# Patient Record
Sex: FEMALE | Race: WHITE | NOT HISPANIC OR LATINO | Employment: OTHER | URBAN - METROPOLITAN AREA
[De-identification: names, ages, dates, MRNs, and addresses within clinical notes are randomized per-mention and may not be internally consistent; named-entity substitution may affect disease eponyms.]

---

## 2017-03-02 ENCOUNTER — CONVERSION ENCOUNTER (OUTPATIENT)
Dept: MAMMOGRAPHY | Facility: HOSPITAL | Age: 62
End: 2017-03-02

## 2017-08-10 ENCOUNTER — HOSPITAL ENCOUNTER (OUTPATIENT)
Dept: HOSPITAL 23 - COPS | Age: 62
Discharge: HOME | End: 2017-08-10
Attending: SURGERY
Payer: COMMERCIAL

## 2017-08-10 DIAGNOSIS — K64.4: Primary | ICD-10-CM

## 2017-08-10 DIAGNOSIS — Z98.890: ICD-10-CM

## 2017-08-10 DIAGNOSIS — Z79.899: ICD-10-CM

## 2017-08-10 DIAGNOSIS — E66.01: ICD-10-CM

## 2017-08-10 DIAGNOSIS — Z98.51: ICD-10-CM

## 2017-08-10 DIAGNOSIS — K52.9: ICD-10-CM

## 2017-08-10 DIAGNOSIS — Z90.721: ICD-10-CM

## 2017-08-10 DIAGNOSIS — Z96.653: ICD-10-CM

## 2017-08-10 DIAGNOSIS — Z90.710: ICD-10-CM

## 2017-08-10 DIAGNOSIS — J30.2: ICD-10-CM

## 2017-08-10 DIAGNOSIS — J45.909: ICD-10-CM

## 2017-08-10 DIAGNOSIS — Z79.1: ICD-10-CM

## 2017-08-10 DIAGNOSIS — Z88.5: ICD-10-CM

## 2017-08-10 DIAGNOSIS — Z80.0: ICD-10-CM

## 2017-08-10 DIAGNOSIS — Z87.442: ICD-10-CM

## 2017-08-10 DIAGNOSIS — I10: ICD-10-CM

## 2017-08-10 DIAGNOSIS — G47.30: ICD-10-CM

## 2018-01-04 ENCOUNTER — OFFICE VISIT CONVERTED (OUTPATIENT)
Dept: INTERNAL MEDICINE | Facility: CLINIC | Age: 63
End: 2018-01-04
Attending: INTERNAL MEDICINE

## 2018-01-10 ENCOUNTER — OFFICE VISIT CONVERTED (OUTPATIENT)
Dept: CARDIOLOGY | Facility: CLINIC | Age: 63
End: 2018-01-10
Attending: INTERNAL MEDICINE

## 2018-01-26 ENCOUNTER — OFFICE VISIT CONVERTED (OUTPATIENT)
Dept: PULMONOLOGY | Facility: CLINIC | Age: 63
End: 2018-01-26
Attending: PHYSICIAN ASSISTANT

## 2018-02-26 ENCOUNTER — OFFICE VISIT CONVERTED (OUTPATIENT)
Dept: INTERNAL MEDICINE | Facility: CLINIC | Age: 63
End: 2018-02-26
Attending: INTERNAL MEDICINE

## 2018-03-19 ENCOUNTER — CONVERSION ENCOUNTER (OUTPATIENT)
Dept: CARDIOLOGY | Facility: CLINIC | Age: 63
End: 2018-03-19

## 2018-03-19 ENCOUNTER — CONVERSION ENCOUNTER (OUTPATIENT)
Dept: CARDIOLOGY | Facility: CLINIC | Age: 63
End: 2018-03-19
Attending: INTERNAL MEDICINE

## 2018-03-22 ENCOUNTER — OFFICE VISIT CONVERTED (OUTPATIENT)
Dept: PULMONOLOGY | Facility: CLINIC | Age: 63
End: 2018-03-22
Attending: INTERNAL MEDICINE

## 2018-04-17 ENCOUNTER — CONVERSION ENCOUNTER (OUTPATIENT)
Dept: MAMMOGRAPHY | Facility: HOSPITAL | Age: 63
End: 2018-04-17

## 2018-04-24 ENCOUNTER — CONVERSION ENCOUNTER (OUTPATIENT)
Dept: ULTRASOUND IMAGING | Facility: HOSPITAL | Age: 63
End: 2018-04-24

## 2018-05-21 ENCOUNTER — OFFICE VISIT CONVERTED (OUTPATIENT)
Dept: PULMONOLOGY | Facility: CLINIC | Age: 63
End: 2018-05-21
Attending: INTERNAL MEDICINE

## 2018-05-31 ENCOUNTER — OFFICE VISIT CONVERTED (OUTPATIENT)
Dept: SURGERY | Facility: CLINIC | Age: 63
End: 2018-05-31
Attending: SURGERY

## 2018-06-15 ENCOUNTER — OFFICE VISIT CONVERTED (OUTPATIENT)
Dept: INTERNAL MEDICINE | Facility: CLINIC | Age: 63
End: 2018-06-15
Attending: INTERNAL MEDICINE

## 2018-06-15 ENCOUNTER — CONVERSION ENCOUNTER (OUTPATIENT)
Dept: INTERNAL MEDICINE | Facility: CLINIC | Age: 63
End: 2018-06-15

## 2018-06-19 ENCOUNTER — CONVERSION ENCOUNTER (OUTPATIENT)
Dept: PERIOP | Facility: HOSPITAL | Age: 63
End: 2018-06-19

## 2018-06-27 ENCOUNTER — OFFICE VISIT CONVERTED (OUTPATIENT)
Dept: SURGERY | Facility: CLINIC | Age: 63
End: 2018-06-27
Attending: SURGERY

## 2018-07-11 ENCOUNTER — OFFICE VISIT CONVERTED (OUTPATIENT)
Dept: SURGERY | Facility: CLINIC | Age: 63
End: 2018-07-11
Attending: SURGERY

## 2018-07-25 ENCOUNTER — OFFICE VISIT CONVERTED (OUTPATIENT)
Dept: INTERNAL MEDICINE | Facility: CLINIC | Age: 63
End: 2018-07-25
Attending: INTERNAL MEDICINE

## 2018-08-13 ENCOUNTER — OFFICE VISIT CONVERTED (OUTPATIENT)
Dept: PULMONOLOGY | Facility: CLINIC | Age: 63
End: 2018-08-13
Attending: INTERNAL MEDICINE

## 2018-09-13 ENCOUNTER — OFFICE VISIT CONVERTED (OUTPATIENT)
Dept: SURGERY | Facility: CLINIC | Age: 63
End: 2018-09-13
Attending: SURGERY

## 2018-09-24 ENCOUNTER — OFFICE VISIT CONVERTED (OUTPATIENT)
Dept: INTERNAL MEDICINE | Facility: CLINIC | Age: 63
End: 2018-09-24
Attending: INTERNAL MEDICINE

## 2018-09-26 ENCOUNTER — OFFICE VISIT CONVERTED (OUTPATIENT)
Dept: CARDIOLOGY | Facility: CLINIC | Age: 63
End: 2018-09-26
Attending: NURSE PRACTITIONER

## 2018-11-01 ENCOUNTER — OFFICE VISIT CONVERTED (OUTPATIENT)
Dept: PULMONOLOGY | Facility: CLINIC | Age: 63
End: 2018-11-01
Attending: INTERNAL MEDICINE

## 2018-11-27 ENCOUNTER — OFFICE VISIT CONVERTED (OUTPATIENT)
Dept: INTERNAL MEDICINE | Facility: CLINIC | Age: 63
End: 2018-11-27
Attending: NURSE PRACTITIONER

## 2018-12-03 ENCOUNTER — OFFICE VISIT CONVERTED (OUTPATIENT)
Dept: INTERNAL MEDICINE | Facility: CLINIC | Age: 63
End: 2018-12-03
Attending: NURSE PRACTITIONER

## 2018-12-07 ENCOUNTER — OFFICE VISIT CONVERTED (OUTPATIENT)
Dept: INTERNAL MEDICINE | Facility: CLINIC | Age: 63
End: 2018-12-07
Attending: NURSE PRACTITIONER

## 2019-01-02 ENCOUNTER — OFFICE VISIT CONVERTED (OUTPATIENT)
Dept: INTERNAL MEDICINE | Facility: CLINIC | Age: 64
End: 2019-01-02
Attending: INTERNAL MEDICINE

## 2019-01-15 ENCOUNTER — HOSPITAL ENCOUNTER (OUTPATIENT)
Dept: CT IMAGING | Facility: HOSPITAL | Age: 64
Discharge: HOME | End: 2019-01-15
Attending: INTERNAL MEDICINE

## 2019-01-16 ENCOUNTER — HOSPITAL ENCOUNTER (OUTPATIENT)
Dept: CARDIOLOGY | Facility: HOSPITAL | Age: 64
Discharge: HOME | End: 2019-01-16
Attending: INTERNAL MEDICINE

## 2019-01-29 ENCOUNTER — OFFICE VISIT CONVERTED (OUTPATIENT)
Dept: INTERNAL MEDICINE | Facility: CLINIC | Age: 64
End: 2019-01-29
Attending: INTERNAL MEDICINE

## 2019-01-31 ENCOUNTER — OFFICE VISIT CONVERTED (OUTPATIENT)
Dept: PULMONOLOGY | Facility: CLINIC | Age: 64
End: 2019-01-31
Attending: INTERNAL MEDICINE

## 2019-03-07 ENCOUNTER — OFFICE VISIT CONVERTED (OUTPATIENT)
Dept: INTERNAL MEDICINE | Facility: CLINIC | Age: 64
End: 2019-03-07
Attending: NURSE PRACTITIONER

## 2019-08-16 ENCOUNTER — HOSPITAL ENCOUNTER (OUTPATIENT)
Dept: OTHER | Facility: HOSPITAL | Age: 64
Discharge: HOME | End: 2019-08-16
Attending: INTERNAL MEDICINE

## 2019-08-16 LAB
25(OH)D3 SERPL-MCNC: 31.2 NG/ML (ref 30–100)
ALBUMIN SERPL-MCNC: 4 G/DL (ref 3.5–5)
ALBUMIN/GLOB SERPL: 1.2 {RATIO} (ref 1.4–2.6)
ALP SERPL-CCNC: 94 U/L (ref 43–160)
ALT SERPL-CCNC: 19 U/L (ref 10–40)
ANION GAP SERPL CALC-SCNC: 18 MMOL/L (ref 8–19)
AST SERPL-CCNC: 18 U/L (ref 15–50)
BASOPHILS # BLD AUTO: 0.06 10*3/UL (ref 0–0.2)
BASOPHILS NFR BLD AUTO: 0.6 % (ref 0–3)
BILIRUB SERPL-MCNC: 0.36 MG/DL (ref 0.2–1.3)
BUN SERPL-MCNC: 18 MG/DL (ref 5–25)
BUN/CREAT SERPL: 24 {RATIO} (ref 6–20)
CALCIUM SERPL-MCNC: 9.2 MG/DL (ref 8.7–10.4)
CHLORIDE SERPL-SCNC: 101 MMOL/L (ref 99–111)
CHOLEST SERPL-MCNC: 155 MG/DL (ref 107–200)
CHOLEST/HDLC SERPL: 3.2 {RATIO} (ref 3–6)
CONV ABS IMM GRAN: 0.04 10*3/UL (ref 0–0.2)
CONV CO2: 26 MMOL/L (ref 22–32)
CONV IMMATURE GRAN: 0.4 % (ref 0–1.8)
CONV TOTAL PROTEIN: 7.3 G/DL (ref 6.3–8.2)
CREAT UR-MCNC: 0.75 MG/DL (ref 0.5–0.9)
DEPRECATED RDW RBC AUTO: 48.3 FL (ref 36.4–46.3)
EOSINOPHIL # BLD AUTO: 0.17 10*3/UL (ref 0–0.7)
EOSINOPHIL # BLD AUTO: 1.8 % (ref 0–7)
ERYTHROCYTE [DISTWIDTH] IN BLOOD BY AUTOMATED COUNT: 15.2 % (ref 11.7–14.4)
EST. AVERAGE GLUCOSE BLD GHB EST-MCNC: 131 MG/DL
GFR SERPLBLD BASED ON 1.73 SQ M-ARVRAT: >60 ML/MIN/{1.73_M2}
GLOBULIN UR ELPH-MCNC: 3.3 G/DL (ref 2–3.5)
GLUCOSE SERPL-MCNC: 138 MG/DL (ref 65–99)
HBA1C MFR BLD: 6.2 % (ref 3.5–5.7)
HCT VFR BLD AUTO: 38.6 % (ref 37–47)
HDLC SERPL-MCNC: 49 MG/DL (ref 40–60)
HGB BLD-MCNC: 11.5 G/DL (ref 12–16)
IRON SATN MFR SERPL: 8 % (ref 20–55)
IRON SERPL-MCNC: 40 UG/DL (ref 60–170)
LDLC SERPL CALC-MCNC: 84 MG/DL (ref 70–100)
LYMPHOCYTES # BLD AUTO: 1.67 10*3/UL (ref 1–5)
LYMPHOCYTES NFR BLD AUTO: 17.5 % (ref 20–45)
MCH RBC QN AUTO: 26 PG (ref 27–31)
MCHC RBC AUTO-ENTMCNC: 29.8 G/DL (ref 33–37)
MCV RBC AUTO: 87.3 FL (ref 81–99)
MONOCYTES # BLD AUTO: 0.8 10*3/UL (ref 0.2–1.2)
MONOCYTES NFR BLD AUTO: 8.4 % (ref 3–10)
NEUTROPHILS # BLD AUTO: 6.81 10*3/UL (ref 2–8)
NEUTROPHILS NFR BLD AUTO: 71.3 % (ref 30–85)
NRBC CBCN: 0 % (ref 0–0.7)
OSMOLALITY SERPL CALC.SUM OF ELEC: 294 MOSM/KG (ref 273–304)
PLATELET # BLD AUTO: 370 10*3/UL (ref 130–400)
PMV BLD AUTO: 10.1 FL (ref 9.4–12.3)
POTASSIUM SERPL-SCNC: 4.6 MMOL/L (ref 3.5–5.3)
RBC # BLD AUTO: 4.42 10*6/UL (ref 4.2–5.4)
SODIUM SERPL-SCNC: 140 MMOL/L (ref 135–147)
TIBC SERPL-MCNC: 488 UG/DL (ref 245–450)
TRANSFERRIN SERPL-MCNC: 341 MG/DL (ref 250–380)
TRIGL SERPL-MCNC: 112 MG/DL (ref 40–150)
VLDLC SERPL-MCNC: 22 MG/DL (ref 5–37)
WBC # BLD AUTO: 9.55 10*3/UL (ref 4.8–10.8)

## 2019-08-19 ENCOUNTER — OFFICE VISIT CONVERTED (OUTPATIENT)
Dept: CARDIOLOGY | Facility: CLINIC | Age: 64
End: 2019-08-19
Attending: INTERNAL MEDICINE

## 2019-08-21 ENCOUNTER — OFFICE VISIT CONVERTED (OUTPATIENT)
Dept: INTERNAL MEDICINE | Facility: CLINIC | Age: 64
End: 2019-08-21
Attending: INTERNAL MEDICINE

## 2019-11-04 ENCOUNTER — OFFICE VISIT CONVERTED (OUTPATIENT)
Dept: PULMONOLOGY | Facility: CLINIC | Age: 64
End: 2019-11-04
Attending: PHYSICIAN ASSISTANT

## 2019-12-23 ENCOUNTER — HOSPITAL ENCOUNTER (OUTPATIENT)
Dept: OTHER | Facility: HOSPITAL | Age: 64
Discharge: HOME | End: 2019-12-23
Attending: NURSE PRACTITIONER

## 2019-12-23 ENCOUNTER — OFFICE VISIT CONVERTED (OUTPATIENT)
Dept: INTERNAL MEDICINE | Facility: CLINIC | Age: 64
End: 2019-12-23
Attending: NURSE PRACTITIONER

## 2019-12-23 ENCOUNTER — CONVERSION ENCOUNTER (OUTPATIENT)
Dept: INTERNAL MEDICINE | Facility: CLINIC | Age: 64
End: 2019-12-23

## 2020-01-10 ENCOUNTER — HOSPITAL ENCOUNTER (OUTPATIENT)
Dept: MAMMOGRAPHY | Facility: HOSPITAL | Age: 65
Discharge: HOME | End: 2020-01-10
Attending: INTERNAL MEDICINE

## 2020-01-16 ENCOUNTER — HOSPITAL ENCOUNTER (OUTPATIENT)
Dept: OTHER | Facility: HOSPITAL | Age: 65
Discharge: HOME | End: 2020-01-16
Attending: INTERNAL MEDICINE

## 2020-01-16 LAB
ANION GAP SERPL CALC-SCNC: 18 MMOL/L (ref 8–19)
BASOPHILS # BLD AUTO: 0.04 10*3/UL (ref 0–0.2)
BASOPHILS NFR BLD AUTO: 0.3 % (ref 0–3)
BUN SERPL-MCNC: 16 MG/DL (ref 5–25)
BUN/CREAT SERPL: 21 {RATIO} (ref 6–20)
CALCIUM SERPL-MCNC: 9.7 MG/DL (ref 8.7–10.4)
CHLORIDE SERPL-SCNC: 100 MMOL/L (ref 99–111)
CONV ABS IMM GRAN: 0.04 10*3/UL (ref 0–0.2)
CONV CO2: 25 MMOL/L (ref 22–32)
CONV IMMATURE GRAN: 0.3 % (ref 0–1.8)
CREAT UR-MCNC: 0.76 MG/DL (ref 0.5–0.9)
DEPRECATED RDW RBC AUTO: 48.5 FL (ref 36.4–46.3)
EOSINOPHIL # BLD AUTO: 0.16 10*3/UL (ref 0–0.7)
EOSINOPHIL # BLD AUTO: 1.3 % (ref 0–7)
ERYTHROCYTE [DISTWIDTH] IN BLOOD BY AUTOMATED COUNT: 15.1 % (ref 11.7–14.4)
GFR SERPLBLD BASED ON 1.73 SQ M-ARVRAT: >60 ML/MIN/{1.73_M2}
GLUCOSE SERPL-MCNC: 103 MG/DL (ref 65–99)
HCT VFR BLD AUTO: 41.8 % (ref 37–47)
HGB BLD-MCNC: 12.3 G/DL (ref 12–16)
LYMPHOCYTES # BLD AUTO: 2.68 10*3/UL (ref 1–5)
LYMPHOCYTES NFR BLD AUTO: 22.3 % (ref 20–45)
MCH RBC QN AUTO: 26.2 PG (ref 27–31)
MCHC RBC AUTO-ENTMCNC: 29.4 G/DL (ref 33–37)
MCV RBC AUTO: 89.1 FL (ref 81–99)
MONOCYTES # BLD AUTO: 0.79 10*3/UL (ref 0.2–1.2)
MONOCYTES NFR BLD AUTO: 6.6 % (ref 3–10)
NEUTROPHILS # BLD AUTO: 8.33 10*3/UL (ref 2–8)
NEUTROPHILS NFR BLD AUTO: 69.2 % (ref 30–85)
NRBC CBCN: 0 % (ref 0–0.7)
OSMOLALITY SERPL CALC.SUM OF ELEC: 287 MOSM/KG (ref 273–304)
PLATELET # BLD AUTO: 363 10*3/UL (ref 130–400)
PMV BLD AUTO: 10.3 FL (ref 9.4–12.3)
POTASSIUM SERPL-SCNC: 4.5 MMOL/L (ref 3.5–5.3)
RBC # BLD AUTO: 4.69 10*6/UL (ref 4.2–5.4)
SODIUM SERPL-SCNC: 138 MMOL/L (ref 135–147)
WBC # BLD AUTO: 12.04 10*3/UL (ref 4.8–10.8)

## 2020-11-04 ENCOUNTER — CONVERSION ENCOUNTER (OUTPATIENT)
Dept: CARDIOLOGY | Facility: CLINIC | Age: 65
End: 2020-11-04

## 2020-11-04 ENCOUNTER — OFFICE VISIT CONVERTED (OUTPATIENT)
Dept: CARDIOLOGY | Facility: CLINIC | Age: 65
End: 2020-11-04
Attending: INTERNAL MEDICINE

## 2020-11-12 ENCOUNTER — CONVERSION ENCOUNTER (OUTPATIENT)
Dept: INTERNAL MEDICINE | Facility: CLINIC | Age: 65
End: 2020-11-12

## 2020-11-12 ENCOUNTER — OFFICE VISIT CONVERTED (OUTPATIENT)
Dept: INTERNAL MEDICINE | Facility: CLINIC | Age: 65
End: 2020-11-12
Attending: STUDENT IN AN ORGANIZED HEALTH CARE EDUCATION/TRAINING PROGRAM

## 2020-11-12 ENCOUNTER — HOSPITAL ENCOUNTER (OUTPATIENT)
Dept: OTHER | Facility: HOSPITAL | Age: 65
Discharge: HOME | End: 2020-11-12
Attending: STUDENT IN AN ORGANIZED HEALTH CARE EDUCATION/TRAINING PROGRAM

## 2020-11-12 LAB
EST. AVERAGE GLUCOSE BLD GHB EST-MCNC: 163 MG/DL
HBA1C MFR BLD: 7.3 % (ref 3.5–5.7)
IRON SATN MFR SERPL: 5 % (ref 20–55)
IRON SERPL-MCNC: 27 UG/DL (ref 60–170)
TIBC SERPL-MCNC: 513 UG/DL (ref 245–450)
TRANSFERRIN SERPL-MCNC: 359 MG/DL (ref 250–380)

## 2020-11-13 LAB
25(OH)D3 SERPL-MCNC: 35 NG/ML (ref 30–100)
ALBUMIN SERPL-MCNC: 4 G/DL (ref 3.5–5)
ALBUMIN/GLOB SERPL: 1.2 {RATIO} (ref 1.4–2.6)
ALP SERPL-CCNC: 111 U/L (ref 43–160)
ALT SERPL-CCNC: 17 U/L (ref 10–40)
ANION GAP SERPL CALC-SCNC: 16 MMOL/L (ref 8–19)
AST SERPL-CCNC: 17 U/L (ref 15–50)
BASOPHILS # BLD AUTO: 0.03 10*3/UL (ref 0–0.2)
BASOPHILS NFR BLD AUTO: 0.3 % (ref 0–3)
BILIRUB SERPL-MCNC: 0.37 MG/DL (ref 0.2–1.3)
BUN SERPL-MCNC: 15 MG/DL (ref 5–25)
BUN/CREAT SERPL: 21 {RATIO} (ref 6–20)
CALCIUM SERPL-MCNC: 9.6 MG/DL (ref 8.7–10.4)
CHLORIDE SERPL-SCNC: 100 MMOL/L (ref 99–111)
CHOLEST SERPL-MCNC: 140 MG/DL (ref 107–200)
CHOLEST/HDLC SERPL: 3.2 {RATIO} (ref 3–6)
CONV ABS IMM GRAN: 0.08 10*3/UL (ref 0–0.2)
CONV CO2: 28 MMOL/L (ref 22–32)
CONV IMMATURE GRAN: 0.9 % (ref 0–1.8)
CONV TOTAL PROTEIN: 7.3 G/DL (ref 6.3–8.2)
CREAT UR-MCNC: 0.73 MG/DL (ref 0.5–0.9)
DEPRECATED RDW RBC AUTO: 52.2 FL (ref 36.4–46.3)
EOSINOPHIL # BLD AUTO: 0.13 10*3/UL (ref 0–0.7)
EOSINOPHIL # BLD AUTO: 1.4 % (ref 0–7)
ERYTHROCYTE [DISTWIDTH] IN BLOOD BY AUTOMATED COUNT: 17.2 % (ref 11.7–14.4)
GFR SERPLBLD BASED ON 1.73 SQ M-ARVRAT: >60 ML/MIN/{1.73_M2}
GLOBULIN UR ELPH-MCNC: 3.3 G/DL (ref 2–3.5)
GLUCOSE SERPL-MCNC: 122 MG/DL (ref 65–99)
HCT VFR BLD AUTO: 38.9 % (ref 37–47)
HDLC SERPL-MCNC: 44 MG/DL (ref 40–60)
HGB BLD-MCNC: 11 G/DL (ref 12–16)
LDLC SERPL CALC-MCNC: 75 MG/DL (ref 70–100)
LYMPHOCYTES # BLD AUTO: 1.44 10*3/UL (ref 1–5)
LYMPHOCYTES NFR BLD AUTO: 15.4 % (ref 20–45)
MCH RBC QN AUTO: 23.5 PG (ref 27–31)
MCHC RBC AUTO-ENTMCNC: 28.3 G/DL (ref 33–37)
MCV RBC AUTO: 83.1 FL (ref 81–99)
MONOCYTES # BLD AUTO: 0.7 10*3/UL (ref 0.2–1.2)
MONOCYTES NFR BLD AUTO: 7.5 % (ref 3–10)
NEUTROPHILS # BLD AUTO: 6.98 10*3/UL (ref 2–8)
NEUTROPHILS NFR BLD AUTO: 74.5 % (ref 30–85)
NRBC CBCN: 0 % (ref 0–0.7)
OSMOLALITY SERPL CALC.SUM OF ELEC: 290 MOSM/KG (ref 273–304)
PLATELET # BLD AUTO: 408 10*3/UL (ref 130–400)
PMV BLD AUTO: 10.9 FL (ref 9.4–12.3)
POTASSIUM SERPL-SCNC: 4.9 MMOL/L (ref 3.5–5.3)
RBC # BLD AUTO: 4.68 10*6/UL (ref 4.2–5.4)
SODIUM SERPL-SCNC: 139 MMOL/L (ref 135–147)
TRIGL SERPL-MCNC: 103 MG/DL (ref 40–150)
TSH SERPL-ACNC: 1.15 M[IU]/L (ref 0.27–4.2)
VLDLC SERPL-MCNC: 21 MG/DL (ref 5–37)
WBC # BLD AUTO: 9.36 10*3/UL (ref 4.8–10.8)

## 2020-11-18 ENCOUNTER — HOSPITAL ENCOUNTER (OUTPATIENT)
Dept: GENERAL RADIOLOGY | Facility: HOSPITAL | Age: 65
Discharge: HOME | End: 2020-11-18
Attending: STUDENT IN AN ORGANIZED HEALTH CARE EDUCATION/TRAINING PROGRAM

## 2020-12-23 ENCOUNTER — CONVERSION ENCOUNTER (OUTPATIENT)
Dept: INTERNAL MEDICINE | Facility: CLINIC | Age: 65
End: 2020-12-23

## 2020-12-23 ENCOUNTER — OFFICE VISIT CONVERTED (OUTPATIENT)
Dept: INTERNAL MEDICINE | Facility: CLINIC | Age: 65
End: 2020-12-23
Attending: STUDENT IN AN ORGANIZED HEALTH CARE EDUCATION/TRAINING PROGRAM

## 2021-01-04 ENCOUNTER — TELEPHONE (OUTPATIENT)
Dept: OBSTETRICS AND GYNECOLOGY | Facility: CLINIC | Age: 66
End: 2021-01-04

## 2021-01-04 NOTE — TELEPHONE ENCOUNTER
Patient's daughter is your patient.    I scheduled her (new patient) for tomorrow due to the fact she has been dealing with swelling and tenderness in her left breast and PCP has not completed any tests.   Daughter is very worried as her mom is having pain and this has been going on for a month. Is her appt ok or do you want me to move out.    Thanks

## 2021-01-05 ENCOUNTER — OFFICE VISIT (OUTPATIENT)
Dept: OBSTETRICS AND GYNECOLOGY | Facility: CLINIC | Age: 66
End: 2021-01-05

## 2021-01-05 VITALS
SYSTOLIC BLOOD PRESSURE: 138 MMHG | DIASTOLIC BLOOD PRESSURE: 81 MMHG | WEIGHT: 293 LBS | BODY MASS INDEX: 48.82 KG/M2 | HEART RATE: 68 BPM | HEIGHT: 65 IN

## 2021-01-05 DIAGNOSIS — N64.4 BREAST PAIN: Primary | ICD-10-CM

## 2021-01-05 DIAGNOSIS — N64.59 PEAU D'ORANGE OVER BREAST: ICD-10-CM

## 2021-01-05 DIAGNOSIS — N64.89 FULLNESS OF BREAST: ICD-10-CM

## 2021-01-05 PROCEDURE — 99203 OFFICE O/P NEW LOW 30 MIN: CPT | Performed by: OBSTETRICS & GYNECOLOGY

## 2021-01-05 RX ORDER — MULTIVIT-MIN/IRON/FOLIC ACID/K 18-600-40
2000 CAPSULE ORAL DAILY
COMMUNITY

## 2021-01-05 RX ORDER — FEXOFENADINE HCL 180 MG/1
180 TABLET ORAL DAILY PRN
COMMUNITY
End: 2021-08-03

## 2021-01-05 RX ORDER — PRAVASTATIN SODIUM 40 MG
40 TABLET ORAL NIGHTLY
COMMUNITY

## 2021-01-05 RX ORDER — DIPHENOXYLATE HYDROCHLORIDE AND ATROPINE SULFATE 2.5; .025 MG/1; MG/1
1 TABLET ORAL DAILY
COMMUNITY

## 2021-01-05 RX ORDER — MELOXICAM 15 MG/1
15 TABLET ORAL DAILY
COMMUNITY
End: 2021-04-14

## 2021-01-05 RX ORDER — FUROSEMIDE 40 MG/1
20 TABLET ORAL DAILY
COMMUNITY
End: 2021-11-30

## 2021-01-05 RX ORDER — FLUOXETINE HYDROCHLORIDE 20 MG/1
20 CAPSULE ORAL DAILY
COMMUNITY
End: 2021-01-19

## 2021-01-05 RX ORDER — CARVEDILOL 25 MG/1
12.5 TABLET ORAL 2 TIMES DAILY WITH MEALS
COMMUNITY
End: 2021-08-06 | Stop reason: SDUPTHER

## 2021-01-05 RX ORDER — SPIRONOLACTONE 25 MG/1
25 TABLET ORAL DAILY
COMMUNITY

## 2021-01-05 RX ORDER — MONTELUKAST SODIUM 10 MG/1
10 TABLET ORAL NIGHTLY
COMMUNITY

## 2021-01-05 NOTE — PROGRESS NOTES
Pt c/o swelling and hardness in her R breast. Pt noticed at the beginning of November. Pt saw her PCP 11/12/2020, given Keflex and had a Mammogram and US, all normal. Pt was still having same symptoms after antibiotics and PCP has ordered a needle biopsy. Pt is awaiting an appt for this currently, buthas been 2 weeks since she has heard anything from her PCP.     Breast pain     HPI     65 y.o.   Started 2 months ago with pain and hardness in her right breast  Constant breast pain on right, worsened initially after started, but has been stabilized since - no alleviating or aggravating symptoms    - Seen by PCP short time later and they did mammogram on both sides and breast ultrasound on her right.  She was told it was normal -   They did not read any abnormality in this area   Given antibiotic (keflex) no response at all.   Feels like breast engorgement to here, No distinct mass   Tenderness present.   Hx of breast biopsy in the past -   Thinks likely some adenomatous like hyperplasia, remove blockage likely > 5 years ago    PCP wanted to send her back for consider second MMG and possible needle biopsy     Past Medical History:   Diagnosis Date   • Anemia    • Congestive heart failure (CHF) (CMS/HCC)    • Diabetes mellitus (CMS/HCC)    • Hypertension      Past Surgical History:   Procedure Laterality Date   • BREAST BIOPSY     • KNEE ARTHROPLASTY, PARTIAL REPLACEMENT     • SPINE SURGERY     • TOTAL ABDOMINAL HYSTERECTOMY WITH SALPINGO OOPHORECTOMY       Med list reviewed.     Allergies   Allergen Reactions   • Amlodipine Swelling   • Hydrochlorothiazide Other (See Comments)     Neuro issues   • Morphine Nausea And Vomiting     hallucinations   • Adhesive Tape Rash     Social History     Tobacco Use   • Smoking status: Never Smoker   • Smokeless tobacco: Never Used   Substance Use Topics   • Alcohol use: Not Currently   • Drug use: Never     Family History   Problem Relation Age of Onset   • Breast cancer Mother   "  • Colon cancer Paternal Grandmother    • Breast cancer Maternal Aunt    • Breast cancer Paternal Aunt    • Ovarian cancer Neg Hx    • Uterine cancer Neg Hx    • Deep vein thrombosis Neg Hx    • Pulmonary embolism Neg Hx          Review of Systems   Constitutional: Negative for fatigue and unexpected weight change.   Gastrointestinal: Negative for abdominal pain.   Genitourinary: Negative for pelvic pain and vaginal bleeding.   All other systems reviewed and are negative.      /81   Pulse 68   Ht 165.1 cm (65\")   Wt (!) 161 kg (355 lb)   Breastfeeding No   BMI 59.08 kg/m²     Physical Exam  Constitutional:       General: She is not in acute distress.     Appearance: She is obese.   HENT:      Head: Normocephalic and atraumatic.   Neck:      Musculoskeletal: Normal range of motion. No neck rigidity.   Cardiovascular:      Rate and Rhythm: Normal rate and regular rhythm.      Pulses: Normal pulses.   Pulmonary:      Effort: Pulmonary effort is normal. No respiratory distress.      Breath sounds: Normal breath sounds.   Chest:      Breasts:         Right: Skin change (peau d'orange centralized around areaola) and tenderness present. No bleeding, mass or nipple discharge.         Left: No bleeding, mass or nipple discharge.   Abdominal:      General: Abdomen is flat. There is no distension.      Palpations: Abdomen is soft.      Tenderness: There is no abdominal tenderness.   Musculoskeletal:      Right lower leg: No edema.      Left lower leg: No edema.   Neurological:      General: No focal deficit present.      Mental Status: She is alert and oriented to person, place, and time.   Skin:     General: Skin is warm and dry.   Psychiatric:         Mood and Affect: Mood normal.         Behavior: Behavior normal.         Thought Content: Thought content normal.   Vitals signs reviewed. Exam conducted with a chaperone present.            Assessment     Diagnoses and all orders for this visit:    1. Breast pain " (Primary)  -     Ambulatory Referral to Breast Surgery    2. Fullness of breast  -     Ambulatory Referral to Breast Surgery    3. Peau d'orange over breast  -     Ambulatory Referral to Breast Surgery    Concern based on her symptoms and history for need to rule out inflammatory breast cancer in this patient.   Will try and get results of prior ultrasound and biopsy, but think evaluation with breast surgeon for possible localized biopsy is best next step to see if is or is not related to inflammatory breast cancer.   S/P antibiotics with no affect.     Dipesh Leone MD  1/5/2021  13:45 EST

## 2021-01-11 ENCOUNTER — TELEPHONE (OUTPATIENT)
Dept: OBSTETRICS AND GYNECOLOGY | Facility: CLINIC | Age: 66
End: 2021-01-11

## 2021-01-11 NOTE — TELEPHONE ENCOUNTER
----- Message from Dipesh Leone MD sent at 1/5/2021  2:10 PM EST -----  Antoinette, Need to get her records from [facility] ([facility]) for recent mammogram and breast ultrasound. Also need to get her appointment with one of the breast surgeons (Dr. Banks or Dangelo) to see about ruling out inflammatory breast cancer. Thanks, Dr. Leone

## 2021-01-13 ENCOUNTER — TELEPHONE (OUTPATIENT)
Dept: SURGERY | Facility: CLINIC | Age: 66
End: 2021-01-13

## 2021-01-13 ENCOUNTER — TELEPHONE (OUTPATIENT)
Dept: OBSTETRICS AND GYNECOLOGY | Facility: CLINIC | Age: 66
End: 2021-01-13

## 2021-01-13 NOTE — TELEPHONE ENCOUNTER
----- Message from Dipesh Leone MD sent at 1/13/2021 11:05 AM EST -----  Antoinette, this is the initial MMG and ultrasound report suggesting just thickening of skin. Again, I think she needs referral to [facility] or [facility] of breast surgery to evaluate possibility of inflammatory breast cancer. How is it going on getting that referral set up? Thanks, Dr. Leone

## 2021-01-13 NOTE — TELEPHONE ENCOUNTER
Forwarding to Elly in mammogram dept.  Referral was set for scheduling at Dr. Gonzalez office on 1/6/21, unsure why they have not called pt to schedule.

## 2021-01-13 NOTE — TELEPHONE ENCOUNTER
Pt calling she is upset that she has not heard from breast surgeon. She would like a call asap.marely

## 2021-01-13 NOTE — TELEPHONE ENCOUNTER
New patient appointment with Dr. Banks is scheduled on 1/19/2021 @ 1:00pm.    Called and spoke to patient, patient expressed v/u of appointment times.    Sent patient a reminder letter in the mail.

## 2021-01-13 NOTE — TELEPHONE ENCOUNTER
Called Dr Banks's office to verify when appt was scheduled.  The informed me they are waiting for patient to call them back to confirm appt on 3/23/21 @ 830am with the Nurse Pract.    I called patient and she was not at home per her .  Will call back later.  SR

## 2021-01-14 NOTE — TELEPHONE ENCOUNTER
Dr Banks's office has moved appointment up to 1/19/21 @ 2pm.  Pt is aware and relieved.   Referral is completed.   SR

## 2021-01-15 ENCOUNTER — TELEPHONE (OUTPATIENT)
Dept: OBSTETRICS AND GYNECOLOGY | Facility: CLINIC | Age: 66
End: 2021-01-15

## 2021-01-15 NOTE — TELEPHONE ENCOUNTER
----- Message from Dipesh Leone MD sent at 1/15/2021  3:01 PM EST -----  Kassy, I have notes from Dr. Banks's office. They plan to see next week. Thanks, Dr Leone

## 2021-01-19 ENCOUNTER — LAB REQUISITION (OUTPATIENT)
Dept: LAB | Facility: HOSPITAL | Age: 66
End: 2021-01-19

## 2021-01-19 ENCOUNTER — OFFICE VISIT (OUTPATIENT)
Dept: SURGERY | Facility: CLINIC | Age: 66
End: 2021-01-19

## 2021-01-19 ENCOUNTER — APPOINTMENT (OUTPATIENT)
Dept: WOMENS IMAGING | Facility: HOSPITAL | Age: 66
End: 2021-01-19

## 2021-01-19 ENCOUNTER — PREP FOR SURGERY (OUTPATIENT)
Dept: OTHER | Facility: HOSPITAL | Age: 66
End: 2021-01-19

## 2021-01-19 VITALS
HEART RATE: 79 BPM | BODY MASS INDEX: 48.82 KG/M2 | WEIGHT: 293 LBS | OXYGEN SATURATION: 95 % | DIASTOLIC BLOOD PRESSURE: 87 MMHG | HEIGHT: 65 IN | SYSTOLIC BLOOD PRESSURE: 143 MMHG | TEMPERATURE: 97.8 F

## 2021-01-19 DIAGNOSIS — C50.911 INFLAMMATORY BREAST CANCER, RIGHT (HCC): Primary | ICD-10-CM

## 2021-01-19 DIAGNOSIS — Z00.00 ENCOUNTER FOR GENERAL ADULT MEDICAL EXAMINATION WITHOUT ABNORMAL FINDINGS: ICD-10-CM

## 2021-01-19 DIAGNOSIS — Z01.818 ENCOUNTER FOR ECHOCARDIOGRAM BEFORE INITIATION OF CHEMOTHERAPY: ICD-10-CM

## 2021-01-19 DIAGNOSIS — Z80.3 FAMILY HISTORY OF BREAST CANCER: ICD-10-CM

## 2021-01-19 DIAGNOSIS — N64.89 OTHER SPECIFIED DISORDERS OF BREAST: ICD-10-CM

## 2021-01-19 PROCEDURE — 11104 PUNCH BX SKIN SINGLE LESION: CPT | Performed by: SURGERY

## 2021-01-19 PROCEDURE — G0279 TOMOSYNTHESIS, MAMMO: HCPCS | Performed by: RADIOLOGY

## 2021-01-19 PROCEDURE — 77066 DX MAMMO INCL CAD BI: CPT | Performed by: RADIOLOGY

## 2021-01-19 PROCEDURE — 88361 TUMOR IMMUNOHISTOCHEM/COMPUT: CPT

## 2021-01-19 PROCEDURE — 11105 PUNCH BX SKIN EA SEP/ADDL: CPT | Performed by: SURGERY

## 2021-01-19 PROCEDURE — 76641 ULTRASOUND BREAST COMPLETE: CPT | Performed by: RADIOLOGY

## 2021-01-19 PROCEDURE — 38505 NEEDLE BIOPSY LYMPH NODES: CPT | Performed by: RADIOLOGY

## 2021-01-19 PROCEDURE — 99205 OFFICE O/P NEW HI 60 MIN: CPT | Performed by: SURGERY

## 2021-01-19 PROCEDURE — 88305 TISSUE EXAM BY PATHOLOGIST: CPT | Performed by: SURGERY

## 2021-01-19 PROCEDURE — 19083 BX BREAST 1ST LESION US IMAG: CPT | Performed by: RADIOLOGY

## 2021-01-19 PROCEDURE — 88305 TISSUE EXAM BY PATHOLOGIST: CPT

## 2021-01-19 PROCEDURE — 88342 IMHCHEM/IMCYTCHM 1ST ANTB: CPT | Performed by: SURGERY

## 2021-01-19 PROCEDURE — G2212 PROLONG OUTPT/OFFICE VIS: HCPCS | Performed by: SURGERY

## 2021-01-19 RX ORDER — CEFAZOLIN SODIUM IN 0.9 % NACL 3 G/100 ML
3 INTRAVENOUS SOLUTION, PIGGYBACK (ML) INTRAVENOUS ONCE
Status: CANCELLED | OUTPATIENT
Start: 2021-01-25 | End: 2021-01-19

## 2021-01-19 RX ORDER — ALBUTEROL SULFATE 90 UG/1
2 AEROSOL, METERED RESPIRATORY (INHALATION) EVERY 4 HOURS PRN
COMMUNITY
Start: 2020-11-12

## 2021-01-19 RX ORDER — FLUOXETINE HYDROCHLORIDE 40 MG/1
40 CAPSULE ORAL DAILY
COMMUNITY
Start: 2020-11-13 | End: 2021-07-01

## 2021-01-19 NOTE — PROGRESS NOTES
[facility]     Referring Provider: Dipesh eLone MD     Chief complaint: Right breast enlarged, inflamed, red, and painful     HPI: Ms. Aida Conner is a 64 yo woman, seen at the request of Dr. Dipesh Leone, for evaluation of right breast changes, possible inflammatory breast cancer. In November 2020, the patient noticed that her right breast had become enlarged, firm, painful, and red. She underwent mammogram and ultrasound at the time, which was read as benign aside from skin thickening and edema. She was treated with a course of antibiotics by her PCP without improvement. She says that the redness and swelling initially started in the lower inner portion of her breast, but over the past 2 months, it has spread to involve the entire breast. She can no longer wear a bra due to discomfort.    She has a past history of 2 benign left breast excisional biopsies many years ago, as well as a right breast excisional biopsy in 2018 for an intraductal papilloma with incidental atypical ductal and lobular hyperplasia. Her past medical history includes obesity, COPD on oxygen, sleep apnea, diabetes and CHF. She has a family history of breast cancer in her mother (diagnosed at age 45), a maternal aunt (diagnosed in her 70s), and a paternal aunt (diagnosed in her 70s). She denies any family history of ovarian cancer. She says she was seen by a medical oncologist in 2018 after the excisional biopsy to discuss possible chemoprevention. She was referred for genetic testing, however it was not covered by insurance so she did not have it done. She ended up deciding not to pursue endocrine therapy due to concerns about side effects.      Oncology/Hematology History Overview Note   06/19/18, Right Breast Excisional Biopsy ([facility], Dr. Juani Barragan):  Right breast wire guided excision:  Small foci of atypical ductal hyperplasia (0.5-1 mm); completely excised  Minute focus of atypical lobular hyperplasia (<0.5 mm)  completely excised  Residual intraductal papilloma with focal sclerosis (approximately 7 mm in greatest dimension); adequately excised  Ectactic ducts  Fibrocystic changes (stromal fibrosis, Sclerosing adenosis) with mild to moderate ductal hyperplasia  Rare microcalcifications associated with adenosis  Extensive vascular calcification  Previous biopsy site    09/07/2018, Right breast US ([facility]):  2.5 predominantly cystic lesion in the operative bed contains fine septations, and is consistent with an organizing hematoma.   No solid relatively hypo or hyperechoic mass is evident  BI-RADS 2: Benign.    1/10/2020, Screening MMG with Saurabh ([facility]):  Post lumpectomy changes on the left are stable.  Post biopsy changes on the right are evident.   No suspicious mass, area or architectural distortion or suspicious microcalcification is identified.  BI-RADS 2: Benign.    11/1/20: Pt noticed right breast was enlarged, firm, and red. Treated with a course of antibiotics with no improvement.     Inflammatory breast cancer, right (CMS/HCC)   11/17/2020 Initial Diagnosis    Inflammatory breast cancer, right (CMS/HCC)     11/18/2020 Imaging    Bilateral Diagnostic MMG with Saurabh & Right Breast US ([facility]):  MMG:  Again seen within the anterior right breast near the 12 o’clock position in an area postoperative architectural distortion. There are multiple surgical clips in this region. Findings are unchanged from 1/10/2020. No new mass or architectural distortion seen within the right breast. No new suspicious calcifications. There is new skin thickening along the anterior aspect of the right breast of uncertain etiology.   There is stable architectural distortion in the upper outer quadrant of the left breast. There is no new mass or architectural distortion. There are stable scattered punctate calcifications. No suspicious calcifications.  US:  Limited sonographic images of the right breast were obtained at the 12 o’clock  position at the site of the patient’s reported right breast fullness. This is in the retroareolar area. Images demonstrate some mild skin thickening and edema within the subcutaneous and superficial fat. Findings are nonspecific but could be seen with developing cellulitis. No discrete solid mass lesion identified. No cyst or abnormal fluid collection is seen.  BI-RADS 2: Benign.         Review of Systems   Constitutional: Positive for chills and fatigue. Negative for appetite change, diaphoresis, fever and unexpected weight change.   HENT:   Positive for hearing loss. Negative for lump/mass, mouth sores, nosebleeds, sore throat, tinnitus, trouble swallowing and voice change.    Eyes: Negative for eye problems and icterus.   Respiratory: Negative for chest tightness, cough, hemoptysis, shortness of breath and wheezing.    Cardiovascular: Negative for chest pain, leg swelling and palpitations.        H/o congestive heart failure   Gastrointestinal: Positive for blood in stool, constipation and diarrhea. Negative for abdominal distention, abdominal pain, nausea, rectal pain and vomiting.        Chronic   Endocrine: Negative for hot flashes.   Genitourinary: Negative for bladder incontinence, difficulty urinating, dyspareunia, dysuria, frequency, hematuria, menstrual problem, nocturia, pelvic pain, vaginal bleeding and vaginal discharge.    Musculoskeletal: Negative for arthralgias, back pain, flank pain, gait problem, myalgias, neck pain and neck stiffness.   Skin: Negative for itching, rash and wound.   Neurological: Negative for dizziness, extremity weakness, gait problem, headaches, light-headedness, numbness, seizures and speech difficulty.   Hematological: Negative for adenopathy. Does not bruise/bleed easily.   Psychiatric/Behavioral: Negative for confusion, decreased concentration, depression, sleep disturbance and suicidal ideas. The patient is not nervous/anxious.    I personally reviewed and updated the  ROS.      Medications:    Current Outpatient Medications:   •  albuterol sulfate  (90 Base) MCG/ACT inhaler, inhale 1 to 2 puffs EVERY 4 TO 6 HOURS AS NEEDED, Disp: , Rfl:   •  carvedilol (COREG) 25 MG tablet, Take 25 mg by mouth 2 (Two) Times a Day With Meals., Disp: , Rfl:   •  Cholecalciferol (Vitamin D) 50 MCG (2000 UT) capsule, Take 2,000 Units by mouth Daily., Disp: , Rfl:   •  fexofenadine (ALLEGRA) 180 MG tablet, Take 180 mg by mouth Daily., Disp: , Rfl:   •  FLUoxetine (PROzac) 40 MG capsule, Take 40 mg by mouth Daily., Disp: , Rfl:   •  furosemide (LASIX) 40 MG tablet, Take 40 mg by mouth 2 (Two) Times a Day., Disp: , Rfl:   •  meloxicam (MOBIC) 15 MG tablet, Take 15 mg by mouth Daily., Disp: , Rfl:   •  metFORMIN (GLUCOPHAGE) 500 MG tablet, Take 500 mg by mouth 2 (Two) Times a Day With Meals., Disp: , Rfl:   •  mometasone-formoterol (DULERA 100) 100-5 MCG/ACT inhaler, Inhale 2 Puffs/kg Daily., Disp: , Rfl:   •  montelukast (SINGULAIR) 10 MG tablet, Take 10 mg by mouth Every Night., Disp: , Rfl:   •  multivitamin (THERAGRAN) tablet tablet, Take  by mouth Daily., Disp: , Rfl:   •  pravastatin (PRAVACHOL) 40 MG tablet, Take 40 mg by mouth Daily., Disp: , Rfl:   •  spironolactone (ALDACTONE) 25 MG tablet, Take 25 mg by mouth Daily., Disp: , Rfl:       Allergies   Allergen Reactions   • Amlodipine Swelling   • Hydrochlorothiazide Other (See Comments)     Neuro issues   • Morphine Nausea And Vomiting     hallucinations   • Adhesive Tape Rash       Past Medical History:   Diagnosis Date   • Anemia    • Congestive heart failure (CHF) (CMS/HCC)    • COPD (chronic obstructive pulmonary disease) (CMS/HCC)    • Diabetes mellitus (CMS/HCC)    • Hypertension    • Obesity    • Osteoarthritis    • Sleep apnea        Past Surgical History:   Procedure Laterality Date   • BREAST EXCISIONAL BIOPSY Bilateral    • KNEE ARTHROPLASTY, PARTIAL REPLACEMENT     • SPINE SURGERY  1984   • TOTAL ABDOMINAL HYSTERECTOMY WITH  SALPINGO OOPHORECTOMY         Family History   Problem Relation Age of Onset   • Breast cancer Mother 45   • Colon cancer Paternal Grandmother    • Breast cancer Maternal Aunt 70   • Breast cancer Paternal Aunt 70   • Lung cancer Father    • Hodgkin's lymphoma Father    • Skin cancer Father         squamous cell   • Ovarian cancer Neg Hx    • Uterine cancer Neg Hx    • Deep vein thrombosis Neg Hx    • Pulmonary embolism Neg Hx        Social History     Socioeconomic History   • Marital status:      Spouse name: Not on file   • Number of children: Not on file   • Years of education: Not on file   • Highest education level: Not on file   Occupational History   • Occupation: retired teacher   Tobacco Use   • Smoking status: Never Smoker   • Smokeless tobacco: Never Used   Substance and Sexual Activity   • Alcohol use: Not Currently   • Drug use: Never   • Sexual activity: Not Currently   Social History Narrative    3 daughters       GYNECOLOGIC HISTORY:   G: 3. P: 3. AB: 0.  Last menstrual period: , sp LACHO/BSO in  for bleeding  Age at menarche: 9  Age at first childbirth: 20  Lactation/How lon months  Age at menopause: 49  Total years of oral contraceptive use: 0  Total years of hormone replacement therapy: 0      Physical Exam  Vitals:    21 1251   BP: 143/87   Pulse: 79   Temp: 97.8 °F (36.6 °C)   SpO2: 95%     ECOG 1 - Symptomatic but completely ambulatory  General: NAD, obese  Psych: a&o x 3, normal mood and affect  Eyes: EOMI, no scleral icterus  ENMT: neck supple without masses or thyromegaly, mucus membranes moist  Resp: normal effort, decreased breath sounds bilaterally, on oxygen  CV: RRR, no murmurs, mild bilateral lower extremity edema  GI: soft, NT, ND  MSK: normal gait, normal ROM in bilateral shoulders  Lymph nodes: Exam limited by body habitus, but there appear to be palpable right axillary lymph nodes  Breast: very large size, pendulous  Right: On inspection, the breast is  enlarged, edematous, and erythematous, especially centrally. There is generalized skin thickening/induration, with a peau d'orange appearance. There is a firm masslike area centrally beneath the areola measuring 9 x 9 cm. See photo uploaded to media tab.  Left: Superior curvilinear scar and radial scar at 9:00, otherwise no visible abnormalities on inspection while seated, with arms raised or hands on hips. No masses or nipple abnormalities.    Right Axilla, in-office ultrasound: There is at least 1 enlarged and morphologically abnormal lymph node in the right axilla. This measures 2.4 cm maximally and has eccentric cortical thickening.     I have independently reviewed her imaging and here are my findings:   Diffuse right breast skin thickening and edema on mammogram and ultrasound in 11/2020.      Procedure: Punch biopsy skin lesion  Indication:  Diffuse right breast skin thickening, suggestive of inflammatory breast cancer  Location: Right breast, 3:00, 12:00, 9:00  Consent:  The risks, benefits, and alternatives to the procedure were discussed with the patient, who understood and wished to proceed. The risks described included, but were not limited to, bleeding and infection.  Description of Procedure: After the patient was positioned supine on the procedure table, I prepped and draped the affected breast skin in sterile fashion. I anesthetized the affected skin and subcutaneous tissue with 1% lidocaine with epinephrine. I then took a 5mm punch biopsy of the affected skin at 3:00, 12:00, and 9:00. Manual compression was held for 5 minutes and 4-0 monocryl suture was placed to approximate the skin edges. Antibacterial ointment and a sterile dressing were applied.  Tolerance: The patient tolerated the procedure well.  Disposition: See below.      Assessment:  65 y.o. F with a new diagnosis of right inflammatory breast cancer.    Discussion:  We need a tissue sample to confirm the diagnosis, but clinically this  appears to be inflammatory breast cancer. She will have a repeat mammogram and ultrasound today, followed by breast and lymph node biopsies (I discussed her case with Dr. Zepeda on the phone today and they will work her in). Several skin punch biopsies were performed in the office today.    We reviewed the management of inflammatory breast cancer. She will require chemotherapy first and she will be referred to medical oncology to discuss this further. This will be followed by surgery, which will be a modified radical mastectomy without reconstruction, followed by radiation.    We discussed that most breast cancer is not hereditary, however given her family history, this may play a role in her case. Genetic testing is warranted and a was sent today. Should the results show a pathologic mutation, we could consider a contralateral risk-reduction mastectomy. She understands that this would not be for the treatment of her right breast cancer and will not improve her prognosis long term. This would be to reduce her risk of a second, primary breast cancer. She asked about doing a left mastectomy regardless of the genetic testing results for symmetry purposes. We will discuss this more at subsequent appointments, but I told her that this will depend on her functional status after chemo and perioperative risk assessment.    Regarding port placement, I described risks and potential complications associated with surgery, including, but not limited to, bleeding, infection, deformity, chronic pain, numbness, seroma, hematoma, deep venous thrombosis, pneumothorax, port malfunction, and the possibility of requiring additional surgery. She expressed an understanding of these factors and wished to proceed.    Plan:  A multidisciplinary plan has been formulated for the patient:      (1) Breast Surgical Oncology:  -F/u skin punch biopsies. I will call her with the results.  -Bilateral diagnostic mammogram and right ultrasound  today. Followed by breast biopsy and lymph node biopsy. I will call her with the results.  -F/u genetic testing. I will call her with results.  -Breast MRI scheduled on 1/21/21.  -Port placement scheduled for 1/25/21.  -I will see her mid-treatment with repeat clinical exam and posttreatment with repeat MRI.    (2) Medical Oncology:  -CT C/A/P & bone scan scheduled for 1/20/21.  -Appointment with Dr. Azul scheduled for 1/22/21.    (3) Radiation Oncology:  -Will refer postoperatively.      Nicci Banks MD    I spent 120 minutes caring for Aida on this date of service. This time includes time spent by me in the following activities: preparing for the visit, performing a medically appropriate examination and/or evaluation, counseling and educating the patient/family/caregiver, ordering medications, tests, or procedures, referring and communicating with other health care professionals, documenting information in the medical record, independently interpreting results and communicating that information with the patient/family/caregiver and care coordination.      CC:  MD Shari Manjarrez MD Prabodh Mehta, MD Arthur McLaughlin, MD

## 2021-01-19 NOTE — H&P (VIEW-ONLY)
[facility]     Referring Provider: Dipesh Leone MD     Chief complaint: Right breast enlarged, inflamed, red, and painful     HPI: Ms. Aida Conner is a 66 yo woman, seen at the request of Dr. Dipesh Leone, for evaluation of right breast changes, possible inflammatory breast cancer. In November 2020, the patient noticed that her right breast had become enlarged, firm, painful, and red. She underwent mammogram and ultrasound at the time, which was read as benign aside from skin thickening and edema. She was treated with a course of antibiotics by her PCP without improvement. She says that the redness and swelling initially started in the lower inner portion of her breast, but over the past 2 months, it has spread to involve the entire breast. She can no longer wear a bra due to discomfort.    She has a past history of 2 benign left breast excisional biopsies many years ago, as well as a right breast excisional biopsy in 2018 for an intraductal papilloma with incidental atypical ductal and lobular hyperplasia. Her past medical history includes obesity, COPD on oxygen, sleep apnea, diabetes and CHF. She has a family history of breast cancer in her mother (diagnosed at age 45), a maternal aunt (diagnosed in her 70s), and a paternal aunt (diagnosed in her 70s). She denies any family history of ovarian cancer. She says she was seen by a medical oncologist in 2018 after the excisional biopsy to discuss possible chemoprevention. She was referred for genetic testing, however it was not covered by insurance so she did not have it done. She ended up deciding not to pursue endocrine therapy due to concerns about side effects.      Oncology/Hematology History Overview Note   06/19/18, Right Breast Excisional Biopsy ([facility], Dr. Juani Barragan):  Right breast wire guided excision:  Small foci of atypical ductal hyperplasia (0.5-1 mm); completely excised  Minute focus of atypical lobular hyperplasia (<0.5 mm)  completely excised  Residual intraductal papilloma with focal sclerosis (approximately 7 mm in greatest dimension); adequately excised  Ectactic ducts  Fibrocystic changes (stromal fibrosis, Sclerosing adenosis) with mild to moderate ductal hyperplasia  Rare microcalcifications associated with adenosis  Extensive vascular calcification  Previous biopsy site    09/07/2018, Right breast US ([facility]):  2.5 predominantly cystic lesion in the operative bed contains fine septations, and is consistent with an organizing hematoma.   No solid relatively hypo or hyperechoic mass is evident  BI-RADS 2: Benign.    1/10/2020, Screening MMG with Saurabh ([facility]):  Post lumpectomy changes on the left are stable.  Post biopsy changes on the right are evident.   No suspicious mass, area or architectural distortion or suspicious microcalcification is identified.  BI-RADS 2: Benign.    11/1/20: Pt noticed right breast was enlarged, firm, and red. Treated with a course of antibiotics with no improvement.     Inflammatory breast cancer, right (CMS/HCC)   11/17/2020 Initial Diagnosis    Inflammatory breast cancer, right (CMS/HCC)     11/18/2020 Imaging    Bilateral Diagnostic MMG with Saurabh & Right Breast US ([facility]):  MMG:  Again seen within the anterior right breast near the 12 o’clock position in an area postoperative architectural distortion. There are multiple surgical clips in this region. Findings are unchanged from 1/10/2020. No new mass or architectural distortion seen within the right breast. No new suspicious calcifications. There is new skin thickening along the anterior aspect of the right breast of uncertain etiology.   There is stable architectural distortion in the upper outer quadrant of the left breast. There is no new mass or architectural distortion. There are stable scattered punctate calcifications. No suspicious calcifications.  US:  Limited sonographic images of the right breast were obtained at the 12 o’clock  position at the site of the patient’s reported right breast fullness. This is in the retroareolar area. Images demonstrate some mild skin thickening and edema within the subcutaneous and superficial fat. Findings are nonspecific but could be seen with developing cellulitis. No discrete solid mass lesion identified. No cyst or abnormal fluid collection is seen.  BI-RADS 2: Benign.         Review of Systems   Constitutional: Positive for chills and fatigue. Negative for appetite change, diaphoresis, fever and unexpected weight change.   HENT:   Positive for hearing loss. Negative for lump/mass, mouth sores, nosebleeds, sore throat, tinnitus, trouble swallowing and voice change.    Eyes: Negative for eye problems and icterus.   Respiratory: Negative for chest tightness, cough, hemoptysis, shortness of breath and wheezing.    Cardiovascular: Negative for chest pain, leg swelling and palpitations.        H/o congestive heart failure   Gastrointestinal: Positive for blood in stool, constipation and diarrhea. Negative for abdominal distention, abdominal pain, nausea, rectal pain and vomiting.        Chronic   Endocrine: Negative for hot flashes.   Genitourinary: Negative for bladder incontinence, difficulty urinating, dyspareunia, dysuria, frequency, hematuria, menstrual problem, nocturia, pelvic pain, vaginal bleeding and vaginal discharge.    Musculoskeletal: Negative for arthralgias, back pain, flank pain, gait problem, myalgias, neck pain and neck stiffness.   Skin: Negative for itching, rash and wound.   Neurological: Negative for dizziness, extremity weakness, gait problem, headaches, light-headedness, numbness, seizures and speech difficulty.   Hematological: Negative for adenopathy. Does not bruise/bleed easily.   Psychiatric/Behavioral: Negative for confusion, decreased concentration, depression, sleep disturbance and suicidal ideas. The patient is not nervous/anxious.    I personally reviewed and updated the  ROS.      Medications:    Current Outpatient Medications:   •  albuterol sulfate  (90 Base) MCG/ACT inhaler, inhale 1 to 2 puffs EVERY 4 TO 6 HOURS AS NEEDED, Disp: , Rfl:   •  carvedilol (COREG) 25 MG tablet, Take 25 mg by mouth 2 (Two) Times a Day With Meals., Disp: , Rfl:   •  Cholecalciferol (Vitamin D) 50 MCG (2000 UT) capsule, Take 2,000 Units by mouth Daily., Disp: , Rfl:   •  fexofenadine (ALLEGRA) 180 MG tablet, Take 180 mg by mouth Daily., Disp: , Rfl:   •  FLUoxetine (PROzac) 40 MG capsule, Take 40 mg by mouth Daily., Disp: , Rfl:   •  furosemide (LASIX) 40 MG tablet, Take 40 mg by mouth 2 (Two) Times a Day., Disp: , Rfl:   •  meloxicam (MOBIC) 15 MG tablet, Take 15 mg by mouth Daily., Disp: , Rfl:   •  metFORMIN (GLUCOPHAGE) 500 MG tablet, Take 500 mg by mouth 2 (Two) Times a Day With Meals., Disp: , Rfl:   •  mometasone-formoterol (DULERA 100) 100-5 MCG/ACT inhaler, Inhale 2 Puffs/kg Daily., Disp: , Rfl:   •  montelukast (SINGULAIR) 10 MG tablet, Take 10 mg by mouth Every Night., Disp: , Rfl:   •  multivitamin (THERAGRAN) tablet tablet, Take  by mouth Daily., Disp: , Rfl:   •  pravastatin (PRAVACHOL) 40 MG tablet, Take 40 mg by mouth Daily., Disp: , Rfl:   •  spironolactone (ALDACTONE) 25 MG tablet, Take 25 mg by mouth Daily., Disp: , Rfl:       Allergies   Allergen Reactions   • Amlodipine Swelling   • Hydrochlorothiazide Other (See Comments)     Neuro issues   • Morphine Nausea And Vomiting     hallucinations   • Adhesive Tape Rash       Past Medical History:   Diagnosis Date   • Anemia    • Congestive heart failure (CHF) (CMS/HCC)    • COPD (chronic obstructive pulmonary disease) (CMS/HCC)    • Diabetes mellitus (CMS/HCC)    • Hypertension    • Obesity    • Osteoarthritis    • Sleep apnea        Past Surgical History:   Procedure Laterality Date   • BREAST EXCISIONAL BIOPSY Bilateral    • KNEE ARTHROPLASTY, PARTIAL REPLACEMENT     • SPINE SURGERY  1984   • TOTAL ABDOMINAL HYSTERECTOMY WITH  SALPINGO OOPHORECTOMY         Family History   Problem Relation Age of Onset   • Breast cancer Mother 45   • Colon cancer Paternal Grandmother    • Breast cancer Maternal Aunt 70   • Breast cancer Paternal Aunt 70   • Lung cancer Father    • Hodgkin's lymphoma Father    • Skin cancer Father         squamous cell   • Ovarian cancer Neg Hx    • Uterine cancer Neg Hx    • Deep vein thrombosis Neg Hx    • Pulmonary embolism Neg Hx        Social History     Socioeconomic History   • Marital status:      Spouse name: Not on file   • Number of children: Not on file   • Years of education: Not on file   • Highest education level: Not on file   Occupational History   • Occupation: retired teacher   Tobacco Use   • Smoking status: Never Smoker   • Smokeless tobacco: Never Used   Substance and Sexual Activity   • Alcohol use: Not Currently   • Drug use: Never   • Sexual activity: Not Currently   Social History Narrative    3 daughters       GYNECOLOGIC HISTORY:   G: 3. P: 3. AB: 0.  Last menstrual period: , sp LACHO/BSO in  for bleeding  Age at menarche: 9  Age at first childbirth: 20  Lactation/How lon months  Age at menopause: 49  Total years of oral contraceptive use: 0  Total years of hormone replacement therapy: 0      Physical Exam  Vitals:    21 1251   BP: 143/87   Pulse: 79   Temp: 97.8 °F (36.6 °C)   SpO2: 95%     ECOG 1 - Symptomatic but completely ambulatory  General: NAD, obese  Psych: a&o x 3, normal mood and affect  Eyes: EOMI, no scleral icterus  ENMT: neck supple without masses or thyromegaly, mucus membranes moist  Resp: normal effort, decreased breath sounds bilaterally, on oxygen  CV: RRR, no murmurs, mild bilateral lower extremity edema  GI: soft, NT, ND  MSK: normal gait, normal ROM in bilateral shoulders  Lymph nodes: Exam limited by body habitus, but there appear to be palpable right axillary lymph nodes  Breast: very large size, pendulous  Right: On inspection, the breast is  enlarged, edematous, and erythematous, especially centrally. There is generalized skin thickening/induration, with a peau d'orange appearance. There is a firm masslike area centrally beneath the areola measuring 9 x 9 cm. See photo uploaded to media tab.  Left: Superior curvilinear scar and radial scar at 9:00, otherwise no visible abnormalities on inspection while seated, with arms raised or hands on hips. No masses or nipple abnormalities.    Right Axilla, in-office ultrasound: There is at least 1 enlarged and morphologically abnormal lymph node in the right axilla. This measures 2.4 cm maximally and has eccentric cortical thickening.     I have independently reviewed her imaging and here are my findings:   Diffuse right breast skin thickening and edema on mammogram and ultrasound in 11/2020.      Procedure: Punch biopsy skin lesion  Indication:  Diffuse right breast skin thickening, suggestive of inflammatory breast cancer  Location: Right breast, 3:00, 12:00, 9:00  Consent:  The risks, benefits, and alternatives to the procedure were discussed with the patient, who understood and wished to proceed. The risks described included, but were not limited to, bleeding and infection.  Description of Procedure: After the patient was positioned supine on the procedure table, I prepped and draped the affected breast skin in sterile fashion. I anesthetized the affected skin and subcutaneous tissue with 1% lidocaine with epinephrine. I then took a 5mm punch biopsy of the affected skin at 3:00, 12:00, and 9:00. Manual compression was held for 5 minutes and 4-0 monocryl suture was placed to approximate the skin edges. Antibacterial ointment and a sterile dressing were applied.  Tolerance: The patient tolerated the procedure well.  Disposition: See below.      Assessment:  65 y.o. F with a new diagnosis of right inflammatory breast cancer.    Discussion:  We need a tissue sample to confirm the diagnosis, but clinically this  appears to be inflammatory breast cancer. She will have a repeat mammogram and ultrasound today, followed by breast and lymph node biopsies (I discussed her case with Dr. Zepeda on the phone today and they will work her in). Several skin punch biopsies were performed in the office today.    We reviewed the management of inflammatory breast cancer. She will require chemotherapy first and she will be referred to medical oncology to discuss this further. This will be followed by surgery, which will be a modified radical mastectomy without reconstruction, followed by radiation.    We discussed that most breast cancer is not hereditary, however given her family history, this may play a role in her case. Genetic testing is warranted and a was sent today. Should the results show a pathologic mutation, we could consider a contralateral risk-reduction mastectomy. She understands that this would not be for the treatment of her right breast cancer and will not improve her prognosis long term. This would be to reduce her risk of a second, primary breast cancer. She asked about doing a left mastectomy regardless of the genetic testing results for symmetry purposes. We will discuss this more at subsequent appointments, but I told her that this will depend on her functional status after chemo and perioperative risk assessment.    Regarding port placement, I described risks and potential complications associated with surgery, including, but not limited to, bleeding, infection, deformity, chronic pain, numbness, seroma, hematoma, deep venous thrombosis, pneumothorax, port malfunction, and the possibility of requiring additional surgery. She expressed an understanding of these factors and wished to proceed.    Plan:  A multidisciplinary plan has been formulated for the patient:      (1) Breast Surgical Oncology:  -F/u skin punch biopsies. I will call her with the results.  -Bilateral diagnostic mammogram and right ultrasound  today. Followed by breast biopsy and lymph node biopsy. I will call her with the results.  -F/u genetic testing. I will call her with results.  -Breast MRI scheduled on 1/21/21.  -Port placement scheduled for 1/25/21.  -I will see her mid-treatment with repeat clinical exam and posttreatment with repeat MRI.    (2) Medical Oncology:  -CT C/A/P & bone scan scheduled for 1/20/21.  -Appointment with Dr. Azul scheduled for 1/22/21.    (3) Radiation Oncology:  -Will refer postoperatively.      Nicci Banks MD    I spent 120 minutes caring for Aida on this date of service. This time includes time spent by me in the following activities: preparing for the visit, performing a medically appropriate examination and/or evaluation, counseling and educating the patient/family/caregiver, ordering medications, tests, or procedures, referring and communicating with other health care professionals, documenting information in the medical record, independently interpreting results and communicating that information with the patient/family/caregiver and care coordination.      CC:  MD Shari Manjarrez MD Prabodh Mehta, MD Arthur McLaughlin, MD

## 2021-01-20 ENCOUNTER — HOSPITAL ENCOUNTER (OUTPATIENT)
Dept: NUCLEAR MEDICINE | Facility: HOSPITAL | Age: 66
Discharge: HOME | End: 2021-01-20

## 2021-01-20 ENCOUNTER — HOSPITAL ENCOUNTER (OUTPATIENT)
Dept: CT IMAGING | Facility: HOSPITAL | Age: 66
Discharge: HOME | End: 2021-01-20

## 2021-01-20 DIAGNOSIS — C50.911 INFLAMMATORY BREAST CANCER, RIGHT (HCC): ICD-10-CM

## 2021-01-20 LAB
CREAT BLDA-MCNC: 0.8 MG/DL (ref 0.6–1.3)
CYTO UR: NORMAL
LAB AP CASE REPORT: NORMAL
LAB AP CLINICAL INFORMATION: NORMAL
LAB AP SPECIAL STAINS: NORMAL
PATH REPORT.FINAL DX SPEC: NORMAL
PATH REPORT.GROSS SPEC: NORMAL

## 2021-01-20 PROCEDURE — 78306 BONE IMAGING WHOLE BODY: CPT

## 2021-01-20 PROCEDURE — 74177 CT ABD & PELVIS W/CONTRAST: CPT

## 2021-01-20 PROCEDURE — 82565 ASSAY OF CREATININE: CPT

## 2021-01-20 PROCEDURE — 0 TECHNETIUM MEDRONATE KIT: Performed by: SURGERY

## 2021-01-20 PROCEDURE — 25010000002 IOPAMIDOL 61 % SOLUTION: Performed by: SURGERY

## 2021-01-20 PROCEDURE — 71260 CT THORAX DX C+: CPT

## 2021-01-20 PROCEDURE — A9503 TC99M MEDRONATE: HCPCS | Performed by: SURGERY

## 2021-01-20 RX ORDER — TC 99M MEDRONATE 20 MG/10ML
23.8 INJECTION, POWDER, LYOPHILIZED, FOR SOLUTION INTRAVENOUS
Status: COMPLETED | OUTPATIENT
Start: 2021-01-20 | End: 2021-01-20

## 2021-01-20 RX ADMIN — IOPAMIDOL 85 ML: 612 INJECTION, SOLUTION INTRAVENOUS at 10:59

## 2021-01-20 RX ADMIN — Medication 23.8 MILLICURIE: at 09:35

## 2021-01-21 ENCOUNTER — TRANSCRIBE ORDERS (OUTPATIENT)
Dept: SURGERY | Facility: CLINIC | Age: 66
End: 2021-01-21

## 2021-01-21 ENCOUNTER — TELEPHONE (OUTPATIENT)
Dept: SURGERY | Facility: CLINIC | Age: 66
End: 2021-01-21

## 2021-01-21 ENCOUNTER — HOSPITAL ENCOUNTER (OUTPATIENT)
Dept: MRI IMAGING | Facility: HOSPITAL | Age: 66
Discharge: HOME | End: 2021-01-21

## 2021-01-21 DIAGNOSIS — C50.911 INFLAMMATORY BREAST CANCER, RIGHT (HCC): ICD-10-CM

## 2021-01-21 DIAGNOSIS — C50.911 INFLAMMATORY BREAST CANCER, RIGHT (HCC): Primary | ICD-10-CM

## 2021-01-21 LAB — CREAT BLDA-MCNC: 0.7 MG/DL (ref 0.6–1.3)

## 2021-01-21 PROCEDURE — 82565 ASSAY OF CREATININE: CPT

## 2021-01-21 NOTE — TELEPHONE ENCOUNTER
Echo is scheduled @ [facility] on 1/28/2021 @ 8:30am.    Called and left message with patient about appointment time, patient to call back and confirm.

## 2021-01-21 NOTE — TELEPHONE ENCOUNTER
I called Ms. Conner to let her know that the breast and lymph node biopsy results did confirm cancer. The skin punch biopsy results did not show any evidence of dermal lymphatic invasion, however I explained that this does not change the clinical diagnosis of inflammatory breast cancer. Also, she was unable to complete the breast MRI today and I let her know this is fine. It would have been useful information, but we can proceed with her treatment without it. We discussed the negative bone scan and the right adrenal mass noted on CT scan. She said that she has a known right adrenal mass seen on imaging for a kidney stone years ago. She will try to get us those records. I will see her for her port.   Nicci Banks MD

## 2021-01-22 ENCOUNTER — CONSULT (OUTPATIENT)
Dept: ONCOLOGY | Facility: CLINIC | Age: 66
End: 2021-01-22

## 2021-01-22 ENCOUNTER — APPOINTMENT (OUTPATIENT)
Dept: PREADMISSION TESTING | Facility: HOSPITAL | Age: 66
End: 2021-01-22

## 2021-01-22 ENCOUNTER — TELEPHONE (OUTPATIENT)
Dept: OBSTETRICS AND GYNECOLOGY | Facility: CLINIC | Age: 66
End: 2021-01-22

## 2021-01-22 ENCOUNTER — TELEPHONE (OUTPATIENT)
Dept: ONCOLOGY | Facility: CLINIC | Age: 66
End: 2021-01-22

## 2021-01-22 ENCOUNTER — HOSPITAL ENCOUNTER (OUTPATIENT)
Dept: CARDIOLOGY | Facility: HOSPITAL | Age: 66
Discharge: HOME | End: 2021-01-22

## 2021-01-22 ENCOUNTER — LAB (OUTPATIENT)
Dept: LAB | Facility: HOSPITAL | Age: 66
End: 2021-01-22

## 2021-01-22 VITALS
OXYGEN SATURATION: 96 % | HEIGHT: 65 IN | RESPIRATION RATE: 18 BRPM | BODY MASS INDEX: 48.82 KG/M2 | TEMPERATURE: 98.5 F | SYSTOLIC BLOOD PRESSURE: 161 MMHG | DIASTOLIC BLOOD PRESSURE: 75 MMHG | WEIGHT: 293 LBS

## 2021-01-22 VITALS — OXYGEN SATURATION: 94 %

## 2021-01-22 VITALS
WEIGHT: 293 LBS | BODY MASS INDEX: 48.82 KG/M2 | HEIGHT: 65 IN | DIASTOLIC BLOOD PRESSURE: 79 MMHG | RESPIRATION RATE: 22 BRPM | OXYGEN SATURATION: 93 % | HEART RATE: 83 BPM | TEMPERATURE: 98 F | SYSTOLIC BLOOD PRESSURE: 154 MMHG

## 2021-01-22 DIAGNOSIS — C50.911 INFLAMMATORY BREAST CANCER, RIGHT (HCC): ICD-10-CM

## 2021-01-22 DIAGNOSIS — Z01.818 ENCOUNTER FOR ECHOCARDIOGRAM BEFORE INITIATION OF CHEMOTHERAPY: ICD-10-CM

## 2021-01-22 DIAGNOSIS — C50.911 INFLAMMATORY BREAST CANCER, RIGHT (HCC): Primary | ICD-10-CM

## 2021-01-22 DIAGNOSIS — C50.611 MALIGNANT NEOPLASM OF AXILLARY TAIL OF RIGHT FEMALE BREAST, UNSPECIFIED ESTROGEN RECEPTOR STATUS (HCC): ICD-10-CM

## 2021-01-22 LAB
ALBUMIN SERPL-MCNC: 3.7 G/DL (ref 3.5–5.2)
ALBUMIN/GLOB SERPL: 1.1 G/DL
ALP SERPL-CCNC: 89 U/L (ref 39–117)
ALT SERPL W P-5'-P-CCNC: 17 U/L (ref 1–33)
ANION GAP SERPL CALCULATED.3IONS-SCNC: 9.1 MMOL/L (ref 5–15)
AST SERPL-CCNC: 13 U/L (ref 1–32)
BASOPHILS # BLD AUTO: 0.04 10*3/MM3 (ref 0–0.2)
BASOPHILS NFR BLD AUTO: 0.5 % (ref 0–1.5)
BILIRUB SERPL-MCNC: 0.3 MG/DL (ref 0–1.2)
BUN SERPL-MCNC: 16 MG/DL (ref 8–23)
BUN/CREAT SERPL: 21.3 (ref 7–25)
CALCIUM SPEC-SCNC: 9 MG/DL (ref 8.6–10.5)
CHLORIDE SERPL-SCNC: 104 MMOL/L (ref 98–107)
CO2 SERPL-SCNC: 26.9 MMOL/L (ref 22–29)
CREAT SERPL-MCNC: 0.75 MG/DL (ref 0.57–1)
DEPRECATED RDW RBC AUTO: 42.8 FL (ref 37–54)
EOSINOPHIL # BLD AUTO: 0.15 10*3/MM3 (ref 0–0.4)
EOSINOPHIL NFR BLD AUTO: 1.8 % (ref 0.3–6.2)
ERYTHROCYTE [DISTWIDTH] IN BLOOD BY AUTOMATED COUNT: 15 % (ref 12.3–15.4)
GFR SERPL CREATININE-BSD FRML MDRD: 77 ML/MIN/1.73
GLOBULIN UR ELPH-MCNC: 3.3 GM/DL
GLUCOSE SERPL-MCNC: 118 MG/DL (ref 65–99)
HCT VFR BLD AUTO: 33.3 % (ref 34–46.6)
HGB BLD-MCNC: 10.1 G/DL (ref 12–15.9)
IMM GRANULOCYTES # BLD AUTO: 0.04 10*3/MM3 (ref 0–0.05)
IMM GRANULOCYTES NFR BLD AUTO: 0.5 % (ref 0–0.5)
LYMPHOCYTES # BLD AUTO: 1.41 10*3/MM3 (ref 0.7–3.1)
LYMPHOCYTES NFR BLD AUTO: 17.4 % (ref 19.6–45.3)
MCH RBC QN AUTO: 24.2 PG (ref 26.6–33)
MCHC RBC AUTO-ENTMCNC: 30.3 G/DL (ref 31.5–35.7)
MCV RBC AUTO: 79.7 FL (ref 79–97)
MONOCYTES # BLD AUTO: 0.81 10*3/MM3 (ref 0.1–0.9)
MONOCYTES NFR BLD AUTO: 10 % (ref 5–12)
NEUTROPHILS NFR BLD AUTO: 5.67 10*3/MM3 (ref 1.7–7)
NEUTROPHILS NFR BLD AUTO: 69.8 % (ref 42.7–76)
NRBC BLD AUTO-RTO: 0 /100 WBC (ref 0–0.2)
PLATELET # BLD AUTO: 388 10*3/MM3 (ref 140–450)
PMV BLD AUTO: 9.8 FL (ref 6–12)
POTASSIUM SERPL-SCNC: 4.3 MMOL/L (ref 3.5–5.2)
PROT SERPL-MCNC: 7 G/DL (ref 6–8.5)
QT INTERVAL: 427 MS
RBC # BLD AUTO: 4.18 10*6/MM3 (ref 3.77–5.28)
SODIUM SERPL-SCNC: 140 MMOL/L (ref 136–145)
WBC # BLD AUTO: 8.12 10*3/MM3 (ref 3.4–10.8)

## 2021-01-22 PROCEDURE — 85025 COMPLETE CBC W/AUTO DIFF WBC: CPT

## 2021-01-22 PROCEDURE — 93306 TTE W/DOPPLER COMPLETE: CPT

## 2021-01-22 PROCEDURE — U0004 COV-19 TEST NON-CDC HGH THRU: HCPCS | Performed by: NURSE PRACTITIONER

## 2021-01-22 PROCEDURE — 93010 ELECTROCARDIOGRAM REPORT: CPT | Performed by: INTERNAL MEDICINE

## 2021-01-22 PROCEDURE — 93306 TTE W/DOPPLER COMPLETE: CPT | Performed by: INTERNAL MEDICINE

## 2021-01-22 PROCEDURE — 80053 COMPREHEN METABOLIC PANEL: CPT

## 2021-01-22 PROCEDURE — 36415 COLL VENOUS BLD VENIPUNCTURE: CPT

## 2021-01-22 PROCEDURE — 93356 MYOCRD STRAIN IMG SPCKL TRCK: CPT | Performed by: INTERNAL MEDICINE

## 2021-01-22 PROCEDURE — 25010000002 PERFLUTREN (DEFINITY) 8.476 MG IN SODIUM CHLORIDE (PF) 0.9 % 10 ML INJECTION: Performed by: SURGERY

## 2021-01-22 PROCEDURE — C9803 HOPD COVID-19 SPEC COLLECT: HCPCS | Performed by: NURSE PRACTITIONER

## 2021-01-22 PROCEDURE — 99205 OFFICE O/P NEW HI 60 MIN: CPT | Performed by: INTERNAL MEDICINE

## 2021-01-22 PROCEDURE — 93356 MYOCRD STRAIN IMG SPCKL TRCK: CPT

## 2021-01-22 PROCEDURE — 93005 ELECTROCARDIOGRAM TRACING: CPT

## 2021-01-22 RX ADMIN — PERFLUTREN 1.5 ML: 6.52 INJECTION, SUSPENSION INTRAVENOUS at 12:16

## 2021-01-22 NOTE — TELEPHONE ENCOUNTER
This did turn out to be cancer.   I talked with Aida at length.   She was happy with the referral to [facility] and to have a diagnosis and treatment plan.     Appreciate the care of Dr. Banks, Dr. Wiggins and Dr. Azul.     Dipesh Leone MD  1/22/2021  15:54 EST

## 2021-01-22 NOTE — PROGRESS NOTES
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    HISTORY OF PRESENT ILLNESS:  The patient is a 65 y.o. year old female who is here for an opinion about the above issue.    History of Present Illness patient is a 65-year-old white female with morbid obesity, history of diastolic congestive heart failure and unknown type of pulmonary disease for which she has been on oxygen for 4 years.    She has been getting routine mammography since 38 years of age because her mother  at 46 of breast cancer and had a biopsy of her left breast in  which was apparently benign and another biopsy in  on her right breast which showed a small focus of atypical ductal hyperplasia and atypical lobular hyperplasia with a residual intraductal papilloma.  At that time she saw medical oncologist who recommended genetic testing and prevention but the insurance would not cover the genetic testing and the medical oncologist thought tamoxifen was too risky for her because of her comorbidities and no treatment was given.  More recently she noticed redness of the right breast in October and showed it to her family doctor who gave her course of Keflex when it did not improve and mammogram was ordered and this was benign  When the redness persisted she saw her gynecologist with concern for inflammatory breast cancer and referred her to Dr. Banks after repeat imaging and [facility] right [facility].  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 Banks who sent her for skin biopsies and she had 3 separate punch biopsy of the skin that showed Perivascular and perifollicular inflammation with no obvious malignancy at 3:00 12:00 and 9:00  In addition staging work-up with CAT scans and bone scan were ordered.  CAT scan of the chest showed a borderline mediastinal  Infracarinal node measuring 15 mm in length mild cardiomegaly and heavy coronary artery calcifications  CT of the abdomen showed a 2.5 x 2.2 cm right adrenal mass which the patient tells me she has had in the past and is most likely benign but also a 2.7 cm left external iliac node which is indeterminate.  Bone scan was negative    Echocardiogram is scheduled for later next week and genetic testing with the [employer] stat panel is pending    Patient is  3 para 3 menarche was at age 9 menopause at 49 when she had a hysterectomy and oophorectomy  First childbirth was at age 20 she breast-fed her second and third children and took no hormone replacement after menopause    Family history is positive for mother dying of breast cancer at age 46 she is a maternal aunt with breast cancer in her 70s a paternal aunt with breast cancer in her 70s paternal grandmother with colon cancer.  Her father had Hodgkin's disease and non-Hodgkin's lymphoma but  of small cell lung cancer at 67      She has not had a heart attack stroke or blood clot      Past Medical History:   Diagnosis Date   • Anemia    • Asthma    • Breast cancer (CMS/HCC)     RIGHT    • Congestive heart failure (CHF) (CMS/HCC)    • COPD (chronic obstructive pulmonary disease) (CMS/HCC)    • Diabetes mellitus (CMS/HCC)    • H/O Intraductal papilloma    • Hemorrhoids    • History of transfusion    • Hypertension    • Kidney stone    • Obesity    • On home oxygen  therapy     2.5 AT REST WITH ACTIVITY UP TO 4L   • Osteoarthritis    • Sleep apnea     CPAP        Past Surgical History:   Procedure Laterality Date   • BREAST EXCISIONAL BIOPSY Bilateral    • COLONOSCOPY     • CYSTOSCOPY BLADDER STONE LITHOTRIPSY     • KNEE ARTHROPLASTY Bilateral 2009   • SPINE SURGERY  1984   • TOTAL ABDOMINAL HYSTERECTOMY WITH SALPINGO OOPHORECTOMY  2004        Current Outpatient Medications on File Prior to Visit   Medication Sig Dispense Refill   • albuterol sulfate  (90 Base) MCG/ACT inhaler Inhale 2 puffs Every 4 (Four) Hours As Needed.     • carvedilol (COREG) 25 MG tablet Take 12.5 mg by mouth 2 (Two) Times a Day With Meals.     • Cholecalciferol (Vitamin D) 50 MCG (2000 UT) capsule Take 2,000 Units by mouth Daily.     • fexofenadine (ALLEGRA) 180 MG tablet Take 180 mg by mouth Daily As Needed.     • FLUoxetine (PROzac) 40 MG capsule Take 40 mg by mouth Daily.     • furosemide (LASIX) 40 MG tablet Take 20 mg by mouth Daily.     • meloxicam (MOBIC) 15 MG tablet Take 15 mg by mouth Daily. PT HOLDING FOR SURGERY     • metFORMIN (GLUCOPHAGE) 500 MG tablet Take 500 mg by mouth Daily With Breakfast.     • mometasone-formoterol (DULERA 100) 100-5 MCG/ACT inhaler Inhale 2 Puffs/kg Every Night.     • montelukast (SINGULAIR) 10 MG tablet Take 10 mg by mouth Every Night.     • multivitamin (THERAGRAN) tablet tablet Take 1 tablet by mouth Daily. TO HOLD 1 WEEK BEFORE SURGERY     • pravastatin (PRAVACHOL) 40 MG tablet Take 40 mg by mouth Every Night.     • spironolactone (ALDACTONE) 25 MG tablet Take 25 mg by mouth Daily.       No current facility-administered medications on file prior to visit.         ALLERGIES:    Allergies   Allergen Reactions   • Amlodipine Swelling   • Hydrochlorothiazide Other (See Comments)     Neuro issues   • Morphine Nausea And Vomiting     hallucinations   • Adhesive Tape Rash        Social History     Socioeconomic History   • Marital status:      Spouse  "name: Not on file   • Number of children: Not on file   • Years of education: Not on file   • Highest education level: Not on file   Occupational History   • Occupation: retired teacher   Tobacco Use   • Smoking status: Never Smoker   • Smokeless tobacco: Never Used   Substance and Sexual Activity   • Alcohol use: Not Currently   • Drug use: Never   • Sexual activity: Defer   Social History Narrative    3 daughters        Family History   Problem Relation Age of Onset   • Breast cancer Mother 45   • Colon cancer Paternal Grandmother    • Breast cancer Maternal Aunt 70   • Breast cancer Paternal Aunt 70   • Lung cancer Father    • Hodgkin's lymphoma Father    • Skin cancer Father         squamous cell   • Ovarian cancer Neg Hx    • Uterine cancer Neg Hx    • Deep vein thrombosis Neg Hx    • Pulmonary embolism Neg Hx    • Malig Hyperthermia Neg Hx         Review of Systems   Constitutional: Positive for fatigue.   Respiratory: Positive for shortness of breath (With exertion on home O2 for 4 years).    Cardiovascular: Positive for leg swelling (On and off ).        Objective     Vitals:    01/22/21 0900   BP: 154/79   Pulse: 83   Resp: 22   Temp: 98 °F (36.7 °C)   TempSrc: Temporal   SpO2: 93%  Comment: with oxygen tank, Patient stated SOB   Weight: (!) 162 kg (357 lb)   Height: 165.1 cm (65\")   PainSc: 0-No pain     Current Status 1/22/2021   ECOG score 2       Physical Exam    CONSTITUTIONAL:  Vital signs reviewed.  No distress, looks comfortable.  Morbidly obese  EYES:  Conjunctiva and lids unremarkable.  PERRLA  EARS,NOSE,MOUTH,THROAT:  Ears and nose appear unremarkable.  Lips, teeth, gums appear unremarkable.  RESPIRATORY:  Normal respiratory effort.  Lungs clear to auscultation bilaterally.  BREASTS: Both breasts are pendulous the right breast shows diffuse redness with peau d'orange involving the whole breast especially the dependent part of the breast with no definite mass palpable although thickness and " induration of the skin in the periareolar area is noted-2 x 2 centimeter right axillary node appreciated (see photograph)  3 biopsy sites from skin biopsies are noted and biopsies sites at 10:00 and the axilla  CARDIOVASCULAR:  Normal S1, S2.  No murmurs rubs or gallops.  1+ lower extremity edema.  GASTROINTESTINAL: Abdomen appears unremarkable.  Nontender.  No hepatomegaly.  No splenomegaly.  LYMPHATIC:  No cervical, supraclavicular, axillary lymphadenopathy.  SKIN:  Warm.  No rashes.  PSYCHIATRIC:  Normal judgment and insight.  Normal mood and affect.      RECENT LABS:  Hematology WBC   Date Value Ref Range Status   01/22/2021 8.12 3.40 - 10.80 10*3/mm3 Final     RBC   Date Value Ref Range Status   01/22/2021 4.18 3.77 - 5.28 10*6/mm3 Final     Hemoglobin   Date Value Ref Range Status   01/22/2021 10.1 (L) 12.0 - 15.9 g/dL Final     Hematocrit   Date Value Ref Range Status   01/22/2021 33.3 (L) 34.0 - 46.6 % Final     Platelets   Date Value Ref Range Status   01/22/2021 388 140 - 450 10*3/mm3 Final          Assessment/Plan   1. [id number] right breast cancer ER/UT HER-2 pending  2.  Morbid obesity  3.  History of diastolic heart failure  4.  Pulmonary disease?  Etiology on oxygen for 4 years  5.  Strong family history of breast cancer genetic testing pending  6.  Probable benign adrenal adenoma  7.  Questionable enlarged lymph nodes left iliac chain and mediastinum likely reactive    Plan  1.  Echocardiogram soon as possible to ascertain tolerability of cardiotoxic chemotherapy which would be typically indicated with an inflammatory breast cancer    2.  PET scan to follow-up on atypical lymph nodes and confirm benign etiology of the adrenal lesion    3.  Port placement and chemo education depending on ER/UT and HER-2 status    I explained to Aida that the goal of treatment would be curative but she has a lot of comorbidities which may limit our ability to give the most effective chemotherapy in the setting  I told  her radiation would be involved and possibly hormonal therapy for 10 years if she has hormone positivity and HER-2 directed therapy for HER-2 is positive    She expressed some concerns about driving back and forth from [place] but felt that once a week was doable    We will see her back in 2 weeks to start treatment with a port placement planned early next week

## 2021-01-22 NOTE — TELEPHONE ENCOUNTER
CSW contacted the patient to follow up on a DQ score of 7, and to assess support/resource needs.  I left a message for the patient with my contact information, and requested a return call.      I will also send a letter of introduction to the patient's home.

## 2021-01-22 NOTE — DISCHARGE INSTRUCTIONS
Take the following medications the morning of surgery:    PROZAC AND CARVEDILOL.  BRING YOUR ALBUTEROL INHALER WITH YOUR  :  CALL OFFICE TO CONFIRM TIME OF ARRIVAL    If you are on prescription narcotic pain medication to control your pain you may also take that medication the morning of surgery.    General Instructions:  • Do not eat solid food after midnight the night before surgery.  • You may drink clear liquids day of surgery but must stop at least one hour before your hospital arrival time.  • It is beneficial for you to have a clear drink that contains carbohydrates the day of surgery.  We suggest a 12 to 20 ounce bottle of Gatorade or Powerade for non-diabetic patients or a 12 to 20 ounce bottle of G2 or Powerade Zero for diabetic patients. (Pediatric patients, are not advised to drink a 12 to 20 ounce carbohydrate drink)    Clear liquids are liquids you can see through.  Nothing red in color.     Plain water                               Sports drinks  Sodas                                   Gelatin (Jell-O)  Fruit juices without pulp such as white grape juice and apple juice  Popsicles that contain no fruit or yogurt  Tea or coffee (no cream or milk added)  Gatorade / Powerade  G2 / Powerade Zero    • Infants may have breast milk up to four hours before surgery.  • Infants drinking formula may drink formula up to six hours before surgery.   • Patients who avoid smoking, chewing tobacco and alcohol for 4 weeks prior to surgery have a reduced risk of post-operative complications.  Quit smoking as many days before surgery as you can.  • Do not smoke, use chewing tobacco or drink alcohol the day of surgery.   • If applicable bring your C-PAP/ BI-PAP machine.  • Bring any papers given to you in the doctor’s office.  • Wear clean comfortable clothes.  • Do not wear contact lenses, false eyelashes or make-up.  Bring a case for your glasses.   • Bring crutches or walker if applicable.  • Remove all piercings.   Leave jewelry and any other valuables at home.  • Hair extensions with metal clips must be removed prior to surgery.  • The Pre-Admission Testing nurse will instruct you to bring medications if unable to obtain an accurate list in Pre-Admission Testing.        If you were given a blood bank ID arm band remember to bring it with you the day of surgery.    Preventing a Surgical Site Infection:  • For 2 to 3 days before surgery, avoid shaving with a razor because the razor can irritate skin and make it easier to develop an infection.    • Any areas of open skin can increase the risk of a post-operative wound infection by allowing bacteria to enter and travel throughout the body.  Notify your surgeon if you have any skin wounds / rashes even if it is not near the expected surgical site.  The area will need assessed to determine if surgery should be delayed until it is healed.  • The night prior to surgery shower using a fresh bar of anti-bacterial soap (such as Dial) and clean washcloth.  Sleep in a clean bed with clean clothing.  Do not allow pets to sleep with you.  • Shower on the morning of surgery using a fresh bar of anti-bacterial soap (such as Dial) and clean washcloth.  Dry with a clean towel and dress in clean clothing.  • Ask your surgeon if you will be receiving antibiotics prior to surgery.  • Make sure you, your family, and all healthcare providers clean their hands with soap and water or an alcohol based hand  before caring for you or your wound.    Day of surgery:  Your arrival time is approximately two hours before your scheduled surgery time.  Upon arrival, a Pre-op nurse and Anesthesiologist will review your health history, obtain vital signs, and answer questions you may have.  The only belongings needed at this time will be a list of your home medications and if applicable your C-PAP/BI-PAP machine.  A Pre-op nurse will start an IV and you may receive medication in preparation for surgery,  including something to help you relax.     Please be aware that surgery does come with discomfort.  We want to make every effort to control your discomfort so please discuss any uncontrolled symptoms with your nurse.   Your doctor will most likely have prescribed pain medications.      If you are going home after surgery you will receive individualized written care instructions before being discharged.  A responsible adult must drive you to and from the hospital on the day of your surgery and stay with you for 24 hours.  Discharge prescriptions can be filled by the hospital pharmacy during regular pharmacy hours.  If you are having surgery late in the day/evening your prescription may be e-prescribed to your pharmacy.  Please verify your pharmacy hours or chose a 24 hour pharmacy to avoid not having access to your prescription because your pharmacy has closed for the day.    If you are staying overnight following surgery, you will be transported to your hospital room following the recovery period.  [facility] has all private rooms.    If you have any questions please call Pre-Admission Testing at [phone].  Deductibles and co-payments are collected on the day of service. Please be prepared to pay the required co-pay, deductible or deposit on the day of service as defined by your plan.    Patient Education for Self-Quarantine Process    Following your COVID testing, we strongly recommend that you do not leave your home after you have been tested for COVID except to get medical care. This includes not going to work, school or to public areas.  If this is not possible for you to do please limit your activities to only required outings.  Be sure to wear a mask when you are with other people, practice social distancing and wash your hands frequently.      The following items provide additional details to keep you safe.  • Wash your hands with soap and water frequently for at least 20 seconds.    • Avoid touching your eyes, nose and mouth with unwashed hands.  • Do not share anything - utensils, towels, food from the same bowl.   • Have your own utensils, drinking glass, dishes, towels and bedding.   • Do not have visitors.   • Do use FaceTime to stay in touch with family and friends.  • You should stay in a specific room away from others if possible.   • Stay at least 6 feet away from others in the home if you cannot have a dedicated room to yourself.   • Do not snuggle with your pet. While the CDC says there is no evidence that pets can spread COVID-19 or be infected from humans, it is probably best to avoid “petting, snuggling, being kissed or licked and sharing food (during self-quarantine)”, according to the CDC.   • Sanitize household surfaces daily. Include all high touch areas (door handles, light switches, phones, countertops, etc.)  • Do not share a bathroom with others, if possible.   • Wear a mask around others in your home if you are unable to stay in a separate room or 6 feet apart. If  you are unable to wear a mask, have your family member wear a mask if they must be within 6 feet of you.   Call your surgeon immediately if you experience any of the following symptoms:  • Sore Throat  • Shortness of Breath or difficulty breathing  • Cough  • Chills  • Body soreness or muscle pain  • Headache  • Fever  • New loss of taste or smell  • Do not arrive for your surgery ill.  Your procedure will need to be rescheduled to another time.  You will need to call your physician before the day of surgery to avoid any unnecessary exposure to hospital staff as well as other patients.

## 2021-01-23 LAB — SARS-COV-2 RNA RESP QL NAA+PROBE: NOT DETECTED

## 2021-01-25 ENCOUNTER — APPOINTMENT (OUTPATIENT)
Dept: GENERAL RADIOLOGY | Facility: HOSPITAL | Age: 66
End: 2021-01-25

## 2021-01-25 ENCOUNTER — ANESTHESIA (OUTPATIENT)
Dept: PERIOP | Facility: HOSPITAL | Age: 66
End: 2021-01-25

## 2021-01-25 ENCOUNTER — ANESTHESIA EVENT (OUTPATIENT)
Dept: PERIOP | Facility: HOSPITAL | Age: 66
End: 2021-01-25

## 2021-01-25 ENCOUNTER — HOSPITAL ENCOUNTER (OUTPATIENT)
Facility: HOSPITAL | Age: 66
Setting detail: HOSPITAL OUTPATIENT SURGERY
Discharge: HOME | End: 2021-01-25
Attending: SURGERY | Admitting: SURGERY

## 2021-01-25 VITALS
DIASTOLIC BLOOD PRESSURE: 60 MMHG | OXYGEN SATURATION: 100 % | SYSTOLIC BLOOD PRESSURE: 124 MMHG | RESPIRATION RATE: 18 BRPM | WEIGHT: 293 LBS | BODY MASS INDEX: 48.82 KG/M2 | HEART RATE: 72 BPM | TEMPERATURE: 97.9 F | HEIGHT: 65 IN

## 2021-01-25 DIAGNOSIS — C50.911 INFLAMMATORY BREAST CANCER, RIGHT (HCC): ICD-10-CM

## 2021-01-25 DIAGNOSIS — C50.911 INFLAMMATORY BREAST CANCER, RIGHT (HCC): Primary | ICD-10-CM

## 2021-01-25 DIAGNOSIS — C50.611 MALIGNANT NEOPLASM OF AXILLARY TAIL OF RIGHT FEMALE BREAST, UNSPECIFIED ESTROGEN RECEPTOR STATUS (HCC): ICD-10-CM

## 2021-01-25 LAB
AORTIC ARCH: 1.7 CM
AORTIC DIMENSIONLESS INDEX: 0.6 (DI)
ASCENDING AORTA: 3.7 CM
BH CV ECHO MEAS - ACS: 1.9 CM
BH CV ECHO MEAS - AO ARCH DIAM (PROXIMAL TRANS.): 1.7 CM
BH CV ECHO MEAS - AO MAX PG (FULL): 14.3 MMHG
BH CV ECHO MEAS - AO MAX PG: 22.1 MMHG
BH CV ECHO MEAS - AO MEAN PG (FULL): 8 MMHG
BH CV ECHO MEAS - AO MEAN PG: 13 MMHG
BH CV ECHO MEAS - AO ROOT AREA (BSA CORRECTED): 1.2
BH CV ECHO MEAS - AO ROOT AREA: 7.1 CM^2
BH CV ECHO MEAS - AO ROOT DIAM: 3 CM
BH CV ECHO MEAS - AO V2 MAX: 235 CM/SEC
BH CV ECHO MEAS - AO V2 MEAN: 169 CM/SEC
BH CV ECHO MEAS - AO V2 VTI: 51.8 CM
BH CV ECHO MEAS - ASC AORTA: 3.7 CM
BH CV ECHO MEAS - AVA(I,A): 1.9 CM^2
BH CV ECHO MEAS - AVA(I,D): 1.9 CM^2
BH CV ECHO MEAS - AVA(V,A): 1.9 CM^2
BH CV ECHO MEAS - AVA(V,D): 1.9 CM^2
BH CV ECHO MEAS - BSA(HAYCOCK): 2.8 M^2
BH CV ECHO MEAS - BSA: 2.5 M^2
BH CV ECHO MEAS - BZI_BMI: 59.4 KILOGRAMS/M^2
BH CV ECHO MEAS - BZI_METRIC_HEIGHT: 165.1 CM
BH CV ECHO MEAS - BZI_METRIC_WEIGHT: 161.9 KG
BH CV ECHO MEAS - EDV(MOD-SP2): 186 ML
BH CV ECHO MEAS - EDV(MOD-SP4): 157 ML
BH CV ECHO MEAS - EDV(TEICH): 180 ML
BH CV ECHO MEAS - EF(CUBED): 80.2 %
BH CV ECHO MEAS - EF(MOD-BP): 64.2 %
BH CV ECHO MEAS - EF(MOD-SP2): 62.9 %
BH CV ECHO MEAS - EF(MOD-SP4): 64.3 %
BH CV ECHO MEAS - EF(TEICH): 71.7 %
BH CV ECHO MEAS - ESV(MOD-SP2): 69 ML
BH CV ECHO MEAS - ESV(MOD-SP4): 56 ML
BH CV ECHO MEAS - ESV(TEICH): 50.9 ML
BH CV ECHO MEAS - FS: 41.7 %
BH CV ECHO MEAS - IVS/LVPW: 1.1
BH CV ECHO MEAS - IVSD: 1.2 CM
BH CV ECHO MEAS - LAT PEAK E' VEL: 9.6 CM/SEC
BH CV ECHO MEAS - LV DIASTOLIC VOL/BSA (35-75): 62 ML/M^2
BH CV ECHO MEAS - LV MASS(C)D: 296.6 GRAMS
BH CV ECHO MEAS - LV MASS(C)DI: 117.2 GRAMS/M^2
BH CV ECHO MEAS - LV MAX PG: 7.8 MMHG
BH CV ECHO MEAS - LV MEAN PG: 5 MMHG
BH CV ECHO MEAS - LV SYSTOLIC VOL/BSA (12-30): 22.1 ML/M^2
BH CV ECHO MEAS - LV V1 MAX: 140 CM/SEC
BH CV ECHO MEAS - LV V1 MEAN: 110 CM/SEC
BH CV ECHO MEAS - LV V1 VTI: 30.7 CM
BH CV ECHO MEAS - LVIDD: 6 CM
BH CV ECHO MEAS - LVIDS: 3.5 CM
BH CV ECHO MEAS - LVLD AP2: 8.8 CM
BH CV ECHO MEAS - LVLD AP4: 7.8 CM
BH CV ECHO MEAS - LVLS AP2: 7.4 CM
BH CV ECHO MEAS - LVLS AP4: 6.8 CM
BH CV ECHO MEAS - LVOT AREA (M): 3.1 CM^2
BH CV ECHO MEAS - LVOT AREA: 3.1 CM^2
BH CV ECHO MEAS - LVOT DIAM: 2 CM
BH CV ECHO MEAS - LVPWD: 1.1 CM
BH CV ECHO MEAS - MED PEAK E' VEL: 8.6 CM/SEC
BH CV ECHO MEAS - MV A DUR: 0.12 SEC
BH CV ECHO MEAS - MV A MAX VEL: 98.5 CM/SEC
BH CV ECHO MEAS - MV DEC SLOPE: 578 CM/SEC^2
BH CV ECHO MEAS - MV E MAX VEL: 74.2 CM/SEC
BH CV ECHO MEAS - MV E/A: 0.75
BH CV ECHO MEAS - MV MAX PG: 5 MMHG
BH CV ECHO MEAS - MV MEAN PG: 3 MMHG
BH CV ECHO MEAS - MV P1/2T MAX VEL: 105 CM/SEC
BH CV ECHO MEAS - MV P1/2T: 53.2 MSEC
BH CV ECHO MEAS - MV V2 MAX: 112 CM/SEC
BH CV ECHO MEAS - MV V2 MEAN: 77 CM/SEC
BH CV ECHO MEAS - MV V2 VTI: 24.6 CM
BH CV ECHO MEAS - MVA P1/2T LCG: 2.1 CM^2
BH CV ECHO MEAS - MVA(P1/2T): 4.1 CM^2
BH CV ECHO MEAS - MVA(VTI): 3.9 CM^2
BH CV ECHO MEAS - PA ACC TIME: 0.09 SEC
BH CV ECHO MEAS - PA MAX PG (FULL): 2.8 MMHG
BH CV ECHO MEAS - PA MAX PG: 6.3 MMHG
BH CV ECHO MEAS - PA PR(ACCEL): 39.9 MMHG
BH CV ECHO MEAS - PA V2 MAX: 125 CM/SEC
BH CV ECHO MEAS - PULM A REVS DUR: 0.11 SEC
BH CV ECHO MEAS - PULM A REVS VEL: 31 CM/SEC
BH CV ECHO MEAS - PULM DIAS VEL: 48.8 CM/SEC
BH CV ECHO MEAS - PULM S/D: 0.94
BH CV ECHO MEAS - PULM SYS VEL: 45.7 CM/SEC
BH CV ECHO MEAS - PVA(V,A): 2.3 CM^2
BH CV ECHO MEAS - PVA(V,D): 2.3 CM^2
BH CV ECHO MEAS - QP/QS: 0.53
BH CV ECHO MEAS - RAP SYSTOLE: 3 MMHG
BH CV ECHO MEAS - RV MAX PG: 3.4 MMHG
BH CV ECHO MEAS - RV MEAN PG: 2 MMHG
BH CV ECHO MEAS - RV V1 MAX: 92.4 CM/SEC
BH CV ECHO MEAS - RV V1 MEAN: 58 CM/SEC
BH CV ECHO MEAS - RV V1 VTI: 16.3 CM
BH CV ECHO MEAS - RVOT AREA: 3.1 CM^2
BH CV ECHO MEAS - RVOT DIAM: 2 CM
BH CV ECHO MEAS - RVSP: 19 MMHG
BH CV ECHO MEAS - SI(AO): 144.7 ML/M^2
BH CV ECHO MEAS - SI(CUBED): 68.4 ML/M^2
BH CV ECHO MEAS - SI(LVOT): 38.1 ML/M^2
BH CV ECHO MEAS - SI(MOD-SP2): 46.2 ML/M^2
BH CV ECHO MEAS - SI(MOD-SP4): 39.9 ML/M^2
BH CV ECHO MEAS - SI(TEICH): 51 ML/M^2
BH CV ECHO MEAS - SV(AO): 366.2 ML
BH CV ECHO MEAS - SV(CUBED): 173.1 ML
BH CV ECHO MEAS - SV(LVOT): 96.4 ML
BH CV ECHO MEAS - SV(MOD-SP2): 117 ML
BH CV ECHO MEAS - SV(MOD-SP4): 101 ML
BH CV ECHO MEAS - SV(RVOT): 51.2 ML
BH CV ECHO MEAS - SV(TEICH): 129.1 ML
BH CV ECHO MEAS - TAPSE (>1.6): 2.6 CM
BH CV ECHO MEAS - TR MAX VEL: 198 CM/SEC
BH CV ECHO MEASUREMENTS AVERAGE E/E' RATIO: 8.15
BH CV XLRA - RV BASE: 4 CM
BH CV XLRA - RV LENGTH: 8.1 CM
BH CV XLRA - RV MID: 2.9 CM
BH CV XLRA - TDI S': 13.2 CM/SEC
GLUCOSE BLDC GLUCOMTR-MCNC: 110 MG/DL (ref 70–130)
GLUCOSE BLDC GLUCOMTR-MCNC: 140 MG/DL (ref 70–130)
LEFT ATRIUM VOLUME INDEX: 31 ML/M2
LV EF 2D ECHO EST: 64 %
MAXIMAL PREDICTED HEART RATE: 153 BPM
SINUS: 3.7 CM
STJ: 2.7 CM
STRESS TARGET HR: 130 BPM

## 2021-01-25 PROCEDURE — 25010000002 ONDANSETRON PER 1 MG: Performed by: ANESTHESIOLOGY

## 2021-01-25 PROCEDURE — 76000 FLUOROSCOPY <1 HR PHYS/QHP: CPT

## 2021-01-25 PROCEDURE — 77001 FLUOROGUIDE FOR VEIN DEVICE: CPT | Performed by: SURGERY

## 2021-01-25 PROCEDURE — 82962 GLUCOSE BLOOD TEST: CPT

## 2021-01-25 PROCEDURE — 25010000002 HYDROMORPHONE PER 4 MG: Performed by: ANESTHESIOLOGY

## 2021-01-25 PROCEDURE — C1788 PORT, INDWELLING, IMP: HCPCS | Performed by: SURGERY

## 2021-01-25 PROCEDURE — 36561 INSERT TUNNELED CV CATH: CPT | Performed by: SURGERY

## 2021-01-25 PROCEDURE — 76937 US GUIDE VASCULAR ACCESS: CPT | Performed by: SURGERY

## 2021-01-25 PROCEDURE — 25010000002 PROPOFOL 10 MG/ML EMULSION: Performed by: ANESTHESIOLOGY

## 2021-01-25 PROCEDURE — 25010000002 HEPARIN (PORCINE) PER 1000 UNITS: Performed by: SURGERY

## 2021-01-25 PROCEDURE — 25010000002 SUCCINYLCHOLINE PER 20 MG: Performed by: ANESTHESIOLOGY

## 2021-01-25 DEVICE — POWERPORT CLEARVUE IMPLANTABLE PORT WITH ATTACHABLE 8F POLYURETHANE OPEN-ENDED SINGLE-LUMEN VENOUS CATHETER INTERMEDIATE KIT
Type: IMPLANTABLE DEVICE | Site: CHEST | Status: FUNCTIONAL
Brand: POWERPORT CLEARVUE

## 2021-01-25 RX ORDER — HYDROMORPHONE HYDROCHLORIDE 1 MG/ML
0.5 INJECTION, SOLUTION INTRAMUSCULAR; INTRAVENOUS; SUBCUTANEOUS
Status: DISCONTINUED | OUTPATIENT
Start: 2021-01-25 | End: 2021-01-25 | Stop reason: HOSPADM

## 2021-01-25 RX ORDER — FAMOTIDINE 10 MG/ML
20 INJECTION, SOLUTION INTRAVENOUS ONCE
Status: COMPLETED | OUTPATIENT
Start: 2021-01-25 | End: 2021-01-25

## 2021-01-25 RX ORDER — HYDROCODONE BITARTRATE AND ACETAMINOPHEN 7.5; 325 MG/1; MG/1
1 TABLET ORAL ONCE AS NEEDED
Status: DISCONTINUED | OUTPATIENT
Start: 2021-01-25 | End: 2021-01-25 | Stop reason: HOSPADM

## 2021-01-25 RX ORDER — CEFAZOLIN SODIUM IN 0.9 % NACL 3 G/100 ML
3 INTRAVENOUS SOLUTION, PIGGYBACK (ML) INTRAVENOUS ONCE
Status: COMPLETED | OUTPATIENT
Start: 2021-01-25 | End: 2021-01-25

## 2021-01-25 RX ORDER — PROMETHAZINE HYDROCHLORIDE 25 MG/1
25 TABLET ORAL ONCE AS NEEDED
Status: DISCONTINUED | OUTPATIENT
Start: 2021-01-25 | End: 2021-01-25 | Stop reason: HOSPADM

## 2021-01-25 RX ORDER — DIPHENHYDRAMINE HYDROCHLORIDE 50 MG/ML
12.5 INJECTION INTRAMUSCULAR; INTRAVENOUS
Status: DISCONTINUED | OUTPATIENT
Start: 2021-01-25 | End: 2021-01-25 | Stop reason: HOSPADM

## 2021-01-25 RX ORDER — SUCCINYLCHOLINE CHLORIDE 20 MG/ML
INJECTION INTRAMUSCULAR; INTRAVENOUS AS NEEDED
Status: DISCONTINUED | OUTPATIENT
Start: 2021-01-25 | End: 2021-01-25 | Stop reason: SURG

## 2021-01-25 RX ORDER — LABETALOL HYDROCHLORIDE 5 MG/ML
5 INJECTION, SOLUTION INTRAVENOUS
Status: DISCONTINUED | OUTPATIENT
Start: 2021-01-25 | End: 2021-01-25 | Stop reason: HOSPADM

## 2021-01-25 RX ORDER — SODIUM CHLORIDE, SODIUM LACTATE, POTASSIUM CHLORIDE, CALCIUM CHLORIDE 600; 310; 30; 20 MG/100ML; MG/100ML; MG/100ML; MG/100ML
9 INJECTION, SOLUTION INTRAVENOUS CONTINUOUS
Status: DISCONTINUED | OUTPATIENT
Start: 2021-01-25 | End: 2021-01-25 | Stop reason: HOSPADM

## 2021-01-25 RX ORDER — MIDAZOLAM HYDROCHLORIDE 1 MG/ML
1 INJECTION INTRAMUSCULAR; INTRAVENOUS
Status: DISCONTINUED | OUTPATIENT
Start: 2021-01-25 | End: 2021-01-25 | Stop reason: HOSPADM

## 2021-01-25 RX ORDER — OXYCODONE AND ACETAMINOPHEN 7.5; 325 MG/1; MG/1
1 TABLET ORAL ONCE AS NEEDED
Status: DISCONTINUED | OUTPATIENT
Start: 2021-01-25 | End: 2021-01-25 | Stop reason: HOSPADM

## 2021-01-25 RX ORDER — GLYCOPYRROLATE 0.2 MG/ML
INJECTION INTRAMUSCULAR; INTRAVENOUS AS NEEDED
Status: DISCONTINUED | OUTPATIENT
Start: 2021-01-25 | End: 2021-01-25 | Stop reason: SURG

## 2021-01-25 RX ORDER — LIDOCAINE HYDROCHLORIDE 20 MG/ML
INJECTION, SOLUTION INFILTRATION; PERINEURAL AS NEEDED
Status: DISCONTINUED | OUTPATIENT
Start: 2021-01-25 | End: 2021-01-25 | Stop reason: SURG

## 2021-01-25 RX ORDER — PROPOFOL 10 MG/ML
VIAL (ML) INTRAVENOUS AS NEEDED
Status: DISCONTINUED | OUTPATIENT
Start: 2021-01-25 | End: 2021-01-25 | Stop reason: SURG

## 2021-01-25 RX ORDER — FENTANYL CITRATE 50 UG/ML
50 INJECTION, SOLUTION INTRAMUSCULAR; INTRAVENOUS
Status: DISCONTINUED | OUTPATIENT
Start: 2021-01-25 | End: 2021-01-25 | Stop reason: HOSPADM

## 2021-01-25 RX ORDER — SODIUM CHLORIDE 0.9 % (FLUSH) 0.9 %
3-10 SYRINGE (ML) INJECTION AS NEEDED
Status: DISCONTINUED | OUTPATIENT
Start: 2021-01-25 | End: 2021-01-25 | Stop reason: HOSPADM

## 2021-01-25 RX ORDER — FLUMAZENIL 0.1 MG/ML
0.2 INJECTION INTRAVENOUS AS NEEDED
Status: DISCONTINUED | OUTPATIENT
Start: 2021-01-25 | End: 2021-01-25 | Stop reason: HOSPADM

## 2021-01-25 RX ORDER — HYDRALAZINE HYDROCHLORIDE 20 MG/ML
5 INJECTION INTRAMUSCULAR; INTRAVENOUS
Status: DISCONTINUED | OUTPATIENT
Start: 2021-01-25 | End: 2021-01-25 | Stop reason: HOSPADM

## 2021-01-25 RX ORDER — NALOXONE HCL 0.4 MG/ML
0.2 VIAL (ML) INJECTION AS NEEDED
Status: DISCONTINUED | OUTPATIENT
Start: 2021-01-25 | End: 2021-01-25 | Stop reason: HOSPADM

## 2021-01-25 RX ORDER — ONDANSETRON 2 MG/ML
4 INJECTION INTRAMUSCULAR; INTRAVENOUS ONCE AS NEEDED
Status: COMPLETED | OUTPATIENT
Start: 2021-01-25 | End: 2021-01-25

## 2021-01-25 RX ORDER — ROCURONIUM BROMIDE 10 MG/ML
INJECTION, SOLUTION INTRAVENOUS AS NEEDED
Status: DISCONTINUED | OUTPATIENT
Start: 2021-01-25 | End: 2021-01-25 | Stop reason: SURG

## 2021-01-25 RX ORDER — LIDOCAINE HYDROCHLORIDE 10 MG/ML
0.5 INJECTION, SOLUTION EPIDURAL; INFILTRATION; INTRACAUDAL; PERINEURAL ONCE AS NEEDED
Status: DISCONTINUED | OUTPATIENT
Start: 2021-01-25 | End: 2021-01-25 | Stop reason: HOSPADM

## 2021-01-25 RX ORDER — HYDROMORPHONE HCL 110MG/55ML
PATIENT CONTROLLED ANALGESIA SYRINGE INTRAVENOUS AS NEEDED
Status: DISCONTINUED | OUTPATIENT
Start: 2021-01-25 | End: 2021-01-25 | Stop reason: SURG

## 2021-01-25 RX ORDER — DIPHENHYDRAMINE HCL 25 MG
25 CAPSULE ORAL
Status: DISCONTINUED | OUTPATIENT
Start: 2021-01-25 | End: 2021-01-25 | Stop reason: HOSPADM

## 2021-01-25 RX ORDER — PROMETHAZINE HYDROCHLORIDE 25 MG/1
25 SUPPOSITORY RECTAL ONCE AS NEEDED
Status: DISCONTINUED | OUTPATIENT
Start: 2021-01-25 | End: 2021-01-25 | Stop reason: HOSPADM

## 2021-01-25 RX ORDER — EPHEDRINE SULFATE 50 MG/ML
5 INJECTION, SOLUTION INTRAVENOUS ONCE AS NEEDED
Status: DISCONTINUED | OUTPATIENT
Start: 2021-01-25 | End: 2021-01-25 | Stop reason: HOSPADM

## 2021-01-25 RX ORDER — SODIUM CHLORIDE 0.9 % (FLUSH) 0.9 %
3 SYRINGE (ML) INJECTION EVERY 12 HOURS SCHEDULED
Status: DISCONTINUED | OUTPATIENT
Start: 2021-01-25 | End: 2021-01-25 | Stop reason: HOSPADM

## 2021-01-25 RX ORDER — ONDANSETRON 2 MG/ML
INJECTION INTRAMUSCULAR; INTRAVENOUS AS NEEDED
Status: DISCONTINUED | OUTPATIENT
Start: 2021-01-25 | End: 2021-01-25 | Stop reason: SURG

## 2021-01-25 RX ADMIN — PROPOFOL 300 MG: 10 INJECTION, EMULSION INTRAVENOUS at 15:30

## 2021-01-25 RX ADMIN — LIDOCAINE HYDROCHLORIDE 100 MG: 20 INJECTION, SOLUTION INFILTRATION; PERINEURAL at 15:30

## 2021-01-25 RX ADMIN — SUGAMMADEX 400 MG: 100 INJECTION, SOLUTION INTRAVENOUS at 16:27

## 2021-01-25 RX ADMIN — ROCURONIUM BROMIDE 20 MG: 10 INJECTION INTRAVENOUS at 15:38

## 2021-01-25 RX ADMIN — GLYCOPYRROLATE 0.3 MG: 0.2 INJECTION INTRAMUSCULAR; INTRAVENOUS at 15:33

## 2021-01-25 RX ADMIN — CEFAZOLIN 3 G: 1 INJECTION, POWDER, FOR SOLUTION INTRAMUSCULAR; INTRAVENOUS; PARENTERAL at 15:36

## 2021-01-25 RX ADMIN — HYDROMORPHONE HYDROCHLORIDE 0.5 MG: 2 INJECTION, SOLUTION INTRAMUSCULAR; INTRAVENOUS; SUBCUTANEOUS at 16:30

## 2021-01-25 RX ADMIN — ONDANSETRON HYDROCHLORIDE 4 MG: 2 SOLUTION INTRAMUSCULAR; INTRAVENOUS at 16:14

## 2021-01-25 RX ADMIN — FAMOTIDINE 20 MG: 10 INJECTION INTRAVENOUS at 12:42

## 2021-01-25 RX ADMIN — SUCCINYLCHOLINE CHLORIDE 200 MG: 20 INJECTION, SOLUTION INTRAMUSCULAR; INTRAVENOUS; PARENTERAL at 15:30

## 2021-01-25 RX ADMIN — SODIUM CHLORIDE, POTASSIUM CHLORIDE, SODIUM LACTATE AND CALCIUM CHLORIDE 9 ML/HR: 600; 310; 30; 20 INJECTION, SOLUTION INTRAVENOUS at 12:19

## 2021-01-25 RX ADMIN — ONDANSETRON 4 MG: 2 INJECTION INTRAMUSCULAR; INTRAVENOUS at 17:33

## 2021-01-25 NOTE — ANESTHESIA PREPROCEDURE EVALUATION
Anesthesia Evaluation     Patient summary reviewed and Nursing notes reviewed                Airway   Mallampati: III  Large neck circumference and Possible difficult intubation  Dental      Pulmonary    (+) COPD severe, asthma,home oxygen, shortness of breath, sleep apnea on CPAP,   Cardiovascular     ECG reviewed  Patient on routine beta blocker  Rhythm: regular  Rate: normal    (+) hypertension, CHF , SOLOMON,       Neuro/Psych- negative ROS  GI/Hepatic/Renal/Endo    (+) morbid obesity (BMI - 60),  renal disease stones, diabetes mellitus type 2,     Musculoskeletal     Abdominal    Substance History - negative use     OB/GYN negative ob/gyn ROS         Other   arthritis,    history of cancer                  Anesthesia Plan    ASA 4     general   (CMAC available    BMI    Patient did bring her CPAP machine which will be available in the RR as needed. She uses the CPAP every night.)  intravenous induction     Anesthetic plan, all risks, benefits, and alternatives have been provided, discussed and informed consent has been obtained with: patient.

## 2021-01-25 NOTE — NURSING NOTE
"CPAP removed from personal belonging bag to setup. Blue painters tape located near mask-hose joint. Patient states, \"[employer] came to place Oxygen adapter and broke mask.\" Respiratory Therapy at bedside working with patient to attach an oxygen adapter.   "

## 2021-01-25 NOTE — ANESTHESIA PROCEDURE NOTES
Airway  Urgency: elective    Date/Time: 1/25/2021 3:31 PM  Airway not difficult    General Information and Staff    Patient location during procedure: OR  Anesthesiologist: Billy Prescott MD    Indications and Patient Condition  Indications for airway management: airway protection    Preoxygenated: yes  Mask difficulty assessment: 0 - not attempted    Final Airway Details  Final airway type: endotracheal airway      Successful airway: ETT  Cuffed: yes   Successful intubation technique: video laryngoscopy  Endotracheal tube insertion site: oral  Blade: CMAC  Blade size: D  ETT size (mm): 7.5  Cormack-Lehane Classification: grade I - full view of glottis  Placement verified by: chest auscultation and capnometry   Measured from: lips  ETT/EBT  to lips (cm): 21  Number of attempts at approach: 1  Assessment: lips, teeth, and gum same as pre-op and atraumatic intubation

## 2021-01-25 NOTE — DISCHARGE INSTRUCTIONS
**Use tylenol or ibuprofen for pain control.**   Allow dermabond to come off on its own.   Ok to shower in 48 hrs.   No tub baths for 2 weeks.   No lifting or pulling with arm on the side of operation for 1 week.     Call the office at [phone] with any questions.   Also call for temp>101.5, redness, drainage, or opening of incision.   My office will call to schedule follow-up appointment.

## 2021-01-25 NOTE — OP NOTE
Operative Report    Patient Name:  Aida Conner  YOB: 1955    Date of Surgery:  1/25/2021    Pre-op Diagnosis:   Inflammatory breast cancer, right (CMS/HCC) [C50.911]       Post-Op Diagnosis:  Inflammatory breast cancer, right (CMS/HCC) [C50.911]    Procedure:  INSERTION VENOUS ACCESS DEVICE      Staff:  Surgeon(s):  Nicci Banks MD      Anesthesia: General    Estimated Blood Loss: 5 mL    Implants:    Implant Name Type Inv. Item Serial No.  Lot No. LRB No. Used Action   KT PORT CLEARVUE POWERPORT ISP W/8F POLY CATH - [id number] Implant KT PORT CLEARVUE POWERPORT ISP W/8F POLY CATH  BARD PER[employer]HER[employer]ASC[employer] [id number] N/A 1 Implanted       Specimen:          None        Findings: Successful left IJ port placement with tip in the SVC.    Complications: None     Indications: The patient is a 65 y.o. F with a new diagnosis of right inflammatory breast cancer. Her treatment will include neoadjuvant chemotherapy. She presents today for port placement. The risks and benefits were discussed preoperatively and she understood and agreed to proceed.     Description of Procedure:  The patient was identified in the preoperative area and brought to the operating room and placed supine on the table. Patient verification was performed and general anesthesia was induced. The patient was positioned with both arms tucked and her breasts taped to her abdomen to keep them out of the field.. The anterior chest and neck were prepped and draped in standard sterile fashion. A time out was performed and all were in agreement. I confirmed that the patient had received preoperative antibiotics.     The patient was placed in Trendelenburg position and I used ultrasound to identify the left internal jugular vein. The skin above this area was infiltrated with local anesthesia. A small stab incision was made at the site of needle entry. The left internal jugular vein was accessed with the needle on the first  stick. The guidewire was advanced using a Seldinger technique with no resistance until cardiac ectopy occurred at which point the wire was pulled back until the ectopy resolved. I checked wire placement with ultrasound and visualized the wire within the vein. Wire location was then verified using fluoroscopy. A transverse incision was made on the left chest two finger breadths below the clavicle and an anterior chest wall pocket was created. The port was placed inside with a snug fit and two 2-0 prolene stitches were placed from the chest wall to each side of the port. These were tagged with hemostats. The catheter was tunneled to the area of wire entry in the neck. The 2-0 prolene was then tied.     Fluoroscopy was used to verify wire placement and to tailor the length of the catheter. The patient was again placed in Trendelenburg position and I advanced the dilator and introducer over the guidewire under fluoroscopic guidance. The dilator and the wire were removed and the catheter was placed into the introducer, which was then peeled away. Fluoroscopy demonstrated good placement of the port with no kinks in the catheter. I accessed the port and it had excellent backflow and flushed easily. The chest incision was closed with interrupted 3-0 Vicryl deep dermal sutures and a running subcuticular 4-0 Monocryl suture. The neck incision was closed with a single interrupted 3-0 Vicryl deep dermal suture. Dermabond was applied to both wounds. Counts were correct at the end of the case. The patient was awakened from anesthesia and taken to the PACU in stable condition.    Nicci Banks MD     Date: 1/25/2021  Time: 18:12 EST

## 2021-01-25 NOTE — ANESTHESIA POSTPROCEDURE EVALUATION
Patient: Aida STARKEY Ubaldo    Procedure Summary     Date: 01/25/21 Room / Location: [facility] OR  / [facility] MAIN OR    Anesthesia Start: 1521 Anesthesia Stop: 1649    Procedure: INSERTION VENOUS ACCESS DEVICE (N/A ) Diagnosis:       Inflammatory breast cancer, right (CMS/HCC)      (Inflammatory breast cancer, right (CMS/HCC) [C50.911])    Surgeon: Nicci Banks MD Provider: Billy Prescott MD    Anesthesia Type: general ASA Status: 4          Anesthesia Type: general    Vitals  Vitals Value Taken Time   /52 01/25/21 1750   Temp 36.6 °C (97.9 °F) 01/25/21 1800   Pulse 68 01/25/21 1750   Resp 16 01/25/21 1750   SpO2 98 % 01/25/21 1801   Vitals shown include unvalidated device data.        Post Anesthesia Care and Evaluation    Patient location during evaluation: bedside  Patient participation: complete - patient participated  Level of consciousness: awake and alert  Pain management: adequate  Airway patency: patent  Anesthetic complications: No anesthetic complications  PONV Status: controlled  Cardiovascular status: blood pressure returned to baseline and acceptable  Respiratory status: acceptable  Hydration status: acceptable

## 2021-01-25 NOTE — PERIOPERATIVE NURSING NOTE
Notified destiny, pt's daughter, that pt as not let for surgery, but I will call her when pt leaves for surgery.

## 2021-01-26 ENCOUNTER — TELEPHONE (OUTPATIENT)
Dept: ONCOLOGY | Facility: CLINIC | Age: 66
End: 2021-01-26

## 2021-01-26 NOTE — TELEPHONE ENCOUNTER
Caller: LUISA RINCON    Relationship to patient: SELF    Best call back number:[phone]     PT STATES SHE NEEDS TO RESCHEDULE HER PET SCAN APPT ON 01/27. STATES SHE JUST HAD HER PORT INSERTED TODAY AND SHE IS FEELING TOO NAUSEOUS TO MAKE THE APPT

## 2021-01-26 NOTE — TELEPHONE ENCOUNTER
"CSW contacted the patient (2nd attempt) to follow up on a DQ score of 7, and to assess support/resource needs.     The patient stated she underwent port surgery yesterday, so she's still feeling a little \"off kilter\" from the anesthesia. However, she stated she is doing remarkably well emotionally since her diagnosis.  The patient stated she has been expecting cancer at some point in her life, due to her mother's history of CA (she  at age 47 when the patient was only 25).  The patient stated she and her sister have always found humor in discussing when, rather than if, they will have cancer.      The patient stated she had a great deal of emotional difficulty when she had a hysterectomy years ago, and sought support from her PCP.  But in her current situation being diagnosed with cancer, she has not felt the emotional stress she felt with the news of needing a hysterectomy.  The patient stated she recognizes she has some grief regarding the diagnosis, but is taking one small step after another and feels hopeful about Dr. Azul's plan of curative intent.  The patient stated she has been providing comfort to her 3 daughters, who are very concerned.  The patient stated it feels good to be able to comfort them, knowing she is thus far feeling emotionally strong.      The patient is  to a supportive  and she has three daughters.  Two of her daughters live in other states ([place] & [place]) and one is local.  The local daughter is  and has two children ages 10 and 11.  The patient describes her grandchildren as her \"buddies\" and she adores spending time with them.  The patient stated she has a strong support network of family and friends.      The patient does not currently have practical/financial concerns and did not identify any support/resource needs.      I provided education regarding the support available to the patient through  and the SOS clinic, should she have future needs.  I also talked " with the patient about [employer], explaining the support they offer could be helpful to her daughters, grandchildren, spouse, as well as to herself.  The patient insightfully stated she could imagine at some point during treatment, needing someone to talk to about what she's going through, because she wouldn't want to burden her daughters or other family members.  I also provided education about [facility] [employer].  The patient stated that although she's not ready to pursue these support options now, she would like to have information about them for possible future reference.      I will send the patient my contact information, as well as brochures for GC and F[facility], as well as ACP information as this is not on file.  I remain available to provide support/resource as needed.

## 2021-01-27 ENCOUNTER — APPOINTMENT (OUTPATIENT)
Dept: PET IMAGING | Facility: HOSPITAL | Age: 66
End: 2021-01-27

## 2021-01-28 ENCOUNTER — APPOINTMENT (OUTPATIENT)
Dept: CARDIOLOGY | Facility: HOSPITAL | Age: 66
End: 2021-01-28

## 2021-01-28 ENCOUNTER — TELEPHONE (OUTPATIENT)
Dept: OTHER | Facility: HOSPITAL | Age: 66
End: 2021-01-28

## 2021-01-28 ENCOUNTER — TELEPHONE (OUTPATIENT)
Dept: ONCOLOGY | Facility: CLINIC | Age: 66
End: 2021-01-28

## 2021-01-28 PROBLEM — Z45.2 FITTING AND ADJUSTMENT OF VASCULAR CATHETER: Status: ACTIVE | Noted: 2021-01-28

## 2021-01-28 RX ORDER — HEPARIN SODIUM (PORCINE) LOCK FLUSH IV SOLN 100 UNIT/ML 100 UNIT/ML
500 SOLUTION INTRAVENOUS AS NEEDED
Status: CANCELLED | OUTPATIENT
Start: 2021-02-04

## 2021-01-28 RX ORDER — SODIUM CHLORIDE 0.9 % (FLUSH) 0.9 %
10 SYRINGE (ML) INJECTION AS NEEDED
Status: CANCELLED | OUTPATIENT
Start: 2021-02-04

## 2021-01-28 NOTE — TELEPHONE ENCOUNTER
Referral received from Dr. Banks's office. I called Ms. Conner and introduced myself and navigational services. She states the plan is to start chemo soon. She verbalizes her primary concern is her ongoing nausea and diarrhea after he port surgery. She stated it has been a few days and she is not tolerating it well. She will update Dr. Banks on her condition if it does not start to improve. She also stated her port site is tender but not red at this time.  Otherwise, she has a good understanding of her pathology and treatment options and is comfortable with her plan of care.     She stated her support system is strong with 3 wonderful daughters and a supportive . She is also following with Janee HIGH regarding support options as needed.     We discussed integrative therapies and other [facility]. She verbalized interest in receiving a navigation folder outlining services. I verified her mailing address and will send out a navigation folder with the following information:     [facility] Cancer Support Network,  [facility] Breast Cancer Support Group, Cancer and Restorative Exercise (CARE), [employer] Exercise program, Guide for the Newly Diagnosed, [employer], [facility], Massage Therapy, Reiki Therapy, [employer] [place], Cancer Nutrition, Cleaning for a Reason, and Survivorship Clinic.    She verbalized appreciation for navigational services and she has my contact information and will call with any questions that arise.

## 2021-01-28 NOTE — TELEPHONE ENCOUNTER
----- Message from Andie Cronin RN sent at 1/28/2021  4:20 PM EST -----  Regarding: chemo ed  Please schedule chemo ed for Monday or Tuesday. Thanks! Tentative plan is perjeta/her/tax  but may change based on her scan results

## 2021-01-29 ENCOUNTER — HOSPITAL ENCOUNTER (OUTPATIENT)
Dept: PET IMAGING | Facility: HOSPITAL | Age: 66
Discharge: HOME | End: 2021-01-29

## 2021-01-29 DIAGNOSIS — C50.911 INFLAMMATORY BREAST CANCER, RIGHT (HCC): ICD-10-CM

## 2021-01-29 DIAGNOSIS — C50.611 MALIGNANT NEOPLASM OF AXILLARY TAIL OF RIGHT FEMALE BREAST, UNSPECIFIED ESTROGEN RECEPTOR STATUS (HCC): ICD-10-CM

## 2021-01-29 LAB — GLUCOSE BLDC GLUCOMTR-MCNC: 120 MG/DL (ref 70–130)

## 2021-01-29 PROCEDURE — 82962 GLUCOSE BLOOD TEST: CPT

## 2021-01-29 PROCEDURE — A9552 F18 FDG: HCPCS | Performed by: INTERNAL MEDICINE

## 2021-01-29 PROCEDURE — 78815 PET IMAGE W/CT SKULL-THIGH: CPT

## 2021-01-29 PROCEDURE — 0 FLUDEOXYGLUCOSE F18 SOLUTION: Performed by: INTERNAL MEDICINE

## 2021-01-29 RX ADMIN — FLUDEOXYGLUCOSE F18 1 DOSE: 300 INJECTION INTRAVENOUS at 12:06

## 2021-02-01 ENCOUNTER — TELEPHONE (OUTPATIENT)
Dept: ONCOLOGY | Facility: CLINIC | Age: 66
End: 2021-02-01

## 2021-02-01 NOTE — TELEPHONE ENCOUNTER
----- Message from Andie Cronin RN sent at 1/29/2021 11:04 AM EST -----  Regarding: schedule  PET scan is today.   She is scheduled for Chemo ed on Monday: tentatively Taxol, Perjeta, Herceptin. We currently have her scheduled on Wednesday 2/3 to see you and start chemo. Cardiology appt is Friday 2/5.     Do you still want to start her 2/3? If so we need to move her to an earlier appointment to allow 6-7 hours for first time THP.

## 2021-02-01 NOTE — PROGRESS NOTES
RM:________    Referral Provider: Jaime Azul MD Haynes, Shari Luna MD    NEW PATIENT/ CONSULT  PREVIOUS CARDIOLOGIST:   CARDIAC TESTING:     : 1955   AGE: 65 y.o.    2021  REASON FOR VISIT/  CC:      WT: ____________ BP: __________L __________R/ HR_______________    CHEST PAIN: _____________    SOA: ____________PALPS: __________      LIGHTHEADED: ___________ FATIGUE: _______________ EDEMA______________  ALLERGIES:  Amlodipine, Hydrochlorothiazide, Morphine, and Adhesive tape  SMOKING HISTORY  Social History     Tobacco Use   • Smoking status: Never Smoker   • Smokeless tobacco: Never Used   Substance Use Topics   • Alcohol use: Not Currently   • Drug use: Never          CHILDREN: _______________________       CAFFEINE USE________  ALCOHOL _____________  OCCUPATION_____________  Past Surgical History:   Procedure Laterality Date   • BREAST EXCISIONAL BIOPSY Bilateral    • COLONOSCOPY     • CYSTOSCOPY BLADDER STONE LITHOTRIPSY     • KNEE ARTHROPLASTY Bilateral    • SPINE SURGERY     • TOTAL ABDOMINAL HYSTERECTOMY WITH SALPINGO OOPHORECTOMY     • VENOUS ACCESS DEVICE (PORT) INSERTION N/A 2021    Procedure: INSERTION VENOUS ACCESS DEVICE;  Surgeon: Nicci Banks MD;  Location: [facility];  Service: General;  Laterality: N/A;                FAMILY HISTORY  HEART DISEASE  CHF  DIABETES  CARDIAC ARREST  STROKE  CANCER  ANEURYSM                                                             H/O: MI_____   STROKE________   GOUT_____   ANEMIA______     CAROTID________ HIV____ CAD_______ HYPERCHOL _____    H/O: CHF _____   RF____ DM___ HTN_______PVD____THYROID DISEASE_______    PMH: GI ____   HEPATITIS ___ KIDNEY DISEASE ___ LUNG DISEASE _______     SLEEP APNEA ____ BLOOD CLOTS ____ DVT ____ VEIN STRIPPING ___________     CANCER _________________________________ CHEMO/ RADIATION__________

## 2021-02-02 ENCOUNTER — TELEPHONE (OUTPATIENT)
Dept: SURGERY | Facility: CLINIC | Age: 66
End: 2021-02-02

## 2021-02-02 NOTE — TELEPHONE ENCOUNTER
I called patient and let her know that her genetic testing was negative for a mutation.      Dr. Banks will give her a copy of the results at her next appointment.

## 2021-02-03 ENCOUNTER — APPOINTMENT (OUTPATIENT)
Dept: ONCOLOGY | Facility: HOSPITAL | Age: 66
End: 2021-02-03

## 2021-02-05 ENCOUNTER — OFFICE VISIT (OUTPATIENT)
Dept: CARDIOLOGY | Facility: CLINIC | Age: 66
End: 2021-02-05

## 2021-02-05 ENCOUNTER — HOSPITAL ENCOUNTER (OUTPATIENT)
Dept: CARDIOLOGY | Facility: HOSPITAL | Age: 66
Discharge: HOME | End: 2021-02-05
Admitting: INTERNAL MEDICINE

## 2021-02-05 VITALS
HEIGHT: 65 IN | BODY MASS INDEX: 48.82 KG/M2 | HEART RATE: 73 BPM | SYSTOLIC BLOOD PRESSURE: 118 MMHG | DIASTOLIC BLOOD PRESSURE: 66 MMHG | WEIGHT: 293 LBS

## 2021-02-05 DIAGNOSIS — I50.32 CHRONIC DIASTOLIC (CONGESTIVE) HEART FAILURE (HCC): ICD-10-CM

## 2021-02-05 DIAGNOSIS — I10 ESSENTIAL HYPERTENSION: ICD-10-CM

## 2021-02-05 DIAGNOSIS — Z99.89 OSA ON CPAP: ICD-10-CM

## 2021-02-05 DIAGNOSIS — I25.10 CORONARY ARTERY CALCIFICATION SEEN ON CT SCAN: ICD-10-CM

## 2021-02-05 DIAGNOSIS — E66.2 OBESITY HYPOVENTILATION SYNDROME (HCC): ICD-10-CM

## 2021-02-05 DIAGNOSIS — C50.911 INFLAMMATORY BREAST CANCER, RIGHT (HCC): Primary | ICD-10-CM

## 2021-02-05 DIAGNOSIS — Z01.818 EXAMINATION PRIOR TO CHEMOTHERAPY: ICD-10-CM

## 2021-02-05 DIAGNOSIS — E11.9 TYPE 2 DIABETES MELLITUS WITHOUT COMPLICATION, WITHOUT LONG-TERM CURRENT USE OF INSULIN (HCC): ICD-10-CM

## 2021-02-05 DIAGNOSIS — G47.33 OSA ON CPAP: ICD-10-CM

## 2021-02-05 LAB
NT-PROBNP SERPL-MCNC: 101 PG/ML (ref 0–900)
TROPONIN T SERPL-MCNC: <0.01 NG/ML (ref 0–0.03)

## 2021-02-05 PROCEDURE — 93000 ELECTROCARDIOGRAM COMPLETE: CPT | Performed by: INTERNAL MEDICINE

## 2021-02-05 PROCEDURE — 83880 ASSAY OF NATRIURETIC PEPTIDE: CPT | Performed by: INTERNAL MEDICINE

## 2021-02-05 PROCEDURE — 36415 COLL VENOUS BLD VENIPUNCTURE: CPT

## 2021-02-05 PROCEDURE — 99214 OFFICE O/P EST MOD 30 MIN: CPT | Performed by: INTERNAL MEDICINE

## 2021-02-05 PROCEDURE — 84484 ASSAY OF TROPONIN QUANT: CPT | Performed by: INTERNAL MEDICINE

## 2021-02-05 RX ORDER — LISINOPRIL 5 MG/1
5 TABLET ORAL DAILY
Qty: 30 TABLET | Refills: 6 | Status: SHIPPED | OUTPATIENT
Start: 2021-02-05 | End: 2021-09-30

## 2021-02-05 NOTE — PROGRESS NOTES
Date of Office Visit: 21  Encounter Provider: Bradley Garza MD  Place of Service: [facility] CARDIOLOGY  Patient Name: Aida Conner  :1955    Chief Complaint   Patient presents with   • CARDIO/ ONOCOLOGY   • Congestive Heart Failure   :     HPI:     Ms. Conner is 65 y.o. and presents today in consultation for cardio-oncology counseling.    She is an established cardiac patient of Dr. Santana in [place] and will be continuing to see him for her general cardiology needs.  She is seen in cardio oncology clinic today as she will be starting potentially cardiotoxic chemotherapy next week.    She has chronic diastolic congestive heart failure.  She is morbidly obese.  She has obstructive sleep apnea and is on CPAP.  She has obesity hypoventilation syndrome and actually has to use oxygen at all times.  She has systemic hypertension.  She has coronary artery calcification seen on a chest CT but does not carry a diagnosis of coronary artery disease.  She has type 2 diabetes, hyperlipidemia, and is morbidly obese.    She has recently been diagnosed with inflammatory breast cancer on the right.  She has undergone placement of a port and will start chemotherapy with paclitaxel, trastuzumab, and pertuzumab on February 10.  She had an echo performed in our office on .  Revealed normal left ventricular systolic function, ejection fraction 64%, grade 1 diastolic dysfunction, and normal global longitudinal strain of -21%.  The right ventricle was mildly dilated.  Both atria were mildly dilated.  The aortic valve was calcified but there was no stenosis.  Right ventricular systolic pressure was normal at 19 mmHg.    She feels that her cardiac status is stable.  She is chronically short of breath.  She has chronic lower extremity edema but it is also stable.  She denies orthopnea, PND, chest pain, palpitations, lightheadedness, or syncope.    Past Medical History:   Diagnosis  Date   • Anemia    • Asthma    • Chronic diastolic (congestive) heart failure (CMS/HCC)    • Chronic hypoxemic respiratory failure (CMS/HCC)     on continuous O2   • H/O Intraductal papilloma    • Hemorrhoids    • History of transfusion 1984    AFTER BACK SURGERY   • Hypertension    • Inflammatory breast cancer (CMS/HCC)     right   • Kidney stone    • Morbid obesity with BMI of 50.0-59.9, adult (CMS/HCC)    • Obesity hypoventilation syndrome (CMS/HCC) 2/5/2021   • On home oxygen therapy     2.5 AT REST WITH ACTIVITY UP TO 4L   • ANABELLE on CPAP    • Osteoarthritis    • Type 2 diabetes mellitus (CMS/HCC)        Past Surgical History:   Procedure Laterality Date   • BREAST EXCISIONAL BIOPSY Bilateral    • COLONOSCOPY     • CYSTOSCOPY BLADDER STONE LITHOTRIPSY     • KNEE ARTHROPLASTY Bilateral 2009   • SPINE SURGERY  1984   • TOTAL ABDOMINAL HYSTERECTOMY WITH SALPINGO OOPHORECTOMY  2004   • VENOUS ACCESS DEVICE (PORT) INSERTION N/A 1/25/2021    Procedure: INSERTION VENOUS ACCESS DEVICE;  Surgeon: Nicci Banks MD;  Location: [facility];  Service: General;  Laterality: N/A;       Social History     Socioeconomic History   • Marital status:      Spouse name: Not on file   • Number of children: 3   • Years of education: Not on file   • Highest education level: Not on file   Occupational History   • Occupation: retired teacher   Tobacco Use   • Smoking status: Never Smoker   • Smokeless tobacco: Never Used   Substance and Sexual Activity   • Alcohol use: Not Currently   • Drug use: Never   • Sexual activity: Defer   Social History Narrative    3 daughters    She drinks one half of a 2 liter of  DT coke with caffeine daily.        Family History   Problem Relation Age of Onset   • Breast cancer Mother 45   • Colon cancer Paternal Grandmother    • Breast cancer Maternal Aunt 70   • Breast cancer Paternal Aunt 70   • Lung cancer Father    • Hodgkin's lymphoma Father    • Skin cancer Father         squamous cell    • Ovarian cancer Neg Hx    • Uterine cancer Neg Hx    • Deep vein thrombosis Neg Hx    • Pulmonary embolism Neg Hx    • Malig Hyperthermia Neg Hx        Review of Systems   Constitution: Positive for malaise/fatigue.   Cardiovascular: Positive for leg swelling.   Respiratory: Positive for shortness of breath.    Skin: Positive for poor wound healing and suspicious lesions.   All other systems reviewed and are negative.      Allergies   Allergen Reactions   • Amlodipine Swelling     LEGS    • Hydrochlorothiazide Other (See Comments)     Neuro issues   • Morphine Nausea And Vomiting     hallucinations   • Adhesive Tape Rash         Current Outpatient Medications:   •  albuterol sulfate  (90 Base) MCG/ACT inhaler, Inhale 2 puffs Every 4 (Four) Hours As Needed., Disp: , Rfl:   •  carvedilol (COREG) 25 MG tablet, Take 12.5 mg by mouth 2 (Two) Times a Day With Meals., Disp: , Rfl:   •  Cholecalciferol (Vitamin D) 50 MCG (2000 UT) capsule, Take 2,000 Units by mouth Daily., Disp: , Rfl:   •  fexofenadine (ALLEGRA) 180 MG tablet, Take 180 mg by mouth Daily As Needed., Disp: , Rfl:   •  FLUoxetine (PROzac) 40 MG capsule, Take 40 mg by mouth Daily., Disp: , Rfl:   •  furosemide (LASIX) 40 MG tablet, Take 20 mg by mouth Daily., Disp: , Rfl:   •  meloxicam (MOBIC) 15 MG tablet, Take 15 mg by mouth Daily. PT HOLDING FOR SURGERY, Disp: , Rfl:   •  metFORMIN (GLUCOPHAGE) 500 MG tablet, Take 500 mg by mouth Daily With Breakfast., Disp: , Rfl:   •  mometasone-formoterol (DULERA 100) 100-5 MCG/ACT inhaler, Inhale 2 Puffs/kg Every Night., Disp: , Rfl:   •  montelukast (SINGULAIR) 10 MG tablet, Take 10 mg by mouth Every Night., Disp: , Rfl:   •  multivitamin (THERAGRAN) tablet tablet, Take 1 tablet by mouth Daily. TO HOLD 1 WEEK BEFORE SURGERY, Disp: , Rfl:   •  O2 (OXYGEN), Inhale 2.5 L/min Continuous., Disp: , Rfl:   •  omeprazole (priLOSEC) 20 MG capsule, Take 1 capsule by mouth Daily., Disp: 30 capsule, Rfl: 3  •   "ondansetron (ZOFRAN) 8 MG tablet, Take 1 tablet by mouth 3 (Three) Times a Day As Needed for Nausea or Vomiting., Disp: 30 tablet, Rfl: 5  •  Potassium 99 MG tablet, Take 1 tablet by mouth Every Night., Disp: , Rfl:   •  pravastatin (PRAVACHOL) 40 MG tablet, Take 40 mg by mouth Every Night., Disp: , Rfl:   •  spironolactone (ALDACTONE) 25 MG tablet, Take 25 mg by mouth Daily., Disp: , Rfl:   •  lisinopril (PRINIVIL,ZESTRIL) 5 MG tablet, Take 1 tablet by mouth Daily., Disp: 30 tablet, Rfl: 6      Objective:     Vitals:    02/05/21 1343   BP: 118/66   BP Location: Right arm   Pulse: 73   Weight: (!) 161 kg (355 lb)   Height: 165.1 cm (65\")     Body mass index is 59.08 kg/m².    Vitals signs reviewed.   Constitutional:       Appearance: Morbidly obese.      Comments: On oxygen.   Eyes:      Conjunctiva/sclera: Conjunctivae normal.   HENT:      Head: Normocephalic.      Nose: Nose normal.         Comments: Masked  Neck:      Musculoskeletal: Normal range of motion.      Comments: Cannot assess for JVD due to habitus  Pulmonary:      Effort: Pulmonary effort is normal.      Breath sounds: Normal breath sounds.   Cardiovascular:      Normal rate. Regular rhythm.      Murmurs: There is no murmur.   Pulses:     Intact distal pulses.   Edema:     Ankle: bilateral 1+ edema of the ankle.     Feet: bilateral 1+ edema of the feet.  Abdominal:      Palpations: Abdomen is soft.      Tenderness: There is no abdominal tenderness.      Comments: Obesity limits abdominal exam   Musculoskeletal: Normal range of motion.   Skin:     General: Skin is warm and dry.      Findings: No rash.   Neurological:      General: No focal deficit present.      Mental Status: Alert and oriented to person, place, and time.      Cranial Nerves: No cranial nerve deficit.   Psychiatric:         Behavior: Behavior normal.         Thought Content: Thought content normal.         Judgment: Judgment normal.           ECG 12 Lead    Date/Time: 2/5/2021 3:05 " PM  Performed by: Bradley Garza MD  Authorized by: Bradley Garza MD   Comparison: compared with previous ECG   Similar to previous ECG  Rhythm: sinus rhythm  Conduction: right bundle branch block  ST Segments: ST segments normal  T Waves: T waves normal  QRS axis: normal  Other: no other findings    Clinical impression: abnormal EKG            Assessment:       Diagnosis Plan   1. Inflammatory breast cancer, right (CMS/HCC)  ECG 12 Lead   2. Examination prior to chemotherapy  proBNP    Troponin    Adult Transthoracic Echo Complete W/ Cont if Necessary Per Protocol   3. Chronic diastolic (congestive) heart failure (CMS/HCC)  proBNP    Troponin    Adult Transthoracic Echo Complete W/ Cont if Necessary Per Protocol   4. Coronary artery calcification seen on CT scan  proBNP    Troponin   5. Essential hypertension     6. Type 2 diabetes mellitus without complication, without long-term current use of insulin (CMS/HCC)     7. ANABELLE on CPAP     8. Obesity hypoventilation syndrome (CMS/HCC)          Plan:       Ms. Conner has chronic diastolic congestive heart failure, coronary artery calcification without symptoms of angina, systemic hypertension, sleep apnea, obesity hypoventilation syndrome on chronic oxygen, type 2 diabetes, and she is morbidly obese.  She will be starting trastuzumab and Pertuzumab as well as paclitaxel.  She is at high risk for the development of chemotherapy related cardiomyopathy.  Thankfully her baseline echo reveals normal left ventricular systolic function and only grade 1 diastolic dysfunction, as well as normal global longitudinal strain, and normal right ventricular systolic pressure.    She will need an echocardiogram every 3 months while she is receiving treatment, again at the end of treatment, and then every 6 months for 2 years.  These recommendations may change if we see significant difference in her left ventricular ejection fraction or global longitudinal strain.    She is already on  carvedilol.  Because I do feel she is a high risk patient, I have also started lisinopril, 5 mg daily, as a cardioprotective medication.  She was counseled against the possibility of it causing a cough.    A baseline proBNP and troponin have been checked but are not yet resulted.  I will follow up on those results.    She and I also discussed, that again, my role is not to replace her trusted and long-established cardiologist at home, with whom she will still follow for her general cardiology needs.    Sincerely,       Bradley Garza MD

## 2021-02-08 NOTE — PROGRESS NOTES
Subjective     REASON FOR CONSULTATION: Right breast inflammatory breast cancer ER/RI HER-2 pending  Provide an opinion on any further workup or treatment                             REQUESTING PHYSICIAN: MD Francisco Esteban MD Bethany Haynes, MD      History of Present Illness patient is a 65-year-old female with COPD on oxygen diastolic heart failure and unfortunately inflammatory HER-2 positive breast cancer on the right here to initiate neoadjuvant chemotherapy.    Thankfully her echocardiogram was adequate and she saw the cardio oncologist who approved her for chemotherapy.    We discussed her PET scan is done because of some adenopathy in the pelvis and a adrenal nodule which were thankfully negative.  She has obviously uptake in the right breast subpectoral and axillary nodes.  There is a 6 mm right lower lobe nodule which is below PET detection level and the adrenal nodule was negative there is no other uptake detected except for short segment uptake in the distal esophagus which she tells me is probably related to Schatzki's ring which she has had and issues with reflux    At any rate I do not think we can wait to get this evaluated before starting chemotherapy and we will refer her back to GI to have it looked at soon    Genetic test with 84 genes was negative    She has a port in place and is ready to start chemotherapy today.  She has not taken her Lasix yet is a little edematous and we will give it IV with her treatment today    We talked again about diarrhea and how to best manage this to avoid dehydration and she has Imodium at home and will call us if the diarrhea is uncontrolled      Past Medical History:   Diagnosis Date   • Anemia    • Asthma    • Chronic diastolic (congestive) heart failure (CMS/HCC)    • Chronic hypoxemic respiratory failure (CMS/HCC)     on continuous O2   • H/O  Intraductal papilloma    • Hemorrhoids    • History of transfusion     AFTER BACK SURGERY   • Hypertension    • Inflammatory breast cancer (CMS/HCC)     right   • Kidney stone    • Morbid obesity with BMI of 50.0-59.9, adult (CMS/HCC)    • Obesity hypoventilation syndrome (CMS/HCC) 2021   • On home oxygen therapy     2.5 AT REST WITH ACTIVITY UP TO 4L   • ANABELLE on CPAP    • Osteoarthritis    • Type 2 diabetes mellitus (CMS/HCC)         Past Surgical History:   Procedure Laterality Date   • BREAST EXCISIONAL BIOPSY Bilateral    • COLONOSCOPY     • CYSTOSCOPY BLADDER STONE LITHOTRIPSY     • KNEE ARTHROPLASTY Bilateral    • SPINE SURGERY     • TOTAL ABDOMINAL HYSTERECTOMY WITH SALPINGO OOPHORECTOMY     • VENOUS ACCESS DEVICE (PORT) INSERTION N/A 2021    Procedure: INSERTION VENOUS ACCESS DEVICE;  Surgeon: Nicci Banks MD;  Location: [facility];  Service: General;  Laterality: N/A;      ONC HISTORY  patient is a 65-year-old white female with morbid obesity, history of diastolic congestive heart failure and unknown type of pulmonary disease for which she has been on oxygen for 4 years.    She has been getting routine mammography since 38 years of age because her mother  at 46 of breast cancer and had a biopsy of her left breast in  which was apparently benign and another biopsy in  on her right breast which showed a small focus of atypical ductal hyperplasia and atypical lobular hyperplasia with a residual intraductal papilloma.  At that time she saw medical oncologist who recommended genetic testing and prevention but the insurance would not cover the genetic testing and the medical oncologist thought tamoxifen was too risky for her because of her comorbidities and no treatment was given.  More recently she noticed redness of the right breast in October and showed it to her family doctor who gave her course of Keflex when it did not improve and mammogram was ordered and  this was benign  When the redness persisted she saw her gynecologist with concern for inflammatory breast cancer and referred her to Dr. Banks after repeat imaging and [facility].  Preliminary report shows micropapillary invasive mammary carcinoma intermediate grade measuring 13 mm right axillary node was also involved with metastatic cancer ER/SD and HER-2 are pending  Patient saw Dr. Banks who sent her for skin biopsies and she had 3 separate punch biopsy of the skin that showed Perivascular and perifollicular inflammation with no obvious malignancy at 3:00 12:00 and 9:00  In addition staging work-up with CAT scans and bone scan were ordered.  CAT scan of the chest showed a borderline mediastinal  Infracarinal node measuring 15 mm in length mild cardiomegaly and heavy coronary artery calcifications  CT of the abdomen showed a 2.5 x 2.2 cm right adrenal mass which the patient tells me she has had in the past and is most likely benign but also a 2.7 cm left external iliac node which is indeterminate.  Bone scan was negative    Echocardiogram is scheduled for later next week and genetic testing with the [employer] stat panel is pending    Patient is  3 para 3 menarche was at age 9 menopause at 49 when she had a hysterectomy and oophorectomy  First childbirth was at age 20 she breast-fed her second and third children and took no hormone replacement after menopause    Family history is positive for mother dying of breast cancer at age 46 she is a maternal aunt with breast cancer in her 70s a paternal aunt with breast cancer in her 70s paternal grandmother with colon cancer.  Her father had Hodgkin's disease and non-Hodgkin's lymphoma but  of small cell lung cancer at 67      She has not had a heart attack stroke or blood clot    Plan to do echocardiogram soon as possible to ascertain tolerability of cardiotoxic chemotherapy which would  be typically indicated with an inflammatory breast cancer    Also plan PET scan to follow-up on atypical lymph nodes and confirm benign etiology of the adrenal lesion    She will have port placement and chemo education depending on ER/VT and HER-2 status    I explained to Aida that the goal of treatment would be curative but she has a lot of comorbidities which may limit our ability to give the most effective chemotherapy in the setting  I told her radiation would be involved and possibly hormonal therapy for 10 years if she has hormone positivity and HER-2 directed therapy for HER-2 is positive    She expressed some concerns about driving back and forth from [place] but felt that once a week was doable    We will see her back in 2 weeks to start treatment with a port placement planned early next week        Current Outpatient Medications on File Prior to Visit   Medication Sig Dispense Refill   • albuterol sulfate  (90 Base) MCG/ACT inhaler Inhale 2 puffs Every 4 (Four) Hours As Needed.     • carvedilol (COREG) 25 MG tablet Take 12.5 mg by mouth 2 (Two) Times a Day With Meals.     • Cholecalciferol (Vitamin D) 50 MCG (2000 UT) capsule Take 2,000 Units by mouth Daily.     • fexofenadine (ALLEGRA) 180 MG tablet Take 180 mg by mouth Daily As Needed.     • FLUoxetine (PROzac) 40 MG capsule Take 40 mg by mouth Daily.     • furosemide (LASIX) 40 MG tablet Take 20 mg by mouth Daily.     • lisinopril (PRINIVIL,ZESTRIL) 5 MG tablet Take 1 tablet by mouth Daily. 30 tablet 6   • meloxicam (MOBIC) 15 MG tablet Take 15 mg by mouth Daily. PT HOLDING FOR SURGERY     • metFORMIN (GLUCOPHAGE) 500 MG tablet Take 500 mg by mouth Daily With Breakfast.     • mometasone-formoterol (DULERA 100) 100-5 MCG/ACT inhaler Inhale 2 Puffs/kg Every Night.     • montelukast (SINGULAIR) 10 MG tablet Take 10 mg by mouth Every Night.     • multivitamin (THERAGRAN) tablet tablet Take 1 tablet by mouth Daily. TO HOLD 1 WEEK BEFORE SURGERY      • O2 (OXYGEN) Inhale 2.5 L/min Continuous.     • ondansetron (ZOFRAN) 8 MG tablet Take 1 tablet by mouth 3 (Three) Times a Day As Needed for Nausea or Vomiting. 30 tablet 5   • Potassium 99 MG tablet Take 1 tablet by mouth Every Night.     • pravastatin (PRAVACHOL) 40 MG tablet Take 40 mg by mouth Every Night.     • spironolactone (ALDACTONE) 25 MG tablet Take 25 mg by mouth Daily.     • [DISCONTINUED] omeprazole (priLOSEC) 20 MG capsule Take 1 capsule by mouth Daily. 30 capsule 3     No current facility-administered medications on file prior to visit.         ALLERGIES:    Allergies   Allergen Reactions   • Amlodipine Swelling     LEGS    • Hydrochlorothiazide Other (See Comments)     Neuro issues   • Morphine Nausea And Vomiting     hallucinations   • Adhesive Tape Rash        Social History     Socioeconomic History   • Marital status:      Spouse name: Not on file   • Number of children: 3   • Years of education: Not on file   • Highest education level: Not on file   Occupational History   • Occupation: Teacher/     Employer: RETIRED   Tobacco Use   • Smoking status: Never Smoker   • Smokeless tobacco: Never Used   Substance and Sexual Activity   • Alcohol use: Not Currently   • Drug use: Never   • Sexual activity: Defer   Social History Narrative    3 daughters    She drinks one half of a 2 liter of  DT coke with caffeine daily.         Family History   Problem Relation Age of Onset   • Breast cancer Mother 45   • Colon cancer Paternal Grandmother    • Breast cancer Maternal Aunt 70   • Breast cancer Paternal Aunt 70   • Lung cancer Father    • Hodgkin's lymphoma Father    • Skin cancer Father         squamous cell   • Ovarian cancer Neg Hx    • Uterine cancer Neg Hx    • Deep vein thrombosis Neg Hx    • Pulmonary embolism Neg Hx    • Malig Hyperthermia Neg Hx         Review of Systems   Constitutional: Positive for fatigue.   Respiratory: Positive for shortness of breath (With exertion on  "home O2 for 4 years).    Cardiovascular: Positive for leg swelling (On and off ).        Objective     Vitals:    02/10/21 0802   BP: 144/75   Pulse: 78   Resp: 16   Temp: 97.3 °F (36.3 °C)   TempSrc: Skin   SpO2: 96%   Weight: (!) 162 kg (358 lb 3.2 oz)   Height: 165.1 cm (65\")     Current Status 2/10/2021   ECOG score 0       Physical Exam  Chest:             CONSTITUTIONAL:  Vital signs reviewed.  No distress, looks comfortable.  Morbidly obese  EYES:  Conjunctiva and lids unremarkable.  PERRLA  EARS,NOSE,MOUTH,THROAT:  Ears and nose appear unremarkable.  Lips, teeth, gums appear unremarkable.  RESPIRATORY:  Normal respiratory effort.  Lungs clear to auscultation bilaterally.  BREASTS: Both breasts are pendulous the right breast shows diffuse redness with peau d'orange involving the whole breast especially the dependent part of the breast with no definite mass palpable although thickness and induration of the skin in the periareolar area is noted-2 x 2 centimeter right axillary node appreciated (see photograph)  3 biopsy sites from skin biopsies are noted and biopsies sites at 10:00 and the axilla  CARDIOVASCULAR:  Normal S1, S2.  No murmurs rubs or gallops.  1+ brawny lower extremity edema.  GASTROINTESTINAL: Abdomen appears unremarkable.  Nontender.  No hepatomegaly.  No splenomegaly.  LYMPHATIC:  No cervical, supraclavicular, axillary lymphadenopathy.  SKIN:  Warm.  No rashes.  PSYCHIATRIC:  Normal judgment and insight.  Normal mood and affect.  I have reexamined the patient and the results are consistent with the previously documented exam. Jaime Azul MD       RECENT LABS:  Hematology WBC   Date Value Ref Range Status   02/10/2021 10.06 3.40 - 10.80 10*3/mm3 Final     RBC   Date Value Ref Range Status   02/10/2021 4.21 3.77 - 5.28 10*6/mm3 Final     Hemoglobin   Date Value Ref Range Status   02/10/2021 9.8 (L) 12.0 - 15.9 g/dL Final     Hematocrit   Date Value Ref Range Status   02/10/2021 34.4 34.0 " - 46.6 % Final     Platelets   Date Value Ref Range Status   02/10/2021 398 140 - 450 10*3/mm3 Final      PET  IMPRESSION:  1.  Moderate to intensely FDG avid asymmetric soft tissue and skin  thickening involving the right breast likely representing patient's  known malignancy.  2.  Intensely FDG avid right axillary and subpectoral adenopathy likely  represent metastatic disease.  3.  Constellation of findings within the right adrenal gland are favored  to represent a lipid rich adenoma. Continued attention on follow-up is  recommended to ensure stability.  4.  While there are no findings of definite FDG avid osseous metastasis,  given the heterogenous FDG uptake throughout the axial and appendicular  skeleton due to the above stated limitations, subtle underlying osseous  metastasis would remain occult. Therefore, continued close attention on  follow-up is recommended to exclude this possibility.  5.  Short segment of moderate to intense FDG uptake within the distal  esophagus and GE junction suggestive of esophagitis. In the appropriate  clinical context correlation with patient history is recommended with  follow-up endoscopy if clinically indicated.  6.  Sub-6 mm pulmonary nodule within the right lower lobe is below PET  resolution and indeterminate. Continued close attention on follow-up  with chest CT in 3 months is recommended to exclude metastatic disease.  7.  Other findings as above.     This report was finalized on 2/2/2021     Assessment/Plan   1. [id number] right breast cancer ER/UT HER-2 -3+ positive inflammatory breast cancer for neoadjuvant chemotherapy  · Staging work-up negative except for 6 mm lung nodule and axillary and subpectoral adenopathy  · THP followed by AC planned if she tolerates it    2.  Morbid obesity    3.  History of diastolic heart failure  · Echocardiogram with ejection fraction of 64% normal strain  · Cleared by cardio-oncology for chemotherapy    4.  Pulmonary disease?  Etiology  on oxygen for 4 years?  Jennifer    5.  Strong family history of breast cancer genetic testing 84 genes negative    6.  Probable benign adrenal adenoma-PET negative    7.  Questionable enlarged lymph nodes left iliac chain and mediastinum likely reactive-PET negative    8.  6 mm right lower lobe nodule below PET resolution pretreatment needs follow-up after THP    9.  Abnormal uptake short segment esophagus with a history of Schatzki's ring-we will double PPI and watch closely but we we will proceed with chemotherapy at this point and refer back to GI-doubt she has metastatic disease to this area    10.  Anemia with microcytic indices-check iron stores    Plan  1.  Taxol Herceptin Perjeta today  2.  Lasix 20 mg IV today  3.  Check ferritin iron stores and consider Injectafer  4.  Return in 1 week for NP evaluation toxicity check and see me in 3 weeks for second round of Perjeta  5.  Daily weights  6.  CT chest planned after completion of Perjeta Herceptin and Taxol in 12 weeks

## 2021-02-09 ENCOUNTER — TELEPHONE (OUTPATIENT)
Dept: ONCOLOGY | Facility: CLINIC | Age: 66
End: 2021-02-09

## 2021-02-09 ENCOUNTER — TELEPHONE (OUTPATIENT)
Dept: SURGERY | Facility: CLINIC | Age: 66
End: 2021-02-09

## 2021-02-09 NOTE — TELEPHONE ENCOUNTER
[facility] appointment with Dr. Banks is scheduled on 4/8/2021 @ 2:00pm.    Called and left message with patient, patient to call back and confirm.    Sent patient a reminder letter in the mail.

## 2021-02-09 NOTE — TELEPHONE ENCOUNTER
CALLER: LUISA    REASON:    PT HAS A NEW PT TOMORROW EARLY IN THE MORNING TOMORROW AND PT FAMILY IS IN A TIZZY ABOUT THE BAD WEATHER COMING IN.    PT CALLING TO ASK WHAT OUR HOSPITAL PROTOCOLS ARE FOR INCLEMENT WEATHER SO PT FAMILY WONT WORRY AS MUCH.    PLEASE ADVISE      BEST C/B: [phone]

## 2021-02-10 ENCOUNTER — OFFICE VISIT (OUTPATIENT)
Dept: ONCOLOGY | Facility: CLINIC | Age: 66
End: 2021-02-10

## 2021-02-10 ENCOUNTER — INFUSION (OUTPATIENT)
Dept: ONCOLOGY | Facility: HOSPITAL | Age: 66
End: 2021-02-10

## 2021-02-10 VITALS
RESPIRATION RATE: 16 BRPM | HEART RATE: 78 BPM | SYSTOLIC BLOOD PRESSURE: 144 MMHG | WEIGHT: 293 LBS | TEMPERATURE: 97.3 F | BODY MASS INDEX: 48.82 KG/M2 | OXYGEN SATURATION: 96 % | HEIGHT: 65 IN | DIASTOLIC BLOOD PRESSURE: 75 MMHG

## 2021-02-10 VITALS — HEART RATE: 95 BPM | SYSTOLIC BLOOD PRESSURE: 150 MMHG | DIASTOLIC BLOOD PRESSURE: 87 MMHG

## 2021-02-10 DIAGNOSIS — C50.611 MALIGNANT NEOPLASM OF AXILLARY TAIL OF RIGHT FEMALE BREAST, UNSPECIFIED ESTROGEN RECEPTOR STATUS (HCC): ICD-10-CM

## 2021-02-10 DIAGNOSIS — C50.611 MALIGNANT NEOPLASM OF AXILLARY TAIL OF RIGHT FEMALE BREAST, UNSPECIFIED ESTROGEN RECEPTOR STATUS (HCC): Primary | ICD-10-CM

## 2021-02-10 LAB
ALBUMIN SERPL-MCNC: 3.9 G/DL (ref 3.5–5.2)
ALBUMIN/GLOB SERPL: 1.2 G/DL (ref 1.1–2.4)
ALP SERPL-CCNC: 110 U/L (ref 38–116)
ALT SERPL W P-5'-P-CCNC: 12 U/L (ref 0–33)
ANION GAP SERPL CALCULATED.3IONS-SCNC: 11.2 MMOL/L (ref 5–15)
AST SERPL-CCNC: 13 U/L (ref 0–32)
BASOPHILS # BLD AUTO: 0.04 10*3/MM3 (ref 0–0.2)
BASOPHILS NFR BLD AUTO: 0.4 % (ref 0–1.5)
BILIRUB SERPL-MCNC: 0.3 MG/DL (ref 0.2–1.2)
BUN SERPL-MCNC: 21 MG/DL (ref 6–20)
BUN/CREAT SERPL: 27.6 (ref 7.3–30)
CALCIUM SPEC-SCNC: 9.5 MG/DL (ref 8.5–10.2)
CANCER AG15-3 SERPL-ACNC: 10 U/ML
CHLORIDE SERPL-SCNC: 103 MMOL/L (ref 98–107)
CO2 SERPL-SCNC: 25.8 MMOL/L (ref 22–29)
CREAT SERPL-MCNC: 0.76 MG/DL (ref 0.6–1.1)
DEPRECATED RDW RBC AUTO: 45.6 FL (ref 37–54)
EOSINOPHIL # BLD AUTO: 0.19 10*3/MM3 (ref 0–0.4)
EOSINOPHIL NFR BLD AUTO: 1.9 % (ref 0.3–6.2)
ERYTHROCYTE [DISTWIDTH] IN BLOOD BY AUTOMATED COUNT: 15.4 % (ref 12.3–15.4)
FERRITIN SERPL-MCNC: 16 NG/ML (ref 13–150)
GFR SERPL CREATININE-BSD FRML MDRD: 76 ML/MIN/1.73
GLOBULIN UR ELPH-MCNC: 3.2 GM/DL (ref 1.8–3.5)
GLUCOSE SERPL-MCNC: 109 MG/DL (ref 74–124)
HCT VFR BLD AUTO: 34.4 % (ref 34–46.6)
HGB BLD-MCNC: 9.8 G/DL (ref 12–15.9)
IMM GRANULOCYTES # BLD AUTO: 0.04 10*3/MM3 (ref 0–0.05)
IMM GRANULOCYTES NFR BLD AUTO: 0.4 % (ref 0–0.5)
IRON 24H UR-MRATE: 21 MCG/DL (ref 37–145)
IRON SATN MFR SERPL: 4 % (ref 20–50)
LYMPHOCYTES # BLD AUTO: 1.49 10*3/MM3 (ref 0.7–3.1)
LYMPHOCYTES NFR BLD AUTO: 14.8 % (ref 19.6–45.3)
MCH RBC QN AUTO: 23.3 PG (ref 26.6–33)
MCHC RBC AUTO-ENTMCNC: 28.5 G/DL (ref 31.5–35.7)
MCV RBC AUTO: 81.7 FL (ref 79–97)
MONOCYTES # BLD AUTO: 0.87 10*3/MM3 (ref 0.1–0.9)
MONOCYTES NFR BLD AUTO: 8.6 % (ref 5–12)
NEUTROPHILS NFR BLD AUTO: 7.43 10*3/MM3 (ref 1.7–7)
NEUTROPHILS NFR BLD AUTO: 73.9 % (ref 42.7–76)
NRBC BLD AUTO-RTO: 0 /100 WBC (ref 0–0.2)
PLATELET # BLD AUTO: 398 10*3/MM3 (ref 140–450)
PMV BLD AUTO: 9.6 FL (ref 6–12)
POTASSIUM SERPL-SCNC: 4.3 MMOL/L (ref 3.5–4.7)
PROT SERPL-MCNC: 7.1 G/DL (ref 6.3–8)
RBC # BLD AUTO: 4.21 10*6/MM3 (ref 3.77–5.28)
SODIUM SERPL-SCNC: 140 MMOL/L (ref 134–145)
TIBC SERPL-MCNC: 478 MCG/DL (ref 298–536)
TRANSFERRIN SERPL-MCNC: 321 MG/DL (ref 200–360)
WBC # BLD AUTO: 10.06 10*3/MM3 (ref 3.4–10.8)

## 2021-02-10 PROCEDURE — 25010000002 DIPHENHYDRAMINE PER 50 MG: Performed by: INTERNAL MEDICINE

## 2021-02-10 PROCEDURE — 80053 COMPREHEN METABOLIC PANEL: CPT

## 2021-02-10 PROCEDURE — 96367 TX/PROPH/DG ADDL SEQ IV INF: CPT

## 2021-02-10 PROCEDURE — 83540 ASSAY OF IRON: CPT | Performed by: INTERNAL MEDICINE

## 2021-02-10 PROCEDURE — 25010000002 TRASTUZUMAB PER 10 MG: Performed by: INTERNAL MEDICINE

## 2021-02-10 PROCEDURE — 25010000002 FUROSEMIDE PER 20 MG: Performed by: INTERNAL MEDICINE

## 2021-02-10 PROCEDURE — 25010000002 PACLITAXEL PER 1 MG: Performed by: INTERNAL MEDICINE

## 2021-02-10 PROCEDURE — 85025 COMPLETE CBC W/AUTO DIFF WBC: CPT

## 2021-02-10 PROCEDURE — 96417 CHEMO IV INFUS EACH ADDL SEQ: CPT

## 2021-02-10 PROCEDURE — 82728 ASSAY OF FERRITIN: CPT | Performed by: INTERNAL MEDICINE

## 2021-02-10 PROCEDURE — 96413 CHEMO IV INFUSION 1 HR: CPT

## 2021-02-10 PROCEDURE — 99215 OFFICE O/P EST HI 40 MIN: CPT | Performed by: INTERNAL MEDICINE

## 2021-02-10 PROCEDURE — 25010000002 PERTUZUMAB 420 MG/14ML SOLUTION 420 MG VIAL: Performed by: INTERNAL MEDICINE

## 2021-02-10 PROCEDURE — 25010000002 HYDROCORTISONE SODIUM SUCCINATE 100 MG RECONSTITUTED SOLUTION: Performed by: INTERNAL MEDICINE

## 2021-02-10 PROCEDURE — 86300 IMMUNOASSAY TUMOR CA 15-3: CPT | Performed by: INTERNAL MEDICINE

## 2021-02-10 PROCEDURE — 84466 ASSAY OF TRANSFERRIN: CPT | Performed by: INTERNAL MEDICINE

## 2021-02-10 PROCEDURE — 96375 TX/PRO/DX INJ NEW DRUG ADDON: CPT

## 2021-02-10 PROCEDURE — 25010000002 DEXAMETHASONE SODIUM PHOSPHATE 100 MG/10ML SOLUTION: Performed by: INTERNAL MEDICINE

## 2021-02-10 PROCEDURE — 96415 CHEMO IV INFUSION ADDL HR: CPT

## 2021-02-10 RX ORDER — SODIUM CHLORIDE 9 MG/ML
250 INJECTION, SOLUTION INTRAVENOUS ONCE
Status: COMPLETED | OUTPATIENT
Start: 2021-02-10 | End: 2021-02-10

## 2021-02-10 RX ORDER — FAMOTIDINE 10 MG/ML
20 INJECTION, SOLUTION INTRAVENOUS AS NEEDED
Status: CANCELLED | OUTPATIENT
Start: 2021-02-10

## 2021-02-10 RX ORDER — SODIUM CHLORIDE 9 MG/ML
250 INJECTION, SOLUTION INTRAVENOUS ONCE
Status: CANCELLED | OUTPATIENT
Start: 2021-03-03

## 2021-02-10 RX ORDER — FAMOTIDINE 10 MG/ML
20 INJECTION, SOLUTION INTRAVENOUS ONCE
Status: COMPLETED | OUTPATIENT
Start: 2021-02-10 | End: 2021-02-10

## 2021-02-10 RX ORDER — DIPHENHYDRAMINE HYDROCHLORIDE 50 MG/ML
50 INJECTION INTRAMUSCULAR; INTRAVENOUS AS NEEDED
Status: CANCELLED | OUTPATIENT
Start: 2021-02-10

## 2021-02-10 RX ORDER — OMEPRAZOLE 20 MG/1
40 CAPSULE, DELAYED RELEASE ORAL DAILY
Qty: 60 CAPSULE | Refills: 3 | Status: SHIPPED | OUTPATIENT
Start: 2021-02-10 | End: 2021-07-29

## 2021-02-10 RX ORDER — DIPHENHYDRAMINE HYDROCHLORIDE 50 MG/ML
50 INJECTION INTRAMUSCULAR; INTRAVENOUS AS NEEDED
Status: CANCELLED | OUTPATIENT
Start: 2021-02-24

## 2021-02-10 RX ORDER — DIPHENHYDRAMINE HYDROCHLORIDE 50 MG/ML
50 INJECTION INTRAMUSCULAR; INTRAVENOUS AS NEEDED
Status: CANCELLED | OUTPATIENT
Start: 2021-03-03

## 2021-02-10 RX ORDER — FAMOTIDINE 10 MG/ML
20 INJECTION, SOLUTION INTRAVENOUS AS NEEDED
Status: CANCELLED | OUTPATIENT
Start: 2021-02-24

## 2021-02-10 RX ORDER — FAMOTIDINE 10 MG/ML
20 INJECTION, SOLUTION INTRAVENOUS ONCE
Status: CANCELLED | OUTPATIENT
Start: 2021-02-10

## 2021-02-10 RX ORDER — SODIUM CHLORIDE 9 MG/ML
250 INJECTION, SOLUTION INTRAVENOUS ONCE
Status: CANCELLED | OUTPATIENT
Start: 2021-02-10

## 2021-02-10 RX ORDER — FAMOTIDINE 10 MG/ML
20 INJECTION, SOLUTION INTRAVENOUS ONCE
Status: CANCELLED | OUTPATIENT
Start: 2021-03-03

## 2021-02-10 RX ORDER — FUROSEMIDE 10 MG/ML
20 INJECTION INTRAMUSCULAR; INTRAVENOUS ONCE
Status: COMPLETED | OUTPATIENT
Start: 2021-02-10 | End: 2021-02-10

## 2021-02-10 RX ORDER — SODIUM CHLORIDE 9 MG/ML
250 INJECTION, SOLUTION INTRAVENOUS ONCE
Status: CANCELLED | OUTPATIENT
Start: 2021-02-24

## 2021-02-10 RX ORDER — FAMOTIDINE 10 MG/ML
20 INJECTION, SOLUTION INTRAVENOUS ONCE
Status: CANCELLED | OUTPATIENT
Start: 2021-02-24

## 2021-02-10 RX ORDER — FAMOTIDINE 10 MG/ML
20 INJECTION, SOLUTION INTRAVENOUS AS NEEDED
Status: CANCELLED | OUTPATIENT
Start: 2021-03-03

## 2021-02-10 RX ADMIN — DIPHENHYDRAMINE HYDROCHLORIDE 50 MG: 50 INJECTION, SOLUTION INTRAMUSCULAR; INTRAVENOUS at 09:04

## 2021-02-10 RX ADMIN — FAMOTIDINE 20 MG: 10 INJECTION, SOLUTION INTRAVENOUS at 08:46

## 2021-02-10 RX ADMIN — DEXAMETHASONE SODIUM PHOSPHATE 12 MG: 10 INJECTION, SOLUTION INTRAMUSCULAR; INTRAVENOUS at 08:45

## 2021-02-10 RX ADMIN — SODIUM CHLORIDE 250 ML: 9 INJECTION, SOLUTION INTRAVENOUS at 08:45

## 2021-02-10 RX ADMIN — PACLITAXEL 200 MG: 6 INJECTION, SOLUTION INTRAVENOUS at 13:38

## 2021-02-10 RX ADMIN — HYDROCORTISONE SODIUM SUCCINATE 100 MG: 100 INJECTION, POWDER, FOR SOLUTION INTRAMUSCULAR; INTRAVENOUS at 13:50

## 2021-02-10 RX ADMIN — FUROSEMIDE 20 MG: 10 INJECTION, SOLUTION INTRAMUSCULAR; INTRAVENOUS at 09:38

## 2021-02-10 RX ADMIN — PERTUZUMAB 840 MG: 30 INJECTION, SOLUTION, CONCENTRATE INTRAVENOUS at 09:57

## 2021-02-10 RX ADMIN — TRASTUZUMAB 650 MG: 150 INJECTION, POWDER, LYOPHILIZED, FOR SOLUTION INTRAVENOUS at 11:57

## 2021-02-10 NOTE — NURSING NOTE
Taxol stopped for cough and SOA, flushing noted.   Mary NP to bedside, orders received for 100 mg Solu-cortef given.   /88 HR 91  O2 Sats 3L(baseline) 91%

## 2021-02-17 ENCOUNTER — APPOINTMENT (OUTPATIENT)
Dept: ONCOLOGY | Facility: HOSPITAL | Age: 66
End: 2021-02-17

## 2021-02-24 ENCOUNTER — TELEPHONE (OUTPATIENT)
Dept: ONCOLOGY | Facility: CLINIC | Age: 66
End: 2021-02-24

## 2021-02-24 ENCOUNTER — OFFICE VISIT (OUTPATIENT)
Dept: ONCOLOGY | Facility: CLINIC | Age: 66
End: 2021-02-24

## 2021-02-24 ENCOUNTER — INFUSION (OUTPATIENT)
Dept: ONCOLOGY | Facility: HOSPITAL | Age: 66
End: 2021-02-24

## 2021-02-24 ENCOUNTER — APPOINTMENT (OUTPATIENT)
Dept: ONCOLOGY | Facility: HOSPITAL | Age: 66
End: 2021-02-24

## 2021-02-24 VITALS
DIASTOLIC BLOOD PRESSURE: 84 MMHG | HEART RATE: 77 BPM | HEIGHT: 65 IN | BODY MASS INDEX: 48.82 KG/M2 | RESPIRATION RATE: 21 BRPM | OXYGEN SATURATION: 96 % | WEIGHT: 293 LBS | SYSTOLIC BLOOD PRESSURE: 156 MMHG | TEMPERATURE: 97.5 F

## 2021-02-24 VITALS
OXYGEN SATURATION: 96 % | WEIGHT: 293 LBS | RESPIRATION RATE: 21 BRPM | SYSTOLIC BLOOD PRESSURE: 145 MMHG | TEMPERATURE: 97.5 F | DIASTOLIC BLOOD PRESSURE: 74 MMHG | HEART RATE: 72 BPM | BODY MASS INDEX: 60.37 KG/M2

## 2021-02-24 DIAGNOSIS — C50.911 INFLAMMATORY BREAST CANCER, RIGHT (HCC): ICD-10-CM

## 2021-02-24 DIAGNOSIS — R19.7 DIARRHEA, UNSPECIFIED TYPE: ICD-10-CM

## 2021-02-24 DIAGNOSIS — C50.611 MALIGNANT NEOPLASM OF AXILLARY TAIL OF RIGHT FEMALE BREAST, UNSPECIFIED ESTROGEN RECEPTOR STATUS (HCC): Primary | ICD-10-CM

## 2021-02-24 DIAGNOSIS — Z45.2 FITTING AND ADJUSTMENT OF VASCULAR CATHETER: ICD-10-CM

## 2021-02-24 DIAGNOSIS — D64.81 ANTINEOPLASTIC CHEMOTHERAPY INDUCED ANEMIA: ICD-10-CM

## 2021-02-24 DIAGNOSIS — T45.1X5A ANTINEOPLASTIC CHEMOTHERAPY INDUCED ANEMIA: ICD-10-CM

## 2021-02-24 DIAGNOSIS — T45.4X5A ADVERSE EFFECT OF IRON, INITIAL ENCOUNTER: ICD-10-CM

## 2021-02-24 DIAGNOSIS — Z79.899 HIGH RISK MEDICATION USE: ICD-10-CM

## 2021-02-24 DIAGNOSIS — K59.1 FUNCTIONAL DIARRHEA: ICD-10-CM

## 2021-02-24 LAB
ABO GROUP BLD: NORMAL
ALBUMIN SERPL-MCNC: 3.7 G/DL (ref 3.5–5.2)
ALBUMIN/GLOB SERPL: 1.3 G/DL (ref 1.1–2.4)
ALP SERPL-CCNC: 105 U/L (ref 38–116)
ALT SERPL W P-5'-P-CCNC: 16 U/L (ref 0–33)
ANION GAP SERPL CALCULATED.3IONS-SCNC: 8 MMOL/L (ref 5–15)
AST SERPL-CCNC: 15 U/L (ref 0–32)
BASOPHILS # BLD AUTO: 0.04 10*3/MM3 (ref 0–0.2)
BASOPHILS NFR BLD AUTO: 0.5 % (ref 0–1.5)
BILIRUB SERPL-MCNC: 0.3 MG/DL (ref 0.2–1.2)
BLD GP AB SCN SERPL QL: NEGATIVE
BUN SERPL-MCNC: 11 MG/DL (ref 6–20)
BUN/CREAT SERPL: 14.9 (ref 7.3–30)
CALCIUM SPEC-SCNC: 8.8 MG/DL (ref 8.5–10.2)
CHLORIDE SERPL-SCNC: 104 MMOL/L (ref 98–107)
CO2 SERPL-SCNC: 29 MMOL/L (ref 22–29)
CREAT SERPL-MCNC: 0.74 MG/DL (ref 0.6–1.1)
DEPRECATED RDW RBC AUTO: 47.3 FL (ref 37–54)
EOSINOPHIL # BLD AUTO: 0.21 10*3/MM3 (ref 0–0.4)
EOSINOPHIL NFR BLD AUTO: 2.4 % (ref 0.3–6.2)
ERYTHROCYTE [DISTWIDTH] IN BLOOD BY AUTOMATED COUNT: 15.8 % (ref 12.3–15.4)
GFR SERPL CREATININE-BSD FRML MDRD: 78 ML/MIN/1.73
GLOBULIN UR ELPH-MCNC: 2.9 GM/DL (ref 1.8–3.5)
GLUCOSE SERPL-MCNC: 131 MG/DL (ref 74–124)
HCT VFR BLD AUTO: 29.2 % (ref 34–46.6)
HGB BLD-MCNC: 8 G/DL (ref 12–15.9)
IMM GRANULOCYTES # BLD AUTO: 0.05 10*3/MM3 (ref 0–0.05)
IMM GRANULOCYTES NFR BLD AUTO: 0.6 % (ref 0–0.5)
LYMPHOCYTES # BLD AUTO: 1.31 10*3/MM3 (ref 0.7–3.1)
LYMPHOCYTES NFR BLD AUTO: 14.9 % (ref 19.6–45.3)
MAGNESIUM SERPL-MCNC: 1.7 MG/DL (ref 1.8–2.5)
MCH RBC QN AUTO: 22.5 PG (ref 26.6–33)
MCHC RBC AUTO-ENTMCNC: 27.4 G/DL (ref 31.5–35.7)
MCV RBC AUTO: 82.3 FL (ref 79–97)
MONOCYTES # BLD AUTO: 0.84 10*3/MM3 (ref 0.1–0.9)
MONOCYTES NFR BLD AUTO: 9.6 % (ref 5–12)
NEUTROPHILS NFR BLD AUTO: 6.33 10*3/MM3 (ref 1.7–7)
NEUTROPHILS NFR BLD AUTO: 72 % (ref 42.7–76)
NRBC BLD AUTO-RTO: 0 /100 WBC (ref 0–0.2)
PLATELET # BLD AUTO: 362 10*3/MM3 (ref 140–450)
PMV BLD AUTO: 8.7 FL (ref 6–12)
POTASSIUM SERPL-SCNC: 3.8 MMOL/L (ref 3.5–4.7)
PROT SERPL-MCNC: 6.6 G/DL (ref 6.3–8)
RBC # BLD AUTO: 3.55 10*6/MM3 (ref 3.77–5.28)
RH BLD: POSITIVE
SODIUM SERPL-SCNC: 141 MMOL/L (ref 134–145)
T&S EXPIRATION DATE: NORMAL
WBC # BLD AUTO: 8.78 10*3/MM3 (ref 3.4–10.8)

## 2021-02-24 PROCEDURE — 25010000002 TRASTUZUMAB PER 10 MG: Performed by: INTERNAL MEDICINE

## 2021-02-24 PROCEDURE — 96367 TX/PROPH/DG ADDL SEQ IV INF: CPT

## 2021-02-24 PROCEDURE — 25010000002 PACLITAXEL PER 1 MG: Performed by: INTERNAL MEDICINE

## 2021-02-24 PROCEDURE — 25010000003 HEPARIN LOCK FLUSH PER 10 UNITS: Performed by: INTERNAL MEDICINE

## 2021-02-24 PROCEDURE — 86920 COMPATIBILITY TEST SPIN: CPT

## 2021-02-24 PROCEDURE — 25010000002 DEXAMETHASONE SODIUM PHOSPHATE 100 MG/10ML SOLUTION: Performed by: INTERNAL MEDICINE

## 2021-02-24 PROCEDURE — 25010000002 DIPHENHYDRAMINE PER 50 MG: Performed by: INTERNAL MEDICINE

## 2021-02-24 PROCEDURE — 86901 BLOOD TYPING SEROLOGIC RH(D): CPT | Performed by: NURSE PRACTITIONER

## 2021-02-24 PROCEDURE — 86850 RBC ANTIBODY SCREEN: CPT | Performed by: NURSE PRACTITIONER

## 2021-02-24 PROCEDURE — 96413 CHEMO IV INFUSION 1 HR: CPT

## 2021-02-24 PROCEDURE — 96375 TX/PRO/DX INJ NEW DRUG ADDON: CPT

## 2021-02-24 PROCEDURE — 99215 OFFICE O/P EST HI 40 MIN: CPT | Performed by: NURSE PRACTITIONER

## 2021-02-24 PROCEDURE — 86900 BLOOD TYPING SEROLOGIC ABO: CPT | Performed by: NURSE PRACTITIONER

## 2021-02-24 PROCEDURE — 96417 CHEMO IV INFUS EACH ADDL SEQ: CPT

## 2021-02-24 PROCEDURE — 85025 COMPLETE CBC W/AUTO DIFF WBC: CPT

## 2021-02-24 PROCEDURE — 83735 ASSAY OF MAGNESIUM: CPT | Performed by: NURSE PRACTITIONER

## 2021-02-24 PROCEDURE — 80053 COMPREHEN METABOLIC PANEL: CPT

## 2021-02-24 RX ORDER — SODIUM CHLORIDE 0.9 % (FLUSH) 0.9 %
10 SYRINGE (ML) INJECTION AS NEEDED
Status: CANCELLED | OUTPATIENT
Start: 2021-02-24

## 2021-02-24 RX ORDER — HEPARIN SODIUM (PORCINE) LOCK FLUSH IV SOLN 100 UNIT/ML 100 UNIT/ML
500 SOLUTION INTRAVENOUS AS NEEDED
Status: DISCONTINUED | OUTPATIENT
Start: 2021-02-24 | End: 2021-02-24 | Stop reason: HOSPADM

## 2021-02-24 RX ORDER — SODIUM CHLORIDE 9 MG/ML
250 INJECTION, SOLUTION INTRAVENOUS AS NEEDED
Status: CANCELLED | OUTPATIENT
Start: 2021-02-24

## 2021-02-24 RX ORDER — ACETAMINOPHEN 325 MG/1
650 TABLET ORAL ONCE
Status: CANCELLED | OUTPATIENT
Start: 2021-02-24 | End: 2021-02-24

## 2021-02-24 RX ORDER — FUROSEMIDE 10 MG/ML
20 INJECTION INTRAMUSCULAR; INTRAVENOUS ONCE
Status: CANCELLED | OUTPATIENT
Start: 2021-03-01 | End: 2021-02-24

## 2021-02-24 RX ORDER — HEPARIN SODIUM (PORCINE) LOCK FLUSH IV SOLN 100 UNIT/ML 100 UNIT/ML
500 SOLUTION INTRAVENOUS AS NEEDED
Status: CANCELLED | OUTPATIENT
Start: 2021-02-24

## 2021-02-24 RX ORDER — FAMOTIDINE 10 MG/ML
20 INJECTION, SOLUTION INTRAVENOUS ONCE
Status: COMPLETED | OUTPATIENT
Start: 2021-02-24 | End: 2021-02-24

## 2021-02-24 RX ORDER — SODIUM CHLORIDE 0.9 % (FLUSH) 0.9 %
10 SYRINGE (ML) INJECTION AS NEEDED
Status: DISCONTINUED | OUTPATIENT
Start: 2021-02-24 | End: 2021-02-24 | Stop reason: HOSPADM

## 2021-02-24 RX ORDER — FUROSEMIDE 10 MG/ML
20 INJECTION INTRAMUSCULAR; INTRAVENOUS ONCE
Status: CANCELLED | OUTPATIENT
Start: 2021-02-24 | End: 2021-02-24

## 2021-02-24 RX ORDER — LIDOCAINE 5 G/100G
CREAM RECTAL; TOPICAL 3 TIMES DAILY PRN
Qty: 45 G | Refills: 2 | Status: SHIPPED | OUTPATIENT
Start: 2021-02-24 | End: 2022-06-07

## 2021-02-24 RX ORDER — ACETAMINOPHEN 325 MG/1
650 TABLET ORAL ONCE
Status: CANCELLED | OUTPATIENT
Start: 2021-03-01 | End: 2021-02-24

## 2021-02-24 RX ORDER — DIPHENHYDRAMINE HCL 25 MG
25 CAPSULE ORAL ONCE
Status: CANCELLED | OUTPATIENT
Start: 2021-03-01 | End: 2021-02-24

## 2021-02-24 RX ORDER — SODIUM CHLORIDE 9 MG/ML
250 INJECTION, SOLUTION INTRAVENOUS AS NEEDED
Status: CANCELLED | OUTPATIENT
Start: 2021-03-01

## 2021-02-24 RX ORDER — DIPHENHYDRAMINE HCL 25 MG
25 CAPSULE ORAL ONCE
Status: CANCELLED | OUTPATIENT
Start: 2021-02-24 | End: 2021-02-24

## 2021-02-24 RX ORDER — SODIUM CHLORIDE 9 MG/ML
250 INJECTION, SOLUTION INTRAVENOUS ONCE
Status: COMPLETED | OUTPATIENT
Start: 2021-02-24 | End: 2021-02-24

## 2021-02-24 RX ADMIN — TRASTUZUMAB 320 MG: 150 INJECTION, POWDER, LYOPHILIZED, FOR SOLUTION INTRAVENOUS at 11:27

## 2021-02-24 RX ADMIN — FAMOTIDINE 20 MG: 10 INJECTION, SOLUTION INTRAVENOUS at 10:44

## 2021-02-24 RX ADMIN — DIPHENHYDRAMINE HYDROCHLORIDE 25 MG: 50 INJECTION, SOLUTION INTRAMUSCULAR; INTRAVENOUS at 11:09

## 2021-02-24 RX ADMIN — SODIUM CHLORIDE 250 ML: 9 INJECTION, SOLUTION INTRAVENOUS at 10:44

## 2021-02-24 RX ADMIN — Medication 500 UNITS: at 13:13

## 2021-02-24 RX ADMIN — SODIUM CHLORIDE, PRESERVATIVE FREE 10 ML: 5 INJECTION INTRAVENOUS at 13:13

## 2021-02-24 RX ADMIN — DEXAMETHASONE SODIUM PHOSPHATE 12 MG: 10 INJECTION, SOLUTION INTRAMUSCULAR; INTRAVENOUS at 10:44

## 2021-02-24 RX ADMIN — PACLITAXEL 200 MG: 6 INJECTION, SOLUTION INTRAVENOUS at 12:13

## 2021-02-24 NOTE — PROGRESS NOTES
Subjective     REASON FOR FOLLOW-UP: Right breast inflammatory breast, triple positive                              REQUESTING PHYSICIAN: MD Francisco Esteban MD Bethany Haynes, MD      History of Present Illness patient is a 65 y.o. 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    Due to inclement weather she missed last week (day 8 therapy).  She presented to the office today for treatment in the infusion area but had no appointment with a provider.  I was asked to see her regarding several concerns.      Patient is found today to have hemoglobin down to 8.0.  She is noticeably pale, more fatigued and a bit more short of breath though this is difficult to truly assess with her underlying COPD and oxygen dependency.  She is in good spirits nonetheless.  We discussed proceeding with blood transfusion in order to continue on with current therapy.    Patient did have a mild reaction to Taxol dose #1 with some increased shortness of breath and flushing.  This was responsive to 100 mg of Solu-Cortef.  She states this gave her a headache and made her quite talkative but otherwise she was thankfully able to complete Taxol infusion without further incident.    Patient reports issues with chronic diarrhea over the last few years and did note a little bit increase in stooling following THP though nothing overly significant in her mind.  She did finally begin taking Imodium just a few days ago and has had no further stools.  She does note significant trouble with hemorrhoids in relation to the diarrhea and is requesting something to help.    She denies other concerns at this time.    Past Medical History:   Diagnosis Date   • Anemia    • Asthma    • Chronic diastolic (congestive) heart  failure (CMS/HCC)    • Chronic hypoxemic respiratory failure (CMS/HCC)     on continuous O2   • H/O Intraductal papilloma    • Hemorrhoids    • History of transfusion     AFTER BACK SURGERY   • Hypertension    • Inflammatory breast cancer (CMS/HCC)     right   • Kidney stone    • Morbid obesity with BMI of 50.0-59.9, adult (CMS/HCC)    • Obesity hypoventilation syndrome (CMS/HCC) 2021   • On home oxygen therapy     2.5 AT REST WITH ACTIVITY UP TO 4L   • ANABELLE on CPAP    • Osteoarthritis    • Type 2 diabetes mellitus (CMS/HCC)         Past Surgical History:   Procedure Laterality Date   • BREAST EXCISIONAL BIOPSY Bilateral    • COLONOSCOPY     • CYSTOSCOPY BLADDER STONE LITHOTRIPSY     • KNEE ARTHROPLASTY Bilateral    • SPINE SURGERY     • TOTAL ABDOMINAL HYSTERECTOMY WITH SALPINGO OOPHORECTOMY     • VENOUS ACCESS DEVICE (PORT) INSERTION N/A 2021    Procedure: INSERTION VENOUS ACCESS DEVICE;  Surgeon: Nicci Banks MD;  Location: [facility];  Service: General;  Laterality: N/A;      ONC HISTORY  patient is a 65-year-old white female with morbid obesity, history of diastolic congestive heart failure and unknown type of pulmonary disease for which she has been on oxygen for 4 years.    She has been getting routine mammography since 38 years of age because her mother  at 46 of breast cancer and had a biopsy of her left breast in  which was apparently benign and another biopsy in  on her right breast which showed a small focus of atypical ductal hyperplasia and atypical lobular hyperplasia with a residual intraductal papilloma.  At that time she saw medical oncologist who recommended genetic testing and prevention but the insurance would not cover the genetic testing and the medical oncologist thought tamoxifen was too risky for her because of her comorbidities and no treatment was given.  More recently she noticed redness of the right breast in October and showed it to her  family doctor who gave her course of Keflex when it did not improve and mammogram was ordered and this was benign  When the redness persisted she saw her gynecologist with concern for inflammatory breast cancer and referred her to Dr. Banks after repeat imaging and [facility] right [facility].  Preliminary report shows micropapillary invasive mammary carcinoma intermediate grade measuring 13 mm right axillary node was also involved with metastatic cancer ER/NY and HER-2 are pending  Patient saw Dr. Banks who sent her for skin biopsies and she had 3 separate punch biopsy of the skin that showed Perivascular and perifollicular inflammation with no obvious malignancy at 3:00 12:00 and 9:00  In addition staging work-up with CAT scans and bone scan were ordered.  CAT scan of the chest showed a borderline mediastinal  Infracarinal node measuring 15 mm in length mild cardiomegaly and heavy coronary artery calcifications  CT of the abdomen showed a 2.5 x 2.2 cm right adrenal mass which the patient tells me she has had in the past and is most likely benign but also a 2.7 cm left external iliac node which is indeterminate.  Bone scan was negative    Echocardiogram is scheduled for later next week and genetic testing with the [facility] stat panel is pending    Patient is  3 para 3 menarche was at age 9 menopause at 49 when she had a hysterectomy and oophorectomy  First childbirth was at age 20 she breast-fed her second and third children and took no hormone replacement after menopause    Family history is positive for mother dying of breast cancer at age 46 she is a maternal aunt with breast cancer in her 70s a paternal aunt with breast cancer in her 70s paternal grandmother with colon cancer.  Her father had Hodgkin's disease and non-Hodgkin's lymphoma but  of small cell lung cancer at 67      She has not had a heart attack stroke or blood clot    Plan to do  echocardiogram soon as possible to ascertain tolerability of cardiotoxic chemotherapy which would be typically indicated with an inflammatory breast cancer    Also plan PET scan to follow-up on atypical lymph nodes and confirm benign etiology of the adrenal lesion    She will have port placement and chemo education depending on ER/MD and HER-2 status    I explained to Aida that the goal of treatment would be curative but she has a lot of comorbidities which may limit our ability to give the most effective chemotherapy in the setting  I told her radiation would be involved and possibly hormonal therapy for 10 years if she has hormone positivity and HER-2 directed therapy for HER-2 is positive    She expressed some concerns about driving back and forth from [place] but felt that once a week was doable    We will see her back in 2 weeks to start treatment with a port placement planned early next week        Current Outpatient Medications on File Prior to Visit   Medication Sig Dispense Refill   • albuterol sulfate  (90 Base) MCG/ACT inhaler Inhale 2 puffs Every 4 (Four) Hours As Needed.     • carvedilol (COREG) 25 MG tablet Take 12.5 mg by mouth 2 (Two) Times a Day With Meals.     • Cholecalciferol (Vitamin D) 50 MCG (2000 UT) capsule Take 2,000 Units by mouth Daily.     • fexofenadine (ALLEGRA) 180 MG tablet Take 180 mg by mouth Daily As Needed.     • FLUoxetine (PROzac) 40 MG capsule Take 40 mg by mouth Daily.     • furosemide (LASIX) 40 MG tablet Take 20 mg by mouth Daily.     • lisinopril (PRINIVIL,ZESTRIL) 5 MG tablet Take 1 tablet by mouth Daily. 30 tablet 6   • meloxicam (MOBIC) 15 MG tablet Take 15 mg by mouth Daily. PT HOLDING FOR SURGERY     • metFORMIN (GLUCOPHAGE) 500 MG tablet Take 500 mg by mouth Daily With Breakfast.     • mometasone-formoterol (DULERA 100) 100-5 MCG/ACT inhaler Inhale 2 Puffs/kg Every Night.     • montelukast (SINGULAIR) 10 MG tablet Take 10 mg by mouth Every Night.     •  multivitamin (THERAGRAN) tablet tablet Take 1 tablet by mouth Daily. TO HOLD 1 WEEK BEFORE SURGERY     • O2 (OXYGEN) Inhale 2.5 L/min Continuous.     • omeprazole (priLOSEC) 20 MG capsule Take 2 capsules by mouth Daily. 60 capsule 3   • ondansetron (ZOFRAN) 8 MG tablet Take 1 tablet by mouth 3 (Three) Times a Day As Needed for Nausea or Vomiting. 30 tablet 5   • Potassium 99 MG tablet Take 1 tablet by mouth Every Night.     • pravastatin (PRAVACHOL) 40 MG tablet Take 40 mg by mouth Every Night.     • spironolactone (ALDACTONE) 25 MG tablet Take 25 mg by mouth Daily.       Current Facility-Administered Medications on File Prior to Visit   Medication Dose Route Frequency Provider Last Rate Last Admin   • [COMPLETED] dexamethasone (DECADRON) IVPB 12 mg  12 mg Intravenous Once Jaime Azul MD   Stopped at 02/24/21 1059   • [COMPLETED] diphenhydrAMINE (BENADRYL) IVPB 25 mg  25 mg Intravenous Once Jaime Azul  mL/hr at 02/24/21 1109 25 mg at 02/24/21 1109   • [COMPLETED] famotidine (PEPCID) injection 20 mg  20 mg Intravenous Once Jaime Azul MD   20 mg at 02/24/21 1044   • PACLitaxel (TAXOL) 200 mg in sodium chloride 0.9 % 283.3 mL chemo IVPB  80 mg/m2 (Treatment Plan Recorded) Intravenous Once Jaime Azul MD       • [COMPLETED] sodium chloride 0.9 % infusion 250 mL  250 mL Intravenous Once Jaime Azul MD 20 mL/hr at 02/24/21 1044 250 mL at 02/24/21 1044   • Trastuzumab (HERCEPTIN) 320 mg in sodium chloride 0.9 % 250 mL chemo IVPB  2 mg/kg (Treatment Plan Recorded) Intravenous Once Jaime Azul MD            ALLERGIES:    Allergies   Allergen Reactions   • Amlodipine Swelling     LEGS    • Hydrochlorothiazide Other (See Comments)     Neuro issues   • Morphine Nausea And Vomiting     hallucinations   • Adhesive Tape Rash        Social History     Socioeconomic History   • Marital status:      Spouse name: Not on file   • Number of children: 3   • Years  "of education: Not on file   • Highest education level: Not on file   Occupational History   • Occupation: Teacher/     Employer: RETIRED   Tobacco Use   • Smoking status: Never Smoker   • Smokeless tobacco: Never Used   Substance and Sexual Activity   • Alcohol use: Not Currently   • Drug use: Never   • Sexual activity: Defer   Social History Narrative    3 daughters    She drinks one half of a 2 liter of  DT coke with caffeine daily.         Family History   Problem Relation Age of Onset   • Breast cancer Mother 45   • Colon cancer Paternal Grandmother    • Breast cancer Maternal Aunt 70   • Breast cancer Paternal Aunt 70   • Lung cancer Father    • Hodgkin's lymphoma Father    • Skin cancer Father         squamous cell   • Ovarian cancer Neg Hx    • Uterine cancer Neg Hx    • Deep vein thrombosis Neg Hx    • Pulmonary embolism Neg Hx    • Malig Hyperthermia Neg Hx         Review of Systems   Constitutional: Positive for fatigue.   Respiratory: Positive for shortness of breath (With exertion on home O2 for 4 years).    Cardiovascular: Positive for leg swelling (On and off ).   Gastrointestinal: Positive for diarrhea and rectal pain (hemmorrhoids).   Psychiatric/Behavioral: Negative.         Objective     Vitals:    02/24/21 1026   BP: 156/84   Pulse: 77   Resp: 21   Temp: 97.5 °F (36.4 °C)   TempSrc: Skin   SpO2: 96%   Weight: (!) 164 kg (362 lb)   Height: 165.1 cm (65\")   PainSc: 0-No pain     Current Status 2/10/2021   ECOG score 0       Physical Exam  Chest:             CONSTITUTIONAL:  Vital signs reviewed.  No distress, looks comfortable.  Morbidly obese  EYES:  Conjunctiva and lids unremarkable.  PERRLA  EARS,NOSE,MOUTH,THROAT:  Ears and nose appear unremarkable.  Lips, teeth, gums appear unremarkable.  RESPIRATORY:  Normal respiratory effort.  Lungs clear to auscultation bilaterally.  BREASTS: Both breasts are pendulous the right breast shows diffuse redness with peau d'orange involving the whole " breast especially the dependent part of the breast with no definite mass palpable although thickness and induration of the skin in the periareolar area is noted-2 x 2 centimeter right axillary node appreciated (see photograph)  3 biopsy sites from skin biopsies are noted and biopsies sites at 10:00 and the axilla  CARDIOVASCULAR:  Normal S1, S2.  No murmurs rubs or gallops.  1+ brawny lower extremity edema.  GASTROINTESTINAL: Abdomen appears unremarkable.  Nontender.  No hepatomegaly.  No splenomegaly.  LYMPHATIC:  No cervical, supraclavicular, axillary lymphadenopathy.  SKIN:  Warm.  No rashes.  PSYCHIATRIC:  Normal judgment and insight.  Normal mood and affect.  I have reexamined the patient and the results are consistent with the previously documented exam. SHAUNA Bardales       RECENT LABS:  Results from last 7 days   Lab Units 02/24/21  0927   WBC 10*3/mm3 8.78   NEUTROS ABS 10*3/mm3 6.33   HEMOGLOBIN g/dL 8.0*   HEMATOCRIT % 29.2*   PLATELETS 10*3/mm3 362     Results from last 7 days   Lab Units 02/24/21  1025 02/24/21  0929   SODIUM mmol/L  --  141   POTASSIUM mmol/L  --  3.8   CHLORIDE mmol/L  --  104   CO2 mmol/L  --  29.0   BUN mg/dL  --  11   CREATININE mg/dL  --  0.74   CALCIUM mg/dL  --  8.8   ALBUMIN g/dL  --  3.70   BILIRUBIN mg/dL  --  0.3   ALK PHOS U/L  --  105   ALT (SGPT) U/L  --  16   AST (SGOT) U/L  --  15   GLUCOSE mg/dL  --  131*   MAGNESIUM mg/dL 1.7*  --              PET  IMPRESSION:  1.  Moderate to intensely FDG avid asymmetric soft tissue and skin  thickening involving the right breast likely representing patient's  known malignancy.  2.  Intensely FDG avid right axillary and subpectoral adenopathy likely  represent metastatic disease.  3.  Constellation of findings within the right adrenal gland are favored  to represent a lipid rich adenoma. Continued attention on follow-up is  recommended to ensure stability.  4.  While there are no findings of definite FDG avid osseous  metastasis,  given the heterogenous FDG uptake throughout the axial and appendicular  skeleton due to the above stated limitations, subtle underlying osseous  metastasis would remain occult. Therefore, continued close attention on  follow-up is recommended to exclude this possibility.  5.  Short segment of moderate to intense FDG uptake within the distal  esophagus and GE junction suggestive of esophagitis. In the appropriate  clinical context correlation with patient history is recommended with  follow-up endoscopy if clinically indicated.  6.  Sub-6 mm pulmonary nodule within the right lower lobe is below PET  resolution and indeterminate. Continued close attention on follow-up  with chest CT in 3 months is recommended to exclude metastatic disease.  7.  Other findings as above.     This report was finalized on 2/2/2021     Assessment/Plan   1. [id number] right breast cancer ER/DE HER-2 -3+ positive inflammatory breast cancer for neoadjuvant chemotherapy  · Staging work-up negative except for 6 mm lung nodule and axillary and subpectoral adenopathy  · THP followed by AC planned if she tolerates it  · C1D8 Taxol delayed 1 week due to inclement weather.    2.  Morbid obesity    3.  History of diastolic heart failure  · Echocardiogram with ejection fraction of 64% normal strain  · Cleared by cardio-oncology for chemotherapy    4.  Pulmonary disease?  Etiology on oxygen for 4 years?  Pickwickian    5.  Strong family history of breast cancer genetic testing 84 genes negative    6.  Probable benign adrenal adenoma-PET negative    7.  Questionable enlarged lymph nodes left iliac chain and mediastinum likely reactive-PET negative    8.  6 mm right lower lobe nodule below PET resolution pretreatment needs follow-up after THP    9.  Abnormal uptake short segment esophagus with a history of Schatzki's ring-we will double PPI and watch closely but we we will proceed with chemotherapy at this point and refer back to GI-doubt she  has metastatic disease to this area    10. Anemia with microcytic indices  · Iron studies performed 2/10/2021.  · I reviewed with the patient today that she is iron deficient, with ferritin of 16, iron saturation of 4%.  Patient reports taking ferrous gluconate in the past but this caused GI upset.  Also with her chronic diarrhea I do not think she can absorb it.  We will pursue IV iron with plans to initiate this next week pending insurance approval.   · In addition hemoglobin is down to 8.0 and we will proceed with 2 units of PRBCs later this week.    11. Hemorrhoidal pain secondary to diarrhea. Will send Rx for topical Lidocaine.    Plan  1.  Proceed with delayed cycle 1 day 8 Taxol/Herceptin.    2.  Patient to continue to utilize Imodium as needed for diarrhea.    3.  Seek insurance approval for IV iron with Injectafer with plans to give first dose next week, 3/3/2021 and second dose delayed to 2 weeks later on 3/17/2021 in order to avoid having to give her iron on the same day of her cycle 2 THP which is a long infusion.   4.  Patient to continue to monitor weight at home.    5.  Rx sent in for Lidocaine 5% anorectal cream for hemorrhoidal pain.  6.  Patient will undergo transfusion of 2 units PRBCs later this week for symptomatic anemia with hemoglobin 8.0  7.  Patient will return in 1 week for MD follow-up and cycle 1 day 15 Taxol/Herceptin.  8.  Plan CT scans after completion of Taxol Perjeta Herceptin at 12-week interval.    This patient is on drug therapy requiring intensive monitoring for toxicity.  We did more than just assess side effects of treatment today.

## 2021-02-24 NOTE — CODE DOCUMENTATION
Pt has stated she has had diarrhea for about two weeks with blood passing when she goes. Pt took imodium which has helped, today pt had a normal stool.  Pt is more SOA than normal, fatigued and pale. HGB came back at 8.0. Mariza WALLS notified and added to her schedule. Blood transfusion drawn and bracelet attached to pt.

## 2021-02-24 NOTE — TELEPHONE ENCOUNTER
----- Message from Juani Lopez RN sent at 2/24/2021  6:58 AM EST -----  Regarding: pt going straight to infusion today  Port visit not needed today as patient going straight to infusion today. Thanks

## 2021-02-25 ENCOUNTER — HOSPITAL ENCOUNTER (OUTPATIENT)
Dept: INFUSION THERAPY | Facility: HOSPITAL | Age: 66
Setting detail: INFUSION SERIES
Discharge: HOME | End: 2021-02-25

## 2021-02-25 DIAGNOSIS — T45.1X5A ANTINEOPLASTIC CHEMOTHERAPY INDUCED ANEMIA: ICD-10-CM

## 2021-02-25 DIAGNOSIS — D64.81 ANTINEOPLASTIC CHEMOTHERAPY INDUCED ANEMIA: ICD-10-CM

## 2021-02-25 DIAGNOSIS — C50.611 MALIGNANT NEOPLASM OF AXILLARY TAIL OF RIGHT FEMALE BREAST, UNSPECIFIED ESTROGEN RECEPTOR STATUS (HCC): ICD-10-CM

## 2021-02-28 LAB
BH BB BLOOD EXPIRATION DATE: NORMAL
BH BB BLOOD TYPE BARCODE: 5100
BH BB DISPENSE STATUS: NORMAL
BH BB PRODUCT CODE: NORMAL
BH BB UNIT NUMBER: NORMAL
CROSSMATCH INTERPRETATION: NORMAL
UNIT  ABO: NORMAL
UNIT  RH: NORMAL

## 2021-03-01 ENCOUNTER — HOSPITAL ENCOUNTER (OUTPATIENT)
Dept: INFUSION THERAPY | Facility: HOSPITAL | Age: 66
Setting detail: INFUSION SERIES
Discharge: HOME | End: 2021-03-01

## 2021-03-01 VITALS
DIASTOLIC BLOOD PRESSURE: 78 MMHG | OXYGEN SATURATION: 100 % | TEMPERATURE: 98.2 F | RESPIRATION RATE: 22 BRPM | HEART RATE: 69 BPM | SYSTOLIC BLOOD PRESSURE: 119 MMHG

## 2021-03-01 DIAGNOSIS — T45.1X5A ANTINEOPLASTIC CHEMOTHERAPY INDUCED ANEMIA: ICD-10-CM

## 2021-03-01 DIAGNOSIS — Z45.2 FITTING AND ADJUSTMENT OF VASCULAR CATHETER: ICD-10-CM

## 2021-03-01 DIAGNOSIS — C50.611 MALIGNANT NEOPLASM OF AXILLARY TAIL OF RIGHT FEMALE BREAST, UNSPECIFIED ESTROGEN RECEPTOR STATUS (HCC): Primary | ICD-10-CM

## 2021-03-01 DIAGNOSIS — D64.81 ANTINEOPLASTIC CHEMOTHERAPY INDUCED ANEMIA: ICD-10-CM

## 2021-03-01 LAB
ABO GROUP BLD: NORMAL
BLD GP AB SCN SERPL QL: NEGATIVE
RH BLD: POSITIVE
T&S EXPIRATION DATE: NORMAL

## 2021-03-01 PROCEDURE — 96374 THER/PROPH/DIAG INJ IV PUSH: CPT

## 2021-03-01 PROCEDURE — 25010000003 HEPARIN LOCK FLUSH PER 10 UNITS: Performed by: INTERNAL MEDICINE

## 2021-03-01 PROCEDURE — 25010000002 FUROSEMIDE PER 20 MG: Performed by: NURSE PRACTITIONER

## 2021-03-01 PROCEDURE — P9016 RBC LEUKOCYTES REDUCED: HCPCS

## 2021-03-01 PROCEDURE — 63710000001 DIPHENHYDRAMINE PER 50 MG: Performed by: NURSE PRACTITIONER

## 2021-03-01 PROCEDURE — 86900 BLOOD TYPING SEROLOGIC ABO: CPT | Performed by: NURSE PRACTITIONER

## 2021-03-01 PROCEDURE — 36591 DRAW BLOOD OFF VENOUS DEVICE: CPT

## 2021-03-01 PROCEDURE — 86850 RBC ANTIBODY SCREEN: CPT | Performed by: NURSE PRACTITIONER

## 2021-03-01 PROCEDURE — 86923 COMPATIBILITY TEST ELECTRIC: CPT

## 2021-03-01 PROCEDURE — 36430 TRANSFUSION BLD/BLD COMPNT: CPT

## 2021-03-01 PROCEDURE — 86900 BLOOD TYPING SEROLOGIC ABO: CPT

## 2021-03-01 PROCEDURE — 86901 BLOOD TYPING SEROLOGIC RH(D): CPT | Performed by: NURSE PRACTITIONER

## 2021-03-01 RX ORDER — FUROSEMIDE 10 MG/ML
20 INJECTION INTRAMUSCULAR; INTRAVENOUS ONCE
Status: COMPLETED | OUTPATIENT
Start: 2021-03-01 | End: 2021-03-01

## 2021-03-01 RX ORDER — ACETAMINOPHEN 325 MG/1
650 TABLET ORAL ONCE
Status: COMPLETED | OUTPATIENT
Start: 2021-03-01 | End: 2021-03-01

## 2021-03-01 RX ORDER — SODIUM CHLORIDE 9 MG/ML
250 INJECTION, SOLUTION INTRAVENOUS AS NEEDED
Status: DISCONTINUED | OUTPATIENT
Start: 2021-03-01 | End: 2021-03-03 | Stop reason: HOSPADM

## 2021-03-01 RX ORDER — LOPERAMIDE HYDROCHLORIDE 2 MG/1
4 CAPSULE ORAL 4 TIMES DAILY PRN
Status: DISCONTINUED | OUTPATIENT
Start: 2021-03-01 | End: 2021-03-03 | Stop reason: HOSPADM

## 2021-03-01 RX ORDER — HEPARIN SODIUM (PORCINE) LOCK FLUSH IV SOLN 100 UNIT/ML 100 UNIT/ML
500 SOLUTION INTRAVENOUS AS NEEDED
Status: DISCONTINUED | OUTPATIENT
Start: 2021-03-01 | End: 2021-03-03 | Stop reason: HOSPADM

## 2021-03-01 RX ORDER — SODIUM CHLORIDE 9 MG/ML
250 INJECTION, SOLUTION INTRAVENOUS AS NEEDED
Status: CANCELLED | OUTPATIENT
Start: 2021-03-01

## 2021-03-01 RX ORDER — DIPHENHYDRAMINE HCL 25 MG
25 CAPSULE ORAL ONCE
Status: COMPLETED | OUTPATIENT
Start: 2021-03-01 | End: 2021-03-01

## 2021-03-01 RX ORDER — SODIUM CHLORIDE 0.9 % (FLUSH) 0.9 %
10 SYRINGE (ML) INJECTION AS NEEDED
Status: CANCELLED | OUTPATIENT
Start: 2021-03-01

## 2021-03-01 RX ORDER — HEPARIN SODIUM (PORCINE) LOCK FLUSH IV SOLN 100 UNIT/ML 100 UNIT/ML
500 SOLUTION INTRAVENOUS AS NEEDED
Status: CANCELLED | OUTPATIENT
Start: 2021-03-01

## 2021-03-01 RX ADMIN — FUROSEMIDE 20 MG: 10 INJECTION, SOLUTION INTRAMUSCULAR; INTRAVENOUS at 12:17

## 2021-03-01 RX ADMIN — LOPERAMIDE HYDROCHLORIDE 4 MG: 2 CAPSULE ORAL at 11:00

## 2021-03-01 RX ADMIN — ACETAMINOPHEN 650 MG: 325 TABLET, FILM COATED ORAL at 09:00

## 2021-03-01 RX ADMIN — Medication 500 UNITS: at 15:34

## 2021-03-01 RX ADMIN — DIPHENHYDRAMINE HYDROCHLORIDE 25 MG: 25 CAPSULE ORAL at 08:59

## 2021-03-01 NOTE — PROGRESS NOTES
Left Mediport accessed easily using sterile technique according to policy. Good blood return and flushes easily. Patient tolerated well.

## 2021-03-01 NOTE — PROGRESS NOTES
Patient having abdominal cramping and frequent diarrhea. She states she has been having this issue since starting chemo. Called [facility] and received an order for immodium.

## 2021-03-01 NOTE — PATIENT INSTRUCTIONS
Anemia    Anemia is a condition in which you do not have enough red blood cells or hemoglobin. Hemoglobin is a substance in red blood cells that carries oxygen. When you do not have enough red blood cells or hemoglobin (are anemic), your body cannot get enough oxygen and your organs may not work properly. As a result, you may feel very tired or have other problems.  What are the causes?  Common causes of anemia include:  · Excessive bleeding. Anemia can be caused by excessive bleeding inside or outside the body, including bleeding from the intestine or from periods in women.  · Poor nutrition.  · Long-lasting (chronic) kidney, thyroid, and liver disease.  · Bone marrow disorders.  · Cancer and treatments for cancer.  · HIV (human immunodeficiency virus) and AIDS (acquired immunodeficiency syndrome).  · Treatments for HIV and AIDS.  · Spleen problems.  · Blood disorders.  · Infections, medicines, and autoimmune disorders that destroy red blood cells.  What are the signs or symptoms?  Symptoms of this condition include:  · Minor weakness.  · Dizziness.  · Headache.  · Feeling heartbeats that are irregular or faster than normal (palpitations).  · Shortness of breath, especially with exercise.  · Paleness.  · Cold sensitivity.  · Indigestion.  · Nausea.  · Difficulty sleeping.  · Difficulty concentrating.  Symptoms may occur suddenly or develop slowly. If your anemia is mild, you may not have symptoms.  How is this diagnosed?  This condition is diagnosed based on:  · Blood tests.  · Your medical history.  · A physical exam.  · Bone marrow biopsy.  Your health care provider may also check your stool (feces) for blood and may do additional testing to look for the cause of your bleeding.  You may also have other tests, including:  · Imaging tests, such as a CT scan or MRI.  · Endoscopy.  · Colonoscopy.  How is this treated?  Treatment for this condition depends on the cause. If you continue to lose a lot of blood, you may  need to be treated at a hospital. Treatment may include:  · Taking supplements of iron, vitamin B12, or folic acid.  · Taking a hormone medicine (erythropoietin) that can help to stimulate red blood cell growth.  · Having a blood transfusion. This may be needed if you lose a lot of blood.  · Making changes to your diet.  · Having surgery to remove your spleen.  Follow these instructions at home:  · Take over-the-counter and prescription medicines only as told by your health care provider.  · Take supplements only as told by your health care provider.  · Follow any diet instructions that you were given.  · Keep all follow-up visits as told by your health care provider. This is important.  Contact a health care provider if:  · You develop new bleeding anywhere in the body.  Get help right away if:  · You are very weak.  · You are short of breath.  · You have pain in your abdomen or chest.  · You are dizzy or feel faint.  · You have trouble concentrating.  · You have bloody or black, tarry stools.  · You vomit repeatedly or you vomit up blood.  Summary  · Anemia is a condition in which you do not have enough red blood cells or enough of a substance in your red blood cells that carries oxygen (hemoglobin).  · Symptoms may occur suddenly or develop slowly.  · If your anemia is mild, you may not have symptoms.  · This condition is diagnosed with blood tests as well as a medical history and physical exam. Other tests may be needed.  · Treatment for this condition depends on the cause of the anemia.  This information is not intended to replace advice given to you by your health care provider. Make sure you discuss any questions you have with your health care provider.  Document Revised: 11/30/2018 Document Reviewed: 01/19/2018  Elsevier Patient Education © 2020 Elsevier  "Inc.    https://www.[employer].org/donate-blood/blood-donation-process/what-happens-to-donated-blood/blood-transfusions/types-of-blood-transfusions.html\"> https://www.hematology.org/education/patients/blood-basics/blood-safety-and-matching\"> https://www.nhlbi.nih.gov/health-topics/blood-transfusion\">   Blood Transfusion, Adult  A blood transfusion is a procedure in which you receive blood or a type of blood cell (blood component) through an IV. You may need a blood transfusion when your blood level is low. This may result from a bleeding disorder, illness, injury, or surgery. The blood may come from a donor. You may also be able to donate blood for yourself (autologous blood donation) before a planned surgery.  The blood given in a transfusion is made up of different blood components. You may receive:  · Red blood cells. These carry oxygen to the cells in the body.  · Platelets. These help your blood to clot.  · Plasma. This is the liquid part of your blood. It carries proteins and other substances throughout the body.  · White blood cells. These help you fight infections.  If you have hemophilia or another clotting disorder, you may also receive other types of blood products.  Tell a health care provider about:  · Any blood disorders you have.  · Any previous reactions you have had during a blood transfusion.  · Any allergies you have.  · All medicines you are taking, including vitamins, herbs, eye drops, creams, and over-the-counter medicines.  · Any surgeries you have had.  · Any medical conditions you have, including any recent fever or cold symptoms.  · Whether you are pregnant or may be pregnant.  What are the risks?  Generally, this is a safe procedure. However, problems may occur.  · The most common problems include:  ? A mild allergic reaction, such as red, swollen areas of skin (hives) and itching.  ? Fever or chills. This may be the body's response to new blood cells received. This may occur during or " up to 4 hours after the transfusion.  · More serious problems may include:  ? Transfusion-associated circulatory overload (TACO), or too much fluid in the lungs. This may cause breathing problems.  ? A serious allergic reaction, such as difficulty breathing or swelling around the face and lips.  ? Transfusion-related acute lung injury (TRALI), which causes breathing difficulty and low oxygen in the blood. This can occur within hours of the transfusion or several days later.  ? Iron overload. This can happen after receiving many blood transfusions over a period of time.  ? Infection or virus being transmitted. This is rare because donated blood is carefully tested before it is given.  ? Hemolytic transfusion reaction. This is rare. It happens when your body's defense system (immune system)tries to attack the new blood cells. Symptoms may include fever, chills, nausea, low blood pressure, and low back or chest pain.  ? Transfusion-associated ebtui-pxglre-lzzo disease (TAGVHD). This is rare. It happens when donated cells attack your body's healthy tissues.  What happens before the procedure?  Medicines  Ask your health care provider about:  · Changing or stopping your regular medicines. This is especially important if you are taking diabetes medicines or blood thinners.  · Taking medicines such as aspirin and ibuprofen. These medicines can thin your blood. Do not take these medicines unless your health care provider tells you to take them.  · Taking over-the-counter medicines, vitamins, herbs, and supplements.  General instructions  · Follow instructions from your health care provider about eating and drinking restrictions.  · You will have a blood test to determine your blood type. This is necessary to know what kind of blood your body will accept and to match it to the donor blood.  · If you are going to have a planned surgery, you may be able to do an autologous blood donation. This may be done in case you need to  have a transfusion.  · You will have your temperature, blood pressure, and pulse monitored before the transfusion.  · If you have had an allergic reaction to a transfusion in the past, you may be given medicine to help prevent a reaction. This medicine may be given to you by mouth (orally) or through an IV.  · Set aside time for the blood transfusion. This procedure generally takes 1-4 hours to complete.  What happens during the procedure?    · An IV will be inserted into one of your veins.  · The bag of donated blood will be attached to your IV. The blood will then enter through your vein.  · Your temperature, blood pressure, and pulse will be monitored regularly during the transfusion. This monitoring is done to detect early signs of a transfusion reaction.  · Tell your nurse right away if you have any of these symptoms during the transfusion:  ? Shortness of breath or trouble breathing.  ? Chest or back pain.  ? Fever or chills.  ? Hives or itching.  · If you have any signs or symptoms of a reaction, your transfusion will be stopped and you may be given medicine.  · When the transfusion is complete, your IV will be removed.  · Pressure may be applied to the IV site for a few minutes.  · A bandage (dressing)will be applied.  The procedure may vary among health care providers and hospitals.  What happens after the procedure?  · Your temperature, blood pressure, pulse, breathing rate, and blood oxygen level will be monitored until you leave the hospital or clinic.  · Your blood may be tested to see how you are responding to the transfusion.  · You may be warmed with fluids or blankets to maintain a normal body temperature.  · If you receive your blood transfusion in an outpatient setting, you will be told whom to contact to report any reactions.  Where to find more information  For more information on blood transfusions, visit the [employer] [place]: [facility]org  Summary  · A blood transfusion is a procedure in  which you receive blood or a type of blood cell (blood component) through an IV.  · The blood you receive may come from a donor or be donated by yourself (autologous blood donation) before a planned surgery.  · The blood given in a transfusion is made up of different blood components. You may receive red blood cells, platelets, plasma, or white blood cells depending on the condition treated.  · Your temperature, blood pressure, and pulse will be monitored before, during, and after the transfusion.  · After the transfusion, your blood may be tested to see how your body has responded.  This information is not intended to replace advice given to you by your health care provider. Make sure you discuss any questions you have with your health care provider.  Document Revised: 06/11/2020 Document Reviewed: 06/11/2020  [employer] Patient Education © 2020 [employer]    Blood Transfusion, Adult, Care After  This sheet gives you information about how to care for yourself after your procedure. Your doctor may also give you more specific instructions. If you have problems or questions, contact your doctor.  What can I expect after the procedure?  After the procedure, it is common to have:  · Bruising and soreness at the IV site.  · A fever or chills on the day of the procedure. This may be your body's response to the new blood cells received.  · A headache.  Follow these instructions at home:  Insertion site care         · Follow instructions from your doctor about how to take care of your insertion site. This is where an IV tube was put into your vein. Make sure you:  ? Wash your hands with soap and water before and after you change your bandage (dressing). If you cannot use soap and water, use hand .  ? Change your bandage as told by your doctor.  · Check your insertion site every day for signs of infection. Check for:  ? Redness, swelling, or pain.  ? Bleeding from the site.  ? Warmth.  ? Pus or a bad  smell.  General instructions  · Take over-the-counter and prescription medicines only as told by your doctor.  · Rest as told by your doctor.  · Go back to your normal activities as told by your doctor.  · Keep all follow-up visits as told by your doctor. This is important.  Contact a doctor if:  · You have itching or red, swollen areas of skin (hives).  · You feel worried or nervous (anxious).  · You feel weak after doing your normal activities.  · You have redness, swelling, warmth, or pain around the insertion site.  · You have blood coming from the insertion site, and the blood does not stop with pressure.  · You have pus or a bad smell coming from the insertion site.  Get help right away if:  · You have signs of a serious reaction. This may be coming from an allergy or the body's defense system (immune system). Signs include:  ? Trouble breathing or shortness of breath.  ? Swelling of the face or feeling warm (flushed).  ? Fever or chills.  ? Head, chest, or back pain.  ? Dark pee (urine) or blood in the pee.  ? Widespread rash.  ? Fast heartbeat.  ? Feeling dizzy or light-headed.  You may receive your blood transfusion in an outpatient setting. If so, you will be told whom to contact to report any reactions.  These symptoms may be an emergency. Do not wait to see if the symptoms will go away. Get medical help right away. Call your local emergency services (911 in the [place] Do not drive yourself to the hospital.  Summary  · Bruising and soreness at the IV site are common.  · Check your insertion site every day for signs of infection.  · Rest as told by your doctor. Go back to your normal activities as told by your doctor.  · Get help right away if you have signs of a serious reaction.  This information is not intended to replace advice given to you by your health care provider. Make sure you discuss any questions you have with your health care provider.  Document Revised: 06/11/2020 Document Reviewed:  06/11/2020  Elsevier Patient Education © 2020 Elsevier Inc.

## 2021-03-02 LAB
BH BB BLOOD EXPIRATION DATE: NORMAL
BH BB BLOOD EXPIRATION DATE: NORMAL
BH BB BLOOD TYPE BARCODE: 6200
BH BB BLOOD TYPE BARCODE: 6200
BH BB DISPENSE STATUS: NORMAL
BH BB DISPENSE STATUS: NORMAL
BH BB PRODUCT CODE: NORMAL
BH BB PRODUCT CODE: NORMAL
BH BB UNIT NUMBER: NORMAL
BH BB UNIT NUMBER: NORMAL
CROSSMATCH INTERPRETATION: NORMAL
CROSSMATCH INTERPRETATION: NORMAL
UNIT  ABO: NORMAL
UNIT  ABO: NORMAL
UNIT  RH: NORMAL
UNIT  RH: NORMAL

## 2021-03-03 ENCOUNTER — INFUSION (OUTPATIENT)
Dept: ONCOLOGY | Facility: HOSPITAL | Age: 66
End: 2021-03-03

## 2021-03-03 ENCOUNTER — OFFICE VISIT (OUTPATIENT)
Dept: ONCOLOGY | Facility: CLINIC | Age: 66
End: 2021-03-03

## 2021-03-03 VITALS
HEART RATE: 84 BPM | SYSTOLIC BLOOD PRESSURE: 169 MMHG | OXYGEN SATURATION: 92 % | WEIGHT: 293 LBS | RESPIRATION RATE: 24 BRPM | BODY MASS INDEX: 48.82 KG/M2 | HEIGHT: 65 IN | DIASTOLIC BLOOD PRESSURE: 82 MMHG | TEMPERATURE: 97.8 F

## 2021-03-03 VITALS — HEART RATE: 99 BPM | DIASTOLIC BLOOD PRESSURE: 83 MMHG | SYSTOLIC BLOOD PRESSURE: 129 MMHG

## 2021-03-03 DIAGNOSIS — C50.911 INFLAMMATORY BREAST CANCER, RIGHT (HCC): Primary | ICD-10-CM

## 2021-03-03 DIAGNOSIS — C50.611 MALIGNANT NEOPLASM OF AXILLARY TAIL OF RIGHT FEMALE BREAST, UNSPECIFIED ESTROGEN RECEPTOR STATUS (HCC): ICD-10-CM

## 2021-03-03 DIAGNOSIS — T45.4X5A ADVERSE EFFECT OF IRON, INITIAL ENCOUNTER: ICD-10-CM

## 2021-03-03 DIAGNOSIS — K59.1 FUNCTIONAL DIARRHEA: ICD-10-CM

## 2021-03-03 DIAGNOSIS — C50.611 MALIGNANT NEOPLASM OF AXILLARY TAIL OF RIGHT FEMALE BREAST, UNSPECIFIED ESTROGEN RECEPTOR STATUS (HCC): Primary | ICD-10-CM

## 2021-03-03 DIAGNOSIS — C50.911 INFLAMMATORY BREAST CANCER, RIGHT (HCC): ICD-10-CM

## 2021-03-03 LAB
ALBUMIN SERPL-MCNC: 3.7 G/DL (ref 3.5–5.2)
ALBUMIN/GLOB SERPL: 1.3 G/DL (ref 1.1–2.4)
ALP SERPL-CCNC: 100 U/L (ref 38–116)
ALT SERPL W P-5'-P-CCNC: 16 U/L (ref 0–33)
ANION GAP SERPL CALCULATED.3IONS-SCNC: 8.7 MMOL/L (ref 5–15)
AST SERPL-CCNC: 14 U/L (ref 0–32)
BASOPHILS # BLD AUTO: 0.04 10*3/MM3 (ref 0–0.2)
BASOPHILS NFR BLD AUTO: 0.4 % (ref 0–1.5)
BILIRUB SERPL-MCNC: 0.3 MG/DL (ref 0.2–1.2)
BUN SERPL-MCNC: 14 MG/DL (ref 6–20)
BUN/CREAT SERPL: 20.9 (ref 7.3–30)
C DIFF TOX GENS STL QL NAA+PROBE: NEGATIVE
CALCIUM SPEC-SCNC: 8.9 MG/DL (ref 8.5–10.2)
CHLORIDE SERPL-SCNC: 103 MMOL/L (ref 98–107)
CO2 SERPL-SCNC: 28.3 MMOL/L (ref 22–29)
CREAT SERPL-MCNC: 0.67 MG/DL (ref 0.6–1.1)
DEPRECATED RDW RBC AUTO: 49.8 FL (ref 37–54)
EOSINOPHIL # BLD AUTO: 0.2 10*3/MM3 (ref 0–0.4)
EOSINOPHIL NFR BLD AUTO: 2.2 % (ref 0.3–6.2)
ERYTHROCYTE [DISTWIDTH] IN BLOOD BY AUTOMATED COUNT: 16.8 % (ref 12.3–15.4)
GFR SERPL CREATININE-BSD FRML MDRD: 88 ML/MIN/1.73
GLOBULIN UR ELPH-MCNC: 2.8 GM/DL (ref 1.8–3.5)
GLUCOSE SERPL-MCNC: 119 MG/DL (ref 74–124)
HCT VFR BLD AUTO: 32.7 % (ref 34–46.6)
HGB BLD-MCNC: 9.7 G/DL (ref 12–15.9)
IMM GRANULOCYTES # BLD AUTO: 0.06 10*3/MM3 (ref 0–0.05)
IMM GRANULOCYTES NFR BLD AUTO: 0.6 % (ref 0–0.5)
LYMPHOCYTES # BLD AUTO: 1.29 10*3/MM3 (ref 0.7–3.1)
LYMPHOCYTES NFR BLD AUTO: 13.9 % (ref 19.6–45.3)
MCH RBC QN AUTO: 24.7 PG (ref 26.6–33)
MCHC RBC AUTO-ENTMCNC: 29.7 G/DL (ref 31.5–35.7)
MCV RBC AUTO: 83.4 FL (ref 79–97)
MONOCYTES # BLD AUTO: 0.5 10*3/MM3 (ref 0.1–0.9)
MONOCYTES NFR BLD AUTO: 5.4 % (ref 5–12)
NEUTROPHILS NFR BLD AUTO: 7.18 10*3/MM3 (ref 1.7–7)
NEUTROPHILS NFR BLD AUTO: 77.5 % (ref 42.7–76)
NRBC BLD AUTO-RTO: 0 /100 WBC (ref 0–0.2)
PLATELET # BLD AUTO: 357 10*3/MM3 (ref 140–450)
PMV BLD AUTO: 9.6 FL (ref 6–12)
POTASSIUM SERPL-SCNC: 4.1 MMOL/L (ref 3.5–4.7)
PROT SERPL-MCNC: 6.5 G/DL (ref 6.3–8)
RBC # BLD AUTO: 3.92 10*6/MM3 (ref 3.77–5.28)
SODIUM SERPL-SCNC: 140 MMOL/L (ref 134–145)
WBC # BLD AUTO: 9.27 10*3/MM3 (ref 3.4–10.8)

## 2021-03-03 PROCEDURE — 87493 C DIFF AMPLIFIED PROBE: CPT | Performed by: INTERNAL MEDICINE

## 2021-03-03 PROCEDURE — 25010000002 FERRIC CARBOXYMALTOSE 750 MG/15ML SOLUTION 15 ML VIAL: Performed by: INTERNAL MEDICINE

## 2021-03-03 PROCEDURE — 25010000002 PACLITAXEL PER 1 MG: Performed by: INTERNAL MEDICINE

## 2021-03-03 PROCEDURE — 96375 TX/PRO/DX INJ NEW DRUG ADDON: CPT

## 2021-03-03 PROCEDURE — 96374 THER/PROPH/DIAG INJ IV PUSH: CPT

## 2021-03-03 PROCEDURE — 85025 COMPLETE CBC W/AUTO DIFF WBC: CPT

## 2021-03-03 PROCEDURE — 96417 CHEMO IV INFUS EACH ADDL SEQ: CPT

## 2021-03-03 PROCEDURE — 25010000002 DEXAMETHASONE SODIUM PHOSPHATE 100 MG/10ML SOLUTION: Performed by: INTERNAL MEDICINE

## 2021-03-03 PROCEDURE — 99215 OFFICE O/P EST HI 40 MIN: CPT | Performed by: INTERNAL MEDICINE

## 2021-03-03 PROCEDURE — 80053 COMPREHEN METABOLIC PANEL: CPT

## 2021-03-03 PROCEDURE — 25010000002 TRASTUZUMAB PER 10 MG: Performed by: INTERNAL MEDICINE

## 2021-03-03 PROCEDURE — 96367 TX/PROPH/DG ADDL SEQ IV INF: CPT

## 2021-03-03 PROCEDURE — 25010000002 PERTUZUMAB 420 MG/14ML SOLUTION 420 MG VIAL: Performed by: INTERNAL MEDICINE

## 2021-03-03 PROCEDURE — 25010000002 DIPHENHYDRAMINE PER 50 MG: Performed by: INTERNAL MEDICINE

## 2021-03-03 PROCEDURE — 96413 CHEMO IV INFUSION 1 HR: CPT

## 2021-03-03 RX ORDER — SODIUM CHLORIDE 9 MG/ML
250 INJECTION, SOLUTION INTRAVENOUS ONCE
Status: CANCELLED | OUTPATIENT
Start: 2021-03-10

## 2021-03-03 RX ORDER — DIPHENHYDRAMINE HYDROCHLORIDE 50 MG/ML
50 INJECTION INTRAMUSCULAR; INTRAVENOUS AS NEEDED
Status: CANCELLED | OUTPATIENT
Start: 2021-03-03

## 2021-03-03 RX ORDER — SODIUM CHLORIDE 9 MG/ML
250 INJECTION, SOLUTION INTRAVENOUS ONCE
Status: CANCELLED | OUTPATIENT
Start: 2021-03-17

## 2021-03-03 RX ORDER — SODIUM CHLORIDE 9 MG/ML
250 INJECTION, SOLUTION INTRAVENOUS ONCE
Status: CANCELLED | OUTPATIENT
Start: 2021-03-03

## 2021-03-03 RX ORDER — FAMOTIDINE 10 MG/ML
20 INJECTION, SOLUTION INTRAVENOUS ONCE
Status: CANCELLED | OUTPATIENT
Start: 2021-03-10

## 2021-03-03 RX ORDER — FAMOTIDINE 10 MG/ML
20 INJECTION, SOLUTION INTRAVENOUS ONCE
Status: CANCELLED | OUTPATIENT
Start: 2021-03-03

## 2021-03-03 RX ORDER — FAMOTIDINE 10 MG/ML
20 INJECTION, SOLUTION INTRAVENOUS AS NEEDED
Status: CANCELLED | OUTPATIENT
Start: 2021-03-10

## 2021-03-03 RX ORDER — DIPHENHYDRAMINE HYDROCHLORIDE 50 MG/ML
50 INJECTION INTRAMUSCULAR; INTRAVENOUS AS NEEDED
Status: CANCELLED | OUTPATIENT
Start: 2021-03-10

## 2021-03-03 RX ORDER — FAMOTIDINE 10 MG/ML
20 INJECTION, SOLUTION INTRAVENOUS AS NEEDED
Status: CANCELLED | OUTPATIENT
Start: 2021-03-03

## 2021-03-03 RX ORDER — FAMOTIDINE 10 MG/ML
20 INJECTION, SOLUTION INTRAVENOUS ONCE
Status: CANCELLED | OUTPATIENT
Start: 2021-03-17

## 2021-03-03 RX ORDER — LOPERAMIDE HYDROCHLORIDE 2 MG/1
2 CAPSULE ORAL ONCE
Status: COMPLETED | OUTPATIENT
Start: 2021-03-03 | End: 2021-03-03

## 2021-03-03 RX ORDER — DIPHENHYDRAMINE HYDROCHLORIDE 50 MG/ML
50 INJECTION INTRAMUSCULAR; INTRAVENOUS AS NEEDED
Status: CANCELLED | OUTPATIENT
Start: 2021-03-17

## 2021-03-03 RX ORDER — FAMOTIDINE 10 MG/ML
20 INJECTION, SOLUTION INTRAVENOUS ONCE
Status: COMPLETED | OUTPATIENT
Start: 2021-03-03 | End: 2021-03-03

## 2021-03-03 RX ORDER — FAMOTIDINE 10 MG/ML
20 INJECTION, SOLUTION INTRAVENOUS AS NEEDED
Status: CANCELLED | OUTPATIENT
Start: 2021-03-17

## 2021-03-03 RX ORDER — SODIUM CHLORIDE 9 MG/ML
250 INJECTION, SOLUTION INTRAVENOUS ONCE
Status: COMPLETED | OUTPATIENT
Start: 2021-03-03 | End: 2021-03-03

## 2021-03-03 RX ADMIN — FAMOTIDINE 20 MG: 10 INJECTION, SOLUTION INTRAVENOUS at 09:14

## 2021-03-03 RX ADMIN — DIPHENHYDRAMINE HYDROCHLORIDE 25 MG: 50 INJECTION, SOLUTION INTRAMUSCULAR; INTRAVENOUS at 09:34

## 2021-03-03 RX ADMIN — PERTUZUMAB 420 MG: 30 INJECTION, SOLUTION, CONCENTRATE INTRAVENOUS at 10:16

## 2021-03-03 RX ADMIN — FERRIC CARBOXYMALTOSE INJECTION 750 MG: 50 INJECTION, SOLUTION INTRAVENOUS at 09:59

## 2021-03-03 RX ADMIN — LOPERAMIDE HYDROCHLORIDE 2 MG: 2 CAPSULE ORAL at 12:01

## 2021-03-03 RX ADMIN — PACLITAXEL 200 MG: 6 INJECTION, SOLUTION INTRAVENOUS at 11:59

## 2021-03-03 RX ADMIN — DEXAMETHASONE SODIUM PHOSPHATE 12 MG: 10 INJECTION, SOLUTION INTRAMUSCULAR; INTRAVENOUS at 09:14

## 2021-03-03 RX ADMIN — SODIUM CHLORIDE 250 ML: 9 INJECTION, SOLUTION INTRAVENOUS at 09:14

## 2021-03-03 RX ADMIN — TRASTUZUMAB 320 MG: 150 INJECTION, POWDER, LYOPHILIZED, FOR SOLUTION INTRAVENOUS at 11:21

## 2021-03-03 NOTE — NURSING NOTE
Subjective       PATIENT NAME:  Aida Conner  YOB: 1955  PATIENT'S SEX:  female    PATIENT'S ADDRESS:  [address] [place] [id number]  PROVIDER:      Encounter Diagnoses   Name Primary?   • Malignant neoplasm of axillary tail of right female breast, unspecified estrogen receptor status (CMS/HCC) Yes   • Adverse effect of iron, initial encounter    • Functional diarrhea      Allergies   Allergen Reactions   • Amlodipine Swelling     LEGS    • Hydrochlorothiazide Other (See Comments)     Neuro issues   • Morphine Nausea And Vomiting     hallucinations   • Adhesive Tape Rash          INFUSION ORDERS    DATE      3/3/2021    For Friday 3/5/2021 at [facility]    Access port per protocol.    Administer 1 liter NS over 3 hours.    Flush port per facility protocol and de-access.     Electronically Signed By: Jaime Azul MD  (NPI: [id number])

## 2021-03-03 NOTE — NURSING NOTE
Pt experiencing severe cramps and diarrhea during visit today. Pt states this has been occurring more frequently but that today it seems to be worse and could be related to what she had for dinner last night. Dr. Azul made aware of situation and ordered a C-diff sample. Explained to pt that if the test comes back positive we will call her. 2mg of Imodium was administered. Diarrhea resolved for remainder of tx today. Signs of dehydration explained to pt and instructed her to call the clinic if any of these occur. Discussed situation with Andie PALOMINO who set pt up for fluids at [place] location for Friday. Pt v/u.

## 2021-03-05 ENCOUNTER — HOSPITAL ENCOUNTER (OUTPATIENT)
Dept: INFUSION THERAPY | Facility: HOSPITAL | Age: 66
Setting detail: RECURRING SERIES
Discharge: HOME | End: 2021-03-05
Attending: INTERNAL MEDICINE

## 2021-03-09 ENCOUNTER — TELEPHONE (OUTPATIENT)
Dept: ONCOLOGY | Facility: CLINIC | Age: 66
End: 2021-03-09

## 2021-03-09 NOTE — TELEPHONE ENCOUNTER
CALLED PT AND ADVISED THAT THESE APPTS WERE WHAT WE HAD, I WILL CALL PT BACK IN THE MORNING TO LET HER KNOW ABOUT HER APPT ON 3/17/21 THAT MANUEL WOULD REVIEW THIS INFORMATION WITH HER

## 2021-03-09 NOTE — TELEPHONE ENCOUNTER
Caller: PT    Relationship to patient: PT    Best call back number: [phone]    PT CALLING TO SEE IF THERE IS AN EARLIER APPT AVAILABLE FOR 3/10?    IS SHE SUPPOSED TO BE SCHED FOR 3/17?    PLEASE RETURN CALL.    THANK YOU

## 2021-03-10 ENCOUNTER — TELEPHONE (OUTPATIENT)
Dept: ONCOLOGY | Facility: CLINIC | Age: 66
End: 2021-03-10

## 2021-03-10 ENCOUNTER — OFFICE VISIT (OUTPATIENT)
Dept: ONCOLOGY | Facility: CLINIC | Age: 66
End: 2021-03-10

## 2021-03-10 ENCOUNTER — INFUSION (OUTPATIENT)
Dept: ONCOLOGY | Facility: HOSPITAL | Age: 66
End: 2021-03-10

## 2021-03-10 ENCOUNTER — APPOINTMENT (OUTPATIENT)
Dept: ONCOLOGY | Facility: HOSPITAL | Age: 66
End: 2021-03-10

## 2021-03-10 VITALS
OXYGEN SATURATION: 97 % | DIASTOLIC BLOOD PRESSURE: 67 MMHG | BODY MASS INDEX: 48.82 KG/M2 | SYSTOLIC BLOOD PRESSURE: 115 MMHG | HEIGHT: 65 IN | WEIGHT: 293 LBS | HEART RATE: 86 BPM | RESPIRATION RATE: 17 BRPM | TEMPERATURE: 97.5 F

## 2021-03-10 DIAGNOSIS — C50.911 INFLAMMATORY BREAST CANCER, RIGHT (HCC): Primary | ICD-10-CM

## 2021-03-10 DIAGNOSIS — L27.0 DRUG RASH: ICD-10-CM

## 2021-03-10 DIAGNOSIS — C50.611 MALIGNANT NEOPLASM OF AXILLARY TAIL OF RIGHT BREAST IN FEMALE, ESTROGEN RECEPTOR POSITIVE (HCC): ICD-10-CM

## 2021-03-10 DIAGNOSIS — K59.1 FUNCTIONAL DIARRHEA: ICD-10-CM

## 2021-03-10 DIAGNOSIS — Z17.0 MALIGNANT NEOPLASM OF AXILLARY TAIL OF RIGHT BREAST IN FEMALE, ESTROGEN RECEPTOR POSITIVE (HCC): ICD-10-CM

## 2021-03-10 DIAGNOSIS — C50.611 MALIGNANT NEOPLASM OF AXILLARY TAIL OF RIGHT FEMALE BREAST, UNSPECIFIED ESTROGEN RECEPTOR STATUS (HCC): Primary | ICD-10-CM

## 2021-03-10 DIAGNOSIS — C50.611 MALIGNANT NEOPLASM OF AXILLARY TAIL OF RIGHT FEMALE BREAST, UNSPECIFIED ESTROGEN RECEPTOR STATUS (HCC): ICD-10-CM

## 2021-03-10 DIAGNOSIS — D50.8 IRON DEFICIENCY ANEMIA SECONDARY TO INADEQUATE DIETARY IRON INTAKE: ICD-10-CM

## 2021-03-10 DIAGNOSIS — T45.4X5A ADVERSE EFFECT OF IRON, INITIAL ENCOUNTER: ICD-10-CM

## 2021-03-10 DIAGNOSIS — K64.9 HEMORRHOIDS, UNSPECIFIED HEMORRHOID TYPE: ICD-10-CM

## 2021-03-10 LAB
ALBUMIN SERPL-MCNC: 3.8 G/DL (ref 3.5–5.2)
ALBUMIN/GLOB SERPL: 1.4 G/DL (ref 1.1–2.4)
ALP SERPL-CCNC: 98 U/L (ref 38–116)
ALT SERPL W P-5'-P-CCNC: 17 U/L (ref 0–33)
ANION GAP SERPL CALCULATED.3IONS-SCNC: 7.6 MMOL/L (ref 5–15)
AST SERPL-CCNC: 13 U/L (ref 0–32)
BASOPHILS # BLD AUTO: 0.04 10*3/MM3 (ref 0–0.2)
BASOPHILS NFR BLD AUTO: 0.5 % (ref 0–1.5)
BILIRUB SERPL-MCNC: 0.4 MG/DL (ref 0.2–1.2)
BUN SERPL-MCNC: 13 MG/DL (ref 6–20)
BUN/CREAT SERPL: 17.6 (ref 7.3–30)
CALCIUM SPEC-SCNC: 9.1 MG/DL (ref 8.5–10.2)
CHLORIDE SERPL-SCNC: 101 MMOL/L (ref 98–107)
CO2 SERPL-SCNC: 28.4 MMOL/L (ref 22–29)
CREAT SERPL-MCNC: 0.74 MG/DL (ref 0.6–1.1)
DEPRECATED RDW RBC AUTO: 52.3 FL (ref 37–54)
EOSINOPHIL # BLD AUTO: 0.19 10*3/MM3 (ref 0–0.4)
EOSINOPHIL NFR BLD AUTO: 2.6 % (ref 0.3–6.2)
ERYTHROCYTE [DISTWIDTH] IN BLOOD BY AUTOMATED COUNT: 19.5 % (ref 12.3–15.4)
GFR SERPL CREATININE-BSD FRML MDRD: 78 ML/MIN/1.73
GLOBULIN UR ELPH-MCNC: 2.8 GM/DL (ref 1.8–3.5)
GLUCOSE SERPL-MCNC: 132 MG/DL (ref 74–124)
HCT VFR BLD AUTO: 32.2 % (ref 34–46.6)
HGB BLD-MCNC: 9.5 G/DL (ref 12–15.9)
IMM GRANULOCYTES # BLD AUTO: 0.11 10*3/MM3 (ref 0–0.05)
IMM GRANULOCYTES NFR BLD AUTO: 1.5 % (ref 0–0.5)
LYMPHOCYTES # BLD AUTO: 1.19 10*3/MM3 (ref 0.7–3.1)
LYMPHOCYTES NFR BLD AUTO: 16 % (ref 19.6–45.3)
MCH RBC QN AUTO: 24.9 PG (ref 26.6–33)
MCHC RBC AUTO-ENTMCNC: 29.5 G/DL (ref 31.5–35.7)
MCV RBC AUTO: 84.3 FL (ref 79–97)
MONOCYTES # BLD AUTO: 0.48 10*3/MM3 (ref 0.1–0.9)
MONOCYTES NFR BLD AUTO: 6.5 % (ref 5–12)
NEUTROPHILS NFR BLD AUTO: 5.43 10*3/MM3 (ref 1.7–7)
NEUTROPHILS NFR BLD AUTO: 72.9 % (ref 42.7–76)
NRBC BLD AUTO-RTO: 0.4 /100 WBC (ref 0–0.2)
PLATELET # BLD AUTO: 346 10*3/MM3 (ref 140–450)
PMV BLD AUTO: 9.3 FL (ref 6–12)
POTASSIUM SERPL-SCNC: 4.3 MMOL/L (ref 3.5–4.7)
PROT SERPL-MCNC: 6.6 G/DL (ref 6.3–8)
RBC # BLD AUTO: 3.82 10*6/MM3 (ref 3.77–5.28)
SODIUM SERPL-SCNC: 137 MMOL/L (ref 134–145)
WBC # BLD AUTO: 7.44 10*3/MM3 (ref 3.4–10.8)

## 2021-03-10 PROCEDURE — 25010000002 PACLITAXEL PER 1 MG: Performed by: INTERNAL MEDICINE

## 2021-03-10 PROCEDURE — 96375 TX/PRO/DX INJ NEW DRUG ADDON: CPT

## 2021-03-10 PROCEDURE — 80053 COMPREHEN METABOLIC PANEL: CPT

## 2021-03-10 PROCEDURE — 85025 COMPLETE CBC W/AUTO DIFF WBC: CPT

## 2021-03-10 PROCEDURE — 25010000002 TRASTUZUMAB PER 10 MG: Performed by: INTERNAL MEDICINE

## 2021-03-10 PROCEDURE — 96417 CHEMO IV INFUS EACH ADDL SEQ: CPT

## 2021-03-10 PROCEDURE — 25010000002 FERRIC CARBOXYMALTOSE 750 MG/15ML SOLUTION 15 ML VIAL: Performed by: INTERNAL MEDICINE

## 2021-03-10 PROCEDURE — 96413 CHEMO IV INFUSION 1 HR: CPT

## 2021-03-10 PROCEDURE — 25010000002 DEXAMETHASONE SODIUM PHOSPHATE 100 MG/10ML SOLUTION: Performed by: INTERNAL MEDICINE

## 2021-03-10 PROCEDURE — 99215 OFFICE O/P EST HI 40 MIN: CPT | Performed by: NURSE PRACTITIONER

## 2021-03-10 PROCEDURE — 25010000002 DIPHENHYDRAMINE PER 50 MG: Performed by: INTERNAL MEDICINE

## 2021-03-10 RX ORDER — FAMOTIDINE 10 MG/ML
20 INJECTION, SOLUTION INTRAVENOUS ONCE
Status: COMPLETED | OUTPATIENT
Start: 2021-03-10 | End: 2021-03-10

## 2021-03-10 RX ORDER — DICYCLOMINE HCL 20 MG
20 TABLET ORAL EVERY 6 HOURS
Qty: 60 TABLET | Refills: 2 | Status: SHIPPED | OUTPATIENT
Start: 2021-03-10 | End: 2021-12-02 | Stop reason: SDUPTHER

## 2021-03-10 RX ORDER — SODIUM CHLORIDE 9 MG/ML
250 INJECTION, SOLUTION INTRAVENOUS ONCE
Status: COMPLETED | OUTPATIENT
Start: 2021-03-10 | End: 2021-03-10

## 2021-03-10 RX ADMIN — FAMOTIDINE 20 MG: 10 INJECTION INTRAVENOUS at 13:26

## 2021-03-10 RX ADMIN — DIPHENHYDRAMINE HYDROCHLORIDE 25 MG: 50 INJECTION, SOLUTION INTRAMUSCULAR; INTRAVENOUS at 13:27

## 2021-03-10 RX ADMIN — DEXAMETHASONE SODIUM PHOSPHATE 12 MG: 10 INJECTION, SOLUTION INTRAMUSCULAR; INTRAVENOUS at 13:40

## 2021-03-10 RX ADMIN — SODIUM CHLORIDE 250 ML: 9 INJECTION, SOLUTION INTRAVENOUS at 13:26

## 2021-03-10 RX ADMIN — TRASTUZUMAB 320 MG: 150 INJECTION, POWDER, LYOPHILIZED, FOR SOLUTION INTRAVENOUS at 14:13

## 2021-03-10 RX ADMIN — PACLITAXEL 200 MG: 6 INJECTION, SOLUTION INTRAVENOUS at 14:46

## 2021-03-10 RX ADMIN — FERRIC CARBOXYMALTOSE INJECTION 750 MG: 50 INJECTION, SOLUTION INTRAVENOUS at 13:56

## 2021-03-10 NOTE — PROGRESS NOTES
Subjective     REASON FOR FOLLOW-UP: Right breast inflammatory breast, triple positive                              REQUESTING PHYSICIAN: MD Francisco Esteban MD Bethany Haynes, MD      History of Present Illness patient is a 65 y.o. 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  Due to inclement weather cycle 1 day 8 therapy was missed.  She did get day 15 therapy with fairly good tolerance except for some diarrhea.    She was found to be iron deficient despite trying oral iron.  We therefore elected to proceed with IV Injectafer with first dose given last week second dose due today.    As she is reviewed back today she states she did have worsening diarrhea after treatment last week.  Patient does note a chronic history of diarrhea with questionable diagnosis of IBS many years ago.  She had an old prescription from 2017 for Bentyl and started back on this with improvement.  She is requesting a new prescription for this.  We did also prescribe rifaximin to be taken for 7 days after each dose of Perjeta.  Patient states she got started on this a little late and took a total of 4 pills this past week.      Patient was previously also having trouble with hemorrhoidal pain with bleeding.  She states she did have a few days of this again and noted that the blood was quite significant.  I assured the patient that her hemoglobin is stable today at 9.5.  She states the topical lidocaine has helped.    Patient is noting a faint rash on her lower forearms and was worried that this was an indication that was something was wrong with her vital organs.  We discussed that we often see this with taxane therapy and this is not something to be overly concerned about but just to monitor.  I assured  her that her blood work does not indicate any alteration in her kidney or liver function for example.  She was thankful to know this.  She has not yet applied anything to the areas of skin breakdown on the arms and we discussed trying over-the-counter hydrocortisone cream.    Shortness of breath remains stable and she is wearing continuous oxygen.    Past Medical History:   Diagnosis Date   • Anemia    • Asthma    • Chronic diastolic (congestive) heart failure (CMS/HCC)    • Chronic hypoxemic respiratory failure (CMS/HCC)     on continuous O2   • H/O Intraductal papilloma    • Hemorrhoids    • History of transfusion     AFTER BACK SURGERY   • Hypertension    • Inflammatory breast cancer (CMS/HCC)     right   • Kidney stone    • Morbid obesity with BMI of 50.0-59.9, adult (CMS/HCC)    • Obesity hypoventilation syndrome (CMS/HCC) 2021   • On home oxygen therapy     2.5 AT REST WITH ACTIVITY UP TO 4L   • ANABELLE on CPAP    • Osteoarthritis    • Type 2 diabetes mellitus (CMS/HCC)         Past Surgical History:   Procedure Laterality Date   • BREAST EXCISIONAL BIOPSY Bilateral    • COLONOSCOPY     • CYSTOSCOPY BLADDER STONE LITHOTRIPSY     • KNEE ARTHROPLASTY Bilateral    • SPINE SURGERY     • TOTAL ABDOMINAL HYSTERECTOMY WITH SALPINGO OOPHORECTOMY     • VENOUS ACCESS DEVICE (PORT) INSERTION N/A 2021    Procedure: INSERTION VENOUS ACCESS DEVICE;  Surgeon: Nicci Banks MD;  Location: [facility];  Service: General;  Laterality: N/A;      ONC HISTORY  patient is a 65-year-old white female with morbid obesity, history of diastolic congestive heart failure and unknown type of pulmonary disease for which she has been on oxygen for 4 years.    She has been getting routine mammography since 38 years of age because her mother  at 46 of breast cancer and had a biopsy of her left breast in  which was apparently benign and another biopsy in 2018 on her right breast which showed a small focus  of atypical ductal hyperplasia and atypical lobular hyperplasia with a residual intraductal papilloma.  At that time she saw medical oncologist who recommended genetic testing and prevention but the insurance would not cover the genetic testing and the medical oncologist thought tamoxifen was too risky for her because of her comorbidities and no treatment was given.  More recently she noticed redness of the right breast in October and showed it to her family doctor who gave her course of Keflex when it did not improve and mammogram was ordered and this was benign  When the redness persisted she saw her gynecologist with concern for inflammatory breast cancer and referred her to Dr. Banks after repeat imaging and [facility]  Preliminary report shows micropapillary invasive mammary carcinoma intermediate grade measuring 13 mm right axillary node was also involved with metastatic cancer ER/NV and HER-2 are pending  Patient saw Dr. Banks who sent her for skin biopsies and she had 3 separate punch biopsy of the skin that showed Perivascular and perifollicular inflammation with no obvious malignancy at 3:00 12:00 and 9:00  In addition staging work-up with CAT scans and bone scan were ordered.  CAT scan of the chest showed a borderline mediastinal  Infracarinal node measuring 15 mm in length mild cardiomegaly and heavy coronary artery calcifications  CT of the abdomen showed a 2.5 x 2.2 cm right adrenal mass which the patient tells me she has had in the past and is most likely benign but also a 2.7 cm left external iliac node which is indeterminate.  Bone scan was negative    Echocardiogram is scheduled for later next week and genetic testing with the invitae stat panel is pending    Patient is  3 para 3 menarche was at age 9 menopause at 49 when she had a hysterectomy and oophorectomy  First childbirth was at age 20 she breast-fed her  second and third children and took no hormone replacement after menopause    Family history is positive for mother dying of breast cancer at age 46 she is a maternal aunt with breast cancer in her 70s a paternal aunt with breast cancer in her 70s paternal grandmother with colon cancer.  Her father had Hodgkin's disease and non-Hodgkin's lymphoma but  of small cell lung cancer at 67      She has not had a heart attack stroke or blood clot    Plan to do echocardiogram soon as possible to ascertain tolerability of cardiotoxic chemotherapy which would be typically indicated with an inflammatory breast cancer    Also plan PET scan to follow-up on atypical lymph nodes and confirm benign etiology of the adrenal lesion    She will have port placement and chemo education depending on ER/TN and HER-2 status    I explained to Aida that the goal of treatment would be curative but she has a lot of comorbidities which may limit our ability to give the most effective chemotherapy in the setting  I told her radiation would be involved and possibly hormonal therapy for 10 years if she has hormone positivity and HER-2 directed therapy for HER-2 is positive    She expressed some concerns about driving back and forth from [place] but felt that once a week was doable    We will see her back in 2 weeks to start treatment with a port placement planned early next week      Patient did have a mild reaction to Taxol dose #1 with some increased shortness of breath and flushing.  This was responsive to 100 mg of Solu-Cortef.  She states this gave her a headache and made her quite talkative but otherwise she was thankfully able to complete Taxol infusion without further incident.    Patient reports issues with chronic diarrhea over the last few years and did note a little bit increase in stooling following THP though nothing overly significant in her mind.  She did finally begin taking Imodium just a few days ago and has had no  further stools.  She does note significant trouble with hemorrhoids in relation to the diarrhea   Required blood transfusion due to significant hemorrhoidal bleeding plus iron deficiency.  Injectafer planned    Current Outpatient Medications on File Prior to Visit   Medication Sig Dispense Refill   • albuterol sulfate  (90 Base) MCG/ACT inhaler Inhale 2 puffs Every 4 (Four) Hours As Needed.     • carvedilol (COREG) 25 MG tablet Take 12.5 mg by mouth 2 (Two) Times a Day With Meals.     • Cholecalciferol (Vitamin D) 50 MCG (2000 UT) capsule Take 2,000 Units by mouth Daily.     • fexofenadine (ALLEGRA) 180 MG tablet Take 180 mg by mouth Daily As Needed.     • FLUoxetine (PROzac) 40 MG capsule Take 40 mg by mouth Daily.     • furosemide (LASIX) 40 MG tablet Take 20 mg by mouth Daily.     • Lidocaine, Anorectal, (LMX 5) 5 % cream cream Apply  topically to the appropriate area as directed 3 (Three) Times a Day As Needed (hemmorrhoid pain). 45 g 2   • lisinopril (PRINIVIL,ZESTRIL) 5 MG tablet Take 1 tablet by mouth Daily. 30 tablet 6   • meloxicam (MOBIC) 15 MG tablet Take 15 mg by mouth Daily. PT HOLDING FOR SURGERY     • metFORMIN (GLUCOPHAGE) 500 MG tablet Take 500 mg by mouth Daily With Breakfast.     • mometasone-formoterol (DULERA 100) 100-5 MCG/ACT inhaler Inhale 2 Puffs/kg Every Night.     • montelukast (SINGULAIR) 10 MG tablet Take 10 mg by mouth Every Night.     • multivitamin (THERAGRAN) tablet tablet Take 1 tablet by mouth Daily. TO HOLD 1 WEEK BEFORE SURGERY     • O2 (OXYGEN) Inhale 2.5 L/min Continuous.     • omeprazole (priLOSEC) 20 MG capsule Take 2 capsules by mouth Daily. 60 capsule 3   • ondansetron (ZOFRAN) 8 MG tablet Take 1 tablet by mouth 3 (Three) Times a Day As Needed for Nausea or Vomiting. 30 tablet 5   • Potassium 99 MG tablet Take 1 tablet by mouth Every Night.     • pravastatin (PRAVACHOL) 40 MG tablet Take 40 mg by mouth Every Night.     • riFAXIMin (XIFAXAN) 550 MG tablet Take one  tablet daily x7 days starting on day of Perjeta chemo each cycle 7 tablet 3   • spironolactone (ALDACTONE) 25 MG tablet Take 25 mg by mouth Daily.       No current facility-administered medications on file prior to visit.        ALLERGIES:    Allergies   Allergen Reactions   • Amlodipine Swelling     LEGS    • Hydrochlorothiazide Unknown - Low Severity     Neuro issues   • Morphine Nausea And Vomiting     hallucinations   • Adhesive Tape Rash        Social History     Socioeconomic History   • Marital status:      Spouse name: Not on file   • Number of children: 3   • Years of education: Not on file   • Highest education level: Not on file   Tobacco Use   • Smoking status: Never Smoker   • Smokeless tobacco: Never Used   Vaping Use   • Vaping Use: Never used   Substance and Sexual Activity   • Alcohol use: Not Currently   • Drug use: Never   • Sexual activity: Defer        Family History   Problem Relation Age of Onset   • Breast cancer Mother 45   • Colon cancer Paternal Grandmother    • Breast cancer Maternal Aunt 70   • Breast cancer Paternal Aunt 70   • Lung cancer Father    • Hodgkin's lymphoma Father    • Skin cancer Father         squamous cell   • Ovarian cancer Neg Hx    • Uterine cancer Neg Hx    • Deep vein thrombosis Neg Hx    • Pulmonary embolism Neg Hx    • Malig Hyperthermia Neg Hx         Review of Systems   Constitutional: Positive for fatigue. Negative for appetite change, chills, diaphoresis, fever and unexpected weight change.   HENT: Negative for hearing loss, sore throat and trouble swallowing.    Respiratory: Positive for shortness of breath (With exertion on home O2 for 4 years). Negative for cough, chest tightness and wheezing.    Cardiovascular: Negative for chest pain, palpitations and leg swelling.   Gastrointestinal: Positive for diarrhea (see HPI) and rectal pain (hemmorrhoids - see HPI). Negative for abdominal distention, abdominal pain, constipation, nausea and vomiting.  "  Genitourinary: Negative for dysuria, frequency, hematuria and urgency.   Musculoskeletal: Negative for joint swelling.        No muscle weakness.   Skin: Negative for rash and wound.   Neurological: Negative for seizures, syncope, speech difficulty, weakness, numbness and headaches.   Hematological: Negative for adenopathy. Does not bruise/bleed easily.   Psychiatric/Behavioral: Negative.  Negative for behavioral problems, confusion and suicidal ideas.   All other systems reviewed and are negative.     ROS reviewed 03/10/21 updated as appropriate    Objective     Vitals:    03/10/21 1245   BP: 115/67   Pulse: 86   Resp: 17   Temp: 97.5 °F (36.4 °C)   TempSrc: Skin   SpO2: 97%   Weight: (!) 158 kg (348 lb 4.8 oz)   Height: 165.1 cm (65\")   PainSc: 0-No pain     Current Status 3/10/2021   ECOG score 0       Physical Exam  Chest:             CONSTITUTIONAL:  Vital signs reviewed.  No distress, looks comfortable. Morbidly obese  EYES:  Conjunctiva and lids unremarkable.  PERRLA  EARS,NOSE,MOUTH,THROAT:  Ears and nose appear unremarkable.  Lips, teeth, gums appear unremarkable.  RESPIRATORY:  Normal respiratory effort.  Lungs clear to auscultation bilaterally.  BREASTS: Both breasts are pendulous; the right breast is no longer erythemic and much softer now to palpation.  Still with slight peau de orange appearance around the nipple.    CARDIOVASCULAR:  Normal S1, S2.  No murmurs rubs or gallops.  1+ brawny lower extremity edema.  GASTROINTESTINAL: Abdomen appears unremarkable.  Nontender.  No hepatomegaly.  No splenomegaly.  LYMPHATIC:  No cervical, supraclavicular, axillary lymphadenopathy.  SKIN:  Warm.  Faint open areas of excoriation on the lower forearms.  No active infection.  PSYCHIATRIC:  Normal judgment and insight.  Normal mood and affect.    I have reexamined the patient and the results are consistent with the previously documented exam except as updated. SHAUNA Bardales       RECENT " LABS:  Results from last 7 days   Lab Units 03/10/21  1220   WBC 10*3/mm3 7.44   NEUTROS ABS 10*3/mm3 5.43   HEMOGLOBIN g/dL 9.5*   HEMATOCRIT % 32.2*   PLATELETS 10*3/mm3 346     Results from last 7 days   Lab Units 03/10/21  1220   SODIUM mmol/L 137   POTASSIUM mmol/L 4.3   CHLORIDE mmol/L 101   CO2 mmol/L 28.4   BUN mg/dL 13   CREATININE mg/dL 0.74   CALCIUM mg/dL 9.1   ALBUMIN g/dL 3.80   BILIRUBIN mg/dL 0.4   ALK PHOS U/L 98   ALT (SGPT) U/L 17   AST (SGOT) U/L 13   GLUCOSE mg/dL 132*             PET  IMPRESSION:  1.  Moderate to intensely FDG avid asymmetric soft tissue and skin  thickening involving the right breast likely representing patient's  known malignancy.  2.  Intensely FDG avid right axillary and subpectoral adenopathy likely  represent metastatic disease.  3.  Constellation of findings within the right adrenal gland are favored  to represent a lipid rich adenoma. Continued attention on follow-up is  recommended to ensure stability.  4.  While there are no findings of definite FDG avid osseous metastasis,  given the heterogenous FDG uptake throughout the axial and appendicular  skeleton due to the above stated limitations, subtle underlying osseous  metastasis would remain occult. Therefore, continued close attention on  follow-up is recommended to exclude this possibility.  5.  Short segment of moderate to intense FDG uptake within the distal  esophagus and GE junction suggestive of esophagitis. In the appropriate  clinical context correlation with patient history is recommended with  follow-up endoscopy if clinically indicated.  6.  Sub-6 mm pulmonary nodule within the right lower lobe is below PET  resolution and indeterminate. Continued close attention on follow-up  with chest CT in 3 months is recommended to exclude metastatic disease.  7.  Other findings as above.     This report was finalized on 2/2/2021     Assessment/Plan   1. [id number] right breast cancer ER/MT HER-2 -3+ positive  inflammatory breast cancer for neoadjuvant chemotherapy  · Staging work-up negative except for 6 mm lung nodule and axillary and subpectoral adenopathy  · THP followed by AC planned if she tolerates it  · C1D8 Taxol missed due to inclement weather.    2.  Morbid obesity    3.  History of diastolic heart failure  · Echocardiogram with ejection fraction of 64% normal strain  · Cleared by cardio-oncology for chemotherapy    4.  Pulmonary disease?  Etiology on oxygen for 4 years?  Radhackian    5.  Strong family history of breast cancer genetic testing 84 genes negative    6.  Probable benign adrenal adenoma-PET negative    7.  Questionable enlarged lymph nodes left iliac chain and mediastinum likely reactive-PET negative    8.  6 mm right lower lobe nodule below PET resolution pretreatment needs follow-up after THP    9.  Abnormal uptake short segment esophagus with a history of Schatzki's ring-we will double PPI and watch closely but we we will proceed with chemotherapy at this point and refer back to GI-doubt she has metastatic disease to this area    10. Anemia with microcytic indices  · Iron studies performed 2/10/2021.  · 2/24/2021: reviewed with the patient that she is iron deficient, with ferritin of 16, iron saturation of 4%.  Patient reports taking ferrous gluconate in the past but this caused GI upset.  Also with her chronic diarrhea I do not think she can absorb it.  We will pursue IV iron with plans to initiate this next week pending insurance approval. In addition hemoglobin down to 8.0 and transfusion pursued.  · 3/3/2021: IV Injectafer initiated x2.  Hemoglobin improved to 9.7.    11. Hemorrhoidal pain secondary to diarrhea. Does have occasional bleeding. Topical lidocaine prescribed. Monitor.    12.  Small areas of excoriation on bilateral forearms,?  Related to Taxol,?  Mild drug rash.  Patient has been applying bacitracin.  I recommended she try hydrocortisone as the areas are slightly red though do  not show evidence of infection.  We will monitor as we will see.    13.  History of chronic diarrhea, ?IBS, now exacerbated with Perjeta therapy.  · Patient prescribed rifaximin 550 mg to take twice daily x7 days with each Perjeta dose.  States she picked this up late and only took about 4 days of therapy.  · Patient did find a old prescription for Bentyl from 2017 and began taking this noted improvement in her diarrhea and abdominal cramping.  Requesting a new prescription for this today.    Plan  1. Proceed with cycle 2 day 8 Taxol/Herceptin.    2. Proceed with Injectafer dose #2.  3. Prescription sent in for Bentyl 20 mg every 6 hours as needed.  4. Patient to apply hydrocortisone cream to areas of breakdown on the forearms as outlined above.  Will monitor for worsening rash.  5. Continue topical lidocaine to hemorrhoids.  Monitor bleeding.    6. Return in 1 week for cycle 2, day 15 Taxol/Herceptin.  7. Patient will otherwise return in 2 weeks for follow-up with Dr. Azul and cycle in  8. Plan CT scans after completion of Taxol Perjeta Herceptin at 12-week interval.    This patient is on drug therapy requiring intensive monitoring for toxicity.  We did more than just assess side effects of treatment today.

## 2021-03-10 NOTE — TELEPHONE ENCOUNTER
----- Message from Andie Cronin RN sent at 3/10/2021  8:51 AM EST -----  Regarding: can come to [facility] schedule looks fine, you can put her on there  ----- Message -----  From: Olimpia Kelley  Sent: 3/10/2021   8:22 AM EST  To: Andie Cronin RN  Subject: APPT                                             PT IS ASKING IF SHE WILL NEED AN APPT ON 3/17/21,I ADVISED THE PT THAT MANUEL WOULD REVIEW HER CHART WITH HER WHEN SHE COMES IN FOR HER INJECTAFER WHAT THE NEXT SCHEDULE WOULD BE. IF THERE IS SOMETHING THAT I NEED TO DO OR ADD ON ANOTHER TX PLEASE ADVISE.     THANK YOU   OLIMPIA

## 2021-03-10 NOTE — TELEPHONE ENCOUNTER
I HAVE CALLED PT BACK MULTIPLE TIMES THIS MORNING - LVM ON ALL PHONES THAT ARE ON FILE TWICE ON EACH PHONE .

## 2021-03-17 ENCOUNTER — INFUSION (OUTPATIENT)
Dept: ONCOLOGY | Facility: HOSPITAL | Age: 66
End: 2021-03-17

## 2021-03-17 VITALS
DIASTOLIC BLOOD PRESSURE: 66 MMHG | SYSTOLIC BLOOD PRESSURE: 109 MMHG | BODY MASS INDEX: 57.64 KG/M2 | HEART RATE: 75 BPM | TEMPERATURE: 96.9 F | WEIGHT: 293 LBS | RESPIRATION RATE: 18 BRPM | OXYGEN SATURATION: 96 %

## 2021-03-17 DIAGNOSIS — Z45.2 FITTING AND ADJUSTMENT OF VASCULAR CATHETER: ICD-10-CM

## 2021-03-17 DIAGNOSIS — C50.611 MALIGNANT NEOPLASM OF AXILLARY TAIL OF RIGHT FEMALE BREAST, UNSPECIFIED ESTROGEN RECEPTOR STATUS (HCC): Primary | ICD-10-CM

## 2021-03-17 LAB
BASOPHILS # BLD AUTO: 0.04 10*3/MM3 (ref 0–0.2)
BASOPHILS NFR BLD AUTO: 0.5 % (ref 0–1.5)
DEPRECATED RDW RBC AUTO: 62.3 FL (ref 37–54)
EOSINOPHIL # BLD AUTO: 0.12 10*3/MM3 (ref 0–0.4)
EOSINOPHIL NFR BLD AUTO: 1.6 % (ref 0.3–6.2)
ERYTHROCYTE [DISTWIDTH] IN BLOOD BY AUTOMATED COUNT: 21.3 % (ref 12.3–15.4)
HCT VFR BLD AUTO: 33.5 % (ref 34–46.6)
HGB BLD-MCNC: 9.8 G/DL (ref 12–15.9)
IMM GRANULOCYTES # BLD AUTO: 0.1 10*3/MM3 (ref 0–0.05)
IMM GRANULOCYTES NFR BLD AUTO: 1.3 % (ref 0–0.5)
LYMPHOCYTES # BLD AUTO: 1.4 10*3/MM3 (ref 0.7–3.1)
LYMPHOCYTES NFR BLD AUTO: 18.8 % (ref 19.6–45.3)
MCH RBC QN AUTO: 25.4 PG (ref 26.6–33)
MCHC RBC AUTO-ENTMCNC: 29.3 G/DL (ref 31.5–35.7)
MCV RBC AUTO: 86.8 FL (ref 79–97)
MONOCYTES # BLD AUTO: 0.42 10*3/MM3 (ref 0.1–0.9)
MONOCYTES NFR BLD AUTO: 5.6 % (ref 5–12)
NEUTROPHILS NFR BLD AUTO: 5.38 10*3/MM3 (ref 1.7–7)
NEUTROPHILS NFR BLD AUTO: 72.2 % (ref 42.7–76)
NRBC BLD AUTO-RTO: 0 /100 WBC (ref 0–0.2)
PLATELET # BLD AUTO: 354 10*3/MM3 (ref 140–450)
PMV BLD AUTO: 9.7 FL (ref 6–12)
RBC # BLD AUTO: 3.86 10*6/MM3 (ref 3.77–5.28)
WBC # BLD AUTO: 7.46 10*3/MM3 (ref 3.4–10.8)

## 2021-03-17 PROCEDURE — 96417 CHEMO IV INFUS EACH ADDL SEQ: CPT

## 2021-03-17 PROCEDURE — 25010000002 DIPHENHYDRAMINE PER 50 MG: Performed by: INTERNAL MEDICINE

## 2021-03-17 PROCEDURE — 36415 COLL VENOUS BLD VENIPUNCTURE: CPT

## 2021-03-17 PROCEDURE — 96375 TX/PRO/DX INJ NEW DRUG ADDON: CPT

## 2021-03-17 PROCEDURE — 85025 COMPLETE CBC W/AUTO DIFF WBC: CPT

## 2021-03-17 PROCEDURE — 96413 CHEMO IV INFUSION 1 HR: CPT

## 2021-03-17 PROCEDURE — 25010000002 DEXAMETHASONE SODIUM PHOSPHATE 100 MG/10ML SOLUTION: Performed by: INTERNAL MEDICINE

## 2021-03-17 PROCEDURE — 96367 TX/PROPH/DG ADDL SEQ IV INF: CPT

## 2021-03-17 PROCEDURE — 25010000003 HEPARIN LOCK FLUSH PER 10 UNITS: Performed by: INTERNAL MEDICINE

## 2021-03-17 PROCEDURE — 25010000002 TRASTUZUMAB PER 10 MG: Performed by: INTERNAL MEDICINE

## 2021-03-17 PROCEDURE — 25010000002 PACLITAXEL PER 1 MG: Performed by: INTERNAL MEDICINE

## 2021-03-17 RX ORDER — FAMOTIDINE 10 MG/ML
20 INJECTION, SOLUTION INTRAVENOUS ONCE
Status: COMPLETED | OUTPATIENT
Start: 2021-03-17 | End: 2021-03-17

## 2021-03-17 RX ORDER — HEPARIN SODIUM (PORCINE) LOCK FLUSH IV SOLN 100 UNIT/ML 100 UNIT/ML
500 SOLUTION INTRAVENOUS AS NEEDED
Status: CANCELLED | OUTPATIENT
Start: 2021-03-17

## 2021-03-17 RX ORDER — SODIUM CHLORIDE 0.9 % (FLUSH) 0.9 %
10 SYRINGE (ML) INJECTION AS NEEDED
Status: CANCELLED | OUTPATIENT
Start: 2021-03-17

## 2021-03-17 RX ORDER — SODIUM CHLORIDE 9 MG/ML
250 INJECTION, SOLUTION INTRAVENOUS ONCE
Status: COMPLETED | OUTPATIENT
Start: 2021-03-17 | End: 2021-03-17

## 2021-03-17 RX ORDER — HEPARIN SODIUM (PORCINE) LOCK FLUSH IV SOLN 100 UNIT/ML 100 UNIT/ML
500 SOLUTION INTRAVENOUS AS NEEDED
Status: DISCONTINUED | OUTPATIENT
Start: 2021-03-17 | End: 2021-03-17 | Stop reason: HOSPADM

## 2021-03-17 RX ADMIN — PACLITAXEL 200 MG: 6 INJECTION, SOLUTION INTRAVENOUS at 10:42

## 2021-03-17 RX ADMIN — SODIUM CHLORIDE 250 ML: 9 INJECTION, SOLUTION INTRAVENOUS at 09:24

## 2021-03-17 RX ADMIN — TRASTUZUMAB 320 MG: 150 INJECTION, POWDER, LYOPHILIZED, FOR SOLUTION INTRAVENOUS at 10:02

## 2021-03-17 RX ADMIN — DEXAMETHASONE SODIUM PHOSPHATE 12 MG: 10 INJECTION, SOLUTION INTRAMUSCULAR; INTRAVENOUS at 09:25

## 2021-03-17 RX ADMIN — DIPHENHYDRAMINE HYDROCHLORIDE 25 MG: 50 INJECTION, SOLUTION INTRAMUSCULAR; INTRAVENOUS at 09:43

## 2021-03-17 RX ADMIN — FAMOTIDINE 20 MG: 10 INJECTION INTRAVENOUS at 09:24

## 2021-03-17 RX ADMIN — Medication 500 UNITS: at 11:47

## 2021-03-19 ENCOUNTER — IMMUNIZATION (OUTPATIENT)
Dept: VACCINE CLINIC | Facility: HOSPITAL | Age: 66
End: 2021-03-19

## 2021-03-19 PROCEDURE — 91300 HC SARSCOV02 VAC 30MCG/0.3ML IM: CPT | Performed by: INTERNAL MEDICINE

## 2021-03-19 PROCEDURE — 0001A: CPT | Performed by: INTERNAL MEDICINE

## 2021-03-24 ENCOUNTER — INFUSION (OUTPATIENT)
Dept: ONCOLOGY | Facility: HOSPITAL | Age: 66
End: 2021-03-24

## 2021-03-24 ENCOUNTER — OFFICE VISIT (OUTPATIENT)
Dept: ONCOLOGY | Facility: CLINIC | Age: 66
End: 2021-03-24

## 2021-03-24 VITALS
BODY MASS INDEX: 48.82 KG/M2 | DIASTOLIC BLOOD PRESSURE: 85 MMHG | WEIGHT: 293 LBS | OXYGEN SATURATION: 94 % | HEIGHT: 65 IN | SYSTOLIC BLOOD PRESSURE: 141 MMHG | HEART RATE: 77 BPM | RESPIRATION RATE: 16 BRPM | TEMPERATURE: 98 F

## 2021-03-24 VITALS — DIASTOLIC BLOOD PRESSURE: 78 MMHG | SYSTOLIC BLOOD PRESSURE: 124 MMHG | HEART RATE: 76 BPM

## 2021-03-24 DIAGNOSIS — C50.611 MALIGNANT NEOPLASM OF AXILLARY TAIL OF RIGHT FEMALE BREAST, UNSPECIFIED ESTROGEN RECEPTOR STATUS (HCC): Primary | ICD-10-CM

## 2021-03-24 DIAGNOSIS — Z45.2 FITTING AND ADJUSTMENT OF VASCULAR CATHETER: ICD-10-CM

## 2021-03-24 DIAGNOSIS — N64.89 OTHER SPECIFIED DISORDERS OF BREAST: ICD-10-CM

## 2021-03-24 DIAGNOSIS — E66.2 OBESITY HYPOVENTILATION SYNDROME (HCC): ICD-10-CM

## 2021-03-24 DIAGNOSIS — Z17.0 MALIGNANT NEOPLASM OF AXILLARY TAIL OF RIGHT BREAST IN FEMALE, ESTROGEN RECEPTOR POSITIVE (HCC): ICD-10-CM

## 2021-03-24 DIAGNOSIS — C50.611 MALIGNANT NEOPLASM OF AXILLARY TAIL OF RIGHT FEMALE BREAST, UNSPECIFIED ESTROGEN RECEPTOR STATUS (HCC): ICD-10-CM

## 2021-03-24 DIAGNOSIS — C50.911 INFLAMMATORY BREAST CANCER, RIGHT (HCC): Primary | ICD-10-CM

## 2021-03-24 DIAGNOSIS — C50.611 MALIGNANT NEOPLASM OF AXILLARY TAIL OF RIGHT BREAST IN FEMALE, ESTROGEN RECEPTOR POSITIVE (HCC): ICD-10-CM

## 2021-03-24 LAB
ALBUMIN SERPL-MCNC: 3.6 G/DL (ref 3.5–5.2)
ALBUMIN/GLOB SERPL: 1.3 G/DL (ref 1.1–2.4)
ALP SERPL-CCNC: 90 U/L (ref 38–116)
ALT SERPL W P-5'-P-CCNC: 21 U/L (ref 0–33)
ANION GAP SERPL CALCULATED.3IONS-SCNC: 9.1 MMOL/L (ref 5–15)
AST SERPL-CCNC: 13 U/L (ref 0–32)
BASOPHILS # BLD AUTO: 0.02 10*3/MM3 (ref 0–0.2)
BASOPHILS NFR BLD AUTO: 0.3 % (ref 0–1.5)
BILIRUB SERPL-MCNC: 0.3 MG/DL (ref 0.2–1.2)
BUN SERPL-MCNC: 15 MG/DL (ref 6–20)
BUN/CREAT SERPL: 22.4 (ref 7.3–30)
CALCIUM SPEC-SCNC: 8.9 MG/DL (ref 8.5–10.2)
CHLORIDE SERPL-SCNC: 106 MMOL/L (ref 98–107)
CO2 SERPL-SCNC: 24.9 MMOL/L (ref 22–29)
CREAT SERPL-MCNC: 0.67 MG/DL (ref 0.6–1.1)
DEPRECATED RDW RBC AUTO: 75.2 FL (ref 37–54)
EOSINOPHIL # BLD AUTO: 0.13 10*3/MM3 (ref 0–0.4)
EOSINOPHIL NFR BLD AUTO: 1.9 % (ref 0.3–6.2)
ERYTHROCYTE [DISTWIDTH] IN BLOOD BY AUTOMATED COUNT: 23.7 % (ref 12.3–15.4)
GFR SERPL CREATININE-BSD FRML MDRD: 88 ML/MIN/1.73
GLOBULIN UR ELPH-MCNC: 2.7 GM/DL (ref 1.8–3.5)
GLUCOSE SERPL-MCNC: 110 MG/DL (ref 74–124)
HCT VFR BLD AUTO: 31.8 % (ref 34–46.6)
HGB BLD-MCNC: 9.3 G/DL (ref 12–15.9)
IMM GRANULOCYTES # BLD AUTO: 0.1 10*3/MM3 (ref 0–0.05)
IMM GRANULOCYTES NFR BLD AUTO: 1.4 % (ref 0–0.5)
LYMPHOCYTES # BLD AUTO: 0.9 10*3/MM3 (ref 0.7–3.1)
LYMPHOCYTES NFR BLD AUTO: 12.9 % (ref 19.6–45.3)
MCH RBC QN AUTO: 26.2 PG (ref 26.6–33)
MCHC RBC AUTO-ENTMCNC: 29.2 G/DL (ref 31.5–35.7)
MCV RBC AUTO: 89.6 FL (ref 79–97)
MONOCYTES # BLD AUTO: 0.44 10*3/MM3 (ref 0.1–0.9)
MONOCYTES NFR BLD AUTO: 6.3 % (ref 5–12)
NEUTROPHILS NFR BLD AUTO: 5.4 10*3/MM3 (ref 1.7–7)
NEUTROPHILS NFR BLD AUTO: 77.2 % (ref 42.7–76)
NRBC BLD AUTO-RTO: 0 /100 WBC (ref 0–0.2)
PLATELET # BLD AUTO: 310 10*3/MM3 (ref 140–450)
PMV BLD AUTO: 9.2 FL (ref 6–12)
POTASSIUM SERPL-SCNC: 3.7 MMOL/L (ref 3.5–4.7)
PROT SERPL-MCNC: 6.3 G/DL (ref 6.3–8)
RBC # BLD AUTO: 3.55 10*6/MM3 (ref 3.77–5.28)
SODIUM SERPL-SCNC: 140 MMOL/L (ref 134–145)
WBC # BLD AUTO: 6.99 10*3/MM3 (ref 3.4–10.8)

## 2021-03-24 PROCEDURE — 25010000002 PERTUZUMAB 420 MG/14ML SOLUTION 420 MG VIAL: Performed by: INTERNAL MEDICINE

## 2021-03-24 PROCEDURE — 25010000002 PACLITAXEL PER 1 MG: Performed by: INTERNAL MEDICINE

## 2021-03-24 PROCEDURE — 25010000002 TRASTUZUMAB PER 10 MG: Performed by: INTERNAL MEDICINE

## 2021-03-24 PROCEDURE — 96375 TX/PRO/DX INJ NEW DRUG ADDON: CPT

## 2021-03-24 PROCEDURE — 25010000002 DIPHENHYDRAMINE PER 50 MG: Performed by: INTERNAL MEDICINE

## 2021-03-24 PROCEDURE — 85025 COMPLETE CBC W/AUTO DIFF WBC: CPT

## 2021-03-24 PROCEDURE — 96413 CHEMO IV INFUSION 1 HR: CPT

## 2021-03-24 PROCEDURE — 25010000002 DEXAMETHASONE SODIUM PHOSPHATE 100 MG/10ML SOLUTION: Performed by: INTERNAL MEDICINE

## 2021-03-24 PROCEDURE — 96417 CHEMO IV INFUS EACH ADDL SEQ: CPT

## 2021-03-24 PROCEDURE — 36415 COLL VENOUS BLD VENIPUNCTURE: CPT

## 2021-03-24 PROCEDURE — 99215 OFFICE O/P EST HI 40 MIN: CPT | Performed by: INTERNAL MEDICINE

## 2021-03-24 PROCEDURE — 80053 COMPREHEN METABOLIC PANEL: CPT

## 2021-03-24 PROCEDURE — 25010000003 HEPARIN LOCK FLUSH PER 10 UNITS: Performed by: INTERNAL MEDICINE

## 2021-03-24 RX ORDER — FAMOTIDINE 10 MG/ML
20 INJECTION, SOLUTION INTRAVENOUS AS NEEDED
Status: CANCELLED | OUTPATIENT
Start: 2021-03-24

## 2021-03-24 RX ORDER — DIPHENOXYLATE HYDROCHLORIDE AND ATROPINE SULFATE 2.5; .025 MG/1; MG/1
1 TABLET ORAL
Status: DISCONTINUED | OUTPATIENT
Start: 2021-03-24 | End: 2021-03-24

## 2021-03-24 RX ORDER — FAMOTIDINE 10 MG/ML
20 INJECTION, SOLUTION INTRAVENOUS ONCE
Status: CANCELLED | OUTPATIENT
Start: 2021-03-24

## 2021-03-24 RX ORDER — FAMOTIDINE 10 MG/ML
20 INJECTION, SOLUTION INTRAVENOUS AS NEEDED
Status: CANCELLED | OUTPATIENT
Start: 2021-03-31

## 2021-03-24 RX ORDER — FAMOTIDINE 10 MG/ML
20 INJECTION, SOLUTION INTRAVENOUS ONCE
Status: CANCELLED | OUTPATIENT
Start: 2021-03-31

## 2021-03-24 RX ORDER — HEPARIN SODIUM (PORCINE) LOCK FLUSH IV SOLN 100 UNIT/ML 100 UNIT/ML
500 SOLUTION INTRAVENOUS AS NEEDED
Status: DISCONTINUED | OUTPATIENT
Start: 2021-03-24 | End: 2021-03-24 | Stop reason: HOSPADM

## 2021-03-24 RX ORDER — DIPHENHYDRAMINE HYDROCHLORIDE 50 MG/ML
50 INJECTION INTRAMUSCULAR; INTRAVENOUS AS NEEDED
Status: CANCELLED | OUTPATIENT
Start: 2021-03-24

## 2021-03-24 RX ORDER — SODIUM CHLORIDE 9 MG/ML
250 INJECTION, SOLUTION INTRAVENOUS ONCE
Status: CANCELLED | OUTPATIENT
Start: 2021-03-24

## 2021-03-24 RX ORDER — FAMOTIDINE 10 MG/ML
20 INJECTION, SOLUTION INTRAVENOUS ONCE
Status: CANCELLED | OUTPATIENT
Start: 2021-04-07

## 2021-03-24 RX ORDER — SODIUM CHLORIDE 0.9 % (FLUSH) 0.9 %
10 SYRINGE (ML) INJECTION AS NEEDED
Status: DISCONTINUED | OUTPATIENT
Start: 2021-03-24 | End: 2021-03-24 | Stop reason: HOSPADM

## 2021-03-24 RX ORDER — SODIUM CHLORIDE 9 MG/ML
250 INJECTION, SOLUTION INTRAVENOUS ONCE
Status: COMPLETED | OUTPATIENT
Start: 2021-03-24 | End: 2021-03-24

## 2021-03-24 RX ORDER — SODIUM CHLORIDE 9 MG/ML
250 INJECTION, SOLUTION INTRAVENOUS ONCE
Status: CANCELLED | OUTPATIENT
Start: 2021-03-31

## 2021-03-24 RX ORDER — HEPARIN SODIUM (PORCINE) LOCK FLUSH IV SOLN 100 UNIT/ML 100 UNIT/ML
500 SOLUTION INTRAVENOUS AS NEEDED
Status: CANCELLED | OUTPATIENT
Start: 2021-03-24

## 2021-03-24 RX ORDER — SODIUM CHLORIDE 9 MG/ML
250 INJECTION, SOLUTION INTRAVENOUS ONCE
Status: CANCELLED | OUTPATIENT
Start: 2021-04-07

## 2021-03-24 RX ORDER — FAMOTIDINE 10 MG/ML
20 INJECTION, SOLUTION INTRAVENOUS ONCE
Status: COMPLETED | OUTPATIENT
Start: 2021-03-24 | End: 2021-03-24

## 2021-03-24 RX ORDER — DIPHENOXYLATE HYDROCHLORIDE AND ATROPINE SULFATE 2.5; .025 MG/1; MG/1
2 TABLET ORAL ONCE
Status: COMPLETED | OUTPATIENT
Start: 2021-03-24 | End: 2021-03-24

## 2021-03-24 RX ORDER — DIPHENHYDRAMINE HYDROCHLORIDE 50 MG/ML
50 INJECTION INTRAMUSCULAR; INTRAVENOUS AS NEEDED
Status: CANCELLED | OUTPATIENT
Start: 2021-04-07

## 2021-03-24 RX ORDER — DIPHENHYDRAMINE HYDROCHLORIDE 50 MG/ML
50 INJECTION INTRAMUSCULAR; INTRAVENOUS AS NEEDED
Status: CANCELLED | OUTPATIENT
Start: 2021-03-31

## 2021-03-24 RX ORDER — SODIUM CHLORIDE 0.9 % (FLUSH) 0.9 %
10 SYRINGE (ML) INJECTION AS NEEDED
Status: CANCELLED | OUTPATIENT
Start: 2021-03-24

## 2021-03-24 RX ORDER — FAMOTIDINE 10 MG/ML
20 INJECTION, SOLUTION INTRAVENOUS AS NEEDED
Status: CANCELLED | OUTPATIENT
Start: 2021-04-07

## 2021-03-24 RX ADMIN — DIPHENHYDRAMINE HYDROCHLORIDE 25 MG: 50 INJECTION, SOLUTION INTRAMUSCULAR; INTRAVENOUS at 10:06

## 2021-03-24 RX ADMIN — DEXAMETHASONE SODIUM PHOSPHATE 12 MG: 10 INJECTION, SOLUTION INTRAMUSCULAR; INTRAVENOUS at 09:49

## 2021-03-24 RX ADMIN — DIPHENOXYLATE HYDROCHLORIDE AND ATROPINE SULFATE 2 TABLET: 2.5; .025 TABLET ORAL at 12:59

## 2021-03-24 RX ADMIN — SODIUM CHLORIDE 250 ML: 9 INJECTION, SOLUTION INTRAVENOUS at 09:48

## 2021-03-24 RX ADMIN — FAMOTIDINE 20 MG: 10 INJECTION INTRAVENOUS at 09:48

## 2021-03-24 RX ADMIN — PACLITAXEL 200 MG: 6 INJECTION, SOLUTION INTRAVENOUS at 12:08

## 2021-03-24 RX ADMIN — PERTUZUMAB 420 MG: 30 INJECTION, SOLUTION, CONCENTRATE INTRAVENOUS at 10:30

## 2021-03-24 RX ADMIN — Medication 500 UNITS: at 13:10

## 2021-03-24 RX ADMIN — SODIUM CHLORIDE, PRESERVATIVE FREE 10 ML: 5 INJECTION INTRAVENOUS at 13:10

## 2021-03-24 RX ADMIN — TRASTUZUMAB 320 MG: 150 INJECTION, POWDER, LYOPHILIZED, FOR SOLUTION INTRAVENOUS at 11:33

## 2021-03-24 NOTE — PROGRESS NOTES
Subjective     REASON FOR FOLLOW-UP: Right breast inflammatory breast, triple positive                              REQUESTING PHYSICIAN: MD Francisco Esteban MD Bethany Haynes, MD      History of Present Illness patient is a 65 y.o. 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  Due to inclement weather cycle 1 day 8 therapy was missed.  She did get day 15 therapy with fairly good tolerance except for some diarrhea.    She was found to be iron deficient despite trying oral iron.  We therefore elected to proceed with IV Injectafer but she remains mildly anemic with a hemoglobin of 9.3    She continues have significant diarrhea 6-8 times per day but is not taking her Imodium as suggested and is only taking 1 with a loose stool in half with the second and is still having problems and we reiterated the right way to take her Imodium and she is agreeable if she continues to have diarrhea she will have to take WelChol    She is also due to take her rifaximin this week because she is getting Perjeta    She has no neuropathy and has noticed significant improvement in the breast swelling and redness and it is almost back to baseline    Patient was previously also having trouble with hemorrhoidal pain with bleeding.  This is somewhat improved    Shortness of breath remains stable and she is wearing continuous oxygen.    Her weight has dropped 10 pounds in the last 3 weeks which is good for her especially since there is no overt signs of dehydration      Past Medical History:   Diagnosis Date   • Anemia    • Asthma    • Chronic diastolic (congestive) heart failure (CMS/HCC)    • Chronic hypoxemic respiratory failure (CMS/HCC)     on continuous O2   • H/O Intraductal papilloma    • Hemorrhoids     • History of transfusion     AFTER BACK SURGERY   • Hypertension    • Inflammatory breast cancer (CMS/HCC)     right   • Kidney stone    • Morbid obesity with BMI of 50.0-59.9, adult (CMS/HCC)    • Obesity hypoventilation syndrome (CMS/HCC) 2021   • On home oxygen therapy     2.5 AT REST WITH ACTIVITY UP TO 4L   • ANABELLE on CPAP    • Osteoarthritis    • Type 2 diabetes mellitus (CMS/HCC)         Past Surgical History:   Procedure Laterality Date   • BREAST EXCISIONAL BIOPSY Bilateral    • COLONOSCOPY     • CYSTOSCOPY BLADDER STONE LITHOTRIPSY     • KNEE ARTHROPLASTY Bilateral    • SPINE SURGERY     • TOTAL ABDOMINAL HYSTERECTOMY WITH SALPINGO OOPHORECTOMY     • VENOUS ACCESS DEVICE (PORT) INSERTION N/A 2021    Procedure: INSERTION VENOUS ACCESS DEVICE;  Surgeon: Nicci Banks MD;  Location: [facility];  Service: General;  Laterality: N/A;      ONC HISTORY  patient is a 65-year-old white female with morbid obesity, history of diastolic congestive heart failure and unknown type of pulmonary disease for which she has been on oxygen for 4 years.    She has been getting routine mammography since 38 years of age because her mother  at 46 of breast cancer and had a biopsy of her left breast in  which was apparently benign and another biopsy in  on her right breast which showed a small focus of atypical ductal hyperplasia and atypical lobular hyperplasia with a residual intraductal papilloma.  At that time she saw medical oncologist who recommended genetic testing and prevention but the insurance would not cover the genetic testing and the medical oncologist thought tamoxifen was too risky for her because of her comorbidities and no treatment was given.  More recently she noticed redness of the right breast in October and showed it to her family doctor who gave her course of Keflex when it did not improve and mammogram was ordered and this was benign  When the redness persisted  she saw her gynecologist with concern for inflammatory breast cancer and referred her to Dr. Banks after repeat imaging and [facility] right [facility]  Preliminary report shows micropapillary invasive mammary carcinoma intermediate grade measuring 13 mm right axillary node was also involved with metastatic cancer ER/LA and HER-2 are pending  Patient saw Dr. Banks who sent her for skin biopsies and she had 3 separate punch biopsy of the skin that showed Perivascular and perifollicular inflammation with no obvious malignancy at 3:00 12:00 and 9:00  In addition staging work-up with CAT scans and bone scan were ordered.  CAT scan of the chest showed a borderline mediastinal  Infracarinal node measuring 15 mm in length mild cardiomegaly and heavy coronary artery calcifications  CT of the abdomen showed a 2.5 x 2.2 cm right adrenal mass which the patient tells me she has had in the past and is most likely benign but also a 2.7 cm left external iliac node which is indeterminate.  Bone scan was negative    Echocardiogram is scheduled for later next week and genetic testing with the [employer] stat panel is pending    Patient is  3 para 3 menarche was at age 9 menopause at 49 when she had a hysterectomy and oophorectomy  First childbirth was at age 20 she breast-fed her second and third children and took no hormone replacement after menopause    Family history is positive for mother dying of breast cancer at age 46 she is a maternal aunt with breast cancer in her 70s a paternal aunt with breast cancer in her 70s paternal grandmother with colon cancer.  Her father had Hodgkin's disease and non-Hodgkin's lymphoma but  of small cell lung cancer at 67      She has not had a heart attack stroke or blood clot    Plan to do echocardiogram soon as possible to ascertain tolerability of cardiotoxic chemotherapy which would be typically indicated with an inflammatory  breast cancer    Also plan PET scan to follow-up on atypical lymph nodes and confirm benign etiology of the adrenal lesion    She will have port placement and chemo education depending on ER/GA and HER-2 status    I explained to Aida that the goal of treatment would be curative but she has a lot of comorbidities which may limit our ability to give the most effective chemotherapy in the setting  I told her radiation would be involved and possibly hormonal therapy for 10 years if she has hormone positivity and HER-2 directed therapy for HER-2 is positive    She expressed some concerns about driving back and forth from [place] but felt that once a week was doable    We will see her back in 2 weeks to start treatment with a port placement planned early next week      Patient did have a mild reaction to Taxol dose #1 with some increased shortness of breath and flushing.  This was responsive to 100 mg of Solu-Cortef.  She states this gave her a headache and made her quite talkative but otherwise she was thankfully able to complete Taxol infusion without further incident.    Patient reports issues with chronic diarrhea over the last few years and did note a little bit increase in stooling following THP though nothing overly significant in her mind.  She did finally begin taking Imodium just a few days ago and has had no further stools.  She does note significant trouble with hemorrhoids in relation to the diarrhea   Required blood transfusion due to significant hemorrhoidal bleeding plus iron deficiency.  Injectafer planned    Current Outpatient Medications on File Prior to Visit   Medication Sig Dispense Refill   • albuterol sulfate  (90 Base) MCG/ACT inhaler Inhale 2 puffs Every 4 (Four) Hours As Needed.     • carvedilol (COREG) 25 MG tablet Take 12.5 mg by mouth 2 (Two) Times a Day With Meals.     • Cholecalciferol (Vitamin D) 50 MCG (2000 UT) capsule Take 2,000 Units by mouth Daily.     • dicyclomine  (BENTYL) 20 MG tablet Take 1 tablet by mouth Every 6 (Six) Hours. 60 tablet 2   • fexofenadine (ALLEGRA) 180 MG tablet Take 180 mg by mouth Daily As Needed.     • FLUoxetine (PROzac) 40 MG capsule Take 40 mg by mouth Daily.     • furosemide (LASIX) 40 MG tablet Take 20 mg by mouth Daily.     • Lidocaine, Anorectal, (LMX 5) 5 % cream cream Apply  topically to the appropriate area as directed 3 (Three) Times a Day As Needed (hemmorrhoid pain). 45 g 2   • lisinopril (PRINIVIL,ZESTRIL) 5 MG tablet Take 1 tablet by mouth Daily. 30 tablet 6   • meloxicam (MOBIC) 15 MG tablet Take 15 mg by mouth Daily. PT HOLDING FOR SURGERY     • metFORMIN (GLUCOPHAGE) 500 MG tablet Take 500 mg by mouth Daily With Breakfast.     • mometasone-formoterol (DULERA 100) 100-5 MCG/ACT inhaler Inhale 2 Puffs/kg Every Night.     • montelukast (SINGULAIR) 10 MG tablet Take 10 mg by mouth Every Night.     • multivitamin (THERAGRAN) tablet tablet Take 1 tablet by mouth Daily. TO HOLD 1 WEEK BEFORE SURGERY     • O2 (OXYGEN) Inhale 2.5 L/min Continuous.     • omeprazole (priLOSEC) 20 MG capsule Take 2 capsules by mouth Daily. 60 capsule 3   • ondansetron (ZOFRAN) 8 MG tablet Take 1 tablet by mouth 3 (Three) Times a Day As Needed for Nausea or Vomiting. 30 tablet 5   • Potassium 99 MG tablet Take 1 tablet by mouth Every Night.     • pravastatin (PRAVACHOL) 40 MG tablet Take 40 mg by mouth Every Night.     • riFAXIMin (XIFAXAN) 550 MG tablet Take one tablet daily x7 days starting on day of Perjeta chemo each cycle 7 tablet 3   • spironolactone (ALDACTONE) 25 MG tablet Take 25 mg by mouth Daily.       Current Facility-Administered Medications on File Prior to Visit   Medication Dose Route Frequency Provider Last Rate Last Admin   • dexamethasone (DECADRON) IVPB 12 mg  12 mg Intravenous Once Jaime Azul MD       • diphenhydrAMINE (BENADRYL) IVPB 25 mg  25 mg Intravenous Once Jaime Azul MD       • famotidine (PEPCID) injection 20 mg   20 mg Intravenous Once Jaime Azul MD       • PACLitaxel (TAXOL) 200 mg in sodium chloride 0.9 % 283.3 mL chemo IVPB  80 mg/m2 (Treatment Plan Recorded) Intravenous Once Jaime Azul MD       • pertuzumab (PERJETA) 420 mg in sodium chloride 0.9 % 264 mL chemo IVPB  420 mg Intravenous Once Jaime zAul MD       • sodium chloride 0.9 % infusion 250 mL  250 mL Intravenous Once Jaime Azul MD       • Trastuzumab (HERCEPTIN) 320 mg in sodium chloride 0.9 % 250 mL chemo IVPB  2 mg/kg (Treatment Plan Recorded) Intravenous Once Jaime Azul MD            ALLERGIES:    Allergies   Allergen Reactions   • Amlodipine Swelling     LEGS    • Hydrochlorothiazide Unknown - Low Severity     Neuro issues   • Morphine Nausea And Vomiting     hallucinations   • Adhesive Tape Rash        Social History     Socioeconomic History   • Marital status:      Spouse name: Not on file   • Number of children: 3   • Years of education: Not on file   • Highest education level: Not on file   Tobacco Use   • Smoking status: Never Smoker   • Smokeless tobacco: Never Used   Vaping Use   • Vaping Use: Never used   Substance and Sexual Activity   • Alcohol use: Not Currently   • Drug use: Never   • Sexual activity: Defer        Family History   Problem Relation Age of Onset   • Breast cancer Mother 45   • Colon cancer Paternal Grandmother    • Breast cancer Maternal Aunt 70   • Breast cancer Paternal Aunt 70   • Lung cancer Father    • Hodgkin's lymphoma Father    • Skin cancer Father         squamous cell   • Ovarian cancer Neg Hx    • Uterine cancer Neg Hx    • Deep vein thrombosis Neg Hx    • Pulmonary embolism Neg Hx    • Malig Hyperthermia Neg Hx         Review of Systems   Constitutional: Positive for fatigue. Negative for appetite change, chills, diaphoresis, fever and unexpected weight change.   HENT: Negative for hearing loss, sore throat and trouble swallowing.    Respiratory: Positive for  "shortness of breath (With exertion on home O2 for 4 years). Negative for cough, chest tightness and wheezing.    Cardiovascular: Negative for chest pain, palpitations and leg swelling.   Gastrointestinal: Positive for diarrhea (see HPI) and rectal pain (hemmorrhoids - see HPI). Negative for abdominal distention, abdominal pain, constipation, nausea and vomiting.   Genitourinary: Negative for dysuria, frequency, hematuria and urgency.   Musculoskeletal: Negative for joint swelling.        No muscle weakness.   Skin: Negative for rash and wound.   Neurological: Negative for seizures, syncope, speech difficulty, weakness, numbness and headaches.   Hematological: Negative for adenopathy. Does not bruise/bleed easily.   Psychiatric/Behavioral: Negative.  Negative for behavioral problems, confusion and suicidal ideas.   All other systems reviewed and are negative.     ROS reviewed 03/24/21 updated as appropriate    Objective     Vitals:    03/24/21 0853   BP: 141/85   Pulse: 77   Resp: 16   Temp: 98 °F (36.7 °C)   TempSrc: Skin   SpO2: 94%   Weight: (!) 158 kg (347 lb 9.6 oz)   Height: 165.1 cm (65\")   PainSc: 0-No pain     Current Status 3/24/2021   ECOG score 1       Physical Exam  Chest:             CONSTITUTIONAL:  Vital signs reviewed.  No distress, looks comfortable. Morbidly obese  EYES:  Conjunctiva and lids unremarkable.  PERRLA  EARS,NOSE,MOUTH,THROAT:  Ears and nose appear unremarkable.  Lips, teeth, gums appear unremarkable.  RESPIRATORY:  Normal respiratory effort.  Lungs clear to auscultation bilaterally.  No axillary adenopathy  BREASTS: Both breasts are pendulous; the right breast is no longer erythemic and much softer now to palpation.  Still with slight peau de orange appearance around the nipple.    CARDIOVASCULAR:  Normal S1, S2.  No murmurs rubs or gallops.  1+ brawny lower extremity edema.  GASTROINTESTINAL: Abdomen appears unremarkable.  Nontender.  No hepatomegaly.  No splenomegaly.  LYMPHATIC:  No " cervical, supraclavicular, axillary lymphadenopathy.  SKIN:  Warm.  Inclusion cyst left axilla  PSYCHIATRIC:  Normal judgment and insight.  Normal mood and affect.    I have reexamined the patient and the results are consistent with the previously documented exam except as updated. Jaime Azul MD       RECENT LABS:  Results from last 7 days   Lab Units 03/24/21  0847   WBC 10*3/mm3 6.99   NEUTROS ABS 10*3/mm3 5.40   HEMOGLOBIN g/dL 9.3*   HEMATOCRIT % 31.8*   PLATELETS 10*3/mm3 310                 PET  IMPRESSION:  1.  Moderate to intensely FDG avid asymmetric soft tissue and skin  thickening involving the right breast likely representing patient's  known malignancy.  2.  Intensely FDG avid right axillary and subpectoral adenopathy likely  represent metastatic disease.  3.  Constellation of findings within the right adrenal gland are favored  to represent a lipid rich adenoma. Continued attention on follow-up is  recommended to ensure stability.  4.  While there are no findings of definite FDG avid osseous metastasis,  given the heterogenous FDG uptake throughout the axial and appendicular  skeleton due to the above stated limitations, subtle underlying osseous  metastasis would remain occult. Therefore, continued close attention on  follow-up is recommended to exclude this possibility.  5.  Short segment of moderate to intense FDG uptake within the distal  esophagus and GE junction suggestive of esophagitis. In the appropriate  clinical context correlation with patient history is recommended with  follow-up endoscopy if clinically indicated.  6.  Sub-6 mm pulmonary nodule within the right lower lobe is below PET  resolution and indeterminate. Continued close attention on follow-up  with chest CT in 3 months is recommended to exclude metastatic disease.  7.  Other findings as above.     This report was finalized on 2/2/2021     Assessment/Plan   1. [id number] right breast cancer ER/NH HER-2 -3+ positive  inflammatory breast cancer for neoadjuvant chemotherapy  · Staging work-up negative except for 6 mm lung nodule and axillary and subpectoral adenopathy  · THP followed by AC planned if she tolerates it  · C1D8 Taxol missed due to inclement weather.    2.  Morbid obesity    3.  History of diastolic heart failure  · Echocardiogram with ejection fraction of 64% normal strain  · Cleared by cardio-oncology for chemotherapy    4.  Pulmonary disease?  Etiology on oxygen for 4 years?  [place]ian    5.  Strong family history of breast cancer genetic testing 84 genes negative    6.  Probable benign adrenal adenoma-PET negative    7.  Questionable enlarged lymph nodes left iliac chain and mediastinum likely reactive-PET negative    8.  6 mm right lower lobe nodule below PET resolution pretreatment needs follow-up after THP    9.  Abnormal uptake short segment esophagus with a history of Schatzki's ring-we will double PPI and watch closely but we we will proceed with chemotherapy at this point and refer back to GI-doubt she has metastatic disease to this area    10. Anemia with microcytic indices  · Iron studies performed 2/10/2021.  · 2/24/2021: reviewed with the patient that she is iron deficient, with ferritin of 16, iron saturation of 4%.  Patient reports taking ferrous gluconate in the past but this caused GI upset.  Also with her chronic diarrhea I do not think she can absorb it.  We will pursue IV iron with plans to initiate this next week pending insurance approval. In addition hemoglobin down to 8.0 and transfusion pursued.  · 3/3/2021: IV Injectafer initiated x2.  Hemoglobin improved to 9.7.    11. Hemorrhoidal pain secondary to diarrhea. Does have occasional bleeding. Topical lidocaine prescribed. Monitor.    12.  Small areas of excoriation on bilateral forearms,?  Related to Taxol,?  Mild drug rash.  Resolved  On  13.  History of chronic diarrhea, ?IBS, now exacerbated with Perjeta therapy.  · Patient prescribed  rifaximin 550 mg to take twice daily x7 days with each Perjeta dose.  States she picked this up late and only took about 4 days of therapy.  · Mild prior to see her first year patient did find a old prescription for Bentyl from 2017 and began taking this noted improvement in her diarrhea and abdominal cramping.  Special lab  Plan  1. Proceed with cycle 3 day 1 Perjeta Taxol/Herceptin.    2. Increase Imodium as prescribed  3. Continue topical lidocaine to hemorrhoids.  Monitor bleeding.    4. Return in 1/2 week for cycle 3, day 8/15 Taxol/Herceptin.  5. Patient will otherwise return in 3 weeks for follow-up with Dr. Azul and cycle 4  6. Plan CT scans and echo after completion of Taxol Perjeta Herceptin at 12-week interval.    This patient is on drug therapy requiring intensive monitoring for toxicity.  We did more than just assess side effects of treatment today.

## 2021-03-31 ENCOUNTER — INFUSION (OUTPATIENT)
Dept: ONCOLOGY | Facility: HOSPITAL | Age: 66
End: 2021-03-31

## 2021-03-31 VITALS
WEIGHT: 293 LBS | RESPIRATION RATE: 18 BRPM | SYSTOLIC BLOOD PRESSURE: 123 MMHG | HEART RATE: 74 BPM | OXYGEN SATURATION: 92 % | BODY MASS INDEX: 58.01 KG/M2 | TEMPERATURE: 96.7 F | DIASTOLIC BLOOD PRESSURE: 70 MMHG

## 2021-03-31 DIAGNOSIS — C50.611 MALIGNANT NEOPLASM OF AXILLARY TAIL OF RIGHT FEMALE BREAST, UNSPECIFIED ESTROGEN RECEPTOR STATUS (HCC): Primary | ICD-10-CM

## 2021-03-31 LAB
ALBUMIN SERPL-MCNC: 3.4 G/DL (ref 3.5–5.2)
ALBUMIN/GLOB SERPL: 1.3 G/DL (ref 1.1–2.4)
ALP SERPL-CCNC: 85 U/L (ref 38–116)
ALT SERPL W P-5'-P-CCNC: 16 U/L (ref 0–33)
ANION GAP SERPL CALCULATED.3IONS-SCNC: 8.6 MMOL/L (ref 5–15)
AST SERPL-CCNC: 11 U/L (ref 0–32)
BASOPHILS # BLD AUTO: 0.02 10*3/MM3 (ref 0–0.2)
BASOPHILS NFR BLD AUTO: 0.3 % (ref 0–1.5)
BILIRUB SERPL-MCNC: 0.5 MG/DL (ref 0.2–1.2)
BUN SERPL-MCNC: 12 MG/DL (ref 6–20)
BUN/CREAT SERPL: 16.7 (ref 7.3–30)
CALCIUM SPEC-SCNC: 9 MG/DL (ref 8.5–10.2)
CHLORIDE SERPL-SCNC: 102 MMOL/L (ref 98–107)
CO2 SERPL-SCNC: 27.4 MMOL/L (ref 22–29)
CREAT SERPL-MCNC: 0.72 MG/DL (ref 0.6–1.1)
DEPRECATED RDW RBC AUTO: 78.3 FL (ref 37–54)
EOSINOPHIL # BLD AUTO: 0.11 10*3/MM3 (ref 0–0.4)
EOSINOPHIL NFR BLD AUTO: 1.6 % (ref 0.3–6.2)
ERYTHROCYTE [DISTWIDTH] IN BLOOD BY AUTOMATED COUNT: 24.8 % (ref 12.3–15.4)
GFR SERPL CREATININE-BSD FRML MDRD: 81 ML/MIN/1.73
GLOBULIN UR ELPH-MCNC: 2.6 GM/DL (ref 1.8–3.5)
GLUCOSE SERPL-MCNC: 140 MG/DL (ref 74–124)
HCT VFR BLD AUTO: 29 % (ref 34–46.6)
HGB BLD-MCNC: 8.7 G/DL (ref 12–15.9)
IMM GRANULOCYTES # BLD AUTO: 0.09 10*3/MM3 (ref 0–0.05)
IMM GRANULOCYTES NFR BLD AUTO: 1.3 % (ref 0–0.5)
LYMPHOCYTES # BLD AUTO: 0.76 10*3/MM3 (ref 0.7–3.1)
LYMPHOCYTES NFR BLD AUTO: 11.2 % (ref 19.6–45.3)
MCH RBC QN AUTO: 26.9 PG (ref 26.6–33)
MCHC RBC AUTO-ENTMCNC: 30 G/DL (ref 31.5–35.7)
MCV RBC AUTO: 89.5 FL (ref 79–97)
MONOCYTES # BLD AUTO: 0.58 10*3/MM3 (ref 0.1–0.9)
MONOCYTES NFR BLD AUTO: 8.5 % (ref 5–12)
NEUTROPHILS NFR BLD AUTO: 5.25 10*3/MM3 (ref 1.7–7)
NEUTROPHILS NFR BLD AUTO: 77.1 % (ref 42.7–76)
NRBC BLD AUTO-RTO: 0.3 /100 WBC (ref 0–0.2)
PLATELET # BLD AUTO: 281 10*3/MM3 (ref 140–450)
PMV BLD AUTO: 9.2 FL (ref 6–12)
POTASSIUM SERPL-SCNC: 4.1 MMOL/L (ref 3.5–4.7)
PROT SERPL-MCNC: 6 G/DL (ref 6.3–8)
RBC # BLD AUTO: 3.24 10*6/MM3 (ref 3.77–5.28)
SODIUM SERPL-SCNC: 138 MMOL/L (ref 134–145)
WBC # BLD AUTO: 6.81 10*3/MM3 (ref 3.4–10.8)

## 2021-03-31 PROCEDURE — 25010000002 TRASTUZUMAB PER 10 MG: Performed by: INTERNAL MEDICINE

## 2021-03-31 PROCEDURE — 25010000002 DEXAMETHASONE SODIUM PHOSPHATE 100 MG/10ML SOLUTION: Performed by: INTERNAL MEDICINE

## 2021-03-31 PROCEDURE — 25010000002 PACLITAXEL PER 1 MG: Performed by: INTERNAL MEDICINE

## 2021-03-31 PROCEDURE — 36415 COLL VENOUS BLD VENIPUNCTURE: CPT

## 2021-03-31 PROCEDURE — 96375 TX/PRO/DX INJ NEW DRUG ADDON: CPT

## 2021-03-31 PROCEDURE — 96417 CHEMO IV INFUS EACH ADDL SEQ: CPT

## 2021-03-31 PROCEDURE — 80053 COMPREHEN METABOLIC PANEL: CPT | Performed by: INTERNAL MEDICINE

## 2021-03-31 PROCEDURE — 85025 COMPLETE CBC W/AUTO DIFF WBC: CPT

## 2021-03-31 PROCEDURE — 25010000002 DIPHENHYDRAMINE PER 50 MG: Performed by: INTERNAL MEDICINE

## 2021-03-31 PROCEDURE — 96413 CHEMO IV INFUSION 1 HR: CPT

## 2021-03-31 RX ORDER — SODIUM CHLORIDE 9 MG/ML
250 INJECTION, SOLUTION INTRAVENOUS ONCE
Status: COMPLETED | OUTPATIENT
Start: 2021-03-31 | End: 2021-03-31

## 2021-03-31 RX ORDER — FAMOTIDINE 10 MG/ML
20 INJECTION, SOLUTION INTRAVENOUS ONCE
Status: COMPLETED | OUTPATIENT
Start: 2021-03-31 | End: 2021-03-31

## 2021-03-31 RX ADMIN — SODIUM CHLORIDE 250 ML: 9 INJECTION, SOLUTION INTRAVENOUS at 09:15

## 2021-03-31 RX ADMIN — PACLITAXEL 200 MG: 6 INJECTION, SOLUTION INTRAVENOUS at 10:32

## 2021-03-31 RX ADMIN — FAMOTIDINE 20 MG: 10 INJECTION INTRAVENOUS at 09:16

## 2021-03-31 RX ADMIN — DIPHENHYDRAMINE HYDROCHLORIDE 25 MG: 50 INJECTION, SOLUTION INTRAMUSCULAR; INTRAVENOUS at 09:33

## 2021-03-31 RX ADMIN — DEXAMETHASONE SODIUM PHOSPHATE 12 MG: 10 INJECTION, SOLUTION INTRAMUSCULAR; INTRAVENOUS at 09:16

## 2021-03-31 RX ADMIN — TRASTUZUMAB 320 MG: 150 INJECTION, POWDER, LYOPHILIZED, FOR SOLUTION INTRAVENOUS at 09:51

## 2021-03-31 NOTE — PROGRESS NOTES
Pt c/o numbness to 2 fingers on the left hand x1 week. Pt also states she has not been using cold therapy to hands and feet during tx. Pt reports having an episode of diarrhea this morning and taking 2 immodium tablets with relief. Pt states she developed a rash to both hands since on Taxol. S/w Dr. Azul who seen pt at chairside. V/o for hydrocortisone 2% cream sent to pharmacy. Will continue to monitor pts rash. If rash worsens Dr. Azul may consider switching treatments.

## 2021-04-07 ENCOUNTER — INFUSION (OUTPATIENT)
Dept: ONCOLOGY | Facility: HOSPITAL | Age: 66
End: 2021-04-07

## 2021-04-07 VITALS
SYSTOLIC BLOOD PRESSURE: 133 MMHG | OXYGEN SATURATION: 92 % | DIASTOLIC BLOOD PRESSURE: 70 MMHG | HEART RATE: 83 BPM | TEMPERATURE: 97.1 F | BODY MASS INDEX: 57.28 KG/M2 | WEIGHT: 293 LBS | RESPIRATION RATE: 21 BRPM

## 2021-04-07 DIAGNOSIS — C50.611 MALIGNANT NEOPLASM OF AXILLARY TAIL OF RIGHT FEMALE BREAST, UNSPECIFIED ESTROGEN RECEPTOR STATUS (HCC): Primary | ICD-10-CM

## 2021-04-07 DIAGNOSIS — Z45.2 FITTING AND ADJUSTMENT OF VASCULAR CATHETER: ICD-10-CM

## 2021-04-07 LAB
BASOPHILS # BLD AUTO: 0.04 10*3/MM3 (ref 0–0.2)
BASOPHILS NFR BLD AUTO: 0.5 % (ref 0–1.5)
DEPRECATED RDW RBC AUTO: 77 FL (ref 37–54)
EOSINOPHIL # BLD AUTO: 0.11 10*3/MM3 (ref 0–0.4)
EOSINOPHIL NFR BLD AUTO: 1.3 % (ref 0.3–6.2)
ERYTHROCYTE [DISTWIDTH] IN BLOOD BY AUTOMATED COUNT: 24.3 % (ref 12.3–15.4)
HCT VFR BLD AUTO: 30.3 % (ref 34–46.6)
HGB BLD-MCNC: 9 G/DL (ref 12–15.9)
IMM GRANULOCYTES # BLD AUTO: 0.18 10*3/MM3 (ref 0–0.05)
IMM GRANULOCYTES NFR BLD AUTO: 2.2 % (ref 0–0.5)
LYMPHOCYTES # BLD AUTO: 0.65 10*3/MM3 (ref 0.7–3.1)
LYMPHOCYTES NFR BLD AUTO: 7.9 % (ref 19.6–45.3)
MCH RBC QN AUTO: 26.8 PG (ref 26.6–33)
MCHC RBC AUTO-ENTMCNC: 29.7 G/DL (ref 31.5–35.7)
MCV RBC AUTO: 90.2 FL (ref 79–97)
MONOCYTES # BLD AUTO: 0.76 10*3/MM3 (ref 0.1–0.9)
MONOCYTES NFR BLD AUTO: 9.2 % (ref 5–12)
NEUTROPHILS NFR BLD AUTO: 6.54 10*3/MM3 (ref 1.7–7)
NEUTROPHILS NFR BLD AUTO: 78.9 % (ref 42.7–76)
NRBC BLD AUTO-RTO: 0.2 /100 WBC (ref 0–0.2)
PLATELET # BLD AUTO: 344 10*3/MM3 (ref 140–450)
PMV BLD AUTO: 9.8 FL (ref 6–12)
RBC # BLD AUTO: 3.36 10*6/MM3 (ref 3.77–5.28)
WBC # BLD AUTO: 8.28 10*3/MM3 (ref 3.4–10.8)

## 2021-04-07 PROCEDURE — 85025 COMPLETE CBC W/AUTO DIFF WBC: CPT

## 2021-04-07 PROCEDURE — 96413 CHEMO IV INFUSION 1 HR: CPT

## 2021-04-07 PROCEDURE — 25010000002 DIPHENHYDRAMINE PER 50 MG: Performed by: INTERNAL MEDICINE

## 2021-04-07 PROCEDURE — 25010000002 DEXAMETHASONE SODIUM PHOSPHATE 100 MG/10ML SOLUTION: Performed by: INTERNAL MEDICINE

## 2021-04-07 PROCEDURE — 36415 COLL VENOUS BLD VENIPUNCTURE: CPT

## 2021-04-07 PROCEDURE — 25010000002 TRASTUZUMAB PER 10 MG: Performed by: INTERNAL MEDICINE

## 2021-04-07 PROCEDURE — 25010000003 HEPARIN LOCK FLUSH PER 10 UNITS: Performed by: INTERNAL MEDICINE

## 2021-04-07 PROCEDURE — 96375 TX/PRO/DX INJ NEW DRUG ADDON: CPT

## 2021-04-07 RX ORDER — HEPARIN SODIUM (PORCINE) LOCK FLUSH IV SOLN 100 UNIT/ML 100 UNIT/ML
500 SOLUTION INTRAVENOUS AS NEEDED
Status: CANCELLED | OUTPATIENT
Start: 2021-04-07

## 2021-04-07 RX ORDER — PACLITAXEL 100 MG/20ML
100 INJECTION, POWDER, LYOPHILIZED, FOR SUSPENSION INTRAVENOUS ONCE
Status: CANCELLED | OUTPATIENT
Start: 2021-04-07

## 2021-04-07 RX ORDER — SODIUM CHLORIDE 0.9 % (FLUSH) 0.9 %
10 SYRINGE (ML) INJECTION AS NEEDED
Status: CANCELLED | OUTPATIENT
Start: 2021-04-07

## 2021-04-07 RX ORDER — HEPARIN SODIUM (PORCINE) LOCK FLUSH IV SOLN 100 UNIT/ML 100 UNIT/ML
500 SOLUTION INTRAVENOUS AS NEEDED
Status: DISCONTINUED | OUTPATIENT
Start: 2021-04-07 | End: 2021-04-08 | Stop reason: HOSPADM

## 2021-04-07 RX ORDER — FAMOTIDINE 10 MG/ML
20 INJECTION, SOLUTION INTRAVENOUS ONCE
Status: COMPLETED | OUTPATIENT
Start: 2021-04-07 | End: 2021-04-07

## 2021-04-07 RX ORDER — SODIUM CHLORIDE 9 MG/ML
250 INJECTION, SOLUTION INTRAVENOUS ONCE
Status: COMPLETED | OUTPATIENT
Start: 2021-04-07 | End: 2021-04-07

## 2021-04-07 RX ADMIN — DEXAMETHASONE SODIUM PHOSPHATE 12 MG: 10 INJECTION, SOLUTION INTRAMUSCULAR; INTRAVENOUS at 09:42

## 2021-04-07 RX ADMIN — FAMOTIDINE 20 MG: 10 INJECTION, SOLUTION INTRAVENOUS at 09:27

## 2021-04-07 RX ADMIN — DIPHENHYDRAMINE HYDROCHLORIDE 25 MG: 50 INJECTION, SOLUTION INTRAMUSCULAR; INTRAVENOUS at 09:26

## 2021-04-07 RX ADMIN — SODIUM CHLORIDE 250 ML: 9 INJECTION, SOLUTION INTRAVENOUS at 09:26

## 2021-04-07 RX ADMIN — TRASTUZUMAB 320 MG: 150 INJECTION, POWDER, LYOPHILIZED, FOR SOLUTION INTRAVENOUS at 11:04

## 2021-04-07 RX ADMIN — Medication 500 UNITS: at 11:41

## 2021-04-07 NOTE — NURSING NOTE
Pt developing rash on her hands and feet from Taxol. S/w Dr. Azul, Taxol changed to Abraxane in Treatment plan. Pt will receive Herceptin only today, and starting next week will receive abraxane instead of Taxol.

## 2021-04-07 NOTE — PROGRESS NOTES
Pt here for chemo today. Lower extremity and hand rash is persisting, now with a burning sensation,  despite creams. Pt also with increased SOA. Likely rash due to her taxol. Will switch treatment to Abraxane to start next week. Taxol discontinued.

## 2021-04-08 ENCOUNTER — OFFICE VISIT (OUTPATIENT)
Dept: SURGERY | Facility: CLINIC | Age: 66
End: 2021-04-08

## 2021-04-08 VITALS
DIASTOLIC BLOOD PRESSURE: 73 MMHG | OXYGEN SATURATION: 98 % | HEIGHT: 65 IN | WEIGHT: 293 LBS | SYSTOLIC BLOOD PRESSURE: 137 MMHG | BODY MASS INDEX: 48.82 KG/M2 | HEART RATE: 87 BPM | RESPIRATION RATE: 20 BRPM

## 2021-04-08 DIAGNOSIS — Z79.899 HIGH RISK MEDICATION USE: ICD-10-CM

## 2021-04-08 DIAGNOSIS — R06.02 SHORTNESS OF BREATH: ICD-10-CM

## 2021-04-08 DIAGNOSIS — C50.911 INFLAMMATORY BREAST CANCER, RIGHT (HCC): Primary | ICD-10-CM

## 2021-04-08 PROCEDURE — 99215 OFFICE O/P EST HI 40 MIN: CPT | Performed by: SURGERY

## 2021-04-08 NOTE — PROGRESS NOTES
[facility]     Referring Provider: Dipesh Leone MD     Chief complaint: F/u mid-NACT     HPI:   1/19/21:  Ms. Aida Conner is a 66 yo woman, seen at the request of Dr. Dipesh Leone, for evaluation of right breast changes, possible inflammatory breast cancer. In November 2020, the patient noticed that her right breast had become enlarged, firm, painful, and red. She underwent mammogram and ultrasound at the time, which was read as benign aside from skin thickening and edema. She was treated with a course of antibiotics by her PCP without improvement. She says that the redness and swelling initially started in the lower inner portion of her breast, but over the past 2 months, it has spread to involve the entire breast. She can no longer wear a bra due to discomfort.     She has a past history of 2 benign left breast excisional biopsies many years ago, as well as a right breast excisional biopsy in 2018 for an intraductal papilloma with incidental atypical ductal and lobular hyperplasia. Her past medical history includes obesity, COPD on oxygen, sleep apnea, diabetes and CHF. She has a family history of breast cancer in her mother (diagnosed at age 45), a maternal aunt (diagnosed in her 70s), and a paternal aunt (diagnosed in her 70s). She denies any family history of ovarian cancer. She says she was seen by a medical oncologist in 2018 after the excisional biopsy to discuss possible chemoprevention. She was referred for genetic testing, however it was not covered by insurance so she did not have it done. She ended up deciding not to pursue endocrine therapy due to concerns about side effects.     4/8/21, Interval History:  After her last visit, she underwent breast and axillary biopsies which showed a triple positive invasive ductal carcinoma. She underwent staging studies which were thankfully negative and her genetic testing returned negative for mutation. She started neoadjuvant THP on 2/10/21. She  has been having issues with increased shortness of breath and a rash, so yesterday Taxol was discontinued and she will be starting Abraxane next week. She reports that her breast has significantly decreased in size and become less swollen. She was joined today in clinic by her daughter. She gave consent for her daughter to be present during her examination and participate in the discussion.       Oncology/Hematology History Overview Note   06/19/18, Right Breast Excisional Biopsy ( [facility], Dr. Juani Barragan):  Right breast wire guided excision:  Small foci of atypical ductal hyperplasia (0.5-1 mm); completely excised  Minute focus of atypical lobular hyperplasia (<0.5 mm) completely excised  Residual intraductal papilloma with focal sclerosis (approximately 7 mm in greatest dimension); adequately excised  Ectactic ducts  Fibrocystic changes (stromal fibrosis, Sclerosing adenosis) with mild to moderate ductal hyperplasia  Rare microcalcifications associated with adenosis  Extensive vascular calcification  Previous biopsy site    09/07/2018, Right breast US ( [facility]):  2.5 predominantly cystic lesion in the operative bed contains fine septations, and is consistent with an organizing hematoma.   No solid relatively hypo or hyperechoic mass is evident  BI-RADS 2: Benign.    1/10/2020, Screening MMG with Saurabh ( [facility]):  Post lumpectomy changes on the left are stable.  Post biopsy changes on the right are evident.   No suspicious mass, area or architectural distortion or suspicious microcalcification is identified.  BI-RADS 2: Benign.    11/1/20: Pt noticed right breast was enlarged, firm, and red. Treated with a course of antibiotics with no improvement.     Inflammatory breast cancer, right (CMS/HCC)   11/17/2020 Initial Diagnosis    Inflammatory breast cancer, right (CMS/HCC)     11/18/2020 Imaging    Bilateral Diagnostic MM[facility] with S[facility] & [facility]  MMG:  Again seen within the anterior right  breast near the 12 o’clock position in an area postoperative architectural distortion. There are multiple surgical clips in this region. Findings are unchanged from 1/10/2020. No new mass or architectural distortion seen within the right breast. No new suspicious calcifications. There is new skin thickening along the anterior aspect of the right breast of uncertain etiology.   There is stable architectural distortion in the upper outer quadrant of the left breast. There is no new mass or architectural distortion. There are stable scattered punctate calcifications. No suspicious calcifications.  US:  Limited sonographic images of the right breast were obtained at the 12 o’clock position at the site of the patient’s reported right breast fullness. This is in the retroareolar area. Images demonstrate some mild skin thickening and edema within the subcutaneous and superficial fat. Findings are nonspecific but could be seen with developing cellulitis. No discrete solid mass lesion identified. No cyst or abnormal fluid collection is seen.  BI-RADS 2: Benign.     1/19/2021 Imaging    Bilateral Diagnostic MMG with Saurabh & Right Breast US ([facility]):  MMG:  Scattered areas of fibroglandular density.  1. There is a large global asymmetry with associated skin thickening and trabecular thickening seen in the central region of the right breast.  This correlates to the symptomatic area. The appearance is consistent with the clinical diagnosis of inflammatory breast carcinoma.  2.  There is a stable post-surgical scar seen in the anterior one-third region of the right breast at 12  o'clock. Post-surgical scar is in an area of prior excisional biopsy.  Metallic surgical clips noted.  3.  There is a stable post-surgical scar seen in the middle one-third 1:30 o'clock region of the left breast. Post-surgical scar is in an area of prior excisional biopsy.  Metallic surgical clips noted.  4. There are several stable punctate calcifications  seen in the anterior one-third region of the left breast at 7 o'clock.  7. There is an axillary lymph node measuring 20 mm in the right axilla.  Next, ultrasound was performed.  US:  2. Ultrasound demonstrates a stable irregular post-surgical scar seen in the anterior one-third region of the right breast at 12 o'clock.  This correlates with the mammographic finding.  5. There are two oval parallel solid masses with partially defined margins measuring 9 x 6 x 8 and 10 x 5 x 9 mm seen in the right breast at 12 o'clock located 6 centimeters from the nipple.  6. There is an irregular solid mass with poorly defined margins measuring 32 x 8 x 11 mm seen in the 10:30 o'clock region of the right breast located 15 centimeters from the nipple.  7. Ultrasound demonstrates an oval parallel abnormal axillary lymph node(s) with well defined, thin margins measuring 32 x 20 x 22 mm in the right axilla.  BI-RADS 4B: Suspicious.     1/19/2021 Biopsy    Right Breast & Right Axilla, US-Guided Core Biopsies ([facility]):    1. Right Breast, 10:30, core biopsies:   Invasive mammary carcinoma, no special type (ductal) with micropapillary features, intermediate grade (tubules=3, nuclear atypia=2, mitoses=2), measuring at least 1.3 cm in dimension.     ER+ (96.02%, strong)  MO+ (81.98%, moderate)  Her2+ (IHC 3+)  Ki-67 40.17%    2. Right Axilla, biopsy:   Lymph node involved by metastatic mammary carcinoma.     1/19/2021 Biopsy    Right Breast, Skin Punch Biopsies ([facility]):    1. Skin, Right Breast, 3 o'clock, Biopsy: Benign skin and underlying connective tissue with               A. Superficial and deep perivascular and perifollicular inflammation.     2. Skin, Right Breast, 12 o'clock, Biopsy: Benign skin and underlying connective tissue with               A. Superficial and deep perivascular and perifollicular inflammation.     3. Skin, Right Breast, 9 o'clock, Biopsy: Benign skin and underlying connective tissue with               A.  Superficial and deep perivascular and perifollicular inflammation.     1/19/2021 Genetic Testing    [employer] Multi-Cancer Panel (84 genes):    Negative     1/20/2021 Imaging    CT C/A/P ( [facility]):  1. Right breast partially within the field-of-view, there is skin thickening and parenchymal induration.  2. No indication of intrathoracic metastatic disease.  3. No lytic or sclerotic bone lesion.  4. Enlarged right adrenal gland, indeterminant. Metastatic deposits not excluded.  5. Likely old left external iliac chain lymph node.     1/20/2021 Imaging    Bone Scan ( [facility]):  No scintigraphic evidence of osseous metastasis.     1/29/2021 Imaging    [facility]  1.  Moderate to intensely FDG avid asymmetric soft tissue and skin thickening involving the right breast likely representing patient's known malignancy.  2.  Intensely FDG avid right axillary and subpectoral adenopathy likely represent metastatic disease.  3.  Constellation of findings within the right adrenal gland are favored to represent a lipid rich adenoma. Continued attention on follow-up is recommended to ensure stability.  4.  While there are no findings of definite FDG avid osseous metastasis, given the heterogenous FDG uptake throughout the axial and appendicular skeleton due to the above stated limitations, subtle underlying osseous  metastasis would remain occult. Therefore, continued close attention on follow-up is recommended to exclude this possibility.  5.  Short segment of moderate to intense FDG uptake within the distal esophagus and GE junction suggestive of esophagitis. In the appropriate clinical context correlation with patient history is recommended with follow-up endoscopy if clinically indicated.  6.  Sub-6 mm pulmonary nodule within the right lower lobe is below PET resolution and indeterminate. Continued close attention on follow-up with chest CT in 3 months is recommended to exclude metastatic disease.     Malignant neoplasm of  axillary tail of right female breast (CMS/HCC)   1/25/2021 Initial Diagnosis    Malignant neoplasm of axillary tail of right female breast (CMS/HCC)     2/10/2021 -  Chemotherapy    OP BREAST Pertuzumab / Trastuzumab-anns / PACLitaxel         Review of Systems:  See interval history.       Medications:    Current Outpatient Medications:   •  albuterol sulfate  (90 Base) MCG/ACT inhaler, Inhale 2 puffs Every 4 (Four) Hours As Needed., Disp: , Rfl:   •  carvedilol (COREG) 25 MG tablet, Take 12.5 mg by mouth 2 (Two) Times a Day With Meals., Disp: , Rfl:   •  Cholecalciferol (Vitamin D) 50 MCG (2000 UT) capsule, Take 2,000 Units by mouth Daily., Disp: , Rfl:   •  dicyclomine (BENTYL) 20 MG tablet, Take 1 tablet by mouth Every 6 (Six) Hours., Disp: 60 tablet, Rfl: 2  •  fexofenadine (ALLEGRA) 180 MG tablet, Take 180 mg by mouth Daily As Needed., Disp: , Rfl:   •  FLUoxetine (PROzac) 40 MG capsule, Take 40 mg by mouth Daily., Disp: , Rfl:   •  furosemide (LASIX) 40 MG tablet, Take 20 mg by mouth Daily., Disp: , Rfl:   •  hydrocortisone 2.5 % cream, Apply  topically to the appropriate area as directed 2 (Two) Times a Day., Disp: 3.5 g, Rfl: 0  •  Lidocaine, Anorectal, (LMX 5) 5 % cream cream, Apply  topically to the appropriate area as directed 3 (Three) Times a Day As Needed (hemmorrhoid pain)., Disp: 45 g, Rfl: 2  •  lisinopril (PRINIVIL,ZESTRIL) 5 MG tablet, Take 1 tablet by mouth Daily., Disp: 30 tablet, Rfl: 6  •  meloxicam (MOBIC) 15 MG tablet, Take 15 mg by mouth Daily. PT HOLDING FOR SURGERY, Disp: , Rfl:   •  metFORMIN (GLUCOPHAGE) 500 MG tablet, Take 500 mg by mouth Daily With Breakfast., Disp: , Rfl:   •  mometasone-formoterol (DULERA 100) 100-5 MCG/ACT inhaler, Inhale 2 Puffs/kg Every Night., Disp: , Rfl:   •  montelukast (SINGULAIR) 10 MG tablet, Take 10 mg by mouth Every Night., Disp: , Rfl:   •  multivitamin (THERAGRAN) tablet tablet, Take 1 tablet by mouth Daily. TO HOLD 1 WEEK BEFORE SURGERY, Disp:  , Rfl:   •  O2 (OXYGEN), Inhale 2.5 L/min Continuous., Disp: , Rfl:   •  omeprazole (priLOSEC) 20 MG capsule, Take 2 capsules by mouth Daily., Disp: 60 capsule, Rfl: 3  •  ondansetron (ZOFRAN) 8 MG tablet, Take 1 tablet by mouth 3 (Three) Times a Day As Needed for Nausea or Vomiting., Disp: 30 tablet, Rfl: 5  •  Potassium 99 MG tablet, Take 1 tablet by mouth Every Night., Disp: , Rfl:   •  pravastatin (PRAVACHOL) 40 MG tablet, Take 40 mg by mouth Every Night., Disp: , Rfl:   •  riFAXIMin (XIFAXAN) 550 MG tablet, Take one tablet daily x7 days starting on day of Perjeta chemo each cycle, Disp: 7 tablet, Rfl: 3  •  spironolactone (ALDACTONE) 25 MG tablet, Take 25 mg by mouth Daily., Disp: , Rfl:   No current facility-administered medications for this visit.      Allergies   Allergen Reactions   • Amlodipine Swelling     LEGS    • Hydrochlorothiazide Unknown - Low Severity     Neuro issues   • Morphine Nausea And Vomiting     hallucinations   • Adhesive Tape Rash       Family History   Problem Relation Age of Onset   • Breast cancer Mother 45   • Colon cancer Paternal Grandmother    • Breast cancer Maternal Aunt 70   • Breast cancer Paternal Aunt 70   • Lung cancer Father    • Hodgkin's lymphoma Father    • Skin cancer Father         squamous cell   • Ovarian cancer Neg Hx    • Uterine cancer Neg Hx    • Deep vein thrombosis Neg Hx    • Pulmonary embolism Neg Hx    • Malig Hyperthermia Neg Hx        PHYSICAL EXAMINATION:   Vitals:    04/08/21 1405   BP: 137/73   Pulse: 87   Resp: 20   SpO2: 98%     ECOG 1 - Symptomatic but ambulatory, with cane  General: NAD, obese, on continuous oxygen and slightly winded  Psych: a&o x 3, normal mood and affect  Eyes: EOMI, no scleral icterus  ENMT: neck supple without masses or thyromegaly, mucus membranes moist  MSK: normal gait, normal ROM in bilateral shoulders  Lymph nodes: no cervical, supraclavicular or axillary lymphadenopathy (exam limited by body habitus)  Breast: symmetric,  very large size, pendulous  Right: On inspection, the right breast is no longer enlarged versus the the left breast. There is still some edema and erythema but this has significantly improved. The skin thickening/induration with a peau d'orange appearance has also improved, instead of encompassing most of her breast, it is now mainly located in the periareolar area. At 12:00, 6 cm FN, there is a firm, masslike area measuring 2 x 2 cm. This is located just below her prior superior curvilinear scar which I could not appreciate on her initial exam due to the extensive induration. She says that she has always had a thick area under the scar, and this may be related to scar tissue. Previously she had a firm masslike area beneath the areola measuring 9 x 9 cm.  No nipple abnormalities.  Left: Superior curvilinear scar and radial scar at 9:00, otherwise no visible abnormalities on inspection while seated, with arms raised or hands on hips. No masses or nipple abnormalities.      Assessment:  66 y.o. F with a diagnosis of right breast inflammatory carcinoma: Intermediate grade, triple positive, invasive ductal carcinoma; clinical T4dN1, anatomic stage IIIB, prognostic stage IIIA.      Discussion:  We reviewed the plan for surgery. I explained that the surgical management of inflammatory breast cancer always requires a modified radical mastectomy without reconstruction and that this would be followed by postmastectomy radiation. I explained that axillary node dissection is associated with an increased risk of lymphedema versus sentinel lymph node biopsy (15-30% vs. <10%), that there is an increased risk of permanent upper inner arm numbness, and a risk of injury to motor nerves that control the shoulder muscles.     We again discussed the contralateral breast and the role of a bilateral mastectomy, which is to reduce the risk of a second primary breast cancer. Her genetic testing was negative, so her risk of a second primary  breast cancer is relatively low, and in addition endocrine therapy will further reduce this risk. She is still interested in a possible contralateral mastectomy for symmetry purposes, which is reasonable. I told her that I will consider this, however it will depend on her functional status after chemo and perioperative risk assessment. If we do not perform a left mastectomy at the initial operation, we can always perform this at a later date once her functional status has recovered.    If her chemotherapy proceeds as scheduled, I believe her last dose of AC will be around 7/14/21. We will tentatively plan for surgery about 4 weeks later.    Plan:  -Continue follow-up with Dr. Azul.  -Preoperative OT evaluation for bioimpedance testing.  -F/u with me the week of 7/19/21. We will tentatively plan for surgery the week of 8/9/2021 which will be a right modified radical mastectomy, possible left mastectomy.  She will let me know if her chemotherapy schedule is adjusted so that I can plan accordingly.    Nicci Banks MD    I spent 60 minutes caring for Aida on this date of service. This time includes time spent by me in the following activities: preparing for the visit, performing a medically appropriate examination and/or evaluation, counseling and educating the patient/family/caregiver, documenting information in the medical record and independently interpreting results and communicating that information with the patient/family/caregiver.      CC:  MD Shari Manjarrez MD Prabodh Mehta, MD Arthur McLaughlin, MD

## 2021-04-09 ENCOUNTER — IMMUNIZATION (OUTPATIENT)
Dept: VACCINE CLINIC | Facility: HOSPITAL | Age: 66
End: 2021-04-09

## 2021-04-09 ENCOUNTER — TELEPHONE (OUTPATIENT)
Dept: ONCOLOGY | Facility: CLINIC | Age: 66
End: 2021-04-09

## 2021-04-09 PROCEDURE — 91300 HC SARSCOV02 VAC 30MCG/0.3ML IM: CPT | Performed by: INTERNAL MEDICINE

## 2021-04-09 PROCEDURE — 0002A: CPT | Performed by: INTERNAL MEDICINE

## 2021-04-09 RX ORDER — PREDNISONE 10 MG/1
20 TABLET ORAL 2 TIMES DAILY
Qty: 60 TABLET | Refills: 1 | Status: SHIPPED | OUTPATIENT
Start: 2021-04-09 | End: 2021-08-03

## 2021-04-09 NOTE — TELEPHONE ENCOUNTER
----- Message from Andie Cronin RN sent at 4/9/2021  7:49 AM EDT -----  Regarding: referral to Dr. Nitesh Freitas  Please see referral to Dr. Freitas.

## 2021-04-09 NOTE — TELEPHONE ENCOUNTER
CALLED AND SPOKE WITH UJDITH IN DR EKV[facility] GRUBER OFFICE, FAX THE REFERRAL OVER AND ASKED MEDICAL RECORDS TO FAX MEDICAL RECORDS TO OFFICE TODAY AS DR KEVIN WALLER IS IN THE OFFICE TODAY AND HE CAN REVIEW TO SEE WHERE TO ADD PT IN AS HE IS BOOKED OUT TILL June

## 2021-04-09 NOTE — PROGRESS NOTES
Subjective     REASON FOR FOLLOW-UP: Right breast inflammatory breast, triple positive                              REQUESTING PHYSICIAN: MD Francisco Esteban MD Bethany Haynes, MD      History of Present Illness patient is a 66 y.o. 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    She has had 7 dose of Taxol and then when she was here last week for chemo and reported worsening shortness of breath now using oxygen constantly instead of as needed.  This was associated with worsening rash on her arms and legs from the Taxol and after looking at the whole picture we opted to hold the Taxol do a CT of the chest to rule out pneumonitis and start steroids in the event that she had Taxol pneumonitis.    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 and discontinue the taxanes  She has had a good response in the breast already    Diarrhea is improved off treatment but will likely resume with her Perjeta and Herceptin today    We have opted to switch to Adriamycin Cytoxan in 3 weeks and will repeat an echocardiogram before then    She will continue on prednisone 20 mg twice daily for total of 7 days and then decrease to 20 mg daily she has a follow-up with a pulmonologist in [facility] next week  We expect to taper her prednisone over 4 to 6 weeks    She is also due to take her rifaximin this week because she is getting Perjeta    She has no neuropathy and has noticed significant improvement in the breast swelling and redness and it is almost back to baseline    Patient was previously also having trouble with hemorrhoidal pain with  bleeding.  This is somewhat improved        Her weight has stabilized and she knows to check her weights daily to make sure she is not getting fluid overload    Past Medical History:   Diagnosis Date   • Anemia    • Asthma    • Chronic diastolic (congestive) heart failure (CMS/HCC)    • Chronic hypoxemic respiratory failure (CMS/HCC)     on continuous O2   • H/O Intraductal papilloma    • Hemorrhoids    • History of transfusion     AFTER BACK SURGERY   • Hypertension    • Inflammatory breast cancer (CMS/HCC)     right   • Kidney stone    • Morbid obesity with BMI of 50.0-59.9, adult (CMS/HCC)    • Obesity hypoventilation syndrome (CMS/HCC) 2021   • On home oxygen therapy     2.5 AT REST WITH ACTIVITY UP TO 4L   • ANABELLE on CPAP    • Osteoarthritis    • Type 2 diabetes mellitus (CMS/HCC)         Past Surgical History:   Procedure Laterality Date   • BREAST EXCISIONAL BIOPSY Bilateral    • COLONOSCOPY     • CYSTOSCOPY BLADDER STONE LITHOTRIPSY     • KNEE ARTHROPLASTY Bilateral    • SPINE SURGERY     • TOTAL ABDOMINAL HYSTERECTOMY WITH SALPINGO OOPHORECTOMY     • VENOUS ACCESS DEVICE (PORT) INSERTION N/A 2021    Procedure: INSERTION VENOUS ACCESS DEVICE;  Surgeon: Nicci Banks MD;  Location: [facility];  Service: General;  Laterality: N/A;      ONC HISTORY  patient is a 65-year-old white female with morbid obesity, history of diastolic congestive heart failure and unknown type of pulmonary disease for which she has been on oxygen for 4 years.    She has been getting routine mammography since 38 years of age because her mother  at 46 of breast cancer and had a biopsy of her left breast in  which was apparently benign and another biopsy in 2018 on her right breast which showed a small focus of atypical ductal hyperplasia and atypical lobular hyperplasia with a residual intraductal papilloma.  At that time she saw medical oncologist who recommended genetic testing and prevention  but the insurance would not cover the genetic testing and the medical oncologist thought tamoxifen was too risky for her because of her comorbidities and no treatment was given.  More recently she noticed redness of the right breast in October and showed it to her family doctor who gave her course of Keflex when it did not improve and mammogram was ordered and this was benign  When the redness persisted she saw her gynecologist with concern for inflammatory breast cancer and referred her to Dr. Banks after repeat imaging and [facility]  Preliminary report shows micropapillary invasive mammary carcinoma intermediate grade measuring 13 mm right axillary node was also involved with metastatic cancer ER/MS and HER-2 are pending  Patient saw Dr. Banks who sent her for skin biopsies and she had 3 separate punch biopsy of the skin that showed Perivascular and perifollicular inflammation with no obvious malignancy at 3:00 12:00 and 9:00  In addition staging work-up with CAT scans and bone scan were ordered.  CAT scan of the chest showed a borderline mediastinal  Infracarinal node measuring 15 mm in length mild cardiomegaly and heavy coronary artery calcifications  CT of the abdomen showed a 2.5 x 2.2 cm right adrenal mass which the patient tells me she has had in the past and is most likely benign but also a 2.7 cm left external iliac node which is indeterminate.  Bone scan was negative    Echocardiogram is scheduled for later next week and genetic testing with the invitae stat panel is pending    Patient is  3 para 3 menarche was at age 9 menopause at 49 when she had a hysterectomy and oophorectomy  First childbirth was at age 20 she breast-fed her second and third children and took no hormone replacement after menopause    Family history is positive for mother dying of breast cancer at age 46 she is a maternal aunt with breast cancer in  her 70s a paternal aunt with breast cancer in her 70s paternal grandmother with colon cancer.  Her father had Hodgkin's disease and non-Hodgkin's lymphoma but  of small cell lung cancer at 67      She has not had a heart attack stroke or blood clot    Plan to do echocardiogram soon as possible to ascertain tolerability of cardiotoxic chemotherapy which would be typically indicated with an inflammatory breast cancer    Also plan PET scan to follow-up on atypical lymph nodes and confirm benign etiology of the adrenal lesion    She will have port placement and chemo education pt is  ER/CA and HER-2 +    I explained to Aida that the goal of treatment would be curative but she has a lot of comorbidities which may limit our ability to give the most effective chemotherapy in the setting  I told her radiation would be involved and possibly hormonal therapy for 10 years as she has hormone positivity and HER-2 directed therapy    She expressed some concerns about driving back and forth from [place] but felt that once a week was doable    We will see her back in 2 weeks to start treatment with a port placement planned early next week      Patient did have a mild reaction to Taxol dose #1 with some increased shortness of breath and flushing.  This was responsive to 100 mg of Solu-Cortef.  She states this gave her a headache and made her quite talkative but otherwise she was thankfully able to complete Taxol infusion without further incident.    Patient reports issues with chronic diarrhea over the last few years and did note a little bit increase in stooling following THP though nothing overly significant in her mind.  She did finally begin taking Imodium just a few days ago and has had no further stools.  She does note significant trouble with hemorrhoids in relation to the diarrhea   Required blood transfusion due to significant hemorrhoidal bleeding plus iron deficiency.  Injectafer planned    Due to inclement  weather cycle 1 day 8 therapy was missed.  She did get day 15 therapy with fairly good tolerance except for some diarrhea.    She was found to be iron deficient despite trying oral iron.  We therefore elected to proceed with IV Injectafer but she remains mildly anemic with a hemoglobin of 9.3    Current Outpatient Medications on File Prior to Visit   Medication Sig Dispense Refill   • albuterol sulfate  (90 Base) MCG/ACT inhaler Inhale 2 puffs Every 4 (Four) Hours As Needed.     • carvedilol (COREG) 25 MG tablet Take 12.5 mg by mouth 2 (Two) Times a Day With Meals.     • Cholecalciferol (Vitamin D) 50 MCG (2000 UT) capsule Take 2,000 Units by mouth Daily.     • dicyclomine (BENTYL) 20 MG tablet Take 1 tablet by mouth Every 6 (Six) Hours. 60 tablet 2   • fexofenadine (ALLEGRA) 180 MG tablet Take 180 mg by mouth Daily As Needed.     • FLUoxetine (PROzac) 40 MG capsule Take 40 mg by mouth Daily.     • furosemide (LASIX) 40 MG tablet Take 20 mg by mouth Daily.     • hydrocortisone 2.5 % cream Apply  topically to the appropriate area as directed 2 (Two) Times a Day. 3.5 g 0   • Lidocaine, Anorectal, (LMX 5) 5 % cream cream Apply  topically to the appropriate area as directed 3 (Three) Times a Day As Needed (hemmorrhoid pain). 45 g 2   • lisinopril (PRINIVIL,ZESTRIL) 5 MG tablet Take 1 tablet by mouth Daily. 30 tablet 6   • metFORMIN (GLUCOPHAGE) 500 MG tablet Take 500 mg by mouth Daily With Breakfast.     • mometasone-formoterol (DULERA 100) 100-5 MCG/ACT inhaler Inhale 2 Puffs/kg Every Night.     • montelukast (SINGULAIR) 10 MG tablet Take 10 mg by mouth Every Night.     • multivitamin (THERAGRAN) tablet tablet Take 1 tablet by mouth Daily. TO HOLD 1 WEEK BEFORE SURGERY     • O2 (OXYGEN) Inhale 2.5 L/min Continuous.     • omeprazole (priLOSEC) 20 MG capsule Take 2 capsules by mouth Daily. 60 capsule 3   • ondansetron (ZOFRAN) 8 MG tablet Take 1 tablet by mouth 3 (Three) Times a Day As Needed for Nausea or  Vomiting. 30 tablet 5   • Potassium 99 MG tablet Take 1 tablet by mouth Every Night.     • pravastatin (PRAVACHOL) 40 MG tablet Take 40 mg by mouth Every Night.     • predniSONE (DELTASONE) 10 MG tablet Take 2 tablets by mouth 2 (Two) Times a Day. 60 tablet 1   • riFAXIMin (XIFAXAN) 550 MG tablet Take one tablet daily x7 days starting on day of Perjeta chemo each cycle 7 tablet 3   • spironolactone (ALDACTONE) 25 MG tablet Take 25 mg by mouth Daily.     • meloxicam (MOBIC) 15 MG tablet Take 15 mg by mouth Daily. PT HOLDING FOR SURGERY       No current facility-administered medications on file prior to visit.        ALLERGIES:    Allergies   Allergen Reactions   • Amlodipine Swelling     LEGS    • Hydrochlorothiazide Unknown - Low Severity     Neuro issues   • Morphine Nausea And Vomiting     hallucinations   • Adhesive Tape Rash        Social History     Socioeconomic History   • Marital status:      Spouse name: Not on file   • Number of children: 3   • Years of education: Not on file   • Highest education level: Not on file   Tobacco Use   • Smoking status: Never Smoker   • Smokeless tobacco: Never Used   Vaping Use   • Vaping Use: Never used   Substance and Sexual Activity   • Alcohol use: Not Currently   • Drug use: Never   • Sexual activity: Defer        Family History   Problem Relation Age of Onset   • Breast cancer Mother 45   • Colon cancer Paternal Grandmother    • Breast cancer Maternal Aunt 70   • Breast cancer Paternal Aunt 70   • Lung cancer Father    • Hodgkin's lymphoma Father    • Skin cancer Father         squamous cell   • Ovarian cancer Neg Hx    • Uterine cancer Neg Hx    • Deep vein thrombosis Neg Hx    • Pulmonary embolism Neg Hx    • Malig Hyperthermia Neg Hx         Review of Systems   Constitutional: Positive for fatigue. Negative for appetite change, chills, diaphoresis, fever and unexpected weight change.   HENT: Negative for hearing loss, sore throat and trouble swallowing.   "  Respiratory: Positive for shortness of breath (Improved with prednisone). Negative for cough, chest tightness and wheezing.    Cardiovascular: Negative for chest pain, palpitations and leg swelling.   Gastrointestinal: Negative for abdominal distention, abdominal pain, constipation, diarrhea (see HPI), nausea, rectal pain (hemmorrhoids -improved) and vomiting.   Genitourinary: Negative for dysuria, frequency, hematuria and urgency.   Musculoskeletal: Negative for joint swelling.        No muscle weakness.   Skin: Negative for rash and wound.   Neurological: Negative for seizures, syncope, speech difficulty, weakness, numbness and headaches.   Hematological: Negative for adenopathy. Does not bruise/bleed easily.   Psychiatric/Behavioral: Negative.  Negative for behavioral problems, confusion and suicidal ideas.   All other systems reviewed and are negative.         Objective     Vitals:    04/14/21 1153   BP: 152/73   Pulse: 78   Resp: 24   Temp: 97.3 °F (36.3 °C)   TempSrc: Temporal   SpO2: 92%   Weight: (!) 157 kg (345 lb 1.6 oz)   Height: 165.1 cm (65\")   PainSc: 0-No pain     Current Status 4/14/2021   ECOG score 1       Physical Exam  Chest:             CONSTITUTIONAL:  Vital signs reviewed.  No distress, looks comfortable. Morbidly obese  EYES:  Conjunctiva and lids unremarkable.  PERRLA  EARS,NOSE,MOUTH,THROAT:  Ears and nose appear unremarkable.  Lips, teeth, gums appear unremarkable.  RESPIRATORY:  Normal respiratory effort.  Lungs clear to auscultation bilaterally.  No axillary adenopathy  BREASTS: Both breasts are pendulous; the right breast is no longer erythemic and much softer now to palpation.  Minimal  peau de orange appearance around the nipple.    CARDIOVASCULAR:  Normal S1, S2.  No murmurs rubs or gallops.  1+ brawny lower extremity edema.  GASTROINTESTINAL: Abdomen appears unremarkable.  Nontender.  No hepatomegaly.  No splenomegaly.  LYMPHATIC:  No cervical, supraclavicular, axillary " lymphadenopathy.  SKIN:  Warm.  Inclusion cyst left axilla  PSYCHIATRIC:  Normal judgment and insight.  Normal mood and affect.  I have reexamined the patient and the results are consistent with the previously documented exam. Jaime Azul MD       RECENT LABS:  Results from last 7 days   Lab Units 04/14/21  1133   WBC 10*3/mm3 13.37*   NEUTROS ABS 10*3/mm3 10.71*   HEMOGLOBIN g/dL 10.2*   HEMATOCRIT % 33.2*   PLATELETS 10*3/mm3 370     Results from last 7 days   Lab Units 04/14/21  1133   SODIUM mmol/L 140   POTASSIUM mmol/L 4.2   CHLORIDE mmol/L 102   CO2 mmol/L 29.7*   BUN mg/dL 13   CREATININE mg/dL 0.68   CALCIUM mg/dL 9.6   ALBUMIN g/dL 3.50   BILIRUBIN mg/dL 0.4   ALK PHOS U/L 92   ALT (SGPT) U/L 25   AST (SGOT) U/L 14   GLUCOSE mg/dL 135*             PET  IMPRESSION:  1.  Moderate to intensely FDG avid asymmetric soft tissue and skin  thickening involving the right breast likely representing patient's  known malignancy.  2.  Intensely FDG avid right axillary and subpectoral adenopathy likely  represent metastatic disease.  3.  Constellation of findings within the right adrenal gland are favored  to represent a lipid rich adenoma. Continued attention on follow-up is  recommended to ensure stability.  4.  While there are no findings of definite FDG avid osseous metastasis,  given the heterogenous FDG uptake throughout the axial and appendicular  skeleton due to the above stated limitations, subtle underlying osseous  metastasis would remain occult. Therefore, continued close attention on  follow-up is recommended to exclude this possibility.  5.  Short segment of moderate to intense FDG uptake within the distal  esophagus and GE junction suggestive of esophagitis. In the appropriate  clinical context correlation with patient history is recommended with  follow-up endoscopy if clinically indicated.  6.  Sub-6 mm pulmonary nodule within the right lower lobe is below PET  resolution and indeterminate.  Continued close attention on follow-up  with chest CT in 3 months is recommended to exclude metastatic disease.  7.  Other findings as above.     This report was finalized on 2/2/2021     Assessment/Plan   1. [id number] right breast cancer ER/CA HER-2 -3+ positive inflammatory breast cancer for neoadjuvant chemotherapy  · Staging work-up negative except for 6 mm lung nodule and axillary and subpectoral adenopathy  · THP followed by AC planned if she tolerates it  · C1D8 Taxol missed due to inclement weather.  · Taxol discontinued after 7 doses due to probable Taxol pneumonitis treated with steroids    2.  Morbid obesity    3.  History of diastolic heart failure  · Echocardiogram with ejection fraction of 64% normal strain  · Cleared by cardio-oncology for chemotherapy    4.  Pulmonary disease?  Etiology on oxygen for 4 years?  Kathiian    5.  Strong family history of breast cancer genetic testing 84 genes negative    6.  Probable benign adrenal adenoma-PET negative    7.  Questionable enlarged lymph nodes left iliac chain and mediastinum likely reactive-PET negative    8.  6 mm right lower lobe nodule below PET resolution pretreatment needs follow-up after THP    9.  Abnormal uptake short segment esophagus with a history of Schatzki's ring-we will double PPI and watch closely but we we will proceed with chemotherapy at this point and refer back to GI-doubt she has metastatic disease to this area    10. Anemia with microcytic indices  · Iron studies performed 2/10/2021.  · 2/24/2021: reviewed with the patient that she is iron deficient, with ferritin of 16, iron saturation of 4%.  Patient reports taking ferrous gluconate in the past but this caused GI upset.  Also with her chronic diarrhea I do not think she can absorb it.  We will pursue IV iron with plans to initiate this next week pending insurance approval. In addition hemoglobin down to 8.0 and transfusion pursued.  · 3/3/2021: IV Injectafer initiated x2.   Hemoglobin improved to 9.7.    11. Hemorrhoidal pain secondary to diarrhea. Does have occasional bleeding. Topical lidocaine prescribed. Monitor.    12.  Small areas of excoriation on bilateral forearms,?  Related to Taxol,?  Mild drug rash.  Resolved  On  13.  History of chronic diarrhea, ?IBS, now exacerbated with Perjeta therapy.  · Patient prescribed rifaximin 550 mg to take twice daily x7 days with each Perjeta dose.  States she picked this up late and only took about 4 days of therapy.  · Mild prior to see her first year patient did find a old prescription for Bentyl from 2017 and began taking this noted improvement in her diarrhea and abdominal cramping.   14.  Worsening shortness of breath with CT evidence of groundglass infiltrates bilaterally suspicious for Taxol-induced pneumonitis.   ·  Patient improving on prednisone 20 twice daily-all taxane discontinued Perjeta Herceptin alone today and then plan to switch to Adriamycin Cytoxan    Plan  1. Proceed with cycle 4 day 1 Perjeta Herceptin.    2. No further taxanes  3. Prednisone 20 twice daily for a week and then decrease to 20 mg daily and taper over 4 to 6 weeks  4. Return in 1/2 week for cycle 4, day 8/15 /Herceptin.  5. Patient will otherwise return in 3 weeks for follow-up with Dr. Azul and first dose of Adriamycin Cytoxan with Neulasta support   6. echo in 2 weeks  7.  Review chest CT to see if the right lower lobe nodule has resolved or is persistent  8.  Follow-up with pulmonologist in a town    This patient is on drug therapy requiring intensive monitoring for toxicity.  We did more than just assess side effects of treatment today.

## 2021-04-09 NOTE — TELEPHONE ENCOUNTER
Called and spoke with pt re: her respiratory issues. When she was here earlier this week, upon arrival her pulse ox was in the 70%'s and she reports having an increase in her O2 needs at home. Per Dr. Azul, orders placed for referral to Dr. Nitesh Freitas and a fine cut chest CT that can be done at [facility] since pt lives closer to that facility. Also routed prescription for prednisone 20 mg bid and explained to pt rationale for this to treat what may be taxol pneumonitis. She v/u. Instructed her that with this steroid we would want her to weigh herself daily as diuretic may need to be adjusted. States she still has not gotten a new scale but she will do her best to get one today or tomorrow. Her daughter has been assisting with needs lately and it sounds like she would be able to help with this. Instructed pt that if her SOA or hypoxia (she now has a home oximeter) worsens she would need to proceed to the ER. She v/u.

## 2021-04-12 ENCOUNTER — TELEPHONE (OUTPATIENT)
Dept: GENERAL RADIOLOGY | Facility: HOSPITAL | Age: 66
End: 2021-04-12

## 2021-04-12 NOTE — TELEPHONE ENCOUNTER
PT HAS BEEN SCHEDULED AT [facility] FOR HER CT CHEST AT 3:15 PM ON Tuesday AS PT IS HAVING HER TX ON Wednesday - PT IS AWARE WHERE SHE IS GOING TO GET HER CT OF THE CHEST COMPLETED

## 2021-04-12 NOTE — TELEPHONE ENCOUNTER
----- Message from Andie Cronin RN sent at 4/9/2021  8:12 AM EDT -----  Regarding: CT  Also needs fine cut chest CT. She lives in [place] so please schedule at [facility] and notify pt.

## 2021-04-13 ENCOUNTER — HOSPITAL ENCOUNTER (OUTPATIENT)
Dept: CT IMAGING | Facility: HOSPITAL | Age: 66
Discharge: HOME | End: 2021-04-13
Attending: INTERNAL MEDICINE

## 2021-04-13 ENCOUNTER — TRANSCRIBE ORDERS (OUTPATIENT)
Dept: SURGERY | Facility: CLINIC | Age: 66
End: 2021-04-13

## 2021-04-13 ENCOUNTER — TELEPHONE (OUTPATIENT)
Dept: SURGERY | Facility: CLINIC | Age: 66
End: 2021-04-13

## 2021-04-13 DIAGNOSIS — C50.911 INFLAMMATORY BREAST CANCER, RIGHT (HCC): Primary | ICD-10-CM

## 2021-04-13 LAB
CREAT BLD-MCNC: 0.8 MG/DL (ref 0.6–1.4)
GFR SERPLBLD BASED ON 1.73 SQ M-ARVRAT: >60 ML/MIN/{1.73_M2}

## 2021-04-13 NOTE — TELEPHONE ENCOUNTER
OT appointment is scheduled on 5/4/2021 @ 11:15am.    Called and left message with patient about appointment time, patient to call back and confirm.    Sent patient a reminder letter in the mail.

## 2021-04-14 ENCOUNTER — INFUSION (OUTPATIENT)
Dept: ONCOLOGY | Facility: HOSPITAL | Age: 66
End: 2021-04-14

## 2021-04-14 ENCOUNTER — OFFICE VISIT (OUTPATIENT)
Dept: ONCOLOGY | Facility: CLINIC | Age: 66
End: 2021-04-14

## 2021-04-14 VITALS
HEIGHT: 65 IN | SYSTOLIC BLOOD PRESSURE: 152 MMHG | RESPIRATION RATE: 24 BRPM | DIASTOLIC BLOOD PRESSURE: 73 MMHG | WEIGHT: 293 LBS | BODY MASS INDEX: 48.82 KG/M2 | OXYGEN SATURATION: 92 % | HEART RATE: 78 BPM | TEMPERATURE: 97.3 F

## 2021-04-14 DIAGNOSIS — C50.611 MALIGNANT NEOPLASM OF AXILLARY TAIL OF RIGHT FEMALE BREAST, UNSPECIFIED ESTROGEN RECEPTOR STATUS (HCC): Primary | ICD-10-CM

## 2021-04-14 DIAGNOSIS — C50.911 INFLAMMATORY BREAST CANCER, RIGHT (HCC): Primary | ICD-10-CM

## 2021-04-14 DIAGNOSIS — C50.611 MALIGNANT NEOPLASM OF AXILLARY TAIL OF RIGHT FEMALE BREAST, UNSPECIFIED ESTROGEN RECEPTOR STATUS (HCC): ICD-10-CM

## 2021-04-14 DIAGNOSIS — Z79.899 ENCOUNTER FOR EYE EXAM DUE TO HIGH RISK MEDICATION: ICD-10-CM

## 2021-04-14 DIAGNOSIS — E66.2 OBESITY HYPOVENTILATION SYNDROME (HCC): ICD-10-CM

## 2021-04-14 DIAGNOSIS — Z79.899 ENCOUNTER FOR LONG-TERM (CURRENT) USE OF HIGH-RISK MEDICATION: ICD-10-CM

## 2021-04-14 LAB
ALBUMIN SERPL-MCNC: 3.5 G/DL (ref 3.5–5.2)
ALBUMIN/GLOB SERPL: 1.2 G/DL (ref 1.1–2.4)
ALP SERPL-CCNC: 92 U/L (ref 38–116)
ALT SERPL W P-5'-P-CCNC: 25 U/L (ref 0–33)
ANION GAP SERPL CALCULATED.3IONS-SCNC: 8.3 MMOL/L (ref 5–15)
AST SERPL-CCNC: 14 U/L (ref 0–32)
BASOPHILS # BLD AUTO: 0.04 10*3/MM3 (ref 0–0.2)
BASOPHILS NFR BLD AUTO: 0.3 % (ref 0–1.5)
BILIRUB SERPL-MCNC: 0.4 MG/DL (ref 0.2–1.2)
BUN SERPL-MCNC: 13 MG/DL (ref 6–20)
BUN/CREAT SERPL: 19.1 (ref 7.3–30)
CALCIUM SPEC-SCNC: 9.6 MG/DL (ref 8.5–10.2)
CHLORIDE SERPL-SCNC: 102 MMOL/L (ref 98–107)
CO2 SERPL-SCNC: 29.7 MMOL/L (ref 22–29)
CREAT SERPL-MCNC: 0.68 MG/DL (ref 0.6–1.1)
DEPRECATED RDW RBC AUTO: 77 FL (ref 37–54)
EOSINOPHIL # BLD AUTO: 0.03 10*3/MM3 (ref 0–0.4)
EOSINOPHIL NFR BLD AUTO: 0.2 % (ref 0.3–6.2)
ERYTHROCYTE [DISTWIDTH] IN BLOOD BY AUTOMATED COUNT: 23.5 % (ref 12.3–15.4)
GFR SERPL CREATININE-BSD FRML MDRD: 86 ML/MIN/1.73
GLOBULIN UR ELPH-MCNC: 2.9 GM/DL (ref 1.8–3.5)
GLUCOSE SERPL-MCNC: 135 MG/DL (ref 74–124)
HCT VFR BLD AUTO: 33.2 % (ref 34–46.6)
HGB BLD-MCNC: 10.2 G/DL (ref 12–15.9)
IMM GRANULOCYTES # BLD AUTO: 0.63 10*3/MM3 (ref 0–0.05)
IMM GRANULOCYTES NFR BLD AUTO: 4.7 % (ref 0–0.5)
LYMPHOCYTES # BLD AUTO: 0.93 10*3/MM3 (ref 0.7–3.1)
LYMPHOCYTES NFR BLD AUTO: 7 % (ref 19.6–45.3)
MCH RBC QN AUTO: 27.9 PG (ref 26.6–33)
MCHC RBC AUTO-ENTMCNC: 30.7 G/DL (ref 31.5–35.7)
MCV RBC AUTO: 90.7 FL (ref 79–97)
MONOCYTES # BLD AUTO: 1.03 10*3/MM3 (ref 0.1–0.9)
MONOCYTES NFR BLD AUTO: 7.7 % (ref 5–12)
NEUTROPHILS NFR BLD AUTO: 10.71 10*3/MM3 (ref 1.7–7)
NEUTROPHILS NFR BLD AUTO: 80.1 % (ref 42.7–76)
NRBC BLD AUTO-RTO: 0.2 /100 WBC (ref 0–0.2)
PLATELET # BLD AUTO: 370 10*3/MM3 (ref 140–450)
PMV BLD AUTO: 9.2 FL (ref 6–12)
POTASSIUM SERPL-SCNC: 4.2 MMOL/L (ref 3.5–4.7)
PROT SERPL-MCNC: 6.4 G/DL (ref 6.3–8)
RBC # BLD AUTO: 3.66 10*6/MM3 (ref 3.77–5.28)
SODIUM SERPL-SCNC: 140 MMOL/L (ref 134–145)
WBC # BLD AUTO: 13.37 10*3/MM3 (ref 3.4–10.8)

## 2021-04-14 PROCEDURE — 25010000002 HEPARIN LOCK FLUSH PER 10 UNITS: Performed by: INTERNAL MEDICINE

## 2021-04-14 PROCEDURE — 85025 COMPLETE CBC W/AUTO DIFF WBC: CPT

## 2021-04-14 PROCEDURE — 25010000002 DIPHENHYDRAMINE PER 50 MG: Performed by: INTERNAL MEDICINE

## 2021-04-14 PROCEDURE — 96413 CHEMO IV INFUSION 1 HR: CPT

## 2021-04-14 PROCEDURE — 96375 TX/PRO/DX INJ NEW DRUG ADDON: CPT

## 2021-04-14 PROCEDURE — 96417 CHEMO IV INFUS EACH ADDL SEQ: CPT

## 2021-04-14 PROCEDURE — 25010000002 PERTUZUMAB 420 MG/14ML SOLUTION 420 MG VIAL: Performed by: INTERNAL MEDICINE

## 2021-04-14 PROCEDURE — 25010000002 DEXAMETHASONE SODIUM PHOSPHATE 100 MG/10ML SOLUTION: Performed by: INTERNAL MEDICINE

## 2021-04-14 PROCEDURE — 80053 COMPREHEN METABOLIC PANEL: CPT

## 2021-04-14 PROCEDURE — 99215 OFFICE O/P EST HI 40 MIN: CPT | Performed by: INTERNAL MEDICINE

## 2021-04-14 PROCEDURE — 25010000002 TRASTUZUMAB PER 10 MG: Performed by: INTERNAL MEDICINE

## 2021-04-14 RX ORDER — SODIUM CHLORIDE 0.9 % (FLUSH) 0.9 %
10 SYRINGE (ML) INJECTION AS NEEDED
Status: CANCELLED | OUTPATIENT
Start: 2021-04-14

## 2021-04-14 RX ORDER — SODIUM CHLORIDE 9 MG/ML
250 INJECTION, SOLUTION INTRAVENOUS ONCE
Status: CANCELLED | OUTPATIENT
Start: 2021-04-28

## 2021-04-14 RX ORDER — FAMOTIDINE 10 MG/ML
20 INJECTION, SOLUTION INTRAVENOUS ONCE
Status: CANCELLED | OUTPATIENT
Start: 2021-04-21

## 2021-04-14 RX ORDER — FAMOTIDINE 10 MG/ML
20 INJECTION, SOLUTION INTRAVENOUS ONCE
Status: COMPLETED | OUTPATIENT
Start: 2021-04-14 | End: 2021-04-14

## 2021-04-14 RX ORDER — DIPHENHYDRAMINE HYDROCHLORIDE 50 MG/ML
50 INJECTION INTRAMUSCULAR; INTRAVENOUS AS NEEDED
Status: CANCELLED | OUTPATIENT
Start: 2021-04-21

## 2021-04-14 RX ORDER — HEPARIN SODIUM (PORCINE) LOCK FLUSH IV SOLN 100 UNIT/ML 100 UNIT/ML
500 SOLUTION INTRAVENOUS AS NEEDED
Status: DISCONTINUED | OUTPATIENT
Start: 2021-04-14 | End: 2021-04-14 | Stop reason: HOSPADM

## 2021-04-14 RX ORDER — FAMOTIDINE 10 MG/ML
20 INJECTION, SOLUTION INTRAVENOUS ONCE
Status: CANCELLED | OUTPATIENT
Start: 2021-04-28

## 2021-04-14 RX ORDER — DIPHENHYDRAMINE HYDROCHLORIDE 50 MG/ML
50 INJECTION INTRAMUSCULAR; INTRAVENOUS AS NEEDED
Status: CANCELLED | OUTPATIENT
Start: 2021-04-28

## 2021-04-14 RX ORDER — FAMOTIDINE 10 MG/ML
20 INJECTION, SOLUTION INTRAVENOUS AS NEEDED
Status: CANCELLED | OUTPATIENT
Start: 2021-04-14

## 2021-04-14 RX ORDER — FAMOTIDINE 10 MG/ML
20 INJECTION, SOLUTION INTRAVENOUS AS NEEDED
Status: CANCELLED | OUTPATIENT
Start: 2021-04-28

## 2021-04-14 RX ORDER — SODIUM CHLORIDE 9 MG/ML
250 INJECTION, SOLUTION INTRAVENOUS ONCE
Status: COMPLETED | OUTPATIENT
Start: 2021-04-14 | End: 2021-04-14

## 2021-04-14 RX ORDER — FAMOTIDINE 10 MG/ML
20 INJECTION, SOLUTION INTRAVENOUS AS NEEDED
Status: CANCELLED | OUTPATIENT
Start: 2021-04-21

## 2021-04-14 RX ORDER — SODIUM CHLORIDE 0.9 % (FLUSH) 0.9 %
10 SYRINGE (ML) INJECTION AS NEEDED
Status: DISCONTINUED | OUTPATIENT
Start: 2021-04-14 | End: 2021-04-14 | Stop reason: HOSPADM

## 2021-04-14 RX ORDER — SODIUM CHLORIDE 9 MG/ML
250 INJECTION, SOLUTION INTRAVENOUS ONCE
Status: CANCELLED | OUTPATIENT
Start: 2021-04-21

## 2021-04-14 RX ORDER — DIPHENHYDRAMINE HYDROCHLORIDE 50 MG/ML
50 INJECTION INTRAMUSCULAR; INTRAVENOUS AS NEEDED
Status: CANCELLED | OUTPATIENT
Start: 2021-04-14

## 2021-04-14 RX ORDER — SODIUM CHLORIDE 9 MG/ML
250 INJECTION, SOLUTION INTRAVENOUS ONCE
Status: CANCELLED | OUTPATIENT
Start: 2021-04-14

## 2021-04-14 RX ORDER — FAMOTIDINE 10 MG/ML
20 INJECTION, SOLUTION INTRAVENOUS ONCE
Status: CANCELLED | OUTPATIENT
Start: 2021-04-14

## 2021-04-14 RX ORDER — HEPARIN SODIUM (PORCINE) LOCK FLUSH IV SOLN 100 UNIT/ML 100 UNIT/ML
500 SOLUTION INTRAVENOUS AS NEEDED
Status: CANCELLED | OUTPATIENT
Start: 2021-04-14

## 2021-04-14 RX ADMIN — SODIUM CHLORIDE, PRESERVATIVE FREE 10 ML: 5 INJECTION INTRAVENOUS at 14:59

## 2021-04-14 RX ADMIN — DEXAMETHASONE SODIUM PHOSPHATE 12 MG: 10 INJECTION, SOLUTION INTRAMUSCULAR; INTRAVENOUS at 13:23

## 2021-04-14 RX ADMIN — PERTUZUMAB 420 MG: 30 INJECTION, SOLUTION, CONCENTRATE INTRAVENOUS at 14:26

## 2021-04-14 RX ADMIN — SODIUM CHLORIDE 250 ML: 9 INJECTION, SOLUTION INTRAVENOUS at 13:03

## 2021-04-14 RX ADMIN — DIPHENHYDRAMINE HYDROCHLORIDE 25 MG: 50 INJECTION, SOLUTION INTRAMUSCULAR; INTRAVENOUS at 13:05

## 2021-04-14 RX ADMIN — FAMOTIDINE 20 MG: 10 INJECTION INTRAVENOUS at 13:03

## 2021-04-14 RX ADMIN — TRASTUZUMAB 320 MG: 150 INJECTION, POWDER, LYOPHILIZED, FOR SOLUTION INTRAVENOUS at 13:52

## 2021-04-14 RX ADMIN — Medication 500 UNITS: at 14:59

## 2021-04-21 ENCOUNTER — INFUSION (OUTPATIENT)
Dept: ONCOLOGY | Facility: HOSPITAL | Age: 66
End: 2021-04-21

## 2021-04-21 ENCOUNTER — OFFICE VISIT (OUTPATIENT)
Dept: ONCOLOGY | Facility: CLINIC | Age: 66
End: 2021-04-21

## 2021-04-21 VITALS
WEIGHT: 293 LBS | DIASTOLIC BLOOD PRESSURE: 89 MMHG | HEIGHT: 65 IN | HEART RATE: 78 BPM | RESPIRATION RATE: 18 BRPM | TEMPERATURE: 96.9 F | BODY MASS INDEX: 48.82 KG/M2 | SYSTOLIC BLOOD PRESSURE: 144 MMHG | OXYGEN SATURATION: 93 %

## 2021-04-21 DIAGNOSIS — C50.611 MALIGNANT NEOPLASM OF AXILLARY TAIL OF RIGHT FEMALE BREAST, UNSPECIFIED ESTROGEN RECEPTOR STATUS (HCC): ICD-10-CM

## 2021-04-21 DIAGNOSIS — T50.905A DRUG-INDUCED PNEUMONITIS: ICD-10-CM

## 2021-04-21 DIAGNOSIS — J18.9 DRUG-INDUCED PNEUMONITIS: ICD-10-CM

## 2021-04-21 DIAGNOSIS — Z79.899 ENCOUNTER FOR EYE EXAM DUE TO HIGH RISK MEDICATION: ICD-10-CM

## 2021-04-21 DIAGNOSIS — C50.911 INFLAMMATORY BREAST CANCER, RIGHT (HCC): Primary | ICD-10-CM

## 2021-04-21 DIAGNOSIS — C50.611 MALIGNANT NEOPLASM OF AXILLARY TAIL OF RIGHT FEMALE BREAST, UNSPECIFIED ESTROGEN RECEPTOR STATUS (HCC): Primary | ICD-10-CM

## 2021-04-21 DIAGNOSIS — Z79.899 ENCOUNTER FOR LONG-TERM (CURRENT) USE OF HIGH-RISK MEDICATION: ICD-10-CM

## 2021-04-21 LAB
BASOPHILS # BLD AUTO: 0.05 10*3/MM3 (ref 0–0.2)
BASOPHILS NFR BLD AUTO: 0.3 % (ref 0–1.5)
DEPRECATED RDW RBC AUTO: 76.8 FL (ref 37–54)
EOSINOPHIL # BLD AUTO: 0.1 10*3/MM3 (ref 0–0.4)
EOSINOPHIL NFR BLD AUTO: 0.5 % (ref 0.3–6.2)
ERYTHROCYTE [DISTWIDTH] IN BLOOD BY AUTOMATED COUNT: 23.4 % (ref 12.3–15.4)
HCT VFR BLD AUTO: 37.2 % (ref 34–46.6)
HGB BLD-MCNC: 11.1 G/DL (ref 12–15.9)
IMM GRANULOCYTES # BLD AUTO: 0.55 10*3/MM3 (ref 0–0.05)
IMM GRANULOCYTES NFR BLD AUTO: 2.8 % (ref 0–0.5)
LYMPHOCYTES # BLD AUTO: 1.87 10*3/MM3 (ref 0.7–3.1)
LYMPHOCYTES NFR BLD AUTO: 9.5 % (ref 19.6–45.3)
MCH RBC QN AUTO: 27.2 PG (ref 26.6–33)
MCHC RBC AUTO-ENTMCNC: 29.8 G/DL (ref 31.5–35.7)
MCV RBC AUTO: 91.2 FL (ref 79–97)
MONOCYTES # BLD AUTO: 1.46 10*3/MM3 (ref 0.1–0.9)
MONOCYTES NFR BLD AUTO: 7.4 % (ref 5–12)
NEUTROPHILS NFR BLD AUTO: 15.74 10*3/MM3 (ref 1.7–7)
NEUTROPHILS NFR BLD AUTO: 79.5 % (ref 42.7–76)
NRBC BLD AUTO-RTO: 0 /100 WBC (ref 0–0.2)
PLATELET # BLD AUTO: 350 10*3/MM3 (ref 140–450)
PMV BLD AUTO: 9.2 FL (ref 6–12)
RBC # BLD AUTO: 4.08 10*6/MM3 (ref 3.77–5.28)
WBC # BLD AUTO: 19.77 10*3/MM3 (ref 3.4–10.8)

## 2021-04-21 PROCEDURE — 99214 OFFICE O/P EST MOD 30 MIN: CPT | Performed by: NURSE PRACTITIONER

## 2021-04-21 PROCEDURE — 25010000002 HEPARIN LOCK FLUSH PER 10 UNITS: Performed by: INTERNAL MEDICINE

## 2021-04-21 PROCEDURE — 96413 CHEMO IV INFUSION 1 HR: CPT

## 2021-04-21 PROCEDURE — 85025 COMPLETE CBC W/AUTO DIFF WBC: CPT

## 2021-04-21 PROCEDURE — 63710000001 DIPHENHYDRAMINE PER 50 MG: Performed by: NURSE PRACTITIONER

## 2021-04-21 PROCEDURE — 25010000002 TRASTUZUMAB PER 10 MG: Performed by: INTERNAL MEDICINE

## 2021-04-21 RX ORDER — SODIUM CHLORIDE 9 MG/ML
250 INJECTION, SOLUTION INTRAVENOUS ONCE
Status: COMPLETED | OUTPATIENT
Start: 2021-04-21 | End: 2021-04-21

## 2021-04-21 RX ORDER — HEPARIN SODIUM (PORCINE) LOCK FLUSH IV SOLN 100 UNIT/ML 100 UNIT/ML
500 SOLUTION INTRAVENOUS AS NEEDED
Status: CANCELLED | OUTPATIENT
Start: 2021-04-21

## 2021-04-21 RX ORDER — DIPHENHYDRAMINE HCL 25 MG
25 CAPSULE ORAL ONCE
Status: COMPLETED | OUTPATIENT
Start: 2021-04-21 | End: 2021-04-21

## 2021-04-21 RX ORDER — HEPARIN SODIUM (PORCINE) LOCK FLUSH IV SOLN 100 UNIT/ML 100 UNIT/ML
500 SOLUTION INTRAVENOUS AS NEEDED
Status: DISCONTINUED | OUTPATIENT
Start: 2021-04-21 | End: 2021-04-21 | Stop reason: HOSPADM

## 2021-04-21 RX ORDER — DIPHENHYDRAMINE HCL 25 MG
25 CAPSULE ORAL ONCE
Status: CANCELLED | OUTPATIENT
Start: 2021-04-21

## 2021-04-21 RX ORDER — SODIUM CHLORIDE 0.9 % (FLUSH) 0.9 %
10 SYRINGE (ML) INJECTION AS NEEDED
Status: CANCELLED | OUTPATIENT
Start: 2021-04-21

## 2021-04-21 RX ADMIN — SODIUM CHLORIDE 250 ML: 9 INJECTION, SOLUTION INTRAVENOUS at 14:13

## 2021-04-21 RX ADMIN — Medication 500 UNITS: at 15:30

## 2021-04-21 RX ADMIN — DIPHENHYDRAMINE HYDROCHLORIDE 25 MG: 25 CAPSULE ORAL at 14:13

## 2021-04-21 RX ADMIN — TRASTUZUMAB 320 MG: 150 INJECTION, POWDER, LYOPHILIZED, FOR SOLUTION INTRAVENOUS at 14:46

## 2021-04-21 NOTE — PROGRESS NOTES
Subjective     REASON FOR FOLLOW-UP: Right breast inflammatory breast, triple positive                              REQUESTING PHYSICIAN: MD Francisco Esteban MD Bethany Haynes, MD    History of Present Illness patient is a 66 y.o. 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    Patient had worsening shortness of breath requiring continuous oxygen.  CT of the chest performed to rule out pneumonitis and she was started on steroids.  CT showed bilateral groundglass infiltrates presumed to be related to Taxol pneumonitis and therefore Taxol discontinued.  Patient is therefore continuing now on Perjeta/Herceptin alone.    She is taking rifaximin to help with diarrhea in relation to Perjeta, taken the week of Perjeta specifically.    Because of Taxol pneumonitis patient was started on prednisone 20 mg twice daily.  As she is reviewed back today she notes her breathing is improved and she is very encouraged by this.  She does have a leukocytosis noted felt to be reactive from the steroids.  She will taper the prednisone down to 20 mg in the morning 10 mg in the evening beginning today.    Patient understands upcoming plan to switch to Adriamycin/Cytoxan to begin in 2 weeks.  We briefly reviewed differences in this treatment plan.  Discussed traditionally she would take oral dexamethasone for 3 days after however with her currently being on prednisone we will delay this.  She already has ondansetron to use as needed though thankfully she is not currently having any trouble with nausea.    Patient has noted overall improvement in her breast swelling and redness with improved softening as well.  She is very encouraged by this.  No neuropathy symptoms.  She denies other concerns today.    Past  Medical History:   Diagnosis Date   • Anemia    • Asthma    • Chronic diastolic (congestive) heart failure (CMS/HCC)    • Chronic hypoxemic respiratory failure (CMS/HCC)     on continuous O2   • H/O Intraductal papilloma    • Hemorrhoids    • History of transfusion     AFTER BACK SURGERY   • Hypertension    • Inflammatory breast cancer (CMS/HCC)     right   • Kidney stone    • Morbid obesity with BMI of 50.0-59.9, adult (CMS/HCC)    • Obesity hypoventilation syndrome (CMS/HCC) 2021   • On home oxygen therapy     2.5 AT REST WITH ACTIVITY UP TO 4L   • ANABELLE on CPAP    • Osteoarthritis    • Type 2 diabetes mellitus (CMS/HCC)         Past Surgical History:   Procedure Laterality Date   • BREAST EXCISIONAL BIOPSY Bilateral    • COLONOSCOPY     • CYSTOSCOPY BLADDER STONE LITHOTRIPSY     • KNEE ARTHROPLASTY Bilateral    • SPINE SURGERY     • TOTAL ABDOMINAL HYSTERECTOMY WITH SALPINGO OOPHORECTOMY     • VENOUS ACCESS DEVICE (PORT) INSERTION N/A 2021    Procedure: INSERTION VENOUS ACCESS DEVICE;  Surgeon: Nicci Banks MD;  Location: [facility];  Service: General;  Laterality: N/A;      ONC HISTORY  patient is a 65-year-old white female with morbid obesity, history of diastolic congestive heart failure and unknown type of pulmonary disease for which she has been on oxygen for 4 years.    She has been getting routine mammography since 38 years of age because her mother  at 46 of breast cancer and had a biopsy of her left breast in  which was apparently benign and another biopsy in 2018 on her right breast which showed a small focus of atypical ductal hyperplasia and atypical lobular hyperplasia with a residual intraductal papilloma.  At that time she saw medical oncologist who recommended genetic testing and prevention but the insurance would not cover the genetic testing and the medical oncologist thought tamoxifen was too risky for her because of her comorbidities and no treatment  was given.  More recently she noticed redness of the right breast in October and showed it to her family doctor who gave her course of Keflex when it did not improve and mammogram was ordered and this was benign  When the redness persisted she saw her gynecologist with concern for inflammatory breast cancer and referred her to Dr. Banks after repeat imaging and [facility]  Preliminary report shows micropapillary invasive mammary carcinoma intermediate grade measuring 13 mm right axillary node was also involved with metastatic cancer ER/FL and HER-2 are pending  Patient saw Dr. Banks who sent her for skin biopsies and she had 3 separate punch biopsy of the skin that showed Perivascular and perifollicular inflammation with no obvious malignancy at 3:00 12:00 and 9:00  In addition staging work-up with CAT scans and bone scan were ordered.  CAT scan of the chest showed a borderline mediastinal  Infracarinal node measuring 15 mm in length mild cardiomegaly and heavy coronary artery calcifications  CT of the abdomen showed a 2.5 x 2.2 cm right adrenal mass which the patient tells me she has had in the past and is most likely benign but also a 2.7 cm left external iliac node which is indeterminate.  Bone scan was negative    Echocardiogram is scheduled for later next week and genetic testing with the [employer] for [employer] stat panel is pending    Patient is  3 para 3 menarche was at age 9 menopause at 49 when she had a hysterectomy and oophorectomy  First childbirth was at age 20 she breast-fed her second and third children and took no hormone replacement after menopause    Family history is positive for mother dying of breast cancer at age 46 she is a maternal aunt with breast cancer in her 70s a paternal aunt with breast cancer in her 70s paternal grandmother with colon cancer.  Her father had Hodgkin's disease and non-Hodgkin's lymphoma but  of  small cell lung cancer at 67      She has not had a heart attack stroke or blood clot    Plan to do echocardiogram soon as possible to ascertain tolerability of cardiotoxic chemotherapy which would be typically indicated with an inflammatory breast cancer    Also plan PET scan to follow-up on atypical lymph nodes and confirm benign etiology of the adrenal lesion    She will have port placement and chemo education pt is  ER/DE and HER-2 +    I explained to Aida that the goal of treatment would be curative but she has a lot of comorbidities which may limit our ability to give the most effective chemotherapy in the setting  I told her radiation would be involved and possibly hormonal therapy for 10 years as she has hormone positivity and HER-2 directed therapy    She expressed some concerns about driving back and forth from [place] but felt that once a week was doable    We will see her back in 2 weeks to start treatment with a port placement planned early next week      Patient did have a mild reaction to Taxol dose #1 with some increased shortness of breath and flushing.  This was responsive to 100 mg of Solu-Cortef.  She states this gave her a headache and made her quite talkative but otherwise she was thankfully able to complete Taxol infusion without further incident.    Patient reports issues with chronic diarrhea over the last few years and did note a little bit increase in stooling following THP though nothing overly significant in her mind.  She did finally begin taking Imodium just a few days ago and has had no further stools.  She does note significant trouble with hemorrhoids in relation to the diarrhea   Required blood transfusion due to significant hemorrhoidal bleeding plus iron deficiency.  Injectafer planned    Due to inclement weather cycle 1 day 8 therapy was missed.  She did get day 15 therapy with fairly good tolerance except for some diarrhea.    She was found to be iron deficient despite  trying oral iron.  We therefore elected to proceed with IV Injectafer but she remains mildly anemic with a hemoglobin of 9.3    Current Outpatient Medications on File Prior to Visit   Medication Sig Dispense Refill   • albuterol sulfate  (90 Base) MCG/ACT inhaler Inhale 2 puffs Every 4 (Four) Hours As Needed.     • carvedilol (COREG) 25 MG tablet Take 12.5 mg by mouth 2 (Two) Times a Day With Meals.     • Cholecalciferol (Vitamin D) 50 MCG (2000 UT) capsule Take 2,000 Units by mouth Daily.     • dicyclomine (BENTYL) 20 MG tablet Take 1 tablet by mouth Every 6 (Six) Hours. 60 tablet 2   • fexofenadine (ALLEGRA) 180 MG tablet Take 180 mg by mouth Daily As Needed.     • FLUoxetine (PROzac) 40 MG capsule Take 40 mg by mouth Daily.     • furosemide (LASIX) 40 MG tablet Take 20 mg by mouth Daily.     • hydrocortisone 2.5 % cream Apply  topically to the appropriate area as directed 2 (Two) Times a Day. 3.5 g 0   • Lidocaine, Anorectal, (LMX 5) 5 % cream cream Apply  topically to the appropriate area as directed 3 (Three) Times a Day As Needed (hemmorrhoid pain). 45 g 2   • lisinopril (PRINIVIL,ZESTRIL) 5 MG tablet Take 1 tablet by mouth Daily. 30 tablet 6   • metFORMIN (GLUCOPHAGE) 500 MG tablet Take 500 mg by mouth Daily With Breakfast.     • mometasone-formoterol (DULERA 100) 100-5 MCG/ACT inhaler Inhale 2 Puffs/kg Every Night.     • montelukast (SINGULAIR) 10 MG tablet Take 10 mg by mouth Every Night.     • multivitamin (THERAGRAN) tablet tablet Take 1 tablet by mouth Daily. TO HOLD 1 WEEK BEFORE SURGERY     • O2 (OXYGEN) Inhale 2.5 L/min Continuous.     • omeprazole (priLOSEC) 20 MG capsule Take 2 capsules by mouth Daily. 60 capsule 3   • ondansetron (ZOFRAN) 8 MG tablet Take 1 tablet by mouth 3 (Three) Times a Day As Needed for Nausea or Vomiting. 30 tablet 5   • Potassium 99 MG tablet Take 1 tablet by mouth Every Night.     • pravastatin (PRAVACHOL) 40 MG tablet Take 40 mg by mouth Every Night.     •  predniSONE (DELTASONE) 10 MG tablet Take 2 tablets by mouth 2 (Two) Times a Day. 60 tablet 1   • riFAXIMin (XIFAXAN) 550 MG tablet Take one tablet daily x7 days starting on day of Perjeta chemo each cycle 7 tablet 3   • spironolactone (ALDACTONE) 25 MG tablet Take 25 mg by mouth Daily.       No current facility-administered medications on file prior to visit.        ALLERGIES:    Allergies   Allergen Reactions   • Amlodipine Swelling     LEGS    • Hydrochlorothiazide Unknown - Low Severity     Neuro issues   • Morphine Nausea And Vomiting     hallucinations   • Adhesive Tape Rash        Social History     Socioeconomic History   • Marital status:      Spouse name: Not on file   • Number of children: 3   • Years of education: Not on file   • Highest education level: Not on file   Tobacco Use   • Smoking status: Never Smoker   • Smokeless tobacco: Never Used   Vaping Use   • Vaping Use: Never used   Substance and Sexual Activity   • Alcohol use: Not Currently   • Drug use: Never   • Sexual activity: Defer        Family History   Problem Relation Age of Onset   • Breast cancer Mother 45   • Colon cancer Paternal Grandmother    • Breast cancer Maternal Aunt 70   • Breast cancer Paternal Aunt 70   • Lung cancer Father    • Hodgkin's lymphoma Father    • Skin cancer Father         squamous cell   • Ovarian cancer Neg Hx    • Uterine cancer Neg Hx    • Deep vein thrombosis Neg Hx    • Pulmonary embolism Neg Hx    • Malig Hyperthermia Neg Hx         Review of Systems   Constitutional: Positive for fatigue. Negative for appetite change, chills, diaphoresis, fever and unexpected weight change.   HENT: Negative for hearing loss, sore throat and trouble swallowing.    Respiratory: Positive for shortness of breath (Improved with prednisone). Negative for cough, chest tightness and wheezing.    Cardiovascular: Negative for chest pain, palpitations and leg swelling.   Gastrointestinal: Negative for abdominal distention,  "abdominal pain, constipation, diarrhea (see HPI), nausea, rectal pain (hemmorrhoids -improved) and vomiting.   Genitourinary: Negative for dysuria, frequency, hematuria and urgency.   Musculoskeletal: Negative for joint swelling.        No muscle weakness.   Skin: Negative for rash and wound.   Neurological: Negative for seizures, syncope, speech difficulty, weakness, numbness and headaches.   Hematological: Negative for adenopathy. Does not bruise/bleed easily.   Psychiatric/Behavioral: Negative.  Negative for behavioral problems, confusion and suicidal ideas.   All other systems reviewed and are negative.         Objective     Vitals:    04/21/21 1259   BP: 144/89   Pulse: 78   Resp: 18   Temp: 96.9 °F (36.1 °C)   TempSrc: Temporal   SpO2: 93%   Weight: (!) 154 kg (340 lb 3.2 oz)   Height: 165.1 cm (65\")   PainSc: 0-No pain     Current Status 4/21/2021   ECOG score 0       Physical Exam  Chest:             CONSTITUTIONAL:  Vital signs reviewed.  No distress, looks comfortable. Morbidly obese  EYES:  Conjunctiva and lids unremarkable.  PERRLA  EARS,NOSE,MOUTH,THROAT:  Ears and nose appear unremarkable.  Lips, teeth, gums appear unremarkable.  RESPIRATORY:  Normal respiratory effort.  Lungs clear to auscultation bilaterally.  No axillary adenopathy  BREASTS: Both breasts are pendulous; the right breast is no longer erythemic and much softer now to palpation.  Minimal  peau de orange appearance around the nipple.    CARDIOVASCULAR:  Normal S1, S2.  No murmurs rubs or gallops.  1+ brawny lower extremity edema.  GASTROINTESTINAL: Abdomen appears unremarkable.  Nontender.  No hepatomegaly.  No splenomegaly.  LYMPHATIC:  No cervical, supraclavicular, axillary lymphadenopathy.  SKIN:  Warm.  Inclusion cyst left axilla  PSYCHIATRIC:  Normal judgment and insight.  Normal mood and affect.    I have reexamined the patient and the results are consistent with the previously documented exam. Ynes Vazquez, APRN "       RECENT LABS:  Results from last 7 days   Lab Units 04/21/21  1244   WBC 10*3/mm3 19.77*   NEUTROS ABS 10*3/mm3 15.74*   HEMOGLOBIN g/dL 11.1*   HEMATOCRIT % 37.2   PLATELETS 10*3/mm3 350                 PET  IMPRESSION:  1.  Moderate to intensely FDG avid asymmetric soft tissue and skin  thickening involving the right breast likely representing patient's  known malignancy.  2.  Intensely FDG avid right axillary and subpectoral adenopathy likely  represent metastatic disease.  3.  Constellation of findings within the right adrenal gland are favored  to represent a lipid rich adenoma. Continued attention on follow-up is  recommended to ensure stability.  4.  While there are no findings of definite FDG avid osseous metastasis,  given the heterogenous FDG uptake throughout the axial and appendicular  skeleton due to the above stated limitations, subtle underlying osseous  metastasis would remain occult. Therefore, continued close attention on  follow-up is recommended to exclude this possibility.  5.  Short segment of moderate to intense FDG uptake within the distal  esophagus and GE junction suggestive of esophagitis. In the appropriate  clinical context correlation with patient history is recommended with  follow-up endoscopy if clinically indicated.  6.  Sub-6 mm pulmonary nodule within the right lower lobe is below PET  resolution and indeterminate. Continued close attention on follow-up  with chest CT in 3 months is recommended to exclude metastatic disease.  7.  Other findings as above.     This report was finalized on 2/2/2021     Assessment/Plan   1. [id number] right breast cancer ER/KY HER-2 -3+ positive inflammatory breast cancer for neoadjuvant chemotherapy  · Staging work-up negative except for 6 mm lung nodule and axillary and subpectoral adenopathy  · THP followed by AC planned if she tolerates it  · C1D8 Taxol missed due to inclement weather.  · Taxol discontinued after 7 doses due to probable Taxol  pneumonitis treated with steroids    2.  Morbid obesity    3.  History of diastolic heart failure  · Echocardiogram with ejection fraction of 64% normal strain  · Cleared by cardio-oncology for chemotherapy    4.  Pulmonary disease?  Etiology on oxygen for 4 years?  Jennifer    5.  Strong family history of breast cancer genetic testing 84 genes negative    6.  Probable benign adrenal adenoma-PET negative    7.  Questionable enlarged lymph nodes left iliac chain and mediastinum likely reactive-PET negative    8.  6 mm right lower lobe nodule below PET resolution pretreatment needs follow-up after THP    9.  Abnormal uptake short segment esophagus with a history of Schatzki's ring-we will double PPI and watch closely but we we will proceed with chemotherapy at this point and refer back to GI-doubt she has metastatic disease to this area    10. Anemia with microcytic indices  · Iron studies performed 2/10/2021.  · 2/24/2021: reviewed with the patient that she is iron deficient, with ferritin of 16, iron saturation of 4%.  Patient reports taking ferrous gluconate in the past but this caused GI upset.  Also with her chronic diarrhea I do not think she can absorb it.  We will pursue IV iron with plans to initiate this next week pending insurance approval. In addition hemoglobin down to 8.0 and transfusion pursued.  · 3/3/2021: IV Injectafer initiated x2.   · Hemoglobin improved to 11.1 currently.      11. Hemorrhoidal pain secondary to diarrhea. Does have occasional bleeding. Topical lidocaine prescribed. Monitor.    12.  History of chronic diarrhea, ?IBS, now exacerbated with Perjeta therapy.  · Patient prescribed rifaximin 550 mg to take twice daily x7 days with each Perjeta dose.  States she picked this up late and only took about 4 days of therapy.  · Mild prior to see first Perjeta.  Utilizing Bentyl.    · Patient now also taking rifaximin the weeks of Perjeta with good control of diarrhea.    13.  Worsening  shortness of breath with CT evidence of groundglass infiltrates bilaterally suspicious for Taxol-induced pneumonitis.   · Patient improving on prednisone 20 twice daily  · Breathing overall improved.  Continues wearing oxygen at all times.  She will slowly taper off prednisone as instructed per Dr. Azul.    Plan  1. Proceed with cycle 4 day 8 Herceptin.    2. No further taxanes  3. Hyper prednisone as already instructed, moving to 20 mg in the morning, 10 mg in the evening today.  Planning very slow taper over 4 to 6 weeks.    4. Return in 1 week for cycle 4-day 15 Herceptin.    5. Patient will undergo repeat echocardiogram next week.  6. Return in 2 weeks for follow-up with Dr. Azul and cycle 1 of Adriamycin/Cytoxan with Neulasta support.  Patient was briefly educated on this today but we will review again when she returns in 2 weeks.  Discussed that she would normally receive oral dexamethasone for nausea prevention with AC therapy, however with her currently on prednisone this will be deferred.  She already has ondansetron to use as needed for nausea.    7. Maintain follow-up with pulmonology.      This patient is on drug therapy requiring intensive monitoring for toxicity.

## 2021-04-28 ENCOUNTER — INFUSION (OUTPATIENT)
Dept: ONCOLOGY | Facility: HOSPITAL | Age: 66
End: 2021-04-28

## 2021-04-28 VITALS
SYSTOLIC BLOOD PRESSURE: 159 MMHG | TEMPERATURE: 96.9 F | DIASTOLIC BLOOD PRESSURE: 90 MMHG | WEIGHT: 293 LBS | BODY MASS INDEX: 56.65 KG/M2 | HEART RATE: 75 BPM | RESPIRATION RATE: 24 BRPM

## 2021-04-28 DIAGNOSIS — C50.611 MALIGNANT NEOPLASM OF AXILLARY TAIL OF RIGHT FEMALE BREAST, UNSPECIFIED ESTROGEN RECEPTOR STATUS (HCC): Primary | ICD-10-CM

## 2021-04-28 DIAGNOSIS — Z79.899 ENCOUNTER FOR EYE EXAM DUE TO HIGH RISK MEDICATION: ICD-10-CM

## 2021-04-28 LAB
BASOPHILS # BLD AUTO: 0.04 10*3/MM3 (ref 0–0.2)
BASOPHILS NFR BLD AUTO: 0.3 % (ref 0–1.5)
DEPRECATED RDW RBC AUTO: 74.1 FL (ref 37–54)
EOSINOPHIL # BLD AUTO: 0.19 10*3/MM3 (ref 0–0.4)
EOSINOPHIL NFR BLD AUTO: 1.4 % (ref 0.3–6.2)
ERYTHROCYTE [DISTWIDTH] IN BLOOD BY AUTOMATED COUNT: 22.4 % (ref 12.3–15.4)
HCT VFR BLD AUTO: 37.2 % (ref 34–46.6)
HGB BLD-MCNC: 11.2 G/DL (ref 12–15.9)
IMM GRANULOCYTES # BLD AUTO: 0.1 10*3/MM3 (ref 0–0.05)
IMM GRANULOCYTES NFR BLD AUTO: 0.7 % (ref 0–0.5)
LYMPHOCYTES # BLD AUTO: 1.82 10*3/MM3 (ref 0.7–3.1)
LYMPHOCYTES NFR BLD AUTO: 12.9 % (ref 19.6–45.3)
MCH RBC QN AUTO: 27.4 PG (ref 26.6–33)
MCHC RBC AUTO-ENTMCNC: 30.1 G/DL (ref 31.5–35.7)
MCV RBC AUTO: 91 FL (ref 79–97)
MONOCYTES # BLD AUTO: 1.04 10*3/MM3 (ref 0.1–0.9)
MONOCYTES NFR BLD AUTO: 7.4 % (ref 5–12)
NEUTROPHILS NFR BLD AUTO: 10.87 10*3/MM3 (ref 1.7–7)
NEUTROPHILS NFR BLD AUTO: 77.3 % (ref 42.7–76)
NRBC BLD AUTO-RTO: 0 /100 WBC (ref 0–0.2)
PLATELET # BLD AUTO: 289 10*3/MM3 (ref 140–450)
PMV BLD AUTO: 9.6 FL (ref 6–12)
RBC # BLD AUTO: 4.09 10*6/MM3 (ref 3.77–5.28)
WBC # BLD AUTO: 14.06 10*3/MM3 (ref 3.4–10.8)

## 2021-04-28 PROCEDURE — 25010000002 TRASTUZUMAB PER 10 MG: Performed by: INTERNAL MEDICINE

## 2021-04-28 PROCEDURE — 63710000001 DIPHENHYDRAMINE PER 50 MG: Performed by: INTERNAL MEDICINE

## 2021-04-28 PROCEDURE — 96413 CHEMO IV INFUSION 1 HR: CPT

## 2021-04-28 PROCEDURE — 85025 COMPLETE CBC W/AUTO DIFF WBC: CPT

## 2021-04-28 PROCEDURE — 25010000002 HEPARIN LOCK FLUSH PER 10 UNITS: Performed by: INTERNAL MEDICINE

## 2021-04-28 RX ORDER — SODIUM CHLORIDE 0.9 % (FLUSH) 0.9 %
10 SYRINGE (ML) INJECTION AS NEEDED
Status: CANCELLED | OUTPATIENT
Start: 2021-04-28

## 2021-04-28 RX ORDER — DIPHENHYDRAMINE HCL 25 MG
25 CAPSULE ORAL ONCE
Status: COMPLETED | OUTPATIENT
Start: 2021-04-28 | End: 2021-04-28

## 2021-04-28 RX ORDER — HEPARIN SODIUM (PORCINE) LOCK FLUSH IV SOLN 100 UNIT/ML 100 UNIT/ML
500 SOLUTION INTRAVENOUS AS NEEDED
Status: DISCONTINUED | OUTPATIENT
Start: 2021-04-28 | End: 2021-04-28 | Stop reason: HOSPADM

## 2021-04-28 RX ORDER — SODIUM CHLORIDE 0.9 % (FLUSH) 0.9 %
10 SYRINGE (ML) INJECTION AS NEEDED
Status: DISCONTINUED | OUTPATIENT
Start: 2021-04-28 | End: 2021-04-28 | Stop reason: HOSPADM

## 2021-04-28 RX ORDER — HEPARIN SODIUM (PORCINE) LOCK FLUSH IV SOLN 100 UNIT/ML 100 UNIT/ML
500 SOLUTION INTRAVENOUS AS NEEDED
Status: CANCELLED | OUTPATIENT
Start: 2021-04-28

## 2021-04-28 RX ORDER — SODIUM CHLORIDE 9 MG/ML
250 INJECTION, SOLUTION INTRAVENOUS ONCE
Status: COMPLETED | OUTPATIENT
Start: 2021-04-28 | End: 2021-04-28

## 2021-04-28 RX ADMIN — SODIUM CHLORIDE, PRESERVATIVE FREE 10 ML: 5 INJECTION INTRAVENOUS at 15:45

## 2021-04-28 RX ADMIN — TRASTUZUMAB 320 MG: 150 INJECTION, POWDER, LYOPHILIZED, FOR SOLUTION INTRAVENOUS at 15:07

## 2021-04-28 RX ADMIN — Medication 500 UNITS: at 15:45

## 2021-04-28 RX ADMIN — DIPHENHYDRAMINE HYDROCHLORIDE 25 MG: 25 CAPSULE ORAL at 14:50

## 2021-04-28 RX ADMIN — SODIUM CHLORIDE 250 ML: 9 INJECTION, SOLUTION INTRAVENOUS at 14:50

## 2021-04-30 ENCOUNTER — HOSPITAL ENCOUNTER (OUTPATIENT)
Dept: CARDIOLOGY | Facility: HOSPITAL | Age: 66
Discharge: HOME | End: 2021-04-30
Admitting: INTERNAL MEDICINE

## 2021-04-30 VITALS
BODY MASS INDEX: 48.82 KG/M2 | DIASTOLIC BLOOD PRESSURE: 88 MMHG | OXYGEN SATURATION: 98 % | WEIGHT: 293 LBS | HEIGHT: 65 IN | HEART RATE: 78 BPM | SYSTOLIC BLOOD PRESSURE: 138 MMHG

## 2021-04-30 DIAGNOSIS — Z79.899 ENCOUNTER FOR LONG-TERM (CURRENT) USE OF HIGH-RISK MEDICATION: ICD-10-CM

## 2021-04-30 DIAGNOSIS — C50.911 INFLAMMATORY BREAST CANCER, RIGHT (HCC): ICD-10-CM

## 2021-04-30 DIAGNOSIS — E66.2 OBESITY HYPOVENTILATION SYNDROME (HCC): ICD-10-CM

## 2021-04-30 DIAGNOSIS — C50.611 MALIGNANT NEOPLASM OF AXILLARY TAIL OF RIGHT FEMALE BREAST, UNSPECIFIED ESTROGEN RECEPTOR STATUS (HCC): ICD-10-CM

## 2021-04-30 PROCEDURE — 93356 MYOCRD STRAIN IMG SPCKL TRCK: CPT | Performed by: INTERNAL MEDICINE

## 2021-04-30 PROCEDURE — 93306 TTE W/DOPPLER COMPLETE: CPT

## 2021-04-30 PROCEDURE — 93306 TTE W/DOPPLER COMPLETE: CPT | Performed by: INTERNAL MEDICINE

## 2021-04-30 PROCEDURE — 93356 MYOCRD STRAIN IMG SPCKL TRCK: CPT

## 2021-04-30 PROCEDURE — 25010000002 PERFLUTREN (DEFINITY) 8.476 MG IN SODIUM CHLORIDE (PF) 0.9 % 10 ML INJECTION: Performed by: INTERNAL MEDICINE

## 2021-04-30 RX ADMIN — PERFLUTREN 1.5 ML: 6.52 INJECTION, SUSPENSION INTRAVENOUS at 09:38

## 2021-05-03 LAB
ASCENDING AORTA: 3.6 CM
BH CV ECHO MEAS - ACS: 2.2 CM
BH CV ECHO MEAS - AO MAX PG (FULL): 10.8 MMHG
BH CV ECHO MEAS - AO MAX PG: 14.9 MMHG
BH CV ECHO MEAS - AO MEAN PG (FULL): 5.3 MMHG
BH CV ECHO MEAS - AO MEAN PG: 7.5 MMHG
BH CV ECHO MEAS - AO ROOT AREA (BSA CORRECTED): 1.2
BH CV ECHO MEAS - AO ROOT AREA: 7.3 CM^2
BH CV ECHO MEAS - AO ROOT DIAM: 3 CM
BH CV ECHO MEAS - AO V2 MAX: 193.1 CM/SEC
BH CV ECHO MEAS - AO V2 MEAN: 128.4 CM/SEC
BH CV ECHO MEAS - AO V2 VTI: 36.3 CM
BH CV ECHO MEAS - ASC AORTA: 3.6 CM
BH CV ECHO MEAS - AVA(I,A): 1.7 CM^2
BH CV ECHO MEAS - AVA(I,D): 1.7 CM^2
BH CV ECHO MEAS - AVA(V,A): 1.6 CM^2
BH CV ECHO MEAS - AVA(V,D): 1.6 CM^2
BH CV ECHO MEAS - BSA(HAYCOCK): 2.8 M^2
BH CV ECHO MEAS - BSA: 2.5 M^2
BH CV ECHO MEAS - BZI_BMI: 56.6 KILOGRAMS/M^2
BH CV ECHO MEAS - BZI_METRIC_HEIGHT: 165.1 CM
BH CV ECHO MEAS - BZI_METRIC_WEIGHT: 154.2 KG
BH CV ECHO MEAS - EDV(MOD-SP2): 217 ML
BH CV ECHO MEAS - EDV(MOD-SP4): 220 ML
BH CV ECHO MEAS - EDV(TEICH): 154.3 ML
BH CV ECHO MEAS - EF(CUBED): 77 %
BH CV ECHO MEAS - EF(MOD-BP): 64 %
BH CV ECHO MEAS - EF(MOD-SP2): 65.9 %
BH CV ECHO MEAS - EF(MOD-SP4): 60.9 %
BH CV ECHO MEAS - EF(TEICH): 68.4 %
BH CV ECHO MEAS - ESV(MOD-SP2): 74 ML
BH CV ECHO MEAS - ESV(MOD-SP4): 86 ML
BH CV ECHO MEAS - ESV(TEICH): 48.8 ML
BH CV ECHO MEAS - FS: 38.7 %
BH CV ECHO MEAS - IVS/LVPW: 1.1
BH CV ECHO MEAS - IVSD: 1.3 CM
BH CV ECHO MEAS - LAT PEAK E' VEL: 7.9 CM/SEC
BH CV ECHO MEAS - LV DIASTOLIC VOL/BSA (35-75): 88.8 ML/M^2
BH CV ECHO MEAS - LV MASS(C)D: 294.4 GRAMS
BH CV ECHO MEAS - LV MASS(C)DI: 118.8 GRAMS/M^2
BH CV ECHO MEAS - LV MAX PG: 4.1 MMHG
BH CV ECHO MEAS - LV MEAN PG: 2.3 MMHG
BH CV ECHO MEAS - LV SYSTOLIC VOL/BSA (12-30): 34.7 ML/M^2
BH CV ECHO MEAS - LV V1 MAX: 101.1 CM/SEC
BH CV ECHO MEAS - LV V1 MEAN: 71.3 CM/SEC
BH CV ECHO MEAS - LV V1 VTI: 20 CM
BH CV ECHO MEAS - LVIDD: 5.6 CM
BH CV ECHO MEAS - LVIDS: 3.4 CM
BH CV ECHO MEAS - LVLD AP2: 9.1 CM
BH CV ECHO MEAS - LVLD AP4: 9 CM
BH CV ECHO MEAS - LVLS AP2: 7.6 CM
BH CV ECHO MEAS - LVLS AP4: 7.6 CM
BH CV ECHO MEAS - LVOT AREA (M): 3.1 CM^2
BH CV ECHO MEAS - LVOT AREA: 3.1 CM^2
BH CV ECHO MEAS - LVOT DIAM: 2 CM
BH CV ECHO MEAS - LVPWD: 1.2 CM
BH CV ECHO MEAS - MED PEAK E' VEL: 9.9 CM/SEC
BH CV ECHO MEAS - MV A DUR: 0.11 SEC
BH CV ECHO MEAS - MV A MAX VEL: 78.4 CM/SEC
BH CV ECHO MEAS - MV DEC SLOPE: 271.6 CM/SEC^2
BH CV ECHO MEAS - MV DEC TIME: 0.2 SEC
BH CV ECHO MEAS - MV E MAX VEL: 56.6 CM/SEC
BH CV ECHO MEAS - MV E/A: 0.72
BH CV ECHO MEAS - MV MAX PG: 3.3 MMHG
BH CV ECHO MEAS - MV MEAN PG: 1.4 MMHG
BH CV ECHO MEAS - MV P1/2T MAX VEL: 69 CM/SEC
BH CV ECHO MEAS - MV P1/2T: 74.4 MSEC
BH CV ECHO MEAS - MV V2 MAX: 90.8 CM/SEC
BH CV ECHO MEAS - MV V2 MEAN: 54.9 CM/SEC
BH CV ECHO MEAS - MV V2 VTI: 22 CM
BH CV ECHO MEAS - MVA P1/2T LCG: 3.2 CM^2
BH CV ECHO MEAS - MVA(P1/2T): 3 CM^2
BH CV ECHO MEAS - MVA(VTI): 2.8 CM^2
BH CV ECHO MEAS - PA ACC TIME: 0.12 SEC
BH CV ECHO MEAS - PA MAX PG (FULL): 4.5 MMHG
BH CV ECHO MEAS - PA MAX PG: 6.8 MMHG
BH CV ECHO MEAS - PA PR(ACCEL): 25.5 MMHG
BH CV ECHO MEAS - PA V2 MAX: 130.3 CM/SEC
BH CV ECHO MEAS - PULM A REVS DUR: 0.11 SEC
BH CV ECHO MEAS - PULM A REVS VEL: 39.4 CM/SEC
BH CV ECHO MEAS - PULM DIAS VEL: 49.6 CM/SEC
BH CV ECHO MEAS - PULM S/D: 0.8
BH CV ECHO MEAS - PULM SYS VEL: 39.9 CM/SEC
BH CV ECHO MEAS - PVA(V,A): 2.5 CM^2
BH CV ECHO MEAS - PVA(V,D): 2.5 CM^2
BH CV ECHO MEAS - QP/QS: 0.95
BH CV ECHO MEAS - RAP SYSTOLE: 3 MMHG
BH CV ECHO MEAS - RV MAX PG: 2.3 MMHG
BH CV ECHO MEAS - RV MEAN PG: 1.1 MMHG
BH CV ECHO MEAS - RV V1 MAX: 76.3 CM/SEC
BH CV ECHO MEAS - RV V1 MEAN: 48.7 CM/SEC
BH CV ECHO MEAS - RV V1 VTI: 13.5 CM
BH CV ECHO MEAS - RVOT AREA: 4.3 CM^2
BH CV ECHO MEAS - RVOT DIAM: 2.3 CM
BH CV ECHO MEAS - RVSP: 19 MMHG
BH CV ECHO MEAS - SI(AO): 106.8 ML/M^2
BH CV ECHO MEAS - SI(CUBED): 54.8 ML/M^2
BH CV ECHO MEAS - SI(LVOT): 24.6 ML/M^2
BH CV ECHO MEAS - SI(MOD-SP2): 57.7 ML/M^2
BH CV ECHO MEAS - SI(MOD-SP4): 54.1 ML/M^2
BH CV ECHO MEAS - SI(TEICH): 42.6 ML/M^2
BH CV ECHO MEAS - SUP REN AO DIAM: 2.5 CM
BH CV ECHO MEAS - SV(AO): 264.7 ML
BH CV ECHO MEAS - SV(CUBED): 135.9 ML
BH CV ECHO MEAS - SV(LVOT): 60.9 ML
BH CV ECHO MEAS - SV(MOD-SP2): 143 ML
BH CV ECHO MEAS - SV(MOD-SP4): 134 ML
BH CV ECHO MEAS - SV(RVOT): 57.8 ML
BH CV ECHO MEAS - SV(TEICH): 105.5 ML
BH CV ECHO MEAS - TAPSE (>1.6): 2.2 CM
BH CV ECHO MEAS - TR MAX VEL: 195.5 CM/SEC
BH CV ECHO MEASUREMENTS AVERAGE E/E' RATIO: 6.36
BH CV XLRA - RV BASE: 4.1 CM
BH CV XLRA - RV LENGTH: 8.7 CM
BH CV XLRA - RV MID: 3.5 CM
BH CV XLRA - TDI S': 16.2 CM/SEC
LEFT ATRIUM VOLUME INDEX: 25 ML/M2
LV EF 2D ECHO EST: 64 %
MAXIMAL PREDICTED HEART RATE: 152 BPM
SINUS: 3.7 CM
STJ: 2.8 CM
STRESS TARGET HR: 129 BPM

## 2021-05-03 NOTE — PROGRESS NOTES
Subjective     REASON FOR FOLLOW-UP: Right breast inflammatory breast, triple positive                              REQUESTING PHYSICIAN: MD Francisco Esteban MD Bethany Haynes, MD    History of Present Illness patient is a 66 y.o. 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    Patient had worsening shortness of breath requiring continuous oxygen.  CT of the chest performed to rule out pneumonitis and she was started on steroids.  CT showed bilateral groundglass infiltrates presumed to be related to Taxol pneumonitis and therefore Taxol discontinued.  Patient completed the fourth cycle of Perjeta/Herceptin alone.  Diarrhea was an issue but not as bad without the Taxol      Because of Taxol pneumonitis patient was started on prednisone 20 mg twice daily.  As she is reviewed back today she notes her breathing is improved and she is very encouraged by this.  She does have a leukocytosis noted felt to be reactive from the steroids.  She is currently on 10 mg a day for the next week and then I told her to go to 5 mg for a week and then every other week and stop    The CAT scan we did at [facility] mentions calcified granulomatous disease but they do not specifically describe the nodule we had seen on the pretreatment CAT scan we will asked them to clarify this but I do not think we need to do another CAT scan    Patient understands upcoming plan to switch to Adriamycin/Cytoxan today Natalie we briefly reviewed differences in this treatment plan.  Discussed traditionally she would take oral dexamethasone for 3 days after however with her currently being on prednisone we will delay this.  She already has ondansetron to use as needed though thankfully she is not currently having any trouble with  nausea.    Echocardiogram was reviewed and shows a stable ejection fraction of 64%    Patient has noted overall improvement in her breast swelling and redness with improved softening as well.  She is very encouraged by this.  No neuropathy symptoms.  She denies other concerns today.    Past Medical History:   Diagnosis Date   • Anemia    • Asthma    • Chronic diastolic (congestive) heart failure (CMS/HCC)    • Chronic hypoxemic respiratory failure (CMS/HCC)     on continuous O2   • H/O Intraductal papilloma    • Hemorrhoids    • History of transfusion     AFTER BACK SURGERY   • Hypertension    • Inflammatory breast cancer (CMS/HCC)     right   • Kidney stone    • Morbid obesity with BMI of 50.0-59.9, adult (CMS/HCC)    • Obesity hypoventilation syndrome (CMS/HCC) 2021   • On home oxygen therapy     2.5 AT REST WITH ACTIVITY UP TO 4L   • ANABELLE on CPAP    • Osteoarthritis    • Type 2 diabetes mellitus (CMS/HCC)         Past Surgical History:   Procedure Laterality Date   • BREAST EXCISIONAL BIOPSY Bilateral    • COLONOSCOPY     • CYSTOSCOPY BLADDER STONE LITHOTRIPSY     • KNEE ARTHROPLASTY Bilateral    • SPINE SURGERY     • TOTAL ABDOMINAL HYSTERECTOMY WITH SALPINGO OOPHORECTOMY     • VENOUS ACCESS DEVICE (PORT) INSERTION N/A 2021    Procedure: INSERTION VENOUS ACCESS DEVICE;  Surgeon: Nicci Banks MD;  Location: [facility];  Service: General;  Laterality: N/A;      ONC HISTORY  patient is a 65-year-old white female with morbid obesity, history of diastolic congestive heart failure and unknown type of pulmonary disease for which she has been on oxygen for 4 years.    She has been getting routine mammography since 38 years of age because her mother  at 46 of breast cancer and had a biopsy of her left breast in  which was apparently benign and another biopsy in  on her right breast which showed a small focus of atypical ductal hyperplasia and atypical lobular hyperplasia  with a residual intraductal papilloma.  At that time she saw medical oncologist who recommended genetic testing and prevention but the insurance would not cover the genetic testing and the medical oncologist thought tamoxifen was too risky for her because of her comorbidities and no treatment was given.  More recently she noticed redness of the right breast in October and showed it to her family doctor who gave her course of Keflex when it did not improve and mammogram was ordered and this was benign  When the redness persisted she saw her gynecologist with concern for inflammatory breast cancer and referred her to Dr. Banks after repeat imaging and [facility]  Preliminary report shows micropapillary invasive mammary carcinoma intermediate grade measuring 13 mm right axillary node was also involved with metastatic cancer ER/IA and HER-2 are pending  Patient saw Dr. Banks who sent her for skin biopsies and she had 3 separate punch biopsy of the skin that showed Perivascular and perifollicular inflammation with no obvious malignancy at 3:00 12:00 and 9:00  In addition staging work-up with CAT scans and bone scan were ordered.  CAT scan of the chest showed a borderline mediastinal  Infracarinal node measuring 15 mm in length mild cardiomegaly and heavy coronary artery calcifications  CT of the abdomen showed a 2.5 x 2.2 cm right adrenal mass which the patient tells me she has had in the past and is most likely benign but also a 2.7 cm left external iliac node which is indeterminate.  Bone scan was negative    Echocardiogram is scheduled for later next week and genetic testing with the invitae stat panel is pending    Patient is  3 para 3 menarche was at age 9 menopause at 49 when she had a hysterectomy and oophorectomy  First childbirth was at age 20 she breast-fed her second and third children and took no hormone replacement after  menopause    Family history is positive for mother dying of breast cancer at age 46 she is a maternal aunt with breast cancer in her 70s a paternal aunt with breast cancer in her 70s paternal grandmother with colon cancer.  Her father had Hodgkin's disease and non-Hodgkin's lymphoma but  of small cell lung cancer at 67      She has not had a heart attack stroke or blood clot    Plan to do echocardiogram soon as possible to ascertain tolerability of cardiotoxic chemotherapy which would be typically indicated with an inflammatory breast cancer    Also plan PET scan to follow-up on atypical lymph nodes and confirm benign etiology of the adrenal lesion    She will have port placement and chemo education pt is  ER/TN and HER-2 +    I explained to Aida that the goal of treatment would be curative but she has a lot of comorbidities which may limit our ability to give the most effective chemotherapy in the setting  I told her radiation would be involved and possibly hormonal therapy for 10 years as she has hormone positivity and HER-2 directed therapy    She expressed some concerns about driving back and forth from [place] but felt that once a week was doable    We will see her back in 2 weeks to start treatment with a port placement planned early next week      Patient did have a mild reaction to Taxol dose #1 with some increased shortness of breath and flushing.  This was responsive to 100 mg of Solu-Cortef.  She states this gave her a headache and made her quite talkative but otherwise she was thankfully able to complete Taxol infusion without further incident.    Patient reports issues with chronic diarrhea over the last few years and did note a little bit increase in stooling following THP though nothing overly significant in her mind.  She did finally begin taking Imodium just a few days ago and has had no further stools.  She does note significant trouble with hemorrhoids in relation to the diarrhea    Required blood transfusion due to significant hemorrhoidal bleeding plus iron deficiency.  Injectafer planned    Due to inclement weather cycle 1 day 8 therapy was missed.  She did get day 15 therapy with fairly good tolerance except for some diarrhea.    She was found to be iron deficient despite trying oral iron.  We therefore elected to proceed with IV Injectafer but she remains mildly anemic with a hemoglobin of 9.3    Current Outpatient Medications on File Prior to Visit   Medication Sig Dispense Refill   • albuterol sulfate  (90 Base) MCG/ACT inhaler Inhale 2 puffs Every 4 (Four) Hours As Needed.     • carvedilol (COREG) 25 MG tablet Take 12.5 mg by mouth 2 (Two) Times a Day With Meals.     • Cholecalciferol (Vitamin D) 50 MCG (2000 UT) capsule Take 2,000 Units by mouth Daily.     • dicyclomine (BENTYL) 20 MG tablet Take 1 tablet by mouth Every 6 (Six) Hours. 60 tablet 2   • fexofenadine (ALLEGRA) 180 MG tablet Take 180 mg by mouth Daily As Needed.     • FLUoxetine (PROzac) 40 MG capsule Take 40 mg by mouth Daily.     • furosemide (LASIX) 40 MG tablet Take 20 mg by mouth Daily.     • hydrocortisone 2.5 % cream Apply  topically to the appropriate area as directed 2 (Two) Times a Day. 3.5 g 0   • Lidocaine, Anorectal, (LMX 5) 5 % cream cream Apply  topically to the appropriate area as directed 3 (Three) Times a Day As Needed (hemmorrhoid pain). 45 g 2   • lisinopril (PRINIVIL,ZESTRIL) 5 MG tablet Take 1 tablet by mouth Daily. 30 tablet 6   • metFORMIN (GLUCOPHAGE) 500 MG tablet Take 500 mg by mouth Daily With Breakfast.     • mometasone-formoterol (DULERA 100) 100-5 MCG/ACT inhaler Inhale 2 Puffs/kg Every Night.     • montelukast (SINGULAIR) 10 MG tablet Take 10 mg by mouth Every Night.     • multivitamin (THERAGRAN) tablet tablet Take 1 tablet by mouth Daily. TO HOLD 1 WEEK BEFORE SURGERY     • O2 (OXYGEN) Inhale 2.5 L/min Continuous.     • omeprazole (priLOSEC) 20 MG capsule Take 2 capsules by mouth  Daily. 60 capsule 3   • ondansetron (ZOFRAN) 8 MG tablet Take 1 tablet by mouth 3 (Three) Times a Day As Needed for Nausea or Vomiting. 30 tablet 5   • Potassium 99 MG tablet Take 1 tablet by mouth Every Night.     • pravastatin (PRAVACHOL) 40 MG tablet Take 40 mg by mouth Every Night.     • predniSONE (DELTASONE) 10 MG tablet Take 2 tablets by mouth 2 (Two) Times a Day. 60 tablet 1   • riFAXIMin (XIFAXAN) 550 MG tablet Take one tablet daily x7 days starting on day of Perjeta chemo each cycle 7 tablet 3   • spironolactone (ALDACTONE) 25 MG tablet Take 25 mg by mouth Daily.       No current facility-administered medications on file prior to visit.        ALLERGIES:    Allergies   Allergen Reactions   • Amlodipine Swelling     LEGS    • Hydrochlorothiazide Unknown - Low Severity     Neuro issues   • Morphine Nausea And Vomiting     hallucinations   • Adhesive Tape Rash        Social History     Socioeconomic History   • Marital status:      Spouse name: Not on file   • Number of children: 3   • Years of education: Not on file   • Highest education level: Not on file   Tobacco Use   • Smoking status: Never Smoker   • Smokeless tobacco: Never Used   Vaping Use   • Vaping Use: Never used   Substance and Sexual Activity   • Alcohol use: Not Currently   • Drug use: Never   • Sexual activity: Defer        Family History   Problem Relation Age of Onset   • Breast cancer Mother 45   • Colon cancer Paternal Grandmother    • Breast cancer Maternal Aunt 70   • Breast cancer Paternal Aunt 70   • Lung cancer Father    • Hodgkin's lymphoma Father    • Skin cancer Father         squamous cell   • Ovarian cancer Neg Hx    • Uterine cancer Neg Hx    • Deep vein thrombosis Neg Hx    • Pulmonary embolism Neg Hx    • Malig Hyperthermia Neg Hx         Review of Systems   Constitutional: Positive for fatigue. Negative for appetite change, chills, diaphoresis, fever and unexpected weight change.   HENT: Negative for hearing loss,  "sore throat and trouble swallowing.    Respiratory: Positive for shortness of breath (Improved with prednisone). Negative for cough, chest tightness and wheezing.    Cardiovascular: Negative for chest pain, palpitations and leg swelling.   Gastrointestinal: Negative for abdominal distention, abdominal pain, constipation, diarrhea (see HPI), nausea, rectal pain (hemmorrhoids -improved) and vomiting.   Genitourinary: Negative for dysuria, frequency, hematuria and urgency.   Musculoskeletal: Negative for joint swelling.        No muscle weakness.   Skin: Negative for rash and wound.   Neurological: Negative for seizures, syncope, speech difficulty, weakness, numbness and headaches.   Hematological: Negative for adenopathy. Does not bruise/bleed easily.   Psychiatric/Behavioral: Negative.  Negative for behavioral problems, confusion and suicidal ideas.   All other systems reviewed and are negative.         Objective     Vitals:    05/05/21 0839   BP: 135/86   Pulse: 67   Resp: 16   Temp: 97.3 °F (36.3 °C)   TempSrc: Skin   SpO2: 96%   Weight: (!) 156 kg (343 lb 12.8 oz)   Height: 165.1 cm (65\")   PainSc: 0-No pain     Current Status 5/5/2021   ECOG score 1       Physical Exam  Chest:             CONSTITUTIONAL:  Vital signs reviewed.  No distress, looks comfortable. Morbidly obese  EYES:  Conjunctiva and lids unremarkable.  PERRLA  EARS,NOSE,MOUTH,THROAT:  Ears and nose appear unremarkable.  Lips, teeth, gums appear unremarkable.  RESPIRATORY:  Normal respiratory effort.  Lungs clear to auscultation bilaterally.  No axillary adenopathy  BREASTS: Both breasts are pendulous; the right breast is no longer erythemic and much softer now to palpation.  Minimal  peau de orange appearance around the nipple.    CARDIOVASCULAR:  Normal S1, S2.  No murmurs rubs or gallops.  1+ brawny lower extremity edema.  GASTROINTESTINAL: Abdomen appears unremarkable.  Nontender.  No hepatomegaly.  No splenomegaly.  LYMPHATIC:  No cervical, " supraclavicular, axillary lymphadenopathy.  SKIN:  Warm.  Inclusion cyst left axilla  PSYCHIATRIC:  Normal judgment and insight.  Normal mood and affect.    I have reexamined the patient and the results are consistent with the previously documented exam. Jaime Azul MD     RECENT LABS:  Results from last 7 days   Lab Units 05/05/21  0827 04/28/21  1405   WBC 10*3/mm3 11.19* 14.06*   NEUTROS ABS 10*3/mm3 8.07* 10.87*   HEMOGLOBIN g/dL 10.6* 11.2*   HEMATOCRIT % 35.3 37.2   PLATELETS 10*3/mm3 245 289     Results from last 7 days   Lab Units 05/05/21  0827   SODIUM mmol/L 140   POTASSIUM mmol/L 3.9   CHLORIDE mmol/L 100   CO2 mmol/L 29.1*   BUN mg/dL 12   CREATININE mg/dL 0.76   CALCIUM mg/dL 9.5   ALBUMIN g/dL 3.70   BILIRUBIN mg/dL 0.3   ALK PHOS U/L 88   ALT (SGPT) U/L 22   AST (SGOT) U/L 14   GLUCOSE mg/dL 116             PET  IMPRESSION:  1.  Moderate to intensely FDG avid asymmetric soft tissue and skin  thickening involving the right breast likely representing patient's  known malignancy.  2.  Intensely FDG avid right axillary and subpectoral adenopathy likely  represent metastatic disease.  3.  Constellation of findings within the right adrenal gland are favored  to represent a lipid rich adenoma. Continued attention on follow-up is  recommended to ensure stability.  4.  While there are no findings of definite FDG avid osseous metastasis,  given the heterogenous FDG uptake throughout the axial and appendicular  skeleton due to the above stated limitations, subtle underlying osseous  metastasis would remain occult. Therefore, continued close attention on  follow-up is recommended to exclude this possibility.  5.  Short segment of moderate to intense FDG uptake within the distal  esophagus and GE junction suggestive of esophagitis. In the appropriate  clinical context correlation with patient history is recommended with  follow-up endoscopy if clinically indicated.  6.  Sub-6 mm pulmonary nodule within  the right lower lobe is below PET  resolution and indeterminate. Continued close attention on follow-up  with chest CT in 3 months is recommended to exclude metastatic disease.  7.  Other findings as above.     This report was finalized on 2/2/2021     Assessment/Plan   1. [id number] right breast cancer ER/MT HER-2 -3+ positive inflammatory breast cancer for neoadjuvant chemotherapy  · Staging work-up negative except for 6 mm lung nodule and axillary and subpectoral adenopathy  · THP followed by AC planned if she tolerates it  · C1D8 Taxol missed due to inclement weather.  · Taxol discontinued after 7 doses due to probable Taxol pneumonitis treated with steroids    2.  Morbid obesity    3.  History of diastolic heart failure  · Echocardiogram with ejection fraction of 64% normal strain  · Cleared by cardio-oncology for chemotherapy    4.  Pulmonary disease?  Etiology on oxygen for 4 years?  Jennifer    5.  Strong family history of breast cancer genetic testing 84 genes negative    6.  Probable benign adrenal adenoma-PET negative    7.  Questionable enlarged lymph nodes left iliac chain and mediastinum likely reactive-PET negative    8.  6 mm right lower lobe nodule below PET resolution pretreatment needs follow-up after THP    9.  Abnormal uptake short segment esophagus with a history of Schatzki's ring-we will double PPI and watch closely but we we will proceed with chemotherapy at this point and refer back to GI-doubt she has metastatic disease to this area    10. Anemia with microcytic indices  · Iron studies performed 2/10/2021.  · 2/24/2021: reviewed with the patient that she is iron deficient, with ferritin of 16, iron saturation of 4%.  Patient reports taking ferrous gluconate in the past but this caused GI upset.  Also with her chronic diarrhea I do not think she can absorb it.  We will pursue IV iron with plans to initiate this next week pending insurance approval. In addition hemoglobin down to 8.0 and  transfusion pursued.  · 3/3/2021: IV Injectafer initiated x2.   · Hemoglobin improved to 11.1 currently.      11. Hemorrhoidal pain secondary to diarrhea. Does have occasional bleeding. Topical lidocaine prescribed. Monitor.    12.  History of chronic diarrhea, ?IBS, now exacerbated with Perjeta therapy.  · Patient prescribed rifaximin 550 mg to take twice daily x7 days with each Perjeta dose.  States she picked this up late and only took about 4 days of therapy.  · Mild prior to see first Perjeta.  Utilizing Bentyl.    · Patient now also taking rifaximin the weeks of Perjeta with good control of diarrhea.    13.  Worsening shortness of breath with CT evidence of groundglass infiltrates bilaterally suspicious for Taxol-induced pneumonitis.   · Patient improving on prednisone 20 twice daily  · Breathing overall improved.  Continues wearing oxygen at all times.  She will slowly taper off prednisone as instructed per Dr. Azul.    Plan  1. Proceed with Adriamycin and Cytoxan with Neulasta support every 3 weeks-decrease BSA to 2.34 for cycle of Adriamycin  2. No further taxanes  3. Prednisone 10 mg a day for 1 week then cut to 5 mg for a week and then 5 mg every other day and stop  4. Return in 1 week for toxicity check  5. Return in 3 weeks for follow-up with Dr. Azul and cycle 2 of Adriamycin/Cytoxan with Neulasta support.    6. Have CAT scan from [facility] compared to our pretreatment CAT scan to make sure pulmonary nodules are stable    This patient is on drug therapy requiring intensive monitoring for toxicity.

## 2021-05-04 ENCOUNTER — APPOINTMENT (OUTPATIENT)
Dept: OCCUPATIONAL THERAPY | Facility: HOSPITAL | Age: 66
End: 2021-05-04

## 2021-05-05 ENCOUNTER — INFUSION (OUTPATIENT)
Dept: ONCOLOGY | Facility: HOSPITAL | Age: 66
End: 2021-05-05

## 2021-05-05 ENCOUNTER — OFFICE VISIT (OUTPATIENT)
Dept: ONCOLOGY | Facility: CLINIC | Age: 66
End: 2021-05-05

## 2021-05-05 VITALS
DIASTOLIC BLOOD PRESSURE: 86 MMHG | SYSTOLIC BLOOD PRESSURE: 135 MMHG | TEMPERATURE: 97.3 F | OXYGEN SATURATION: 96 % | HEIGHT: 65 IN | RESPIRATION RATE: 16 BRPM | BODY MASS INDEX: 48.82 KG/M2 | WEIGHT: 293 LBS | HEART RATE: 67 BPM

## 2021-05-05 DIAGNOSIS — C50.611 MALIGNANT NEOPLASM OF AXILLARY TAIL OF RIGHT FEMALE BREAST, UNSPECIFIED ESTROGEN RECEPTOR STATUS (HCC): ICD-10-CM

## 2021-05-05 DIAGNOSIS — R79.9 ABNORMAL FINDING OF BLOOD CHEMISTRY, UNSPECIFIED: ICD-10-CM

## 2021-05-05 DIAGNOSIS — C50.911 INFLAMMATORY BREAST CANCER, RIGHT (HCC): ICD-10-CM

## 2021-05-05 DIAGNOSIS — C50.611 MALIGNANT NEOPLASM OF AXILLARY TAIL OF RIGHT FEMALE BREAST, UNSPECIFIED ESTROGEN RECEPTOR STATUS (HCC): Primary | ICD-10-CM

## 2021-05-05 DIAGNOSIS — E66.2 OBESITY HYPOVENTILATION SYNDROME (HCC): ICD-10-CM

## 2021-05-05 LAB
ALBUMIN SERPL-MCNC: 3.7 G/DL (ref 3.5–5.2)
ALBUMIN/GLOB SERPL: 1.3 G/DL (ref 1.1–2.4)
ALP SERPL-CCNC: 88 U/L (ref 38–116)
ALT SERPL W P-5'-P-CCNC: 22 U/L (ref 0–33)
ANION GAP SERPL CALCULATED.3IONS-SCNC: 10.9 MMOL/L (ref 5–15)
AST SERPL-CCNC: 14 U/L (ref 0–32)
BASOPHILS # BLD AUTO: 0.03 10*3/MM3 (ref 0–0.2)
BASOPHILS NFR BLD AUTO: 0.3 % (ref 0–1.5)
BILIRUB SERPL-MCNC: 0.3 MG/DL (ref 0.2–1.2)
BUN SERPL-MCNC: 12 MG/DL (ref 6–20)
BUN/CREAT SERPL: 15.8 (ref 7.3–30)
CALCIUM SPEC-SCNC: 9.5 MG/DL (ref 8.5–10.2)
CHLORIDE SERPL-SCNC: 100 MMOL/L (ref 98–107)
CO2 SERPL-SCNC: 29.1 MMOL/L (ref 22–29)
CREAT SERPL-MCNC: 0.76 MG/DL (ref 0.6–1.1)
DEPRECATED RDW RBC AUTO: 69.8 FL (ref 37–54)
EOSINOPHIL # BLD AUTO: 0.29 10*3/MM3 (ref 0–0.4)
EOSINOPHIL NFR BLD AUTO: 2.6 % (ref 0.3–6.2)
ERYTHROCYTE [DISTWIDTH] IN BLOOD BY AUTOMATED COUNT: 20.9 % (ref 12.3–15.4)
GFR SERPL CREATININE-BSD FRML MDRD: 76 ML/MIN/1.73
GLOBULIN UR ELPH-MCNC: 2.9 GM/DL (ref 1.8–3.5)
GLUCOSE SERPL-MCNC: 116 MG/DL (ref 74–124)
HCT VFR BLD AUTO: 35.3 % (ref 34–46.6)
HGB BLD-MCNC: 10.6 G/DL (ref 12–15.9)
IMM GRANULOCYTES # BLD AUTO: 0.05 10*3/MM3 (ref 0–0.05)
IMM GRANULOCYTES NFR BLD AUTO: 0.4 % (ref 0–0.5)
LYMPHOCYTES # BLD AUTO: 2.01 10*3/MM3 (ref 0.7–3.1)
LYMPHOCYTES NFR BLD AUTO: 18 % (ref 19.6–45.3)
MCH RBC QN AUTO: 27.5 PG (ref 26.6–33)
MCHC RBC AUTO-ENTMCNC: 30 G/DL (ref 31.5–35.7)
MCV RBC AUTO: 91.7 FL (ref 79–97)
MONOCYTES # BLD AUTO: 0.74 10*3/MM3 (ref 0.1–0.9)
MONOCYTES NFR BLD AUTO: 6.6 % (ref 5–12)
NEUTROPHILS NFR BLD AUTO: 72.1 % (ref 42.7–76)
NEUTROPHILS NFR BLD AUTO: 8.07 10*3/MM3 (ref 1.7–7)
NRBC BLD AUTO-RTO: 0 /100 WBC (ref 0–0.2)
PLATELET # BLD AUTO: 245 10*3/MM3 (ref 140–450)
PMV BLD AUTO: 9.1 FL (ref 6–12)
POTASSIUM SERPL-SCNC: 3.9 MMOL/L (ref 3.5–4.7)
PROT SERPL-MCNC: 6.6 G/DL (ref 6.3–8)
RBC # BLD AUTO: 3.85 10*6/MM3 (ref 3.77–5.28)
SODIUM SERPL-SCNC: 140 MMOL/L (ref 134–145)
WBC # BLD AUTO: 11.19 10*3/MM3 (ref 3.4–10.8)

## 2021-05-05 PROCEDURE — 80053 COMPREHEN METABOLIC PANEL: CPT

## 2021-05-05 PROCEDURE — 99215 OFFICE O/P EST HI 40 MIN: CPT | Performed by: INTERNAL MEDICINE

## 2021-05-05 PROCEDURE — 85025 COMPLETE CBC W/AUTO DIFF WBC: CPT

## 2021-05-05 PROCEDURE — 36591 DRAW BLOOD OFF VENOUS DEVICE: CPT

## 2021-05-05 RX ORDER — DOXORUBICIN HYDROCHLORIDE 2 MG/ML
60 INJECTION, SOLUTION INTRAVENOUS ONCE
Status: CANCELLED | OUTPATIENT
Start: 2021-05-05

## 2021-05-05 RX ORDER — PALONOSETRON 0.05 MG/ML
0.25 INJECTION, SOLUTION INTRAVENOUS ONCE
Status: CANCELLED | OUTPATIENT
Start: 2021-05-14

## 2021-05-05 RX ORDER — SODIUM CHLORIDE 9 MG/ML
250 INJECTION, SOLUTION INTRAVENOUS ONCE
Status: CANCELLED | OUTPATIENT
Start: 2021-05-14

## 2021-05-05 NOTE — PROGRESS NOTES
Provided pt with chemo education packets and neulasta video. Obtained consent. Waiting to hear back if pt is approved for first AC tx.    Per Poly pt is not approved today for AC.

## 2021-05-10 ENCOUNTER — TELEPHONE (OUTPATIENT)
Dept: ONCOLOGY | Facility: CLINIC | Age: 66
End: 2021-05-10

## 2021-05-10 NOTE — TELEPHONE ENCOUNTER
----- Message from Andie Cronin RN sent at 5/10/2021 12:13 PM EDT -----  Regarding: adjust appointment  Please move her to a bit earlier on the 13th. Her chemo was finally approved and she will be getting her first A/C on the 13th. Pt is expecting a call.

## 2021-05-11 NOTE — PROGRESS NOTES
Subjective     REASON FOR FOLLOW-UP: Right breast inflammatory breast, triple positive                              REQUESTING PHYSICIAN: MD Francisco Esteban MD Bethany Haynes, MD    History of Present Illness patient is a 66 y.o. 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    Adriamycin Cytoxan was not approved and took 5 days to get the approval so she is back today to start chemotherapy. Overall 1 week extra off is given a little more time to recover and she feels better    Patient had worsening shortness of breath requiring continuous oxygen.  CT of the chest performed to rule out pneumonitis and she was started on steroids.  CT showed bilateral groundglass infiltrates presumed to be related to Taxol pneumonitis and therefore Taxol discontinued.  Patient completed the fourth cycle of Perjeta/Herceptin alone.  Diarrhea was an issue but not as bad without the Taxol      Because of Taxol pneumonitis patient was started on prednisone 20 mg twice daily.  As she is reviewed back today she notes her breathing is improved and she is very encouraged by this.  She does have a leukocytosis noted felt to be reactive from the steroids.  She is currently on 10 mg a day for the next week and then I told her to go to 5 mg for a week and then every other week and stop    The CAT scan we did at [facility] ([place]) mentions calcified granulomatous disease but they do not specifically describe the nodule we had seen on the pretreatment CAT scan we will asked them to clarify this and they made no mention of nodules but no abnormality was seen    .  Discussed traditionally she would take oral dexamethasone for 3 days after however with her currently being on prednisone we will delay this.  She already has  ondansetron to use as needed though thankfully she is not currently having any trouble with nausea.    Echocardiogram was reviewed and shows a stable ejection fraction of 64%    Patient has noted overall improvement in her breast swelling and redness with improved softening as well.  She is very encouraged by this.  No neuropathy symptoms.  She denies other concerns today.    Past Medical History:   Diagnosis Date   • Anemia    • Asthma    • Chronic diastolic (congestive) heart failure (CMS/HCC)    • Chronic hypoxemic respiratory failure (CMS/HCC)     on continuous O2   • H/O Intraductal papilloma    • Hemorrhoids    • History of transfusion     AFTER BACK SURGERY   • Hypertension    • Inflammatory breast cancer (CMS/HCC)     right   • Kidney stone    • Morbid obesity with BMI of 50.0-59.9, adult (CMS/HCC)    • Obesity hypoventilation syndrome (CMS/HCC) 2021   • On home oxygen therapy     2.5 AT REST WITH ACTIVITY UP TO 4L   • ANABELLE on CPAP    • Osteoarthritis    • Type 2 diabetes mellitus (CMS/HCC)         Past Surgical History:   Procedure Laterality Date   • BREAST EXCISIONAL BIOPSY Bilateral    • COLONOSCOPY     • CYSTOSCOPY BLADDER STONE LITHOTRIPSY     • KNEE ARTHROPLASTY Bilateral    • SPINE SURGERY     • TOTAL ABDOMINAL HYSTERECTOMY WITH SALPINGO OOPHORECTOMY     • VENOUS ACCESS DEVICE (PORT) INSERTION N/A 2021    Procedure: INSERTION VENOUS ACCESS DEVICE;  Surgeon: Nicci Banks MD;  Location: [facility];  Service: General;  Laterality: N/A;      ONC HISTORY  patient is a 65-year-old white female with morbid obesity, history of diastolic congestive heart failure and unknown type of pulmonary disease for which she has been on oxygen for 4 years.    She has been getting routine mammography since 38 years of age because her mother  at 46 of breast cancer and had a biopsy of her left breast in  which was apparently benign and another biopsy in 2018 on her right breast  which showed a small focus of atypical ductal hyperplasia and atypical lobular hyperplasia with a residual intraductal papilloma.  At that time she saw medical oncologist who recommended genetic testing and prevention but the insurance would not cover the genetic testing and the medical oncologist thought tamoxifen was too risky for her because of her comorbidities and no treatment was given.  More recently she noticed redness of the right breast in October and showed it to her family doctor who gave her course of Keflex when it did not improve and mammogram was ordered and this was benign  When the redness persisted she saw her gynecologist with concern for inflammatory breast cancer and referred her to Dr. Banks after repeat imaging and [facility]  Preliminary report shows micropapillary invasive mammary carcinoma intermediate grade measuring 13 mm right axillary node was also involved with metastatic cancer ER/GA and HER-2 are pending  Patient saw Dr. Banks who sent her for skin biopsies and she had 3 separate punch biopsy of the skin that showed Perivascular and perifollicular inflammation with no obvious malignancy at 3:00 12:00 and 9:00  In addition staging work-up with CAT scans and bone scan were ordered.  CAT scan of the chest showed a borderline mediastinal  Infracarinal node measuring 15 mm in length mild cardiomegaly and heavy coronary artery calcifications  CT of the abdomen showed a 2.5 x 2.2 cm right adrenal mass which the patient tells me she has had in the past and is most likely benign but also a 2.7 cm left external iliac node which is indeterminate.  Bone scan was negative    Echocardiogram is scheduled for later next week and genetic testing with the invitae stat panel is pending    Patient is  3 para 3 menarche was at age 9 menopause at 49 when she had a hysterectomy and oophorectomy  First childbirth was at age  20 she breast-fed her second and third children and took no hormone replacement after menopause    Family history is positive for mother dying of breast cancer at age 46 she is a maternal aunt with breast cancer in her 70s a paternal aunt with breast cancer in her 70s paternal grandmother with colon cancer.  Her father had Hodgkin's disease and non-Hodgkin's lymphoma but  of small cell lung cancer at 67      She has not had a heart attack stroke or blood clot    Plan to do echocardiogram soon as possible to ascertain tolerability of cardiotoxic chemotherapy which would be typically indicated with an inflammatory breast cancer    Also plan PET scan to follow-up on atypical lymph nodes and confirm benign etiology of the adrenal lesion    She will have port placement and chemo education pt is  ER/[place] and HER-2 +    I explained to Aida that the goal of treatment would be curative but she has a lot of comorbidities which may limit our ability to give the most effective chemotherapy in the setting  I told her radiation would be involved and possibly hormonal therapy for 10 years as she has hormone positivity and HER-2 directed therapy    She expressed some concerns about driving back and forth from [place] but felt that once a week was doable    We will see her back in 2 weeks to start treatment with a port placement planned early next week      Patient did have a mild reaction to Taxol dose #1 with some increased shortness of breath and flushing.  This was responsive to 100 mg of Solu-Cortef.  She states this gave her a headache and made her quite talkative but otherwise she was thankfully able to complete Taxol infusion without further incident.    Patient reports issues with chronic diarrhea over the last few years and did note a little bit increase in stooling following THP though nothing overly significant in her mind.  She did finally begin taking Imodium just a few days ago and has had no further stools.   She does note significant trouble with hemorrhoids in relation to the diarrhea   Required blood transfusion due to significant hemorrhoidal bleeding plus iron deficiency.  Injectafer planned    Due to inclement weather cycle 1 day 8 therapy was missed.  She did get day 15 therapy with fairly good tolerance except for some diarrhea.    She was found to be iron deficient despite trying oral iron.  We therefore elected to proceed with IV Injectafer but she remains mildly anemic with a hemoglobin of 9.3    Current Outpatient Medications on File Prior to Visit   Medication Sig Dispense Refill   • albuterol sulfate  (90 Base) MCG/ACT inhaler Inhale 2 puffs Every 4 (Four) Hours As Needed.     • carvedilol (COREG) 25 MG tablet Take 12.5 mg by mouth 2 (Two) Times a Day With Meals.     • Cholecalciferol (Vitamin D) 50 MCG (2000 UT) capsule Take 2,000 Units by mouth Daily.     • dicyclomine (BENTYL) 20 MG tablet Take 1 tablet by mouth Every 6 (Six) Hours. 60 tablet 2   • fexofenadine (ALLEGRA) 180 MG tablet Take 180 mg by mouth Daily As Needed.     • FLUoxetine (PROzac) 40 MG capsule Take 40 mg by mouth Daily.     • furosemide (LASIX) 40 MG tablet Take 20 mg by mouth Daily.     • hydrocortisone 2.5 % cream Apply  topically to the appropriate area as directed 2 (Two) Times a Day. 3.5 g 0   • Lidocaine, Anorectal, (LMX 5) 5 % cream cream Apply  topically to the appropriate area as directed 3 (Three) Times a Day As Needed (hemmorrhoid pain). 45 g 2   • lisinopril (PRINIVIL,ZESTRIL) 5 MG tablet Take 1 tablet by mouth Daily. 30 tablet 6   • metFORMIN (GLUCOPHAGE) 500 MG tablet Take 500 mg by mouth Daily With Breakfast.     • mometasone-formoterol (DULERA 100) 100-5 MCG/ACT inhaler Inhale 2 Puffs/kg Every Night.     • montelukast (SINGULAIR) 10 MG tablet Take 10 mg by mouth Every Night.     • multivitamin (THERAGRAN) tablet tablet Take 1 tablet by mouth Daily. TO HOLD 1 WEEK BEFORE SURGERY     • O2 (OXYGEN) Inhale 2.5 L/min  Continuous.     • omeprazole (priLOSEC) 20 MG capsule Take 2 capsules by mouth Daily. 60 capsule 3   • ondansetron (ZOFRAN) 8 MG tablet Take 1 tablet by mouth 3 (Three) Times a Day As Needed for Nausea or Vomiting. 30 tablet 5   • Potassium 99 MG tablet Take 1 tablet by mouth Every Night.     • pravastatin (PRAVACHOL) 40 MG tablet Take 40 mg by mouth Every Night.     • predniSONE (DELTASONE) 10 MG tablet Take 2 tablets by mouth 2 (Two) Times a Day. 60 tablet 1   • riFAXIMin (XIFAXAN) 550 MG tablet Take one tablet daily x7 days starting on day of Perjeta chemo each cycle 7 tablet 3   • spironolactone (ALDACTONE) 25 MG tablet Take 25 mg by mouth Daily.       No current facility-administered medications on file prior to visit.        ALLERGIES:    Allergies   Allergen Reactions   • Amlodipine Swelling     LEGS    • Hydrochlorothiazide Unknown - Low Severity     Neuro issues   • Morphine Nausea And Vomiting     hallucinations   • Adhesive Tape Rash        Social History     Socioeconomic History   • Marital status:      Spouse name: Not on file   • Number of children: 3   • Years of education: Not on file   • Highest education level: Not on file   Tobacco Use   • Smoking status: Never Smoker   • Smokeless tobacco: Never Used   Vaping Use   • Vaping Use: Never used   Substance and Sexual Activity   • Alcohol use: Not Currently   • Drug use: Never   • Sexual activity: Defer        Family History   Problem Relation Age of Onset   • Breast cancer Mother 45   • Colon cancer Paternal Grandmother    • Breast cancer Maternal Aunt 70   • Breast cancer Paternal Aunt 70   • Lung cancer Father    • Hodgkin's lymphoma Father    • Skin cancer Father         squamous cell   • Ovarian cancer Neg Hx    • Uterine cancer Neg Hx    • Deep vein thrombosis Neg Hx    • Pulmonary embolism Neg Hx    • Malig Hyperthermia Neg Hx         Review of Systems   Constitutional: Positive for fatigue. Negative for appetite change, chills,  diaphoresis, fever and unexpected weight change.   HENT: Negative for hearing loss, sore throat and trouble swallowing.    Respiratory: Positive for shortness of breath (Improved with prednisone). Negative for cough, chest tightness and wheezing.    Cardiovascular: Negative for chest pain, palpitations and leg swelling.   Gastrointestinal: Negative for abdominal distention, abdominal pain, constipation, diarrhea (see HPI), nausea, rectal pain (hemmorrhoids -improved) and vomiting.   Genitourinary: Negative for dysuria, frequency, hematuria and urgency.   Musculoskeletal: Negative for joint swelling.        No muscle weakness.   Skin: Negative for rash and wound.   Neurological: Negative for seizures, syncope, speech difficulty, weakness, numbness and headaches.   Hematological: Negative for adenopathy. Does not bruise/bleed easily.   Psychiatric/Behavioral: Negative.  Negative for behavioral problems, confusion and suicidal ideas.   All other systems reviewed and are negative.         Objective     There were no vitals filed for this visit.  Current Status 5/5/2021   ECOG score 1       Physical Exam  Chest:             CONSTITUTIONAL:  Vital signs reviewed.  No distress, looks comfortable. Morbidly obese  EYES:  Conjunctiva and lids unremarkable.  PERRLA  EARS,NOSE,MOUTH,THROAT:  Ears and nose appear unremarkable.  Lips, teeth, gums appear unremarkable.  RESPIRATORY:  Normal respiratory effort.  Lungs clear to auscultation bilaterally.  No axillary adenopathy  BREASTS: Both breasts are pendulous; the right breast is no longer erythemic and much softer now to palpation.  Minimal  peau de orange appearance around the nipple.    CARDIOVASCULAR:  Normal S1, S2.  No murmurs rubs or gallops.  1+ brawny lower extremity edema.  GASTROINTESTINAL: Abdomen appears unremarkable.  Nontender.  No hepatomegaly.  No splenomegaly.  LYMPHATIC:  No cervical, supraclavicular, axillary lymphadenopathy.  SKIN:  Warm.  Inclusion cyst left  axilla  PSYCHIATRIC:  Normal judgment and insight.  Normal mood and affect.          RECENT LABS:  Results from last 7 days   Lab Units 05/05/21  0827   WBC 10*3/mm3 11.19*   NEUTROS ABS 10*3/mm3 8.07*   HEMOGLOBIN g/dL 10.6*   HEMATOCRIT % 35.3   PLATELETS 10*3/mm3 245     Results from last 7 days   Lab Units 05/05/21  0827   SODIUM mmol/L 140   POTASSIUM mmol/L 3.9   CHLORIDE mmol/L 100   CO2 mmol/L 29.1*   BUN mg/dL 12   CREATININE mg/dL 0.76   CALCIUM mg/dL 9.5   ALBUMIN g/dL 3.70   BILIRUBIN mg/dL 0.3   ALK PHOS U/L 88   ALT (SGPT) U/L 22   AST (SGOT) U/L 14   GLUCOSE mg/dL 116             PET  IMPRESSION:  1.  Moderate to intensely FDG avid asymmetric soft tissue and skin  thickening involving the right breast likely representing patient's  known malignancy.  2.  Intensely FDG avid right axillary and subpectoral adenopathy likely  represent metastatic disease.  3.  Constellation of findings within the right adrenal gland are favored  to represent a lipid rich adenoma. Continued attention on follow-up is  recommended to ensure stability.  4.  While there are no findings of definite FDG avid osseous metastasis,  given the heterogenous FDG uptake throughout the axial and appendicular  skeleton due to the above stated limitations, subtle underlying osseous  metastasis would remain occult. Therefore, continued close attention on  follow-up is recommended to exclude this possibility.  5.  Short segment of moderate to intense FDG uptake within the distal  esophagus and GE junction suggestive of esophagitis. In the appropriate  clinical context correlation with patient history is recommended with  follow-up endoscopy if clinically indicated.  6.  Sub-6 mm pulmonary nodule within the right lower lobe is below PET  resolution and indeterminate. Continued close attention on follow-up  with chest CT in 3 months is recommended to exclude metastatic disease.  7.  Other findings as above.     This report was finalized on  2/2/2021     Assessment/Plan   1. [id number] right breast cancer ER/HI HER-2 -3+ positive inflammatory breast cancer for neoadjuvant chemotherapy  · Staging work-up negative except for 6 mm lung nodule and axillary and subpectoral adenopathy  · THP followed by AC planned if she tolerates it  · C1D8 Taxol missed due to inclement weather.  · Taxol discontinued after 7 doses due to probable Taxol pneumonitis treated with steroids    2.  Morbid obesity    3.  History of diastolic heart failure  · Echocardiogram with ejection fraction of 64% normal strain  · Cleared by cardio-oncology for chemotherapy    4.  Pulmonary disease?  Etiology on oxygen for 4 years?  Pickwickian    5.  Strong family history of breast cancer genetic testing 84 genes negative    6.  Probable benign adrenal adenoma-PET negative    7.  Questionable enlarged lymph nodes left iliac chain and mediastinum likely reactive-PET negative    8.  6 mm right lower lobe nodule below PET resolution pretreatment needs follow-up after THP    9.  Abnormal uptake short segment esophagus with a history of Schatzki's ring-we will double PPI and watch closely but we we will proceed with chemotherapy at this point and refer back to GI-doubt she has metastatic disease to this area    10. Anemia with microcytic indices  · Iron studies performed 2/10/2021.  · 2/24/2021: reviewed with the patient that she is iron deficient, with ferritin of 16, iron saturation of 4%.  Patient reports taking ferrous gluconate in the past but this caused GI upset.  Also with her chronic diarrhea I do not think she can absorb it.  We will pursue IV iron with plans to initiate this next week pending insurance approval. In addition hemoglobin down to 8.0 and transfusion pursued.  · 3/3/2021: IV Injectafer initiated x2.   · Hemoglobin improved to 11.1 currently.      11. Hemorrhoidal pain secondary to diarrhea. Does have occasional bleeding. Topical lidocaine prescribed. Monitor.    12.  History  of chronic diarrhea, ?IBS, now exacerbated with Perjeta therapy.  · Patient prescribed rifaximin 550 mg to take twice daily x7 days with each Perjeta dose.  States she picked this up late and only took about 4 days of therapy.  · Mild prior to see first Perjeta.  Utilizing Bentyl.    · Patient now also taking rifaximin the weeks of Perjeta with good control of diarrhea.    13.  Worsening shortness of breath with CT evidence of groundglass infiltrates bilaterally suspicious for Taxol-induced pneumonitis.   · Patient improving on prednisone 20 twice daily  · Breathing overall improved.  Continues wearing oxygen at all times.  She will slowly taper off prednisone as instructed per Dr. Azul.    Plan  1. Proceed with Adriamycin and Cytoxan with Neulasta support every 3 weeks-decrease BSA to 2.3 for first cycle of Adriamycin  2. No further taxanes  3. Prednisone  5 mg for a week and then 5 mg every other day and stop  4. Return in 1 week for toxicity check  5. Return in 3 weeks for follow-up with Dr. Azul and cycle 2 of Adriamycin/Cytoxan with Neulasta support.      6. This patient is on drug therapy requiring intensive monitoring for toxicity.

## 2021-05-13 ENCOUNTER — APPOINTMENT (OUTPATIENT)
Dept: ONCOLOGY | Facility: HOSPITAL | Age: 66
End: 2021-05-13

## 2021-05-13 NOTE — PROGRESS NOTES
"   Progress Note      Patient Name: Aida Conner   Patient ID: [id number]   Sex: Female   YOB: 1955    Primary Care Provider: Shari Roa MD   Referring Provider: Penny Lou MD    Visit Date: November 12, 2020    Provider: Penny Lou MD   Location: [facility]   Location Address: [address] [place] [address]  [id number]   Location Phone: [phone]          Chief Complaint  · f/u   · CHF  · HLD   · Arthritis       History Of Present Illness  Aida Conner is a 65 year old /White female who presents for evaluation and treatment of:      followup  flu shot today  Left ear feels stopped up the past couple months     65-year-old female with extensive past medical history including chronic hypoxic respiratory failure requiring 2.5 L oxygen, sleep apnea, diastolic heart failure, and morbid obesity with BMI of 60 presenting for acute visit for deafness in the left ear, right breast hardness, and need of for prescription refills.      Concern for left ear deafness:   Reports decrease hearing in the left ear about 2 months ago. She is concerned this is secondary to cerumen impaction and would like to have her ears looked at today. Denies ringing in her ear, fevers or chills, ear drainage or ear pain. Denies any hx of recurrent ear infections.       hardness in right breast:   Hx of lumpectomy from both breast. Left 'quite sometime ago\". Right breast lumpectomy was a couple years ago and was concerning for milk duct??? (?hyperplasia). Initiation of tamoxifen was recommended  by the breast surgeon but she saw an oncologist who wanted her to have genetic testing done prior to starting tamoxifen but she was never able to start the medication since her insurance did not cover the genetic testing .Right breast hardness noticed over the past week, hard and swelling, no color change but reports the breast looks a little deformed, feels \"inflamed\". Denies " any nipple discharge. Prior hx of bloody nipple discharge at time of right breast lumpectomy 2 yrs ago. Denies any night sweat. Last mammo 2020 was normal per patient. Mother with hx of breast cancer in her 40's with bone mets and  at 46, her mother's sister had breast cancer, and paternal aunt with cancer in her old age.       CHF-  Seen by Cardiology on 2020    arthritis- needs refill of meloxicam    HLD- Needs labs     anxiety- doing well with prozac.               Past Medical History  Disease Name Date Onset Notes   Abnormal mammogram 12/15/2014 --    Allergic rhinitis --  --    Anemia --  --    Anxiety disorder --  --    Asthma --  --    Depression --  --    Enlarged heart --  --    Essential tremor 2016 --    Hemorrhoids --  external and internal   Hyperlipidemia --  --    Hypertension, essential --  --    IBS (irritable bowel syndrome) --  --    Impaired fasting glucose 2014 --    Mitral valve insufficiency --  --    Mitral valve regurgitation --  --    Obesity --  --    Obstructive sleep apnea --  --    Osteoarthrosis --  --    Renal calculi 2015 --    Restrictive airway disease 08/15/2016 --    Sleep apnea --  --    Urinary bladder incontinence --  --    Vitamin D deficiency 2014 --    VitaminD Deficiency --  --          Past Surgical History  Procedure Name Date Notes   Colonoscopy 2011 --    EGD  --    Hysterectomy 04 Total   Kidney Stone Surgery, Unspecified 2015 --    Knee Replacement  --    Lumbar spinal fusion 1984 Laminectomy   Lumpectomy, left breast  --          Medication List  Name Date Started Instructions   ALBUTEROL HFA 2020 1-2 PUFFS EVERY 4-6 HOURS AS NEEDED   Allegra 180 mg oral tablet  take 1 tablet by oral route daily as needed   carvedilol 25 mg oral tablet 2020 take 0.5 tablet by oral route 2 times a day for 90 days   cholecalciferol (vitamin D3) 2,000 unit oral tablet  take 1 tablet by oral route daily    Dulera 200-5 mcg/actuation inhalation HFA aerosol inhaler 2017 inhale 2 puffs by inhalation route every 12 hours for 90 days   ferrous gluconate 324 mg (37.5 mg iron) oral tablet  take 1 tablet by oral route 2 times a day   furosemide 40 mg oral tablet 2020 take 0.5 tablet by oral route daily for 90 days   magnesium gluconate 27 mg magnesium (500 mg) oral tablet  take 1 tablet by oral route QD PRN muscle spasms   meloxicam 15 mg oral tablet 2020 take 0.5 tablet by oral route daily for 90 days   Metamucil 0.52 gram oral capsule  take 1 capsule by oral route 5Xday   multivitamin Oral  --    Nasonex 50 mcg/actuation nasal spray,non-aerosol 2020 spray 2 sprays in each nostril by intranasal route once daily for 30 days   potassium 99 mg oral tablet  take 1 tablet by oral route QD PRN muscle spasms   pravastatin 40 mg oral tablet  take 1 tablet (40 mg) by oral route once daily   Prozac 40 mg oral capsule 2020 take 1 capsule by oral route daily   Singulair 10 mg oral tablet 2020 TAKE ONE TABLET BY MOUTH EACH EVENING for 90 days   spironolactone 25 mg oral tablet  take 1 tablet (25 mg) by oral route once daily   Symbicort 80-4.5 mcg/actuation inhalation HFA aerosol inhaler 2020 inhale 2 puffs by inhalation route 2 times per day in the morning and evening         Allergy List  Allergen Name Date Reaction Notes   Adhesives --  --  --    amlodipine --  --  --    hydrochlorothiazide --  --  --    Morphine Sulfate --  --  --          Family Medical History  Disease Name Relative/Age Notes   Colon Neoplasm, Malignant Grandmother (paternal)/<65            Reproductive History  Menstrual   Menopause Status: Postmenopausal HRT?: No   Pregnancy Summary   Total Pregnancies: 3 Full Term: 3 Premature: 0   Ab Induced: 0 Ab Spontaneous: 0 Ectopics: 0   Multiples: 0 Livin         Social History  Finding Status Start/Stop Quantity Notes   *Denies Alcohol Use --  --/-- --  --    Tobacco  "Never --/-- --  --          Immunizations  NameDate Admin Mfg Trade Name Lot Number Route Inj VIS Given VIS Publication   Blbiomqua88/12/2020 SEQ Fluarix, quadrivalent, preservative free [id number] IM RD 11/12/2020 08/15/2019   Comments: pt tolerated well. left office in stable condition.   Prevnar 1308/21/2019 WAL PREVNAR 13 [id number] IM LD 08/21/2019    Comments: patient tolerated well, left office in stable condition         Review of Systems  · Constitutional  o Denies  o : fever, fatigue, weight loss, weight gain  · HENT  o Denies  o : headaches, vertigo, nasal congestion, postnasal drainage, tinnitus, ear pain, ear fullness  · Breasts  o Denies  o : tenderness, swelling, Nipple Discharge  · Cardiovascular  o Denies  o : lower extremity edema, claudication, chest pressure, palpitations  · Respiratory  o Admits  o : shortness of breath which is chronic  o Denies  o : wheezing, frequent cough, hemoptysis, dyspnea on exertion  · Gastrointestinal  o Denies  o : nausea, vomiting, diarrhea, constipation, abdominal pain      Vitals  Date Time BP Position Site L\R Cuff Size HR RR TEMP (F) WT  HT  BMI kg/m2 BSA m2 O2 Sat FR L/min FiO2        11/12/2020 10:32 /70 Sitting    71 - R 18 97.5 360lbs 0oz 5'  5\" 59.91 2.74 94 %            Physical Examination  · Constitutional  o Appearance  o : no acute distress, well-nourished  · Head and Face  o Head  o :   § Inspection  § : atraumatic, normocephalic  · Eyes  o Eyes  o : extraocular movements intact, no scleral icterus, no conjunctival injection  · Ears, Nose, Mouth and Throat  o Ears  o :   § External Ears  § : normal  § Otoscopic Examination  § : Unable to visualize bilateral TM secondary to brown cerumen overlying each membrane. The auditory canal itself is clear, but cerumen is impacted over bilateral membranes.   o Nose  o :   § Intranasal Exam  § : nares patent, O2 canula in place.  · Respiratory  o Respiratory Effort  o : breathing comfortably, symmetric chest " rise  o Auscultation of Lungs  o : clear to asculatation bilaterally, no wheezes, rales, or rhonchii  · Cardiovascular  o Heart  o :   § Auscultation of Heart  § : regular rate and rhythm, no murmurs, rubs, or gallops  o Peripheral Vascular System  o :   § Extremities  § : no edema  · Neurologic  o Mental Status Examination  o :   § Orientation  § : grossly oriented to person, place and time  o Cranial Nerves  o : crainial nerves 2-12 grossly intact, hearing decreased on the left   o Gait and Station  o :   § Gait Screening  § : normal gait  · Psychiatric  o General  o : normal mood and affect     Breast exam: right breast with hardened circumferential area about 2cm from the nipple. Overlying skin does look different than skin of left breast with widened pores, but no discoloration. No discharge with compression of nipple. Unable to appreciate any lumps with palpation of the breast although exam is limited due to size of her breast. No FASANEH appreciated in the tail of Salgado.           Results  · In-Office Procedures  o Medical procedure  § Cerumen Removal by MA or Nurse, Unilateral [facility] ([id number])      [place]. ears flushed with warm water and peroxide. Pt tolerated well. Unable to visualize tympanic membrane. Pt instructed by Dr. Francis to try debrox drops and return to flush ears a second time.       Assessment  · Need for influenza vaccination     V04.81/Z23  will be administered in clinic today  · Impaired fasting glucose     790.21/R73.01  repeating A1C.  · VitaminD Deficiency     269.2  Chronic in need of labs today as she has not been seen for the past 1 yr.  · Anemia     281.9  Chronic in need of labs today as she has not been seen for the past 1 yr.  · Breast pain, right     611.71/N64.4  Acute onset of right breast pain and hardness which is concerning given prior history of normalities in the same breast. Diagnostic mammogram ordered for further evaluation.  · Impacted cerumen of both  ears     380.4/H61.23  Recurrent per patient. She was advised to use over-the-counter Debrox during her last encounter which she has not yet done. Attempt to clean out both ears in office were unsuccessful. Recommends patient to use over-the-counter Debrox for the next few days and return to clinic for nurse visit for cerumen removal. Will refer for formal hearing test given significant change in hearing in the left ear (failed whisper test and finger rubbing test). May need referral to ENT if we are unable to clean out her ears.   · Deafness     389.9/H91.90  Abrupt onset of left ear deafness for the past 2 months. Most likely secondary to cerumen impaction concerning etiologies cannot be ruled out at this time. Will refer to radiology for formal hearing test; we are hopeful that we will be able to get her ears cleaned out by then to allow for a satisfactory exam, otherwise she may need referral to ENT.       Plan  · Orders  o Immunization Admin Fee (Single) ([facility]) (20201) - V04.81/Z23 - 11/12/2020  o Hgb A1c [facility] ([id number]) - 790.21/R73.01 - 11/12/2020  o Physical, Primary Care Panel (CBC, CMP, Lipid, TSH) [facility] ([id number], 39057, 36210, 78961) - 281.9, 269.2, 790.21/R73.01 - 11/12/2020  o Vitamin D (25-Hydroxy) Level (50350) - 269.2 - 11/12/2020  o ACO-39: Current medications updated and reviewed (1159F, ) - - 11/12/2020  o ACO-14: Influenza immunization administered or previously received [facility] () - V04.81/Z23 - 11/12/2020  o Iron Profile (Iron 57853 TIBC 54734 and Transferrin 23779) (IRONP) - 281.9 - 11/12/2020  o Mammogram diagnostic 3D breast unilateral RIGHT (w/US if needed) [facility] (, -RO) - 611.71/N64.4 - 11/12/2020  o FLUAD, QUAD SYR 0.5 mL (40946) - V04.81/Z23 - 11/12/2020   Vaccine - Influenza; Dose: 0.5; Site: Right Deltoid; Route: Intramuscular; Date: 11/12/2020 13:55:00; Exp: 06/30/2021; Lot: [id number]; Mfg: Seqirus; TradeName: Fluarix, quadrivalent, preservative free; Administered By: Aubrey Ponce  LPN; Comment: pt tolerated well. left office in stable condition.  o Audiology Consultation (AUDIO) - 389.9/H91.90 - 11/13/2020  · Medications  o Medications have been Reconciled  o Transition of Care or Provider Policy  · Instructions  o Patient was educated/instructed on their diagnosis, treatment and medications prior to discharge from the clinic today.  · Disposition  o Follow up in 1 month.            Electronically Signed by: Penny Lou MD -Author on February 14, 2021 01:05:46 PM

## 2021-05-13 NOTE — PROGRESS NOTES
Progress Note      Patient Name: Aida Conner   Patient ID: [id number]   Sex: Female   YOB: 1955    Primary Care Provider: Shari Roa MD   Referring Provider: Shari Roa MD    Visit Date: November 4, 2020    Provider: Karen Santana MD   Location: [facility]   Location Address: [address] [place] [address], [facility]   [place]  [id number]   Location Phone: [phone]          Chief Complaint     Shortness of breath.       History Of Present Illness  REFERRING PROVIDER: Shari Roa MD   Aida Conner is a 65 year old /White female who has not been in the office in 14 months now. She continues to be short of breath. It is moderate even with mild exertion. She is on O2 at 2.5 liters when she is home, but when she is up moving around, it is 4 liters. She says that is essentially unchanged. She also admits she does not get any exercise, because she in her house and not getting out due to COVID. Denies any chest pain. No palpitations, swelling, dizziness, syncope, PND, or orthopnea. She has gained about 4 more pounds since her last visit.   PAST MEDICAL HISTORY: Atherosclerotic heart disease; Chronic lung disease; Diastolic heart failure; Hyperlipidemia; Hypertension; Obstructive sleep apnea.   PSYCHOSOCIAL HISTORY: Denies tobacco use. Denies alcohol use.   CURRENT MEDICATIONS: Coreg 25 mg 1/2 b.i.d.; fluoxetine 40 mg daily; furosemide 40 mg 1/2 daily; spironolactone 25 mg daily; Mobic 15 mg 1/2 daily; pravastatin 40 mg daily; Singulair 10 mg daily; Allegra daily; multivitamin daily; potassium over-the-counter supplement daily; vitamin D3 2000 units daily.       Review of Systems  · Cardiovascular  o Admits  o : shortness of breath while walking or lying flat  o Denies  o : palpitations (fast, fluttering, or skipping beats), swelling (feet, ankles, hands), chest pain or angina pectoris   · Respiratory  o Denies  o : chronic or frequent cough      Vitals  Date Time BP  "Position Site L\R Cuff Size HR RR TEMP (F) WT  HT  BMI kg/m2 BSA m2 O2 Sat FR L/min FiO2 HC       11/04/2020 12:00 /70 Sitting    74 - R   368lbs 16oz 5'  5\" 61.4 2.77             Physical Examination  · Constitutional  o Appearance  o : Awake, alert, super morbidly obese female, ambulates with a cane, on O2 continuously, in no acute distress.   · Respiratory  o Respiratory  o : Increased AP diameter with diminished breath sounds. Prolonged expiration. Few rhonchi. No rales.   · Cardiovascular  o Heart  o : PMI not well felt. Heart sounds are distant. No definite gallop or murmur.   o Peripheral Vascular System  o :   § Extremities  § : Trace edema.  · Gastrointestinal  o Abdominal Examination  o : Soft. No tenderness or masses felt. No hepatosplenomegaly. Abdominal aorta is not palpable.  · Labs  o Labs  o : Not done as directed.          Assessment     1.  Shortness of breath related to severe chronic lung disease.  2.  Hypertension, controlled.  3.  Hyperlipidemia, unknown control.  4.  Atherosclerotic heart disease without angina.  5.  Diastolic heart failure, stable.  6.  Super morbid obesity.       Plan     1.  Check a Lipid and CMP next week.    2.  Follow up in one year with labs or earlier if needed.          Care plan was reviewed and collaborated by SHAUNA Pineda, and Karen Santana MD, [facility] with [facility]  JF:PM:vm               Electronically Signed by: Maddy Carrasquillo-, Other -Author on November 10, 2020 06:55:55 AM  Electronically Co-signed by: SHAUNA Cornelius -Reviewer on November 10, 2020 08:34:35 AM  Electronically Co-signed by: Karen Santana MD -Reviewer on November 14, 2020 10:19:59 AM  "

## 2021-05-14 ENCOUNTER — OFFICE VISIT (OUTPATIENT)
Dept: ONCOLOGY | Facility: CLINIC | Age: 66
End: 2021-05-14

## 2021-05-14 ENCOUNTER — INFUSION (OUTPATIENT)
Dept: ONCOLOGY | Facility: HOSPITAL | Age: 66
End: 2021-05-14

## 2021-05-14 VITALS
TEMPERATURE: 96.2 F | HEART RATE: 75 BPM | HEIGHT: 65 IN | DIASTOLIC BLOOD PRESSURE: 86 MMHG | WEIGHT: 293 LBS | OXYGEN SATURATION: 97 % | SYSTOLIC BLOOD PRESSURE: 126 MMHG | BODY MASS INDEX: 48.82 KG/M2

## 2021-05-14 VITALS
SYSTOLIC BLOOD PRESSURE: 128 MMHG | HEART RATE: 74 BPM | BODY MASS INDEX: 48.82 KG/M2 | HEIGHT: 65 IN | DIASTOLIC BLOOD PRESSURE: 70 MMHG | WEIGHT: 293 LBS

## 2021-05-14 VITALS
SYSTOLIC BLOOD PRESSURE: 110 MMHG | WEIGHT: 293 LBS | TEMPERATURE: 97.5 F | HEIGHT: 65 IN | RESPIRATION RATE: 18 BRPM | HEART RATE: 71 BPM | DIASTOLIC BLOOD PRESSURE: 70 MMHG | BODY MASS INDEX: 48.82 KG/M2 | OXYGEN SATURATION: 94 %

## 2021-05-14 VITALS
TEMPERATURE: 97.1 F | SYSTOLIC BLOOD PRESSURE: 121 MMHG | HEIGHT: 65 IN | BODY MASS INDEX: 48.82 KG/M2 | OXYGEN SATURATION: 96 % | RESPIRATION RATE: 16 BRPM | HEART RATE: 73 BPM | WEIGHT: 293 LBS | DIASTOLIC BLOOD PRESSURE: 80 MMHG

## 2021-05-14 DIAGNOSIS — R79.9 ABNORMAL FINDING OF BLOOD CHEMISTRY, UNSPECIFIED: ICD-10-CM

## 2021-05-14 DIAGNOSIS — Z79.899 ENCOUNTER FOR EYE EXAM DUE TO HIGH RISK MEDICATION: ICD-10-CM

## 2021-05-14 DIAGNOSIS — E66.2 OBESITY HYPOVENTILATION SYNDROME (HCC): ICD-10-CM

## 2021-05-14 DIAGNOSIS — C50.611 MALIGNANT NEOPLASM OF AXILLARY TAIL OF RIGHT BREAST IN FEMALE, ESTROGEN RECEPTOR NEGATIVE (HCC): ICD-10-CM

## 2021-05-14 DIAGNOSIS — C50.611 MALIGNANT NEOPLASM OF AXILLARY TAIL OF RIGHT FEMALE BREAST, UNSPECIFIED ESTROGEN RECEPTOR STATUS (HCC): Primary | ICD-10-CM

## 2021-05-14 DIAGNOSIS — C50.611 MALIGNANT NEOPLASM OF AXILLARY TAIL OF RIGHT FEMALE BREAST, UNSPECIFIED ESTROGEN RECEPTOR STATUS (HCC): ICD-10-CM

## 2021-05-14 DIAGNOSIS — C50.911 INFLAMMATORY BREAST CANCER, RIGHT (HCC): ICD-10-CM

## 2021-05-14 DIAGNOSIS — C50.911 INFLAMMATORY BREAST CANCER, RIGHT (HCC): Primary | ICD-10-CM

## 2021-05-14 DIAGNOSIS — Z17.1 MALIGNANT NEOPLASM OF AXILLARY TAIL OF RIGHT BREAST IN FEMALE, ESTROGEN RECEPTOR NEGATIVE (HCC): ICD-10-CM

## 2021-05-14 LAB
ALBUMIN SERPL-MCNC: 3.7 G/DL (ref 3.5–5.2)
ALBUMIN/GLOB SERPL: 1.2 G/DL (ref 1.1–2.4)
ALP SERPL-CCNC: 92 U/L (ref 38–116)
ALT SERPL W P-5'-P-CCNC: 20 U/L (ref 0–33)
ANION GAP SERPL CALCULATED.3IONS-SCNC: 9.5 MMOL/L (ref 5–15)
AST SERPL-CCNC: 15 U/L (ref 0–32)
BASOPHILS # BLD AUTO: 0.04 10*3/MM3 (ref 0–0.2)
BASOPHILS NFR BLD AUTO: 0.3 % (ref 0–1.5)
BILIRUB SERPL-MCNC: 0.2 MG/DL (ref 0.2–1.2)
BUN SERPL-MCNC: 10 MG/DL (ref 6–20)
BUN/CREAT SERPL: 14.5 (ref 7.3–30)
CALCIUM SPEC-SCNC: 9.4 MG/DL (ref 8.5–10.2)
CHLORIDE SERPL-SCNC: 103 MMOL/L (ref 98–107)
CO2 SERPL-SCNC: 29.5 MMOL/L (ref 22–29)
CREAT SERPL-MCNC: 0.69 MG/DL (ref 0.6–1.1)
DEPRECATED RDW RBC AUTO: 64.1 FL (ref 37–54)
EOSINOPHIL # BLD AUTO: 0.19 10*3/MM3 (ref 0–0.4)
EOSINOPHIL NFR BLD AUTO: 1.5 % (ref 0.3–6.2)
ERYTHROCYTE [DISTWIDTH] IN BLOOD BY AUTOMATED COUNT: 19.1 % (ref 12.3–15.4)
GFR SERPL CREATININE-BSD FRML MDRD: 85 ML/MIN/1.73
GLOBULIN UR ELPH-MCNC: 3.1 GM/DL (ref 1.8–3.5)
GLUCOSE SERPL-MCNC: 113 MG/DL (ref 74–124)
HBA1C MFR BLD: 6.08 % (ref 4.8–5.6)
HCT VFR BLD AUTO: 32.8 % (ref 34–46.6)
HGB BLD-MCNC: 10 G/DL (ref 12–15.9)
IMM GRANULOCYTES # BLD AUTO: 0.08 10*3/MM3 (ref 0–0.05)
IMM GRANULOCYTES NFR BLD AUTO: 0.6 % (ref 0–0.5)
LYMPHOCYTES # BLD AUTO: 2.12 10*3/MM3 (ref 0.7–3.1)
LYMPHOCYTES NFR BLD AUTO: 16.4 % (ref 19.6–45.3)
MCH RBC QN AUTO: 27.8 PG (ref 26.6–33)
MCHC RBC AUTO-ENTMCNC: 30.5 G/DL (ref 31.5–35.7)
MCV RBC AUTO: 91.1 FL (ref 79–97)
MONOCYTES # BLD AUTO: 0.74 10*3/MM3 (ref 0.1–0.9)
MONOCYTES NFR BLD AUTO: 5.7 % (ref 5–12)
NEUTROPHILS NFR BLD AUTO: 75.5 % (ref 42.7–76)
NEUTROPHILS NFR BLD AUTO: 9.77 10*3/MM3 (ref 1.7–7)
NRBC BLD AUTO-RTO: 0 /100 WBC (ref 0–0.2)
PLATELET # BLD AUTO: 308 10*3/MM3 (ref 140–450)
PMV BLD AUTO: 8.9 FL (ref 6–12)
POTASSIUM SERPL-SCNC: 3.5 MMOL/L (ref 3.5–4.7)
PROT SERPL-MCNC: 6.8 G/DL (ref 6.3–8)
RBC # BLD AUTO: 3.6 10*6/MM3 (ref 3.77–5.28)
SODIUM SERPL-SCNC: 142 MMOL/L (ref 134–145)
WBC # BLD AUTO: 12.94 10*3/MM3 (ref 3.4–10.8)

## 2021-05-14 PROCEDURE — 83036 HEMOGLOBIN GLYCOSYLATED A1C: CPT | Performed by: INTERNAL MEDICINE

## 2021-05-14 PROCEDURE — 25010000002 HEPARIN LOCK FLUSH PER 10 UNITS: Performed by: INTERNAL MEDICINE

## 2021-05-14 PROCEDURE — 25010000002 CYCLOPHOSPHAMIDE 1 GM/5ML SOLUTION 5 ML VIAL: Performed by: INTERNAL MEDICINE

## 2021-05-14 PROCEDURE — 25010000002 DEXAMETHASONE SODIUM PHOSPHATE 100 MG/10ML SOLUTION: Performed by: INTERNAL MEDICINE

## 2021-05-14 PROCEDURE — 96367 TX/PROPH/DG ADDL SEQ IV INF: CPT

## 2021-05-14 PROCEDURE — 80053 COMPREHEN METABOLIC PANEL: CPT

## 2021-05-14 PROCEDURE — 96413 CHEMO IV INFUSION 1 HR: CPT

## 2021-05-14 PROCEDURE — 25010000002 PEGFILGRASTIM 6 MG/0.6ML PREFILLED SYRINGE KIT: Performed by: INTERNAL MEDICINE

## 2021-05-14 PROCEDURE — 96377 APPLICATON ON-BODY INJECTOR: CPT

## 2021-05-14 PROCEDURE — 96375 TX/PRO/DX INJ NEW DRUG ADDON: CPT

## 2021-05-14 PROCEDURE — 25010000002 FOSAPREPITANT PER 1 MG: Performed by: INTERNAL MEDICINE

## 2021-05-14 PROCEDURE — 96409 CHEMO IV PUSH SNGL DRUG: CPT

## 2021-05-14 PROCEDURE — 99214 OFFICE O/P EST MOD 30 MIN: CPT | Performed by: INTERNAL MEDICINE

## 2021-05-14 PROCEDURE — 85025 COMPLETE CBC W/AUTO DIFF WBC: CPT

## 2021-05-14 PROCEDURE — 25010000002 DOXORUBICIN PER 10 MG: Performed by: INTERNAL MEDICINE

## 2021-05-14 PROCEDURE — 25010000002 PALONOSETRON PER 25 MCG: Performed by: INTERNAL MEDICINE

## 2021-05-14 PROCEDURE — 96411 CHEMO IV PUSH ADDL DRUG: CPT

## 2021-05-14 PROCEDURE — 96417 CHEMO IV INFUS EACH ADDL SEQ: CPT

## 2021-05-14 PROCEDURE — 36415 COLL VENOUS BLD VENIPUNCTURE: CPT

## 2021-05-14 RX ORDER — DEXAMETHASONE 4 MG/1
TABLET ORAL
Qty: 6 TABLET | Refills: 3 | Status: SHIPPED | OUTPATIENT
Start: 2021-05-14 | End: 2021-09-08

## 2021-05-14 RX ORDER — DOXORUBICIN HYDROCHLORIDE 2 MG/ML
135 INJECTION, SOLUTION INTRAVENOUS ONCE
Status: COMPLETED | OUTPATIENT
Start: 2021-05-14 | End: 2021-05-14

## 2021-05-14 RX ORDER — SODIUM CHLORIDE 9 MG/ML
250 INJECTION, SOLUTION INTRAVENOUS ONCE
Status: COMPLETED | OUTPATIENT
Start: 2021-05-14 | End: 2021-05-14

## 2021-05-14 RX ORDER — PALONOSETRON 0.05 MG/ML
0.25 INJECTION, SOLUTION INTRAVENOUS ONCE
Status: COMPLETED | OUTPATIENT
Start: 2021-05-14 | End: 2021-05-14

## 2021-05-14 RX ORDER — DOXORUBICIN HYDROCHLORIDE 2 MG/ML
135 INJECTION, SOLUTION INTRAVENOUS ONCE
Status: CANCELLED | OUTPATIENT
Start: 2021-05-14

## 2021-05-14 RX ORDER — HEPARIN SODIUM (PORCINE) LOCK FLUSH IV SOLN 100 UNIT/ML 100 UNIT/ML
500 SOLUTION INTRAVENOUS AS NEEDED
Status: DISCONTINUED | OUTPATIENT
Start: 2021-05-14 | End: 2021-05-14 | Stop reason: HOSPADM

## 2021-05-14 RX ORDER — ONDANSETRON HYDROCHLORIDE 8 MG/1
8 TABLET, FILM COATED ORAL 3 TIMES DAILY PRN
Qty: 30 TABLET | Refills: 5 | Status: SHIPPED | OUTPATIENT
Start: 2021-05-14 | End: 2022-10-04

## 2021-05-14 RX ORDER — SODIUM CHLORIDE 0.9 % (FLUSH) 0.9 %
10 SYRINGE (ML) INJECTION AS NEEDED
Status: CANCELLED | OUTPATIENT
Start: 2021-05-14

## 2021-05-14 RX ORDER — HEPARIN SODIUM (PORCINE) LOCK FLUSH IV SOLN 100 UNIT/ML 100 UNIT/ML
500 SOLUTION INTRAVENOUS AS NEEDED
Status: CANCELLED | OUTPATIENT
Start: 2021-05-14

## 2021-05-14 RX ADMIN — DEXAMETHASONE SODIUM PHOSPHATE 12 MG: 10 INJECTION, SOLUTION INTRAMUSCULAR; INTRAVENOUS at 10:36

## 2021-05-14 RX ADMIN — PEGFILGRASTIM 6 MG: KIT SUBCUTANEOUS at 11:25

## 2021-05-14 RX ADMIN — PALONOSETRON 0.25 MG: 0.05 INJECTION, SOLUTION INTRAVENOUS at 10:02

## 2021-05-14 RX ADMIN — Medication 500 UNITS: at 11:52

## 2021-05-14 RX ADMIN — SODIUM CHLORIDE 100 ML: 9 INJECTION, SOLUTION INTRAVENOUS at 10:02

## 2021-05-14 RX ADMIN — CYCLOPHOSPHAMIDE 1490 MG: 200 INJECTION, SOLUTION INTRAVENOUS at 11:12

## 2021-05-14 RX ADMIN — DOXORUBICIN HYDROCHLORIDE 135 MG: 2 INJECTION, SOLUTION INTRAVENOUS at 10:55

## 2021-05-14 RX ADMIN — SODIUM CHLORIDE 250 ML: 9 INJECTION, SOLUTION INTRAVENOUS at 09:57

## 2021-05-14 NOTE — PROGRESS NOTES
"   Progress Note      Patient Name: Aida Conner   Patient ID: [id number]   Sex: Female   YOB: 1955    Primary Care Provider: Shari Roa MD   Referring Provider: Penny Lou MD    Visit Date: December 23, 2020    Provider: Penny Lou MD   Location: [facility]   Location Address: [address] [place] [address]  [id number]   Location Phone: [phone]          Chief Complaint  · Acute  · \"right breast swollen, started about first of Nov.\"      History Of Present Illness  Aida Conner is a 65 year old /White female who presents for evaluation and treatment of:      Right breast rash and discomfort: first noted in November and worsening per pt. Continues to endorse the right breast feels hard, and abnormal appearance of overlying skin. She has a hx of lumpectomy of the right breast with recommendation for initiation of tamoxifen in the past, but was unable to follow through due to her insurance not covering the proposed treatment. She denies any nipple discharge.     US obtained in November confirmed thickened skin with inflammation with concern for cellulitis at the time for which she was tx with abx. However her symptoms are persisting. Denies fevers or chills. Mammogram was also ordered at the same time, unclear if this was performed at the same time or if need rescheduling.          Past Medical History  Disease Name Date Onset Notes   Abnormal mammogram 12/15/2014 --    Allergic rhinitis --  --    Anemia --  --    Anxiety disorder --  --    Asthma --  --    Breast pain, right --  --    Depression --  --    Enlarged heart --  --    Essential tremor 04/25/2016 --    Hemorrhoids --  external and internal   Hyperlipidemia --  --    Hypertension, essential --  --    IBS (irritable bowel syndrome) --  --    Impaired fasting glucose 06/24/2014 --    Mitral valve insufficiency --  --    Mitral valve regurgitation --  --    Obesity --  --  "   Obstructive sleep apnea --  --    Osteoarthrosis --  --    Renal calculi 1/2015 --    Restrictive airway disease 08/15/2016 --    Sleep apnea --  --    Type II diabetes mellitus --  --    Urinary bladder incontinence --  --    Vitamin D deficiency 12/30/2014 --    VitaminD Deficiency --  --          Past Surgical History  Procedure Name Date Notes   Colonoscopy 2011 2014 2017 --    EGD 2017 --    Hysterectomy 8/31/04 Total   Kidney Stone Surgery, Unspecified 1/2015 --    Knee Replacement 2009 --    Lumbar spinal fusion 1984 Laminectomy   Lumpectomy, left breast 2012 --          Medication List  Name Date Started Instructions   ALBUTEROL HFA 11/12/2020 1-2 PUFFS EVERY 4-6 HOURS AS NEEDED   Allegra 180 mg oral tablet  take 1 tablet by oral route daily as needed   carvedilol 25 mg oral tablet 11/12/2020 take 0.5 tablet by oral route 2 times a day for 90 days   cholecalciferol (vitamin D3) 2,000 unit oral tablet  take 1 tablet by oral route daily   Dulera 200-5 mcg/actuation inhalation HFA aerosol inhaler 02/20/2017 inhale 2 puffs by inhalation route every 12 hours for 90 days   ferrous gluconate 324 mg (37.5 mg iron) oral tablet 11/18/2020 take 1 tablet by oral route 3 times a day for 30 days   furosemide 40 mg oral tablet 11/12/2020 take 0.5 tablet by oral route daily for 90 days   magnesium gluconate 27 mg magnesium (500 mg) oral tablet  take 1 tablet by oral route QD PRN muscle spasms   meloxicam 15 mg oral tablet 11/12/2020 take 0.5 tablet by oral route daily for 90 days   Metamucil 0.52 gram oral capsule  take 1 capsule by oral route 5Xday   metformin 500 mg oral tablet 11/18/2020 take 2 tablets (1,000 mg) by oral route 2 times per day with morning and evening meals for 30 days   multivitamin Oral  --    Nasonex 50 mcg/actuation nasal spray,non-aerosol 11/12/2020 spray 2 sprays in each nostril by intranasal route once daily for 30 days   potassium 99 mg oral tablet  take 1 tablet by oral route QD PRN muscle  spasms   pravastatin 40 mg oral tablet 2020 take 1 tablet (40 mg) by oral route once daily   Prozac 40 mg oral capsule 2020 take 1 capsule by oral route daily   Singulair 10 mg oral tablet 2020 TAKE ONE TABLET BY MOUTH EACH EVENING for 90 days   spironolactone 25 mg oral tablet  take 1 tablet (25 mg) by oral route once daily   Symbicort 80-4.5 mcg/actuation inhalation HFA aerosol inhaler 2020 inhale 2 puffs by inhalation route 2 times per day in the morning and evening         Allergy List  Allergen Name Date Reaction Notes   Adhesives --  --  --    amlodipine --  --  --    hydrochlorothiazide --  --  --    Morphine Sulfate --  --  --          Family Medical History  Disease Name Relative/Age Notes   Colon Neoplasm, Malignant Grandmother (paternal)/<65            Reproductive History  Menstrual   Menopause Status: Postmenopausal HRT?: No   Pregnancy Summary   Total Pregnancies: 3 Full Term: 3 Premature: 0   Ab Induced: 0 Ab Spontaneous: 0 Ectopics: 0   Multiples: 0 Livin         Social History  Finding Status Start/Stop Quantity Notes   *Denies Alcohol Use --  --/-- --  --    Tobacco Never --/-- --  --          Immunizations  NameDate Admin Mfg Trade Name Lot Number Route Inj VIS Given VIS Publication   Lloxpuyss63/12/2020 SEQ Fluarix, quadrivalent, preservative free [id number] IM RD 2020 08/15/2019   Comments: pt tolerated well. left office in stable condition.   Prevnar [id number] WAL PREVNAR 13 [id number] IM LD 2019    Comments: patient tolerated well, left office in stable condition         Review of Systems  · Constitutional  o Denies  o : fever, fatigue, weight loss, weight gain  · Cardiovascular  o Denies  o : lower extremity edema, claudication, chest pressure, palpitations  · Respiratory  o Denies  o : shortness of breath, wheezing, cough, hemoptysis, dyspnea on exertion  · Gastrointestinal  o Denies  o : nausea, vomiting, diarrhea, constipation, abdominal  "pain      Vitals  Date Time BP Position Site L\R Cuff Size HR RR TEMP (F) WT  HT  BMI kg/m2 BSA m2 O2 Sat FR L/min FiO2 HC       11/04/2020 12:00 /70 Sitting    74 - R   368lbs 16oz 5'  5\" 61.4 2.77       11/12/2020 10:32 /70 Sitting    71 - R 18 97.5 360lbs 0oz 5'  5\" 59.91 2.74 94 %      12/23/2020 03:11 /86 Sitting    75 - R  96.2 360lbs 0oz 5'  5\" 59.91 2.74 97 % 4 36%          Physical Examination  · Constitutional  o Appearance  o : no acute distress, well-nourished  · Head and Face  o Head  o :   § Inspection  § : atraumatic, normocephalic  · Eyes  o Eyes  o : extraocular movements intact, no scleral icterus, no conjunctival injection  · Ears, Nose, Mouth and Throat  o Ears  o :   § External Ears  § : normal  o Nose  o :   § Intranasal Exam  § : nares patent  o Oral Cavity  o :   § Oral Mucosa  § : moist mucous membranes  · Respiratory  o Respiratory Effort  o : breathing comfortably, symmetric chest rise, supplemental Oxygen in place   o Auscultation of Lungs  o : clear to asculatation bilaterally, no wheezes, rales, or rhonchii  · Cardiovascular  o Heart  o :   § Auscultation of Heart  § : regular rate and rhythm, no murmurs, rubs, or gallops  · Neurologic  o Mental Status Examination  o :   § Orientation  § : grossly oriented to person, place and time  o Gait and Station  o :   § Gait Screening  § : normal gait  · Psychiatric  o General  o : normal mood and affect     Breast exam: right breast remains with peau d'orange appearance with very pronounced pores, however w/o discoloration. Skin of right breast feels hard to the touch. No rash. Right nipple normal in appearance w/o discharge.           Assessment  · Breast pain, right     611.71/N64.4  Subacute and persisting. In need for further evaluation. Re-ordering mammogram. Biopsy by IR vs Derm for pathology, will start with IR with plan for derm referral. May benefit from referral to the oncologist she saw from her prior lumpectomy, she " will work on obtaining oncologist name.   · Skin lesion     709.9/L98.9  Abnormal skin of right breast. See above.       Plan  · Orders  o ACO-39: Current medications updated and reviewed (1159F, ) - 611.71/N64.4, 709.9/L98.9 - 12/23/2020  o Mammogram 2D diagnostic breast unilateral RIGHT (w/US if needed) [facility]. (31017, -IJ) - 611.71/N64.4, 709.9/L98.9 - 12/23/2020   Please schedule with [facility].    Please schedule ASAP.  o Breast (Needle Directed Biopsy) (73505) - 611.71/N64.4, 709.9/L98.9 - 12/23/2020   skin over right breast with abnormal appearance with dilated pores and induration surrounding the nipple and about 3cm diameter and extending over the lateral breast. Please schedule ASAP, potentially with mammogram.   · Medications  o Medications have been Reconciled  o Transition of Care or Provider Policy  · Instructions  o Patient was educated/instructed on their diagnosis, treatment and medications prior to discharge from the clinic today.  o Patient instructed to seek medical attention urgently for new or worsening symptoms.  o Call the office with any concerns or questions.  · Disposition  o Follow up in 3 weeks            Electronically Signed by: Penny Lou MD -Author on April 5, 2021 03:44:43 PM

## 2021-05-15 VITALS
OXYGEN SATURATION: 95 % | HEART RATE: 69 BPM | TEMPERATURE: 97.2 F | DIASTOLIC BLOOD PRESSURE: 74 MMHG | SYSTOLIC BLOOD PRESSURE: 130 MMHG | BODY MASS INDEX: 48.82 KG/M2 | HEIGHT: 65 IN | WEIGHT: 293 LBS

## 2021-05-15 VITALS
HEIGHT: 65 IN | HEART RATE: 71 BPM | RESPIRATION RATE: 16 BRPM | BODY MASS INDEX: 48.82 KG/M2 | TEMPERATURE: 98.1 F | WEIGHT: 293 LBS | DIASTOLIC BLOOD PRESSURE: 68 MMHG | SYSTOLIC BLOOD PRESSURE: 124 MMHG | OXYGEN SATURATION: 95 %

## 2021-05-15 VITALS
SYSTOLIC BLOOD PRESSURE: 122 MMHG | HEIGHT: 65 IN | WEIGHT: 293 LBS | BODY MASS INDEX: 48.82 KG/M2 | HEART RATE: 70 BPM | DIASTOLIC BLOOD PRESSURE: 74 MMHG

## 2021-05-16 VITALS — WEIGHT: 293 LBS | RESPIRATION RATE: 16 BRPM | HEIGHT: 65 IN | BODY MASS INDEX: 48.82 KG/M2

## 2021-05-16 VITALS — WEIGHT: 293 LBS | HEIGHT: 65 IN | BODY MASS INDEX: 48.82 KG/M2 | RESPIRATION RATE: 20 BRPM

## 2021-05-16 VITALS
HEART RATE: 68 BPM | BODY MASS INDEX: 48.82 KG/M2 | SYSTOLIC BLOOD PRESSURE: 140 MMHG | HEIGHT: 65 IN | OXYGEN SATURATION: 95 % | RESPIRATION RATE: 18 BRPM | DIASTOLIC BLOOD PRESSURE: 88 MMHG | TEMPERATURE: 97.6 F | WEIGHT: 293 LBS

## 2021-05-16 VITALS
WEIGHT: 293 LBS | RESPIRATION RATE: 18 BRPM | OXYGEN SATURATION: 95 % | SYSTOLIC BLOOD PRESSURE: 134 MMHG | HEART RATE: 72 BPM | DIASTOLIC BLOOD PRESSURE: 82 MMHG | BODY MASS INDEX: 48.82 KG/M2 | TEMPERATURE: 97.9 F | HEIGHT: 65 IN

## 2021-05-16 VITALS
WEIGHT: 293 LBS | BODY MASS INDEX: 48.82 KG/M2 | SYSTOLIC BLOOD PRESSURE: 146 MMHG | HEIGHT: 65 IN | DIASTOLIC BLOOD PRESSURE: 86 MMHG | HEART RATE: 66 BPM

## 2021-05-16 VITALS — HEIGHT: 65 IN | WEIGHT: 293 LBS | RESPIRATION RATE: 16 BRPM | BODY MASS INDEX: 48.82 KG/M2

## 2021-05-16 VITALS
OXYGEN SATURATION: 96 % | SYSTOLIC BLOOD PRESSURE: 134 MMHG | HEIGHT: 65 IN | TEMPERATURE: 98.2 F | DIASTOLIC BLOOD PRESSURE: 78 MMHG | WEIGHT: 293 LBS | BODY MASS INDEX: 48.82 KG/M2 | HEART RATE: 72 BPM | RESPIRATION RATE: 20 BRPM

## 2021-05-16 VITALS
DIASTOLIC BLOOD PRESSURE: 64 MMHG | HEIGHT: 65 IN | OXYGEN SATURATION: 97 % | WEIGHT: 293 LBS | HEART RATE: 67 BPM | TEMPERATURE: 97.2 F | SYSTOLIC BLOOD PRESSURE: 116 MMHG | BODY MASS INDEX: 48.82 KG/M2 | RESPIRATION RATE: 16 BRPM

## 2021-05-16 VITALS
WEIGHT: 293 LBS | HEIGHT: 65 IN | SYSTOLIC BLOOD PRESSURE: 140 MMHG | OXYGEN SATURATION: 95 % | DIASTOLIC BLOOD PRESSURE: 74 MMHG | TEMPERATURE: 98.2 F | HEART RATE: 69 BPM | BODY MASS INDEX: 48.82 KG/M2

## 2021-05-16 VITALS
OXYGEN SATURATION: 98 % | HEIGHT: 65 IN | BODY MASS INDEX: 48.82 KG/M2 | WEIGHT: 293 LBS | HEART RATE: 61 BPM | DIASTOLIC BLOOD PRESSURE: 82 MMHG | SYSTOLIC BLOOD PRESSURE: 130 MMHG | RESPIRATION RATE: 26 BRPM | TEMPERATURE: 98.3 F

## 2021-05-16 VITALS
HEART RATE: 77 BPM | RESPIRATION RATE: 16 BRPM | OXYGEN SATURATION: 96 % | DIASTOLIC BLOOD PRESSURE: 64 MMHG | BODY MASS INDEX: 48.82 KG/M2 | TEMPERATURE: 97.9 F | HEIGHT: 65 IN | WEIGHT: 293 LBS | SYSTOLIC BLOOD PRESSURE: 118 MMHG

## 2021-05-16 VITALS
TEMPERATURE: 98.1 F | WEIGHT: 293 LBS | DIASTOLIC BLOOD PRESSURE: 88 MMHG | OXYGEN SATURATION: 96 % | HEART RATE: 74 BPM | BODY MASS INDEX: 48.82 KG/M2 | RESPIRATION RATE: 15 BRPM | SYSTOLIC BLOOD PRESSURE: 134 MMHG | HEIGHT: 65 IN

## 2021-05-16 VITALS
BODY MASS INDEX: 48.82 KG/M2 | WEIGHT: 293 LBS | RESPIRATION RATE: 16 BRPM | SYSTOLIC BLOOD PRESSURE: 126 MMHG | HEART RATE: 72 BPM | HEIGHT: 65 IN | TEMPERATURE: 96.9 F | OXYGEN SATURATION: 95 % | DIASTOLIC BLOOD PRESSURE: 89 MMHG

## 2021-05-16 VITALS
HEIGHT: 65 IN | SYSTOLIC BLOOD PRESSURE: 168 MMHG | BODY MASS INDEX: 48.82 KG/M2 | DIASTOLIC BLOOD PRESSURE: 82 MMHG | WEIGHT: 293 LBS | HEART RATE: 78 BPM

## 2021-05-16 VITALS
HEART RATE: 78 BPM | DIASTOLIC BLOOD PRESSURE: 56 MMHG | WEIGHT: 293 LBS | HEIGHT: 65 IN | BODY MASS INDEX: 48.82 KG/M2 | SYSTOLIC BLOOD PRESSURE: 92 MMHG

## 2021-05-16 VITALS
WEIGHT: 293 LBS | DIASTOLIC BLOOD PRESSURE: 64 MMHG | OXYGEN SATURATION: 94 % | SYSTOLIC BLOOD PRESSURE: 128 MMHG | RESPIRATION RATE: 18 BRPM | TEMPERATURE: 97.8 F | HEIGHT: 65 IN | BODY MASS INDEX: 48.82 KG/M2 | HEART RATE: 77 BPM

## 2021-05-16 VITALS
RESPIRATION RATE: 16 BRPM | TEMPERATURE: 98.1 F | WEIGHT: 293 LBS | HEART RATE: 73 BPM | OXYGEN SATURATION: 95 % | BODY MASS INDEX: 48.82 KG/M2 | HEIGHT: 65 IN | DIASTOLIC BLOOD PRESSURE: 82 MMHG | SYSTOLIC BLOOD PRESSURE: 128 MMHG

## 2021-05-16 VITALS — HEIGHT: 65 IN | BODY MASS INDEX: 48.82 KG/M2 | WEIGHT: 293 LBS | RESPIRATION RATE: 20 BRPM

## 2021-05-20 ENCOUNTER — OFFICE VISIT (OUTPATIENT)
Dept: ONCOLOGY | Facility: CLINIC | Age: 66
End: 2021-05-20

## 2021-05-20 ENCOUNTER — INFUSION (OUTPATIENT)
Dept: ONCOLOGY | Facility: HOSPITAL | Age: 66
End: 2021-05-20

## 2021-05-20 VITALS
SYSTOLIC BLOOD PRESSURE: 115 MMHG | RESPIRATION RATE: 17 BRPM | HEIGHT: 65 IN | TEMPERATURE: 97.8 F | HEART RATE: 70 BPM | OXYGEN SATURATION: 97 % | DIASTOLIC BLOOD PRESSURE: 66 MMHG | BODY MASS INDEX: 48.82 KG/M2 | WEIGHT: 293 LBS

## 2021-05-20 DIAGNOSIS — K59.03 DRUG-INDUCED CONSTIPATION: ICD-10-CM

## 2021-05-20 DIAGNOSIS — Z79.899 ENCOUNTER FOR LONG-TERM (CURRENT) USE OF HIGH-RISK MEDICATION: ICD-10-CM

## 2021-05-20 DIAGNOSIS — Z79.899 ENCOUNTER FOR EYE EXAM DUE TO HIGH RISK MEDICATION: Primary | ICD-10-CM

## 2021-05-20 DIAGNOSIS — C50.911 INFLAMMATORY BREAST CANCER, RIGHT (HCC): ICD-10-CM

## 2021-05-20 DIAGNOSIS — C50.911 INFLAMMATORY BREAST CANCER, RIGHT (HCC): Primary | ICD-10-CM

## 2021-05-20 DIAGNOSIS — E66.2 OBESITY HYPOVENTILATION SYNDROME (HCC): ICD-10-CM

## 2021-05-20 DIAGNOSIS — C50.611 MALIGNANT NEOPLASM OF AXILLARY TAIL OF RIGHT FEMALE BREAST, UNSPECIFIED ESTROGEN RECEPTOR STATUS (HCC): ICD-10-CM

## 2021-05-20 LAB
ALBUMIN SERPL-MCNC: 3.9 G/DL (ref 3.5–5.2)
ALBUMIN/GLOB SERPL: 1.6 G/DL (ref 1.1–2.4)
ALP SERPL-CCNC: 107 U/L (ref 38–116)
ALT SERPL W P-5'-P-CCNC: 25 U/L (ref 0–33)
ANION GAP SERPL CALCULATED.3IONS-SCNC: 8.8 MMOL/L (ref 5–15)
AST SERPL-CCNC: 13 U/L (ref 0–32)
BASOPHILS # BLD AUTO: 0.09 10*3/MM3 (ref 0–0.2)
BASOPHILS NFR BLD AUTO: 1.5 % (ref 0–1.5)
BILIRUB SERPL-MCNC: 0.4 MG/DL (ref 0.2–1.2)
BUN SERPL-MCNC: 22 MG/DL (ref 6–20)
BUN/CREAT SERPL: 32.4 (ref 7.3–30)
CALCIUM SPEC-SCNC: 9.4 MG/DL (ref 8.5–10.2)
CHLORIDE SERPL-SCNC: 99 MMOL/L (ref 98–107)
CO2 SERPL-SCNC: 29.2 MMOL/L (ref 22–29)
CREAT SERPL-MCNC: 0.68 MG/DL (ref 0.6–1.1)
DEPRECATED RDW RBC AUTO: 62 FL (ref 37–54)
EOSINOPHIL # BLD AUTO: 0 10*3/MM3 (ref 0–0.4)
EOSINOPHIL NFR BLD AUTO: 0 % (ref 0.3–6.2)
ERYTHROCYTE [DISTWIDTH] IN BLOOD BY AUTOMATED COUNT: 18.7 % (ref 12.3–15.4)
GFR SERPL CREATININE-BSD FRML MDRD: 86 ML/MIN/1.73
GLOBULIN UR ELPH-MCNC: 2.5 GM/DL (ref 1.8–3.5)
GLUCOSE SERPL-MCNC: 206 MG/DL (ref 74–124)
HCT VFR BLD AUTO: 33.1 % (ref 34–46.6)
HGB BLD-MCNC: 9.9 G/DL (ref 12–15.9)
IMM GRANULOCYTES # BLD AUTO: 0.87 10*3/MM3 (ref 0–0.05)
IMM GRANULOCYTES NFR BLD AUTO: 14.2 % (ref 0–0.5)
LYMPHOCYTES # BLD AUTO: 0.25 10*3/MM3 (ref 0.7–3.1)
LYMPHOCYTES NFR BLD AUTO: 4.1 % (ref 19.6–45.3)
MCH RBC QN AUTO: 26.8 PG (ref 26.6–33)
MCHC RBC AUTO-ENTMCNC: 29.9 G/DL (ref 31.5–35.7)
MCV RBC AUTO: 89.7 FL (ref 79–97)
MONOCYTES # BLD AUTO: 0.05 10*3/MM3 (ref 0.1–0.9)
MONOCYTES NFR BLD AUTO: 0.8 % (ref 5–12)
NEUTROPHILS NFR BLD AUTO: 4.85 10*3/MM3 (ref 1.7–7)
NEUTROPHILS NFR BLD AUTO: 79.4 % (ref 42.7–76)
NRBC BLD AUTO-RTO: 0 /100 WBC (ref 0–0.2)
PLATELET # BLD AUTO: 310 10*3/MM3 (ref 140–450)
PMV BLD AUTO: 9.4 FL (ref 6–12)
POTASSIUM SERPL-SCNC: 4.2 MMOL/L (ref 3.5–4.7)
PROT SERPL-MCNC: 6.4 G/DL (ref 6.3–8)
RBC # BLD AUTO: 3.69 10*6/MM3 (ref 3.77–5.28)
SODIUM SERPL-SCNC: 137 MMOL/L (ref 134–145)
WBC # BLD AUTO: 6.11 10*3/MM3 (ref 3.4–10.8)

## 2021-05-20 PROCEDURE — 85025 COMPLETE CBC W/AUTO DIFF WBC: CPT

## 2021-05-20 PROCEDURE — 25010000002 HEPARIN LOCK FLUSH PER 10 UNITS: Performed by: INTERNAL MEDICINE

## 2021-05-20 PROCEDURE — 80053 COMPREHEN METABOLIC PANEL: CPT

## 2021-05-20 PROCEDURE — 36591 DRAW BLOOD OFF VENOUS DEVICE: CPT

## 2021-05-20 PROCEDURE — 99214 OFFICE O/P EST MOD 30 MIN: CPT | Performed by: NURSE PRACTITIONER

## 2021-05-20 RX ORDER — HEPARIN SODIUM (PORCINE) LOCK FLUSH IV SOLN 100 UNIT/ML 100 UNIT/ML
500 SOLUTION INTRAVENOUS AS NEEDED
Status: DISCONTINUED | OUTPATIENT
Start: 2021-05-20 | End: 2021-05-20 | Stop reason: HOSPADM

## 2021-05-20 RX ORDER — SODIUM CHLORIDE 0.9 % (FLUSH) 0.9 %
10 SYRINGE (ML) INJECTION AS NEEDED
Status: CANCELLED | OUTPATIENT
Start: 2021-05-20

## 2021-05-20 RX ORDER — HEPARIN SODIUM (PORCINE) LOCK FLUSH IV SOLN 100 UNIT/ML 100 UNIT/ML
500 SOLUTION INTRAVENOUS AS NEEDED
Status: CANCELLED | OUTPATIENT
Start: 2021-05-20

## 2021-05-20 RX ADMIN — Medication 500 UNITS: at 14:17

## 2021-05-20 NOTE — PATIENT INSTRUCTIONS
CONSTIPATION:  1. Senna-S 1-2 tabs nightly  2. If suppository preferred, may try Bisacodyl suppository    FOR FUTURE STEROIDS WITH TREATMENT:  1. Take just ONE Decadron daily x3 days Adriamycin

## 2021-05-20 NOTE — PROGRESS NOTES
Subjective     REASON FOR FOLLOW-UP: Right breast inflammatory breast, triple positive                              REQUESTING PHYSICIAN: MD Francisco Esteban MD Bethany Haynes, MD    History of Present Illness patient is a 66 y.o. 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    Patient had worsening shortness of breath requiring continuous oxygen.  CT of the chest performed to rule out pneumonitis and she was started on steroids.  CT showed bilateral groundglass infiltrates presumed to be related to Taxol pneumonitis and therefore Taxol discontinued.  Patient completed the fourth cycle of Perjeta/Herceptin alone.  Diarrhea was an issue but not as bad without the Taxol.    Patient proceeded with cycle 1 Adriamycin/Cytoxan on 5/14/2021 and is seen back today for 1 week follow-up.  She is feeling more fatigued today.  She did have difficulty with the dexamethasone and it seems there was miscommunication on her holding this in light of her still being on prednisone.  Therefore with a combination of steroids she had significant insomnia and jitteriness.  She did not have any nausea thankfully.  We discussed perhaps decreasing the amount of dexamethasone she takes with cycle 2.  She has struggle with significant constipation this week and we discussed interventions to help with this.    At this point in regards to prednisone for her pneumonitis we discussed further tapering to 5 mg every other day for 1 more week and then discontinuing as previously instructed.  Her breathing remains stable though she does require continuous oxygen, unchanged.    Patient had some questions about immunocompromise state and recent changes in mast mandate in relation to Covid pandemic.  These questions were  addressed to her satisfaction.  She denies other concerns at this time.      Past Medical History:   Diagnosis Date   • Anemia    • Asthma    • Chronic diastolic (congestive) heart failure (CMS/HCC)    • Chronic hypoxemic respiratory failure (CMS/HCC)     on continuous O2   • H/O Intraductal papilloma    • Hemorrhoids    • History of transfusion     AFTER BACK SURGERY   • Hypertension    • Inflammatory breast cancer (CMS/HCC)     right   • Kidney stone    • Morbid obesity with BMI of 50.0-59.9, adult (CMS/HCC)    • Obesity hypoventilation syndrome (CMS/HCC) 2021   • On home oxygen therapy     2.5 AT REST WITH ACTIVITY UP TO 4L   • ANABELLE on CPAP    • Osteoarthritis    • Type 2 diabetes mellitus (CMS/HCC)         Past Surgical History:   Procedure Laterality Date   • BREAST EXCISIONAL BIOPSY Bilateral    • COLONOSCOPY     • CYSTOSCOPY BLADDER STONE LITHOTRIPSY     • KNEE ARTHROPLASTY Bilateral    • SPINE SURGERY     • TOTAL ABDOMINAL HYSTERECTOMY WITH SALPINGO OOPHORECTOMY     • VENOUS ACCESS DEVICE (PORT) INSERTION N/A 2021    Procedure: INSERTION VENOUS ACCESS DEVICE;  Surgeon: Nicci Banks MD;  Location: [facility];  Service: General;  Laterality: N/A;      ONC HISTORY  patient is a 65-year-old white female with morbid obesity, history of diastolic congestive heart failure and unknown type of pulmonary disease for which she has been on oxygen for 4 years.    She has been getting routine mammography since 38 years of age because her mother  at 46 of breast cancer and had a biopsy of her left breast in  which was apparently benign and another biopsy in 2018 on her right breast which showed a small focus of atypical ductal hyperplasia and atypical lobular hyperplasia with a residual intraductal papilloma.  At that time she saw medical oncologist who recommended genetic testing and prevention but the insurance would not cover the genetic testing and the medical oncologist  thought tamoxifen was too risky for her because of her comorbidities and no treatment was given.  More recently she noticed redness of the right breast in October and showed it to her family doctor who gave her course of Keflex when it did not improve and mammogram was ordered and this was benign  When the redness persisted she saw her gynecologist with concern for inflammatory breast cancer and referred her to Dr. Banks after repeat imaging and [facility] right [facility]  Preliminary report shows micropapillary invasive mammary carcinoma intermediate grade measuring 13 mm right axillary node was also involved with metastatic cancer ER/CT and HER-2 are pending  Patient saw Dr. Banks who sent her for skin biopsies and she had 3 separate punch biopsy of the skin that showed Perivascular and perifollicular inflammation with no obvious malignancy at 3:00 12:00 and 9:00  In addition staging work-up with CAT scans and bone scan were ordered.  CAT scan of the chest showed a borderline mediastinal  Infracarinal node measuring 15 mm in length mild cardiomegaly and heavy coronary artery calcifications  CT of the abdomen showed a 2.5 x 2.2 cm right adrenal mass which the patient tells me she has had in the past and is most likely benign but also a 2.7 cm left external iliac node which is indeterminate.  Bone scan was negative    Echocardiogram is scheduled for later next week and genetic testing with the qcueitae stat panel is pending    Patient is  3 para 3 menarche was at age 9 menopause at 49 when she had a hysterectomy and oophorectomy  First childbirth was at age 20 she breast-fed her second and third children and took no hormone replacement after menopause    Family history is positive for mother dying of breast cancer at age 46 she is a maternal aunt with breast cancer in her 70s a paternal aunt with breast cancer in her 70s paternal grandmother with  colon cancer.  Her father had Hodgkin's disease and non-Hodgkin's lymphoma but  of small cell lung cancer at 67      She has not had a heart attack stroke or blood clot    Plan to do echocardiogram soon as possible to ascertain tolerability of cardiotoxic chemotherapy which would be typically indicated with an inflammatory breast cancer    Also plan PET scan to follow-up on atypical lymph nodes and confirm benign etiology of the adrenal lesion    She will have port placement and chemo education pt is  ER/KY and HER-2 +    I explained to Aida that the goal of treatment would be curative but she has a lot of comorbidities which may limit our ability to give the most effective chemotherapy in the setting  I told her radiation would be involved and possibly hormonal therapy for 10 years as she has hormone positivity and HER-2 directed therapy    She expressed some concerns about driving back and forth from [place] but felt that once a week was doable    We will see her back in 2 weeks to start treatment with a port placement planned early next week      Patient did have a mild reaction to Taxol dose #1 with some increased shortness of breath and flushing.  This was responsive to 100 mg of Solu-Cortef.  She states this gave her a headache and made her quite talkative but otherwise she was thankfully able to complete Taxol infusion without further incident.    Patient reports issues with chronic diarrhea over the last few years and did note a little bit increase in stooling following THP though nothing overly significant in her mind.  She did finally begin taking Imodium just a few days ago and has had no further stools.  She does note significant trouble with hemorrhoids in relation to the diarrhea   Required blood transfusion due to significant hemorrhoidal bleeding plus iron deficiency.  Injectafer planned    Due to inclement weather cycle 1 day 8 therapy was missed.  She did get day 15 therapy with fairly  good tolerance except for some diarrhea.    She was found to be iron deficient despite trying oral iron.  We therefore elected to proceed with IV Injectafer but she remains mildly anemic with a hemoglobin of 9.3    Current Outpatient Medications on File Prior to Visit   Medication Sig Dispense Refill   • albuterol sulfate  (90 Base) MCG/ACT inhaler Inhale 2 puffs Every 4 (Four) Hours As Needed.     • carvedilol (COREG) 25 MG tablet Take 12.5 mg by mouth 2 (Two) Times a Day With Meals.     • Cholecalciferol (Vitamin D) 50 MCG (2000 UT) capsule Take 2,000 Units by mouth Daily.     • dexamethasone (DECADRON) 4 MG tablet Take 2 tablets in the morning daily on Days 2, 3 & 4.  Take with food. 6 tablet 3   • dicyclomine (BENTYL) 20 MG tablet Take 1 tablet by mouth Every 6 (Six) Hours. 60 tablet 2   • fexofenadine (ALLEGRA) 180 MG tablet Take 180 mg by mouth Daily As Needed.     • FLUoxetine (PROzac) 40 MG capsule Take 40 mg by mouth Daily.     • furosemide (LASIX) 40 MG tablet Take 20 mg by mouth Daily.     • hydrocortisone 2.5 % cream Apply  topically to the appropriate area as directed 2 (Two) Times a Day. 3.5 g 0   • Lidocaine, Anorectal, (LMX 5) 5 % cream cream Apply  topically to the appropriate area as directed 3 (Three) Times a Day As Needed (hemmorrhoid pain). 45 g 2   • lisinopril (PRINIVIL,ZESTRIL) 5 MG tablet Take 1 tablet by mouth Daily. 30 tablet 6   • mometasone-formoterol (DULERA 100) 100-5 MCG/ACT inhaler Inhale 2 Puffs/kg Every Night.     • montelukast (SINGULAIR) 10 MG tablet Take 10 mg by mouth Every Night.     • multivitamin (THERAGRAN) tablet tablet Take 1 tablet by mouth Daily. TO HOLD 1 WEEK BEFORE SURGERY     • O2 (OXYGEN) Inhale 2.5 L/min Continuous.     • omeprazole (priLOSEC) 20 MG capsule Take 2 capsules by mouth Daily. 60 capsule 3   • ondansetron (ZOFRAN) 8 MG tablet Take 1 tablet by mouth 3 (Three) Times a Day As Needed for Nausea or Vomiting. 30 tablet 5   • Potassium 99 MG tablet  Take 1 tablet by mouth Every Night.     • pravastatin (PRAVACHOL) 40 MG tablet Take 40 mg by mouth Every Night.     • predniSONE (DELTASONE) 10 MG tablet Take 2 tablets by mouth 2 (Two) Times a Day. 60 tablet 1   • spironolactone (ALDACTONE) 25 MG tablet Take 25 mg by mouth Daily.     • [DISCONTINUED] ondansetron (ZOFRAN) 8 MG tablet Take 1 tablet by mouth 3 (Three) Times a Day As Needed for Nausea or Vomiting. 30 tablet 5   • [DISCONTINUED] riFAXIMin (XIFAXAN) 550 MG tablet Take one tablet daily x7 days starting on day of Perjeta chemo each cycle 7 tablet 3   • metFORMIN (GLUCOPHAGE) 500 MG tablet Take 500 mg by mouth Daily With Breakfast.       No current facility-administered medications on file prior to visit.        ALLERGIES:    Allergies   Allergen Reactions   • Amlodipine Swelling     LEGS    • Hydrochlorothiazide Unknown - Low Severity     Neuro issues   • Morphine Nausea And Vomiting     hallucinations   • Adhesive Tape Rash        Social History     Socioeconomic History   • Marital status:      Spouse name: Not on file   • Number of children: 3   • Years of education: Not on file   • Highest education level: Not on file   Tobacco Use   • Smoking status: Never Smoker   • Smokeless tobacco: Never Used   Vaping Use   • Vaping Use: Never used   Substance and Sexual Activity   • Alcohol use: Not Currently   • Drug use: Never   • Sexual activity: Defer        Family History   Problem Relation Age of Onset   • Breast cancer Mother 45   • Colon cancer Paternal Grandmother    • Breast cancer Maternal Aunt 70   • Breast cancer Paternal Aunt 70   • Lung cancer Father    • Hodgkin's lymphoma Father    • Skin cancer Father         squamous cell   • Ovarian cancer Neg Hx    • Uterine cancer Neg Hx    • Deep vein thrombosis Neg Hx    • Pulmonary embolism Neg Hx    • Malig Hyperthermia Neg Hx         Review of Systems   Constitutional: Positive for fatigue. Negative for appetite change, chills, diaphoresis, fever  "and unexpected weight change.   HENT: Negative for hearing loss, sore throat, trouble swallowing and voice change.    Respiratory: Positive for shortness of breath (Improved with prednisone). Negative for cough, chest tightness and wheezing.    Cardiovascular: Negative for chest pain, palpitations and leg swelling.   Gastrointestinal: Positive for constipation. Negative for abdominal distention, abdominal pain, diarrhea, nausea, rectal pain (hemmorrhoids -improved) and vomiting.   Genitourinary: Negative for dysuria, frequency, hematuria and urgency.   Musculoskeletal: Negative for joint swelling.        No muscle weakness.   Skin: Negative for rash and wound.   Neurological: Negative for seizures, syncope, speech difficulty, weakness, numbness and headaches.   Hematological: Negative for adenopathy. Does not bruise/bleed easily.   Psychiatric/Behavioral: Negative.  Negative for behavioral problems, confusion and suicidal ideas.   All other systems reviewed and are negative.       Objective     Vitals:    05/20/21 1422   BP: 115/66   Pulse: 70   Resp: 17   Temp: 97.8 °F (36.6 °C)   TempSrc: Skin   SpO2: 97%   Weight: (!) 159 kg (350 lb 11.2 oz)   Height: 165.1 cm (65\")   PainSc: 0-No pain     Current Status 5/20/2021   ECOG score 1       Physical Exam  Chest:             CONSTITUTIONAL:  Vital signs reviewed.  No distress, looks comfortable. Morbidly obese  EYES:  Conjunctiva and lids unremarkable.  PERRLA  EARS,NOSE,MOUTH,THROAT:  Ears and nose appear unremarkable.  Lips, teeth, gums appear unremarkable.  RESPIRATORY:  Normal respiratory effort.  Lungs clear to auscultation bilaterally.  No axillary adenopathy  BREASTS: Both breasts are pendulous; the right breast is no longer erythemic and much softer now to palpation.  Minimal  peau de orange appearance around the nipple.    CARDIOVASCULAR:  Normal S1, S2.  No murmurs rubs or gallops.  1+ brawny lower extremity edema.  GASTROINTESTINAL: Abdomen appears " unremarkable.  Nontender.  No hepatomegaly.  No splenomegaly.  LYMPHATIC:  No cervical, supraclavicular, axillary lymphadenopathy.  SKIN:  Warm.  Inclusion cyst left axilla  PSYCHIATRIC:  Normal judgment and insight.  Normal mood and affect.          RECENT LABS:  Results from last 7 days   Lab Units 05/20/21  1410 05/14/21  0844   WBC 10*3/mm3 6.11 12.94*   NEUTROS ABS 10*3/mm3 4.85 9.77*   HEMOGLOBIN g/dL 9.9* 10.0*   HEMATOCRIT % 33.1* 32.8*   PLATELETS 10*3/mm3 310 308     Results from last 7 days   Lab Units 05/20/21  1410 05/14/21  0844   SODIUM mmol/L 137 142   POTASSIUM mmol/L 4.2 3.5   CHLORIDE mmol/L 99 103   CO2 mmol/L 29.2* 29.5*   BUN mg/dL 22* 10   CREATININE mg/dL 0.68 0.69   CALCIUM mg/dL 9.4 9.4   ALBUMIN g/dL 3.90 3.70   BILIRUBIN mg/dL 0.4 0.2   ALK PHOS U/L 107 92   ALT (SGPT) U/L 25 20   AST (SGOT) U/L 13 15   GLUCOSE mg/dL 206* 113             PET  IMPRESSION:  1.  Moderate to intensely FDG avid asymmetric soft tissue and skin  thickening involving the right breast likely representing patient's  known malignancy.  2.  Intensely FDG avid right axillary and subpectoral adenopathy likely  represent metastatic disease.  3.  Constellation of findings within the right adrenal gland are favored  to represent a lipid rich adenoma. Continued attention on follow-up is  recommended to ensure stability.  4.  While there are no findings of definite FDG avid osseous metastasis,  given the heterogenous FDG uptake throughout the axial and appendicular  skeleton due to the above stated limitations, subtle underlying osseous  metastasis would remain occult. Therefore, continued close attention on  follow-up is recommended to exclude this possibility.  5.  Short segment of moderate to intense FDG uptake within the distal  esophagus and GE junction suggestive of esophagitis. In the appropriate  clinical context correlation with patient history is recommended with  follow-up endoscopy if clinically indicated.  6.   Sub-6 mm pulmonary nodule within the right lower lobe is below PET  resolution and indeterminate. Continued close attention on follow-up  with chest CT in 3 months is recommended to exclude metastatic disease.  7.  Other findings as above.     This report was finalized on 2/2/2021     Assessment/Plan   1. [id number] right breast cancer ER/ME HER-2 -3+ positive inflammatory breast cancer for neoadjuvant chemotherapy  · Staging work-up negative except for 6 mm lung nodule and axillary and subpectoral adenopathy  · THP followed by AC planned if she tolerates it  · C1D8 Taxol missed due to inclement weather.  · Taxol discontinued after 7 doses due to probable Taxol pneumonitis treated with steroids.    · C1 Adriamycin/Cytoxan given 5/14/2021.    2.  Morbid obesity    3.  History of diastolic heart failure  · Echocardiogram with ejection fraction of 64% normal strain  · Cleared by cardio-oncology for chemotherapy    4.  Pulmonary disease?  Etiology on oxygen for 4 years?  Pickwickian    5.  Strong family history of breast cancer genetic testing 84 genes negative    6.  Probable benign adrenal adenoma-PET negative    7.  Questionable enlarged lymph nodes left iliac chain and mediastinum likely reactive-PET negative    8.  6 mm right lower lobe nodule below PET resolution pretreatment needs follow-up after THP    9.  Abnormal uptake short segment esophagus with a history of Schatzki's ring-we will double PPI and watch closely but we we will proceed with chemotherapy at this point and refer back to GI-doubt she has metastatic disease to this area    10. Anemia with microcytic indices  · Iron studies performed 2/10/2021.  · 2/24/2021: reviewed with the patient that she is iron deficient, with ferritin of 16, iron saturation of 4%.  Patient reports taking ferrous gluconate in the past but this caused GI upset.  Also with her chronic diarrhea I do not think she can absorb it.  We will pursue IV iron with plans to initiate this  next week pending insurance approval. In addition hemoglobin down to 8.0 and transfusion pursued.  · 3/3/2021: IV Injectafer initiated x2.   · Hemoglobin down to 9.9 one week out from first AC though overall stable.  Monitor.     11. Hemorrhoidal pain secondary to diarrhea. Does have occasional bleeding. Topical lidocaine prescribed. Monitor.    12.  History of chronic diarrhea, ?IBS, exacerbated with Perjeta therapy.  · Patient required rifaximin 550 mg to take twice daily x7 days with each Perjeta dose.   · Since completion of Perjeta patient is now actually experiencing constipation (further discussed below).      13.  Taxol-induced pneumonitis.   · Worsening shortness of breath with CT evidence of groundglass infiltrates bilaterally suspicious for   · Patient prescribed prednisone 20 twice daily  · Breathing is overall improved.  Patient is slowly tapering off prednisone.  Today she will begin 5 mg every other day x1 week and then discontinue.      14.  Constipation following initiation of Adriamycin/Cytoxan.  · Patient is just increased fiber in her diet but asking what else she can do.  She prefers a suppository if possible.  We discussed the use of a total suppository but also consider taking senna S1-2 tabs nightly at least the first week after chemotherapy to help avoid this in the future.    15.  Mild dehydration.  ·  BUN up to 22.  This resulted after patient gone.  I did call her and encouraged her to increase hydration, specifically noncaffeinated beverages.  Creatinine remains normal at 0.6.    Plan  1. Counts reviewed with patient today including CBC and CMP.  2. Patient to decrease prednisone to 5 mg every other day x1 week and then discontinue.  3. Due to jitteriness and insomnia experienced with previous dexamethasone, and the fact that she did not have trouble with nausea, patient given the okay to take dexamethasone 4 mg 1 tab for 3 days after her next AC treatment rather than 2 tabs.  4. Patient  to utilize senna S tabs or bisacodyl suppository as needed per above.  5. Increase intake of noncaffeinated beverages to hydrate as outlined.  6. Patient to return in 2 weeks for follow-up with Dr. Azul and cycle 2 of Adriamycin/Cytoxan with Neulasta support.  Thankfully patient did not have bone pain in relation to Neulasta.     This patient is on drug therapy requiring intensive monitoring for toxicity.

## 2021-05-24 RX ORDER — DICYCLOMINE HCL 20 MG
TABLET ORAL
Qty: 60 TABLET | Refills: 2 | OUTPATIENT
Start: 2021-05-24

## 2021-05-28 VITALS
HEIGHT: 65 IN | WEIGHT: 293 LBS | HEART RATE: 78 BPM | BODY MASS INDEX: 48.82 KG/M2 | RESPIRATION RATE: 16 BRPM | DIASTOLIC BLOOD PRESSURE: 70 MMHG | OXYGEN SATURATION: 94 % | SYSTOLIC BLOOD PRESSURE: 150 MMHG | TEMPERATURE: 98.2 F

## 2021-05-28 VITALS
RESPIRATION RATE: 16 BRPM | OXYGEN SATURATION: 96 % | DIASTOLIC BLOOD PRESSURE: 92 MMHG | WEIGHT: 293 LBS | HEART RATE: 71 BPM | DIASTOLIC BLOOD PRESSURE: 67 MMHG | DIASTOLIC BLOOD PRESSURE: 72 MMHG | HEIGHT: 65 IN | HEART RATE: 67 BPM | DIASTOLIC BLOOD PRESSURE: 88 MMHG | BODY MASS INDEX: 48.82 KG/M2 | OXYGEN SATURATION: 92 % | TEMPERATURE: 98.1 F | BODY MASS INDEX: 48.82 KG/M2 | TEMPERATURE: 98.3 F | SYSTOLIC BLOOD PRESSURE: 169 MMHG | HEART RATE: 70 BPM | OXYGEN SATURATION: 97 % | OXYGEN SATURATION: 97 % | HEART RATE: 66 BPM | HEIGHT: 65 IN | BODY MASS INDEX: 48.82 KG/M2 | SYSTOLIC BLOOD PRESSURE: 152 MMHG | RESPIRATION RATE: 16 BRPM | SYSTOLIC BLOOD PRESSURE: 154 MMHG | HEIGHT: 65 IN | TEMPERATURE: 97.6 F | WEIGHT: 293 LBS | WEIGHT: 293 LBS | TEMPERATURE: 97.5 F | HEIGHT: 65 IN | BODY MASS INDEX: 48.82 KG/M2 | WEIGHT: 293 LBS | RESPIRATION RATE: 16 BRPM | RESPIRATION RATE: 16 BRPM | SYSTOLIC BLOOD PRESSURE: 157 MMHG

## 2021-05-28 VITALS
OXYGEN SATURATION: 97 % | HEART RATE: 72 BPM | RESPIRATION RATE: 24 BRPM | SYSTOLIC BLOOD PRESSURE: 156 MMHG | TEMPERATURE: 97.1 F | BODY MASS INDEX: 48.82 KG/M2 | DIASTOLIC BLOOD PRESSURE: 81 MMHG | WEIGHT: 293 LBS | HEIGHT: 65 IN

## 2021-05-28 VITALS
WEIGHT: 293 LBS | OXYGEN SATURATION: 96 % | HEART RATE: 69 BPM | TEMPERATURE: 98.2 F | SYSTOLIC BLOOD PRESSURE: 144 MMHG | HEIGHT: 65 IN | RESPIRATION RATE: 15 BRPM | BODY MASS INDEX: 48.82 KG/M2 | DIASTOLIC BLOOD PRESSURE: 90 MMHG

## 2021-05-28 NOTE — PROGRESS NOTES
Patient: LUISA RINCON     Acct: [id number]     Report: #[id number]  UNIT #: [id number]     : 1955    Encounter Date:2018  PRIMARY CARE: MATTHIAS CATES  ***Signed***  --------------------------------------------------------------------------------------------------------------------  Chief Complaint      Encounter Date      Mar 22, 2018            Primary Care Provider      MATTHIAS CATES            Referring Provider      MATTHIAS CATES            Patient Complaint      Patient is complaining of      2 Mth Fu            VITALS      Height 5 ft 5 in / 165.1 cm      Weight 342 lbs 5 oz / 155.771765 kg      BSA 2.77 m2      BMI 57.0 kg/m2      Temperature 98.1 F / 36.72 C - Oral      Pulse 66      Respirations 16      Blood Pressure 169/92 Sitting, Left Arm      Pulse Oximetry 96%, 4 liters      Exhaled Nitrous Oxide Testin            HPI      The patient is a 62 year old female with super morbid obesity and chronic     hypoxemic respiratory on 4 liters nasal cannula, chronic decompensated     diastolic heart failure and obstructive sleep apnea on home CPAP as well as     asthma who presents for follow up.  She was last seen in our office by Dr. Joanne Rosario on 2018 for hospital follow up.  She was hospitalized  through 2017 for acute on chronic decompensated diastolic heart     failure where she received IV Lasix, oral Aldactone and was discharged on these     medications. Additionally, she was treated for bronchitis and possible     pneumonia during that stay.  She returns today that she is much improved after     being started on Lasix and Aldactone. She is monitoring her salt intake, has     not lost any weight, but is breathing much better.  She continues to wear     oxygen at all times.  She has a CPAP which she is consistently wearing at     night.  She is scheduled for pulmonary rehabilitation, but has yet to start     with them secondary to being  ill this past winter.  She has followed up with     Dr. Santana who repeated echocardiogram in his office, but she is unaware of     these results. She had stopped taking her inhaled corticosteroids secondary to     feeling better and notes that her Dulera has been out of date.  She denies     fevers, chills or night sweats, still gets dyspneic with exertion.              Review of systems is significant for shortness of breath and dyspnea on     exertion.              Past medical, family, social and surgical history were reviewed with the     patient and updated in the chart, unchanged since last visit.              Medications:  Reviewed by myself.            ROS      Constitutional:  Denies: Fatigue, Fever, Weight gain, Weight loss, Chills,     Insomnia, Other      Respiratory/Breathing:  Complains of: Shortness of air, Denies: Wheezing, Cough    , Hemoptysis, Pleuritic pain, Other      Endocrine:  Denies: Polydipsia, Polyuria, Heat/cold intolerance, Abnorml     menstrual pattern, Diabetes, Other      Eyes:  Denies: Blurred vision, Vision Changes, Other      Ears, nose, mouth, throat:  Denies: Congestion, Dysphagia, Hearing Changes,     Nose Bleeding, Nasal Discharge, Throat pain, Tinnitus, Other      Cardiovascular:  Complains of: Exertional dyspnea, Denies: Chest Pain,     Peripheral Edema, Palpitations, Syncope, Wake up Gasping for air, Orthopnea,     Tachycardia, Other      Gastrointestinal:  Denies: Abdominal pain/cramping, Bloody stools, Constipation    , Diarrhea, Melena, Nausea, Vomiting, Other      Genitourinary:  Denies: Dysuria, Urinary frequency, Incontinence, Hematuria,     Urgency, Other      Musculoskeletal:  Denies: Joint Pain, Joint Stiffness, Joint Swelling, Myalgias    , Other      Hematologic/lymphatic:  DENIES: Lymphadenopathy, Bruising, Bleeding tendencies,     Other      Neurologic:  Denies: Headache, Numbness, Weakness, Seizures, Other      Psychiatric:  Denies: Anxiety, Appropriate Effect,  Depression, Other      Sleep:  No: Excessive daytime sleep, Morning Headache?, Snoring, Insomnia?,     Stop breathing at sleep?, Other      Integumentary:  Denies: Rash, Dry skin, Skin Warm to Touch, Other            FAMILY/SOCIAL/MEDICAL HX      Current History            Problems         Chronic Problems:         780.57 SLEEP APNEA (Onset: 7/7/11)         278.01 MORBID OBESITY (Onset: 7/7/11)         272.4 HYPERLIPIDEMIA (Onset: 7/7/11, Acute)         300.4 ANXIETY AND DEPRESSION (Onset: 7/7/11)         477.9 ALLERGIC RHINITIS (Onset: 7/7/11, Acute)         401.9 HYPERTENSION NOS (Onset: 7/7/11)      Surgical History:  Yes: Abdominal Surgery (egd, colonoscopy), Back Surgery (    1984 LUMBAR LAMINECTOMY), Bowel Surgery (COLONOSCOPY), Oral Surgery (tonsils),     Orthopedic Surgery ( BILAT. TOTAL KNEE REPLACEMENT X 2 2009, laminectomy),     Throat Surgery (TONSILLECTOMY BUT STATES THEY HAVE GROWN BACK), Other Surgeries    , No: AAA Repair, Angioplasty, Appendectomy, Bladder Surgery, Breast Surgery,     CABG, Carotid Stenosis, Cholecystectomy, Ear Surgery, Eye Surgery, Head Surgery    , Hernia Surgery, Kidney Surgery, Nose Surgery, Prostatectomy, Rectal Surgery,     Spinal Surgery, Testicular Surgery, Valve Replacement, Vascular Surgery      Stroke - Family Hx:  Grandparent      Heart - Family Hx:  Grandparent, Aunt, Uncle      Cancer/Type - Family Hx:  Mother (BREAST), Grandparent (COLON), Aunt (BREAST)      Social History:  No Tobacco Use, No Alcohol Use, No Recreational Drug use      Smoking status:  Never smoker      Exercise Activity:  None      Occupation:  RETIRED      [employer]/Social Activities:  READING,COOKING,SEWING      Whom do you live with?:        Number of Pregnancies:  3      Number of Delivers:  3      Hysterectomy:  Yes      Anticoagulation Therapy:  No      Antibiotic Prophylaxis:  No      Medical History:  Yes: Allergies, Anemia, Arthritis (osteoarthritis), Asthma,     Blood Disease (ANEMIA),  "Broken Bones, Chemotherapy/Cancer (BASAL CELL SKIN     CANCER -REMOVED), Chronic Bronchitis/COPD, Depression, Head Injury, Hemorrhoids/    Rectal Prob (RECTAL BLEEDING, NAUSEA, DIARRHEA,inflam. bowel disorder,gastritis,    HHosteoa), High Blood Pressure, Migrane Headaches, Night sweats, Shortness Of     Breath (sleep apnea), Sinus Trouble, No: Alcoholism, Cataracts, Chemical     Dependency, Emphysema, Chronic Liver Disease, Colon Trouble, Colitis,     Diverticulitis, Congestive Heart Failu, Deafness or Ringing Ears, Convulsions,     Anxiety, Bipolar Disorder, Diabetes, Epilepsy, Seizures, Forgetfullness,     Glaucoma, Gall Stones, Gout, Heart Attack, Heart Murmur, Hepatitis, Hiatal     Hernia, High Cholesterol, HIV (Do not ask - volu, Jaundice, Kidney or Bladder     Disease, Kidney Stones, Mitral Valve Prolapse, Phlebitis, Psychiatric Care,     Reflux Disease, Rheumatic Fever, Sexually Transmitted Dis, Skin Disease/Psoriais    /Ecz, Stroke, Thyroid Problem, Tuberculosis or Pos TB Te, Miscellaneous Medical/    oth            Hx Influenza Vaccination:  Yes      Date Influenza Vaccine Given:  Nov 1, 2017      Influenza Vaccine Declined:  No      2 or More Falls Past Year?:  No      Fall Past Year with Injury?:  No      Hx Pneumococcal Vaccination:  Yes      Encouraged to follow-up with:  PCP regarding preventative exams.      Chart initiated by      lalo hernández ma            ALLERGIES/MEDICATIONS      Allergies:        Coded Allergies:             AMLODIPINE BESYLATE (Verified  Allergy, Severe, SWELLING, 3/22/18)           HYDROCHLOROTHIAZIDE (Verified  Allergy, Unknown, NEUROLOGICAL AND KIDNEY     PROBLEMS OCCUR, 3/22/18)           MORPHINE (Verified  Allergy, Unknown, hallucinations; nausea, 3/22/18)           ADHESIVE (Unverified  Adverse Reaction, Unknown, \"TEARS MY SKIN\", 3/22/18)      Medications    Last Reconciled on 3/22/18 15:01 by BOAZ TORRE,       Psyllium Husk (Metamucil) 0.52 Gm Capsule      1 CAP PO " 5XD, #60 CAP 0 Refills         Reported         3/22/18       Spironolactone (Spironolactone*) 25 Mg Tablet      12.5 MG PO QDAY, #30 TAB 0 Refills         Reported         3/22/18       Carvedilol (Coreg) 12.5 Mg Tablet      12.5 MG PO BID, #60 TAB 0 Refills         Reported         3/22/18       Mometasone/Formoterol (Dulera 100 Mcg/5 Mcg) 13 Gm Hfa.aer.ad      1 PUFFS INH RTBID, #1 MDI         Prov: CRISSY VARGAS         12/30/17       Furosemide (Furosemide) 40 Mg Tablet      40 MG PO QDAY, #30 TAB 0 Refills         Prov: CRISSY VARGAS         12/30/17       Pravastatin Sod (Pravastatin*) 20 Mg Tablet      20 MG PO HS, #30 TAB 0 Refills         Reported         12/26/17       Albuterol/Ipratropium (Duoneb) Unknown Strength Ampul.neb      INH RTQ4H, #180 NEB 0 Refills         Reported         9/21/17       Mehul-Mometasone (Nasonex*) 17 Gm Naspr      2 PUFFS NARE EACH QDAY Y for ALLERGIES, #1 BOTTLE 0 Refills         Reported         9/18/17       Montelukast Sodium (Singulair*) 10 Mg Tab      10 MG PO HS, #30 TAB 0 Refills         Reported         9/18/17       Cholecalciferol (Vitamin D3*) 2,000 U Tablet      2000 UNITS PO QDAY, #30 TAB 0 Refills         Reported         9/18/17       Multivitamins (Multi-Vitamin) 1 Tab Tablet      1 TAB PO QDAY         Reported         12/3/12       FLUoxetine HCl (PROzac) 40 Mg Capsule      40 MG PO QDAY, CAP         Reported         12/3/12      Current Medications      Current Medications Reviewed 3/22/18            EXAM      VITAL SIGNS:  Reviewed.        GENERAL: Super morbid obese lady, BMI 57, on supplemental oxygen in no acute     distress.        NECK:  Supple without tracheal deviation or lymphadenopathy.  No thyromegaly     appreciated.      LYMPHATICS:  No cervical or supraclavicular lymphadenopathy.      HEENT: Pupils are equal, round and reactive to light. There is no scleral     icterus.  Nares patent without hypertrophy of the turbinates. No erythema of      the passages.  TMs are clear bilaterally with good cone of light. The posterior     pharynx is without  lesions or erythema.      RESPIRATORY:  There is poor aeration throughout with diminished breath sounds     in the bases bilaterally.  No wheezes, rhonchi or rales.  Tympanic to     percussion.        CARDIOVASCULAR:  Regular rate and rhythm.  No murmurs, gallops or rubs.  No     lower extremity edema.  Equal radial pulses.        GI: Soft, nontender, nondistended, no organomegaly.  Bowel sounds present in     all four quadrants.      MUSCULOSKELETAL:  No joint effusions, erythema or warmth over the major joint     systems.      SKIN:  No rashes or lesions.      NEUROLOGIC: Cranial nerves II-XII are intact bilaterally.  Moves all     extremities. Ambulates with ease.      PSYCH:  Appropriate mood and affect.      Vitals      Vitals:             Height 5 ft 5 in / 165.1 cm           Weight 342 lbs 5 oz / 155.512335 kg           BSA 2.77 m2           BMI 57.0 kg/m2           Temperature 98.1 F / 36.72 C - Oral           Pulse 66           Respirations 16           Blood Pressure 169/92 Sitting, Left Arm           Pulse Oximetry 96%, 4 liters            REVIEW      Results Reviewed      PCCS Results Reviewed?:  Yes Prev Lab Results, Yes Prev Radiology Results, Yes     [facility]      Lab Results      Reviewed echocardiogram from 11/10/2017 which was a suboptimal study.       Reviewed Sandy Rosario's last clinic note and my last clinic note.      Radiographic Results               [facility]                PACS RADIOLOGY REPORT            Patient: LUISA RINCON   Acct: #[id number]   Report: #[id number]            UNIT #: [id number]    DOS: 17 1228      INSURANCE:BLUE CROSS - KEHP   ORDER #:[id number]      LOCATION:U2  0217-01   : 1955            PROVIDERS      ADMITTING:  Kwasi Patel   ATTENDING: Kwasi Patel      FAMILY:   MATTHIAS CATES   ORDERING:  Shahab Anaya         OTHER:    DICTATING:  Slick Joya MD            REQ #:[id number]   EXAM:[facility] - CT CHEST without CONTRAST      REASON FOR EXAM:  groundglass opacities      REASON FOR VISIT:  ADMIT-PULM EDEMA            *******Signed******         PROCEDURE:   CT CHEST WITHOUT CONTRAST             COMPARISON:   [facility], CT, CHEST W/O CONTRAST, 12/09/2012, 23:    51.  [facility], CR, CHEST PA/AP   [facility], CR, CHEST       PA/AP   CONTRAST, 10/18/2017,       11:43.             INDICATIONS:   groundglass opacities             TECHNIQUE:   CT images were created without the administration of contrast     material.               PROTOCOL:     Standard imaging protocol performed                RADIATION:     DLP: 596mGy*cm          Automated exposure control was utilized to minimize radiation dose.              FINDINGS:         There is a small to moderate pericardial effusion which is mildly increased     compared to the chest       CT from October 2017. There is calcific atherosclerosis of the coronary     arteries and aorta, as       before. The central pulmonary arteries appear enlarged. The ascending aorta     appears ectatic       measuring up to 3.9 cm. Small mediastinal lymph nodes appear similar to the     prior exam. No definite       axillary adenopathy is seen. Postsurgical changes are noted in the left breast,     as before. There is       a small hiatal hernia, as before. The right adrenal lesion appears similar in     size measuring 2.7 cm       and demonstrates attenuation consistent with a lipid rich adenoma. No acute     abnormality is seen in       the visualized upper abdomen. Degenerative changes are present in the thoracic     spine. There is       flowing anterior osteophytosis in the thoracic spine suggesting DISH.             The previously seen mosaic attenuation of the pulmonary parenchyma with patchy      groundglass density       appears to have improved. No significant ground glass opacities are visualized     at this time. There       are areas of linear opacity/consolidation in the lower lungs. Some of these     areas were previously       present, but there are also new areas of linear opacity in the lung bases. No     other definite new       infiltrate is seen.             Probable trace effusions are seen in the inferior posterior costophrenic angles    , right slightly       greater than left. No pneumothorax. There appears to be central bronchial wall     thickening,       particularly in the lower lungs. There also appears to be foci of bronchial     obstruction in the       lower lungs in the area of linear opacities including in the right middle and     bilateral lower       lobes. This can be seen in the right lower lobe on series 5 images 184-212             CONCLUSION:         1. Mosaic attenuation with ground glass opacities seen on the prior chest CT     appears to have       improved in the interval.      2. There is central bronchial wall thickening with areas of bronchial     obstruction in the lower       lobes and right middle lobe. There are linear opacities/consolidation in the     lung bases including       in the areas of bronchial obstruction, some of which appear new, suspicious for     atelectasis and       volume loss. Findings may indicate bronchitis with mucous plugging. Correlate     with       symptoms/history. Some of the linear opacities were previously present and     likely represent       parenchymal scarring.      3. Trace effusions, right slightly greater than left.      4. Cardiomegaly with enlargement of central pulmonary arteries, which may     indicate pulmonary       hypertension.      5. Small to moderate pericardial effusion is mildly increased compared to     October 2017.      6. Coronary artery and aortic atherosclerosis.      7. Ectasia of the ascending aorta.       8. Small hiatal hernia and right adrenal adenoma, as before.              ANGEL DEVINE MD             Electronically Signed and Approved By: ANGEL DEVINE MD on 12/27/2017 at 15:35                            Until signed, this is an unconfirmed preliminary report that may contain      errors and is subject to change.                                              NETMA:      D:12/27/17 1535      PFT Results      [id number]  [id number]                                 [facility] [employer]                            [place], [place]  [address]               __________________________________________________________________________             Patient Name:                   Attending Physician:      Aida Conner M.D.             Patient Visit # MR #            Admit Date  Disch Date     Location      [id number]    [id number]      11/10/2017                 [facility]             Date of Birth      1955      __________________________________________________________________________      821 - DIAGNOSTIC REPORT             PULMONARY FUNCTION TEST             SPIROMETRY:      FEV1/FVC ratio 87% of predicted.      FEV1 37% of predicted, 0.94 liters.      FVC 42% of predicted, 1.41 liters.      There was no significant response to one-time bronchodilator administration.             LUNG VOLUMES:      Total lung capacity 83% of predicted.      Residual volume 144% of predicted.      Vital capacity 37% of predicted.             DIFFUSION CAPACITY:      DLCO, 34% of predicted.      DLCO VA, 73% of predicted.             There were no prior pulmonary function tests for comparison.             Lung volumes consistent with both obstructive and restrictive processes.             CONCLUSION:        1. Normal FEV1/FVC ratio with severely reduced FEV1 of 37% of predicted,           0.94 liters. There was  significant reversibility with administration of     bronchodilator.        2. Lung volumes show severe restrictive process, as well as hyperinflation,           residual volume being greater than 120%.        3. There is severe reduction in diffusion capacity, which corrects to mild           when corrected for lung volumes.         Please correlate clinically.             To be electronically signed in [facility]      [id number] BAOZ TORRE M.D.             KM:chavo      D:  12/01/2017 13:53      T:  12/01/2017 14:22      #[id number]             Until signed, this is an unconfirmed preliminary report that may contain      errors and is subject to change.                   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       Dx: Cough - R05      ASSESSMENT:       The patient is a 62 year old super morbid obese lady with a history of chronic     hypoxic respiratory failure on 4 liters of oxygen, moderate persistent asthma,     and decompensated diastolic heart failure who presents for follow up.      1.  Chronic hypoxemic respiratory failure. She will continue to wear 4 liters     of oxygen continuously at all times.  She will likely never be off this based     on her comorbid conditions.      2.  Chronic  decompensated diastolic heart failure. She is followed with Dr. Santana, continue diuresis as prescribed. She is currently taking Aldactone 25     twice a day as well as Lasix 40 mg once a day. She is adherent to a 2 gram     sodium diet.      3. Super morbid obesity with BMI 57 kg/m2. The patient is looking forward to     the spring to try and get active and lose weight.  She is also going to enroll     in cardiopulmonary rehab.      4.  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 The patient's     Dulera has . We do not have samples of this, so I started her on     Symbicort 80/4.5, continue Singulair.      5.  Continue Singulair.      6. Obstructive sleep apnea. She is compliant with CPAP, continue this.      7. Pulmonary artery hypertension, likely secondary to morbid obesity,     hypoventilation, obstructive sleep apnea and underlying obstructive lung     disease. This was discussed with the patient.      8.  Restrictive lung disease, likely related to decreased chest wall compliance     and body habitus. She was counseled on following up with pulmonary rehab.        9.  Positive anti-RNP antibody. This was checked while she was in the hospital     and was never followed up. I have repeated it.  If it comes back positive, this     would be suggestive of a mixed connective tissue disease and she will need a     referral to rheumatology.            Patient Education      Education resources provided:  Yes      Patient Education Provided:  How to use an Inhaler, Pulmonary Hypertension,     Sleep Apnea                 Disclaimer: Converted document may not contain table formatting or lab diagrams. Please see [employer] for the authenticated document.

## 2021-05-28 NOTE — PROGRESS NOTES
Patient: LUISA RINCON     Acct: [id number]     Report: #[id number]  UNIT #: [id number]     : 1955    Encounter Date:2019  PRIMARY CARE: MATTHIAS CATES  ***Signed***  --------------------------------------------------------------------------------------------------------------------  Chief Complaint      Encounter Date      2019            Primary Care Provider      MATTHIAS ACTES            Referring Provider      MATTHIAS CATES            Patient Complaint      Patient is complaining of      Pt is here for follow up/chest ct/PFT results/COPD            VITALS      Height 5 ft 5 in / 165.1 cm      Weight 360 lbs 9 oz / 163.71938 kg      BSA 2.54 m2      BMI 60.0 kg/m2      Temperature 98.3 F / 36.83 C - Oral      Pulse 71      Respirations 16      Blood Pressure 152/72 Sitting, Right Arm      Pulse Oximetry 97%, nasal cannula, 4 lpm      Comment: Oxygen is on pulse dose      Initial Exhaled Nitrous Oxide      Exhaled Nitrous Oxide Results:  27            HPI      The patient is a 63 year old super morbid obese female with a BMI of 60 and     chronic hypoxemic respiratory failure who presents for routine follow up. She     was last seen by myself on 18 and at this time she was continued on     Dulera, Singulair and Claritin. She was counseled no weight loss and was asked     to continue CPAP for sleep apnea. She returns today stating she has been ill and    actually came down with influenza, required 1 day of hospitalization. She is c    urrently battling a sinus infection and she has some sinus congestion, nasal     drainage and questions if she needs an antibiotic but this just started less     than a week ago. She has chronic shortness of breath, chronic intermittent     wheezing and coughing. Her cough is nonproductive and her wheezing and cough     normally happen when she lies flat at night. She has cut down on Lasix to 20 mg     once a day on her own accord for her  congestive heart failure. She has not been     taking her inhalers as prescribed, in fact, she only uses then when absolutely     needed. She has a prescription for both Dulera and Symbicort and she says she     just takes them when she feels scratchy or itchy in the throat.             Review of Systems as noted.             Past family, medical, surgical and social histories were all reviewed by myself     with the patient and are unchanged.            Medications were reviewed by myself with the patient and updated in the chart.            ROS      Constitutional:  Denies: Fatigue, Fever, Weight gain, Weight loss, Chills,     Insomnia, Other      Respiratory/Breathing:  Complains of: Shortness of air, Wheezing, Cough; Denies:    Hemoptysis, Pleuritic pain, Other      Endocrine:  Denies: Polydipsia, Polyuria, Heat/cold intolerance, Abnorml menstr    ual pattern, Diabetes, Other      Eyes:  Denies: Blurred vision, Vision Changes, Other      Ears, nose, mouth, throat:  Complains of: Congestion, Nasal Discharge; Denies:     Dysphagia, Hearing Changes, Nose Bleeding, Throat pain, Tinnitus, Other      Cardiovascular:  Denies: Chest Pain, Exertional dyspnea, Peripheral Edema,     Palpitations, Syncope, Wake up Gasping for air, Orthopnea, Tachycardia, Other      Gastrointestinal:  Denies: Abdominal pain/cramping, Bloody stools, Constipation,    Diarrhea, Melena, Nausea, Vomiting, Other      Genitourinary:  Denies: Dysuria, Urinary frequency, Incontinence, Hematuria,     Urgency, Other      Musculoskeletal:  Denies: Joint Pain, Joint Stiffness, Joint Swelling, Myalgias,    Other      Hematologic/lymphatic:  DENIES: Lymphadenopathy, Bruising, Bleeding tendencies,     Other      Neurologic:  Denies: Headache, Numbness, Weakness, Seizures, Other      Psychiatric:  Denies: Anxiety, Appropriate Effect, Depression, Other      Sleep:  No: Excessive daytime sleep, Morning Headache?, Snoring, Insomnia?, Stop    breathing at  sleep?, Other      Integumentary:  Denies: Rash, Dry skin, Skin Warm to Touch, Other            FAMILY/SOCIAL/MEDICAL HX      Current History            Problems         Chronic Problems:         780.57 SLEEP APNEA (Onset: 7/7/11)         278.01 MORBID OBESITY (Onset: 7/7/11)         272.4 HYPERLIPIDEMIA (Onset: 7/7/11, Acute)         300.4 ANXIETY AND DEPRESSION (Onset: 7/7/11)         477.9 ALLERGIC RHINITIS (Onset: 7/7/11, Acute)         401.9 HYPERTENSION NOS (Onset: 7/7/11)      Surgical History:  Yes: Abdominal Surgery (egd, colonoscopy), Back Surgery (1984    LUMBAR LAMINECTOMY), Bowel Surgery (COLONOSCOPY), Oral Surgery (tonsils),     Orthopedic Surgery ( BILAT. TOTAL KNEE REPLACEMENT X 2 2009, laminectomy),     Throat Surgery (TONSILLECTOMY BUT STATES THEY HAVE GROWN BACK), Other Surgeries;    No: AAA Repair, Angioplasty, Appendectomy, Bladder Surgery, Breast Surgery,     CABG, Carotid Stenosis, Cholecystectomy, Ear Surgery, Eye Surgery, Head Surgery,    Hernia Surgery, Kidney Surgery, Nose Surgery, Prostatectomy, Rectal Surgery,     Spinal Surgery, Testicular Surgery, Valve Replacement, Vascular Surgery      Stroke - Family Hx:  Grandparent      Heart - Family Hx:  Grandparent, Aunt, Uncle      Cancer/Type - Family Hx:  Mother (BREAST), Grandparent (COLON), Aunt (BREAST)      Social History:  No Tobacco Use, No Alcohol Use, No Recreational Drug use      Smoking status:  Never smoker      Exercise Activity:  None      Occupation:  RETIRED      [employer]/Social Activities:  READING,COOKING,SEWING      Whom do you live with?:        Number of Pregnancies:  3      Number of Delivers:  3      Hysterectomy:  Yes      Anticoagulation Therapy:  No      Antibiotic Prophylaxis:  No      Medical History:  Yes: Allergies, Anemia, Arthritis (osteoarthritis), Asthma,     Blood Disease (ANEMIA), Broken Bones, Chemotherapy/Cancer (BASAL CELL SKIN     CANCER -REMOVED), Chronic Bronchitis/COPD, Congestive Heart Failu  "(Diastolic     Congested Failure), Depression, Head Injury, Hemorrhoids/Rectal Prob (RECTAL     BLEEDING, NAUSEA, DIARRHEA,inflam. bowel disorder,gastritis,HHosteoa), High     Blood Pressure (On Medication), Migrane Headaches, Night sweats, Shortness Of     Breath (sleep apnea), Sinus Trouble; No: Alcoholism, Cataracts, Chemical     Dependency, Emphysema, Chronic Liver Disease, Colon Trouble, Colitis,     Diverticulitis, Deafness or Ringing Ears, Convulsions, Anxiety, Bipolar D    isorder, Diabetes, Epilepsy, Seizures, Forgetfullness, Glaucoma, Gall Stones,     Gout, Heart Attack, Heart Murmur, Hepatitis, Hiatal Hernia, High Cholesterol,     HIV (Do not ask - volu, Jaundice, Kidney or Bladder Disease, Kidney Stones,     Mitral Valve Prolapse, Phlebitis, Psychiatric Care, Reflux Disease, Rheumatic     Fever, Sexually Transmitted Dis, Skin Disease/Psoriais/Ecz, Stroke, Thyroid     Problem, Tuberculosis or Pos TB Te, Miscellaneous Medical/oth      Psychiatric History      Depression            PREVENTION      Hx Influenza Vaccination:  Yes      Date Influenza Vaccine Given:  Oct 1, 2018      Influenza Vaccine Declined:  No      2 or More Falls Past Year?:  No      Fall Past Year with Injury?:  No      Hx Pneumococcal Vaccination:  Yes      Encouraged to follow-up with:  PCP regarding preventative exams.      Chart initiated by      Annette Ocampo MA            ALLERGIES/MEDICATIONS      Allergies:        Coded Allergies:             AMLODIPINE BESYLATE (Verified  Allergy, Severe, SWELLING, 11/1/18)           HYDROCHLOROTHIAZIDE (Verified  Allergy, Unknown, NEUROLOGICAL AND KIDNEY     PROBLEMS OCCUR, 11/1/18)           MORPHINE (Verified  Allergy, Unknown, hallucinations; nausea, 11/1/18)           ADHESIVE (Verified  Adverse Reaction, Unknown, \"TEARS MY SKIN\", 11/1/18)      Medications    Last Reconciled on 1/31/19 16:07 by BOAZ TORRE, DO      Fluticasone/Umeclidin/Vilanter (Trelegy Ellipta 100-62.5-25) 1 Each " Blst.w.dev      1 PUFF INH RTQDAY, #1 INH 9 Refills         Prov: BOAZ TORRE         1/31/19       Magnesium Gluconate (Magnesium Gluconate) 27 Mg Tablet      500 MG PO QDAY PRN for MUSCLE SPASMS, TAB         Reported         11/27/18       Potassium (Potassium) 99 Mg Tablet      99 MG PO PRN for MUSCLE SPASMS, TAB         Reported         11/27/18       Benzonatate (Benzonatate) 200 Mg Capsule      200 MG PO TID, #30 CAP         Reported         11/27/18       Meloxicam (Mobic*) 7.5 Mg Tablet      7.5 MG PO QDAY, TAB         Reported         4/3/18       Psyllium Husk (Metamucil) 0.52 Gm Capsule      1 CAP PO 5XD, #60 CAP 0 Refills         Reported         3/22/18       Spironolactone (Spironolactone*) 25 Mg Tablet      12.5 MG PO QDAY, #30 TAB 0 Refills         Reported         3/22/18       Carvedilol (Coreg) 12.5 Mg Tablet      12.5 MG PO BID, #60 TAB 0 Refills         Reported         3/22/18       Furosemide (Furosemide) 40 Mg Tablet      40 MG PO QDAY, #30 TAB 0 Refills         Prov: CRISSY VARGAS         12/30/17       Pravastatin Sod (Pravastatin*) 20 Mg Tablet      20 MG PO HS, #30 TAB 0 Refills         Reported         12/26/17       Albuterol/Ipratropium (Duoneb) Unknown Strength Ampul.neb      1 INH QID PRN for SHORTNESS OF BREATH, #180 NEB 0 Refills         Reported         9/21/17       Mehul-Mometasone (Nasonex*) 17 Gm Naspr      2 PUFFS NARE EACH QDAY PRN for ALLERGIES, #1 BOTTLE 0 Refills         Reported         9/18/17       Montelukast Sodium (Singulair*) 10 Mg Tab      10 MG PO HS, #30 TAB 0 Refills         Reported         9/18/17       Cholecalciferol (Vitamin D3*) 2,000 U Tablet      2000 UNITS PO QDAY, #30 TAB 0 Refills         Reported         9/18/17       Multivitamins (Multi-Vitamin) 1 Tab Tablet      1 TAB PO QDAY         Reported         12/3/12       FLUoxetine HCl (PROzac) 40 Mg Capsule      40 MG PO QDAY, CAP         Reported         12/3/12      Current Medications      Current  Medications Reviewed 1/31/19            EXAM      Vital signs reviewed.      GENERAL:  Super morbid obese female on supplemental oxygen. She seems to have     some nasal congestion.       HEENT: Pupils are equally round and reactive to light and accommodation.      Extraocular muscles intact bilateral. Mild tenderness over the maxillary and     frontal sinuses, erythematous  turbinates, no bogginess, there was some dried     mucous secretions in the nasal passages.  TM's are clear bilaterally. Small     oropharynx without lesions or erythema.       NECK:  Supple without tracheal deviation or lymphadenopathy. No thyromegaly     appreciated.       LYMPHATICS: No cervical or supraclavicular lymphadenopathy.       RESPIRATORY:  Clear to auscultation bilaterally, no wheezes, rales or rhonchi,     tympanic to percussion.      CVS:  Regular rate and rhythm, no murmurs, rubs or gallops, 1+ pitting edema     bilaterally, equal radial pulses.      GI: Abdomen soft but protuberant, nontender, nondistended, no hepatomegaly     appreciated.  Bowel sounds present in all 4 quadrants.       MUSCULOSKELETAL: No erythema, warmth or fluctuance over the major joints     including the knees, ankles, wrists and elbows.        SKIN: No rashes or lesions.       NEUROLOGICAL: Alert and oriented X 3.  No focal deficits on exam. Cranial nerves    II-XII intact bilaterally.       PSYCH: Patient has appropriate mood and affect.      Vitals      Vitals:             Height 5 ft 5 in / 165.1 cm           Weight 360 lbs 9 oz / 163.91523 kg           BSA 2.54 m2           BMI 60.0 kg/m2           Temperature 98.3 F / 36.83 C - Oral           Pulse 71           Respirations 16           Blood Pressure 152/72 Sitting, Right Arm           Pulse Oximetry 97%, nasal cannula, 4 lpm           Comment: Oxygen is on pulse dose            REVIEW      Results Reviewed      PCCS Results Reviewed?:  Yes Prev Radiology Results, Yes Previous [facility] Records       Radiographic Results               [facility]                PACS RADIOLOGY REPORT            Patient: LUISA RINCON   Acct: #[id number]   Report: #[id number]            UNIT #: [id number]    DOS: 01/15/19 0941      INSURANCE:[employer]   ORDER #:[id number]      LOCATION:CT     : 1955            PROVIDERS      ADMITTING:     ATTENDING: BOAZ TORRE      FAMILY:  MATTHIAS CATES   ORDERING:  BOAZ TORRE         OTHER:    DICTATING:  FLAKITA CAMARENA MD            REQ #:[id number]   EXAM:WO - CT CHEST without CONTRAST      REASON FOR EXAM:  COPD      REASON FOR VISIT:  COPD            *******Signed******         PROCEDURE:   CT CHEST WITHOUT CONTRAST             COMPARISON:   [facility], CT, CHEST W/O CONTRAST, 2017,     14:29.             INDICATIONS:   COPD             TECHNIQUE:   CT images were created without the administration of contrast     material.               PROTOCOL:     Standard imaging protocol performed                RADIATION:     DLP: 914mGy*cm          Automated exposure control was utilized to minimize radiation dose.              FINDINGS:         No suspicious pulmonary nodules are dense consolidation.  Previously     demonstrated lower lobe mucous       plugging has improved.  Persistent but improving linear atelectasis or scarring     in the lower lobes.        There is persistent scarring or atelectasis in the lingula and right middle     lobe.  No pleural       fluid or pneumothorax.  Cardiomegaly.  Trace pericardial effusion is persistent     but slightly       improved from the prior.  No adenopathy in the chest.  No acute findings in the     included upper       abdomen.  Stable benign right adrenal adenomas.  No aggressive appearing bone     lesion.             CONCLUSION:   Lower lobe mucous plugging has improved.             Improving linear atelectasis in the lower lobes.   Persistent linear opacities in    the lower lobes,       right middle lobe, and lingula may represent chronic atelectasis or scarring.             Cardiomegaly is stable.  Persistent trace pericardial effusion                FLAKITA CAMARENA MD             Electronically Signed and Approved By: FLAKITA CAMARENA MD on 1/15/2019 at 11:29                        Until signed, this is an unconfirmed preliminary report that may contain      errors and is subject to change.                                              ALVAER:      D:01/15/19 1129            Assessment      Notes      New Medications      * Fluticasone/Umeclidin/Vilanter (Trelegy Ellipta 100-62.5-25) 1 EACH       BLST.W.DEV: 1 PUFF INH RTQDAY #1      ASSESSMENT:       1. Super morbid obesity with BMI 60.        2.  Chronic hypoxemic respiratory failure.       3.  Chronic decompensated diastolic congestive heart failure.       4.  Chronic obstructive pulmonary disease with asthma overlap syndrome.       5. Obstructive sleep apnea.       6. Pulmonary artery hypertension secondary to morbid obesity, obesity     hypoventilation syndrome.       7.  Restrictive lung disease based on body habitus.             PLAN:      1. The patient is not doing well remembering to take Symbicort or Dulera as the    se are twice daily medications. I will switch her over to trelegy once daily.        2. The patient disenrolled in pulmonary rehabilitation on her own.       3. The patient decreased her Lasix on her own. I told her to please not do this     and to follow up with Dr. Santana as previously planned.       4. I counseled the patient on weight loss and she says she will try to work on     this.       5. Continue CPAP at night.       7. Follow up in 3-4 months to see how she is doing on trelegy.            Patient Education      Patient Education Provided:  Acute Asthma, COPD, How to use an Inhaler,     Pulmonary Hypertension                 Disclaimer: Converted document may not  contain table formatting or lab diagrams. Please see [facility] [employer] [facility] for the authenticated document.

## 2021-05-28 NOTE — PROGRESS NOTES
Patient: LUISA RINCON     Acct: [id number]     Report: #[id number]  UNIT #: [id number]     : 1955    Encounter Date:2018  PRIMARY CARE: MATTHIAS CATES  ***Fazal***  --------------------------------------------------------------------------------------------------------------------  Chief Complaint      Encounter Date      2018            Referring Provider      MATTHIAS CATES            Patient Complaint      Patient is complaining of      hospital follow up            VITALS      Height 5 ft 5 in / 165.1 cm      Weight 334 lbs 2 oz / 151.810000 kg      BSA 2.73 m2      BMI 55.6 kg/m2      Temperature 98.2 F / 36.78 C - Oral      Pulse 78      Respirations 16      Blood Pressure 150/70 Sitting, Right Arm      Pulse Oximetry 94%, nasal cannula, 4 lpm      Exhaled Nitrous Oxide Testin            HPI      61 y/o super morbidly obese female, recently admitted to the hospital from -17 with acute on chronic hypoxic respiratory failure, acute on     chronic decompensated diastolic heart failure, obstructive sleep apnea on home     CPAP, asthma with acute exacerbation. The patient had been having increasing     shortness of air and was found to be in respiratory failure with fluid     overload. She was given IV Lasix, oral Aldactone, aggressive pulmonary toilet,     steroids and by mouth IV antibiotics with Rocephin. Our services consultation     in hospital due to her respiratory failure. She had significant improvement in     her symptoms with diuresis and was able to be discharged home with diuretics,     Omnicef, prednisone, albuterol and oxygen.             The patient presents to our office today for follow-up. She has been doing much     better since being discharged. She does have obstructive sleep apnea and has a     home CPAP machine from [employer]. She has continued to take diuretics and is     following closely with Dr. Santana. She admits that her shortness  of breath is     improving, she has been trying to stay on a low-salt diet and has been checking     her daily weights. She does not want to use oxygen continuously but is willing     to do it as she understands that her hypoxia is significant without it and she     can feel when her oxygen gets low. Her diffusion capacity noted on PFTs was 34%     predicted but 74% when corrected for BMI. Also showed her to have severe     restrictive process as well as hyperinflation. She is currently on Aldactone     25mg twice a day as well as Coreg 25mg daily. She feels her dyspnea on exertion     is about at baseline now. She has not had any fevers, chills or night sweats.            ROS      Constitutional:  Complains of: Fatigue, Denies: Fever, Weight gain, Weight loss    , Chills, Insomnia, Other      Respiratory/Breathing:  Complains of: Shortness of air, Denies: Wheezing, Cough    , Hemoptysis, Pleuritic pain, Other      Endocrine:  Denies: Polydipsia, Polyuria, Heat/cold intolerance, Abnorml     menstrual pattern, Diabetes, Other      Eyes:  Denies: Blurred vision, Vision Changes, Other      Ears, nose, mouth, throat:  Denies: Mouth lesions, Thrush, Throat pain,     Hoarseness, Allergies/Hay Fever, Post Nasal Drip, Headaches, Recent Head Injury    , Nose Bleeding, Neck Stiffness, Thyroid Mass, Hearing Loss, Ear Fullness, Dry     Mouth, Nasal or Sinus Pain, Dry Lips, Nasal discharge, Nasal congestion, Other      Cardiovascular:  Denies: Palpitations, Syncope, Claudication, Chest Pain, Wake     up Gasping for air, Leg Swelling, Irregular Heart Rate, Cyanosis, Dyspnea on     Exertion, Other      Gastrointestinal:  Denies: Nausea, Constipation, Diarrhea, Abdominal pain,     Vomiting, Difficulty Swallowing, Reflux/Heartburn, Dysphagia, Jaundice, Bloating    , Melena, Bloody stools, Other      Genitourinary:  Denies: Urinary frequency, Incontinence, Hematuria, Urgency,     Nocturia, Dysuria, Testicular problems, Other       Musculoskeletal:  Denies: Joint Pain, Joint Stiffness, Joint Swelling, Myalgias    , Other      Hematologic/lymphatic:  DENIES: Lymphadenopathy, Bruising, Bleeding tendencies,     Other      Neurological:  Denies: Headache, Numbness, Weakness, Seizures, Other      Psychiatric:  Denies: Anxiety, Appropriate Effect, Depression, Other      Sleep:  No: Excessive daytime sleep, Morning Headache?, Snoring, Insomnia?,     Stop breathing at sleep?, Other      Integumentary:  Denies: Rash, Dry skin, Skin Warm to Touch, Other      Immunologic/Allergic:  Denies: Latex allergy, Seasonal allergies, Asthma,     Urticaria, Eczema, Other      Immunization status:  No: Up to date            FAMILY/SOCIAL/MEDICAL HX      Current History            Problems         Chronic Problems:         780.57 SLEEP APNEA (Onset: 7/7/11)         278.01 MORBID OBESITY (Onset: 7/7/11)         272.4 HYPERLIPIDEMIA (Onset: 7/7/11, Acute)         300.4 ANXIETY AND DEPRESSION (Onset: 7/7/11)         477.9 ALLERGIC RHINITIS (Onset: 7/7/11, Acute)         401.9 HYPERTENSION NOS (Onset: 7/7/11)      Surgical History:  Yes: Abdominal Surgery (egd, colonoscopy), Back Surgery (    1984 LUMBAR LAMINECTOMY), Bowel Surgery (COLONOSCOPY), Oral Surgery (tonsils),     Orthopedic Surgery ( BILAT. TOTAL KNEE REPLACEMENT X 2 2009, laminectomy),     Throat Surgery (TONSILLECTOMY BUT STATES THEY HAVE GROWN BACK), Other Surgeries    , No: AAA Repair, Angioplasty, Appendectomy, Bladder Surgery, Breast Surgery,     CABG, Carotid Stenosis, Cholecystectomy, Ear Surgery, Eye Surgery, Head Surgery    , Hernia Surgery, Kidney Surgery, Nose Surgery, Prostatectomy, Rectal Surgery,     Spinal Surgery, Testicular Surgery, Valve Replacement, Vascular Surgery      Stroke - Family Hx:  Grandparent      Heart - Family Hx:  Grandparent, Aunt, Uncle      Cancer/Type - Family Hx:  Mother (BREAST), Grandparent (COLON), Aunt (BREAST)      Social History:  No Tobacco Use, No Alcohol Use,  No Recreational Drug use      Smoking status:  Never smoker      Exercise Activity:  None      Occupation:  RETIRED      [employer]/Social Activities:  READING,COOKING,SEWING      Whom do you live with?:        Number of Pregnancies:  3      Number of Delivers:  3      Hysterectomy:  Yes      Anticoagulation Therapy:  No      Antibiotic Prophylaxis:  No      Medical History:  Yes: Allergies, Anemia, Arthritis (osteoarthritis), Asthma,     Blood Disease (ANEMIA), Broken Bones, Chemotherapy/Cancer (BASAL CELL SKIN     CANCER -REMOVED), Chronic Bronchitis/COPD, Depression, Head Injury, Hemorrhoids/    Rectal Prob (RECTAL BLEEDING, NAUSEA, DIARRHEA,inflam. bowel disorder,gastritis,    HHosteoa), High Blood Pressure, Migrane Headaches, Night sweats, Shortness Of     Breath (sleep apnea), Sinus Trouble, No: Alcoholism, Cataracts, Chemical     Dependency, Emphysema, Chronic Liver Disease, Colon Trouble, Colitis,     Diverticulitis, Congestive Heart Failu, Deafness or Ringing Ears, Convulsions,     Anxiety, Bipolar Disorder, Diabetes, Epilepsy, Seizures, Forgetfullness,     Glaucoma, Gall Stones, Gout, Heart Attack, Heart Murmur, Hepatitis, Hiatal     Hernia, High Cholesterol, HIV (Do not ask - volu, Jaundice, Kidney or Bladder     Disease, Kidney Stones, Mitral Valve Prolapse, Phlebitis, Psychiatric Care,     Reflux Disease, Rheumatic Fever, Sexually Transmitted Dis, Skin Disease/Psoriais    /Ecz, Stroke, Thyroid Problem, Tuberculosis or Pos TB Te, Miscellaneous Medical/    oth            Hx Influenza Vaccination:  Yes      Date Influenza Vaccine Given:  Nov 1, 2017      Influenza Vaccine Declined:  No      2 or More Falls Past Year?:  No      Fall Past Year with Injury?:  No      Hx Pneumococcal Vaccination:  Yes      Encouraged to follow-up with:  PCP regarding preventative exams.      Chart initiated by      sammy holden ma            ALLERGIES/MEDICATIONS      Allergies:        Coded Allergies:              "AMLODIPINE BESYLATE (Verified  Allergy, Severe, SWELLING, 1/26/18)           HYDROCHLOROTHIAZIDE (Verified  Allergy, Unknown, NEUROLOGICAL AND KIDNEY     PROBLEMS OCCUR, 1/26/18)           MORPHINE (Verified  Allergy, Unknown, hallucinations; nausea, 1/26/18)           ADHESIVE (Unverified  Adverse Reaction, Unknown, \"TEARS MY SKIN\", 1/26/18)      Medications    Last Reconciled on 1/27/18 19:31 by Sandy Rosario      Spironolactone (Aldactone) 50 Mg Tablet      25 MG PO BID, #60 TAB         Prov: SANDY ROSARIO         1/26/18       Mometasone/Formoterol (Dulera 100 Mcg/5 Mcg) 13 Gm Hfa.aer.ad      1 PUFFS INH RTBID, #1 MDI         Prov: CRISSY VARGAS         12/30/17       Furosemide (Furosemide) 40 Mg Tablet      40 MG PO QDAY, #30 TAB 0 Refills         Prov: CRISSY VARGAS         12/30/17       Pravastatin Sod (Pravastatin*) 20 Mg Tablet      20 MG PO HS, #30 TAB 0 Refills         Reported         12/26/17       Carvedilol (Coreg) 25 Mg Tablet      25 MG PO QDAY, #60 TAB 0 Refills         Reported         12/26/17       Albuterol/Ipratropium (Duoneb) Unknown Strength Ampul.neb      INH RTQ4H, #180 NEB 0 Refills         Reported         9/21/17       Ferrous Gluconate (Ferrous Gluconate) 324 Mg Tablet      324 MG PO QDAY, #30 TAB 0 Refills         Reported         9/18/17       Mehul-Mometasone (Nasonex*) 17 Gm Naspr      2 PUFFS NARE EACH QDAY Y for ALLERGIES, #1 BOTTLE 0 Refills         Reported         9/18/17       Montelukast Sodium (Singulair*) 10 Mg Tab      10 MG PO HS, #30 TAB 0 Refills         Reported         9/18/17       Cholecalciferol (Vitamin D3*) 2,000 U Tablet      2000 UNITS PO QDAY, #30 TAB 0 Refills         Reported         9/18/17       Multivitamins (Multi-Vitamin) 1 Tab Tablet      1 TAB PO QDAY         Reported         12/3/12       FLUoxetine HCl (PROzac) 40 Mg Capsule      40 MG PO QDAY, CAP         Reported         12/3/12      Current Medications      Current Medications Reviewed " 1/26/18            EXAM      GEN-patient appears stated age resting comfortable in no acute distress on     supplemental O2 @ 4L       Eyes-PERRL,  conjunctiva are normal in appearance extraocular muscles are intact    , no scleral icterus      Mouth normal dentition, no erythema no ulcerations oropharynx appears normal no     exudate no evidence of postnasal drip, MP4      Neck-there are no palpable supraclavicular or cervical adenopathy, thyroid is     normal in appearance no apparent nodules, there is no inspiratory or expiratory     stridor      Respiratory-patient exhibits normal work of breathing, speaking in full     sentences without difficulty, the chest is normal in appearance, auscultation w    / no wheezes, rales or rhonchi, very distant breath sounds d/t obesity.       Cardiovascular-the heart rate is normal and regular S1 and S2 present with no     murmur or extra heart sounds, no JVD, + bilateral lower extremity trace pedal     edema      GI-severely obese abdomen, bowel sounds present and normal in all quadrants no     hepatosplenomegaly or masses felt      Extremities-no clubbing is present, pulses present in all extremities,     capillary refill time is normal      Skin-skin is normal in appearance it is warm and dry, no rashes present, no     evidence of cyanosis,palpation reveals no masses      Neurological-the patient is alert and oriented to time place and person, moves     all 4 extremities, normal gait, normal affect and mood, CN2-12 intact      Psyc-normal judgment and insight is good, normal mood and affect, alert and     oriented to person, place, and time, and date      Vtials      Vitals:             Height 5 ft 5 in / 165.1 cm           Weight 334 lbs 2 oz / 151.052555 kg           BSA 2.73 m2           BMI 55.6 kg/m2           Temperature 98.2 F / 36.78 C - Oral           Pulse 78           Respirations 16           Blood Pressure 150/70 Sitting, Right Arm           Pulse Oximetry 94%,  nasal cannula, 4 lpm            REVIEW      Results Reviewed      PCCS Results Reviewed?:  Yes Prev Lab Results, Yes Prev Radiology Results, Yes     Previous [facility] Records      Lab Results            Item Value  Date Time             Immunoglobulin E 30 kU/L H 17 1415             Rheumatoid Factor Screen >100.0 IU/mL H 10/7/05 1633             Blood Gas Puncture Site RIGHT Radial 11/10/17 0745             Blood Gas pH 7.41 11/10/17 0745             Blood Gas PCO2 42.5 mmHg 11/10/17 0745             Blood Gas PO2 61.0 mmHg L 11/10/17 0745             Blood Gas HCO3 26.5 mcq/L H 11/10/17 0745             Blood Gas Base Excess 1.7 11/10/17 0745             Blood Gas Oxygen Saturation 91.2 L 11/10/17 0745             Jermain Test ACCEPTED 11/10/17 0745             Hemoglobin Oxygen Saturation 90.6 % L 11/10/17 0745             Carboxyhemoglobin 0.4 % 11/10/17 0745             Methemoglobin 0.3 % 11/10/17 0745             Reduced Hemoglobin 8.7 % H 11/10/17 0745             Total Hemoglobin 12.5 g/dL 11/10/17 0745             Oxygen Delivery Device Room Air 11/10/17 0745             Blood Gas Comments NONE 11/10/17 0745             Eosinophils (%) (Auto) 7.30 % H 17 2020             Eosinophils (%) (Auto) 5.91 % 17 0442             Eosinophils (%) (Auto) 0.10 % 17 0446             Eosinophils (%) (Auto) 0.04 % 17 0422             Eosinophils (%) (Auto) 0.18 % 17 0610            Micro Results      microbiology negative aside for yeast in sputum, likely contaminant      Radiographic Results      Patient: LUISA RINCON   Acct: #[id number]   Report: #[id number]            UNIT #: [id number]    DOS: 17 1228      INSURANCE:[employer] - [employer]   ORDER #:[id number]      LOCATION:U2  0217-01   : 1955            PROVIDERS      ADMITTING:  Kwasi Patel   ATTENDING: Kwasi Patel      FAMILY:  CATES,MATTHIAS   ORDERING:  Shahab Anaya         OTHER:    DICTATING:  Slick Joya MD             [facility] #:[id number]   [facility]      REASON FOR EXAM:  groundglass opacities      REASON FOR VISIT:  ADMIT-PULM EDEMA            *******Signed******         PROCEDURE:   CT CHEST WITHOUT CONTRAST             COMPARISON:   [facility], CT, CHEST W/O CONTRAST, 12/09/2012, 23:    51.  [facility], CR, CHEST PA/AP   [facility], CR, CHEST       PA/AP   CONTRAST, 10/18/2017,       11:43.             INDICATIONS:   groundglass opacities             TECHNIQUE:   CT images were created without the administration of contrast     material.               PROTOCOL:     Standard imaging protocol performed                RADIATION:     DLP: 596mGy*cm          Automated exposure control was utilized to minimize radiation dose.              FINDINGS:         There is a small to moderate pericardial effusion which is mildly increased     compared to the chest       CT from October 2017. There is calcific atherosclerosis of the coronary     arteries and aorta, as       before. The central pulmonary arteries appear enlarged. The ascending aorta     appears ectatic       measuring up to 3.9 cm. Small mediastinal lymph nodes appear similar to the     prior exam. No definite       axillary adenopathy is seen. Postsurgical changes are noted in the left breast,     as before. There is       a small hiatal hernia, as before. The right adrenal lesion appears similar in     size measuring 2.7 cm       and demonstrates attenuation consistent with a lipid rich adenoma. No acute     abnormality is seen in       the visualized upper abdomen. Degenerative changes are present in the thoracic     spine. There is       flowing anterior osteophytosis in the thoracic spine suggesting DISH.             The previously seen mosaic attenuation of the pulmonary parenchyma with patchy     groundglass density       appears to have improved. No significant ground glass opacities are  visualized     at this time. There       are areas of linear opacity/consolidation in the lower lungs. Some of these     areas were previously       present, but there are also new areas of linear opacity in the lung bases. No     other definite new       infiltrate is seen.             Probable trace effusions are seen in the inferior posterior costophrenic angles    , right slightly       greater than left. No pneumothorax. There appears to be central bronchial wall     thickening,       particularly in the lower lungs. There also appears to be foci of bronchial     obstruction in the       lower lungs in the area of linear opacities including in the right middle and     bilateral lower       lobes. This can be seen in the right lower lobe on series 5 images 184-212             CONCLUSION:         1. Mosaic attenuation with ground glass opacities seen on the prior chest CT     appears to have       improved in the interval.      2. There is central bronchial wall thickening with areas of bronchial     obstruction in the lower       lobes and right middle lobe. There are linear opacities/consolidation in the     lung bases including       in the areas of bronchial obstruction, some of which appear new, suspicious for     atelectasis and       volume loss. Findings may indicate bronchitis with mucous plugging. Correlate     with       symptoms/history. Some of the linear opacities were previously present and     likely represent       parenchymal scarring.      3. Trace effusions, right slightly greater than left.      4. Cardiomegaly with enlargement of central pulmonary arteries, which may     indicate pulmonary       hypertension.      5. Small to moderate pericardial effusion is mildly increased compared to     October 2017.      6. Coronary artery and aortic atherosclerosis.      7. Ectasia of the ascending aorta.      8. Small hiatal hernia and right adrenal adenoma, as before.             ANGEL DEVINE MD              Electronically Signed and Approved By: ANGEL DEVINE MD on 12/27/2017 at 15:35      PFT Results      Patient Name:                   Attending Physician:      Aida Conner M.D.             Patient Visit # MR #            Admit Date  Disch Date     Location      [id number]    [id number]      11/10/2017                 [facility] -             Date of Birth      1955      __________________________________________________________________________      821 - DIAGNOSTIC REPORT             PULMONARY FUNCTION TEST             SPIROMETRY:      FEV1/FVC ratio 87% of predicted.      FEV1 37% of predicted, 0.94 liters.      FVC 42% of predicted, 1.41 liters.      There was no significant response to one-time bronchodilator administration.             LUNG VOLUMES:      Total lung capacity 83% of predicted.      Residual volume 144% of predicted.      Vital capacity 37% of predicted.             DIFFUSION CAPACITY:      DLCO, 34% of predicted.      DLCO VA, 73% of predicted.             There were no prior pulmonary function tests for comparison.             Lung volumes consistent with both obstructive and restrictive processes.             CONCLUSION:        1. Normal FEV1/FVC ratio with severely reduced FEV1 of 37% of predicted,           0.94 liters. There was significant reversibility with administration of     bronchodilator.        2. Lung volumes show severe restrictive process, as well as hyperinflation,           residual volume being greater than 120%.        3. There is severe reduction in diffusion capacity, which corrects to mild           when corrected for lung volumes.         Please correlate clinically.             To be electronically signed in [facility]      [id number] BOAZ TORRE M.D.            PLAN      Assessment      Notes      Changed Medications      * Carvedilol (Coreg) 25 MG TABLET: 25 MG PO QDAY #60      * SPIRONOLACTONE (Aldactone) 50 MG TABLET:       From:  50 MG PO BID #60       To: 25 MG PO BID #60      Discontinued Medications      * CEFDINIR 300 MG CAP: 300 MG PO BID #6      * predniSONE* 20 MG TABLET: 20 MG PO QDAY #5      ASSESSMENT   The patient is a 62 year old lady with a history of hypoxic respiratory failure     on four liters of oxygen, asthma on Dulera and Singulair as well as morbid     obesity who presents for follow            ACUTE ON CHRONIC HYPOXIC RESPIRATORY FAILURE      * Continue with supplemental O2 at 4 L continuously. Discussed with her the     importance of wearing this.       CHRONIC DECOMPENSATED DIASTOLIC HEART FAILURE      * Continue follow-up with Dr. Santana.       * Needs to continue diuresis as prescribed by cardiology. Currently on     Aldactone 25 mg twice a day.       * I spent 8 minutes discussing the need to be diligent with refraining from     foods that are high in sodium to avoid retaining unnecessary fluid, remaining     as active as possible, monitoring her fluid intake, checking daily weights,     shopping on the periphery of the grocery store and avoiding highly processed     foods that contain significant amount of sodium, and following with cardiology     on a regular basis and being compliant with medications. we discussed the     importance of these measures with hopes to avoid a subsequent hospitalization     in the future.      SUPER MORBID OBESITY w/ BODY MASS INDEX OF 55.6 kg/m        * Discussed with the patient the importance of weight loss as this is     complicating all of her comorbid conditions.       * She is working on weight loss.       OBSTRUCTIVE LUNG DISEASE W/ ASTHMA      * Continue Dulera and Singulair, has been doing well with this.      OBSTRUCTIVE SLEEP APNEA ON HOME PAP THERAPY w/ BIPAP      * Continue with CPAP from [employer], patient was asking about humidifier, will     discuss with DME company.      BILATERAL GGO NOTED ON CT SCAN      * Continue with follow-up per Dr. Juarez. May need repeat  scanning.       RESTRICTIVE LUNG DISEASE NOTED FROM PFTS LIKELY RELATED TO  CHEST WALL     COMPLIANCE FROM BODY HABITUS      * As above, needs to lose weight.       * Would likely benefit from pulmonary rehabilitation.       VACCINATION      * Up-to-date on flu and pneumonia vaccines      FOLLOW UP      * Follow up with Dr. Kruger in 1-2 months or earlier if needed.            Patient Education      Education resources provided:  Yes      Patient Education Provided:  Acute Asthma, COPD, Sleep Apnea            Electronically signed by ROX ROBLES  2018 16:54       Disclaimer: Converted document may not contain table formatting or lab diagrams. Please see [employer] for the authenticated document.

## 2021-05-28 NOTE — PROGRESS NOTES
"Patient: LUISA RINCON     Acct: [id number]     Report: #[id number]  UNIT #: [id number]     : 1955    Encounter Date:2018  PRIMARY CARE: MATTHIAS CATES  ***Signed***  --------------------------------------------------------------------------------------------------------------------  Visit Type      New Patient Visit            Chief Complaint      abnormal breast biopsy            Referring Provider/Copies To      Referring Provider:  Juani Barragan            Allergies      Coded Allergies:             AMLODIPINE BESYLATE (Verified  Allergy, Severe, SWELLING, 18)           HYDROCHLOROTHIAZIDE (Verified  Allergy, Unknown, NEUROLOGICAL AND KIDNEY     PROBLEMS OCCUR, 18)           MORPHINE (Verified  Allergy, Unknown, hallucinations; nausea, 18)           ADHESIVE (Verified  Adverse Reaction, Unknown, \"TEARS MY SKIN\", 18)            Medications      Last Reconciled on 18 13:16 by MAYA MAGALLON      Mometasone/Formoterol (Dulera 100 Mcg/5 Mcg) 13 Gm Hfa.aer.ad      2 PUFFS INH QDAY, #1 MDI         Reported         18       Meloxicam (Mobic*) 7.5 Mg Tablet      7.5 MG PO QDAY, TAB         Reported         4/3/18       Psyllium Husk (Metamucil) 0.52 Gm Capsule      1 CAP PO 5XD, #60 CAP 0 Refills         Reported         3/22/18       Spironolactone (Spironolactone*) 25 Mg Tablet      12.5 MG PO QDAY, #30 TAB 0 Refills         Reported         3/22/18       Carvedilol (Coreg) 12.5 Mg Tablet      12.5 MG PO BID, #60 TAB 0 Refills         Reported         3/22/18       Furosemide (Furosemide) 40 Mg Tablet      40 MG PO QDAY, #30 TAB 0 Refills         Prov: CRISSY VARGAS         17       Pravastatin Sod (Pravastatin*) 20 Mg Tablet      20 MG PO HS, #30 TAB 0 Refills         Reported         17       Albuterol/Ipratropium (Duoneb) Unknown Strength Ampul.neb      1 INH QID Y for SHORTNESS OF BREATH, #180 NEB 0 Refills         Reported         17     "   Mehul-Mometasone (Nasonex*) 17 Gm Naspr      2 PUFFS NARE EACH QDAY Y for ALLERGIES, #1 BOTTLE 0 Refills         Reported         17       Montelukast Sodium (Singulair*) 10 Mg Tab      10 MG PO HS, #30 TAB 0 Refills         Reported         17       Cholecalciferol (Vitamin D3*) 2,000 U Tablet      2000 UNITS PO QDAY, #30 TAB 0 Refills         Reported         17       Multivitamins (Multi-Vitamin) 1 Tab Tablet      1 TAB PO QDAY         Reported         12/3/12       FLUoxetine HCl (PROzac) 40 Mg Capsule      40 MG PO QDAY, CAP         Reported         12/3/12      Medications Reviewed:  No Changes made to meds            History and Present Illness      Past Oncology Illness History      Patient is a 63-year-old white female with past medical history notable for     diastolic heart failure and oxygen dependence who presents today for evaluation     of an abnormal breast biopsy. Patient states that she went for her routine     mammogram earlier this year. She has been compliant with yearly mammograms and     has not missed any. Just prior to her mammogram she reports a reddish-brown     discharge from the right nipple was new. It went on for several days. She was     rescheduled for her routine mammogram but was converted to a diagnostic     mammogram due to the nipple discharge. She was found to have an abnormality in     the right breast for which biopsy was recommended. She was seen by surgery and     underwent an excisional biopsy/lumpectomy on 18. Pathology from the     specimen revealed atypical hyperplasia. She states that the surgery went well     and the area is healing nicely. She has had a prior biopsy on the contralateral     left side approximate 5 years ago. She states that this did not demonstrate     cancer but was atypical. Those records will be requested. She denies any other     new masses or lymphadenopathy. She does have strong family history with her     mother   from breast cancer in her early 40s. She also had a maternal     aunt and paternal aunt both with breast cancer. She had menarche at age 9 and     underwent LACHO/BSO in her early 50s. She had her first child at age 21. She     states that she is up-to-date on GYN examinations. Her hysterectomy was due to     dysfunctional uterine bleeding and not malignancy.      Given her family history and prior biopsies, she is interested in cancer     prevention strategies and presents today for that discussion.            Clinical Trial Participant      No            ECOG Performance Status      0            Most Recent Lab Findings      FINAL PATHOLOGIC DIAGNOSIS:      RIGHT BREAST, WIRE GUIDED EXCISION:       - SMALL FOCI OF ATYPICAL DUCTAL HYPERPLASIA (0.5 - 1 MM); COMPLETELY EXCISED       - MINUTE FOCUS OF ATYPICAL LOBULAR HYPERPLASIA (<0.5 MM); COMPLETELY EXCISED       - RESIDUAL INTRADUCTAL PAPILLOMA WITH FOCAL SCLEROSIS (APPROXIMATELY 7 MM IN        GREATEST DIMENSION); ADEQUATELY EXCISED       - ECTATIC DUCTS       - FIBROCYSTIC CHANGES (STROMAL FIBROSIS, SCLEROSING ADENOSIS) WITH MILD TO     MODERATE        DUCTAL HYPERPLASIA       - RARE MICROCALCIFICATIONS ASSOCIATED WITH ADENOSIS       - EXTENSIVE VASCULAR CALCIFICATION       - PREVIOUS BIOPSY SITE            COMMENT:  Several small foci of atypical ductal and lobular hyperplasia are     noted in the      breast tissue adjacent to but separate from the intraductal papilloma and are     completely      excised.  No evidence of malignancy is observed.            Most Recent Imaging Findings      Patient: LUISA RINCON   Acct: #[id number]   Report: #[id number]            UNIT #: [id number]    DOS: 18       INSURANCE:[employer]   ORDER #:[id number]      LOCATION:SURG     : 1955            PROVIDERS      ADMITTING:     ATTENDING: Juani Barragan      FAMILY:  MATTHIAS CATES   ORDERING:  Juani Barragan         OTHER:    DICTATING:   Anders Panchal MD            REQ #:[id number]   EXAM:[id number] - DIG DX UNI POST BX NO CAD RGHT      REASON FOR EXAM:  R BREAST MASS      REASON FOR VISIT:  R BREAST MASS            *******Signed******         PROCEDURE:   DIGITAL DIAGNOSTIC POST BIOPSY NO CAD RIGHT             COMPARISON:   [facility], , DIG DX UNI POST BX NO CAD RT, 4/24/ 2018, 14:04.  [facility], , DIGITAL PLACEMENT NEEDLE LOCAL, 6/19/2018, 8:29.             INDICATIONS:   R BREAST MASS             FINDINGS:         The hook portion of the wire lies within 1 mm of the localization clip, which     is in the biopsy bed.             IMPRESSION:         The hooked portion of the localization wire lies within 1 mm of the     localization clip, which is in       the biopsy bed.             I discussed findings with Dr. Barragan at 0905 hr.                ANDERS PANCHAL MD             Electronically Signed and Approved By: ANDERS PANCHAL MD on 6/19/2018 at 9:24            PAST, FAMILY   Past Medical History      Past Medical History:  Hypertension, Low or High WBC Count, Arthritis, Sleep     apnea      Hematology/oncology:  REPORTS HX OF: Anemia            Past Surgical History      REPORTS HX OF: Joint replacement (BOTH KNEES), Skin cancer removal, Spinal     surgery, Hysterectomy, Other Past Surgical Hx            Family History      REPORTS HX OF: Anemia (MOTHER), Breast cancer (MOTHER, maternal aunt, paternal     aunt)            Social History      Lives independently:  Yes      Occupation:  RETIRED             Tobacco Use      Tobacco status:  Never smoker            Alcohol Use      Alcohol intake:  None            Substance Use      Substance use:  Denies use            REVIEW OF SYSTEMS      General:  Complains of: Fatigue, Denies: Appetite change, Excessive sweating,     Fever, Night sweats, Weight gain, Weight loss, Other      Eyes:  Denies: Blurred vision, Corrective lenses, Diplopia,  Eye irritation, Eye     pain, Eye redness, Spots in vision, Vision loss, Other      Ears, nose, mouth, throat:  Denies: Headache, Seizures, Visual Changes, Hearing     loss, Sinus Congestion, Hoarseness, Sore throat, Other      Cardiovascular:  Denies: Chest pain, Irregular heartbeat, Palpitations, Swollen     ankles/legs, Other      Respiratory:  Denies: Chest pain, Shortness of Air, Productive cough, Coughing     blood, Other      Gastrointestinal:  Denies: Nausea, Vomiting, Problem swallowing, Frequent     heartburn, Constipation, Diarrhea, Tarry stools, Bloody stools, Unable to     control bowels, Other      Kidney/Bladder:  Denies: Painful Urination, Change in urinary stream, Blood in     urine, Incontinence, Frequent Urination, Decreased urine stream, Other      Musculoskeletal:  Denies: New Back pain, Leg Cramps, Painful Joints, Swollen     Joints, Muscle Pain, Muscle weakness, Other      Skin:  DENIES: Jaundice, Easy Bleeding, Lesions/changes in moles, Nail changes,     Skin Discoloration, Rash, Other      Neurological:  Denies: Dizziness, Fainting, Numbness\Tingling, Paralysis,     Seizures, Other      Psychiatric:  Complains of: AAO X 3, Denies: Anxiety, Panic attacks, Depression    , Memory loss, Other      Endocrine:  DiabetesThyroid DisorderOsteoporosisEndocrine Other      Hematologic/lymphatic:  Denies: Bruising, Bleeding, Enlarged Lymph Nodes,     Recurrent infections, Other      Reproductive:  Denies Pregnant, Denies Menopause, Denies Still Menstruating,     Denies Heavy Periods, Denies Other            VITAL SIGNS,PAIN/FATIGUE SCORE      Vitals      Height 5 ft 4.84 in / 164.7 cm      Weight 360 lbs 14.288 oz / 163.7 kg      BSA 2.85 m2      BMI 60.3 kg/m2      Temperature 97.1 F / 36.17 C - Temporal      Pulse 72      Respirations 24      Blood Pressure 156/81 Sitting, Right Arm      Pulse Oximetry 97%, NASAL CANNULA, 2 lpm            Pain Score      Experiencing any pain?:  No            Fatigue  Score      Experiencing any fatigue?:  Yes      Fatigue (0-10 scale):  5            General Appearance:  Alert, Oriented X3, Cooperative, No acute distress      Eyes:  Anicteric Sclerae, Moist Conjunctiva, PERRLA      HEENT:  Other (nasal cannula in place)      Neck:  Supple, No Masses or JVD      Respiratory:  CTAB, Diminished Breath      Breast\Chest:  No Symmetrical, No Nipple Discharge, No Masses, No Lumps, Other (    healing surgical incision in the superior areolar position. No axillary     adenopathy bilateral.)      Cardio:  RRR, No Murmur, Other (trace-1+ edema bilateral. )      Psychiatric:  Appropriate Affect, Intact Judgement, AAO x3      Neuro:  No Focal Sensory Deficit      Muscularskeletal:  Normal Gait and Station      Extremities:  No Digital Cyanosis, No Digital Ischemia, Normal Gait and station      Lymphatic:  No Cervical, No Supraclavicular, No Infraclavicular, No Axillary            PREVENTION      Hx Influenza Vaccination:  Yes ( Nov 2017)      Date Influenza Vaccine Given:  Nov 1, 2017      Influenza Vaccine Declined:  No      2 or More Falls Past Year?:  No      Fall Past Year with Injury?:  No      Hx Pneumococcal Vaccination:  Yes      Encouraged to follow-up with:  PCP regarding preventative exams.      Chart initiated by      KARLY SHAW MA            IMPRESSION/PLAN      Impression      atypical lobular and ductal hyperplasia of the right breast.            Diagnosis      Atypical hyperplasia of breast - N62      Patient has had excisional biopsy of an area containing both ductal and lobular     hyperplasia. No evidence of malignancy. She has had prior biopsy with similar     findings on the contralateral breast. She does have a strong family history of     breast cancer including her mother who passed away in her 40s from breast     cancer. Her risk model is >20% based on the Cecilia model. Given the strong family     history, I would recommend genetic counseling evaluation to which  she is     agreeable. Referral will be made. We discussed testing for hereditary cancer     syndromes but I will defer to genetic counseling regarding those orders.      I will plan to see her back in 1 month which should allow sufficient time for     genetic testing. This could influence the possible use of medication such as     tamoxifen for breast cancer risk reduction.      New Referrals      * Genetic Counseling, Routine            Patient Education      Patient Education Provided:  Yes      ACO BMI High above 25:  Encouraged weight loss                 Disclaimer: Converted document may not contain table formatting or lab diagrams. Please see [employer] for the authenticated document.

## 2021-05-28 NOTE — PROGRESS NOTES
Patient: LUISA RINCON     Acct: [id number]     Report: #[id number]  UNIT #: [id number]     : 1955    Encounter Date:2019  PRIMARY CARE: MATTHIAS CATES  ***Signed***  --------------------------------------------------------------------------------------------------------------------  Chief Complaint      Encounter Date      2019            Primary Care Provider      MATTHIAS CATES            Referring Provider      MATTHIAS CATES            Patient Complaint      Patient is complaining of      Patient here today for oxygen re-certification, COPD            VITALS      Height 65 in / 165.1 cm      Weight 361 lbs  / 163.952366 kg      BSA 2.54 m2      BMI 60.1 kg/m2      Temperature 98.2 F / 36.78 C - Oral      Pulse 69      Respirations 15      Blood Pressure 144/90 Sitting, Right Arm      Pulse Oximetry 96%, nasal canula , 4 lpm      Comment: in office walk- O2 88%      Initial Exhaled Nitrous Oxide      Exhaled Nitrous Oxide Results:  27            HPI      The patient is a 64 year old white female patient of Dr. Juarez's with a history    of chronic hypoxic respiratory failure, super morbid obesity, chronic diastolic     heart failure, asthma chronic obstructive pulmonary disease overlap syndrome and    obstructive sleep apnea on nightly CPAP and obesity hyperventilation syndrome.     She is here for oxygen recertification today. She continues to qualify for this     on 6 minute walk test. She reports very good compliance with her CPAP and states    that she uses it every night and feels like it significantly helps her. I do not    have a recent CPAP compliance report at this time. She says she thinks she has     been doing quite well since her last office visit in 2019.  She denies     any increased dyspnea, coughing or wheezing, hemoptysis, fever or chills. She     has historically been noncompliant with using her inhalers and has had a hard     time remembering to use twice  daily inhalers so at her last office visit Dr. Torre started her on trelegy ellipta. Unfortunately she never picked this up     and never started using it. She is asking me a lot of questions about it today     and is concerned that it is a powder.             I reviewed her Review of Systems, medical, surgical and family history and agree    with those as entered.      Copies To:   BOAZ TORRE ;            GELY      Constitutional:  Denies: Fatigue, Fever, Weight gain, Weight loss, Chills,     Insomnia, Other      Respiratory/Breathing:  Complains of: Shortness of air; Denies: Wheezing, Cough,    Hemoptysis, Pleuritic pain, Other      Endocrine:  Denies: Polydipsia, Polyuria, Heat/cold intolerance, Abnorml     menstrual pattern, Diabetes, Other      Eyes:  Denies: Blurred vision, Vision Changes, Other      Ears, nose, mouth, throat:  Denies: Congestion, Dysphagia, Hearing Changes, Nose    Bleeding, Nasal Discharge, Throat pain, Tinnitus, Other      Cardiovascular:  Denies: Chest Pain, Exertional dyspnea, Peripheral Edema,     Palpitations, Syncope, Wake up Gasping for air, Orthopnea, Tachycardia, Other      Gastrointestinal:  Denies: Abdominal pain/cramping, Bloody stools, Constipation,    Diarrhea, Melena, Nausea, Vomiting, Other      Genitourinary:  Denies: Dysuria, Urinary frequency, Incontinence, Hematuria,     Urgency, Other      Musculoskeletal:  Denies: Joint Pain, Joint Stiffness, Joint Swelling, Myalgias,    Other      Hematologic/lymphatic:  DENIES: Lymphadenopathy, Bruising, Bleeding tendencies,     Other      Neurologic:  Denies: Headache, Numbness, Weakness, Seizures, Other      Psychiatric:  Denies: Anxiety, Appropriate Effect, Depression, Other      Sleep:  No: Excessive daytime sleep, Morning Headache?, Snoring, Insomnia?, Stop    breathing at sleep?, Other      Integumentary:  Denies: Rash, Dry skin, Skin Warm to Touch, Other            FAMILY/SOCIAL/MEDICAL HX      Current History             Problems         Chronic Problems:         780.57 SLEEP APNEA (Onset: 7/7/11)         278.01 MORBID OBESITY (Onset: 7/7/11)         272.4 HYPERLIPIDEMIA (Onset: 7/7/11, Acute)         300.4 ANXIETY AND DEPRESSION (Onset: 7/7/11)         477.9 ALLERGIC RHINITIS (Onset: 7/7/11, Acute)         401.9 HYPERTENSION NOS (Onset: 7/7/11)      Surgical History:  Yes: Abdominal Surgery (egd, colonoscopy), Back Surgery (1984    LUMBAR LAMINECTOMY), Bowel Surgery (COLONOSCOPY), Oral Surgery (tonsils),     Orthopedic Surgery ( BILAT. TOTAL KNEE REPLACEMENT X 2 2009, laminectomy),     Throat Surgery (TONSILLECTOMY BUT STATES THEY HAVE GROWN BACK), Other Surgeries;    No: AAA Repair, Angioplasty, Appendectomy, Bladder Surgery, Breast Surgery,     CABG, Carotid Stenosis, Cholecystectomy, Ear Surgery, Eye Surgery, Head Surgery,    Hernia Surgery, Kidney Surgery, Nose Surgery, Prostatectomy, Rectal Surgery,     Spinal Surgery, Testicular Surgery, Valve Replacement, Vascular Surgery      Stroke - Family Hx:  Grandparent      Heart - Family Hx:  Grandparent, Aunt, Uncle      Cancer/Type - Family Hx:  Mother (BREAST), Grandparent (COLON), Aunt (BREAST)      Social History:  No Tobacco Use, No Alcohol Use, No Recreational Drug use      Smoking status:  Never smoker      Exercise Activity:  None      Occupation:  RETIRED      [employer]/Social Activities:  READING,COOKING,SEWING      Whom do you live with?:        Number of Pregnancies:  3      Number of Delivers:  3      Hysterectomy:  Yes      Anticoagulation Therapy:  No      Antibiotic Prophylaxis:  No      Medical History:  Yes: Allergies, Anemia, Arthritis (osteoarthritis), Asthma,     Blood Disease (ANEMIA), Broken Bones, Chemotherapy/Cancer (BASAL CELL SKIN     CANCER -REMOVED), Chronic Bronchitis/COPD, Congestive Heart Failu (Diastolic     Congested Failure), Depression, Head Injury, Hemorrhoids/Rectal Prob (RECTAL     BLEEDING, NAUSEA, DIARRHEA,inflam. bowel  "disorder,gastritis,HHosteoa), High     Blood Pressure (On Medication), Migrane Headaches, Night sweats, Shortness Of     Breath (sleep apnea), Sinus Trouble; No: Alcoholism, Cataracts, Chemical     Dependency, Emphysema, Chronic Liver Disease, Colon Trouble, Colitis,     Diverticulitis, Deafness or Ringing Ears, Convulsions, Anxiety, Bipolar     Disorder, Diabetes, Epilepsy, Seizures, Forgetfullness, Glaucoma, Gall Stones,     Gout, Heart Attack, Heart Murmur, Hepatitis, Hiatal Hernia, High Cholesterol,     HIV (Do not ask - volu, Jaundice, Kidney or Bladder Disease, Kidney Stones,     Mitral Valve Prolapse, Phlebitis, Psychiatric Care, Reflux Disease, Rheumatic     Fever, Sexually Transmitted Dis, Skin Disease/Psoriais/Ecz, Stroke, Thyroid     Problem, Tuberculosis or Pos TB Te, Miscellaneous Medical/oth      Psychiatric History      depression            PREVENTION      Hx Influenza Vaccination:  Yes      Date Influenza Vaccine Given:  Nov 1, 2019      Influenza Vaccine Declined:  No      2 or More Falls Past Year?:  No      Fall Past Year with Injury?:  No      Hx Pneumococcal Vaccination:  Yes      Encouraged to follow-up with:  PCP regarding preventative exams.      Chart initiated by      Mary Arnold CMA            ALLERGIES/MEDICATIONS      Allergies:        Coded Allergies:             AMLODIPINE BESYLATE (Verified  Allergy, Severe, SWELLING, 11/1/18)           HYDROCHLOROTHIAZIDE (Verified  Allergy, Unknown, NEUROLOGICAL AND KIDNEY     PROBLEMS OCCUR, 11/1/18)           MORPHINE (Verified  Allergy, Unknown, hallucinations; nausea, 11/1/18)           ADHESIVE (Verified  Adverse Reaction, Unknown, \"TEARS MY SKIN\", 11/1/18)      Medications    Last Reconciled on 11/4/19 15:12 by THOMAS PRESLEY      Pravastatin Sod (Pravastatin*) 40 Mg Tablet      40 MG PO HS, #30 TAB 0 Refills         Reported         11/4/19       Mometasone/Formoterol (Dulera 200 Mcg/5 Mcg) 13 Gm Hfa.aer.ad      2 PUFFS INH RTBID, #1 " MDI 0 Refills         Reported         11/4/19       Magnesium Gluconate (Magnesium Gluconate) 27 Mg Tablet      500 MG PO QDAY PRN for MUSCLE SPASMS, TAB         Reported         11/27/18       Potassium (Potassium) 99 Mg Tablet      99 MG PO PRN for MUSCLE SPASMS, TAB         Reported         11/27/18       Benzonatate (Benzonatate) 200 Mg Capsule      200 MG PO TID, #30 CAP         Reported         11/27/18       Meloxicam (Mobic*) 7.5 Mg Tablet      7.5 MG PO QDAY, TAB         Reported         4/3/18       Psyllium Husk (Metamucil) 0.52 Gm Capsule      1 CAP PO 5XD, #60 CAP 0 Refills         Reported         3/22/18       Spironolactone (Spironolactone*) 25 Mg Tablet      12.5 MG PO QDAY, #30 TAB 0 Refills         Reported         3/22/18       Carvedilol (Coreg) 12.5 Mg Tablet      12.5 MG PO BID, #60 TAB 0 Refills         Reported         3/22/18       Furosemide (Furosemide) 40 Mg Tablet      40 MG PO QDAY, #30 TAB 0 Refills         Prov: CRISSY VARGAS         12/30/17       Albuterol/Ipratropium (Duoneb) Unknown Strength Ampul.neb      1 INH QID PRN for SHORTNESS OF BREATH, #180 NEB 0 Refills         Reported         9/21/17       Mehul-Mometasone (Nasonex*) 17 Gm Naspr      2 PUFFS NARE EACH QDAY PRN for ALLERGIES, #1 BOTTLE 0 Refills         Reported         9/18/17       Montelukast Sodium (Singulair*) 10 Mg Tab      10 MG PO HS, #30 TAB 0 Refills         Reported         9/18/17       Cholecalciferol (Vitamin D3*) 2,000 U Tablet      2000 UNITS PO QDAY, #30 TAB 0 Refills         Reported         9/18/17       Multivitamins (Multi-Vitamin) 1 Tab Tablet      1 TAB PO QDAY         Reported         12/3/12       FLUoxetine HCl (PROzac) 40 Mg Capsule      40 MG PO QDAY, CAP         Reported         12/3/12      Current Medications      Current Medications Reviewed 11/4/19            EXAM      VITAL SIGNS:  Reviewed.        NECK:  Supple without tracheal deviation or lymphadenopathy.  No thyromegaly      appreciated.      LYMPHATICS:  No cervical or supraclavicular lymphadenopathy.      HEENT: Pupils are equal, round and reactive to light. There is no scleral     icterus.  Nares patent without hypertrophy of the turbinates. No erythema of the    passages.  TMs are clear bilaterally with good cone of light. The posterior     pharynx is without  lesions or erythema.      RESPIRATORY: Mildly decreased breath sounds throughout, no wheezes, rhonchi or     crackles, normal work of breathing noted.        CARDIOVASCULAR:  Regular rate and rhythm.  No murmurs, gallops or rubs.  No     lower extremity edema.  Equal radial pulses.        GI: Soft, nontender, nondistended, no organomegaly.  Bowel sounds present in all    four quadrants.      MUSCULOSKELETAL:  No joint effusions, erythema or warmth over the major joint     systems.      SKIN:  No rashes or lesions.      NEUROLOGIC: Cranial nerves II-XII are intact bilaterally.  Moves all     extremities. Ambulates with ease.      PSYCH:  Appropriate mood and affect.      Vitals      Vitals:             Height 65 in / 165.1 cm           Weight 361 lbs  / 163.627874 kg           BSA 2.54 m2           BMI 60.1 kg/m2           Temperature 98.2 F / 36.78 C - Oral           Pulse 69           Respirations 15           Blood Pressure 144/90 Sitting, Right Arm           Pulse Oximetry 96%, nasal canula , 4 lpm           Comment: in office walk- O2 88%            REVIEW      Results Reviewed      PCCS Results Reviewed?:  Yes Prev Lab Results, Yes Prev Radiology Results, Yes     [facility]            Assessment      Notes      New Medications      * Fluticasone/Umeclidin/Vilanter (Trelegy Ellipta 100-62.5-25) 1 EACH       BLST.W.DEV: 1 PUFF INH RTQDAY #1      Changed Medications      * Pravastatin Sod (Pravastatin*) 40 MG TABLET: 40 MG PO HS #30         Replaced 20 MG TABLET: 20 MG PO HS #30      Discontinued Medications      * Fluticasone/Umeclidin/Vilanter (Trelegy  Ellipta 100-62.5-25) 1 EACH       BLST.W.DEV: 1 PUFF INH RTQDAY #1      New Office Procedures      * Fluzone Vaccine High-Dose, Routine         Flu Vacc [id number](65Yr Up)/Pf (Fluzone High-Dose 2019-20 Syr) 180 MCG/0.5 ML       SYRINGE: 180 MICROGRAM INTRAMUSCULARLY Qty 1 SYRINGE      ASSESSMENT:       1. Super morbid obesity with BMI 60.        2.  Chronic hypoxemic respiratory failure.       3.  Chronic decompensated diastolic congestive heart failure.       4.  Chronic obstructive pulmonary disease with asthma overlap syndrome.       5. Obstructive sleep apnea.       6. Pulmonary artery hypertension secondary to morbid obesity, obesity hypoventil    ation syndrome.       7.  Restrictive lung disease based on body habitus.             PLAN:      1. The patient continues to qualify for supplemental oxygen, her resting room     air SPO2 ws 96%, her ambulation room air SPO2 was 88%. Her ambulation SPO2 came     up to 96% on 2 liters of oxygen, she continues to qualify for supplemental     oxygen with exertion. We will recertify her for this so she can continue to use     supplemental oxygen.       2. Continue nightly CPAP machine with oxygen bled in.       3. I will review her CPAP compliance report when it is available.      4. I have discussed in detail with the patient regarding trelegy ellipta and     answered her questions about this. I have re-prescribed this today and advised     her to use 1 puff once daily. She is willing to try this and will call us if she    has any issues with it.  She should rinse her mouth out after using this.       5. Continue to follow up with Dr. Santana regarding diuretics for chronic     diastolic heart failure and management.       6. I counseled the patient on weight loss by decreasing caloric intake and     gradually increasing physical activity. The patient verbalized understanding.       7. I have given the patient a Fluzone today, she is up to date on pneumonia     vaccines  through her primary care provider per her report.       8. Follow up in 6-8 months with Dr. Juarez, sooner if needed.            Patient Education      Patient Education Provided:  COPD, How to use an Inhaler, Sleep Apnea      Time Spent:  > 50% /[facility]            Electronically signed by JOVANNY MOORE PA-C  11/12/2019 15:55       Disclaimer: Converted document may not contain table formatting or lab diagrams. Please see [employer] [facility] for the authenticated document.

## 2021-05-28 NOTE — PROGRESS NOTES
Patient: LUISA RINCON     Acct: [id number]     Report: #[id number]  UNIT #: [id number]     : 1955    Encounter Date:2018  PRIMARY CARE: MATTHIAS CATSE  ***Signed***  --------------------------------------------------------------------------------------------------------------------  Chief Complaint      Encounter Date      2018            Primary Care Provider      MATTHIAS CATES            Referring Provider      MATTHIAS CATES            Patient Complaint      Patient is complaining of      6 months follow up            VITALS      Height 5 ft 5 in / 165.1 cm      Weight 360 lbs 1 oz / 163.726556 kg      BSA 2.54 m2      BMI 59.9 kg/m2      Temperature 97.6 F / 36.44 C - Oral      Pulse 67      Respirations 16      Blood Pressure 154/67 Sitting, Right Arm      Pulse Oximetry 97%, nasal cannula, 4 lpm      Initial Exhaled Nitrous Oxide      Exhaled Nitrous Oxide Results:  27            HPI      The patient presents for a follow up regarding shortness of breath and chronic     hypoxemic respiratory failure.  She was last seen in the clinic on 05/15/2018.      She has been doing very well and has cut down to 20 mg of Lasix once daily and     thinks that this has kept her water weight under control.  She was diagnosed     with sleep apnea and started on CPAP machine and is doing very well with that.      She is on Dulera two puffs inhaled once a day, cetirizine and intermittent     albuterol as needed.  Additionally, she takes Singulair at night.  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     She is a retired  and likes to go over to the library at her     daughter's school to help.  She needs recertification for her oxygen.  She     currently takes 3-4 liters per minute to maintain oxygen saturations in the mid     90s.  She has gained weight and is aware that she needs to lose weight.            Past medical, family,  social and surgical history updated in the chart,     unchanged since last visit.              Review of systems as noted.            Medications were reviewed and updated in the chart.  Her Lasix is actually 20     mg, but she cut this down by herself.            ROS      Constitutional:  Complains of: Weight gain; Denies: Fatigue, Fever, Weight loss,    Chills, Insomnia, Other      Respiratory/Breathing:  Complains of: Shortness of air; Denies: Wheezing, Cough,    Hemoptysis, Pleuritic pain, Other      Endocrine:  Denies: Polydipsia, Polyuria, Heat/cold intolerance, Abnorml     menstrual pattern, Diabetes, Other      Eyes:  Denies: Blurred vision, Vision Changes, Other      Ears, nose, mouth, throat:  Denies: Mouth lesions, Thrush, Throat pain,     Hoarseness, Allergies/Hay Fever, Post Nasal Drip, Headaches, Recent Head Injury,    Nose Bleeding, Neck Stiffness, Thyroid Mass, Hearing Loss, Ear Fullness, Dry     Mouth, Nasal or Sinus Pain, Dry Lips, Nasal discharge, Nasal congestion, Other      Cardiovascular:  Denies: Palpitations, Syncope, Claudication, Chest Pain, Wake     up Gasping for air, Leg Swelling, Irregular Heart Rate, Cyanosis, Dyspnea on     Exertion, Other      Gastrointestinal:  Denies: Nausea, Constipation, Diarrhea, Abdominal pain,     Vomiting, Difficulty Swallowing, Reflux/Heartburn, Dysphagia, Jaundice,     Bloating, Melena, Bloody stools, Other      Genitourinary:  Denies: Urinary frequency, Incontinence, Hematuria, Urgency,     Nocturia, Dysuria, Testicular problems, Other      Musculoskeletal:  Denies: Joint Pain, Joint Stiffness, Joint Swelling, Myalgias,    Other      Hematologic/lymphatic:  DENIES: Lymphadenopathy, Bruising, Bleeding tendencies,     Other      Neurological:  Denies: Headache, Numbness, Weakness, Seizures, Other      Psychiatric:  Denies: Anxiety, Appropriate Effect, Depression, Other      Sleep:  No: Excessive daytime sleep, Morning Headache?, Snoring, Insomnia?, Stop     breathing at sleep?, Other      Integumentary:  Denies: Rash, Dry skin, Skin Warm to Touch, Other      Immunologic/Allergic:  Denies: Latex allergy, Seasonal allergies, Asthma, U    rticaria, Eczema, Other      Immunization status:  No: Up to date            FAMILY/SOCIAL/MEDICAL HX      Current History            Problems         Chronic Problems:         780.57 SLEEP APNEA (Onset: 7/7/11)         278.01 MORBID OBESITY (Onset: 7/7/11)         272.4 HYPERLIPIDEMIA (Onset: 7/7/11, Acute)         300.4 ANXIETY AND DEPRESSION (Onset: 7/7/11)         477.9 ALLERGIC RHINITIS (Onset: 7/7/11, Acute)         401.9 HYPERTENSION NOS (Onset: 7/7/11)      Surgical History:  Yes: Abdominal Surgery (EGD), Back Surgery (1984 LUMBAR     LAMINECTOMY), Bowel Surgery (COLONOSCOPY), Oral Surgery (tonsils), Orthopedic     Surgery (BILATERAL KNEE, LUMBAR FUSION AND LAMINECTOMY), Throat Surgery     (TONSILLECTOMY BUT STATES THEY HAVE GROWN BACK), Other Surgeries; No: AAA     Repair, Angioplasty, Appendectomy, Bladder Surgery, Breast Surgery, CABG,     Carotid Stenosis, Cholecystectomy, Ear Surgery, Eye Surgery, Head Surgery,     Hernia Surgery, Kidney Surgery, Nose Surgery, Prostatectomy, Rectal Surgery,     Spinal Surgery, Testicular Surgery, Valve Replacement, Vascular Surgery      Stroke - Family Hx:  Grandparent      Heart - Family Hx:  Grandparent, Aunt, Uncle      Cancer/Type - Family Hx:  Mother (BREAST), Grandparent (COLON), Aunt (BREAST)      Social History:  No Tobacco Use, No Alcohol Use, No Recreational Drug use      Smoking status:  Never smoker      Exercise Activity:  None      Occupation:  RETIRED      [place]/Social Activities:  READING,COOKING,SEWING      Whom do you live with?:        Number of Pregnancies:  3      Number of Delivers:  3      Hysterectomy:  Yes      Anticoagulation Therapy:  No      Antibiotic Prophylaxis:  No      Medical History:  Yes: Allergies, Anemia, Arthritis (osteoarthritis), Asthma      (MILD- CONTROLLED WITH INHALERS ), Blood Disease (ANEMIA), Broken Bones,     Chemotherapy/Cancer (BASAL CELL SKIN CANCER -REMOVED), Chronic Bronchitis/COPD,     Congestive Heart Failu (DIASTOLIC CONGESTIVE HEART FAILURE), Depression, Head     Injury, Hemorrhoids/Rectal Prob (RECTAL BLEEDING, INFLAMMATORY     BOWEL,GASTRITIS,HHosteoa), High Blood Pressure (CONTROLLED WITH MEDS), Migrane     Headaches, Shortness Of Breath (SLEEP APNEA, PULMONARY HYPERTENSION ), Sinus     Trouble; No: Alcoholism, Cataracts, Chemical Dependency, Emphysema, Chronic     Liver Disease, Colon Trouble, Colitis, Diverticulitis, Deafness or Ringing Ears,    Convulsions, Anxiety, Bipolar Disorder, Diabetes, Epilepsy, Seizures,     Forgetfullness, Glaucoma, Gall Stones, Gout, Heart Attack, Heart Murmur,     Hepatitis, Hiatal Hernia, High Cholesterol, HIV (Do not ask - volu, Jaundice, K    idney or Bladder Disease, Kidney Stones, Mitral Valve Prolapse, Night sweats,     Phlebitis, Psychiatric Care, Reflux Disease, Rheumatic Fever, Sexually     Transmitted Dis, Skin Disease/Psoriais/Ecz, Stroke, Thyroid Problem,     Tuberculosis or Pos TB Te, Miscellaneous Medical/oth      Psychiatric History      depression            PREVENTION      Hx Influenza Vaccination:  Yes      Date Influenza Vaccine Given:  Sep 28, 2018      Influenza Vaccine Declined:  No      2 or More Falls Past Year?:  No      Fall Past Year with Injury?:  No      Hx Pneumococcal Vaccination:  Yes      Encouraged to follow-up with:  PCP regarding preventative exams.      Chart initiated by      sammy holden ma            ALLERGIES/MEDICATIONS      Allergies:        Coded Allergies:             AMLODIPINE BESYLATE (Verified  Allergy, Severe, SWELLING, 11/1/18)           HYDROCHLOROTHIAZIDE (Verified  Allergy, Unknown, NEUROLOGICAL AND KIDNEY     PROBLEMS OCCUR, 11/1/18)           MORPHINE (Verified  Allergy, Unknown, hallucinations; nausea, 11/1/18)           ADHESIVE (Verified   "Adverse Reaction, Unknown, \"TEARS MY SKIN\", 11/1/18)      Medications    Last Reconciled on 11/1/18 16:33 by BOAZ TORRE DO      Cetirizine Hcl (ZyrTEC) 10 Mg Tablet      10 MG PO QDAY, #30 TAB 0 Refills         Prov: ZANE DAVIDSON cfe         10/5/18       Cetirizine Hcl (ZyrTEC) 10 Mg Tablet      10 MG PO QDAY, #30 TAB 0 Refills         Prov: STUARTOSCAROKUR cfe         10/5/18       Mometasone/Formoterol (Dulera 100 Mcg/5 Mcg) 13 Gm Hfa.aer.ad      2 PUFFS INH QDAY, #1 MDI         Reported         6/14/18       Meloxicam (Mobic*) 7.5 Mg Tablet      7.5 MG PO QDAY, TAB         Reported         4/3/18       Psyllium Husk (Metamucil) 0.52 Gm Capsule      1 CAP PO 5XD, #60 CAP 0 Refills         Reported         3/22/18       Spironolactone (Spironolactone*) 25 Mg Tablet      12.5 MG PO QDAY, #30 TAB 0 Refills         Reported         3/22/18       Carvedilol (Coreg) 12.5 Mg Tablet      12.5 MG PO BID, #60 TAB 0 Refills         Reported         3/22/18       Furosemide (Furosemide) 40 Mg Tablet      40 MG PO QDAY, #30 TAB 0 Refills         Prov: DEJUANPIEDADARISTEOCRISSY         12/30/17       Pravastatin Sod (Pravastatin*) 20 Mg Tablet      20 MG PO HS, #30 TAB 0 Refills         Reported         12/26/17       Albuterol/Ipratropium (Duoneb) Unknown Strength Ampul.neb      1 INH QID PRN for SHORTNESS OF BREATH, #180 NEB 0 Refills         Reported         9/21/17       Mehul-Mometasone (Nasonex*) 17 Gm Naspr      2 PUFFS NARE EACH QDAY PRN for ALLERGIES, #1 BOTTLE 0 Refills         Reported         9/18/17       Montelukast Sodium (Singulair*) 10 Mg Tab      10 MG PO HS, #30 TAB 0 Refills         Reported         9/18/17       Cholecalciferol (Vitamin D3*) 2,000 U Tablet      2000 UNITS PO QDAY, #30 TAB 0 Refills         Reported         9/18/17       Multivitamins (Multi-Vitamin) 1 Tab Tablet      1 TAB PO QDAY         Reported         12/3/12       FLUoxetine HCl (PROzac) 40 Mg Capsule      40 MG PO QDAY, CAP         Reported      "    12/3/12      Current Medications      Current Medications Reviewed 11/1/18            EXAM      VITAL SIGNS:  Reviewed.        GENERAL: Morbidly obese female, appears in no acute distress, speaking in full     sentences.        NECK:  Supple without tracheal deviation or lymphadenopathy.  No thyromegaly     appreciated.      LYMPHATICS:  No cervical or supraclavicular lymphadenopathy.      HEENT: Pupils are equal, round and reactive to light. There is no scleral     icterus.  Nares patent without hypertrophy of the turbinates. No erythema of the    passages.  TMs are clear bilaterally with good cone of light. The posterior     pharynx is without  lesions or erythema.      RESPIRATORY:  She has diminished breath soounds throughotu, distant heart tones,    otherwise everything else was normal.      CARDIOVASCULAR:  Regular rate and rhythm.  No murmurs, gallops or rubs.  No     lower extremity edema.  Equal radial pulses.        GI: Protuberant abdomen, soft, nontender, nondistended, no organomegaly.  Bowel     sounds present in all four quadrants.      MUSCULOSKELETAL:  No joint effusions, erythema or warmth over the major joint     systems.      SKIN:  No rashes or lesions.      NEUROLOGIC: Cranial nerves II-XII are intact bilaterally.  Moves all     extremities. Ambulates with ease.      PSYCH:  Appropriate mood and affect.      Vtials      Vitals:             Height 5 ft 5 in / 165.1 cm           Weight 360 lbs 1 oz / 163.309940 kg           BSA 2.54 m2           BMI 59.9 kg/m2           Temperature 97.6 F / 36.44 C - Oral           Pulse 67           Respirations 16           Blood Pressure 154/67 Sitting, Right Arm           Pulse Oximetry 97%, nasal cannula, 4 lpm            REVIEW      Results Reviewed      PCCS Results Reviewed?:  Yes Prev Lab Results, Yes Prev Radiology Results, Yes     Previous [facility] Records      Lab Results      I reviewed my clinic note.              I reviewed imaging including chest  CT performed 12/272/107. There was mosaic     attention and ground glass opacities seen in the chest that has had improved     central bronchial thickening and bronchial obstruction in the right lower and     middle lobes consistent with chronic bronchitis.  She also had trace bilateral     pleural effusions and a moderate pericardial effusion.            I reviewed pulmonary function testing.  She had obstructive and restrictive lung    disease, FEV1 was 37% of predicted, DLCO 34% of predicted with a vital capacity     of 37% of predicted.            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            Notes      Discontinued Medications      * Levofloxacin (Levaquin*) 500 MG TABLET: 500 MG PO QDAY 10 Days #10      * Benzonatate (Tessalon Perles) 100 MG CAPSULE: 200 MG PO TID 15 Days #90      * Levofloxacin (Levaquin*) 500 MG TABLET: 500 MG PO QDAY 10 Days #10      * Benzonatate (Tessalon Perles) 100 MG CAPSULE: 200 MG PO TID 15 Days #90      New Diagnostics      * PFT-Comp, PrePost,DLCO,BodyBox, Week         Dx: COPD, severe - J44.9      * 6 Min Walk With Pulse Ox, Routine         Dx: COPD, severe - J44.9      * Chest W/O Cont CT, Routine         Dx: COPD with asthma - J44.9      ASSESSMENT:       1.  Combined restrictive obstructive lung disease.      2. Chronic hypoxemic respiratory failure.      3. Chronic diastolic congestive heart failure, currently euvolemic.      4.  Supermorbid obesity with a BMI of 60 kg/m2.      5. Sleep apnea, compliant with CPAP.      6.  Pulmonary arterial hypertension, likely multifactorial including morbid     obesity, obesity hypoventilation syndrome, obstructive sleep apnea and her     chronic lung disease.      7.  Restrictive lung disease secondary to her obesity.            PLAN:      1. I have ordered for a repeat CT  scan of the chest without contrast, six minute    walk and pulmonary function test for new baselines one year after her last.  She    follows with Dr. Santana, I am sure she he will repeat echocardiogram and sent     this to my attention.      2.  She will continue with her chronic heart failure medications.      3. Continue Dulera, Singulair, Claritin and albuterol as needed.      4.  I did  her on weight loss. I asked if I could refer to a dietician,     but she kindly declined.        5. She will continue her CPAP for sleep apnea.      6.  Follow up in four months.            Patient Education      ACO BMI High above 25:  Encouraged weight loss      Patient Education Provided:  Acute Asthma, COPD, How to use an Inhaler                 Disclaimer: Converted document may not contain table formatting or lab diagrams. Please see [employer] for the authenticated document.

## 2021-05-28 NOTE — PROGRESS NOTES
Patient: LUISA RINCON     Acct: [id number]     Report: #[id number]  UNIT #: [id number]     : 1955    Encounter Date:2018  PRIMARY CARE: MATTHIAS CATES  ***Signed***  --------------------------------------------------------------------------------------------------------------------  Chief Complaint      Encounter Date      May 21, 2018            Primary Care Provider      MATTHIAS CATES            Referring Provider      MATTHIAS CATES            Patient Complaint      Patient is complaining of       2 Month F/U Pulm Rehab/Sleep Results/Lab Res            VITALS      Height 5 ft 5 in / 165.1 cm      Weight 353 lbs 4 oz / 160.893433 kg      BSA 2.82 m2      BMI 58.8 kg/m2      Temperature 97.5 F / 36.39 C - Oral      Pulse 70      Respirations 16      Blood Pressure 157/88 Sitting, Left Arm      Pulse Oximetry 92%, 3 liters      Exhaled Nitrous Oxide Testin            HPI      The patient is a 63 year old super morbid obese female with chronic hypercapnic     respiratory failure on 4 liters of oxygen as well as chronic decompensated     diastolic congestive heart failure and obstructive sleep apnea. She presents for    follow up regarding pulmonary rehabilitation, sleep study results and shortness     of breath.  She presents for follow up. She was last seen by myself in March at     which time I asked the patient to consider pulmonary rehabilitation and she     wanted to wait until spring/summer. She is on Symbicort 80/4.5 and Singulair.     She is compliant with her CPAP. Her heart failure is controlled currently. She     is being seen by  cardiology and is on Lasix and Aldactone and low dose     Carvedilol. She uses DuoNeb as needed and Nasonex for allergies.             Review of Systems is notable for shortness of breath and intermittent wheezing.             Past family, medical, surgical and social histories were all reviewed by myself     with the patient and are unchanged.             Medications were reviewed by myself with the patient and updated in the chart.            ROS      Constitutional:  Denies: Fatigue, Fever, Weight gain, Weight loss, Chills,     Insomnia, Other      Respiratory/Breathing:  Complains of: Shortness of air, Wheezing; Denies: Cough,    Hemoptysis, Pleuritic pain, Other      Endocrine:  Denies: Polydipsia, Polyuria, Heat/cold intolerance, Abnorml     menstrual pattern, Diabetes, Other      Eyes:  Denies: Blurred vision, Vision Changes, Other      Ears, nose, mouth, throat:  Denies: Mouth lesions, Thrush, Throat pain,     Hoarseness, Allergies/Hay Fever, Post Nasal Drip, Headaches, Recent Head Injury,    Nose Bleeding, Neck Stiffness, Thyroid Mass, Hearing Loss, Ear Fullness, Dry     Mouth, Nasal or Sinus Pain, Dry Lips, Nasal discharge, Nasal congestion, Other      Cardiovascular:  Denies: Palpitations, Syncope, Claudication, Chest Pain, Wake     up Gasping for air, Leg Swelling, Irregular Heart Rate, Cyanosis, Dyspnea on     Exertion, Other      Gastrointestinal:  Denies: Nausea, Constipation, Diarrhea, Abdominal pain,     Vomiting, Difficulty Swallowing, Reflux/Heartburn, Dysphagia, Jaundice,     Bloating, Melena, Bloody stools, Other      Genitourinary:  Denies: Urinary frequency, Incontinence, Hematuria, Urgency,     Nocturia, Dysuria, Testicular problems, Other      Musculoskeletal:  Denies: Joint Pain, Joint Stiffness, Joint Swelling, Myalgias,    Other      Hematologic/lymphatic:  DENIES: Lymphadenopathy, Bruising, Bleeding tendencies,     Other      Neurological:  Denies: Headache, Numbness, Weakness, Seizures, Other      Psychiatric:  Denies: Anxiety, Appropriate Effect, Depression, Other      Sleep:  No: Excessive daytime sleep, Morning Headache?, Snoring, Insomnia?, Stop    breathing at sleep?, Other      Integumentary:  Denies: Rash, Dry skin, Skin Warm to Touch, Other      Immunologic/Allergic:  Denies: Latex allergy, Seasonal allergies, Asthma,      Urticaria, Eczema, Other      Immunization status:  No: Up to date            FAMILY/SOCIAL/MEDICAL HX      Current History            Problems         Chronic Problems:         780.57 SLEEP APNEA (Onset: 7/7/11)         278.01 MORBID OBESITY (Onset: 7/7/11)         272.4 HYPERLIPIDEMIA (Onset: 7/7/11, Acute)         300.4 ANXIETY AND DEPRESSION (Onset: 7/7/11)         477.9 ALLERGIC RHINITIS (Onset: 7/7/11, Acute)         401.9 HYPERTENSION NOS (Onset: 7/7/11)      Surgical History:  Yes: Abdominal Surgery (egd, colonoscopy), Back Surgery (1984    LUMBAR LAMINECTOMY), Bowel Surgery (COLONOSCOPY), Oral Surgery (tonsils),     Orthopedic Surgery ( BILAT. TOTAL KNEE REPLACEMENT X 2 2009, laminectomy),     Throat Surgery (TONSILLECTOMY BUT STATES THEY HAVE GROWN BACK), Other Surgeries;    No: AAA Repair, Angioplasty, Appendectomy, Bladder Surgery, Breast Surgery,     CABG, Carotid Stenosis, Cholecystectomy, Ear Surgery, Eye Surgery, Head Surgery,    Hernia Surgery, Kidney Surgery, Nose Surgery, Prostatectomy, Rectal Surgery,     Spinal Surgery, Testicular Surgery, Valve Replacement, Vascular Surgery      Stroke - Family Hx:  Grandparent      Heart - Family Hx:  Grandparent, Aunt, Uncle      Cancer/Type - Family Hx:  Mother (BREAST), Grandparent (COLON), Aunt (BREAST)      Social History:  No Tobacco Use, No Alcohol Use, No Recreational Drug use      Smoking status:  Never smoker      Exercise Activity:  None      Occupation:  RETIRED      [place]/Social Activities:  READING,COOKING,SEWING      Whom do you live with?:        Number of Pregnancies:  3      Number of Delivers:  3      Hysterectomy:  Yes      Anticoagulation Therapy:  No      Antibiotic Prophylaxis:  No      Medical History:  Yes: Allergies, Anemia, Arthritis (osteoarthritis), Asthma,     Blood Disease (ANEMIA), Broken Bones, Chemotherapy/Cancer (BASAL CELL SKIN     CANCER -REMOVED), Chronic Bronchitis/COPD, Congestive Heart Failu (Diastolic    "  Congested Failure), Depression, Head Injury, Hemorrhoids/Rectal Prob (RECTAL     BLEEDING, NAUSEA, DIARRHEA,inflam. bowel disorder,gastritis,HHosteoa), High     Blood Pressure (On Medication), Migrane Headaches, Night sweats, Shortness Of     Breath (sleep apnea), Sinus Trouble; No: Alcoholism, Cataracts, Chemical     Dependency, Emphysema, Chronic Liver Disease, Colon Trouble, Colitis,     Diverticulitis, Deafness or Ringing Ears, Convulsions, Anxiety, Bipolar     Disorder, Diabetes, Epilepsy, Seizures, Forgetfullness, Glaucoma, Gall Stones,     Gout, Heart Attack, Heart Murmur, Hepatitis, Hiatal Hernia, High Cholesterol,     HIV (Do not ask - volu, Jaundice, Kidney or Bladder Disease, Kidney Stones,     Mitral Valve Prolapse, Phlebitis, Psychiatric Care, Reflux Disease, Rheumatic     Fever, Sexually Transmitted Dis, Skin Disease/Psoriais/Ecz, Stroke, Thyroid     Problem, Tuberculosis or Pos TB Te, Miscellaneous Medical/oth            PREVENTION      Hx Influenza Vaccination:  Yes ( Nov 17)      Date Influenza Vaccine Given:  Nov 1, 2017      Influenza Vaccine Declined:  No      2 or More Falls Past Year?:  No      Fall Past Year with Injury?:  No      Hx Pneumococcal Vaccination:  Yes      Encouraged to follow-up with:  PCP regarding preventative exams.      Chart initiated by      Maya Panchal ma            ALLERGIES/MEDICATIONS      Allergies:        Coded Allergies:             AMLODIPINE BESYLATE (Verified  Allergy, Severe, SWELLING, 5/21/18)           HYDROCHLOROTHIAZIDE (Verified  Allergy, Unknown, NEUROLOGICAL AND KIDNEY     PROBLEMS OCCUR, 5/21/18)           MORPHINE (Verified  Allergy, Unknown, hallucinations; nausea, 5/21/18)           ADHESIVE (Unverified  Adverse Reaction, Unknown, \"TEARS MY SKIN\", 5/21/18)      Medications    Last Reconciled on 5/21/18 13:16 by MAYA PANCHAL      Meloxicam (Mobic*) 7.5 Mg Tablet      7.5 MG PO QDAY, TAB         Reported         4/3/18       Psyllium Husk (Metamucil) " 0.52 Gm Capsule      1 CAP PO 5XD, #60 CAP 0 Refills         Reported         3/22/18       Spironolactone (Spironolactone*) 25 Mg Tablet      12.5 MG PO QDAY, #30 TAB 0 Refills         Reported         3/22/18       Carvedilol (Coreg) 12.5 Mg Tablet      12.5 MG PO BID, #60 TAB 0 Refills         Reported         3/22/18       Mometasone/Formoterol (Dulera 100 Mcg/5 Mcg) 13 Gm Hfa.aer.ad      1 PUFFS INH RTBID, #1 MDI         Prov: CRISSY VARGAS         12/30/17       Furosemide (Furosemide) 40 Mg Tablet      40 MG PO QDAY, #30 TAB 0 Refills         Prov: CRISSY VARGAS         12/30/17       Pravastatin Sod (Pravastatin*) 20 Mg Tablet      20 MG PO HS, #30 TAB 0 Refills         Reported         12/26/17       Albuterol/Ipratropium (Duoneb) Unknown Strength Ampul.neb      INH RTQ4H, #180 NEB 0 Refills         Reported         9/21/17       Mehul-Mometasone (Nasonex*) 17 Gm Naspr      2 PUFFS NARE EACH QDAY Y for ALLERGIES, #1 BOTTLE 0 Refills         Reported         9/18/17       Montelukast Sodium (Singulair*) 10 Mg Tab      10 MG PO HS, #30 TAB 0 Refills         Reported         9/18/17       Cholecalciferol (Vitamin D3*) 2,000 U Tablet      2000 UNITS PO QDAY, #30 TAB 0 Refills         Reported         9/18/17       Multivitamins (Multi-Vitamin) 1 Tab Tablet      1 TAB PO QDAY         Reported         12/3/12       FLUoxetine HCl (PROzac) 40 Mg Capsule      40 MG PO QDAY, CAP         Reported         12/3/12      Current Medications      Current Medications Reviewed 5/21/18            EXAM      Vital signs reviewed.      GENERAL:   Morbidly obese female in no acute distress, speaking in full     sentences on supplemental oxygen.       HEENT: Pupils are equally round and reactive to light and accommodation.      Extraocular muscles intact bilateral. Nares patent without hypertrophy of the     turbinates.  TM's are clear bilaterally. Small oropharynx without lesions or e    rythema.       NECK:  Supple without  tracheal deviation or lymphadenopathy. No thyromegaly     appreciated.       LYMPHATICS: No cervical or supraclavicular lymphadenopathy.       RESPIRATORY: Diminished breath sounds throughout, no wheezes, rales or rhonchi,     tympanic to percussion.      CVS: Distant heart tones, regular rate and rhythm, no murmurs, rubs or gallops,     2+ pitting lower extremity edema to the knees, equal radial pulses.      GI: Abdomen soft, protuberant,  nontender, nondistended, no hepatomegaly     appreciated.  Bowel sounds present in all 4 quadrants.       MUSCULOSKELETAL: No erythema, warmth or fluctuance over the major joints     including the knees, ankles, wrists and elbows.        SKIN: No rashes or lesions.       NEUROLOGICAL: Alert and oriented X 3.  No focal deficits on exam. Cranial nerves    II-XII intact bilaterally.       PSYCH: Patient has appropriate mood and affect.      Vtials      Vitals:             Height 5 ft 5 in / 165.1 cm           Weight 353 lbs 4 oz / 160.530407 kg           BSA 2.82 m2           BMI 58.8 kg/m2           Temperature 97.5 F / 36.39 C - Oral           Pulse 70           Respirations 16           Blood Pressure 157/88 Sitting, Left Arm           Pulse Oximetry 92%, 3 liters            REVIEW      Results Reviewed      [facility]  [facility]            Assessment      ASSESSMENT:       1.  Chronic hypoxemic respiratory failure.       2.  Chronic decompensated diastolic congestive heart failure.       3. Super morbid obesity with BMI 59.9 kg/m2. T.        4.  Chronic obstructive pulmonary disease with asthma overlap syndrome.       5. Allergic rhinitis.      6. Obstructive sleep apnea.       7. Pulmonary artery hypertension.      8.  Restrictive lung disease.            PLAN:      1. Continue supplemental oxygen.       2. Enroll in pulmonary rehabilitation as soon as possible.       3. Follow up with cardiology for heart failure. Continue Lasix and beta blocker      Aldactone.       4. Continue Symbicort 80/4.5 for more asthma symptoms than chronic obstructive     pulmonary disease.       5. Continue CPAP at night.       6. The patient's pulmonary artery hypertension likely secondary to a combination    of issues including morbid obesity, underlying sleep apnea and obesity     hypoventilation syndrome.       7. Follow up in 6 months.            Patient Education      Patient Education Provided:  Acute Asthma, COPD, How to use an Inhaler,     Pulmonary Hypertension                 Disclaimer: Converted document may not contain table formatting or lab diagrams. Please see [employer] for the authenticated document.

## 2021-06-04 ENCOUNTER — INFUSION (OUTPATIENT)
Dept: ONCOLOGY | Facility: HOSPITAL | Age: 66
End: 2021-06-04

## 2021-06-04 ENCOUNTER — OFFICE VISIT (OUTPATIENT)
Dept: ONCOLOGY | Facility: CLINIC | Age: 66
End: 2021-06-04

## 2021-06-04 VITALS
SYSTOLIC BLOOD PRESSURE: 151 MMHG | TEMPERATURE: 97.5 F | DIASTOLIC BLOOD PRESSURE: 84 MMHG | BODY MASS INDEX: 48.82 KG/M2 | OXYGEN SATURATION: 93 % | WEIGHT: 293 LBS | HEIGHT: 65 IN | HEART RATE: 79 BPM | RESPIRATION RATE: 16 BRPM

## 2021-06-04 DIAGNOSIS — C50.611 MALIGNANT NEOPLASM OF AXILLARY TAIL OF RIGHT FEMALE BREAST, UNSPECIFIED ESTROGEN RECEPTOR STATUS (HCC): ICD-10-CM

## 2021-06-04 DIAGNOSIS — Z79.899 ENCOUNTER FOR EYE EXAM DUE TO HIGH RISK MEDICATION: ICD-10-CM

## 2021-06-04 DIAGNOSIS — Z79.899 ENCOUNTER FOR LONG-TERM (CURRENT) USE OF HIGH-RISK MEDICATION: ICD-10-CM

## 2021-06-04 DIAGNOSIS — C50.611 MALIGNANT NEOPLASM OF AXILLARY TAIL OF RIGHT FEMALE BREAST, UNSPECIFIED ESTROGEN RECEPTOR STATUS (HCC): Primary | ICD-10-CM

## 2021-06-04 LAB
ALBUMIN SERPL-MCNC: 3.6 G/DL (ref 3.5–5.2)
ALBUMIN/GLOB SERPL: 1.3 G/DL (ref 1.1–2.4)
ALP SERPL-CCNC: 83 U/L (ref 38–116)
ALT SERPL W P-5'-P-CCNC: 44 U/L (ref 0–33)
ANION GAP SERPL CALCULATED.3IONS-SCNC: 9.3 MMOL/L (ref 5–15)
AST SERPL-CCNC: 31 U/L (ref 0–32)
BASOPHILS # BLD AUTO: 0.06 10*3/MM3 (ref 0–0.2)
BASOPHILS NFR BLD AUTO: 0.6 % (ref 0–1.5)
BILIRUB SERPL-MCNC: 0.2 MG/DL (ref 0.2–1.2)
BUN SERPL-MCNC: 10 MG/DL (ref 6–20)
BUN/CREAT SERPL: 15.6 (ref 7.3–30)
CALCIUM SPEC-SCNC: 9.3 MG/DL (ref 8.5–10.2)
CHLORIDE SERPL-SCNC: 102 MMOL/L (ref 98–107)
CO2 SERPL-SCNC: 28.7 MMOL/L (ref 22–29)
CREAT SERPL-MCNC: 0.64 MG/DL (ref 0.6–1.1)
DEPRECATED RDW RBC AUTO: 60.1 FL (ref 37–54)
EOSINOPHIL # BLD AUTO: 0.01 10*3/MM3 (ref 0–0.4)
EOSINOPHIL NFR BLD AUTO: 0.1 % (ref 0.3–6.2)
ERYTHROCYTE [DISTWIDTH] IN BLOOD BY AUTOMATED COUNT: 18.3 % (ref 12.3–15.4)
GFR SERPL CREATININE-BSD FRML MDRD: 92 ML/MIN/1.73
GLOBULIN UR ELPH-MCNC: 2.8 GM/DL (ref 1.8–3.5)
GLUCOSE SERPL-MCNC: 160 MG/DL (ref 74–124)
HCT VFR BLD AUTO: 32.2 % (ref 34–46.6)
HGB BLD-MCNC: 10.1 G/DL (ref 12–15.9)
IMM GRANULOCYTES # BLD AUTO: 0.09 10*3/MM3 (ref 0–0.05)
IMM GRANULOCYTES NFR BLD AUTO: 0.9 % (ref 0–0.5)
LYMPHOCYTES # BLD AUTO: 0.98 10*3/MM3 (ref 0.7–3.1)
LYMPHOCYTES NFR BLD AUTO: 9.9 % (ref 19.6–45.3)
MCH RBC QN AUTO: 28.2 PG (ref 26.6–33)
MCHC RBC AUTO-ENTMCNC: 31.4 G/DL (ref 31.5–35.7)
MCV RBC AUTO: 89.9 FL (ref 79–97)
MONOCYTES # BLD AUTO: 0.67 10*3/MM3 (ref 0.1–0.9)
MONOCYTES NFR BLD AUTO: 6.7 % (ref 5–12)
NEUTROPHILS NFR BLD AUTO: 8.13 10*3/MM3 (ref 1.7–7)
NEUTROPHILS NFR BLD AUTO: 81.8 % (ref 42.7–76)
NRBC BLD AUTO-RTO: 0 /100 WBC (ref 0–0.2)
PLATELET # BLD AUTO: 290 10*3/MM3 (ref 140–450)
PMV BLD AUTO: 9.1 FL (ref 6–12)
POTASSIUM SERPL-SCNC: 3.7 MMOL/L (ref 3.5–4.7)
PROT SERPL-MCNC: 6.4 G/DL (ref 6.3–8)
RBC # BLD AUTO: 3.58 10*6/MM3 (ref 3.77–5.28)
SODIUM SERPL-SCNC: 140 MMOL/L (ref 134–145)
WBC # BLD AUTO: 9.94 10*3/MM3 (ref 3.4–10.8)

## 2021-06-04 PROCEDURE — 96367 TX/PROPH/DG ADDL SEQ IV INF: CPT

## 2021-06-04 PROCEDURE — 25010000002 CYCLOPHOSPHAMIDE 1 GM/5ML SOLUTION 5 ML VIAL: Performed by: INTERNAL MEDICINE

## 2021-06-04 PROCEDURE — 80053 COMPREHEN METABOLIC PANEL: CPT

## 2021-06-04 PROCEDURE — 25010000002 FOSAPREPITANT PER 1 MG: Performed by: INTERNAL MEDICINE

## 2021-06-04 PROCEDURE — 25010000002 DOXORUBICIN PER 10 MG: Performed by: INTERNAL MEDICINE

## 2021-06-04 PROCEDURE — 25010000002 DEXAMETHASONE SODIUM PHOSPHATE 100 MG/10ML SOLUTION: Performed by: INTERNAL MEDICINE

## 2021-06-04 PROCEDURE — 99215 OFFICE O/P EST HI 40 MIN: CPT | Performed by: INTERNAL MEDICINE

## 2021-06-04 PROCEDURE — 96377 APPLICATON ON-BODY INJECTOR: CPT

## 2021-06-04 PROCEDURE — 85025 COMPLETE CBC W/AUTO DIFF WBC: CPT

## 2021-06-04 PROCEDURE — 96411 CHEMO IV PUSH ADDL DRUG: CPT

## 2021-06-04 PROCEDURE — 25010000002 PALONOSETRON PER 25 MCG: Performed by: INTERNAL MEDICINE

## 2021-06-04 PROCEDURE — 25010000002 PEGFILGRASTIM 6 MG/0.6ML PREFILLED SYRINGE KIT: Performed by: INTERNAL MEDICINE

## 2021-06-04 PROCEDURE — 25010000002 HEPARIN LOCK FLUSH PER 10 UNITS: Performed by: INTERNAL MEDICINE

## 2021-06-04 PROCEDURE — 96375 TX/PRO/DX INJ NEW DRUG ADDON: CPT

## 2021-06-04 PROCEDURE — 96413 CHEMO IV INFUSION 1 HR: CPT

## 2021-06-04 RX ORDER — SODIUM CHLORIDE 0.9 % (FLUSH) 0.9 %
10 SYRINGE (ML) INJECTION AS NEEDED
Status: DISCONTINUED | OUTPATIENT
Start: 2021-06-04 | End: 2021-06-04 | Stop reason: HOSPADM

## 2021-06-04 RX ORDER — DOXORUBICIN HYDROCHLORIDE 2 MG/ML
60 INJECTION, SOLUTION INTRAVENOUS ONCE
Status: COMPLETED | OUTPATIENT
Start: 2021-06-04 | End: 2021-06-04

## 2021-06-04 RX ORDER — HEPARIN SODIUM (PORCINE) LOCK FLUSH IV SOLN 100 UNIT/ML 100 UNIT/ML
500 SOLUTION INTRAVENOUS AS NEEDED
Status: DISCONTINUED | OUTPATIENT
Start: 2021-06-04 | End: 2021-06-04 | Stop reason: HOSPADM

## 2021-06-04 RX ORDER — SODIUM CHLORIDE 0.9 % (FLUSH) 0.9 %
10 SYRINGE (ML) INJECTION AS NEEDED
Status: CANCELLED | OUTPATIENT
Start: 2021-06-04

## 2021-06-04 RX ORDER — DOXORUBICIN HYDROCHLORIDE 2 MG/ML
60 INJECTION, SOLUTION INTRAVENOUS ONCE
Status: CANCELLED | OUTPATIENT
Start: 2021-06-04

## 2021-06-04 RX ORDER — SODIUM CHLORIDE 9 MG/ML
250 INJECTION, SOLUTION INTRAVENOUS ONCE
Status: CANCELLED | OUTPATIENT
Start: 2021-06-04

## 2021-06-04 RX ORDER — SODIUM CHLORIDE 9 MG/ML
250 INJECTION, SOLUTION INTRAVENOUS ONCE
Status: COMPLETED | OUTPATIENT
Start: 2021-06-04 | End: 2021-06-04

## 2021-06-04 RX ORDER — PALONOSETRON 0.05 MG/ML
0.25 INJECTION, SOLUTION INTRAVENOUS ONCE
Status: CANCELLED | OUTPATIENT
Start: 2021-06-04

## 2021-06-04 RX ORDER — MICONAZOLE NITRATE 20 MG/G
POWDER TOPICAL 2 TIMES DAILY
Qty: 85 G | Refills: 1 | Status: SHIPPED | OUTPATIENT
Start: 2021-06-04 | End: 2022-10-04

## 2021-06-04 RX ORDER — HEPARIN SODIUM (PORCINE) LOCK FLUSH IV SOLN 100 UNIT/ML 100 UNIT/ML
500 SOLUTION INTRAVENOUS AS NEEDED
Status: CANCELLED | OUTPATIENT
Start: 2021-06-04

## 2021-06-04 RX ORDER — PALONOSETRON 0.05 MG/ML
0.25 INJECTION, SOLUTION INTRAVENOUS ONCE
Status: COMPLETED | OUTPATIENT
Start: 2021-06-04 | End: 2021-06-04

## 2021-06-04 RX ADMIN — SODIUM CHLORIDE 250 ML: 9 INJECTION, SOLUTION INTRAVENOUS at 13:04

## 2021-06-04 RX ADMIN — PALONOSETRON 0.25 MG: 0.05 INJECTION, SOLUTION INTRAVENOUS at 13:05

## 2021-06-04 RX ADMIN — DOXORUBICIN HYDROCHLORIDE 150 MG: 2 INJECTION, SOLUTION INTRAVENOUS at 14:02

## 2021-06-04 RX ADMIN — DEXAMETHASONE SODIUM PHOSPHATE 12 MG: 10 INJECTION, SOLUTION INTRAMUSCULAR; INTRAVENOUS at 13:37

## 2021-06-04 RX ADMIN — CYCLOPHOSPHAMIDE 1490 MG: 200 INJECTION, SOLUTION INTRAVENOUS at 14:36

## 2021-06-04 RX ADMIN — SODIUM CHLORIDE 100 ML: 9 INJECTION, SOLUTION INTRAVENOUS at 13:07

## 2021-06-04 RX ADMIN — PEGFILGRASTIM 6 MG: KIT SUBCUTANEOUS at 14:39

## 2021-06-04 RX ADMIN — SODIUM CHLORIDE, PRESERVATIVE FREE 10 ML: 5 INJECTION INTRAVENOUS at 15:13

## 2021-06-04 RX ADMIN — Medication 500 UNITS: at 15:13

## 2021-06-04 NOTE — NURSING NOTE
Adriamycin given slow IV push through side arm of free flowing IVF of NS with excellent blood return maintained throughout.  Blood return monitored q 5 cc. Patient ate ice and sucked on ice during administration.

## 2021-06-18 ENCOUNTER — TELEPHONE (OUTPATIENT)
Dept: CARDIOLOGY | Facility: CLINIC | Age: 66
End: 2021-06-18

## 2021-06-18 NOTE — TELEPHONE ENCOUNTER
Called and spoke with patient. She said she missed her appt because she has a stomach virus. She would like to reschedule her echo here and left a VM earlier for Daysi. She knows to expect a call from scheduling.    Thank you,    Mariza Peterson RN  Triage [facility] [place] [facility] [place]

## 2021-06-23 ENCOUNTER — HOSPITAL ENCOUNTER (OUTPATIENT)
Dept: CARDIOLOGY | Facility: HOSPITAL | Age: 66
Discharge: HOME | End: 2021-06-23
Admitting: INTERNAL MEDICINE

## 2021-06-23 VITALS — HEART RATE: 75 BPM | OXYGEN SATURATION: 93 % | HEIGHT: 65 IN | WEIGHT: 293 LBS | BODY MASS INDEX: 48.82 KG/M2

## 2021-06-23 DIAGNOSIS — C50.611 MALIGNANT NEOPLASM OF AXILLARY TAIL OF RIGHT FEMALE BREAST, UNSPECIFIED ESTROGEN RECEPTOR STATUS (HCC): ICD-10-CM

## 2021-06-23 DIAGNOSIS — Z79.899 ENCOUNTER FOR LONG-TERM (CURRENT) USE OF HIGH-RISK MEDICATION: ICD-10-CM

## 2021-06-23 PROCEDURE — 93356 MYOCRD STRAIN IMG SPCKL TRCK: CPT | Performed by: INTERNAL MEDICINE

## 2021-06-23 PROCEDURE — 93356 MYOCRD STRAIN IMG SPCKL TRCK: CPT

## 2021-06-23 PROCEDURE — 93306 TTE W/DOPPLER COMPLETE: CPT | Performed by: INTERNAL MEDICINE

## 2021-06-23 PROCEDURE — 25010000002 PERFLUTREN (DEFINITY) 8.476 MG IN SODIUM CHLORIDE (PF) 0.9 % 10 ML INJECTION: Performed by: INTERNAL MEDICINE

## 2021-06-23 PROCEDURE — 93306 TTE W/DOPPLER COMPLETE: CPT

## 2021-06-23 RX ADMIN — PERFLUTREN 1.5 ML: 6.52 INJECTION, SUSPENSION INTRAVENOUS at 11:44

## 2021-06-24 LAB
AORTIC ARCH: 2.4 CM
ASCENDING AORTA: 3.8 CM
BH CV ECHO MEAS - ACS: 2 CM
BH CV ECHO MEAS - AO ARCH DIAM (PROXIMAL TRANS.): 2.4 CM
BH CV ECHO MEAS - AO MAX PG (FULL): 9.9 MMHG
BH CV ECHO MEAS - AO MAX PG: 14.4 MMHG
BH CV ECHO MEAS - AO MEAN PG (FULL): 6.8 MMHG
BH CV ECHO MEAS - AO MEAN PG: 8.8 MMHG
BH CV ECHO MEAS - AO ROOT AREA (BSA CORRECTED): 1.4
BH CV ECHO MEAS - AO ROOT AREA: 9.9 CM^2
BH CV ECHO MEAS - AO ROOT DIAM: 3.5 CM
BH CV ECHO MEAS - AO V2 MAX: 190 CM/SEC
BH CV ECHO MEAS - AO V2 MEAN: 142.7 CM/SEC
BH CV ECHO MEAS - AO V2 VTI: 38.1 CM
BH CV ECHO MEAS - ASC AORTA: 3.8 CM
BH CV ECHO MEAS - AVA(I,A): 1.4 CM^2
BH CV ECHO MEAS - AVA(I,D): 1.4 CM^2
BH CV ECHO MEAS - AVA(V,A): 1.7 CM^2
BH CV ECHO MEAS - AVA(V,D): 1.7 CM^2
BH CV ECHO MEAS - BSA(HAYCOCK): 2.8 M^2
BH CV ECHO MEAS - BSA: 2.5 M^2
BH CV ECHO MEAS - BZI_BMI: 57.7 KILOGRAMS/M^2
BH CV ECHO MEAS - BZI_METRIC_HEIGHT: 165.1 CM
BH CV ECHO MEAS - BZI_METRIC_WEIGHT: 157.4 KG
BH CV ECHO MEAS - EDV(MOD-SP2): 158 ML
BH CV ECHO MEAS - EDV(MOD-SP4): 159 ML
BH CV ECHO MEAS - EDV(TEICH): 112.6 ML
BH CV ECHO MEAS - EF(CUBED): 79.3 %
BH CV ECHO MEAS - EF(MOD-BP): 64 %
BH CV ECHO MEAS - EF(MOD-SP2): 63.9 %
BH CV ECHO MEAS - EF(MOD-SP4): 64.2 %
BH CV ECHO MEAS - EF(TEICH): 71.5 %
BH CV ECHO MEAS - ESV(MOD-SP2): 57 ML
BH CV ECHO MEAS - ESV(MOD-SP4): 57 ML
BH CV ECHO MEAS - ESV(TEICH): 32.1 ML
BH CV ECHO MEAS - FS: 40.9 %
BH CV ECHO MEAS - IVS/LVPW: 1.1
BH CV ECHO MEAS - IVSD: 1.2 CM
BH CV ECHO MEAS - LAT PEAK E' VEL: 11.6 CM/SEC
BH CV ECHO MEAS - LV DIASTOLIC VOL/BSA (35-75): 63.6 ML/M^2
BH CV ECHO MEAS - LV MASS(C)D: 225.1 GRAMS
BH CV ECHO MEAS - LV MASS(C)DI: 90 GRAMS/M^2
BH CV ECHO MEAS - LV MAX PG: 4.6 MMHG
BH CV ECHO MEAS - LV MEAN PG: 2 MMHG
BH CV ECHO MEAS - LV SYSTOLIC VOL/BSA (12-30): 22.8 ML/M^2
BH CV ECHO MEAS - LV V1 MAX: 106.7 CM/SEC
BH CV ECHO MEAS - LV V1 MEAN: 64 CM/SEC
BH CV ECHO MEAS - LV V1 VTI: 18.1 CM
BH CV ECHO MEAS - LVIDD: 4.9 CM
BH CV ECHO MEAS - LVIDS: 2.9 CM
BH CV ECHO MEAS - LVLD AP2: 8.6 CM
BH CV ECHO MEAS - LVLD AP4: 8.2 CM
BH CV ECHO MEAS - LVLS AP2: 6.8 CM
BH CV ECHO MEAS - LVLS AP4: 7.3 CM
BH CV ECHO MEAS - LVOT AREA (M): 3.1 CM^2
BH CV ECHO MEAS - LVOT AREA: 3 CM^2
BH CV ECHO MEAS - LVOT DIAM: 2 CM
BH CV ECHO MEAS - LVPWD: 1.2 CM
BH CV ECHO MEAS - MED PEAK E' VEL: 6.4 CM/SEC
BH CV ECHO MEAS - MV A DUR: 0.13 SEC
BH CV ECHO MEAS - MV A MAX VEL: 88.6 CM/SEC
BH CV ECHO MEAS - MV DEC SLOPE: 470 CM/SEC^2
BH CV ECHO MEAS - MV DEC TIME: 0.24 SEC
BH CV ECHO MEAS - MV E MAX VEL: 70.6 CM/SEC
BH CV ECHO MEAS - MV E/A: 0.8
BH CV ECHO MEAS - MV MAX PG: 4.3 MMHG
BH CV ECHO MEAS - MV MEAN PG: 1.7 MMHG
BH CV ECHO MEAS - MV P1/2T MAX VEL: 81.4 CM/SEC
BH CV ECHO MEAS - MV P1/2T: 50.8 MSEC
BH CV ECHO MEAS - MV V2 MAX: 103.2 CM/SEC
BH CV ECHO MEAS - MV V2 MEAN: 57.3 CM/SEC
BH CV ECHO MEAS - MV V2 VTI: 23.8 CM
BH CV ECHO MEAS - MVA P1/2T LCG: 2.7 CM^2
BH CV ECHO MEAS - MVA(P1/2T): 4.3 CM^2
BH CV ECHO MEAS - MVA(VTI): 2.3 CM^2
BH CV ECHO MEAS - PA ACC TIME: 0.1 SEC
BH CV ECHO MEAS - PA MAX PG (FULL): 2.2 MMHG
BH CV ECHO MEAS - PA MAX PG: 6.8 MMHG
BH CV ECHO MEAS - PA PR(ACCEL): 36.2 MMHG
BH CV ECHO MEAS - PA V2 MAX: 130.3 CM/SEC
BH CV ECHO MEAS - PULM A REVS DUR: 0.11 SEC
BH CV ECHO MEAS - PULM A REVS VEL: 45 CM/SEC
BH CV ECHO MEAS - PULM DIAS VEL: 44.6 CM/SEC
BH CV ECHO MEAS - PULM S/D: 1.2
BH CV ECHO MEAS - PULM SYS VEL: 51.4 CM/SEC
BH CV ECHO MEAS - PVA(V,A): 2.4 CM^2
BH CV ECHO MEAS - PVA(V,D): 2.4 CM^2
BH CV ECHO MEAS - QP/QS: 1
BH CV ECHO MEAS - RAP SYSTOLE: 3 MMHG
BH CV ECHO MEAS - RV MAX PG: 4.6 MMHG
BH CV ECHO MEAS - RV MEAN PG: 3.1 MMHG
BH CV ECHO MEAS - RV V1 MAX: 106.7 CM/SEC
BH CV ECHO MEAS - RV V1 MEAN: 86.4 CM/SEC
BH CV ECHO MEAS - RV V1 VTI: 18.9 CM
BH CV ECHO MEAS - RVOT AREA: 2.9 CM^2
BH CV ECHO MEAS - RVOT DIAM: 1.9 CM
BH CV ECHO MEAS - RVSP: 12 MMHG
BH CV ECHO MEAS - SI(AO): 150.5 ML/M^2
BH CV ECHO MEAS - SI(CUBED): 37.2 ML/M^2
BH CV ECHO MEAS - SI(LVOT): 21.7 ML/M^2
BH CV ECHO MEAS - SI(MOD-SP2): 40.4 ML/M^2
BH CV ECHO MEAS - SI(MOD-SP4): 40.8 ML/M^2
BH CV ECHO MEAS - SI(TEICH): 32.2 ML/M^2
BH CV ECHO MEAS - SUP REN AO DIAM: 2.2 CM
BH CV ECHO MEAS - SV(AO): 376.3 ML
BH CV ECHO MEAS - SV(CUBED): 93.1 ML
BH CV ECHO MEAS - SV(LVOT): 54.3 ML
BH CV ECHO MEAS - SV(MOD-SP2): 101 ML
BH CV ECHO MEAS - SV(MOD-SP4): 102 ML
BH CV ECHO MEAS - SV(RVOT): 54.4 ML
BH CV ECHO MEAS - SV(TEICH): 80.5 ML
BH CV ECHO MEAS - TAPSE (>1.6): 2.9 CM
BH CV ECHO MEAS - TR MAX VEL: 147.4 CM/SEC
BH CV ECHO MEASUREMENTS AVERAGE E/E' RATIO: 7.84
BH CV XLRA - RV BASE: 3.8 CM
BH CV XLRA - RV LENGTH: 7.7 CM
BH CV XLRA - RV MID: 3 CM
BH CV XLRA - TDI S': 13.9 CM/SEC
LEFT ATRIUM VOLUME INDEX: 32 ML/M2
LV EF 2D ECHO EST: 64 %
MAXIMAL PREDICTED HEART RATE: 152 BPM
SINUS: 3.1 CM
STJ: 2.9 CM
STRESS TARGET HR: 129 BPM

## 2021-06-25 ENCOUNTER — OFFICE VISIT (OUTPATIENT)
Dept: ONCOLOGY | Facility: CLINIC | Age: 66
End: 2021-06-25

## 2021-06-25 ENCOUNTER — INFUSION (OUTPATIENT)
Dept: ONCOLOGY | Facility: HOSPITAL | Age: 66
End: 2021-06-25

## 2021-06-25 ENCOUNTER — TELEPHONE (OUTPATIENT)
Dept: CARDIOLOGY | Facility: CLINIC | Age: 66
End: 2021-06-25

## 2021-06-25 VITALS
TEMPERATURE: 97.5 F | WEIGHT: 293 LBS | OXYGEN SATURATION: 95 % | SYSTOLIC BLOOD PRESSURE: 160 MMHG | HEIGHT: 65 IN | DIASTOLIC BLOOD PRESSURE: 83 MMHG | HEART RATE: 83 BPM | RESPIRATION RATE: 22 BRPM | BODY MASS INDEX: 48.82 KG/M2

## 2021-06-25 DIAGNOSIS — C50.611 MALIGNANT NEOPLASM OF AXILLARY TAIL OF RIGHT FEMALE BREAST, UNSPECIFIED ESTROGEN RECEPTOR STATUS (HCC): Primary | ICD-10-CM

## 2021-06-25 DIAGNOSIS — C50.611 MALIGNANT NEOPLASM OF AXILLARY TAIL OF RIGHT FEMALE BREAST, UNSPECIFIED ESTROGEN RECEPTOR STATUS (HCC): ICD-10-CM

## 2021-06-25 LAB
ALBUMIN SERPL-MCNC: 3.6 G/DL (ref 3.5–5.2)
ALBUMIN/GLOB SERPL: 1.3 G/DL (ref 1.1–2.4)
ALP SERPL-CCNC: 82 U/L (ref 38–116)
ALT SERPL W P-5'-P-CCNC: 23 U/L (ref 0–33)
ANION GAP SERPL CALCULATED.3IONS-SCNC: 8.5 MMOL/L (ref 5–15)
AST SERPL-CCNC: 22 U/L (ref 0–32)
BASOPHILS # BLD AUTO: 0.09 10*3/MM3 (ref 0–0.2)
BASOPHILS NFR BLD AUTO: 0.9 % (ref 0–1.5)
BILIRUB SERPL-MCNC: 0.2 MG/DL (ref 0.2–1.2)
BUN SERPL-MCNC: 14 MG/DL (ref 6–20)
BUN/CREAT SERPL: 18.4 (ref 7.3–30)
CALCIUM SPEC-SCNC: 9.4 MG/DL (ref 8.5–10.2)
CHLORIDE SERPL-SCNC: 104 MMOL/L (ref 98–107)
CO2 SERPL-SCNC: 28.5 MMOL/L (ref 22–29)
CREAT SERPL-MCNC: 0.76 MG/DL (ref 0.6–1.1)
DEPRECATED RDW RBC AUTO: 59.1 FL (ref 37–54)
EOSINOPHIL # BLD AUTO: 0.01 10*3/MM3 (ref 0–0.4)
EOSINOPHIL NFR BLD AUTO: 0.1 % (ref 0.3–6.2)
ERYTHROCYTE [DISTWIDTH] IN BLOOD BY AUTOMATED COUNT: 17.6 % (ref 12.3–15.4)
GFR SERPL CREATININE-BSD FRML MDRD: 76 ML/MIN/1.73
GLOBULIN UR ELPH-MCNC: 2.8 GM/DL (ref 1.8–3.5)
GLUCOSE SERPL-MCNC: 190 MG/DL (ref 74–124)
HCT VFR BLD AUTO: 34.1 % (ref 34–46.6)
HGB BLD-MCNC: 10 G/DL (ref 12–15.9)
IMM GRANULOCYTES # BLD AUTO: 0.16 10*3/MM3 (ref 0–0.05)
IMM GRANULOCYTES NFR BLD AUTO: 1.7 % (ref 0–0.5)
LYMPHOCYTES # BLD AUTO: 0.89 10*3/MM3 (ref 0.7–3.1)
LYMPHOCYTES NFR BLD AUTO: 9.3 % (ref 19.6–45.3)
MAGNESIUM SERPL-MCNC: 1.9 MG/DL (ref 1.8–2.5)
MCH RBC QN AUTO: 27.2 PG (ref 26.6–33)
MCHC RBC AUTO-ENTMCNC: 29.3 G/DL (ref 31.5–35.7)
MCV RBC AUTO: 92.7 FL (ref 79–97)
MONOCYTES # BLD AUTO: 0.74 10*3/MM3 (ref 0.1–0.9)
MONOCYTES NFR BLD AUTO: 7.7 % (ref 5–12)
NEUTROPHILS NFR BLD AUTO: 7.68 10*3/MM3 (ref 1.7–7)
NEUTROPHILS NFR BLD AUTO: 80.3 % (ref 42.7–76)
NRBC BLD AUTO-RTO: 0 /100 WBC (ref 0–0.2)
PLATELET # BLD AUTO: 386 10*3/MM3 (ref 140–450)
PMV BLD AUTO: 9.1 FL (ref 6–12)
POTASSIUM SERPL-SCNC: 3.8 MMOL/L (ref 3.5–4.7)
PROT SERPL-MCNC: 6.4 G/DL (ref 6.3–8)
RBC # BLD AUTO: 3.68 10*6/MM3 (ref 3.77–5.28)
SODIUM SERPL-SCNC: 141 MMOL/L (ref 134–145)
WBC # BLD AUTO: 9.57 10*3/MM3 (ref 3.4–10.8)

## 2021-06-25 PROCEDURE — 25010000002 DEXAMETHASONE SODIUM PHOSPHATE 100 MG/10ML SOLUTION: Performed by: INTERNAL MEDICINE

## 2021-06-25 PROCEDURE — 25010000002 FOSAPREPITANT PER 1 MG: Performed by: INTERNAL MEDICINE

## 2021-06-25 PROCEDURE — 85025 COMPLETE CBC W/AUTO DIFF WBC: CPT

## 2021-06-25 PROCEDURE — 25010000002 PALONOSETRON PER 25 MCG: Performed by: INTERNAL MEDICINE

## 2021-06-25 PROCEDURE — 96375 TX/PRO/DX INJ NEW DRUG ADDON: CPT

## 2021-06-25 PROCEDURE — 99214 OFFICE O/P EST MOD 30 MIN: CPT | Performed by: INTERNAL MEDICINE

## 2021-06-25 PROCEDURE — 83735 ASSAY OF MAGNESIUM: CPT | Performed by: INTERNAL MEDICINE

## 2021-06-25 PROCEDURE — 96413 CHEMO IV INFUSION 1 HR: CPT

## 2021-06-25 PROCEDURE — 96367 TX/PROPH/DG ADDL SEQ IV INF: CPT

## 2021-06-25 PROCEDURE — 25010000002 CYCLOPHOSPHAMIDE 1 GM/5ML SOLUTION 5 ML VIAL: Performed by: INTERNAL MEDICINE

## 2021-06-25 PROCEDURE — 96411 CHEMO IV PUSH ADDL DRUG: CPT

## 2021-06-25 PROCEDURE — 25010000002 PEGFILGRASTIM 6 MG/0.6ML PREFILLED SYRINGE KIT: Performed by: INTERNAL MEDICINE

## 2021-06-25 PROCEDURE — 96377 APPLICATON ON-BODY INJECTOR: CPT

## 2021-06-25 PROCEDURE — 80053 COMPREHEN METABOLIC PANEL: CPT

## 2021-06-25 PROCEDURE — 25010000002 DOXORUBICIN PER 10 MG: Performed by: INTERNAL MEDICINE

## 2021-06-25 RX ORDER — WHEAT DEXTRIN 3 G/3.8 G
POWDER (GRAM) ORAL
COMMUNITY
End: 2021-08-03

## 2021-06-25 RX ORDER — PALONOSETRON 0.05 MG/ML
0.25 INJECTION, SOLUTION INTRAVENOUS ONCE
Status: COMPLETED | OUTPATIENT
Start: 2021-06-25 | End: 2021-06-25

## 2021-06-25 RX ORDER — VALSARTAN 40 MG/1
TABLET ORAL
COMMUNITY
End: 2021-08-03

## 2021-06-25 RX ORDER — SODIUM CHLORIDE 9 MG/ML
250 INJECTION, SOLUTION INTRAVENOUS ONCE
Status: CANCELLED | OUTPATIENT
Start: 2021-06-25

## 2021-06-25 RX ORDER — MELOXICAM 7.5 MG/1
TABLET ORAL
COMMUNITY
End: 2021-08-03

## 2021-06-25 RX ORDER — PALONOSETRON 0.05 MG/ML
0.25 INJECTION, SOLUTION INTRAVENOUS ONCE
Status: CANCELLED | OUTPATIENT
Start: 2021-06-25

## 2021-06-25 RX ORDER — SODIUM CHLORIDE 9 MG/ML
250 INJECTION, SOLUTION INTRAVENOUS ONCE
Status: COMPLETED | OUTPATIENT
Start: 2021-06-25 | End: 2021-06-25

## 2021-06-25 RX ORDER — FEXOFENADINE HCL 180 MG/1
TABLET ORAL
COMMUNITY
End: 2021-08-03

## 2021-06-25 RX ORDER — DOXORUBICIN HYDROCHLORIDE 2 MG/ML
54 INJECTION, SOLUTION INTRAVENOUS ONCE
Status: COMPLETED | OUTPATIENT
Start: 2021-06-25 | End: 2021-06-25

## 2021-06-25 RX ORDER — DOXORUBICIN HYDROCHLORIDE 2 MG/ML
54 INJECTION, SOLUTION INTRAVENOUS ONCE
Status: CANCELLED | OUTPATIENT
Start: 2021-06-25

## 2021-06-25 RX ORDER — BENZONATATE 200 MG/1
CAPSULE ORAL
COMMUNITY
End: 2021-08-03

## 2021-06-25 RX ADMIN — DOXORUBICIN HYDROCHLORIDE 134 MG: 2 INJECTION, SOLUTION INTRAVENOUS at 12:30

## 2021-06-25 RX ADMIN — PEGFILGRASTIM 6 MG: KIT SUBCUTANEOUS at 13:23

## 2021-06-25 RX ADMIN — PALONOSETRON 0.25 MG: 0.05 INJECTION, SOLUTION INTRAVENOUS at 11:34

## 2021-06-25 RX ADMIN — CYCLOPHOSPHAMIDE 1490 MG: 200 INJECTION, SOLUTION INTRAVENOUS at 12:48

## 2021-06-25 RX ADMIN — DEXAMETHASONE SODIUM PHOSPHATE 12 MG: 10 INJECTION, SOLUTION INTRAMUSCULAR; INTRAVENOUS at 12:07

## 2021-06-25 RX ADMIN — SODIUM CHLORIDE 100 ML: 9 INJECTION, SOLUTION INTRAVENOUS at 11:34

## 2021-06-25 RX ADMIN — SODIUM CHLORIDE 250 ML: 9 INJECTION, SOLUTION INTRAVENOUS at 11:34

## 2021-06-25 NOTE — TELEPHONE ENCOUNTER
----- Message from Nancy Briseno MD sent at 6/25/2021  4:17 PM EDT -----  Elmer Huerta,   please call me regarding this patient who is on carvedilol and is currently receiving doxorubicin.  Pharmacy was able to locate information which suggests Coreg may increase anthracycline dose/by availability.  Therefore should consider switch Coreg to either metoprolol, bisoprolol or Bystolic.  This is also what [place] is now adopting.  Please call

## 2021-06-25 NOTE — PROGRESS NOTES
Subjective     REASON FOR FOLLOW-UP: Right breast inflammatory breast, triple positive                              REQUESTING PHYSICIAN: MD Francisco Esteban MD Bethany Haynes, MD    History of Present Illness patient is a 66 y.o. 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  For 12 weeks followed by Adriamycin Cytoxan    Patient had worsening shortness of breath requiring continuous oxygen.  CT of the chest performed to rule out pneumonitis and she was started on steroids.  CT showed bilateral groundglass infiltrates presumed to be related to Taxol pneumonitis and therefore Taxol discontinued.  Patient completed the fourth cycle of Perjeta/Herceptin alone.  Diarrhea was an issue but not as bad without the Taxol.  She is weaned off her prednisone    Patient proceeded with cycle 1 Adriamycin/Cytoxan on 5/14/2021 and is seen back today for cycle #2 and we are doing it at 3-week intervals because of her frailty she is here today for cycle #3    She had no nausea vomiting or change.  She is getting fatigued and less active and more short of breath with exertion better repeat echocardiogram is stable and her oxygen requirements have not increased    At this point she has tapered off her prednisone and is back to her baseline.  Her breathing remains stable though she does require continuous oxygen, unchanged.    She complains of pain in the left sacroiliac area for the last week and thinks she pulled a muscle and I have recommended she take her Mobic and see how it does and this is better    Breast continues to improve with chemotherapy    Her weight is gone up 10 pounds in the last couple of weeks and I think this is due to fluid and I have asked her to increase her Lasix and watch her  weights daily and her weight is down a couple of pounds with the higher dose of Lasix    Past Medical History:   Diagnosis Date   • Anemia    • Asthma    • Chronic diastolic (congestive) heart failure (CMS/HCC)    • Chronic hypoxemic respiratory failure (CMS/HCC)     on continuous O2   • H/O Intraductal papilloma    • Hemorrhoids    • History of transfusion     AFTER BACK SURGERY   • Hypertension    • Inflammatory breast cancer (CMS/HCC)     right   • Kidney stone    • Morbid obesity with BMI of 50.0-59.9, adult (CMS/HCC)    • Obesity hypoventilation syndrome (CMS/HCC) 2021   • On home oxygen therapy     2.5 AT REST WITH ACTIVITY UP TO 4L   • ANABELLE on CPAP    • Osteoarthritis    • Type 2 diabetes mellitus (CMS/HCC)         Past Surgical History:   Procedure Laterality Date   • BREAST EXCISIONAL BIOPSY Bilateral    • COLONOSCOPY     • CYSTOSCOPY BLADDER STONE LITHOTRIPSY     • KNEE ARTHROPLASTY Bilateral    • SPINE SURGERY     • TOTAL ABDOMINAL HYSTERECTOMY WITH SALPINGO OOPHORECTOMY     • VENOUS ACCESS DEVICE (PORT) INSERTION N/A 2021    Procedure: INSERTION VENOUS ACCESS DEVICE;  Surgeon: Nicci Banks MD;  Location: [facility];  Service: General;  Laterality: N/A;      ONC HISTORY  patient is a 65-year-old white female with morbid obesity, history of diastolic congestive heart failure and unknown type of pulmonary disease for which she has been on oxygen for 4 years.    She has been getting routine mammography since 38 years of age because her mother  at 46 of breast cancer and had a biopsy of her left breast in  which was apparently benign and another biopsy in 2018 on her right breast which showed a small focus of atypical ductal hyperplasia and atypical lobular hyperplasia with a residual intraductal papilloma.  At that time she saw medical oncologist who recommended genetic testing and prevention but the insurance would not cover the genetic testing and the medical  oncologist thought tamoxifen was too risky for her because of her comorbidities and no treatment was given.  More recently she noticed redness of the right breast in October and showed it to her family doctor who gave her course of Keflex when it did not improve and mammogram was ordered and this was benign  When the redness persisted she saw her gynecologist with concern for inflammatory breast cancer and referred her to Dr. Banks after repeat imaging and [facility].  Preliminary report shows micropapillary invasive mammary carcinoma intermediate grade measuring 13 mm right axillary node was also involved with metastatic cancer ER/WA and HER-2 are pending  Patient saw Dr. Banks who sent her for skin biopsies and she had 3 separate punch biopsy of the skin that showed Perivascular and perifollicular inflammation with no obvious malignancy at 3:00 12:00 and 9:00  In addition staging work-up with CAT scans and bone scan were ordered.  CAT scan of the chest showed a borderline mediastinal  Infracarinal node measuring 15 mm in length mild cardiomegaly and heavy coronary artery calcifications  CT of the abdomen showed a 2.5 x 2.2 cm right adrenal mass which the patient tells me she has had in the past and is most likely benign but also a 2.7 cm left external iliac node which is indeterminate.  Bone scan was negative    Echocardiogram is scheduled for later next week and genetic testing with the Z Planeitae stat panel is pending    Patient is  3 para 3 menarche was at age 9 menopause at 49 when she had a hysterectomy and oophorectomy  First childbirth was at age 20 she breast-fed her second and third children and took no hormone replacement after menopause    Family history is positive for mother dying of breast cancer at age 46 she is a maternal aunt with breast cancer in her 70s a paternal aunt with breast cancer in her 70s paternal  grandmother with colon cancer.  Her father had Hodgkin's disease and non-Hodgkin's lymphoma but  of small cell lung cancer at 67      She has not had a heart attack stroke or blood clot    Plan to do echocardiogram soon as possible to ascertain tolerability of cardiotoxic chemotherapy which would be typically indicated with an inflammatory breast cancer    Also plan PET scan to follow-up on atypical lymph nodes and confirm benign etiology of the adrenal lesion    She will have port placement and chemo education pt is  ER/OH and HER-2 +    I explained to Aida that the goal of treatment would be curative but she has a lot of comorbidities which may limit our ability to give the most effective chemotherapy in the setting  I told her radiation would be involved and possibly hormonal therapy for 10 years as she has hormone positivity and HER-2 directed therapy    She expressed some concerns about driving back and forth from [place] but felt that once a week was doable    We will see her back in 2 weeks to start treatment with a port placement planned early next week      Patient did have a mild reaction to Taxol dose #1 with some increased shortness of breath and flushing.  This was responsive to 100 mg of Solu-Cortef.  She states this gave her a headache and made her quite talkative but otherwise she was thankfully able to complete Taxol infusion without further incident.    Patient reports issues with chronic diarrhea over the last few years and did note a little bit increase in stooling following THP though nothing overly significant in her mind.  She did finally begin taking Imodium just a few days ago and has had no further stools.  She does note significant trouble with hemorrhoids in relation to the diarrhea   Required blood transfusion due to significant hemorrhoidal bleeding plus iron deficiency.  Injectafer planned    Due to inclement weather cycle 1 day 8 therapy was missed.  She did get day 15  therapy with fairly good tolerance except for some diarrhea.    She was found to be iron deficient despite trying oral iron.  We therefore elected to proceed with IV Injectafer but she remains mildly anemic with a hemoglobin of 9.3    Current Outpatient Medications on File Prior to Visit   Medication Sig Dispense Refill   • albuterol sulfate  (90 Base) MCG/ACT inhaler Inhale 2 puffs Every 4 (Four) Hours As Needed.     • benzonatate (TESSALON) 200 MG capsule benzonatate 200 mg oral capsule take 1 capsule (200 mg) by oral route 3 times per day   Suspended     • carvedilol (COREG) 25 MG tablet Take 12.5 mg by mouth 2 (Two) Times a Day With Meals.     • Cholecalciferol (Vitamin D) 50 MCG (2000 UT) capsule Take 2,000 Units by mouth Daily.     • dexamethasone (DECADRON) 4 MG tablet Take 2 tablets in the morning daily on Days 2, 3 & 4.  Take with food. 6 tablet 3   • dicyclomine (BENTYL) 20 MG tablet Take 1 tablet by mouth Every 6 (Six) Hours. 60 tablet 2   • fexofenadine (Allegra Allergy) 180 MG tablet Allegra 180 mg oral tablet take 1 tablet by oral route daily as needed   Active     • fexofenadine (ALLEGRA) 180 MG tablet Take 180 mg by mouth Daily As Needed.     • FLUoxetine (PROzac) 40 MG capsule Take 40 mg by mouth Daily.     • furosemide (LASIX) 40 MG tablet Take 20 mg by mouth Daily.     • hydrocortisone 2.5 % cream Apply  topically to the appropriate area as directed 2 (Two) Times a Day. 3.5 g 0   • Inulin 2 g chewable tablet Fiber Gummies 2 gram oral tablet,chewable chew 1 tablet by oral route daily   Suspended     • Lidocaine, Anorectal, (LMX 5) 5 % cream cream Apply  topically to the appropriate area as directed 3 (Three) Times a Day As Needed (hemmorrhoid pain). 45 g 2   • lisinopril (PRINIVIL,ZESTRIL) 5 MG tablet Take 1 tablet by mouth Daily. 30 tablet 6   • meloxicam (Mobic) 7.5 MG tablet Mobic 7.5 mg oral tablet take 1 tablet (7.5 mg) by oral route once daily   Suspended     • metFORMIN (GLUCOPHAGE)  500 MG tablet Take 500 mg by mouth Daily With Breakfast.     • miconazole (Lotrimin AF) 2 % powder Apply  topically to the appropriate area as directed 2 (two) times a day. 85 g 1   • mometasone-formoterol (DULERA 100) 100-5 MCG/ACT inhaler Inhale 2 Puffs/kg Every Night.     • montelukast (SINGULAIR) 10 MG tablet Take 10 mg by mouth Every Night.     • multivitamin (THERAGRAN) tablet tablet Take 1 tablet by mouth Daily. TO HOLD 1 WEEK BEFORE SURGERY     • O2 (OXYGEN) Inhale 2.5 L/min Continuous.     • Olmesartan Medoxomil (BENICAR PO)      • omeprazole (priLOSEC) 20 MG capsule Take 2 capsules by mouth Daily. 60 capsule 3   • ondansetron (ZOFRAN) 8 MG tablet Take 1 tablet by mouth 3 (Three) Times a Day As Needed for Nausea or Vomiting. 30 tablet 5   • Potassium 99 MG tablet Take 1 tablet by mouth Every Night.     • pravastatin (PRAVACHOL) 40 MG tablet Take 40 mg by mouth Every Night.     • predniSONE (DELTASONE) 10 MG tablet Take 2 tablets by mouth 2 (Two) Times a Day. 60 tablet 1   • spironolactone (ALDACTONE) 25 MG tablet Take 25 mg by mouth Daily.     • valsartan (Diovan) 40 MG tablet Diovan 40 mg oral tablet take 1 tablet (40 mg) by oral route 2 times per day   Suspended     • VITAMIN D PO      • Wheat Dextrin (Benefiber) powder Benefiber Sugar Free (dextrin) 3 gram/3.8 gram oral powder use as directed by oral route As needed   Suspended     • psyllium (METAMUCIL) 0.52 g capsule Metamucil 0.52 gram oral capsule take 1 capsule by oral route 5Xday   Active       No current facility-administered medications on file prior to visit.        ALLERGIES:    Allergies   Allergen Reactions   • Amlodipine Swelling     LEGS    • Hydrochlorothiazide Unknown - Low Severity     Neuro issues   • Morphine Nausea And Vomiting     hallucinations   • Adhesive Tape Rash        Social History     Socioeconomic History   • Marital status:      Spouse name: Not on file   • Number of children: 3   • Years of education: Not on file    • Highest education level: Not on file   Tobacco Use   • Smoking status: Never Smoker   • Smokeless tobacco: Never Used   Vaping Use   • Vaping Use: Never used   Substance and Sexual Activity   • Alcohol use: Not Currently   • Drug use: Never   • Sexual activity: Defer        Family History   Problem Relation Age of Onset   • Breast cancer Mother 45   • Colon cancer Paternal Grandmother    • Breast cancer Maternal Aunt 70   • Breast cancer Paternal Aunt 70   • Lung cancer Father    • Hodgkin's lymphoma Father    • Skin cancer Father         squamous cell   • Ovarian cancer Neg Hx    • Uterine cancer Neg Hx    • Deep vein thrombosis Neg Hx    • Pulmonary embolism Neg Hx    • Malig Hyperthermia Neg Hx         Review of Systems   Constitutional: Positive for fatigue. Negative for appetite change, chills, diaphoresis, fever and unexpected weight change.   HENT: Negative for hearing loss, sore throat, trouble swallowing and voice change.    Respiratory: Negative for cough, chest tightness, shortness of breath (Improved ) and wheezing.    Cardiovascular: Positive for leg swelling. Negative for chest pain and palpitations.   Gastrointestinal: Positive for constipation. Negative for abdominal distention, abdominal pain, diarrhea, nausea, rectal pain (hemmorrhoids -improved) and vomiting.   Genitourinary: Negative for dysuria, frequency, hematuria and urgency.   Musculoskeletal: Positive for back pain (Left sacroiliac). Negative for joint swelling and neck stiffness.        No muscle weakness.   Skin: Negative for rash and wound.   Neurological: Negative for seizures, syncope, speech difficulty, weakness, numbness and headaches.   Hematological: Negative for adenopathy. Does not bruise/bleed easily.   Psychiatric/Behavioral: Negative.  Negative for behavioral problems, confusion and suicidal ideas.   All other systems reviewed and are negative.       Objective     Vitals:    06/25/21 1001   BP: 160/83  Comment: w/o  B/P  "Meds taken   Pulse: 83   Resp: 22   Temp: 97.5 °F (36.4 °C)   TempSrc: Skin   SpO2: 95%   Weight: (!) 156 kg (343 lb 9.6 oz)   Height: 165.1 cm (65\")   PainSc: 0-No pain     Current Status 6/25/2021   ECOG score 0       Physical Exam  Chest:             CONSTITUTIONAL:  Vital signs reviewed.  No distress, looks comfortable. Morbidly obese  EYES:  Conjunctiva and lids unremarkable.  PERRLA  EARS,NOSE,MOUTH,THROAT:  Ears and nose appear unremarkable.  Lips, teeth, gums appear unremarkable.  RESPIRATORY:  Normal respiratory effort.  Lungs clear to auscultation bilaterally.  No axillary adenopathy  BREASTS: Both breasts are pendulous; the right breast is no longer erythemic and much softer now to palpation.  No residual abnormality  CARDIOVASCULAR:  Normal S1, S2.  No murmurs rubs or gallops.  1+ brawny lower extremity edema.  GASTROINTESTINAL: Abdomen appears unremarkable.  Nontender.  No hepatomegaly.  No splenomegaly.  LYMPHATIC:  No cervical, supraclavicular, axillary lymphadenopathy.  SKIN:  Warm.  Inclusion cyst left axilla-extensive tenia cruris  in the right groin  PSYCHIATRIC:  Normal judgment and insight.  Normal mood and affect.    I have reexamined the patient and the results are consistent with the previously documented exam. Jaime Azul MD           RECENT LABS:  Results from last 7 days   Lab Units 06/25/21  1001   WBC 10*3/mm3 9.57   NEUTROS ABS 10*3/mm3 7.68*   HEMOGLOBIN g/dL 10.0*   HEMATOCRIT % 34.1   PLATELETS 10*3/mm3 386     Results from last 7 days   Lab Units 06/25/21  1001   SODIUM mmol/L 141   POTASSIUM mmol/L 3.8   CHLORIDE mmol/L 104   CO2 mmol/L 28.5   BUN mg/dL 14   CREATININE mg/dL 0.76   CALCIUM mg/dL 9.4   ALBUMIN g/dL 3.60   BILIRUBIN mg/dL 0.2   ALK PHOS U/L 82   ALT (SGPT) U/L 23   AST (SGOT) U/L 22   GLUCOSE mg/dL 190*   MAGNESIUM mg/dL 1.9             PET  IMPRESSION:  1.  Moderate to intensely FDG avid asymmetric soft tissue and skin  thickening involving the right breast " likely representing patient's  known malignancy.  2.  Intensely FDG avid right axillary and subpectoral adenopathy likely  represent metastatic disease.  3.  Constellation of findings within the right adrenal gland are favored  to represent a lipid rich adenoma. Continued attention on follow-up is  recommended to ensure stability.  4.  While there are no findings of definite FDG avid osseous metastasis,  given the heterogenous FDG uptake throughout the axial and appendicular  skeleton due to the above stated limitations, subtle underlying osseous  metastasis would remain occult. Therefore, continued close attention on  follow-up is recommended to exclude this possibility.  5.  Short segment of moderate to intense FDG uptake within the distal  esophagus and GE junction suggestive of esophagitis. In the appropriate  clinical context correlation with patient history is recommended with  follow-up endoscopy if clinically indicated.  6.  Sub-6 mm pulmonary nodule within the right lower lobe is below PET  resolution and indeterminate. Continued close attention on follow-up  with chest CT in 3 months is recommended to exclude metastatic disease.  7.  Other findings as above.     This report was finalized on 2/2/2021     Assessment/Plan   1. [id number] right breast cancer ER/TX HER-2 -3+ positive inflammatory breast cancer for neoadjuvant chemotherapy  · Staging work-up negative except for 6 mm lung nodule and axillary and subpectoral adenopathy  · THP followed by AC planned if she tolerates it  · C1D8 Taxol missed due to inclement weather.  · Taxol discontinued after 7 doses due to probable Taxol pneumonitis treated with steroids.    · C1 Adriamycin/Cytoxan given 5/14/2021.    2.  Morbid obesity    3.  History of diastolic heart failure  · Echocardiogram with ejection fraction of 64% normal strain  · Cleared by cardio-oncology for chemotherapy    4.  Pulmonary disease?  Etiology on oxygen for 4 years?  [place]    5.   Strong family history of breast cancer genetic testing 84 genes negative    6.  Probable benign adrenal adenoma-PET negative    7.  Questionable enlarged lymph nodes left iliac chain and mediastinum likely reactive-PET negative    8.  6 mm right lower lobe nodule below PET resolution pretreatment needs follow-up after THP  · Repeat CT [facility] at [facility] radiologist report stability of nodules and nodes    9.  Abnormal uptake short segment esophagus with a history of Schatzki's ring-we will double PPI and watch closely but we we will proceed with chemotherapy at this point and refer back to GI-doubt she has metastatic disease to this area    10. Anemia with microcytic indices  · Iron studies performed 2/10/2021.  · 2/24/2021: reviewed with the patient that she is iron deficient, with ferritin of 16, iron saturation of 4%.  Patient reports taking ferrous gluconate in the past but this caused GI upset.  Also with her chronic diarrhea I do not think she can absorb it.  We will pursue IV iron with plans to initiate this next week pending insurance approval. In addition hemoglobin down to 8.0 and transfusion pursued.  · 3/3/2021: IV Injectafer initiated x2.   · Hemoglobin down to 9.9 one week out from first AC though overall stable.  Monitor.     11. Hemorrhoidal pain secondary to diarrhea. Does have occasional bleeding. Topical lidocaine prescribed. Monitor.    12.  History of chronic diarrhea, ?IBS, exacerbated with Perjeta therapy.  · Patient required rifaximin 550 mg to take twice daily x7 days with each Perjeta dose.   · Since completion of Perjeta patient is now actually experiencing constipation (further discussed below).      13.  Taxol-induced pneumonitis.   · Worsening shortness of breath with CT evidence of groundglass infiltrates bilaterally suspicious for   · Patient prescribed prednisone 20 twice daily  · Breathing is overall improved.  Patient is slowly tapering off prednisone.  Today she will begin 5  mg every other day x1 week and then discontinue.      14.  Constipation following initiation of Adriamycin/Cytoxan.  · Patient is just increased fiber in her diet but asking what else she can do.  She prefers a suppository if possible.  We discussed the use of a total suppository but also consider taking senna S1-2 tabs nightly at least the first week after chemotherapy to help avoid this in the future.    15.  Mild dehydration.  ·  BUN up to 22.  This resulted after patient gone.  I did call her and encouraged her to increase hydration, specifically noncaffeinated beverages.  Creatinine remains normal at 0.6.    Plan  1. Adriamycin Cytoxan  #3 with  with Neulasta support.  2. Mobic for sacroiliac discomfort.  3. Daily weights and increase Lasix.  4.Patient to return in 3 weeks for her last dose of Adriamycin Cytoxan before surgery     this patient is on drug therapy requiring intensive monitoring for toxicity.  We will discuss with Dr. Bradley Garza monitoring and carvedilol which may interfere with the Adriamycin and repeat echo was noted to be stable  She has had an excellent response in the breast but she is frail and I am afraid that last dose of chemotherapy may be too much for her and we will have to watch her closely and decide in 3 weeks whether to proceed with the last dose of Adriamycin Cytoxan or stop and proceed to surgery

## 2021-06-25 NOTE — TELEPHONE ENCOUNTER
I called both phone numbers on file and got voicemail at both of them.  I left a message on both numbers for her to call me back.    Suri, I will need to switch her from carvedilol to metoprolol.  Will you try calling her again next week while I am rounding in the hospital?  Also, she really needs to make a follow-up with me.    prabha BATISTA

## 2021-06-28 NOTE — TELEPHONE ENCOUNTER
Pt called back, she is scheduled for August the 8th; however she is really attached to her Carvedilol as it has stabilized her bp for years and is hesitant to change.  She also reports that her oncologist may have given last chemo treatment for now.  She is tentatively scheduled for her mastectomy in mid August.       I did verify her pharmacy in case you still want to send the Metoprolol.

## 2021-06-29 NOTE — TELEPHONE ENCOUNTER
Dr Azul, please note -- the patient is extremely reticent to change the BP medications that her usual cardiologist prescribes.  If she is indeed finished with doxorubicin, then I see no reason to change it.  Please let me know your final decision.    Suri, it seems that Dr Azul is going to reassess in three weeks.  Will you send this back to me and I can check on her then?    Thanks all  LYNNE

## 2021-07-01 RX ORDER — FLUOXETINE HYDROCHLORIDE 40 MG/1
CAPSULE ORAL
Qty: 30 CAPSULE | Refills: 0 | Status: SHIPPED | OUTPATIENT
Start: 2021-07-01 | End: 2021-08-06

## 2021-07-01 NOTE — TELEPHONE ENCOUNTER
Left detailed message that patient will need to be seen for further refills.    HUB: PLEASE MAKE APPOINTMENT FOR PATIENT IF SHE RETURNS CALL

## 2021-07-12 ENCOUNTER — HOSPITAL ENCOUNTER (OUTPATIENT)
Dept: OCCUPATIONAL THERAPY | Facility: HOSPITAL | Age: 66
Setting detail: THERAPIES SERIES
Discharge: HOME | End: 2021-07-12

## 2021-07-12 DIAGNOSIS — Z91.89 AT RISK FOR LYMPHEDEMA: Primary | ICD-10-CM

## 2021-07-12 DIAGNOSIS — C50.911 INFLAMMATORY BREAST CANCER, RIGHT (HCC): ICD-10-CM

## 2021-07-12 PROCEDURE — 93702 BIS XTRACELL FLUID ANALYSIS: CPT

## 2021-07-12 PROCEDURE — 97535 SELF CARE MNGMENT TRAINING: CPT

## 2021-07-12 PROCEDURE — 97165 OT EVAL LOW COMPLEX 30 MIN: CPT

## 2021-07-12 NOTE — THERAPY EVALUATION
Outpatient Occupational Therapy Lymphedema Initial Evaluation  [facility]     Patient Name: Aida Conner  : 1955  MRN: [id number]  Today's Date: 2021      Visit Date: 2021  Patient and therapist both masked. Therapist with face shield and gloves.  Patient Active Problem List   Diagnosis   • Inflammatory breast cancer, right (CMS/HCC)   • Other specified disorders of breast    • Malignant neoplasm of axillary tail of right female breast (CMS/HCC)   • Encounter for eye exam due to high risk medication   • Obesity hypoventilation syndrome (CMS/HCC)   • Iron adverse reaction   • Functional diarrhea   • Encounter for long-term (current) use of high-risk medication        Past Medical History:   Diagnosis Date   • Anemia    • Asthma    • Chronic diastolic (congestive) heart failure (CMS/HCC)    • Chronic hypoxemic respiratory failure (CMS/HCC)     on continuous O2   • H/O Intraductal papilloma    • Hemorrhoids    • History of transfusion     AFTER BACK SURGERY   • Hypertension    • Inflammatory breast cancer (CMS/HCC)     right   • Kidney stone    • Morbid obesity with BMI of 50.0-59.9, adult (CMS/HCC)    • Obesity hypoventilation syndrome (CMS/HCC) 2021   • On home oxygen therapy     2.5 AT REST WITH ACTIVITY UP TO 4L   • ANABELLE on CPAP    • Osteoarthritis    • Type 2 diabetes mellitus (CMS/HCC)         Past Surgical History:   Procedure Laterality Date   • BREAST EXCISIONAL BIOPSY Bilateral    • COLONOSCOPY     • CYSTOSCOPY BLADDER STONE LITHOTRIPSY     • KNEE ARTHROPLASTY Bilateral    • SPINE SURGERY     • TOTAL ABDOMINAL HYSTERECTOMY WITH SALPINGO OOPHORECTOMY     • VENOUS ACCESS DEVICE (PORT) INSERTION N/A 2021    Procedure: INSERTION VENOUS ACCESS DEVICE;  Surgeon: Nicci Banks MD;  Location: [facility]  Service: General;  Laterality: N/A;         Visit Dx:     ICD-10-CM ICD-9-CM   1. At risk for lymphedema  Z91.89 V49.89   2. Inflammatory breast cancer,  right (CMS/[facility])  C50.911 174.9       Patient History     Row Name 07/12/21 1700             History    Chief Complaint  Other 1 (comment) pre op  -RE      Date Current Problem(s) Began  04/13/21  -RE      Brief Description of Current Complaint  Patient presents for a pre-operative evaluation, lymphedema education and bioimpedance   -RE      Patient/Caregiver Goals  Know what to do to help the symptoms  -RE      Are you or can you be pregnant  No  -RE         Pain     Pain at Present  0  -RE         Fall Risk Assessment    Any falls in the past year:  Yes  -RE      Number of falls reported in the last 12 months  2  -RE      Factors that contributed to the fall:  Tripped;Fatigue  -RE         Services    Are you currently receiving Home Health services  No  -RE         Daily Activities    Primary Language  English  -RE      Are you able to read  Yes  -RE      Are you able to write  Yes  -RE      How does patient learn best?  Listening;Reading;Demonstration  -RE      Teaching needs identified  Management of Condition  -RE      Patient is concerned about/has problems with  Other (comment) lymphedema  -RE      Does patient have problems with the following?  None  -RE      Barriers to learning  None  -RE      Pt Participated in POC and Goals  Yes  -RE         Safety    Are you being hurt, hit, or frightened by anyone at home or in your life?  No  -RE      Are you being neglected by a caregiver  No  -RE        User Key  (r) = Recorded By, (t) = Taken By, (c) = Cosigned By    Initials Name Provider Type    Nathalie Hidalgo OTR Occupational Therapist          Lymphedema     Row Name 07/12/21 1800             Subjective Pain    Able to rate subjective pain?  yes  -RE      Pre-Treatment Pain Level  0  -RE      Post-Treatment Pain Level  0  -RE         Lymphedema Assessment    Lymphedema Classification  RUE:;at risk/stage 0  -RE      Lymphedema Cancer Related Sx  -- planned mastectomy and axillary dissection  -RE      Stage of  Cancer  Stage III  -RE      Chemo Received  yes neoadjuvant  -RE      Radiation Therapy Received  -- planned  -RE      Infections or Cellulitis?  no  -RE         Posture/Observations    Posture/Observations Comments  waks with staight cane, on O2  -RE         General ROM    GENERAL ROM COMMENTS  [employer] UE AROM is WFL. Shoulder flexion is 165  -RE         L-Dex Bioimpedence Screening    L-Dex Measurement Extremity  RUE  -RE      L-Dex Patient Position  Standing  -RE      L-Dex UE Dominate Side  Left  -RE      L-Dex UE At Risk Side  Right  -RE      L-Dex UE Pre Surgical Value  Yes  -RE      L-Dex UE Score  4  -RE      L-Dex UE Baseline Score  4  -RE      L-Dex UE Value Change  0  -RE      L-Dex UE Comment  WNL  -RE        User Key  (r) = Recorded By, (t) = Taken By, (c) = Cosigned By    Initials Name Provider Type    Nathalie Hidalgo OTR Occupational Therapist                  Therapy Education  Education Details: Discussed lymphedema precautions and gave written information.  Explained purpose of Bioimpedance testing including recommended frequency. Discussed patient's L-dex value of 4.0.  Given: Other (comment)  Program: New  How Provided: Verbal, Written  Provided to: Patient  Level of Understanding: Teach back education performed, Verbalized  37892 - OT Self Care/Mgmt Minutes: 15        OT Goals     Row Name 07/12/21 1800          OT Short Term Goals    STG Date to Achieve  07/26/21  -RE     STG 1  Patient will demonstrate proper awareness of “What is Lymphedema?” and “Healthy Habits” for improved prevention, management, care of symptoms and ease of transition to self-care of condition.   -RE     STG 1 Progress  New  -RE     STG 2  Pt will demonstrate understanding of use of compression sleeve for edema prevention, exercise and air travel.   -RE     STG 2 Progress  New  -RE        Long Term Goals    LTG Date to Achieve  08/12/21  -RE     LTG 1  Patient will participate in bioimpedance scans every 3 months as a  method of early detection of lymphedema to allow for early intervention.  -RE     LTG 1 Progress  New  -RE     LTG 2  Patient's bioimpedance score to remain below 6.5 for decreased risk of stage II lymphedema.  -RE     LTG 2 Progress  New  -RE        Time Calculation    OT Goal Re-Cert Due Date  10/12/21  -RE       User Key  (r) = Recorded By, (t) = Taken By, (c) = Cosigned By    Initials Name Provider Type    Nathalie Hidalgo OTR Occupational Therapist          OT Assessment/Plan     Row Name 07/12/21 3418          OT Assessment    Impairments  Impaired lymphatic circulation  -RE     Assessment Comments  Patient presents to OT pre-operatively for planned BRCA surgery to include right mastectomy and planned ALND.   Baseline ROM and  baseline bioimpedance measurements were taken today to be compared to measurements retaken post-surgery.  At that time, any reduced movement, decline in function or postural issues will be addressed with skilled therapy and additional goals will be established.  L-dex value today is 4.0 which is WNL.Personal risk factors for lymphedema post-operatively for the R upper extremity and trunk quadrant were also assessed today and basic lymphedema precautions were discussed.  A more detailed discussion regarding personal risk factors will take place post-operatively once the number of nodes removed and the plan for further medical care is known.  -RE     Please refer to paper survey for additional self-reported information  Yes  -RE     OT Diagnosis  at risk for lymphedema  -RE     OT Rehab Potential  Good  -RE     Patient/caregiver participated in establishment of treatment plan and goals  Yes  -RE     Patient would benefit from skilled therapy intervention  Yes  -RE        OT Plan    OT Frequency  Other (comment)  -RE     Predicted Duration of Therapy Intervention (OT)  post op follow up and bioimpedance per MD orders  -RE     Planned CPT's?  OT EVAL MOD COMPLEXITY: 96745;OT THER PROC EA 15  MIN: 48891CZ;OT SELF CARE/MGMT/TRAIN 15 MIN: 28434;OT MANUAL THERAPY EA 15 MIN: 72021;OT BIS XTRACELL FLUID ANALYSIS: 01184  -RE     Planned Therapy Interventions (Optional Details)  home exercise program;manual therapy techniques;patient/family education;ROM (Range of Motion);other (see comments) bioimpedance  -RE     OT Plan Comments  follow up as ordered  -RE       User Key  (r) = Recorded By, (t) = Taken By, (c) = Cosigned By    Initials Name Provider Type    Nathalie Hidalgo OTR Occupational Therapist          Outcome Measure Options: Quick DASH  Quick DASH  Open a tight or new jar.: Moderate Difficulty  Do heavy household chores (e.g., wash walls, wash floors): Unable  Carry a shopping bag or briefcase: Moderate Difficulty  Wash your back: Severe Difficulty  Use a knife to cut food: No Difficulty  Recreational activities in which you take some force or impact through your arm, should or hand (e.g. golf, hammering, tennis, etc.): Severe Difficulty  During the past week, to what extent has your arm, shoulder, or hand problem interfered with your normal social activites with family, friends, neighbors or groups?: Not at all  During the past week, were you limited in your work or other regular daily activities as a result of your arm, shoulder or hand problem?: Not limited at all  Arm, Shoulder, or hand pain: None  Tingling (pins and needles) in your arm, shoulder, or hand: None  During the past week, how much difficulty have you had sleeping because of the pain in your arm, shoulder or hand?: No difficulty  Number of Questions Answered: 11  Quick DASH Score: 31.82         Time Calculation:   OT Start Time: 1330  OT Stop Time: 1415  OT Time Calculation (min): 45 min  Total Timed Code Minutes- OT: 15 minute(s)  Timed Charges  09972 - OT Self Care/Mgmt Minutes: 15  Untimed Charges  OT Eval/Re-eval Minutes: 30  Total Minutes  Timed Charges Total Minutes: 15  Untimed Charges Total Minutes: 30   Total Minutes: 45      Therapy Charges for Today     Code Description Service Date Service Provider Modifiers Qty    [id number]  OT SELF CARE/MGMT/TRAIN EA 15 MIN 7/12/2021 Nathalie Joseph OTR GO 1    [id number]  OT EVAL LOW COMPLEXITY 2 7/12/2021 Nathalie Joseph OTR GO 1                    VELMA Moreau  7/12/2021

## 2021-07-12 NOTE — THERAPY TREATMENT NOTE
Outpatient Occupational Therapy Lymphedema Treatment Note  [facility]     Patient Name: Aida Conner  : 1955  MRN: [id number]  Today's Date: 2021      Visit Date: 2021  Patient and therapist both masked. Therapist with face shield and gloves.  Patient Active Problem List   Diagnosis   • Inflammatory breast cancer, right (CMS/HCC)   • Other specified disorders of breast    • Malignant neoplasm of axillary tail of right female breast (CMS/HCC)   • Encounter for eye exam due to high risk medication   • Obesity hypoventilation syndrome (CMS/HCC)   • Iron adverse reaction   • Functional diarrhea   • Encounter for long-term (current) use of high-risk medication        Past Medical History:   Diagnosis Date   • Anemia    • Asthma    • Chronic diastolic (congestive) heart failure (CMS/HCC)    • Chronic hypoxemic respiratory failure (CMS/HCC)     on continuous O2   • H/O Intraductal papilloma    • Hemorrhoids    • History of transfusion     AFTER BACK SURGERY   • Hypertension    • Inflammatory breast cancer (CMS/HCC)     right   • Kidney stone    • Morbid obesity with BMI of 50.0-59.9, adult (CMS/HCC)    • Obesity hypoventilation syndrome (CMS/HCC) 2021   • On home oxygen therapy     2.5 AT REST WITH ACTIVITY UP TO 4L   • ANABELLE on CPAP    • Osteoarthritis    • Type 2 diabetes mellitus (CMS/HCC)         Past Surgical History:   Procedure Laterality Date   • BREAST EXCISIONAL BIOPSY Bilateral    • COLONOSCOPY     • CYSTOSCOPY BLADDER STONE LITHOTRIPSY     • KNEE ARTHROPLASTY Bilateral    • SPINE SURGERY     • TOTAL ABDOMINAL HYSTERECTOMY WITH SALPINGO OOPHORECTOMY     • VENOUS ACCESS DEVICE (PORT) INSERTION N/A 2021    Procedure: INSERTION VENOUS ACCESS DEVICE;  Surgeon: Nicci Banks MD;  Location: [facility];  Service: General;  Laterality: N/A;         Visit Dx:      ICD-10-CM ICD-9-CM   1. At risk for lymphedema  Z91.89 V49.89   2. Inflammatory breast cancer, right  (CMS/[facility])  C50.911 174.9       Lymphedema     Row Name 07/12/21 1821             Subjective Pain    Able to rate subjective pain?  yes  -RE      Pre-Treatment Pain Level  0  -RE      Post-Treatment Pain Level  0  -RE         L-Dex Bioimpedence Screening    L-Dex Measurement Extremity  RUE  -RE      L-Dex Patient Position  Standing  -RE      L-Dex UE Dominate Side  Left  -RE      L-Dex UE At Risk Side  Right  -RE      L-Dex UE Pre Surgical Value  Yes  -RE      L-Dex UE Score  4  -RE      L-Dex UE Baseline Score  4  -RE      L-Dex UE Value Change  0  -RE      L-Dex UE Comment  WNL  -RE        User Key  (r) = Recorded By, (t) = Taken By, (c) = Cosigned By    Initials Name Provider Type    Nathalie Hidalgo OTR Occupational Therapist                                                 Time Calculation:   OT Start Time: 1415  OT Stop Time: 1430  OT Time Calculation (min): 15 min  Untimed Charges  71538 - OT Bioimpedence Rx Minutes: 15  Total Minutes  Untimed Charges Total Minutes: 15   Total Minutes: 15     Therapy Charges for Today     Code Description Service Date Service Provider Modifiers Qty    [id number] HC OT BIS XTRACELL FLUID ANALYSIS 7/12/2021 Nathalie Joseph OTR GO 1                      VELMA Moreau  7/12/2021

## 2021-07-16 ENCOUNTER — OFFICE VISIT (OUTPATIENT)
Dept: ONCOLOGY | Facility: CLINIC | Age: 66
End: 2021-07-16

## 2021-07-16 ENCOUNTER — INFUSION (OUTPATIENT)
Dept: ONCOLOGY | Facility: HOSPITAL | Age: 66
End: 2021-07-16

## 2021-07-16 VITALS
HEART RATE: 81 BPM | SYSTOLIC BLOOD PRESSURE: 110 MMHG | DIASTOLIC BLOOD PRESSURE: 78 MMHG | HEIGHT: 65 IN | RESPIRATION RATE: 16 BRPM | OXYGEN SATURATION: 94 % | TEMPERATURE: 98.2 F | WEIGHT: 293 LBS | BODY MASS INDEX: 48.82 KG/M2

## 2021-07-16 DIAGNOSIS — C50.611 MALIGNANT NEOPLASM OF AXILLARY TAIL OF RIGHT FEMALE BREAST, UNSPECIFIED ESTROGEN RECEPTOR STATUS (HCC): ICD-10-CM

## 2021-07-16 DIAGNOSIS — C50.611 MALIGNANT NEOPLASM OF AXILLARY TAIL OF RIGHT FEMALE BREAST, UNSPECIFIED ESTROGEN RECEPTOR STATUS (HCC): Primary | ICD-10-CM

## 2021-07-16 DIAGNOSIS — Z79.899 ENCOUNTER FOR EYE EXAM DUE TO HIGH RISK MEDICATION: Primary | ICD-10-CM

## 2021-07-16 LAB
ALBUMIN SERPL-MCNC: 3.6 G/DL (ref 3.5–5.2)
ALBUMIN/GLOB SERPL: 1.3 G/DL (ref 1.1–2.4)
ALP SERPL-CCNC: 86 U/L (ref 38–116)
ALT SERPL W P-5'-P-CCNC: 22 U/L (ref 0–33)
ANION GAP SERPL CALCULATED.3IONS-SCNC: 13.1 MMOL/L (ref 5–15)
AST SERPL-CCNC: 22 U/L (ref 0–32)
BASOPHILS # BLD AUTO: 0.09 10*3/MM3 (ref 0–0.2)
BASOPHILS NFR BLD AUTO: 1 % (ref 0–1.5)
BILIRUB SERPL-MCNC: 0.3 MG/DL (ref 0.2–1.2)
BUN SERPL-MCNC: 12 MG/DL (ref 6–20)
BUN/CREAT SERPL: 13.6 (ref 7.3–30)
CALCIUM SPEC-SCNC: 9.3 MG/DL (ref 8.5–10.2)
CHLORIDE SERPL-SCNC: 100 MMOL/L (ref 98–107)
CO2 SERPL-SCNC: 25.9 MMOL/L (ref 22–29)
CREAT SERPL-MCNC: 0.88 MG/DL (ref 0.6–1.1)
DEPRECATED RDW RBC AUTO: 57.2 FL (ref 37–54)
EOSINOPHIL # BLD AUTO: 0.02 10*3/MM3 (ref 0–0.4)
EOSINOPHIL NFR BLD AUTO: 0.2 % (ref 0.3–6.2)
ERYTHROCYTE [DISTWIDTH] IN BLOOD BY AUTOMATED COUNT: 17.2 % (ref 12.3–15.4)
GFR SERPL CREATININE-BSD FRML MDRD: 64 ML/MIN/1.73
GLOBULIN UR ELPH-MCNC: 2.7 GM/DL (ref 1.8–3.5)
GLUCOSE SERPL-MCNC: 145 MG/DL (ref 74–124)
HCT VFR BLD AUTO: 36.5 % (ref 34–46.6)
HGB BLD-MCNC: 10.7 G/DL (ref 12–15.9)
IMM GRANULOCYTES # BLD AUTO: 0.4 10*3/MM3 (ref 0–0.05)
IMM GRANULOCYTES NFR BLD AUTO: 4.3 % (ref 0–0.5)
LYMPHOCYTES # BLD AUTO: 1.04 10*3/MM3 (ref 0.7–3.1)
LYMPHOCYTES NFR BLD AUTO: 11.1 % (ref 19.6–45.3)
MCH RBC QN AUTO: 27.2 PG (ref 26.6–33)
MCHC RBC AUTO-ENTMCNC: 29.3 G/DL (ref 31.5–35.7)
MCV RBC AUTO: 92.6 FL (ref 79–97)
MONOCYTES # BLD AUTO: 0.99 10*3/MM3 (ref 0.1–0.9)
MONOCYTES NFR BLD AUTO: 10.5 % (ref 5–12)
NEUTROPHILS NFR BLD AUTO: 6.87 10*3/MM3 (ref 1.7–7)
NEUTROPHILS NFR BLD AUTO: 72.9 % (ref 42.7–76)
NRBC BLD AUTO-RTO: 0.5 /100 WBC (ref 0–0.2)
PLATELET # BLD AUTO: 360 10*3/MM3 (ref 140–450)
PMV BLD AUTO: 9.3 FL (ref 6–12)
POTASSIUM SERPL-SCNC: 4.1 MMOL/L (ref 3.5–4.7)
PROT SERPL-MCNC: 6.3 G/DL (ref 6.3–8)
RBC # BLD AUTO: 3.94 10*6/MM3 (ref 3.77–5.28)
SODIUM SERPL-SCNC: 139 MMOL/L (ref 134–145)
WBC # BLD AUTO: 9.41 10*3/MM3 (ref 3.4–10.8)

## 2021-07-16 PROCEDURE — 36591 DRAW BLOOD OFF VENOUS DEVICE: CPT

## 2021-07-16 PROCEDURE — 25010000002 HEPARIN LOCK FLUSH PER 10 UNITS: Performed by: INTERNAL MEDICINE

## 2021-07-16 PROCEDURE — 99214 OFFICE O/P EST MOD 30 MIN: CPT | Performed by: INTERNAL MEDICINE

## 2021-07-16 PROCEDURE — 85025 COMPLETE CBC W/AUTO DIFF WBC: CPT

## 2021-07-16 PROCEDURE — 80053 COMPREHEN METABOLIC PANEL: CPT

## 2021-07-16 RX ORDER — HEPARIN SODIUM (PORCINE) LOCK FLUSH IV SOLN 100 UNIT/ML 100 UNIT/ML
500 SOLUTION INTRAVENOUS AS NEEDED
Status: DISCONTINUED | OUTPATIENT
Start: 2021-07-16 | End: 2021-07-16 | Stop reason: HOSPADM

## 2021-07-16 RX ORDER — HEPARIN SODIUM (PORCINE) LOCK FLUSH IV SOLN 100 UNIT/ML 100 UNIT/ML
500 SOLUTION INTRAVENOUS AS NEEDED
Status: CANCELLED | OUTPATIENT
Start: 2021-07-16

## 2021-07-16 RX ORDER — SODIUM CHLORIDE 0.9 % (FLUSH) 0.9 %
10 SYRINGE (ML) INJECTION AS NEEDED
Status: DISCONTINUED | OUTPATIENT
Start: 2021-07-16 | End: 2021-07-16 | Stop reason: HOSPADM

## 2021-07-16 RX ORDER — MELOXICAM 15 MG/1
7.5 TABLET ORAL DAILY
COMMUNITY
Start: 2021-07-06 | End: 2021-08-03

## 2021-07-16 RX ORDER — SODIUM CHLORIDE 0.9 % (FLUSH) 0.9 %
10 SYRINGE (ML) INJECTION AS NEEDED
Status: CANCELLED | OUTPATIENT
Start: 2021-07-16

## 2021-07-16 RX ADMIN — Medication 500 UNITS: at 10:25

## 2021-07-16 RX ADMIN — SODIUM CHLORIDE, PRESERVATIVE FREE 10 ML: 5 INJECTION INTRAVENOUS at 10:24

## 2021-07-16 NOTE — PROGRESS NOTES
Subjective     REASON FOR FOLLOW-UP: Right breast inflammatory breast, triple positive                              REQUESTING PHYSICIAN: MD Francisco Esteban MD Bethany Haynes, MD    History of Present Illness patient is a 66 y.o. 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  For 12 weeks followed by Adriamycin Cytoxan    Patient had worsening shortness of breath requiring continuous oxygen.  CT of the chest performed to rule out pneumonitis and she was started on steroids.  CT showed bilateral groundglass infiltrates presumed to be related to Taxol pneumonitis and therefore Taxol discontinued.  Patient completed the fourth cycle of Perjeta/Herceptin alone.  Diarrhea was an issue but not as bad without the Taxol.  She is weaned off her prednisone    Patient proceeded with cycle 1 Adriamycin/Cytoxan on 5/14/2021 and is seen back today for cycle #3 and we are doing it at 3-week intervals because of her frailty       She had profound nausea vomiting on day 4 of the last treatment and pulled a muscle in her left side which is still hurting    .  She is getting significantly more fatigued and less active and more short of breath with exertion despite repeat echocardiogram being stable and her oxygen requirements have not increased    She had left sacroiliac area pain for a week and a half and this is improved she never took the Mobic    She has increased her Lasix and her weight is down 7 pounds but overall she is significantly weaker since starting treatment and I think it is in her best interest to discontinue treatment and proceed with surgery and will use Herceptin in the interim till surgery scheduled  She has had a good response in the breast and I do not think the last dose of  Adriamycin is going to be crucial and we run the risk of making her so weak that she cannot go for surgery      Past Medical History:   Diagnosis Date   • Anemia    • Asthma    • Chronic diastolic (congestive) heart failure (CMS/HCC)    • Chronic hypoxemic respiratory failure (CMS/HCC)     on continuous O2   • H/O Intraductal papilloma    • Hemorrhoids    • History of transfusion     AFTER BACK SURGERY   • Hypertension    • Inflammatory breast cancer (CMS/HCC)     right   • Kidney stone    • Morbid obesity with BMI of 50.0-59.9, adult (CMS/HCC)    • Obesity hypoventilation syndrome (CMS/HCC) 2021   • On home oxygen therapy     2.5 AT REST WITH ACTIVITY UP TO 4L   • ANABELLE on CPAP    • Osteoarthritis    • Type 2 diabetes mellitus (CMS/HCC)         Past Surgical History:   Procedure Laterality Date   • BREAST EXCISIONAL BIOPSY Bilateral    • COLONOSCOPY     • CYSTOSCOPY BLADDER STONE LITHOTRIPSY     • KNEE ARTHROPLASTY Bilateral    • SPINE SURGERY     • TOTAL ABDOMINAL HYSTERECTOMY WITH SALPINGO OOPHORECTOMY     • VENOUS ACCESS DEVICE (PORT) INSERTION N/A 2021    Procedure: INSERTION VENOUS ACCESS DEVICE;  Surgeon: Nicci Banks MD;  Location: [facility];  Service: General;  Laterality: N/A;      ONC HISTORY  patient is a 65-year-old white female with morbid obesity, history of diastolic congestive heart failure and unknown type of pulmonary disease for which she has been on oxygen for 4 years.    She has been getting routine mammography since 38 years of age because her mother  at 46 of breast cancer and had a biopsy of her left breast in  which was apparently benign and another biopsy in 2018 on her right breast which showed a small focus of atypical ductal hyperplasia and atypical lobular hyperplasia with a residual intraductal papilloma.  At that time she saw medical oncologist who recommended genetic testing and prevention but the insurance would not cover the genetic  testing and the medical oncologist thought tamoxifen was too risky for her because of her comorbidities and no treatment was given.  More recently she noticed redness of the right breast in October and showed it to her family doctor who gave her course of Keflex when it did not improve and mammogram was ordered and this was benign  When the redness persisted she saw her gynecologist with concern for inflammatory breast cancer and referred her to Dr. Banks after repeat imaging and [facility]  Preliminary report shows micropapillary invasive mammary carcinoma intermediate grade measuring 13 mm right axillary node was also involved with metastatic cancer ER/FL and HER-2 are pending  Patient saw Dr. Banks who sent her for skin biopsies and she had 3 separate punch biopsy of the skin that showed Perivascular and perifollicular inflammation with no obvious malignancy at 3:00 12:00 and 9:00  In addition staging work-up with CAT scans and bone scan were ordered.  CAT scan of the chest showed a borderline mediastinal  Infracarinal node measuring 15 mm in length mild cardiomegaly and heavy coronary artery calcifications  CT of the abdomen showed a 2.5 x 2.2 cm right adrenal mass which the patient tells me she has had in the past and is most likely benign but also a 2.7 cm left external iliac node which is indeterminate.  Bone scan was negative    Echocardiogram is scheduled for later next week and genetic testing with the [employer] stat panel is pending    Patient is  3 para 3 menarche was at age 9 menopause at 49 when she had a hysterectomy and oophorectomy  First childbirth was at age 20 she breast-fed her second and third children and took no hormone replacement after menopause    Family history is positive for mother dying of breast cancer at age 46 she is a maternal aunt with breast cancer in her 70s a paternal aunt with breast cancer in  her 70s paternal grandmother with colon cancer.  Her father had Hodgkin's disease and non-Hodgkin's lymphoma but  of small cell lung cancer at 67      She has not had a heart attack stroke or blood clot    Plan to do echocardiogram soon as possible to ascertain tolerability of cardiotoxic chemotherapy which would be typically indicated with an inflammatory breast cancer    Also plan PET scan to follow-up on atypical lymph nodes and confirm benign etiology of the adrenal lesion    She will have port placement and chemo education pt is  ER/WV and HER-2 +    I explained to Aida that the goal of treatment would be curative but she has a lot of comorbidities which may limit our ability to give the most effective chemotherapy in the setting  I told her radiation would be involved and possibly hormonal therapy for 10 years as she has hormone positivity and HER-2 directed therapy    She expressed some concerns about driving back and forth from [place] but felt that once a week was doable    We will see her back in 2 weeks to start treatment with a port placement planned early next week      Patient did have a mild reaction to Taxol dose #1 with some increased shortness of breath and flushing.  This was responsive to 100 mg of Solu-Cortef.  She states this gave her a headache and made her quite talkative but otherwise she was thankfully able to complete Taxol infusion without further incident.    Patient reports issues with chronic diarrhea over the last few years and did note a little bit increase in stooling following THP though nothing overly significant in her mind.  She did finally begin taking Imodium just a few days ago and has had no further stools.  She does note significant trouble with hemorrhoids in relation to the diarrhea   Required blood transfusion due to significant hemorrhoidal bleeding plus iron deficiency.  Injectafer planned    Due to inclement weather cycle 1 day 8 therapy was missed.  She  did get day 15 therapy with fairly good tolerance except for some diarrhea.    She was found to be iron deficient despite trying oral iron.  We therefore elected to proceed with IV Injectafer but she remains mildly anemic with a hemoglobin of 9.3    Current Outpatient Medications on File Prior to Visit   Medication Sig Dispense Refill   • albuterol sulfate  (90 Base) MCG/ACT inhaler Inhale 2 puffs Every 4 (Four) Hours As Needed.     • benzonatate (TESSALON) 200 MG capsule benzonatate 200 mg oral capsule take 1 capsule (200 mg) by oral route 3 times per day   Suspended     • carvedilol (COREG) 25 MG tablet Take 12.5 mg by mouth 2 (Two) Times a Day With Meals.     • Cholecalciferol (Vitamin D) 50 MCG (2000 UT) capsule Take 2,000 Units by mouth Daily.     • dexamethasone (DECADRON) 4 MG tablet Take 2 tablets in the morning daily on Days 2, 3 & 4.  Take with food. 6 tablet 3   • dicyclomine (BENTYL) 20 MG tablet Take 1 tablet by mouth Every 6 (Six) Hours. 60 tablet 2   • fexofenadine (Allegra Allergy) 180 MG tablet Allegra 180 mg oral tablet take 1 tablet by oral route daily as needed   Active     • fexofenadine (ALLEGRA) 180 MG tablet Take 180 mg by mouth Daily As Needed.     • FLUoxetine (PROzac) 40 MG capsule TAKE 1 CAPSULE BY MOUTH EVERY DAY 30 capsule 0   • furosemide (LASIX) 40 MG tablet Take 20 mg by mouth Daily.     • hydrocortisone 2.5 % cream Apply  topically to the appropriate area as directed 2 (Two) Times a Day. 3.5 g 0   • Inulin 2 g chewable tablet Fiber Gummies 2 gram oral tablet,chewable chew 1 tablet by oral route daily   Suspended     • Lidocaine, Anorectal, (LMX 5) 5 % cream cream Apply  topically to the appropriate area as directed 3 (Three) Times a Day As Needed (hemmorrhoid pain). 45 g 2   • lisinopril (PRINIVIL,ZESTRIL) 5 MG tablet Take 1 tablet by mouth Daily. 30 tablet 6   • meloxicam (MOBIC) 15 MG tablet Take 7.5 mg by mouth Daily.     • meloxicam (Mobic) 7.5 MG tablet Mobic 7.5 mg oral  tablet take 1 tablet (7.5 mg) by oral route once daily   Suspended     • metFORMIN (GLUCOPHAGE) 500 MG tablet Take 500 mg by mouth Daily With Breakfast.     • miconazole (Lotrimin AF) 2 % powder Apply  topically to the appropriate area as directed 2 (two) times a day. 85 g 1   • mometasone-formoterol (DULERA 100) 100-5 MCG/ACT inhaler Inhale 2 Puffs/kg Every Night.     • montelukast (SINGULAIR) 10 MG tablet Take 10 mg by mouth Every Night.     • multivitamin (THERAGRAN) tablet tablet Take 1 tablet by mouth Daily. TO HOLD 1 WEEK BEFORE SURGERY     • O2 (OXYGEN) Inhale 2.5 L/min Continuous.     • Olmesartan Medoxomil (BENICAR PO)      • omeprazole (priLOSEC) 20 MG capsule Take 2 capsules by mouth Daily. 60 capsule 3   • ondansetron (ZOFRAN) 8 MG tablet Take 1 tablet by mouth 3 (Three) Times a Day As Needed for Nausea or Vomiting. 30 tablet 5   • Potassium 99 MG tablet Take 1 tablet by mouth Every Night.     • pravastatin (PRAVACHOL) 40 MG tablet Take 40 mg by mouth Every Night.     • predniSONE (DELTASONE) 10 MG tablet Take 2 tablets by mouth 2 (Two) Times a Day. 60 tablet 1   • psyllium (METAMUCIL) 0.52 g capsule Metamucil 0.52 gram oral capsule take 1 capsule by oral route 5Xday   Active     • spironolactone (ALDACTONE) 25 MG tablet Take 25 mg by mouth Daily.     • valsartan (Diovan) 40 MG tablet Diovan 40 mg oral tablet take 1 tablet (40 mg) by oral route 2 times per day   Suspended     • VITAMIN D PO      • Wheat Dextrin (Benefiber) powder Benefiber Sugar Free (dextrin) 3 gram/3.8 gram oral powder use as directed by oral route As needed   Suspended       No current facility-administered medications on file prior to visit.        ALLERGIES:    Allergies   Allergen Reactions   • Amlodipine Swelling     LEGS    • Hydrochlorothiazide Unknown - Low Severity     Neuro issues   • Morphine Nausea And Vomiting     hallucinations   • Adhesive Tape Rash        Social History     Socioeconomic History   • Marital status:       Spouse name: Not on file   • Number of children: 3   • Years of education: Not on file   • Highest education level: Not on file   Tobacco Use   • Smoking status: Never Smoker   • Smokeless tobacco: Never Used   Vaping Use   • Vaping Use: Never used   Substance and Sexual Activity   • Alcohol use: Not Currently   • Drug use: Never   • Sexual activity: Defer        Family History   Problem Relation Age of Onset   • Breast cancer Mother 45   • Colon cancer Paternal Grandmother    • Breast cancer Maternal Aunt 70   • Breast cancer Paternal Aunt 70   • Lung cancer Father    • Hodgkin's lymphoma Father    • Skin cancer Father         squamous cell   • Ovarian cancer Neg Hx    • Uterine cancer Neg Hx    • Deep vein thrombosis Neg Hx    • Pulmonary embolism Neg Hx    • Malig Hyperthermia Neg Hx         Review of Systems   Constitutional: Positive for fatigue (Significantly worse after cycle 3 of Adriamycin Cytoxan). Negative for appetite change, chills, diaphoresis, fever and unexpected weight change.   HENT: Negative for hearing loss, sore throat, trouble swallowing and voice change.    Respiratory: Negative for cough, chest tightness, shortness of breath (Improved ) and wheezing.    Cardiovascular: Positive for leg swelling (Improved with higher dose of Lasix). Negative for chest pain and palpitations.   Gastrointestinal: Positive for constipation. Negative for abdominal distention, abdominal pain (Left upper quadrant from muscle strain after vomiting), diarrhea, nausea, rectal pain (hemmorrhoids -improved) and vomiting.   Genitourinary: Negative for dysuria, frequency, hematuria and urgency.   Musculoskeletal: Positive for back pain (Left sacroiliac better). Negative for joint swelling and neck stiffness.        No muscle weakness.   Skin: Negative for rash and wound.   Neurological: Negative for seizures, syncope, speech difficulty, weakness, numbness and headaches.   Hematological: Negative for adenopathy.  "Does not bruise/bleed easily.   Psychiatric/Behavioral: Negative.  Negative for behavioral problems, confusion and suicidal ideas.   All other systems reviewed and are negative.       Objective     Vitals:    07/16/21 0937   BP: 110/78   Pulse: 81   Resp: 16   Temp: 98.2 °F (36.8 °C)   TempSrc: Skin   SpO2: 94%   Weight: (!) 154 kg (340 lb)   Height: 165.1 cm (65\")   PainSc:   5   PainLoc: Abdomen  Comment: Lt sidstomach, under breast wall inflammation,pain     Current Status 7/16/2021   ECOG score 1       Physical Exam    CONSTITUTIONAL:  Vital signs reviewed.  No distress, looks comfortable. Morbidly obese  EYES:  Conjunctiva and lids unremarkable.  PERRLA  EARS,NOSE,MOUTH,THROAT:  Ears and nose appear unremarkable.  Lips, teeth, gums appear unremarkable.  RESPIRATORY:  Normal respiratory effort.  Lungs clear to auscultation bilaterally.  No axillary adenopathy  BREASTS: Both breasts are pendulous; the right breast is no longer larger and much softer now to palpation.  No residual abnormality-may be some slight pinkness to the skin which is new  CARDIOVASCULAR:  Normal S1, S2.  No murmurs rubs or gallops.  1+ brawny lower extremity edema.  GASTROINTESTINAL: Abdomen appears unremarkable.  Nontender.  No hepatomegaly.  No splenomegaly.  LYMPHATIC:  No cervical, supraclavicular, axillary lymphadenopathy.  SKIN:  Warm.  Inclusion cyst left axilla-extensive tenia cruris  in the right groin  PSYCHIATRIC:  Normal judgment and insight.  Normal mood and affect.       I have reexamined the patient and the results are consistent with the previously documented exam. Jaime Azul MD           RECENT LABS:  Results from last 7 days   Lab Units 07/16/21  0921   WBC 10*3/mm3 9.41   NEUTROS ABS 10*3/mm3 6.87   HEMOGLOBIN g/dL 10.7*   HEMATOCRIT % 36.5   PLATELETS 10*3/mm3 360     Results from last 7 days   Lab Units 07/16/21  0921   SODIUM mmol/L 139   POTASSIUM mmol/L 4.1   CHLORIDE mmol/L 100   CO2 mmol/L 25.9   BUN " mg/dL 12   CREATININE mg/dL 0.88   CALCIUM mg/dL 9.3   ALBUMIN g/dL 3.60   BILIRUBIN mg/dL 0.3   ALK PHOS U/L 86   ALT (SGPT) U/L 22   AST (SGOT) U/L 22   GLUCOSE mg/dL 145*             PET  IMPRESSION:  1.  Moderate to intensely FDG avid asymmetric soft tissue and skin  thickening involving the right breast likely representing patient's  known malignancy.  2.  Intensely FDG avid right axillary and subpectoral adenopathy likely  represent metastatic disease.  3.  Constellation of findings within the right adrenal gland are favored  to represent a lipid rich adenoma. Continued attention on follow-up is  recommended to ensure stability.  4.  While there are no findings of definite FDG avid osseous metastasis,  given the heterogenous FDG uptake throughout the axial and appendicular  skeleton due to the above stated limitations, subtle underlying osseous  metastasis would remain occult. Therefore, continued close attention on  follow-up is recommended to exclude this possibility.  5.  Short segment of moderate to intense FDG uptake within the distal  esophagus and GE junction suggestive of esophagitis. In the appropriate  clinical context correlation with patient history is recommended with  follow-up endoscopy if clinically indicated.  6.  Sub-6 mm pulmonary nodule within the right lower lobe is below PET  resolution and indeterminate. Continued close attention on follow-up  with chest CT in 3 months is recommended to exclude metastatic disease.  7.  Other findings as above.     This report was finalized on 2/2/2021     Assessment/Plan   1. [id number] right breast cancer ER/SC HER-2 -3+ positive inflammatory breast cancer for neoadjuvant chemotherapy  · Staging work-up negative except for 6 mm lung nodule and axillary and subpectoral adenopathy  · THP followed by AC planned if she tolerates it  · C1D8 Taxol missed due to inclement weather.  · Taxol discontinued after 7 doses due to probable Taxol pneumonitis treated with  steroids.    · C1 Adriamycin/Cytoxan given 5/14/2021.    2.  Morbid obesity    3.  History of diastolic heart failure  · Echocardiogram with ejection fraction of 64% normal strain  · Cleared by cardio-oncology for chemotherapy    4.  Pulmonary disease?  Etiology on oxygen for 4 years?  Jennifer    5.  Strong family history of breast cancer genetic testing 84 genes negative    6.  Probable benign adrenal adenoma-PET negative    7.  Questionable enlarged lymph nodes left iliac chain and mediastinum likely reactive-PET negative    8.  6 mm right lower lobe nodule below PET resolution pretreatment needs follow-up after THP  · Repeat CT read at [facility] radiologist report stability of nodules and nodes    9.  Abnormal uptake short segment esophagus with a history of Schatzki's ring-we will double PPI and watch closely but we we will proceed with chemotherapy at this point and refer back to GI-doubt she has metastatic disease to this area    10. Anemia with microcytic indices  · Iron studies performed 2/10/2021.  · 2/24/2021: reviewed with the patient that she is iron deficient, with ferritin of 16, iron saturation of 4%.  Patient reports taking ferrous gluconate in the past but this caused GI upset.  Also with her chronic diarrhea I do not think she can absorb it.  We will pursue IV iron with plans to initiate this next week pending insurance approval. In addition hemoglobin down to 8.0 and transfusion pursued.  · 3/3/2021: IV Injectafer initiated x2.   · Hemoglobin down to 9.9 one week out from first AC though overall stable.  Monitor.     11. Hemorrhoidal pain secondary to diarrhea. Does have occasional bleeding. Topical lidocaine prescribed. Monitor.    12.  History of chronic diarrhea, ?IBS, exacerbated with Perjeta therapy.  · Patient required rifaximin 550 mg to take twice daily x7 days with each Perjeta dose.   · Since completion of Perjeta patient is now actually experiencing constipation (further  discussed below).      13.  Taxol-induced pneumonitis.   · Worsening shortness of breath with CT evidence of groundglass infiltrates bilaterally suspicious for pneumonitis  · Patient prescribed prednisone 20 twice daily  · Breathing is overall improved.  Patient is slowly tapering off prednisone.  Today she will begin 5 mg every other day x1 week and then discontinue.      14.  Constipation following initiation of Adriamycin/Cytoxan.  · Patient is just increased fiber in her diet but asking what else she can do.  She prefers a suppository if possible.  We discussed the use of a total suppository but also consider taking senna S1-2 tabs nightly at least the first week after chemotherapy to help avoid this in the future.      Plan  1. Discontinue chemotherapy  2. Follow-up with Dr. Banks for imaging and scheduling of surgery  3. Daily weights   4.Patient to return in 3 weeks for Herceptin single agent until surgery scheduled     this patient is on drug therapy requiring intensive monitoring for toxicity.     She has had an excellent response in the breast but she is frail and I am afraid that last dose of chemotherapy may be too much for her and We will stop chemo now and proceed with surgery

## 2021-07-19 DIAGNOSIS — Z79.899 ENCOUNTER FOR LONG-TERM (CURRENT) USE OF HIGH-RISK MEDICATION: Primary | ICD-10-CM

## 2021-07-20 DIAGNOSIS — Z79.899 ENCOUNTER FOR LONG-TERM (CURRENT) USE OF HIGH-RISK MEDICATION: Primary | ICD-10-CM

## 2021-07-21 ENCOUNTER — OFFICE VISIT (OUTPATIENT)
Dept: SURGERY | Facility: CLINIC | Age: 66
End: 2021-07-21

## 2021-07-21 ENCOUNTER — PREP FOR SURGERY (OUTPATIENT)
Dept: OTHER | Facility: HOSPITAL | Age: 66
End: 2021-07-21

## 2021-07-21 VITALS
HEIGHT: 65 IN | RESPIRATION RATE: 20 BRPM | OXYGEN SATURATION: 98 % | DIASTOLIC BLOOD PRESSURE: 80 MMHG | HEART RATE: 79 BPM | BODY MASS INDEX: 48.82 KG/M2 | SYSTOLIC BLOOD PRESSURE: 124 MMHG | WEIGHT: 293 LBS

## 2021-07-21 DIAGNOSIS — C50.911 INFLAMMATORY BREAST CANCER, RIGHT (HCC): Primary | ICD-10-CM

## 2021-07-21 PROCEDURE — 99215 OFFICE O/P EST HI 40 MIN: CPT | Performed by: SURGERY

## 2021-07-21 RX ORDER — CEFAZOLIN SODIUM IN 0.9 % NACL 3 G/100 ML
3 INTRAVENOUS SOLUTION, PIGGYBACK (ML) INTRAVENOUS ONCE
Status: CANCELLED | OUTPATIENT
Start: 2021-08-10 | End: 2021-07-21

## 2021-07-21 RX ORDER — HEPARIN SODIUM 5000 [USP'U]/ML
5000 INJECTION, SOLUTION INTRAVENOUS; SUBCUTANEOUS
Status: CANCELLED | OUTPATIENT
Start: 2021-08-10 | End: 2021-08-11

## 2021-07-21 NOTE — PROGRESS NOTES
[facility]     Referring Provider: Dipesh Leone MD     Chief complaint: F/u post-NACT     HPI:   1/19/21:  Ms. Aida Conner is a 64 yo woman, seen at the request of Dr. Dipesh Leone, for evaluation of right breast changes, possible inflammatory breast cancer. In November 2020, the patient noticed that her right breast had become enlarged, firm, painful, and red. She underwent mammogram and ultrasound at the time, which was read as benign aside from skin thickening and edema. She was treated with a course of antibiotics by her PCP without improvement. She says that the redness and swelling initially started in the lower inner portion of her breast, but over the past 2 months, it has spread to involve the entire breast. She can no longer wear a bra due to discomfort.     She has a past history of 2 benign left breast excisional biopsies many years ago, as well as a right breast excisional biopsy in 2018 for an intraductal papilloma with incidental atypical ductal and lobular hyperplasia. Her past medical history includes obesity, COPD on oxygen, sleep apnea, diabetes and CHF. She has a family history of breast cancer in her mother (diagnosed at age 45), a maternal aunt (diagnosed in her 70s), and a paternal aunt (diagnosed in her 70s). She denies any family history of ovarian cancer. She says she was seen by a medical oncologist in 2018 after the excisional biopsy to discuss possible chemoprevention. She was referred for genetic testing, however it was not covered by insurance so she did not have it done. She ended up deciding not to pursue endocrine therapy due to concerns about side effects.     4/8/21:   After her last visit, she underwent breast and axillary biopsies which showed a triple positive invasive ductal carcinoma. She underwent staging studies which were thankfully negative and her genetic testing returned negative for mutation. She started neoadjuvant THP on 2/10/21. She has been having  issues with increased shortness of breath and a rash, so yesterday Taxol was discontinued and she will be starting Abraxane next week. She reports that her breast has significantly decreased in size and become less swollen.     7/21/21, Interval History:  Taxol was discontinued after 7 doses due to pneumonitis which improved with steroids. She then completed 3 cycles of AC, last dose on 6/25/21. Cycle 4 was held due to her frailty. She was joined today in clinic by her . She gave consent for her  to be present during her examination and participate in the discussion.       Oncology/Hematology History Overview Note   06/19/18, Right Breast Excisional Biopsy ( [facility], Dr. Juani Barragan):  Right breast wire guided excision:  Small foci of atypical ductal hyperplasia (0.5-1 mm); completely excised  Minute focus of atypical lobular hyperplasia (<0.5 mm) completely excised  Residual intraductal papilloma with focal sclerosis (approximately 7 mm in greatest dimension); adequately excised  Ectactic ducts  Fibrocystic changes (stromal fibrosis, Sclerosing adenosis) with mild to moderate ductal hyperplasia  Rare microcalcifications associated with adenosis  Extensive vascular calcification  Previous biopsy site    09/07/2018, Right breast US ( [facility]):  2.5 predominantly cystic lesion in the operative bed contains fine septations, and is consistent with an organizing hematoma.   No solid relatively hypo or hyperechoic mass is evident  BI-RADS 2: Benign.    1/10/2020, Screening MMG with Saurabh ( [facility]):  Post lumpectomy changes on the left are stable.  Post biopsy changes on the right are evident.   No suspicious mass, area or architectural distortion or suspicious microcalcification is identified.  BI-RADS 2: Benign.    11/1/20: Pt noticed right breast was enlarged, firm, and red. Treated with a course of antibiotics with no improvement.     Inflammatory breast cancer, right (CMS/HCC)   11/17/2020 Initial  Diagnosis    Inflammatory breast cancer, right (CMS/HCC)     11/18/2020 Imaging    Bilateral Diagnostic MMG with Saurabh & Right Breast US ([facility]):  MMG:  Again seen within the anterior right breast near the 12 o’clock position in an area postoperative architectural distortion. There are multiple surgical clips in this region. Findings are unchanged from 1/10/2020. No new mass or architectural distortion seen within the right breast. No new suspicious calcifications. There is new skin thickening along the anterior aspect of the right breast of uncertain etiology.   There is stable architectural distortion in the upper outer quadrant of the left breast. There is no new mass or architectural distortion. There are stable scattered punctate calcifications. No suspicious calcifications.  US:  Limited sonographic images of the right breast were obtained at the 12 o’clock position at the site of the patient’s reported right breast fullness. This is in the retroareolar area. Images demonstrate some mild skin thickening and edema within the subcutaneous and superficial fat. Findings are nonspecific but could be seen with developing cellulitis. No discrete solid mass lesion identified. No cyst or abnormal fluid collection is seen.  BI-RADS 2: Benign.     1/19/2021 Imaging    Bilateral Diagnostic MMG with Saurabh & Right Breast US ([facility]):  MMG:  Scattered areas of fibroglandular density.  1. There is a large global asymmetry with associated skin thickening and trabecular thickening seen in the central region of the right breast.  This correlates to the symptomatic area. The appearance is consistent with the clinical diagnosis of inflammatory breast carcinoma.  2.  There is a stable post-surgical scar seen in the anterior one-third region of the right breast at 12  o'clock. Post-surgical scar is in an area of prior excisional biopsy.  Metallic surgical clips noted.  3.  There is a stable post-surgical scar seen in the middle  one-third 1:30 o'clock region of the left breast. Post-surgical scar is in an area of prior excisional biopsy.  Metallic surgical clips noted.  4. There are several stable punctate calcifications seen in the anterior one-third region of the left breast at 7 o'clock.  7. There is an axillary lymph node measuring 20 mm in the right axilla.  Next, ultrasound was performed.  US:  2. Ultrasound demonstrates a stable irregular post-surgical scar seen in the anterior one-third region of the right breast at 12 o'clock.  This correlates with the mammographic finding.  5. There are two oval parallel solid masses with partially defined margins measuring 9 x 6 x 8 and 10 x 5 x 9 mm seen in the right breast at 12 o'clock located 6 centimeters from the nipple.  6. There is an irregular solid mass with poorly defined margins measuring 32 x 8 x 11 mm seen in the 10:30 o'clock region of the right breast located 15 centimeters from the nipple.  7. Ultrasound demonstrates an oval parallel abnormal axillary lymph node(s) with well defined, thin margins measuring 32 x 20 x 22 mm in the right axilla.  BI-RADS 4B: Suspicious.     1/19/2021 Biopsy    Right Breast & Right Axilla, US-Guided Core Biopsies ([facility]):    1. Right Breast, 10:30, core biopsies:   Invasive mammary carcinoma, no special type (ductal) with micropapillary features, intermediate grade (tubules=3, nuclear atypia=2, mitoses=2), measuring at least 1.3 cm in dimension.     ER+ (96.02%, strong)  CT+ (81.98%, moderate)  Her2+ (IHC 3+)  Ki-67 40.17%    2. Right Axilla, biopsy:   Lymph node involved by metastatic mammary carcinoma.     1/19/2021 Biopsy    Right Breast, Skin Punch Biopsies ([facility]):    1. Skin, Right Breast, 3 o'clock, Biopsy: Benign skin and underlying connective tissue with               A. Superficial and deep perivascular and perifollicular inflammation.     2. Skin, Right Breast, 12 o'clock, Biopsy: Benign skin and underlying connective tissue with                A. Superficial and deep perivascular and perifollicular inflammation.     3. Skin, Right Breast, 9 o'clock, Biopsy: Benign skin and underlying connective tissue with               A. Superficial and deep perivascular and perifollicular inflammation.     1/19/2021 Genetic Testing    [employer] Multi-Cancer Panel (84 genes):    Negative     1/20/2021 Imaging    [facility]  1. Right breast partially within the field-of-view, there is skin thickening and parenchymal induration.  2. No indication of intrathoracic metastatic disease.  3. No lytic or sclerotic bone lesion.  4. Enlarged right adrenal gland, indeterminant. Metastatic deposits not excluded.  5. Likely old left external iliac chain lymph node.     1/20/2021 Imaging    Bone Scan ([facility]):  No scintigraphic evidence of osseous metastasis.     1/29/2021 Imaging    PET CT ([facility]):  1.  Moderate to intensely FDG avid asymmetric soft tissue and skin thickening involving the right breast likely representing patient's known malignancy.  2.  Intensely FDG avid right axillary and subpectoral adenopathy likely represent metastatic disease.  3.  Constellation of findings within the right adrenal gland are favored to represent a lipid rich adenoma. Continued attention on follow-up is recommended to ensure stability.  4.  While there are no findings of definite FDG avid osseous metastasis, given the heterogenous FDG uptake throughout the axial and appendicular skeleton due to the above stated limitations, subtle underlying osseous  metastasis would remain occult. Therefore, continued close attention on follow-up is recommended to exclude this possibility.  5.  Short segment of moderate to intense FDG uptake within the distal esophagus and GE junction suggestive of esophagitis. In the appropriate clinical context correlation with patient history is recommended with follow-up endoscopy if clinically indicated.  6.  Sub-6 mm pulmonary nodule within the right lower  lobe is below PET resolution and indeterminate. Continued close attention on follow-up with chest CT in 3 months is recommended to exclude metastatic disease.     Malignant neoplasm of axillary tail of right female breast (CMS/HCC)   1/25/2021 Initial Diagnosis    Malignant neoplasm of axillary tail of right female breast (CMS/HCC)     2/10/2021 - 5/4/2021 Chemotherapy    OP BREAST Pertuzumab / Trastuzumab-anns / PACLitaxel     5/14/2021 - 7/15/2021 Chemotherapy    OP BREAST AC DOXOrubicin / Cyclophosphamide     8/6/2021 -  Chemotherapy    OP BREAST Trastuzumab-anns Q21D (maintenance)         Review of Systems:  See interval history.       Medications:    Current Outpatient Medications:   •  albuterol sulfate  (90 Base) MCG/ACT inhaler, Inhale 2 puffs Every 4 (Four) Hours As Needed., Disp: , Rfl:   •  benzonatate (TESSALON) 200 MG capsule, benzonatate 200 mg oral capsule take 1 capsule (200 mg) by oral route 3 times per day   Suspended, Disp: , Rfl:   •  carvedilol (COREG) 25 MG tablet, Take 12.5 mg by mouth 2 (Two) Times a Day With Meals., Disp: , Rfl:   •  Cholecalciferol (Vitamin D) 50 MCG (2000 UT) capsule, Take 2,000 Units by mouth Daily., Disp: , Rfl:   •  dexamethasone (DECADRON) 4 MG tablet, Take 2 tablets in the morning daily on Days 2, 3 & 4.  Take with food., Disp: 6 tablet, Rfl: 3  •  dicyclomine (BENTYL) 20 MG tablet, Take 1 tablet by mouth Every 6 (Six) Hours., Disp: 60 tablet, Rfl: 2  •  fexofenadine (Allegra Allergy) 180 MG tablet, Allegra 180 mg oral tablet take 1 tablet by oral route daily as needed   Active, Disp: , Rfl:   •  fexofenadine (ALLEGRA) 180 MG tablet, Take 180 mg by mouth Daily As Needed., Disp: , Rfl:   •  FLUoxetine (PROzac) 40 MG capsule, TAKE 1 CAPSULE BY MOUTH EVERY DAY, Disp: 30 capsule, Rfl: 0  •  furosemide (LASIX) 40 MG tablet, Take 20 mg by mouth Daily., Disp: , Rfl:   •  hydrocortisone 2.5 % cream, Apply  topically to the appropriate area as directed 2 (Two) Times a  Day., Disp: 3.5 g, Rfl: 0  •  Inulin 2 g chewable tablet, Fiber Gummies 2 gram oral tablet,chewable chew 1 tablet by oral route daily   Suspended, Disp: , Rfl:   •  Lidocaine, Anorectal, (LMX 5) 5 % cream cream, Apply  topically to the appropriate area as directed 3 (Three) Times a Day As Needed (hemmorrhoid pain)., Disp: 45 g, Rfl: 2  •  lisinopril (PRINIVIL,ZESTRIL) 5 MG tablet, Take 1 tablet by mouth Daily., Disp: 30 tablet, Rfl: 6  •  meloxicam (MOBIC) 15 MG tablet, Take 7.5 mg by mouth Daily., Disp: , Rfl:   •  meloxicam (Mobic) 7.5 MG tablet, Mobic 7.5 mg oral tablet take 1 tablet (7.5 mg) by oral route once daily   Suspended, Disp: , Rfl:   •  metFORMIN (GLUCOPHAGE) 500 MG tablet, Take 500 mg by mouth Daily With Breakfast., Disp: , Rfl:   •  miconazole (Lotrimin AF) 2 % powder, Apply  topically to the appropriate area as directed 2 (two) times a day., Disp: 85 g, Rfl: 1  •  mometasone-formoterol (DULERA 100) 100-5 MCG/ACT inhaler, Inhale 2 Puffs/kg Every Night., Disp: , Rfl:   •  montelukast (SINGULAIR) 10 MG tablet, Take 10 mg by mouth Every Night., Disp: , Rfl:   •  multivitamin (THERAGRAN) tablet tablet, Take 1 tablet by mouth Daily. TO HOLD 1 WEEK BEFORE SURGERY, Disp: , Rfl:   •  O2 (OXYGEN), Inhale 2.5 L/min Continuous., Disp: , Rfl:   •  Olmesartan Medoxomil (BENICAR PO), , Disp: , Rfl:   •  omeprazole (priLOSEC) 20 MG capsule, Take 2 capsules by mouth Daily., Disp: 60 capsule, Rfl: 3  •  ondansetron (ZOFRAN) 8 MG tablet, Take 1 tablet by mouth 3 (Three) Times a Day As Needed for Nausea or Vomiting., Disp: 30 tablet, Rfl: 5  •  Potassium 99 MG tablet, Take 1 tablet by mouth Every Night., Disp: , Rfl:   •  pravastatin (PRAVACHOL) 40 MG tablet, Take 40 mg by mouth Every Night., Disp: , Rfl:   •  predniSONE (DELTASONE) 10 MG tablet, Take 2 tablets by mouth 2 (Two) Times a Day., Disp: 60 tablet, Rfl: 1  •  psyllium (METAMUCIL) 0.52 g capsule, Metamucil 0.52 gram oral capsule take 1 capsule by oral route  5Xday   Active, Disp: , Rfl:   •  spironolactone (ALDACTONE) 25 MG tablet, Take 25 mg by mouth Daily., Disp: , Rfl:   •  valsartan (Diovan) 40 MG tablet, Diovan 40 mg oral tablet take 1 tablet (40 mg) by oral route 2 times per day   Suspended, Disp: , Rfl:   •  VITAMIN D PO, , Disp: , Rfl:   •  Wheat Dextrin (Benefiber) powder, Benefiber Sugar Free (dextrin) 3 gram/3.8 gram oral powder use as directed by oral route As needed   Suspended, Disp: , Rfl:       Allergies   Allergen Reactions   • Amlodipine Swelling     LEGS    • Hydrochlorothiazide Unknown - Low Severity     Neuro issues   • Morphine Nausea And Vomiting     hallucinations   • Adhesive Tape Rash       Family History   Problem Relation Age of Onset   • Breast cancer Mother 45   • Colon cancer Paternal Grandmother    • Breast cancer Maternal Aunt 70   • Breast cancer Paternal Aunt 70   • Lung cancer Father    • Hodgkin's lymphoma Father    • Skin cancer Father         squamous cell   • Ovarian cancer Neg Hx    • Uterine cancer Neg Hx    • Deep vein thrombosis Neg Hx    • Pulmonary embolism Neg Hx    • Malig Hyperthermia Neg Hx        PHYSICAL EXAMINATION:   Vitals:    07/21/21 1512   BP: 124/80   Pulse: 79   Resp: 20   SpO2: 98%     ECOG 1 - Symptomatic but ambulatory, with cane  General: NAD, obese, on continuous oxygen and slightly winded  Psych: a&o x 3, normal mood and affect  Eyes: EOMI, no scleral icterus  ENMT: neck supple without masses or thyromegaly, mucus membranes moist  MSK: normal gait, normal ROM in bilateral shoulders  Lymph nodes: no cervical, supraclavicular or axillary lymphadenopathy (exam limited by body habitus)  Breast: symmetric, very large size, pendulous  Right: On inspection, there is a superior curvilinear scar. The right breast is no longer enlarged versus the the left breast. The skin is still slightly erythematous versus the left, however the prior edema and peau d'orange appearance have resolved. No masses or nipple  abnormalities.  Left: Superior curvilinear scar and radial scar at 9:00, otherwise no visible abnormalities on inspection while seated, with arms raised or hands on hips. No masses or nipple abnormalities.      Assessment:  66 y.o. F with a diagnosis of right breast inflammatory carcinoma: Intermediate grade, triple positive, invasive ductal carcinoma; clinical T4dN1, anatomic stage IIIB, prognostic stage IIIA. She completed neoadjuvant chemotherapy on 6/25/21    Discussion:  We reviewed the plan for surgery. She understands that the surgical management of inflammatory breast cancer always requires a modified radical mastectomy without reconstruction. I explained that axillary node dissection is associated with an increased risk of lymphedema versus sentinel lymph node biopsy (15-30% vs. <10%), that there is an increased risk of permanent upper inner arm numbness, and a risk of injury to motor nerves that control the shoulder muscles.     We again discussed the contralateral breast and the role of a bilateral mastectomy, which is to reduce the risk of a second primary breast cancer. Her genetic testing was negative, so her risk of a second primary breast cancer is relatively low, and in addition endocrine therapy will further reduce this risk. She is still interested in a possible contralateral mastectomy for symmetry purposes, which is reasonable. She understands that adding this to the surgery will slightly increase her risk of perioperative complications.    I described additional risks and potential complications associated with surgery, including, but not limited to, bleeding, infection, deformity/poor cosmetic result, chronic pain, neurovascular injury, numbness, seroma, hematoma, deep venous thrombosis, skin flap necrosis, disease recurrence and the possibility of requiring additional surgery. We also discussed other treatment options including the option of not undergoing any surgical treatment and the risks  associated with this including disease progression. She expressed an understanding of these factors and wished to proceed.    Plan:  -Continue follow-up with Dr. Azul.  -Left total mastectomy and right modified radical mastectomy.    Nicci Banks MD    I spent 45 minutes caring for Aida on this date of service. This time includes time spent by me in the following activities: preparing for the visit, performing a medically appropriate examination and/or evaluation, counseling and educating the patient/family/caregiver and documenting information in the medical record.      CC:  MD Shari Manjarrez MD Prabodh Mehta, MD Arthur McLaughlin, MD

## 2021-07-22 ENCOUNTER — TELEPHONE (OUTPATIENT)
Dept: SURGERY | Facility: CLINIC | Age: 66
End: 2021-07-22

## 2021-07-22 NOTE — TELEPHONE ENCOUNTER
Surgery is scheduled on 8/10/2021 @ 11:00am, patient arrival 9:00am.    PAT is scheduled on 8/3/2021 @ 1:30pm.    Post-op appointment with Dr. Banks is scheduled on 9/2/2021 @ 2:00pm.    Called and left message with patient about appointment times, I will relay all appointment times to patient once she calls back.    Sent patient a reminder letter in the mail.

## 2021-07-28 ENCOUNTER — TRANSCRIBE ORDERS (OUTPATIENT)
Dept: PREADMISSION TESTING | Facility: HOSPITAL | Age: 66
End: 2021-07-28

## 2021-07-29 DIAGNOSIS — C50.611 MALIGNANT NEOPLASM OF AXILLARY TAIL OF RIGHT FEMALE BREAST, UNSPECIFIED ESTROGEN RECEPTOR STATUS (HCC): ICD-10-CM

## 2021-07-29 RX ORDER — OMEPRAZOLE 20 MG/1
CAPSULE, DELAYED RELEASE ORAL
Qty: 30 CAPSULE | Refills: 3 | Status: SHIPPED | OUTPATIENT
Start: 2021-07-29 | End: 2022-06-13

## 2021-08-03 ENCOUNTER — PRE-ADMISSION TESTING (OUTPATIENT)
Dept: PREADMISSION TESTING | Facility: HOSPITAL | Age: 66
End: 2021-08-03

## 2021-08-03 VITALS
RESPIRATION RATE: 20 BRPM | DIASTOLIC BLOOD PRESSURE: 81 MMHG | OXYGEN SATURATION: 96 % | TEMPERATURE: 97.7 F | SYSTOLIC BLOOD PRESSURE: 174 MMHG | BODY MASS INDEX: 48.82 KG/M2 | WEIGHT: 293 LBS | HEART RATE: 73 BPM | HEIGHT: 65 IN

## 2021-08-03 LAB
ANION GAP SERPL CALCULATED.3IONS-SCNC: 9.8 MMOL/L (ref 5–15)
BASOPHILS # BLD AUTO: 0.06 10*3/MM3 (ref 0–0.2)
BASOPHILS NFR BLD AUTO: 0.8 % (ref 0–1.5)
BUN SERPL-MCNC: 16 MG/DL (ref 8–23)
BUN/CREAT SERPL: 22.9 (ref 7–25)
CALCIUM SPEC-SCNC: 9.6 MG/DL (ref 8.6–10.5)
CHLORIDE SERPL-SCNC: 102 MMOL/L (ref 98–107)
CO2 SERPL-SCNC: 26.2 MMOL/L (ref 22–29)
CREAT SERPL-MCNC: 0.7 MG/DL (ref 0.57–1)
DEPRECATED RDW RBC AUTO: 53.9 FL (ref 37–54)
EOSINOPHIL # BLD AUTO: 0.19 10*3/MM3 (ref 0–0.4)
EOSINOPHIL NFR BLD AUTO: 2.5 % (ref 0.3–6.2)
ERYTHROCYTE [DISTWIDTH] IN BLOOD BY AUTOMATED COUNT: 16.4 % (ref 12.3–15.4)
GFR SERPL CREATININE-BSD FRML MDRD: 83 ML/MIN/1.73
GLUCOSE SERPL-MCNC: 137 MG/DL (ref 65–99)
HCT VFR BLD AUTO: 34.9 % (ref 34–46.6)
HGB BLD-MCNC: 10.7 G/DL (ref 12–15.9)
IMM GRANULOCYTES # BLD AUTO: 0.03 10*3/MM3 (ref 0–0.05)
IMM GRANULOCYTES NFR BLD AUTO: 0.4 % (ref 0–0.5)
LYMPHOCYTES # BLD AUTO: 0.83 10*3/MM3 (ref 0.7–3.1)
LYMPHOCYTES NFR BLD AUTO: 11.1 % (ref 19.6–45.3)
MCH RBC QN AUTO: 27.4 PG (ref 26.6–33)
MCHC RBC AUTO-ENTMCNC: 30.7 G/DL (ref 31.5–35.7)
MCV RBC AUTO: 89.3 FL (ref 79–97)
MONOCYTES # BLD AUTO: 0.67 10*3/MM3 (ref 0.1–0.9)
MONOCYTES NFR BLD AUTO: 8.9 % (ref 5–12)
NEUTROPHILS NFR BLD AUTO: 5.71 10*3/MM3 (ref 1.7–7)
NEUTROPHILS NFR BLD AUTO: 76.3 % (ref 42.7–76)
NRBC BLD AUTO-RTO: 0 /100 WBC (ref 0–0.2)
PLATELET # BLD AUTO: 314 10*3/MM3 (ref 140–450)
PMV BLD AUTO: 10 FL (ref 6–12)
POTASSIUM SERPL-SCNC: 4.1 MMOL/L (ref 3.5–5.2)
QT INTERVAL: 430 MS
RBC # BLD AUTO: 3.91 10*6/MM3 (ref 3.77–5.28)
SODIUM SERPL-SCNC: 138 MMOL/L (ref 136–145)
WBC # BLD AUTO: 7.49 10*3/MM3 (ref 3.4–10.8)

## 2021-08-03 PROCEDURE — 80048 BASIC METABOLIC PNL TOTAL CA: CPT

## 2021-08-03 PROCEDURE — 93010 ELECTROCARDIOGRAM REPORT: CPT | Performed by: INTERNAL MEDICINE

## 2021-08-03 PROCEDURE — 36415 COLL VENOUS BLD VENIPUNCTURE: CPT

## 2021-08-03 PROCEDURE — 93005 ELECTROCARDIOGRAM TRACING: CPT

## 2021-08-03 PROCEDURE — 85025 COMPLETE CBC W/AUTO DIFF WBC: CPT

## 2021-08-03 RX ORDER — DIPHENHYDRAMINE HCL 25 MG
25 CAPSULE ORAL NIGHTLY PRN
COMMUNITY
End: 2022-10-04

## 2021-08-03 NOTE — DISCHARGE INSTRUCTIONS
Take the following medications the morning of surgery:     prozac   Omeprazole   Coreg   dulera  Emergency inhaler    If you are on prescription narcotic pain medication to control your pain you may also take that medication the morning of surgery.    General Instructions:  • Do not eat solid food after midnight the night before surgery.  • You may drink clear liquids day of surgery but must stop at least one hour before your hospital arrival time.  • It is beneficial for you to have a clear drink that contains carbohydrates the day of surgery.  We suggest a 12 to 20 ounce bottle of Gatorade or Powerade for non-diabetic patients or a 12 to 20 ounce bottle of G2 or Powerade Zero for diabetic patients. (Pediatric patients, are not advised to drink a 12 to 20 ounce carbohydrate drink)    Clear liquids are liquids you can see through.  Nothing red in color.     Plain water                               Sports drinks  Sodas                                   Gelatin (Jell-O)  Fruit juices without pulp such as white grape juice and apple juice  Popsicles that contain no fruit or yogurt  Tea or coffee (no cream or milk added)  Gatorade / Powerade  G2 / Powerade Zero    • Infants may have breast milk up to four hours before surgery.  • Infants drinking formula may drink formula up to six hours before surgery.   • Patients who avoid smoking, chewing tobacco and alcohol for 4 weeks prior to surgery have a reduced risk of post-operative complications.  Quit smoking as many days before surgery as you can.  • Do not smoke, use chewing tobacco or drink alcohol the day of surgery.   • If applicable bring your C-PAP/ BI-PAP machine.  • Bring any papers given to you in the doctor’s office.  • Wear clean comfortable clothes.  • Do not wear contact lenses, false eyelashes or make-up.  Bring a case for your glasses.   • Bring crutches or walker if applicable.  • Remove all piercings.  Leave jewelry and any other valuables at  home.  • Hair extensions with metal clips must be removed prior to surgery.  • The Pre-Admission Testing nurse will instruct you to bring medications if unable to obtain an accurate list in Pre-Admission Testing.        If you were given a blood bank ID arm band remember to bring it with you the day of surgery.    Preventing a Surgical Site Infection:  • For 2 to 3 days before surgery, avoid shaving with a razor because the razor can irritate skin and make it easier to develop an infection.    • Any areas of open skin can increase the risk of a post-operative wound infection by allowing bacteria to enter and travel throughout the body.  Notify your surgeon if you have any skin wounds / rashes even if it is not near the expected surgical site.  The area will need assessed to determine if surgery should be delayed until it is healed.  • The night prior to surgery shower using a fresh bar of anti-bacterial soap (such as Dial) and clean washcloth.  Sleep in a clean bed with clean clothing.  Do not allow pets to sleep with you.  • Shower on the morning of surgery using a fresh bar of anti-bacterial soap (such as Dial) and clean washcloth.  Dry with a clean towel and dress in clean clothing.  • Ask your surgeon if you will be receiving antibiotics prior to surgery.  • Make sure you, your family, and all healthcare providers clean their hands with soap and water or an alcohol based hand  before caring for you or your wound.    Day of surgery:8/10/2021   0900  Your arrival time is approximately two hours before your scheduled surgery time.  Upon arrival, a Pre-op nurse and Anesthesiologist will review your health history, obtain vital signs, and answer questions you may have.  The only belongings needed at this time will be a list of your home medications and if applicable your C-PAP/BI-PAP machine.  A Pre-op nurse will start an IV and you may receive medication in preparation for surgery, including something to  help you relax.     Please be aware that surgery does come with discomfort.  We want to make every effort to control your discomfort so please discuss any uncontrolled symptoms with your nurse.   Your doctor will most likely have prescribed pain medications.      If you are going home after surgery you will receive individualized written care instructions before being discharged.  A responsible adult must drive you to and from the hospital on the day of your surgery and stay with you for 24 hours.  Discharge prescriptions can be filled by the hospital pharmacy during regular pharmacy hours.  If you are having surgery late in the day/evening your prescription may be e-prescribed to your pharmacy.  Please verify your pharmacy hours or chose a 24 hour pharmacy to avoid not having access to your prescription because your pharmacy has closed for the day.    If you are staying overnight following surgery, you will be transported to your hospital room following the recovery period.  [facility] has all private rooms.    If you have any questions please call Pre-Admission Testing at [phone].  Deductibles and co-payments are collected on the day of service. Please be prepared to pay the required co-pay, deductible or deposit on the day of service as defined by your plan.    Patient Education for Self-Quarantine Process    • Following your COVID testing, we strongly recommend that you wear a mask when you are with other people and practice social distancing.   • Limit your activities to only required outings.  • Wash your hands with soap and water frequently for at least 20 seconds.   • Avoid touching your eyes, nose and mouth with unwashed hands.  • Do not share anything - utensils, drinking glasses, food from the same bowl.   • Sanitize household surfaces daily. Include all high touch areas (door handles, light switches, phones, countertops, etc.)    Call your surgeon immediately if you experience any of  the following symptoms:  • Sore Throat  • Shortness of Breath or difficulty breathing  • Cough  • Chills  • Body soreness or muscle pain  • Headache  • Fever  • New loss of taste or smell  • Do not arrive for your surgery ill.  Your procedure will need to be rescheduled to another time.  You will need to call your physician before the day of surgery to avoid any unnecessary exposure to hospital staff as well as other patients.

## 2021-08-06 RX ORDER — FLUOXETINE HYDROCHLORIDE 40 MG/1
CAPSULE ORAL
Qty: 30 CAPSULE | Refills: 0 | Status: SHIPPED | OUTPATIENT
Start: 2021-08-06 | End: 2021-09-07

## 2021-08-06 RX ORDER — CARVEDILOL 25 MG/1
12.5 TABLET ORAL 2 TIMES DAILY WITH MEALS
Qty: 30 TABLET | Refills: 0 | Status: SHIPPED | OUTPATIENT
Start: 2021-08-06

## 2021-08-06 NOTE — TELEPHONE ENCOUNTER
She needs to schedule an appointment to be seen. Will send in 30 day supply of medications, but cannot continue refilling meds without seeing patient in clinic.    Breath sounds clear and equal bilaterally.

## 2021-08-07 ENCOUNTER — LAB (OUTPATIENT)
Dept: LAB | Facility: HOSPITAL | Age: 66
End: 2021-08-07

## 2021-08-07 LAB — SARS-COV-2 ORF1AB RESP QL NAA+PROBE: NOT DETECTED

## 2021-08-07 PROCEDURE — C9803 HOPD COVID-19 SPEC COLLECT: HCPCS

## 2021-08-07 PROCEDURE — U0004 COV-19 TEST NON-CDC HGH THRU: HCPCS

## 2021-08-10 ENCOUNTER — ANESTHESIA (OUTPATIENT)
Dept: PERIOP | Facility: HOSPITAL | Age: 66
End: 2021-08-10

## 2021-08-10 ENCOUNTER — ANESTHESIA EVENT (OUTPATIENT)
Dept: PERIOP | Facility: HOSPITAL | Age: 66
End: 2021-08-10

## 2021-08-10 ENCOUNTER — HOSPITAL ENCOUNTER (OUTPATIENT)
Facility: HOSPITAL | Age: 66
Discharge: HOME | End: 2021-08-11
Attending: SURGERY | Admitting: SURGERY

## 2021-08-10 DIAGNOSIS — C50.911 INFLAMMATORY BREAST CANCER, RIGHT (HCC): ICD-10-CM

## 2021-08-10 LAB — GLUCOSE BLDC GLUCOMTR-MCNC: 139 MG/DL (ref 70–130)

## 2021-08-10 PROCEDURE — 25010000002 FENTANYL CITRATE (PF) 50 MCG/ML SOLUTION: Performed by: NURSE ANESTHETIST, CERTIFIED REGISTERED

## 2021-08-10 PROCEDURE — 63710000001 GABAPENTIN 100 MG CAPSULE: Performed by: SURGERY

## 2021-08-10 PROCEDURE — 25010000002 PROPOFOL 10 MG/ML EMULSION: Performed by: NURSE ANESTHETIST, CERTIFIED REGISTERED

## 2021-08-10 PROCEDURE — A9270 NON-COVERED ITEM OR SERVICE: HCPCS | Performed by: STUDENT IN AN ORGANIZED HEALTH CARE EDUCATION/TRAINING PROGRAM

## 2021-08-10 PROCEDURE — 25010000002 CEFAZOLIN PER 500 MG: Performed by: SURGERY

## 2021-08-10 PROCEDURE — A9270 NON-COVERED ITEM OR SERVICE: HCPCS | Performed by: SURGERY

## 2021-08-10 PROCEDURE — C1889 IMPLANT/INSERT DEVICE, NOC: HCPCS | Performed by: SURGERY

## 2021-08-10 PROCEDURE — 63710000001 TRAMADOL 50 MG TABLET: Performed by: SURGERY

## 2021-08-10 PROCEDURE — 25010000002 SUCCINYLCHOLINE PER 20 MG: Performed by: NURSE ANESTHETIST, CERTIFIED REGISTERED

## 2021-08-10 PROCEDURE — G0378 HOSPITAL OBSERVATION PER HR: HCPCS

## 2021-08-10 PROCEDURE — 63710000001 SCOPOLAMINE 1 MG/3DAYS PATCH 72 HOUR: Performed by: STUDENT IN AN ORGANIZED HEALTH CARE EDUCATION/TRAINING PROGRAM

## 2021-08-10 PROCEDURE — 25010000002 HYDROMORPHONE PER 4 MG: Performed by: NURSE ANESTHETIST, CERTIFIED REGISTERED

## 2021-08-10 PROCEDURE — 19303 MAST SIMPLE COMPLETE: CPT | Performed by: SPECIALIST/TECHNOLOGIST, OTHER

## 2021-08-10 PROCEDURE — 25010000003 CEFAZOLIN IN DEXTROSE 2-4 GM/100ML-% SOLUTION: Performed by: SURGERY

## 2021-08-10 PROCEDURE — 88360 TUMOR IMMUNOHISTOCHEM/MANUAL: CPT | Performed by: SURGERY

## 2021-08-10 PROCEDURE — 19303 MAST SIMPLE COMPLETE: CPT | Performed by: SURGERY

## 2021-08-10 PROCEDURE — 63710000001 ACETAMINOPHEN 500 MG TABLET: Performed by: SURGERY

## 2021-08-10 PROCEDURE — 25010000002 DEXAMETHASONE PER 1 MG: Performed by: NURSE ANESTHETIST, CERTIFIED REGISTERED

## 2021-08-10 PROCEDURE — 63710000001 CARVEDILOL 12.5 MG TABLET: Performed by: SURGERY

## 2021-08-10 PROCEDURE — 88342 IMHCHEM/IMCYTCHM 1ST ANTB: CPT | Performed by: SURGERY

## 2021-08-10 PROCEDURE — 88341 IMHCHEM/IMCYTCHM EA ADD ANTB: CPT | Performed by: SURGERY

## 2021-08-10 PROCEDURE — 63710000001 MONTELUKAST 10 MG TABLET: Performed by: SURGERY

## 2021-08-10 PROCEDURE — 25010000002 ONDANSETRON PER 1 MG: Performed by: NURSE ANESTHETIST, CERTIFIED REGISTERED

## 2021-08-10 PROCEDURE — 25010000002 MIDAZOLAM PER 1 MG: Performed by: STUDENT IN AN ORGANIZED HEALTH CARE EDUCATION/TRAINING PROGRAM

## 2021-08-10 PROCEDURE — 19307 MAST MOD RAD: CPT | Performed by: SPECIALIST/TECHNOLOGIST, OTHER

## 2021-08-10 PROCEDURE — 88307 TISSUE EXAM BY PATHOLOGIST: CPT | Performed by: SURGERY

## 2021-08-10 PROCEDURE — 63710000001 DOCUSATE SODIUM 100 MG CAPSULE: Performed by: SURGERY

## 2021-08-10 PROCEDURE — 25010000002 HEPARIN (PORCINE) PER 1000 UNITS: Performed by: SURGERY

## 2021-08-10 PROCEDURE — 25010000002 PHENYLEPHRINE PER 1 ML: Performed by: NURSE ANESTHETIST, CERTIFIED REGISTERED

## 2021-08-10 PROCEDURE — 19307 MAST MOD RAD: CPT | Performed by: SURGERY

## 2021-08-10 PROCEDURE — 88309 TISSUE EXAM BY PATHOLOGIST: CPT | Performed by: SURGERY

## 2021-08-10 PROCEDURE — 82962 GLUCOSE BLOOD TEST: CPT

## 2021-08-10 DEVICE — LIGACLIP MCA MULTIPLE CLIP APPLIERS, 20 SMALL CLIPS
Type: IMPLANTABLE DEVICE | Site: BREAST | Status: FUNCTIONAL
Brand: LIGACLIP

## 2021-08-10 DEVICE — LIGACLIP MCA MULTIPLE CLIP APPLIERS, 20 MEDIUM CLIPS
Type: IMPLANTABLE DEVICE | Site: BREAST | Status: FUNCTIONAL
Brand: LIGACLIP

## 2021-08-10 RX ORDER — LIDOCAINE HYDROCHLORIDE 10 MG/ML
0.5 INJECTION, SOLUTION EPIDURAL; INFILTRATION; INTRACAUDAL; PERINEURAL ONCE AS NEEDED
Status: DISCONTINUED | OUTPATIENT
Start: 2021-08-10 | End: 2021-08-10 | Stop reason: HOSPADM

## 2021-08-10 RX ORDER — EPHEDRINE SULFATE 50 MG/ML
5 INJECTION, SOLUTION INTRAVENOUS ONCE AS NEEDED
Status: DISCONTINUED | OUTPATIENT
Start: 2021-08-10 | End: 2021-08-10 | Stop reason: HOSPADM

## 2021-08-10 RX ORDER — DIPHENHYDRAMINE HYDROCHLORIDE 50 MG/ML
12.5 INJECTION INTRAMUSCULAR; INTRAVENOUS
Status: DISCONTINUED | OUTPATIENT
Start: 2021-08-10 | End: 2021-08-10 | Stop reason: HOSPADM

## 2021-08-10 RX ORDER — HYDRALAZINE HYDROCHLORIDE 20 MG/ML
5 INJECTION INTRAMUSCULAR; INTRAVENOUS
Status: DISCONTINUED | OUTPATIENT
Start: 2021-08-10 | End: 2021-08-10 | Stop reason: HOSPADM

## 2021-08-10 RX ORDER — LABETALOL HYDROCHLORIDE 5 MG/ML
5 INJECTION, SOLUTION INTRAVENOUS
Status: DISCONTINUED | OUTPATIENT
Start: 2021-08-10 | End: 2021-08-10 | Stop reason: HOSPADM

## 2021-08-10 RX ORDER — PROMETHAZINE HYDROCHLORIDE 25 MG/1
25 TABLET ORAL ONCE AS NEEDED
Status: DISCONTINUED | OUTPATIENT
Start: 2021-08-10 | End: 2021-08-10 | Stop reason: HOSPADM

## 2021-08-10 RX ORDER — ONDANSETRON 2 MG/ML
4 INJECTION INTRAMUSCULAR; INTRAVENOUS ONCE AS NEEDED
Status: COMPLETED | OUTPATIENT
Start: 2021-08-10 | End: 2021-08-10

## 2021-08-10 RX ORDER — HYDROMORPHONE HYDROCHLORIDE 1 MG/ML
0.5 INJECTION, SOLUTION INTRAMUSCULAR; INTRAVENOUS; SUBCUTANEOUS
Status: DISCONTINUED | OUTPATIENT
Start: 2021-08-10 | End: 2021-08-10 | Stop reason: HOSPADM

## 2021-08-10 RX ORDER — LISINOPRIL 5 MG/1
5 TABLET ORAL DAILY
Status: DISCONTINUED | OUTPATIENT
Start: 2021-08-11 | End: 2021-08-11 | Stop reason: HOSPADM

## 2021-08-10 RX ORDER — ACETAMINOPHEN 500 MG
1000 TABLET ORAL EVERY 8 HOURS
Status: DISCONTINUED | OUTPATIENT
Start: 2021-08-10 | End: 2021-08-11 | Stop reason: HOSPADM

## 2021-08-10 RX ORDER — CARVEDILOL 12.5 MG/1
12.5 TABLET ORAL 2 TIMES DAILY WITH MEALS
Status: DISCONTINUED | OUTPATIENT
Start: 2021-08-10 | End: 2021-08-11 | Stop reason: HOSPADM

## 2021-08-10 RX ORDER — FAMOTIDINE 10 MG/ML
20 INJECTION, SOLUTION INTRAVENOUS ONCE
Status: COMPLETED | OUTPATIENT
Start: 2021-08-10 | End: 2021-08-10

## 2021-08-10 RX ORDER — HEPARIN SODIUM 5000 [USP'U]/ML
5000 INJECTION, SOLUTION INTRAVENOUS; SUBCUTANEOUS
Status: COMPLETED | OUTPATIENT
Start: 2021-08-10 | End: 2021-08-10

## 2021-08-10 RX ORDER — DIPHENHYDRAMINE HCL 25 MG
25 CAPSULE ORAL
Status: DISCONTINUED | OUTPATIENT
Start: 2021-08-10 | End: 2021-08-10 | Stop reason: HOSPADM

## 2021-08-10 RX ORDER — SODIUM CHLORIDE 0.9 % (FLUSH) 0.9 %
3 SYRINGE (ML) INJECTION EVERY 12 HOURS SCHEDULED
Status: DISCONTINUED | OUTPATIENT
Start: 2021-08-10 | End: 2021-08-10 | Stop reason: HOSPADM

## 2021-08-10 RX ORDER — PROMETHAZINE HYDROCHLORIDE 25 MG/1
25 SUPPOSITORY RECTAL ONCE AS NEEDED
Status: DISCONTINUED | OUTPATIENT
Start: 2021-08-10 | End: 2021-08-10 | Stop reason: HOSPADM

## 2021-08-10 RX ORDER — SODIUM CHLORIDE 0.9 % (FLUSH) 0.9 %
3-10 SYRINGE (ML) INJECTION AS NEEDED
Status: DISCONTINUED | OUTPATIENT
Start: 2021-08-10 | End: 2021-08-10 | Stop reason: HOSPADM

## 2021-08-10 RX ORDER — LIDOCAINE HYDROCHLORIDE 20 MG/ML
INJECTION, SOLUTION INFILTRATION; PERINEURAL AS NEEDED
Status: DISCONTINUED | OUTPATIENT
Start: 2021-08-10 | End: 2021-08-10 | Stop reason: SURG

## 2021-08-10 RX ORDER — SCOLOPAMINE TRANSDERMAL SYSTEM 1 MG/1
1 PATCH, EXTENDED RELEASE TRANSDERMAL ONCE
Status: DISCONTINUED | OUTPATIENT
Start: 2021-08-10 | End: 2021-08-10

## 2021-08-10 RX ORDER — DEXAMETHASONE SODIUM PHOSPHATE 10 MG/ML
INJECTION INTRAMUSCULAR; INTRAVENOUS AS NEEDED
Status: DISCONTINUED | OUTPATIENT
Start: 2021-08-10 | End: 2021-08-10 | Stop reason: SURG

## 2021-08-10 RX ORDER — NALOXONE HCL 0.4 MG/ML
0.2 VIAL (ML) INJECTION AS NEEDED
Status: DISCONTINUED | OUTPATIENT
Start: 2021-08-10 | End: 2021-08-10 | Stop reason: HOSPADM

## 2021-08-10 RX ORDER — DOCUSATE SODIUM 100 MG/1
100 CAPSULE, LIQUID FILLED ORAL 2 TIMES DAILY
Status: DISCONTINUED | OUTPATIENT
Start: 2021-08-10 | End: 2021-08-11 | Stop reason: HOSPADM

## 2021-08-10 RX ORDER — HYDROMORPHONE HCL 110MG/55ML
PATIENT CONTROLLED ANALGESIA SYRINGE INTRAVENOUS AS NEEDED
Status: DISCONTINUED | OUTPATIENT
Start: 2021-08-10 | End: 2021-08-10 | Stop reason: SURG

## 2021-08-10 RX ORDER — CEFAZOLIN SODIUM 2 G/100ML
2 INJECTION, SOLUTION INTRAVENOUS EVERY 8 HOURS
Status: COMPLETED | OUTPATIENT
Start: 2021-08-10 | End: 2021-08-11

## 2021-08-10 RX ORDER — FENTANYL CITRATE 50 UG/ML
50 INJECTION, SOLUTION INTRAMUSCULAR; INTRAVENOUS
Status: DISCONTINUED | OUTPATIENT
Start: 2021-08-10 | End: 2021-08-10 | Stop reason: HOSPADM

## 2021-08-10 RX ORDER — FLUOXETINE HYDROCHLORIDE 20 MG/1
40 CAPSULE ORAL DAILY
Status: DISCONTINUED | OUTPATIENT
Start: 2021-08-11 | End: 2021-08-11 | Stop reason: HOSPADM

## 2021-08-10 RX ORDER — ONDANSETRON 2 MG/ML
4 INJECTION INTRAMUSCULAR; INTRAVENOUS EVERY 6 HOURS PRN
Status: DISCONTINUED | OUTPATIENT
Start: 2021-08-10 | End: 2021-08-11 | Stop reason: HOSPADM

## 2021-08-10 RX ORDER — IPRATROPIUM BROMIDE AND ALBUTEROL SULFATE 2.5; .5 MG/3ML; MG/3ML
3 SOLUTION RESPIRATORY (INHALATION) ONCE AS NEEDED
Status: DISCONTINUED | OUTPATIENT
Start: 2021-08-10 | End: 2021-08-10 | Stop reason: HOSPADM

## 2021-08-10 RX ORDER — SODIUM CHLORIDE, SODIUM LACTATE, POTASSIUM CHLORIDE, CALCIUM CHLORIDE 600; 310; 30; 20 MG/100ML; MG/100ML; MG/100ML; MG/100ML
9 INJECTION, SOLUTION INTRAVENOUS CONTINUOUS
Status: DISCONTINUED | OUTPATIENT
Start: 2021-08-10 | End: 2021-08-10 | Stop reason: HOSPADM

## 2021-08-10 RX ORDER — BUDESONIDE AND FORMOTEROL FUMARATE DIHYDRATE 160; 4.5 UG/1; UG/1
2 AEROSOL RESPIRATORY (INHALATION) NIGHTLY
Status: DISCONTINUED | OUTPATIENT
Start: 2021-08-10 | End: 2021-08-11 | Stop reason: HOSPADM

## 2021-08-10 RX ORDER — SUCCINYLCHOLINE CHLORIDE 20 MG/ML
INJECTION INTRAMUSCULAR; INTRAVENOUS AS NEEDED
Status: DISCONTINUED | OUTPATIENT
Start: 2021-08-10 | End: 2021-08-10 | Stop reason: SURG

## 2021-08-10 RX ORDER — MIDAZOLAM HYDROCHLORIDE 1 MG/ML
0.5 INJECTION INTRAMUSCULAR; INTRAVENOUS
Status: DISCONTINUED | OUTPATIENT
Start: 2021-08-10 | End: 2021-08-10 | Stop reason: HOSPADM

## 2021-08-10 RX ORDER — ONDANSETRON 4 MG/1
4 TABLET, FILM COATED ORAL EVERY 6 HOURS PRN
Status: DISCONTINUED | OUTPATIENT
Start: 2021-08-10 | End: 2021-08-11 | Stop reason: HOSPADM

## 2021-08-10 RX ORDER — MONTELUKAST SODIUM 10 MG/1
10 TABLET ORAL NIGHTLY
Status: DISCONTINUED | OUTPATIENT
Start: 2021-08-10 | End: 2021-08-11 | Stop reason: HOSPADM

## 2021-08-10 RX ORDER — FENTANYL CITRATE 50 UG/ML
INJECTION, SOLUTION INTRAMUSCULAR; INTRAVENOUS AS NEEDED
Status: DISCONTINUED | OUTPATIENT
Start: 2021-08-10 | End: 2021-08-10 | Stop reason: SURG

## 2021-08-10 RX ORDER — GLYCOPYRROLATE 0.2 MG/ML
INJECTION INTRAMUSCULAR; INTRAVENOUS AS NEEDED
Status: DISCONTINUED | OUTPATIENT
Start: 2021-08-10 | End: 2021-08-10 | Stop reason: SURG

## 2021-08-10 RX ORDER — CEFAZOLIN SODIUM IN 0.9 % NACL 3 G/100 ML
3 INTRAVENOUS SOLUTION, PIGGYBACK (ML) INTRAVENOUS ONCE
Status: COMPLETED | OUTPATIENT
Start: 2021-08-10 | End: 2021-08-10

## 2021-08-10 RX ORDER — ALBUTEROL SULFATE 2.5 MG/3ML
2.5 SOLUTION RESPIRATORY (INHALATION) EVERY 4 HOURS PRN
Status: DISCONTINUED | OUTPATIENT
Start: 2021-08-10 | End: 2021-08-11 | Stop reason: HOSPADM

## 2021-08-10 RX ORDER — TRAMADOL HYDROCHLORIDE 50 MG/1
50 TABLET ORAL EVERY 6 HOURS PRN
Status: DISCONTINUED | OUTPATIENT
Start: 2021-08-10 | End: 2021-08-11 | Stop reason: HOSPADM

## 2021-08-10 RX ORDER — PROPOFOL 10 MG/ML
VIAL (ML) INTRAVENOUS AS NEEDED
Status: DISCONTINUED | OUTPATIENT
Start: 2021-08-10 | End: 2021-08-10 | Stop reason: SURG

## 2021-08-10 RX ORDER — PANTOPRAZOLE SODIUM 40 MG/1
40 TABLET, DELAYED RELEASE ORAL EVERY MORNING
Status: DISCONTINUED | OUTPATIENT
Start: 2021-08-11 | End: 2021-08-11 | Stop reason: HOSPADM

## 2021-08-10 RX ORDER — GABAPENTIN 100 MG/1
100 CAPSULE ORAL 3 TIMES DAILY
Status: DISCONTINUED | OUTPATIENT
Start: 2021-08-10 | End: 2021-08-11 | Stop reason: HOSPADM

## 2021-08-10 RX ORDER — MAGNESIUM HYDROXIDE 1200 MG/15ML
LIQUID ORAL AS NEEDED
Status: DISCONTINUED | OUTPATIENT
Start: 2021-08-10 | End: 2021-08-10 | Stop reason: HOSPADM

## 2021-08-10 RX ORDER — FUROSEMIDE 20 MG/1
20 TABLET ORAL DAILY
Status: DISCONTINUED | OUTPATIENT
Start: 2021-08-11 | End: 2021-08-11 | Stop reason: HOSPADM

## 2021-08-10 RX ORDER — SPIRONOLACTONE 25 MG/1
25 TABLET ORAL DAILY
Status: DISCONTINUED | OUTPATIENT
Start: 2021-08-11 | End: 2021-08-11 | Stop reason: HOSPADM

## 2021-08-10 RX ORDER — BUPIVACAINE HYDROCHLORIDE AND EPINEPHRINE 2.5; 5 MG/ML; UG/ML
INJECTION, SOLUTION EPIDURAL; INFILTRATION; INTRACAUDAL; PERINEURAL AS NEEDED
Status: DISCONTINUED | OUTPATIENT
Start: 2021-08-10 | End: 2021-08-10 | Stop reason: HOSPADM

## 2021-08-10 RX ADMIN — CARVEDILOL 12.5 MG: 12.5 TABLET, FILM COATED ORAL at 20:49

## 2021-08-10 RX ADMIN — DOCUSATE SODIUM 100 MG: 100 CAPSULE, LIQUID FILLED ORAL at 20:49

## 2021-08-10 RX ADMIN — SODIUM CHLORIDE, POTASSIUM CHLORIDE, SODIUM LACTATE AND CALCIUM CHLORIDE: 600; 310; 30; 20 INJECTION, SOLUTION INTRAVENOUS at 14:29

## 2021-08-10 RX ADMIN — SUCCINYLCHOLINE CHLORIDE 200 MG: 20 INJECTION, SOLUTION INTRAMUSCULAR; INTRAVENOUS; PARENTERAL at 11:24

## 2021-08-10 RX ADMIN — FAMOTIDINE 20 MG: 10 INJECTION INTRAVENOUS at 11:01

## 2021-08-10 RX ADMIN — FENTANYL CITRATE 50 MCG: 50 INJECTION INTRAMUSCULAR; INTRAVENOUS at 13:02

## 2021-08-10 RX ADMIN — HYDROMORPHONE HYDROCHLORIDE 0.5 MG: 1 INJECTION, SOLUTION INTRAMUSCULAR; INTRAVENOUS; SUBCUTANEOUS at 16:14

## 2021-08-10 RX ADMIN — GABAPENTIN 100 MG: 100 CAPSULE ORAL at 20:49

## 2021-08-10 RX ADMIN — SODIUM CHLORIDE, POTASSIUM CHLORIDE, SODIUM LACTATE AND CALCIUM CHLORIDE 9 ML/HR: 600; 310; 30; 20 INJECTION, SOLUTION INTRAVENOUS at 11:01

## 2021-08-10 RX ADMIN — FENTANYL CITRATE 50 MCG: 50 INJECTION INTRAMUSCULAR; INTRAVENOUS at 15:02

## 2021-08-10 RX ADMIN — FENTANYL CITRATE 50 MCG: 50 INJECTION, SOLUTION INTRAMUSCULAR; INTRAVENOUS at 16:18

## 2021-08-10 RX ADMIN — CEFAZOLIN SODIUM 2 G: 2 INJECTION, SOLUTION INTRAVENOUS at 20:49

## 2021-08-10 RX ADMIN — HEPARIN SODIUM 5000 UNITS: 5000 INJECTION INTRAVENOUS; SUBCUTANEOUS at 10:59

## 2021-08-10 RX ADMIN — HYDROMORPHONE HYDROCHLORIDE 0.5 MG: 2 INJECTION, SOLUTION INTRAMUSCULAR; INTRAVENOUS; SUBCUTANEOUS at 12:52

## 2021-08-10 RX ADMIN — PROPOFOL 200 MG: 10 INJECTION, EMULSION INTRAVENOUS at 11:24

## 2021-08-10 RX ADMIN — ACETAMINOPHEN 1000 MG: 500 TABLET, FILM COATED ORAL at 20:49

## 2021-08-10 RX ADMIN — MIDAZOLAM 0.5 MG: 1 INJECTION INTRAMUSCULAR; INTRAVENOUS at 11:01

## 2021-08-10 RX ADMIN — HYDROMORPHONE HYDROCHLORIDE 0.5 MG: 1 INJECTION, SOLUTION INTRAMUSCULAR; INTRAVENOUS; SUBCUTANEOUS at 16:31

## 2021-08-10 RX ADMIN — PHENYLEPHRINE HYDROCHLORIDE 100 MCG: 10 INJECTION INTRAVENOUS at 13:19

## 2021-08-10 RX ADMIN — GLYCOPYRROLATE 0.3 MG: 0.2 INJECTION INTRAMUSCULAR; INTRAVENOUS at 12:19

## 2021-08-10 RX ADMIN — FENTANYL CITRATE 100 MCG: 50 INJECTION INTRAMUSCULAR; INTRAVENOUS at 11:17

## 2021-08-10 RX ADMIN — PHENYLEPHRINE HYDROCHLORIDE 100 MCG: 10 INJECTION INTRAVENOUS at 14:14

## 2021-08-10 RX ADMIN — HYDROMORPHONE HYDROCHLORIDE 0.5 MG: 2 INJECTION, SOLUTION INTRAMUSCULAR; INTRAVENOUS; SUBCUTANEOUS at 11:48

## 2021-08-10 RX ADMIN — DEXAMETHASONE SODIUM PHOSPHATE 8 MG: 10 INJECTION INTRAMUSCULAR; INTRAVENOUS at 11:29

## 2021-08-10 RX ADMIN — LIDOCAINE HYDROCHLORIDE 100 MG: 20 INJECTION, SOLUTION INFILTRATION; PERINEURAL at 11:24

## 2021-08-10 RX ADMIN — MONTELUKAST SODIUM 10 MG: 10 TABLET, FILM COATED ORAL at 20:49

## 2021-08-10 RX ADMIN — FENTANYL CITRATE 50 MCG: 50 INJECTION, SOLUTION INTRAMUSCULAR; INTRAVENOUS at 15:54

## 2021-08-10 RX ADMIN — ONDANSETRON 4 MG: 2 INJECTION INTRAMUSCULAR; INTRAVENOUS at 17:10

## 2021-08-10 RX ADMIN — CEFAZOLIN SODIUM 3 G: 10 INJECTION, POWDER, FOR SOLUTION INTRAVENOUS at 11:04

## 2021-08-10 RX ADMIN — TRAMADOL HYDROCHLORIDE 50 MG: 50 TABLET, FILM COATED ORAL at 17:02

## 2021-08-10 RX ADMIN — SCOLOPAMINE TRANSDERMAL SYSTEM 1 PATCH: 1 PATCH, EXTENDED RELEASE TRANSDERMAL at 11:02

## 2021-08-10 NOTE — ANESTHESIA POSTPROCEDURE EVALUATION
Patient: Aida Conner    Procedure Summary     Date: 08/10/21 Room / Location: [facility] OR  / [facility] MAIN OR    Anesthesia Start: 1109 Anesthesia Stop: 1515    Procedures:       LEFT TOTAL MASTECTOMY (Left Breast)      RIGHT MODIFIED RADICAL MASTECTOMY (Right Breast) Diagnosis:       Inflammatory breast cancer, right (CMS/HCC)      (Inflammatory breast cancer, right (CMS/HCC) [C50.911])    Surgeons: Nicci Banks MD Provider: Lemuel Whitney MD    Anesthesia Type: general ASA Status: 4          Anesthesia Type: general    Vitals  Vitals Value Taken Time   /65 08/10/21 1616   Temp     Pulse 79 08/10/21 1625   Resp 16 08/10/21 1615   SpO2 97 % 08/10/21 1625   Vitals shown include unvalidated device data.        Post Anesthesia Care and Evaluation    Patient location during evaluation: PACU  Patient participation: complete - patient participated  Level of consciousness: awake  Pain score: 2  Pain management: adequate  Airway patency: patent  Anesthetic complications: No anesthetic complications  PONV Status: none  Cardiovascular status: acceptable  Respiratory status: acceptable  Hydration status: acceptable

## 2021-08-10 NOTE — ANESTHESIA PROCEDURE NOTES
Airway  Urgency: elective    Date/Time: 8/10/2021 11:24 AM  Airway not difficult    General Information and Staff    Patient location during procedure: OR  Anesthesiologist: Lemuel Whitney MD  CRNA: Nila Adams CRNA    Indications and Patient Condition  Indications for airway management: airway protection    Preoxygenated: yes  MILS not maintained throughout  Mask difficulty assessment: 0 - not attempted    Final Airway Details  Final airway type: endotracheal airway      Successful airway: ETT  Cuffed: yes   Successful intubation technique: direct laryngoscopy  Facilitating devices/methods: anterior pressure/BURP  Endotracheal tube insertion site: oral  Blade: Fawn  Blade size: 3  ETT size (mm): 7.0  Cormack-Lehane Classification: grade I - full view of glottis  Placement verified by: chest auscultation and capnometry   Cuff volume (mL): 7  Measured from: lips  ETT/EBT  to lips (cm): 22  Number of attempts at approach: 1  Assessment: lips, teeth, and gum same as pre-op and atraumatic intubation    Additional Comments  Pt preoxygenated prior to induction, easy mask airway, atraumatic intubation,+ ETCO2, + bs bilat,  ETT secured and connected to ventilator.

## 2021-08-10 NOTE — OP NOTE
Operative Report    Patient Name:  Aida Conner  YOB: 1955    Date of Surgery:  8/10/2021    Pre-op Diagnosis:   Inflammatory breast cancer, right (CMS/HCC) [C50.911]       Post-Op Diagnosis:  Inflammatory breast cancer, right (CMS/HCC) [C50.911]    Procedures:  LEFT TOTAL MASTECTOMY  RIGHT MODIFIED RADICAL MASTECTOMY      Staff:  Surgeon(s):  Nicci Banks MD    Assistant: Annette Parks CSA  was responsible for performing the following activities: Retraction, Suction, Irrigation and Suturing and their skilled assistance was necessary for the success of this case.    Anesthesia: General    Estimated Blood Loss: 50 mL    Implants:    Implant Name Type Inv. Item Serial No.  Lot No. LRB No. Used Action   CLIPAPPLR M/ ENDO LIGACLIP 9 3/8IN  - [id number] Implant CLIPAPPLR M/ ENDO LIGACLIP 9 3/8IN   [employer]  DIV [employer] J [id number] Right 1 Implanted   CLIPAPPLR M/ ENDO LIGACLIP9 3/8IN MD - [id number] Implant CLIPAPPLR M/ ENDO LIGACLIP9 3/8IN MD  [employer]  DIV [employer] J [employer] J [id number] Right 1 Implanted       Specimen:          Specimens     ID Source Type Tests Collected By Collected At Frozen?    A Breast, Left Tissue · TISSUE PATHOLOGY EXAM   Nicci Banks MD 8/10/21 1158 No    Description: left total mastectomy, stitch at 12 o clock, 2122g     B Breast, Right Tissue · TISSUE PATHOLOGY EXAM   Nicci Banks MD 8/10/21 1355 No    Description: right modified radical mastectomy, stitch @ 12 o clock, 2589g     This specimen was not marked as sent.            Findings: Fatty breast parenchyma.    Complications: None     Indications: The patient is a 66 y.o. F with a diagnosis of right breast inflammatory carcinoma: Intermediate grade, triple positive, invasive ductal carcinoma; clinical T4dN1, anatomic stage IIIB, prognostic stage IIIA. She completed neoadjuvant chemotherapy on 6/25/21. She presents today for surgery. The risks and benefits were discussed  preoperatively and she understood and agreed to proceed.     Description of Procedure:  The patient was identified in the preoperative area and brought to the operating room and placed supine on the table. Patient verification was performed and general anesthesia was induced. The patient was prepped and draped in sterile fashion with the right arm prepped into the field. A time out was performed and all were in agreement. I confirmed that the patient had received preoperative antibiotics and chemical prophylaxis.     I began with the left unaffected side. I made an incision through the skin and subcutaneous tissue. I created tissue flaps using electrocautery superiorly to the level of the clavicle, laterally to the anterior axillary line, inferiorly to the inframammary fold, and medially to the sternal border. Once the flaps were raised the breast was carefully removed from the pectoralis major and serratus using electrocautery. The specimen was marked with a short superior stitch (12:00) and handed off for pathology. The wound bed was then copiously irrigated and hemostasis achieved.     Next a pectoralis muscle block was performed. The interfascial plane between pectoralis major and minor was identified and 7.5 mL of 0.25% bupivacaine with epinephrine was injected into this space. Next the fascia overlying the serratus anterior, under pectoralis minor, was identified and this space was injected with 7.5 mL of local anesthetic. The mastectomy flaps and pectoralis muscle were coated with 5 mL of liquid thrombin. Two 19 Fr KHAI drains were placed in the mastectomy cavity, and brought out through the lateral chest wall skin inferior to the incision, and secured with 3-0 nylon. Annette began closing the left side while I moved to the right side. The deep dermis was closed with interrupted 3-0 vicryl suture. The skin was closed with 4-0 subcuticular running monocryl and covered with dermabond.     I then proceeded with the  right modified radical mastectomy. I made an incision through the skin and subcutaneous tissue. I created tissue flaps using electrocautery superiorly to the level of the clavicle, inferiorly to the inframammary fold, and medially to the sternal border. The lateral mastectomy flap was continued superiorly to the estimated level of the axillary vein and laterally to the latisimuss dorsi. Once the flaps were raised the breast was carefully removed from the pectoralis major and serratus using electrocautery and reflected laterally. The edge of the pectoralis major was identified and exposed. Dissection progressed under the pectoralis major extending to the pectoralis minor muscle. The pectoral muscles were retracted superiorly and medially with a retractor being careful to preserve the median pectoral bundle. The level II barron tissue deep to the pectoralis minor was swept down towards the specimen for inclusion. The axillary vein was then identified using careful blunt dissection and cleared of overlying fat. The first branch off the axillary vein was doubly clipped and divided with the ligasure device. The thoracodorsal nerve was then identified deep to the ligated vein and preserved. The barron tissue was carefully dissected off the chest wall and the long thoracic nerve was identified and preserved. The axillary contents were dissected out from medial to lateral being sure to visualize and preserve the neurovascular structures. Once the dissection was complete the axillary contents in continuity with the right breast were removed. The specimen was marked with a short superior stitch (12:00). The wound bed was copiously irrigated with sterile water and hemostasis acheived. The axillary vein, thoracodoral nerve bundle and long thoracic nerve were visualized and intact.     Next a pectoralis muscle block was performed. The interfascial plane between pectoralis major and minor was identified and 7.5 mL of 0.25%  bupivacaine with epinephrine was injected into this space. Next the fascia overlying the serratus anterior, under pectoralis minor, was identified and this space was injected with 7.5 mL of local anesthetic. The mastectomy flaps and pectoralis muscle were coated with 5 mL of liquid thrombin. Two 19 Fr KHAI drains were placed, one in the mastectomy cavity and one in the axilla. These were brought out through the lateral chest wall skin inferior to the incision and secured with a 3-0 nylon. The deep dermis was closed with interrupted 3-0 vicryl suture. The skin was closed with 4-0 subcuticular running monocryl and covered with dermabond. SorbaView dressings were placed over the drain sites. The patient was placed in an ACE wrap which was stuff with fluffs. Counts were correct at the end of the case. The patient was awakened from anesthesia and taken to the PACU in stable condition.    Nicci Banks MD     Date: 8/10/2021  Time: 14:54 EDT

## 2021-08-10 NOTE — ANESTHESIA PREPROCEDURE EVALUATION
Anesthesia Evaluation     Patient summary reviewed and Nursing notes reviewed   history of anesthetic complications: PONV               Airway   Mallampati: III  TM distance: <3 FB  Large neck circumference, Possible difficult intubation, Difficult intubation highly probable and Anterior  Dental    (+) poor dentition    Pulmonary    (+) COPD severe, asthma,home oxygen, shortness of breath, sleep apnea on CPAP,   Cardiovascular   Exercise tolerance: poor (<4 METS)    ECG reviewed  Patient on routine beta blocker  Rhythm: regular  Rate: normal    (+) hypertension well controlled, CHF , SOLOMON,       Neuro/Psych- negative ROS  GI/Hepatic/Renal/Endo    (+) morbid obesity (BMI - 60),  renal disease stones, diabetes mellitus type 2,     Musculoskeletal     Abdominal    Substance History - negative use     OB/GYN negative ob/gyn ROS         Other   arthritis,    history of cancer                      Anesthesia Plan    ASA 4     general   ([facility] available    BMI >50    Patient did bring her CPAP machine which will be available in the  as needed. She uses the CPAP every night.)  intravenous induction     Anesthetic plan, all risks, benefits, and alternatives have been provided, discussed and informed consent has been obtained with: patient.  Use of blood products discussed with consented to blood products.   Plan discussed with CRNA.

## 2021-08-11 ENCOUNTER — READMISSION MANAGEMENT (OUTPATIENT)
Dept: CALL CENTER | Facility: HOSPITAL | Age: 66
End: 2021-08-11

## 2021-08-11 VITALS
RESPIRATION RATE: 18 BRPM | OXYGEN SATURATION: 93 % | SYSTOLIC BLOOD PRESSURE: 122 MMHG | HEART RATE: 73 BPM | TEMPERATURE: 98.5 F | WEIGHT: 293 LBS | HEIGHT: 65 IN | BODY MASS INDEX: 48.82 KG/M2 | DIASTOLIC BLOOD PRESSURE: 69 MMHG

## 2021-08-11 PROCEDURE — 63710000001 ACETAMINOPHEN 500 MG TABLET: Performed by: SURGERY

## 2021-08-11 PROCEDURE — G0378 HOSPITAL OBSERVATION PER HR: HCPCS

## 2021-08-11 PROCEDURE — A9270 NON-COVERED ITEM OR SERVICE: HCPCS | Performed by: SURGERY

## 2021-08-11 PROCEDURE — 63710000001 CARVEDILOL 12.5 MG TABLET: Performed by: SURGERY

## 2021-08-11 PROCEDURE — 63710000001 DOCUSATE SODIUM 100 MG CAPSULE: Performed by: SURGERY

## 2021-08-11 PROCEDURE — 99024 POSTOP FOLLOW-UP VISIT: CPT | Performed by: SURGERY

## 2021-08-11 PROCEDURE — 63710000001 FLUOXETINE 20 MG CAPSULE: Performed by: SURGERY

## 2021-08-11 PROCEDURE — 63710000001 GABAPENTIN 100 MG CAPSULE: Performed by: SURGERY

## 2021-08-11 PROCEDURE — 25010000003 CEFAZOLIN IN DEXTROSE 2-4 GM/100ML-% SOLUTION: Performed by: SURGERY

## 2021-08-11 PROCEDURE — 63710000001 PANTOPRAZOLE 40 MG TABLET DELAYED-RELEASE: Performed by: SURGERY

## 2021-08-11 RX ORDER — ACETAMINOPHEN 500 MG
1000 TABLET ORAL EVERY 8 HOURS
Qty: 30 TABLET | Refills: 0 | Status: SHIPPED | OUTPATIENT
Start: 2021-08-11 | End: 2021-08-16

## 2021-08-11 RX ORDER — GABAPENTIN 100 MG/1
100 CAPSULE ORAL 3 TIMES DAILY
Qty: 42 CAPSULE | Refills: 0 | Status: SHIPPED | OUTPATIENT
Start: 2021-08-11 | End: 2021-08-30 | Stop reason: SDUPTHER

## 2021-08-11 RX ORDER — PSEUDOEPHEDRINE HCL 30 MG
100 TABLET ORAL 2 TIMES DAILY
Qty: 20 CAPSULE | Refills: 0 | Status: SHIPPED | OUTPATIENT
Start: 2021-08-11 | End: 2021-08-21

## 2021-08-11 RX ORDER — TRAMADOL HYDROCHLORIDE 50 MG/1
50 TABLET ORAL EVERY 6 HOURS PRN
Qty: 20 TABLET | Refills: 0 | Status: SHIPPED | OUTPATIENT
Start: 2021-08-11 | End: 2021-08-17

## 2021-08-11 RX ADMIN — FLUOXETINE HYDROCHLORIDE 40 MG: 20 CAPSULE ORAL at 08:38

## 2021-08-11 RX ADMIN — GABAPENTIN 100 MG: 100 CAPSULE ORAL at 08:38

## 2021-08-11 RX ADMIN — ACETAMINOPHEN 1000 MG: 500 TABLET, FILM COATED ORAL at 02:58

## 2021-08-11 RX ADMIN — PANTOPRAZOLE SODIUM 40 MG: 40 TABLET, DELAYED RELEASE ORAL at 06:42

## 2021-08-11 RX ADMIN — CARVEDILOL 12.5 MG: 12.5 TABLET, FILM COATED ORAL at 08:38

## 2021-08-11 RX ADMIN — CEFAZOLIN SODIUM 2 G: 2 INJECTION, SOLUTION INTRAVENOUS at 05:24

## 2021-08-11 RX ADMIN — DOCUSATE SODIUM 100 MG: 100 CAPSULE, LIQUID FILLED ORAL at 08:38

## 2021-08-11 NOTE — PLAN OF CARE
Goal Outcome Evaluation:  Plan of Care Reviewed With: patient        Progress: improving  Outcome Summary: Chest incisions bilaterally intact with dermabond. KHAI x4 intact with small amts of sersanginous drainage out. Voiding without difficulty. Given scheduled tylenol but denies need for any additional pain meds. Wearing o2 as she does at home. IS to 750

## 2021-08-11 NOTE — PROGRESS NOTES
Case Management Discharge Note      Final Note: Discharged home. Marina Grant, GEORGETTE         Transportation Services  Private: Car    Final Discharge Disposition Code: 01 - home or self-care

## 2021-08-11 NOTE — OUTREACH NOTE
Prep Survey      Responses   [facility] facility patient discharged from?  [place]   Is LACE score < 7 ?  Yes   Emergency Room discharge w/ pulse ox?  No   Eligibility  [facility]   Date of Admission  08/10/21   Date of Discharge  08/11/21   Discharge Disposition  Home or Self Care   Discharge diagnosis  Total left mastectomy   Does the patient have one of the following disease processes/diagnoses(primary or secondary)?  General Surgery   Does the patient have Home health ordered?  No   Is there a DME ordered?  No   Prep survey completed?  Yes          Sarahi Aly RN

## 2021-08-11 NOTE — PROGRESS NOTES
Continued Stay Note  [facility]     Patient Name: Aida Conner  MRN: [id number]  Today's Date: 8/11/2021    Admit Date: 8/10/2021    Discharge Plan     Row Name 08/11/21 1201       Plan    Plan  Home no needs    Plan Comments  Discharge order noted. Met with patient and daughter at bedside who confirmed DC plan is home. Stated spouse and daughters will assist as needed and will provide transportation at DC. Denies any needs/equipment.        Discharge Codes    No documentation.       Expected Discharge Date and Time     Expected Discharge Date Expected Discharge Time    Aug 11, 2021             Marina Grant RN

## 2021-08-11 NOTE — PROGRESS NOTES
Breast Surgery Progress Note    Chief complaint: sp surgery    Subjective:  Ms. Conner did well overnight. She has eaten, has been up out of bed, and her pain is controlled.    Objective:  Vitals:    08/11/21 0522   BP: 108/64   Pulse: 70   Resp: 18   Temp: 97.3 °F (36.3 °C)   SpO2: 93%       Physical Exam:  General: NAD  HEENT: NCAT, EOMI  Lungs: respirations non-labored  Chest: Bilateral mastectomy incisions c/d/i with dermabond, flaps healthy, drains serosanguineous    Assessment:  66 y.o. female POD 1 left mastectomy and right modified radical mastectomy    Plan:  -Drain teaching.  -Discharge home later this morning.   -She already has a follow-up appointment with me scheduled.  -I will call her with pathology results.    Nicci Banks MD

## 2021-08-11 NOTE — PROGRESS NOTES
MD order for patient to use home positive airway pressure device.  Unit reviewed and appears to be a device specified in [employer] recall.  Patient provided information on recall and directed to discuss any concerns with sleep provider and DME.  Patient states he/she does not want to continue use of home device during hospital stay. Ordering provider contacted, orders received to transition patient to hospital device.

## 2021-08-12 ENCOUNTER — TRANSITIONAL CARE MANAGEMENT TELEPHONE ENCOUNTER (OUTPATIENT)
Dept: CALL CENTER | Facility: HOSPITAL | Age: 66
End: 2021-08-12

## 2021-08-12 NOTE — OUTREACH NOTE
Call Center TCM Note      Responses   [facility] patient discharged from?  [place]   Does the patient have one of the following disease processes/diagnoses(primary or secondary)?  General Surgery   TCM attempt successful?  No   Unsuccessful attempts  Attempt 1          Elizabeth Turcios RN    8/12/2021, 09:22 EDT

## 2021-08-13 ENCOUNTER — TRANSITIONAL CARE MANAGEMENT TELEPHONE ENCOUNTER (OUTPATIENT)
Dept: CALL CENTER | Facility: HOSPITAL | Age: 66
End: 2021-08-13

## 2021-08-13 ENCOUNTER — TELEPHONE (OUTPATIENT)
Dept: SURGERY | Facility: CLINIC | Age: 66
End: 2021-08-13

## 2021-08-13 NOTE — TELEPHONE ENCOUNTER
I called Ms. Conner to discuss her pathology report. She is recovering well from surgery. I will place a referral for rad/onc.     Nicci Banks MD

## 2021-08-13 NOTE — OUTREACH NOTE
Call Center TCM Note      Responses   [facility] patient discharged from?  [place]   Does the patient have one of the following disease processes/diagnoses(primary or secondary)?  General Surgery   TCM attempt successful?  Yes [Verbal release  and daughters]   Call start time  0820   Call end time  0828   Discharge diagnosis  Total left mastectomy   Meds reviewed with patient/caregiver?  Yes   Is the patient having any side effects they believe may be caused by any medication additions or changes?  Yes   Side effects comments   Pt reports Tramadol caused flushing to face and neck . Pt no longer taking med as Pain is controlled.    Does the patient have all medications related to this admission filled (includes all antibiotics, pain medications, etc.)  Yes   Is the patient taking all medications as directed (includes completed medication regime)?  Yes   Does the patient have a follow up appointment scheduled with their surgeon?  Yes [8/2/21 @ 2pm. ]   Has the patient kept scheduled appointments due by today?  N/A   Comments  Pt declines appt with listed PCP at this time. Pt states she will make an appt.    Psychosocial issues?  No   Did the patient receive a copy of their discharge instructions?  Yes   Nursing interventions  Reviewed instructions with patient   What is the patient's perception of their health status since discharge?  Improving   Nursing interventions  Nurse provided patient education   Is the patient /caregiver able to teach back basic post-op care?  Drive as instructed by MD in discharge instructions, Keep incision areas clean,dry and protected, No tub bath, swimming, or hot tub until instructed by MD, Lifting as instructed by MD in discharge instructions   Is the patient/caregiver able to teach back signs and symptoms of incisional infection?  Increased redness, swelling or pain at the incisonal site, Increased drainage or bleeding, Pus or odor from incision, Fever   Is the  patient/caregiver able to teach back steps to recovery at home?  Rest and rebuild strength, gradually increase activity, Set small, achievable goals for return to baseline health, Make a list of questions for surgeon's appointment   If the patient is a current smoker, are they able to teach back resources for cessation?  Not a smoker   Is the patient/caregiver able to teach back the hierarchy of who to call/visit for symptoms/problems? PCP, Specialist, Home health nurse, Urgent Care, ED, 911  Yes   TCM call completed?  Yes   Wrap up additional comments  Pt reports that she is doing well, however does question that she has a pain in her shoulder at times with movement. Pt will call Surgeon office to report as well as report about tramadal flushing and swelling under the arm. Pt declined an appt for PCP follow up as she states she will make the appt. Pt also states that she is thinking of changing PCP. Encouraged Pt to make follow up appt with a provider.           Re Person RN    8/13/2021, 08:38 EDT

## 2021-08-14 LAB
LAB AP CASE REPORT: NORMAL
LAB AP DIAGNOSIS COMMENT: NORMAL
LAB AP SPECIAL STAINS: NORMAL
LAB AP SYNOPTIC CHECKLIST: NORMAL
PATH REPORT.ADDENDUM SPEC: NORMAL
PATH REPORT.FINAL DX SPEC: NORMAL
PATH REPORT.GROSS SPEC: NORMAL

## 2021-08-25 ENCOUNTER — TELEPHONE (OUTPATIENT)
Dept: SURGERY | Facility: CLINIC | Age: 66
End: 2021-08-25

## 2021-08-25 NOTE — TELEPHONE ENCOUNTER
KHAI Drain Totals:     OR Date: 8/10/21    Left Side:     Drain #1   8/12- 20 cc  8/13- 20 cc  8/14- 10 cc  8/15- 20 cc  8/16- 20 cc  8/17- 25 cc  8/18- 25 cc  8/19- 20 cc  8/20- 30 cc  8/21-10 cc  8/22- 20 cc  8/23- 10 cc  8/24- 20 cc  8/25- 10 cc morning only     Drain #2  8/12- 50 cc  8/13- 35 cc  8/14- 20 cc  8/15- 40 cc  8/16- 50 cc  8/17- 30 cc  8/18- 40 cc   8/19- 20 cc  8/20- 35 cc  8/21- 20 cc  8/22- 20 cc  8/23- 10 cc  8/24- 20 cc  8/25-10 cc morning only     Right Side:     Drain#3   8/12- 110 cc  8/13- 90 cc   8/14- 55 cc  8/15- 150 cc  8/16- 95 cc  8/17- 115 cc  8/18- 155 cc  8/19- 110 cc  8/20- 100 cc  8/21- 30 cc  8/22- 80 cc  8/23- 20 cc   8/24- 40 cc  8/25- 20 cc morning only     Drain 4#   8/12- 150 cc  8/13- 105 cc  8/14- 60 cc  8/15- 165 cc  8/16- 125 cc  8/17- 90 cc   8/18- 60 cc  8/19- 60 cc  8/20- 70 cc  8/22- 70 cc  8/23- 40 cc  8/24- 90 cc  8/25- 40 cc morning only

## 2021-08-26 ENCOUNTER — OFFICE VISIT (OUTPATIENT)
Dept: PULMONOLOGY | Facility: CLINIC | Age: 66
End: 2021-08-26

## 2021-08-26 DIAGNOSIS — J44.9 CHRONIC OBSTRUCTIVE PULMONARY DISEASE, UNSPECIFIED COPD TYPE (HCC): Primary | ICD-10-CM

## 2021-08-26 PROCEDURE — 99442 PR PHYS/QHP TELEPHONE EVALUATION 11-20 MIN: CPT | Performed by: INTERNAL MEDICINE

## 2021-08-26 RX ORDER — DOXYCYCLINE HYCLATE 100 MG
100 TABLET ORAL 2 TIMES DAILY
Qty: 20 TABLET | Refills: 0 | Status: SHIPPED | OUTPATIENT
Start: 2021-08-26 | End: 2021-09-08

## 2021-08-26 NOTE — PROGRESS NOTES
Chief Complaint  Follow-up and Shortness of Breath    Subjective          Aida Conner presents to [facility]  History of Present Illness  You have chosen to receive care through a telephone visit. Do you consent to use a telephone visit for your medical care today? Yes    Patient diagnosed with inflammatory breast cancer since last office visit  Has undergone chemotherapy radical bilateral mastectomy  Currently undergoing radiation treatments to the chest    Not currently complaining of any shortness of breath at this time    Has shortness of breath a few months ago found to have a CT scan of the chest showing some groundglass opacities    Shortness of breath has improved since taxil has been discontinued      Objective   Vital Signs:   There were no vitals taken for this visit.    Physical Exam -deferred due to phone visit  Result Review :                 Assessment and Plan  Assessment-  chronic hypoxic and hypercapnic respiratory failure  Continue O2 at current flow rate  Continue NIPPV to sleep at night    ANABELLE-continue PAP therapy-renew prescription and supplies    COPD  Continue albuterol  Reluctant to start Trelegy  Inform her side effects of mild  See patient back in 1 month to discuss further    Abnormal CT scan showing groundglass opacities  Most likely due to chemotherapy  We will see patient back in 1 month  Plan on ordering CT scan at that point  Would not be surprised if patient was found to have some groundglass opacities in the lung as she is getting radiation to her breast            There are no diagnoses linked to this encounter.  I spent 25 minutes caring for Aida on this date of service. This time includes time spent by me in the following activities:preparing for the visit, reviewing tests and documenting information in the medical record  Follow Up   No follow-ups on file.  Patient was given instructions and counseling regarding her  condition or for health maintenance advice. Please see specific information pulled into the AVS if appropriate.

## 2021-08-27 ENCOUNTER — TELEPHONE (OUTPATIENT)
Dept: SURGERY | Facility: CLINIC | Age: 66
End: 2021-08-27

## 2021-08-27 NOTE — TELEPHONE ENCOUNTER
Radiation oncology appointment with Dr. Sams @ [facility] is scheduled on 9/8/2021 @ 9:00am.    Called and left message with patient about appointment time, patient to call back and confirm.

## 2021-08-30 ENCOUNTER — TELEPHONE (OUTPATIENT)
Dept: SURGERY | Facility: CLINIC | Age: 66
End: 2021-08-30

## 2021-08-30 DIAGNOSIS — C50.911 INFLAMMATORY BREAST CANCER, RIGHT (HCC): ICD-10-CM

## 2021-08-30 RX ORDER — GABAPENTIN 100 MG/1
100 CAPSULE ORAL 3 TIMES DAILY
Qty: 90 CAPSULE | Refills: 0 | Status: SHIPPED | OUTPATIENT
Start: 2021-08-30 | End: 2021-11-29 | Stop reason: SDUPTHER

## 2021-08-30 NOTE — TELEPHONE ENCOUNTER
Patient came into office on 8/27/21 to get left breast drain removed.  She still has 3 drains total attached.     Patient tolerated removal very well. Dressed with 4x4, patient will keep clean and dry x 2 days. Also added a fresh dressing to the left axilla drain site.

## 2021-09-01 ENCOUNTER — OFFICE VISIT (OUTPATIENT)
Dept: SURGERY | Facility: CLINIC | Age: 66
End: 2021-09-01

## 2021-09-01 VITALS
WEIGHT: 293 LBS | SYSTOLIC BLOOD PRESSURE: 132 MMHG | DIASTOLIC BLOOD PRESSURE: 80 MMHG | BODY MASS INDEX: 48.82 KG/M2 | OXYGEN SATURATION: 98 % | RESPIRATION RATE: 19 BRPM | HEIGHT: 65 IN | HEART RATE: 86 BPM

## 2021-09-01 DIAGNOSIS — Z48.89 POSTOPERATIVE VISIT: Primary | ICD-10-CM

## 2021-09-01 DIAGNOSIS — C50.911 INFLAMMATORY BREAST CANCER, RIGHT (HCC): ICD-10-CM

## 2021-09-01 PROCEDURE — 99024 POSTOP FOLLOW-UP VISIT: CPT | Performed by: SURGERY

## 2021-09-01 NOTE — PROGRESS NOTES
[facility]     Referring Provider: Dipesh Leone MD     Chief complaint: Postoperative visit      HPI:   1/19/21:  Ms. Aida Conner is a 66 yo woman, seen at the request of Dr. Dipesh Leone, for evaluation of right breast changes, possible inflammatory breast cancer. In November 2020, the patient noticed that her right breast had become enlarged, firm, painful, and red. She underwent mammogram and ultrasound at the time, which was read as benign aside from skin thickening and edema. She was treated with a course of antibiotics by her PCP without improvement. She says that the redness and swelling initially started in the lower inner portion of her breast, but over the past 2 months, it has spread to involve the entire breast. She can no longer wear a bra due to discomfort.     She has a past history of 2 benign left breast excisional biopsies many years ago, as well as a right breast excisional biopsy in 2018 for an intraductal papilloma with incidental atypical ductal and lobular hyperplasia. Her past medical history includes obesity, COPD on oxygen, sleep apnea, diabetes and CHF. She has a family history of breast cancer in her mother (diagnosed at age 45), a maternal aunt (diagnosed in her 70s), and a paternal aunt (diagnosed in her 70s). She denies any family history of ovarian cancer. She says she was seen by a medical oncologist in 2018 after the excisional biopsy to discuss possible chemoprevention. She was referred for genetic testing, however it was not covered by insurance so she did not have it done. She ended up deciding not to pursue endocrine therapy due to concerns about side effects.     4/8/21:   After her last visit, she underwent breast and axillary biopsies which showed a triple positive invasive ductal carcinoma. She underwent staging studies which were thankfully negative and her genetic testing returned negative for mutation. She started neoadjuvant THP on 2/10/21. She has been  having issues with increased shortness of breath and a rash, so yesterday Taxol was discontinued and she will be starting Abraxane next week. She reports that her breast has significantly decreased in size and become less swollen.     7/21/21:  Taxol was discontinued after 7 doses due to pneumonitis which improved with steroids. She then completed 3 cycles of AC, last dose on 6/25/21. Cycle 4 was held due to her frailty.    9/1/21, Interval History:  She underwent left mastectomy and right modified radical mastectomy on 8/10/21. See surgery and pathology details in the oncologic history below. She had one left breast drain removed in the office last week. The remaining left breast drain and the right breast drain have each had out about 10-15 mL/day for the past few days. The right axillary drain has had out about 40-60 mL/day for the past few days. She is complaining of occasional sharp shooting pain in her right upper arm and her gabapentin prescription was refilled earlier this week.      Oncology/Hematology History Overview Note   06/19/18, Right Breast Excisional Biopsy ( [facility], Dr. Juani Barragan):  Right breast wire guided excision:  Small foci of atypical ductal hyperplasia (0.5-1 mm); completely excised  Minute focus of atypical lobular hyperplasia (<0.5 mm) completely excised  Residual intraductal papilloma with focal sclerosis (approximately 7 mm in greatest dimension); adequately excised  Ectactic ducts  Fibrocystic changes (stromal fibrosis, Sclerosing adenosis) with mild to moderate ductal hyperplasia  Rare microcalcifications associated with adenosis  Extensive vascular calcification  Previous biopsy site    09/07/2018, Right breast US ([facility]):  2.5 predominantly cystic lesion in the operative bed contains fine septations, and is consistent with an organizing hematoma.   No solid relatively hypo or hyperechoic mass is evident  BI-RADS 2: Benign.    1/10/2020, Screening MMG with Saurabh ([facility]):  Post lumpectomy changes on the left are stable.  Post biopsy changes on the right are evident.   No suspicious mass, area or architectural distortion or suspicious microcalcification is identified.  BI-RADS 2: Benign.    11/1/20: Pt noticed right breast was enlarged, firm, and red. Treated with a course of antibiotics with no improvement.     Inflammatory breast cancer, right (CMS/HCC)   11/17/2020 Initial Diagnosis    Inflammatory breast cancer, right (CMS/HCC)     11/18/2020 Imaging    Bilateral Diagnostic MMG with Saurabh & Right Breast US ([facility]):  MMG:  Again seen within the anterior right breast near the 12 o’clock position in an area postoperative architectural distortion. There are multiple surgical clips in this region. Findings are unchanged from 1/10/2020. No new mass or architectural distortion seen within the right breast. No new suspicious calcifications. There is new skin thickening along the anterior aspect of the right breast of uncertain etiology.   There is stable architectural distortion in the upper outer quadrant of the left breast. There is no new mass or architectural distortion. There are stable scattered punctate calcifications. No suspicious calcifications.  US:  Limited sonographic images of the right breast were obtained at the 12 o’clock position at the site of the patient’s reported right breast fullness. This is in the retroareolar area. Images demonstrate some mild skin thickening and edema within the subcutaneous and superficial fat. Findings are nonspecific but could be seen with developing cellulitis. No discrete solid mass lesion identified. No cyst or abnormal fluid collection is seen.  BI-RADS 2: Benign.     1/19/2021 Imaging    Bilateral Diagnostic MMG with Saurabh & Right Breast US ([facility]):  MMG:  Scattered areas of fibroglandular density.  1. There is a large global asymmetry with associated skin thickening and trabecular thickening seen in the central region of the  right breast.  This correlates to the symptomatic area. The appearance is consistent with the clinical diagnosis of inflammatory breast carcinoma.  2.  There is a stable post-surgical scar seen in the anterior one-third region of the right breast at 12  o'clock. Post-surgical scar is in an area of prior excisional biopsy.  Metallic surgical clips noted.  3.  There is a stable post-surgical scar seen in the middle one-third 1:30 o'clock region of the left breast. Post-surgical scar is in an area of prior excisional biopsy.  Metallic surgical clips noted.  4. There are several stable punctate calcifications seen in the anterior one-third region of the left breast at 7 o'clock.  7. There is an axillary lymph node measuring 20 mm in the right axilla.  Next, ultrasound was performed.  US:  2. Ultrasound demonstrates a stable irregular post-surgical scar seen in the anterior one-third region of the right breast at 12 o'clock.  This correlates with the mammographic finding.  5. There are two oval parallel solid masses with partially defined margins measuring 9 x 6 x 8 and 10 x 5 x 9 mm seen in the right breast at 12 o'clock located 6 centimeters from the nipple.  6. There is an irregular solid mass with poorly defined margins measuring 32 x 8 x 11 mm seen in the 10:30 o'clock region of the right breast located 15 centimeters from the nipple.  7. Ultrasound demonstrates an oval parallel abnormal axillary lymph node(s) with well defined, thin margins measuring 32 x 20 x 22 mm in the right axilla.  BI-RADS 4B: Suspicious.     1/19/2021 Biopsy    Right Breast & Right Axilla, US-Guided Core Biopsies ([facility]):    1. Right Breast, 10:30, core biopsies:   Invasive mammary carcinoma, no special type (ductal) with micropapillary features, intermediate grade (tubules=3, nuclear atypia=2, mitoses=2), measuring at least 1.3 cm in dimension.     ER+ (96.02%, strong)  [place]+ (81.98%, moderate)  Her2+ (IHC 3+)  Ki-67 40.17%    2. Right Axilla,  biopsy:   Lymph node involved by metastatic mammary carcinoma.     1/19/2021 Biopsy    Right Breast, Skin Punch Biopsies ( [facility]):    1. Skin, Right Breast, 3 o'clock, Biopsy: Benign skin and underlying connective tissue with               A. Superficial and deep perivascular and perifollicular inflammation.     2. Skin, Right Breast, 12 o'clock, Biopsy: Benign skin and underlying connective tissue with               A. Superficial and deep perivascular and perifollicular inflammation.     3. Skin, Right Breast, 9 o'clock, Biopsy: Benign skin and underlying connective tissue with               A. Superficial and deep perivascular and perifollicular inflammation.     1/19/2021 Genetic Testing    [employer] Multi-Cancer Panel (84 genes):    Negative     1/20/2021 Imaging    [facility]  1. Right breast partially within the field-of-view, there is skin thickening and parenchymal induration.  2. No indication of intrathoracic metastatic disease.  3. No lytic or sclerotic bone lesion.  4. Enlarged right adrenal gland, indeterminant. Metastatic deposits not excluded.  5. Likely old left external iliac chain lymph node.     1/20/2021 Imaging    B[facility] Scan [facility]):  No scintigraphic evidence of osseous metastasis.     1/29/2021 Imaging    PET CT ([facility]):  1.  Moderate to intensely FDG avid asymmetric soft tissue and skin thickening involving the right breast likely representing patient's known malignancy.  2.  Intensely FDG avid right axillary and subpectoral adenopathy likely represent metastatic disease.  3.  Constellation of findings within the right adrenal gland are favored to represent a lipid rich adenoma. Continued attention on follow-up is recommended to ensure stability.  4.  While there are no findings of definite FDG avid osseous metastasis, given the heterogenous FDG uptake throughout the axial and appendicular skeleton due to the above stated limitations, subtle underlying osseous  metastasis would remain  occult. Therefore, continued close attention on follow-up is recommended to exclude this possibility.  5.  Short segment of moderate to intense FDG uptake within the distal esophagus and GE junction suggestive of esophagitis. In the appropriate clinical context correlation with patient history is recommended with follow-up endoscopy if clinically indicated.  6.  Sub-6 mm pulmonary nodule within the right lower lobe is below PET resolution and indeterminate. Continued close attention on follow-up with chest CT in 3 months is recommended to exclude metastatic disease.     8/10/2021 Surgery    Right modified radical mastectomy and left total mastectomy    1. Left Breast, Total Mastectomy (2,122 grams):               A. MULTIFOCAL LOW GRADE DUCTAL CARCINOMA IN SITU (DCIS):                            1. Solid, Cribriform, and Pagetoid type with single cell necrosis and focal calcifications.                            2. Extent of DCIS: 20 mm (5 of 26 blocks involved).                            3. Margins are negative for in situ carcinoma; Closest distance: DCIS is present > 10 mm from                                the posterior margin.               B. Multiple intraductal papillomas, usual ductal hyperplasia, and fibroadenomatoid change.               C. Unremarkable skin and nipple.               D. See Synoptic Report and Comment #1.      2. Right Breast, Modified Radical Mastectomy S/P Neoadjuvant Chemotherapy (2,589 grams):               A. FIBROTIC TUMOR BED WITH NO RESIDUAL INVASIVE DUCTAL CARCINOMA.               B. Background breast parenchyma with multiple intraductal papillomas, fibroadenomatoid change,       usual ductal hyperplasia and pseudoangiomatous stromal hyperplasia (PASH).  C. Scar and fat necrosis, consistent with prior procedure-related changes.  D. Clip and biopsy site changes present within tumor bed.    E. Unremarkable skin and nipple.   F. Nineteen lymph nodes, negative for carcinoma  (0/19):               1. Clip and biopsy site changes are present.               2. Treatment effect is present in 4 of 19 lymph nodes.  G. See Comment #2.     ER+ (%, strong)  ND+ (%, strong)  Ki-67 4%     Malignant neoplasm of axillary tail of right female breast (CMS/HCC)   1/25/2021 Initial Diagnosis    Malignant neoplasm of axillary tail of right female breast (CMS/HCC)     2/10/2021 - 5/4/2021 Chemotherapy    OP BREAST Pertuzumab / Trastuzumab-anns / PACLitaxel     5/14/2021 - 7/15/2021 Chemotherapy    OP BREAST AC DOXOrubicin / Cyclophosphamide     8/6/2021 -  Chemotherapy    OP BREAST Trastuzumab-anns Q21D (maintenance)         Review of Systems:  See interval history.       Medications:    Current Outpatient Medications:   •  albuterol sulfate  (90 Base) MCG/ACT inhaler, Inhale 2 puffs Every 4 (Four) Hours As Needed., Disp: , Rfl:   •  carvedilol (COREG) 25 MG tablet, Take 0.5 tablets by mouth 2 (Two) Times a Day With Meals., Disp: 30 tablet, Rfl: 0  •  Cholecalciferol (Vitamin D) 50 MCG (2000 UT) capsule, Take 2,000 Units by mouth Daily., Disp: , Rfl:   •  dexamethasone (DECADRON) 4 MG tablet, Take 2 tablets in the morning daily on Days 2, 3 & 4.  Take with food., Disp: 6 tablet, Rfl: 3  •  dicyclomine (BENTYL) 20 MG tablet, Take 1 tablet by mouth Every 6 (Six) Hours., Disp: 60 tablet, Rfl: 2  •  diphenhydrAMINE (BENADRYL) 25 mg capsule, Take 25 mg by mouth At Night As Needed for Itching, Allergies or Sleep., Disp: , Rfl:   •  doxycycline (VIBRAMYICN) 100 MG tablet, Take 1 tablet by mouth 2 (Two) Times a Day., Disp: 20 tablet, Rfl: 0  •  FLUoxetine (PROzac) 40 MG capsule, TAKE 1 CAPSULE BY MOUTH EVERY DAY, Disp: 30 capsule, Rfl: 0  •  furosemide (LASIX) 40 MG tablet, Take 20 mg by mouth Daily., Disp: , Rfl:   •  gabapentin (NEURONTIN) 100 MG capsule, Take 1 capsule by mouth 3 (Three) Times a Day for 30 days., Disp: 90 capsule, Rfl: 0  •  hydrocortisone 2.5 % cream, Apply  topically to  the appropriate area as directed 2 (Two) Times a Day., Disp: 3.5 g, Rfl: 0  •  Lidocaine, Anorectal, (LMX 5) 5 % cream cream, Apply  topically to the appropriate area as directed 3 (Three) Times a Day As Needed (hemmorrhoid pain)., Disp: 45 g, Rfl: 2  •  lisinopril (PRINIVIL,ZESTRIL) 5 MG tablet, Take 1 tablet by mouth Daily., Disp: 30 tablet, Rfl: 6  •  miconazole (Lotrimin AF) 2 % powder, Apply  topically to the appropriate area as directed 2 (two) times a day., Disp: 85 g, Rfl: 1  •  mometasone-formoterol (DULERA 100) 100-5 MCG/ACT inhaler, Inhale 2 Puffs/kg Every Night., Disp: , Rfl:   •  montelukast (SINGULAIR) 10 MG tablet, Take 10 mg by mouth Every Night., Disp: , Rfl:   •  multivitamin (THERAGRAN) tablet tablet, Take 1 tablet by mouth Daily., Disp: , Rfl:   •  O2 (OXYGEN), Inhale 2.5 L/min Continuous., Disp: , Rfl:   •  omeprazole (priLOSEC) 20 MG capsule, TAKE 1 CAPSULE BY MOUTH EVERY DAY, Disp: 30 capsule, Rfl: 3  •  ondansetron (ZOFRAN) 8 MG tablet, Take 1 tablet by mouth 3 (Three) Times a Day As Needed for Nausea or Vomiting., Disp: 30 tablet, Rfl: 5  •  Potassium 99 MG tablet, Take 1 tablet by mouth Every Night., Disp: , Rfl:   •  pravastatin (PRAVACHOL) 40 MG tablet, Take 40 mg by mouth Every Night., Disp: , Rfl:   •  psyllium (METAMUCIL) 0.52 g capsule, Metamucil 0.52 gram oral capsule take 1 capsule by oral route 5Xday   Active, Disp: , Rfl:   •  spironolactone (ALDACTONE) 25 MG tablet, Take 25 mg by mouth Daily., Disp: , Rfl:       Allergies   Allergen Reactions   • Amlodipine Swelling     LEGS    • Hydrochlorothiazide Unknown - Low Severity     Neuro issues   • Morphine Nausea And Vomiting     hallucinations   • Adhesive Tape Rash       Family History   Problem Relation Age of Onset   • Breast cancer Mother 45   • Colon cancer Paternal Grandmother    • Breast cancer Maternal Aunt 70   • Breast cancer Paternal Aunt 70   • Lung cancer Father    • Hodgkin's lymphoma Father    • Skin cancer Father          squamous cell   • Ovarian cancer Neg Hx    • Uterine cancer Neg Hx    • Deep vein thrombosis Neg Hx    • Pulmonary embolism Neg Hx    • Malig Hyperthermia Neg Hx        PHYSICAL EXAMINATION:   Vitals:    09/01/21 0929   BP: 132/80   Pulse: 86   Resp: 19   SpO2: 98%     ECOG 1 - Symptomatic but ambulatory, with cane  General: NAD, obese, on continuous oxygen and slightly winded  Psych: a&o x 3, normal mood and affect  Eyes: EOMI, no scleral icterus  ENMT: neck supple without masses or thyromegaly, mucus membranes moist  MSK: normal gait, normal ROM in bilateral shoulders  Lymph nodes: no axillary swelling  Breast:   Right: Sp mastectomy with well-healing central incision. Flaps healthy. Drains serosanguineous.  Left: Sp mastectomy with well-healing central incision. Flaps healthy. Drain serosanguineous.      Assessment:  66 y.o. F with a diagnosis of right breast inflammatory carcinoma: Intermediate grade, triple positive, invasive ductal carcinoma; clinical T4dN1, anatomic stage IIIB, prognostic stage IIIA. She completed neoadjuvant chemotherapy on 6/25/21. She underwent left mastectomy and right modified radical mastectomy on 8/10/21, pCR on the right and incidental DCIS on the left, pTis.    Plan:  -Remaining left breast drain and right breast drain removed today. We will call her next week to check on the right axillary drain output.  -Continue follow-up with Dr. Azul.  -Radiation oncology referral. Appointment scheduled with Dr. Sams on 9/8/21.  -Follow-up with OT. She needs to be fitted with a compression sleeve before starting radiation.  -Follow-up in 3 months for clinical exam.  -She was instructed to call sooner with any questions, concerns or changes on BSE.    Nicci Banks MD      CC:  MD Shari Manjarrez MD Prabodh Mehta, MD Arthur McLaughlin, MD

## 2021-09-03 ENCOUNTER — APPOINTMENT (OUTPATIENT)
Dept: OCCUPATIONAL THERAPY | Facility: HOSPITAL | Age: 66
End: 2021-09-03

## 2021-09-03 NOTE — PROGRESS NOTES
Subjective     REASON FOR FOLLOW-UP: Right breast inflammatory breast, triple positive                              REQUESTING PHYSICIAN: MD Francisco Esteban MD Bethany Haynes, MD    History of Present Illness patient is a 66 y.o. 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  For 12 weeks followed by Adriamycin Cytoxan    Patient had worsening shortness of breath requiring continuous oxygen.  CT of the chest performed to rule out pneumonitis and she was started on steroids.  CT showed bilateral groundglass infiltrates presumed to be related to Taxol pneumonitis and therefore Taxol discontinued.  Patient completed the fourth cycle of Perjeta/Herceptin alone.  Diarrhea was an issue but not as bad without the Taxol.  She is weaned off her prednisone    Patient proceeded with cycle 1 Adriamycin/Cytoxan on 5/14/2021 and is seen back today for cycle #3 and we are doing it at 3-week intervals because of her frailty and after 3 cycles we stopped because of tolerance issues    She went for surgery her bilateral mastectomies and interestingly she had a complete pathological CR on the right breast and had evidence of DCIS in the left breast    She is scheduled to see the radiation therapist to start radiation and to continue Perjeta Herceptin and after radiation we will start hormonal blockade with an aromatase inhibitor provider bone density is not too bad and we have scheduled a DEXA scan    She continues on oxygen but overall is doing much better than I expected after surgery and chemotherapy    Echocardiogram is due next week    Past Medical History:   Diagnosis Date   • Anemia    • Anxiety    • Asthma    • Chronic diastolic (congestive) heart failure (CMS/HCC)    • Chronic  hypoxemic respiratory failure (CMS/HCC)     on continuous O2   • Diabetes mellitus (CMS/HCC)     history but HGB A1c is down and no more meds   • Diarrhea     with chemo   • H/O Intraductal papilloma    • Hemorrhoids    • History of transfusion     AFTER BACK SURGERY no reaction   • Hypertension    • IBS (irritable bowel syndrome)    • Inflammatory breast cancer (CMS/HCC)     right   • Kidney stone    • Morbid obesity with BMI of 50.0-59.9, adult (CMS/HCC)    • Obesity hypoventilation syndrome (CMS/HCC) 2021   • On home oxygen therapy     2.5 AT REST WITH ACTIVITY UP TO 4L   • ANABELLE on CPAP     cpap  & uses O2 with CPAP   • Osteoarthritis    • PONV (postoperative nausea and vomiting)         Past Surgical History:   Procedure Laterality Date   • BREAST EXCISIONAL BIOPSY Bilateral    • COLONOSCOPY     • CYSTOSCOPY BLADDER STONE LITHOTRIPSY     • ENDOSCOPY     • KNEE ARTHROPLASTY Bilateral    • MASTECTOMY Left 8/10/2021    Procedure: LEFT TOTAL MASTECTOMY;  Surgeon: Nicci Banks MD;  Location: [facility] OR;  Service: General;  Laterality: Left;   • MASTECTOMY Right 8/10/2021    Procedure: RIGHT MODIFIED RADICAL MASTECTOMY;  Surgeon: Nicci Banks MD;  Location: [facility] OR;  Service: General;  Laterality: Right;   • SPINE SURGERY     • TOTAL ABDOMINAL HYSTERECTOMY WITH SALPINGO OOPHORECTOMY     • VENOUS ACCESS DEVICE (PORT) INSERTION N/A 2021    Procedure: INSERTION VENOUS ACCESS DEVICE;  Surgeon: Nicci Banks MD;  Location: [facility] OR;  Service: General;  Laterality: N/A;      ONC HISTORY  patient is a 65-year-old white female with morbid obesity, history of diastolic congestive heart failure and unknown type of pulmonary disease for which she has been on oxygen for 4 years.    She has been getting routine mammography since 38 years of age because her mother  at 46 of breast cancer and had a biopsy of her left breast in  which was apparently benign and  another biopsy in  on her right breast which showed a small focus of atypical ductal hyperplasia and atypical lobular hyperplasia with a residual intraductal papilloma.  At that time she saw medical oncologist who recommended genetic testing and prevention but the insurance would not cover the genetic testing and the medical oncologist thought tamoxifen was too risky for her because of her comorbidities and no treatment was given.  More recently she noticed redness of the right breast in October and showed it to her family doctor who gave her course of Keflex when it did not improve and mammogram was ordered and this was benign  When the redness persisted she saw her gynecologist with concern for inflammatory breast cancer and referred her to Dr. Banks after repeat imaging and [facility] right [facility]  Preliminary report shows micropapillary invasive mammary carcinoma intermediate grade measuring 13 mm right axillary node was also involved with metastatic cancer ER/MT and HER-2 are pending  Patient saw Dr. Banks who sent her for skin biopsies and she had 3 separate punch biopsy of the skin that showed Perivascular and perifollicular inflammation with no obvious malignancy at 3:00 12:00 and 9:00  In addition staging work-up with CAT scans and bone scan were ordered.  CAT scan of the chest showed a borderline mediastinal  Infracarinal node measuring 15 mm in length mild cardiomegaly and heavy coronary artery calcifications  CT of the abdomen showed a 2.5 x 2.2 cm right adrenal mass which the patient tells me she has had in the past and is most likely benign but also a 2.7 cm left external iliac node which is indeterminate.  Bone scan was negative    Echocardiogram is scheduled for later next week and genetic testing with the invitae stat panel is pending    Patient is  3 para 3 menarche was at age 9 menopause at 49 when she had a hysterectomy  and oophorectomy  First childbirth was at age 20 she breast-fed her second and third children and took no hormone replacement after menopause    Family history is positive for mother dying of breast cancer at age 46 she is a maternal aunt with breast cancer in her 70s a paternal aunt with breast cancer in her 70s paternal grandmother with colon cancer.  Her father had Hodgkin's disease and non-Hodgkin's lymphoma but  of small cell lung cancer at 67      She has not had a heart attack stroke or blood clot    Plan to do echocardiogram soon as possible to ascertain tolerability of cardiotoxic chemotherapy which would be typically indicated with an inflammatory breast cancer    Also plan PET scan to follow-up on atypical lymph nodes and confirm benign etiology of the adrenal lesion    She will have port placement and chemo education pt is  ER/MT and HER-2 +    I explained to Aida that the goal of treatment would be curative but she has a lot of comorbidities which may limit our ability to give the most effective chemotherapy in the setting  I told her radiation would be involved and possibly hormonal therapy for 10 years as she has hormone positivity and HER-2 directed therapy    She expressed some concerns about driving back and forth from [place] but felt that once a week was doable    We will see her back in 2 weeks to start treatment with a port placement planned early next week      Patient did have a mild reaction to Taxol dose #1 with some increased shortness of breath and flushing.  This was responsive to 100 mg of Solu-Cortef.  She states this gave her a headache and made her quite talkative but otherwise she was thankfully able to complete Taxol infusion without further incident.    Patient reports issues with chronic diarrhea over the last few years and did note a little bit increase in stooling following THP though nothing overly significant in her mind.  She did finally begin taking Imodium just  a few days ago and has had no further stools.  She does note significant trouble with hemorrhoids in relation to the diarrhea   Required blood transfusion due to significant hemorrhoidal bleeding plus iron deficiency.  Injectafer planned    Due to inclement weather cycle 1 day 8 therapy was missed.  She did get day 15 therapy with fairly good tolerance except for some diarrhea.    She was found to be iron deficient despite trying oral iron.  We therefore elected to proceed with IV Injectafer but she remains mildly anemic with a hemoglobin of 9.3    7/21    She has increased her Lasix and her weight is down 7 pounds but overall she is significantly weaker since starting treatment and I think it is in her best interest to discontinue treatment and proceed with surgery and will use Herceptin in the interim till surgery scheduled  She has had a good response in the breast and I do not think the last dose of Adriamycin is going to be crucial and we run the risk of making her so weak that she cannot go for surgery    Current Outpatient Medications on File Prior to Visit   Medication Sig Dispense Refill   • albuterol sulfate  (90 Base) MCG/ACT inhaler Inhale 2 puffs Every 4 (Four) Hours As Needed.     • carvedilol (COREG) 25 MG tablet Take 0.5 tablets by mouth 2 (Two) Times a Day With Meals. 30 tablet 0   • Cholecalciferol (Vitamin D) 50 MCG (2000 UT) capsule Take 2,000 Units by mouth Daily.     • dexamethasone (DECADRON) 4 MG tablet Take 2 tablets in the morning daily on Days 2, 3 & 4.  Take with food. 6 tablet 3   • dicyclomine (BENTYL) 20 MG tablet Take 1 tablet by mouth Every 6 (Six) Hours. 60 tablet 2   • diphenhydrAMINE (BENADRYL) 25 mg capsule Take 25 mg by mouth At Night As Needed for Itching, Allergies or Sleep.     • doxycycline (VIBRAMYICN) 100 MG tablet Take 1 tablet by mouth 2 (Two) Times a Day. 20 tablet 0   • FLUoxetine (PROzac) 40 MG capsule TAKE 1 CAPSULE BY MOUTH EVERY DAY 30 capsule 0   •  furosemide (LASIX) 40 MG tablet Take 20 mg by mouth Daily.     • gabapentin (NEURONTIN) 100 MG capsule Take 1 capsule by mouth 3 (Three) Times a Day for 30 days. 90 capsule 0   • hydrocortisone 2.5 % cream Apply  topically to the appropriate area as directed 2 (Two) Times a Day. 3.5 g 0   • Lidocaine, Anorectal, (LMX 5) 5 % cream cream Apply  topically to the appropriate area as directed 3 (Three) Times a Day As Needed (hemmorrhoid pain). 45 g 2   • lisinopril (PRINIVIL,ZESTRIL) 5 MG tablet Take 1 tablet by mouth Daily. 30 tablet 6   • miconazole (Lotrimin AF) 2 % powder Apply  topically to the appropriate area as directed 2 (two) times a day. 85 g 1   • mometasone-formoterol (DULERA 100) 100-5 MCG/ACT inhaler Inhale 2 Puffs/kg Every Night.     • montelukast (SINGULAIR) 10 MG tablet Take 10 mg by mouth Every Night.     • multivitamin (THERAGRAN) tablet tablet Take 1 tablet by mouth Daily.     • O2 (OXYGEN) Inhale 2.5 L/min Continuous.     • omeprazole (priLOSEC) 20 MG capsule TAKE 1 CAPSULE BY MOUTH EVERY DAY 30 capsule 3   • ondansetron (ZOFRAN) 8 MG tablet Take 1 tablet by mouth 3 (Three) Times a Day As Needed for Nausea or Vomiting. 30 tablet 5   • Potassium 99 MG tablet Take 1 tablet by mouth Every Night.     • pravastatin (PRAVACHOL) 40 MG tablet Take 40 mg by mouth Every Night.     • psyllium (METAMUCIL) 0.52 g capsule Metamucil 0.52 gram oral capsule take 1 capsule by oral route 5Xday   Active     • spironolactone (ALDACTONE) 25 MG tablet Take 25 mg by mouth Daily.       No current facility-administered medications on file prior to visit.        ALLERGIES:    Allergies   Allergen Reactions   • Amlodipine Swelling     LEGS    • Hydrochlorothiazide Unknown - Low Severity     Neuro issues   • Morphine Nausea And Vomiting     hallucinations   • Adhesive Tape Rash        Social History     Socioeconomic History   • Marital status:      Spouse name: Not on file   • Number of children: 3   • Years of  education: Not on file   • Highest education level: Not on file   Tobacco Use   • Smoking status: Never Smoker   • Smokeless tobacco: Never Used   Vaping Use   • Vaping Use: Never used   Substance and Sexual Activity   • Alcohol use: Never   • Drug use: Never   • Sexual activity: Defer        Family History   Problem Relation Age of Onset   • Breast cancer Mother 45   • Colon cancer Paternal Grandmother    • Breast cancer Maternal Aunt 70   • Breast cancer Paternal Aunt 70   • Lung cancer Father    • Hodgkin's lymphoma Father    • Skin cancer Father         squamous cell   • Ovarian cancer Neg Hx    • Uterine cancer Neg Hx    • Deep vein thrombosis Neg Hx    • Pulmonary embolism Neg Hx    • Malig Hyperthermia Neg Hx         Review of Systems   Constitutional: Positive for fatigue (Better). Negative for appetite change, chills, diaphoresis, fever and unexpected weight change.   HENT: Negative for hearing loss, sore throat, trouble swallowing and voice change.    Respiratory: Negative for cough, chest tightness, shortness of breath (Improved ) and wheezing.    Cardiovascular: Positive for leg swelling (Better). Negative for chest pain and palpitations.   Gastrointestinal: Negative for abdominal distention, abdominal pain, constipation, diarrhea, nausea, rectal pain (hemmorrhoids -improved) and vomiting.   Genitourinary: Negative for dysuria, frequency, hematuria and urgency.   Musculoskeletal: Negative for back pain ( better), joint swelling and neck stiffness.        No muscle weakness.   Skin: Negative for rash and wound.   Neurological: Negative for seizures, syncope, speech difficulty, weakness, numbness and headaches.   Hematological: Negative for adenopathy. Does not bruise/bleed easily.   Psychiatric/Behavioral: Negative.  Negative for behavioral problems, confusion and suicidal ideas.   All other systems reviewed and are negative.       Objective     Vitals:    09/07/21 0927   BP: 129/83   Pulse: 79   Resp: 16  "  Temp: 97.5 °F (36.4 °C)   TempSrc: Skin   SpO2: 97%   Weight: (!) 153 kg (338 lb 4.8 oz)   Height: 165.1 cm (65\")   PainSc: 0-No pain     Current Status 9/7/2021   ECOG score 0       Physical Exam    CONSTITUTIONAL:  Vital signs reviewed.  No distress, looks comfortable. Morbidly obese  EYES:  Conjunctiva and lids unremarkable.  PERRLA  EARS,NOSE,MOUTH,THROAT:  Ears and nose appear unremarkable.  Lips, teeth, gums appear unremarkable.  RESPIRATORY:  Normal respiratory effort.  Lungs clear to auscultation bilaterally.  No axillary adenopathy  BREASTS: Bilateral mastectomies with no reconstruction-drain right chest wall  CARDIOVASCULAR:  Normal S1, S2.  No murmurs rubs or gallops.  1+ brawny lower extremity edema.  GASTROINTESTINAL: Abdomen appears unremarkable.  Nontender.  No hepatomegaly.  No splenomegaly.  LYMPHATIC:  No cervical, supraclavicular, axillary lymphadenopathy.  SKIN:  Warm.  Inclusion cyst left axilla-extensive tenia cruris  in the right groin  PSYCHIATRIC:  Normal judgment and insight.  Normal mood and affect.       I have reexamined the patient and the results are consistent with the previously documented exam. Jaime Azul MD           RECENT LABS:  Results from last 7 days   Lab Units 09/07/21  0933   WBC 10*3/mm3 8.43   NEUTROS ABS 10*3/mm3 6.73   HEMOGLOBIN g/dL 10.0*   HEMATOCRIT % 34.9   PLATELETS 10*3/mm3 311                 PET  IMPRESSION:  1.  Moderate to intensely FDG avid asymmetric soft tissue and skin  thickening involving the right breast likely representing patient's  known malignancy.  2.  Intensely FDG avid right axillary and subpectoral adenopathy likely  represent metastatic disease.  3.  Constellation of findings within the right adrenal gland are favored  to represent a lipid rich adenoma. Continued attention on follow-up is  recommended to ensure stability.  4.  While there are no findings of definite FDG avid osseous metastasis,  given the heterogenous FDG uptake " throughout the axial and appendicular  skeleton due to the above stated limitations, subtle underlying osseous  metastasis would remain occult. Therefore, continued close attention on  follow-up is recommended to exclude this possibility.  5.  Short segment of moderate to intense FDG uptake within the distal  esophagus and GE junction suggestive of esophagitis. In the appropriate  clinical context correlation with patient history is recommended with  follow-up endoscopy if clinically indicated.  6.  Sub-6 mm pulmonary nodule within the right lower lobe is below PET  resolution and indeterminate. Continued close attention on follow-up  with chest CT in 3 months is recommended to exclude metastatic disease.  7.  Other findings as above.     This report was finalized on 2/2/2021     Final Diagnosis   1. Left Breast, Total Mastectomy (2,122 grams):               A. MULTIFOCAL LOW GRADE DUCTAL CARCINOMA IN SITU (DCIS):                            1. Solid, Cribriform, and Pagetoid type with single cell necrosis and focal calcifications.                            2. Extent of DCIS: 20 mm (5 of 26 blocks involved).                            3. Margins are negative for in situ carcinoma; Closest distance: DCIS is present > 10 mm from                                the posterior margin.               B. Multiple intraductal papillomas, usual ductal hyperplasia, and fibroadenomatoid change.               C. Unremarkable skin and nipple.               D. See Synoptic Report and Comment #1.      2. Right Breast, Modified Radical Mastectomy S/P Neoadjuvant Chemotherapy (2,589 grams):               A. FIBROTIC TUMOR BED WITH NO RESIDUAL INVASIVE DUCTAL CARCINOMA.               B. Background breast parenchyma with multiple intraductal papillomas, fibroadenomatoid change,       usual ductal hyperplasia and pseudoangiomatous stromal hyperplasia (PASH).  C. Scar and fat necrosis, consistent with prior procedure-related changes.  D.  Clip and biopsy site changes present within tumor bed.    E. Unremarkable skin and nipple.   F. Nineteen lymph nodes, negative for carcinoma (0/19):               1. Clip and biopsy site changes are present.               2. Treatment effect is present in 4 of 19 lymph nodes.  G. See Comment #2.             Assessment/Plan   1. [id number] right breast cancer ER/NV HER-2 -3+ positive inflammatory breast cancer for neoadjuvant chemotherapy  · Staging work-up negative except for 6 mm lung nodule and axillary and subpectoral adenopathy  · THP followed by AC planned if she tolerates it  · C1D8 Taxol missed due to inclement weather.  · Taxol discontinued after 7 doses due to probable Taxol pneumonitis treated with steroids.    · C1 Adriamycin/Cytoxan given 5/14/2021.  · Adriamycin and Cytoxan stopped after 3 doses due to poor tolerance  · YPT0N0 right breast with multifocal DCIS ER/NV positive in the left breast post bilateral mastectomies and right axillary dissection  · Radiation Arimidex and Perjeta Herceptin to continue for the rest of the year    2.  Morbid obesity    3.  History of diastolic heart failure  · Echocardiogram with ejection fraction of 64% normal strain  · Cleared by cardio-oncology for chemotherapy    4.  Pulmonary disease?  Etiology on oxygen for 4 years?  Pickwickian    5.  Strong family history of breast cancer genetic testing 84 genes negative    6.  Probable benign adrenal adenoma-PET negative    7.  Questionable enlarged lymph nodes left iliac chain and mediastinum likely reactive-PET negative    8.  6 mm right lower lobe nodule below PET resolution pretreatment needs follow-up after THP  · Repeat CT read at [facility] radiologist report stability of nodules and nodes    9.  Abnormal uptake short segment esophagus with a history of Schatzki's ring-we will double PPI and watch closely but we we will proceed with chemotherapy at this point and refer back to GI-doubt she has metastatic disease to  this area    10. Anemia with microcytic indices  · Iron studies performed 2/10/2021.  · 2/24/2021: reviewed with the patient that she is iron deficient, with ferritin of 16, iron saturation of 4%.  Patient reports taking ferrous gluconate in the past but this caused GI upset.  Also with her chronic diarrhea I do not think she can absorb it.  We will pursue IV iron with plans to initiate this next week pending insurance approval. In addition hemoglobin down to 8.0 and transfusion pursued.  · 3/3/2021: IV Injectafer initiated x2.   · Hemoglobin down to 9.9 one week out from first AC though overall stable.  Monitor.   · Hemoglobin improved off chemotherapy    11. Hemorrhoidal pain secondary to diarrhea. Does have occasional bleeding. Topical lidocaine prescribed. Monitor.    12.  History of chronic diarrhea, ?IBS, exacerbated with Perjeta therapy.  · Patient required rifaximin 550 mg to take twice daily x7 days with each Perjeta dose.   · Since completion of Perjeta patient is now actually experiencing constipation (further discussed below).      13.  Taxol-induced pneumonitis.   · Worsening shortness of breath with CT evidence of groundglass infiltrates bilaterally suspicious for pneumonitis  · Patient prescribed prednisone 20 twice daily  · Breathing is overall improved.  Patient is slowly tapering off prednisone.  Today she will begin 5 mg every other day x1 week and then discontinue.      14.  Constipation following initiation of Adriamycin/Cytoxan.  · Patient is just increased fiber in her diet but asking what else she can do.  She prefers a suppository if possible.  We discussed the use of a total suppository but also consider taking senna S1-2 tabs nightly at least the first week after chemotherapy to help avoid this in the future.      Plan  1.  Echocardiogram next week  2, Herceptin alone today and then add back the Perjeta  3.  Return in 3 weeks for chemo only and see me in 6 weeks with a DEXA scan  4.   Aromatase inhibitor planned if bone density is adequate     this patient is on drug therapy requiring intensive monitoring for toxicity.   I spent 402 total minutes, face-to-face, caring for Aida today.  Greater than 50% of this time involved counseling and/or coordination of care as documented within this note.

## 2021-09-07 ENCOUNTER — INFUSION (OUTPATIENT)
Dept: ONCOLOGY | Facility: HOSPITAL | Age: 66
End: 2021-09-07

## 2021-09-07 ENCOUNTER — OFFICE VISIT (OUTPATIENT)
Dept: ONCOLOGY | Facility: CLINIC | Age: 66
End: 2021-09-07

## 2021-09-07 VITALS
HEIGHT: 65 IN | OXYGEN SATURATION: 97 % | HEART RATE: 79 BPM | BODY MASS INDEX: 48.82 KG/M2 | DIASTOLIC BLOOD PRESSURE: 83 MMHG | WEIGHT: 293 LBS | TEMPERATURE: 97.5 F | SYSTOLIC BLOOD PRESSURE: 129 MMHG | RESPIRATION RATE: 16 BRPM

## 2021-09-07 DIAGNOSIS — C50.611 MALIGNANT NEOPLASM OF AXILLARY TAIL OF RIGHT FEMALE BREAST, UNSPECIFIED ESTROGEN RECEPTOR STATUS (HCC): ICD-10-CM

## 2021-09-07 DIAGNOSIS — C50.611 MALIGNANT NEOPLASM OF AXILLARY TAIL OF RIGHT FEMALE BREAST, UNSPECIFIED ESTROGEN RECEPTOR STATUS (HCC): Primary | ICD-10-CM

## 2021-09-07 DIAGNOSIS — C50.911 INFLAMMATORY BREAST CANCER, RIGHT (HCC): ICD-10-CM

## 2021-09-07 DIAGNOSIS — Z79.899 ENCOUNTER FOR LONG-TERM (CURRENT) USE OF HIGH-RISK MEDICATION: ICD-10-CM

## 2021-09-07 DIAGNOSIS — Z09 ENCOUNTER FOR FOLLOW-UP EXAMINATION AFTER COMPLETED TREATMENT FOR CONDITIONS OTHER THAN MALIGNANT NEOPLASM: ICD-10-CM

## 2021-09-07 LAB
BASOPHILS # BLD AUTO: 0.04 10*3/MM3 (ref 0–0.2)
BASOPHILS NFR BLD AUTO: 0.5 % (ref 0–1.5)
DEPRECATED RDW RBC AUTO: 53.2 FL (ref 37–54)
EOSINOPHIL # BLD AUTO: 0.17 10*3/MM3 (ref 0–0.4)
EOSINOPHIL NFR BLD AUTO: 2 % (ref 0.3–6.2)
ERYTHROCYTE [DISTWIDTH] IN BLOOD BY AUTOMATED COUNT: 15.6 % (ref 12.3–15.4)
HCT VFR BLD AUTO: 34.9 % (ref 34–46.6)
HGB BLD-MCNC: 10 G/DL (ref 12–15.9)
IMM GRANULOCYTES # BLD AUTO: 0.03 10*3/MM3 (ref 0–0.05)
IMM GRANULOCYTES NFR BLD AUTO: 0.4 % (ref 0–0.5)
LYMPHOCYTES # BLD AUTO: 0.77 10*3/MM3 (ref 0.7–3.1)
LYMPHOCYTES NFR BLD AUTO: 9.1 % (ref 19.6–45.3)
MCH RBC QN AUTO: 26.8 PG (ref 26.6–33)
MCHC RBC AUTO-ENTMCNC: 28.7 G/DL (ref 31.5–35.7)
MCV RBC AUTO: 93.6 FL (ref 79–97)
MONOCYTES # BLD AUTO: 0.69 10*3/MM3 (ref 0.1–0.9)
MONOCYTES NFR BLD AUTO: 8.2 % (ref 5–12)
NEUTROPHILS NFR BLD AUTO: 6.73 10*3/MM3 (ref 1.7–7)
NEUTROPHILS NFR BLD AUTO: 79.8 % (ref 42.7–76)
NRBC BLD AUTO-RTO: 0 /100 WBC (ref 0–0.2)
PLATELET # BLD AUTO: 311 10*3/MM3 (ref 140–450)
PMV BLD AUTO: 9.2 FL (ref 6–12)
RBC # BLD AUTO: 3.73 10*6/MM3 (ref 3.77–5.28)
WBC # BLD AUTO: 8.43 10*3/MM3 (ref 3.4–10.8)

## 2021-09-07 PROCEDURE — 99215 OFFICE O/P EST HI 40 MIN: CPT | Performed by: INTERNAL MEDICINE

## 2021-09-07 PROCEDURE — 96413 CHEMO IV INFUSION 1 HR: CPT

## 2021-09-07 PROCEDURE — 25010000002 TRASTUZUMAB PER 10 MG: Performed by: INTERNAL MEDICINE

## 2021-09-07 PROCEDURE — 85025 COMPLETE CBC W/AUTO DIFF WBC: CPT

## 2021-09-07 RX ORDER — SODIUM CHLORIDE 9 MG/ML
250 INJECTION, SOLUTION INTRAVENOUS ONCE
Status: CANCELLED | OUTPATIENT
Start: 2021-09-07

## 2021-09-07 RX ORDER — SODIUM CHLORIDE 9 MG/ML
250 INJECTION, SOLUTION INTRAVENOUS ONCE
Status: COMPLETED | OUTPATIENT
Start: 2021-09-07 | End: 2021-09-07

## 2021-09-07 RX ORDER — FLUOXETINE HYDROCHLORIDE 40 MG/1
CAPSULE ORAL
Qty: 30 CAPSULE | Refills: 0 | Status: SHIPPED | OUTPATIENT
Start: 2021-09-07

## 2021-09-07 RX ADMIN — TRASTUZUMAB 1230 MG: 150 INJECTION, POWDER, LYOPHILIZED, FOR SOLUTION INTRAVENOUS at 12:08

## 2021-09-07 RX ADMIN — SODIUM CHLORIDE 250 ML: 9 INJECTION, SOLUTION INTRAVENOUS at 12:08

## 2021-09-08 ENCOUNTER — HOSPITAL ENCOUNTER (OUTPATIENT)
Dept: RADIATION ONCOLOGY | Facility: HOSPITAL | Age: 66
Setting detail: RADIATION/ONCOLOGY SERIES
End: 2021-09-08

## 2021-09-08 ENCOUNTER — CONSULT (OUTPATIENT)
Dept: RADIATION ONCOLOGY | Facility: HOSPITAL | Age: 66
End: 2021-09-08

## 2021-09-08 ENCOUNTER — TELEPHONE (OUTPATIENT)
Dept: OBSTETRICS AND GYNECOLOGY | Facility: CLINIC | Age: 66
End: 2021-09-08

## 2021-09-08 VITALS
DIASTOLIC BLOOD PRESSURE: 65 MMHG | HEART RATE: 76 BPM | OXYGEN SATURATION: 97 % | BODY MASS INDEX: 48.82 KG/M2 | WEIGHT: 293 LBS | TEMPERATURE: 97.8 F | RESPIRATION RATE: 16 BRPM | HEIGHT: 65 IN | SYSTOLIC BLOOD PRESSURE: 146 MMHG

## 2021-09-08 DIAGNOSIS — C50.911 INFLAMMATORY BREAST CANCER, RIGHT (HCC): Primary | ICD-10-CM

## 2021-09-08 PROBLEM — I10 HYPERTENSION, ESSENTIAL: Status: ACTIVE | Noted: 2021-09-08

## 2021-09-08 PROBLEM — E66.9 OBESITY: Status: ACTIVE | Noted: 2021-09-08

## 2021-09-08 PROBLEM — E78.5 HYPERLIPIDEMIA: Status: ACTIVE | Noted: 2021-09-08

## 2021-09-08 PROBLEM — M19.90 OSTEOARTHROSIS: Status: ACTIVE | Noted: 2021-09-08

## 2021-09-08 PROBLEM — E11.9 TYPE II DIABETES MELLITUS: Status: ACTIVE | Noted: 2021-09-08

## 2021-09-08 PROBLEM — K64.9 HEMORRHOIDS: Status: ACTIVE | Noted: 2021-09-08

## 2021-09-08 PROBLEM — I51.7 ENLARGED HEART: Status: ACTIVE | Noted: 2021-09-08

## 2021-09-08 PROBLEM — F32.A DEPRESSION: Status: ACTIVE | Noted: 2021-09-08

## 2021-09-08 PROBLEM — F41.9 ANXIETY DISORDER: Status: ACTIVE | Noted: 2021-09-08

## 2021-09-08 PROBLEM — D64.9 ANEMIA: Status: ACTIVE | Noted: 2021-09-08

## 2021-09-08 PROBLEM — G47.33 OBSTRUCTIVE SLEEP APNEA: Status: ACTIVE | Noted: 2021-09-08

## 2021-09-08 PROBLEM — R32 URINARY BLADDER INCONTINENCE: Status: ACTIVE | Noted: 2021-09-08

## 2021-09-08 PROBLEM — J30.9 ALLERGIC RHINITIS: Status: ACTIVE | Noted: 2021-09-08

## 2021-09-08 PROBLEM — I34.0 MITRAL VALVE INSUFFICIENCY: Status: ACTIVE | Noted: 2021-09-08

## 2021-09-08 PROBLEM — K58.9 IBS (IRRITABLE BOWEL SYNDROME): Status: ACTIVE | Noted: 2021-09-08

## 2021-09-08 PROBLEM — N64.4 BREAST PAIN, RIGHT: Status: ACTIVE | Noted: 2021-09-08

## 2021-09-08 PROCEDURE — 99204 OFFICE O/P NEW MOD 45 MIN: CPT | Performed by: RADIOLOGY

## 2021-09-08 PROCEDURE — G0463 HOSPITAL OUTPT CLINIC VISIT: HCPCS

## 2021-09-08 NOTE — TELEPHONE ENCOUNTER
----- Message from Kassy Rebolledo MA sent at 9/8/2021  4:09 PM EDT -----  Regarding: FW: Prescription Question  Contact: [phone]    ----- Message -----  From: Aida Conner  Sent: 9/8/2021   3:53 PM EDT  To: [facility]  Subject: Prescription Question                            Aida Brice here.   Could you possibly call in a prescription for a yeast infection cream?  THANKS!   (I took a round of antibiotics for cellulitis and ...here we go)    I don't have a current primary care  and [facility] thought of you.  Thank you so very much, Dr. Leone    24 hr. [facility]    [phone]  [address] [place] [address]

## 2021-09-08 NOTE — PROGRESS NOTES
New Patient Visit      Patient: Aida Connre   YOB: 1955   Medical Record Number: [id number]   Date of Visit: September 30, 2021   Primary Diagnosis: Inflammatory breast cancer, right (HCC) [C50.911]                                                  History of Present Illness: Aida Conner is a 66-year-old female with inflammatory breast cancer involving the right breast status post neoadjuvant chemotherapy followed by bilateral mastectomy and right axillary lymph node dissection who is seen in consultation regarding radiotherapy as a component of adjuvant management.  Ms. Conner first noticed right breast skin changes approximately 1 year ago.  She underwent evaluation and management with antibiotics with no improvement.  Mammography ordered by her primary care provider and performed on 11/18/2020 revealed no abnormality within breast parenchyma however there was new skin thickening along the anterior aspect of the right breast.  The skin changes persisted and progressed, prompting evaluation by her gynecologist who immediately referred Ms. Conner to Dr. Nicci Banks.  Mammography performed on 1/19/2021 revealed global asymmetry in the central region of the right breast there were solid masses within the right breast at the 12 o'clock position located 6 cm from the nipple.  Additionally, there was a solid mass at the 10:30 position within the right breast located 15 cm from the nipple.  Abnormal axillary lymph nodes were also appreciated.  Pathology from biopsy of the skin performed on 1/19/2021 revealed superficial and deep perivascular perifollicular inflammation.  Core biopsies from the right breast revealed invasive ductal carcinoma, ER positive/MS positive, HER-2/brandi positive with Ki-67 of 40.17%.  CT scan of the chest, abdomen and pelvis was performed on 1/20/2021 revealing skin thickening and parenchymal induration within the right breast as well as an enlarged right adrenal gland.  Bone  scan on 1/20/2021 was without evidence of bony metastatic disease PET scan performed on 1/29/2021 revealed moderate to intensely avid asymmetric soft tissue and skin thickening involving the right breast.  There is FDG avid right axillary and subpectoral lymphadenopathy.  The findings within the right adrenal gland are favored to represent a lipid rich adenoma.  There was a sub-6 cm pulmonary nodule within the right lower lobe that was below PET resolution and was indeterminate.  Ms. Conner was evaluated by Dr. Azul and commenced neoadjuvant chemotherapy with THP on 2/10/2021.  Taxol was discontinued after Ms. Conner developed pneumonitis.  She commenced Adriamycin/Cytoxan on 5/14/2021 and this was stopped after 3 doses due to poor tolerance.  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  Ms. Conner reports feeling well overall after surgery but continues to have some dyspnea on exertion.  She wears supplemental oxygen continuously now.  She denies chest pain, back pain, headaches, vision changes, focal weakness, confusion or imbalance.    This is a new problem to me, and one that is potentially life-threatening.  (Old medical records were requested, reviewed, and summarized in the HPI)    Review of Systems   Constitutional: Positive for fatigue. Negative for appetite change and unexpected weight change.   HENT: Negative for sore throat, tinnitus and trouble swallowing.    Eyes: Positive for visual disturbance (wears glasses).   Respiratory: Positive for shortness of breath. Negative for cough.         Continuous use of O2@ 3L  per NC    Cardiovascular: Negative for chest pain and palpitations.        CHF   Gastrointestinal: Negative for constipation, diarrhea and nausea.   Genitourinary: Negative for dysuria, frequency and urgency.    Musculoskeletal: Positive for arthralgias and gait problem. Negative for back pain.   Skin: Negative.         Skin integrity impaired r/t surgery   Neurological: Negative for dizziness and headaches.   Psychiatric/Behavioral: Negative for agitation, sleep disturbance and suicidal ideas.          All other systems reviewed and are negative.    Past Medical History:   Diagnosis Date   • Anemia    • Anxiety    • Asthma    • Chronic diastolic (congestive) heart failure (CMS/HCC)    • Chronic hypoxemic respiratory failure (CMS/HCC)     on continuous O2   • Diarrhea     with chemo   • H/O Intraductal papilloma    • Hemorrhoids    • History of transfusion 1984    AFTER BACK SURGERY no reaction   • Hypertension    • IBS (irritable bowel syndrome)    • Inflammatory breast cancer (CMS/HCC)     right   • Kidney stone    • Morbid obesity with BMI of 50.0-59.9, adult (CMS/HCC)    • Obesity hypoventilation syndrome (CMS/HCC) 2/5/2021   • On home oxygen therapy     2.5 AT REST WITH ACTIVITY UP TO 4L   • ANABELLE on CPAP     cpap  & uses O2 with CPAP   • Osteoarthritis    • PONV (postoperative nausea and vomiting)         Past Surgical History:   Procedure Laterality Date   • BREAST EXCISIONAL BIOPSY Bilateral    • COLONOSCOPY     • CYSTOSCOPY BLADDER STONE LITHOTRIPSY     • ENDOSCOPY     • KNEE ARTHROPLASTY Bilateral 2009   • MASTECTOMY Left 8/10/2021    Procedure: LEFT TOTAL MASTECTOMY;  Surgeon: Nicci Banks MD;  Location: [facility] OR;  Service: General;  Laterality: Left;   • MASTECTOMY Right 8/10/2021    Procedure: RIGHT MODIFIED RADICAL MASTECTOMY;  Surgeon: Nicci Banks MD;  Location: [facility] OR;  Service: General;  Laterality: Right;   • SPINE SURGERY  1984   • TOTAL ABDOMINAL HYSTERECTOMY WITH SALPINGO OOPHORECTOMY  2004   • UPPER GASTROINTESTINAL ENDOSCOPY     • VENOUS ACCESS DEVICE (PORT) INSERTION N/A 1/25/2021    Procedure: INSERTION VENOUS ACCESS DEVICE;  Surgeon: Nicci Banks MD;   Location: [facility] MAIN OR;  Service: General;  Laterality: N/A;      Family History   Problem Relation Age of Onset   • Breast cancer Mother 45   • Colon cancer Paternal Grandmother    • Breast cancer Maternal Aunt 70   • Breast cancer Paternal Aunt 70   • Lung cancer Father    • Hodgkin's lymphoma Father    • Skin cancer Father         squamous cell   • Ovarian cancer Neg Hx    • Uterine cancer Neg Hx    • Deep vein thrombosis Neg Hx    • Pulmonary embolism Neg Hx    • Malig Hyperthermia Neg Hx         Social History     Socioeconomic History   • Marital status:      Spouse name: Not on file   • Number of children: 3   • Years of education: Not on file   • Highest education level: Not on file   Tobacco Use   • Smoking status: Never Smoker   • Smokeless tobacco: Never Used   Vaping Use   • Vaping Use: Never used   Substance and Sexual Activity   • Alcohol use: Never   • Drug use: Never   • Sexual activity: Defer       Allergies: Amlodipine, Hydrochlorothiazide, Morphine, and Adhesive tape     Prior to Admission medications    Medication Sig Start Date End Date Taking? Authorizing Provider   albuterol sulfate  (90 Base) MCG/ACT inhaler Inhale 2 puffs Every 4 (Four) Hours As Needed. 11/12/20  Yes Candy Gonzalez MD   carvedilol (COREG) 25 MG tablet Take 0.5 tablets by mouth 2 (Two) Times a Day With Meals. 8/6/21  Yes Shari Roa MD   Cholecalciferol (Vitamin D) 50 MCG (2000 UT) capsule Take 2,000 Units by mouth Daily.   Yes Candy Gonzalez MD   dicyclomine (BENTYL) 20 MG tablet Take 1 tablet by mouth Every 6 (Six) Hours. 3/10/21  Yes Ynes Vazquez APRN   diphenhydrAMINE (BENADRYL) 25 mg capsule Take 25 mg by mouth At Night As Needed for Itching, Allergies or Sleep.   Yes Candy Gonzalez MD   FLUoxetine (PROzac) 40 MG capsule TAKE 1 CAPSULE BY MOUTH EVERY DAY 9/7/21  Yes Shari Roa MD   furosemide (LASIX) 40 MG tablet Take 20 mg by mouth Daily.    Yes Candy Gonzalez MD   gabapentin (NEURONTIN) 100 MG capsule Take 1 capsule by mouth 3 (Three) Times a Day for 30 days. 8/30/21 9/29/21 Yes Nicci Banks MD   Lidocaine, Anorectal, (LMX 5) 5 % cream cream Apply  topically to the appropriate area as directed 3 (Three) Times a Day As Needed (hemmorrhoid pain). 2/24/21  Yes Ynes Vazquez APRN   lisinopril (PRINIVIL,ZESTRIL) 5 MG tablet Take 1 tablet by mouth Daily. 2/5/21  Yes Bradley Garza MD   miconazole (Lotrimin AF) 2 % powder Apply  topically to the appropriate area as directed 2 (two) times a day. 6/4/21  Yes Jaime Azul MD   mometasone-formoterol (DULERA 100) 100-5 MCG/ACT inhaler Inhale 2 Puffs/kg Every Night.   Yes Candy Gonzalez MD   montelukast (SINGULAIR) 10 MG tablet Take 10 mg by mouth Every Night.   Yes Cnady Gonzalez MD   multivitamin (THERAGRAN) tablet tablet Take 1 tablet by mouth Daily.   Yes Candy Gonzalez MD   O2 (OXYGEN) Inhale 2.5 L/min Continuous.   Yes Candy Gonzalez MD   Potassium 99 MG tablet Take 1 tablet by mouth Every Night.   Yes Candy Gonzalez MD   pravastatin (PRAVACHOL) 40 MG tablet Take 40 mg by mouth Every Night.   Yes Candy Gonzalez MD   psyllium (METAMUCIL) 0.52 g capsule Metamucil 0.52 gram oral capsule take 1 capsule by oral route 5Xday   Active   Yes Candy Gonzalez MD   spironolactone (ALDACTONE) 25 MG tablet Take 25 mg by mouth Daily.   Yes Candy Gonzalez MD   omeprazole (priLOSEC) 20 MG capsule TAKE 1 CAPSULE BY MOUTH EVERY DAY 7/29/21   Jaime Azul MD   ondansetron (ZOFRAN) 8 MG tablet Take 1 tablet by mouth 3 (Three) Times a Day As Needed for Nausea or Vomiting. 5/14/21   Jaime Azul MD   dexamethasone (DECADRON) 4 MG tablet Take 2 tablets in the morning daily on Days 2, 3 & 4.  Take with food. 5/14/21 9/8/21  Jaime Azul MD   doxycycline (VIBRAMYICN) 100 MG tablet Take 1 tablet by mouth 2 (Two) Times a  "Day. 8/26/21 9/8/21  Jaime Azul MD   hydrocortisone 2.5 % cream Apply  topically to the appropriate area as directed 2 (Two) Times a Day. 3/31/21 9/8/21  Jaime Azul MD      Pain:(on a scale of 0-10)  Pain Score    09/08/21 0937   PainSc:   2        Quality of Life: 80 - Restricted Physical Activity  Advanced Care Plan: N    Physical Examination:  Vitals:     Vitals:    09/08/21 0937   BP: 146/65   Pulse: 76   Resp: 16   Temp: 97.8 °F (36.6 °C)   SpO2: 97%       Height: 165.1 cm (65\")  Weight: Weight: (!) 155 kg (341 lb 7.9 oz) Body mass index is 56.83 kg/m².      Constitutional: The patient is a well-developed, well-nourished obese female in no acute distress.  Alert and oriented ×3.  HEENT: Atraumatic. Normocephalic. No abnormalities noted.  Lymphatics: No cervical, supraclavicular, or axillary lymphadenopathy is palpated.  CV: Regular rate and rhythm.  No murmurs, rubs, or gallops are appreciated.  Respiratory: Decreased breath sounds bilaterally  Breasts: Bilateral postmastectomy scars healing well with some eschar formation.  No signs of infection or malignancy.  GI: Abdomen soft, nontender, nondistended  Extremities: No clubbing, cyanosis, or edema.  Neurologic: Cranial nerves II through XII are grossly intact, with no focal neurological deficits noted on exam.  Psychiatric: Alert and oriented x3. Normal affect, with no anxiety or depression noted.    Radiographs :I personally reviewed the PET/CT performed on 1/29/2021.  The pertinent findings are as above in HPI.      Pathology: I personally reviewed the pathology report from the procedure performed on 1/19/2021 as well as the pathology report from the procedure performed on 8/10/2021.  The pertinent findings are as above in HPI.    Labs:   WBC   Date Value Ref Range Status   09/07/2021 8.43 3.40 - 10.80 10*3/mm3 Final     Hemoglobin   Date Value Ref Range Status   09/07/2021 10.0 (L) 12.0 - 15.9 g/dL Final     Hematocrit   Date Value Ref " Range Status   09/07/2021 34.9 34.0 - 46.6 % Final     Platelets   Date Value Ref Range Status   09/07/2021 311 140 - 450 10*3/mm3 Final    No results found for: CEA    ASSESSMENT/PLAN: Aida Conner is a 66-year-old female with [id number] ER positive/ME positive HER-2/brandi/positive inflammatory breast cancer involving the right breast status post neoadjuvant chemotherapy followed by bilateral mastectomy and right axillary lymph node dissection.  She experienced an pleat pathologic response within the right breast and has DCIS within the left breast.  She did experience pneumonitis with Taxol and still has persistent pneumonitis associated dyspnea.  She is healing well overall after surgery.  ECOG 1.    I discussed the clinical, radiographic and pathologic findings to date with Ms. Conner.  I explained the role of radiotherapy in the adjuvant management of inflammatory breast cancer.  Outlined the rationale for external beam radiotherapy in the adjuvant setting, recommending this as a component of her treatment.  She is agreeable to my recommendation for adjuvant external beam radiotherapy to the right chest wall and axilla, understanding the risks, benefits and alternatives to treatment.  She is scheduled for CT simulation for treatment planning purposes.        Electronically signed by Shahab Sams MD 9/8/2021  10:15 EDT

## 2021-09-08 NOTE — TELEPHONE ENCOUNTER
Kassy,     I sent in Rx as requested. She is aware (I have another e-mail to respond to).     Thanks,   Dr. Leone

## 2021-09-08 NOTE — PATIENT INSTRUCTIONS
Managing Side Effects of Radiation Therapy  You will learn how to identify the most common side effects that occur from radiation therapy, including how they occur and ways to manage them.  To view the content, go to this web address:  [email or web address]    This video will  on: 2023. If you need access to this video following this date, please reach out to the healthcare provider who assigned it to you.  This information is not intended to replace advice given to you by your health care provider. Make sure you discuss any questions you have with your health care provider.  [employer] Patient Education ©  [employer]  Radiation for Breast Cancer  You will learn what radiation is, the different forms, and the side effects.  To view the content, go to this web address:  [email or web address]    This video will  on: 2023. If you need access to this video following this date, please reach out to the healthcare provider who assigned it to you.  This information is not intended to replace advice given to you by your health care provider. Make sure you discuss any questions you have with your health care provider.  [employer] Patient Education ©  [employer]  Breast Cancer, Female    Breast cancer is a malignant growth of tissue (tumor) in the breast. Unlike noncancerous (benign) tumors, malignant tumors are cancerous and can spread to other parts of the body. The two most common types of breast cancer start in the milk ducts (ductal carcinoma) or in the lobules where milk is made in the breast (lobular carcinoma). Breast cancer is one of the most common types of cancer in women.  What are the causes?  The exact cause of female breast cancer is unknown.  What increases the risk?  The following factors may make you more likely to develop this condition:  · Being older than 55 years of age.  · Race and ethnicity.  women generally have an increased risk, but  -American women are more likely to develop the disease before age 45.  · Having a family history of breast cancer.  · Having had breast cancer in the past.  · Having certain noncancerous conditions of the breast, such as dense breast tissue.  · Having the BRCA1 and BRCA2 genes.  · Having a history of radiation exposure.  · Obesity.  · Starting menopause after age 55.  · Starting your menstrual periods before age 12.  · Having never been pregnant or having your first child after age 30.  · Having never .  · Using hormone therapy after menopause.  · Using birth control pills.  · Drinking more than one alcoholic drink a day.  · Exposure to the drug MELISSA, which was given to pregnant women from the 1940s to the 1970s.  What are the signs or symptoms?  Symptoms of this condition include:  · A painless lump or thickening in your breast.  · Changes in the size or shape of your breast.  · Breast skin changes, such as puckering or dimpling.  · Nipple abnormalities, such as scaling, crustiness, redness, or pulling in (retraction).  · Nipple discharge that is bloody or clear.  How is this diagnosed?  This condition may be diagnosed by:  · Taking your medical history and doing a physical exam. During the exam, your health care provider will feel the tissue around your breast and under your arms.  · Taking a sample of nipple discharge. The sample will be examined under a microscope.  · Performing imaging tests, such as breast X-rays (mammogram), breast ultrasound exams, or an MRI.  · Taking a tissue sample (biopsy) from the breast. The sample will be examined under a microscope to look for cancer cells.  · Taking a sample from the lymph nodes near the affected breast (sentinel node biopsy).  Your cancer will be staged to determine its severity and extent. Staging is a careful attempt to find out the size of the tumor, whether the cancer has spread, and if so, to what parts of the body. Staging also includes testing  your tumor for certain receptors, such as estrogen, progesterone, and human epidermal growth factor receptor 2 (HER2). This will help your cancer care team decide on a treatment that will work best for you. You may need to have more tests to determine the stage of your cancer. Stages include the following:  · Stage 0--The tumor has not spread to other breast tissue.  · Stage I--The cancer is only found in the breast or may be in the lymph nodes. The tumor may be up to ¾ in (2 cm) wide.  · Stage II--The cancer has spread to nearby lymph nodes. The tumor may be up to 2 in (5 cm) wide.  · Stage III--The cancer has spread to more distant lymph nodes. The tumor may be larger than 2 in (5 cm) wide.  · Stage IV--The cancer has spread to other parts of the body, such as the bones, brain, liver, or lungs.  How is this treated?  Treatment for this condition depends on the type and stage of the breast cancer. It may be treated with:  · Surgery. This may involve breast-conserving surgery (lumpectomy or partial mastectomy) in which only the part of the breast containing the cancer is removed. Some normal tissue surrounding this area may also be removed. In some cases, surgery may be done to remove the entire breast (mastectomy) and nipple. Lymph nodes may also be removed.  · Radiation therapy, which uses high-energy rays to kill cancer cells.  · Chemotherapy, which is the use of drugs to kill cancer cells.  · Hormone therapy, which involves taking medicine to adjust the hormone levels in your body. You may take medicine to decrease your estrogen levels. This can help stop cancer cells from growing.  · Targeted therapy, in which drugs are used to block the growth and spread of cancer cells. These drugs target a specific part of the cancer cell and usually cause fewer side effects than chemotherapy. Targeted therapy may be used alone or in combination with chemotherapy.  · A combination of surgery, radiation, chemotherapy, or  hormone therapy may be needed to treat breast cancer.  Follow these instructions at home:  1. Take over-the-counter and prescription medicines only as told by your health care provider.  2. Eat a healthy diet. A healthy diet includes lots of fruits and vegetables, low-fat dairy products, lean meats, and fiber.  ? Make sure half your plate is filled with fruits or vegetables.  ? Choose high-fiber foods such as whole-grain breads and cereals.  3. Consider joining a support group. This may help you learn to cope with the stress of having breast cancer.  4. Talk to your health care team about exercise and physical activity. The right exercise program can:  ? Help prevent or reduce symptoms such as fatigue or depression.  ? Improve overall health and survival rates.  5. Keep all follow-up visits as told by your health care provider. This is important.  Where to find more information  · American Cancer Society: www.cancer.org  · National Cancer Horace: www.cancer.gov  Contact a health care provider if:  · You have a sudden increase in pain.  · You have any symptoms or changes that concern you.  · You lose weight without trying.  · You notice a new lump in either breast or under your arm.  · You develop swelling in either arm or hand.  · You have a fever.  · You notice new fatigue or weakness.  Get help right away if:  · You have chest pain or trouble breathing.  · You faint.  Summary  · Breast cancer is a malignant growth of tissue (tumor) in the breast.  · Your cancer will be staged to determine its severity and extent.  · Treatment for this condition depends on the type and stage of the breast cancer.  This information is not intended to replace advice given to you by your health care provider. Make sure you discuss any questions you have with your health care provider.  Document Revised: 11/30/2018 Document Reviewed: 08/13/2018  [employer] Patient Education © 2021 [employer].  External Beam Radiation Therapy, Care  After  This sheet gives you information about how to care for yourself after your procedure. Your health care provider may also give you more specific instructions. If you have problems or questions, contact your health care provider.  What can I expect after the procedure?  After the procedure, it is common to have:  · Tiredness (fatigue).  · Red, flaking, dry skin in the treated area.  · A sunburn-like rash on the skin in the treated area.  · Itching in the treated area.  Other side effects may occur, depending on which part of the body was exposed to radiation and how much radiation was used. These side effects may include:  · Hair loss if the radiation therapy was directed to the head.  · Coughing or difficulty swallowing if the radiation therapy was directed to the head, neck, or chest.  · A type of swelling called lymphedema if the radiation therapy was directed to the head, neck, or chest.  · Nausea, vomiting, or diarrhea if the radiation therapy was directed to the abdomen or pelvis.  · Bladder problems, urinating often, or a lowered ability or desire to have sex (sexual dysfunction). These problems may happen if the radiation therapy was directed to the bladder, kidney, or prostate.  · Memory loss and thinking problems (cognitive changes) if the radiation therapy was directed to the brain.  Some side effects may show up months to years later. However, most side effects are usually temporary and get better over time. It can take up to 3-4 weeks for you to regain your energy or for side effects to get better.  Follow these instructions at home:  Radiation therapy affects everyone differently. Some people do not have side effects. You can take steps at home to help prevent or manage side effects.  Skin care    · Wash your skin with a mild soap as told by your health care provider. Do not scrub or rub your skin. Pat yourself dry.  · Use a mild shampoo and be gentle when washing your hair.  · Apply gentle lotion  or cream to the treated area as told by your health care provider.  · Keep the treated area covered when you are outside. Do not expose treated skin to the sun.  · Avoid scratching the treated area.  Eating and drinking  · Eat small nutritious meals and snacks regularly during the day.  · Choose bland and soft foods that are easy to eat.  · Follow your health care provider's advice on the type and amount of liquids to drink each day.  · If you have diarrhea, drink plenty of clear fluids.  General instructions  · Do not use a heating pad or a warm cloth to relieve pain in the treated area.  · Take over-the-counter and prescription medicines only as told by your health care provider.  · Try to maintain your weight during treatment. Ask your health care team for tips.  · Keep all follow-up visits as told by your health care provider. This is important. The visits are usually scheduled 6 weeks to 6 months after radiation therapy. They are needed to determine if the radiation therapy worked as expected.  Contact a health care provider if:  1. You have any of the following in the treated area:  ? Pain.  ? More redness.  ? Open skin or blisters.  2. You have a fever.  3. You have nausea or vomiting that lasts more than 2 days.  4. You have diarrhea that lasts longer than 2 days.  5. You lose weight without trying to.  Get help right away if you:  · Are unable to swallow.  · Have difficulty breathing.  · Have severe vomiting or diarrhea.  Summary  · After this procedure, it is common to have tiredness (fatigue), skin changes, and other side effects, depending on where the radiation therapy was given.  · Some side effects may show up months to years later. However, most side effects are usually temporary and get better over time. It can take up to 3-4 weeks for you to regain your energy or for side effects to get better.  · Keep all follow-up visits as told by your health care provider. This is important. The visits are  usually scheduled 6 weeks to 6 months after radiation therapy.  This information is not intended to replace advice given to you by your health care provider. Make sure you discuss any questions you have with your health care provider.  Document Revised: 08/31/2020 Document Reviewed: 08/31/2020  [employer] Patient Education © 2021 [employer]      Pt educated on Skin care to the breast during radiation treatments.

## 2021-09-10 ENCOUNTER — TELEPHONE (OUTPATIENT)
Dept: SURGERY | Facility: CLINIC | Age: 66
End: 2021-09-10

## 2021-09-10 ENCOUNTER — CLINICAL SUPPORT (OUTPATIENT)
Dept: SURGERY | Facility: CLINIC | Age: 66
End: 2021-09-10

## 2021-09-10 NOTE — PROGRESS NOTES
Patient came into office today for right axilla J Luis drain removal.    Patient tolerated removal very well. Dressed with 4x4, patient will keep clean and dry x 2 days.     Patient is healing well.

## 2021-09-10 NOTE — TELEPHONE ENCOUNTER
Right Axillary KHAI drain     OR Date: 8/10/21    9/1-60 cc  9/2- 65 cc  9/3- 40 cc  9/4- 60 cc  9/5- 40 cc   9/6- 40 cc  9/7- 40 cc  9/8- 20 cc  9/9- 20 cc  9/10- 5 Morning only

## 2021-09-14 NOTE — PROGRESS NOTES
RM:________    Referral Provider: Jaime Azul MD Archemetre, Rose, MD    NEW PATIENT/ CONSULT  PREVIOUS CARDIOLOGIST:   CARDIAC TESTING:     : 1955   AGE: 66 y.o.    2021  REASON FOR VISIT/  CC:      WT: ____________ BP: __________L __________R/ HR_______________    CHEST PAIN: _____________    SOA: ____________PALPS: __________      LIGHTHEADED: ___________ FATIGUE: _______________ EDEMA______________  ALLERGIES:  Amlodipine, Hydrochlorothiazide, Morphine, and Adhesive tape  SMOKING HISTORY  Social History     Tobacco Use   • Smoking status: Never Smoker   • Smokeless tobacco: Never Used   Vaping Use   • Vaping Use: Never used   Substance Use Topics   • Alcohol use: Never   • Drug use: Never          CHILDREN: _______________________       CAFFEINE USE________  ALCOHOL _____________  OCCUPATION_____________  Past Surgical History:   Procedure Laterality Date   • BREAST EXCISIONAL BIOPSY Bilateral    • COLONOSCOPY     • CYSTOSCOPY BLADDER STONE LITHOTRIPSY     • ENDOSCOPY     • KNEE ARTHROPLASTY Bilateral    • MASTECTOMY Left 8/10/2021    Procedure: LEFT TOTAL MASTECTOMY;  Surgeon: Nicci Banks MD;  Location: [facility];  Service: General;  Laterality: Left;   • MASTECTOMY Right 8/10/2021    Procedure: RIGHT MODIFIED RADICAL MASTECTOMY;  Surgeon: Nicci Banks MD;  Location: [facility];  Service: General;  Laterality: Right;   • SPINE SURGERY     • TOTAL ABDOMINAL HYSTERECTOMY WITH SALPINGO OOPHORECTOMY     • UPPER GASTROINTESTINAL ENDOSCOPY     • VENOUS ACCESS DEVICE (PORT) INSERTION N/A 2021    Procedure: INSERTION VENOUS ACCESS DEVICE;  Surgeon: Nicci Banks MD;  Location: [facility];  Service: General;  Laterality: N/A;                FAMILY HISTORY  HEART DISEASE  CHF  DIABETES  CARDIAC ARREST  STROKE  CANCER  ANEURYSM                                                             H/O: MI_____   STROKE________   GOUT_____    ANEMIA______     CAROTID________ HIV____ CAD_______ HYPERCHOL _____    H/O: CHF _____   RF____ DM___ HTN_______PVD____THYROID DISEASE_______    PMH: GI ____   HEPATITIS ___ KIDNEY DISEASE ___ LUNG DISEASE _______     SLEEP APNEA ____ BLOOD CLOTS ____ DVT ____ VEIN STRIPPING ___________     CANCER _________________________________ CHEMO/ RADIATION__________

## 2021-09-16 ENCOUNTER — HOSPITAL ENCOUNTER (OUTPATIENT)
Dept: BONE DENSITY | Facility: HOSPITAL | Age: 66
Discharge: HOME | End: 2021-09-16
Admitting: INTERNAL MEDICINE

## 2021-09-16 ENCOUNTER — MDT ASSESSMENT (OUTPATIENT)
Dept: OTHER | Facility: HOSPITAL | Age: 66
End: 2021-09-16

## 2021-09-16 DIAGNOSIS — C50.911 INFLAMMATORY BREAST CANCER, RIGHT (HCC): ICD-10-CM

## 2021-09-16 DIAGNOSIS — Z09 ENCOUNTER FOR FOLLOW-UP EXAMINATION AFTER COMPLETED TREATMENT FOR CONDITIONS OTHER THAN MALIGNANT NEOPLASM: ICD-10-CM

## 2021-09-16 DIAGNOSIS — Z79.899 ENCOUNTER FOR LONG-TERM (CURRENT) USE OF HIGH-RISK MEDICATION: ICD-10-CM

## 2021-09-16 DIAGNOSIS — C50.611 MALIGNANT NEOPLASM OF AXILLARY TAIL OF RIGHT FEMALE BREAST, UNSPECIFIED ESTROGEN RECEPTOR STATUS (HCC): ICD-10-CM

## 2021-09-16 PROCEDURE — 77080 DXA BONE DENSITY AXIAL: CPT

## 2021-09-16 NOTE — PROGRESS NOTES
MULTIDISCIPLINARY TREATMENT PLANNING CONFERENCE  DATE: 9/17/2021      SPECIALTY: Breast Conference    PRESENTER: Rina Azul MD    SITE: Right Breast         Please discuss clinical/working stage, TNM, Stage Group, National Treatment Guidelines, and Prognostic Indicators.       CONFERENCE SUMMARY:  Patient with inflammatory breast cancer triple positive treated with Adriamycin Cytoxan Taxol Perjeta Herceptin neoadjuvant lady with a complete pathological response at surgery.  Continue Perjeta Herceptin because of high risk pretreatment stage despite path CR radiation and aromatase inhibitor plan                          AJCC STAGE: [id number] with contralateral DCIS      REFERRAL SUPPORT   Psychosocial Assessment:  []                Clinical Trials:  []                Genetic Testing:  []                Geriatric Assessment:  []                Smoking Cessation:  []                Nutrition Assessment:  []                Social Work Evaluation/Barriers to Care:  []                Behavioral Oncology Evaluation:  []                Palliative Care:  []      EVIDENCE BASED NATIONAL TREATMENT GUIDELINES:  []                   SOCIAL HISTORY:   reports that she has never smoked. She has never used smokeless tobacco. She reports that she does not drink alcohol and does not use drugs.                  PAST MEDICAL HISTORY:   has a past medical history of Anemia, Anxiety, Asthma, Chronic diastolic (congestive) heart failure (CMS/HCC), Chronic hypoxemic respiratory failure (CMS/HCC), Diarrhea, H/O Intraductal papilloma, Hemorrhoids, History of transfusion (1984), Hypertension, IBS (irritable bowel syndrome), Inflammatory breast cancer (CMS/HCC), Kidney stone, Morbid obesity with BMI of 50.0-59.9, adult (CMS/HCC), Obesity hypoventilation syndrome (CMS/HCC) (2/5/2021), On home oxygen therapy, ANABELLE on CPAP, Osteoarthritis, and PONV (postoperative nausea and vomiting).                  PAST SURGICAL HISTORY:  @                  IMAGING:  No radiology results for the last 90 days.     11/18/2020:    PROCEDURE: DIGITAL DIAGNOSTIC BILAT WALDEMAR WITH R2 CAD AND 3D DAVID         COMPARISON: [facility], MG, DIG SCREENING BILAT WALDEMAR W 3D DAVID, 1/10/2020, 7:13.         INDICATIONS: RIGHT BREAST FULLNESS BEHIND THE NIPPLE.         IMPRESSION:    1. New skin thickening along the anterior aspect of the right breast of uncertain etiology.      Ultrasound performed same day demonstrated skin thickening and edema in this area.  Findings could     be related to developing cellulitis.  Clinical correlation recommended.      2.  Stable postoperative architectural distortion within both breast as detailed above.      PROCEDURE: RIGHT BREAST ULTRASOUND, LIMITED         COMPARISON: [facility], US, US BREAST RIGHT LIMITED, 9/07/2018, 11:01.         INDICATIONS: RIGHT BREAST PAIN        CONCLUSION:        1. Mild skin thickening and edema within the periareolar subcutaneous and superficial fat which     could be related to developing cellulitis.  Clinical correlation recommended.  No evidence of     abscess or solid mass lesion.      1/20/2021:    CT ABDOMEN PELVIS WITH CONTRAST-, CT CHEST WITH CONTRAST-       CT ABDOMEN PELVIS WITH CONTRAST-, CT CHEST WITH CONTRAST-     CT FINDINGS: The right breast is only partially within the  field-of-view. There is skin thickening with considerable induration of  the parenchyma. Post biopsy markers in position. There is right axillary  adenopathy. Largest lymph node is approximately 3.5 cm in length. The  left breast is only partially within the field-of-view. No left axillary  adenopathy.     No lytic or sclerotic bone lesion.     No pleural effusion, lung consolidation or suspicious pulmonary lesion.  There are fine linear areas of scarring versus discoid atelectasis  within both lungs.     Heavy coronary artery calcification. Mild cardiac enlargement, no  pericardial effusion. Esophagus is  satisfactory in course and caliber.  Few small mediastinal lymph nodes with the largest in the infracarinal  space, 15 mm in greatest length. No adenopathy. Atherosclerotic  calcification of a normal diameter aorta.     There is a 2.5 x 2.2 cm right adrenal mass, it is indeterminant and a  metastatic deposit is not excluded. If there is any old cross-sectional  imaging, this would be helpful to review. None at this institution. The  left adrenal gland is normal.     The liver, gallbladder, pancreas and spleen are satisfactory in  appearance. The stomach is within normal limits. The right kidney is  normal. There is a 2-3 mm nonobstructing left renal calculus, the left  kidney is otherwise normal. The ureters are satisfactory in course and  caliber to the urinary bladder which is collapsed. Diameter of the  abdominal aorta is normal.     Large and small bowel within normal limits. In the left external iliac  chain, there is an enlarged 27 x 11 mm lymph node. No other enlarged  pelvic or abdominal lymph node demonstrated. This is likely an old  reactive node.     CONCLUSION:  1. Right breast partially within the field-of-view, there is skin  thickening and parenchymal induration.  2. No indication of intrathoracic metastatic disease.  3. No lytic or sclerotic bone lesion.  4. Enlarged right adrenal gland, indeterminant. Metastatic deposits not  excluded.  5. Likely old left external iliac chain lymph node.    1/29/2021:    F-18 FDG PET FROM SKULL BASE TO MID THIGH WITH PET/CT FUSION     FINDINGS:      Evaluation is significantly suboptimal due to patient body habitus and  respiratory motion artifact.     No cervical adenopathy demonstrating FDG uptake significantly above that  of blood pool is present. The thyroid, submandibular and parotid glands  demonstrate roughly symmetric FDG uptake.     There is no hilar, mediastinal or left axillary adenopathy demonstrating  FDG uptake significantly above that of blood pool.      Extensive intensely FDG avid right axillary and subpectoral adenopathy  is present. Index most inferior right axillary node measures 1.8 cm in  short axis dimension with a max SUV of 18.9. Index right subpectoral  node measures 0.7 cm in short axis dimension with a max SUV of 6.6.     Extensive asymmetric soft tissue thickening is present throughout the  right breast with overlying skin thickening as well. It demonstrates  relatively diffuse moderate to intense FDG uptake demonstrate a max SUV  of approximately 8.4.     There is no pulmonary consolidation, pleural effusion or pneumothorax.  Sub-6 mm pleural-based nodule within the superior aspect of the right  lower lobe is present and below PET resolution. No FDG avid pulmonary  nodules are present.     Short segment of moderate to intense FDG uptake is present within the  distal esophagus and GE junction demonstrating a max SUV of 7.1.     Uptake within the liver is heterogenous without a definite focal area of  FDG uptake significantly above that of the remainder of the liver, the  gallbladder, pancreas, spleen or kidneys. Hepatic steatosis present.  Left nephrolithiasis is present and measures up to approximately 1-2 mm.  There is no hydronephrosis.     There is a hypodense lesion within the right adrenal gland measuring 2.5  cm. It demonstrates a density of less than 0 Hounsfield units and does  not demonstrate FDG uptake significantly above that of background liver  with a max SUV of 6.4 compared with background liver of 6.1.     There is no abdominopelvic adenopathy demonstrating FDG uptake  significantly above that of blood pool. No free intraperitoneal air or  fluid is present.     FDG uptake throughout the axial and appendicular skeleton is  heterogenous without a focal area demonstrating FDG uptake significantly  above that of the remainder of the visualized bones. There is densely  sclerotic appearance of the right fourth rib posterior medially  with  what appears to be associated callus formation. There is no significant  FDG uptake in this location above that of adjacent marrow. No increased  uptake was visualized on recent skeletal scintigraphy. Mild to moderate  anterolisthesis of L5 on S1 is present. Cortical irregularity along the  distal aspect of the sternum is favored to be secondary to respiratory  motion artifact in this area given the appearance as well as similar  defect within the overlying skin this level.     IMPRESSION:  1.  Moderate to intensely FDG avid asymmetric soft tissue and skin  thickening involving the right breast likely representing patient's  known malignancy.  2.  Intensely FDG avid right axillary and subpectoral adenopathy likely  represent metastatic disease.  3.  Constellation of findings within the right adrenal gland are favored  to represent a lipid rich adenoma. Continued attention on follow-up is  recommended to ensure stability.  4.  While there are no findings of definite FDG avid osseous metastasis,  given the heterogenous FDG uptake throughout the axial and appendicular  skeleton due to the above stated limitations, subtle underlying osseous  metastasis would remain occult. Therefore, continued close attention on  follow-up is recommended to exclude this possibility.  5.  Short segment of moderate to intense FDG uptake within the distal  esophagus and GE junction suggestive of esophagitis. In the appropriate  clinical context correlation with patient history is recommended with  follow-up endoscopy if clinically indicated.  6.  Sub-6 mm pulmonary nodule within the right lower lobe is below PET  resolution and indeterminate. Continued close attention on follow-up  with chest CT in 3 months is recommended to exclude metastatic disease.  7.  Other findings as above.      4/13/2021:    PROCEDURE: CT CHEST W/ CONTRAST         COMPARISON: [facility], CT, CHEST W/O CONTRAST, 1/15/2019, 9:43.          INDICATIONS: SHORTNESS OF BREATH/HIGH RISK MED USE (TAXOL)/INFLAMATORY BREAST CANCER           FINDINGS:     There are few scattered thin bandlike linear densities in both lungs which have increased from the     previous exam and represent either pulmonary scars versus subsegmental atelectasis.  There are some     nonspecific areas of ground-glass density throughout both lungs.  This can be seen with an     infectious or inflammatory process.  There are no layering pleural effusions.  There is a     left-sided port in place.  There are a few non-pathologically enlarged mediastinal lymph nodes     incidentally noted.  There are calcified left hilar and subcarinal lymph nodes indicating old     healed granulomatous disease.  There are atherosclerotic vascular calcifications which includes     involvement of the coronary arteries.  There is a round hypodense mass in the right adrenal gland.      It measures 2.9 x 1.8 cm.  The Hounsfield unit density is suggestive of a benign adenoma.  There     are no acute findings seen beneath the hemidiaphragms.  There are no suspicious osteolytic or     sclerotic lesions within the bony thorax.         CONCLUSION:     1. There are a few scattered thin bandlike linear densities which have increased from the previous     exam representing either pulmonary scars versus subsegmental atelectasis.    2. Hazy areas of ground-glass density have developed in both lungs.  This is nonspecific.  This can     be seen with an infectious or inflammatory process.    3. Chronic calcific granulomatous disease.    4. Stable benign adenoma in the right adrenal gland.                    SURGICAL PROCEDURE / PATHOLOGY:      1/19/2021: [id number] (OPUS)        8/10/2021: [id number] [facility]                        Labs & Biomarkers:      1/19/2021:         8/10/2021:

## 2021-09-17 ENCOUNTER — HOSPITAL ENCOUNTER (OUTPATIENT)
Dept: CARDIOLOGY | Facility: HOSPITAL | Age: 66
Discharge: HOME | End: 2021-09-17
Admitting: INTERNAL MEDICINE

## 2021-09-17 ENCOUNTER — OFFICE VISIT (OUTPATIENT)
Dept: CARDIOLOGY | Facility: CLINIC | Age: 66
End: 2021-09-17

## 2021-09-17 VITALS
BODY MASS INDEX: 48.82 KG/M2 | SYSTOLIC BLOOD PRESSURE: 146 MMHG | WEIGHT: 293 LBS | HEIGHT: 65 IN | HEART RATE: 81 BPM | DIASTOLIC BLOOD PRESSURE: 74 MMHG

## 2021-09-17 VITALS
HEIGHT: 65 IN | SYSTOLIC BLOOD PRESSURE: 140 MMHG | BODY MASS INDEX: 48.82 KG/M2 | DIASTOLIC BLOOD PRESSURE: 80 MMHG | OXYGEN SATURATION: 93 % | HEART RATE: 75 BPM | WEIGHT: 293 LBS

## 2021-09-17 DIAGNOSIS — I10 HYPERTENSION, ESSENTIAL: ICD-10-CM

## 2021-09-17 DIAGNOSIS — E11.42 TYPE 2 DIABETES MELLITUS WITH DIABETIC POLYNEUROPATHY, WITHOUT LONG-TERM CURRENT USE OF INSULIN (HCC): ICD-10-CM

## 2021-09-17 DIAGNOSIS — G47.33 OBSTRUCTIVE SLEEP APNEA: ICD-10-CM

## 2021-09-17 DIAGNOSIS — C50.611 MALIGNANT NEOPLASM OF AXILLARY TAIL OF RIGHT BREAST IN FEMALE, ESTROGEN RECEPTOR NEGATIVE (HCC): Primary | ICD-10-CM

## 2021-09-17 DIAGNOSIS — E66.01 CLASS 3 SEVERE OBESITY DUE TO EXCESS CALORIES WITH SERIOUS COMORBIDITY AND BODY MASS INDEX (BMI) OF 50.0 TO 59.9 IN ADULT (HCC): ICD-10-CM

## 2021-09-17 DIAGNOSIS — Z17.1 MALIGNANT NEOPLASM OF AXILLARY TAIL OF RIGHT BREAST IN FEMALE, ESTROGEN RECEPTOR NEGATIVE (HCC): Primary | ICD-10-CM

## 2021-09-17 DIAGNOSIS — E78.2 MIXED HYPERLIPIDEMIA: ICD-10-CM

## 2021-09-17 DIAGNOSIS — Z79.899 ENCOUNTER FOR LONG-TERM (CURRENT) USE OF HIGH-RISK MEDICATION: ICD-10-CM

## 2021-09-17 PROCEDURE — 93000 ELECTROCARDIOGRAM COMPLETE: CPT | Performed by: INTERNAL MEDICINE

## 2021-09-17 PROCEDURE — 93321 DOPPLER ECHO F-UP/LMTD STD: CPT | Performed by: INTERNAL MEDICINE

## 2021-09-17 PROCEDURE — 93356 MYOCRD STRAIN IMG SPCKL TRCK: CPT

## 2021-09-17 PROCEDURE — 93308 TTE F-UP OR LMTD: CPT | Performed by: INTERNAL MEDICINE

## 2021-09-17 PROCEDURE — 93308 TTE F-UP OR LMTD: CPT

## 2021-09-17 PROCEDURE — 93356 MYOCRD STRAIN IMG SPCKL TRCK: CPT | Performed by: INTERNAL MEDICINE

## 2021-09-17 PROCEDURE — 93325 DOPPLER ECHO COLOR FLOW MAPG: CPT

## 2021-09-17 PROCEDURE — 93321 DOPPLER ECHO F-UP/LMTD STD: CPT

## 2021-09-17 PROCEDURE — 93325 DOPPLER ECHO COLOR FLOW MAPG: CPT | Performed by: INTERNAL MEDICINE

## 2021-09-17 PROCEDURE — 99213 OFFICE O/P EST LOW 20 MIN: CPT | Performed by: INTERNAL MEDICINE

## 2021-09-17 PROCEDURE — 25010000002 PERFLUTREN (DEFINITY) 8.476 MG IN SODIUM CHLORIDE (PF) 0.9 % 10 ML INJECTION: Performed by: INTERNAL MEDICINE

## 2021-09-17 RX ADMIN — PERFLUTREN 1.5 ML: 6.52 INJECTION, SUSPENSION INTRAVENOUS at 13:08

## 2021-09-17 NOTE — PROGRESS NOTES
Date of Office Visit: 21  Encounter Provider: Bradley Garza MD  Place of Service: [facility] CARDIOLOGY  Patient Name: Aida Conner  :1955    Chief Complaint   Patient presents with   • Cancer   :     HPI:     Ms. Conner is 66 y.o. and presents today to follow up. I met her in 2021.     She is an established cardiac patient of Dr. Santana in [place] and will be continuing to see him for her general cardiology needs.      She has chronic diastolic congestive heart failure.  She has obstructive sleep apnea and is on CPAP.  She has obesity hypoventilation syndrome and actually has to use oxygen at all times.  She has systemic hypertension.  She has coronary artery calcification seen on a chest CT but does not carry a diagnosis of coronary artery disease.  She has type 2 diabetes, hyperlipidemia, and is morbidly obese.    She was diagnosed with right sided inflammatory breast cancer (ER/MN/Her2+)  in late . She had an echo in 2021 that showed normal LVSF, grade 1 diastolic dysfunction, and normal GLS -21%. aclitaxel, trastuzumab, and pertuzumab on February 10.  She had an echo performed in our office on .  Revealed normal left ventricular systolic function, ejection fraction 64%, grade 1 diastolic dysfunction, normal global longitudinal strain of -21%, and normal RVSP.     She was intended to take doxorubicin as well as trastuzumab/pertuzumab. She was already on carvedilol, and I added lisinopril. While on doxorubicin, there was a recommendation to switch her beta blocker due to concerns that it could increase doxorubicin levels, but she was reluctant to do so as it had been started by her primary cardiologist.     Ultimately, she received only 3 doses of doxorubicin/cyclophosphamide before it was stopped due to intolerance. Paclitaxel caused pneumonitis and had to be stopped. She has been on trastuzumab, with plans to add pertuzumab soon.  There is a plan to ultimately start an aromatase inhibitor if her bone density is adequate. She has also been referred for right sided chest radiation.     She had echocardiograms in April, June, and again today, which were unchanged (LVEF 64%), GLS -21-22%.     Her cardiac status is stable; she has chronic dyspnea and is on oxygen. She has no chest pain, palpitations, or syncope. She had been taking 20mg of furosemide daily, but has required some additional diuretics depending on her pedal edema. She denies orthopnea.     Past Medical History:   Diagnosis Date   • Anemia    • Anxiety    • Asthma    • Chronic diastolic (congestive) heart failure (CMS/HCC)    • Chronic hypoxemic respiratory failure (CMS/HCC)     on continuous O2   • Diarrhea     with chemo   • H/O Intraductal papilloma    • Hemorrhoids    • History of transfusion 1984    AFTER BACK SURGERY no reaction   • Hypertension    • IBS (irritable bowel syndrome)    • Inflammatory breast cancer (CMS/HCC)     right   • Kidney stone    • Morbid obesity with BMI of 50.0-59.9, adult (CMS/HCC)    • Obesity hypoventilation syndrome (CMS/HCC) 2/5/2021   • On home oxygen therapy     2.5 AT REST WITH ACTIVITY UP TO 4L   • ANABELLE on CPAP     cpap  & uses O2 with CPAP   • Osteoarthritis    • PONV (postoperative nausea and vomiting)        Past Surgical History:   Procedure Laterality Date   • BREAST EXCISIONAL BIOPSY Bilateral    • COLONOSCOPY     • CYSTOSCOPY BLADDER STONE LITHOTRIPSY     • ENDOSCOPY     • KNEE ARTHROPLASTY Bilateral 2009   • MASTECTOMY Left 8/10/2021    Procedure: LEFT TOTAL MASTECTOMY;  Surgeon: Nicci Banks MD;  Location: [facility];  Service: General;  Laterality: Left;   • MASTECTOMY Right 8/10/2021    Procedure: RIGHT MODIFIED RADICAL MASTECTOMY;  Surgeon: Nicci Banks MD;  Location: [facility] OR;  Service: General;  Laterality: Right;   • SPINE SURGERY  1984   • TOTAL ABDOMINAL HYSTERECTOMY WITH SALPINGO OOPHORECTOMY  2004   •  UPPER GASTROINTESTINAL ENDOSCOPY     • VENOUS ACCESS DEVICE (PORT) INSERTION N/A 1/25/2021    Procedure: INSERTION VENOUS ACCESS DEVICE;  Surgeon: Nicci Banks MD;  Location: [facility];  Service: General;  Laterality: N/A;       Social History     Socioeconomic History   • Marital status:      Spouse name: Not on file   • Number of children: 3   • Years of education: Not on file   • Highest education level: Not on file   Tobacco Use   • Smoking status: Never Smoker   • Smokeless tobacco: Never Used   Vaping Use   • Vaping Use: Never used   Substance and Sexual Activity   • Alcohol use: Never   • Drug use: Never   • Sexual activity: Defer       Family History   Problem Relation Age of Onset   • Breast cancer Mother 45   • Colon cancer Paternal Grandmother    • Breast cancer Maternal Aunt 70   • Breast cancer Paternal Aunt 70   • Lung cancer Father    • Hodgkin's lymphoma Father    • Skin cancer Father         squamous cell   • Ovarian cancer Neg Hx    • Uterine cancer Neg Hx    • Deep vein thrombosis Neg Hx    • Pulmonary embolism Neg Hx    • Malig Hyperthermia Neg Hx        Review of Systems   Constitutional: Positive for malaise/fatigue.   Cardiovascular: Positive for leg swelling.   Respiratory: Positive for shortness of breath.    Skin: Positive for poor wound healing and suspicious lesions.   All other systems reviewed and are negative.      Allergies   Allergen Reactions   • Amlodipine Swelling     LEGS    • Hydrochlorothiazide Unknown - Low Severity     Neuro issues   • Morphine Nausea And Vomiting     hallucinations   • Adhesive Tape Rash         Current Outpatient Medications:   •  albuterol sulfate  (90 Base) MCG/ACT inhaler, Inhale 2 puffs Every 4 (Four) Hours As Needed., Disp: , Rfl:   •  carvedilol (COREG) 25 MG tablet, Take 0.5 tablets by mouth 2 (Two) Times a Day With Meals., Disp: 30 tablet, Rfl: 0  •  Cholecalciferol (Vitamin D) 50 MCG (2000 UT) capsule, Take 2,000 Units by  mouth Daily., Disp: , Rfl:   •  dicyclomine (BENTYL) 20 MG tablet, Take 1 tablet by mouth Every 6 (Six) Hours., Disp: 60 tablet, Rfl: 2  •  diphenhydrAMINE (BENADRYL) 25 mg capsule, Take 25 mg by mouth At Night As Needed for Itching, Allergies or Sleep., Disp: , Rfl:   •  FLUoxetine (PROzac) 40 MG capsule, TAKE 1 CAPSULE BY MOUTH EVERY DAY, Disp: 30 capsule, Rfl: 0  •  furosemide (LASIX) 40 MG tablet, Take 20 mg by mouth Daily., Disp: , Rfl:   •  gabapentin (NEURONTIN) 100 MG capsule, Take 1 capsule by mouth 3 (Three) Times a Day for 30 days., Disp: 90 capsule, Rfl: 0  •  Lidocaine, Anorectal, (LMX 5) 5 % cream cream, Apply  topically to the appropriate area as directed 3 (Three) Times a Day As Needed (hemmorrhoid pain)., Disp: 45 g, Rfl: 2  •  lisinopril (PRINIVIL,ZESTRIL) 5 MG tablet, Take 1 tablet by mouth Daily., Disp: 30 tablet, Rfl: 6  •  miconazole (Lotrimin AF) 2 % powder, Apply  topically to the appropriate area as directed 2 (two) times a day., Disp: 85 g, Rfl: 1  •  mometasone-formoterol (DULERA 100) 100-5 MCG/ACT inhaler, Inhale 2 Puffs/kg Every Night., Disp: , Rfl:   •  montelukast (SINGULAIR) 10 MG tablet, Take 10 mg by mouth Every Night., Disp: , Rfl:   •  multivitamin (THERAGRAN) tablet tablet, Take 1 tablet by mouth Daily., Disp: , Rfl:   •  O2 (OXYGEN), Inhale 2.5 L/min Continuous., Disp: , Rfl:   •  omeprazole (priLOSEC) 20 MG capsule, TAKE 1 CAPSULE BY MOUTH EVERY DAY, Disp: 30 capsule, Rfl: 3  •  ondansetron (ZOFRAN) 8 MG tablet, Take 1 tablet by mouth 3 (Three) Times a Day As Needed for Nausea or Vomiting., Disp: 30 tablet, Rfl: 5  •  Potassium 99 MG tablet, Take 1 tablet by mouth Every Night., Disp: , Rfl:   •  pravastatin (PRAVACHOL) 40 MG tablet, Take 40 mg by mouth Every Night., Disp: , Rfl:   •  psyllium (METAMUCIL) 0.52 g capsule, Metamucil 0.52 gram oral capsule take 1 capsule by oral route 5Xday   Active, Disp: , Rfl:   •  spironolactone (ALDACTONE) 25 MG tablet, Take 25 mg by mouth  Daily., Disp: , Rfl:       Objective:     There were no vitals filed for this visit.  There is no height or weight on file to calculate BMI.    Vitals reviewed.   Constitutional:       Appearance: Not in distress. Morbidly obese.      Comments: On oxygen. Morbidly obese.   Eyes:      Conjunctiva/sclera: Conjunctivae normal.   HENT:      Head: Normocephalic.      Nose: Nose normal.         Comments: Masked  Neck:      Comments: Cannot assess for JVD due to habitus  Pulmonary:      Effort: Pulmonary effort is normal.      Breath sounds: Normal breath sounds.   Cardiovascular:      Normal rate. Regular rhythm.      Murmurs: There is no murmur.   Pulses:     Intact distal pulses.   Edema:     Ankle: bilateral 2+ edema of the ankle.     Feet: bilateral 2+ edema of the feet.  Abdominal:      Palpations: Abdomen is soft.      Tenderness: There is no abdominal tenderness.      Comments: Obesity limits abdominal exam   Musculoskeletal: Normal range of motion.      Cervical back: Normal range of motion. Skin:     General: Skin is warm and dry.      Findings: No rash.   Neurological:      General: No focal deficit present.      Mental Status: Alert and oriented to person, place, and time.      Cranial Nerves: No cranial nerve deficit.   Psychiatric:         Behavior: Behavior normal.         Thought Content: Thought content normal.         Judgment: Judgment normal.           ECG 12 Lead    Date/Time: 9/17/2021 1:27 PM  Performed by: Bradley Garza MD  Authorized by: Bradley Garza MD   Comparison: compared with previous ECG   Similar to previous ECG  Rhythm: sinus rhythm  Conduction: right bundle branch block  ST Segments: ST segments normal  T Waves: T waves normal  QRS axis: normal  Other: no other findings    Clinical impression: abnormal EKG            Assessment:       Diagnosis Plan   1. Malignant neoplasm of axillary tail of right breast in female, estrogen receptor negative (CMS/HCC)     2. Hypertension, essential      3. Mixed hyperlipidemia     4. Type 2 diabetes mellitus with diabetic polyneuropathy, without long-term current use of insulin (CMS/[facility])     5. Class 3 severe obesity due to excess calories with serious comorbidity and body mass index (BMI) of 50.0 to 59.9 in adult (CMS/[facility])     6. Obstructive sleep apnea        Plan:       Ms. Conner has chronic diastolic congestive heart failure, coronary artery calcification without symptoms of angina, systemic hypertension, sleep apnea, obesity hypoventilation syndrome on chronic oxygen, type 2 diabetes, and she is morbidly obese.  She received three doses of doxorobucin but did not tolerate it well.  She has started trastuzumab and will be start pertuzumab. She may be starting an aromatase inhibitor in the future.    She has had serial echocardiograms which have shown stable left-ventricular systolic function, ejection fraction 60-65%, and stable global longitudinal strain, -21-22%.  She will receive another echocardiogram in 3 months and see me back in 6 months.  She will remain on carvedilol and lisinopril.    She does not take her diuretic on days where she has appointments.  As a result, she has more leg swelling than usual.  I encouraged her to take 40 mg of furosemide daily for the next 2 days and to keep her legs elevated.    I will touch base with Dr. Azul to see how long her intended treatment with trastuzumab is going to be.    Sincerely,       Bradley Garza MD

## 2021-09-20 LAB
BH CV ECHO MEAS - BSA(HAYCOCK): 2.8 M^2
BH CV ECHO MEAS - BSA: 2.5 M^2
BH CV ECHO MEAS - BZI_BMI: 56.7 KILOGRAMS/M^2
BH CV ECHO MEAS - BZI_METRIC_HEIGHT: 165.1 CM
BH CV ECHO MEAS - BZI_METRIC_WEIGHT: 154.7 KG
BH CV ECHO MEAS - EDV(CUBED): 191.2 ML
BH CV ECHO MEAS - EDV(MOD-SP2): 121 ML
BH CV ECHO MEAS - EDV(MOD-SP4): 150 ML
BH CV ECHO MEAS - EDV(TEICH): 164 ML
BH CV ECHO MEAS - EF(CUBED): 72.2 %
BH CV ECHO MEAS - EF(MOD-BP): 63.4 %
BH CV ECHO MEAS - EF(MOD-SP2): 64.5 %
BH CV ECHO MEAS - EF(MOD-SP4): 64 %
BH CV ECHO MEAS - EF(TEICH): 63.2 %
BH CV ECHO MEAS - ESV(CUBED): 53.1 ML
BH CV ECHO MEAS - ESV(MOD-SP2): 43 ML
BH CV ECHO MEAS - ESV(MOD-SP4): 54 ML
BH CV ECHO MEAS - ESV(TEICH): 60.4 ML
BH CV ECHO MEAS - FS: 34.7 %
BH CV ECHO MEAS - IVS/LVPW: 1
BH CV ECHO MEAS - IVSD: 1.2 CM
BH CV ECHO MEAS - LAT PEAK E' VEL: 11.4 CM/SEC
BH CV ECHO MEAS - LV DIASTOLIC VOL/BSA (35-75): 60.4 ML/M^2
BH CV ECHO MEAS - LV MASS(C)D: 296.2 GRAMS
BH CV ECHO MEAS - LV MASS(C)DI: 119.4 GRAMS/M^2
BH CV ECHO MEAS - LV SYSTOLIC VOL/BSA (12-30): 21.8 ML/M^2
BH CV ECHO MEAS - LVIDD: 5.8 CM
BH CV ECHO MEAS - LVIDS: 3.8 CM
BH CV ECHO MEAS - LVLD AP2: 8.1 CM
BH CV ECHO MEAS - LVLD AP4: 8 CM
BH CV ECHO MEAS - LVLS AP2: 6.9 CM
BH CV ECHO MEAS - LVLS AP4: 7.4 CM
BH CV ECHO MEAS - LVPWD: 1.2 CM
BH CV ECHO MEAS - MED PEAK E' VEL: 8.6 CM/SEC
BH CV ECHO MEAS - MV A DUR: 0.13 SEC
BH CV ECHO MEAS - MV A MAX VEL: 85.7 CM/SEC
BH CV ECHO MEAS - MV DEC SLOPE: 668.9 CM/SEC^2
BH CV ECHO MEAS - MV DEC TIME: 0.15 SEC
BH CV ECHO MEAS - MV E MAX VEL: 101.5 CM/SEC
BH CV ECHO MEAS - MV E/A: 1.2
BH CV ECHO MEAS - MV P1/2T MAX VEL: 102.4 CM/SEC
BH CV ECHO MEAS - MV P1/2T: 44.8 MSEC
BH CV ECHO MEAS - MVA P1/2T LCG: 2.1 CM^2
BH CV ECHO MEAS - MVA(P1/2T): 4.9 CM^2
BH CV ECHO MEAS - PULM A REVS DUR: 0.12 SEC
BH CV ECHO MEAS - PULM A REVS VEL: 33.6 CM/SEC
BH CV ECHO MEAS - PULM DIAS VEL: 60.1 CM/SEC
BH CV ECHO MEAS - PULM S/D: 0.75
BH CV ECHO MEAS - PULM SYS VEL: 44.9 CM/SEC
BH CV ECHO MEAS - SI(CUBED): 55.6 ML/M^2
BH CV ECHO MEAS - SI(MOD-SP2): 31.4 ML/M^2
BH CV ECHO MEAS - SI(MOD-SP4): 38.7 ML/M^2
BH CV ECHO MEAS - SI(TEICH): 41.8 ML/M^2
BH CV ECHO MEAS - SV(CUBED): 138.1 ML
BH CV ECHO MEAS - SV(MOD-SP2): 78 ML
BH CV ECHO MEAS - SV(MOD-SP4): 96 ML
BH CV ECHO MEAS - SV(TEICH): 103.6 ML
BH CV ECHO MEASUREMENTS AVERAGE E/E' RATIO: 10.15
BH CV XLRA - TDI S': 14.4 CM/SEC
LEFT ATRIUM VOLUME INDEX: 35 ML/M2
MAXIMAL PREDICTED HEART RATE: 152 BPM
STRESS TARGET HR: 129 BPM

## 2021-09-28 ENCOUNTER — OFFICE VISIT (OUTPATIENT)
Dept: PULMONOLOGY | Facility: CLINIC | Age: 66
End: 2021-09-28

## 2021-09-28 VITALS
WEIGHT: 293 LBS | DIASTOLIC BLOOD PRESSURE: 69 MMHG | TEMPERATURE: 98.4 F | OXYGEN SATURATION: 98 % | BODY MASS INDEX: 48.82 KG/M2 | HEART RATE: 72 BPM | HEIGHT: 65 IN | SYSTOLIC BLOOD PRESSURE: 126 MMHG | RESPIRATION RATE: 20 BRPM

## 2021-09-28 DIAGNOSIS — E66.01 CLASS 3 SEVERE OBESITY DUE TO EXCESS CALORIES WITH SERIOUS COMORBIDITY AND BODY MASS INDEX (BMI) OF 50.0 TO 59.9 IN ADULT (HCC): ICD-10-CM

## 2021-09-28 DIAGNOSIS — J30.9 ALLERGIC RHINITIS, UNSPECIFIED SEASONALITY, UNSPECIFIED TRIGGER: ICD-10-CM

## 2021-09-28 DIAGNOSIS — G47.33 OBSTRUCTIVE SLEEP APNEA: Primary | ICD-10-CM

## 2021-09-28 DIAGNOSIS — J98.4 RESTRICTIVE AIRWAY DISEASE: ICD-10-CM

## 2021-09-28 DIAGNOSIS — J96.12 CHRONIC RESPIRATORY FAILURE WITH HYPOXIA AND HYPERCAPNIA (HCC): ICD-10-CM

## 2021-09-28 DIAGNOSIS — R06.09 DYSPNEA ON EXERTION: ICD-10-CM

## 2021-09-28 DIAGNOSIS — R91.8 ABNORMAL CT SCAN OF LUNG: ICD-10-CM

## 2021-09-28 DIAGNOSIS — Z23 ENCOUNTER FOR IMMUNIZATION: ICD-10-CM

## 2021-09-28 DIAGNOSIS — J96.11 CHRONIC RESPIRATORY FAILURE WITH HYPOXIA AND HYPERCAPNIA (HCC): ICD-10-CM

## 2021-09-28 PROCEDURE — G0008 ADMIN INFLUENZA VIRUS VAC: HCPCS | Performed by: NURSE PRACTITIONER

## 2021-09-28 PROCEDURE — 99214 OFFICE O/P EST MOD 30 MIN: CPT | Performed by: NURSE PRACTITIONER

## 2021-09-28 PROCEDURE — 90662 IIV NO PRSV INCREASED AG IM: CPT | Performed by: NURSE PRACTITIONER

## 2021-09-28 NOTE — PROGRESS NOTES
Primary Care Provider  Penny Lou MD     Referring Provider  No ref. provider found     Chief Complaint  Follow-up (1mo f/u) and Sleep Apnea    Subjective          Aida Conner presents to [facility] PULMONARY & CRITICAL CARE MEDICINE  History of Present Illness  Aida Conner is a 66 y.o. female patient of Dr. Anaya with a history of obstructive sleep apnea, chronic hypoxic and hypercapnic respiratory failure, COPD, breast cancer, and an abnormal chest CT showing groundglass opacities.  She is here for follow-up visit today.    Patient is that she is doing well since her last visit.  She denies using antibiotics or steroids for her lungs.  She recently had a bilateral mastectomy and chemotherapy.  She states that she has not yet started her radiation therapy and has a follow appointment with Dr. Sams this week to discuss this further.  She also states that she does not regularly take an inhaler.  She does have Dulera as well as an albuterol inhaler at home to use as needed.  She continues wear 3 L of oxygen continuously and wears her NIPPV at night with no issues.  Overall, patient states that she is doing well and has no complaints at this time.  She is up-to-date with her Covid vaccine and would like a flu vaccine today.      Her history of smoking is      Tobacco Use: Low Risk    • Smoking Tobacco Use: Never Smoker   • Smokeless Tobacco Use: Never Used   .    Review of Systems   Constitutional: Negative for chills, fatigue, fever, unexpected weight gain and unexpected weight loss.   HENT: Negative for congestion (Nasal), hearing loss, mouth sores, nosebleeds, postnasal drip, sore throat and trouble swallowing.    Eyes: Negative for blurred vision and visual disturbance.   Respiratory: Positive for shortness of breath. Negative for apnea, cough and wheezing.         Negative for Hemoptysis     Cardiovascular: Negative for chest pain, palpitations and leg swelling.    Gastrointestinal: Negative for abdominal pain, constipation, diarrhea, nausea, vomiting and GERD.        Negative for Jaundice  Negative for Bloating  Negative for Melena   Musculoskeletal: Negative for joint swelling and myalgias.        Negative for Joint pain  Negative for Joint stiffness   Skin: Negative for color change.        Negative for cyanosis   Neurological: Negative for syncope, weakness, numbness and headache.      Sleep: Negative for Excessive daytime sleepiness  Negative for morning headaches  Negative for Snoring    Family History   Problem Relation Age of Onset   • Breast cancer Mother 45   • Colon cancer Paternal Grandmother    • Breast cancer Maternal Aunt 70   • Breast cancer Paternal Aunt 70   • Lung cancer Father    • Hodgkin's lymphoma Father    • Skin cancer Father         squamous cell   • Ovarian cancer Neg Hx    • Uterine cancer Neg Hx    • Deep vein thrombosis Neg Hx    • Pulmonary embolism Neg Hx    • Malig Hyperthermia Neg Hx         Social History     Socioeconomic History   • Marital status:      Spouse name: Not on file   • Number of children: 3   • Years of education: Not on file   • Highest education level: Not on file   Tobacco Use   • Smoking status: Never Smoker   • Smokeless tobacco: Never Used   Vaping Use   • Vaping Use: Never used   Substance and Sexual Activity   • Alcohol use: Never   • Drug use: Never   • Sexual activity: Defer        Past Medical History:   Diagnosis Date   • Anemia    • Anxiety    • Asthma    • Chronic diastolic (congestive) heart failure (CMS/HCC)    • Chronic hypoxemic respiratory failure (CMS/HCC)     on continuous O2   • Diarrhea     with chemo   • H/O Intraductal papilloma    • Hemorrhoids    • History of transfusion 1984    AFTER BACK SURGERY no reaction   • Hypertension    • IBS (irritable bowel syndrome)    • Inflammatory breast cancer (CMS/HCC)     right   • Kidney stone    • Morbid obesity with BMI of 50.0-59.9, adult (CMS/HCC)    •  Obesity hypoventilation syndrome (CMS/HCC) 2/5/2021   • On home oxygen therapy     2.5 AT REST WITH ACTIVITY UP TO 4L   • ANABELLE on CPAP     cpap  & uses O2 with CPAP   • Osteoarthritis    • PONV (postoperative nausea and vomiting)         Immunization History   Administered Date(s) Administered   • COVID-19 (PFIZER) 03/19/2021, 04/09/2021   • Flu Vaccine Quad PF >36MO 09/24/2018, 11/12/2020   • Fluzone High-Dose 65+yrs 09/28/2021   • Influenza TIV (IM) 01/30/2014, 09/29/2014   • Pneumococcal Conjugate 13-Valent (PCV13) 08/21/2019         Allergies   Allergen Reactions   • Amlodipine Swelling     LEGS    • Hydrochlorothiazide Unknown - Low Severity     Neuro issues   • Morphine Nausea And Vomiting     hallucinations   • Adhesive Tape Rash          Current Outpatient Medications:   •  albuterol sulfate  (90 Base) MCG/ACT inhaler, Inhale 2 puffs Every 4 (Four) Hours As Needed., Disp: , Rfl:   •  carvedilol (COREG) 25 MG tablet, Take 0.5 tablets by mouth 2 (Two) Times a Day With Meals., Disp: 30 tablet, Rfl: 0  •  Cholecalciferol (Vitamin D) 50 MCG (2000 UT) capsule, Take 2,000 Units by mouth Daily., Disp: , Rfl:   •  dicyclomine (BENTYL) 20 MG tablet, Take 1 tablet by mouth Every 6 (Six) Hours., Disp: 60 tablet, Rfl: 2  •  diphenhydrAMINE (BENADRYL) 25 mg capsule, Take 25 mg by mouth At Night As Needed for Itching, Allergies or Sleep., Disp: , Rfl:   •  FLUoxetine (PROzac) 40 MG capsule, TAKE 1 CAPSULE BY MOUTH EVERY DAY, Disp: 30 capsule, Rfl: 0  •  furosemide (LASIX) 40 MG tablet, Take 20 mg by mouth Daily., Disp: , Rfl:   •  gabapentin (NEURONTIN) 100 MG capsule, Take 1 capsule by mouth 3 (Three) Times a Day for 30 days., Disp: 90 capsule, Rfl: 0  •  Lidocaine, Anorectal, (LMX 5) 5 % cream cream, Apply  topically to the appropriate area as directed 3 (Three) Times a Day As Needed (hemmorrhoid pain)., Disp: 45 g, Rfl: 2  •  lisinopril (PRINIVIL,ZESTRIL) 5 MG tablet, Take 1 tablet by mouth Daily., Disp: 30  tablet, Rfl: 6  •  miconazole (Lotrimin AF) 2 % powder, Apply  topically to the appropriate area as directed 2 (two) times a day., Disp: 85 g, Rfl: 1  •  mometasone-formoterol (DULERA 100) 100-5 MCG/ACT inhaler, Inhale 2 Puffs/kg Every Night., Disp: , Rfl:   •  montelukast (SINGULAIR) 10 MG tablet, Take 10 mg by mouth Every Night., Disp: , Rfl:   •  multivitamin (THERAGRAN) tablet tablet, Take 1 tablet by mouth Daily., Disp: , Rfl:   •  O2 (OXYGEN), Inhale 3 L/min Continuous., Disp: , Rfl:   •  ondansetron (ZOFRAN) 8 MG tablet, Take 1 tablet by mouth 3 (Three) Times a Day As Needed for Nausea or Vomiting., Disp: 30 tablet, Rfl: 5  •  Potassium 99 MG tablet, Take 1 tablet by mouth Every Night., Disp: , Rfl:   •  pravastatin (PRAVACHOL) 40 MG tablet, Take 40 mg by mouth Every Night., Disp: , Rfl:   •  spironolactone (ALDACTONE) 25 MG tablet, Take 25 mg by mouth Daily., Disp: , Rfl:   •  omeprazole (priLOSEC) 20 MG capsule, TAKE 1 CAPSULE BY MOUTH EVERY DAY, Disp: 30 capsule, Rfl: 3  •  psyllium (METAMUCIL) 0.52 g capsule, Metamucil 0.52 gram oral capsule take 1 capsule by oral route 5Xday   Active, Disp: , Rfl:      Objective   Physical Exam  Constitutional:       General: She is not in acute distress.     Appearance: Normal appearance. She is obese.   HENT:      Right Ear: Hearing normal.      Left Ear: Hearing normal.      Nose: No nasal tenderness or congestion.      Mouth/Throat:      Mouth: Mucous membranes are moist. No oral lesions.   Eyes:      Extraocular Movements: Extraocular movements intact.      Pupils: Pupils are equal, round, and reactive to light.   Neck:      Thyroid: No thyroid mass or thyromegaly.   Cardiovascular:      Rate and Rhythm: Normal rate and regular rhythm.      Pulses: Normal pulses.      Heart sounds: Normal heart sounds. No murmur heard.     Pulmonary:      Effort: Pulmonary effort is normal.      Breath sounds: Decreased breath sounds present. No wheezing, rhonchi or rales.  "  Abdominal:      General: Bowel sounds are normal. There is no distension.      Palpations: Abdomen is soft.      Tenderness: There is no abdominal tenderness.   Musculoskeletal:      Cervical back: Neck supple.      Right lower leg: No edema.      Left lower leg: No edema.   Lymphadenopathy:      Cervical: No cervical adenopathy.      Upper Body:      Right upper body: No axillary adenopathy.   Skin:     General: Skin is warm and dry.      Findings: No lesion or rash.   Neurological:      General: No focal deficit present.      Mental Status: She is alert and oriented to person, place, and time.      Cranial Nerves: Cranial nerves are intact.   Psychiatric:         Mood and Affect: Affect normal. Mood is not anxious or depressed.         Vital Signs:   /69 (BP Location: Other (Comment), Patient Position: Sitting, Cuff Size: Adult) Comment (BP Location): RIGHT FOREARM  Pulse 72   Temp 98.4 °F (36.9 °C) (Temporal)   Resp 20   Ht 165.1 cm (65\")   Wt (!) 154 kg (340 lb)   SpO2 98% Comment: 4L O2  BMI 56.58 kg/m²        Result Review :     CMP    CMP 6/25/21 7/16/21 8/3/21   Glucose 190 (A) 145 (A) 137 (A)   BUN 14 12 16   Creatinine 0.76 0.88 0.70   eGFR Non African Am 76 64 83   Sodium 141 139 138   Potassium 3.8 4.1 4.1   Chloride 104 100 102   Calcium 9.4 9.3 9.6   Albumin 3.60 3.60    Total Bilirubin 0.2 0.3    Alkaline Phosphatase 82 86    AST (SGOT) 22 22    ALT (SGPT) 23 22    (A) Abnormal value            CBC w/diff    CBC w/Diff 7/16/21 8/3/21 9/7/21   WBC 9.41 7.49 8.43   RBC 3.94 3.91 3.73 (A)   Hemoglobin 10.7 (A) 10.7 (A) 10.0 (A)   Hematocrit 36.5 34.9 34.9   MCV 92.6 89.3 93.6   MCH 27.2 27.4 26.8   MCHC 29.3 (A) 30.7 (A) 28.7 (A)   RDW 17.2 (A) 16.4 (A) 15.6 (A)   Platelets 360 314 311   Neutrophil Rel % 72.9 76.3 (A) 79.8 (A)   Immature Granulocyte Rel % 4.3 (A) 0.4 0.4   Lymphocyte Rel % 11.1 (A) 11.1 (A) 9.1 (A)   Monocyte Rel % 10.5 8.9 8.2   Eosinophil Rel % 0.2 (A) 2.5 2.0   Basophil " Rel % 1.0 0.8 0.5   (A) Abnormal value            Data reviewed: Radiologic studies Chest CT 4/13/2021 and Dr. Anaya's last telehealth note   Procedures        Assessment and Plan    Diagnoses and all orders for this visit:    1. Obstructive sleep apnea (Primary)    2. Restrictive airway disease    3. Allergic rhinitis, unspecified seasonality, unspecified trigger    4. Abnormal CT scan of lung  -     CT Chest Without Contrast; Future    5. Encounter for immunization  -     Fluzone High-Dose 65+yrs ([id number])    6. Dyspnea on exertion    7. Chronic respiratory failure with hypoxia and hypercapnia ([facility])    8. Class 3 severe obesity due to excess calories with serious comorbidity and body mass index (BMI) of 50.0 to 59.9 in adult (CMS/HCC)    9.  Continue Dulera as prescribed.  Rinse mouth out after each use.  10.  Continue albuterol as needed.  11.  Continue Singulair at night.  12.  I will order a CT scan to assess patient's groundglass opacities on previous CT.  13.  Continue supplemental oxygen to maintain saturations at or above 90%.  14.  Continue NIPPV at night and with naps.  15.  Encourage patient try stay as active as possible.  Recommended 30 minutes daily exercise.  16.  Follow-up with oncology and radiation oncology as scheduled.  17.  Follow-up in 6 months with Dr. Anaya sooner if needed.      Follow Up   Return in about 6 months (around 3/28/2022) for Recheck with Dr. Anaya.  Patient was given instructions and counseling regarding her condition or for health maintenance advice. Please see specific information pulled into the AVS if appropriate.

## 2021-09-29 ENCOUNTER — APPOINTMENT (OUTPATIENT)
Dept: RADIATION ONCOLOGY | Facility: HOSPITAL | Age: 66
End: 2021-09-29

## 2021-09-30 ENCOUNTER — HOSPITAL ENCOUNTER (OUTPATIENT)
Dept: RADIATION ONCOLOGY | Facility: HOSPITAL | Age: 66
Setting detail: RADIATION/ONCOLOGY SERIES
Discharge: HOME | End: 2021-09-30

## 2021-09-30 DIAGNOSIS — C50.111 MALIGNANT NEOPLASM OF CENTRAL PORTION OF RIGHT FEMALE BREAST (HCC): ICD-10-CM

## 2021-09-30 DIAGNOSIS — C50.611 MALIGNANT NEOPLASM OF AXILLARY TAIL OF RIGHT FEMALE BREAST, UNSPECIFIED ESTROGEN RECEPTOR STATUS (HCC): ICD-10-CM

## 2021-09-30 DIAGNOSIS — C50.911 INFLAMMATORY BREAST CANCER, RIGHT (HCC): Primary | ICD-10-CM

## 2021-09-30 PROCEDURE — 77334 RADIATION TREATMENT AID(S): CPT | Performed by: RADIOLOGY

## 2021-09-30 PROCEDURE — 77263 THER RADIOLOGY TX PLNG CPLX: CPT | Performed by: RADIOLOGY

## 2021-09-30 PROCEDURE — 77290 THER RAD SIMULAJ FIELD CPLX: CPT | Performed by: RADIOLOGY

## 2021-09-30 RX ORDER — LISINOPRIL 5 MG/1
TABLET ORAL
Qty: 30 TABLET | Refills: 6 | Status: SHIPPED | OUTPATIENT
Start: 2021-09-30 | End: 2022-06-10

## 2021-10-01 ENCOUNTER — HOSPITAL ENCOUNTER (OUTPATIENT)
Dept: RADIATION ONCOLOGY | Facility: HOSPITAL | Age: 66
Setting detail: RADIATION/ONCOLOGY SERIES
End: 2021-10-01

## 2021-10-05 ENCOUNTER — INFUSION (OUTPATIENT)
Dept: ONCOLOGY | Facility: HOSPITAL | Age: 66
End: 2021-10-05

## 2021-10-05 VITALS
OXYGEN SATURATION: 93 % | HEART RATE: 80 BPM | SYSTOLIC BLOOD PRESSURE: 141 MMHG | DIASTOLIC BLOOD PRESSURE: 81 MMHG | TEMPERATURE: 98 F | WEIGHT: 293 LBS | BODY MASS INDEX: 55.78 KG/M2

## 2021-10-05 DIAGNOSIS — C50.611 MALIGNANT NEOPLASM OF AXILLARY TAIL OF RIGHT FEMALE BREAST, UNSPECIFIED ESTROGEN RECEPTOR STATUS (HCC): Primary | ICD-10-CM

## 2021-10-05 LAB
ALBUMIN SERPL-MCNC: 4 G/DL (ref 3.5–5.2)
ALBUMIN/GLOB SERPL: 1.3 G/DL (ref 1.1–2.4)
ALP SERPL-CCNC: 103 U/L (ref 38–116)
ALT SERPL W P-5'-P-CCNC: 12 U/L (ref 0–33)
ANION GAP SERPL CALCULATED.3IONS-SCNC: 8.1 MMOL/L (ref 5–15)
AST SERPL-CCNC: 15 U/L (ref 0–32)
BASOPHILS # BLD AUTO: 0.03 10*3/MM3 (ref 0–0.2)
BASOPHILS NFR BLD AUTO: 0.3 % (ref 0–1.5)
BILIRUB SERPL-MCNC: 0.3 MG/DL (ref 0.2–1.2)
BUN SERPL-MCNC: 19 MG/DL (ref 6–20)
BUN/CREAT SERPL: 29.7 (ref 7.3–30)
CALCIUM SPEC-SCNC: 9.3 MG/DL (ref 8.5–10.2)
CHLORIDE SERPL-SCNC: 102 MMOL/L (ref 98–107)
CO2 SERPL-SCNC: 30.9 MMOL/L (ref 22–29)
CREAT SERPL-MCNC: 0.64 MG/DL (ref 0.6–1.1)
DEPRECATED RDW RBC AUTO: 47.3 FL (ref 37–54)
EOSINOPHIL # BLD AUTO: 0.16 10*3/MM3 (ref 0–0.4)
EOSINOPHIL NFR BLD AUTO: 1.7 % (ref 0.3–6.2)
ERYTHROCYTE [DISTWIDTH] IN BLOOD BY AUTOMATED COUNT: 14.3 % (ref 12.3–15.4)
GFR SERPL CREATININE-BSD FRML MDRD: 92 ML/MIN/1.73
GLOBULIN UR ELPH-MCNC: 3 GM/DL (ref 1.8–3.5)
GLUCOSE SERPL-MCNC: 120 MG/DL (ref 74–124)
HCT VFR BLD AUTO: 34.8 % (ref 34–46.6)
HGB BLD-MCNC: 10.3 G/DL (ref 12–15.9)
IMM GRANULOCYTES # BLD AUTO: 0.04 10*3/MM3 (ref 0–0.05)
IMM GRANULOCYTES NFR BLD AUTO: 0.4 % (ref 0–0.5)
LYMPHOCYTES # BLD AUTO: 0.91 10*3/MM3 (ref 0.7–3.1)
LYMPHOCYTES NFR BLD AUTO: 9.7 % (ref 19.6–45.3)
MCH RBC QN AUTO: 26.8 PG (ref 26.6–33)
MCHC RBC AUTO-ENTMCNC: 29.6 G/DL (ref 31.5–35.7)
MCV RBC AUTO: 90.4 FL (ref 79–97)
MONOCYTES # BLD AUTO: 0.73 10*3/MM3 (ref 0.1–0.9)
MONOCYTES NFR BLD AUTO: 7.8 % (ref 5–12)
NEUTROPHILS NFR BLD AUTO: 7.49 10*3/MM3 (ref 1.7–7)
NEUTROPHILS NFR BLD AUTO: 80.1 % (ref 42.7–76)
NRBC BLD AUTO-RTO: 0 /100 WBC (ref 0–0.2)
PLATELET # BLD AUTO: 337 10*3/MM3 (ref 140–450)
PMV BLD AUTO: 9.2 FL (ref 6–12)
POTASSIUM SERPL-SCNC: 4.2 MMOL/L (ref 3.5–4.7)
PROT SERPL-MCNC: 7 G/DL (ref 6.3–8)
RBC # BLD AUTO: 3.85 10*6/MM3 (ref 3.77–5.28)
SODIUM SERPL-SCNC: 141 MMOL/L (ref 134–145)
WBC # BLD AUTO: 9.36 10*3/MM3 (ref 3.4–10.8)

## 2021-10-05 PROCEDURE — 96413 CHEMO IV INFUSION 1 HR: CPT

## 2021-10-05 PROCEDURE — 25010000002 TRASTUZUMAB PER 10 MG: Performed by: INTERNAL MEDICINE

## 2021-10-05 PROCEDURE — 80053 COMPREHEN METABOLIC PANEL: CPT

## 2021-10-05 PROCEDURE — 25010000002 PERTUZUMAB 420 MG/14ML SOLUTION 420 MG VIAL: Performed by: NURSE PRACTITIONER

## 2021-10-05 PROCEDURE — 96417 CHEMO IV INFUS EACH ADDL SEQ: CPT

## 2021-10-05 PROCEDURE — 96415 CHEMO IV INFUSION ADDL HR: CPT

## 2021-10-05 PROCEDURE — 85025 COMPLETE CBC W/AUTO DIFF WBC: CPT

## 2021-10-05 RX ORDER — SODIUM CHLORIDE 9 MG/ML
250 INJECTION, SOLUTION INTRAVENOUS ONCE
Status: COMPLETED | OUTPATIENT
Start: 2021-10-05 | End: 2021-10-05

## 2021-10-05 RX ADMIN — PERTUZUMAB 840 MG: 30 INJECTION, SOLUTION, CONCENTRATE INTRAVENOUS at 12:01

## 2021-10-05 RX ADMIN — TRASTUZUMAB 920 MG: 150 INJECTION, POWDER, LYOPHILIZED, FOR SOLUTION INTRAVENOUS at 14:14

## 2021-10-05 RX ADMIN — SODIUM CHLORIDE 250 ML: 9 INJECTION, SOLUTION INTRAVENOUS at 12:01

## 2021-10-13 ENCOUNTER — HOSPITAL ENCOUNTER (OUTPATIENT)
Dept: RADIATION ONCOLOGY | Facility: HOSPITAL | Age: 66
Discharge: HOME | End: 2021-10-13

## 2021-10-13 PROCEDURE — 77295 3-D RADIOTHERAPY PLAN: CPT | Performed by: RADIOLOGY

## 2021-10-13 PROCEDURE — 77300 RADIATION THERAPY DOSE PLAN: CPT | Performed by: RADIOLOGY

## 2021-10-13 PROCEDURE — 77280 THER RAD SIMULAJ FIELD SMPL: CPT | Performed by: RADIOLOGY

## 2021-10-13 PROCEDURE — 77334 RADIATION TREATMENT AID(S): CPT | Performed by: RADIOLOGY

## 2021-10-14 ENCOUNTER — HOSPITAL ENCOUNTER (OUTPATIENT)
Dept: CT IMAGING | Facility: HOSPITAL | Age: 66
Discharge: HOME | End: 2021-10-14
Admitting: NURSE PRACTITIONER

## 2021-10-14 ENCOUNTER — HOSPITAL ENCOUNTER (OUTPATIENT)
Dept: RADIATION ONCOLOGY | Facility: HOSPITAL | Age: 66
Discharge: HOME | End: 2021-10-14

## 2021-10-14 DIAGNOSIS — R91.8 ABNORMAL CT SCAN OF LUNG: ICD-10-CM

## 2021-10-14 PROCEDURE — G6002 STEREOSCOPIC X-RAY GUIDANCE: HCPCS | Performed by: RADIOLOGY

## 2021-10-14 PROCEDURE — 71250 CT THORAX DX C-: CPT

## 2021-10-14 PROCEDURE — 77412 RADIATION TX DELIVERY LVL 3: CPT | Performed by: RADIOLOGY

## 2021-10-14 PROCEDURE — 77387 GUIDANCE FOR RADJ TX DLVR: CPT | Performed by: RADIOLOGY

## 2021-10-15 ENCOUNTER — HOSPITAL ENCOUNTER (OUTPATIENT)
Dept: RADIATION ONCOLOGY | Facility: HOSPITAL | Age: 66
Discharge: HOME | End: 2021-10-15

## 2021-10-15 PROCEDURE — 77412 RADIATION TX DELIVERY LVL 3: CPT | Performed by: RADIOLOGY

## 2021-10-15 PROCEDURE — 77387 GUIDANCE FOR RADJ TX DLVR: CPT | Performed by: RADIOLOGY

## 2021-10-15 PROCEDURE — G6002 STEREOSCOPIC X-RAY GUIDANCE: HCPCS | Performed by: RADIOLOGY

## 2021-10-18 ENCOUNTER — DOCUMENTATION (OUTPATIENT)
Dept: RADIATION ONCOLOGY | Facility: HOSPITAL | Age: 66
End: 2021-10-18

## 2021-10-18 ENCOUNTER — HOSPITAL ENCOUNTER (OUTPATIENT)
Dept: RADIATION ONCOLOGY | Facility: HOSPITAL | Age: 66
Discharge: HOME | End: 2021-10-18

## 2021-10-18 PROCEDURE — G6002 STEREOSCOPIC X-RAY GUIDANCE: HCPCS | Performed by: RADIOLOGY

## 2021-10-18 PROCEDURE — 77387 GUIDANCE FOR RADJ TX DLVR: CPT | Performed by: RADIOLOGY

## 2021-10-18 PROCEDURE — 77412 RADIATION TX DELIVERY LVL 3: CPT | Performed by: RADIOLOGY

## 2021-10-18 NOTE — PROGRESS NOTES
Diagnosis: Breast cancer    Reason for referral: First week of tx/rounding    Comments: OSW met with pt in rad onc during first week of tx/rounding. Pt presented in a pleasant mood. Introduced myself and discussed OSW role/psychosocial services available. Pt utilizing [facility] wheelchair today. Pt has portable oxygen with her today and receives this through [employer]. Pt resides in [place] and reports she has great support from her spouse and daughter as well as her Buddhism. Pt reports she has great evelyn. Pt also has a grandson and granddaughter, ages 11 and 12. Pt's spouse provides transportation to txs. Pt has [employer] Medicare insurance.   Pt has several blouses she purchased post bilateral mastectomy and would like to donate these. OSW to consult with breast nurse navigator and [place] [facility] regarding possible donation opportunities.  Pt unable to independently provide feet/nail care and grooming and inquiring where she may receive this at, explaining she had a neighbor who was diabetic and had this completed somewhere in which insurance covered the expense. Pt does not have diabetes and understands insurance will likely not cover this expense for her. OSW to consult with medical staff.  No other needs identified at this time. Provided my business card, encouraging OSW support remains available. Pt expressed gratitude.    Services/Referrals Provided: Care coordination - feet/nail care and grooming

## 2021-10-18 NOTE — PROGRESS NOTES
Update: Spoke with [place] [facility] [employer] and they are happy to accept donations. Pt may ship these directly to their address and they can provide pt with a tax deductible donation receipt if needed.   Left breast nurse navigator a MELVIN. Awaiting response.  Spoke with rad onc RNs who confirm a podiatrist may provide nail grooming, however, due to pt not being diabetic she would likely have to pay OOP for this service. Pt may also utilize a nail salon.  OSW to contact pt to relay this information. OSW support remains available.

## 2021-10-19 ENCOUNTER — HOSPITAL ENCOUNTER (OUTPATIENT)
Dept: RADIATION ONCOLOGY | Facility: HOSPITAL | Age: 66
Discharge: HOME | End: 2021-10-19

## 2021-10-19 PROCEDURE — 77412 RADIATION TX DELIVERY LVL 3: CPT | Performed by: RADIOLOGY

## 2021-10-19 PROCEDURE — 77387 GUIDANCE FOR RADJ TX DLVR: CPT | Performed by: RADIOLOGY

## 2021-10-19 PROCEDURE — G6002 STEREOSCOPIC X-RAY GUIDANCE: HCPCS | Performed by: RADIOLOGY

## 2021-10-20 ENCOUNTER — HOSPITAL ENCOUNTER (OUTPATIENT)
Dept: RADIATION ONCOLOGY | Facility: HOSPITAL | Age: 66
Discharge: HOME | End: 2021-10-20

## 2021-10-20 PROCEDURE — 77336 RADIATION PHYSICS CONSULT: CPT | Performed by: RADIOLOGY

## 2021-10-20 PROCEDURE — 77412 RADIATION TX DELIVERY LVL 3: CPT | Performed by: RADIOLOGY

## 2021-10-20 PROCEDURE — G6002 STEREOSCOPIC X-RAY GUIDANCE: HCPCS | Performed by: RADIOLOGY

## 2021-10-20 PROCEDURE — 77387 GUIDANCE FOR RADJ TX DLVR: CPT | Performed by: RADIOLOGY

## 2021-10-21 ENCOUNTER — NURSE NAVIGATOR (OUTPATIENT)
Dept: OTHER | Facility: HOSPITAL | Age: 66
End: 2021-10-21

## 2021-10-21 ENCOUNTER — HOSPITAL ENCOUNTER (OUTPATIENT)
Dept: RADIATION ONCOLOGY | Facility: HOSPITAL | Age: 66
Discharge: HOME | End: 2021-10-21

## 2021-10-21 VITALS
OXYGEN SATURATION: 96 % | SYSTOLIC BLOOD PRESSURE: 167 MMHG | TEMPERATURE: 97.5 F | HEART RATE: 79 BPM | DIASTOLIC BLOOD PRESSURE: 92 MMHG | WEIGHT: 293 LBS | BODY MASS INDEX: 55.21 KG/M2 | RESPIRATION RATE: 16 BRPM

## 2021-10-21 DIAGNOSIS — C50.911 INFLAMMATORY BREAST CANCER, RIGHT (HCC): Primary | ICD-10-CM

## 2021-10-21 PROCEDURE — 77387 GUIDANCE FOR RADJ TX DLVR: CPT | Performed by: RADIOLOGY

## 2021-10-21 PROCEDURE — 77412 RADIATION TX DELIVERY LVL 3: CPT | Performed by: RADIOLOGY

## 2021-10-21 PROCEDURE — G6002 STEREOSCOPIC X-RAY GUIDANCE: HCPCS | Performed by: RADIOLOGY

## 2021-10-21 RX ORDER — FLUOXETINE HYDROCHLORIDE 40 MG/1
CAPSULE ORAL
Qty: 30 CAPSULE | Refills: 0 | OUTPATIENT
Start: 2021-10-21

## 2021-10-21 NOTE — PROGRESS NOTES
On Treatment Visit       Patient: Aida Conner   YOB: 1955   Medical Record Number: [id number]     Date of Visit  October 21, 2021   Primary Diagnosis:Inflammatory breast cancer, right (HCC) [C50.911]  Cancer Staging: Cancer Staging  No matching staging information was found for the patient.       was seen today for an on treatment visit.  She is receiving radiation therapy to the right chest wall and nodes.  She  has received 1200 cGy in 6 fractions out of a planned dose of 5000 cGy in 25 fractions. She is currently receiving concurrent Perjeta per Dr. Azul in [place].   She states she has intermittent diarrhea which she has had since chemo.  She has been given a referral for PT and a compression sleeve.    Today on exam the patient is tolerating radiation therapy well and has no new disease or treatment-related complaints.                                            Review of Systems:   Review of Systems   Constitutional: Positive for fatigue.   Respiratory: Negative for cough and shortness of breath.    Gastrointestinal: Positive for diarrhea. Negative for constipation and nausea.   Genitourinary: Negative for dysuria, frequency and urgency.   Skin: Negative for color change.   Neurological: Negative for dizziness and headaches.       Vitals:     Vitals:    10/21/21 1341   BP: 167/92   Pulse: 79   Resp: 16   Temp: 97.5 °F (36.4 °C)   SpO2: 96%       Weight:   Wt Readings from Last 3 Encounters:   10/21/21 (!) 151 kg (331 lb 12.7 oz)   10/05/21 (!) 152 kg (335 lb 3.2 oz)   09/28/21 (!) 154 kg (340 lb)      Pain:    Pain Score    10/21/21 1341   PainSc: 0-No pain         Physical Exam:  Physical Exam  Constitutional:       General: She is not in acute distress.     Appearance: She is obese.   Chest:      Chest wall: No mass or swelling.   Breasts:      Right: Absent.      Left: Absent.       Lymphadenopathy:      Comments: No obvious RUE edema.   Skin:     Findings: No erythema.    Neurological:      Mental Status: She is alert.           Plan: I have reviewed treatment setup notes, checked and approved the daily guidance images.  I reviewed dose delivery, treatment parameters and deemed them appropriate. We plan to continue radiation therapy as prescribed.      Lydia Barreto MD  Radiation Oncology   Electronically signed 10/21/2021  14:05 EDT

## 2021-10-21 NOTE — PROGRESS NOTES
Called MsMichelle Ubaldo to see how she was doing. Left a message with my contact information and asked her to call back at her convenience.

## 2021-10-21 NOTE — TELEPHONE ENCOUNTER
Denied due to needing follow up appointment. Has been receiving refills without making appt since July 2021.

## 2021-10-22 ENCOUNTER — HOSPITAL ENCOUNTER (OUTPATIENT)
Dept: RADIATION ONCOLOGY | Facility: HOSPITAL | Age: 66
Discharge: HOME | End: 2021-10-22

## 2021-10-22 ENCOUNTER — TELEPHONE (OUTPATIENT)
Dept: OCCUPATIONAL THERAPY | Facility: HOSPITAL | Age: 66
End: 2021-10-22

## 2021-10-22 ENCOUNTER — HOSPITAL ENCOUNTER (OUTPATIENT)
Dept: OCCUPATIONAL THERAPY | Facility: HOSPITAL | Age: 66
Discharge: HOME | End: 2021-10-22
Admitting: RADIOLOGY

## 2021-10-22 DIAGNOSIS — I89.0 LYMPHEDEMA: ICD-10-CM

## 2021-10-22 DIAGNOSIS — G89.3 NEOPLASM RELATED PAIN: ICD-10-CM

## 2021-10-22 DIAGNOSIS — Z17.0 BILATERAL MALIGNANT NEOPLASM OF BREAST IN FEMALE, ESTROGEN RECEPTOR POSITIVE, UNSPECIFIED SITE OF BREAST (HCC): ICD-10-CM

## 2021-10-22 DIAGNOSIS — Z91.89 AT RISK FOR SELF CARE DEFICIT: ICD-10-CM

## 2021-10-22 DIAGNOSIS — M62.81 MUSCLE WEAKNESS (GENERALIZED): ICD-10-CM

## 2021-10-22 DIAGNOSIS — M25.60 JOINT STIFFNESS: ICD-10-CM

## 2021-10-22 DIAGNOSIS — Z91.89 AT RISK FOR LYMPHEDEMA: ICD-10-CM

## 2021-10-22 DIAGNOSIS — Z90.13 HISTORY OF MASTECTOMY, BILATERAL: Primary | ICD-10-CM

## 2021-10-22 DIAGNOSIS — C50.911 BILATERAL MALIGNANT NEOPLASM OF BREAST IN FEMALE, ESTROGEN RECEPTOR POSITIVE, UNSPECIFIED SITE OF BREAST (HCC): ICD-10-CM

## 2021-10-22 DIAGNOSIS — R29.3 ABNORMAL POSTURE: ICD-10-CM

## 2021-10-22 DIAGNOSIS — L90.5 SCAR CONDITION AND FIBROSIS OF SKIN: Primary | ICD-10-CM

## 2021-10-22 DIAGNOSIS — I97.2 POSTMASTECTOMY LYMPHEDEMA SYNDROME: ICD-10-CM

## 2021-10-22 DIAGNOSIS — C50.911 INFLAMMATORY BREAST CANCER, RIGHT (HCC): ICD-10-CM

## 2021-10-22 DIAGNOSIS — C50.912 BILATERAL MALIGNANT NEOPLASM OF BREAST IN FEMALE, ESTROGEN RECEPTOR POSITIVE, UNSPECIFIED SITE OF BREAST (HCC): ICD-10-CM

## 2021-10-22 PROCEDURE — 93702 BIS XTRACELL FLUID ANALYSIS: CPT

## 2021-10-22 PROCEDURE — 97165 OT EVAL LOW COMPLEX 30 MIN: CPT

## 2021-10-22 PROCEDURE — 97535 SELF CARE MNGMENT TRAINING: CPT

## 2021-10-22 NOTE — THERAPY EVALUATION
Outpatient Occupational Therapy Lymphedema Initial Evaluation   Etienne     Patient Name: Aida Conner  : 1955  MRN: [id number]  Today's Date: 10/22/2021      Visit Date: 10/22/2021    Patient Active Problem List   Diagnosis   • Inflammatory breast cancer, right (HCC)   • Other specified disorders of breast    • Malignant neoplasm of axillary tail of right female breast (HCC)   • Encounter for eye exam due to high risk medication   • Obesity hypoventilation syndrome (HCC)   • Iron adverse reaction   • Functional diarrhea   • Encounter for long-term (current) use of high-risk medication   • Abnormal mammogram   • Allergic rhinitis   • Anemia   • Anxiety disorder   • Breast pain, right   • Depression   • Essential tremor   • Hemorrhoids   • Hyperlipidemia   • Hypertension, essential   • IBS (irritable bowel syndrome)   • Impaired fasting glucose   • Obesity   • Obstructive sleep apnea   • Osteoarthrosis   • Renal calculi   • Restrictive airway disease   • Type II diabetes mellitus (HCC)   • Vitamin deficiency   • Urinary bladder incontinence   • Malignant neoplasm of central portion of right female breast (HCC)        Past Medical History:   Diagnosis Date   • Anemia    • Anxiety    • Asthma    • Chronic diastolic (congestive) heart failure (CMS/HCC)    • Chronic hypoxemic respiratory failure (CMS/HCC)     on continuous O2   • Diarrhea     with chemo   • H/O Intraductal papilloma    • Hemorrhoids    • History of transfusion     AFTER BACK SURGERY no reaction   • Hypertension    • IBS (irritable bowel syndrome)    • Inflammatory breast cancer (CMS/HCC)     right   • Kidney stone    • Morbid obesity with BMI of 50.0-59.9, adult (CMS/HCC)    • Obesity hypoventilation syndrome (CMS/HCC) 2021   • On home oxygen therapy     2.5 AT REST WITH ACTIVITY UP TO 4L   • ANABELLE on CPAP     cpap  & uses O2 with CPAP   • Osteoarthritis    • PONV (postoperative nausea and vomiting)         Past Surgical History:    Procedure Laterality Date   • BREAST EXCISIONAL BIOPSY Bilateral    • COLONOSCOPY     • CYSTOSCOPY BLADDER STONE LITHOTRIPSY     • ENDOSCOPY     • KNEE ARTHROPLASTY Bilateral 2009   • MASTECTOMY Left 8/10/2021    Procedure: LEFT TOTAL MASTECTOMY;  Surgeon: Nicci Banks MD;  Location: [facility] MAIN OR;  Service: General;  Laterality: Left;   • MASTECTOMY Right 8/10/2021    Procedure: RIGHT MODIFIED RADICAL MASTECTOMY;  Surgeon: Nicci Banks MD;  Location: [facility] MAIN OR;  Service: General;  Laterality: Right;   • SPINE SURGERY  1984   • TOTAL ABDOMINAL HYSTERECTOMY WITH SALPINGO OOPHORECTOMY  2004   • UPPER GASTROINTESTINAL ENDOSCOPY     • VENOUS ACCESS DEVICE (PORT) INSERTION N/A 1/25/2021    Procedure: INSERTION VENOUS ACCESS DEVICE;  Surgeon: Nicci Banks MD;  Location: [facility] MAIN OR;  Service: General;  Laterality: N/A;         Visit Dx:     ICD-10-CM ICD-9-CM   1. Scar condition and fibrosis of skin  L90.5 709.2   2. At risk for lymphedema  Z91.89 V49.89   3. Inflammatory breast cancer, right (HCC)  C50.911 174.9   4. Neoplasm related pain  G89.3 338.3   5. Joint stiffness  M25.60 719.50   6. Muscle weakness (generalized)  M62.81 728.87   7. Abnormal posture  R29.3 781.92   8. 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 At risk for self care deficit  Z91.89 V49.89        Patient History     Row Name 10/22/21 1300             History    Chief Complaint Other 1 (comment)  at risk for lymphedema  -CH      Hand Dominance left-handed  -              Fall Risk Assessment    Any falls in the past year: No  -CH      Does patient have a fear of falling Yes (comment)  uses cane  -CH              Services    Are you currently receiving Home Health services No  -CH      Do you plan to receive Home Health services in the near future No  -CH              Daily Activities    Primary Language English  -CH      Are you able to read Yes  -CH      Are you able to write Yes  -CH      How does  patient learn best? Reading; Demonstration  -CH      Barriers to learning None  -CH              Safety    Are you being hurt, hit, or frightened by anyone at home or in your life? No  -CH      Are you being neglected by a caregiver No  -CH      Have you had any of the following issues with Depression  pt has a history of use of prozac and her primary care has set up counseling  -CH            User Key  (r) = Recorded By, (t) = Taken By, (c) = Cosigned By    Initials Name Provider Type     Ria Walker OT Occupational Therapist                 Lymphedema     Row Name 10/22/21 1300             Subjective Pain    Able to rate subjective pain? yes  -CH      Pre-Treatment Pain Level 0  -CH      Post-Treatment Pain Level 0  -CH      Subjective Pain Comment Patient states that at rest she does not have pain however with end range of motion she does feel stretching pain/discomfort.  -CH              Subjective Comments    Subjective Comments Patient states she has not been performing any postsurgical exercises.  Patient states she did have a visit with occupational therapy at [facility] who discussed lymphedema and performed L Dex scoring.  Patient did not have a follow-up with that clinician at the time.  Patient also states that she did not understand when she was fitted for her sleeve that she would need to follow-up afterwards.  -CH              Lymphedema Assessment    Lymphedema Classification RUE:; at risk/stage 0  -CH      Lymphedema Cancer Related Sx right; modified radical mastectomy; axillary dissection; left; simple mastectomy  -CH      Lymph Nodes Removed # 19  -CH      Positive Lymph Nodes # 0  -CH      Chemo Received yes  -CH      Chemo Treatments #/Timeframe THP  AC   HP  -CH      Radiation Therapy Received yes  -CH      Radiation Treatments #/Timeframe 8 completed and expected 25 total  -CH              Physical Concerns    The amount of pain associated with my lymphedema is: 0  -CH    "   The amount of limb heaviness associated with my lymphedema is: 0  -CH      The amount of skin tightness associated with my lymphedema is: 0  -CH      The size of my swollen limb(s) seems: 0  -CH      Lymphedema affects the movement of my swollen limb(s): 0  -CH      The strength in my swollen limb(s) is: 0  -CH              Psychosocial Concerns    Lymphedema affects my body image (i.e., \"how I think I look\"). 0  -CH      Lymphedema affects my socializing with others. 0  -CH      Lymphedema affects my intimate relations with spouse or partner (rate 0 if not applicable 0  -CH      Lymphedema \"gets me down\" (i.e., depression, frustration, or anger) 0  -CH      I must rely on others for help due to my lymphedema. 0  -CH      I know what to do to manage my lymphedema 4  -CH              Functional Concerns    Lymphedema affects my ability to perform self-care activities (i.e. eating, dressing, hygiene) 0  -CH      Lymphedema affects my ability to perform routine home or work-related activities. 0  -CH      Lymphedema affects my performance of preferred leisure activities. 0  -CH      Lymphedema affects proper fit of clothing/shoes 0  -CH      Lymphedema affects my sleep 0  -CH              Posture/Observations    Alignment Options Rounded shoulders; Scapular elevation  -CH      Rounded Shoulders Right:; Moderate  -CH      Scapular Elevation Right:; Mild  -CH              General ROM    RT Upper Ext Rt Shoulder ABduction; Rt Shoulder Extension; Rt Shoulder External Rotation; Rt Shoulder Internal Rotation; Rt Shoulder Flexion  -CH              Right Upper Ext    Rt Shoulder Abduction AROM 85  -CH      Rt Shoulder Extension AROM 50  -CH      Rt Shoulder Flexion AROM 110  -CH      Rt Shoulder External Rotation AROM 45  -CH      Rt Shoulder Internal Rotation AROM WFL  -CH              L-Dex Bioimpedence Screening    L-Dex Measurement Extremity RUE  -CH      L-Dex Patient Position Standing  -CH      L-Dex UE Dominate Side " Left  -CH      L-Dex UE At Risk Side Right  -CH      L-Dex UE Pre Surgical Value Yes  -CH      L-Dex UE Score 10.6  -CH      L-Dex UE Baseline Score 4  -CH      L-Dex UE Value Change 6.6  -CH      $ L-Dex Charge yes  -CH              Lymphedema Life Impact Scale Totals    A.  Total Q1 - Q17 (Do not include Q18) 4  -CH      B.  Total number of questions answered (Q1-Q17) 17  -CH      C. Divide A by B 0.24  -CH      D. Multiple C by 25 6  -CH            User Key  (r) = Recorded By, (t) = Taken By, (c) = Cosigned By    Initials Name Provider Type    Ria Colón OT Occupational Therapist                 OT Ortho     Row Name 10/22/21 1300             Orthotics & Prosthetics Management    Orthosis Location upper extremity orthosis  -      Additional Documentation Orthosis Location (Row)  -            User Key  (r) = Recorded By, (t) = Taken By, (c) = Cosigned By    Initials Name Provider Type    Ria Colón OT Occupational Therapist              Due to the condition of the residual soft tissue in the upper extremity and patient continuing with radiation therapy, OT recommends patient procure a CircAid reduction kit to support reduction of limb volume in the right upper extremity.  OT feels this will be a softer material against the skin and will allow her for greater ease to don doff the compression garment.  Patient was sized into a wide long CircAid reduction kit.  OT will send order request to  once the physician order for the compression sleeve is received.      Therapy Education  Education Details: Education on Lymphatic pathology and development of lymphedema.  Education on lymphedema prevention/stoplight to recovery, self MLD, HEP: stretching protocol.  Education on wear schedule of compression garment for UE with emphasis to attend to skin integrity as she is completing XRT.  Given: HEP, Symptoms/condition management  Program: New  How Provided: Verbal  Provided to:  Patient  Level of Understanding: Verbalized, Demonstrated  30898 - OT Self Care/Mgmt Minutes: 60     Patient provided education on risk factors for lymphedema to include greater than 6 lymph node removal, BMI greater than 30, mastectomy versus lumpectomy, radiation therapy, and Taxol use and advanced age.    Patient provided with the LeAP lymphedema action plan stoplight to recovery handout (Rehabilitation Oncology: July 2019 - Volume 37 - Issue 3 - p 122127 doi: 10.1097/01.[id number]) to improve patient's ability to identify lymph exacerbation and provide guidance on appropriate action to be taken to control symptoms.   Patient provided with education on a simple self-manual lymphatic drainage technique.  Handouts provided.   Patient exhibited fair return demonstration of skill.  Patient will benefit from continued education to ensure carryover skill.  Patient provided education on orthopedic and lymph fluid dynamic changes from mastectomy and sentinel node removal surgery, activity guidelines aand education on a stretching HEP.     OT Goals     Row Name 10/22/21 1610          Time Calculation    OT Goal Re-Cert Due Date 11/21/21  -           User Key  (r) = Recorded By, (t) = Taken By, (c) = Cosigned By    Initials Name Provider Type    Ria Colón, OT Occupational Therapist              1. Post Breast Surgery Care/at risk for Lymphedema  LTG 1: 90 days:  As an indicator of no exacerbation of lymphedema staging, the patient will present with an L-Dex score less than [10] points from preoperative baseline.   STATUS: New  STG 1a:   30 days: To prevent exacerbation of mixed edema to lymphedema, patient will utilize the 2 postsurgical compression garments daily.      STATUS: New  STG 1b: 30 days: Patient will be independent with self-manual lymphatic massage.    STATUS: New  STG 1c: 30 days:  Patient will be independent with identification of signs and symptoms of lymphedema exasperation per  stoplight to recovery education handout.   STATUS: New  STG 1 d: 30 days: Patient will be independent with HEP to prevent advancement in lymphedema staging.   STATUS: New  TREATMENT:  Self Care/ADL retraining, Therapeutic Activity, Neuromuscular Re-education, Therapeutic Exercise, Bioimpedence Fluid Analysis, Post-Surgical compression garement [address] [facility]/ [employer] Cam[employer]ole Kit 2860K, Orthotic Management and training,  and Manual Therapy.     OT Assessment/Plan     Row Name 10/22/21 1609          OT Assessment    Functional Limitations Performance in self-care ADL  -CH     Impairments Impaired lymphatic circulation  -     Assessment Comments Patient is demonstrating an exasperation in limb volume indicating subclinical levels of lymphedema this date.  Patient will benefit from continued skilled occupational therapy services to be fitted into the CircAid upper extremity reduction kit and to continue education on prevention and management of lymphedema symptoms as well as evaluation of overall lymphatic functioning to prevent advancement in lymphedema staging.  -     OT Rehab Potential Excellent  -     Patient/caregiver participated in establishment of treatment plan and goals Yes  -            OT Plan    OT Frequency 1x/week  0-1 time per week  -     Predicted Duration of Therapy Intervention (OT) Pt. to re-evaluated 3 weeks post-surgery, 3 weeks post XRT, every 3 months from baseline for years 1-3 and every 6 months years 4 and 5  -           User Key  (r) = Recorded By, (t) = Taken By, (c) = Cosigned By    Initials Name Provider Type    Ria Colón OT Occupational Therapist                          Time Calculation:   Timed Charges  86314 - OT Self Care/Mgmt Minutes: 60  Untimed Charges  OT Eval/Re-eval Minutes: 30  Total Minutes  Timed Charges Total Minutes: 60  Untimed Charges Total Minutes: 30   Total Minutes: 90     Therapy Charges for Today     Code Description Service Date  Service Provider Modifiers Qty    08990179630 HC PT BIS XTRACELL FLUID ANALYSIS 10/22/2021 Ria Walker OT  1    21505512674 HC OT SELF CARE/MGMT/TRAIN EA 15 MIN 10/22/2021 Ria Walker OT GO 4    99634167181 HC OT EVAL LOW COMPLEXITY 1 10/22/2021 Ria Walker OT GO 1                    Ria Walker OT  10/22/2021

## 2021-10-25 ENCOUNTER — HOSPITAL ENCOUNTER (OUTPATIENT)
Dept: RADIATION ONCOLOGY | Facility: HOSPITAL | Age: 66
Discharge: HOME | End: 2021-10-25

## 2021-10-25 PROCEDURE — 77307 TELETHX ISODOSE PLAN CPLX: CPT | Performed by: RADIOLOGY

## 2021-10-25 PROCEDURE — 77334 RADIATION TREATMENT AID(S): CPT | Performed by: RADIOLOGY

## 2021-10-25 PROCEDURE — 77412 RADIATION TX DELIVERY LVL 3: CPT | Performed by: RADIOLOGY

## 2021-10-25 PROCEDURE — 77280 THER RAD SIMULAJ FIELD SMPL: CPT | Performed by: RADIOLOGY

## 2021-10-25 NOTE — PROGRESS NOTES
10/25: Spoke with pt in rad onc to relay information regarding feet/nail care and provided resources for such. Pt reports she does not need financial assistance for this service. Also provided information regarding [place] [facility]. No other needs identified at this time. Encouraged OSW support remains available.

## 2021-10-26 ENCOUNTER — HOSPITAL ENCOUNTER (OUTPATIENT)
Dept: RADIATION ONCOLOGY | Facility: HOSPITAL | Age: 66
Discharge: HOME | End: 2021-10-26

## 2021-10-26 ENCOUNTER — TELEPHONE (OUTPATIENT)
Dept: ONCOLOGY | Facility: CLINIC | Age: 66
End: 2021-10-26

## 2021-10-26 VITALS
TEMPERATURE: 97.2 F | WEIGHT: 293 LBS | RESPIRATION RATE: 16 BRPM | HEART RATE: 73 BPM | DIASTOLIC BLOOD PRESSURE: 60 MMHG | OXYGEN SATURATION: 97 % | BODY MASS INDEX: 55.69 KG/M2 | SYSTOLIC BLOOD PRESSURE: 145 MMHG

## 2021-10-26 DIAGNOSIS — C50.111 MALIGNANT NEOPLASM OF CENTRAL PORTION OF RIGHT BREAST IN FEMALE, ESTROGEN RECEPTOR NEGATIVE (HCC): Primary | ICD-10-CM

## 2021-10-26 DIAGNOSIS — Z17.1 MALIGNANT NEOPLASM OF CENTRAL PORTION OF RIGHT BREAST IN FEMALE, ESTROGEN RECEPTOR NEGATIVE (HCC): Primary | ICD-10-CM

## 2021-10-26 DIAGNOSIS — R19.7 DIARRHEA, UNSPECIFIED TYPE: Primary | ICD-10-CM

## 2021-10-26 PROCEDURE — 77427 RADIATION TX MANAGEMENT X5: CPT | Performed by: RADIOLOGY

## 2021-10-26 PROCEDURE — 77387 GUIDANCE FOR RADJ TX DLVR: CPT | Performed by: RADIOLOGY

## 2021-10-26 PROCEDURE — 77412 RADIATION TX DELIVERY LVL 3: CPT | Performed by: RADIOLOGY

## 2021-10-26 PROCEDURE — G6002 STEREOSCOPIC X-RAY GUIDANCE: HCPCS | Performed by: RADIOLOGY

## 2021-10-26 NOTE — TELEPHONE ENCOUNTER
Pt called in to report that she has had diarrhea for the past 2 weeks (received Perjeta 3 weeks ago). Has been taking immodium, but only one tablet every few hours. Reviewed our office protocol for taking immodium 2 tablets after 1st loose stool of the day then one tablet after each additional loose stool not to exceed 8 tablets/day. Per Dr. Azul will also re-start patient on xifaxan 550mg daily and this has been routed to her pharmacy. Pt requested to hold chemo until after radiation, but instructed her she will need to come to her appointment tomorrow to be seen by Dr. Azul, obtain stool culture and stool for c-diff and receive her herceptin infusion. We will hold perjeta at this time. She v/u.

## 2021-10-27 ENCOUNTER — INFUSION (OUTPATIENT)
Dept: ONCOLOGY | Facility: HOSPITAL | Age: 66
End: 2021-10-27

## 2021-10-27 ENCOUNTER — OFFICE VISIT (OUTPATIENT)
Dept: ONCOLOGY | Facility: CLINIC | Age: 66
End: 2021-10-27

## 2021-10-27 ENCOUNTER — HOSPITAL ENCOUNTER (OUTPATIENT)
Dept: RADIATION ONCOLOGY | Facility: HOSPITAL | Age: 66
Discharge: HOME | End: 2021-10-27

## 2021-10-27 VITALS
SYSTOLIC BLOOD PRESSURE: 131 MMHG | RESPIRATION RATE: 18 BRPM | TEMPERATURE: 97.3 F | HEIGHT: 65 IN | DIASTOLIC BLOOD PRESSURE: 85 MMHG | OXYGEN SATURATION: 97 % | HEART RATE: 72 BPM | BODY MASS INDEX: 48.82 KG/M2 | WEIGHT: 293 LBS

## 2021-10-27 DIAGNOSIS — Z17.1 MALIGNANT NEOPLASM OF AXILLARY TAIL OF RIGHT BREAST IN FEMALE, ESTROGEN RECEPTOR NEGATIVE (HCC): ICD-10-CM

## 2021-10-27 DIAGNOSIS — Z79.899 ENCOUNTER FOR EYE EXAM DUE TO HIGH RISK MEDICATION: ICD-10-CM

## 2021-10-27 DIAGNOSIS — C50.611 MALIGNANT NEOPLASM OF AXILLARY TAIL OF RIGHT FEMALE BREAST, UNSPECIFIED ESTROGEN RECEPTOR STATUS (HCC): ICD-10-CM

## 2021-10-27 DIAGNOSIS — C50.611 MALIGNANT NEOPLASM OF AXILLARY TAIL OF RIGHT FEMALE BREAST, UNSPECIFIED ESTROGEN RECEPTOR STATUS (HCC): Primary | ICD-10-CM

## 2021-10-27 DIAGNOSIS — C50.611 MALIGNANT NEOPLASM OF AXILLARY TAIL OF RIGHT BREAST IN FEMALE, ESTROGEN RECEPTOR NEGATIVE (HCC): ICD-10-CM

## 2021-10-27 DIAGNOSIS — R19.7 DIARRHEA, UNSPECIFIED TYPE: ICD-10-CM

## 2021-10-27 DIAGNOSIS — C50.911 INFLAMMATORY BREAST CANCER, RIGHT (HCC): Primary | ICD-10-CM

## 2021-10-27 PROBLEM — M81.0 OSTEOPOROSIS WITHOUT CURRENT PATHOLOGICAL FRACTURE: Status: ACTIVE | Noted: 2021-10-27

## 2021-10-27 LAB
ALBUMIN SERPL-MCNC: 3.8 G/DL (ref 3.5–5.2)
ALBUMIN/GLOB SERPL: 1.4 G/DL (ref 1.1–2.4)
ALP SERPL-CCNC: 104 U/L (ref 38–116)
ALT SERPL W P-5'-P-CCNC: 12 U/L (ref 0–33)
ANION GAP SERPL CALCULATED.3IONS-SCNC: 5.1 MMOL/L (ref 5–15)
AST SERPL-CCNC: 16 U/L (ref 0–32)
BASOPHILS # BLD AUTO: 0.02 10*3/MM3 (ref 0–0.2)
BASOPHILS NFR BLD AUTO: 0.3 % (ref 0–1.5)
BILIRUB SERPL-MCNC: 0.3 MG/DL (ref 0.2–1.2)
BUN SERPL-MCNC: 14 MG/DL (ref 6–20)
BUN/CREAT SERPL: 19.7 (ref 7.3–30)
CALCIUM SPEC-SCNC: 9.3 MG/DL (ref 8.5–10.2)
CHLORIDE SERPL-SCNC: 105 MMOL/L (ref 98–107)
CO2 SERPL-SCNC: 33.9 MMOL/L (ref 22–29)
CREAT SERPL-MCNC: 0.71 MG/DL (ref 0.6–1.1)
DEPRECATED RDW RBC AUTO: 47.1 FL (ref 37–54)
EOSINOPHIL # BLD AUTO: 0.18 10*3/MM3 (ref 0–0.4)
EOSINOPHIL NFR BLD AUTO: 2.3 % (ref 0.3–6.2)
ERYTHROCYTE [DISTWIDTH] IN BLOOD BY AUTOMATED COUNT: 14.2 % (ref 12.3–15.4)
FERRITIN SERPL-MCNC: 17.6 NG/ML (ref 13–150)
GFR SERPL CREATININE-BSD FRML MDRD: 82 ML/MIN/1.73
GLOBULIN UR ELPH-MCNC: 2.7 GM/DL (ref 1.8–3.5)
GLUCOSE SERPL-MCNC: 108 MG/DL (ref 74–124)
HCT VFR BLD AUTO: 32.6 % (ref 34–46.6)
HGB BLD-MCNC: 9.3 G/DL (ref 12–15.9)
IMM GRANULOCYTES # BLD AUTO: 0.04 10*3/MM3 (ref 0–0.05)
IMM GRANULOCYTES NFR BLD AUTO: 0.5 % (ref 0–0.5)
IRON 24H UR-MRATE: 24 MCG/DL (ref 37–145)
IRON SATN MFR SERPL: 6 % (ref 14–48)
LYMPHOCYTES # BLD AUTO: 0.65 10*3/MM3 (ref 0.7–3.1)
LYMPHOCYTES NFR BLD AUTO: 8.5 % (ref 19.6–45.3)
MAGNESIUM SERPL-MCNC: 1.7 MG/DL (ref 1.8–2.5)
MCH RBC QN AUTO: 25.8 PG (ref 26.6–33)
MCHC RBC AUTO-ENTMCNC: 28.5 G/DL (ref 31.5–35.7)
MCV RBC AUTO: 90.6 FL (ref 79–97)
MONOCYTES # BLD AUTO: 0.67 10*3/MM3 (ref 0.1–0.9)
MONOCYTES NFR BLD AUTO: 8.7 % (ref 5–12)
NEUTROPHILS NFR BLD AUTO: 6.12 10*3/MM3 (ref 1.7–7)
NEUTROPHILS NFR BLD AUTO: 79.7 % (ref 42.7–76)
NRBC BLD AUTO-RTO: 0 /100 WBC (ref 0–0.2)
PLATELET # BLD AUTO: 262 10*3/MM3 (ref 140–450)
PMV BLD AUTO: 9.3 FL (ref 6–12)
POTASSIUM SERPL-SCNC: 3.9 MMOL/L (ref 3.5–4.7)
PROT SERPL-MCNC: 6.5 G/DL (ref 6.3–8)
RBC # BLD AUTO: 3.6 10*6/MM3 (ref 3.77–5.28)
SODIUM SERPL-SCNC: 144 MMOL/L (ref 134–145)
TIBC SERPL-MCNC: 433 MCG/DL (ref 249–505)
TRANSFERRIN SERPL-MCNC: 309 MG/DL (ref 200–360)
WBC # BLD AUTO: 7.68 10*3/MM3 (ref 3.4–10.8)

## 2021-10-27 PROCEDURE — 36415 COLL VENOUS BLD VENIPUNCTURE: CPT | Performed by: INTERNAL MEDICINE

## 2021-10-27 PROCEDURE — 82728 ASSAY OF FERRITIN: CPT | Performed by: INTERNAL MEDICINE

## 2021-10-27 PROCEDURE — 85025 COMPLETE CBC W/AUTO DIFF WBC: CPT

## 2021-10-27 PROCEDURE — 25010000002 TRASTUZUMAB PER 10 MG: Performed by: INTERNAL MEDICINE

## 2021-10-27 PROCEDURE — 77387 GUIDANCE FOR RADJ TX DLVR: CPT | Performed by: RADIOLOGY

## 2021-10-27 PROCEDURE — 80053 COMPREHEN METABOLIC PANEL: CPT

## 2021-10-27 PROCEDURE — 83540 ASSAY OF IRON: CPT | Performed by: INTERNAL MEDICINE

## 2021-10-27 PROCEDURE — 84466 ASSAY OF TRANSFERRIN: CPT | Performed by: INTERNAL MEDICINE

## 2021-10-27 PROCEDURE — 25010000002 HEPARIN LOCK FLUSH PER 10 UNITS: Performed by: INTERNAL MEDICINE

## 2021-10-27 PROCEDURE — 77412 RADIATION TX DELIVERY LVL 3: CPT | Performed by: RADIOLOGY

## 2021-10-27 PROCEDURE — 96413 CHEMO IV INFUSION 1 HR: CPT

## 2021-10-27 PROCEDURE — 99215 OFFICE O/P EST HI 40 MIN: CPT | Performed by: INTERNAL MEDICINE

## 2021-10-27 PROCEDURE — 83735 ASSAY OF MAGNESIUM: CPT | Performed by: INTERNAL MEDICINE

## 2021-10-27 PROCEDURE — G6002 STEREOSCOPIC X-RAY GUIDANCE: HCPCS | Performed by: RADIOLOGY

## 2021-10-27 RX ORDER — SODIUM CHLORIDE 0.9 % (FLUSH) 0.9 %
10 SYRINGE (ML) INJECTION AS NEEDED
Status: DISCONTINUED | OUTPATIENT
Start: 2021-10-27 | End: 2021-10-27 | Stop reason: HOSPADM

## 2021-10-27 RX ORDER — SODIUM CHLORIDE 9 MG/ML
250 INJECTION, SOLUTION INTRAVENOUS ONCE
Status: COMPLETED | OUTPATIENT
Start: 2021-10-27 | End: 2021-10-27

## 2021-10-27 RX ORDER — SODIUM CHLORIDE 9 MG/ML
250 INJECTION, SOLUTION INTRAVENOUS ONCE
Status: CANCELLED | OUTPATIENT
Start: 2021-10-27

## 2021-10-27 RX ORDER — ANASTROZOLE 1 MG/1
1 TABLET ORAL DAILY
Qty: 30 TABLET | Refills: 6 | Status: SHIPPED | OUTPATIENT
Start: 2021-10-27 | End: 2021-11-26

## 2021-10-27 RX ORDER — HEPARIN SODIUM (PORCINE) LOCK FLUSH IV SOLN 100 UNIT/ML 100 UNIT/ML
500 SOLUTION INTRAVENOUS AS NEEDED
Status: DISCONTINUED | OUTPATIENT
Start: 2021-10-27 | End: 2021-10-27 | Stop reason: HOSPADM

## 2021-10-27 RX ORDER — DIPHENOXYLATE HYDROCHLORIDE AND ATROPINE SULFATE 2.5; .025 MG/1; MG/1
1 TABLET ORAL 4 TIMES DAILY PRN
Qty: 120 TABLET | Refills: 0 | Status: SHIPPED | OUTPATIENT
Start: 2021-10-27

## 2021-10-27 RX ORDER — HEPARIN SODIUM (PORCINE) LOCK FLUSH IV SOLN 100 UNIT/ML 100 UNIT/ML
500 SOLUTION INTRAVENOUS AS NEEDED
Status: CANCELLED | OUTPATIENT
Start: 2021-10-27

## 2021-10-27 RX ORDER — SODIUM CHLORIDE 0.9 % (FLUSH) 0.9 %
10 SYRINGE (ML) INJECTION AS NEEDED
Status: CANCELLED | OUTPATIENT
Start: 2021-10-27

## 2021-10-27 RX ADMIN — TRASTUZUMAB 920 MG: 150 INJECTION, POWDER, LYOPHILIZED, FOR SOLUTION INTRAVENOUS at 13:25

## 2021-10-27 RX ADMIN — SODIUM CHLORIDE 250 ML: 9 INJECTION, SOLUTION INTRAVENOUS at 12:54

## 2021-10-27 RX ADMIN — Medication 500 UNITS: at 13:58

## 2021-10-27 RX ADMIN — SODIUM CHLORIDE, PRESERVATIVE FREE 10 ML: 5 INJECTION INTRAVENOUS at 13:58

## 2021-10-27 NOTE — PROGRESS NOTES
Subjective     REASON FOR FOLLOW-UP: Right breast inflammatory breast, triple positive                              REQUESTING PHYSICIAN: MD Francisco Esteban MD Bethany Haynes, MD    History of Present Illness patient is a 66 y.o. 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  For 12 weeks followed by Adriamycin Cytoxan  She is here today USP through her radiation having had significant diarrhea from her dose of Perjeta 3 weeks ago.  The diarrhea started about a week after the dose and she did not call and tell us about it until 2 days ago.  She is taking Imodium on doubled up her dosing and diarrhea stopped as of yesterday morning  We have asked her to give us a stool specimen if possible for C. difficile but at this point I think she is not can be able to tolerate Perjeta and we will continue with Herceptin alone    We will start anastrozole because she is not a good candidate for tamoxifen and her bone density showed normal bone density in the spine but osteoporosis in the left femoral neck and we will start Prolia in 6 weeks.The side effects and toxicities of the Aromatase inhibitors was discussed with the patient including, hot flashes, mood swings and hair thinning.Significant arthralgias and worsening bone density were also discussed. Baseline bone density evaluation was ordered.        The radiation is wearing her out and she is very fatigued hopefully this will improve    She continues on oxygen but overall is doing much better than I expected after surgery and chemotherapy    Echocardiogram i in September was stable with an EF of 63%    Past Medical History:   Diagnosis Date   • Anemia    • Anxiety    • Asthma    • Chronic diastolic (congestive) heart  failure (HCC)    • Chronic hypoxemic respiratory failure (HCC)     on continuous O2   • Diarrhea     with chemo   • H/O Intraductal papilloma    • Hemorrhoids    • History of transfusion     AFTER BACK SURGERY no reaction   • Hypertension    • IBS (irritable bowel syndrome)    • Inflammatory breast cancer (HCC)     right   • Kidney stone    • Morbid obesity with BMI of 50.0-59.9, adult (HCC)    • Obesity hypoventilation syndrome (HCC) 2021   • On home oxygen therapy     2.5 AT REST WITH ACTIVITY UP TO 4L   • ANABELLE on CPAP     cpap  & uses O2 with CPAP   • Osteoarthritis    • PONV (postoperative nausea and vomiting)         Past Surgical History:   Procedure Laterality Date   • BREAST EXCISIONAL BIOPSY Bilateral    • COLONOSCOPY     • CYSTOSCOPY BLADDER STONE LITHOTRIPSY     • ENDOSCOPY     • KNEE ARTHROPLASTY Bilateral    • MASTECTOMY Left 8/10/2021    Procedure: LEFT TOTAL MASTECTOMY;  Surgeon: Nicci Banks MD;  Location: [facility];  Service: General;  Laterality: Left;   • MASTECTOMY Right 8/10/2021    Procedure: RIGHT MODIFIED RADICAL MASTECTOMY;  Surgeon: Nicci Banks MD;  Location: [facility] OR;  Service: General;  Laterality: Right;   • SPINE SURGERY     • TOTAL ABDOMINAL HYSTERECTOMY WITH SALPINGO OOPHORECTOMY     • UPPER GASTROINTESTINAL ENDOSCOPY     • VENOUS ACCESS DEVICE (PORT) INSERTION N/A 2021    Procedure: INSERTION VENOUS ACCESS DEVICE;  Surgeon: Nicci Banks MD;  Location: [facility];  Service: General;  Laterality: N/A;      ONC HISTORY  patient is a 65-year-old white female with morbid obesity, history of diastolic congestive heart failure and unknown type of pulmonary disease for which she has been on oxygen for 4 years.    She has been getting routine mammography since 38 years of age because her mother  at 46 of breast cancer and had a biopsy of her left breast in  which was apparently benign and another biopsy in 2018 on her  right breast which showed a small focus of atypical ductal hyperplasia and atypical lobular hyperplasia with a residual intraductal papilloma.  At that time she saw medical oncologist who recommended genetic testing and prevention but the insurance would not cover the genetic testing and the medical oncologist thought tamoxifen was too risky for her because of her comorbidities and no treatment was given.  More recently she noticed redness of the right breast in October and showed it to her family doctor who gave her course of Keflex when it did not improve and mammogram was ordered and this was benign  When the redness persisted she saw her gynecologist with concern for inflammatory breast cancer and referred her to Dr. Banks after repeat imaging and [facility]  Preliminary report shows micropapillary invasive mammary carcinoma intermediate grade measuring 13 mm right axillary node was also involved with metastatic cancer ER/MN and HER-2 are pending  Patient saw Dr. Banks who sent her for skin biopsies and she had 3 separate punch biopsy of the skin that showed Perivascular and perifollicular inflammation with no obvious malignancy at 3:00 12:00 and 9:00  In addition staging work-up with CAT scans and bone scan were ordered.  CAT scan of the chest showed a borderline mediastinal  Infracarinal node measuring 15 mm in length mild cardiomegaly and heavy coronary artery calcifications  CT of the abdomen showed a 2.5 x 2.2 cm right adrenal mass which the patient tells me she has had in the past and is most likely benign but also a 2.7 cm left external iliac node which is indeterminate.  Bone scan was negative    Echocardiogram is scheduled for later next week and genetic testing with the invitae stat panel is pending    Patient is  3 para 3 menarche was at age 9 menopause at 49 when she had a hysterectomy and oophorectomy  First  childbirth was at age 20 she breast-fed her second and third children and took no hormone replacement after menopause    Family history is positive for mother dying of breast cancer at age 46 she is a maternal aunt with breast cancer in her 70s a paternal aunt with breast cancer in her 70s paternal grandmother with colon cancer.  Her father had Hodgkin's disease and non-Hodgkin's lymphoma but  of small cell lung cancer at 67      She has not had a heart attack stroke or blood clot    Plan to do echocardiogram soon as possible to ascertain tolerability of cardiotoxic chemotherapy which would be typically indicated with an inflammatory breast cancer    Also plan PET scan to follow-up on atypical lymph nodes and confirm benign etiology of the adrenal lesion    She will have port placement and chemo education pt is  ER/NC and HER-2 +    I explained to Aida that the goal of treatment would be curative but she has a lot of comorbidities which may limit our ability to give the most effective chemotherapy in the setting  I told her radiation would be involved and possibly hormonal therapy for 10 years as she has hormone positivity and HER-2 directed therapy    She expressed some concerns about driving back and forth from [place] but felt that once a week was doable    We will see her back in 2 weeks to start treatment with a port placement planned early next week      Patient did have a mild reaction to Taxol dose #1 with some increased shortness of breath and flushing.  This was responsive to 100 mg of Solu-Cortef.  She states this gave her a headache and made her quite talkative but otherwise she was thankfully able to complete Taxol infusion without further incident.    Patient reports issues with chronic diarrhea over the last few years and did note a little bit increase in stooling following THP though nothing overly significant in her mind.  She did finally begin taking Imodium just a few days ago and has  had no further stools.  She does note significant trouble with hemorrhoids in relation to the diarrhea   Required blood transfusion due to significant hemorrhoidal bleeding plus iron deficiency.  Injectafer planned    Due to inclement weather cycle 1 day 8 therapy was missed.  She did get day 15 therapy with fairly good tolerance except for some diarrhea.    She was found to be iron deficient despite trying oral iron.  We therefore elected to proceed with IV Injectafer but she remains mildly anemic with a hemoglobin of 9.3    7/21    She has increased her Lasix and her weight is down 7 pounds but overall she is significantly weaker since starting treatment and I think it is in her best interest to discontinue treatment and proceed with surgery and will use Herceptin in the interim till surgery scheduled  She has had a good response in the breast and I do not think the last dose of Adriamycin is going to be crucial and we run the risk of making her so weak that she cannot go for surgery    9/21  Patient had worsening shortness of breath requiring continuous oxygen.  CT of the chest performed to rule out pneumonitis and she was started on steroids.  CT showed bilateral groundglass infiltrates presumed to be related to Taxol pneumonitis and therefore Taxol discontinued.  Patient completed the fourth cycle of Perjeta/Herceptin alone.  Diarrhea was an issue but not as bad without the Taxol.  She is weaned off her prednisone    Patient proceeded with cycle 1 Adriamycin/Cytoxan on 5/14/2021 and is seen back today for cycle #3 and we are doing it at 3-week intervals because of her frailty and after 3 cycles we stopped because of tolerance issues    She went for surgery her bilateral mastectomies and interestingly she had a complete pathological CR on the right breast and had evidence of DCIS in the left breast [id number]      Current Outpatient Medications on File Prior to Visit   Medication Sig Dispense Refill   • albuterol  sulfate  (90 Base) MCG/ACT inhaler Inhale 2 puffs Every 4 (Four) Hours As Needed.     • carvedilol (COREG) 25 MG tablet Take 0.5 tablets by mouth 2 (Two) Times a Day With Meals. 30 tablet 0   • Cholecalciferol (Vitamin D) 50 MCG (2000 UT) capsule Take 2,000 Units by mouth Daily.     • dicyclomine (BENTYL) 20 MG tablet Take 1 tablet by mouth Every 6 (Six) Hours. 60 tablet 2   • diphenhydrAMINE (BENADRYL) 25 mg capsule Take 25 mg by mouth At Night As Needed for Itching, Allergies or Sleep.     • FLUoxetine (PROzac) 40 MG capsule TAKE 1 CAPSULE BY MOUTH EVERY DAY 30 capsule 0   • furosemide (LASIX) 40 MG tablet Take 20 mg by mouth Daily.     • Lidocaine, Anorectal, (LMX 5) 5 % cream cream Apply  topically to the appropriate area as directed 3 (Three) Times a Day As Needed (hemmorrhoid pain). 45 g 2   • lisinopril (PRINIVIL,ZESTRIL) 5 MG tablet TAKE 1 TABLET BY MOUTH EVERY DAY 30 tablet 6   • miconazole (Lotrimin AF) 2 % powder Apply  topically to the appropriate area as directed 2 (two) times a day. 85 g 1   • mometasone-formoterol (DULERA 100) 100-5 MCG/ACT inhaler Inhale 2 Puffs/kg Every Night.     • montelukast (SINGULAIR) 10 MG tablet Take 10 mg by mouth Every Night.     • multivitamin (THERAGRAN) tablet tablet Take 1 tablet by mouth Daily.     • O2 (OXYGEN) Inhale 3 L/min Continuous.     • omeprazole (priLOSEC) 20 MG capsule TAKE 1 CAPSULE BY MOUTH EVERY DAY 30 capsule 3   • ondansetron (ZOFRAN) 8 MG tablet Take 1 tablet by mouth 3 (Three) Times a Day As Needed for Nausea or Vomiting. 30 tablet 5   • Potassium 99 MG tablet Take 1 tablet by mouth Every Night.     • pravastatin (PRAVACHOL) 40 MG tablet Take 40 mg by mouth Every Night.     • psyllium (METAMUCIL) 0.52 g capsule Metamucil 0.52 gram oral capsule take 1 capsule by oral route 5Xday   Active     • riFAXIMin (Xifaxan) 550 MG tablet Take 1 tablet by mouth Daily. 28 tablet 2   • spironolactone (ALDACTONE) 25 MG tablet Take 25 mg by mouth Daily.     •  gabapentin (NEURONTIN) 100 MG capsule Take 1 capsule by mouth 3 (Three) Times a Day for 30 days. 90 capsule 0     No current facility-administered medications on file prior to visit.        ALLERGIES:    Allergies   Allergen Reactions   • Amlodipine Swelling     LEGS    • Hydrochlorothiazide Unknown - Low Severity     Neuro issues   • Morphine Nausea And Vomiting     hallucinations   • Adhesive Tape Rash        Social History     Socioeconomic History   • Marital status:    • Number of children: 3   Tobacco Use   • Smoking status: Never Smoker   • Smokeless tobacco: Never Used   Vaping Use   • Vaping Use: Never used   Substance and Sexual Activity   • Alcohol use: Never   • Drug use: Never   • Sexual activity: Defer        Family History   Problem Relation Age of Onset   • Breast cancer Mother 45   • Colon cancer Paternal Grandmother    • Breast cancer Maternal Aunt 70   • Breast cancer Paternal Aunt 70   • Lung cancer Father    • Hodgkin's lymphoma Father    • Skin cancer Father         squamous cell   • Ovarian cancer Neg Hx    • Uterine cancer Neg Hx    • Deep vein thrombosis Neg Hx    • Pulmonary embolism Neg Hx    • Malig Hyperthermia Neg Hx         Review of Systems   Constitutional: Positive for fatigue (Better). Negative for appetite change, chills, diaphoresis, fever and unexpected weight change.   HENT: Negative for hearing loss, sore throat, trouble swallowing and voice change.    Respiratory: Negative for cough, chest tightness, shortness of breath (Improved ) and wheezing.    Cardiovascular: Positive for leg swelling (Better). Negative for chest pain and palpitations.   Gastrointestinal: Negative for abdominal distention, abdominal pain, constipation, diarrhea (Severe after Perjeta stopped yesterday), nausea, rectal pain (hemmorrhoids -improved) and vomiting.   Genitourinary: Negative for dysuria, frequency, hematuria and urgency.   Musculoskeletal: Negative for back pain ( better), joint  "swelling and neck stiffness.        No muscle weakness.   Skin: Negative for rash and wound.   Neurological: Negative for seizures, syncope, speech difficulty, weakness, numbness and headaches.   Hematological: Negative for adenopathy. Does not bruise/bleed easily.   Psychiatric/Behavioral: Negative.  Negative for behavioral problems, confusion and suicidal ideas.   All other systems reviewed and are negative.       Objective     Vitals:    10/27/21 1207   BP: 131/85   Pulse: 72   Resp: 18   Temp: 97.3 °F (36.3 °C)   TempSrc: Temporal   SpO2: 97%   Weight: (!) 152 kg (334 lb 12.8 oz)   Height: 165.1 cm (65\")   PainSc: 0-No pain     Current Status 10/27/2021   ECOG score 1       Physical Exam    CONSTITUTIONAL:  Vital signs reviewed.  No distress, looks comfortable. Morbidly obese  EYES:  Conjunctiva and lids unremarkable.  PERRLA  EARS,NOSE,MOUTH,THROAT:  Ears and nose appear unremarkable.  Lips, teeth, gums appear unremarkable.  RESPIRATORY:  Normal respiratory effort.  Lungs clear to auscultation bilaterally.  No axillary adenopathy  BREASTS: Bilateral mastectomies with no reconstruction-drain right chest wall  CARDIOVASCULAR:  Normal S1, S2.  No murmurs rubs or gallops.  1+ brawny lower extremity edema.  GASTROINTESTINAL: Abdomen appears unremarkable.  Nontender.  No hepatomegaly.  No splenomegaly.  LYMPHATIC:  No cervical, supraclavicular, axillary lymphadenopathy.  SKIN:  Warm.  Inclusion cyst left axilla-extensive tenia cruris  in the right groin  PSYCHIATRIC:  Normal judgment and insight.  Normal mood and affect.       I have reexamined the patient and the results are consistent with the previously documented exam. Jaime Azul MD           RECENT LABS:  Results from last 7 days   Lab Units 10/27/21  1147   WBC 10*3/mm3 7.68   NEUTROS ABS 10*3/mm3 6.12   HEMOGLOBIN g/dL 9.3*   HEMATOCRIT % 32.6*   PLATELETS 10*3/mm3 262                 PET  IMPRESSION:  1.  Moderate to intensely FDG avid asymmetric " soft tissue and skin  thickening involving the right breast likely representing patient's  known malignancy.  2.  Intensely FDG avid right axillary and subpectoral adenopathy likely  represent metastatic disease.  3.  Constellation of findings within the right adrenal gland are favored  to represent a lipid rich adenoma. Continued attention on follow-up is  recommended to ensure stability.  4.  While there are no findings of definite FDG avid osseous metastasis,  given the heterogenous FDG uptake throughout the axial and appendicular  skeleton due to the above stated limitations, subtle underlying osseous  metastasis would remain occult. Therefore, continued close attention on  follow-up is recommended to exclude this possibility.  5.  Short segment of moderate to intense FDG uptake within the distal  esophagus and GE junction suggestive of esophagitis. In the appropriate  clinical context correlation with patient history is recommended with  follow-up endoscopy if clinically indicated.  6.  Sub-6 mm pulmonary nodule within the right lower lobe is below PET  resolution and indeterminate. Continued close attention on follow-up  with chest CT in 3 months is recommended to exclude metastatic disease.  7.  Other findings as above.     This report was finalized on 2/2/2021     Final Diagnosis   1. Left Breast, Total Mastectomy (2,122 grams):               A. MULTIFOCAL LOW GRADE DUCTAL CARCINOMA IN SITU (DCIS):                            1. Solid, Cribriform, and Pagetoid type with single cell necrosis and focal calcifications.                            2. Extent of DCIS: 20 mm (5 of 26 blocks involved).                            3. Margins are negative for in situ carcinoma; Closest distance: DCIS is present > 10 mm from                                the posterior margin.               B. Multiple intraductal papillomas, usual ductal hyperplasia, and fibroadenomatoid change.               C. Unremarkable skin and  nipple.               D. See Synoptic Report and Comment #1.      2. Right Breast, Modified Radical Mastectomy S/P Neoadjuvant Chemotherapy (2,589 grams):               A. FIBROTIC TUMOR BED WITH NO RESIDUAL INVASIVE DUCTAL CARCINOMA.               B. Background breast parenchyma with multiple intraductal papillomas, fibroadenomatoid change,       usual ductal hyperplasia and pseudoangiomatous stromal hyperplasia (PASH).  C. Scar and fat necrosis, consistent with prior procedure-related changes.  D. Clip and biopsy site changes present within tumor bed.    E. Unremarkable skin and nipple.   F. Nineteen lymph nodes, negative for carcinoma (0/19):               1. Clip and biopsy site changes are present.               2. Treatment effect is present in 4 of 19 lymph nodes.  G. See Comment #2.         FINDINGS:   LUMBAR SPINE:  The BMD measured in the L1-L4 is 1.054 g/cm2 for a  T-score of 0.1 and a Z-score of 2.1     LEFT HIP: The BMD for the femoral neck is 0.476g/cm2 for a T score of   -3.4 and a Z score of -1.7     RIGHT HIP:  The BMD for the femoral neck is 0.657g/cm2 for a T score of  -1.7 and a Z score of 0.0     IMPRESSION:  Osteoporosis.     This report was finalized on 9/16/2021     Assessment/Plan   1. [id number] right breast cancer ER/CA HER-2 -3+ positive inflammatory breast cancer for neoadjuvant chemotherapy  · Staging work-up negative except for 6 mm lung nodule and axillary and subpectoral adenopathy  · THP followed by AC planned if she tolerates it  · C1D8 Taxol missed due to inclement weather.  · Taxol discontinued after 7 doses due to probable Taxol pneumonitis treated with steroids.    · C1 Adriamycin/Cytoxan given 5/14/2021.  · Adriamycin and Cytoxan stopped after 3 doses due to poor tolerance  · YPT0N0 right breast with multifocal DCIS ER/CA positive in the left breast post bilateral mastectomies and right axillary dissection  · Radiation Arimidex and Perjeta Herceptin to continue for the rest of  the year  · Severe diarrhea after resuming Perjeta-Lomotil ordered and C. difficile checked with this is C. difficile negative she will not tolerate Perjeta for the rest of the year and we will stop    2.  Morbid obesity    3.  History of diastolic heart failure  · Echocardiogram with ejection fraction of 64% normal strain  · Cleared by cardio-oncology for chemotherapy  · Echocardiogram in 9/21 stable at 63%    4.  Pulmonary disease?  Etiology on oxygen for 4 years?  Pickwickian    5.  Strong family history of breast cancer genetic testing 84 genes negative    6.  Probable benign adrenal adenoma-PET negative    7.  Questionable enlarged lymph nodes left iliac chain and mediastinum likely reactive-PET negative    8.  6 mm right lower lobe nodule below PET resolution pretreatment needs follow-up after THP  · Repeat CT read at [facility] radiologist report stability of nodules and nodes  · Repeat CT at [facility] in 10/21 shows continued improvement    9.  Abnormal uptake short segment esophagus with a history of Schatzki's ring-we will double PPI and watch closely but we we will proceed with chemotherapy at this point and refer back to GI-doubt she has metastatic disease to this area    10. Anemia with microcytic indices  · Iron studies performed 2/10/2021.  · 2/24/2021: reviewed with the patient that she is iron deficient, with ferritin of 16, iron saturation of 4%.  Patient reports taking ferrous gluconate in the past but this caused GI upset.  Also with her chronic diarrhea I do not think she can absorb it.  We will pursue IV iron with plans to initiate this next week pending insurance approval. In addition hemoglobin down to 8.0 and transfusion pursued.  · 3/3/2021: IV Injectafer initiated x2.   · Hemoglobin down to 9.9 one week out from first AC though overall stable.  Monitor.   · Hemoglobin improved off chemotherapy  · Hemoglobin dropped after resuming Perjeta Herceptin will recheck iron stores    11.  Hemorrhoidal pain secondary to diarrhea. Does have occasional bleeding. Topical lidocaine prescribed. Monitor.    12.  History of chronic diarrhea, ?IBS, exacerbated with Perjeta therapy.  · Patient required rifaximin 550 mg to take twice daily x7 days with each Perjeta dose.   · Since completion of Perjeta patient is now actually experiencing constipation (further discussed below).      13.  Taxol-induced pneumonitis.   · Worsening shortness of breath with CT evidence of groundglass infiltrates bilaterally suspicious for pneumonitis  · Patient prescribed prednisone 20 twice daily  · Breathing is overall improved.  Patient is slowly tapering off prednisone.  Today she will begin 5 mg every other day x1 week and then discontinue.      14.  Constipation following initiation of Adriamycin/Cytoxan.  · Patient is just increased fiber in her diet but asking what else she can do.  She prefers a suppository if possible.  We discussed the use of a total suppository but also consider taking senna S1-2 tabs nightly at least the first week after chemotherapy to help avoid this in the future.    15.  Osteoporosis on DEXA scan in 9/21  Plan  1.  Herceptin alone today and from here on out unless the diarrhea was from C. difficile  2.   Return in 3 weeks for Herceptin only and see Mariza Vazquez in 6 weeks for Prolia   3. see me in 12 weeks   4.  Check stool for C. difficile and iron studies  5.  Arimidex to start in early November with plans to start Prolia in 6 weeks  6.  Lomotil prescribed for diarrhea   this patient is on drug therapy requiring intensive monitoring for toxicity.     At this point the diarrhea for her is really prohibitive and she has C. difficile to explain her diarrhea we will have to stop Perjeta and continue with Herceptin single agent and Arimidex  Radiation will continue for another 3 weeks  I spent 40 total minutes, face-to-face, caring for Aida today.  Greater than 50% of this time involved counseling  and/or coordination of care as documented within this note.

## 2021-10-28 ENCOUNTER — HOSPITAL ENCOUNTER (OUTPATIENT)
Dept: RADIATION ONCOLOGY | Facility: HOSPITAL | Age: 66
Discharge: HOME | End: 2021-10-28

## 2021-10-28 PROCEDURE — 77387 GUIDANCE FOR RADJ TX DLVR: CPT | Performed by: RADIOLOGY

## 2021-10-28 PROCEDURE — 77336 RADIATION PHYSICS CONSULT: CPT | Performed by: RADIOLOGY

## 2021-10-28 PROCEDURE — 77412 RADIATION TX DELIVERY LVL 3: CPT | Performed by: RADIOLOGY

## 2021-10-28 PROCEDURE — G6002 STEREOSCOPIC X-RAY GUIDANCE: HCPCS | Performed by: RADIOLOGY

## 2021-10-29 ENCOUNTER — HOSPITAL ENCOUNTER (OUTPATIENT)
Dept: RADIATION ONCOLOGY | Facility: HOSPITAL | Age: 66
Discharge: HOME | End: 2021-10-29

## 2021-10-29 ENCOUNTER — APPOINTMENT (OUTPATIENT)
Dept: OCCUPATIONAL THERAPY | Facility: HOSPITAL | Age: 66
End: 2021-10-29

## 2021-10-29 PROCEDURE — 77387 GUIDANCE FOR RADJ TX DLVR: CPT | Performed by: RADIOLOGY

## 2021-10-29 PROCEDURE — 77427 RADIATION TX MANAGEMENT X5: CPT | Performed by: RADIOLOGY

## 2021-10-29 PROCEDURE — G6002 STEREOSCOPIC X-RAY GUIDANCE: HCPCS | Performed by: RADIOLOGY

## 2021-10-29 PROCEDURE — 77412 RADIATION TX DELIVERY LVL 3: CPT | Performed by: RADIOLOGY

## 2021-11-01 ENCOUNTER — HOSPITAL ENCOUNTER (OUTPATIENT)
Dept: RADIATION ONCOLOGY | Facility: HOSPITAL | Age: 66
Discharge: HOME | End: 2021-11-01

## 2021-11-01 ENCOUNTER — HOSPITAL ENCOUNTER (OUTPATIENT)
Dept: RADIATION ONCOLOGY | Facility: HOSPITAL | Age: 66
Setting detail: RADIATION/ONCOLOGY SERIES
End: 2021-11-01

## 2021-11-01 VITALS
SYSTOLIC BLOOD PRESSURE: 153 MMHG | OXYGEN SATURATION: 100 % | HEART RATE: 69 BPM | RESPIRATION RATE: 18 BRPM | DIASTOLIC BLOOD PRESSURE: 89 MMHG | TEMPERATURE: 97.3 F

## 2021-11-01 DIAGNOSIS — Z17.1 MALIGNANT NEOPLASM OF CENTRAL PORTION OF RIGHT BREAST IN FEMALE, ESTROGEN RECEPTOR NEGATIVE (HCC): Primary | ICD-10-CM

## 2021-11-01 DIAGNOSIS — C50.111 MALIGNANT NEOPLASM OF CENTRAL PORTION OF RIGHT BREAST IN FEMALE, ESTROGEN RECEPTOR NEGATIVE (HCC): Primary | ICD-10-CM

## 2021-11-01 PROCEDURE — 77412 RADIATION TX DELIVERY LVL 3: CPT | Performed by: RADIOLOGY

## 2021-11-01 PROCEDURE — 77387 GUIDANCE FOR RADJ TX DLVR: CPT | Performed by: RADIOLOGY

## 2021-11-01 PROCEDURE — G6002 STEREOSCOPIC X-RAY GUIDANCE: HCPCS | Performed by: RADIOLOGY

## 2021-11-01 NOTE — PROGRESS NOTES
On Treatment Visit       Patient: Aida Conner   YOB: 1955   Medical Record Number: [id number]     Date of Visit  November 1, 2021   Cancer Staging: Cancer Staging  Inflammatory breast cancer, right (HCC)  Staging form: Breast, AJCC 8th Edition  - Clinical: cT4d, cN1, G3, ER-, [place]-, HER2+ - Signed by Jaime Azul MD on 10/27/2021         was seen today for an on treatment visit.  She is receiving radiation therapy to the left chest wall. She  has received 1200 cGy in 6 fractions out of a planned dose of 5000 cGy in 25 fractions.     She experienced some shortness of breath today when walking into the hospital but this is now resolved.  She denies chest pain or cough.                                          Review of Systems:   Review of Systems   Constitutional: Positive for fatigue. Negative for appetite change.   HENT: Positive for sinus pressure (with clear sinus drainage). Negative for sore throat.    Respiratory: Positive for cough (nonproductive, started 1 day prior) and shortness of breath (on oxygen).    Cardiovascular: Positive for leg swelling.   Gastrointestinal: Negative for diarrhea and nausea.   Genitourinary: Negative for difficulty urinating.   Musculoskeletal: Positive for gait problem.   Neurological: Negative for dizziness and headaches.   Psychiatric/Behavioral: Positive for sleep disturbance.       Vitals:     Vitals:    11/01/21 1352   BP: 153/89   Pulse: 69   Resp: 18   Temp: 97.3 °F (36.3 °C)   SpO2: 100%       Weight:   Wt Readings from Last 3 Encounters:   10/27/21 (!) 152 kg (334 lb 12.8 oz)   10/26/21 (!) 152 kg (334 lb 10.5 oz)   10/21/21 (!) 151 kg (331 lb 12.7 oz)      Pain:    Pain Score    11/01/21 1352   PainSc: 0-No pain         Physical Exam:  Gen: WD/WN; NAD; nasal cannula in place  HEENT: MMM  Trachea: midline  Chest: symmetric  Resp: normal respiratory effort  Extr: warm, well-perfused  Neuro: awake and alert; no aphasia or neglect    Plan: I  have reviewed treatment setup notes, checked and approved the daily guidance images.  I reviewed dose delivery, treatment parameters and deemed them appropriate. We plan to continue radiation therapy as prescribed.          Radiation Oncology    Electronically signed by Shahab Sams MD 11/1/2021  15:08 EDT

## 2021-11-02 ENCOUNTER — HOSPITAL ENCOUNTER (OUTPATIENT)
Dept: RADIATION ONCOLOGY | Facility: HOSPITAL | Age: 66
Discharge: HOME | End: 2021-11-02

## 2021-11-02 PROCEDURE — 77412 RADIATION TX DELIVERY LVL 3: CPT | Performed by: RADIOLOGY

## 2021-11-02 PROCEDURE — 77387 GUIDANCE FOR RADJ TX DLVR: CPT | Performed by: RADIOLOGY

## 2021-11-02 PROCEDURE — G6002 STEREOSCOPIC X-RAY GUIDANCE: HCPCS | Performed by: RADIOLOGY

## 2021-11-03 ENCOUNTER — LAB (OUTPATIENT)
Dept: LAB | Facility: HOSPITAL | Age: 66
End: 2021-11-03

## 2021-11-03 ENCOUNTER — HOSPITAL ENCOUNTER (OUTPATIENT)
Dept: RADIATION ONCOLOGY | Facility: HOSPITAL | Age: 66
Discharge: HOME | End: 2021-11-03

## 2021-11-03 PROCEDURE — G6002 STEREOSCOPIC X-RAY GUIDANCE: HCPCS | Performed by: RADIOLOGY

## 2021-11-03 PROCEDURE — 77387 GUIDANCE FOR RADJ TX DLVR: CPT | Performed by: RADIOLOGY

## 2021-11-03 PROCEDURE — 77412 RADIATION TX DELIVERY LVL 3: CPT | Performed by: RADIOLOGY

## 2021-11-05 ENCOUNTER — HOSPITAL ENCOUNTER (OUTPATIENT)
Dept: RADIATION ONCOLOGY | Facility: HOSPITAL | Age: 66
Discharge: HOME | End: 2021-11-05

## 2021-11-05 PROCEDURE — 77387 GUIDANCE FOR RADJ TX DLVR: CPT | Performed by: RADIOLOGY

## 2021-11-05 PROCEDURE — 77336 RADIATION PHYSICS CONSULT: CPT | Performed by: RADIOLOGY

## 2021-11-05 PROCEDURE — G6002 STEREOSCOPIC X-RAY GUIDANCE: HCPCS | Performed by: RADIOLOGY

## 2021-11-05 PROCEDURE — 77412 RADIATION TX DELIVERY LVL 3: CPT | Performed by: RADIOLOGY

## 2021-11-06 PROBLEM — I25.10 CORONARY ARTERY DISEASE INVOLVING NATIVE HEART WITHOUT ANGINA PECTORIS: Status: ACTIVE | Noted: 2021-11-06

## 2021-11-06 PROBLEM — I50.32 CHRONIC DIASTOLIC CONGESTIVE HEART FAILURE: Status: ACTIVE | Noted: 2021-11-06

## 2021-11-08 ENCOUNTER — HOSPITAL ENCOUNTER (OUTPATIENT)
Dept: RADIATION ONCOLOGY | Facility: HOSPITAL | Age: 66
Discharge: HOME | End: 2021-11-08

## 2021-11-08 ENCOUNTER — TELEPHONE (OUTPATIENT)
Dept: CARDIOLOGY | Facility: CLINIC | Age: 66
End: 2021-11-08

## 2021-11-08 PROCEDURE — G6002 STEREOSCOPIC X-RAY GUIDANCE: HCPCS | Performed by: RADIOLOGY

## 2021-11-08 PROCEDURE — 77427 RADIATION TX MANAGEMENT X5: CPT | Performed by: RADIOLOGY

## 2021-11-08 PROCEDURE — 77412 RADIATION TX DELIVERY LVL 3: CPT | Performed by: RADIOLOGY

## 2021-11-08 PROCEDURE — 77387 GUIDANCE FOR RADJ TX DLVR: CPT | Performed by: RADIOLOGY

## 2021-11-09 ENCOUNTER — HOSPITAL ENCOUNTER (OUTPATIENT)
Dept: RADIATION ONCOLOGY | Facility: HOSPITAL | Age: 66
Discharge: HOME | End: 2021-11-09

## 2021-11-09 PROCEDURE — G6002 STEREOSCOPIC X-RAY GUIDANCE: HCPCS | Performed by: RADIOLOGY

## 2021-11-09 PROCEDURE — 77387 GUIDANCE FOR RADJ TX DLVR: CPT | Performed by: RADIOLOGY

## 2021-11-09 PROCEDURE — 77412 RADIATION TX DELIVERY LVL 3: CPT | Performed by: RADIOLOGY

## 2021-11-10 ENCOUNTER — HOSPITAL ENCOUNTER (OUTPATIENT)
Dept: RADIATION ONCOLOGY | Facility: HOSPITAL | Age: 66
Discharge: HOME | End: 2021-11-10

## 2021-11-10 ENCOUNTER — APPOINTMENT (OUTPATIENT)
Dept: OCCUPATIONAL THERAPY | Facility: HOSPITAL | Age: 66
End: 2021-11-10

## 2021-11-10 VITALS
BODY MASS INDEX: 53.97 KG/M2 | TEMPERATURE: 97.5 F | OXYGEN SATURATION: 93 % | SYSTOLIC BLOOD PRESSURE: 133 MMHG | RESPIRATION RATE: 16 BRPM | WEIGHT: 293 LBS | DIASTOLIC BLOOD PRESSURE: 74 MMHG | HEART RATE: 78 BPM

## 2021-11-10 DIAGNOSIS — Z17.1 MALIGNANT NEOPLASM OF CENTRAL PORTION OF RIGHT BREAST IN FEMALE, ESTROGEN RECEPTOR NEGATIVE (HCC): Primary | ICD-10-CM

## 2021-11-10 DIAGNOSIS — C50.111 MALIGNANT NEOPLASM OF CENTRAL PORTION OF RIGHT BREAST IN FEMALE, ESTROGEN RECEPTOR NEGATIVE (HCC): Primary | ICD-10-CM

## 2021-11-10 PROCEDURE — 77412 RADIATION TX DELIVERY LVL 3: CPT | Performed by: RADIOLOGY

## 2021-11-10 PROCEDURE — G6002 STEREOSCOPIC X-RAY GUIDANCE: HCPCS | Performed by: RADIOLOGY

## 2021-11-10 PROCEDURE — 77387 GUIDANCE FOR RADJ TX DLVR: CPT | Performed by: RADIOLOGY

## 2021-11-10 NOTE — PROGRESS NOTES
On Treatment Visit       Patient: Aida Conner   YOB: 1955   Medical Record Number: [id number]     Date of Visit  November 10, 2021   Primary Diagnosis:Malignant neoplasm of central portion of right breast in female, estrogen receptor negative (HCC) [C50.111, Z17.1]  Cancer Staging: Cancer Staging  cT4d, cN1, G3, ER-, [place]-, HER2+       was seen today for an on treatment visit.  She is receiving radiation therapy to the right chest wall.  She  has received 3600 cGy in 18 fractions out of a planned dose of 5000 cGy in 25 fractions. She is currently receiving concurrent Perjeta  per Dr. Azul.     Today on exam the patient is tolerating radiation therapy fairly well.  She has an area of moist desquamation at the right axilla for which she is using Silvadene cream.                                            Review of Systems:   Review of Systems   Constitutional: Positive for fatigue.   Respiratory: Positive for shortness of breath. Negative for cough.    Gastrointestinal: Negative for constipation, diarrhea and nausea.   Genitourinary: Negative for dysuria, frequency and urgency.   Skin: Positive for color change.   Neurological: Negative for dizziness and headaches.       Vitals:     Vitals:    11/10/21 1339   BP: 133/74   Pulse: 78   Resp: 16   Temp: 97.5 °F (36.4 °C)   SpO2: 93%       Weight:   Wt Readings from Last 3 Encounters:   11/10/21 (!) 147 kg (324 lb 4.8 oz)   10/27/21 (!) 152 kg (334 lb 12.8 oz)   10/26/21 (!) 152 kg (334 lb 10.5 oz)      Pain:    Pain Score    11/10/21 1339   PainSc:   5         Physical Exam:  Physical Exam  Constitutional:       General: She is not in acute distress.     Appearance: She is obese.   Chest:   Breasts:      Right: Skin change (several cm patch of superficial moist desquamation at Rt axilla; moderate Rt chest wall erythema) present.       Neurological:      Mental Status: She is alert.           Plan: I have reviewed treatment setup notes, checked  and approved the daily guidance images.  I reviewed dose delivery, treatment parameters and deemed them appropriate. We plan to continue radiation therapy as prescribed.      Lydia Barreto MD  Radiation Oncology   Electronically signed 11/10/2021  14:57 EST

## 2021-11-11 ENCOUNTER — HOSPITAL ENCOUNTER (OUTPATIENT)
Dept: RADIATION ONCOLOGY | Facility: HOSPITAL | Age: 66
Discharge: HOME | End: 2021-11-11

## 2021-11-11 PROCEDURE — G6002 STEREOSCOPIC X-RAY GUIDANCE: HCPCS | Performed by: RADIOLOGY

## 2021-11-11 PROCEDURE — 77412 RADIATION TX DELIVERY LVL 3: CPT | Performed by: RADIOLOGY

## 2021-11-11 PROCEDURE — 77387 GUIDANCE FOR RADJ TX DLVR: CPT | Performed by: RADIOLOGY

## 2021-11-12 ENCOUNTER — TELEPHONE (OUTPATIENT)
Dept: ONCOLOGY | Facility: CLINIC | Age: 66
End: 2021-11-12

## 2021-11-12 ENCOUNTER — TELEPHONE (OUTPATIENT)
Dept: SURGERY | Facility: CLINIC | Age: 66
End: 2021-11-12

## 2021-11-12 ENCOUNTER — HOSPITAL ENCOUNTER (OUTPATIENT)
Dept: RADIATION ONCOLOGY | Facility: HOSPITAL | Age: 66
Discharge: HOME | End: 2021-11-12

## 2021-11-12 PROCEDURE — 77412 RADIATION TX DELIVERY LVL 3: CPT | Performed by: RADIOLOGY

## 2021-11-12 PROCEDURE — 77387 GUIDANCE FOR RADJ TX DLVR: CPT | Performed by: RADIOLOGY

## 2021-11-12 PROCEDURE — G6002 STEREOSCOPIC X-RAY GUIDANCE: HCPCS | Performed by: RADIOLOGY

## 2021-11-12 PROCEDURE — 77336 RADIATION PHYSICS CONSULT: CPT | Performed by: RADIOLOGY

## 2021-11-12 NOTE — TELEPHONE ENCOUNTER
Patient let us know her  has suffered a stroke  He is home now, doing better  He does not want to see the Neurologist recommended through [facility].   She would like to know who we would recommend for Neurology here at [facility]?

## 2021-11-12 NOTE — TELEPHONE ENCOUNTER
Caller: LUISA RINCON    Best call back number: [phone] ANYTIME AND LEAVE VM.    Type of visit: INFUSION APPT     Requested date: 11/18/2021 ORIGINAL APPT WAS AT 11AM BUT PATIENTS DAUGHTER WILL NOT GET OFF WORK IN TIME.  PATIENT NEEDS APPT PUSHED BACK TO LATER TIME.  ANYTIME 12PM AND AFTER WORKS.     If rescheduling, when is the original appointment: 11/18/2021    Additional notes:PLEASE CALL PATIENT BACK AND CONFIRM THIS HAS BEEN CHANGED.

## 2021-11-15 ENCOUNTER — HOSPITAL ENCOUNTER (OUTPATIENT)
Dept: RADIATION ONCOLOGY | Facility: HOSPITAL | Age: 66
Discharge: HOME | End: 2021-11-15

## 2021-11-15 PROCEDURE — 77387 GUIDANCE FOR RADJ TX DLVR: CPT | Performed by: RADIOLOGY

## 2021-11-15 PROCEDURE — 77412 RADIATION TX DELIVERY LVL 3: CPT | Performed by: RADIOLOGY

## 2021-11-15 PROCEDURE — 77427 RADIATION TX MANAGEMENT X5: CPT | Performed by: RADIOLOGY

## 2021-11-15 PROCEDURE — G6002 STEREOSCOPIC X-RAY GUIDANCE: HCPCS | Performed by: RADIOLOGY

## 2021-11-16 ENCOUNTER — TELEPHONE (OUTPATIENT)
Dept: OTHER | Facility: HOSPITAL | Age: 66
End: 2021-11-16

## 2021-11-16 NOTE — TELEPHONE ENCOUNTER
[facility] M[facility]IDISCIPLIN[facility]  SURVIVORSHIP SERVICES CARE COORDINATION NOTE  Survivorship Treatment Summary Referral Scheduling  PHONE      Call placed to Patient RE: open referral to survivorship treatment summary visit.    Left voice mail for patient    Introduced myself and reviewed purpose and goals of survivorship treatment summary visit as well as what to expect..    Patient mailed name and contact information for Katerina Drake DNP, APRN.    Patient encouraged to call the office at any point for additional information, resources or support.

## 2021-11-17 ENCOUNTER — HOSPITAL ENCOUNTER (OUTPATIENT)
Dept: RADIATION ONCOLOGY | Facility: HOSPITAL | Age: 66
Discharge: HOME | End: 2021-11-17

## 2021-11-17 PROCEDURE — G6002 STEREOSCOPIC X-RAY GUIDANCE: HCPCS | Performed by: RADIOLOGY

## 2021-11-17 PROCEDURE — 77412 RADIATION TX DELIVERY LVL 3: CPT | Performed by: RADIOLOGY

## 2021-11-17 PROCEDURE — 77387 GUIDANCE FOR RADJ TX DLVR: CPT | Performed by: RADIOLOGY

## 2021-11-18 ENCOUNTER — APPOINTMENT (OUTPATIENT)
Dept: ONCOLOGY | Facility: HOSPITAL | Age: 66
End: 2021-11-18

## 2021-11-18 ENCOUNTER — INFUSION (OUTPATIENT)
Dept: ONCOLOGY | Facility: HOSPITAL | Age: 66
End: 2021-11-18

## 2021-11-18 VITALS
RESPIRATION RATE: 20 BRPM | OXYGEN SATURATION: 96 % | BODY MASS INDEX: 54.38 KG/M2 | DIASTOLIC BLOOD PRESSURE: 78 MMHG | WEIGHT: 293 LBS | TEMPERATURE: 97 F | HEART RATE: 81 BPM | SYSTOLIC BLOOD PRESSURE: 125 MMHG

## 2021-11-18 DIAGNOSIS — C50.611 MALIGNANT NEOPLASM OF AXILLARY TAIL OF RIGHT FEMALE BREAST, UNSPECIFIED ESTROGEN RECEPTOR STATUS (HCC): Primary | ICD-10-CM

## 2021-11-18 LAB
ALBUMIN SERPL-MCNC: 3.6 G/DL (ref 3.5–5.2)
ALBUMIN/GLOB SERPL: 1.2 G/DL (ref 1.1–2.4)
ALP SERPL-CCNC: 110 U/L (ref 38–116)
ALT SERPL W P-5'-P-CCNC: 15 U/L (ref 0–33)
ANION GAP SERPL CALCULATED.3IONS-SCNC: 6.1 MMOL/L (ref 5–15)
AST SERPL-CCNC: 17 U/L (ref 0–32)
BASOPHILS # BLD AUTO: 0.03 10*3/MM3 (ref 0–0.2)
BASOPHILS NFR BLD AUTO: 0.4 % (ref 0–1.5)
BILIRUB SERPL-MCNC: 0.2 MG/DL (ref 0.2–1.2)
BUN SERPL-MCNC: 16 MG/DL (ref 6–20)
BUN/CREAT SERPL: 26.7 (ref 7.3–30)
CALCIUM SPEC-SCNC: 9.4 MG/DL (ref 8.5–10.2)
CHLORIDE SERPL-SCNC: 104 MMOL/L (ref 98–107)
CO2 SERPL-SCNC: 30.9 MMOL/L (ref 22–29)
CREAT SERPL-MCNC: 0.6 MG/DL (ref 0.6–1.1)
DEPRECATED RDW RBC AUTO: 46.4 FL (ref 37–54)
EOSINOPHIL # BLD AUTO: 0.21 10*3/MM3 (ref 0–0.4)
EOSINOPHIL NFR BLD AUTO: 3 % (ref 0.3–6.2)
ERYTHROCYTE [DISTWIDTH] IN BLOOD BY AUTOMATED COUNT: 15 % (ref 12.3–15.4)
GFR SERPL CREATININE-BSD FRML MDRD: 99 ML/MIN/1.73
GLOBULIN UR ELPH-MCNC: 3 GM/DL (ref 1.8–3.5)
GLUCOSE SERPL-MCNC: 119 MG/DL (ref 74–124)
HCT VFR BLD AUTO: 35.4 % (ref 34–46.6)
HGB BLD-MCNC: 10.3 G/DL (ref 12–15.9)
IMM GRANULOCYTES # BLD AUTO: 0.03 10*3/MM3 (ref 0–0.05)
IMM GRANULOCYTES NFR BLD AUTO: 0.4 % (ref 0–0.5)
LYMPHOCYTES # BLD AUTO: 0.61 10*3/MM3 (ref 0.7–3.1)
LYMPHOCYTES NFR BLD AUTO: 8.6 % (ref 19.6–45.3)
MCH RBC QN AUTO: 24.9 PG (ref 26.6–33)
MCHC RBC AUTO-ENTMCNC: 29.1 G/DL (ref 31.5–35.7)
MCV RBC AUTO: 85.7 FL (ref 79–97)
MONOCYTES # BLD AUTO: 0.56 10*3/MM3 (ref 0.1–0.9)
MONOCYTES NFR BLD AUTO: 7.9 % (ref 5–12)
NEUTROPHILS NFR BLD AUTO: 5.62 10*3/MM3 (ref 1.7–7)
NEUTROPHILS NFR BLD AUTO: 79.7 % (ref 42.7–76)
NRBC BLD AUTO-RTO: 0 /100 WBC (ref 0–0.2)
PLATELET # BLD AUTO: 292 10*3/MM3 (ref 140–450)
PMV BLD AUTO: 9.9 FL (ref 6–12)
POTASSIUM SERPL-SCNC: 4 MMOL/L (ref 3.5–4.7)
PROT SERPL-MCNC: 6.6 G/DL (ref 6.3–8)
RBC # BLD AUTO: 4.13 10*6/MM3 (ref 3.77–5.28)
SODIUM SERPL-SCNC: 141 MMOL/L (ref 134–145)
WBC NRBC COR # BLD: 7.06 10*3/MM3 (ref 3.4–10.8)

## 2021-11-18 PROCEDURE — 25010000002 TRASTUZUMAB PER 10 MG: Performed by: NURSE PRACTITIONER

## 2021-11-18 PROCEDURE — 80053 COMPREHEN METABOLIC PANEL: CPT

## 2021-11-18 PROCEDURE — 85025 COMPLETE CBC W/AUTO DIFF WBC: CPT

## 2021-11-18 PROCEDURE — 96413 CHEMO IV INFUSION 1 HR: CPT

## 2021-11-18 RX ORDER — SODIUM CHLORIDE 9 MG/ML
250 INJECTION, SOLUTION INTRAVENOUS ONCE
Status: COMPLETED | OUTPATIENT
Start: 2021-11-18 | End: 2021-11-18

## 2021-11-18 RX ADMIN — SODIUM CHLORIDE 250 ML: 9 INJECTION, SOLUTION INTRAVENOUS at 13:10

## 2021-11-18 RX ADMIN — TRASTUZUMAB 900 MG: 150 INJECTION, POWDER, LYOPHILIZED, FOR SOLUTION INTRAVENOUS at 13:11

## 2021-11-19 ENCOUNTER — HOSPITAL ENCOUNTER (OUTPATIENT)
Dept: RADIATION ONCOLOGY | Facility: HOSPITAL | Age: 66
Discharge: HOME | End: 2021-11-19

## 2021-11-19 PROCEDURE — 77412 RADIATION TX DELIVERY LVL 3: CPT | Performed by: RADIOLOGY

## 2021-11-19 PROCEDURE — 77387 GUIDANCE FOR RADJ TX DLVR: CPT | Performed by: RADIOLOGY

## 2021-11-19 PROCEDURE — G6002 STEREOSCOPIC X-RAY GUIDANCE: HCPCS | Performed by: RADIOLOGY

## 2021-11-21 ENCOUNTER — HOSPITAL ENCOUNTER (OUTPATIENT)
Dept: RADIATION ONCOLOGY | Facility: HOSPITAL | Age: 66
Discharge: HOME | End: 2021-11-21

## 2021-11-21 PROCEDURE — 77412 RADIATION TX DELIVERY LVL 3: CPT | Performed by: RADIOLOGY

## 2021-11-21 PROCEDURE — G6002 STEREOSCOPIC X-RAY GUIDANCE: HCPCS | Performed by: RADIOLOGY

## 2021-11-21 PROCEDURE — 77387 GUIDANCE FOR RADJ TX DLVR: CPT | Performed by: RADIOLOGY

## 2021-11-22 ENCOUNTER — HOSPITAL ENCOUNTER (OUTPATIENT)
Dept: RADIATION ONCOLOGY | Facility: HOSPITAL | Age: 66
Discharge: HOME | End: 2021-11-22

## 2021-11-22 PROCEDURE — 77412 RADIATION TX DELIVERY LVL 3: CPT | Performed by: RADIOLOGY

## 2021-11-22 PROCEDURE — 77387 GUIDANCE FOR RADJ TX DLVR: CPT | Performed by: RADIOLOGY

## 2021-11-22 PROCEDURE — G6002 STEREOSCOPIC X-RAY GUIDANCE: HCPCS | Performed by: RADIOLOGY

## 2021-11-22 PROCEDURE — 77336 RADIATION PHYSICS CONSULT: CPT | Performed by: RADIOLOGY

## 2021-11-23 ENCOUNTER — OFFICE VISIT (OUTPATIENT)
Dept: RADIATION ONCOLOGY | Facility: HOSPITAL | Age: 66
End: 2021-11-23

## 2021-11-23 ENCOUNTER — APPOINTMENT (OUTPATIENT)
Dept: RADIATION ONCOLOGY | Facility: HOSPITAL | Age: 66
End: 2021-11-23

## 2021-11-23 VITALS
HEART RATE: 71 BPM | TEMPERATURE: 97.5 F | RESPIRATION RATE: 18 BRPM | SYSTOLIC BLOOD PRESSURE: 130 MMHG | DIASTOLIC BLOOD PRESSURE: 69 MMHG | OXYGEN SATURATION: 98 %

## 2021-11-23 DIAGNOSIS — C50.911 INFLAMMATORY BREAST CANCER, RIGHT (HCC): Primary | ICD-10-CM

## 2021-11-23 PROCEDURE — 99212-NC PR NO CHARGE CBC OFFICE OUTPATIENT VISIT 10 MINUTES: Performed by: RADIOLOGY

## 2021-11-29 ENCOUNTER — APPOINTMENT (OUTPATIENT)
Dept: RADIATION ONCOLOGY | Facility: HOSPITAL | Age: 66
End: 2021-11-29

## 2021-11-29 DIAGNOSIS — C50.911 INFLAMMATORY BREAST CANCER, RIGHT (HCC): ICD-10-CM

## 2021-11-29 RX ORDER — LIDOCAINE HYDROCHLORIDE 20 MG/ML
5 SOLUTION OROPHARYNGEAL AS NEEDED
Qty: 100 ML | Refills: 1 | Status: SHIPPED | OUTPATIENT
Start: 2021-11-29 | End: 2022-10-04

## 2021-11-29 RX ORDER — GABAPENTIN 100 MG/1
100 CAPSULE ORAL 3 TIMES DAILY
Qty: 90 CAPSULE | Refills: 1 | Status: SHIPPED | OUTPATIENT
Start: 2021-11-29 | End: 2022-10-04

## 2021-11-30 DIAGNOSIS — E11.9 TYPE 2 DIABETES MELLITUS WITHOUT COMPLICATIONS (HCC): ICD-10-CM

## 2021-11-30 RX ORDER — FUROSEMIDE 40 MG/1
TABLET ORAL
Qty: 45 TABLET | Refills: 1 | Status: SHIPPED | OUTPATIENT
Start: 2021-11-30 | End: 2022-12-27

## 2021-11-30 RX ORDER — MELOXICAM 15 MG/1
TABLET ORAL
Qty: 45 TABLET | Refills: 1 | Status: SHIPPED | OUTPATIENT
Start: 2021-11-30 | End: 2022-10-04

## 2021-12-02 ENCOUNTER — OFFICE VISIT (OUTPATIENT)
Dept: SURGERY | Facility: CLINIC | Age: 66
End: 2021-12-02

## 2021-12-02 VITALS
BODY MASS INDEX: 48.82 KG/M2 | OXYGEN SATURATION: 95 % | DIASTOLIC BLOOD PRESSURE: 72 MMHG | HEART RATE: 75 BPM | WEIGHT: 293 LBS | SYSTOLIC BLOOD PRESSURE: 139 MMHG | HEIGHT: 65 IN

## 2021-12-02 DIAGNOSIS — C50.911 INFLAMMATORY BREAST CANCER, RIGHT (HCC): Primary | ICD-10-CM

## 2021-12-02 PROCEDURE — 99213 OFFICE O/P EST LOW 20 MIN: CPT | Performed by: SURGERY

## 2021-12-02 RX ORDER — DICYCLOMINE HCL 20 MG
20 TABLET ORAL EVERY 6 HOURS
Qty: 60 TABLET | Refills: 2 | Status: SHIPPED | OUTPATIENT
Start: 2021-12-02

## 2021-12-02 NOTE — PROGRESS NOTES
[facility]     Referring Provider: Dipesh Leone MD     Chief complaint: Routine follow-up breast cancer     HPI:   1/19/21:  Ms. Aida Conner is a 66 yo woman, seen at the request of Dr. Dipesh Leone, for evaluation of right breast changes, possible inflammatory breast cancer. In November 2020, the patient noticed that her right breast had become enlarged, firm, painful, and red. She underwent mammogram and ultrasound at the time, which was read as benign aside from skin thickening and edema. She was treated with a course of antibiotics by her PCP without improvement. She says that the redness and swelling initially started in the lower inner portion of her breast, but over the past 2 months, it has spread to involve the entire breast. She can no longer wear a bra due to discomfort.     She has a past history of 2 benign left breast excisional biopsies many years ago, as well as a right breast excisional biopsy in 2018 for an intraductal papilloma with incidental atypical ductal and lobular hyperplasia. Her past medical history includes obesity, COPD on oxygen, sleep apnea, diabetes and CHF. She has a family history of breast cancer in her mother (diagnosed at age 45), a maternal aunt (diagnosed in her 70s), and a paternal aunt (diagnosed in her 70s). She denies any family history of ovarian cancer. She says she was seen by a medical oncologist in 2018 after the excisional biopsy to discuss possible chemoprevention. She was referred for genetic testing, however it was not covered by insurance so she did not have it done. She ended up deciding not to pursue endocrine therapy due to concerns about side effects.     4/8/21:   After her last visit, she underwent breast and axillary biopsies which showed a triple positive invasive ductal carcinoma. She underwent staging studies which were thankfully negative and her genetic testing returned negative for mutation. She started neoadjuvant THP on 2/10/21.  She has been having issues with increased shortness of breath and a rash, so yesterday Taxol was discontinued and she will be starting Abraxane next week. She reports that her breast has significantly decreased in size and become less swollen.     7/21/21:  Taxol was discontinued after 7 doses due to pneumonitis which improved with steroids. She then completed 3 cycles of AC, last dose on 6/25/21. Cycle 4 was held due to her frailty.    9/1/21  She underwent left mastectomy and right modified radical mastectomy on 8/10/21. See surgery and pathology details in the oncologic history below. She had one left breast drain removed in the office last week. The remaining left breast drain and the right breast drain have each had out about 10-15 mL/day for the past few days. The right axillary drain has had out about 40-60 mL/day for the past few days. She is complaining of occasional sharp shooting pain in her right upper arm and her gabapentin prescription was refilled earlier this week.    12/2/21, Interval History:  She returns today for scheduled follow-up. She completed radiation on 11/22/21. She struggled with skin irritation towards the end of radiation and sent me a message earlier this week asking for a gabapentin refill due to the pain. Over the past few days, the skin has already gotten much better. She is currently applying Silvadene.  She was seen by OT at [facility] postoperatively and her bioimpedance score was already elevated from her preoperative score. She was fitted for a compression sleeve, however never received it. Dr. Azul recently stopped Perjeta due to diarrhea. She is continuing on Herceptin alone and she also restarted Arimidex.      Oncology/Hematology History Overview Note   06/19/18, Right Breast Excisional Biopsy ([facility], Dr. Juani Barragan):  Right breast wire guided excision:  Small foci of atypical ductal hyperplasia (0.5-1 mm); completely excised  Minute focus of atypical lobular  hyperplasia (<0.5 mm) completely excised  Residual intraductal papilloma with focal sclerosis (approximately 7 mm in greatest dimension); adequately excised  Ectactic ducts  Fibrocystic changes (stromal fibrosis, Sclerosing adenosis) with mild to moderate ductal hyperplasia  Rare microcalcifications associated with adenosis  Extensive vascular calcification  Previous biopsy site    09/07/2018, Right breast US ([facility]):  2.5 predominantly cystic lesion in the operative bed contains fine septations, and is consistent with an organizing hematoma.   No solid relatively hypo or hyperechoic mass is evident  BI-RADS 2: Benign.    1/10/2020, Screening MMG with Saurabh ([facility]):  Post lumpectomy changes on the left are stable.  Post biopsy changes on the right are evident.   No suspicious mass, area or architectural distortion or suspicious microcalcification is identified.  BI-RADS 2: Benign.    11/1/20: Pt noticed right breast was enlarged, firm, and red. Treated with a course of antibiotics with no improvement.     Inflammatory breast cancer, right (HCC)   11/17/2020 Initial Diagnosis    Inflammatory breast cancer, right (CMS/HCC)     11/18/2020 Imaging    Bilateral Diagnostic MMG with Saurabh & Right Breast US ([facility]):  MMG:  Again seen within the anterior right breast near the 12 o’clock position in an area postoperative architectural distortion. There are multiple surgical clips in this region. Findings are unchanged from 1/10/2020. No new mass or architectural distortion seen within the right breast. No new suspicious calcifications. There is new skin thickening along the anterior aspect of the right breast of uncertain etiology.   There is stable architectural distortion in the upper outer quadrant of the left breast. There is no new mass or architectural distortion. There are stable scattered punctate calcifications. No suspicious calcifications.  US:  Limited sonographic images of the right breast were obtained  at the 12 o’clock position at the site of the patient’s reported right breast fullness. This is in the retroareolar area. Images demonstrate some mild skin thickening and edema within the subcutaneous and superficial fat. Findings are nonspecific but could be seen with developing cellulitis. No discrete solid mass lesion identified. No cyst or abnormal fluid collection is seen.  BI-RADS 2: Benign.     1/19/2021 Imaging    Bilateral Diagnostic MMG with Saurabh & Right Breast US ([facility]):  MMG:  Scattered areas of fibroglandular density.  1. There is a large global asymmetry with associated skin thickening and trabecular thickening seen in the central region of the right breast.  This correlates to the symptomatic area. The appearance is consistent with the clinical diagnosis of inflammatory breast carcinoma.  2.  There is a stable post-surgical scar seen in the anterior one-third region of the right breast at 12  o'clock. Post-surgical scar is in an area of prior excisional biopsy.  Metallic surgical clips noted.  3.  There is a stable post-surgical scar seen in the middle one-third 1:30 o'clock region of the left breast. Post-surgical scar is in an area of prior excisional biopsy.  Metallic surgical clips noted.  4. There are several stable punctate calcifications seen in the anterior one-third region of the left breast at 7 o'clock.  7. There is an axillary lymph node measuring 20 mm in the right axilla.  Next, ultrasound was performed.  US:  2. Ultrasound demonstrates a stable irregular post-surgical scar seen in the anterior one-third region of the right breast at 12 o'clock.  This correlates with the mammographic finding.  5. There are two oval parallel solid masses with partially defined margins measuring 9 x 6 x 8 and 10 x 5 x 9 mm seen in the right breast at 12 o'clock located 6 centimeters from the nipple.  6. There is an irregular solid mass with poorly defined margins measuring 32 x 8 x 11 mm seen in the 10:30  o'clock region of the right breast located 15 centimeters from the nipple.  7. Ultrasound demonstrates an oval parallel abnormal axillary lymph node(s) with well defined, thin margins measuring 32 x 20 x 22 mm in the right axilla.  BI-RADS 4B: Suspicious.     1/19/2021 Biopsy    Right Breast & Right Axilla, US-Guided Core Biopsies ([facility]):    1. Right Breast, 10:30, core biopsies:   Invasive mammary carcinoma, no special type (ductal) with micropapillary features, intermediate grade (tubules=3, nuclear atypia=2, mitoses=2), measuring at least 1.3 cm in dimension.     ER+ (96.02%, strong)  ND+ (81.98%, moderate)  Her2+ (IHC 3+)  Ki-67 40.17%    2. Right Axilla, biopsy:   Lymph node involved by metastatic mammary carcinoma.     1/19/2021 Biopsy    Right Breast, Skin Punch Biopsies ( [facility]):    1. Skin, Right Breast, 3 o'clock, Biopsy: Benign skin and underlying connective tissue with               A. Superficial and deep perivascular and perifollicular inflammation.     2. Skin, Right Breast, 12 o'clock, Biopsy: Benign skin and underlying connective tissue with               A. Superficial and deep perivascular and perifollicular inflammation.     3. Skin, Right Breast, 9 o'clock, Biopsy: Benign skin and underlying connective tissue with               A. Superficial and deep perivascular and perifollicular inflammation.     1/19/2021 Genetic Testing    [employer] Multi-Cancer Panel (84 genes):    Negative     1/20/2021 Imaging    CT C/A/P ( [facility]):  1. Right breast partially within the field-of-view, there is skin thickening and parenchymal induration.  2. No indication of intrathoracic metastatic disease.  3. No lytic or sclerotic bone lesion.  4. Enlarged right adrenal gland, indeterminant. Metastatic deposits not excluded.  5. Likely old left external iliac chain lymph node.     1/20/2021 Imaging    Bone Scan ( [facility]):  No scintigraphic evidence of osseous metastasis.     1/29/2021 Imaging    PET CT ( [facility]):  1.   Moderate to intensely FDG avid asymmetric soft tissue and skin thickening involving the right breast likely representing patient's known malignancy.  2.  Intensely FDG avid right axillary and subpectoral adenopathy likely represent metastatic disease.  3.  Constellation of findings within the right adrenal gland are favored to represent a lipid rich adenoma. Continued attention on follow-up is recommended to ensure stability.  4.  While there are no findings of definite FDG avid osseous metastasis, given the heterogenous FDG uptake throughout the axial and appendicular skeleton due to the above stated limitations, subtle underlying osseous  metastasis would remain occult. Therefore, continued close attention on follow-up is recommended to exclude this possibility.  5.  Short segment of moderate to intense FDG uptake within the distal esophagus and GE junction suggestive of esophagitis. In the appropriate clinical context correlation with patient history is recommended with follow-up endoscopy if clinically indicated.  6.  Sub-6 mm pulmonary nodule within the right lower lobe is below PET resolution and indeterminate. Continued close attention on follow-up with chest CT in 3 months is recommended to exclude metastatic disease.     8/10/2021 Surgery    Right modified radical mastectomy and left total mastectomy    1. Left Breast, Total Mastectomy (2,122 grams):               A. MULTIFOCAL LOW GRADE DUCTAL CARCINOMA IN SITU (DCIS):                            1. Solid, Cribriform, and Pagetoid type with single cell necrosis and focal calcifications.                            2. Extent of DCIS: 20 mm (5 of 26 blocks involved).                            3. Margins are negative for in situ carcinoma; Closest distance: DCIS is present > 10 mm from                                the posterior margin.               B. Multiple intraductal papillomas, usual ductal hyperplasia, and fibroadenomatoid change.               C.  Unremarkable skin and nipple.               D. See Synoptic Report and Comment #1.      2. Right Breast, Modified Radical Mastectomy S/P Neoadjuvant Chemotherapy (2,589 grams):               A. FIBROTIC TUMOR BED WITH NO RESIDUAL INVASIVE DUCTAL CARCINOMA.               B. Background breast parenchyma with multiple intraductal papillomas, fibroadenomatoid change,       usual ductal hyperplasia and pseudoangiomatous stromal hyperplasia (PASH).  C. Scar and fat necrosis, consistent with prior procedure-related changes.  D. Clip and biopsy site changes present within tumor bed.    E. Unremarkable skin and nipple.   F. Nineteen lymph nodes, negative for carcinoma (0/19):               1. Clip and biopsy site changes are present.               2. Treatment effect is present in 4 of 19 lymph nodes.  G. See Comment #2.     ER+ (%, strong)  GA+ (%, strong)  Ki-67 4%     10/27/2021 Cancer Staged    Staging form: Breast, AJCC 8th Edition  - Clinical: cT4d, cN1, G3, ER-, GA-, HER2+ - Signed by Jaime Azul MD on 10/27/2021     Malignant neoplasm of axillary tail of right female breast (HCC)   1/25/2021 Initial Diagnosis    Malignant neoplasm of axillary tail of right female breast (CMS/HCC)     2/10/2021 - 5/4/2021 Chemotherapy    OP BREAST Pertuzumab / Trastuzumab-anns / PACLitaxel     5/14/2021 - 7/15/2021 Chemotherapy    OP BREAST AC DOXOrubicin / Cyclophosphamide     9/7/2021 - 9/27/2021 Chemotherapy    OP BREAST Trastuzumab-anns Q21D (maintenance)     10/5/2021 -  Chemotherapy    OP BREAST Pertuzumab / Trastuzumab-anns  Q21D     Malignant neoplasm of central portion of right female breast (HCC)   9/30/2021 Initial Diagnosis    Malignant neoplasm of central portion of right female breast (CMS/HCC)     10/11/2021 -  Radiation    RADIATION THERAPY Treatment Details (Noted on 9/30/2021)  Site: Right Breast - Central portion of breast  Technique: 3D CRT  Goal: Curative  Planned Treatment Start Date:  10/11/2021     12/8/2021 -  Chemotherapy    OP SUPPORTIVE Denosumab (Prolia) Q6M         Review of Systems:  See interval history.       Medications:    Current Outpatient Medications:   •  albuterol sulfate  (90 Base) MCG/ACT inhaler, Inhale 2 puffs Every 4 (Four) Hours As Needed., Disp: , Rfl:   •  carvedilol (COREG) 25 MG tablet, Take 0.5 tablets by mouth 2 (Two) Times a Day With Meals., Disp: 30 tablet, Rfl: 0  •  Cholecalciferol (Vitamin D) 50 MCG (2000 UT) capsule, Take 2,000 Units by mouth Daily., Disp: , Rfl:   •  dicyclomine (BENTYL) 20 MG tablet, Take 1 tablet by mouth Every 6 (Six) Hours., Disp: 60 tablet, Rfl: 2  •  diphenhydrAMINE (BENADRYL) 25 mg capsule, Take 25 mg by mouth At Night As Needed for Itching, Allergies or Sleep., Disp: , Rfl:   •  diphenoxylate-atropine (LOMOTIL) 2.5-0.025 MG per tablet, Take 1 tablet by mouth 4 (Four) Times a Day As Needed for Diarrhea., Disp: 120 tablet, Rfl: 0  •  FLUoxetine (PROzac) 40 MG capsule, TAKE 1 CAPSULE BY MOUTH EVERY DAY, Disp: 30 capsule, Rfl: 0  •  furosemide (LASIX) 40 MG tablet, TAKE ONE-HALF TABLET BY MOUTH EVERY DAY, Disp: 45 tablet, Rfl: 1  •  gabapentin (NEURONTIN) 100 MG capsule, Take 1 capsule by mouth 3 (Three) Times a Day for 30 days., Disp: 90 capsule, Rfl: 1  •  Lidocaine Viscous HCl (XYLOCAINE) 2 % solution, Apply 5 mL topically to the appropriate area as directed As Needed for Mild Pain  (mix with Silvadene)., Disp: 100 mL, Rfl: 1  •  Lidocaine, Anorectal, (LMX 5) 5 % cream cream, Apply  topically to the appropriate area as directed 3 (Three) Times a Day As Needed (hemmorrhoid pain)., Disp: 45 g, Rfl: 2  •  lisinopril (PRINIVIL,ZESTRIL) 5 MG tablet, TAKE 1 TABLET BY MOUTH EVERY DAY, Disp: 30 tablet, Rfl: 6  •  meloxicam (MOBIC) 15 MG tablet, take 1/2 tablet BY MOUTH EVERY DAY, Disp: 45 tablet, Rfl: 1  •  miconazole (Lotrimin AF) 2 % powder, Apply  topically to the appropriate area as directed 2 (two) times a day., Disp: 85 g, Rfl: 1  •   mometasone-formoterol (DULERA 100) 100-5 MCG/ACT inhaler, Inhale 2 Puffs/kg Every Night., Disp: , Rfl:   •  montelukast (SINGULAIR) 10 MG tablet, Take 10 mg by mouth Every Night., Disp: , Rfl:   •  multivitamin (THERAGRAN) tablet tablet, Take 1 tablet by mouth Daily., Disp: , Rfl:   •  O2 (OXYGEN), Inhale 3 L/min Continuous., Disp: , Rfl:   •  omeprazole (priLOSEC) 20 MG capsule, TAKE 1 CAPSULE BY MOUTH EVERY DAY, Disp: 30 capsule, Rfl: 3  •  ondansetron (ZOFRAN) 8 MG tablet, Take 1 tablet by mouth 3 (Three) Times a Day As Needed for Nausea or Vomiting., Disp: 30 tablet, Rfl: 5  •  Potassium 99 MG tablet, Take 1 tablet by mouth Every Night., Disp: , Rfl:   •  pravastatin (PRAVACHOL) 40 MG tablet, Take 40 mg by mouth Every Night., Disp: , Rfl:   •  psyllium (METAMUCIL) 0.52 g capsule, Metamucil 0.52 gram oral capsule take 1 capsule by oral route 5Xday   Active, Disp: , Rfl:   •  riFAXIMin (Xifaxan) 550 MG tablet, Take 1 tablet by mouth Daily., Disp: 28 tablet, Rfl: 2  •  silver sulfadiazine (SILVADENE, SSD) 1 % cream, Apply 1 application topically to the appropriate area as directed 2 (Two) Times a Day., Disp: 50 g, Rfl: 1  •  spironolactone (ALDACTONE) 25 MG tablet, Take 25 mg by mouth Daily., Disp: , Rfl:       Allergies   Allergen Reactions   • Amlodipine Swelling     LEGS    • Hydrochlorothiazide Unknown - Low Severity     Neuro issues   • Morphine Nausea And Vomiting     hallucinations   • Adhesive Tape Rash       Family History   Problem Relation Age of Onset   • Breast cancer Mother 45   • Colon cancer Paternal Grandmother    • Breast cancer Maternal Aunt 70   • Breast cancer Paternal Aunt 70   • Lung cancer Father    • Hodgkin's lymphoma Father    • Skin cancer Father         squamous cell   • Ovarian cancer Neg Hx    • Uterine cancer Neg Hx    • Deep vein thrombosis Neg Hx    • Pulmonary embolism Neg Hx    • Malig Hyperthermia Neg Hx        PHYSICAL EXAMINATION:   Vitals:    12/02/21 1049   BP: 139/72    Pulse: 75   SpO2: 95%     ECOG 1 - Symptomatic but ambulatory, with cane  General: NAD, obese, on continuous oxygen and slightly winded  Psych: a&o x 3, normal mood and affect  Eyes: EOMI, no scleral icterus  ENMT: neck supple without masses or thyromegaly, mucus membranes moist  MSK: normal gait, decreased ROM in right shoulder  Lymph nodes: +Right axillary edema; no cervical, supraclavicular or axillary lymphadenopathy (exam limited by body habitus)  Breast:   Right: Sp mastectomy with well-healed central scar and post radiation erythema. There is moist desquamation along the lateral skin fold into the axilla. No masses.  Left: Sp mastectomy with well-healed central scar. Scar is soft. No masses.  Ext: Mild right upper extremity edema.      Assessment:  66 y.o. F with a diagnosis of right breast inflammatory carcinoma: Intermediate grade, triple positive, invasive ductal carcinoma; clinical T4dN1, anatomic stage IIIB, prognostic stage IIIA. She completed neoadjuvant chemotherapy on 6/25/21. She underwent left mastectomy and right modified radical mastectomy on 8/10/21, pCR on the right and incidental DCIS on the left, pTis. She completed radiation on 11/22/21. She is currently on Herceptin and Arimidex.    Plan:  -Continue follow-up with Dr. Azul.  -Continue follow-up with Dr. Sams.  -Continue follow-up with OT/LE clinic at [facility]. She really needs to start wearing a compression sleeve. She is going to call them for a follow-up appointment.  -Follow-up in 3 months for clinical exam.  -She was instructed to call sooner with any questions, concerns or changes on BSE.    Nicci Banks MD      CC:  MD Serenity Manjarrez APRN

## 2021-12-06 ENCOUNTER — APPOINTMENT (OUTPATIENT)
Dept: RADIATION ONCOLOGY | Facility: HOSPITAL | Age: 66
End: 2021-12-06

## 2021-12-09 ENCOUNTER — INFUSION (OUTPATIENT)
Dept: ONCOLOGY | Facility: HOSPITAL | Age: 66
End: 2021-12-09

## 2021-12-09 ENCOUNTER — OFFICE VISIT (OUTPATIENT)
Dept: ONCOLOGY | Facility: CLINIC | Age: 66
End: 2021-12-09

## 2021-12-09 VITALS
SYSTOLIC BLOOD PRESSURE: 139 MMHG | HEIGHT: 65 IN | OXYGEN SATURATION: 94 % | TEMPERATURE: 97.3 F | DIASTOLIC BLOOD PRESSURE: 83 MMHG | HEART RATE: 76 BPM | RESPIRATION RATE: 16 BRPM | WEIGHT: 293 LBS | BODY MASS INDEX: 48.82 KG/M2

## 2021-12-09 DIAGNOSIS — T45.4X5A ADVERSE EFFECT OF IRON, INITIAL ENCOUNTER: ICD-10-CM

## 2021-12-09 DIAGNOSIS — D50.8 IRON DEFICIENCY ANEMIA SECONDARY TO INADEQUATE DIETARY IRON INTAKE: ICD-10-CM

## 2021-12-09 DIAGNOSIS — Z79.899 ENCOUNTER FOR LONG-TERM (CURRENT) USE OF HIGH-RISK MEDICATION: ICD-10-CM

## 2021-12-09 DIAGNOSIS — M81.0 AGE-RELATED OSTEOPOROSIS WITHOUT CURRENT PATHOLOGICAL FRACTURE: ICD-10-CM

## 2021-12-09 DIAGNOSIS — C50.611 MALIGNANT NEOPLASM OF AXILLARY TAIL OF RIGHT FEMALE BREAST, UNSPECIFIED ESTROGEN RECEPTOR STATUS (HCC): ICD-10-CM

## 2021-12-09 DIAGNOSIS — C50.911 INFLAMMATORY BREAST CANCER, RIGHT (HCC): ICD-10-CM

## 2021-12-09 DIAGNOSIS — Z17.1 MALIGNANT NEOPLASM OF AXILLARY TAIL OF RIGHT BREAST IN FEMALE, ESTROGEN RECEPTOR NEGATIVE (HCC): Primary | ICD-10-CM

## 2021-12-09 DIAGNOSIS — C50.611 MALIGNANT NEOPLASM OF AXILLARY TAIL OF RIGHT BREAST IN FEMALE, ESTROGEN RECEPTOR NEGATIVE (HCC): Primary | ICD-10-CM

## 2021-12-09 DIAGNOSIS — K59.1 FUNCTIONAL DIARRHEA: ICD-10-CM

## 2021-12-09 DIAGNOSIS — C50.611 MALIGNANT NEOPLASM OF AXILLARY TAIL OF RIGHT FEMALE BREAST, UNSPECIFIED ESTROGEN RECEPTOR STATUS (HCC): Primary | ICD-10-CM

## 2021-12-09 DIAGNOSIS — C50.111 MALIGNANT NEOPLASM OF CENTRAL PORTION OF RIGHT FEMALE BREAST, UNSPECIFIED ESTROGEN RECEPTOR STATUS (HCC): ICD-10-CM

## 2021-12-09 DIAGNOSIS — K58.2 IRRITABLE BOWEL SYNDROME WITH BOTH CONSTIPATION AND DIARRHEA: ICD-10-CM

## 2021-12-09 LAB
ALBUMIN SERPL-MCNC: 3.7 G/DL (ref 3.5–5.2)
ALBUMIN/GLOB SERPL: 1.3 G/DL (ref 1.1–2.4)
ALP SERPL-CCNC: 108 U/L (ref 38–116)
ALT SERPL W P-5'-P-CCNC: 13 U/L (ref 0–33)
ANION GAP SERPL CALCULATED.3IONS-SCNC: 8.5 MMOL/L (ref 5–15)
AST SERPL-CCNC: 16 U/L (ref 0–32)
BASOPHILS # BLD AUTO: 0.02 10*3/MM3 (ref 0–0.2)
BASOPHILS NFR BLD AUTO: 0.3 % (ref 0–1.5)
BILIRUB SERPL-MCNC: 0.4 MG/DL (ref 0.2–1.2)
BUN SERPL-MCNC: 15 MG/DL (ref 6–20)
BUN/CREAT SERPL: 18.8 (ref 7.3–30)
CALCIUM SPEC-SCNC: 9.1 MG/DL (ref 8.5–10.2)
CHLORIDE SERPL-SCNC: 103 MMOL/L (ref 98–107)
CO2 SERPL-SCNC: 29.5 MMOL/L (ref 22–29)
CREAT SERPL-MCNC: 0.8 MG/DL (ref 0.6–1.1)
DEPRECATED RDW RBC AUTO: 50.1 FL (ref 37–54)
EOSINOPHIL # BLD AUTO: 0.17 10*3/MM3 (ref 0–0.4)
EOSINOPHIL NFR BLD AUTO: 2.5 % (ref 0.3–6.2)
ERYTHROCYTE [DISTWIDTH] IN BLOOD BY AUTOMATED COUNT: 15.9 % (ref 12.3–15.4)
FERRITIN SERPL-MCNC: 17.5 NG/ML (ref 13–150)
GFR SERPL CREATININE-BSD FRML MDRD: 71 ML/MIN/1.73
GLOBULIN UR ELPH-MCNC: 2.8 GM/DL (ref 1.8–3.5)
GLUCOSE SERPL-MCNC: 97 MG/DL (ref 74–124)
HCT VFR BLD AUTO: 34.5 % (ref 34–46.6)
HGB BLD-MCNC: 9.8 G/DL (ref 12–15.9)
IMM GRANULOCYTES # BLD AUTO: 0.02 10*3/MM3 (ref 0–0.05)
IMM GRANULOCYTES NFR BLD AUTO: 0.3 % (ref 0–0.5)
IRON 24H UR-MRATE: 26 MCG/DL (ref 37–145)
IRON SATN MFR SERPL: 6 % (ref 14–48)
LYMPHOCYTES # BLD AUTO: 0.45 10*3/MM3 (ref 0.7–3.1)
LYMPHOCYTES NFR BLD AUTO: 6.6 % (ref 19.6–45.3)
MAGNESIUM SERPL-MCNC: 1.9 MG/DL (ref 1.8–2.5)
MCH RBC QN AUTO: 25 PG (ref 26.6–33)
MCHC RBC AUTO-ENTMCNC: 28.4 G/DL (ref 31.5–35.7)
MCV RBC AUTO: 88 FL (ref 79–97)
MONOCYTES # BLD AUTO: 0.61 10*3/MM3 (ref 0.1–0.9)
MONOCYTES NFR BLD AUTO: 9 % (ref 5–12)
NEUTROPHILS NFR BLD AUTO: 5.5 10*3/MM3 (ref 1.7–7)
NEUTROPHILS NFR BLD AUTO: 81.3 % (ref 42.7–76)
NRBC BLD AUTO-RTO: 0 /100 WBC (ref 0–0.2)
PHOSPHATE SERPL-MCNC: 4.1 MG/DL (ref 2.5–4.5)
PLATELET # BLD AUTO: 251 10*3/MM3 (ref 140–450)
PMV BLD AUTO: 9.4 FL (ref 6–12)
POTASSIUM SERPL-SCNC: 4.3 MMOL/L (ref 3.5–4.7)
PROT SERPL-MCNC: 6.5 G/DL (ref 6.3–8)
RBC # BLD AUTO: 3.92 10*6/MM3 (ref 3.77–5.28)
SODIUM SERPL-SCNC: 141 MMOL/L (ref 134–145)
TIBC SERPL-MCNC: 430 MCG/DL (ref 249–505)
TRANSFERRIN SERPL-MCNC: 307 MG/DL (ref 200–360)
WBC NRBC COR # BLD: 6.77 10*3/MM3 (ref 3.4–10.8)

## 2021-12-09 PROCEDURE — 84466 ASSAY OF TRANSFERRIN: CPT

## 2021-12-09 PROCEDURE — 25010000002 TRASTUZUMAB PER 10 MG: Performed by: NURSE PRACTITIONER

## 2021-12-09 PROCEDURE — 85025 COMPLETE CBC W/AUTO DIFF WBC: CPT

## 2021-12-09 PROCEDURE — 83735 ASSAY OF MAGNESIUM: CPT

## 2021-12-09 PROCEDURE — 96375 TX/PRO/DX INJ NEW DRUG ADDON: CPT

## 2021-12-09 PROCEDURE — 96413 CHEMO IV INFUSION 1 HR: CPT

## 2021-12-09 PROCEDURE — 99215 OFFICE O/P EST HI 40 MIN: CPT | Performed by: NURSE PRACTITIONER

## 2021-12-09 PROCEDURE — 83540 ASSAY OF IRON: CPT

## 2021-12-09 PROCEDURE — 84100 ASSAY OF PHOSPHORUS: CPT

## 2021-12-09 PROCEDURE — 80053 COMPREHEN METABOLIC PANEL: CPT

## 2021-12-09 PROCEDURE — 25010000002 DENOSUMAB 60 MG/ML SOLUTION PREFILLED SYRINGE: Performed by: NURSE PRACTITIONER

## 2021-12-09 PROCEDURE — 25010000002 FERRIC CARBOXYMALTOSE 750 MG/15ML SOLUTION: Performed by: NURSE PRACTITIONER

## 2021-12-09 PROCEDURE — 82728 ASSAY OF FERRITIN: CPT

## 2021-12-09 PROCEDURE — 96372 THER/PROPH/DIAG INJ SC/IM: CPT

## 2021-12-09 RX ORDER — SODIUM CHLORIDE 9 MG/ML
250 INJECTION, SOLUTION INTRAVENOUS ONCE
Status: CANCELLED | OUTPATIENT
Start: 2021-12-09

## 2021-12-09 RX ORDER — ANASTROZOLE 1 MG/1
1 TABLET ORAL DAILY
COMMUNITY
End: 2022-05-10 | Stop reason: SDUPTHER

## 2021-12-09 RX ORDER — SODIUM CHLORIDE 9 MG/ML
250 INJECTION, SOLUTION INTRAVENOUS ONCE
Status: CANCELLED | OUTPATIENT
Start: 2021-12-16

## 2021-12-09 RX ORDER — SODIUM CHLORIDE 9 MG/ML
250 INJECTION, SOLUTION INTRAVENOUS ONCE
Status: COMPLETED | OUTPATIENT
Start: 2021-12-09 | End: 2021-12-09

## 2021-12-09 RX ADMIN — FERRIC CARBOXYMALTOSE INJECTION 750 MG: 50 INJECTION, SOLUTION INTRAVENOUS at 14:01

## 2021-12-09 RX ADMIN — SODIUM CHLORIDE 250 ML: 9 INJECTION, SOLUTION INTRAVENOUS at 13:28

## 2021-12-09 RX ADMIN — DENOSUMAB 60 MG: 60 INJECTION SUBCUTANEOUS at 14:41

## 2021-12-09 RX ADMIN — TRASTUZUMAB 900 MG: 150 INJECTION, POWDER, LYOPHILIZED, FOR SOLUTION INTRAVENOUS at 13:28

## 2021-12-09 NOTE — PROGRESS NOTES
Subjective     REASON FOR FOLLOW-UP: Right breast inflammatory breast, triple positive                              REQUESTING PHYSICIAN: MD Francisco Esteban MD Bethany Haynes, MD    History of Present Illness patient is a 66 y.o. 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  She continues on Herceptin/Perjeta however Perjeta was discontinued 2 treatments ago due to persistent diarrhea.  We had requested the patient to give us a stool sample to rule out C. difficile.  She states she provided a sample to Bobby Kapadia however it does not appear that that sample was ever assessed.  That being said she is not having consistent diarrhea at this point.  She will have a few loose stools around day 4-5 and this occurred even after most recent Herceptin alone.  She does have Xifaxan to use still though was not sure exactly how to dose this now with Perjeta out of the picture.  We discussed she still could use this may be days 3 through 7 after therapy.  She also has Lomotil which seems to be helpful.    We discussed recent findings on DEXA scan showing osteoporosis.  The patient did not previously understand these findings and so we reviewed in detail again today the reason why we have recommended she proceed with Prolia with first dose plan today.  We discussed potential side effects for which to monitor.  Patient has no teeth bothering her and is up-to-date on dental checkups.    We also reviewed recent iron studies showing the patient is iron deficient again.  Iron studies done 10/27/2021 showed iron saturation of 6%, ferritin 17.  It appears the plan had been to give her IV Injectafer however this was missed in November.  We  discussed proceeding with this today.  She is symptomatic with significant fatigue.  She continues to be anemic, hemoglobin 9.8 today.    She did complete radiation 2 weeks ago and skin is still healing from this.  At this point she has areas which are flaking and mildly  erythemic but no open areas.      She does note some difficulty with worsening depression though she admits with feeling really tired this has not helped.    She denies other concerns this time.      Past Medical History:   Diagnosis Date   • Anemia    • Anxiety    • Asthma    • Chronic diastolic (congestive) heart failure (HCC)    • Chronic hypoxemic respiratory failure (HCC)     on continuous O2   • Diarrhea     with chemo   • H/O Intraductal papilloma    • Hemorrhoids    • History of transfusion 1984    AFTER BACK SURGERY no reaction   • Hypertension    • IBS (irritable bowel syndrome)    • Inflammatory breast cancer (HCC)     right   • Kidney stone    • Morbid obesity with BMI of 50.0-59.9, adult (HCC)    • Obesity hypoventilation syndrome (HCC) 2/5/2021   • On home oxygen therapy     2.5 AT REST WITH ACTIVITY UP TO 4L   • ANABELLE on CPAP     cpap  & uses O2 with CPAP   • Osteoarthritis    • PONV (postoperative nausea and vomiting)         Past Surgical History:   Procedure Laterality Date   • BREAST EXCISIONAL BIOPSY Bilateral    • COLONOSCOPY     • CYSTOSCOPY BLADDER STONE LITHOTRIPSY     • ENDOSCOPY     • KNEE ARTHROPLASTY Bilateral 2009   • MASTECTOMY Left 8/10/2021    Procedure: LEFT TOTAL MASTECTOMY;  Surgeon: Nicci Banks MD;  Location: [facility];  Service: General;  Laterality: Left;   • MASTECTOMY Right 8/10/2021    Procedure: RIGHT MODIFIED RADICAL MASTECTOMY;  Surgeon: Nicci Banks MD;  [facility];  Service: General;  Laterality: Right;   • SPINE SURGERY  1984   • TOTAL ABDOMINAL HYSTERECTOMY WITH SALPINGO OOPHORECTOMY  2004   • UPPER GASTROINTESTINAL ENDOSCOPY     • VENOUS ACCESS DEVICE (PORT)  INSERTION N/A 2021    Procedure: INSERTION VENOUS ACCESS DEVICE;  Surgeon: Nicci Banks MD;  Location: [facility] OR;  Service: General;  Laterality: N/A;      ONC HISTORY  patient is a 65-year-old white female with morbid obesity, history of diastolic congestive heart failure and unknown type of pulmonary disease for which she has been on oxygen for 4 years.    She has been getting routine mammography since 38 years of age because her mother  at 46 of breast cancer and had a biopsy of her left breast in  which was apparently benign and another biopsy in  on her right breast which showed a small focus of atypical ductal hyperplasia and atypical lobular hyperplasia with a residual intraductal papilloma.  At that time she saw medical oncologist who recommended genetic testing and prevention but the insurance would not cover the genetic testing and the medical oncologist thought tamoxifen was too risky for her because of her comorbidities and no treatment was given.  More recently she noticed redness of the right breast in October and showed it to her family doctor who gave her course of Keflex when it did not improve and mammogram was ordered and this was benign  When the redness persisted she saw her gynecologist with concern for inflammatory breast cancer and referred her to Dr. Banks after repeat imaging and [facility]  Preliminary report shows micropapillary invasive mammary carcinoma intermediate grade measuring 13 mm right axillary node was also involved with metastatic cancer ER/ME and HER-2 are pending  Patient saw Dr. Banks who sent her for skin biopsies and she had 3 separate punch biopsy of the skin that showed Perivascular and perifollicular inflammation with no obvious malignancy at 3:00 12:00 and 9:00  In addition staging work-up with CAT scans and bone scan were ordered.  CAT scan of the chest showed a  borderline mediastinal  Infracarinal node measuring 15 mm in length mild cardiomegaly and heavy coronary artery calcifications  CT of the abdomen showed a 2.5 x 2.2 cm right adrenal mass which the patient tells me she has had in the past and is most likely benign but also a 2.7 cm left external iliac node which is indeterminate.  Bone scan was negative    Echocardiogram is scheduled for later next week and genetic testing with the [employer] stat panel is pending    Patient is  3 para 3 menarche was at age 9 menopause at 49 when she had a hysterectomy and oophorectomy  First childbirth was at age 20 she breast-fed her second and third children and took no hormone replacement after menopause    Family history is positive for mother dying of breast cancer at age 46 she is a maternal aunt with breast cancer in her 70s a paternal aunt with breast cancer in her 70s paternal grandmother with colon cancer.  Her father had Hodgkin's disease and non-Hodgkin's lymphoma but  of small cell lung cancer at 67      She has not had a heart attack stroke or blood clot    Plan to do echocardiogram soon as possible to ascertain tolerability of cardiotoxic chemotherapy which would be typically indicated with an inflammatory breast cancer    Also plan PET scan to follow-up on atypical lymph nodes and confirm benign etiology of the adrenal lesion    She will have port placement and chemo education pt is  ER/NE and HER-2 +    I explained to Aida that the goal of treatment would be curative but she has a lot of comorbidities which may limit our ability to give the most effective chemotherapy in the setting  I told her radiation would be involved and possibly hormonal therapy for 10 years as she has hormone positivity and HER-2 directed therapy    She expressed some concerns about driving back and forth from [place] but felt that once a week was doable    We will see her back in 2 weeks to start treatment with a port  placement planned early next week      Patient did have a mild reaction to Taxol dose #1 with some increased shortness of breath and flushing.  This was responsive to 100 mg of Solu-Cortef.  She states this gave her a headache and made her quite talkative but otherwise she was thankfully able to complete Taxol infusion without further incident.    Patient reports issues with chronic diarrhea over the last few years and did note a little bit increase in stooling following THP though nothing overly significant in her mind.  She did finally begin taking Imodium just a few days ago and has had no further stools.  She does note significant trouble with hemorrhoids in relation to the diarrhea   Required blood transfusion due to significant hemorrhoidal bleeding plus iron deficiency.  Injectafer planned    Due to inclement weather cycle 1 day 8 therapy was missed.  She did get day 15 therapy with fairly good tolerance except for some diarrhea.    She was found to be iron deficient despite trying oral iron.  We therefore elected to proceed with IV Injectafer but she remains mildly anemic with a hemoglobin of 9.3    7/21    She has increased her Lasix and her weight is down 7 pounds but overall she is significantly weaker since starting treatment and I think it is in her best interest to discontinue treatment and proceed with surgery and will use Herceptin in the interim till surgery scheduled  She has had a good response in the breast and I do not think the last dose of Adriamycin is going to be crucial and we run the risk of making her so weak that she cannot go for surgery    9/21  Patient had worsening shortness of breath requiring continuous oxygen.  CT of the chest performed to rule out pneumonitis and she was started on steroids.  CT showed bilateral groundglass infiltrates presumed to be related to Taxol pneumonitis and therefore Taxol discontinued.  Patient completed the fourth cycle of Perjeta/Herceptin alone.   Diarrhea was an issue but not as bad without the Taxol.  She is weaned off her prednisone    Patient proceeded with cycle 1 Adriamycin/Cytoxan on 5/14/2021 and is seen back today for cycle #3 and we are doing it at 3-week intervals because of her frailty and after 3 cycles we stopped because of tolerance issues    She went for surgery her bilateral mastectomies and interestingly she had a complete pathological CR on the right breast and had evidence of DCIS in the left breast [id number]      Current Outpatient Medications on File Prior to Visit   Medication Sig Dispense Refill   • albuterol sulfate  (90 Base) MCG/ACT inhaler Inhale 2 puffs Every 4 (Four) Hours As Needed.     • anastrozole (ARIMIDEX) 1 MG tablet Take 1 mg by mouth Daily.     • carvedilol (COREG) 25 MG tablet Take 0.5 tablets by mouth 2 (Two) Times a Day With Meals. 30 tablet 0   • Cholecalciferol (Vitamin D) 50 MCG (2000 UT) capsule Take 2,000 Units by mouth Daily.     • dicyclomine (BENTYL) 20 MG tablet Take 1 tablet by mouth Every 6 (Six) Hours. 60 tablet 2   • diphenhydrAMINE (BENADRYL) 25 mg capsule Take 25 mg by mouth At Night As Needed for Itching, Allergies or Sleep.     • diphenoxylate-atropine (LOMOTIL) 2.5-0.025 MG per tablet Take 1 tablet by mouth 4 (Four) Times a Day As Needed for Diarrhea. 120 tablet 0   • FLUoxetine (PROzac) 40 MG capsule TAKE 1 CAPSULE BY MOUTH EVERY DAY 30 capsule 0   • furosemide (LASIX) 40 MG tablet TAKE ONE-HALF TABLET BY MOUTH EVERY DAY 45 tablet 1   • gabapentin (NEURONTIN) 100 MG capsule Take 1 capsule by mouth 3 (Three) Times a Day for 30 days. 90 capsule 1   • Lidocaine Viscous HCl (XYLOCAINE) 2 % solution Apply 5 mL topically to the appropriate area as directed As Needed for Mild Pain  (mix with Silvadene). 100 mL 1   • Lidocaine, Anorectal, (LMX 5) 5 % cream cream Apply  topically to the appropriate area as directed 3 (Three) Times a Day As Needed (hemmorrhoid pain). 45 g 2   • lisinopril  (PRINIVIL,ZESTRIL) 5 MG tablet TAKE 1 TABLET BY MOUTH EVERY DAY 30 tablet 6   • meloxicam (MOBIC) 15 MG tablet take 1/2 tablet BY MOUTH EVERY DAY 45 tablet 1   • miconazole (Lotrimin AF) 2 % powder Apply  topically to the appropriate area as directed 2 (two) times a day. 85 g 1   • mometasone-formoterol (DULERA 100) 100-5 MCG/ACT inhaler Inhale 2 Puffs/kg Every Night.     • montelukast (SINGULAIR) 10 MG tablet Take 10 mg by mouth Every Night.     • multivitamin (THERAGRAN) tablet tablet Take 1 tablet by mouth Daily.     • O2 (OXYGEN) Inhale 3 L/min Continuous.     • omeprazole (priLOSEC) 20 MG capsule TAKE 1 CAPSULE BY MOUTH EVERY DAY 30 capsule 3   • ondansetron (ZOFRAN) 8 MG tablet Take 1 tablet by mouth 3 (Three) Times a Day As Needed for Nausea or Vomiting. 30 tablet 5   • Potassium 99 MG tablet Take 1 tablet by mouth Every Night.     • pravastatin (PRAVACHOL) 40 MG tablet Take 40 mg by mouth Every Night.     • psyllium (METAMUCIL) 0.52 g capsule Metamucil 0.52 gram oral capsule take 1 capsule by oral route 5Xday   Active     • riFAXIMin (Xifaxan) 550 MG tablet Take 1 tablet by mouth Daily. 28 tablet 2   • silver sulfadiazine (SILVADENE, SSD) 1 % cream Apply 1 application topically to the appropriate area as directed 2 (Two) Times a Day. 50 g 1   • spironolactone (ALDACTONE) 25 MG tablet Take 25 mg by mouth Daily.       Current Facility-Administered Medications on File Prior to Visit   Medication Dose Route Frequency Provider Last Rate Last Admin   • denosumab (PROLIA) syringe 60 mg  60 mg Subcutaneous Once Ynes Vazquez, APRN       • sodium chloride 0.9 % infusion 250 mL  250 mL Intravenous Once Ynes Vazquez, SHAUNA       • Trastuzumab (HERCEPTIN) 900 mg in sodium chloride 0.9 % 250 mL chemo IVPB  900 mg Intravenous Once Ynes Vazquez, APRN            ALLERGIES:    Allergies   Allergen Reactions   • Amlodipine Swelling     LEGS    • Hydrochlorothiazide Unknown - Low Severity     Neuro  issues   • Morphine Nausea And Vomiting     hallucinations   • Adhesive Tape Rash        Social History     Socioeconomic History   • Marital status:    • Number of children: 3   Tobacco Use   • Smoking status: Never Smoker   • Smokeless tobacco: Never Used   Vaping Use   • Vaping Use: Never used   Substance and Sexual Activity   • Alcohol use: Never   • Drug use: Never   • Sexual activity: Defer        Family History   Problem Relation Age of Onset   • Breast cancer Mother 45   • Colon cancer Paternal Grandmother    • Breast cancer Maternal Aunt 70   • Breast cancer Paternal Aunt 70   • Lung cancer Father    • Hodgkin's lymphoma Father    • Skin cancer Father         squamous cell   • Ovarian cancer Neg Hx    • Uterine cancer Neg Hx    • Deep vein thrombosis Neg Hx    • Pulmonary embolism Neg Hx    • Malig Hyperthermia Neg Hx         Review of Systems   Constitutional: Positive for fatigue (worse). Negative for appetite change, chills, diaphoresis, fever and unexpected weight change.   HENT: Negative for hearing loss, sore throat, trouble swallowing and voice change.    Respiratory: Negative for cough, chest tightness, shortness of breath (Improved ) and wheezing.    Cardiovascular: Positive for leg swelling (stable). Negative for chest pain and palpitations.   Gastrointestinal: Negative for abdominal distention, abdominal pain, constipation, diarrhea (Severe after Perjeta stopped yesterday), nausea, rectal pain (hemmorrhoids -improved) and vomiting.   Genitourinary: Negative for dysuria, frequency, hematuria and urgency.   Musculoskeletal: Negative for back pain ( better), joint swelling and neck stiffness.        No muscle weakness.   Skin: Negative for rash and wound.   Neurological: Negative for seizures, syncope, speech difficulty, weakness, numbness and headaches.   Hematological: Negative for adenopathy. Does not bruise/bleed easily.   Psychiatric/Behavioral: Positive for dysphoric mood (see HPI).  "Negative for behavioral problems, confusion and suicidal ideas.   All other systems reviewed and are negative.     ROS reviewed and updated 12/09/21      Objective     Vitals:    12/09/21 1144   BP: 139/83   Pulse: 76   Resp: 16   Temp: 97.3 °F (36.3 °C)   TempSrc: Temporal   SpO2: 94%   Weight: (!) 148 kg (327 lb)   Height: 165.1 cm (65\")   PainSc: 0-No pain     Current Status 12/9/2021   ECOG score 1       Physical Exam    CONSTITUTIONAL:  Vital signs reviewed.  No distress, looks comfortable. Morbidly obese  EYES:  Conjunctiva and lids unremarkable.  PERRLA  EARS,NOSE,MOUTH,THROAT:  Ears and nose appear unremarkable.  Lips, teeth, gums appear unremarkable.  RESPIRATORY:  Normal respiratory effort.  Lungs clear to auscultation bilaterally.  No axillary adenopathy  BREASTS: Bilateral mastectomies with no reconstruction.  Mild radiation dermatitis along the right chest wall.  She has peeling skin along the right posterior chest.  No open areas to the skin.  CARDIOVASCULAR:  Normal S1, S2.  No murmurs rubs or gallops.  1+ brawny lower extremity edema.  GASTROINTESTINAL: Abdomen appears unremarkable.  Nontender.  No hepatomegaly.  No splenomegaly.  LYMPHATIC:  No cervical, supraclavicular, axillary lymphadenopathy.  SKIN:  Warm.  Inclusion cyst left axilla-extensive tenia cruris  in the right groin  PSYCHIATRIC:  Normal judgment and insight.  Normal mood and affect.      RECENT LABS:  Results from last 7 days   Lab Units 12/09/21  1117   WBC 10*3/mm3 6.77   NEUTROS ABS 10*3/mm3 5.50   HEMOGLOBIN g/dL 9.8*   HEMATOCRIT % 34.5   PLATELETS 10*3/mm3 251     Results from last 7 days   Lab Units 12/09/21  1117   SODIUM mmol/L 141   POTASSIUM mmol/L 4.3   CHLORIDE mmol/L 103   CO2 mmol/L 29.5*   BUN mg/dL 15   CREATININE mg/dL 0.80   CALCIUM mg/dL 9.1   ALBUMIN g/dL 3.70   BILIRUBIN mg/dL 0.4   ALK PHOS U/L 108   ALT (SGPT) U/L 13   AST (SGOT) U/L 16   GLUCOSE mg/dL 97   MAGNESIUM mg/dL 1.9             PET  IMPRESSION:  1.  " Moderate to intensely FDG avid asymmetric soft tissue and skin  thickening involving the right breast likely representing patient's  known malignancy.  2.  Intensely FDG avid right axillary and subpectoral adenopathy likely  represent metastatic disease.  3.  Constellation of findings within the right adrenal gland are favored  to represent a lipid rich adenoma. Continued attention on follow-up is  recommended to ensure stability.  4.  While there are no findings of definite FDG avid osseous metastasis,  given the heterogenous FDG uptake throughout the axial and appendicular  skeleton due to the above stated limitations, subtle underlying osseous  metastasis would remain occult. Therefore, continued close attention on  follow-up is recommended to exclude this possibility.  5.  Short segment of moderate to intense FDG uptake within the distal  esophagus and GE junction suggestive of esophagitis. In the appropriate  clinical context correlation with patient history is recommended with  follow-up endoscopy if clinically indicated.  6.  Sub-6 mm pulmonary nodule within the right lower lobe is below PET  resolution and indeterminate. Continued close attention on follow-up  with chest CT in 3 months is recommended to exclude metastatic disease.  7.  Other findings as above.     This report was finalized on 2/2/2021     Final Diagnosis   1. Left Breast, Total Mastectomy (2,122 grams):               A. MULTIFOCAL LOW GRADE DUCTAL CARCINOMA IN SITU (DCIS):                            1. Solid, Cribriform, and Pagetoid type with single cell necrosis and focal calcifications.                            2. Extent of DCIS: 20 mm (5 of 26 blocks involved).                            3. Margins are negative for in situ carcinoma; Closest distance: DCIS is present > 10 mm from                                the posterior margin.               B. Multiple intraductal papillomas, usual ductal hyperplasia, and fibroadenomatoid  change.               C. Unremarkable skin and nipple.               D. See Synoptic Report and Comment #1.      2. Right Breast, Modified Radical Mastectomy S/P Neoadjuvant Chemotherapy (2,589 grams):               A. FIBROTIC TUMOR BED WITH NO RESIDUAL INVASIVE DUCTAL CARCINOMA.               B. Background breast parenchyma with multiple intraductal papillomas, fibroadenomatoid change,       usual ductal hyperplasia and pseudoangiomatous stromal hyperplasia (PASH).  C. Scar and fat necrosis, consistent with prior procedure-related changes.  D. Clip and biopsy site changes present within tumor bed.    E. Unremarkable skin and nipple.   F. Nineteen lymph nodes, negative for carcinoma (0/19):               1. Clip and biopsy site changes are present.               2. Treatment effect is present in 4 of 19 lymph nodes.  G. See Comment #2.         FINDINGS:   LUMBAR SPINE:  The BMD measured in the L1-L4 is 1.054 g/cm2 for a  T-score of 0.1 and a Z-score of 2.1     LEFT HIP: The BMD for the femoral neck is 0.476g/cm2 for a T score of   -3.4 and a Z score of -1.7     RIGHT HIP:  The BMD for the femoral neck is 0.657g/cm2 for a T score of  -1.7 and a Z score of 0.0     IMPRESSION:  Osteoporosis.     This report was finalized on 9/16/2021     Assessment/Plan   1. [id number] right breast cancer ER/VT HER-2 -3+ positive inflammatory breast cancer for neoadjuvant chemotherapy  · Staging work-up negative except for 6 mm lung nodule and axillary and subpectoral adenopathy  · THP followed by AC planned if she tolerates it  · C1D8 Taxol missed due to inclement weather.  · Taxol discontinued after 7 doses due to probable Taxol pneumonitis treated with steroids.    · C1 Adriamycin/Cytoxan given 5/14/2021.  · Adriamycin and Cytoxan stopped after 3 doses due to poor tolerance  · YPT0N0 right breast with multifocal DCIS ER/VT positive in the left breast post bilateral mastectomies and right axillary dissection  · Radiation Arimidex and  Perjeta Herceptin to continue for the rest of the year  · 10/27/2021 decision made to discontinue Perjeta due to worsening diarrhea.  There was some concern for possible C. difficile and we did attempt to collect sample from the patient however this was not run.  See further discussion below.  · Right chest radiation completed end of November 2021.  · November 2021 patient initiating Arimidex, tolerating well.  · 12/9/2021 continue Herceptin alone for duration of 1 year which will be February 2022.    2.  Morbid obesity    3.  History of diastolic heart failure  · Echocardiogram with ejection fraction of 64% normal strain  · Cleared by cardio-oncology for chemotherapy  · Echocardiogram in 9/21 stable at 63%    4.  Pulmonary disease?  Etiology on oxygen for 4 years?  Radhackian    5.  Strong family history of breast cancer genetic testing 84 genes negative    6.  Probable benign adrenal adenoma-PET negative    7.  Questionable enlarged lymph nodes left iliac chain and mediastinum likely reactive-PET negative    8.  6 mm right lower lobe nodule below PET resolution pretreatment needs follow-up after THP  · Repeat CT read at [facility] radiologist report stability of nodules and nodes  · Repeat CT at [facility] in 10/21 shows continued improvement    9.  Abnormal uptake short segment esophagus with a history of Schatzki's ring-we will double PPI and watch closely but we we will proceed with chemotherapy at this point and refer back to GI-doubt she has metastatic disease to this area    10. Anemia with microcytic indices  · Iron studies performed 2/10/2021.  · 2/24/2021: reviewed with the patient that she is iron deficient, with ferritin of 16, iron saturation of 4%.  Patient reports taking ferrous gluconate in the past but this caused GI upset.  Also with her chronic diarrhea I do not think she can absorb it.  We will pursue IV iron with plans to initiate this next week pending insurance approval. In  addition hemoglobin down to 8.0 and transfusion pursued.  · 3/3/2021: IV Injectafer initiated x2.   · Hemoglobin down to 9.9 one week out from first AC though overall stable.  Monitor.   · Hemoglobin improved off chemotherapy  · Hemoglobin dropped after resuming Perjeta Herceptin will recheck iron stores  · 10/27/2021 repeat iron studies with iron saturation 6%, ferritin 17.  Plan additional Injectafer.    11.  History of chronic diarrhea, ?IBS, exacerbated with Perjeta therapy.  · Patient required rifaximin 550 mg to take twice daily x7 days with each Perjeta dose.   · Perjeta discontinued due to continued diarrhea.  · Patient does have rifaximin to use if needed and Lomotil which she finds most helpful.    12. History of Taxol-induced pneumonitis.    13.  Osteoporosis on DEXA scan in 9/21  · Plan Prolia, begin 12/9/2021.    PLAN:  1. Proceed with Herceptin today.  2. Proceed with dose 1 of Prolia.  Patient educated on this in detail today.  She is up-to-date on dental exams and no current dental issues.  3. Repeat ferritin and iron profile today in order to get patient approved for additional Injectafer.  Expect these to remain low as they were 6 weeks ago and we will plan to proceed with IV Injectafer x2.  4. Continue Arimidex.  Tolerating well.  5. Continue Lomotil as needed for diarrhea.  6. Patient would like to move back to the Wednesday treatment day and we will shift her appointments to return on 1/5/2022 for Herceptin alone.  7. She will then see Dr. Azul 3 weeks later for additional Herceptin.  8. She will have completed 1 year of Herceptin therapy as of February 2022.    Patient reports feeling more down today but also attributes some of this to how tired she feels.  She is also recovering from radiation.  Encouraged her that we need to get her iron stores replenished and allow her some more time away from radiation.  If she is still struggling with worsening depression when seen back in follow-up we  can refer her to supportive oncology. She is agreeable.    I spent 50 minutes caring for Aida on this date of service. This time includes time spent by me in the following activities: preparing for the visit, reviewing tests, obtaining and/or reviewing a separately obtained history, performing a medically appropriate examination and/or evaluation, counseling and educating the patient/family/caregiver, ordering medications, tests, or procedures, referring and communicating with other health care professionals, documenting information in the medical record and care coordination

## 2021-12-13 ENCOUNTER — OFFICE VISIT (OUTPATIENT)
Dept: RADIATION ONCOLOGY | Facility: HOSPITAL | Age: 66
End: 2021-12-13

## 2021-12-13 VITALS
BODY MASS INDEX: 53.89 KG/M2 | HEART RATE: 70 BPM | SYSTOLIC BLOOD PRESSURE: 109 MMHG | TEMPERATURE: 97.8 F | OXYGEN SATURATION: 99 % | WEIGHT: 293 LBS | RESPIRATION RATE: 16 BRPM | DIASTOLIC BLOOD PRESSURE: 64 MMHG

## 2021-12-13 DIAGNOSIS — Z17.1 MALIGNANT NEOPLASM OF CENTRAL PORTION OF RIGHT BREAST IN FEMALE, ESTROGEN RECEPTOR NEGATIVE (HCC): Primary | ICD-10-CM

## 2021-12-13 DIAGNOSIS — C50.111 MALIGNANT NEOPLASM OF CENTRAL PORTION OF RIGHT BREAST IN FEMALE, ESTROGEN RECEPTOR NEGATIVE (HCC): Primary | ICD-10-CM

## 2021-12-13 PROCEDURE — G0463 HOSPITAL OUTPT CLINIC VISIT: HCPCS | Performed by: RADIOLOGY

## 2021-12-13 PROCEDURE — 99212-NC PR NO CHARGE CBC OFFICE OUTPATIENT VISIT 10 MINUTES: Performed by: RADIOLOGY

## 2021-12-13 NOTE — PROGRESS NOTES
Follow Up Office Visit      Encounter Date: 12/13/2021   Patient Name: Aida Conner  YOB: 1955   Medical Record Number: [id number]   Primary Diagnosis: Malignant neoplasm of central portion of right breast in female, estrogen receptor negative (HCC) [C50.111, Z17.1]   Cancer Staging: Cancer Staging  Inflammatory breast cancer, right (HCC)  Staging form: Breast, AJCC 8th Edition  - Clinical: cT4d, cN1, G3, ER-, MD-, HER2+ - Signed by Jaime Azul MD on 10/27/2021    Completion Date:  11/22/2021    Chief Complaint:    Chief Complaint   Patient presents with   • Follow-up   • Breast Cancer       History of Present Illness: Aida Conner returns to radiation oncology for short interval follow-up in order to assess healing of radiation dermatitis.  She reports feeling well overall with the exception of fatigue that she attributes to her anemia.  She still requires supplemental oxygen.  She reports complete healing of her right chest wall but does have some persistent tenderness.        Subjective      Review of Systems: Review of Systems   Constitutional: Positive for fatigue.   Respiratory: Negative for cough and shortness of breath.    Gastrointestinal: Positive for diarrhea. Negative for constipation and nausea.   Genitourinary: Negative for dysuria, frequency and urgency.   Skin: Positive for color change.   Neurological: Negative for dizziness and headaches.       The following portions of the patient's history were reviewed and updated as appropriate: allergies, current medications, past family history, past medical history, past social history, past surgical history and problem list.    Medications:     Current Outpatient Medications:   •  albuterol sulfate  (90 Base) MCG/ACT inhaler, Inhale 2 puffs Every 4 (Four) Hours As Needed., Disp: , Rfl:   •  anastrozole (ARIMIDEX) 1 MG tablet, Take 1 mg by mouth Daily., Disp: , Rfl:   •  carvedilol (COREG) 25 MG tablet, Take 0.5 tablets  by mouth 2 (Two) Times a Day With Meals., Disp: 30 tablet, Rfl: 0  •  Cholecalciferol (Vitamin D) 50 MCG (2000 UT) capsule, Take 2,000 Units by mouth Daily., Disp: , Rfl:   •  dicyclomine (BENTYL) 20 MG tablet, Take 1 tablet by mouth Every 6 (Six) Hours., Disp: 60 tablet, Rfl: 2  •  diphenhydrAMINE (BENADRYL) 25 mg capsule, Take 25 mg by mouth At Night As Needed for Itching, Allergies or Sleep., Disp: , Rfl:   •  diphenoxylate-atropine (LOMOTIL) 2.5-0.025 MG per tablet, Take 1 tablet by mouth 4 (Four) Times a Day As Needed for Diarrhea., Disp: 120 tablet, Rfl: 0  •  FLUoxetine (PROzac) 40 MG capsule, TAKE 1 CAPSULE BY MOUTH EVERY DAY, Disp: 30 capsule, Rfl: 0  •  furosemide (LASIX) 40 MG tablet, TAKE ONE-HALF TABLET BY MOUTH EVERY DAY, Disp: 45 tablet, Rfl: 1  •  lisinopril (PRINIVIL,ZESTRIL) 5 MG tablet, TAKE 1 TABLET BY MOUTH EVERY DAY, Disp: 30 tablet, Rfl: 6  •  meloxicam (MOBIC) 15 MG tablet, take 1/2 tablet BY MOUTH EVERY DAY, Disp: 45 tablet, Rfl: 1  •  mometasone-formoterol (DULERA 100) 100-5 MCG/ACT inhaler, Inhale 2 Puffs/kg Every Night., Disp: , Rfl:   •  montelukast (SINGULAIR) 10 MG tablet, Take 10 mg by mouth Every Night., Disp: , Rfl:   •  multivitamin (THERAGRAN) tablet tablet, Take 1 tablet by mouth Daily., Disp: , Rfl:   •  O2 (OXYGEN), Inhale 3 L/min Continuous., Disp: , Rfl:   •  omeprazole (priLOSEC) 20 MG capsule, TAKE 1 CAPSULE BY MOUTH EVERY DAY, Disp: 30 capsule, Rfl: 3  •  ondansetron (ZOFRAN) 8 MG tablet, Take 1 tablet by mouth 3 (Three) Times a Day As Needed for Nausea or Vomiting., Disp: 30 tablet, Rfl: 5  •  Potassium 99 MG tablet, Take 1 tablet by mouth Every Night., Disp: , Rfl:   •  pravastatin (PRAVACHOL) 40 MG tablet, Take 40 mg by mouth Every Night., Disp: , Rfl:   •  psyllium (METAMUCIL) 0.52 g capsule, Metamucil 0.52 gram oral capsule take 1 capsule by oral route 5Xday   Active, Disp: , Rfl:   •  riFAXIMin (Xifaxan) 550 MG tablet, Take 1 tablet by mouth Daily., Disp: 28 tablet,  Rfl: 2  •  spironolactone (ALDACTONE) 25 MG tablet, Take 25 mg by mouth Daily., Disp: , Rfl:   •  gabapentin (NEURONTIN) 100 MG capsule, Take 1 capsule by mouth 3 (Three) Times a Day for 30 days., Disp: 90 capsule, Rfl: 1  •  Lidocaine Viscous HCl (XYLOCAINE) 2 % solution, Apply 5 mL topically to the appropriate area as directed As Needed for Mild Pain  (mix with Silvadene)., Disp: 100 mL, Rfl: 1  •  Lidocaine, Anorectal, (LMX 5) 5 % cream cream, Apply  topically to the appropriate area as directed 3 (Three) Times a Day As Needed (hemmorrhoid pain)., Disp: 45 g, Rfl: 2  •  miconazole (Lotrimin AF) 2 % powder, Apply  topically to the appropriate area as directed 2 (two) times a day., Disp: 85 g, Rfl: 1  •  silver sulfadiazine (SILVADENE, SSD) 1 % cream, Apply 1 application topically to the appropriate area as directed 2 (Two) Times a Day., Disp: 50 g, Rfl: 1    Allergies:   Allergies   Allergen Reactions   • Amlodipine Swelling     LEGS    • Hydrochlorothiazide Unknown - Low Severity     Neuro issues   • Morphine Nausea And Vomiting     hallucinations   • Adhesive Tape Rash       ECOG: (1) Restricted in physically strenuous activity, ambulatory and able to do work of light nature  Quality of Life: 70 - Difficulty Walking, Needs Assistance      Objective     Physical Exam:   Vital Signs:   Vitals:    12/13/21 0948   BP: 109/64   Pulse: 70   Resp: 16   Temp: 97.8 °F (36.6 °C)   TempSrc: Temporal   SpO2: 99%   Weight: (!) 147 kg (323 lb 13.7 oz)   PainSc:   3     Body mass index is 53.89 kg/m².     Physical Exam  Constitutional:       General: She is not in acute distress.     Appearance: Normal appearance. She is obese.   HENT:      Head: Normocephalic and atraumatic.   Pulmonary:      Effort: No respiratory distress.      Breath sounds: No stridor. No wheezing or rhonchi.   Chest:      Comments: Complete right chest wall re-epithelialization with right chest wall hyperpigmentation and dry skin  Abdominal:       General: There is no distension.   Skin:     General: Skin is warm and dry.   Neurological:      General: No focal deficit present.      Mental Status: She is alert.      Cranial Nerves: No cranial nerve deficit.   Psychiatric:         Mood and Affect: Mood normal.         Behavior: Behavior normal.             Assessment / Plan        Assessment/Plan:   Aida Conner is a 66-year-old female with [id number] ER positive/MT positive HER-2/brandi/positive inflammatory breast cancer involving the right breast status post neoadjuvant chemotherapy followed by bilateral mastectomy and right axillary lymph node dissection.  She experienced complete pathologic response within the right breast and has DCIS within the left breast.  She did experience pneumonitis with Taxol and still has persistent pneumonitis associated dyspnea.  She is now status post external beam radiotherapy to the right chest wall and axilla.  She experienced a grade 2 skin reaction and has subsequently experienced complete reepithelialization.  She continues to receive Herceptin and appears to be tolerating this well.    I will see her in follow-up in 6 months.  She was encouraged to contact me in the interim with any questions or concerns.      Shahab Sams MD  Radiation Oncology  [facility]    This document has been signed by Shahab Sams MD on December 13, 2021 10:37 EST

## 2021-12-16 ENCOUNTER — TELEPHONE (OUTPATIENT)
Dept: ONCOLOGY | Facility: CLINIC | Age: 66
End: 2021-12-16

## 2021-12-16 ENCOUNTER — INFUSION (OUTPATIENT)
Dept: ONCOLOGY | Facility: HOSPITAL | Age: 66
End: 2021-12-16

## 2021-12-16 VITALS
BODY MASS INDEX: 52.68 KG/M2 | SYSTOLIC BLOOD PRESSURE: 149 MMHG | WEIGHT: 293 LBS | OXYGEN SATURATION: 95 % | DIASTOLIC BLOOD PRESSURE: 79 MMHG | TEMPERATURE: 97.1 F | HEART RATE: 74 BPM | RESPIRATION RATE: 18 BRPM

## 2021-12-16 DIAGNOSIS — T45.4X5A ADVERSE EFFECT OF IRON, INITIAL ENCOUNTER: Primary | ICD-10-CM

## 2021-12-16 DIAGNOSIS — Z79.899 ENCOUNTER FOR EYE EXAM DUE TO HIGH RISK MEDICATION: ICD-10-CM

## 2021-12-16 DIAGNOSIS — K59.1 FUNCTIONAL DIARRHEA: ICD-10-CM

## 2021-12-16 PROCEDURE — 25010000002 HEPARIN LOCK FLUSH PER 10 UNITS: Performed by: INTERNAL MEDICINE

## 2021-12-16 PROCEDURE — 25010000002 FERRIC CARBOXYMALTOSE 750 MG/15ML SOLUTION: Performed by: NURSE PRACTITIONER

## 2021-12-16 PROCEDURE — 96374 THER/PROPH/DIAG INJ IV PUSH: CPT

## 2021-12-16 RX ORDER — SODIUM CHLORIDE 0.9 % (FLUSH) 0.9 %
10 SYRINGE (ML) INJECTION AS NEEDED
Status: CANCELLED | OUTPATIENT
Start: 2021-12-16

## 2021-12-16 RX ORDER — SODIUM CHLORIDE 9 MG/ML
250 INJECTION, SOLUTION INTRAVENOUS ONCE
Status: COMPLETED | OUTPATIENT
Start: 2021-12-16 | End: 2021-12-16

## 2021-12-16 RX ORDER — SODIUM CHLORIDE 0.9 % (FLUSH) 0.9 %
10 SYRINGE (ML) INJECTION AS NEEDED
Status: DISCONTINUED | OUTPATIENT
Start: 2021-12-16 | End: 2021-12-16 | Stop reason: HOSPADM

## 2021-12-16 RX ORDER — HEPARIN SODIUM (PORCINE) LOCK FLUSH IV SOLN 100 UNIT/ML 100 UNIT/ML
500 SOLUTION INTRAVENOUS AS NEEDED
Status: DISCONTINUED | OUTPATIENT
Start: 2021-12-16 | End: 2021-12-16 | Stop reason: HOSPADM

## 2021-12-16 RX ORDER — HEPARIN SODIUM (PORCINE) LOCK FLUSH IV SOLN 100 UNIT/ML 100 UNIT/ML
500 SOLUTION INTRAVENOUS AS NEEDED
Status: CANCELLED | OUTPATIENT
Start: 2021-12-16

## 2021-12-16 RX ADMIN — FERRIC CARBOXYMALTOSE INJECTION 750 MG: 50 INJECTION, SOLUTION INTRAVENOUS at 11:05

## 2021-12-16 RX ADMIN — Medication 500 UNITS: at 11:42

## 2021-12-16 RX ADMIN — SODIUM CHLORIDE 250 ML: 9 INJECTION, SOLUTION INTRAVENOUS at 11:02

## 2021-12-16 RX ADMIN — SODIUM CHLORIDE, PRESERVATIVE FREE 10 ML: 5 INJECTION INTRAVENOUS at 11:42

## 2021-12-16 NOTE — NURSING NOTE
Pt's weight down to 316lb today. We rechecked wt twice and this is accurate.  Informed pharmacy that the wt in the computer for today is accurate.

## 2021-12-16 NOTE — TELEPHONE ENCOUNTER
Caller: Aida Conner    Relationship: Self    Best call back number: [phone]    What is the best time to reach you: ANYTIME TODAY    Who are you requesting to speak with (clinical staff, provider,  specific staff member): NURSE    What was the call regarding: PATIENT WOULD LIKE TO KNOW IF HER INFUSION FOR TODAY CAN BE DONE AT [facility] IN ORDER TO SAVE HER A TRIP TO [place]?      Do you require a callback:  PLEASE CALL PATIENT TO ADVISE.

## 2021-12-16 NOTE — TELEPHONE ENCOUNTER
Returned Ms Conner's call. Advised her that unfortunately she would not be able to get her infusion at [facility] due to multiple factors.  She requested an earlier time today due to their travel time.  Per infusion, she can come at 10:30- she was going to head this way   Erythema to left ankle

## 2021-12-17 ENCOUNTER — APPOINTMENT (OUTPATIENT)
Dept: CARDIOLOGY | Facility: HOSPITAL | Age: 66
End: 2021-12-17

## 2021-12-28 ENCOUNTER — APPOINTMENT (OUTPATIENT)
Dept: CARDIOLOGY | Facility: HOSPITAL | Age: 66
End: 2021-12-28

## 2022-01-05 ENCOUNTER — HOSPITAL ENCOUNTER (OUTPATIENT)
Dept: CARDIOLOGY | Facility: HOSPITAL | Age: 67
Discharge: HOME | End: 2022-01-05
Admitting: INTERNAL MEDICINE

## 2022-01-05 VITALS — HEIGHT: 65 IN | WEIGHT: 293 LBS | BODY MASS INDEX: 48.82 KG/M2 | HEART RATE: 78 BPM

## 2022-01-05 DIAGNOSIS — G47.33 OBSTRUCTIVE SLEEP APNEA: ICD-10-CM

## 2022-01-05 DIAGNOSIS — C50.611 MALIGNANT NEOPLASM OF AXILLARY TAIL OF RIGHT FEMALE BREAST, UNSPECIFIED ESTROGEN RECEPTOR STATUS: Primary | ICD-10-CM

## 2022-01-05 DIAGNOSIS — Z17.1 MALIGNANT NEOPLASM OF AXILLARY TAIL OF RIGHT BREAST IN FEMALE, ESTROGEN RECEPTOR NEGATIVE: ICD-10-CM

## 2022-01-05 DIAGNOSIS — E78.2 MIXED HYPERLIPIDEMIA: ICD-10-CM

## 2022-01-05 DIAGNOSIS — E66.01 CLASS 3 SEVERE OBESITY DUE TO EXCESS CALORIES WITH SERIOUS COMORBIDITY AND BODY MASS INDEX (BMI) OF 50.0 TO 59.9 IN ADULT: ICD-10-CM

## 2022-01-05 DIAGNOSIS — I10 HYPERTENSION, ESSENTIAL: ICD-10-CM

## 2022-01-05 DIAGNOSIS — C50.611 MALIGNANT NEOPLASM OF AXILLARY TAIL OF RIGHT BREAST IN FEMALE, ESTROGEN RECEPTOR NEGATIVE: ICD-10-CM

## 2022-01-05 DIAGNOSIS — E11.42 TYPE 2 DIABETES MELLITUS WITH DIABETIC POLYNEUROPATHY, WITHOUT LONG-TERM CURRENT USE OF INSULIN: ICD-10-CM

## 2022-01-05 PROCEDURE — 93306 TTE W/DOPPLER COMPLETE: CPT | Performed by: INTERNAL MEDICINE

## 2022-01-05 PROCEDURE — 93356 MYOCRD STRAIN IMG SPCKL TRCK: CPT | Performed by: INTERNAL MEDICINE

## 2022-01-05 PROCEDURE — 93356 MYOCRD STRAIN IMG SPCKL TRCK: CPT

## 2022-01-05 PROCEDURE — 93306 TTE W/DOPPLER COMPLETE: CPT

## 2022-01-05 PROCEDURE — 25010000002 PERFLUTREN (DEFINITY) 8.476 MG IN SODIUM CHLORIDE (PF) 0.9 % 10 ML INJECTION: Performed by: INTERNAL MEDICINE

## 2022-01-05 RX ADMIN — PERFLUTREN 1.5 ML: 6.52 INJECTION, SUSPENSION INTRAVENOUS at 10:53

## 2022-01-05 NOTE — PROGRESS NOTES
I called but got VM and left message that this was all stable. Can continue with current treatment plan.      Jenelle, can you order a limited echo in 90 days?    Tian andersen

## 2022-01-06 ENCOUNTER — TELEPHONE (OUTPATIENT)
Dept: ONCOLOGY | Facility: CLINIC | Age: 67
End: 2022-01-06

## 2022-01-06 ENCOUNTER — APPOINTMENT (OUTPATIENT)
Dept: ONCOLOGY | Facility: HOSPITAL | Age: 67
End: 2022-01-06

## 2022-01-06 ENCOUNTER — OFFICE VISIT (OUTPATIENT)
Dept: ONCOLOGY | Facility: CLINIC | Age: 67
End: 2022-01-06

## 2022-01-06 ENCOUNTER — INFUSION (OUTPATIENT)
Dept: ONCOLOGY | Facility: HOSPITAL | Age: 67
End: 2022-01-06

## 2022-01-06 VITALS
WEIGHT: 293 LBS | BODY MASS INDEX: 48.82 KG/M2 | HEART RATE: 77 BPM | DIASTOLIC BLOOD PRESSURE: 79 MMHG | RESPIRATION RATE: 18 BRPM | SYSTOLIC BLOOD PRESSURE: 128 MMHG | OXYGEN SATURATION: 95 % | TEMPERATURE: 97.2 F | HEIGHT: 65 IN

## 2022-01-06 DIAGNOSIS — C50.611 MALIGNANT NEOPLASM OF AXILLARY TAIL OF RIGHT FEMALE BREAST, UNSPECIFIED ESTROGEN RECEPTOR STATUS: ICD-10-CM

## 2022-01-06 DIAGNOSIS — C50.611 MALIGNANT NEOPLASM OF AXILLARY TAIL OF RIGHT BREAST IN FEMALE, ESTROGEN RECEPTOR POSITIVE: ICD-10-CM

## 2022-01-06 DIAGNOSIS — C50.911 INFLAMMATORY BREAST CANCER, RIGHT: Primary | ICD-10-CM

## 2022-01-06 DIAGNOSIS — C50.611 MALIGNANT NEOPLASM OF AXILLARY TAIL OF RIGHT FEMALE BREAST, UNSPECIFIED ESTROGEN RECEPTOR STATUS: Primary | ICD-10-CM

## 2022-01-06 DIAGNOSIS — C50.111 MALIGNANT NEOPLASM OF CENTRAL PORTION OF RIGHT BREAST IN FEMALE, ESTROGEN RECEPTOR POSITIVE: ICD-10-CM

## 2022-01-06 DIAGNOSIS — Z79.899 ENCOUNTER FOR LONG-TERM (CURRENT) USE OF HIGH-RISK MEDICATION: ICD-10-CM

## 2022-01-06 DIAGNOSIS — Z17.0 MALIGNANT NEOPLASM OF AXILLARY TAIL OF RIGHT BREAST IN FEMALE, ESTROGEN RECEPTOR POSITIVE: ICD-10-CM

## 2022-01-06 DIAGNOSIS — Z17.0 MALIGNANT NEOPLASM OF CENTRAL PORTION OF RIGHT BREAST IN FEMALE, ESTROGEN RECEPTOR POSITIVE: ICD-10-CM

## 2022-01-06 DIAGNOSIS — R92.8 ABNORMAL MAMMOGRAM: ICD-10-CM

## 2022-01-06 LAB
ALBUMIN SERPL-MCNC: 3.6 G/DL (ref 3.5–5.2)
ALBUMIN/GLOB SERPL: 1.1 G/DL (ref 1.1–2.4)
ALP SERPL-CCNC: 120 U/L (ref 38–116)
ALT SERPL W P-5'-P-CCNC: 14 U/L (ref 0–33)
ANION GAP SERPL CALCULATED.3IONS-SCNC: 6 MMOL/L (ref 5–15)
AORTIC DIMENSIONLESS INDEX: 0.5 (DI)
ASCENDING AORTA: 3.2 CM
AST SERPL-CCNC: 16 U/L (ref 0–32)
BASOPHILS # BLD AUTO: 0.04 10*3/MM3 (ref 0–0.2)
BASOPHILS NFR BLD AUTO: 0.4 % (ref 0–1.5)
BH CV ECHO MEAS - ACS: 1.8 CM
BH CV ECHO MEAS - AO MAX PG (FULL): 16.1 MMHG
BH CV ECHO MEAS - AO MAX PG: 20.8 MMHG
BH CV ECHO MEAS - AO MEAN PG (FULL): 8.4 MMHG
BH CV ECHO MEAS - AO MEAN PG: 11.4 MMHG
BH CV ECHO MEAS - AO ROOT AREA (BSA CORRECTED): 1.6
BH CV ECHO MEAS - AO ROOT AREA: 11.4 CM^2
BH CV ECHO MEAS - AO ROOT DIAM: 3.8 CM
BH CV ECHO MEAS - AO V2 MAX: 228 CM/SEC
BH CV ECHO MEAS - AO V2 MEAN: 161.6 CM/SEC
BH CV ECHO MEAS - AO V2 VTI: 46.5 CM
BH CV ECHO MEAS - ASC AORTA: 3.2 CM
BH CV ECHO MEAS - AVA(I,A): 1.7 CM^2
BH CV ECHO MEAS - AVA(I,D): 1.7 CM^2
BH CV ECHO MEAS - AVA(V,A): 1.5 CM^2
BH CV ECHO MEAS - AVA(V,D): 1.5 CM^2
BH CV ECHO MEAS - BSA(HAYCOCK): 2.7 M^2
BH CV ECHO MEAS - BSA: 2.4 M^2
BH CV ECHO MEAS - BZI_BMI: 52.6 KILOGRAMS/M^2
BH CV ECHO MEAS - BZI_METRIC_HEIGHT: 165.1 CM
BH CV ECHO MEAS - BZI_METRIC_WEIGHT: 143.3 KG
BH CV ECHO MEAS - EDV(MOD-SP2): 165 ML
BH CV ECHO MEAS - EDV(MOD-SP4): 203 ML
BH CV ECHO MEAS - EDV(TEICH): 166.9 ML
BH CV ECHO MEAS - EF(CUBED): 68 %
BH CV ECHO MEAS - EF(MOD-BP): 64 %
BH CV ECHO MEAS - EF(MOD-SP2): 64.2 %
BH CV ECHO MEAS - EF(MOD-SP4): 63.5 %
BH CV ECHO MEAS - EF(TEICH): 58.8 %
BH CV ECHO MEAS - ESV(MOD-SP2): 59 ML
BH CV ECHO MEAS - ESV(MOD-SP4): 74 ML
BH CV ECHO MEAS - ESV(TEICH): 68.7 ML
BH CV ECHO MEAS - FS: 31.6 %
BH CV ECHO MEAS - IVS/LVPW: 0.88
BH CV ECHO MEAS - IVSD: 1.1 CM
BH CV ECHO MEAS - LAT PEAK E' VEL: 10.4 CM/SEC
BH CV ECHO MEAS - LV DIASTOLIC VOL/BSA (35-75): 84.5 ML/M^2
BH CV ECHO MEAS - LV MASS(C)D: 273.6 GRAMS
BH CV ECHO MEAS - LV MASS(C)DI: 113.9 GRAMS/M^2
BH CV ECHO MEAS - LV MAX PG: 4.7 MMHG
BH CV ECHO MEAS - LV MEAN PG: 2.9 MMHG
BH CV ECHO MEAS - LV SYSTOLIC VOL/BSA (12-30): 30.8 ML/M^2
BH CV ECHO MEAS - LV V1 MAX: 108 CM/SEC
BH CV ECHO MEAS - LV V1 MEAN: 82.2 CM/SEC
BH CV ECHO MEAS - LV V1 VTI: 23.7 CM
BH CV ECHO MEAS - LVIDD: 5.8 CM
BH CV ECHO MEAS - LVIDS: 4 CM
BH CV ECHO MEAS - LVLD AP2: 8.7 CM
BH CV ECHO MEAS - LVLD AP4: 9.1 CM
BH CV ECHO MEAS - LVLS AP2: 7.6 CM
BH CV ECHO MEAS - LVLS AP4: 7.3 CM
BH CV ECHO MEAS - LVOT AREA (M): 3.1 CM^2
BH CV ECHO MEAS - LVOT AREA: 3.3 CM^2
BH CV ECHO MEAS - LVOT DIAM: 2 CM
BH CV ECHO MEAS - LVPWD: 1.2 CM
BH CV ECHO MEAS - MED PEAK E' VEL: 7.2 CM/SEC
BH CV ECHO MEAS - MR MAX PG: 62 MMHG
BH CV ECHO MEAS - MR MAX VEL: 393.6 CM/SEC
BH CV ECHO MEAS - MV A DUR: 0.09 SEC
BH CV ECHO MEAS - MV A MAX VEL: 90.4 CM/SEC
BH CV ECHO MEAS - MV DEC SLOPE: 389.7 CM/SEC^2
BH CV ECHO MEAS - MV DEC TIME: 0.19 SEC
BH CV ECHO MEAS - MV E MAX VEL: 81 CM/SEC
BH CV ECHO MEAS - MV E/A: 0.9
BH CV ECHO MEAS - MV MAX PG: 3.4 MMHG
BH CV ECHO MEAS - MV MEAN PG: 1.8 MMHG
BH CV ECHO MEAS - MV P1/2T MAX VEL: 101.8 CM/SEC
BH CV ECHO MEAS - MV P1/2T: 76.5 MSEC
BH CV ECHO MEAS - MV V2 MAX: 92.3 CM/SEC
BH CV ECHO MEAS - MV V2 MEAN: 63.4 CM/SEC
BH CV ECHO MEAS - MV V2 VTI: 26.3 CM
BH CV ECHO MEAS - MVA P1/2T LCG: 2.2 CM^2
BH CV ECHO MEAS - MVA(P1/2T): 2.9 CM^2
BH CV ECHO MEAS - MVA(VTI): 2.9 CM^2
BH CV ECHO MEAS - PA ACC TIME: 0.08 SEC
BH CV ECHO MEAS - PA MAX PG (FULL): 2.1 MMHG
BH CV ECHO MEAS - PA MAX PG: 4.7 MMHG
BH CV ECHO MEAS - PA PR(ACCEL): 41 MMHG
BH CV ECHO MEAS - PA V2 MAX: 108.6 CM/SEC
BH CV ECHO MEAS - PULM A REVS DUR: 0.11 SEC
BH CV ECHO MEAS - PULM A REVS VEL: 33.4 CM/SEC
BH CV ECHO MEAS - PULM DIAS VEL: 43.3 CM/SEC
BH CV ECHO MEAS - PULM S/D: 0.93
BH CV ECHO MEAS - PULM SYS VEL: 40.1 CM/SEC
BH CV ECHO MEAS - PVA(V,A): 2.8 CM^2
BH CV ECHO MEAS - PVA(V,D): 2.8 CM^2
BH CV ECHO MEAS - QP/QS: 0.61
BH CV ECHO MEAS - RAP SYSTOLE: 3 MMHG
BH CV ECHO MEAS - RV MAX PG: 2.7 MMHG
BH CV ECHO MEAS - RV MEAN PG: 1.6 MMHG
BH CV ECHO MEAS - RV V1 MAX: 81.4 CM/SEC
BH CV ECHO MEAS - RV V1 MEAN: 60.7 CM/SEC
BH CV ECHO MEAS - RV V1 VTI: 12.7 CM
BH CV ECHO MEAS - RVOT AREA: 3.7 CM^2
BH CV ECHO MEAS - RVOT DIAM: 2.2 CM
BH CV ECHO MEAS - RVSP: 26 MMHG
BH CV ECHO MEAS - SI(AO): 220.9 ML/M^2
BH CV ECHO MEAS - SI(CUBED): 55.4 ML/M^2
BH CV ECHO MEAS - SI(LVOT): 32.2 ML/M^2
BH CV ECHO MEAS - SI(MOD-SP2): 44.1 ML/M^2
BH CV ECHO MEAS - SI(MOD-SP4): 53.7 ML/M^2
BH CV ECHO MEAS - SI(TEICH): 40.9 ML/M^2
BH CV ECHO MEAS - SUP REN AO DIAM: 2.2 CM
BH CV ECHO MEAS - SV(AO): 530.7 ML
BH CV ECHO MEAS - SV(CUBED): 133.1 ML
BH CV ECHO MEAS - SV(LVOT): 77.3 ML
BH CV ECHO MEAS - SV(MOD-SP2): 106 ML
BH CV ECHO MEAS - SV(MOD-SP4): 129 ML
BH CV ECHO MEAS - SV(RVOT): 47.1 ML
BH CV ECHO MEAS - SV(TEICH): 98.2 ML
BH CV ECHO MEAS - TAPSE (>1.6): 2.7 CM
BH CV ECHO MEAS - TR MAX VEL: 237.5 CM/SEC
BH CV ECHO MEASUREMENTS AVERAGE E/E' RATIO: 9.2
BH CV XLRA - RV BASE: 4.3 CM
BH CV XLRA - RV LENGTH: 8.9 CM
BH CV XLRA - RV MID: 3.6 CM
BH CV XLRA - TDI S': 13.2 CM/SEC
BILIRUB SERPL-MCNC: 0.3 MG/DL (ref 0.2–1.2)
BUN SERPL-MCNC: 10 MG/DL (ref 6–20)
BUN/CREAT SERPL: 17.9 (ref 7.3–30)
CALCIUM SPEC-SCNC: 7 MG/DL (ref 8.5–10.2)
CHLORIDE SERPL-SCNC: 105 MMOL/L (ref 98–107)
CO2 SERPL-SCNC: 27 MMOL/L (ref 22–29)
CREAT SERPL-MCNC: 0.56 MG/DL (ref 0.6–1.1)
DEPRECATED RDW RBC AUTO: 67.8 FL (ref 37–54)
EOSINOPHIL # BLD AUTO: 0.09 10*3/MM3 (ref 0–0.4)
EOSINOPHIL NFR BLD AUTO: 1 % (ref 0.3–6.2)
ERYTHROCYTE [DISTWIDTH] IN BLOOD BY AUTOMATED COUNT: 20.7 % (ref 12.3–15.4)
GFR SERPL CREATININE-BSD FRML MDRD: 108 ML/MIN/1.73
GLOBULIN UR ELPH-MCNC: 3.2 GM/DL (ref 1.8–3.5)
GLUCOSE SERPL-MCNC: 101 MG/DL (ref 74–124)
HCT VFR BLD AUTO: 41.8 % (ref 34–46.6)
HGB BLD-MCNC: 12.4 G/DL (ref 12–15.9)
IMM GRANULOCYTES # BLD AUTO: 0.03 10*3/MM3 (ref 0–0.05)
IMM GRANULOCYTES NFR BLD AUTO: 0.3 % (ref 0–0.5)
LEFT ATRIUM VOLUME INDEX: 31 ML/M2
LYMPHOCYTES # BLD AUTO: 0.68 10*3/MM3 (ref 0.7–3.1)
LYMPHOCYTES NFR BLD AUTO: 7.5 % (ref 19.6–45.3)
MAXIMAL PREDICTED HEART RATE: 152 BPM
MCH RBC QN AUTO: 26.9 PG (ref 26.6–33)
MCHC RBC AUTO-ENTMCNC: 29.7 G/DL (ref 31.5–35.7)
MCV RBC AUTO: 90.7 FL (ref 79–97)
MONOCYTES # BLD AUTO: 0.53 10*3/MM3 (ref 0.1–0.9)
MONOCYTES NFR BLD AUTO: 5.9 % (ref 5–12)
NEUTROPHILS NFR BLD AUTO: 7.64 10*3/MM3 (ref 1.7–7)
NEUTROPHILS NFR BLD AUTO: 84.9 % (ref 42.7–76)
NRBC BLD AUTO-RTO: 0 /100 WBC (ref 0–0.2)
PLATELET # BLD AUTO: 250 10*3/MM3 (ref 140–450)
PMV BLD AUTO: 9.4 FL (ref 6–12)
POTASSIUM SERPL-SCNC: 4.3 MMOL/L (ref 3.5–4.7)
PROT SERPL-MCNC: 6.8 G/DL (ref 6.3–8)
RBC # BLD AUTO: 4.61 10*6/MM3 (ref 3.77–5.28)
SINUS: 3.8 CM
SODIUM SERPL-SCNC: 138 MMOL/L (ref 134–145)
STJ: 2.8 CM
STRESS TARGET HR: 129 BPM
WBC NRBC COR # BLD: 9.01 10*3/MM3 (ref 3.4–10.8)

## 2022-01-06 PROCEDURE — 96413 CHEMO IV INFUSION 1 HR: CPT

## 2022-01-06 PROCEDURE — 25010000002 TRASTUZUMAB PER 10 MG: Performed by: INTERNAL MEDICINE

## 2022-01-06 PROCEDURE — 85025 COMPLETE CBC W/AUTO DIFF WBC: CPT

## 2022-01-06 PROCEDURE — 80053 COMPREHEN METABOLIC PANEL: CPT

## 2022-01-06 PROCEDURE — 99214 OFFICE O/P EST MOD 30 MIN: CPT | Performed by: INTERNAL MEDICINE

## 2022-01-06 RX ORDER — SODIUM CHLORIDE 9 MG/ML
250 INJECTION, SOLUTION INTRAVENOUS ONCE
Status: CANCELLED | OUTPATIENT
Start: 2022-01-06

## 2022-01-06 RX ORDER — SODIUM CHLORIDE 9 MG/ML
250 INJECTION, SOLUTION INTRAVENOUS ONCE
Status: COMPLETED | OUTPATIENT
Start: 2022-01-06 | End: 2022-01-06

## 2022-01-06 RX ADMIN — TRASTUZUMAB 900 MG: 150 INJECTION, POWDER, LYOPHILIZED, FOR SOLUTION INTRAVENOUS at 14:38

## 2022-01-06 RX ADMIN — SODIUM CHLORIDE 250 ML: 9 INJECTION, SOLUTION INTRAVENOUS at 14:38

## 2022-01-06 NOTE — NURSING NOTE
Spoke to  in regards of cc and calcium. Per MD pt should take two tums daily. Pt vu.     Lab Results   Component Value Date    CALCIUM 7.0 (L) 01/06/2022    PHOS 4.1 12/09/2021     Lab Results   Component Value Date    GLUCOSE 101 01/06/2022    BUN 10 01/06/2022    CREATININE 0.56 (L) 01/06/2022    EGFRIFNONA 108 01/06/2022    BCR 17.9 01/06/2022    K 4.3 01/06/2022    CO2 27.0 01/06/2022    CALCIUM 7.0 (L) 01/06/2022    ALBUMIN 3.60 01/06/2022    LABIL2 1.2 (L) 11/12/2020    AST 16 01/06/2022    ALT 14 01/06/2022

## 2022-01-06 NOTE — PROGRESS NOTES
Subjective     REASON FOR FOLLOW-UP: Right breast inflammatory breast, triple positive                              REQUESTING PHYSICIAN: MD Francisco Esteban MD Bethany Haynes, MD    History of Present Illness patient is a 66 y.o. 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 difficile or other infections.    She is feeling very well overall and tolerating the Arimidex without any problems    Diarrhea comes and goes and is not definitively due to her Herceptin    She had 1 dose of Prolia and is not due for the next dose until June    continues on oxygen but overall is doing much better than I expected after surgery and chemotherapy    Echocardiogram on 1/5/2022 was stable with an EF of 64% and normal strain    Past Medical History:   Diagnosis Date   • Anemia    • Anxiety    • Asthma    • Chronic diastolic (congestive) heart failure (HCC)    • Chronic hypoxemic respiratory failure (HCC)     on continuous O2   • Diarrhea     with chemo   • H/O Intraductal papilloma    • Hemorrhoids    • History of transfusion 1984    AFTER BACK SURGERY no reaction   • Hypertension    • IBS (irritable bowel syndrome)    • Inflammatory breast cancer (HCC)     right   • Kidney stone    • Morbid obesity with BMI of 50.0-59.9, adult (HCC)    • Obesity hypoventilation syndrome (HCC) 2/5/2021   • On home oxygen therapy     2.5 AT REST WITH ACTIVITY UP TO 4L   • ANABELLE on CPAP     cpap  & uses O2 with CPAP   • Osteoarthritis    • PONV (postoperative nausea and vomiting)         Past Surgical History:   Procedure Laterality Date    • BREAST EXCISIONAL BIOPSY Bilateral    • COLONOSCOPY     • CYSTOSCOPY BLADDER STONE LITHOTRIPSY     • ENDOSCOPY     • KNEE ARTHROPLASTY Bilateral 2009   • MASTECTOMY Left 8/10/2021    Procedure: LEFT TOTAL MASTECTOMY;  Surgeon: Nicci Banks MD;  Location: [facility] MAIN OR;  Service: General;  Laterality: Left;   • MASTECTOMY Right 8/10/2021    Procedure: RIGHT MODIFIED RADICAL MASTECTOMY;  Surgeon: Nicci Banks MD;  Location: [facility] MAIN OR;  Service: General;  Laterality: Right;   • SPINE SURGERY     • TOTAL ABDOMINAL HYSTERECTOMY WITH SALPINGO OOPHORECTOMY     • UPPER GASTROINTESTINAL ENDOSCOPY     • VENOUS ACCESS DEVICE (PORT) INSERTION N/A 2021    Procedure: INSERTION VENOUS ACCESS DEVICE;  Surgeon: Nicci Banks MD;  Location: [facility] OR;  Service: General;  Laterality: N/A;      ONC HISTORY  patient is a 65-year-old white female with morbid obesity, history of diastolic congestive heart failure and unknown type of pulmonary disease for which she has been on oxygen for 4 years.    She has been getting routine mammography since 38 years of age because her mother  at 46 of breast cancer and had a biopsy of her left breast in  which was apparently benign and another biopsy in  on her right breast which showed a small focus of atypical ductal hyperplasia and atypical lobular hyperplasia with a residual intraductal papilloma.  At that time she saw medical oncologist who recommended genetic testing and prevention but the insurance would not cover the genetic testing and the medical oncologist thought tamoxifen was too risky for her because of her comorbidities and no treatment was given.  More recently she noticed redness of the right breast in October and showed it to her family doctor who gave her course of Keflex when it did not improve and mammogram was ordered and this was benign  When the redness persisted she saw her gynecologist with concern for  inflammatory breast cancer and referred her to Dr. Banks after repeat imaging and [facility] right [facility]  Preliminary report shows micropapillary invasive mammary carcinoma intermediate grade measuring 13 mm right axillary node was also involved with metastatic cancer ER/FL and HER-2 are pending  Patient saw Dr. Banks who sent her for skin biopsies and she had 3 separate punch biopsy of the skin that showed Perivascular and perifollicular inflammation with no obvious malignancy at 3:00 12:00 and 9:00  In addition staging work-up with CAT scans and bone scan were ordered.  CAT scan of the chest showed a borderline mediastinal  Infracarinal node measuring 15 mm in length mild cardiomegaly and heavy coronary artery calcifications  CT of the abdomen showed a 2.5 x 2.2 cm right adrenal mass which the patient tells me she has had in the past and is most likely benign but also a 2.7 cm left external iliac node which is indeterminate.  Bone scan was negative    Echocardiogram is scheduled for later next week and genetic testing with the [employer]itae stat panel is pending    Patient is  3 para 3 menarche was at age 9 menopause at 49 when she had a hysterectomy and oophorectomy  First childbirth was at age 20 she breast-fed her second and third children and took no hormone replacement after menopause    Family history is positive for mother dying of breast cancer at age 46 she is a maternal aunt with breast cancer in her 70s a paternal aunt with breast cancer in her 70s paternal grandmother with colon cancer.  Her father had Hodgkin's disease and non-Hodgkin's lymphoma but  of small cell lung cancer at 67      She has not had a heart attack stroke or blood clot    Plan to do echocardiogram soon as possible to ascertain tolerability of cardiotoxic chemotherapy which would be typically indicated with an inflammatory breast cancer    Also plan PET scan to  follow-up on atypical lymph nodes and confirm benign etiology of the adrenal lesion    She will have port placement and chemo education pt is  ER/NY and HER-2 +    I explained to Aida that the goal of treatment would be curative but she has a lot of comorbidities which may limit our ability to give the most effective chemotherapy in the setting  I told her radiation would be involved and possibly hormonal therapy for 10 years as she has hormone positivity and HER-2 directed therapy    She expressed some concerns about driving back and forth from [place] but felt that once a week was doable    We will see her back in 2 weeks to start treatment with a port placement planned early next week      Patient did have a mild reaction to Taxol dose #1 with some increased shortness of breath and flushing.  This was responsive to 100 mg of Solu-Cortef.  She states this gave her a headache and made her quite talkative but otherwise she was thankfully able to complete Taxol infusion without further incident.    Patient reports issues with chronic diarrhea over the last few years and did note a little bit increase in stooling following THP though nothing overly significant in her mind.  She did finally begin taking Imodium just a few days ago and has had no further stools.  She does note significant trouble with hemorrhoids in relation to the diarrhea   Required blood transfusion due to significant hemorrhoidal bleeding plus iron deficiency.  Injectafer planned    Due to inclement weather cycle 1 day 8 therapy was missed.  She did get day 15 therapy with fairly good tolerance except for some diarrhea.    She was found to be iron deficient despite trying oral iron.  We therefore elected to proceed with IV Injectafer but she remains mildly anemic with a hemoglobin of 9.3    7/21    She has increased her Lasix and her weight is down 7 pounds but overall she is significantly weaker since starting treatment and I think it is in  her best interest to discontinue treatment and proceed with surgery and will use Herceptin in the interim till surgery scheduled  She has had a good response in the breast and I do not think the last dose of Adriamycin is going to be crucial and we run the risk of making her so weak that she cannot go for surgery    9/21  Patient had worsening shortness of breath requiring continuous oxygen.  CT of the chest performed to rule out pneumonitis and she was started on steroids.  CT showed bilateral groundglass infiltrates presumed to be related to Taxol pneumonitis and therefore Taxol discontinued.  Patient completed the fourth cycle of Perjeta/Herceptin alone.  Diarrhea was an issue but not as bad without the Taxol.  She is weaned off her prednisone    Patient proceeded with cycle 1 Adriamycin/Cytoxan on 5/14/2021 and is seen back today for cycle #3 and we are doing it at 3-week intervals because of her frailty and after 3 cycles we stopped because of tolerance issues    She went for surgery her bilateral mastectomies and interestingly she had a complete pathological CR on the right breast and had evidence of DCIS in the left breast [id number]    10/21  We will start anastrozole because she is not a good candidate for tamoxifen and her bone density showed normal bone density in the spine but osteoporosis in the left femoral neck and we will start Prolia in 6 weeks.The side effects and toxicities of the Aromatase inhibitors was discussed with the patient including, hot flashes, mood swings and hair thinning.Significant arthralgias and worsening bone density were also discussed. Baseline bone density evaluation was ordered.        Current Outpatient Medications on File Prior to Visit   Medication Sig Dispense Refill   • albuterol sulfate  (90 Base) MCG/ACT inhaler Inhale 2 puffs Every 4 (Four) Hours As Needed.     • anastrozole (ARIMIDEX) 1 MG tablet Take 1 mg by mouth Daily.     • carvedilol (COREG) 25 MG tablet Take  0.5 tablets by mouth 2 (Two) Times a Day With Meals. 30 tablet 0   • Cholecalciferol (Vitamin D) 50 MCG (2000 UT) capsule Take 2,000 Units by mouth Daily.     • dicyclomine (BENTYL) 20 MG tablet Take 1 tablet by mouth Every 6 (Six) Hours. 60 tablet 2   • diphenhydrAMINE (BENADRYL) 25 mg capsule Take 25 mg by mouth At Night As Needed for Itching, Allergies or Sleep.     • diphenoxylate-atropine (LOMOTIL) 2.5-0.025 MG per tablet Take 1 tablet by mouth 4 (Four) Times a Day As Needed for Diarrhea. 120 tablet 0   • FLUoxetine (PROzac) 40 MG capsule TAKE 1 CAPSULE BY MOUTH EVERY DAY 30 capsule 0   • furosemide (LASIX) 40 MG tablet TAKE ONE-HALF TABLET BY MOUTH EVERY DAY 45 tablet 1   • Lidocaine Viscous HCl (XYLOCAINE) 2 % solution Apply 5 mL topically to the appropriate area as directed As Needed for Mild Pain  (mix with Silvadene). 100 mL 1   • Lidocaine, Anorectal, (LMX 5) 5 % cream cream Apply  topically to the appropriate area as directed 3 (Three) Times a Day As Needed (hemmorrhoid pain). 45 g 2   • lisinopril (PRINIVIL,ZESTRIL) 5 MG tablet TAKE 1 TABLET BY MOUTH EVERY DAY 30 tablet 6   • meloxicam (MOBIC) 15 MG tablet take 1/2 tablet BY MOUTH EVERY DAY 45 tablet 1   • miconazole (Lotrimin AF) 2 % powder Apply  topically to the appropriate area as directed 2 (two) times a day. 85 g 1   • mometasone-formoterol (DULERA 100) 100-5 MCG/ACT inhaler Inhale 2 Puffs/kg Every Night.     • montelukast (SINGULAIR) 10 MG tablet Take 10 mg by mouth Every Night.     • multivitamin (THERAGRAN) tablet tablet Take 1 tablet by mouth Daily.     • O2 (OXYGEN) Inhale 3 L/min Continuous.     • omeprazole (priLOSEC) 20 MG capsule TAKE 1 CAPSULE BY MOUTH EVERY DAY 30 capsule 3   • ondansetron (ZOFRAN) 8 MG tablet Take 1 tablet by mouth 3 (Three) Times a Day As Needed for Nausea or Vomiting. 30 tablet 5   • Potassium 99 MG tablet Take 1 tablet by mouth Every Night.     • pravastatin (PRAVACHOL) 40 MG tablet Take 40 mg by mouth Every Night.      • psyllium (METAMUCIL) 0.52 g capsule Metamucil 0.52 gram oral capsule take 1 capsule by oral route 5Xday   Active     • riFAXIMin (Xifaxan) 550 MG tablet Take 1 tablet by mouth Daily. 28 tablet 2   • silver sulfadiazine (SILVADENE, SSD) 1 % cream Apply 1 application topically to the appropriate area as directed 2 (Two) Times a Day. 50 g 1   • spironolactone (ALDACTONE) 25 MG tablet Take 25 mg by mouth Daily.     • triamcinolone (KENALOG) 0.1 % ointment APPLY TO LOWER LEGS TWICE DAILY AS NEEDED FOR ITCHING     • gabapentin (NEURONTIN) 100 MG capsule Take 1 capsule by mouth 3 (Three) Times a Day for 30 days. 90 capsule 1     No current facility-administered medications on file prior to visit.        ALLERGIES:    Allergies   Allergen Reactions   • Amlodipine Swelling     LEGS    • Hydrochlorothiazide Unknown - Low Severity     Neuro issues   • Morphine Nausea And Vomiting     hallucinations   • Adhesive Tape Rash        Social History     Socioeconomic History   • Marital status:    • Number of children: 3   Tobacco Use   • Smoking status: Never Smoker   • Smokeless tobacco: Never Used   Vaping Use   • Vaping Use: Never used   Substance and Sexual Activity   • Alcohol use: Never   • Drug use: Never   • Sexual activity: Defer        Family History   Problem Relation Age of Onset   • Breast cancer Mother 45   • Colon cancer Paternal Grandmother    • Breast cancer Maternal Aunt 70   • Breast cancer Paternal Aunt 70   • Lung cancer Father    • Hodgkin's lymphoma Father    • Skin cancer Father         squamous cell   • Ovarian cancer Neg Hx    • Uterine cancer Neg Hx    • Deep vein thrombosis Neg Hx    • Pulmonary embolism Neg Hx    • Malig Hyperthermia Neg Hx         Review of Systems   Constitutional: Positive for fatigue (Better). Negative for appetite change, chills, diaphoresis, fever and unexpected weight change.   HENT: Negative for hearing loss, sore throat, trouble swallowing and voice change.   "  Respiratory: Negative for cough, chest tightness, shortness of breath (Improved ) and wheezing.    Cardiovascular: Positive for leg swelling (Better). Negative for chest pain and palpitations.   Gastrointestinal: Negative for abdominal distention, abdominal pain, constipation, diarrhea (Severe after Perjeta stopped yesterday), nausea, rectal pain (hemmorrhoids -improved) and vomiting.   Genitourinary: Negative for dysuria, frequency, hematuria and urgency.   Musculoskeletal: Negative for back pain ( better), joint swelling and neck stiffness.        No muscle weakness.   Skin: Negative for rash and wound.   Neurological: Negative for seizures, syncope, speech difficulty, weakness, numbness and headaches.   Hematological: Negative for adenopathy. Does not bruise/bleed easily.   Psychiatric/Behavioral: Negative.  Negative for behavioral problems, confusion and suicidal ideas.   All other systems reviewed and are negative.       Objective     Vitals:    01/06/22 1253   BP: 128/79   Pulse: 77   Resp: 18   Temp: 97.2 °F (36.2 °C)   TempSrc: Temporal   SpO2: 95%   Weight: (!) 141 kg (311 lb 3.2 oz)   Height: 165.1 cm (65\")   PainSc: 0-No pain     Current Status 1/6/2022   ECOG score 0       Physical Exam    CONSTITUTIONAL:  Vital signs reviewed.  No distress, looks comfortable. Morbidly obese  EYES:  Conjunctiva and lids unremarkable.  PERRLA  EARS,NOSE,MOUTH,THROAT:  Ears and nose appear unremarkable.  Lips, teeth, gums appear unremarkable.  RESPIRATORY:  Normal respiratory effort.  Lungs clear to auscultation bilaterally.  No axillary adenopathy  BREASTS: Bilateral mastectomies with no reconstruction-drain right chest wall  CARDIOVASCULAR:  Normal S1, S2.  No murmurs rubs or gallops.  1+ brawny lower extremity edema.  GASTROINTESTINAL: Abdomen appears unremarkable.  Nontender.  No hepatomegaly.  No splenomegaly.  LYMPHATIC:  No cervical, supraclavicular, axillary lymphadenopathy.  SKIN:  Warm.  Inclusion cyst left " axilla-extensive tenia cruris  in the right groin  PSYCHIATRIC:  Normal judgment and insight.  Normal mood and affect.       I have reexamined the patient and the results are consistent with the previously documented exam. Jaime Azul MD           RECENT LABS:  Results from last 7 days   Lab Units 01/06/22  1242   WBC 10*3/mm3 9.01   NEUTROS ABS 10*3/mm3 7.64*   HEMOGLOBIN g/dL 12.4   HEMATOCRIT % 41.8   PLATELETS 10*3/mm3 250     Results from last 7 days   Lab Units 01/06/22  1242   SODIUM mmol/L 138   POTASSIUM mmol/L 4.3   CHLORIDE mmol/L 105   CO2 mmol/L 27.0   BUN mg/dL 10   CREATININE mg/dL 0.56*   CALCIUM mg/dL 7.0*   ALBUMIN g/dL 3.60   BILIRUBIN mg/dL 0.3   ALK PHOS U/L 120*   ALT (SGPT) U/L 14   AST (SGOT) U/L 16   GLUCOSE mg/dL 101             PET  IMPRESSION:  1.  Moderate to intensely FDG avid asymmetric soft tissue and skin  thickening involving the right breast likely representing patient's  known malignancy.  2.  Intensely FDG avid right axillary and subpectoral adenopathy likely  represent metastatic disease.  3.  Constellation of findings within the right adrenal gland are favored  to represent a lipid rich adenoma. Continued attention on follow-up is  recommended to ensure stability.  4.  While there are no findings of definite FDG avid osseous metastasis,  given the heterogenous FDG uptake throughout the axial and appendicular  skeleton due to the above stated limitations, subtle underlying osseous  metastasis would remain occult. Therefore, continued close attention on  follow-up is recommended to exclude this possibility.  5.  Short segment of moderate to intense FDG uptake within the distal  esophagus and GE junction suggestive of esophagitis. In the appropriate  clinical context correlation with patient history is recommended with  follow-up endoscopy if clinically indicated.  6.  Sub-6 mm pulmonary nodule within the right lower lobe is below PET  resolution and indeterminate.  Continued close attention on follow-up  with chest CT in 3 months is recommended to exclude metastatic disease.  7.  Other findings as above.     This report was finalized on 2/2/2021     Final Diagnosis   1. Left Breast, Total Mastectomy (2,122 grams):               A. MULTIFOCAL LOW GRADE DUCTAL CARCINOMA IN SITU (DCIS):                            1. Solid, Cribriform, and Pagetoid type with single cell necrosis and focal calcifications.                            2. Extent of DCIS: 20 mm (5 of 26 blocks involved).                            3. Margins are negative for in situ carcinoma; Closest distance: DCIS is present > 10 mm from                                the posterior margin.               B. Multiple intraductal papillomas, usual ductal hyperplasia, and fibroadenomatoid change.               C. Unremarkable skin and nipple.               D. See Synoptic Report and Comment #1.      2. Right Breast, Modified Radical Mastectomy S/P Neoadjuvant Chemotherapy (2,589 grams):               A. FIBROTIC TUMOR BED WITH NO RESIDUAL INVASIVE DUCTAL CARCINOMA.               B. Background breast parenchyma with multiple intraductal papillomas, fibroadenomatoid change,       usual ductal hyperplasia and pseudoangiomatous stromal hyperplasia (PASH).  C. Scar and fat necrosis, consistent with prior procedure-related changes.  D. Clip and biopsy site changes present within tumor bed.    E. Unremarkable skin and nipple.   F. Nineteen lymph nodes, negative for carcinoma (0/19):               1. Clip and biopsy site changes are present.               2. Treatment effect is present in 4 of 19 lymph nodes.  G. See Comment #2.         FINDINGS:   LUMBAR SPINE:  The BMD measured in the L1-L4 is 1.054 g/cm2 for a  T-score of 0.1 and a Z-score of 2.1     LEFT HIP: The BMD for the femoral neck is 0.476g/cm2 for a T score of   -3.4 and a Z score of -1.7     RIGHT HIP:  The BMD for the femoral neck is 0.657g/cm2 for a T score  of  -1.7 and a Z score of 0.0     IMPRESSION:  Osteoporosis.     This report was finalized on 9/16/2021     Assessment/Plan   1. [id number] right breast cancer ER/CA HER-2 -3+ positive inflammatory breast cancer for neoadjuvant chemotherapy  · Staging work-up negative except for 6 mm lung nodule and axillary and subpectoral adenopathy  · THP followed by AC planned if she tolerates it  · C1D8 Taxol missed due to inclement weather.  · Taxol discontinued after 7 doses due to probable Taxol pneumonitis treated with steroids.    · C1 Adriamycin/Cytoxan given 5/14/2021.  · Adriamycin and Cytoxan stopped after 3 doses due to poor tolerance  · YPT0N0 right breast with multifocal DCIS ER/CA positive in the left breast post bilateral mastectomies and right axillary dissection  · Radiation Arimidex and Perjeta Herceptin to continue for the rest of the year  · Severe diarrhea after resuming Perjeta-Lomotil ordered and C. difficile checked with this is C. difficile negative she will not tolerate Perjeta for the rest of the year and we will stop    2.  Morbid obesity    3.  History of diastolic heart failure  · Echocardiogram with ejection fraction of 64% normal strain  · Cleared by cardio-oncology for chemotherapy  · Echocardiogram in 9/21 stable at 63%    4.  Pulmonary disease?  Etiology on oxygen for 4 years?  Jennifer    5.  Strong family history of breast cancer genetic testing 84 genes negative    6.  Probable benign adrenal adenoma-PET negative    7.  Questionable enlarged lymph nodes left iliac chain and mediastinum likely reactive-PET negative    8.  6 mm right lower lobe nodule below PET resolution pretreatment needs follow-up after THP  · Repeat CT read at [facility] radiologist report stability of nodules and nodes  · Repeat CT at [facility] in 10/21 shows continued improvement    9.  Abnormal uptake short segment esophagus with a history of Schatzki's ring-we will double PPI and watch closely but we we  will proceed with chemotherapy at this point and refer back to GI-doubt she has metastatic disease to this area    10. Anemia with microcytic indices  · Iron studies performed 2/10/2021.  · 2/24/2021: reviewed with the patient that she is iron deficient, with ferritin of 16, iron saturation of 4%.  Patient reports taking ferrous gluconate in the past but this caused GI upset.  Also with her chronic diarrhea I do not think she can absorb it.  We will pursue IV iron with plans to initiate this next week pending insurance approval. In addition hemoglobin down to 8.0 and transfusion pursued.  · 3/3/2021: IV Injectafer initiated x2.   · Hemoglobin down to 9.9 one week out from first AC though overall stable.  Monitor.   · Hemoglobin improved off chemotherapy  · Hemoglobin dropped after resuming Perjeta Herceptin will recheck iron stores    11. Hemorrhoidal pain secondary to diarrhea. Does have occasional bleeding. Topical lidocaine prescribed. Monitor.    12.  History of chronic diarrhea, ?IBS, exacerbated with Perjeta therapy.  · Patient required rifaximin 550 mg to take twice daily x7 days with each Perjeta dose.   · Since completion of Perjeta patient is now actually experiencing constipation (further discussed below).      13.  Taxol-induced pneumonitis.   · Worsening shortness of breath with CT evidence of groundglass infiltrates bilaterally suspicious for pneumonitis  · Patient prescribed prednisone 20 twice daily  · Breathing is overall improved.  Patient is slowly tapering off prednisone.  Today she will begin 5 mg every other day x1 week and then discontinue.      14.  Constipation following initiation of Adriamycin/Cytoxan.  · Patient is just increased fiber in her diet but asking what else she can do.  She prefers a suppository if possible.  We discussed the use of a total suppository but also consider taking senna S1-2 tabs nightly at least the first week after chemotherapy to help avoid this in the  future.    15.  Osteoporosis on DEXA scan in 9/21  Plan  1.  Herceptin alone today and from here on out   2.   Return in 3 weeks/6 weeks for herceptin  3. see me in 9 weeks   4.continue Arimidex   5.  Prolia due again in June   this patient is on drug therapy requiring intensive monitoring for toxicity.     At this point the diarrhea for her is really prohibitive and  C. difficile neg it is haed to explain her diarrhea we will have to stop Perjeta and continue with Herceptin single agent and Arimidex

## 2022-01-06 NOTE — TELEPHONE ENCOUNTER
Caller: LUISA RINCON    Relationship to patient: PATIENT    Best call back number: [phone]    Chief complaint: RESCHEDULE BECAUSE OF WEATHER    Type of visit: PORT FLUSH AND FOLLOW UP    Requested date: TODAY 1/6/22 BUT EARLIER THAN 1:45    Additional notes:PLEASE CALL TO RESCHEDULE

## 2022-01-26 ENCOUNTER — APPOINTMENT (OUTPATIENT)
Dept: ONCOLOGY | Facility: HOSPITAL | Age: 67
End: 2022-01-26

## 2022-01-27 ENCOUNTER — INFUSION (OUTPATIENT)
Dept: ONCOLOGY | Facility: HOSPITAL | Age: 67
End: 2022-01-27

## 2022-01-27 VITALS
RESPIRATION RATE: 16 BRPM | HEART RATE: 76 BPM | TEMPERATURE: 97.7 F | BODY MASS INDEX: 51.82 KG/M2 | DIASTOLIC BLOOD PRESSURE: 74 MMHG | WEIGHT: 293 LBS | SYSTOLIC BLOOD PRESSURE: 147 MMHG | OXYGEN SATURATION: 93 %

## 2022-01-27 DIAGNOSIS — Z79.899 ENCOUNTER FOR EYE EXAM DUE TO HIGH RISK MEDICATION: ICD-10-CM

## 2022-01-27 DIAGNOSIS — C50.611 MALIGNANT NEOPLASM OF AXILLARY TAIL OF RIGHT FEMALE BREAST, UNSPECIFIED ESTROGEN RECEPTOR STATUS: Primary | ICD-10-CM

## 2022-01-27 LAB
ALBUMIN SERPL-MCNC: 3.8 G/DL (ref 3.5–5.2)
ALBUMIN/GLOB SERPL: 1.3 G/DL (ref 1.1–2.4)
ALP SERPL-CCNC: 113 U/L (ref 38–116)
ALT SERPL W P-5'-P-CCNC: 15 U/L (ref 0–33)
ANION GAP SERPL CALCULATED.3IONS-SCNC: 7.7 MMOL/L (ref 5–15)
AST SERPL-CCNC: 15 U/L (ref 0–32)
BASOPHILS # BLD AUTO: 0.03 10*3/MM3 (ref 0–0.2)
BASOPHILS NFR BLD AUTO: 0.4 % (ref 0–1.5)
BILIRUB SERPL-MCNC: 0.4 MG/DL (ref 0.2–1.2)
BUN SERPL-MCNC: 11 MG/DL (ref 6–20)
BUN/CREAT SERPL: 19.6 (ref 7.3–30)
CALCIUM SPEC-SCNC: 8.3 MG/DL (ref 8.5–10.2)
CHLORIDE SERPL-SCNC: 103 MMOL/L (ref 98–107)
CO2 SERPL-SCNC: 27.3 MMOL/L (ref 22–29)
CREAT SERPL-MCNC: 0.56 MG/DL (ref 0.6–1.1)
DEPRECATED RDW RBC AUTO: 66.2 FL (ref 37–54)
EOSINOPHIL # BLD AUTO: 0.08 10*3/MM3 (ref 0–0.4)
EOSINOPHIL NFR BLD AUTO: 1.1 % (ref 0.3–6.2)
ERYTHROCYTE [DISTWIDTH] IN BLOOD BY AUTOMATED COUNT: 19.4 % (ref 12.3–15.4)
GFR SERPL CREATININE-BSD FRML MDRD: 108 ML/MIN/1.73
GLOBULIN UR ELPH-MCNC: 2.9 GM/DL (ref 1.8–3.5)
GLUCOSE SERPL-MCNC: 160 MG/DL (ref 74–124)
HCT VFR BLD AUTO: 44.5 % (ref 34–46.6)
HGB BLD-MCNC: 13.1 G/DL (ref 12–15.9)
IMM GRANULOCYTES # BLD AUTO: 0.02 10*3/MM3 (ref 0–0.05)
IMM GRANULOCYTES NFR BLD AUTO: 0.3 % (ref 0–0.5)
LYMPHOCYTES # BLD AUTO: 0.54 10*3/MM3 (ref 0.7–3.1)
LYMPHOCYTES NFR BLD AUTO: 7.1 % (ref 19.6–45.3)
MCH RBC QN AUTO: 27.2 PG (ref 26.6–33)
MCHC RBC AUTO-ENTMCNC: 29.4 G/DL (ref 31.5–35.7)
MCV RBC AUTO: 92.3 FL (ref 79–97)
MONOCYTES # BLD AUTO: 0.45 10*3/MM3 (ref 0.1–0.9)
MONOCYTES NFR BLD AUTO: 5.9 % (ref 5–12)
NEUTROPHILS NFR BLD AUTO: 6.49 10*3/MM3 (ref 1.7–7)
NEUTROPHILS NFR BLD AUTO: 85.2 % (ref 42.7–76)
NRBC BLD AUTO-RTO: 0 /100 WBC (ref 0–0.2)
PLATELET # BLD AUTO: 243 10*3/MM3 (ref 140–450)
PMV BLD AUTO: 9.2 FL (ref 6–12)
POTASSIUM SERPL-SCNC: 4.5 MMOL/L (ref 3.5–4.7)
PROT SERPL-MCNC: 6.7 G/DL (ref 6.3–8)
RBC # BLD AUTO: 4.82 10*6/MM3 (ref 3.77–5.28)
SODIUM SERPL-SCNC: 138 MMOL/L (ref 134–145)
WBC NRBC COR # BLD: 7.61 10*3/MM3 (ref 3.4–10.8)

## 2022-01-27 PROCEDURE — 85025 COMPLETE CBC W/AUTO DIFF WBC: CPT

## 2022-01-27 PROCEDURE — 80053 COMPREHEN METABOLIC PANEL: CPT

## 2022-01-27 PROCEDURE — 96413 CHEMO IV INFUSION 1 HR: CPT

## 2022-01-27 PROCEDURE — 25010000002 HEPARIN LOCK FLUSH PER 10 UNITS: Performed by: INTERNAL MEDICINE

## 2022-01-27 PROCEDURE — 25010000002 TRASTUZUMAB PER 10 MG: Performed by: NURSE PRACTITIONER

## 2022-01-27 RX ORDER — HEPARIN SODIUM (PORCINE) LOCK FLUSH IV SOLN 100 UNIT/ML 100 UNIT/ML
500 SOLUTION INTRAVENOUS AS NEEDED
Status: CANCELLED | OUTPATIENT
Start: 2022-01-27

## 2022-01-27 RX ORDER — SODIUM CHLORIDE 0.9 % (FLUSH) 0.9 %
10 SYRINGE (ML) INJECTION AS NEEDED
Status: DISCONTINUED | OUTPATIENT
Start: 2022-01-27 | End: 2022-01-27 | Stop reason: HOSPADM

## 2022-01-27 RX ORDER — SODIUM CHLORIDE 9 MG/ML
250 INJECTION, SOLUTION INTRAVENOUS ONCE
Status: COMPLETED | OUTPATIENT
Start: 2022-01-27 | End: 2022-01-27

## 2022-01-27 RX ORDER — HEPARIN SODIUM (PORCINE) LOCK FLUSH IV SOLN 100 UNIT/ML 100 UNIT/ML
500 SOLUTION INTRAVENOUS AS NEEDED
Status: DISCONTINUED | OUTPATIENT
Start: 2022-01-27 | End: 2022-01-27 | Stop reason: HOSPADM

## 2022-01-27 RX ORDER — SODIUM CHLORIDE 0.9 % (FLUSH) 0.9 %
10 SYRINGE (ML) INJECTION AS NEEDED
Status: CANCELLED | OUTPATIENT
Start: 2022-01-27

## 2022-01-27 RX ADMIN — Medication 10 ML: at 16:33

## 2022-01-27 RX ADMIN — Medication 500 UNITS: at 16:33

## 2022-01-27 RX ADMIN — SODIUM CHLORIDE 250 ML: 9 INJECTION, SOLUTION INTRAVENOUS at 14:57

## 2022-01-27 RX ADMIN — TRASTUZUMAB 900 MG: 150 INJECTION, POWDER, LYOPHILIZED, FOR SOLUTION INTRAVENOUS at 16:00

## 2022-02-11 ENCOUNTER — TELEPHONE (OUTPATIENT)
Dept: ONCOLOGY | Facility: CLINIC | Age: 67
End: 2022-02-11

## 2022-02-11 NOTE — TELEPHONE ENCOUNTER
Caller: Aida Conner    Relationship to patient: Self    Best call back number: [phone]    Chief complaint: PATIENT SCHED 3/9/22 FOR INFUSION. PATIENT STATES THIS WOULD BE ONE DAY SHORT OF 3 WEEKS REGARDING TREATEMNT. IS THIS OK OR DOES THIS NEED TO BE RESCHED TO 3/10/22    Type of visit: INFUSION

## 2022-02-17 ENCOUNTER — INFUSION (OUTPATIENT)
Dept: ONCOLOGY | Facility: HOSPITAL | Age: 67
End: 2022-02-17

## 2022-02-17 ENCOUNTER — OFFICE VISIT (OUTPATIENT)
Dept: ONCOLOGY | Facility: CLINIC | Age: 67
End: 2022-02-17

## 2022-02-17 VITALS
WEIGHT: 293 LBS | BODY MASS INDEX: 48.82 KG/M2 | HEIGHT: 65 IN | DIASTOLIC BLOOD PRESSURE: 95 MMHG | TEMPERATURE: 96.8 F | RESPIRATION RATE: 20 BRPM | SYSTOLIC BLOOD PRESSURE: 158 MMHG | OXYGEN SATURATION: 96 % | HEART RATE: 73 BPM

## 2022-02-17 DIAGNOSIS — C50.111 MALIGNANT NEOPLASM OF CENTRAL PORTION OF RIGHT BREAST IN FEMALE, ESTROGEN RECEPTOR POSITIVE: ICD-10-CM

## 2022-02-17 DIAGNOSIS — Z17.0 MALIGNANT NEOPLASM OF CENTRAL PORTION OF RIGHT BREAST IN FEMALE, ESTROGEN RECEPTOR POSITIVE: ICD-10-CM

## 2022-02-17 DIAGNOSIS — C50.611 MALIGNANT NEOPLASM OF AXILLARY TAIL OF RIGHT FEMALE BREAST, UNSPECIFIED ESTROGEN RECEPTOR STATUS: Primary | ICD-10-CM

## 2022-02-17 DIAGNOSIS — T45.4X5A ADVERSE EFFECT OF IRON, INITIAL ENCOUNTER: ICD-10-CM

## 2022-02-17 DIAGNOSIS — Z17.0 MALIGNANT NEOPLASM OF AXILLARY TAIL OF RIGHT BREAST IN FEMALE, ESTROGEN RECEPTOR POSITIVE: ICD-10-CM

## 2022-02-17 DIAGNOSIS — C50.611 MALIGNANT NEOPLASM OF AXILLARY TAIL OF RIGHT BREAST IN FEMALE, ESTROGEN RECEPTOR POSITIVE: ICD-10-CM

## 2022-02-17 DIAGNOSIS — C50.911 INFLAMMATORY BREAST CANCER, RIGHT: Primary | ICD-10-CM

## 2022-02-17 LAB
ALBUMIN SERPL-MCNC: 3.8 G/DL (ref 3.5–5.2)
ALBUMIN/GLOB SERPL: 1.3 G/DL (ref 1.1–2.4)
ALP SERPL-CCNC: 106 U/L (ref 38–116)
ALT SERPL W P-5'-P-CCNC: 15 U/L (ref 0–33)
ANION GAP SERPL CALCULATED.3IONS-SCNC: 6.9 MMOL/L (ref 5–15)
AST SERPL-CCNC: 14 U/L (ref 0–32)
BASOPHILS # BLD AUTO: 0.02 10*3/MM3 (ref 0–0.2)
BASOPHILS NFR BLD AUTO: 0.2 % (ref 0–1.5)
BILIRUB SERPL-MCNC: 0.4 MG/DL (ref 0.2–1.2)
BUN SERPL-MCNC: 14 MG/DL (ref 6–20)
BUN/CREAT SERPL: 24.1 (ref 7.3–30)
CALCIUM SPEC-SCNC: 8.9 MG/DL (ref 8.5–10.2)
CHLORIDE SERPL-SCNC: 104 MMOL/L (ref 98–107)
CO2 SERPL-SCNC: 32.1 MMOL/L (ref 22–29)
CREAT SERPL-MCNC: 0.58 MG/DL (ref 0.6–1.1)
DEPRECATED RDW RBC AUTO: 61.7 FL (ref 37–54)
EOSINOPHIL # BLD AUTO: 0.15 10*3/MM3 (ref 0–0.4)
EOSINOPHIL NFR BLD AUTO: 1.9 % (ref 0.3–6.2)
ERYTHROCYTE [DISTWIDTH] IN BLOOD BY AUTOMATED COUNT: 17.4 % (ref 12.3–15.4)
GFR SERPL CREATININE-BSD FRML MDRD: 103 ML/MIN/1.73
GLOBULIN UR ELPH-MCNC: 2.9 GM/DL (ref 1.8–3.5)
GLUCOSE SERPL-MCNC: 112 MG/DL (ref 74–124)
HCT VFR BLD AUTO: 42.6 % (ref 34–46.6)
HGB BLD-MCNC: 12.5 G/DL (ref 12–15.9)
IMM GRANULOCYTES # BLD AUTO: 0.02 10*3/MM3 (ref 0–0.05)
IMM GRANULOCYTES NFR BLD AUTO: 0.2 % (ref 0–0.5)
LYMPHOCYTES # BLD AUTO: 0.58 10*3/MM3 (ref 0.7–3.1)
LYMPHOCYTES NFR BLD AUTO: 7.2 % (ref 19.6–45.3)
MCH RBC QN AUTO: 28 PG (ref 26.6–33)
MCHC RBC AUTO-ENTMCNC: 29.3 G/DL (ref 31.5–35.7)
MCV RBC AUTO: 95.3 FL (ref 79–97)
MONOCYTES # BLD AUTO: 0.63 10*3/MM3 (ref 0.1–0.9)
MONOCYTES NFR BLD AUTO: 7.9 % (ref 5–12)
NEUTROPHILS NFR BLD AUTO: 6.62 10*3/MM3 (ref 1.7–7)
NEUTROPHILS NFR BLD AUTO: 82.6 % (ref 42.7–76)
NRBC BLD AUTO-RTO: 0 /100 WBC (ref 0–0.2)
PLATELET # BLD AUTO: 231 10*3/MM3 (ref 140–450)
PMV BLD AUTO: 9.5 FL (ref 6–12)
POTASSIUM SERPL-SCNC: 4.4 MMOL/L (ref 3.5–4.7)
PROT SERPL-MCNC: 6.7 G/DL (ref 6.3–8)
RBC # BLD AUTO: 4.47 10*6/MM3 (ref 3.77–5.28)
SODIUM SERPL-SCNC: 143 MMOL/L (ref 134–145)
WBC NRBC COR # BLD: 8.02 10*3/MM3 (ref 3.4–10.8)

## 2022-02-17 PROCEDURE — 25010000002 TRASTUZUMAB PER 10 MG: Performed by: INTERNAL MEDICINE

## 2022-02-17 PROCEDURE — 99214 OFFICE O/P EST MOD 30 MIN: CPT | Performed by: INTERNAL MEDICINE

## 2022-02-17 PROCEDURE — 80053 COMPREHEN METABOLIC PANEL: CPT

## 2022-02-17 PROCEDURE — 85025 COMPLETE CBC W/AUTO DIFF WBC: CPT

## 2022-02-17 PROCEDURE — 96413 CHEMO IV INFUSION 1 HR: CPT

## 2022-02-17 RX ORDER — SODIUM CHLORIDE 9 MG/ML
250 INJECTION, SOLUTION INTRAVENOUS ONCE
Status: COMPLETED | OUTPATIENT
Start: 2022-02-17 | End: 2022-02-17

## 2022-02-17 RX ORDER — SODIUM CHLORIDE 0.9 % (FLUSH) 0.9 %
10 SYRINGE (ML) INJECTION AS NEEDED
Status: CANCELLED | OUTPATIENT
Start: 2022-02-17

## 2022-02-17 RX ORDER — HEPARIN SODIUM (PORCINE) LOCK FLUSH IV SOLN 100 UNIT/ML 100 UNIT/ML
500 SOLUTION INTRAVENOUS AS NEEDED
Status: CANCELLED | OUTPATIENT
Start: 2022-02-17

## 2022-02-17 RX ORDER — SODIUM CHLORIDE 9 MG/ML
250 INJECTION, SOLUTION INTRAVENOUS ONCE
Status: CANCELLED | OUTPATIENT
Start: 2022-02-17

## 2022-02-17 RX ADMIN — TRASTUZUMAB 900 MG: 150 INJECTION, POWDER, LYOPHILIZED, FOR SOLUTION INTRAVENOUS at 14:28

## 2022-02-17 RX ADMIN — SODIUM CHLORIDE 250 ML: 9 INJECTION, SOLUTION INTRAVENOUS at 14:28

## 2022-02-17 NOTE — PROGRESS NOTES
Subjective     REASON FOR FOLLOW-UP: Right breast inflammatory breast, triple positive                              REQUESTING PHYSICIAN: MD Francisco Esteban MD Bethany Haynes, MD    History of Present Illness patient is a 66 y.o. 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 difficile or other infections.    She is feeling very well overall and tolerating the Arimidex without any problems    Diarrhea has resolved after stopping the Perjeta    She had 1 dose of Prolia and is not due for the next dose until June    continues on oxygen but overall is doing much better than I expected after surgery and chemotherapy    Echocardiogram on 1/5/2022 was stable with an EF of 64% and normal strain and her last dose of Herceptin will be in the end of March to make up for some delays during surgery    Past Medical History:   Diagnosis Date   • Anemia    • Anxiety    • Asthma    • Chronic diastolic (congestive) heart failure (HCC)    • Chronic hypoxemic respiratory failure (HCC)     on continuous O2   • Diarrhea     with chemo   • H/O Intraductal papilloma    • Hemorrhoids    • History of transfusion 1984    AFTER BACK SURGERY no reaction   • Hypertension    • IBS (irritable bowel syndrome)    • Inflammatory breast cancer (HCC)     right   • Kidney stone    • Morbid obesity with BMI of 50.0-59.9, adult (HCC)    • Obesity hypoventilation syndrome (HCC)  2021   • On home oxygen therapy     2.5 AT REST WITH ACTIVITY UP TO 4L   • ANABELLE on CPAP     cpap  & uses O2 with CPAP   • Osteoarthritis    • PONV (postoperative nausea and vomiting)         Past Surgical History:   Procedure Laterality Date   • BREAST EXCISIONAL BIOPSY Bilateral    • COLONOSCOPY     • CYSTOSCOPY BLADDER STONE LITHOTRIPSY     • ENDOSCOPY     • KNEE ARTHROPLASTY Bilateral    • MASTECTOMY Left 8/10/2021    Procedure: LEFT TOTAL MASTECTOMY;  Surgeon: Nicci Banks MD;  Location: [facility] OR;  Service: General;  Laterality: Left;   • MASTECTOMY Right 8/10/2021    Procedure: RIGHT MODIFIED RADICAL MASTECTOMY;  Surgeon: Nicci Banks MD;  Location: [facility] OR;  Service: General;  Laterality: Right;   • SPINE SURGERY     • TOTAL ABDOMINAL HYSTERECTOMY WITH SALPINGO OOPHORECTOMY     • UPPER GASTROINTESTINAL ENDOSCOPY     • VENOUS ACCESS DEVICE (PORT) INSERTION N/A 2021    Procedure: INSERTION VENOUS ACCESS DEVICE;  Surgeon: Nicci Banks MD;  Location: [facility] OR;  Service: General;  Laterality: N/A;      ONC HISTORY  patient is a 65-year-old white female with morbid obesity, history of diastolic congestive heart failure and unknown type of pulmonary disease for which she has been on oxygen for 4 years.    She has been getting routine mammography since 38 years of age because her mother  at 46 of breast cancer and had a biopsy of her left breast in  which was apparently benign and another biopsy in 2018 on her right breast which showed a small focus of atypical ductal hyperplasia and atypical lobular hyperplasia with a residual intraductal papilloma.  At that time she saw medical oncologist who recommended genetic testing and prevention but the insurance would not cover the genetic testing and the medical oncologist thought tamoxifen was too risky for her because of her comorbidities and no treatment was given.  More recently she noticed redness of  the right breast in October and showed it to her family doctor who gave her course of Keflex when it did not improve and mammogram was ordered and this was benign  When the redness persisted she saw her gynecologist with concern for inflammatory breast cancer and referred her to Dr. Banks after repeat imaging and [facility]  Preliminary report shows micropapillary invasive mammary carcinoma intermediate grade measuring 13 mm right axillary node was also involved with metastatic cancer ER/HI and HER-2 are pending  Patient saw Dr. Banks who sent her for skin biopsies and she had 3 separate punch biopsy of the skin that showed Perivascular and perifollicular inflammation with no obvious malignancy at 3:00 12:00 and 9:00  In addition staging work-up with CAT scans and bone scan were ordered.  CAT scan of the chest showed a borderline mediastinal  Infracarinal node measuring 15 mm in length mild cardiomegaly and heavy coronary artery calcifications  CT of the abdomen showed a 2.5 x 2.2 cm right adrenal mass which the patient tells me she has had in the past and is most likely benign but also a 2.7 cm left external iliac node which is indeterminate.  Bone scan was negative    Echocardiogram is scheduled for later next week and genetic testing with the [employer] stat panel is pending    Patient is  3 para 3 menarche was at age 9 menopause at 49 when she had a hysterectomy and oophorectomy  First childbirth was at age 20 she breast-fed her second and third children and took no hormone replacement after menopause    Family history is positive for mother dying of breast cancer at age 46 she is a maternal aunt with breast cancer in her 70s a paternal aunt with breast cancer in her 70s paternal grandmother with colon cancer.  Her father had Hodgkin's disease and non-Hodgkin's lymphoma but  of small cell lung cancer at 67      She has not had a  heart attack stroke or blood clot    Plan to do echocardiogram soon as possible to ascertain tolerability of cardiotoxic chemotherapy which would be typically indicated with an inflammatory breast cancer    Also plan PET scan to follow-up on atypical lymph nodes and confirm benign etiology of the adrenal lesion    She will have port placement and chemo education pt is  ER/AZ and HER-2 +    I explained to Aida that the goal of treatment would be curative but she has a lot of comorbidities which may limit our ability to give the most effective chemotherapy in the setting  I told her radiation would be involved and possibly hormonal therapy for 10 years as she has hormone positivity and HER-2 directed therapy    She expressed some concerns about driving back and forth from [place] but felt that once a week was doable    We will see her back in 2 weeks to start treatment with a port placement planned early next week      Patient did have a mild reaction to Taxol dose #1 with some increased shortness of breath and flushing.  This was responsive to 100 mg of Solu-Cortef.  She states this gave her a headache and made her quite talkative but otherwise she was thankfully able to complete Taxol infusion without further incident.    Patient reports issues with chronic diarrhea over the last few years and did note a little bit increase in stooling following THP though nothing overly significant in her mind.  She did finally begin taking Imodium just a few days ago and has had no further stools.  She does note significant trouble with hemorrhoids in relation to the diarrhea   Required blood transfusion due to significant hemorrhoidal bleeding plus iron deficiency.  Injectafer planned    Due to inclement weather cycle 1 day 8 therapy was missed.  She did get day 15 therapy with fairly good tolerance except for some diarrhea.    She was found to be iron deficient despite trying oral iron.  We therefore elected to proceed  with IV Injectafer but she remains mildly anemic with a hemoglobin of 9.3    7/21    She has increased her Lasix and her weight is down 7 pounds but overall she is significantly weaker since starting treatment and I think it is in her best interest to discontinue treatment and proceed with surgery and will use Herceptin in the interim till surgery scheduled  She has had a good response in the breast and I do not think the last dose of Adriamycin is going to be crucial and we run the risk of making her so weak that she cannot go for surgery    9/21  Patient had worsening shortness of breath requiring continuous oxygen.  CT of the chest performed to rule out pneumonitis and she was started on steroids.  CT showed bilateral groundglass infiltrates presumed to be related to Taxol pneumonitis and therefore Taxol discontinued.  Patient completed the fourth cycle of Perjeta/Herceptin alone.  Diarrhea was an issue but not as bad without the Taxol.  She is weaned off her prednisone    Patient proceeded with cycle 1 Adriamycin/Cytoxan on 5/14/2021 and is seen back today for cycle #3 and we are doing it at 3-week intervals because of her frailty and after 3 cycles we stopped because of tolerance issues    She went for surgery her bilateral mastectomies and interestingly she had a complete pathological CR on the right breast and had evidence of DCIS in the left breast [id number]    10/21  We will start anastrozole because she is not a good candidate for tamoxifen and her bone density showed normal bone density in the spine but osteoporosis in the left femoral neck and we will start Prolia in 6 weeks.The side effects and toxicities of the Aromatase inhibitors was discussed with the patient including, hot flashes, mood swings and hair thinning.Significant arthralgias and worsening bone density were also discussed. Baseline bone density evaluation was ordered.        Current Outpatient Medications on File Prior to Visit   Medication  Sig Dispense Refill   • albuterol sulfate  (90 Base) MCG/ACT inhaler Inhale 2 puffs Every 4 (Four) Hours As Needed.     • anastrozole (ARIMIDEX) 1 MG tablet Take 1 mg by mouth Daily.     • carvedilol (COREG) 25 MG tablet Take 0.5 tablets by mouth 2 (Two) Times a Day With Meals. 30 tablet 0   • Cholecalciferol (Vitamin D) 50 MCG (2000 UT) capsule Take 2,000 Units by mouth Daily.     • dicyclomine (BENTYL) 20 MG tablet Take 1 tablet by mouth Every 6 (Six) Hours. 60 tablet 2   • diphenhydrAMINE (BENADRYL) 25 mg capsule Take 25 mg by mouth At Night As Needed for Itching, Allergies or Sleep.     • diphenoxylate-atropine (LOMOTIL) 2.5-0.025 MG per tablet Take 1 tablet by mouth 4 (Four) Times a Day As Needed for Diarrhea. 120 tablet 0   • FLUoxetine (PROzac) 40 MG capsule TAKE 1 CAPSULE BY MOUTH EVERY DAY 30 capsule 0   • furosemide (LASIX) 40 MG tablet TAKE ONE-HALF TABLET BY MOUTH EVERY DAY 45 tablet 1   • Lidocaine Viscous HCl (XYLOCAINE) 2 % solution Apply 5 mL topically to the appropriate area as directed As Needed for Mild Pain  (mix with Silvadene). 100 mL 1   • Lidocaine, Anorectal, (LMX 5) 5 % cream cream Apply  topically to the appropriate area as directed 3 (Three) Times a Day As Needed (hemmorrhoid pain). 45 g 2   • lisinopril (PRINIVIL,ZESTRIL) 5 MG tablet TAKE 1 TABLET BY MOUTH EVERY DAY 30 tablet 6   • meloxicam (MOBIC) 15 MG tablet take 1/2 tablet BY MOUTH EVERY DAY 45 tablet 1   • miconazole (Lotrimin AF) 2 % powder Apply  topically to the appropriate area as directed 2 (two) times a day. 85 g 1   • mometasone-formoterol (DULERA 100) 100-5 MCG/ACT inhaler Inhale 2 Puffs/kg Every Night.     • montelukast (SINGULAIR) 10 MG tablet Take 10 mg by mouth Every Night.     • multivitamin (THERAGRAN) tablet tablet Take 1 tablet by mouth Daily.     • O2 (OXYGEN) Inhale 3 L/min Continuous.     • omeprazole (priLOSEC) 20 MG capsule TAKE 1 CAPSULE BY MOUTH EVERY DAY 30 capsule 3   • ondansetron (ZOFRAN) 8 MG  tablet Take 1 tablet by mouth 3 (Three) Times a Day As Needed for Nausea or Vomiting. 30 tablet 5   • Potassium 99 MG tablet Take 1 tablet by mouth Every Night.     • pravastatin (PRAVACHOL) 40 MG tablet Take 40 mg by mouth Every Night.     • psyllium (METAMUCIL) 0.52 g capsule Metamucil 0.52 gram oral capsule take 1 capsule by oral route 5Xday   Active     • riFAXIMin (Xifaxan) 550 MG tablet Take 1 tablet by mouth Daily. 28 tablet 2   • silver sulfadiazine (SILVADENE, SSD) 1 % cream Apply 1 application topically to the appropriate area as directed 2 (Two) Times a Day. 50 g 1   • spironolactone (ALDACTONE) 25 MG tablet Take 25 mg by mouth Daily.     • triamcinolone (KENALOG) 0.1 % ointment APPLY TO LOWER LEGS TWICE DAILY AS NEEDED FOR ITCHING     • gabapentin (NEURONTIN) 100 MG capsule Take 1 capsule by mouth 3 (Three) Times a Day for 30 days. 90 capsule 1     Current Facility-Administered Medications on File Prior to Visit   Medication Dose Route Frequency Provider Last Rate Last Admin   • [COMPLETED] sodium chloride 0.9 % infusion 250 mL  250 mL Intravenous Once Jaime Azul MD   Stopped at 02/17/22 1503   • [COMPLETED] Trastuzumab (HERCEPTIN) 900 mg in sodium chloride 0.9 % 250 mL chemo IVPB  900 mg Intravenous Once Jaime Azul MD   Stopped at 02/17/22 1503        ALLERGIES:    Allergies   Allergen Reactions   • Amlodipine Swelling     LEGS    • Hydrochlorothiazide Unknown - Low Severity     Neuro issues   • Morphine Nausea And Vomiting     hallucinations   • Adhesive Tape Rash        Social History     Socioeconomic History   • Marital status:    • Number of children: 3   Tobacco Use   • Smoking status: Never Smoker   • Smokeless tobacco: Never Used   Vaping Use   • Vaping Use: Never used   Substance and Sexual Activity   • Alcohol use: Never   • Drug use: Never   • Sexual activity: Defer        Family History   Problem Relation Age of Onset   • Breast cancer Mother 45   • Colon cancer  "Paternal Grandmother    • Breast cancer Maternal Aunt 70   • Breast cancer Paternal Aunt 70   • Lung cancer Father    • Hodgkin's lymphoma Father    • Skin cancer Father         squamous cell   • Ovarian cancer Neg Hx    • Uterine cancer Neg Hx    • Deep vein thrombosis Neg Hx    • Pulmonary embolism Neg Hx    • Malig Hyperthermia Neg Hx         Review of Systems   Constitutional: Positive for fatigue (Better). Negative for appetite change, chills, diaphoresis, fever and unexpected weight change.   HENT: Negative for hearing loss, sore throat, trouble swallowing and voice change.    Respiratory: Negative for cough, chest tightness, shortness of breath (Improved ) and wheezing.    Cardiovascular: Negative for chest pain and palpitations.   Gastrointestinal: Negative for abdominal distention, abdominal pain, constipation, diarrhea (Severe after Perjeta stopped yesterday), nausea, rectal pain (hemmorrhoids -improved) and vomiting.   Genitourinary: Negative for dysuria, frequency, hematuria and urgency.   Musculoskeletal: Negative for back pain ( better), joint swelling and neck stiffness.        No muscle weakness.   Skin: Negative for rash and wound.   Neurological: Negative for seizures, syncope, speech difficulty, weakness, numbness and headaches.   Hematological: Negative for adenopathy. Does not bruise/bleed easily.   Psychiatric/Behavioral: Negative.  Negative for behavioral problems, confusion and suicidal ideas.   All other systems reviewed and are negative.       Objective     Vitals:    02/17/22 1250   BP: 158/95   Pulse: 73   Resp: 20   Temp: 96.8 °F (36 °C)   TempSrc: Temporal   SpO2: 96%   Weight: (!) 145 kg (318 lb 9.6 oz)   Height: 165.1 cm (65\")   PainSc: 0-No pain     Current Status 2/17/2022   ECOG score 1       Physical Exam    CONSTITUTIONAL:  Vital signs reviewed.  No distress, looks comfortable. Morbidly obese  EYES:  Conjunctiva and lids unremarkable.  PERRLA  EARS,NOSE,MOUTH,THROAT:  Ears and " nose appear unremarkable.  Lips, teeth, gums appear unremarkable.  RESPIRATORY:  Normal respiratory effort.  Lungs clear to auscultation bilaterally.  No axillary adenopathy  BREASTS: Bilateral mastectomies with no reconstruction-  CARDIOVASCULAR:  Normal S1, S2.  No murmurs rubs or gallops.  1+ brawny lower extremity edema.  GASTROINTESTINAL: Abdomen appears unremarkable.  Nontender.  No hepatomegaly.  No splenomegaly.  LYMPHATIC:  No cervical, supraclavicular, axillary lymphadenopathy.  SKIN:  Warm.  Inclusion cyst left axilla-  PSYCHIATRIC:  Normal judgment and insight.  Normal mood and affect.       I have reexamined the patient and the results are consistent with the previously documented exam. Jaime Azul MD           RECENT LABS:  Results from last 7 days   Lab Units 02/17/22  1237   WBC 10*3/mm3 8.02   NEUTROS ABS 10*3/mm3 6.62   HEMOGLOBIN g/dL 12.5   HEMATOCRIT % 42.6   PLATELETS 10*3/mm3 231     Results from last 7 days   Lab Units 02/17/22  1237   SODIUM mmol/L 143   POTASSIUM mmol/L 4.4   CHLORIDE mmol/L 104   CO2 mmol/L 32.1*   BUN mg/dL 14   CREATININE mg/dL 0.58*   CALCIUM mg/dL 8.9   ALBUMIN g/dL 3.80   BILIRUBIN mg/dL 0.4   ALK PHOS U/L 106   ALT (SGPT) U/L 15   AST (SGOT) U/L 14   GLUCOSE mg/dL 112             PET  IMPRESSION:  1.  Moderate to intensely FDG avid asymmetric soft tissue and skin  thickening involving the right breast likely representing patient's  known malignancy.  2.  Intensely FDG avid right axillary and subpectoral adenopathy likely  represent metastatic disease.  3.  Constellation of findings within the right adrenal gland are favored  to represent a lipid rich adenoma. Continued attention on follow-up is  recommended to ensure stability.  4.  While there are no findings of definite FDG avid osseous metastasis,  given the heterogenous FDG uptake throughout the axial and appendicular  skeleton due to the above stated limitations, subtle underlying osseous  metastasis  would remain occult. Therefore, continued close attention on  follow-up is recommended to exclude this possibility.  5.  Short segment of moderate to intense FDG uptake within the distal  esophagus and GE junction suggestive of esophagitis. In the appropriate  clinical context correlation with patient history is recommended with  follow-up endoscopy if clinically indicated.  6.  Sub-6 mm pulmonary nodule within the right lower lobe is below PET  resolution and indeterminate. Continued close attention on follow-up  with chest CT in 3 months is recommended to exclude metastatic disease.  7.  Other findings as above.     This report was finalized on 2/2/2021     Final Diagnosis   1. Left Breast, Total Mastectomy (2,122 grams):               A. MULTIFOCAL LOW GRADE DUCTAL CARCINOMA IN SITU (DCIS):                            1. Solid, Cribriform, and Pagetoid type with single cell necrosis and focal calcifications.                            2. Extent of DCIS: 20 mm (5 of 26 blocks involved).                            3. Margins are negative for in situ carcinoma; Closest distance: DCIS is present > 10 mm from                                the posterior margin.               B. Multiple intraductal papillomas, usual ductal hyperplasia, and fibroadenomatoid change.               C. Unremarkable skin and nipple.               D. See Synoptic Report and Comment #1.      2. Right Breast, Modified Radical Mastectomy S/P Neoadjuvant Chemotherapy (2,589 grams):               A. FIBROTIC TUMOR BED WITH NO RESIDUAL INVASIVE DUCTAL CARCINOMA.               B. Background breast parenchyma with multiple intraductal papillomas, fibroadenomatoid change,       usual ductal hyperplasia and pseudoangiomatous stromal hyperplasia (PASH).  C. Scar and fat necrosis, consistent with prior procedure-related changes.  D. Clip and biopsy site changes present within tumor bed.    E. Unremarkable skin and nipple.   F. Nineteen lymph nodes,  negative for carcinoma (0/19):               1. Clip and biopsy site changes are present.               2. Treatment effect is present in 4 of 19 lymph nodes.  G. See Comment #2.         FINDINGS:   LUMBAR SPINE:  The BMD measured in the L1-L4 is 1.054 g/cm2 for a  T-score of 0.1 and a Z-score of 2.1     LEFT HIP: The BMD for the femoral neck is 0.476g/cm2 for a T score of   -3.4 and a Z score of -1.7     RIGHT HIP:  The BMD for the femoral neck is 0.657g/cm2 for a T score of  -1.7 and a Z score of 0.0     IMPRESSION:  Osteoporosis.     This report was finalized on 9/16/2021     Assessment/Plan   1. [id number] right breast cancer ER/WV HER-2 -3+ positive inflammatory breast cancer for neoadjuvant chemotherapy  · Staging work-up negative except for 6 mm lung nodule and axillary and subpectoral adenopathy  · THP followed by AC planned if she tolerates it  · C1D8 Taxol missed due to inclement weather.  · Taxol discontinued after 7 doses due to probable Taxol pneumonitis treated with steroids.    · C1 Adriamycin/Cytoxan given 5/14/2021.  · Adriamycin and Cytoxan stopped after 3 doses due to poor tolerance  · YPT0N0 right breast with multifocal DCIS ER/WV positive in the left breast post bilateral mastectomies and right axillary dissection  · Radiation Arimidex and Perjeta Herceptin to continue for the rest of the year  · Severe diarrhea after resuming Perjeta-Lomotil ordered and C. difficile checked with this is C. difficile negative she will not tolerate Perjeta for the rest of the year and we will stop  · Doing well on single agent Herceptin and Arimidex in 2/22    2.  Morbid obesity    3.  History of diastolic heart failure  · Echocardiogram with ejection fraction of 64% normal strain  · Cleared by cardio-oncology for chemotherapy  · Echocardiogram in 9/21 stable at 63%    4.  Pulmonary disease?  Etiology on oxygen for 4 years?  Jennifer    5.  Strong family history of breast cancer genetic testing 84 genes  negative    6.  Probable benign adrenal adenoma-PET negative    7.  Questionable enlarged lymph nodes left iliac chain and mediastinum likely reactive-PET negative    8.  6 mm right lower lobe nodule below PET resolution pretreatment needs follow-up after THP  · Repeat CT read at [facility] radiologist report stability of nodules and nodes  · Repeat CT at [facility] in 10/21 shows continued improvement    9.  Abnormal uptake short segment esophagus with a history of Schatzki's ring-we will double PPI and watch closely but we we will proceed with chemotherapy at this point and refer back to GI-doubt she has metastatic disease to this area    10. Anemia with microcytic indices  · Iron studies performed 2/10/2021.  · 2/24/2021: reviewed with the patient that she is iron deficient, with ferritin of 16, iron saturation of 4%.  Patient reports taking ferrous gluconate in the past but this caused GI upset.  Also with her chronic diarrhea I do not think she can absorb it.  We will pursue IV iron with plans to initiate this next week pending insurance approval. In addition hemoglobin down to 8.0 and transfusion pursued.  · 3/3/2021: IV Injectafer initiated x2.   · Hemoglobin down to 9.9 one week out from first AC though overall stable.  Monitor.   · Hemoglobin improved off chemotherapy  · Hemoglobin dropped after resuming Perjeta Herceptin will recheck iron stores    11. Hemorrhoidal pain secondary to diarrhea. Does have occasional bleeding. Topical lidocaine prescribed. Monitor.    12.  History of chronic diarrhea, ?IBS, exacerbated with Perjeta therapy.  · Patient required rifaximin 550 mg to take twice daily x7 days with each Perjeta dose.   · Since completion of Perjeta patient is now actually experiencing constipation (further discussed below).      13.  Taxol-induced pneumonitis.   · Worsening shortness of breath with CT evidence of groundglass infiltrates bilaterally suspicious for pneumonitis  · Patient  prescribed prednisone 20 twice daily  · Breathing is overall improved.  Patient is slowly tapering off prednisone.  Today she will begin 5 mg every other day x1 week and then discontinue.      14.  Constipation following initiation of Adriamycin/Cytoxan.  · Patient is just increased fiber in her diet but asking what else she can do.  She prefers a suppository if possible.  We discussed the use of a total suppository but also consider taking senna S1-2 tabs nightly at least the first week after chemotherapy to help avoid this in the future.    15.  Osteoporosis on DEXA scan in 9/21-Prolia initiated in 12/21    Plan  1.  Herceptin alone today  2.   Return in 3 weeks/6 weeks for herceptin  3. see me in 12 weeks for her next dose of Prolia  4.continue Arimidex   5. Report removal with Dr. Banks   this patient is on drug therapy requiring intensive monitoring for toxicity.

## 2022-02-28 ENCOUNTER — PATIENT MESSAGE (OUTPATIENT)
Dept: CARDIOLOGY | Facility: CLINIC | Age: 67
End: 2022-02-28

## 2022-03-01 ENCOUNTER — TELEPHONE (OUTPATIENT)
Dept: SURGERY | Facility: CLINIC | Age: 67
End: 2022-03-01

## 2022-03-01 NOTE — TELEPHONE ENCOUNTER
Due to scheduling conflict, appointment with Dr. Banks on 3/2/2022 has been moved to 3/23/2022 @ 11:30am.    Called and left message with patient about appointment time change, patient to call back and confirm.

## 2022-03-01 NOTE — TELEPHONE ENCOUNTER
From: Aida Conner  To: Bradley Garza MD  Sent: 2/28/2022 4:20 PM EST  Subject: Appt needs to move to April    I am scheduled for an echo and office visit w/ you on 3/17 I believe, but it has only been 2 months since my last one (1/5 or 6 ) So the next one needs to be April 6 or beyond.    My LAST chemo is scheduled for 3/31/22 , so Dr. Azul said something about leaving it up to you as to whether you want to do another echo/office visit then or not. Between 3/31 and 6/9, I will not be seeing Dr. Azul, and then see her on 6/9 for a 3 mo. follow-up and labs.   NOTE: I DO really like you as a cardiologist, and would like you to continue as MY cardiologist. We had discussed this informally on my last visit. I just need to be sure this is okay with you before I notify Dr. Santana's office in [facility]. I can request all needed files from them.

## 2022-03-01 NOTE — TELEPHONE ENCOUNTER
You are right.  We do not need to do this test until early April so we will reschedule that.  I am happy to see you in my [place] office.  I will have someone from my office call you tomorrow.

## 2022-03-10 ENCOUNTER — INFUSION (OUTPATIENT)
Dept: ONCOLOGY | Facility: HOSPITAL | Age: 67
End: 2022-03-10

## 2022-03-10 ENCOUNTER — OFFICE VISIT (OUTPATIENT)
Dept: ONCOLOGY | Facility: CLINIC | Age: 67
End: 2022-03-10

## 2022-03-10 VITALS
HEART RATE: 86 BPM | WEIGHT: 293 LBS | OXYGEN SATURATION: 94 % | SYSTOLIC BLOOD PRESSURE: 117 MMHG | RESPIRATION RATE: 18 BRPM | BODY MASS INDEX: 53.42 KG/M2 | DIASTOLIC BLOOD PRESSURE: 77 MMHG | TEMPERATURE: 97.5 F

## 2022-03-10 DIAGNOSIS — C50.911 INFLAMMATORY BREAST CANCER, RIGHT: ICD-10-CM

## 2022-03-10 DIAGNOSIS — Z79.899 ENCOUNTER FOR EYE EXAM DUE TO HIGH RISK MEDICATION: ICD-10-CM

## 2022-03-10 DIAGNOSIS — C50.611 MALIGNANT NEOPLASM OF AXILLARY TAIL OF RIGHT FEMALE BREAST, UNSPECIFIED ESTROGEN RECEPTOR STATUS: Primary | ICD-10-CM

## 2022-03-10 DIAGNOSIS — L02.229 BOIL OF TRUNK: ICD-10-CM

## 2022-03-10 DIAGNOSIS — Z79.899 HIGH RISK MEDICATION USE: ICD-10-CM

## 2022-03-10 DIAGNOSIS — T45.4X5A ADVERSE EFFECT OF IRON, INITIAL ENCOUNTER: ICD-10-CM

## 2022-03-10 LAB
ALBUMIN SERPL-MCNC: 3.7 G/DL (ref 3.5–5.2)
ALBUMIN/GLOB SERPL: 1.3 G/DL (ref 1.1–2.4)
ALP SERPL-CCNC: 94 U/L (ref 38–116)
ALT SERPL W P-5'-P-CCNC: 17 U/L (ref 0–33)
ANION GAP SERPL CALCULATED.3IONS-SCNC: 6.2 MMOL/L (ref 5–15)
AST SERPL-CCNC: 14 U/L (ref 0–32)
BASOPHILS # BLD AUTO: 0.02 10*3/MM3 (ref 0–0.2)
BASOPHILS NFR BLD AUTO: 0.2 % (ref 0–1.5)
BILIRUB SERPL-MCNC: 0.5 MG/DL (ref 0.2–1.2)
BUN SERPL-MCNC: 18 MG/DL (ref 6–20)
BUN/CREAT SERPL: 31 (ref 7.3–30)
CALCIUM SPEC-SCNC: 9 MG/DL (ref 8.5–10.2)
CHLORIDE SERPL-SCNC: 101 MMOL/L (ref 98–107)
CO2 SERPL-SCNC: 31.8 MMOL/L (ref 22–29)
CREAT SERPL-MCNC: 0.58 MG/DL (ref 0.6–1.1)
DEPRECATED RDW RBC AUTO: 56 FL (ref 37–54)
EGFRCR SERPLBLD CKD-EPI 2021: 98.7 ML/MIN/1.73
EOSINOPHIL # BLD AUTO: 0.19 10*3/MM3 (ref 0–0.4)
EOSINOPHIL NFR BLD AUTO: 2.3 % (ref 0.3–6.2)
ERYTHROCYTE [DISTWIDTH] IN BLOOD BY AUTOMATED COUNT: 15.9 % (ref 12.3–15.4)
FERRITIN SERPL-MCNC: 267.5 NG/ML (ref 13–150)
GLOBULIN UR ELPH-MCNC: 2.9 GM/DL (ref 1.8–3.5)
GLUCOSE SERPL-MCNC: 101 MG/DL (ref 74–124)
HCT VFR BLD AUTO: 40.7 % (ref 34–46.6)
HGB BLD-MCNC: 12.1 G/DL (ref 12–15.9)
HGB RETIC QN AUTO: 34.4 PG (ref 29.8–36.1)
IMM GRANULOCYTES # BLD AUTO: 0.03 10*3/MM3 (ref 0–0.05)
IMM GRANULOCYTES NFR BLD AUTO: 0.4 % (ref 0–0.5)
IMM RETICS NFR: 9.9 % (ref 3–15.8)
IRON 24H UR-MRATE: 87 MCG/DL (ref 37–145)
IRON SATN MFR SERPL: 26 % (ref 14–48)
LYMPHOCYTES # BLD AUTO: 0.62 10*3/MM3 (ref 0.7–3.1)
LYMPHOCYTES NFR BLD AUTO: 7.5 % (ref 19.6–45.3)
MCH RBC QN AUTO: 28.7 PG (ref 26.6–33)
MCHC RBC AUTO-ENTMCNC: 29.7 G/DL (ref 31.5–35.7)
MCV RBC AUTO: 96.4 FL (ref 79–97)
MONOCYTES # BLD AUTO: 0.6 10*3/MM3 (ref 0.1–0.9)
MONOCYTES NFR BLD AUTO: 7.3 % (ref 5–12)
NEUTROPHILS NFR BLD AUTO: 6.81 10*3/MM3 (ref 1.7–7)
NEUTROPHILS NFR BLD AUTO: 82.3 % (ref 42.7–76)
NRBC BLD AUTO-RTO: 0 /100 WBC (ref 0–0.2)
PLATELET # BLD AUTO: 221 10*3/MM3 (ref 140–450)
PMV BLD AUTO: 9.1 FL (ref 6–12)
POTASSIUM SERPL-SCNC: 4.4 MMOL/L (ref 3.5–4.7)
PROT SERPL-MCNC: 6.6 G/DL (ref 6.3–8)
RBC # BLD AUTO: 4.22 10*6/MM3 (ref 3.77–5.28)
RETICS # AUTO: 0.06 10*6/MM3 (ref 0.02–0.13)
RETICS/RBC NFR AUTO: 1.37 % (ref 0.7–1.9)
SODIUM SERPL-SCNC: 139 MMOL/L (ref 134–145)
TIBC SERPL-MCNC: 336 MCG/DL (ref 249–505)
TRANSFERRIN SERPL-MCNC: 240 MG/DL (ref 200–360)
WBC NRBC COR # BLD: 8.27 10*3/MM3 (ref 3.4–10.8)

## 2022-03-10 PROCEDURE — 85046 RETICYTE/HGB CONCENTRATE: CPT

## 2022-03-10 PROCEDURE — 82728 ASSAY OF FERRITIN: CPT

## 2022-03-10 PROCEDURE — 80053 COMPREHEN METABOLIC PANEL: CPT

## 2022-03-10 PROCEDURE — 84466 ASSAY OF TRANSFERRIN: CPT

## 2022-03-10 PROCEDURE — 25010000002 TRASTUZUMAB PER 10 MG: Performed by: NURSE PRACTITIONER

## 2022-03-10 PROCEDURE — 99214 OFFICE O/P EST MOD 30 MIN: CPT | Performed by: NURSE PRACTITIONER

## 2022-03-10 PROCEDURE — 83540 ASSAY OF IRON: CPT

## 2022-03-10 PROCEDURE — 85025 COMPLETE CBC W/AUTO DIFF WBC: CPT

## 2022-03-10 PROCEDURE — 25010000002 HEPARIN LOCK FLUSH PER 10 UNITS: Performed by: INTERNAL MEDICINE

## 2022-03-10 PROCEDURE — 96413 CHEMO IV INFUSION 1 HR: CPT

## 2022-03-10 RX ORDER — SODIUM CHLORIDE 9 MG/ML
250 INJECTION, SOLUTION INTRAVENOUS ONCE
Status: COMPLETED | OUTPATIENT
Start: 2022-03-10 | End: 2022-03-10

## 2022-03-10 RX ORDER — SODIUM CHLORIDE 0.9 % (FLUSH) 0.9 %
10 SYRINGE (ML) INJECTION AS NEEDED
Status: CANCELLED | OUTPATIENT
Start: 2022-03-10

## 2022-03-10 RX ORDER — HEPARIN SODIUM (PORCINE) LOCK FLUSH IV SOLN 100 UNIT/ML 100 UNIT/ML
500 SOLUTION INTRAVENOUS AS NEEDED
Status: CANCELLED | OUTPATIENT
Start: 2022-03-10

## 2022-03-10 RX ORDER — HEPARIN SODIUM (PORCINE) LOCK FLUSH IV SOLN 100 UNIT/ML 100 UNIT/ML
500 SOLUTION INTRAVENOUS AS NEEDED
Status: DISCONTINUED | OUTPATIENT
Start: 2022-03-10 | End: 2022-03-10 | Stop reason: HOSPADM

## 2022-03-10 RX ADMIN — Medication 500 UNITS: at 13:55

## 2022-03-10 RX ADMIN — TRASTUZUMAB 900 MG: 150 INJECTION, POWDER, LYOPHILIZED, FOR SOLUTION INTRAVENOUS at 13:18

## 2022-03-10 RX ADMIN — SODIUM CHLORIDE 250 ML: 9 INJECTION, SOLUTION INTRAVENOUS at 13:18

## 2022-03-10 NOTE — PROGRESS NOTES
Subjective     REASON FOR FOLLOW-UP: Right breast inflammatory breast, triple positive                              REQUESTING PHYSICIAN: MD Francisco Esteban MD Bethany Haynes, MD    History of Present Illness patient is a 66 y.o. 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 difficile or other infections.    She is feeling very well overall and tolerating the Arimidex without any problems    I was asked to see the patient in the infusion area regarding a boil under her right mastectomy incision.  She reports a few weeks ago she developed a boil which was red and pustular.  She was seen by her primary care provider who lanced this area and placed her on doxycycline x10 days.  She had resolution of the redness.  She never had fevers.  She has a small incision where this was drained and feels there is an area hardened underneath and wished to be evaluated.  She currently denies fevers.  This is not painful.  She reports after aggressively squeezing this several days ago there was a small amount of purulent drainage removed.    Past Medical History:   Diagnosis Date   • Anemia    • Anxiety    • Asthma    • Chronic diastolic (congestive) heart failure (HCC)    • Chronic hypoxemic respiratory failure (HCC)     on continuous O2   • Diarrhea     with chemo   • H/O Intraductal papilloma    • Hemorrhoids    • History of transfusion 1984    AFTER BACK  SURGERY no reaction   • Hypertension    • IBS (irritable bowel syndrome)    • Inflammatory breast cancer (HCC)     right   • Kidney stone    • Morbid obesity with BMI of 50.0-59.9, adult (HCC)    • Obesity hypoventilation syndrome (HCC) 2021   • On home oxygen therapy     2.5 AT REST WITH ACTIVITY UP TO 4L   • ANABELLE on CPAP     cpap  & uses O2 with CPAP   • Osteoarthritis    • PONV (postoperative nausea and vomiting)         Past Surgical History:   Procedure Laterality Date   • BREAST EXCISIONAL BIOPSY Bilateral    • COLONOSCOPY     • CYSTOSCOPY BLADDER STONE LITHOTRIPSY     • ENDOSCOPY     • KNEE ARTHROPLASTY Bilateral 2009   • MASTECTOMY Left 8/10/2021    Procedure: LEFT TOTAL MASTECTOMY;  Surgeon: Nicci Banks MD;  Location: [facility] OR;  Service: General;  Laterality: Left;   • MASTECTOMY Right 8/10/2021    Procedure: RIGHT MODIFIED RADICAL MASTECTOMY;  Surgeon: Nicci Banks MD;  Location: [facility] MAIN OR;  Service: General;  Laterality: Right;   • SPINE SURGERY     • TOTAL ABDOMINAL HYSTERECTOMY WITH SALPINGO OOPHORECTOMY     • UPPER GASTROINTESTINAL ENDOSCOPY     • VENOUS ACCESS DEVICE (PORT) INSERTION N/A 2021    Procedure: INSERTION VENOUS ACCESS DEVICE;  Surgeon: Nicci Banks MD;  Location: [facility] OR;  Service: General;  Laterality: N/A;      ONC HISTORY  patient is a 65-year-old white female with morbid obesity, history of diastolic congestive heart failure and unknown type of pulmonary disease for which she has been on oxygen for 4 years.    She has been getting routine mammography since 38 years of age because her mother  at 46 of breast cancer and had a biopsy of her left breast in  which was apparently benign and another biopsy in  on her right breast which showed a small focus of atypical ductal hyperplasia and atypical lobular hyperplasia with a residual intraductal papilloma.  At that time she saw medical oncologist who recommended genetic  testing and prevention but the insurance would not cover the genetic testing and the medical oncologist thought tamoxifen was too risky for her because of her comorbidities and no treatment was given.  More recently she noticed redness of the right breast in October and showed it to her family doctor who gave her course of Keflex when it did not improve and mammogram was ordered and this was benign  When the redness persisted she saw her gynecologist with concern for inflammatory breast cancer and referred her to Dr. Banks after repeat imaging and [facility]  Preliminary report shows micropapillary invasive mammary carcinoma intermediate grade measuring 13 mm right axillary node was also involved with metastatic cancer ER/VA and HER-2 are pending  Patient saw Dr. Banks who sent her for skin biopsies and she had 3 separate punch biopsy of the skin that showed Perivascular and perifollicular inflammation with no obvious malignancy at 3:00 12:00 and 9:00  In addition staging work-up with CAT scans and bone scan were ordered.  CAT scan of the chest showed a borderline mediastinal  Infracarinal node measuring 15 mm in length mild cardiomegaly and heavy coronary artery calcifications  CT of the abdomen showed a 2.5 x 2.2 cm right adrenal mass which the patient tells me she has had in the past and is most likely benign but also a 2.7 cm left external iliac node which is indeterminate.  Bone scan was negative    Echocardiogram is scheduled for later next week and genetic testing with the invitae stat panel is pending    Patient is  3 para 3 menarche was at age 9 menopause at 49 when she had a hysterectomy and oophorectomy  First childbirth was at age 20 she breast-fed her second and third children and took no hormone replacement after menopause    Family history is positive for mother dying of breast cancer at age 46 she is a maternal aunt  with breast cancer in her 70s a paternal aunt with breast cancer in her 70s paternal grandmother with colon cancer.  Her father had Hodgkin's disease and non-Hodgkin's lymphoma but  of small cell lung cancer at 67      She has not had a heart attack stroke or blood clot    Plan to do echocardiogram soon as possible to ascertain tolerability of cardiotoxic chemotherapy which would be typically indicated with an inflammatory breast cancer    Also plan PET scan to follow-up on atypical lymph nodes and confirm benign etiology of the adrenal lesion    She will have port placement and chemo education pt is  ER/WV and HER-2 +    I explained to Aida that the goal of treatment would be curative but she has a lot of comorbidities which may limit our ability to give the most effective chemotherapy in the setting  I told her radiation would be involved and possibly hormonal therapy for 10 years as she has hormone positivity and HER-2 directed therapy    She expressed some concerns about driving back and forth from [place] but felt that once a week was doable    We will see her back in 2 weeks to start treatment with a port placement planned early next week      Patient did have a mild reaction to Taxol dose #1 with some increased shortness of breath and flushing.  This was responsive to 100 mg of Solu-Cortef.  She states this gave her a headache and made her quite talkative but otherwise she was thankfully able to complete Taxol infusion without further incident.    Patient reports issues with chronic diarrhea over the last few years and did note a little bit increase in stooling following THP though nothing overly significant in her mind.  She did finally begin taking Imodium just a few days ago and has had no further stools.  She does note significant trouble with hemorrhoids in relation to the diarrhea   Required blood transfusion due to significant hemorrhoidal bleeding plus iron deficiency.  Injectafer  planned    Due to inclement weather cycle 1 day 8 therapy was missed.  She did get day 15 therapy with fairly good tolerance except for some diarrhea.    She was found to be iron deficient despite trying oral iron.  We therefore elected to proceed with IV Injectafer but she remains mildly anemic with a hemoglobin of 9.3    7/21    She has increased her Lasix and her weight is down 7 pounds but overall she is significantly weaker since starting treatment and I think it is in her best interest to discontinue treatment and proceed with surgery and will use Herceptin in the interim till surgery scheduled  She has had a good response in the breast and I do not think the last dose of Adriamycin is going to be crucial and we run the risk of making her so weak that she cannot go for surgery    9/21  Patient had worsening shortness of breath requiring continuous oxygen.  CT of the chest performed to rule out pneumonitis and she was started on steroids.  CT showed bilateral groundglass infiltrates presumed to be related to Taxol pneumonitis and therefore Taxol discontinued.  Patient completed the fourth cycle of Perjeta/Herceptin alone.  Diarrhea was an issue but not as bad without the Taxol.  She is weaned off her prednisone    Patient proceeded with cycle 1 Adriamycin/Cytoxan on 5/14/2021 and is seen back today for cycle #3 and we are doing it at 3-week intervals because of her frailty and after 3 cycles we stopped because of tolerance issues    She went for surgery her bilateral mastectomies and interestingly she had a complete pathological CR on the right breast and had evidence of DCIS in the left breast [id number]    10/21  We will start anastrozole because she is not a good candidate for tamoxifen and her bone density showed normal bone density in the spine but osteoporosis in the left femoral neck and we will start Prolia in 6 weeks.The side effects and toxicities of the Aromatase inhibitors was discussed with the patient  including, hot flashes, mood swings and hair thinning.Significant arthralgias and worsening bone density were also discussed. Baseline bone density evaluation was ordered.        Current Outpatient Medications on File Prior to Visit   Medication Sig Dispense Refill   • albuterol sulfate  (90 Base) MCG/ACT inhaler Inhale 2 puffs Every 4 (Four) Hours As Needed.     • anastrozole (ARIMIDEX) 1 MG tablet Take 1 mg by mouth Daily.     • carvedilol (COREG) 25 MG tablet Take 0.5 tablets by mouth 2 (Two) Times a Day With Meals. 30 tablet 0   • Cholecalciferol (Vitamin D) 50 MCG (2000 UT) capsule Take 2,000 Units by mouth Daily.     • dicyclomine (BENTYL) 20 MG tablet Take 1 tablet by mouth Every 6 (Six) Hours. 60 tablet 2   • diphenhydrAMINE (BENADRYL) 25 mg capsule Take 25 mg by mouth At Night As Needed for Itching, Allergies or Sleep.     • diphenoxylate-atropine (LOMOTIL) 2.5-0.025 MG per tablet Take 1 tablet by mouth 4 (Four) Times a Day As Needed for Diarrhea. 120 tablet 0   • FLUoxetine (PROzac) 40 MG capsule TAKE 1 CAPSULE BY MOUTH EVERY DAY 30 capsule 0   • furosemide (LASIX) 40 MG tablet TAKE ONE-HALF TABLET BY MOUTH EVERY DAY 45 tablet 1   • gabapentin (NEURONTIN) 100 MG capsule Take 1 capsule by mouth 3 (Three) Times a Day for 30 days. 90 capsule 1   • Lidocaine Viscous HCl (XYLOCAINE) 2 % solution Apply 5 mL topically to the appropriate area as directed As Needed for Mild Pain  (mix with Silvadene). 100 mL 1   • Lidocaine, Anorectal, (LMX 5) 5 % cream cream Apply  topically to the appropriate area as directed 3 (Three) Times a Day As Needed (hemmorrhoid pain). 45 g 2   • lisinopril (PRINIVIL,ZESTRIL) 5 MG tablet TAKE 1 TABLET BY MOUTH EVERY DAY 30 tablet 6   • meloxicam (MOBIC) 15 MG tablet take 1/2 tablet BY MOUTH EVERY DAY 45 tablet 1   • miconazole (Lotrimin AF) 2 % powder Apply  topically to the appropriate area as directed 2 (two) times a day. 85 g 1   • mometasone-formoterol (DULERA 100) 100-5  MCG/ACT inhaler Inhale 2 Puffs/kg Every Night.     • montelukast (SINGULAIR) 10 MG tablet Take 10 mg by mouth Every Night.     • multivitamin (THERAGRAN) tablet tablet Take 1 tablet by mouth Daily.     • O2 (OXYGEN) Inhale 3 L/min Continuous.     • omeprazole (priLOSEC) 20 MG capsule TAKE 1 CAPSULE BY MOUTH EVERY DAY 30 capsule 3   • ondansetron (ZOFRAN) 8 MG tablet Take 1 tablet by mouth 3 (Three) Times a Day As Needed for Nausea or Vomiting. 30 tablet 5   • Potassium 99 MG tablet Take 1 tablet by mouth Every Night.     • pravastatin (PRAVACHOL) 40 MG tablet Take 40 mg by mouth Every Night.     • psyllium (METAMUCIL) 0.52 g capsule Metamucil 0.52 gram oral capsule take 1 capsule by oral route 5Xday   Active     • riFAXIMin (Xifaxan) 550 MG tablet Take 1 tablet by mouth Daily. 28 tablet 2   • silver sulfadiazine (SILVADENE, SSD) 1 % cream Apply 1 application topically to the appropriate area as directed 2 (Two) Times a Day. 50 g 1   • spironolactone (ALDACTONE) 25 MG tablet Take 25 mg by mouth Daily.     • triamcinolone (KENALOG) 0.1 % ointment APPLY TO LOWER LEGS TWICE DAILY AS NEEDED FOR ITCHING       Current Facility-Administered Medications on File Prior to Visit   Medication Dose Route Frequency Provider Last Rate Last Admin   • heparin injection 500 Units  500 Units Intravenous PRN Jaime Azul MD   500 Units at 03/10/22 1355   • [COMPLETED] sodium chloride 0.9 % infusion 250 mL  250 mL Intravenous Once Nicci Hudson APRN   Stopped at 03/10/22 1355   • [COMPLETED] Trastuzumab (HERCEPTIN) 900 mg in sodium chloride 0.9 % 250 mL chemo IVPB  900 mg Intravenous Once Nicci Hudson APRN   Stopped at 03/10/22 1354        ALLERGIES:    Allergies   Allergen Reactions   • Amlodipine Swelling     LEGS    • Hydrochlorothiazide Unknown - Low Severity     Neuro issues   • Morphine Nausea And Vomiting     hallucinations   • Adhesive Tape Rash        Social History     Socioeconomic History   •  Marital status:    • Number of children: 3   Tobacco Use   • Smoking status: Never Smoker   • Smokeless tobacco: Never Used   Vaping Use   • Vaping Use: Never used   Substance and Sexual Activity   • Alcohol use: Never   • Drug use: Never   • Sexual activity: Defer        Family History   Problem Relation Age of Onset   • Breast cancer Mother 45   • Colon cancer Paternal Grandmother    • Breast cancer Maternal Aunt 70   • Breast cancer Paternal Aunt 70   • Lung cancer Father    • Hodgkin's lymphoma Father    • Skin cancer Father         squamous cell   • Ovarian cancer Neg Hx    • Uterine cancer Neg Hx    • Deep vein thrombosis Neg Hx    • Pulmonary embolism Neg Hx    • Malig Hyperthermia Neg Hx         Review of Systems   ROS as per HPI    Objective       There were no vitals filed for this visit.  Current Status 2/17/2022   ECOG score 1     Vital signs performed in the infusion area with temperature 97.5, heart rate 86, respirations 18, blood pressure 117/77, O2 sats 94%, weight 321 pounds pain 0-10    Physical Exam    CONSTITUTIONAL:  Vital signs reviewed.  No distress, looks comfortable. Morbidly obese  EYES:  Conjunctiva and lids unremarkable.  PERRLA  EARS,NOSE,MOUTH,THROAT:  Ears and nose appear unremarkable.  Lips, teeth, gums appear unremarkable.  RESPIRATORY:  Normal respiratory effort.  O2 via nasal cannula in place  BREASTS: Bilateral mastectomies with no reconstruction  CARDIOVASCULAR:  Normal S1, S2.  No murmurs rubs or gallops.  1+ brawny lower extremity edema.  GASTROINTESTINAL: Abdomen appears unremarkable.  Nontender.  No hepatomegaly.  No splenomegaly.  SKIN:  Warm.  Careful inspection of the skin, mastectomy incisions with no evidence of recurrent disease.  Inferior to the right mastectomy incision there is a 1 cm healed incision from previous boil.  There is slight induration, 1.5 cm just under the incision, there is no surrounding erythema, no warmth, no purulent drainage, no signs of  infection or boil remaining  PSYCHIATRIC:  Normal judgment and insight.  Normal mood and affect.       I have reexamined the patient and the results are consistent with the previously documented exam. SHAUNA Milan     RECENT LABS:  Results from last 7 days   Lab Units 03/10/22  1223   WBC 10*3/mm3 8.27   NEUTROS ABS 10*3/mm3 6.81   HEMOGLOBIN g/dL 12.1   HEMATOCRIT % 40.7   PLATELETS 10*3/mm3 221     Results from last 7 days   Lab Units 03/10/22  1223   SODIUM mmol/L 139   POTASSIUM mmol/L 4.4   CHLORIDE mmol/L 101   CO2 mmol/L 31.8*   BUN mg/dL 18   CREATININE mg/dL 0.58*   CALCIUM mg/dL 9.0   ALBUMIN g/dL 3.70   BILIRUBIN mg/dL 0.5   ALK PHOS U/L 94   ALT (SGPT) U/L 17   AST (SGOT) U/L 14   GLUCOSE mg/dL 101   FERRITIN ng/mL 267.50*   IRON mcg/dL 87   TIBC mcg/dL 336         PET  IMPRESSION:  1.  Moderate to intensely FDG avid asymmetric soft tissue and skin  thickening involving the right breast likely representing patient's  known malignancy.  2.  Intensely FDG avid right axillary and subpectoral adenopathy likely  represent metastatic disease.  3.  Constellation of findings within the right adrenal gland are favored  to represent a lipid rich adenoma. Continued attention on follow-up is  recommended to ensure stability.  4.  While there are no findings of definite FDG avid osseous metastasis,  given the heterogenous FDG uptake throughout the axial and appendicular  skeleton due to the above stated limitations, subtle underlying osseous  metastasis would remain occult. Therefore, continued close attention on  follow-up is recommended to exclude this possibility.  5.  Short segment of moderate to intense FDG uptake within the distal  esophagus and GE junction suggestive of esophagitis. In the appropriate  clinical context correlation with patient history is recommended with  follow-up endoscopy if clinically indicated.  6.  Sub-6 mm pulmonary nodule within the right lower lobe is below  PET  resolution and indeterminate. Continued close attention on follow-up  with chest CT in 3 months is recommended to exclude metastatic disease.  7.  Other findings as above.     This report was finalized on 2/2/2021     Final Diagnosis   1. Left Breast, Total Mastectomy (2,122 grams):               A. MULTIFOCAL LOW GRADE DUCTAL CARCINOMA IN SITU (DCIS):                            1. Solid, Cribriform, and Pagetoid type with single cell necrosis and focal calcifications.                            2. Extent of DCIS: 20 mm (5 of 26 blocks involved).                            3. Margins are negative for in situ carcinoma; Closest distance: DCIS is present > 10 mm from                                the posterior margin.               B. Multiple intraductal papillomas, usual ductal hyperplasia, and fibroadenomatoid change.               C. Unremarkable skin and nipple.               D. See Synoptic Report and Comment #1.      2. Right Breast, Modified Radical Mastectomy S/P Neoadjuvant Chemotherapy (2,589 grams):               A. FIBROTIC TUMOR BED WITH NO RESIDUAL INVASIVE DUCTAL CARCINOMA.               B. Background breast parenchyma with multiple intraductal papillomas, fibroadenomatoid change,       usual ductal hyperplasia and pseudoangiomatous stromal hyperplasia (PASH).  C. Scar and fat necrosis, consistent with prior procedure-related changes.  D. Clip and biopsy site changes present within tumor bed.    E. Unremarkable skin and nipple.   F. Nineteen lymph nodes, negative for carcinoma (0/19):               1. Clip and biopsy site changes are present.               2. Treatment effect is present in 4 of 19 lymph nodes.  G. See Comment #2.         FINDINGS:   LUMBAR SPINE:  The BMD measured in the L1-L4 is 1.054 g/cm2 for a  T-score of 0.1 and a Z-score of 2.1     LEFT HIP: The BMD for the femoral neck is 0.476g/cm2 for a T score of   -3.4 and a Z score of -1.7     RIGHT HIP:  The BMD for the femoral neck  is 0.657g/cm2 for a T score of  -1.7 and a Z score of 0.0     IMPRESSION:  Osteoporosis.     Assessment/Plan   1. [id number] right breast cancer ER/MN HER-2 -3+ positive inflammatory breast cancer for neoadjuvant chemotherapy  · Staging work-up negative except for 6 mm lung nodule and axillary and subpectoral adenopathy  · THP followed by AC planned if she tolerates it  · C1D8 Taxol missed due to inclement weather.  · Taxol discontinued after 7 doses due to probable Taxol pneumonitis treated with steroids.    · C1 Adriamycin/Cytoxan given 5/14/2021.  · Adriamycin and Cytoxan stopped after 3 doses due to poor tolerance  · YPT0N0 right breast with multifocal DCIS ER/MN positive in the left breast post bilateral mastectomies and right axillary dissection  · Radiation Arimidex and Perjeta Herceptin to continue for the rest of the year  · Severe diarrhea after resuming Perjeta-Lomotil ordered and C. difficile checked with this is C. difficile negative she will not tolerate Perjeta for the rest of the year and we will stop  · Doing well on single agent Herceptin and Arimidex in 2/22  · Proceed today with Herceptin    2.  Morbid obesity    3.  History of diastolic heart failure  · Echocardiogram with ejection fraction of 64% normal strain  · Cleared by cardio-oncology for chemotherapy  · Echocardiogram in 9/21 stable at 63%  · Echocardiogram 1/5/2022 with stable ejection fraction 64%    4.  Pulmonary disease?  Etiology on oxygen for 4 years?  Pickwickian    5.  Strong family history of breast cancer genetic testing 84 genes negative    6.  Probable benign adrenal adenoma-PET negative    7.  Questionable enlarged lymph nodes left iliac chain and mediastinum likely reactive-PET negative    8.  6 mm right lower lobe nodule below PET resolution pretreatment needs follow-up after THP  · Repeat CT read at [facility] radiologist report stability of nodules and nodes  · Repeat CT at [facility] in 10/21 shows continued  improvement    9.  Abnormal uptake short segment esophagus with a history of Schatzki's ring-we will double PPI and watch closely but we we will proceed with chemotherapy at this point and refer back to GI-doubt she has metastatic disease to this area    10. Anemia with microcytic indices  · Iron studies performed 2/10/2021.  · 2/24/2021: reviewed with the patient that she is iron deficient, with ferritin of 16, iron saturation of 4%.  Patient reports taking ferrous gluconate in the past but this caused GI upset.  Also with her chronic diarrhea I do not think she can absorb it.  We will pursue IV iron with plans to initiate this next week pending insurance approval. In addition hemoglobin down to 8.0 and transfusion pursued.  · 3/3/2021: IV Injectafer initiated x2.   · Hemoglobin down to 9.9 one week out from first AC though overall stable.  Monitor.   · Hemoglobin improved off chemotherapy  · Hemoglobin stable today at 12.1 without iron deficiency, ferritin 267, iron saturation 26%    11. Hemorrhoidal pain secondary to diarrhea. Does have occasional bleeding. Topical lidocaine prescribed. Monitor.    12.  History of chronic diarrhea, ?IBS, exacerbated with Perjeta therapy.  · Patient required rifaximin 550 mg to take twice daily x7 days with each Perjeta dose.   · Since completion of Perjeta patient is now actually experiencing constipation (further discussed below).      13.  Taxol-induced pneumonitis.   · Worsening shortness of breath with CT evidence of groundglass infiltrates bilaterally suspicious for pneumonitis  · Patient prescribed prednisone 20 twice daily  · Breathing is overall stable.  Patient is slowly tapering off prednisone.      14.  Osteoporosis on DEXA scan in 9/21-Prolia initiated in 12/21    15.  Boil under the right mastectomy site  · Drained by PCP and placed on doxycycline x10 days which was completed  · No evidence of recurrent boil presently with no erythema, drainage, warmth.  There is  only minimal induration just under the site of previous drainage site.  The incision is closed.  · No further management required at this time.    Plan  1.  Herceptin alone today  2.   Return in 3 weeks for final herceptin  3.  Follow-up with Dr. Banks as scheduled late March 2022  4.  At this time, the patient is not in need of further drainage or antibiotic treatment for previous boil.  There are no signs of infection or evidence of boil on exam today  5.  Continue Arimidex  6.  She is not in need of additional iron replacement  7.  Echocardiogram April 2022  8.  MD follow-up with Dr. Azul June 2022 at which time she is due for Prolia    Patient is on high risk medication requiring close monitoring for toxicity    Nicci Hudson, APRN  03/10/2022

## 2022-03-23 ENCOUNTER — OFFICE VISIT (OUTPATIENT)
Dept: SURGERY | Facility: CLINIC | Age: 67
End: 2022-03-23

## 2022-03-23 ENCOUNTER — PREP FOR SURGERY (OUTPATIENT)
Dept: OTHER | Facility: HOSPITAL | Age: 67
End: 2022-03-23

## 2022-03-23 ENCOUNTER — TELEPHONE (OUTPATIENT)
Dept: SURGERY | Facility: CLINIC | Age: 67
End: 2022-03-23

## 2022-03-23 VITALS
DIASTOLIC BLOOD PRESSURE: 80 MMHG | BODY MASS INDEX: 48.82 KG/M2 | HEIGHT: 65 IN | OXYGEN SATURATION: 97 % | SYSTOLIC BLOOD PRESSURE: 132 MMHG | RESPIRATION RATE: 17 BRPM | HEART RATE: 83 BPM | WEIGHT: 293 LBS

## 2022-03-23 DIAGNOSIS — C50.111 MALIGNANT NEOPLASM OF CENTRAL PORTION OF RIGHT BREAST IN FEMALE, ESTROGEN RECEPTOR POSITIVE: Primary | ICD-10-CM

## 2022-03-23 DIAGNOSIS — C50.911 INFLAMMATORY BREAST CANCER, RIGHT: Primary | ICD-10-CM

## 2022-03-23 DIAGNOSIS — Z01.818 PRE-OP TESTING: Primary | ICD-10-CM

## 2022-03-23 DIAGNOSIS — Z17.0 MALIGNANT NEOPLASM OF CENTRAL PORTION OF RIGHT BREAST IN FEMALE, ESTROGEN RECEPTOR POSITIVE: Primary | ICD-10-CM

## 2022-03-23 PROCEDURE — 99213 OFFICE O/P EST LOW 20 MIN: CPT | Performed by: SURGERY

## 2022-03-23 NOTE — PROGRESS NOTES
[facility]     Referring Provider: Dipesh Leone MD     Chief complaint: Routine follow-up breast cancer     HPI:   1/19/21:  Ms. Aida Conner is a 66 yo woman, seen at the request of Dr. Dipesh Leone, for evaluation of right breast changes, possible inflammatory breast cancer. In November 2020, the patient noticed that her right breast had become enlarged, firm, painful, and red. She underwent mammogram and ultrasound at the time, which was read as benign aside from skin thickening and edema. She was treated with a course of antibiotics by her PCP without improvement. She says that the redness and swelling initially started in the lower inner portion of her breast, but over the past 2 months, it has spread to involve the entire breast. She can no longer wear a bra due to discomfort.     She has a past history of 2 benign left breast excisional biopsies many years ago, as well as a right breast excisional biopsy in 2018 for an intraductal papilloma with incidental atypical ductal and lobular hyperplasia. Her past medical history includes obesity, COPD on oxygen, sleep apnea, diabetes and CHF. She has a family history of breast cancer in her mother (diagnosed at age 45), a maternal aunt (diagnosed in her 70s), and a paternal aunt (diagnosed in her 70s). She denies any family history of ovarian cancer. She says she was seen by a medical oncologist in 2018 after the excisional biopsy to discuss possible chemoprevention. She was referred for genetic testing, however it was not covered by insurance so she did not have it done. She ended up deciding not to pursue endocrine therapy due to concerns about side effects.     4/8/21:   After her last visit, she underwent breast and axillary biopsies which showed a triple positive invasive ductal carcinoma. She underwent staging studies which were thankfully negative and her genetic testing returned negative for mutation. She started neoadjuvant THP on 2/10/21.  She has been having issues with increased shortness of breath and a rash, so yesterday Taxol was discontinued and she will be starting Abraxane next week. She reports that her breast has significantly decreased in size and become less swollen.     7/21/21:  Taxol was discontinued after 7 doses due to pneumonitis which improved with steroids. She then completed 3 cycles of AC, last dose on 6/25/21. Cycle 4 was held due to her frailty.    9/1/21  She underwent left mastectomy and right modified radical mastectomy on 8/10/21. See surgery and pathology details in the oncologic history below. She had one left breast drain removed in the office last week. The remaining left breast drain and the right breast drain have each had out about 10-15 mL/day for the past few days. The right axillary drain has had out about 40-60 mL/day for the past few days. She is complaining of occasional sharp shooting pain in her right upper arm and her gabapentin prescription was refilled earlier this week.    12/2/21:  She returns today for scheduled follow-up. She completed radiation on 11/22/21. She struggled with skin irritation towards the end of radiation and sent me a message earlier this week asking for a gabapentin refill due to the pain. Over the past few days, the skin has already gotten much better. She is currently applying Silvadene.  She was seen by OT at [facility] postoperatively and her bioimpedance score was already elevated from her preoperative score. She was fitted for a compression sleeve, however never received it. Dr. Azul recently stopped Perjeta due to diarrhea. She is continuing on Herceptin alone and she also restarted Arimidex.    3/23/22, Interval History:  She returns today for scheduled follow-up. Her last dose of Herceptin is scheduled for 3/31/22. She remains on Arimidex and is still tolerating this well. She never saw OT back after her last appointment and she still does not have a compression sleeve. She has  noticed some swelling/tightness in her upper right arm. She also has been having pain and decreased range of motion in her right shoulder.      Oncology/Hematology History Overview Note   06/19/18, Right Breast Excisional Biopsy ( [facility], Dr. Juani Barragan):  Right breast wire guided excision:  Small foci of atypical ductal hyperplasia (0.5-1 mm); completely excised  Minute focus of atypical lobular hyperplasia (<0.5 mm) completely excised  Residual intraductal papilloma with focal sclerosis (approximately 7 mm in greatest dimension); adequately excised  Ectactic ducts  Fibrocystic changes (stromal fibrosis, Sclerosing adenosis) with mild to moderate ductal hyperplasia  Rare microcalcifications associated with adenosis  Extensive vascular calcification  Previous biopsy site    09/07/2018, Right breast US ( [facility]):  2.5 predominantly cystic lesion in the operative bed contains fine septations, and is consistent with an organizing hematoma.   No solid relatively hypo or hyperechoic mass is evident  BI-RADS 2: Benign.    1/10/2020, Screening MMG with Saurabh ( [facility]):  Post lumpectomy changes on the left are stable.  Post biopsy changes on the right are evident.   No suspicious mass, area or architectural distortion or suspicious microcalcification is identified.  BI-RADS 2: Benign.    11/1/20: Pt noticed right breast was enlarged, firm, and red. Treated with a course of antibiotics with no improvement.     Inflammatory breast cancer, right (HCC)   11/17/2020 Initial Diagnosis    Inflammatory breast cancer, right (CMS/HCC)     11/18/2020 Imaging    Bilateral Diagnostic MMG with Saurabh & Right Breast US ( [facility]):  MMG:  Again seen within the anterior right breast near the 12 o’clock position in an area postoperative architectural distortion. There are multiple surgical clips in this region. Findings are unchanged from 1/10/2020. No new mass or architectural distortion seen within the right breast. No new  suspicious calcifications. There is new skin thickening along the anterior aspect of the right breast of uncertain etiology.   There is stable architectural distortion in the upper outer quadrant of the left breast. There is no new mass or architectural distortion. There are stable scattered punctate calcifications. No suspicious calcifications.  US:  Limited sonographic images of the right breast were obtained at the 12 o’clock position at the site of the patient’s reported right breast fullness. This is in the retroareolar area. Images demonstrate some mild skin thickening and edema within the subcutaneous and superficial fat. Findings are nonspecific but could be seen with developing cellulitis. No discrete solid mass lesion identified. No cyst or abnormal fluid collection is seen.  BI-RADS 2: Benign.     1/19/2021 Imaging    Bilateral Diagnostic MMG with Saurabh & Right Breast US ([facility]):  MMG:  Scattered areas of fibroglandular density.  1. There is a large global asymmetry with associated skin thickening and trabecular thickening seen in the central region of the right breast.  This correlates to the symptomatic area. The appearance is consistent with the clinical diagnosis of inflammatory breast carcinoma.  2.  There is a stable post-surgical scar seen in the anterior one-third region of the right breast at 12  o'clock. Post-surgical scar is in an area of prior excisional biopsy.  Metallic surgical clips noted.  3.  There is a stable post-surgical scar seen in the middle one-third 1:30 o'clock region of the left breast. Post-surgical scar is in an area of prior excisional biopsy.  Metallic surgical clips noted.  4. There are several stable punctate calcifications seen in the anterior one-third region of the left breast at 7 o'clock.  7. There is an axillary lymph node measuring 20 mm in the right axilla.  Next, ultrasound was performed.  US:  2. Ultrasound demonstrates a stable irregular post-surgical scar seen in  the anterior one-third region of the right breast at 12 o'clock.  This correlates with the mammographic finding.  5. There are two oval parallel solid masses with partially defined margins measuring 9 x 6 x 8 and 10 x 5 x 9 mm seen in the right breast at 12 o'clock located 6 centimeters from the nipple.  6. There is an irregular solid mass with poorly defined margins measuring 32 x 8 x 11 mm seen in the 10:30 o'clock region of the right breast located 15 centimeters from the nipple.  7. Ultrasound demonstrates an oval parallel abnormal axillary lymph node(s) with well defined, thin margins measuring 32 x 20 x 22 mm in the right axilla.  BI-RADS 4B: Suspicious.     1/19/2021 Biopsy    Right Breast & Right Axilla, US-Guided Core Biopsies ([facility]):    1. Right Breast, 10:30, core biopsies:   Invasive mammary carcinoma, no special type (ductal) with micropapillary features, intermediate grade (tubules=3, nuclear atypia=2, mitoses=2), measuring at least 1.3 cm in dimension.     ER+ (96.02%, strong)  KS+ (81.98%, moderate)  Her2+ (IHC 3+)  Ki-67 40.17%    2. Right Axilla, biopsy:   Lymph node involved by metastatic mammary carcinoma.     1/19/2021 Biopsy    Right Breast, Skin Punch Biopsies ( [facility]):    1. Skin, Right Breast, 3 o'clock, Biopsy: Benign skin and underlying connective tissue with               A. Superficial and deep perivascular and perifollicular inflammation.     2. Skin, Right Breast, 12 o'clock, Biopsy: Benign skin and underlying connective tissue with               A. Superficial and deep perivascular and perifollicular inflammation.     3. Skin, Right Breast, 9 o'clock, Biopsy: Benign skin and underlying connective tissue with               A. Superficial and deep perivascular and perifollicular inflammation.     1/19/2021 Genetic Testing    [employer] Multi-Cancer Panel (84 genes):    Negative     1/20/2021 Imaging    CT C/A/P ( [facility]):  1. Right breast partially within the field-of-view, there is skin  thickening and parenchymal induration.  2. No indication of intrathoracic metastatic disease.  3. No lytic or sclerotic bone lesion.  4. Enlarged right adrenal gland, indeterminant. Metastatic deposits not excluded.  5. Likely old left external iliac chain lymph node.     1/20/2021 Imaging    Bone Scan ( [facility]):  No scintigraphic evidence of osseous metastasis.     1/29/2021 Imaging    [facility]  1.  Moderate to intensely FDG avid asymmetric soft tissue and skin thickening involving the right breast likely representing patient's known malignancy.  2.  Intensely FDG avid right axillary and subpectoral adenopathy likely represent metastatic disease.  3.  Constellation of findings within the right adrenal gland are favored to represent a lipid rich adenoma. Continued attention on follow-up is recommended to ensure stability.  4.  While there are no findings of definite FDG avid osseous metastasis, given the heterogenous FDG uptake throughout the axial and appendicular skeleton due to the above stated limitations, subtle underlying osseous  metastasis would remain occult. Therefore, continued close attention on follow-up is recommended to exclude this possibility.  5.  Short segment of moderate to intense FDG uptake within the distal esophagus and GE junction suggestive of esophagitis. In the appropriate clinical context correlation with patient history is recommended with follow-up endoscopy if clinically indicated.  6.  Sub-6 mm pulmonary nodule within the right lower lobe is below PET resolution and indeterminate. Continued close attention on follow-up with chest CT in 3 months is recommended to exclude metastatic disease.     8/10/2021 Surgery    Right modified radical mastectomy and left total mastectomy    1. Left Breast, Total Mastectomy (2,122 grams):               A. MULTIFOCAL LOW GRADE DUCTAL CARCINOMA IN SITU (DCIS):                            1. Solid, Cribriform, and Pagetoid type with single cell  necrosis and focal calcifications.                            2. Extent of DCIS: 20 mm (5 of 26 blocks involved).                            3. Margins are negative for in situ carcinoma; Closest distance: DCIS is present > 10 mm from                                the posterior margin.               B. Multiple intraductal papillomas, usual ductal hyperplasia, and fibroadenomatoid change.               C. Unremarkable skin and nipple.               D. See Synoptic Report and Comment #1.      2. Right Breast, Modified Radical Mastectomy S/P Neoadjuvant Chemotherapy (2,589 grams):               A. FIBROTIC TUMOR BED WITH NO RESIDUAL INVASIVE DUCTAL CARCINOMA.               B. Background breast parenchyma with multiple intraductal papillomas, fibroadenomatoid change,       usual ductal hyperplasia and pseudoangiomatous stromal hyperplasia (PASH).  C. Scar and fat necrosis, consistent with prior procedure-related changes.  D. Clip and biopsy site changes present within tumor bed.    E. Unremarkable skin and nipple.   F. Nineteen lymph nodes, negative for carcinoma (0/19):               1. Clip and biopsy site changes are present.               2. Treatment effect is present in 4 of 19 lymph nodes.  G. See Comment #2.     ER+ (%, strong)  OR+ (%, strong)  Ki-67 4%     10/27/2021 Cancer Staged    Staging form: Breast, AJCC 8th Edition  - Clinical: cT4d, cN1, G3, ER-, OR-, HER2+ - Signed by Jaime Azul MD on 10/27/2021     1/6/2022 Cancer Staged    Staging form: Breast, AJCC 8th Edition  - Pathologic: No Stage Recommended (ypT0, pN0, cM0) - Signed by Jaime Azul MD on 1/6/2022     Malignant neoplasm of axillary tail of right female breast (HCC)   1/25/2021 Initial Diagnosis    Malignant neoplasm of axillary tail of right female breast (CMS/HCC)     2/10/2021 - 5/4/2021 Chemotherapy    OP BREAST Pertuzumab / Trastuzumab-anns / PACLitaxel     5/14/2021 - 7/15/2021 Chemotherapy    OP BREAST AC  DOXOrubicin / Cyclophosphamide     9/7/2021 - 9/27/2021 Chemotherapy    OP BREAST Trastuzumab-anns Q21D (maintenance)     10/5/2021 -  Chemotherapy    OP BREAST Pertuzumab / Trastuzumab-anns  Q21D     Malignant neoplasm of central portion of right female breast (HCC)   9/30/2021 Initial Diagnosis    Malignant neoplasm of central portion of right female breast (CMS/HCC)     10/11/2021 -  Radiation    RADIATION THERAPY Treatment Details (Noted on 9/30/2021)  Site: Right Breast - Central portion of breast  Technique: 3D CRT  Goal: Curative  Planned Treatment Start Date: 10/11/2021     12/9/2021 -  Chemotherapy    OP SUPPORTIVE Denosumab (Prolia) Q6M         Review of Systems:  See interval history.       Medications:    Current Outpatient Medications:   •  albuterol sulfate  (90 Base) MCG/ACT inhaler, Inhale 2 puffs Every 4 (Four) Hours As Needed., Disp: , Rfl:   •  anastrozole (ARIMIDEX) 1 MG tablet, Take 1 mg by mouth Daily., Disp: , Rfl:   •  carvedilol (COREG) 25 MG tablet, Take 0.5 tablets by mouth 2 (Two) Times a Day With Meals., Disp: 30 tablet, Rfl: 0  •  Cholecalciferol (Vitamin D) 50 MCG (2000 UT) capsule, Take 2,000 Units by mouth Daily., Disp: , Rfl:   •  dicyclomine (BENTYL) 20 MG tablet, Take 1 tablet by mouth Every 6 (Six) Hours., Disp: 60 tablet, Rfl: 2  •  diphenhydrAMINE (BENADRYL) 25 mg capsule, Take 25 mg by mouth At Night As Needed for Itching, Allergies or Sleep., Disp: , Rfl:   •  diphenoxylate-atropine (LOMOTIL) 2.5-0.025 MG per tablet, Take 1 tablet by mouth 4 (Four) Times a Day As Needed for Diarrhea., Disp: 120 tablet, Rfl: 0  •  FLUoxetine (PROzac) 40 MG capsule, TAKE 1 CAPSULE BY MOUTH EVERY DAY, Disp: 30 capsule, Rfl: 0  •  furosemide (LASIX) 40 MG tablet, TAKE ONE-HALF TABLET BY MOUTH EVERY DAY, Disp: 45 tablet, Rfl: 1  •  gabapentin (NEURONTIN) 100 MG capsule, Take 1 capsule by mouth 3 (Three) Times a Day for 30 days., Disp: 90 capsule, Rfl: 1  •  Lidocaine Viscous HCl (XYLOCAINE)  2 % solution, Apply 5 mL topically to the appropriate area as directed As Needed for Mild Pain  (mix with Silvadene)., Disp: 100 mL, Rfl: 1  •  Lidocaine, Anorectal, (LMX 5) 5 % cream cream, Apply  topically to the appropriate area as directed 3 (Three) Times a Day As Needed (hemmorrhoid pain)., Disp: 45 g, Rfl: 2  •  lisinopril (PRINIVIL,ZESTRIL) 5 MG tablet, TAKE 1 TABLET BY MOUTH EVERY DAY, Disp: 30 tablet, Rfl: 6  •  meloxicam (MOBIC) 15 MG tablet, take 1/2 tablet BY MOUTH EVERY DAY, Disp: 45 tablet, Rfl: 1  •  miconazole (Lotrimin AF) 2 % powder, Apply  topically to the appropriate area as directed 2 (two) times a day., Disp: 85 g, Rfl: 1  •  mometasone-formoterol (DULERA 100) 100-5 MCG/ACT inhaler, Inhale 2 Puffs/kg Every Night., Disp: , Rfl:   •  montelukast (SINGULAIR) 10 MG tablet, Take 10 mg by mouth Every Night., Disp: , Rfl:   •  multivitamin (THERAGRAN) tablet tablet, Take 1 tablet by mouth Daily., Disp: , Rfl:   •  O2 (OXYGEN), Inhale 3 L/min Continuous., Disp: , Rfl:   •  omeprazole (priLOSEC) 20 MG capsule, TAKE 1 CAPSULE BY MOUTH EVERY DAY, Disp: 30 capsule, Rfl: 3  •  ondansetron (ZOFRAN) 8 MG tablet, Take 1 tablet by mouth 3 (Three) Times a Day As Needed for Nausea or Vomiting., Disp: 30 tablet, Rfl: 5  •  Potassium 99 MG tablet, Take 1 tablet by mouth Every Night., Disp: , Rfl:   •  pravastatin (PRAVACHOL) 40 MG tablet, Take 40 mg by mouth Every Night., Disp: , Rfl:   •  psyllium (METAMUCIL) 0.52 g capsule, Metamucil 0.52 gram oral capsule take 1 capsule by oral route 5Xday   Active, Disp: , Rfl:   •  riFAXIMin (Xifaxan) 550 MG tablet, Take 1 tablet by mouth Daily., Disp: 28 tablet, Rfl: 2  •  silver sulfadiazine (SILVADENE, SSD) 1 % cream, Apply 1 application topically to the appropriate area as directed 2 (Two) Times a Day., Disp: 50 g, Rfl: 1  •  spironolactone (ALDACTONE) 25 MG tablet, Take 25 mg by mouth Daily., Disp: , Rfl:   •  triamcinolone (KENALOG) 0.1 % ointment, APPLY TO LOWER LEGS  TWICE DAILY AS NEEDED FOR ITCHING, Disp: , Rfl:       Allergies   Allergen Reactions   • Amlodipine Swelling     LEGS    • Hydrochlorothiazide Unknown - Low Severity     Neuro issues   • Morphine Nausea And Vomiting     hallucinations   • Adhesive Tape Rash       Family History   Problem Relation Age of Onset   • Breast cancer Mother 45   • Colon cancer Paternal Grandmother    • Breast cancer Maternal Aunt 70   • Breast cancer Paternal Aunt 70   • Lung cancer Father    • Hodgkin's lymphoma Father    • Skin cancer Father         squamous cell   • Ovarian cancer Neg Hx    • Uterine cancer Neg Hx    • Deep vein thrombosis Neg Hx    • Pulmonary embolism Neg Hx    • Malig Hyperthermia Neg Hx        PHYSICAL EXAMINATION:   Vitals:    03/23/22 1136   BP: 132/80   Pulse: 83   Resp: 17   SpO2: 97%     ECOG 1 - Symptomatic but ambulatory, with cane  General: NAD, obese, on continuous oxygen and slightly winded  Psych: a&o x 3, normal mood and affect  Eyes: EOMI, no scleral icterus  ENMT: neck supple without masses or thyromegaly, mucus membranes moist  MSK: normal gait, decreased ROM in right shoulder, Mild right upper extremity edema.  Lymph nodes: +Right axillary edema; no cervical, supraclavicular or axillary lymphadenopathy (exam limited by body habitus)  Breast:   Right: Sp mastectomy with well-healed central scar and post radiation hyperpigmentation. There is scar retraction laterally. No masses.  Left: Sp mastectomy with well-healed central scar. Scar is soft. No masses.      Assessment:  66 y.o. F with a diagnosis of right breast inflammatory carcinoma: Intermediate grade, triple positive, invasive ductal carcinoma; clinical T4dN1, anatomic stage IIIB, prognostic stage IIIA. She completed neoadjuvant chemotherapy on 6/25/21. She underwent left mastectomy and right modified radical mastectomy on 8/10/21, pCR on the right and incidental DCIS on the left, pTis. She completed radiation on 11/22/21. She is currently on  Herceptin and Arimidex.  -She has right upper extremity and chest wall lymphedema.    Plan:  -Port removal.  -Continue follow-up with Dr. Azul.  -Continue follow-up with Dr. Sams.  -She already has follow-up scheduled with with OT/LE clinic at [facility] in 2-week. I am also going to put in a PT referral because I think she may need this for her right shoulder.  -Follow-up in 6 months for clinical exam.  -She was instructed to call sooner with any questions, concerns or changes on BSE.    Nicci Banks MD      CC:  MD Serenity Manjarrez APRN

## 2022-03-23 NOTE — TELEPHONE ENCOUNTER
I left a message for this pt to call regarding her COVID test and PT appt.     I called [facility], where she currently goes for Lymphedema, and she has an appt with them on Monday 03/28/2022 at 2:00  They can see her for her additional PT needs so she just needs to keep that appointment.    She can have her COVID test done in [place] but it needs to be done at a [facility] facility so we can see those results prior to her port removal on 04/05/2022 and it needs to be a PCR test only.      CMA

## 2022-03-31 ENCOUNTER — INFUSION (OUTPATIENT)
Dept: ONCOLOGY | Facility: HOSPITAL | Age: 67
End: 2022-03-31

## 2022-03-31 VITALS
HEART RATE: 79 BPM | TEMPERATURE: 97.7 F | WEIGHT: 293 LBS | OXYGEN SATURATION: 95 % | RESPIRATION RATE: 18 BRPM | DIASTOLIC BLOOD PRESSURE: 65 MMHG | BODY MASS INDEX: 53.05 KG/M2 | SYSTOLIC BLOOD PRESSURE: 103 MMHG

## 2022-03-31 DIAGNOSIS — C50.611 MALIGNANT NEOPLASM OF AXILLARY TAIL OF RIGHT FEMALE BREAST, UNSPECIFIED ESTROGEN RECEPTOR STATUS: Primary | ICD-10-CM

## 2022-03-31 LAB
ALBUMIN SERPL-MCNC: 3.4 G/DL (ref 3.5–5.2)
ALBUMIN/GLOB SERPL: 1.1 G/DL (ref 1.1–2.4)
ALP SERPL-CCNC: 83 U/L (ref 38–116)
ALT SERPL W P-5'-P-CCNC: 12 U/L (ref 0–33)
ANION GAP SERPL CALCULATED.3IONS-SCNC: 10.3 MMOL/L (ref 5–15)
AST SERPL-CCNC: 14 U/L (ref 0–32)
BASOPHILS # BLD AUTO: 0.01 10*3/MM3 (ref 0–0.2)
BASOPHILS NFR BLD AUTO: 0.1 % (ref 0–1.5)
BILIRUB SERPL-MCNC: 0.4 MG/DL (ref 0.2–1.2)
BUN SERPL-MCNC: 19 MG/DL (ref 6–20)
BUN/CREAT SERPL: 31.1 (ref 7.3–30)
CALCIUM SPEC-SCNC: 8.4 MG/DL (ref 8.5–10.2)
CHLORIDE SERPL-SCNC: 105 MMOL/L (ref 98–107)
CO2 SERPL-SCNC: 23.7 MMOL/L (ref 22–29)
CREAT SERPL-MCNC: 0.61 MG/DL (ref 0.6–1.1)
DEPRECATED RDW RBC AUTO: 52.5 FL (ref 37–54)
EGFRCR SERPLBLD CKD-EPI 2021: 96.9 ML/MIN/1.73
EOSINOPHIL # BLD AUTO: 0.12 10*3/MM3 (ref 0–0.4)
EOSINOPHIL NFR BLD AUTO: 1.5 % (ref 0.3–6.2)
ERYTHROCYTE [DISTWIDTH] IN BLOOD BY AUTOMATED COUNT: 14.2 % (ref 12.3–15.4)
GLOBULIN UR ELPH-MCNC: 3.1 GM/DL (ref 1.8–3.5)
GLUCOSE SERPL-MCNC: 97 MG/DL (ref 74–124)
HCT VFR BLD AUTO: 40 % (ref 34–46.6)
HGB BLD-MCNC: 11.7 G/DL (ref 12–15.9)
IMM GRANULOCYTES # BLD AUTO: 0.03 10*3/MM3 (ref 0–0.05)
IMM GRANULOCYTES NFR BLD AUTO: 0.4 % (ref 0–0.5)
LYMPHOCYTES # BLD AUTO: 0.77 10*3/MM3 (ref 0.7–3.1)
LYMPHOCYTES NFR BLD AUTO: 9.4 % (ref 19.6–45.3)
MCH RBC QN AUTO: 29.3 PG (ref 26.6–33)
MCHC RBC AUTO-ENTMCNC: 29.3 G/DL (ref 31.5–35.7)
MCV RBC AUTO: 100.3 FL (ref 79–97)
MONOCYTES # BLD AUTO: 0.69 10*3/MM3 (ref 0.1–0.9)
MONOCYTES NFR BLD AUTO: 8.4 % (ref 5–12)
NEUTROPHILS NFR BLD AUTO: 6.57 10*3/MM3 (ref 1.7–7)
NEUTROPHILS NFR BLD AUTO: 80.2 % (ref 42.7–76)
NRBC BLD AUTO-RTO: 0 /100 WBC (ref 0–0.2)
PLATELET # BLD AUTO: 246 10*3/MM3 (ref 140–450)
PMV BLD AUTO: 9.5 FL (ref 6–12)
POTASSIUM SERPL-SCNC: 4 MMOL/L (ref 3.5–4.7)
PROT SERPL-MCNC: 6.5 G/DL (ref 6.3–8)
RBC # BLD AUTO: 3.99 10*6/MM3 (ref 3.77–5.28)
SODIUM SERPL-SCNC: 139 MMOL/L (ref 134–145)
WBC NRBC COR # BLD: 8.19 10*3/MM3 (ref 3.4–10.8)

## 2022-03-31 PROCEDURE — 96413 CHEMO IV INFUSION 1 HR: CPT

## 2022-03-31 PROCEDURE — 25010000002 TRASTUZUMAB PER 10 MG: Performed by: NURSE PRACTITIONER

## 2022-03-31 PROCEDURE — 80053 COMPREHEN METABOLIC PANEL: CPT

## 2022-03-31 PROCEDURE — 85025 COMPLETE CBC W/AUTO DIFF WBC: CPT

## 2022-03-31 RX ORDER — SODIUM CHLORIDE 9 MG/ML
250 INJECTION, SOLUTION INTRAVENOUS ONCE
Status: COMPLETED | OUTPATIENT
Start: 2022-03-31 | End: 2022-03-31

## 2022-03-31 RX ADMIN — TRASTUZUMAB 900 MG: 150 INJECTION, POWDER, LYOPHILIZED, FOR SOLUTION INTRAVENOUS at 13:06

## 2022-03-31 RX ADMIN — SODIUM CHLORIDE 250 ML: 9 INJECTION, SOLUTION INTRAVENOUS at 13:05

## 2022-03-31 NOTE — TELEPHONE ENCOUNTER
Pt said she will be going to [facility] on Saturday for the covid testing.  Order was placed for [facility].  I called to confirm if testing could be done there but per Penny they are closed this Saturday and next Saturday so the pt will need to go to the Urgent care on [address]. Address was given to the pt- orders placed in Epic for testing.

## 2022-03-31 NOTE — TELEPHONE ENCOUNTER
Pt called back, she now wants to come to [facility] for her covid testing. I called to get her scheduled and notified the pt- she is scheduled to come in on Saturday April 2, 2022 (drive through testing instructions were given to the pt) arrive at 1:20.    CMA

## 2022-03-31 NOTE — TELEPHONE ENCOUNTER
Left another message on pts home and mobile line today. I need her to call back asap so we can get her covid testing set up prior to her surgery. She is supposed to let me know which facility she will be getting this so I can place an order.    CMA

## 2022-04-01 ENCOUNTER — TELEPHONE (OUTPATIENT)
Dept: PULMONOLOGY | Facility: CLINIC | Age: 67
End: 2022-04-01

## 2022-04-01 NOTE — TELEPHONE ENCOUNTER
Patient called and left voicemail stating that she is not feeling well and will need to reschedule today's appointment. Patient states she would like to be rescheduled with Dr. Anaya as well. Please call patient to reschedule.

## 2022-04-02 ENCOUNTER — LAB (OUTPATIENT)
Dept: LAB | Facility: HOSPITAL | Age: 67
End: 2022-04-02

## 2022-04-02 DIAGNOSIS — C50.111 MALIGNANT NEOPLASM OF CENTRAL PORTION OF RIGHT BREAST IN FEMALE, ESTROGEN RECEPTOR POSITIVE: ICD-10-CM

## 2022-04-02 DIAGNOSIS — Z01.818 PRE-OP TESTING: ICD-10-CM

## 2022-04-02 DIAGNOSIS — Z17.0 MALIGNANT NEOPLASM OF CENTRAL PORTION OF RIGHT BREAST IN FEMALE, ESTROGEN RECEPTOR POSITIVE: ICD-10-CM

## 2022-04-02 LAB — SARS-COV-2 ORF1AB RESP QL NAA+PROBE: NOT DETECTED

## 2022-04-02 PROCEDURE — C9803 HOPD COVID-19 SPEC COLLECT: HCPCS

## 2022-04-02 PROCEDURE — U0004 COV-19 TEST NON-CDC HGH THRU: HCPCS

## 2022-04-04 ENCOUNTER — HOSPITAL ENCOUNTER (OUTPATIENT)
Dept: OCCUPATIONAL THERAPY | Facility: HOSPITAL | Age: 67
Setting detail: THERAPIES SERIES
Discharge: HOME | End: 2022-04-04

## 2022-04-04 DIAGNOSIS — Z90.13 HISTORY OF MASTECTOMY, BILATERAL: Primary | ICD-10-CM

## 2022-04-04 DIAGNOSIS — R52 PAIN: ICD-10-CM

## 2022-04-04 DIAGNOSIS — L90.5 SCAR CONDITION AND FIBROSIS OF SKIN: ICD-10-CM

## 2022-04-04 DIAGNOSIS — C50.912 BILATERAL MALIGNANT NEOPLASM OF BREAST IN FEMALE, ESTROGEN RECEPTOR POSITIVE, UNSPECIFIED SITE OF BREAST: ICD-10-CM

## 2022-04-04 DIAGNOSIS — M62.81 MUSCLE WEAKNESS (GENERALIZED): ICD-10-CM

## 2022-04-04 DIAGNOSIS — R29.3 ABNORMAL POSTURE: ICD-10-CM

## 2022-04-04 DIAGNOSIS — I89.0 LYMPHEDEMA: ICD-10-CM

## 2022-04-04 DIAGNOSIS — M25.60 JOINT STIFFNESS: ICD-10-CM

## 2022-04-04 DIAGNOSIS — Z17.0 BILATERAL MALIGNANT NEOPLASM OF BREAST IN FEMALE, ESTROGEN RECEPTOR POSITIVE, UNSPECIFIED SITE OF BREAST: ICD-10-CM

## 2022-04-04 DIAGNOSIS — C50.911 BILATERAL MALIGNANT NEOPLASM OF BREAST IN FEMALE, ESTROGEN RECEPTOR POSITIVE, UNSPECIFIED SITE OF BREAST: ICD-10-CM

## 2022-04-04 DIAGNOSIS — I97.2 POSTMASTECTOMY LYMPHEDEMA SYNDROME: ICD-10-CM

## 2022-04-04 PROCEDURE — 93702 BIS XTRACELL FLUID ANALYSIS: CPT

## 2022-04-04 PROCEDURE — 97535 SELF CARE MNGMENT TRAINING: CPT

## 2022-04-04 NOTE — THERAPY RE-EVALUATION
Outpatient Occupational Therapy Lymphedema Re-Evaluation   Etienne     Patient Name: Aida Conner  : 1955  MRN: [id number]  Today's Date: 2022      Visit Date: 2022    Patient Active Problem List   Diagnosis   • Inflammatory breast cancer, right (HCC)   • Other specified disorders of breast    • Malignant neoplasm of axillary tail of right female breast (HCC)   • Encounter for eye exam due to high risk medication   • Obesity hypoventilation syndrome (HCC)   • Iron adverse reaction   • Functional diarrhea   • Encounter for long-term (current) use of high-risk medication   • Abnormal mammogram   • Allergic rhinitis   • Anemia   • Anxiety disorder   • Breast pain, right   • Depression   • Essential tremor   • Hemorrhoids   • Hyperlipidemia   • Hypertension, essential   • IBS (irritable bowel syndrome)   • Impaired fasting glucose   • Obesity   • Obstructive sleep apnea   • Osteoarthrosis   • Renal calculi   • Restrictive airway disease   • Type II diabetes mellitus (HCC)   • Vitamin deficiency   • Urinary bladder incontinence   • Malignant neoplasm of central portion of right female breast (HCC)   • Osteoporosis without current pathological fracture   • Chronic diastolic congestive heart failure (HCC)   • Coronary artery disease involving native heart without angina pectoris        Past Medical History:   Diagnosis Date   • Anemia    • Anxiety    • Asthma    • Chronic diastolic (congestive) heart failure (HCC)    • Chronic hypoxemic respiratory failure (HCC)     on continuous O2   • Diarrhea     with chemo   • H/O Intraductal papilloma    • Hemorrhoids    • History of transfusion     AFTER BACK SURGERY no reaction   • Hypertension    • IBS (irritable bowel syndrome)    • Inflammatory breast cancer (HCC)     right   • Kidney stone    • Morbid obesity with BMI of 50.0-59.9, adult (HCC)    • Obesity hypoventilation syndrome (HCC) 2021   • On home oxygen therapy     2.5 AT REST WITH ACTIVITY  UP TO 4L   • ANABELLE on CPAP     cpap  & uses O2 with CPAP   • Osteoarthritis    • PONV (postoperative nausea and vomiting)         Past Surgical History:   Procedure Laterality Date   • BREAST EXCISIONAL BIOPSY Bilateral    • COLONOSCOPY     • CYSTOSCOPY BLADDER STONE LITHOTRIPSY     • ENDOSCOPY     • KNEE ARTHROPLASTY Bilateral 2009   • MASTECTOMY Left 8/10/2021    Procedure: LEFT TOTAL MASTECTOMY;  Surgeon: Nicci Banks MD;  Location: [facility] OR;  Service: General;  Laterality: Left;   • MASTECTOMY Right 8/10/2021    Procedure: RIGHT MODIFIED RADICAL MASTECTOMY;  Surgeon: Nicci Banks MD;  Location: [facility] MAIN OR;  Service: General;  Laterality: Right;   • SPINE SURGERY  1984   • TOTAL ABDOMINAL HYSTERECTOMY WITH SALPINGO OOPHORECTOMY  2004   • UPPER GASTROINTESTINAL ENDOSCOPY     • VENOUS ACCESS DEVICE (PORT) INSERTION N/A 1/25/2021    Procedure: INSERTION VENOUS ACCESS DEVICE;  Surgeon: Nicci Banks MD;  Location: [facility] MAIN OR;  Service: General;  Laterality: N/A;   • VENOUS ACCESS DEVICE (PORT) REMOVAL Left 4/5/2022    Procedure: REMOVAL VENOUS ACCESS DEVICE;  Surgeon: Nicci Banks MD;  Location: [facility] MAIN OR;  Service: General;  Laterality: Left;         Visit Dx:     ICD-10-CM ICD-9-CM   1. History of mastectomy, bilateral  Z90.13 V45.71   2. Bilateral malignant neoplasm of breast in female, estrogen receptor positive, unspecified site of breast (HCC)  C50.911 174.9    Z17.0 V86.0    C50.912    3. Lymphedema  I89.0 457.1   4. Scar condition and fibrosis of skin  L90.5 709.2   5. Pain  R52 780.96   6. Joint stiffness  M25.60 719.50   7. Muscle weakness (generalized)  M62.81 728.87   8. Abnormal posture  R29.3 781.92   9. Postmastectomy lymphedema syndrome  I97.2 457.0               Therapy Education  Education Details: OT reviewed lymphedema prevention information with emphasis on signs and symptoms of development of lymphedema including sensory signs and symptoms.   Patient did ask about the compression sleeve that OT had put in a request for.  The request was placed but OT did was unaware that she had not received anything because she was unable to attend appointments.  OT educated patient of the need for complete decongestive therapy to stabilize her lymphatic functioning and hopefully return it back to normal.  Given: HEP, Symptoms/condition management  Program: New  How Provided: Verbal  Provided to: Patient  Level of Understanding: Verbalized, Demonstrated  06883 - OT Self Care/Mgmt Minutes: 58  OT and patient discussed compression wrapping in the right upper extremity.  Patient expressed concerned about the weight and the bulk of the compression wraps being uncomfortable and possibly causing more pain in the right shoulder.  OT did recommend a CircAid reduction kit that would be lighter and easier for her to self manage at home to allow for hygiene.  OT was able to obtain measurements for the CircAid reduction kit for the patient however patient did not have a credit card on file for her to be able to proceed with ordering this garment for her.           1. Post Breast Surgery Care/at risk for Lymphedema  LTG 1: 90 days:  As an indicator of no exacerbation of lymphedema staging, the patient will present with an L-Dex score less than [10] points from preoperative baseline.              STATUS: Not met: Ongoing  STG 1a:   30 days: To prevent exacerbation of mixed edema to lymphedema, patient will utilize the 2 postsurgical compression garments daily.                 STATUS: Not met: Discontinue  STG 1b: 30 days: Patient will be independent with self-manual lymphatic massage.               STATUS: Partially met: Ongoing   STG 1c: 30 days:  Patient will be independent with identification of signs and symptoms of lymphedema exasperation per stoplight to recovery education handout.              STATUS: Partially met: Ongoing   STG 1 d: 30 days: Patient will be independent with  HEP to prevent advancement in lymphedema staging.              STATUS: Partially met ongoing  TREATMENT:  Self Care/ADL retraining, Therapeutic Activity, Neuromuscular Re-education, Therapeutic Exercise, Bioimpedence Fluid Analysis, Post-Surgical compression garement 30689 Alayna Zip-ST-High/ Nayana Camisole Kit 2860K, Orthotic Management and training,  and Manual Therapy.    2. Decreased Functional Use of R UE  LTG [2]:  90 Days: The patient will report a pain rating of 2/10 or better to improve functional use of right upper extremity and IADL tasks.    STATUS:  New  STG [2]a:  The patient will report a pain rating of 4/10 or better as an average in 1 week interval.  STATUS:  New    LTG [3]: 90 days:  The patient will demonstrate an increase of right upper extremity AROM to WFL for ADL independence.  STATUS:  New    STG [3]a: The patient will demonstrate an increase in all shoulder planes in right upper extremity up to degrees of 20 or WFL for improved self-care independence.    STATUS:  New    TREATMENT:  Manual Therapy, Therapeutic Activity, ADL retraining, Neuromuscular Re-education, Therapeutic Exercise, Patient and Family Education, Orthotic Management and training, Modalities: TENS, NMES, Ultrasound, Fluidotherapy Wound dressing as needed, Modalities as needed and Manual Therapy.    3. Carrying, Moving, and Handling Objects Functional Limitation     LTG 4: 90 days:  The patient will demonstrate 1-19% limitation by achieving a score of 11 on the Quick Dash.           STATUS:  New   STG 4a: 30 days:  The patient will demonstrate 80-99% limitation by achieving a score of 54 on the Quick Dash.             STATUS:  New  TREATMENT: Manual Therapy, Therapeutic Activity, Neuromuscular Re-education, Therapeutic Exercise, Patient and Family Education, Orthotic Management and training, Modalities: TENS, NMES, Ultrasound, Fluidotherapy Wound dressing as needed, Modalities as needed and Manual Therapy.        Quick  DASH  Open a tight or new jar.: Severe Difficulty  Do heavy household chores (e.g., wash walls, wash floors): Unable  Carry a shopping bag or briefcase: Moderate Difficulty  Wash your back: Unable  Use a knife to cut food: No Difficulty  Recreational activities in which you take some force or impact through your arm, should or hand (e.g. golf, hammering, tennis, etc.): Unable  During the past week, to what extent has your arm, shoulder, or hand problem interfered with your normal social activites with family, friends, neighbors or groups?: Not at all  During the past week, were you limited in your work or other regular daily activities as a result of your arm, shoulder or hand problem?: Moderately Limited  Arm, Shoulder, or hand pain: Moderate  Tingling (pins and needles) in your arm, shoulder, or hand: None  During the past week, how much difficulty have you had sleeping because of the pain in your arm, shoulder or hand?: Moderate Difficiculty         Time Calculation:   Timed Charges  25166 - OT Self Care/Mgmt Minutes: 58  Total Minutes  Timed Charges Total Minutes: 58   Total Minutes: 58                   Ria Walker OT  4/11/2022

## 2022-04-05 ENCOUNTER — ANESTHESIA (OUTPATIENT)
Dept: PERIOP | Facility: HOSPITAL | Age: 67
End: 2022-04-05

## 2022-04-05 ENCOUNTER — HOSPITAL ENCOUNTER (OUTPATIENT)
Facility: HOSPITAL | Age: 67
Setting detail: HOSPITAL OUTPATIENT SURGERY
Discharge: HOME | End: 2022-04-05
Attending: SURGERY | Admitting: SURGERY

## 2022-04-05 ENCOUNTER — ANESTHESIA EVENT (OUTPATIENT)
Dept: PERIOP | Facility: HOSPITAL | Age: 67
End: 2022-04-05

## 2022-04-05 VITALS
BODY MASS INDEX: 48.82 KG/M2 | TEMPERATURE: 97.6 F | HEIGHT: 65 IN | DIASTOLIC BLOOD PRESSURE: 92 MMHG | RESPIRATION RATE: 20 BRPM | WEIGHT: 293 LBS | SYSTOLIC BLOOD PRESSURE: 154 MMHG | OXYGEN SATURATION: 96 % | HEART RATE: 66 BPM

## 2022-04-05 PROBLEM — C50.111 MALIGNANT NEOPLASM OF CENTRAL PORTION OF RIGHT FEMALE BREAST: Status: ACTIVE | Noted: 2021-09-30

## 2022-04-05 PROCEDURE — 36590 REMOVAL TUNNELED CV CATH: CPT | Performed by: SURGERY

## 2022-04-05 PROCEDURE — 25010000002 PROPOFOL 10 MG/ML EMULSION: Performed by: NURSE ANESTHETIST, CERTIFIED REGISTERED

## 2022-04-05 RX ORDER — ONDANSETRON 2 MG/ML
4 INJECTION INTRAMUSCULAR; INTRAVENOUS ONCE AS NEEDED
Status: DISCONTINUED | OUTPATIENT
Start: 2022-04-05 | End: 2022-04-05 | Stop reason: HOSPADM

## 2022-04-05 RX ORDER — ACETAMINOPHEN 325 MG/1
650 TABLET ORAL ONCE AS NEEDED
Status: DISCONTINUED | OUTPATIENT
Start: 2022-04-05 | End: 2022-04-05 | Stop reason: HOSPADM

## 2022-04-05 RX ORDER — PROMETHAZINE HYDROCHLORIDE 25 MG/1
25 TABLET ORAL ONCE AS NEEDED
Status: DISCONTINUED | OUTPATIENT
Start: 2022-04-05 | End: 2022-04-05 | Stop reason: HOSPADM

## 2022-04-05 RX ORDER — PROPOFOL 10 MG/ML
VIAL (ML) INTRAVENOUS AS NEEDED
Status: DISCONTINUED | OUTPATIENT
Start: 2022-04-05 | End: 2022-04-05 | Stop reason: SURG

## 2022-04-05 RX ORDER — DIPHENHYDRAMINE HCL 25 MG
25 CAPSULE ORAL
Status: DISCONTINUED | OUTPATIENT
Start: 2022-04-05 | End: 2022-04-05 | Stop reason: HOSPADM

## 2022-04-05 RX ORDER — LIDOCAINE HYDROCHLORIDE 10 MG/ML
0.5 INJECTION, SOLUTION EPIDURAL; INFILTRATION; INTRACAUDAL; PERINEURAL ONCE AS NEEDED
Status: DISCONTINUED | OUTPATIENT
Start: 2022-04-05 | End: 2022-04-05 | Stop reason: HOSPADM

## 2022-04-05 RX ORDER — LABETALOL HYDROCHLORIDE 5 MG/ML
5 INJECTION, SOLUTION INTRAVENOUS
Status: DISCONTINUED | OUTPATIENT
Start: 2022-04-05 | End: 2022-04-05 | Stop reason: HOSPADM

## 2022-04-05 RX ORDER — NALOXONE HCL 0.4 MG/ML
0.2 VIAL (ML) INJECTION AS NEEDED
Status: DISCONTINUED | OUTPATIENT
Start: 2022-04-05 | End: 2022-04-05 | Stop reason: HOSPADM

## 2022-04-05 RX ORDER — HYDRALAZINE HYDROCHLORIDE 20 MG/ML
5 INJECTION INTRAMUSCULAR; INTRAVENOUS
Status: DISCONTINUED | OUTPATIENT
Start: 2022-04-05 | End: 2022-04-05 | Stop reason: HOSPADM

## 2022-04-05 RX ORDER — FAMOTIDINE 10 MG/ML
20 INJECTION, SOLUTION INTRAVENOUS ONCE
Status: COMPLETED | OUTPATIENT
Start: 2022-04-05 | End: 2022-04-05

## 2022-04-05 RX ORDER — SODIUM CHLORIDE 0.9 % (FLUSH) 0.9 %
3 SYRINGE (ML) INJECTION EVERY 12 HOURS SCHEDULED
Status: DISCONTINUED | OUTPATIENT
Start: 2022-04-05 | End: 2022-04-05 | Stop reason: HOSPADM

## 2022-04-05 RX ORDER — SODIUM CHLORIDE 0.9 % (FLUSH) 0.9 %
3-10 SYRINGE (ML) INJECTION AS NEEDED
Status: DISCONTINUED | OUTPATIENT
Start: 2022-04-05 | End: 2022-04-05 | Stop reason: HOSPADM

## 2022-04-05 RX ORDER — FENTANYL CITRATE 50 UG/ML
25 INJECTION, SOLUTION INTRAMUSCULAR; INTRAVENOUS
Status: DISCONTINUED | OUTPATIENT
Start: 2022-04-05 | End: 2022-04-05 | Stop reason: HOSPADM

## 2022-04-05 RX ORDER — PROPOFOL 10 MG/ML
VIAL (ML) INTRAVENOUS CONTINUOUS PRN
Status: DISCONTINUED | OUTPATIENT
Start: 2022-04-05 | End: 2022-04-05 | Stop reason: SURG

## 2022-04-05 RX ORDER — FENTANYL CITRATE 50 UG/ML
50 INJECTION, SOLUTION INTRAMUSCULAR; INTRAVENOUS
Status: DISCONTINUED | OUTPATIENT
Start: 2022-04-05 | End: 2022-04-05 | Stop reason: HOSPADM

## 2022-04-05 RX ORDER — FLUMAZENIL 0.1 MG/ML
0.2 INJECTION INTRAVENOUS AS NEEDED
Status: DISCONTINUED | OUTPATIENT
Start: 2022-04-05 | End: 2022-04-05 | Stop reason: HOSPADM

## 2022-04-05 RX ORDER — LIDOCAINE HYDROCHLORIDE 20 MG/ML
INJECTION, SOLUTION INFILTRATION; PERINEURAL AS NEEDED
Status: DISCONTINUED | OUTPATIENT
Start: 2022-04-05 | End: 2022-04-05 | Stop reason: SURG

## 2022-04-05 RX ORDER — MAGNESIUM HYDROXIDE 1200 MG/15ML
LIQUID ORAL AS NEEDED
Status: DISCONTINUED | OUTPATIENT
Start: 2022-04-05 | End: 2022-04-05 | Stop reason: HOSPADM

## 2022-04-05 RX ORDER — OXYCODONE AND ACETAMINOPHEN 7.5; 325 MG/1; MG/1
1 TABLET ORAL EVERY 4 HOURS PRN
Status: DISCONTINUED | OUTPATIENT
Start: 2022-04-05 | End: 2022-04-05 | Stop reason: HOSPADM

## 2022-04-05 RX ORDER — EPHEDRINE SULFATE 50 MG/ML
5 INJECTION, SOLUTION INTRAVENOUS ONCE AS NEEDED
Status: DISCONTINUED | OUTPATIENT
Start: 2022-04-05 | End: 2022-04-05 | Stop reason: HOSPADM

## 2022-04-05 RX ORDER — HYDROMORPHONE HYDROCHLORIDE 1 MG/ML
0.25 INJECTION, SOLUTION INTRAMUSCULAR; INTRAVENOUS; SUBCUTANEOUS
Status: DISCONTINUED | OUTPATIENT
Start: 2022-04-05 | End: 2022-04-05 | Stop reason: HOSPADM

## 2022-04-05 RX ORDER — HYDROCODONE BITARTRATE AND ACETAMINOPHEN 7.5; 325 MG/1; MG/1
1 TABLET ORAL ONCE AS NEEDED
Status: DISCONTINUED | OUTPATIENT
Start: 2022-04-05 | End: 2022-04-05 | Stop reason: HOSPADM

## 2022-04-05 RX ORDER — SODIUM CHLORIDE, SODIUM LACTATE, POTASSIUM CHLORIDE, CALCIUM CHLORIDE 600; 310; 30; 20 MG/100ML; MG/100ML; MG/100ML; MG/100ML
9 INJECTION, SOLUTION INTRAVENOUS CONTINUOUS
Status: DISCONTINUED | OUTPATIENT
Start: 2022-04-05 | End: 2022-04-05 | Stop reason: HOSPADM

## 2022-04-05 RX ORDER — PROMETHAZINE HYDROCHLORIDE 25 MG/1
25 SUPPOSITORY RECTAL ONCE AS NEEDED
Status: DISCONTINUED | OUTPATIENT
Start: 2022-04-05 | End: 2022-04-05 | Stop reason: HOSPADM

## 2022-04-05 RX ORDER — DIPHENHYDRAMINE HYDROCHLORIDE 50 MG/ML
12.5 INJECTION INTRAMUSCULAR; INTRAVENOUS
Status: DISCONTINUED | OUTPATIENT
Start: 2022-04-05 | End: 2022-04-05 | Stop reason: HOSPADM

## 2022-04-05 RX ADMIN — Medication 20 MG: at 08:02

## 2022-04-05 RX ADMIN — Medication 20 MG: at 07:59

## 2022-04-05 RX ADMIN — FAMOTIDINE 20 MG: 10 INJECTION INTRAVENOUS at 07:11

## 2022-04-05 RX ADMIN — PROPOFOL 125 MCG/KG/MIN: 10 INJECTION, EMULSION INTRAVENOUS at 07:57

## 2022-04-05 RX ADMIN — LIDOCAINE HYDROCHLORIDE 60 MG: 20 INJECTION, SOLUTION INFILTRATION; PERINEURAL at 07:57

## 2022-04-05 RX ADMIN — SODIUM CHLORIDE, POTASSIUM CHLORIDE, SODIUM LACTATE AND CALCIUM CHLORIDE 9 ML/HR: 600; 310; 30; 20 INJECTION, SOLUTION INTRAVENOUS at 07:11

## 2022-04-05 RX ADMIN — Medication 50 MG: at 07:57

## 2022-04-05 NOTE — ANESTHESIA PREPROCEDURE EVALUATION
Anesthesia Evaluation     Patient summary reviewed and Nursing notes reviewed   history of anesthetic complications: PONV  NPO Solid Status: > 8 hours  NPO Liquid Status: > 2 hours           Airway   Mallampati: II  Dental - normal exam     Pulmonary - normal exam   (+) COPD, asthma,home oxygen, sleep apnea on CPAP,   Cardiovascular - normal exam  Exercise tolerance: poor (<4 METS)    ECG reviewed    (+) hypertension, CHF , hyperlipidemia,     ROS comment: Reviewed echo    Neuro/Psych  (+) psychiatric history Anxiety and Depression,    GI/Hepatic/Renal/Endo    (+) morbid obesity,  renal disease stones, diabetes mellitus type 2,     Musculoskeletal     Abdominal   (+) obese,    Substance History      OB/GYN          Other   arthritis,    history of cancer                  Anesthesia Plan    ASA 3     MAC   (I have reviewed all pertinent information including medical history,allergies, imaging, studies and laboratory results. I have explained risks and benefits to anesthesia, including but not limited to; dental damage, corneal abrasion, sore throat, nausea, vomiting, aspiration, MI, nerve damage, failed block and death. Patient has agreed to proceed. )    Anesthetic plan, all risks, benefits, and alternatives have been provided, discussed and informed consent has been obtained with: patient.    Plan discussed with CRNA.        CODE STATUS:

## 2022-04-05 NOTE — ANESTHESIA POSTPROCEDURE EVALUATION
"Patient: Aida STARKEY [place]    Procedure Summary     Date: 04/05/22 Room / Location: [facility] OR 03 / [facility] MAIN OR    Anesthesia Start: 0748 Anesthesia Stop: 0832    Procedure: REMOVAL VENOUS ACCESS DEVICE (Left ) Diagnosis:       Malignant neoplasm of central portion of right breast in female, estrogen receptor positive (HCC)      (Malignant neoplasm of central portion of right breast in female, estrogen receptor positive (HCC) [C50.111, Z17.0])    Surgeons: Nicci Banks MD Provider: Idania Christiansen MD    Anesthesia Type: MAC ASA Status: 3          Anesthesia Type: MAC    Vitals  Vitals Value Taken Time   /87 04/05/22 0836   Temp 36.4 °C (97.6 °F) 04/05/22 0831   Pulse 66 04/05/22 0850   Resp 20 04/05/22 0835   SpO2 96 % 04/05/22 0850   Vitals shown include unvalidated device data.        Post Anesthesia Care and Evaluation    Patient location during evaluation: bedside  Patient participation: complete - patient participated  Level of consciousness: awake and alert  Pain management: adequate  Airway patency: patent  Anesthetic complications: No anesthetic complications    Cardiovascular status: acceptable  Respiratory status: acceptable  Hydration status: acceptable    Comments: /87   Pulse 66   Temp 36.4 °C (97.6 °F) (Oral)   Resp 20   Ht 165.1 cm (65\")   Wt (!) 145 kg (320 lb 5.3 oz)   LMP  (LMP Unknown)   SpO2 100%   BMI 53.31 kg/m²           "

## 2022-04-05 NOTE — OP NOTE
Operative Report    Patient Name:  Aida Conner  YOB: 1955    Date of Surgery:  4/5/2022    Pre-op Diagnosis:   Malignant neoplasm of central portion of right breast in female, estrogen receptor positive (HCC) [C50.111, Z17.0]       Post-Op Diagnosis:  Malignant neoplasm of central portion of right breast in female, estrogen receptor positive (HCC) [C50.111, Z17.0]    Procedure:  REMOVAL LEFT IJ VENOUS ACCESS DEVICE    Staff:  Surgeon(s):  Nicci Banks MD         Anesthesia: Monitored Anesthesia Care    Estimated Blood Loss: 2 mL    Implants:    Nothing was implanted during the procedure    Specimen:          None        Findings: Port and catheter were intact.     Complications: None     Indications: The patient is a 66 y.o. F with a diagnosis of right breast inflammatory carcinoma: Intermediate grade, triple positive, invasive ductal carcinoma; clinical T4dN1, anatomic stage IIIB, prognostic stage IIIA. She completed neoadjuvant chemotherapy on 6/25/21. She underwent left mastectomy and right modified radical mastectomy on 8/10/21, pCR on the right and incidental DCIS on the left, pTis. She completed radiation on 11/22/21. She completed maintenance Herceptin on 3/31/22. She presents today for port removal. The risks and benefits were discussed preoperatively and she understood and agreed to proceed.     Description of Procedure:  The patient was identified in the preoperative area and brought to the operating room and placed supine on the table. Patient verification was performed and anesthesia was induced. The anterior chest and neck were prepped and draped in standard sterile fashion. A time out was performed and all were in agreement.    Local anesthesia was injected into the skin and subcutaneous tissue along the prior incision, towards the port pocket and towards the neck. The prior chest wall incision was excised. Cautery was used to divide down to the port and open the capsule  around the port. The previously placed Prolene sutures were cut and removed. The port was then grasped with a clamp and lifted out of the wound. A 3-0 Vicryl pursestring suture was placed around the catheter tract, but not tied.    The patient was then placed in trendelenburg position. Port and catheter were removed while pressure was held at the neck insertion site and the pursestring stitch was simultaneously tied. Pressure was held for 5 minutes. The wound was irrigated and hemostasis was acheived with electrocautery. Additional local anesthetic was infiltrated into the wound. The incision was closed with interrupted 3-0 Vicryl deep dermal sutures and a running subcuticular 4-0 Monocryl suture and covered with Dermabond. Counts were correct at the end of the case. The patient was awakened from anesthesia and taken to the PACU in stable condition.    Nicci Banks MD     Date: 4/5/2022  Time: 08:55 EDT

## 2022-04-05 NOTE — DISCHARGE INSTRUCTIONS
Discharge Instructions for Port Removal    Go home, rest and take it easy today; however, you should get up and move about several times today to reduce the risk of developing a clot in your legs.      You may experience some dizziness or memory loss from the anesthesia.  This may last for the next 24 hours.  Someone should plan on staying with you for the first 24 hours for your safety.    Do not make any important legal decisions or sign any legal papers for the next 24 hours.      Eat and drink lightly today.  Start off with liquids, jello, soup, crackers or other bland foods at first. You may advance your diet tomorrow as tolerated as long as you do not experience any nausea or vomiting.     If skin glue (Dermabond) was used, your incisions are protected and covered.  The invisible glue will dissolve on its own as your incision heals.       You may shower tomorrow allowing water to run over the incisions; however do not scrub the incisions.  No tub baths until your incisions are completely healed.           You may have some pain/discomfort following surgery.   You will not be totally pain free, but your pain medicine should make the pain tolerable.  Please take your pain medicine as prescribed and always take your pills with food to prevent nausea. If you are having severe pain that cannot be controlled by the pain medicine, please contact me.  If the pain is such that narcotic pain medicine is not required, you may take Tylenol or Ibuprofen as directed unless indicated otherwise.     No driving for 24 hours and for as long as you are taking your prescription pain medicine.      Remember to contact me for any of the following:    Fever> 101 degrees  Severe pain that cannot be controlled by taking your pain pills  Severe nausea or vomiting that cannot be controlled by taking your nausea medicine  Significant bleeding from your incision  Drainage that has a bad smell or is yellow or green in appearance  Any  other questions or concerns

## 2022-04-11 ENCOUNTER — HOSPITAL ENCOUNTER (OUTPATIENT)
Dept: OCCUPATIONAL THERAPY | Facility: HOSPITAL | Age: 67
Setting detail: THERAPIES SERIES
Discharge: HOME | End: 2022-04-11

## 2022-04-11 DIAGNOSIS — C50.912 BILATERAL MALIGNANT NEOPLASM OF BREAST IN FEMALE, ESTROGEN RECEPTOR POSITIVE, UNSPECIFIED SITE OF BREAST: ICD-10-CM

## 2022-04-11 DIAGNOSIS — Z91.89 AT RISK FOR LYMPHEDEMA: ICD-10-CM

## 2022-04-11 DIAGNOSIS — R29.3 ABNORMAL POSTURE: ICD-10-CM

## 2022-04-11 DIAGNOSIS — R52 PAIN: ICD-10-CM

## 2022-04-11 DIAGNOSIS — Z17.0 BILATERAL MALIGNANT NEOPLASM OF BREAST IN FEMALE, ESTROGEN RECEPTOR POSITIVE, UNSPECIFIED SITE OF BREAST: ICD-10-CM

## 2022-04-11 DIAGNOSIS — I97.2 POSTMASTECTOMY LYMPHEDEMA SYNDROME: ICD-10-CM

## 2022-04-11 DIAGNOSIS — Z91.89 AT RISK FOR SELF CARE DEFICIT: ICD-10-CM

## 2022-04-11 DIAGNOSIS — C50.911 BILATERAL MALIGNANT NEOPLASM OF BREAST IN FEMALE, ESTROGEN RECEPTOR POSITIVE, UNSPECIFIED SITE OF BREAST: ICD-10-CM

## 2022-04-11 DIAGNOSIS — L90.5 SCAR CONDITION AND FIBROSIS OF SKIN: ICD-10-CM

## 2022-04-11 DIAGNOSIS — Z90.13 HISTORY OF MASTECTOMY, BILATERAL: Primary | ICD-10-CM

## 2022-04-11 DIAGNOSIS — M62.81 MUSCLE WEAKNESS (GENERALIZED): ICD-10-CM

## 2022-04-11 DIAGNOSIS — G89.3 NEOPLASM RELATED PAIN: ICD-10-CM

## 2022-04-11 DIAGNOSIS — C50.911 INFLAMMATORY BREAST CANCER, RIGHT: ICD-10-CM

## 2022-04-11 DIAGNOSIS — I89.0 LYMPHEDEMA: ICD-10-CM

## 2022-04-11 DIAGNOSIS — M25.60 JOINT STIFFNESS: ICD-10-CM

## 2022-04-11 PROCEDURE — 97535 SELF CARE MNGMENT TRAINING: CPT

## 2022-04-11 PROCEDURE — 97140 MANUAL THERAPY 1/> REGIONS: CPT

## 2022-04-11 NOTE — THERAPY TREATMENT NOTE
Outpatient Occupational Therapy Lymphedema Treatment Note  [facility]     Patient Name: Aida Conner  : 1955  MRN: [id number]  Today's Date: 2022      Visit Date: 2022    Patient Active Problem List   Diagnosis   • Inflammatory breast cancer, right (HCC)   • Other specified disorders of breast    • Malignant neoplasm of axillary tail of right female breast (HCC)   • Encounter for eye exam due to high risk medication   • Obesity hypoventilation syndrome (HCC)   • Iron adverse reaction   • Functional diarrhea   • Encounter for long-term (current) use of high-risk medication   • Abnormal mammogram   • Allergic rhinitis   • Anemia   • Anxiety disorder   • Breast pain, right   • Depression   • Essential tremor   • Hemorrhoids   • Hyperlipidemia   • Hypertension, essential   • IBS (irritable bowel syndrome)   • Impaired fasting glucose   • Obesity   • Obstructive sleep apnea   • Osteoarthrosis   • Renal calculi   • Restrictive airway disease   • Type II diabetes mellitus (HCC)   • Vitamin deficiency   • Urinary bladder incontinence   • Malignant neoplasm of central portion of right female breast (HCC)   • Osteoporosis without current pathological fracture   • Chronic diastolic congestive heart failure (HCC)   • Coronary artery disease involving native heart without angina pectoris        Past Medical History:   Diagnosis Date   • Anemia    • Anxiety    • Asthma    • Chronic diastolic (congestive) heart failure (HCC)    • Chronic hypoxemic respiratory failure (HCC)     on continuous O2   • Diarrhea     with chemo   • H/O Intraductal papilloma    • Hemorrhoids    • History of transfusion     AFTER BACK SURGERY no reaction   • Hypertension    • IBS (irritable bowel syndrome)    • Inflammatory breast cancer (HCC)     right   • Kidney stone    • Morbid obesity with BMI of 50.0-59.9, adult (HCC)    • Obesity hypoventilation syndrome (HCC) 2021   • On home oxygen therapy     2.5 AT REST WITH  ACTIVITY UP TO 4L   • ANABELLE on CPAP     cpap  & uses O2 with CPAP   • Osteoarthritis    • PONV (postoperative nausea and vomiting)         Past Surgical History:   Procedure Laterality Date   • BREAST EXCISIONAL BIOPSY Bilateral    • COLONOSCOPY     • CYSTOSCOPY BLADDER STONE LITHOTRIPSY     • ENDOSCOPY     • KNEE ARTHROPLASTY Bilateral 2009   • MASTECTOMY Left 8/10/2021    Procedure: LEFT TOTAL MASTECTOMY;  Surgeon: Nicci Banks MD;  Location: [facility] MAIN OR;  Service: General;  Laterality: Left;   • MASTECTOMY Right 8/10/2021    Procedure: RIGHT MODIFIED RADICAL MASTECTOMY;  Surgeon: Nicci Banks MD;  Location: [facility] MAIN OR;  Service: General;  Laterality: Right;   • SPINE SURGERY  1984   • TOTAL ABDOMINAL HYSTERECTOMY WITH SALPINGO OOPHORECTOMY  2004   • UPPER GASTROINTESTINAL ENDOSCOPY     • VENOUS ACCESS DEVICE (PORT) INSERTION N/A 1/25/2021    Procedure: INSERTION VENOUS ACCESS DEVICE;  Surgeon: Nicci Banks MD;  Location: [facility] MAIN OR;  Service: General;  Laterality: N/A;   • VENOUS ACCESS DEVICE (PORT) REMOVAL Left 4/5/2022    Procedure: REMOVAL VENOUS ACCESS DEVICE;  Surgeon: Nicci Banks MD;  Location: [facility] MAIN OR;  Service: General;  Laterality: Left;         Visit Dx:      ICD-10-CM ICD-9-CM   1. History of mastectomy, bilateral  Z90.13 V45.71   2. Bilateral malignant neoplasm of breast in female, estrogen receptor positive, unspecified site of breast (HCC)  C50.911 174.9    Z17.0 V86.0    C50.912    3. Lymphedema  I89.0 457.1   4. Scar condition and fibrosis of skin  L90.5 709.2   5. Joint stiffness  M25.60 719.50   6. Muscle weakness (generalized)  M62.81 728.87   7. Abnormal posture  R29.3 781.92   8. 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 At risk for lymphedema  Z91.89 V49.89   10. Inflammatory breast cancer, right (HCC)  C50.911 174.9   11. Pain  R52 780.96   12. Neoplasm related pain  G89.3 338.3   13. At risk for self care deficit  Z91.89 V49.89         Lymphedema     Row Name 04/11/22 1200             Subjective Pain    Able to rate subjective pain? yes  -SC      Pre-Treatment Pain Level 6  -SC      Post-Treatment Pain Level 4  -SC      Subjective Pain Comment R shoulder with ROM  -SC              Manual Lymphatic Drainage    Manual Lymphatic Drainage initial sequence;opened regional lymph nodes;opened anastamoses;extremity treatment  -SC      Initial Sequence full neck;cervical;supraclavicular;shoulder collectors  -SC      Opened Regional Lymph Nodes axillary;inguinal;other lymph nodes;ribs  parasternal  -SC      Axillary left  -SC      Inguinal right  -SC      Opened Anastamoses anterior axillo-axillary;axillo-inguinal  -SC      Extremity Treatment MLD to full limb  -SC      MLD to Full Limb R UE  -SC      Manual Therapy Therapist providing trigger point release to pec major/minor, trapezius, latissimus dorsi and deltoid to decrease increased muscle tightness and pain and improve pt ROM. Pt states post treatment she felt she had an increase in ROM  -SC              Compression/Skin Care    Compression/Skin Care wrapping location;bandaging  -SC      Wrapping Location upper extremity  chest wall  -SC      Wrapping Location UE right:;fingers to axilla  -SC      Bandage Layers cotton liner;padding/fluff layer;short-stretch bandages (comment size/quantity);cotton elastic stocking- single layer (comment size)  [place]  -SC      Bandaging Technique circumferential/spiral;moderate compression  -SC            User Key  (r) = Recorded By, (t) = Taken By, (c) = Cosigned By    Initials Name Provider Type    Shahrzad Solsi COTA Occupational Therapist Assistant                         OT Assessment/Plan     Row Name 04/11/22 1218          OT Assessment    Assessment Comments Patient will benefit from skilled occupational therapy services for complete decongestive therapy to help stabilize lymphatic functioning and skilled occupational therapy services to help  restore functional range of motion and strength in the upper extremity to achieve independence with IADLs.  -SC     Patient would benefit from skilled therapy intervention Yes  -SC            OT Plan    OT Plan Comments Continue POC  -SC           User Key  (r) = Recorded By, (t) = Taken By, (c) = Cosigned By    Initials Name Provider Type    Shahrzad Solis COTA Occupational Therapist Assistant                    Manual Rx (last 36 hours)     Manual Treatments     Row Name 04/11/22 1223             Total Minutes    89147 - OT Manual Therapy Minutes 46  -SC            User Key  (r) = Recorded By, (t) = Taken By, (c) = Cosigned By    Initials Name Provider Type    Shahrzad Solis COTA Occupational Therapist Assistant                  Therapy Education  Education Details: Therapist recommending pt don Matrix on chest wall for at least 24 hours and then to have  help reapply during day only. Therapist recommending pt keep compression wrapping on R UE for at least 24 hours or until she comes back to treatment in 2 days. Therapist educating pt on importance of staying in compression wraps to decrease fluid accumulation. Therapist recommending pt continue to utilize R UE as much as possible while wraps to assist with fluid reduction.  Given: Symptoms/condition management  Program: New, Reinforced  How Provided: Verbal  Provided to: Patient  Level of Understanding: Verbalized  15029 - OT Self Care/Mgmt Minutes: 10                Time Calculation:   Timed Charges  70699 - OT Manual Therapy Minutes: 46  75811 - OT Self Care/Mgmt Minutes: 10  Untimed Charges  OT Moist Heat Minutes: 5  Total Minutes  Timed Charges Total Minutes: 56  Untimed Charges Total Minutes: 5   Total Minutes: 61     Therapy Charges for Today     Code Description Service Date Service Provider Modifiers Qty    40716521555 HC OT MANUAL THERAPY EA 15 MIN 4/11/2022 Shahrzad Torres COTA GO 3    15643470181 HC OT SELF CARE/MGMT/TRAIN EA 15 MIN  4/11/2022 Shahrzad Torres COTA GO 1                      LUIS M Mcguire  4/11/2022

## 2022-04-13 ENCOUNTER — HOSPITAL ENCOUNTER (OUTPATIENT)
Dept: OCCUPATIONAL THERAPY | Facility: HOSPITAL | Age: 67
Discharge: HOME | End: 2022-04-13
Admitting: SURGERY

## 2022-04-13 DIAGNOSIS — C50.912 BILATERAL MALIGNANT NEOPLASM OF BREAST IN FEMALE, ESTROGEN RECEPTOR POSITIVE, UNSPECIFIED SITE OF BREAST: ICD-10-CM

## 2022-04-13 DIAGNOSIS — M25.60 JOINT STIFFNESS: ICD-10-CM

## 2022-04-13 DIAGNOSIS — Z90.13 HISTORY OF MASTECTOMY, BILATERAL: Primary | ICD-10-CM

## 2022-04-13 DIAGNOSIS — I97.2 POSTMASTECTOMY LYMPHEDEMA SYNDROME: ICD-10-CM

## 2022-04-13 DIAGNOSIS — C50.911 BILATERAL MALIGNANT NEOPLASM OF BREAST IN FEMALE, ESTROGEN RECEPTOR POSITIVE, UNSPECIFIED SITE OF BREAST: ICD-10-CM

## 2022-04-13 DIAGNOSIS — I89.0 LYMPHEDEMA: ICD-10-CM

## 2022-04-13 DIAGNOSIS — L90.5 SCAR CONDITION AND FIBROSIS OF SKIN: ICD-10-CM

## 2022-04-13 DIAGNOSIS — R29.3 ABNORMAL POSTURE: ICD-10-CM

## 2022-04-13 DIAGNOSIS — R52 PAIN: ICD-10-CM

## 2022-04-13 DIAGNOSIS — Z17.0 BILATERAL MALIGNANT NEOPLASM OF BREAST IN FEMALE, ESTROGEN RECEPTOR POSITIVE, UNSPECIFIED SITE OF BREAST: ICD-10-CM

## 2022-04-13 DIAGNOSIS — M62.81 MUSCLE WEAKNESS (GENERALIZED): ICD-10-CM

## 2022-04-13 PROCEDURE — 97535 SELF CARE MNGMENT TRAINING: CPT

## 2022-04-13 PROCEDURE — 97763 ORTHC/PROSTC MGMT SBSQ ENC: CPT

## 2022-04-13 PROCEDURE — 97140 MANUAL THERAPY 1/> REGIONS: CPT

## 2022-04-13 NOTE — THERAPY TREATMENT NOTE
Outpatient Occupational Therapy Lymphedema Treatment Note  [facility]     Patient Name: Aida Conner  : 1955  MRN: [id number]  Today's Date: 2022      Visit Date: 2022    Patient Active Problem List   Diagnosis   • Inflammatory breast cancer, right (HCC)   • Other specified disorders of breast    • Malignant neoplasm of axillary tail of right female breast (HCC)   • Encounter for eye exam due to high risk medication   • Obesity hypoventilation syndrome (HCC)   • Iron adverse reaction   • Functional diarrhea   • Encounter for long-term (current) use of high-risk medication   • Abnormal mammogram   • Allergic rhinitis   • Anemia   • Anxiety disorder   • Breast pain, right   • Depression   • Essential tremor   • Hemorrhoids   • Hyperlipidemia   • Hypertension, essential   • IBS (irritable bowel syndrome)   • Impaired fasting glucose   • Obesity   • Obstructive sleep apnea   • Osteoarthrosis   • Renal calculi   • Restrictive airway disease   • Type II diabetes mellitus (HCC)   • Vitamin deficiency   • Urinary bladder incontinence   • Malignant neoplasm of central portion of right female breast (HCC)   • Osteoporosis without current pathological fracture   • Chronic diastolic congestive heart failure (HCC)   • Coronary artery disease involving native heart without angina pectoris        Past Medical History:   Diagnosis Date   • Anemia    • Anxiety    • Asthma    • Chronic diastolic (congestive) heart failure (HCC)    • Chronic hypoxemic respiratory failure (HCC)     on continuous O2   • Diarrhea     with chemo   • H/O Intraductal papilloma    • Hemorrhoids    • History of transfusion     AFTER BACK SURGERY no reaction   • Hypertension    • IBS (irritable bowel syndrome)    • Inflammatory breast cancer (HCC)     right   • Kidney stone    • Morbid obesity with BMI of 50.0-59.9, adult (HCC)    • Obesity hypoventilation syndrome (HCC) 2021   • On home oxygen therapy     2.5 AT REST WITH  ACTIVITY UP TO 4L   • ANABELLE on CPAP     cpap  & uses O2 with CPAP   • Osteoarthritis    • PONV (postoperative nausea and vomiting)         Past Surgical History:   Procedure Laterality Date   • BREAST EXCISIONAL BIOPSY Bilateral    • COLONOSCOPY     • CYSTOSCOPY BLADDER STONE LITHOTRIPSY     • ENDOSCOPY     • KNEE ARTHROPLASTY Bilateral 2009   • MASTECTOMY Left 8/10/2021    Procedure: LEFT TOTAL MASTECTOMY;  Surgeon: Nicci Banks MD;  Location: [facility] MAIN OR;  Service: General;  Laterality: Left;   • MASTECTOMY Right 8/10/2021    Procedure: RIGHT MODIFIED RADICAL MASTECTOMY;  Surgeon: Nicci Banks MD;  Location: [facility] MAIN OR;  Service: General;  Laterality: Right;   • SPINE SURGERY  1984   • TOTAL ABDOMINAL HYSTERECTOMY WITH SALPINGO OOPHORECTOMY  2004   • UPPER GASTROINTESTINAL ENDOSCOPY     • VENOUS ACCESS DEVICE (PORT) INSERTION N/A 1/25/2021    Procedure: INSERTION VENOUS ACCESS DEVICE;  Surgeon: Nicci Banks MD;  Location: [facility] MAIN OR;  Service: General;  Laterality: N/A;   • VENOUS ACCESS DEVICE (PORT) REMOVAL Left 4/5/2022    Procedure: REMOVAL VENOUS ACCESS DEVICE;  Surgeon: Nicci Banks MD;  Location: [facility] MAIN OR;  Service: General;  Laterality: Left;         Visit Dx:      ICD-10-CM ICD-9-CM   1. History of mastectomy, bilateral  Z90.13 V45.71   2. Bilateral malignant neoplasm of breast in female, estrogen receptor positive, unspecified site of breast (HCC)  C50.911 174.9    Z17.0 V86.0    C50.912    3. Lymphedema  I89.0 457.1   4. Scar condition and fibrosis of skin  L90.5 709.2   5. Joint stiffness  M25.60 719.50   6. Muscle weakness (generalized)  M62.81 728.87   7. Abnormal posture  R29.3 781.92   8. 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 Pain  R52 780.96        Lymphedema     Row Name 04/13/22 1700             Subjective Pain    Able to rate subjective pain? yes  -CH      Pre-Treatment Pain Level 5  -CH      Post-Treatment Pain Level 4  -CH       Subjective Pain Comment Patient continues to have significant shoulder pain that is worsened with range of motion.  -CH              Subjective Comments    Subjective Comments Patient states that after her treatment with Shahrzad she had a lot of relief of shoulder pain that she has not experienced in quite some time.  Patient also stated that she has some difficulty with tolerating the compression wraps due to her having an area superior to the elbow which felt like a pressure wound was starting and almost as if she had a tech tourniquet in that area.  Patient also stated she had a lot of pain in the elbow.  Patient initially discussed focusing more on range of motion and pain management and had indicated the possibility of not addressing the lymphedema if the treatment protocol was going to cause her significant amount of pain and discomfort with the compression wraps.  -CH              Manual Lymphatic Drainage    Manual Lymphatic Drainage initial sequence;opened regional lymph nodes;opened anastamoses;extremity treatment  -CH      Initial Sequence short neck;supraclavicular  -CH      Opened Regional Lymph Nodes axillary;paraspinal  Peristernal, intercostal  -CH      Axillary right  -CH      Opened Anastamoses other opened anastamoses  Axial low-peristernal  -CH      Extremity Treatment MLD to proximal limb only  -CH      Manual Therapy Secondary to time constraints patient performed a modified Samir protocol MLD with patient.  -CH            User Key  (r) = Recorded By, (t) = Taken By, (c) = Cosigned By    Initials Name Provider Type    Ria Colón OT Occupational Therapist                 OT Ortho     Row Name 04/13/22 4012             Orthotics & Prosthetics Management    Orthosis Location upper extremity orthosis  -CH      Additional Documentation Orthosis Location (Row)  -            User Key  (r) = Recorded By, (t) = Taken By, (c) = Cosigned By    Initials Name Provider Type    Ria Colón  OT Occupational Therapist              OT assisted patient in procuring a CircAid reduction arm kit size wide long this date through [employer]       OT Assessment/Plan     Row Name 04/13/22 7298          OT Assessment    Functional Limitations Performance in self-care ADL  -CH     Impairments Impaired lymphatic circulation;Range of motion;Posture;Pain;Muscle strength;Joint mobility  -CH     Assessment Comments Patient is demonstrating hypertonicity and musculature in the entire right upper quadrant both anteriorly posteriorly and into the neck that is creating ongoing pain and poor postural alignment contributing to further orthopedic decline.  Patient also continues to have ongoing lymphedema in the right upper extremity.  Patient will benefit from continued skilled occupational therapy services to decrease pain and improve functional range of motion and improve overall lymphatic dynamics to improve independence with I/ADLs.  -CH            OT Plan    OT Plan Comments Continue POC  -CH           User Key  (r) = Recorded By, (t) = Taken By, (c) = Cosigned By    Initials Name Provider Type    Ria Colón OT Occupational Therapist                    Manual Rx (last 36 hours)     Manual Treatments     Row Name 04/13/22 2188             Total Minutes    02496 - OT Manual Therapy Minutes 25  -CH            User Key  (r) = Recorded By, (t) = Taken By, (c) = Cosigned By    Initials Name Provider Type    Ria Colón OT Occupational Therapist                  Therapy Education  Education Details: Patient had attempted to come up with alternatives to having compression that would cut into the elbow area and create the tourniquet.  OT educated patient that we could not leave the compression off of the elbow only as compression above and below that would only result in swelling to increase in the elbow region.  OT educated patient on possible alternatives to how we wrap to increase comfort and inability  to tolerate compression to manage the lymphedema.  OT also educated patient on the the effect of not treating the lymphedema and how it can progress through stages therefore we need to continue with complete decongestive therapy to include compression manual lymphatic drainage and addressing the range of motion and strength in that shoulder.  Patient was very understanding and did related that she understood the consequences of not addressing it.  Patient was also very adamant that if she had the same experience that she had with her first compression wrap that she would not be able to tolerate it per the protocol and felt that she would not make progress towards improving the overall lymphedema.  OT did educate patient about the CircAid upper extremity reduction kit and how it would be a lighter more tolerable garment.  Given: Symptoms/condition management  Program: New, Reinforced  How Provided: Verbal  Provided to: Patient  Level of Understanding: Verbalized  91159 - OT Self Care/Mgmt Minutes: 20                Time Calculation:   Timed Charges  93632 - OT Manual Therapy Minutes: 25  08191 - OT Self Care/Mgmt Minutes: 20  47001 - OT Orthotic Management: 10  Total Minutes  Timed Charges Total Minutes: 55   Total Minutes: 55     Therapy Charges for Today     Code Description Service Date Service Provider Modifiers Qty    87608735735 HC OT MANUAL THERAPY EA 15 MIN 4/13/2022 Ria Walker OT GO 2    48840009420 HC OT SELF CARE/MGMT/TRAIN EA 15 MIN 4/13/2022 Ria Walker OT GO 1    [id number]  OT ORTHOTIC(S)/PROSTHETIC(S) ENCOUNTER, EACH 15 MIN 4/13/2022 Ria Walker OT  1                      Ria Walker OT  4/13/2022

## 2022-04-20 ENCOUNTER — HOSPITAL ENCOUNTER (OUTPATIENT)
Dept: OCCUPATIONAL THERAPY | Facility: HOSPITAL | Age: 67
Setting detail: THERAPIES SERIES
Discharge: HOME | End: 2022-04-20

## 2022-04-20 DIAGNOSIS — Z90.13 HISTORY OF MASTECTOMY, BILATERAL: ICD-10-CM

## 2022-04-20 DIAGNOSIS — L90.5 SCAR CONDITION AND FIBROSIS OF SKIN: ICD-10-CM

## 2022-04-20 DIAGNOSIS — C50.911 BILATERAL MALIGNANT NEOPLASM OF BREAST IN FEMALE, ESTROGEN RECEPTOR POSITIVE, UNSPECIFIED SITE OF BREAST: ICD-10-CM

## 2022-04-20 DIAGNOSIS — Z17.0 BILATERAL MALIGNANT NEOPLASM OF BREAST IN FEMALE, ESTROGEN RECEPTOR POSITIVE, UNSPECIFIED SITE OF BREAST: ICD-10-CM

## 2022-04-20 DIAGNOSIS — C50.912 BILATERAL MALIGNANT NEOPLASM OF BREAST IN FEMALE, ESTROGEN RECEPTOR POSITIVE, UNSPECIFIED SITE OF BREAST: ICD-10-CM

## 2022-04-20 DIAGNOSIS — R52 PAIN: ICD-10-CM

## 2022-04-20 DIAGNOSIS — I89.0 LYMPHEDEMA: Primary | ICD-10-CM

## 2022-04-20 DIAGNOSIS — R29.3 ABNORMAL POSTURE: ICD-10-CM

## 2022-04-20 DIAGNOSIS — M25.60 JOINT STIFFNESS: ICD-10-CM

## 2022-04-20 DIAGNOSIS — I97.2 POSTMASTECTOMY LYMPHEDEMA SYNDROME: ICD-10-CM

## 2022-04-20 PROCEDURE — 97140 MANUAL THERAPY 1/> REGIONS: CPT

## 2022-04-20 NOTE — THERAPY TREATMENT NOTE
Outpatient Occupational Therapy Lymphedema Treatment Note  [facility]     Patient Name: Aida Conner  : 1955  MRN: [id number]  Today's Date: 2022      Visit Date: 2022    Patient Active Problem List   Diagnosis   • Inflammatory breast cancer, right (HCC)   • Other specified disorders of breast    • Malignant neoplasm of axillary tail of right female breast (HCC)   • Encounter for eye exam due to high risk medication   • Obesity hypoventilation syndrome (HCC)   • Iron adverse reaction   • Functional diarrhea   • Encounter for long-term (current) use of high-risk medication   • Abnormal mammogram   • Allergic rhinitis   • Anemia   • Anxiety disorder   • Breast pain, right   • Depression   • Essential tremor   • Hemorrhoids   • Hyperlipidemia   • Hypertension, essential   • IBS (irritable bowel syndrome)   • Impaired fasting glucose   • Obesity   • Obstructive sleep apnea   • Osteoarthrosis   • Renal calculi   • Restrictive airway disease   • Type II diabetes mellitus (HCC)   • Vitamin deficiency   • Urinary bladder incontinence   • Malignant neoplasm of central portion of right female breast (HCC)   • Osteoporosis without current pathological fracture   • Chronic diastolic congestive heart failure (HCC)   • Coronary artery disease involving native heart without angina pectoris        Past Medical History:   Diagnosis Date   • Anemia    • Anxiety    • Asthma    • Chronic diastolic (congestive) heart failure (HCC)    • Chronic hypoxemic respiratory failure (HCC)     on continuous O2   • Diarrhea     with chemo   • H/O Intraductal papilloma    • Hemorrhoids    • History of transfusion     AFTER BACK SURGERY no reaction   • Hypertension    • IBS (irritable bowel syndrome)    • Inflammatory breast cancer (HCC)     right   • Kidney stone    • Morbid obesity with BMI of 50.0-59.9, adult (HCC)    • Obesity hypoventilation syndrome (HCC) 2021   • On home oxygen therapy     2.5 AT REST WITH  ACTIVITY UP TO 4L   • ANABELLE on CPAP     cpap  & uses O2 with CPAP   • Osteoarthritis    • PONV (postoperative nausea and vomiting)         Past Surgical History:   Procedure Laterality Date   • BREAST EXCISIONAL BIOPSY Bilateral    • COLONOSCOPY     • CYSTOSCOPY BLADDER STONE LITHOTRIPSY     • ENDOSCOPY     • KNEE ARTHROPLASTY Bilateral 2009   • MASTECTOMY Left 8/10/2021    Procedure: LEFT TOTAL MASTECTOMY;  Surgeon: Nicci Banks MD;  Location: [facility] MAIN OR;  Service: General;  Laterality: Left;   • MASTECTOMY Right 8/10/2021    Procedure: RIGHT MODIFIED RADICAL MASTECTOMY;  Surgeon: Nicci Banks MD;  Location: [facility] MAIN OR;  Service: General;  Laterality: Right;   • SPINE SURGERY  1984   • TOTAL ABDOMINAL HYSTERECTOMY WITH SALPINGO OOPHORECTOMY  2004   • UPPER GASTROINTESTINAL ENDOSCOPY     • VENOUS ACCESS DEVICE (PORT) INSERTION N/A 1/25/2021    Procedure: INSERTION VENOUS ACCESS DEVICE;  Surgeon: Nicci Banks MD;  Location: [facility] MAIN OR;  Service: General;  Laterality: N/A;   • VENOUS ACCESS DEVICE (PORT) REMOVAL Left 4/5/2022    Procedure: REMOVAL VENOUS ACCESS DEVICE;  Surgeon: Nicci Banks MD;  Location: [facility] MAIN OR;  Service: General;  Laterality: Left;         Visit Dx:      ICD-10-CM ICD-9-CM   1. 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 Bilateral malignant neoplasm of breast in female, estrogen receptor positive, unspecified site of breast (HCC)  C50.911 174.9    Z17.0 V86.0    C50.912    4. History of mastectomy, bilateral  Z90.13 V45.71   5. Abnormal posture  R29.3 781.92   6. Pain  R52 780.96   7. Joint stiffness  M25.60 719.50   8. Scar condition and fibrosis of skin  L90.5 709.2        Lymphedema     Row Name 04/20/22 0800             Subjective Pain    Able to rate subjective pain? yes  -CH      Pre-Treatment Pain Level 5  -CH      Post-Treatment Pain Level 3  -CH      Subjective Pain Comment Patient states that for  "several days after treatment her shoulder felt better but with more time passing it began to be painful again.  She stated that she did attempt to do some of the massage techniques that we utilized in clinic but could not find the \"right spots\"  -CH              Subjective Comments    Subjective Comments Patient states she is noticing more swelling accumulation especially when she did not have compression on.  -CH              Skin Changes/Observations    Location/Assessment Upper Quadrant  -CH      Upper Quadrant Conditions intact;right:  -CH      Upper Quadrant Color/Pigment right:;normal  -CH              Manual Lymphatic Drainage    Manual Lymphatic Drainage initial sequence;opened regional lymph nodes;opened anastamoses;extremity treatment  -CH      Initial Sequence short neck;supraclavicular  -CH      Opened Regional Lymph Nodes axillary;paraspinal  Peristernal, intercostal  -CH      Axillary right  -CH      Opened Anastamoses other opened anastamoses  Axial low-peristernal  -CH      Extremity Treatment MLD to proximal limb only  -CH      MLD to Full Limb R UE  -      Manual Therapy Therapist provided patient with trigger point release and myofascial release to the right upper quadrant particularly to pectoralis major pectoralis minor teres major teres minor latissimus dorsi insertion site at the inferior angle of scapula and upper trap/supraspinatus.  -CH              Compression/Skin Care    Compression/Skin Care wrapping location;bandaging  -CH      Wrapping Location upper extremity  -CH      Wrapping Location UE right:;fingers to axilla  -CH      Bandage Layers cotton liner;padding/fluff layer;short-stretch bandages (comment size/quantity)  -CH      Bandaging Technique circumferential/spiral;moderate compression  -CH            User Key  (r) = Recorded By, (t) = Taken By, (c) = Cosigned By    Initials Name Provider Type    CH Ria Walker OT Occupational Therapist                         OT " Assessment/Plan     Row Name 04/20/22 1725          OT Assessment    Functional Limitations Performance in self-care ADL  -     Impairments Impaired lymphatic circulation;Range of motion;Posture;Pain;Muscle strength;Joint mobility  -     Assessment Comments Patient will benefit from continued skilled occupational therapy services to help decrease pain and muscle tightening in the right upper quadrant as well as assist with reduction of lymph volume until a plateau is achieved.  -CH            OT Plan    OT Plan Comments Continue POC  -CH           User Key  (r) = Recorded By, (t) = Taken By, (c) = Cosigned By    Initials Name Provider Type    Ria Colón OT Occupational Therapist                    Manual Rx (last 36 hours)     Manual Treatments     Row Name 04/20/22 1727             Total Minutes    98792 - OT Manual Therapy Minutes 55  -CH            User Key  (r) = Recorded By, (t) = Taken By, (c) = Cosigned By    Initials Name Provider Type    Ria Colón OT Occupational Therapist                  Therapy Education  Education Details: OT inform patient that OT had just received a email indicating that her garment was ordered and shipped on 4/18/2022.  OT completed documentation on the [employer] website so that the patient will get updates on when her garment will be ordered.  Given: Symptoms/condition management  Program: New, Reinforced  How Provided: Verbal  Provided to: Patient  Level of Understanding: Verbalized                Time Calculation:   Timed Charges  72138 - OT Manual Therapy Minutes: 55  Total Minutes  Timed Charges Total Minutes: 55   Total Minutes: 55     Therapy Charges for Today     Code Description Service Date Service Provider Modifiers Qty    [id number]  OT MANUAL THERAPY EA 15 MIN 4/20/2022 Ria Walker OT GO 4                      Ria Walker OT  4/20/2022

## 2022-04-21 ENCOUNTER — HOSPITAL ENCOUNTER (OUTPATIENT)
Dept: CARDIOLOGY | Facility: HOSPITAL | Age: 67
Discharge: HOME | End: 2022-04-21
Admitting: INTERNAL MEDICINE

## 2022-04-21 ENCOUNTER — OFFICE VISIT (OUTPATIENT)
Dept: CARDIOLOGY | Facility: CLINIC | Age: 67
End: 2022-04-21

## 2022-04-21 VITALS
DIASTOLIC BLOOD PRESSURE: 64 MMHG | HEART RATE: 70 BPM | WEIGHT: 293 LBS | SYSTOLIC BLOOD PRESSURE: 120 MMHG | BODY MASS INDEX: 48.82 KG/M2 | HEIGHT: 65 IN

## 2022-04-21 VITALS
HEART RATE: 76 BPM | WEIGHT: 293 LBS | OXYGEN SATURATION: 98 % | BODY MASS INDEX: 48.82 KG/M2 | HEIGHT: 65 IN | SYSTOLIC BLOOD PRESSURE: 128 MMHG | DIASTOLIC BLOOD PRESSURE: 80 MMHG

## 2022-04-21 DIAGNOSIS — I50.32 CHRONIC DIASTOLIC CONGESTIVE HEART FAILURE: ICD-10-CM

## 2022-04-21 DIAGNOSIS — Z79.899 ENCOUNTER FOR LONG-TERM (CURRENT) USE OF HIGH-RISK MEDICATION: ICD-10-CM

## 2022-04-21 DIAGNOSIS — I25.10 CORONARY ARTERY CALCIFICATION SEEN ON CT SCAN: Primary | ICD-10-CM

## 2022-04-21 DIAGNOSIS — I10 HYPERTENSION, ESSENTIAL: ICD-10-CM

## 2022-04-21 DIAGNOSIS — E78.2 MIXED HYPERLIPIDEMIA: ICD-10-CM

## 2022-04-21 DIAGNOSIS — E11.42 TYPE 2 DIABETES MELLITUS WITH DIABETIC POLYNEUROPATHY, WITHOUT LONG-TERM CURRENT USE OF INSULIN: ICD-10-CM

## 2022-04-21 DIAGNOSIS — C50.911 INFLAMMATORY BREAST CANCER, RIGHT: ICD-10-CM

## 2022-04-21 DIAGNOSIS — I34.0 NONRHEUMATIC MITRAL VALVE REGURGITATION: ICD-10-CM

## 2022-04-21 LAB
BH CV ECHO LEFT VENTRICLE GLOBAL LONGITUDINAL STRAIN: -22.4 %
BH CV ECHO MEAS - EDV(CUBED): 283.1 ML
BH CV ECHO MEAS - EDV(MOD-SP2): 180 ML
BH CV ECHO MEAS - EDV(MOD-SP4): 205 ML
BH CV ECHO MEAS - EF(MOD-BP): 64.4 %
BH CV ECHO MEAS - EF(MOD-SP2): 63.3 %
BH CV ECHO MEAS - EF(MOD-SP4): 66.8 %
BH CV ECHO MEAS - ESV(CUBED): 88.9 ML
BH CV ECHO MEAS - ESV(MOD-SP2): 66 ML
BH CV ECHO MEAS - ESV(MOD-SP4): 68 ML
BH CV ECHO MEAS - FS: 32 %
BH CV ECHO MEAS - IVS/LVPW: 0.99 CM
BH CV ECHO MEAS - IVSD: 1.15 CM
BH CV ECHO MEAS - LAT PEAK E' VEL: 9 CM/SEC
BH CV ECHO MEAS - LV DIASTOLIC VOL/BSA (35-75): 84.9 CM2
BH CV ECHO MEAS - LV MASS(C)D: 346.9 GRAMS
BH CV ECHO MEAS - LV SYSTOLIC VOL/BSA (12-30): 28.2 CM2
BH CV ECHO MEAS - LVIDD: 6.6 CM
BH CV ECHO MEAS - LVIDS: 4.5 CM
BH CV ECHO MEAS - LVPWD: 1.16 CM
BH CV ECHO MEAS - MED PEAK E' VEL: 96.8 CM/SEC
BH CV ECHO MEAS - MR MAX PG: 138.3 MMHG
BH CV ECHO MEAS - MR MAX VEL: 588.1 CM/SEC
BH CV ECHO MEAS - MV A MAX VEL: 107.1 CM/SEC
BH CV ECHO MEAS - MV DEC TIME: 0.15 MSEC
BH CV ECHO MEAS - MV E MAX VEL: 87.6 CM/SEC
BH CV ECHO MEAS - MV E/A: 0.82
BH CV ECHO MEAS - PULM A REVS DUR: 0.11 SEC
BH CV ECHO MEAS - PULM A REVS VEL: 33.7 CM/SEC
BH CV ECHO MEAS - PULM DIAS VEL: 51.4 CM/SEC
BH CV ECHO MEAS - PULM SYS VEL: 35.1 CM/SEC
BH CV ECHO MEAS - SI(MOD-SP2): 47.2 ML/M2
BH CV ECHO MEAS - SI(MOD-SP4): 56.7 ML/M2
BH CV ECHO MEAS - SV(MOD-SP2): 114 ML
BH CV ECHO MEAS - SV(MOD-SP4): 137 ML
BH CV ECHO MEASUREMENTS AVERAGE E/E' RATIO: 1.66
LEFT ATRIUM VOLUME INDEX: 21.6 ML/M2
LV EF 2D ECHO EST: 55 %
MAXIMAL PREDICTED HEART RATE: 151 BPM
STRESS TARGET HR: 128 BPM

## 2022-04-21 PROCEDURE — 93356 MYOCRD STRAIN IMG SPCKL TRCK: CPT

## 2022-04-21 PROCEDURE — 93325 DOPPLER ECHO COLOR FLOW MAPG: CPT

## 2022-04-21 PROCEDURE — 93325 DOPPLER ECHO COLOR FLOW MAPG: CPT | Performed by: INTERNAL MEDICINE

## 2022-04-21 PROCEDURE — 93321 DOPPLER ECHO F-UP/LMTD STD: CPT

## 2022-04-21 PROCEDURE — 25010000002 PERFLUTREN (DEFINITY) 8.476 MG IN SODIUM CHLORIDE (PF) 0.9 % 10 ML INJECTION: Performed by: INTERNAL MEDICINE

## 2022-04-21 PROCEDURE — 93308 TTE F-UP OR LMTD: CPT

## 2022-04-21 PROCEDURE — 93000 ELECTROCARDIOGRAM COMPLETE: CPT | Performed by: INTERNAL MEDICINE

## 2022-04-21 PROCEDURE — 93308 TTE F-UP OR LMTD: CPT | Performed by: INTERNAL MEDICINE

## 2022-04-21 PROCEDURE — 93321 DOPPLER ECHO F-UP/LMTD STD: CPT | Performed by: INTERNAL MEDICINE

## 2022-04-21 PROCEDURE — 93356 MYOCRD STRAIN IMG SPCKL TRCK: CPT | Performed by: INTERNAL MEDICINE

## 2022-04-21 PROCEDURE — 99214 OFFICE O/P EST MOD 30 MIN: CPT | Performed by: INTERNAL MEDICINE

## 2022-04-21 RX ADMIN — PERFLUTREN 1.5 ML: 6.52 INJECTION, SUSPENSION INTRAVENOUS at 09:41

## 2022-04-21 NOTE — PROGRESS NOTES
Date of Office Visit: 22  Encounter Provider: Bradley Garza MD  Place of Service: [facility] CARDIOLOGY  Patient Name: Aida Conner  :1955    Chief Complaint   Patient presents with   • Malignant neoplasm of axillary tail of right breast in fema   • Follow-up   :     HPI:     Ms. Conner is 67 y.o. and presents today to follow up. I met her in 2021. I have reviewed prior notes and there are no changes except for any new updates described below. I have also reviewed any information entered into the medical record by the patient or by ancillary staff.     She has chronic diastolic congestive heart failure.  She has obstructive sleep apnea and is on CPAP.  She has obesity hypoventilation syndrome and actually has to use oxygen at all times.  She has systemic hypertension.  She has coronary artery calcification seen on a chest CT but does not carry a diagnosis of coronary artery disease.  She has type 2 diabetes, hyperlipidemia, and is morbidly obese.    She was diagnosed with right sided inflammatory breast cancer (ER/MS/Her2+) in late . She had an echo in 2021 that showed normal LVSF, grade 1 diastolic dysfunction, and normal GLS -21%. aclitaxel, trastuzumab, and pertuzumab on February 10.  She had an echo performed in our office on .  Revealed normal left ventricular systolic function, ejection fraction 64%, grade 1 diastolic dysfunction, normal global longitudinal strain of -21%, and normal RVSP.     She was intended to take doxorubicin as well as trastuzumab/pertuzumab. She was already on carvedilol, and I added lisinopril. While on doxorubicin, there was a recommendation to switch her beta blocker due to concerns that it could increase doxorubicin levels, but she was reluctant to do so as it had been started by her primary cardiologist.     Ultimately, she received only 3 doses of doxorubicin/cyclophosphamide before it was stopped due to  intolerance. Paclitaxel caused pneumonitis and had to be stopped. She did not tolerate pertuzumab, and she finished trastuzumab on March 31, 2022. She has been on anastrozole.     She has had serial echocardiograms since April 2021; she had one today. All have been stable (LVEF 60-65%, GLS -21-22%).    Her cardiac status is stable; she has chronic dyspnea and is on oxygen. She has no chest pain, palpitations, or syncope. She had been taking 20mg of furosemide daily, but has required some additional diuretics depending on her pedal edema. She denies orthopnea.     Past Medical History:   Diagnosis Date   • Anemia    • Anxiety    • Asthma    • Chronic diastolic (congestive) heart failure (HCC)    • Chronic hypoxemic respiratory failure (HCC)     on continuous O2   • Coronary artery calcification seen on CT scan 4/21/2022   • Diarrhea     with chemo   • H/O Intraductal papilloma    • Hemorrhoids    • History of transfusion 1984    AFTER BACK SURGERY no reaction   • Hypertension    • IBS (irritable bowel syndrome)    • Inflammatory breast cancer (HCC)     right   • Kidney stone    • Morbid obesity with BMI of 50.0-59.9, adult (HCC)    • Obesity hypoventilation syndrome (HCC) 02/05/2021   • On home oxygen therapy     2.5 AT REST WITH ACTIVITY UP TO 4L   • ANABELLE on CPAP     cpap  & uses O2 with CPAP   • Osteoarthritis    • PONV (postoperative nausea and vomiting)        Past Surgical History:   Procedure Laterality Date   • BREAST EXCISIONAL BIOPSY Bilateral    • COLONOSCOPY     • CYSTOSCOPY BLADDER STONE LITHOTRIPSY     • ENDOSCOPY     • KNEE ARTHROPLASTY Bilateral 2009   • MASTECTOMY Left 8/10/2021    Procedure: LEFT TOTAL MASTECTOMY;  Surgeon: Nicci Banks MD;  Location: [facility];  Service: General;  Laterality: Left;   • MASTECTOMY Right 8/10/2021    Procedure: RIGHT MODIFIED RADICAL MASTECTOMY;  Surgeon: Nicci Banks MD;  Location: [facility] OR;  Service: General;  Laterality: Right;   • SPINE  SURGERY  1984   • TOTAL ABDOMINAL HYSTERECTOMY WITH SALPINGO OOPHORECTOMY  2004   • UPPER GASTROINTESTINAL ENDOSCOPY     • VENOUS ACCESS DEVICE (PORT) INSERTION N/A 1/25/2021    Procedure: INSERTION VENOUS ACCESS DEVICE;  Surgeon: Nicci Banks MD;  Location: [facility] OR;  Service: General;  Laterality: N/A;   • VENOUS ACCESS DEVICE (PORT) REMOVAL Left 4/5/2022    Procedure: REMOVAL VENOUS ACCESS DEVICE;  Surgeon: Nicci Banks MD;  Location: [facility] OR;  Service: General;  Laterality: Left;       Social History     Socioeconomic History   • Marital status:    • Number of children: 3   Tobacco Use   • Smoking status: Never Smoker   • Smokeless tobacco: Never Used   Vaping Use   • Vaping Use: Never used   Substance and Sexual Activity   • Alcohol use: Never   • Drug use: Never   • Sexual activity: Defer       Family History   Problem Relation Age of Onset   • Breast cancer Mother 45   • Colon cancer Paternal Grandmother    • Breast cancer Maternal Aunt 70   • Breast cancer Paternal Aunt 70   • Lung cancer Father    • Hodgkin's lymphoma Father    • Skin cancer Father         squamous cell   • Ovarian cancer Neg Hx    • Uterine cancer Neg Hx    • Deep vein thrombosis Neg Hx    • Pulmonary embolism Neg Hx    • Malig Hyperthermia Neg Hx        Review of Systems   Constitutional: Positive for malaise/fatigue.   Cardiovascular: Positive for leg swelling.   Respiratory: Positive for shortness of breath.    Skin: Positive for poor wound healing and suspicious lesions.   All other systems reviewed and are negative.      Allergies   Allergen Reactions   • Amlodipine Swelling     LEGS    • Hydrochlorothiazide Unknown - Low Severity     Neuro issues   • Morphine Nausea And Vomiting     hallucinations   • Adhesive Tape Rash         Current Outpatient Medications:   •  albuterol sulfate  (90 Base) MCG/ACT inhaler, Inhale 2 puffs Every 4 (Four) Hours As Needed., Disp: , Rfl:   •  anastrozole  (ARIMIDEX) 1 MG tablet, Take 1 mg by mouth Daily., Disp: , Rfl:   •  carvedilol (COREG) 25 MG tablet, Take 0.5 tablets by mouth 2 (Two) Times a Day With Meals., Disp: 30 tablet, Rfl: 0  •  Cholecalciferol (Vitamin D) 50 MCG (2000 UT) capsule, Take 2,000 Units by mouth Daily., Disp: , Rfl:   •  dicyclomine (BENTYL) 20 MG tablet, Take 1 tablet by mouth Every 6 (Six) Hours., Disp: 60 tablet, Rfl: 2  •  diphenhydrAMINE (BENADRYL) 25 mg capsule, Take 25 mg by mouth At Night As Needed for Itching, Allergies or Sleep., Disp: , Rfl:   •  diphenoxylate-atropine (LOMOTIL) 2.5-0.025 MG per tablet, Take 1 tablet by mouth 4 (Four) Times a Day As Needed for Diarrhea., Disp: 120 tablet, Rfl: 0  •  FLUoxetine (PROzac) 40 MG capsule, TAKE 1 CAPSULE BY MOUTH EVERY DAY, Disp: 30 capsule, Rfl: 0  •  furosemide (LASIX) 40 MG tablet, TAKE ONE-HALF TABLET BY MOUTH EVERY DAY, Disp: 45 tablet, Rfl: 1  •  Lidocaine Viscous HCl (XYLOCAINE) 2 % solution, Apply 5 mL topically to the appropriate area as directed As Needed for Mild Pain  (mix with Silvadene)., Disp: 100 mL, Rfl: 1  •  Lidocaine, Anorectal, (LMX 5) 5 % cream cream, Apply  topically to the appropriate area as directed 3 (Three) Times a Day As Needed (hemmorrhoid pain)., Disp: 45 g, Rfl: 2  •  lisinopril (PRINIVIL,ZESTRIL) 5 MG tablet, TAKE 1 TABLET BY MOUTH EVERY DAY, Disp: 30 tablet, Rfl: 6  •  meloxicam (MOBIC) 15 MG tablet, take 1/2 tablet BY MOUTH EVERY DAY, Disp: 45 tablet, Rfl: 1  •  miconazole (Lotrimin AF) 2 % powder, Apply  topically to the appropriate area as directed 2 (two) times a day., Disp: 85 g, Rfl: 1  •  mometasone-formoterol (DULERA 100) 100-5 MCG/ACT inhaler, Inhale 2 Puffs/kg Every Night., Disp: , Rfl:   •  montelukast (SINGULAIR) 10 MG tablet, Take 10 mg by mouth Every Night., Disp: , Rfl:   •  multivitamin (THERAGRAN) tablet tablet, Take 1 tablet by mouth Daily., Disp: , Rfl:   •  O2 (OXYGEN), Inhale 3 L/min Continuous., Disp: , Rfl:   •  omeprazole  "(priLOSEC) 20 MG capsule, TAKE 1 CAPSULE BY MOUTH EVERY DAY, Disp: 30 capsule, Rfl: 3  •  ondansetron (ZOFRAN) 8 MG tablet, Take 1 tablet by mouth 3 (Three) Times a Day As Needed for Nausea or Vomiting., Disp: 30 tablet, Rfl: 5  •  Potassium 99 MG tablet, Take 1 tablet by mouth Every Night., Disp: , Rfl:   •  pravastatin (PRAVACHOL) 40 MG tablet, Take 40 mg by mouth Every Night., Disp: , Rfl:   •  psyllium (METAMUCIL) 0.52 g capsule, Metamucil 0.52 gram oral capsule take 1 capsule by oral route 5Xday   Active, Disp: , Rfl:   •  riFAXIMin (Xifaxan) 550 MG tablet, Take 1 tablet by mouth Daily., Disp: 28 tablet, Rfl: 2  •  silver sulfadiazine (SILVADENE, SSD) 1 % cream, Apply 1 application topically to the appropriate area as directed 2 (Two) Times a Day., Disp: 50 g, Rfl: 1  •  spironolactone (ALDACTONE) 25 MG tablet, Take 25 mg by mouth Daily., Disp: , Rfl:   •  gabapentin (NEURONTIN) 100 MG capsule, Take 1 capsule by mouth 3 (Three) Times a Day for 30 days., Disp: 90 capsule, Rfl: 1  No current facility-administered medications for this visit.      Objective:     Vitals:    04/21/22 1040   BP: 120/64   BP Location: Left arm   Pulse: 70   Weight: (!) 147 kg (324 lb)   Height: 165.1 cm (65\")     Body mass index is 53.92 kg/m².    Vitals reviewed.   Constitutional:       Appearance: Not in distress. Morbidly obese.      Comments: On oxygen. Morbidly obese.   Eyes:      Conjunctiva/sclera: Conjunctivae normal.   HENT:      Head: Normocephalic.      Nose: Nose normal.         Comments: Masked  Neck:      Comments: Cannot assess for JVD due to habitus  Pulmonary:      Effort: Pulmonary effort is normal.      Breath sounds: Normal breath sounds.   Cardiovascular:      Normal rate. Regular rhythm.      Murmurs: There is no murmur.   Pulses:     Intact distal pulses.   Edema:     Ankle: bilateral 2+ edema of the ankle.     Feet: bilateral 2+ edema of the feet.  Abdominal:      Palpations: Abdomen is soft.      Tenderness: " There is no abdominal tenderness.      Comments: Obesity limits abdominal exam   Musculoskeletal: Normal range of motion.      Cervical back: Normal range of motion. Skin:     General: Skin is warm and dry.      Findings: No rash.   Neurological:      General: No focal deficit present.      Mental Status: Alert and oriented to person, place, and time.      Cranial Nerves: No cranial nerve deficit.   Psychiatric:         Behavior: Behavior normal.         Thought Content: Thought content normal.         Judgment: Judgment normal.           ECG 12 Lead    Date/Time: 4/21/2022 3:12 PM  Performed by: Bradley Garza MD  Authorized by: Bradley Garza MD   Comparison: compared with previous ECG   Similar to previous ECG  Rhythm: sinus rhythm  Conduction: right bundle branch block and left anterior fascicular block  ST Segments: ST segments normal  T Waves: T waves normal  QRS axis: left  Other: no other findings    Clinical impression: abnormal EKG            Assessment:       Diagnosis Plan   1. Coronary artery calcification seen on CT scan     2. Mixed hyperlipidemia     3. Chronic diastolic congestive heart failure (HCC)  Adult Transthoracic Echo Limited W/ Cont if Necessary Per Protocol   4. Nonrheumatic mitral valve regurgitation     5. Hypertension, essential     6. Inflammatory breast cancer, right (HCC)     7. Type 2 diabetes mellitus with diabetic polyneuropathy, without long-term current use of insulin (HCC)        Plan:       Ms. Conner has chronic diastolic congestive heart failure, coronary artery calcification without symptoms of angina, systemic hypertension, sleep apnea, obesity hypoventilation syndrome on chronic oxygen, type 2 diabetes, and she is morbidly obese.      She only received three doses of doxorobucin (she did not tolerate it well). She did not tolerate pertuzumab. She completed trastuzumab on March 31, 2022. Serial echocardiograms have been normal and stable; I will repeat this in six months.   Today's echo did show mild-moderate MR.     She will remain on carvedilol, lisinopril, furosemide, and spironolactone. Her BP is well controlled.     She is now on anastrozole; we will continue to try to control her cardiac risk factors while on this medication.     Sincerely,       Bradley Garza MD

## 2022-04-22 ENCOUNTER — HOSPITAL ENCOUNTER (OUTPATIENT)
Dept: OCCUPATIONAL THERAPY | Facility: HOSPITAL | Age: 67
Setting detail: THERAPIES SERIES
Discharge: HOME | End: 2022-04-22

## 2022-04-22 DIAGNOSIS — I89.0 LYMPHEDEMA: Primary | ICD-10-CM

## 2022-04-22 DIAGNOSIS — G89.3 NEOPLASM RELATED PAIN: ICD-10-CM

## 2022-04-22 DIAGNOSIS — C50.911 BILATERAL MALIGNANT NEOPLASM OF BREAST IN FEMALE, ESTROGEN RECEPTOR POSITIVE, UNSPECIFIED SITE OF BREAST: ICD-10-CM

## 2022-04-22 DIAGNOSIS — M25.60 JOINT STIFFNESS: ICD-10-CM

## 2022-04-22 DIAGNOSIS — Z17.0 BILATERAL MALIGNANT NEOPLASM OF BREAST IN FEMALE, ESTROGEN RECEPTOR POSITIVE, UNSPECIFIED SITE OF BREAST: ICD-10-CM

## 2022-04-22 DIAGNOSIS — L90.5 SCAR CONDITION AND FIBROSIS OF SKIN: ICD-10-CM

## 2022-04-22 DIAGNOSIS — Z90.13 HISTORY OF MASTECTOMY, BILATERAL: ICD-10-CM

## 2022-04-22 DIAGNOSIS — Z91.89 AT RISK FOR SELF CARE DEFICIT: ICD-10-CM

## 2022-04-22 DIAGNOSIS — I97.2 POSTMASTECTOMY LYMPHEDEMA SYNDROME: ICD-10-CM

## 2022-04-22 DIAGNOSIS — R52 PAIN: ICD-10-CM

## 2022-04-22 DIAGNOSIS — C50.911 INFLAMMATORY BREAST CANCER, RIGHT: ICD-10-CM

## 2022-04-22 DIAGNOSIS — R29.3 ABNORMAL POSTURE: ICD-10-CM

## 2022-04-22 DIAGNOSIS — C50.912 BILATERAL MALIGNANT NEOPLASM OF BREAST IN FEMALE, ESTROGEN RECEPTOR POSITIVE, UNSPECIFIED SITE OF BREAST: ICD-10-CM

## 2022-04-22 DIAGNOSIS — M62.81 MUSCLE WEAKNESS (GENERALIZED): ICD-10-CM

## 2022-04-22 DIAGNOSIS — Z91.89 AT RISK FOR LYMPHEDEMA: ICD-10-CM

## 2022-04-22 PROCEDURE — 97535 SELF CARE MNGMENT TRAINING: CPT

## 2022-04-22 PROCEDURE — 97140 MANUAL THERAPY 1/> REGIONS: CPT

## 2022-04-22 NOTE — THERAPY TREATMENT NOTE
Outpatient Occupational Therapy Lymphedema Treatment Note  [facility]     Patient Name: Aida Conner  : 1955  MRN: [id number]  Today's Date: 2022      Visit Date: 2022    Patient Active Problem List   Diagnosis   • Inflammatory breast cancer, right (HCC)   • Other specified disorders of breast    • Malignant neoplasm of axillary tail of right female breast (HCC)   • Encounter for eye exam due to high risk medication   • Obesity hypoventilation syndrome (HCC)   • Iron adverse reaction   • Functional diarrhea   • Encounter for long-term (current) use of high-risk medication   • Abnormal mammogram   • Allergic rhinitis   • Anemia   • Anxiety disorder   • Breast pain, right   • Depression   • Essential tremor   • Hemorrhoids   • Hyperlipidemia   • Hypertension, essential   • IBS (irritable bowel syndrome)   • Impaired fasting glucose   • Obesity   • Obstructive sleep apnea   • Osteoarthrosis   • Renal calculi   • Restrictive airway disease   • Type II diabetes mellitus (HCC)   • Vitamin deficiency   • Urinary bladder incontinence   • Malignant neoplasm of central portion of right female breast (HCC)   • Osteoporosis without current pathological fracture   • Chronic diastolic congestive heart failure (HCC)   • Coronary artery calcification seen on CT scan        Past Medical History:   Diagnosis Date   • Anemia    • Anxiety    • Asthma    • Chronic diastolic (congestive) heart failure (HCC)    • Chronic hypoxemic respiratory failure (HCC)     on continuous O2   • Coronary artery calcification seen on CT scan 2022   • Diarrhea     with chemo   • H/O Intraductal papilloma    • Hemorrhoids    • History of transfusion 1984    AFTER BACK SURGERY no reaction   • Hypertension    • IBS (irritable bowel syndrome)    • Inflammatory breast cancer (HCC)     right   • Kidney stone    • Morbid obesity with BMI of 50.0-59.9, adult (HCC)    • Obesity hypoventilation syndrome (HCC) 2021   • On home oxygen  therapy     2.5 AT REST WITH ACTIVITY UP TO 4L   • ANABELLE on CPAP     cpap  & uses O2 with CPAP   • Osteoarthritis    • PONV (postoperative nausea and vomiting)         Past Surgical History:   Procedure Laterality Date   • BREAST EXCISIONAL BIOPSY Bilateral    • COLONOSCOPY     • CYSTOSCOPY BLADDER STONE LITHOTRIPSY     • ENDOSCOPY     • KNEE ARTHROPLASTY Bilateral 2009   • MASTECTOMY Left 8/10/2021    Procedure: LEFT TOTAL MASTECTOMY;  Surgeon: Nicci Banks MD;  Location: [facility] MAIN OR;  Service: General;  Laterality: Left;   • MASTECTOMY Right 8/10/2021    Procedure: RIGHT MODIFIED RADICAL MASTECTOMY;  Surgeon: Nicci Banks MD;  Location: [facility] MAIN OR;  Service: General;  Laterality: Right;   • SPINE SURGERY  1984   • TOTAL ABDOMINAL HYSTERECTOMY WITH SALPINGO OOPHORECTOMY  2004   • UPPER GASTROINTESTINAL ENDOSCOPY     • VENOUS ACCESS DEVICE (PORT) INSERTION N/A 1/25/2021    Procedure: INSERTION VENOUS ACCESS DEVICE;  Surgeon: Nicci Banks MD;  Location: [facility] MAIN OR;  Service: General;  Laterality: N/A;   • VENOUS ACCESS DEVICE (PORT) REMOVAL Left 4/5/2022    Procedure: REMOVAL VENOUS ACCESS DEVICE;  Surgeon: Nicci Banks MD;  Location: [facility] MAIN OR;  Service: General;  Laterality: Left;         Visit Dx:      ICD-10-CM ICD-9-CM   1. 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 Bilateral malignant neoplasm of breast in female, estrogen receptor positive, unspecified site of breast (HCC)  C50.911 174.9    Z17.0 V86.0    C50.912    4. History of mastectomy, bilateral  Z90.13 V45.71   5. Abnormal posture  R29.3 781.92   6. Pain  R52 780.96   7. Joint stiffness  M25.60 719.50   8. Scar condition and fibrosis of skin  L90.5 709.2   9. Muscle weakness (generalized)  M62.81 728.87   10. At risk for lymphedema  Z91.89 V49.89   11. Inflammatory breast cancer, right (HCC)  C50.911 174.9   12. Neoplasm related pain  G89.3 338.3   13. At risk for self  care deficit  Z91.89 V49.89        Lymphedema     Row Name 04/22/22 1400             Subjective Pain    Able to rate subjective pain? yes  -SC      Pre-Treatment Pain Level 4  -SC      Post-Treatment Pain Level 0  -SC      Subjective Pain Comment Right shoulder with range of motion  -SC              Subjective Comments    Subjective Comments Patient states she received the CircAid reduction kit and is eager to see if she likes it better than the compression wrapping  -SC              Skin Changes/Observations    Location/Assessment Upper Quadrant  -SC      Upper Quadrant Conditions intact;right:  -SC      Upper Quadrant Color/Pigment right:;normal  -SC              Manual Lymphatic Drainage    Manual Lymphatic Drainage initial sequence;opened regional lymph nodes;opened anastamoses;extremity treatment  -SC      Initial Sequence short neck;supraclavicular  -SC      Opened Regional Lymph Nodes axillary;paraspinal  Peristernal, intercostal  -SC      Axillary right  -SC      Opened Anastamoses other opened anastamoses  Axial low-peristernal  -SC      Extremity Treatment MLD to proximal limb only  -SC      MLD to Full Limb R UE  -SC      Manual Therapy Therapist provided patient with trigger point release and myofascial release to the right upper quadrant particularly to pectoralis major pectoralis minor teres major teres minor latissimus dorsi insertion site at the inferior angle of scapula and upper trap/supraspinatus.  Therapist able to break lymphatic cording in right axilla allowing for increased range of motion of right upper extremity.  -SC              Compression/Skin Care    Compression/Skin Care compression garment  -SC      Compression Garment Comments Medi CircAid reduction kit for right upper extremity  -SC      Compression/Skin Care Comments Therapist measuring and trimming CircAid reduction kit to fit patient right upper extremity.  -SC            User Key  (r) = Recorded By, (t) = Taken By, (c) =  Cosigned By    Initials Name Provider Type    Shahrzad Solis COTA Occupational Therapist Assistant                         OT Assessment/Plan     Row Name 04/22/22 8607          OT Assessment    Assessment Comments Patient will benefit from continued skilled occupational therapy services to help decrease pain and muscle tightening in the right upper quadrant as well as assist with reduction of lymph volume until a plateau is achieved.  -SC     Patient would benefit from skilled therapy intervention Yes  -SC            OT Plan    OT Plan Comments Continue POC  -SC           User Key  (r) = Recorded By, (t) = Taken By, (c) = Cosigned By    Initials Name Provider Type    Shahrzad Solsi COTA Occupational Therapist Assistant                    Manual Rx (last 36 hours)     Manual Treatments     Row Name 04/22/22 1500             Total Minutes    45533 - OT Manual Therapy Minutes 30  -SC            User Key  (r) = Recorded By, (t) = Taken By, (c) = Cosigned By    Initials Name Provider Type    Shahrzad Slois COTA Occupational Therapist Assistant                  Therapy Education  Education Details: Therapist providing education to patient regarding donning and doffing technique of Medi CircAid reduction kit for right upper extremity.  Patient able to demonstrate fair donning technique.  Patient states her  will be able to help her at home.  Therapist educating patient on wear schedule of reduction, every day all day and at night, taking off for showers only.  Therapist reeducating patient on process of reduction kit, trimming down the reduction kit as needed to allow for optimal volume loss.  Patient would benefit from continued education on donning garment.  Given: Symptoms/condition management  Program: New  How Provided: Verbal, Demonstration  Provided to: Patient  Level of Understanding: Teach back education performed, Verbalized, Demonstrated  80619 - OT Self Care/Mgmt Minutes: 30                 Time Calculation:   Timed Charges  02054 - OT Manual Therapy Minutes: 30  25045 - OT Self Care/Mgmt Minutes: 30  Total Minutes  Timed Charges Total Minutes: 60   Total Minutes: 60     Therapy Charges for Today     Code Description Service Date Service Provider Modifiers Qty    43082147978 HC OT MANUAL THERAPY EA 15 MIN 4/22/2022 Shahrzad Torres COTA GO 2    78169115503 HC OT SELF CARE/MGMT/TRAIN EA 15 MIN 4/22/2022 Shahrzad Torres COTA GO 2                      LUIS M Mcguire  4/22/2022

## 2022-04-27 ENCOUNTER — APPOINTMENT (OUTPATIENT)
Dept: OCCUPATIONAL THERAPY | Facility: HOSPITAL | Age: 67
End: 2022-04-27

## 2022-04-29 ENCOUNTER — HOSPITAL ENCOUNTER (OUTPATIENT)
Dept: OCCUPATIONAL THERAPY | Facility: HOSPITAL | Age: 67
Setting detail: THERAPIES SERIES
Discharge: HOME | End: 2022-04-29

## 2022-04-29 DIAGNOSIS — C50.912 BILATERAL MALIGNANT NEOPLASM OF BREAST IN FEMALE, ESTROGEN RECEPTOR POSITIVE, UNSPECIFIED SITE OF BREAST: ICD-10-CM

## 2022-04-29 DIAGNOSIS — R52 PAIN: ICD-10-CM

## 2022-04-29 DIAGNOSIS — Z17.0 BILATERAL MALIGNANT NEOPLASM OF BREAST IN FEMALE, ESTROGEN RECEPTOR POSITIVE, UNSPECIFIED SITE OF BREAST: ICD-10-CM

## 2022-04-29 DIAGNOSIS — C50.911 BILATERAL MALIGNANT NEOPLASM OF BREAST IN FEMALE, ESTROGEN RECEPTOR POSITIVE, UNSPECIFIED SITE OF BREAST: ICD-10-CM

## 2022-04-29 DIAGNOSIS — M25.60 JOINT STIFFNESS: ICD-10-CM

## 2022-04-29 DIAGNOSIS — I89.0 LYMPHEDEMA: Primary | ICD-10-CM

## 2022-04-29 DIAGNOSIS — M62.81 MUSCLE WEAKNESS (GENERALIZED): ICD-10-CM

## 2022-04-29 DIAGNOSIS — R29.3 ABNORMAL POSTURE: ICD-10-CM

## 2022-04-29 PROCEDURE — 97140 MANUAL THERAPY 1/> REGIONS: CPT

## 2022-04-29 PROCEDURE — 97535 SELF CARE MNGMENT TRAINING: CPT

## 2022-04-29 NOTE — THERAPY TREATMENT NOTE
Outpatient Occupational Therapy Lymphedema Treatment Note  [facility]     Patient Name: Aida Conner  : 1955  MRN: [id number]  Today's Date: 2022      Visit Date: 2022    Patient Active Problem List   Diagnosis   • Inflammatory breast cancer, right (HCC)   • Other specified disorders of breast    • Malignant neoplasm of axillary tail of right female breast (HCC)   • Encounter for eye exam due to high risk medication   • Obesity hypoventilation syndrome (HCC)   • Iron adverse reaction   • Functional diarrhea   • Encounter for long-term (current) use of high-risk medication   • Abnormal mammogram   • Allergic rhinitis   • Anemia   • Anxiety disorder   • Breast pain, right   • Depression   • Essential tremor   • Hemorrhoids   • Hyperlipidemia   • Hypertension, essential   • IBS (irritable bowel syndrome)   • Impaired fasting glucose   • Obesity   • Obstructive sleep apnea   • Osteoarthrosis   • Renal calculi   • Restrictive airway disease   • Type II diabetes mellitus (HCC)   • Vitamin deficiency   • Urinary bladder incontinence   • Malignant neoplasm of central portion of right female breast (HCC)   • Osteoporosis without current pathological fracture   • Chronic diastolic congestive heart failure (HCC)   • Coronary artery calcification seen on CT scan        Past Medical History:   Diagnosis Date   • Anemia    • Anxiety    • Asthma    • Chronic diastolic (congestive) heart failure (HCC)    • Chronic hypoxemic respiratory failure (HCC)     on continuous O2   • Coronary artery calcification seen on CT scan 2022   • Diarrhea     with chemo   • H/O Intraductal papilloma    • Hemorrhoids    • History of transfusion 1984    AFTER BACK SURGERY no reaction   • Hypertension    • IBS (irritable bowel syndrome)    • Inflammatory breast cancer (HCC)     right   • Kidney stone    • Morbid obesity with BMI of 50.0-59.9, adult (HCC)    • Obesity hypoventilation syndrome (HCC) 2021   • On home oxygen  therapy     2.5 AT REST WITH ACTIVITY UP TO 4L   • ANABELLE on CPAP     cpap  & uses O2 with CPAP   • Osteoarthritis    • PONV (postoperative nausea and vomiting)         Past Surgical History:   Procedure Laterality Date   • BREAST EXCISIONAL BIOPSY Bilateral    • COLONOSCOPY     • CYSTOSCOPY BLADDER STONE LITHOTRIPSY     • ENDOSCOPY     • KNEE ARTHROPLASTY Bilateral 2009   • MASTECTOMY Left 8/10/2021    Procedure: LEFT TOTAL MASTECTOMY;  Surgeon: Nicci Banks MD;  Location: [facility] MAIN OR;  Service: General;  Laterality: Left;   • MASTECTOMY Right 8/10/2021    Procedure: RIGHT MODIFIED RADICAL MASTECTOMY;  Surgeon: Nicci Banks MD;  Location: [facility] MAIN OR;  Service: General;  Laterality: Right;   • SPINE SURGERY  1984   • TOTAL ABDOMINAL HYSTERECTOMY WITH SALPINGO OOPHORECTOMY  2004   • UPPER GASTROINTESTINAL ENDOSCOPY     • VENOUS ACCESS DEVICE (PORT) INSERTION N/A 1/25/2021    Procedure: INSERTION VENOUS ACCESS DEVICE;  Surgeon: Nicci Banks MD;  Location: [facility] MAIN OR;  Service: General;  Laterality: N/A;   • VENOUS ACCESS DEVICE (PORT) REMOVAL Left 4/5/2022    Procedure: REMOVAL VENOUS ACCESS DEVICE;  Surgeon: Nicci Banks MD;  Location: [facility] MAIN OR;  Service: General;  Laterality: Left;         Visit Dx:      ICD-10-CM ICD-9-CM   1. Lymphedema  I89.0 457.1   2. Bilateral malignant neoplasm of breast in female, estrogen receptor positive, unspecified site of breast (HCC)  C50.911 174.9    Z17.0 V86.0    C50.912    3. Abnormal posture  R29.3 781.92   4. Pain  R52 780.96   5. Joint stiffness  M25.60 719.50   6. Muscle weakness (generalized)  M62.81 728.87        Lymphedema     Row Name 04/29/22 1500             Subjective Pain    Able to rate subjective pain? yes  -MP      Pre-Treatment Pain Level 4  -MP      Post-Treatment Pain Level 1  -MP      Subjective Pain Comment Right shoulder pain with overhead reach especially.  -MP              Subjective Comments    Subjective  Comments Pt states her  has been helping to vicente the circaid reduction kit on the right UE.  She states she tolerated it for about a day and a half before removing, but that she put it back on multiple times since last visit.  -MP              Skin Changes/Observations    Location/Assessment Upper Quadrant  -MP      Upper Quadrant Conditions intact;right:  -MP      Upper Quadrant Color/Pigment right:;hyperpigmented  -MP      Skin Observations Comment pt reports slight irritation at skin folds right lateral trunk, but no redness or other irritation is noted.  -MP              Manual Lymphatic Drainage    Manual Lymphatic Drainage initial sequence;opened regional lymph nodes;opened anastamoses;extremity treatment  -MP      Initial Sequence short neck;supraclavicular  -MP      Opened Regional Lymph Nodes axillary;paraspinal  Peristernal, intercostal  -MP      Axillary right  -MP      Opened Anastamoses other opened anastamoses  Axial low-peristernal  -MP      Extremity Treatment MLD to proximal limb only  -MP      Manual Therapy Occupational therapist provided manual therapy for trigger point release and myofascial release at the right upper quadrant including pectoralis major and minor, teres major and minor, latissimus dorsi and upper trapezius muscles.  Occupational therapist assisted with active assistive stretch into shoulder abduction, external rotation..  -MP            User Key  (r) = Recorded By, (t) = Taken By, (c) = Cosigned By    Initials Name Provider Type    Belkis Kaur OT Occupational Therapist                         OT Assessment/Plan     Row Name 04/29/22 1528          OT Assessment    Assessment Comments Patient reported a significant reduction in pain with overhead reach after treatment today.  She was able to return demonstrate exercises that were added to her home exercise program as well as donning of CircAid reduction kit at the right upper extremity with ability to provide  instruction to her , who will be helping.  She is progressing well and continues to benefit from skilled occupational therapy services until a plateau is achieved and education is completed for use of a permanent garment and upgraded home exercise program activities.  -MP           User Key  (r) = Recorded By, (t) = Taken By, (c) = Cosigned By    Initials Name Provider Type    Belkis Kaur OT Occupational Therapist                   OT Exercises     Row Name 04/29/22 1500             Exercise 1    Exercise Name 1 Scapular retraction/low trapezial  -MP      Sets 1 1  -MP      Reps 1 5  -MP      Time (Minutes) 1 Hold 5-10 seconds  -MP              Exercise 2    Exercise Name 2 Active assistive external rotation with cane  -MP      Sets 2 1  -MP      Reps 2 5  -MP      Time (Seconds) 2 Hold 10 seconds  -MP              Exercise 3    Exercise Name 3 Overhead reach with cane exercise  -MP      Reps 3 5  -MP      Time (Minutes) 3 Hold for sustained stretch for 5 to 10 seconds  -MP            User Key  (r) = Recorded By, (t) = Taken By, (c) = Cosigned By    Initials Name Provider Type    Belkis Kaur OT Occupational Therapist              Manual Rx (last 36 hours)     Manual Treatments     Row Name 04/29/22 1532             Total Minutes    92146 - OT Manual Therapy Minutes 40  -MP            User Key  (r) = Recorded By, (t) = Taken By, (c) = Cosigned By    Initials Name Provider Type    Belkis Kaur OT Occupational Therapist                  Therapy Education  Education Details: OT provided education regarding scapular balance and need for improved functional motion for external rotation at the right upper extremity in order to provide stability for overhead reach.  OT instructed client in exercises for low trapezial activation and hold, and cane exercises for shoulder external rotation followed by shoulder flexion.  She was asked to continue these exercises as home exercise program additions  and is agreeable.  She demonstrated knowledge of proper donning of CircAid reduction kit with therapist helping minimally.  Patient has her  at home who can help as well and she is able to provide instruction to him for this.  Given: HEP  Program: New, Reinforced, Progressed  How Provided: Verbal, Demonstration  Provided to: Patient  13379 - OT Self Care/Mgmt Minutes: 20                Time Calculation:   Timed Charges  23627 - OT Manual Therapy Minutes: 40  91873 - OT Self Care/Mgmt Minutes: 20  Total Minutes  Timed Charges Total Minutes: 60   Total Minutes: 60     Therapy Charges for Today     Code Description Service Date Service Provider Modifiers Qty    01988507331 HC OT MANUAL THERAPY EA 15 MIN 4/29/2022 Belkis Mooney OT GO 3    00512632783 HC OT SELF CARE/MGMT/TRAIN EA 15 MIN 4/29/2022 Belkis Mooney OT GO 1                      Belkis Mooney OT  4/29/2022

## 2022-05-02 ENCOUNTER — HOSPITAL ENCOUNTER (OUTPATIENT)
Dept: OCCUPATIONAL THERAPY | Facility: HOSPITAL | Age: 67
Setting detail: THERAPIES SERIES
Discharge: HOME | End: 2022-05-02

## 2022-05-02 DIAGNOSIS — Z91.89 AT RISK FOR SELF CARE DEFICIT: ICD-10-CM

## 2022-05-02 DIAGNOSIS — Z90.13 HISTORY OF MASTECTOMY, BILATERAL: ICD-10-CM

## 2022-05-02 DIAGNOSIS — C50.912 BILATERAL MALIGNANT NEOPLASM OF BREAST IN FEMALE, ESTROGEN RECEPTOR POSITIVE, UNSPECIFIED SITE OF BREAST: ICD-10-CM

## 2022-05-02 DIAGNOSIS — C50.911 INFLAMMATORY BREAST CANCER, RIGHT: ICD-10-CM

## 2022-05-02 DIAGNOSIS — M62.81 MUSCLE WEAKNESS (GENERALIZED): ICD-10-CM

## 2022-05-02 DIAGNOSIS — M25.60 JOINT STIFFNESS: ICD-10-CM

## 2022-05-02 DIAGNOSIS — R29.3 ABNORMAL POSTURE: ICD-10-CM

## 2022-05-02 DIAGNOSIS — Z91.89 AT RISK FOR LYMPHEDEMA: ICD-10-CM

## 2022-05-02 DIAGNOSIS — C50.911 BILATERAL MALIGNANT NEOPLASM OF BREAST IN FEMALE, ESTROGEN RECEPTOR POSITIVE, UNSPECIFIED SITE OF BREAST: ICD-10-CM

## 2022-05-02 DIAGNOSIS — I97.2 POSTMASTECTOMY LYMPHEDEMA SYNDROME: ICD-10-CM

## 2022-05-02 DIAGNOSIS — L90.5 SCAR CONDITION AND FIBROSIS OF SKIN: ICD-10-CM

## 2022-05-02 DIAGNOSIS — R52 PAIN: ICD-10-CM

## 2022-05-02 DIAGNOSIS — G89.3 NEOPLASM RELATED PAIN: ICD-10-CM

## 2022-05-02 DIAGNOSIS — Z17.0 BILATERAL MALIGNANT NEOPLASM OF BREAST IN FEMALE, ESTROGEN RECEPTOR POSITIVE, UNSPECIFIED SITE OF BREAST: ICD-10-CM

## 2022-05-02 DIAGNOSIS — I89.0 LYMPHEDEMA: Primary | ICD-10-CM

## 2022-05-02 PROCEDURE — 97535 SELF CARE MNGMENT TRAINING: CPT

## 2022-05-02 PROCEDURE — 97140 MANUAL THERAPY 1/> REGIONS: CPT

## 2022-05-02 NOTE — THERAPY TREATMENT NOTE
Outpatient Occupational Therapy Lymphedema Treatment Note  OLGA Clifton     Patient Name: Aida Conner  : 1955  MRN: [id number]  Today's Date: 2022      Visit Date: 2022    Patient Active Problem List   Diagnosis   • Inflammatory breast cancer, right (HCC)   • Other specified disorders of breast    • Malignant neoplasm of axillary tail of right female breast (HCC)   • Encounter for eye exam due to high risk medication   • Obesity hypoventilation syndrome (HCC)   • Iron adverse reaction   • Functional diarrhea   • Encounter for long-term (current) use of high-risk medication   • Abnormal mammogram   • Allergic rhinitis   • Anemia   • Anxiety disorder   • Breast pain, right   • Depression   • Essential tremor   • Hemorrhoids   • Hyperlipidemia   • Hypertension, essential   • IBS (irritable bowel syndrome)   • Impaired fasting glucose   • Obesity   • Obstructive sleep apnea   • Osteoarthrosis   • Renal calculi   • Restrictive airway disease   • Type II diabetes mellitus (HCC)   • Vitamin deficiency   • Urinary bladder incontinence   • Malignant neoplasm of central portion of right female breast (HCC)   • Osteoporosis without current pathological fracture   • Chronic diastolic congestive heart failure (HCC)   • Coronary artery calcification seen on CT scan        Past Medical History:   Diagnosis Date   • Anemia    • Anxiety    • Asthma    • Chronic diastolic (congestive) heart failure (HCC)    • Chronic hypoxemic respiratory failure (HCC)     on continuous O2   • Coronary artery calcification seen on CT scan 2022   • Diarrhea     with chemo   • H/O Intraductal papilloma    • Hemorrhoids    • History of transfusion 1984    AFTER BACK SURGERY no reaction   • Hypertension    • IBS (irritable bowel syndrome)    • Inflammatory breast cancer (HCC)     right   • Kidney stone    • Morbid obesity with BMI of 50.0-59.9, adult (HCC)    • Obesity hypoventilation syndrome (HCC) 2021   • On home oxygen  therapy     2.5 AT REST WITH ACTIVITY UP TO 4L   • ANABELLE on CPAP     cpap  & uses O2 with CPAP   • Osteoarthritis    • PONV (postoperative nausea and vomiting)         Past Surgical History:   Procedure Laterality Date   • BREAST EXCISIONAL BIOPSY Bilateral    • COLONOSCOPY     • CYSTOSCOPY BLADDER STONE LITHOTRIPSY     • ENDOSCOPY     • KNEE ARTHROPLASTY Bilateral 2009   • MASTECTOMY Left 8/10/2021    Procedure: LEFT TOTAL MASTECTOMY;  Surgeon: Nicci Banks MD;  Location: [facility] MAIN OR;  Service: General;  Laterality: Left;   • MASTECTOMY Right 8/10/2021    Procedure: RIGHT MODIFIED RADICAL MASTECTOMY;  Surgeon: Nicci Banks MD;  Location: [facility] MAIN OR;  Service: General;  Laterality: Right;   • SPINE SURGERY  1984   • TOTAL ABDOMINAL HYSTERECTOMY WITH SALPINGO OOPHORECTOMY  2004   • UPPER GASTROINTESTINAL ENDOSCOPY     • VENOUS ACCESS DEVICE (PORT) INSERTION N/A 1/25/2021    Procedure: INSERTION VENOUS ACCESS DEVICE;  Surgeon: Nicci Banks MD;  Location: [facility] MAIN OR;  Service: General;  Laterality: N/A;   • VENOUS ACCESS DEVICE (PORT) REMOVAL Left 4/5/2022    Procedure: REMOVAL VENOUS ACCESS DEVICE;  Surgeon: Nicci Banks MD;  Location: [facility] MAIN OR;  Service: General;  Laterality: Left;         Visit Dx:      ICD-10-CM ICD-9-CM   1. Lymphedema  I89.0 457.1   2. Bilateral malignant neoplasm of breast in female, estrogen receptor positive, unspecified site of breast (HCC)  C50.911 174.9    Z17.0 V86.0    C50.912    3. Abnormal posture  R29.3 781.92   4. Pain  R52 780.96   5. Joint stiffness  M25.60 719.50   6. Muscle weakness (generalized)  M62.81 728.87   7. 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 History of mastectomy, bilateral  Z90.13 V45.71   9. Scar condition and fibrosis of skin  L90.5 709.2   10. At risk for lymphedema  Z91.89 V49.89   11. Inflammatory breast cancer, right (HCC)  C50.911 174.9   12. Neoplasm related pain  G89.3 338.3   13. At risk for self  care deficit  Z91.89 V49.89        Lymphedema     Row Name 05/02/22 1700             Subjective Pain    Able to rate subjective pain? yes  -SC      Pre-Treatment Pain Level 0  -SC      Post-Treatment Pain Level 0  -SC      Subjective Pain Comment Patient denies pain  -SC              Subjective Comments    Subjective Comments Patient states she no longer experiences right shoulder pain however she does experience tightness and pulling with range of motion, however patient does express excitement with the increase in range of motion since beginning treatment again  -SC              Skin Changes/Observations    Skin Observations Comment Patient arriving not donning CircAid reduction kit on right upper upper extremity.  Patient states she did not feel like putting it on since she was coming to treatment.  -SC              Manual Lymphatic Drainage    Manual Lymphatic Drainage initial sequence;opened regional lymph nodes;opened anastamoses;extremity treatment  -SC      Initial Sequence short neck;supraclavicular  -SC      Opened Regional Lymph Nodes axillary;paraspinal  Peristernal, intercostal  -SC      Axillary right  -SC      Opened Anastamoses other opened anastamoses  Axial low-peristernal  -SC      Extremity Treatment MLD to full limb  -SC      MLD to Full Limb Right upper extremity  -SC      Manual Therapy Therapist provided patient with trigger point release and myofascial release to the right upper quadrant particularly to pectoralis major pectoralis minor teres major teres minor latissimus dorsi insertion site at the inferior angle of scapula and upper trap/supraspinatus.  Therapist providing myofascial arm pull and passive stretch of right upper extremity.  Patient able to demonstrate increased range of motion posttreatment  -SC              Compression/Skin Care    Compression Garment Comments CircAid reduction kit right upper extremity  -SC            User Key  (r) = Recorded By, (t) = Taken By, (c) =  Cosigned By    Initials Name Provider Type    Shahrzad Solis COTA Occupational Therapist Assistant                         OT Assessment/Plan     Row Name 05/02/22 1751          OT Assessment    Assessment Comments Patient will benefit from skilled occupational therapy until plateau has been reached in right upper extremity and new compression sleeve can be obtained.  Patient will also benefit from skilled occupational therapy to improve patient range of motion in right upper extremity and decrease pain to improve independence with functional I/ADLs.  -SC     Patient would benefit from skilled therapy intervention Yes  -SC            OT Plan    OT Plan Comments Continue POC  -SC           User Key  (r) = Recorded By, (t) = Taken By, (c) = Cosigned By    Initials Name Provider Type    Shahrzad Solis COTA Occupational Therapist Assistant                    Manual Rx (last 36 hours)     Manual Treatments     Row Name 05/02/22 1753             Total Minutes    43449 - OT Manual Therapy Minutes 43  -SC            User Key  (r) = Recorded By, (t) = Taken By, (c) = Cosigned By    Initials Name Provider Type    Shahrzad Solis COTA Occupational Therapist Assistant                  Therapy Education  Education Details: Therapist re-educating patient on importance donning CircAid reduction kit every day and all night for optimal reduction results.  Therapist stressing the need for constant compression and performing self manual lymphatic drainage twice a day along with stretching HEP.  Given: Symptoms/condition management  Program: Reinforced  How Provided: Verbal  Provided to: Patient  Level of Understanding: Verbalized  90045 - OT Self Care/Mgmt Minutes: 10                Time Calculation:   Timed Charges  26258 - OT Manual Therapy Minutes: 43  06150 - OT Self Care/Mgmt Minutes: 10  Total Minutes  Timed Charges Total Minutes: 53   Total Minutes: 53     Therapy Charges for Today     Code Description Service  Date Service Provider Modifiers Qty    62139861102 HC OT MANUAL THERAPY EA 15 MIN 5/2/2022 Shahrzad Torres COTA GO 3    04009662911 HC OT SELF CARE/MGMT/TRAIN EA 15 MIN 5/2/2022 Shahrzad Torres COTA GO 1                      LUIS M Mcguire  5/2/2022

## 2022-05-04 ENCOUNTER — HOSPITAL ENCOUNTER (OUTPATIENT)
Dept: OCCUPATIONAL THERAPY | Facility: HOSPITAL | Age: 67
Setting detail: THERAPIES SERIES
Discharge: HOME | End: 2022-05-04

## 2022-05-04 DIAGNOSIS — I89.0 LYMPHEDEMA: Primary | ICD-10-CM

## 2022-05-04 DIAGNOSIS — I97.2 POSTMASTECTOMY LYMPHEDEMA SYNDROME: ICD-10-CM

## 2022-05-04 DIAGNOSIS — R29.3 ABNORMAL POSTURE: ICD-10-CM

## 2022-05-04 DIAGNOSIS — R52 PAIN: ICD-10-CM

## 2022-05-04 DIAGNOSIS — M25.60 JOINT STIFFNESS: ICD-10-CM

## 2022-05-04 PROCEDURE — 93702 BIS XTRACELL FLUID ANALYSIS: CPT

## 2022-05-04 PROCEDURE — 97535 SELF CARE MNGMENT TRAINING: CPT

## 2022-05-04 PROCEDURE — 97035 APP MDLTY 1+ULTRASOUND EA 15: CPT

## 2022-05-04 NOTE — THERAPY RE-EVALUATION
Outpatient Occupational Therapy Lymphedema Re-Evaluation   Etienne     Patient Name: Aida Conner  : 1955  MRN: [id number]  Today's Date: 2022      Visit Date: 2022    Patient Active Problem List   Diagnosis   • Inflammatory breast cancer, right (HCC)   • Other specified disorders of breast    • Malignant neoplasm of axillary tail of right female breast (HCC)   • Encounter for eye exam due to high risk medication   • Obesity hypoventilation syndrome (HCC)   • Iron adverse reaction   • Functional diarrhea   • Encounter for long-term (current) use of high-risk medication   • Abnormal mammogram   • Allergic rhinitis   • Anemia   • Anxiety disorder   • Breast pain, right   • Depression   • Essential tremor   • Hemorrhoids   • Hyperlipidemia   • Hypertension, essential   • IBS (irritable bowel syndrome)   • Impaired fasting glucose   • Obesity   • Obstructive sleep apnea   • Osteoarthrosis   • Renal calculi   • Restrictive airway disease   • Type II diabetes mellitus (HCC)   • Vitamin deficiency   • Urinary bladder incontinence   • Malignant neoplasm of central portion of right female breast (HCC)   • Osteoporosis without current pathological fracture   • Chronic diastolic congestive heart failure (HCC)   • Coronary artery calcification seen on CT scan        Past Medical History:   Diagnosis Date   • Anemia    • Anxiety    • Asthma    • Chronic diastolic (congestive) heart failure (HCC)    • Chronic hypoxemic respiratory failure (HCC)     on continuous O2   • Coronary artery calcification seen on CT scan 2022   • Diarrhea     with chemo   • H/O Intraductal papilloma    • Hemorrhoids    • History of transfusion     AFTER BACK SURGERY no reaction   • Hypertension    • IBS (irritable bowel syndrome)    • Inflammatory breast cancer (HCC)     right   • Kidney stone    • Morbid obesity with BMI of 50.0-59.9, adult (HCC)    • Obesity hypoventilation syndrome (HCC) 2021   • On home oxygen  therapy     2.5 AT REST WITH ACTIVITY UP TO 4L   • ANABELLE on CPAP     cpap  & uses O2 with CPAP   • Osteoarthritis    • PONV (postoperative nausea and vomiting)         Past Surgical History:   Procedure Laterality Date   • BREAST EXCISIONAL BIOPSY Bilateral    • COLONOSCOPY     • CYSTOSCOPY BLADDER STONE LITHOTRIPSY     • ENDOSCOPY     • KNEE ARTHROPLASTY Bilateral 2009   • MASTECTOMY Left 8/10/2021    Procedure: LEFT TOTAL MASTECTOMY;  Surgeon: Nicci Banks MD;  Location: [facility] MAIN OR;  Service: General;  Laterality: Left;   • MASTECTOMY Right 8/10/2021    Procedure: RIGHT MODIFIED RADICAL MASTECTOMY;  Surgeon: Nicci Banks MD;  Location: [facility] MAIN OR;  Service: General;  Laterality: Right;   • SPINE SURGERY  1984   • TOTAL ABDOMINAL HYSTERECTOMY WITH SALPINGO OOPHORECTOMY  2004   • UPPER GASTROINTESTINAL ENDOSCOPY     • VENOUS ACCESS DEVICE (PORT) INSERTION N/A 1/25/2021    Procedure: INSERTION VENOUS ACCESS DEVICE;  Surgeon: Nicci Banks MD;  Location: [facility] MAIN OR;  Service: General;  Laterality: N/A;   • VENOUS ACCESS DEVICE (PORT) REMOVAL Left 4/5/2022    Procedure: REMOVAL VENOUS ACCESS DEVICE;  Surgeon: Nicci Banks MD;  Location: [facility] MAIN OR;  Service: General;  Laterality: Left;         Visit Dx:     ICD-10-CM ICD-9-CM   1. Lymphedema  I89.0 457.1   2. Pain  R52 780.96   3. Joint stiffness  M25.60 719.50   4. Abnormal posture  R29.3 781.92   5. 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 yes  -CH      Pre-Treatment Pain Level 4  -CH      Post-Treatment Pain Level 4  -CH      Subjective Pain Comment R deltoid insertion site  -CH              Subjective Comments    Subjective Comments Patient reports new onset of swelling at approximate rib 9 through 12 and over the right abdomen that is painful to the touch and hot compared to the other side.  -CH            "   Lymphedema Assessment    Lymphedema Classification RUE:;at risk/stage 0  -CH      Lymphedema Cancer Related Sx right;modified radical mastectomy;axillary dissection;left;simple mastectomy  -CH      Lymph Nodes Removed # 19  -CH      Positive Lymph Nodes # 0  -CH      Chemo Received yes  -CH      Chemo Treatments #/Timeframe THP  AC   HP  -CH      Radiation Therapy Received yes  -CH      Radiation Treatments #/Timeframe 25  -CH              LLIS - Physical Concerns    The amount of pain associated with my lymphedema is: 0  -CH      The amount of limb heaviness associated with my lymphedema is: 0  -CH      The amount of skin tightness associated with my lymphedema is: 0  -CH      The size of my swollen limb(s) seems: 0  -CH      Lymphedema affects the movement of my swollen limb(s): 0  -CH      The strength in my swollen limb(s) is: 0  -CH              LLIS - Psychosocial Concerns    Lymphedema affects my body image (i.e., \"how I think I look\"). 0  -CH      Lymphedema affects my socializing with others. 0  -CH      Lymphedema affects my intimate relations with spouse or partner (rate 0 if not applicable 0  -CH      Lymphedema \"gets me down\" (i.e., depression, frustration, or anger) 0  -CH      I must rely on others for help due to my lymphedema. 0  -CH      I know what to do to manage my lymphedema 0  -CH              LLIS - Functional Concerns    Lymphedema affects my ability to perform self-care activities (i.e. eating, dressing, hygiene) 0  -CH      Lymphedema affects my ability to perform routine home or work-related activities. 0  -CH      Lymphedema affects my performance of preferred leisure activities. 0  -CH      Lymphedema affects proper fit of clothing/shoes 0  -CH      Lymphedema affects my sleep 0  -CH              Right Upper Ext    Rt Shoulder Abduction AROM 110  -CH      Rt Shoulder Extension AROM 60  -CH      Rt Shoulder Flexion AROM 120  -CH      Rt Shoulder External Rotation AROM 55  -CH      Rt " Shoulder Internal Rotation AROM 35  -CH              Skin Changes/Observations    Location/Assessment Upper Quadrant  -CH      Upper Quadrant Conditions right:;intact;dry  -CH      Upper Quadrant Color/Pigment right:;hyperpigmented  -      Skin Observations Comment Patient is noted with observable swelling in the lower abdomen along ribs 10 through 9.  Patient also noted with pain through palpation on ribs 11 and 12.  Patient was also observed wearing her compression garment to this session however the garment had slipped down to mid brachial region.  -CH              L-Dex Bioimpedence Screening    L-Dex Measurement Extremity LUE  -CH      L-Dex Patient Position Standing  -CH      L-Dex UE Dominate Side Right  -CH      L-Dex UE At Risk Side Right  -CH      L-Dex UE Pre Surgical Value Yes  -CH      L-Dex UE Score 7.2  -CH      L-Dex UE Baseline Score 10.6  -CH      L-Dex UE Value Change -3.4  -CH      $ L-Dex Charge yes  -CH              Lymphedema Life Impact Scale Totals    A.  Total Q1 - Q17 (Do not include Q18) 0  -CH      B.  Total number of questions answered (Q1-Q17) 17  -CH      C. Divide A by B 0  -CH      D. Multiple C by 25 0  -CH            User Key  (r) = Recorded By, (t) = Taken By, (c) = Cosigned By    Initials Name Provider Type    Ria Colón OT Occupational Therapist                        Therapy Education  Education Details: OT and patient discussed options on how to don doff the compression garment to improve placement of the garment.  OT recommended patient place the garment on countertop or at the table and while sitting extend her arm out and slighted into the garment and allow the table to hold her arm while she is strapping it down.  OT also recommend that patient pull the garment up throughout the day as it is probably being moved through the friction of her arm rubbing against her body when she is mobilizing.  OT educated patient that we will withhold any stretching and any self  manual lymphatic drainage this date until she rules out infection pathology in the lower quadrant.  Given: Symptoms/condition management  Program: Reinforced, New  How Provided: Verbal, Demonstration  Provided to: Patient  Level of Understanding: Verbalized  64934 - OT Self Care/Mgmt Minutes: 35         OT Goals     Row Name 05/04/22 1716          Time Calculation    OT Goal Re-Cert Due Date 06/03/22  -           User Key  (r) = Recorded By, (t) = Taken By, (c) = Cosigned By    Initials Name Provider Type    Ria Colón OT Occupational Therapist              1. Post Breast Surgery Care/at risk for Lymphedema  LTG 1: 90 days:  As an indicator of no exacerbation of lymphedema staging, the patient will present with an L-Dex score less than [10] points from preoperative baseline.              STATUS: Met: Ongoing    STG 1a:   30 days: To prevent exacerbation of mixed edema to lymphedema, patient will utilize the 2 postsurgical compression garments daily.                 STATUS: Not met: Discontinue    STG 1b: 30 days: Patient will be independent with self-manual lymphatic massage.               STATUS: Met: Ongoing     STG 1c: 30 days:  Patient will be independent with identification of signs and symptoms of lymphedema exasperation per stoplight to recovery education handout.              STATUS: Partially met: Ongoing     STG 1 d: 30 days: Patient will be independent with HEP to prevent advancement in lymphedema staging.              STATUS: Partially met: ongoing    TREATMENT:  Self Care/ADL retraining, Therapeutic Activity, Neuromuscular Re-education, Therapeutic Exercise, Bioimpedence Fluid Analysis, [facility] compression [facility]arement [address] [employer] 2860K, Orthotic Management and training,  and Manual Therapy.     2. Decreased Functional Use of R UE  LTG [2]:  90 Days: The patient will report a pain rating of 2/10 or better to improve functional use of right upper extremity  and IADL tasks.            STATUS:  Partially Met: ongoing  STG [2]a:  The patient will report a pain rating of 4/10 or better as an average in 1 week intervals.   STATUS:  Met: ongoing    3. Decrease Functional ROM:    LTG [3]: 90 days:  The patient will demonstrate an increase of right upper extremity AROM to WFL for ADL independence.  STATUS:  Partially Met: ongoing     STG [3]a: The patient will demonstrate an increase in all shoulder planes in right upper extremity up to degrees of 20 or WFL for improved self-care independence.        STATUS:  Partially Met: ongoing     TREATMENT:  Manual Therapy, Therapeutic Activity, ADL retraining, Neuromuscular Re-education, Therapeutic Exercise, Patient and Family Education, Orthotic Management and training, Modalities: TENS, NMES, Ultrasound, Fluidotherapy Wound dressing as needed, Modalities as needed and Manual Therapy.     3. Carrying, Moving, and Handling Objects Functional Limitation                               LTG 4: 90 days:  The patient will demonstrate 1-19% limitation by achieving a score of 11 on the Quick Dash.                      STATUS:  Partially Met: ongoing              STG 4a: 30 days:  The patient will demonstrate 80-99% limitation by achieving a score of 54 on the Quick Dash.                      STATUS:  Met   STG 4a: 30 days:  The patient will demonstrate 40-9% limitation by achieving a score of 36 on the Quick Dash.                      STATUS:  New  TREATMENT: Manual Therapy, Therapeutic Activity, Neuromuscular Re-education, Therapeutic Exercise, Patient and Family Education, Orthotic Management and training, Modalities: TENS, NMES, Ultrasound, Fluidotherapy Wound dressing as needed, Modalities as needed and Manual Therapy.        OT Assessment/Plan     Row Name 05/04/22 9242          OT Assessment    Functional Limitations Performance in self-care ADL  -CH     Impairments Impaired lymphatic circulation;Range of motion;Posture;Pain;Muscle  strength;Joint mobility  -     Assessment Comments Patient is demonstrating a significant reduction in lymph volume in the right upper extremity.  Patient has demonstrated improved range of motion but has not been able to achieve functional range of motion at this time.  Patient is also presenting with new onset of joint tenderness in the anterior aspect of the right shoulder.  Patient will benefit from continued skilled occupational therapy services to continue to support reduction of the lymph volume in the right upper extremity and till a plateau is achieved.  Patient will also benefit from skilled occupational therapy services to reduce muscle tension and pain that is negatively impacting functional range of motion and IADL tasks.  -     OT Rehab Potential Excellent  -     Patient/caregiver participated in establishment of treatment plan and goals Yes  -     Patient would benefit from skilled therapy intervention Yes  -CH            OT Plan    OT Frequency 1x/week  -     Predicted Duration of Therapy Intervention (OT) 12  -     Planned CPT's? OT RE-EVAL: 49229;OT THER ACT EA 15 MIN: 00807WC;OT THER PROC EA 15 MIN: 55589OQ;OT NEUROMUSC RE EDUCATION EA 15 MIN: 23642;OT SELF CARE/MGMT/TRAIN 15 MIN: 06247;OT HOT/COLD PACK;OT ULTRASOUND EA 15 MIN: 23062;OT ELECTRIC STIM ATTD EA 15 MIN: 71034;OT MANUAL THERAPY EA 15 MIN: 97561;OT BIS XTRACELL FLUID ANALYSIS: 51674;OT ORTHOTIC MGMT/TRAIN EA 15 MIN: 47573;OT ORTHO/PROSTHET CHECKOUT EA 15 MIN: 88882  -     Planned Therapy Interventions (Optional Details) home exercise program;joint mobilization;manual therapy techniques;neuromuscular re-education;orthotic fitting/training;patient/family education;postural re-education;ROM (Range of Motion);strengthening  -           User Key  (r) = Recorded By, (t) = Taken By, (c) = Cosigned By    Initials Name Provider Type    Ria Colón OT Occupational Therapist                Outcome Measure Options: Quick  DASH  Quick DASH  Open a tight or new jar.: Severe Difficulty  Do heavy household chores (e.g., wash walls, wash floors): Severe Difficulty  Carry a shopping bag or briefcase: Moderate Difficulty  Wash your back: Severe Difficulty  Use a knife to cut food: No Difficulty  Recreational activities in which you take some force or impact through your arm, should or hand (e.g. golf, hammering, tennis, etc.): Unable  During the past week, to what extent has your arm, shoulder, or hand problem interfered with your normal social activites with family, friends, neighbors or groups?: Not at all  During the past week, were you limited in your work or other regular daily activities as a result of your arm, shoulder or hand problem?: Moderately Limited  Arm, Shoulder, or hand pain: Mild  Tingling (pins and needles) in your arm, shoulder, or hand: None  During the past week, how much difficulty have you had sleeping because of the pain in your arm, shoulder or hand?: Mild Difficulty  Number of Questions Answered: 11  Quick DASH Score: 43.18         Time Calculation:   Timed Charges  75796 - OT Ultrasound Minutes: 8  19943 - OT Self Care/Mgmt Minutes: 35  Total Minutes  Timed Charges Total Minutes: 43   Total Minutes: 43     Therapy Charges for Today     Code Description Service Date Service Provider Modifiers Qty    52017655756 HC PT BIS XTRACELL FLUID ANALYSIS 5/4/2022 Ria Walker OT  1    [id number]  OT SELF CARE/MGMT/TRAIN EA 15 MIN 5/4/2022 Ria Walker OT GO 2    [id number]  OT ULTRASOUND EA 15 MIN 5/4/2022 Ria Walker OT GO 1                    Ria Walker OT  5/4/2022

## 2022-05-10 ENCOUNTER — TELEPHONE (OUTPATIENT)
Dept: ONCOLOGY | Facility: CLINIC | Age: 67
End: 2022-05-10

## 2022-05-10 RX ORDER — ANASTROZOLE 1 MG/1
1 TABLET ORAL DAILY
Qty: 90 TABLET | Refills: 1 | Status: SHIPPED | OUTPATIENT
Start: 2022-05-10 | End: 2022-12-29 | Stop reason: SDUPTHER

## 2022-05-13 ENCOUNTER — APPOINTMENT (OUTPATIENT)
Dept: OCCUPATIONAL THERAPY | Facility: HOSPITAL | Age: 67
End: 2022-05-13

## 2022-05-16 ENCOUNTER — HOSPITAL ENCOUNTER (OUTPATIENT)
Dept: OCCUPATIONAL THERAPY | Facility: HOSPITAL | Age: 67
Setting detail: THERAPIES SERIES
Discharge: HOME | End: 2022-05-16

## 2022-05-16 DIAGNOSIS — I97.2 POSTMASTECTOMY LYMPHEDEMA SYNDROME: ICD-10-CM

## 2022-05-16 DIAGNOSIS — Z91.89 AT RISK FOR SELF CARE DEFICIT: ICD-10-CM

## 2022-05-16 DIAGNOSIS — R52 PAIN: ICD-10-CM

## 2022-05-16 DIAGNOSIS — R29.3 ABNORMAL POSTURE: ICD-10-CM

## 2022-05-16 DIAGNOSIS — I89.0 LYMPHEDEMA: Primary | ICD-10-CM

## 2022-05-16 DIAGNOSIS — M25.60 JOINT STIFFNESS: ICD-10-CM

## 2022-05-16 PROCEDURE — 97140 MANUAL THERAPY 1/> REGIONS: CPT

## 2022-05-16 PROCEDURE — 97760 ORTHOTIC MGMT&TRAING 1ST ENC: CPT

## 2022-05-16 NOTE — THERAPY TREATMENT NOTE
Outpatient Occupational Therapy Lymphedema Treatment Note  [facility]     Patient Name: Aida Conner  : 1955  MRN: [id number]  Today's Date: 2022      Visit Date: 2022    Patient Active Problem List   Diagnosis   • Inflammatory breast cancer, right (HCC)   • Other specified disorders of breast    • Malignant neoplasm of axillary tail of right female breast (HCC)   • Encounter for eye exam due to high risk medication   • Obesity hypoventilation syndrome (HCC)   • Iron adverse reaction   • Functional diarrhea   • Encounter for long-term (current) use of high-risk medication   • Abnormal mammogram   • Allergic rhinitis   • Anemia   • Anxiety disorder   • Breast pain, right   • Depression   • Essential tremor   • Hemorrhoids   • Hyperlipidemia   • Hypertension, essential   • IBS (irritable bowel syndrome)   • Impaired fasting glucose   • Obesity   • Obstructive sleep apnea   • Osteoarthrosis   • Renal calculi   • Restrictive airway disease   • Type II diabetes mellitus (HCC)   • Vitamin deficiency   • Urinary bladder incontinence   • Malignant neoplasm of central portion of right female breast (HCC)   • Osteoporosis without current pathological fracture   • Chronic diastolic congestive heart failure (HCC)   • Coronary artery calcification seen on CT scan        Past Medical History:   Diagnosis Date   • Anemia    • Anxiety    • Asthma    • Chronic diastolic (congestive) heart failure (HCC)    • Chronic hypoxemic respiratory failure (HCC)     on continuous O2   • Coronary artery calcification seen on CT scan 2022   • Diarrhea     with chemo   • H/O Intraductal papilloma    • Hemorrhoids    • History of transfusion 1984    AFTER BACK SURGERY no reaction   • Hypertension    • IBS (irritable bowel syndrome)    • Inflammatory breast cancer (HCC)     right   • Kidney stone    • Morbid obesity with BMI of 50.0-59.9, adult (HCC)    • Obesity hypoventilation syndrome (HCC) 2021   • On home oxygen  therapy     2.5 AT REST WITH ACTIVITY UP TO 4L   • ANABELLE on CPAP     cpap  & uses O2 with CPAP   • Osteoarthritis    • PONV (postoperative nausea and vomiting)         Past Surgical History:   Procedure Laterality Date   • BREAST EXCISIONAL BIOPSY Bilateral    • COLONOSCOPY     • CYSTOSCOPY BLADDER STONE LITHOTRIPSY     • ENDOSCOPY     • KNEE ARTHROPLASTY Bilateral 2009   • MASTECTOMY Left 8/10/2021    Procedure: LEFT TOTAL MASTECTOMY;  Surgeon: Nicci Banks MD;  Location: [facility] MAIN OR;  Service: General;  Laterality: Left;   • MASTECTOMY Right 8/10/2021    Procedure: RIGHT MODIFIED RADICAL MASTECTOMY;  Surgeon: Nicci Banks MD;  Location: [facility] MAIN OR;  Service: General;  Laterality: Right;   • SPINE SURGERY  1984   • TOTAL ABDOMINAL HYSTERECTOMY WITH SALPINGO OOPHORECTOMY  2004   • UPPER GASTROINTESTINAL ENDOSCOPY     • VENOUS ACCESS DEVICE (PORT) INSERTION N/A 1/25/2021    Procedure: INSERTION VENOUS ACCESS DEVICE;  Surgeon: Nicci Banks MD;  Location: [facility] MAIN OR;  Service: General;  Laterality: N/A;   • VENOUS ACCESS DEVICE (PORT) REMOVAL Left 4/5/2022    Procedure: REMOVAL VENOUS ACCESS DEVICE;  Surgeon: Nicci Banks MD;  Location: [facility] MAIN OR;  Service: General;  Laterality: Left;         Visit Dx:      ICD-10-CM ICD-9-CM   1. Lymphedema  I89.0 457.1   2. Pain  R52 780.96   3. Joint stiffness  M25.60 719.50   4. Abnormal posture  R29.3 781.92   5. 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 At risk for self care deficit  Z91.89 V49.89        Lymphedema     Row Name 05/16/22 1600             Subjective Pain    Able to rate subjective pain? yes  -SC      Pre-Treatment Pain Level 2  -SC      Post-Treatment Pain Level 2  -SC      Subjective Pain Comment Right axilla with range of motion  -SC              Subjective Comments    Subjective Comments Patient states she went to her primary care physician regarding the red inflamed tender area on the right lateral  abdomen.  Patient states her primary care physician does believe that it is an issue with the ribs and cartilage in between.  Patient is waiting for x-ray appointment to be made.  Patient does state that it has been feeling better and is no longer red and inflamed.  -SC              Manual Lymphatic Drainage    Manual Lymphatic Drainage initial sequence;opened regional lymph nodes;opened anastamoses;extremity treatment  -SC      Initial Sequence short neck;supraclavicular  -SC      Opened Regional Lymph Nodes axillary;paraspinal  Peristernal, intercostal  -SC      Axillary right  -SC      Opened Anastamoses other opened anastamoses  Axial low-peristernal  -SC      Extremity Treatment MLD to full limb  -SC      MLD to Full Limb Upper extremity  -SC      Manual Therapy Therapist provided patient with trigger point release and myofascial release to the right upper quadrant particularly to pectoralis major pectoralis minor teres major teres minor latissimus dorsi insertion site at the inferior angle of scapula and upper trap/supraspinatus.  Therapist providing  passive stretch of right upper extremity in all planes.  Patient able to demonstrate increased range of motion and decreased discomfort posttreatment  -SC              Compression/Skin Care    Compression Garment Comments CircAid reduction kit right upper extremity  -SC            User Key  (r) = Recorded By, (t) = Taken By, (c) = Cosigned By    Initials Name Provider Type    SC Shahrzad Torres COTA Occupational Therapist Assistant                 OT Ortho     Row Name 05/16/22 1600             Orthotics & Prosthetics Management    Orthosis Location upper extremity orthosis  Therapist modifying CircAid reduction kit right upper extremity allowing for increased compression throughout for optimal lymph volume reduction.  -SC      Additional Documentation Orthosis Location (Row)  -SC            User Key  (r) = Recorded By, (t) = Taken By, (c) = Cosigned By     Initials Name Provider Type    Shahrzad Solis COTA Occupational Therapist Assistant                     OT Assessment/Plan     Row Name 05/16/22 1652          OT Assessment    Assessment Comments Patient will benefit from continued skilled occupational therapy services to continue to support reduction of the lymph volume in the right upper extremity and till a plateau is achieved. Patient will also benefit from skilled occupational therapy services to reduce muscle tension and pain that is negatively impacting functional range of motion and IADL tasks.  -SC     Patient would benefit from skilled therapy intervention Yes  -SC            OT Plan    OT Plan Comments Continue POC  -SC           User Key  (r) = Recorded By, (t) = Taken By, (c) = Cosigned By    Initials Name Provider Type    Shahrzad Solis COTA Occupational Therapist Assistant                    Manual Rx (last 36 hours)     Manual Treatments     Row Name 05/16/22 1652             Total Minutes    93920 - OT Manual Therapy Minutes 36  -SC            User Key  (r) = Recorded By, (t) = Taken By, (c) = Cosigned By    Initials Name Provider Type    Shahrzad Solis COTA Occupational Therapist Assistant                                   Time Calculation:   Timed Charges  91280 - OT Manual Therapy Minutes: 36  19578 - OT Orthotic Management: 10  Total Minutes  Timed Charges Total Minutes: 46   Total Minutes: 46     Therapy Charges for Today     Code Description Service Date Service Provider Modifiers Qty    19790285359 HC OT ORTHOTIC MGMT/TRAIN EA 15 MIN 5/16/2022 Shahrzad Torres COTA GO 1    75988874931 HC OT MANUAL THERAPY EA 15 MIN 5/16/2022 Shahrzad Torres COTA GO 2                      LUIS M Mcguire  5/16/2022

## 2022-05-20 ENCOUNTER — HOSPITAL ENCOUNTER (OUTPATIENT)
Dept: OCCUPATIONAL THERAPY | Facility: HOSPITAL | Age: 67
Setting detail: THERAPIES SERIES
Discharge: HOME | End: 2022-05-20

## 2022-05-20 DIAGNOSIS — I89.0 LYMPHEDEMA: Primary | ICD-10-CM

## 2022-05-20 DIAGNOSIS — Z91.89 AT RISK FOR SELF CARE DEFICIT: ICD-10-CM

## 2022-05-20 DIAGNOSIS — M25.60 JOINT STIFFNESS: ICD-10-CM

## 2022-05-20 DIAGNOSIS — I97.2 POSTMASTECTOMY LYMPHEDEMA SYNDROME: ICD-10-CM

## 2022-05-20 DIAGNOSIS — R52 PAIN: ICD-10-CM

## 2022-05-20 DIAGNOSIS — Z90.13 HISTORY OF MASTECTOMY, BILATERAL: ICD-10-CM

## 2022-05-20 DIAGNOSIS — M62.81 MUSCLE WEAKNESS (GENERALIZED): ICD-10-CM

## 2022-05-20 DIAGNOSIS — Z17.0 BILATERAL MALIGNANT NEOPLASM OF BREAST IN FEMALE, ESTROGEN RECEPTOR POSITIVE, UNSPECIFIED SITE OF BREAST: ICD-10-CM

## 2022-05-20 DIAGNOSIS — C50.912 BILATERAL MALIGNANT NEOPLASM OF BREAST IN FEMALE, ESTROGEN RECEPTOR POSITIVE, UNSPECIFIED SITE OF BREAST: ICD-10-CM

## 2022-05-20 DIAGNOSIS — Z91.89 AT RISK FOR LYMPHEDEMA: ICD-10-CM

## 2022-05-20 DIAGNOSIS — C50.911 BILATERAL MALIGNANT NEOPLASM OF BREAST IN FEMALE, ESTROGEN RECEPTOR POSITIVE, UNSPECIFIED SITE OF BREAST: ICD-10-CM

## 2022-05-20 DIAGNOSIS — L90.5 SCAR CONDITION AND FIBROSIS OF SKIN: ICD-10-CM

## 2022-05-20 DIAGNOSIS — R29.3 ABNORMAL POSTURE: ICD-10-CM

## 2022-05-20 PROCEDURE — 97110 THERAPEUTIC EXERCISES: CPT

## 2022-05-20 PROCEDURE — 97140 MANUAL THERAPY 1/> REGIONS: CPT

## 2022-05-20 NOTE — THERAPY TREATMENT NOTE
Outpatient Occupational Therapy Lymphedema Treatment Note  [facility]     Patient Name: Aida Conner  : 1955  MRN: [id number]  Today's Date: 2022      Visit Date: 2022    Patient Active Problem List   Diagnosis   • Inflammatory breast cancer, right (HCC)   • Other specified disorders of breast    • Malignant neoplasm of axillary tail of right female breast (HCC)   • Encounter for eye exam due to high risk medication   • Obesity hypoventilation syndrome (HCC)   • Iron adverse reaction   • Functional diarrhea   • Encounter for long-term (current) use of high-risk medication   • Abnormal mammogram   • Allergic rhinitis   • Anemia   • Anxiety disorder   • Breast pain, right   • Depression   • Essential tremor   • Hemorrhoids   • Hyperlipidemia   • Hypertension, essential   • IBS (irritable bowel syndrome)   • Impaired fasting glucose   • Obesity   • Obstructive sleep apnea   • Osteoarthrosis   • Renal calculi   • Restrictive airway disease   • Type II diabetes mellitus (HCC)   • Vitamin deficiency   • Urinary bladder incontinence   • Malignant neoplasm of central portion of right female breast (HCC)   • Osteoporosis without current pathological fracture   • Chronic diastolic congestive heart failure (HCC)   • Coronary artery calcification seen on CT scan        Past Medical History:   Diagnosis Date   • Anemia    • Anxiety    • Asthma    • Chronic diastolic (congestive) heart failure (HCC)    • Chronic hypoxemic respiratory failure (HCC)     on continuous O2   • Coronary artery calcification seen on CT scan 2022   • Diarrhea     with chemo   • H/O Intraductal papilloma    • Hemorrhoids    • History of transfusion 1984    AFTER BACK SURGERY no reaction   • Hypertension    • IBS (irritable bowel syndrome)    • Inflammatory breast cancer (HCC)     right   • Kidney stone    • Morbid obesity with BMI of 50.0-59.9, adult (HCC)    • Obesity hypoventilation syndrome (HCC) 2021   • On home oxygen  therapy     2.5 AT REST WITH ACTIVITY UP TO 4L   • ANABELLE on CPAP     cpap  & uses O2 with CPAP   • Osteoarthritis    • PONV (postoperative nausea and vomiting)         Past Surgical History:   Procedure Laterality Date   • BREAST EXCISIONAL BIOPSY Bilateral    • COLONOSCOPY     • CYSTOSCOPY BLADDER STONE LITHOTRIPSY     • ENDOSCOPY     • KNEE ARTHROPLASTY Bilateral 2009   • MASTECTOMY Left 8/10/2021    Procedure: LEFT TOTAL MASTECTOMY;  Surgeon: Nicci Banks MD;  Location: [facility] MAIN OR;  Service: General;  Laterality: Left;   • MASTECTOMY Right 8/10/2021    Procedure: RIGHT MODIFIED RADICAL MASTECTOMY;  Surgeon: Nicci Banks MD;  Location: [facility] MAIN OR;  Service: General;  Laterality: Right;   • SPINE SURGERY  1984   • TOTAL ABDOMINAL HYSTERECTOMY WITH SALPINGO OOPHORECTOMY  2004   • UPPER GASTROINTESTINAL ENDOSCOPY     • VENOUS ACCESS DEVICE (PORT) INSERTION N/A 1/25/2021    Procedure: INSERTION VENOUS ACCESS DEVICE;  Surgeon: Nicci Banks MD;  Location: [facility] MAIN OR;  Service: General;  Laterality: N/A;   • VENOUS ACCESS DEVICE (PORT) REMOVAL Left 4/5/2022    Procedure: REMOVAL VENOUS ACCESS DEVICE;  Surgeon: Nicci Banks MD;  Location: [facility] MAIN OR;  Service: General;  Laterality: Left;         Visit Dx:      ICD-10-CM ICD-9-CM   1. Lymphedema  I89.0 457.1   2. Pain  R52 780.96   3. Joint stiffness  M25.60 719.50   4. Abnormal posture  R29.3 781.92   5. 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 At risk for self care deficit  Z91.89 V49.89   7. Bilateral malignant neoplasm of breast in female, estrogen receptor positive, unspecified site of breast (HCC)  C50.911 174.9    Z17.0 V86.0    C50.912    8. Muscle weakness (generalized)  M62.81 728.87   9. History of mastectomy, bilateral  Z90.13 V45.71   10. Scar condition and fibrosis of skin  L90.5 709.2   11. At risk for lymphedema  Z91.89 V49.89        Lymphedema     Row Name 05/20/22 1400             Subjective Pain     Able to rate subjective pain? yes  -MP      Pre-Treatment Pain Level 2  -MP      Post-Treatment Pain Level 2  -MP      Subjective Pain Comment Right axilla and shoulder with motion  -MP              Subjective Comments    Subjective Comments still waiting for x-ray to be set up  -MP              Skin Changes/Observations    Location/Assessment Upper Quadrant  -MP      Upper Quadrant Conditions right:;intact;clean;dry  -MP      Upper Quadrant Color/Pigment right:;erythema;hyperpigmented  -MP      Skin Observations Comment Patient is noted to be wearing compression garment upon arrival to therapy today.  Garment is donned mostly correct with a couple of gaps between straps.  -MP              Manual Lymphatic Drainage    Manual Lymphatic Drainage initial sequence;opened regional lymph nodes;opened anastamoses  -MP      Initial Sequence short neck;supraclavicular;shoulder collectors;abdomen  -MP      Opened Regional Lymph Nodes axillary;inguinal  -MP      Axillary right  -MP      Opened Anastamoses anterior axillo-axillary;axillo-inguinal  -MP      Extremity Treatment MLD to proximal limb only  -MP      Manual Therapy Therapist provided trigger point release and myofascial release at the right upper quadrant, with focus on the pectoralis major and minor, teres major and minor, latissimus dorsi and upper trapius/supraspinatus.  Therapist with joint mobilization for glenohumeral joint anterior posterior glide, distraction and assisted rotation  -MP              Compression/Skin Care    Compression/Skin Care compression garment  -MP      Compression Garment Comments CircAid reduction kit was donned with therapist assisting patient to adjust tension on straps and patient providing teach back.  Therapist reminded patient to avoid gaps between straps.  Patient states that  is able to help with this at home and she will remind him to limit gaps  -MP            User Key  (r) = Recorded By, (t) = Taken By, (c) =  Cosigned By    Initials Name Provider Type    Belkis Kaur OT Occupational Therapist                         OT Assessment/Plan     Row Name 05/20/22 1537          OT Assessment    Assessment Comments Patient continues to have slight redness at the left lateral chest wall, but no obvious signs of infection on this date.  She tolerated treatment well and will continue to benefit from skilled occupational therapy services until a plateau is achieved and patient is independent with complete decongestive therapy, as well as independent with modalities and exercises to reduce muscle tension and pain that is negatively impacting functional range of motion  -MP           User Key  (r) = Recorded By, (t) = Taken By, (c) = Cosigned By    Initials Name Provider Type    Belkis Kaur OT Occupational Therapist                   OT Exercises     Row Name 05/20/22 1500             Exercise 1    Exercise Name 1 Scapular retraction/low trapezial  -MP      Sets 1 1  -MP      Time (Minutes) 1 Hold 5-10 seconds  -MP              Exercise 2    Exercise Name 2 Active assistive external rotation with cane  -MP      Sets 2 1  -MP      Reps 2 5  -MP      Time (Seconds) 2 Hold 10 seconds  -MP              Exercise 3    Exercise Name 3 Overhead reach with cane exercise  -MP      Reps 3 5  -MP      Time (Minutes) 3 Hold for sustained stretch for 5 to 10 seconds  -MP            User Key  (r) = Recorded By, (t) = Taken By, (c) = Cosigned By    Initials Name Provider Type    Belkis Kaur OT Occupational Therapist              Manual Rx (last 36 hours)     Manual Treatments     Row Name 05/20/22 1539             Total Minutes    41009 - OT Manual Therapy Minutes 40  -MP            User Key  (r) = Recorded By, (t) = Taken By, (c) = Cosigned By    Initials Name Provider Type    Belkis Kaur OT Occupational Therapist                Therapy Education  Education Details: Therapist instructed patient to begin light shoulder  external rotation and shoulder flexion cane exercises after shoulder depression and low trap squeezes.  She was instructed in the biomechanics of the shoulder and rationale for performing scapular balancing exercises prior to overhead reach and external rotation.  Patient return demonstrated cane exercises properly and was asked to complete these daily in a seated or similar reclined posture        Time Calculation:   Timed Charges  38291 - OT Therapeutic Exercise Minutes: 20  44538 - OT Manual Therapy Minutes: 40  Total Minutes  Timed Charges Total Minutes: 60   Total Minutes: 60     Therapy Charges for Today     Code Description Service Date Service Provider Modifiers Qty    66512875247  OT THER PROC EA 15 MIN 5/20/2022 Belkis Mooney OT GO 1    [id number]  OT MANUAL THERAPY EA 15 MIN 5/20/2022 Belkis Mooney OT GO 3                      Belkis Mooney OT  5/20/2022

## 2022-05-23 ENCOUNTER — HOSPITAL ENCOUNTER (OUTPATIENT)
Dept: OCCUPATIONAL THERAPY | Facility: HOSPITAL | Age: 67
Setting detail: THERAPIES SERIES
Discharge: HOME | End: 2022-05-23

## 2022-05-23 DIAGNOSIS — R52 PAIN: Primary | ICD-10-CM

## 2022-05-23 DIAGNOSIS — L90.5 SCAR CONDITION AND FIBROSIS OF SKIN: ICD-10-CM

## 2022-05-23 DIAGNOSIS — M25.60 JOINT STIFFNESS: ICD-10-CM

## 2022-05-23 DIAGNOSIS — I89.0 LYMPHEDEMA: ICD-10-CM

## 2022-05-23 PROCEDURE — 97140 MANUAL THERAPY 1/> REGIONS: CPT

## 2022-05-23 PROCEDURE — 97035 APP MDLTY 1+ULTRASOUND EA 15: CPT

## 2022-05-23 NOTE — THERAPY TREATMENT NOTE
Outpatient Occupational Therapy Lymphedema Treatment Note  [facility]     Patient Name: Aida Conner  : 1955  MRN: [id number]  Today's Date: 2022      Visit Date: 2022    Patient Active Problem List   Diagnosis   • Inflammatory breast cancer, right (HCC)   • Other specified disorders of breast    • Malignant neoplasm of axillary tail of right female breast (HCC)   • Encounter for eye exam due to high risk medication   • Obesity hypoventilation syndrome (HCC)   • Iron adverse reaction   • Functional diarrhea   • Encounter for long-term (current) use of high-risk medication   • Abnormal mammogram   • Allergic rhinitis   • Anemia   • Anxiety disorder   • Breast pain, right   • Depression   • Essential tremor   • Hemorrhoids   • Hyperlipidemia   • Hypertension, essential   • IBS (irritable bowel syndrome)   • Impaired fasting glucose   • Obesity   • Obstructive sleep apnea   • Osteoarthrosis   • Renal calculi   • Restrictive airway disease   • Type II diabetes mellitus (HCC)   • Vitamin deficiency   • Urinary bladder incontinence   • Malignant neoplasm of central portion of right female breast (HCC)   • Osteoporosis without current pathological fracture   • Chronic diastolic congestive heart failure (HCC)   • Coronary artery calcification seen on CT scan        Past Medical History:   Diagnosis Date   • Anemia    • Anxiety    • Asthma    • Chronic diastolic (congestive) heart failure (HCC)    • Chronic hypoxemic respiratory failure (HCC)     on continuous O2   • Coronary artery calcification seen on CT scan 2022   • Diarrhea     with chemo   • H/O Intraductal papilloma    • Hemorrhoids    • History of transfusion 1984    AFTER BACK SURGERY no reaction   • Hypertension    • IBS (irritable bowel syndrome)    • Inflammatory breast cancer (HCC)     right   • Kidney stone    • Morbid obesity with BMI of 50.0-59.9, adult (HCC)    • Obesity hypoventilation syndrome (HCC) 2021   • On home oxygen  therapy     2.5 AT REST WITH ACTIVITY UP TO 4L   • ANABELLE on CPAP     cpap  & uses O2 with CPAP   • Osteoarthritis    • PONV (postoperative nausea and vomiting)         Past Surgical History:   Procedure Laterality Date   • BREAST EXCISIONAL BIOPSY Bilateral    • COLONOSCOPY     • CYSTOSCOPY BLADDER STONE LITHOTRIPSY     • ENDOSCOPY     • KNEE ARTHROPLASTY Bilateral 2009   • MASTECTOMY Left 8/10/2021    Procedure: LEFT TOTAL MASTECTOMY;  Surgeon: Nicci Banks MD;  Location: [facility] MAIN OR;  Service: General;  Laterality: Left;   • MASTECTOMY Right 8/10/2021    Procedure: RIGHT MODIFIED RADICAL MASTECTOMY;  Surgeon: Nicci Banks MD;  Location: [facility] MAIN OR;  Service: General;  Laterality: Right;   • SPINE SURGERY  1984   • TOTAL ABDOMINAL HYSTERECTOMY WITH SALPINGO OOPHORECTOMY  2004   • UPPER GASTROINTESTINAL ENDOSCOPY     • VENOUS ACCESS DEVICE (PORT) INSERTION N/A 1/25/2021    Procedure: INSERTION VENOUS ACCESS DEVICE;  Surgeon: Nicci Banks MD;  Location: [facility] MAIN OR;  Service: General;  Laterality: N/A;   • VENOUS ACCESS DEVICE (PORT) REMOVAL Left 4/5/2022    Procedure: REMOVAL VENOUS ACCESS DEVICE;  Surgeon: Nicci Banks MD;  Location: [facility] MAIN OR;  Service: General;  Laterality: Left;         Visit Dx:      ICD-10-CM ICD-9-CM   1. Pain  R52 780.96   2. Lymphedema  I89.0 457.1   3. Joint stiffness  M25.60 719.50   4. Scar condition and fibrosis of skin  L90.5 709.2        Lymphedema     Row Name 05/23/22 1600             Subjective Pain    Able to rate subjective pain? yes  -CH      Pre-Treatment Pain Level 3  -CH      Post-Treatment Pain Level 3  -CH      Subjective Pain Comment Patient's pain is primarily at end range of motion and she feels pain and pulling into her right chest wall/axilla  -CH              Subjective Comments    Subjective Comments Patient states she is still not gotten an x-ray of the rib to determine where the pain is coming from.  -CH               Manual Lymphatic Drainage    Manual Lymphatic Drainage initial sequence;opened regional lymph nodes;opened anastamoses  -      Initial Sequence short neck;supraclavicular;shoulder collectors;abdomen  -CH      Opened Regional Lymph Nodes axillary;inguinal  -CH      Axillary right  -      Opened Anastamoses anterior axillo-axillary;axillo-inguinal  -CH      Extremity Treatment MLD to proximal limb only  -      Manual Therapy Therapist provided myofascial release/trigger point to include myofascial arm pull.  Patient is noted with significant spongy [place] at flexion and abduction.  Patient is also noted with significant external rotation limitation secondary to pain in the shoulder with end range.  -            User Key  (r) = Recorded By, (t) = Taken By, (c) = Cosigned By    Initials Name Provider Type    Ria Colón OT Occupational Therapist              OT assisted patient in donning the CircAid reduction kit to the right upper extremity.     OT Assessment/Plan     Row Name 05/23/22 1615          OT Assessment    Functional Limitations Performance in self-care ADL  -     Impairments Impaired lymphatic circulation;Range of motion;Posture;Pain;Muscle strength;Joint mobility  -     Assessment Comments Patient is noted with an increase in overall lymphatic dynamics from an L-Dex score that was 7.9 in April is now 21.  Patient also continues to present with significant range of motion restrictions with pain at end range.  Patient will benefit from continued skilled occupational therapy services to continue to support limb volume back to normalize functioning and improve functional range of motion for I/ADL independence.  -            OT Plan    OT Plan Comments Continue POC  -           User Key  (r) = Recorded By, (t) = Taken By, (c) = Cosigned By    Initials Name Provider Type    Ria Colón OT Occupational Therapist                 Modalities     Row Name 05/23/22 1600              Ultrasound 72117    Location Right shoulder  -CH      Combo RX Minutes 70765 8  -CH      Duty Cycle 100  -CH      Frequency 3.0 MHz  -CH            User Key  (r) = Recorded By, (t) = Taken By, (c) = Cosigned By    Initials Name Provider Type    Ria Colón, OT Occupational Therapist                Manual Rx (last 36 hours)     Manual Treatments     Row Name 05/23/22 1616             Total Minutes    40658 - OT Manual Therapy Minutes 45  -CH            User Key  (r) = Recorded By, (t) = Taken By, (c) = Cosigned By    Initials Name Provider Type    Ria Colón, OT Occupational Therapist                Time Calculation:   Timed Charges  75074 - OT Ultrasound Minutes: 8  75418 - OT Manual Therapy Minutes: 45  Total Minutes  Timed Charges Total Minutes: 53   Total Minutes: 53     Therapy Charges for Today     Code Description Service Date Service Provider Modifiers Qty    34092737575  OT MANUAL THERAPY EA 15 MIN 5/23/2022 Ria Walker OT GO 3    [id number]  OT ULTRASOUND EA 15 MIN 5/23/2022 Ria Walker OT GO 1                      Ria Walker OT  5/23/2022

## 2022-05-27 ENCOUNTER — HOSPITAL ENCOUNTER (OUTPATIENT)
Dept: OCCUPATIONAL THERAPY | Facility: HOSPITAL | Age: 67
Setting detail: THERAPIES SERIES
Discharge: HOME | End: 2022-05-27

## 2022-05-27 DIAGNOSIS — Z91.89 AT RISK FOR SELF CARE DEFICIT: ICD-10-CM

## 2022-05-27 DIAGNOSIS — C50.912 BILATERAL MALIGNANT NEOPLASM OF BREAST IN FEMALE, ESTROGEN RECEPTOR POSITIVE, UNSPECIFIED SITE OF BREAST: ICD-10-CM

## 2022-05-27 DIAGNOSIS — Z17.0 BILATERAL MALIGNANT NEOPLASM OF BREAST IN FEMALE, ESTROGEN RECEPTOR POSITIVE, UNSPECIFIED SITE OF BREAST: ICD-10-CM

## 2022-05-27 DIAGNOSIS — C50.911 INFLAMMATORY BREAST CANCER, RIGHT: ICD-10-CM

## 2022-05-27 DIAGNOSIS — Z91.89 AT RISK FOR LYMPHEDEMA: ICD-10-CM

## 2022-05-27 DIAGNOSIS — I89.0 LYMPHEDEMA: ICD-10-CM

## 2022-05-27 DIAGNOSIS — I97.2 POSTMASTECTOMY LYMPHEDEMA SYNDROME: ICD-10-CM

## 2022-05-27 DIAGNOSIS — G89.3 NEOPLASM RELATED PAIN: ICD-10-CM

## 2022-05-27 DIAGNOSIS — Z90.13 HISTORY OF MASTECTOMY, BILATERAL: ICD-10-CM

## 2022-05-27 DIAGNOSIS — C50.911 BILATERAL MALIGNANT NEOPLASM OF BREAST IN FEMALE, ESTROGEN RECEPTOR POSITIVE, UNSPECIFIED SITE OF BREAST: ICD-10-CM

## 2022-05-27 DIAGNOSIS — M62.81 MUSCLE WEAKNESS (GENERALIZED): ICD-10-CM

## 2022-05-27 DIAGNOSIS — R29.3 ABNORMAL POSTURE: ICD-10-CM

## 2022-05-27 DIAGNOSIS — R52 PAIN: Primary | ICD-10-CM

## 2022-05-27 DIAGNOSIS — L90.5 SCAR CONDITION AND FIBROSIS OF SKIN: ICD-10-CM

## 2022-05-27 DIAGNOSIS — M25.60 JOINT STIFFNESS: ICD-10-CM

## 2022-05-27 PROCEDURE — 97535 SELF CARE MNGMENT TRAINING: CPT

## 2022-05-27 PROCEDURE — 97140 MANUAL THERAPY 1/> REGIONS: CPT

## 2022-05-27 NOTE — THERAPY TREATMENT NOTE
Outpatient Occupational Therapy Lymphedema Treatment Note  [facility]     Patient Name: Aida Conner  : 1955  MRN: [id number]  Today's Date: 2022      Visit Date: 2022    Patient Active Problem List   Diagnosis   • Inflammatory breast cancer, right (HCC)   • Other specified disorders of breast    • Malignant neoplasm of axillary tail of right female breast (HCC)   • Encounter for eye exam due to high risk medication   • Obesity hypoventilation syndrome (HCC)   • Iron adverse reaction   • Functional diarrhea   • Encounter for long-term (current) use of high-risk medication   • Abnormal mammogram   • Allergic rhinitis   • Anemia   • Anxiety disorder   • Breast pain, right   • Depression   • Essential tremor   • Hemorrhoids   • Hyperlipidemia   • Hypertension, essential   • IBS (irritable bowel syndrome)   • Impaired fasting glucose   • Obesity   • Obstructive sleep apnea   • Osteoarthrosis   • Renal calculi   • Restrictive airway disease   • Type II diabetes mellitus (HCC)   • Vitamin deficiency   • Urinary bladder incontinence   • Malignant neoplasm of central portion of right female breast (HCC)   • Osteoporosis without current pathological fracture   • Chronic diastolic congestive heart failure (HCC)   • Coronary artery calcification seen on CT scan        Past Medical History:   Diagnosis Date   • Anemia    • Anxiety    • Asthma    • Chronic diastolic (congestive) heart failure (HCC)    • Chronic hypoxemic respiratory failure (HCC)     on continuous O2   • Coronary artery calcification seen on CT scan 2022   • Diarrhea     with chemo   • H/O Intraductal papilloma    • Hemorrhoids    • History of transfusion 1984    AFTER BACK SURGERY no reaction   • Hypertension    • IBS (irritable bowel syndrome)    • Inflammatory breast cancer (HCC)     right   • Kidney stone    • Morbid obesity with BMI of 50.0-59.9, adult (HCC)    • Obesity hypoventilation syndrome (HCC) 2021   • On home oxygen  therapy     2.5 AT REST WITH ACTIVITY UP TO 4L   • ANABELLE on CPAP     cpap  & uses O2 with CPAP   • Osteoarthritis    • PONV (postoperative nausea and vomiting)         Past Surgical History:   Procedure Laterality Date   • BREAST EXCISIONAL BIOPSY Bilateral    • COLONOSCOPY     • CYSTOSCOPY BLADDER STONE LITHOTRIPSY     • ENDOSCOPY     • KNEE ARTHROPLASTY Bilateral 2009   • MASTECTOMY Left 8/10/2021    Procedure: LEFT TOTAL MASTECTOMY;  Surgeon: Nicci Banks MD;  Location: [facility] MAIN OR;  Service: General;  Laterality: Left;   • MASTECTOMY Right 8/10/2021    Procedure: RIGHT MODIFIED RADICAL MASTECTOMY;  Surgeon: Nicci Banks MD;  Location: [facility] MAIN OR;  Service: General;  Laterality: Right;   • SPINE SURGERY  1984   • TOTAL ABDOMINAL HYSTERECTOMY WITH SALPINGO OOPHORECTOMY  2004   • UPPER GASTROINTESTINAL ENDOSCOPY     • VENOUS ACCESS DEVICE (PORT) INSERTION N/A 1/25/2021    Procedure: INSERTION VENOUS ACCESS DEVICE;  Surgeon: Nicci Banks MD;  Location: [facility] MAIN OR;  Service: General;  Laterality: N/A;   • VENOUS ACCESS DEVICE (PORT) REMOVAL Left 4/5/2022    Procedure: REMOVAL VENOUS ACCESS DEVICE;  Surgeon: Nicci Banks MD;  Location: [facility] MAIN OR;  Service: General;  Laterality: Left;         Visit Dx:      ICD-10-CM ICD-9-CM   1. Pain  R52 780.96   2. Lymphedema  I89.0 457.1   3. Joint stiffness  M25.60 719.50   4. Scar condition and fibrosis of skin  L90.5 709.2   5. Abnormal posture  R29.3 781.92   6. 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 At risk for self care deficit  Z91.89 V49.89   8. Bilateral malignant neoplasm of breast in female, estrogen receptor positive, unspecified site of breast (HCC)  C50.911 174.9    Z17.0 V86.0    C50.912    9. Muscle weakness (generalized)  M62.81 728.87   10. History of mastectomy, bilateral  Z90.13 V45.71   11. At risk for lymphedema  Z91.89 V49.89   12. Inflammatory breast cancer, right (HCC)  C50.911 174.9   13.  Neoplasm related pain  G89.3 338.3        Lymphedema     Row Name 05/27/22 1600             Subjective Pain    Able to rate subjective pain? yes  -SC      Pre-Treatment Pain Level 3  -SC      Post-Treatment Pain Level 3  -SC      Subjective Pain Comment Patient's pain is primarily at end range of motion and she feels pain and pulling into her right chest wall/axilla  -SC              Manual Lymphatic Drainage    Manual Lymphatic Drainage initial sequence;opened regional lymph nodes;opened anastamoses  -SC      Initial Sequence short neck;supraclavicular;shoulder collectors;abdomen  -SC      Opened Regional Lymph Nodes axillary;inguinal  -SC      Axillary right  -SC      Opened Anastamoses anterior axillo-axillary;axillo-inguinal  -SC      Extremity Treatment MLD to proximal limb only  -SC      Manual Therapy point release and myofascial release to the right upper quadrant particularly to pectoralis major pectoralis minor teres major teres minor latissimus dorsi insertion site at the inferior angle of scapula and upper trap/supraspinatus.  Therapist providing  passive stretch of right upper extremity in all planes.  -SC              Compression/Skin Care    Compression/Skin Care compression garment  -SC      Compression Garment Comments CircAid reduction kit  -SC            User Key  (r) = Recorded By, (t) = Taken By, (c) = Cosigned By    Initials Name Provider Type    SC Shahrzad Torres COTA Occupational Therapist Assistant                         OT Assessment/Plan     Row Name 05/27/22 9349          OT Assessment    Assessment Comments Patient will benefit from continued skilled occupational therapy services to continue to support lymph volume back to normalize functioning and improve functional range of motion for I/ADL independence.  -SC     Patient would benefit from skilled therapy intervention Yes  -SC            OT Plan    OT Plan Comments Continue POC  -SC           User Key  (r) = Recorded By, (t) = Taken  By, (c) = Cosigned By    Initials Name Provider Type    Shahrzad Solis COTA Occupational Therapist Assistant                    Manual Rx (last 36 hours)     Manual Treatments     Row Name 05/27/22 1652             Total Minutes    67293 - OT Manual Therapy Minutes 25  -SC            User Key  (r) = Recorded By, (t) = Taken By, (c) = Cosigned By    Initials Name Provider Type    Shahrzad Solis COTA Occupational Therapist Assistant                  Therapy Education  Education Details: Therapist providing patient with new HEP to improve patient range of motion and decreased possible frozen shoulder and right shoulder.  Therapist demonstrating exercises and patient was able to demonstrate x2 with good understanding.  Patient will benefit from continued education to ensure good technique to prevent injury.  Therapist providing patient with information about frozen shoulder for patient to read.  Given: HEP, Symptoms/condition management  Program: New  How Provided: Verbal, Demonstration, Written  Provided to: Patient  Level of Understanding: Teach back education performed, Verbalized, Demonstrated  16576 - OT Self Care/Mgmt Minutes: 25        Time Calculation:   Timed Charges  29090 - OT Manual Therapy Minutes: 25  43086 - OT Self Care/Mgmt Minutes: 25  Untimed Charges  OT Moist Heat Minutes: 5  Total Minutes  Timed Charges Total Minutes: 50  Untimed Charges Total Minutes: 5   Total Minutes: 55     Therapy Charges for Today     Code Description Service Date Service Provider Modifiers Qty    78641110224 HC OT MANUAL THERAPY EA 15 MIN 5/27/2022 Shahrzad Torres COTA GO 2    17250597767 HC OT SELF CARE/MGMT/TRAIN EA 15 MIN 5/27/2022 Shahrzad Torres COTA GO 1                      LUIS M Mcguire  5/27/2022

## 2022-06-01 ENCOUNTER — APPOINTMENT (OUTPATIENT)
Dept: OCCUPATIONAL THERAPY | Facility: HOSPITAL | Age: 67
End: 2022-06-01

## 2022-06-03 ENCOUNTER — APPOINTMENT (OUTPATIENT)
Dept: OCCUPATIONAL THERAPY | Facility: HOSPITAL | Age: 67
End: 2022-06-03

## 2022-06-06 ENCOUNTER — TELEPHONE (OUTPATIENT)
Dept: ONCOLOGY | Facility: CLINIC | Age: 67
End: 2022-06-06

## 2022-06-06 NOTE — TELEPHONE ENCOUNTER
Caller: Aida Conner    Relationship to patient: Self    Best call back number: [phone]    Chief complaint: NEED TO RESCHEDULE THIS WEEKS APPTS    Type of visit: LABS, FOLLOW UP AND INJECTION    Requested date:   6/29 OR 6/30 PREFER LATE MORNING OR EARLY AFTERNOON APPT TIME     AND OR IF NEED EARLIER DATE  CAN DO ANY WED OR THURS IN THE MONTH OF June     If rescheduling, when is the original appointment: 06/09     Additional notes:

## 2022-06-07 ENCOUNTER — APPOINTMENT (OUTPATIENT)
Dept: OCCUPATIONAL THERAPY | Facility: HOSPITAL | Age: 67
End: 2022-06-07

## 2022-06-07 RX ORDER — LIDOCAINE 50 MG/G
CREAM TOPICAL
Qty: 30 G | Refills: 2 | Status: SHIPPED | OUTPATIENT
Start: 2022-06-07 | End: 2022-10-04

## 2022-06-09 ENCOUNTER — APPOINTMENT (OUTPATIENT)
Dept: ONCOLOGY | Facility: HOSPITAL | Age: 67
End: 2022-06-09

## 2022-06-10 ENCOUNTER — APPOINTMENT (OUTPATIENT)
Dept: OCCUPATIONAL THERAPY | Facility: HOSPITAL | Age: 67
End: 2022-06-10

## 2022-06-10 RX ORDER — LISINOPRIL 5 MG/1
TABLET ORAL
Qty: 90 TABLET | Refills: 1 | Status: SHIPPED | OUTPATIENT
Start: 2022-06-10

## 2022-06-11 DIAGNOSIS — C50.611 MALIGNANT NEOPLASM OF AXILLARY TAIL OF RIGHT FEMALE BREAST, UNSPECIFIED ESTROGEN RECEPTOR STATUS: ICD-10-CM

## 2022-06-13 ENCOUNTER — APPOINTMENT (OUTPATIENT)
Dept: RADIATION ONCOLOGY | Facility: HOSPITAL | Age: 67
End: 2022-06-13

## 2022-06-13 RX ORDER — OMEPRAZOLE 20 MG/1
CAPSULE, DELAYED RELEASE ORAL
Qty: 30 CAPSULE | Refills: 3 | Status: SHIPPED | OUTPATIENT
Start: 2022-06-13 | End: 2022-07-11

## 2022-06-14 ENCOUNTER — HOSPITAL ENCOUNTER (OUTPATIENT)
Dept: OCCUPATIONAL THERAPY | Facility: HOSPITAL | Age: 67
Setting detail: THERAPIES SERIES
Discharge: HOME | End: 2022-06-14

## 2022-06-14 PROCEDURE — 97110 THERAPEUTIC EXERCISES: CPT

## 2022-06-14 PROCEDURE — 97140 MANUAL THERAPY 1/> REGIONS: CPT

## 2022-06-14 NOTE — THERAPY TREATMENT NOTE
Outpatient Occupational Therapy Lymphedema Treatment Note  [facility]     Patient Name: Aida Conner  : 1955  MRN: [id number]  Today's Date: 2022      Visit Date: 2022    Patient Active Problem List   Diagnosis   • Inflammatory breast cancer, right (HCC)   • Other specified disorders of breast    • Malignant neoplasm of axillary tail of right female breast (HCC)   • Encounter for eye exam due to high risk medication   • Obesity hypoventilation syndrome (HCC)   • Iron adverse reaction   • Functional diarrhea   • Encounter for long-term (current) use of high-risk medication   • Abnormal mammogram   • Allergic rhinitis   • Anemia   • Anxiety disorder   • Breast pain, right   • Depression   • Essential tremor   • Hemorrhoids   • Hyperlipidemia   • Hypertension, essential   • IBS (irritable bowel syndrome)   • Impaired fasting glucose   • Obesity   • Obstructive sleep apnea   • Osteoarthrosis   • Renal calculi   • Restrictive airway disease   • Type II diabetes mellitus (HCC)   • Vitamin deficiency   • Urinary bladder incontinence   • Malignant neoplasm of central portion of right female breast (HCC)   • Osteoporosis without current pathological fracture   • Chronic diastolic congestive heart failure (HCC)   • Coronary artery calcification seen on CT scan        Past Medical History:   Diagnosis Date   • Anemia    • Anxiety    • Asthma    • Chronic diastolic (congestive) heart failure (HCC)    • Chronic hypoxemic respiratory failure (HCC)     on continuous O2   • Coronary artery calcification seen on CT scan 2022   • Diarrhea     with chemo   • H/O Intraductal papilloma    • Hemorrhoids    • History of transfusion 1984    AFTER BACK SURGERY no reaction   • Hypertension    • IBS (irritable bowel syndrome)    • Inflammatory breast cancer (HCC)     right   • Kidney stone    • Morbid obesity with BMI of 50.0-59.9, adult (HCC)    • Obesity hypoventilation syndrome (HCC) 2021   • On home oxygen  therapy     2.5 AT REST WITH ACTIVITY UP TO 4L   • ANABELLE on CPAP     cpap  & uses O2 with CPAP   • Osteoarthritis    • PONV (postoperative nausea and vomiting)         Past Surgical History:   Procedure Laterality Date   • BREAST EXCISIONAL BIOPSY Bilateral    • COLONOSCOPY     • CYSTOSCOPY BLADDER STONE LITHOTRIPSY     • ENDOSCOPY     • KNEE ARTHROPLASTY Bilateral 2009   • MASTECTOMY Left 8/10/2021    Procedure: LEFT TOTAL MASTECTOMY;  Surgeon: Nicci Banks MD;  Location: [facility] MAIN OR;  Service: General;  Laterality: Left;   • MASTECTOMY Right 8/10/2021    Procedure: RIGHT MODIFIED RADICAL MASTECTOMY;  Surgeon: Nicci Banks MD;  Location: [facility] MAIN OR;  Service: General;  Laterality: Right;   • SPINE SURGERY  1984   • TOTAL ABDOMINAL HYSTERECTOMY WITH SALPINGO OOPHORECTOMY  2004   • UPPER GASTROINTESTINAL ENDOSCOPY     • VENOUS ACCESS DEVICE (PORT) INSERTION N/A 1/25/2021    Procedure: INSERTION VENOUS ACCESS DEVICE;  Surgeon: Nicci Banks MD;  Location: [facility] MAIN OR;  Service: General;  Laterality: N/A;   • VENOUS ACCESS DEVICE (PORT) REMOVAL Left 4/5/2022    Procedure: REMOVAL VENOUS ACCESS DEVICE;  Surgeon: Nicci Banks MD;  Location: [facility] MAIN OR;  Service: General;  Laterality: Left;         Visit Dx:    No diagnosis found.     Lymphedema     Row Name 06/14/22 1600             Subjective Pain    Able to rate subjective pain? yes  -MP      Pre-Treatment Pain Level 2  -MP      Post-Treatment Pain Level 2  -MP              Subjective Comments    Subjective Comments Patient reports that her  is recovering from a spinal surgery and has been unable to help very much with donning of her compression garment.  She states that it continues to be difficult to do by herself.  -MP              Skin Changes/Observations    Location/Assessment Upper Quadrant  -MP      Upper Quadrant Conditions right:;intact;clean;dry  -MP      Upper Quadrant Color/Pigment right:;erythema  -MP       Skin Observations Comment Slight erythema right chest wall. Patient arrived w/ no compression, but brought circaid reduction kit with her.  -MP              Manual Lymphatic Drainage    Manual Lymphatic Drainage initial sequence;opened regional lymph nodes;opened anastamoses  -MP      Initial Sequence short neck;supraclavicular;shoulder collectors;abdomen  -MP      Opened Regional Lymph Nodes axillary;inguinal  -MP      Axillary right  -MP      Opened Anastamoses anterior axillo-axillary;axillo-inguinal  -MP      Extremity Treatment MLD to proximal limb only  -MP      Manual Therapy Trigger point and myofascial release right Upper extremity.  Focused on pectoralis major/minor, teres major/minor  -MP              Compression/Skin Care    Compression/Skin Care compression garment  -MP      Compression Garment Comments CircAid reduction kit was donned with patient teach back to therapist and how to apply.  Patient demonstrates knowledge but is unable to apply independently.   generally helps her at home but is unable right now because of recovering from spinal surgery  -MP            User Key  (r) = Recorded By, (t) = Taken By, (c) = Cosigned By    Initials Name Provider Type    Belkis Kaur OT Occupational Therapist                     OT Assessment/Plan     Row Name 06/14/22 7007          OT Assessment    Assessment Comments Patient was without compression on the right upper extremity today when coming into the clinic because she needs help from her  to don it, and he has been unable since his surgery.  She does state that he will be more able to help within the next few days.  She return demonstrated all home exercise program activities properly and tolerated today's treatment for myofascial release and trigger point release as well as advanced active range of motion patient will continue to benefit from skilled occupational therapy services in order to continue to support lymph volume reduction  and normalize functional movement of the right upper extremity for ADLs and IADLs  -MP           User Key  (r) = Recorded By, (t) = Taken By, (c) = Cosigned By    Initials Name Provider Type    Belkis Kaur, OT Occupational Therapist                 Modalities     Row Name 06/14/22 1600             Ultrasound 96808    Location Right lateral chest pectoralis major/minor  -MP      Duty Cycle 50  -MP      Frequency 3.0 MHz  -MP      Intensity - Wts/cm 0.5  -MP      43226 - PT Ultrasound Minutes 8  -MP            User Key  (r) = Recorded By, (t) = Taken By, (c) = Cosigned By    Initials Name Provider Type    MP Belkis Mooney, OT Occupational Therapist               OT Exercises     Row Name 06/14/22 1600             Exercise 1    Exercise Name 1 Scapular retraction/low trapezial  -MP      Sets 1 1  -MP      Time (Minutes) 1 Hold 5-10 seconds  -MP              Exercise 2    Exercise Name 2 Active assistive external rotation with cane  -MP      Sets 2 1  -MP      Reps 2 5  -MP      Time (Seconds) 2 Hold 10 seconds  -MP              Exercise 3    Exercise Name 3 Overhead reach with cane exercise  -MP      Reps 3 5  -MP      Time (Minutes) 3 Hold for sustained stretch for 5 to 10 seconds  -MP              Exercise 4    Exercise Name 4 Shoulder flexion actively and assistive wall  -MP      Sets 4 1  -MP      Reps 4 5  -MP              Exercise 5    Exercise Name 5 Shoulder abduction active and assistive wall  -MP      Reps 5 5  -MP            User Key  (r) = Recorded By, (t) = Taken By, (c) = Cosigned By    Initials Name Provider Type    MP Belkis Mooney, OT Occupational Therapist              Manual Rx (last 36 hours)     Manual Treatments     Row Name 06/14/22 1620             Total Minutes    47117 - OT Manual Therapy Minutes 40  -MP            User Key  (r) = Recorded By, (t) = Taken By, (c) = Cosigned By    Initials Name Provider Type    MP Belkis Mooney, OT Occupational Therapist                  Therapy  Education  Education Details: Patient reported that she had difficulty fitting and exercises due to caring for her .  She also stated that she was having trouble remembering how to perform some of them.  Occupational therapist reviewed all home exercise program activities with patient.  See exercise flowsheet.  Patient return demonstrated properly and did have her written instructions with her today while reviewing.  Patient was asked to try to do exercises multiple times a day, even if unable to do the full 10 repetitions of each.  She stated agreement.  Occupational therapist reviewed importance of wearing CircAid reduction kit at all times as able.  Patient verbalized agreement and stated that her  would be more able to help her since he is starting to feel better from surgery.  Given: Symptoms/condition management, HEP  Program: Reinforced, Progressed  How Provided: Demonstration, Verbal  Provided to: Patient        Time Calculation:   Timed Charges  59963 - OT Therapeutic Exercise Minutes: 20  92907 - OT Manual Therapy Minutes: 40  Total Minutes  Timed Charges Total Minutes: 60   Total Minutes: 60     Therapy Charges for Today     Code Description Service Date Service Provider Modifiers Qty    59946865869  OT THER PROC EA 15 MIN 6/14/2022 Belkis Mooney OT GO 1    72088696076  OT MANUAL THERAPY EA 15 MIN 6/14/2022 Belkis Mooney OT GO 3                      Belkis Mooney OT  6/14/2022

## 2022-06-17 ENCOUNTER — HOSPITAL ENCOUNTER (OUTPATIENT)
Dept: OCCUPATIONAL THERAPY | Facility: HOSPITAL | Age: 67
Setting detail: THERAPIES SERIES
Discharge: HOME | End: 2022-06-17

## 2022-06-17 DIAGNOSIS — L90.5 SCAR CONDITION AND FIBROSIS OF SKIN: ICD-10-CM

## 2022-06-17 DIAGNOSIS — Z90.13 HISTORY OF MASTECTOMY, BILATERAL: ICD-10-CM

## 2022-06-17 DIAGNOSIS — M25.60 JOINT STIFFNESS: ICD-10-CM

## 2022-06-17 DIAGNOSIS — R52 PAIN: Primary | ICD-10-CM

## 2022-06-17 DIAGNOSIS — C50.912 BILATERAL MALIGNANT NEOPLASM OF BREAST IN FEMALE, ESTROGEN RECEPTOR POSITIVE, UNSPECIFIED SITE OF BREAST: ICD-10-CM

## 2022-06-17 DIAGNOSIS — Z17.0 BILATERAL MALIGNANT NEOPLASM OF BREAST IN FEMALE, ESTROGEN RECEPTOR POSITIVE, UNSPECIFIED SITE OF BREAST: ICD-10-CM

## 2022-06-17 DIAGNOSIS — C50.911 BILATERAL MALIGNANT NEOPLASM OF BREAST IN FEMALE, ESTROGEN RECEPTOR POSITIVE, UNSPECIFIED SITE OF BREAST: ICD-10-CM

## 2022-06-17 DIAGNOSIS — R29.3 ABNORMAL POSTURE: ICD-10-CM

## 2022-06-17 DIAGNOSIS — I97.2 POSTMASTECTOMY LYMPHEDEMA SYNDROME: ICD-10-CM

## 2022-06-17 DIAGNOSIS — M62.81 MUSCLE WEAKNESS (GENERALIZED): ICD-10-CM

## 2022-06-17 DIAGNOSIS — I89.0 LYMPHEDEMA: ICD-10-CM

## 2022-06-17 DIAGNOSIS — Z91.89 AT RISK FOR SELF CARE DEFICIT: ICD-10-CM

## 2022-06-17 DIAGNOSIS — Z91.89 AT RISK FOR LYMPHEDEMA: ICD-10-CM

## 2022-06-17 PROCEDURE — 97140 MANUAL THERAPY 1/> REGIONS: CPT

## 2022-06-17 PROCEDURE — 97110 THERAPEUTIC EXERCISES: CPT

## 2022-06-17 NOTE — THERAPY TREATMENT NOTE
Outpatient Occupational Therapy Lymphedema Treatment Note  OLGA Clifton     Patient Name: Aida Conner  : 1955  MRN: [id number]  Today's Date: 2022      Visit Date: 2022    Patient Active Problem List   Diagnosis   • Inflammatory breast cancer, right (HCC)   • Other specified disorders of breast    • Malignant neoplasm of axillary tail of right female breast (HCC)   • Encounter for eye exam due to high risk medication   • Obesity hypoventilation syndrome (HCC)   • Iron adverse reaction   • Functional diarrhea   • Encounter for long-term (current) use of high-risk medication   • Abnormal mammogram   • Allergic rhinitis   • Anemia   • Anxiety disorder   • Breast pain, right   • Depression   • Essential tremor   • Hemorrhoids   • Hyperlipidemia   • Hypertension, essential   • IBS (irritable bowel syndrome)   • Impaired fasting glucose   • Obesity   • Obstructive sleep apnea   • Osteoarthrosis   • Renal calculi   • Restrictive airway disease   • Type II diabetes mellitus (HCC)   • Vitamin deficiency   • Urinary bladder incontinence   • Malignant neoplasm of central portion of right female breast (HCC)   • Osteoporosis without current pathological fracture   • Chronic diastolic congestive heart failure (HCC)   • Coronary artery calcification seen on CT scan        Past Medical History:   Diagnosis Date   • Anemia    • Anxiety    • Asthma    • Chronic diastolic (congestive) heart failure (HCC)    • Chronic hypoxemic respiratory failure (HCC)     on continuous O2   • Coronary artery calcification seen on CT scan 2022   • Diarrhea     with chemo   • H/O Intraductal papilloma    • Hemorrhoids    • History of transfusion 1984    AFTER BACK SURGERY no reaction   • Hypertension    • IBS (irritable bowel syndrome)    • Inflammatory breast cancer (HCC)     right   • Kidney stone    • Morbid obesity with BMI of 50.0-59.9, adult (HCC)    • Obesity hypoventilation syndrome (HCC) 2021   • On home oxygen  therapy     2.5 AT REST WITH ACTIVITY UP TO 4L   • ANABELLE on CPAP     cpap  & uses O2 with CPAP   • Osteoarthritis    • PONV (postoperative nausea and vomiting)         Past Surgical History:   Procedure Laterality Date   • BREAST EXCISIONAL BIOPSY Bilateral    • COLONOSCOPY     • CYSTOSCOPY BLADDER STONE LITHOTRIPSY     • ENDOSCOPY     • KNEE ARTHROPLASTY Bilateral 2009   • MASTECTOMY Left 8/10/2021    Procedure: LEFT TOTAL MASTECTOMY;  Surgeon: Nicci Banks MD;  Location: [facility] MAIN OR;  Service: General;  Laterality: Left;   • MASTECTOMY Right 8/10/2021    Procedure: RIGHT MODIFIED RADICAL MASTECTOMY;  Surgeon: Nicci Banks MD;  Location: [facility] MAIN OR;  Service: General;  Laterality: Right;   • SPINE SURGERY  1984   • TOTAL ABDOMINAL HYSTERECTOMY WITH SALPINGO OOPHORECTOMY  2004   • UPPER GASTROINTESTINAL ENDOSCOPY     • VENOUS ACCESS DEVICE (PORT) INSERTION N/A 1/25/2021    Procedure: INSERTION VENOUS ACCESS DEVICE;  Surgeon: Nicci Banks MD;  Location: [facility] MAIN OR;  Service: General;  Laterality: N/A;   • VENOUS ACCESS DEVICE (PORT) REMOVAL Left 4/5/2022    Procedure: REMOVAL VENOUS ACCESS DEVICE;  Surgeon: Nicci Banks MD;  Location: [facility] MAIN OR;  Service: General;  Laterality: Left;         Visit Dx:      ICD-10-CM ICD-9-CM   1. Pain  R52 780.96   2. Lymphedema  I89.0 457.1   3. Joint stiffness  M25.60 719.50   4. Scar condition and fibrosis of skin  L90.5 709.2   5. Abnormal posture  R29.3 781.92   6. 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 At risk for self care deficit  Z91.89 V49.89   8. Bilateral malignant neoplasm of breast in female, estrogen receptor positive, unspecified site of breast (HCC)  C50.911 174.9    Z17.0 V86.0    C50.912    9. Muscle weakness (generalized)  M62.81 728.87   10. At risk for lymphedema  Z91.89 V49.89   11. History of mastectomy, bilateral  Z90.13 V45.71        Lymphedema     Row Name 06/17/22 1100             Subjective Pain     Able to rate subjective pain? yes  -MP      Pre-Treatment Pain Level 1  -MP      Post-Treatment Pain Level 1  -MP      Subjective Pain Comment more stiffness than pain right shoulder  -MP              Subjective Comments    Subjective Comments  was able to help vicente circaid reduction kit today.  She arrived  -MP              Skin Changes/Observations    Location/Assessment Upper Quadrant  -MP      Upper Quadrant Conditions right:;intact;clean;dry  -MP      Upper Quadrant Color/Pigment right:;erythema  -MP              Manual Lymphatic Drainage    Manual Lymphatic Drainage initial sequence;opened regional lymph nodes;opened anastamoses  -MP      Initial Sequence short neck;supraclavicular;shoulder collectors;abdomen  -MP      Opened Regional Lymph Nodes axillary;inguinal  -MP      Axillary right  -MP      Opened Anastamoses anterior axillo-axillary;axillo-inguinal  -MP      Extremity Treatment MLD to proximal limb only  -MP      Manual Therapy Therapist provided trigger point and myofascial release at the right upper extremity with focus on pectoralis major/minor, teres major and minor  -MP              Compression/Skin Care    Compression/Skin Care compression garment  -MP      Compression Garment Comments Therapist assisted patient with donning of CircAid reduction kit at the right upper extremity following treatment.  Patient was able to verbalize instructions on how to apply.  Patient reports that her  is now able to help with donning of CircAid at home as well.  -MP            User Key  (r) = Recorded By, (t) = Taken By, (c) = Cosigned By    Initials Name Provider Type    Belkis Kaur OT Occupational Therapist                       OT Assessment/Plan     Row Name 06/17/22 6738          OT Assessment    Assessment Comments Patient arrived today into clinic with compression at the right upper extremity and reports that she is able to put them more effectively now that her  is able to  help with it.  She is able to provide instruction to her  to help her.  She tolerated treatment well today and reported a pain level 1/10 upon arrival to therapy today.  Her greatest concern at this point is range of motion, and she continues to have significant muscular tightness and scar tissue interference with range of motion.  She continues to be appropriate for skilled occupational therapy services in order to continue to support lymph volume reduction and normalize functional movement at the right upper extremity for ADLs and IADLs.  -MP           User Key  (r) = Recorded By, (t) = Taken By, (c) = Cosigned By    Initials Name Provider Type    Belkis Kaur OT Occupational Therapist                  Manual Rx (last 36 hours)     Manual Treatments     Row Name 06/17/22 1246             Total Minutes    00867 - OT Manual Therapy Minutes 32  -MP            User Key  (r) = Recorded By, (t) = Taken By, (c) = Cosigned By    Initials Name Provider Type    Belkis Kaur OT Occupational Therapist                Therapy Education  Education Details: Patient is asked to continue to maintain compression is much as possible between treatment sessions.  Occupational therapist instructed patient to pull compression garment up to his close to the proximal arm as possible prior to tightening straps.  Patient verbalized knowledge she was asked to continue to perform home exercise program activities at least 1 time a day.  Patient verbalizes that she is continuing to do exercises daily.        Time Calculation:   Timed Charges  70448 - OT Therapeutic Exercise Minutes: 13  33659 - OT Manual Therapy Minutes: 32  Total Minutes  Timed Charges Total Minutes: 45   Total Minutes: 45     Therapy Charges for Today     Code Description Service Date Service Provider Modifiers Qty    81968723796  OT THER PROC EA 15 MIN 6/17/2022 Belkis Mooney OT GO 1    [id number]  OT MANUAL THERAPY EA 15 MIN 6/17/2022 Vinicio  Belkis, OT GO 2                      Belkis Mooney OT  6/17/2022

## 2022-06-21 ENCOUNTER — APPOINTMENT (OUTPATIENT)
Dept: OCCUPATIONAL THERAPY | Facility: HOSPITAL | Age: 67
End: 2022-06-21

## 2022-06-23 ENCOUNTER — HOSPITAL ENCOUNTER (OUTPATIENT)
Dept: OCCUPATIONAL THERAPY | Facility: HOSPITAL | Age: 67
Setting detail: THERAPIES SERIES
Discharge: HOME | End: 2022-06-23

## 2022-06-23 DIAGNOSIS — C50.912 BILATERAL MALIGNANT NEOPLASM OF BREAST IN FEMALE, ESTROGEN RECEPTOR POSITIVE, UNSPECIFIED SITE OF BREAST: ICD-10-CM

## 2022-06-23 DIAGNOSIS — M62.81 MUSCLE WEAKNESS (GENERALIZED): ICD-10-CM

## 2022-06-23 DIAGNOSIS — I89.0 LYMPHEDEMA: ICD-10-CM

## 2022-06-23 DIAGNOSIS — M25.60 JOINT STIFFNESS: ICD-10-CM

## 2022-06-23 DIAGNOSIS — Z17.0 BILATERAL MALIGNANT NEOPLASM OF BREAST IN FEMALE, ESTROGEN RECEPTOR POSITIVE, UNSPECIFIED SITE OF BREAST: ICD-10-CM

## 2022-06-23 DIAGNOSIS — R52 PAIN: Primary | ICD-10-CM

## 2022-06-23 DIAGNOSIS — Z91.89 AT RISK FOR LYMPHEDEMA: ICD-10-CM

## 2022-06-23 DIAGNOSIS — Z90.13 HISTORY OF MASTECTOMY, BILATERAL: ICD-10-CM

## 2022-06-23 DIAGNOSIS — C50.911 BILATERAL MALIGNANT NEOPLASM OF BREAST IN FEMALE, ESTROGEN RECEPTOR POSITIVE, UNSPECIFIED SITE OF BREAST: ICD-10-CM

## 2022-06-23 DIAGNOSIS — Z91.89 AT RISK FOR SELF CARE DEFICIT: ICD-10-CM

## 2022-06-23 DIAGNOSIS — L90.5 SCAR CONDITION AND FIBROSIS OF SKIN: ICD-10-CM

## 2022-06-23 DIAGNOSIS — I97.2 POSTMASTECTOMY LYMPHEDEMA SYNDROME: ICD-10-CM

## 2022-06-23 DIAGNOSIS — R29.3 ABNORMAL POSTURE: ICD-10-CM

## 2022-06-23 PROCEDURE — 97140 MANUAL THERAPY 1/> REGIONS: CPT

## 2022-06-23 PROCEDURE — 93702 BIS XTRACELL FLUID ANALYSIS: CPT

## 2022-06-23 NOTE — THERAPY RE-EVALUATION
Outpatient Occupational Therapy Lymphedema Re-Evaluation   Etienne     Patient Name: Aida Conner  : 1955  MRN: [id number]  Today's Date: 2022      Visit Date: 2022    Patient Active Problem List   Diagnosis   • Inflammatory breast cancer, right (HCC)   • Other specified disorders of breast    • Malignant neoplasm of axillary tail of right female breast (HCC)   • Encounter for eye exam due to high risk medication   • Obesity hypoventilation syndrome (HCC)   • Iron adverse reaction   • Functional diarrhea   • Encounter for long-term (current) use of high-risk medication   • Abnormal mammogram   • Allergic rhinitis   • Anemia   • Anxiety disorder   • Breast pain, right   • Depression   • Essential tremor   • Hemorrhoids   • Hyperlipidemia   • Hypertension, essential   • IBS (irritable bowel syndrome)   • Impaired fasting glucose   • Obesity   • Obstructive sleep apnea   • Osteoarthrosis   • Renal calculi   • Restrictive airway disease   • Type II diabetes mellitus (HCC)   • Vitamin deficiency   • Urinary bladder incontinence   • Malignant neoplasm of central portion of right female breast (HCC)   • Osteoporosis without current pathological fracture   • Chronic diastolic congestive heart failure (HCC)   • Coronary artery calcification seen on CT scan        Past Medical History:   Diagnosis Date   • Anemia    • Anxiety    • Asthma    • Chronic diastolic (congestive) heart failure (HCC)    • Chronic hypoxemic respiratory failure (HCC)     on continuous O2   • Coronary artery calcification seen on CT scan 2022   • Diarrhea     with chemo   • H/O Intraductal papilloma    • Hemorrhoids    • History of transfusion     AFTER BACK SURGERY no reaction   • Hypertension    • IBS (irritable bowel syndrome)    • Inflammatory breast cancer (HCC)     right   • Kidney stone    • Morbid obesity with BMI of 50.0-59.9, adult (HCC)    • Obesity hypoventilation syndrome (HCC) 2021   • On home oxygen  therapy     2.5 AT REST WITH ACTIVITY UP TO 4L   • ANABELLE on CPAP     cpap  & uses O2 with CPAP   • Osteoarthritis    • PONV (postoperative nausea and vomiting)         Past Surgical History:   Procedure Laterality Date   • BREAST EXCISIONAL BIOPSY Bilateral    • COLONOSCOPY     • CYSTOSCOPY BLADDER STONE LITHOTRIPSY     • ENDOSCOPY     • KNEE ARTHROPLASTY Bilateral 2009   • MASTECTOMY Left 8/10/2021    Procedure: LEFT TOTAL MASTECTOMY;  Surgeon: Nicci Banks MD;  Location: [facility] MAIN OR;  Service: General;  Laterality: Left;   • MASTECTOMY Right 8/10/2021    Procedure: RIGHT MODIFIED RADICAL MASTECTOMY;  Surgeon: Nicci Banks MD;  Location: [facility] MAIN OR;  Service: General;  Laterality: Right;   • SPINE SURGERY  1984   • TOTAL ABDOMINAL HYSTERECTOMY WITH SALPINGO OOPHORECTOMY  2004   • UPPER GASTROINTESTINAL ENDOSCOPY     • VENOUS ACCESS DEVICE (PORT) INSERTION N/A 1/25/2021    Procedure: INSERTION VENOUS ACCESS DEVICE;  Surgeon: Nicci Banks MD;  Location: [facility] MAIN OR;  Service: General;  Laterality: N/A;   • VENOUS ACCESS DEVICE (PORT) REMOVAL Left 4/5/2022    Procedure: REMOVAL VENOUS ACCESS DEVICE;  Surgeon: Nicci Banks MD;  Location: [facility] MAIN OR;  Service: General;  Laterality: Left;         Visit Dx:     ICD-10-CM ICD-9-CM   1. Pain  R52 780.96   2. Lymphedema  I89.0 457.1   3. Joint stiffness  M25.60 719.50   4. Scar condition and fibrosis of skin  L90.5 709.2   5. Abnormal posture  R29.3 781.92   6. 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 At risk for self care deficit  Z91.89 V49.89   8. Bilateral malignant neoplasm of breast in female, estrogen receptor positive, unspecified site of breast (HCC)  C50.911 174.9    Z17.0 V86.0    C50.912    9. Muscle weakness (generalized)  M62.81 728.87   10. At risk for lymphedema  Z91.89 V49.89   11. History of mastectomy, bilateral  Z90.13 V45.71            Lymphedema     Row Name 06/23/22 0800             Subjective  "Pain    Able to rate subjective pain? yes  -MP      Pre-Treatment Pain Level 0  -MP      Post-Treatment Pain Level 0  -MP      Subjective Pain Comment Pt denies pain.  -MP              Subjective Comments    Subjective Comments Pt and  received the covid-19 booster, pfizer, 5 days ago.  She states that she has been more tired. Otherwise, no side effects from the [place]. She states she hasd been doing exercises 1x/day. She states she feels she is continuing to progress.  -MP              LLIS - Physical Concerns    The amount of pain associated with my lymphedema is: 0  -MP      The amount of limb heaviness associated with my lymphedema is: 1  -MP      The amount of skin tightness associated with my lymphedema is: 0  -MP      The size of my swollen limb(s) seems: 1  -MP      Lymphedema affects the movement of my swollen limb(s): 0  -MP      The strength in my swollen limb(s) is: 2  -MP              LLIS - Psychosocial Concerns    Lymphedema affects my body image (i.e., \"how I think I look\"). 0  -MP      Lymphedema affects my socializing with others. 0  -MP      Lymphedema affects my intimate relations with spouse or partner (rate 0 if not applicable 0  -MP      Lymphedema \"gets me down\" (i.e., depression, frustration, or anger) 0  -MP      I must rely on others for help due to my lymphedema. 0  -MP      I know what to do to manage my lymphedema 0  -MP              LLIS - Functional Concerns    Lymphedema affects my ability to perform self-care activities (i.e. eating, dressing, hygiene) 0  -MP      Lymphedema affects my ability to perform routine home or work-related activities. 0  -MP      Lymphedema affects my performance of preferred leisure activities. 0  -MP      Lymphedema affects proper fit of clothing/shoes 0  -MP      Lymphedema affects my sleep 0  -MP              Posture/Observations    Posture/Observations Comments WFLs for ADLs and IADLs  -MP              General ROM    RT Upper Ext Rt Shoulder " ABduction;Rt Shoulder Flexion;Rt Shoulder External Rotation;Rt Shoulder Internal Rotation  -MP              Right Upper Ext    Rt Shoulder Flexion AROM 140  -MP      Rt Shoulder External Rotation AROM 70  -MP      Rt Shoulder Internal Rotation AROM 30  -MP      Rt Upper Extremity Comments  Pt states difficulty pulling pants up  -MP              MMT (Manual Muscle Testing)    General MMT Comments Internal rotation, external rotation and tricep strength are limited to 3+/5  -MP              Skin Changes/Observations    Location/Assessment Upper Quadrant  -MP      Upper Quadrant Conditions right:;intact;clean;dry  -MP      Upper Quadrant Color/Pigment right:;erythema  -MP              Lymphedema Sensation    Lymphedema Sensation Comments no complaints of sensory deficits.  -MP              Manual Lymphatic Drainage    Manual Lymphatic Drainage initial sequence;opened regional lymph nodes;opened anastamoses  -MP      Initial Sequence short neck;supraclavicular;shoulder collectors;abdomen  -MP      Opened Regional Lymph Nodes axillary;inguinal  -MP      Axillary right  -MP      Opened Anastamoses anterior axillo-axillary;axillo-inguinal  -MP      Extremity Treatment MLD to proximal limb only  -MP      Manual Therapy Therapist provided trigger point and myofascial release at the right UE w/ focus on pectoris major/minor, teres major and minor.  -MP              Compression/Skin Care    Compression/Skin Care compression garment  -MP      Compression Garment Comments Therapist assisted with donning of garment after treatment  -MP              L-Dex Bioimpedence Screening    L-Dex UE Score 24.7  -MP      L-Dex UE Baseline Score 10.6  -MP      L-Dex UE Value Change 14.1  -MP      $ L-Dex Charge yes  -MP              Lymphedema Life Impact Scale Totals    A.  Total Q1 - Q17 (Do not include Q18) 4  -MP      B.  Total number of questions answered (Q1-Q17) 17  -MP      C. Divide A by B 0.24  -MP      D. Multiple C by 25 6  -MP             User Key  (r) = Recorded By, (t) = Taken By, (c) = Cosigned By    Initials Name Provider Type    Belkis Kaur OT Occupational Therapist                      Therapy Education  Education Details: Occupational therapist educated patient that her lymph dynamics have changed and increased, making it imperative that she be diligent with compression garment wear daily as well as self manual lymphatic drainage and exercise performance 2 times daily, and to perform other exercises at least 1 time a day.  Occupational therapist reviewed self manual lymphatic drainage technique with patient, and patient return demonstrated technique properly.  Patient was asked to focus attention for home exercises on external rotation and overhead flexion especially.  Patient is agreeable.  Given: Symptoms/condition management, HEP  Program: Reinforced, Progressed  How Provided: Verbal, Demonstration, Written  Provided to: Patient  Level of Understanding: Teach back education performed, Verbalized, Demonstrated      Manual Rx (last 36 hours)     Manual Treatments     Row Name 06/23/22 1217             Total Minutes    08926 - OT Manual Therapy Minutes 30  -MP            User Key  (r) = Recorded By, (t) = Taken By, (c) = Cosigned By    Initials Name Provider Type    Belkis Kaur OT Occupational Therapist               OT Goals     Row Name 06/23/22 1217          Time Calculation    OT Goal Re-Cert Due Date 07/23/22  -MP           User Key  (r) = Recorded By, (t) = Taken By, (c) = Cosigned By    Initials Name Provider Type    Belkis Kaur OT Occupational Therapist                 OT Assessment/Plan     Row Name 06/23/22 1214          OT Assessment    Functional Limitations Performance in self-care ADL  -MP     Impairments Impaired lymphatic circulation;Range of motion;Posture;Pain;Muscle strength;Joint mobility  -MP     Assessment Comments Patient demonstrates lymph dynamics that are significantly outside of  normal range today per bioimpedance reassessment.  She reports that she is using compression garment daily, but also requires assistance to don it, and states that she has difficulty encouraging her  to help.  Occupational therapist emphasized need to maintain compression at all times as much as possible.  Patient was able to also demonstrate ability to perform self manual lymphatic drainage and understands importance of performing this 2 times a day at this time, as well as other home exercise program activities.  She continues to be appropriate for skilled occupational therapy services in order to support lymph volume reduction and normalize functional movement at the right upper extremity for ADLs and IADLs.  -MP     OT Diagnosis Lymphedema  -MP     OT Rehab Potential Good  -MP     Patient/caregiver participated in establishment of treatment plan and goals Yes  -MP     Patient would benefit from skilled therapy intervention Yes  -MP            OT Plan    OT Frequency 2x/week  1-2 times a week  -MP     Predicted Duration of Therapy Intervention (OT) 8 weeks  -MP     Planned CPT's? OT RE-EVAL: 40376;OT THER ACT EA 15 MIN: 95653PB;OT THER PROC EA 15 MIN: 40429ZH;OT NEUROMUSC RE EDUCATION EA 15 MIN: 85890;OT SELF CARE/MGMT/TRAIN 15 MIN: 00960;OT HOT/COLD PACK;OT ULTRASOUND EA 15 MIN: 06431;OT ELECTRIC STIM ATTD EA 15 MIN: 87532;OT MANUAL THERAPY EA 15 MIN: 26611;OT BIS XTRACELL FLUID ANALYSIS: 87758;OT ORTHOTIC MGMT/TRAIN EA 15 MIN: 87923;OT ORTHO/PROSTHET CHECKOUT EA 15 MIN: 01959  -MP     Planned Therapy Interventions (Optional Details) home exercise program;joint mobilization;manual therapy techniques;neuromuscular re-education;orthotic fitting/training;patient/family education;postural re-education;ROM (Range of Motion);strengthening  -MP     OT Plan Comments Continue plan of care  -MP           User Key  (r) = Recorded By, (t) = Taken By, (c) = Cosigned By    Initials Name Provider Type    ANGELICA Mooney  SHAN Tamayo Occupational Therapist                      Goals:  1. Post Breast Surgery Care/at risk for Lymphedema  LTG 1: 90 days:  As an indicator of no exacerbation of lymphedema staging, the patient will present with an L-Dex score less than [10] points from preoperative baseline.              STATUS: Not Met: Ongoing     STG 1a:   30 days: To prevent exacerbation of mixed edema to lymphedema, patient will utilize the 2 postsurgical compression garments daily.                 STATUS: Not met: Discontinue     STG 1b: 30 days: Patient will be independent with self-manual lymphatic massage.               STATUS: Met: Ongoing      STG 1c: 30 days:  Patient will be independent with identification of signs and symptoms of lymphedema exasperation per stoplight to recovery education handout.              STATUS: met: Ongoing      STG 1 d: 30 days: Patient will be independent with HEP to prevent advancement in lymphedema staging.              STATUS: Partially met: ongoing     TREATMENT:  Self Care/ADL retraining, Therapeutic Activity, Neuromuscular Re-education, Therapeutic Exercise, Bioimpedence Fluid Analysis, Post-Surgical compression garement [address] [address]na Zip-ST-High/ Nayana Camisole Kit 2860K, Orthotic Management and training,  and Manual Therapy.     2. Decreased Functional Use of R UE  LTG [2]:  90 Days: The patient will report a pain rating of 2/10 or better to improve functional use of right upper extremity and IADL tasks.            STATUS:  Partially Met: ongoing  STG [2]a:  The patient will report a pain rating of 4/10 or better as an average in 1 week intervals.   STATUS:  Met: ongoing     3. Decrease Functional ROM:     LTG [3]: 90 days:  The patient will demonstrate an increase of right upper extremity AROM to WFL for ADL independence.  STATUS:  Partially Met: ongoing     STG [3]a: The patient will demonstrate an increase in all shoulder planes in right upper extremity up to degrees of 20 or WFL for  improved self-care independence.        STATUS:  Partially Met: ongoing     TREATMENT:  Manual Therapy, Therapeutic Activity, ADL retraining, Neuromuscular Re-education, Therapeutic Exercise, Patient and Family Education, Orthotic Management and training, Modalities: TENS, NMES, Ultrasound, Fluidotherapy Wound dressing as needed, Modalities as needed and Manual Therapy.     3. Carrying, Moving, and Handling Objects Functional Limitation                               LTG 4: 90 days:  The patient will demonstrate 1-19% limitation by achieving a score of 11 on the Quick Dash.                      STATUS:  Partially Met: ongoing              STG 4a: 30 days:  The patient will demonstrate 80-99% limitation by achieving a score of 54 on the Quick Dash.                      STATUS:  Met              STG 4a: 30 days:  The patient will demonstrate 40-9% limitation by achieving a score of 36 on the Quick Dash.                      STATUS:  New  TREATMENT: Manual Therapy, Therapeutic Activity, Neuromuscular Re-education, Therapeutic Exercise, Patient and Family Education, Orthotic Management and training, Modalities: TENS, NMES, Ultrasound, Fluidotherapy Wound dressing as needed, Modalities as needed and Manual Therapy.    Time Calculation:   Timed Charges  15348 - OT Manual Therapy Minutes: 30  Total Minutes  Timed Charges Total Minutes: 30   Total Minutes: 30     Therapy Charges for Today     Code Description Service Date Service Provider Modifiers Qty    76671517499 HC PT BIS XTRACELL FLUID ANALYSIS 6/23/2022 Belkis Mooney OT  1    [id number] HC OT MANUAL THERAPY EA 15 MIN 6/23/2022 Belkis Mooney OT GO 2                  Belkis Mooney OT  6/23/2022

## 2022-06-24 ENCOUNTER — APPOINTMENT (OUTPATIENT)
Dept: OCCUPATIONAL THERAPY | Facility: HOSPITAL | Age: 67
End: 2022-06-24

## 2022-07-09 NOTE — PROGRESS NOTES
Subjective     REASON FOR FOLLOW-UP: Right breast inflammatory breast, triple positive                              REQUESTING PHYSICIAN: MD Francisco Esteban MD Bethany Haynes, MD    History of Present Illness patient is a 67 y.o. 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  Completed treatment March 2022    Patient has been continuing on Arimidex daily.  She denies any known side effects from this aside from gaining weight.  She does remain quite sedentary and admits to even skipping her lymphedema exercises that she knows she should be doing.  She has not been getting out of the house much as family is quite concerned about her risk of infection.  We discussed from the cancer standpoint she has not immunosuppressed however she does have to watch with regards to her ongoing shortness of breath and need for oxygen.      She has noticed intermittent diarrhea and states she is due for colonoscopy so therefore we will get this set up.  She is concerned this could be related to her diarrhea and chemotherapy though she did have some IBS type symptoms prior to initiation of treatment she reports.  She does have improvement when she takes probiotics regularly but finds when she stops them that the diarrhea increases.  I encouraged her to take this daily then.    She denies any new areas of pain.  She has had no recent skin changes.  She does continue with [facility]hed[facility].    Past Medical History:   Diagnosis Date   • Anemia     • Anxiety    • Asthma    • Chronic diastolic (congestive) heart failure (HCC)    • Chronic hypoxemic respiratory failure (HCC)     on continuous O2   • Coronary artery calcification seen on CT scan 4/21/2022   • Diarrhea     with chemo   • H/O Intraductal papilloma    • Hemorrhoids    • History of transfusion 1984    AFTER BACK SURGERY no reaction   • Hypertension    • IBS (irritable bowel syndrome)    • Inflammatory breast cancer (HCC)     right   • Kidney stone    • Morbid obesity with BMI of 50.0-59.9, adult (HCC)    • Obesity hypoventilation syndrome (HCC) 02/05/2021   • On home oxygen therapy     2.5 AT REST WITH ACTIVITY UP TO 4L   • ANABELLE on CPAP     cpap  & uses O2 with CPAP   • Osteoarthritis    • PONV (postoperative nausea and vomiting)         Past Surgical History:   Procedure Laterality Date   • BREAST EXCISIONAL BIOPSY Bilateral    • COLONOSCOPY     • CYSTOSCOPY BLADDER STONE LITHOTRIPSY     • ENDOSCOPY     • KNEE ARTHROPLASTY Bilateral 2009   • MASTECTOMY Left 8/10/2021    Procedure: LEFT TOTAL MASTECTOMY;  Surgeon: Nicci Banks MD;  Location: [facility] OR;  Service: General;  Laterality: Left;   • MASTECTOMY Right 8/10/2021    Procedure: RIGHT MODIFIED RADICAL MASTECTOMY;  Surgeon: Nicci Banks MD;  Location: [facility] OR;  Service: General;  Laterality: Right;   • SPINE SURGERY  1984   • TOTAL ABDOMINAL HYSTERECTOMY WITH SALPINGO OOPHORECTOMY  2004   • UPPER GASTROINTESTINAL ENDOSCOPY     • VENOUS ACCESS DEVICE (PORT) INSERTION N/A 1/25/2021    Procedure: INSERTION VENOUS ACCESS DEVICE;  Surgeon: Nicci Banks MD;  Location: [facility] OR;  Service: General;  Laterality: N/A;   • VENOUS ACCESS DEVICE (PORT) REMOVAL Left 4/5/2022    Procedure: REMOVAL VENOUS ACCESS DEVICE;  Surgeon: Nicci Banks MD;  Location: [facility] OR;  Service: General;  Laterality: Left;      ONC HISTORY  patient is a 65-year-old white female with morbid obesity, history of diastolic  congestive heart failure and unknown type of pulmonary disease for which she has been on oxygen for 4 years.    She has been getting routine mammography since 38 years of age because her mother  at 46 of breast cancer and had a biopsy of her left breast in  which was apparently benign and another biopsy in  on her right breast which showed a small focus of atypical ductal hyperplasia and atypical lobular hyperplasia with a residual intraductal papilloma.  At that time she saw medical oncologist who recommended genetic testing and prevention but the insurance would not cover the genetic testing and the medical oncologist thought tamoxifen was too risky for her because of her comorbidities and no treatment was given.  More recently she noticed redness of the right breast in October and showed it to her family doctor who gave her course of Keflex when it did not improve and mammogram was ordered and this was benign  When the redness persisted she saw her gynecologist with concern for inflammatory breast cancer and referred her to Dr. Banks after repeat imaging and [facility].  Preliminary report shows micropapillary invasive mammary carcinoma intermediate grade measuring 13 mm right axillary node was also involved with metastatic cancer ER/AZ and HER-2 are pending  Patient saw Dr. Banks who sent her for skin biopsies and she had 3 separate punch biopsy of the skin that showed Perivascular and perifollicular inflammation with no obvious malignancy at 3:00 12:00 and 9:00  In addition staging work-up with CAT scans and bone scan were ordered.  CAT scan of the chest showed a borderline mediastinal  Infracarinal node measuring 15 mm in length mild cardiomegaly and heavy coronary artery calcifications  CT of the abdomen showed a 2.5 x 2.2 cm right adrenal mass which the patient tells me she has had in the past and is most likely benign but  also a 2.7 cm left external iliac node which is indeterminate.  Bone scan was negative    Echocardiogram is scheduled for later next week and genetic testing with the Teevoxe stat panel is pending    Patient is  3 para 3 menarche was at age 9 menopause at 49 when she had a hysterectomy and oophorectomy  First childbirth was at age 20 she breast-fed her second and third children and took no hormone replacement after menopause    Family history is positive for mother dying of breast cancer at age 46 she is a maternal aunt with breast cancer in her 70s a paternal aunt with breast cancer in her 70s paternal grandmother with colon cancer.  Her father had Hodgkin's disease and non-Hodgkin's lymphoma but  of small cell lung cancer at 67      She has not had a heart attack stroke or blood clot    Plan to do echocardiogram soon as possible to ascertain tolerability of cardiotoxic chemotherapy which would be typically indicated with an inflammatory breast cancer    Also plan PET scan to follow-up on atypical lymph nodes and confirm benign etiology of the adrenal lesion    She will have port placement and chemo education pt is  ER/ND and HER-2 +    I explained to Aida that the goal of treatment would be curative but she has a lot of comorbidities which may limit our ability to give the most effective chemotherapy in the setting  I told her radiation would be involved and possibly hormonal therapy for 10 years as she has hormone positivity and HER-2 directed therapy    She expressed some concerns about driving back and forth from [place] but felt that once a week was doable    We will see her back in 2 weeks to start treatment with a port placement planned early next week      Patient did have a mild reaction to Taxol dose #1 with some increased shortness of breath and flushing.  This was responsive to 100 mg of Solu-Cortef.  She states this gave her a headache and made her quite talkative but otherwise she  was thankfully able to complete Taxol infusion without further incident.    Patient reports issues with chronic diarrhea over the last few years and did note a little bit increase in stooling following THP though nothing overly significant in her mind.  She did finally begin taking Imodium just a few days ago and has had no further stools.  She does note significant trouble with hemorrhoids in relation to the diarrhea   Required blood transfusion due to significant hemorrhoidal bleeding plus iron deficiency.  Injectafer planned    Due to inclement weather cycle 1 day 8 therapy was missed.  She did get day 15 therapy with fairly good tolerance except for some diarrhea.    She was found to be iron deficient despite trying oral iron.  We therefore elected to proceed with IV Injectafer but she remains mildly anemic with a hemoglobin of 9.3    7/21    She has increased her Lasix and her weight is down 7 pounds but overall she is significantly weaker since starting treatment and I think it is in her best interest to discontinue treatment and proceed with surgery and will use Herceptin in the interim till surgery scheduled  She has had a good response in the breast and I do not think the last dose of Adriamycin is going to be crucial and we run the risk of making her so weak that she cannot go for surgery    9/21  Patient had worsening shortness of breath requiring continuous oxygen.  CT of the chest performed to rule out pneumonitis and she was started on steroids.  CT showed bilateral groundglass infiltrates presumed to be related to Taxol pneumonitis and therefore Taxol discontinued.  Patient completed the fourth cycle of Perjeta/Herceptin alone.  Diarrhea was an issue but not as bad without the Taxol.  She is weaned off her prednisone    Patient proceeded with cycle 1 Adriamycin/Cytoxan on 5/14/2021 and is seen back today for cycle #3 and we are doing it at 3-week intervals because of her frailty and after 3 cycles we  stopped because of tolerance issues    She went for surgery her bilateral mastectomies and interestingly she had a complete pathological CR on the right breast and had evidence of DCIS in the left breast [id number]    10/21  We will start anastrozole because she is not a good candidate for tamoxifen and her bone density showed normal bone density in the spine but osteoporosis in the left femoral neck and we will start Prolia in 6 weeks.The side effects and toxicities of the Aromatase inhibitors was discussed with the patient including, hot flashes, mood swings and hair thinning.Significant arthralgias and worsening bone density were also discussed. Baseline bone density evaluation was ordered.        Current Outpatient Medications on File Prior to Visit   Medication Sig Dispense Refill   • albuterol sulfate  (90 Base) MCG/ACT inhaler Inhale 2 puffs Every 4 (Four) Hours As Needed.     • anastrozole (ARIMIDEX) 1 MG tablet Take 1 tablet by mouth Daily. 90 tablet 1   • carvedilol (COREG) 25 MG tablet Take 0.5 tablets by mouth 2 (Two) Times a Day With Meals. 30 tablet 0   • Cholecalciferol (Vitamin D) 50 MCG (2000 UT) capsule Take 2,000 Units by mouth Daily.     • dicyclomine (BENTYL) 20 MG tablet Take 1 tablet by mouth Every 6 (Six) Hours. 60 tablet 2   • diphenhydrAMINE (BENADRYL) 25 mg capsule Take 25 mg by mouth At Night As Needed for Itching, Allergies or Sleep.     • diphenoxylate-atropine (LOMOTIL) 2.5-0.025 MG per tablet Take 1 tablet by mouth 4 (Four) Times a Day As Needed for Diarrhea. 120 tablet 0   • FLUoxetine (PROzac) 40 MG capsule TAKE 1 CAPSULE BY MOUTH EVERY DAY 30 capsule 0   • furosemide (LASIX) 40 MG tablet TAKE ONE-HALF TABLET BY MOUTH EVERY DAY 45 tablet 1   • Hemorrhoidal Relief 5 % cream cream apply topically to the appropriate area as directed THREE TIMES DAILY AS NEEDED for hemmorrhoid pain 30 g 2   • Lidocaine Viscous HCl (XYLOCAINE) 2 % solution Apply 5 mL topically to the appropriate area  as directed As Needed for Mild Pain  (mix with Silvadene). 100 mL 1   • lisinopril (PRINIVIL,ZESTRIL) 5 MG tablet TAKE 1 TABLET BY MOUTH EVERY DAY 90 tablet 1   • meloxicam (MOBIC) 15 MG tablet take 1/2 tablet BY MOUTH EVERY DAY 45 tablet 1   • miconazole (Lotrimin AF) 2 % powder Apply  topically to the appropriate area as directed 2 (two) times a day. 85 g 1   • mometasone-formoterol (DULERA 100) 100-5 MCG/ACT inhaler Inhale 2 Puffs/kg Every Night.     • montelukast (SINGULAIR) 10 MG tablet Take 10 mg by mouth Every Night.     • multivitamin (THERAGRAN) tablet tablet Take 1 tablet by mouth Daily.     • O2 (OXYGEN) Inhale 3 L/min Continuous.     • ondansetron (ZOFRAN) 8 MG tablet Take 1 tablet by mouth 3 (Three) Times a Day As Needed for Nausea or Vomiting. 30 tablet 5   • Potassium 99 MG tablet Take 1 tablet by mouth Every Night.     • pravastatin (PRAVACHOL) 40 MG tablet Take 40 mg by mouth Every Night.     • psyllium (METAMUCIL) 0.52 g capsule Metamucil 0.52 gram oral capsule take 1 capsule by oral route 5Xday   Active     • riFAXIMin (Xifaxan) 550 MG tablet Take 1 tablet by mouth Daily. 28 tablet 2   • silver sulfadiazine (SILVADENE, SSD) 1 % cream Apply 1 application topically to the appropriate area as directed 2 (Two) Times a Day. 50 g 1   • spironolactone (ALDACTONE) 25 MG tablet Take 25 mg by mouth Daily.     • [DISCONTINUED] omeprazole (priLOSEC) 20 MG capsule TAKE 1 CAPSULE BY MOUTH EVERY DAY 30 capsule 3   • gabapentin (NEURONTIN) 100 MG capsule Take 1 capsule by mouth 3 (Three) Times a Day for 30 days. 90 capsule 1     Current Facility-Administered Medications on File Prior to Visit   Medication Dose Route Frequency Provider Last Rate Last Admin   • [DISCONTINUED] heparin injection 500 Units  500 Units Intravenous PRN Jaime Azul MD       • [DISCONTINUED] sodium chloride 0.9 % flush 10 mL  10 mL Intravenous PRN Jaime Azul MD            ALLERGIES:    Allergies   Allergen Reactions  "  • Amlodipine Swelling     LEGS    • Hydrochlorothiazide Unknown - Low Severity     Neuro issues   • Morphine Nausea And Vomiting     hallucinations   • Adhesive Tape Rash        Social History     Socioeconomic History   • Marital status:    • Number of children: 3   Tobacco Use   • Smoking status: Never Smoker   • Smokeless tobacco: Never Used   Vaping Use   • Vaping Use: Never used   Substance and Sexual Activity   • Alcohol use: Never   • Drug use: Never   • Sexual activity: Defer        Family History   Problem Relation Age of Onset   • Breast cancer Mother 45   • Colon cancer Paternal Grandmother    • Breast cancer Maternal Aunt 70   • Breast cancer Paternal Aunt 70   • Lung cancer Father    • Hodgkin's lymphoma Father    • Skin cancer Father         squamous cell   • Ovarian cancer Neg Hx    • Uterine cancer Neg Hx    • Deep vein thrombosis Neg Hx    • Pulmonary embolism Neg Hx    • Malig Hyperthermia Neg Hx         Review of Systems   ROS as per HPI    Objective       Vitals:    07/11/22 1037   BP: 128/61   Pulse: 69   Resp: 22   Temp: 97.3 °F (36.3 °C)   TempSrc: Temporal   SpO2: 93%   Weight: (!) 148 kg (327 lb 1.6 oz)   Height: 165.1 cm (65\")   PainSc: 0-No pain     Current Status 2/17/2022   ECOG score 1         Physical Exam    CONSTITUTIONAL:  Vital signs reviewed.  No distress, looks comfortable. Morbidly obese  EYES:  Conjunctiva and lids unremarkable.  PERRLA  EARS,NOSE,MOUTH,THROAT: Hearing intact, mask in place.  RESPIRATORY:  Normal respiratory effort.  O2 via nasal cannula in place  BREASTS: Bilateral mastectomies with no reconstruction.  No erythema or nodules noted.  CARDIOVASCULAR:  Normal S1, S2.  No murmurs rubs or gallops.  1+ brawny lower extremity edema.  SKIN:  Warm.  Dry, no rashes.  MUSCULOSKELETAL: Walks without assistance.  PSYCHIATRIC:  Normal judgment and insight.  Normal mood and affect.       I have reexamined the patient and the results are consistent with the " previously documented exam. SHAUNA Wilson     RECENT LABS:  Results from last 7 days   Lab Units 07/11/22  1119   WBC 10*3/mm3 8.75   NEUTROS ABS 10*3/mm3 7.28*   HEMOGLOBIN g/dL 13.3   HEMATOCRIT % 42.9   PLATELETS 10*3/mm3 240     Results from last 7 days   Lab Units 07/11/22  1119   SODIUM mmol/L 139   POTASSIUM mmol/L 4.6   CHLORIDE mmol/L 101   CO2 mmol/L 27.9   BUN mg/dL 18   CREATININE mg/dL 0.60   CALCIUM mg/dL 9.6   ALBUMIN g/dL 4.00   BILIRUBIN mg/dL 0.4   ALK PHOS U/L 104   ALT (SGPT) U/L 19   AST (SGOT) U/L 14   GLUCOSE mg/dL 125*   MAGNESIUM mg/dL 1.8         PET  IMPRESSION:  1.  Moderate to intensely FDG avid asymmetric soft tissue and skin  thickening involving the right breast likely representing patient's  known malignancy.  2.  Intensely FDG avid right axillary and subpectoral adenopathy likely  represent metastatic disease.  3.  Constellation of findings within the right adrenal gland are favored  to represent a lipid rich adenoma. Continued attention on follow-up is  recommended to ensure stability.  4.  While there are no findings of definite FDG avid osseous metastasis,  given the heterogenous FDG uptake throughout the axial and appendicular  skeleton due to the above stated limitations, subtle underlying osseous  metastasis would remain occult. Therefore, continued close attention on  follow-up is recommended to exclude this possibility.  5.  Short segment of moderate to intense FDG uptake within the distal  esophagus and GE junction suggestive of esophagitis. In the appropriate  clinical context correlation with patient history is recommended with  follow-up endoscopy if clinically indicated.  6.  Sub-6 mm pulmonary nodule within the right lower lobe is below PET  resolution and indeterminate. Continued close attention on follow-up  with chest CT in 3 months is recommended to exclude metastatic disease.  7.  Other findings as above.     This report was finalized on 2/2/2021     Final  Diagnosis   1. Left Breast, Total Mastectomy (2,122 grams):               A. MULTIFOCAL LOW GRADE DUCTAL CARCINOMA IN SITU (DCIS):                            1. Solid, Cribriform, and Pagetoid type with single cell necrosis and focal calcifications.                            2. Extent of DCIS: 20 mm (5 of 26 blocks involved).                            3. Margins are negative for in situ carcinoma; Closest distance: DCIS is present > 10 mm from                                the posterior margin.               B. Multiple intraductal papillomas, usual ductal hyperplasia, and fibroadenomatoid change.               C. Unremarkable skin and nipple.               D. See Synoptic Report and Comment #1.      2. Right Breast, Modified Radical Mastectomy S/P Neoadjuvant Chemotherapy (2,589 grams):               A. FIBROTIC TUMOR BED WITH NO RESIDUAL INVASIVE DUCTAL CARCINOMA.               B. Background breast parenchyma with multiple intraductal papillomas, fibroadenomatoid change,       usual ductal hyperplasia and pseudoangiomatous stromal hyperplasia (PASH).  C. Scar and fat necrosis, consistent with prior procedure-related changes.  D. Clip and biopsy site changes present within tumor bed.    E. Unremarkable skin and nipple.   F. Nineteen lymph nodes, negative for carcinoma (0/19):               1. Clip and biopsy site changes are present.               2. Treatment effect is present in 4 of 19 lymph nodes.  G. See Comment #2.         FINDINGS:   LUMBAR SPINE:  The BMD measured in the L1-L4 is 1.054 g/cm2 for a  T-score of 0.1 and a Z-score of 2.1     LEFT HIP: The BMD for the femoral neck is 0.476g/cm2 for a T score of   -3.4 and a Z score of -1.7     RIGHT HIP:  The BMD for the femoral neck is 0.657g/cm2 for a T score of  -1.7 and a Z score of 0.0     IMPRESSION:  Osteoporosis.     Assessment & Plan   1. [id number] right breast cancer ER/VA HER-2 -3+ positive inflammatory breast cancer for neoadjuvant  chemotherapy  · Staging work-up negative except for 6 mm lung nodule and axillary and subpectoral adenopathy  · THP followed by AC planned if she tolerates it  · C1D8 Taxol missed due to inclement weather.  · Taxol discontinued after 7 doses due to probable Taxol pneumonitis treated with steroids.    · C1 Adriamycin/Cytoxan given 5/14/2021.  · Adriamycin and Cytoxan stopped after 3 doses due to poor tolerance  · YPT0N0 right breast with multifocal DCIS ER/FL positive in the left breast post bilateral mastectomies and right axillary dissection  · Radiation Arimidex and Perjeta Herceptin to continue for the rest of the year  · Severe diarrhea after resuming Perjeta-Lomotil ordered and C. difficile checked with this is C. difficile negative she will not tolerate Perjeta for the rest of the year and we will stop  · Doing well on single agent Herceptin and Arimidex in 2/22  · Final Herceptin 3/31/2022  · Port removed by Dr. Banks 4/5/2022  · Referred to lymphedema clinic at [facility] and undergoing treatment  · Patient seen 7/11/2022 continuing on Arimidex.  She does have intermittent diarrhea which she feels is related to the chemotherapy.  We discussed typically it would resolve within a month or so after treatment is over.  She does have formed stools as well and states she feels as dietary related.  She is due for colonoscopy and will have this scheduled.  Otherwise she feels she is tolerating the Arimidex well.  No new areas of persistent pain.  Respiratory status is stable.  Patient is gaining some weight and is quite frustrated by this though she did have fairly significant nausea during treatment with weight loss.  We discussed 7 gain is not surprising giving that she was adjusting to being off chemotherapy however that we do want to monitor this and limit any further weight gain and ideally have her lose weight in a healthy manner.  She reports he is quite sedentary now and I encouraged some sort of increased  physical activity.  She is not keeping up with her lymphedema exercises she reports we discussed trying to schedule this on her day so that she makes her to accomplish a task.  We also discussed some dietary adjustments.    2.  Morbid obesity    3.  History of diastolic heart failure  · Echocardiogram with ejection fraction of 64% normal strain  · Cleared by cardio-oncology for chemotherapy  · Echocardiogram in 9/21 stable at 63%  · Echocardiogram 1/5/2022 with stable ejection fraction 64%  · Echocardiogram with ejection fraction 55% 4/21/2022  · Patient due for echocardiogram and follow-up with Dr. Garza October 2022    4.  Pulmonary disease?  Etiology on oxygen for 4 years?  Jennifer    5.  Strong family history of breast cancer genetic testing 84 genes negative    6.  Probable benign adrenal adenoma-PET negative    7.  Questionable enlarged lymph nodes left iliac chain and mediastinum likely reactive-PET negative    8.  6 mm right lower lobe nodule below PET resolution pretreatment needs follow-up after THP  · Repeat CT read at [facility] radiologist report stability of nodules and nodes  · Repeat CT at [facility] in 10/21 shows continued improvement    9.  Abnormal uptake short segment esophagus with a history of Schatzki's ring-we will double PPI and watch closely but we we will proceed with chemotherapy at this point and refer back to GI-doubt she has metastatic disease to this area    10. Anemia with microcytic indices  · Iron studies performed 2/10/2021.  · 2/24/2021: reviewed with the patient that she is iron deficient, with ferritin of 16, iron saturation of 4%.  Patient reports taking ferrous gluconate in the past but this caused GI upset.  Also with her chronic diarrhea I do not think she can absorb it.  We will pursue IV iron with plans to initiate this next week pending insurance approval. In addition hemoglobin down to 8.0 and transfusion pursued.  · 3/3/2021: IV Injectafer initiated x2.    · Hemoglobin down to 9.9 one week out from first AC though overall stable.  Monitor.   · Hemoglobin improved off chemotherapy  · Hemoglobin today 13.3    11. Hemorrhoidal pain secondary to diarrhea. Does have occasional bleeding. Topical lidocaine prescribed. Monitor.    12.  History of chronic diarrhea, ?IBS, exacerbated with Perjeta therapy.  · Patient required rifaximin 550 mg to take twice daily x7 days with each Perjeta dose.   · Since completion of Perjeta patient is now actually experiencing constipation (further discussed below).      13.  Taxol-induced pneumonitis.   · Worsening shortness of breath with CT evidence of groundglass infiltrates bilaterally suspicious for pneumonitis  · Patient prescribed prednisone 20 twice daily  · Breathing is overall stable.  Patient is slowly tapering off prednisone.      14.  Osteoporosis on DEXA scan in 9/21-Prolia initiated in 12/21.  Patient due for Prolia today.    15.  Boil under the right mastectomy site  · Drained by PCP and placed on doxycycline x10 days which was completed  · No evidence of recurrent boil presently with no erythema, drainage, warmth.  There is only minimal induration just under the site of previous drainage site.  The incision is closed.  · No further management required at this time.    Plan    1. Prolia today   2. Continue Arimidex daily  3. Follow-up with Dr. Garza for echocardiogram in October.  4. Follow-up with Dr. Azul in 3 months with CBC, CMP  5. Patient will schedule colonoscopy with her gastroenterologist  6. Encouraged increased physical activity and dietary adjustments due to recent weight gain.      Patient is on high risk medication requiring close monitoring for toxicity    Itzel Larry, SHAUNA  07/11/2022

## 2022-07-11 ENCOUNTER — INFUSION (OUTPATIENT)
Dept: ONCOLOGY | Facility: HOSPITAL | Age: 67
End: 2022-07-11

## 2022-07-11 ENCOUNTER — OFFICE VISIT (OUTPATIENT)
Dept: ONCOLOGY | Facility: CLINIC | Age: 67
End: 2022-07-11

## 2022-07-11 VITALS
HEIGHT: 65 IN | OXYGEN SATURATION: 93 % | DIASTOLIC BLOOD PRESSURE: 61 MMHG | HEART RATE: 69 BPM | TEMPERATURE: 97.3 F | SYSTOLIC BLOOD PRESSURE: 128 MMHG | RESPIRATION RATE: 22 BRPM | BODY MASS INDEX: 48.82 KG/M2 | WEIGHT: 293 LBS

## 2022-07-11 DIAGNOSIS — C50.611 MALIGNANT NEOPLASM OF AXILLARY TAIL OF RIGHT FEMALE BREAST, UNSPECIFIED ESTROGEN RECEPTOR STATUS: Primary | ICD-10-CM

## 2022-07-11 DIAGNOSIS — C50.911 INFLAMMATORY BREAST CANCER, RIGHT: ICD-10-CM

## 2022-07-11 DIAGNOSIS — C50.111 MALIGNANT NEOPLASM OF CENTRAL PORTION OF RIGHT FEMALE BREAST, UNSPECIFIED ESTROGEN RECEPTOR STATUS: ICD-10-CM

## 2022-07-11 DIAGNOSIS — Z79.811 AROMATASE INHIBITOR USE: ICD-10-CM

## 2022-07-11 DIAGNOSIS — Z79.899 ENCOUNTER FOR EYE EXAM DUE TO HIGH RISK MEDICATION: Primary | ICD-10-CM

## 2022-07-11 DIAGNOSIS — Z79.899 HIGH RISK MEDICATION USE: ICD-10-CM

## 2022-07-11 DIAGNOSIS — M81.0 AGE-RELATED OSTEOPOROSIS WITHOUT CURRENT PATHOLOGICAL FRACTURE: Primary | ICD-10-CM

## 2022-07-11 DIAGNOSIS — M81.0 AGE-RELATED OSTEOPOROSIS WITHOUT CURRENT PATHOLOGICAL FRACTURE: ICD-10-CM

## 2022-07-11 LAB
ALBUMIN SERPL-MCNC: 4 G/DL (ref 3.5–5.2)
ALBUMIN/GLOB SERPL: 1.4 G/DL (ref 1.1–2.4)
ALP SERPL-CCNC: 104 U/L (ref 38–116)
ALT SERPL W P-5'-P-CCNC: 19 U/L (ref 0–33)
ANION GAP SERPL CALCULATED.3IONS-SCNC: 10.1 MMOL/L (ref 5–15)
AST SERPL-CCNC: 14 U/L (ref 0–32)
BASOPHILS # BLD AUTO: 0.03 10*3/MM3 (ref 0–0.2)
BASOPHILS NFR BLD AUTO: 0.3 % (ref 0–1.5)
BILIRUB SERPL-MCNC: 0.4 MG/DL (ref 0.2–1.2)
BUN SERPL-MCNC: 18 MG/DL (ref 6–20)
BUN/CREAT SERPL: 30 (ref 7.3–30)
CALCIUM SPEC-SCNC: 9.6 MG/DL (ref 8.5–10.2)
CHLORIDE SERPL-SCNC: 101 MMOL/L (ref 98–107)
CO2 SERPL-SCNC: 27.9 MMOL/L (ref 22–29)
CREAT SERPL-MCNC: 0.6 MG/DL (ref 0.6–1.1)
DEPRECATED RDW RBC AUTO: 45.1 FL (ref 37–54)
EGFRCR SERPLBLD CKD-EPI 2021: 97.3 ML/MIN/1.73
EOSINOPHIL # BLD AUTO: 0.17 10*3/MM3 (ref 0–0.4)
EOSINOPHIL NFR BLD AUTO: 1.9 % (ref 0.3–6.2)
ERYTHROCYTE [DISTWIDTH] IN BLOOD BY AUTOMATED COUNT: 12.6 % (ref 12.3–15.4)
GLOBULIN UR ELPH-MCNC: 2.9 GM/DL (ref 1.8–3.5)
GLUCOSE SERPL-MCNC: 125 MG/DL (ref 74–124)
HCT VFR BLD AUTO: 42.9 % (ref 34–46.6)
HGB BLD-MCNC: 13.3 G/DL (ref 12–15.9)
IMM GRANULOCYTES # BLD AUTO: 0.03 10*3/MM3 (ref 0–0.05)
IMM GRANULOCYTES NFR BLD AUTO: 0.3 % (ref 0–0.5)
LYMPHOCYTES # BLD AUTO: 0.7 10*3/MM3 (ref 0.7–3.1)
LYMPHOCYTES NFR BLD AUTO: 8 % (ref 19.6–45.3)
MAGNESIUM SERPL-MCNC: 1.8 MG/DL (ref 1.8–2.5)
MCH RBC QN AUTO: 30.3 PG (ref 26.6–33)
MCHC RBC AUTO-ENTMCNC: 31 G/DL (ref 31.5–35.7)
MCV RBC AUTO: 97.7 FL (ref 79–97)
MONOCYTES # BLD AUTO: 0.54 10*3/MM3 (ref 0.1–0.9)
MONOCYTES NFR BLD AUTO: 6.2 % (ref 5–12)
NEUTROPHILS NFR BLD AUTO: 7.28 10*3/MM3 (ref 1.7–7)
NEUTROPHILS NFR BLD AUTO: 83.3 % (ref 42.7–76)
NRBC BLD AUTO-RTO: 0 /100 WBC (ref 0–0.2)
PHOSPHATE SERPL-MCNC: 4.1 MG/DL (ref 2.5–4.5)
PLATELET # BLD AUTO: 240 10*3/MM3 (ref 140–450)
PMV BLD AUTO: 9.9 FL (ref 6–12)
POTASSIUM SERPL-SCNC: 4.6 MMOL/L (ref 3.5–4.7)
PROT SERPL-MCNC: 6.9 G/DL (ref 6.3–8)
RBC # BLD AUTO: 4.39 10*6/MM3 (ref 3.77–5.28)
SODIUM SERPL-SCNC: 139 MMOL/L (ref 134–145)
WBC NRBC COR # BLD: 8.75 10*3/MM3 (ref 3.4–10.8)

## 2022-07-11 PROCEDURE — 84100 ASSAY OF PHOSPHORUS: CPT

## 2022-07-11 PROCEDURE — 96372 THER/PROPH/DIAG INJ SC/IM: CPT

## 2022-07-11 PROCEDURE — 99214 OFFICE O/P EST MOD 30 MIN: CPT | Performed by: NURSE PRACTITIONER

## 2022-07-11 PROCEDURE — 83735 ASSAY OF MAGNESIUM: CPT

## 2022-07-11 PROCEDURE — 85025 COMPLETE CBC W/AUTO DIFF WBC: CPT

## 2022-07-11 PROCEDURE — 25010000002 DENOSUMAB 60 MG/ML SOLUTION PREFILLED SYRINGE: Performed by: NURSE PRACTITIONER

## 2022-07-11 PROCEDURE — 80053 COMPREHEN METABOLIC PANEL: CPT

## 2022-07-11 RX ORDER — SODIUM CHLORIDE 0.9 % (FLUSH) 0.9 %
10 SYRINGE (ML) INJECTION AS NEEDED
OUTPATIENT
Start: 2022-07-11

## 2022-07-11 RX ORDER — HEPARIN SODIUM (PORCINE) LOCK FLUSH IV SOLN 100 UNIT/ML 100 UNIT/ML
500 SOLUTION INTRAVENOUS AS NEEDED
Status: DISCONTINUED | OUTPATIENT
Start: 2022-07-11 | End: 2022-07-11 | Stop reason: HOSPADM

## 2022-07-11 RX ORDER — HEPARIN SODIUM (PORCINE) LOCK FLUSH IV SOLN 100 UNIT/ML 100 UNIT/ML
500 SOLUTION INTRAVENOUS AS NEEDED
OUTPATIENT
Start: 2022-07-11

## 2022-07-11 RX ORDER — SODIUM CHLORIDE 0.9 % (FLUSH) 0.9 %
10 SYRINGE (ML) INJECTION AS NEEDED
Status: DISCONTINUED | OUTPATIENT
Start: 2022-07-11 | End: 2022-07-11 | Stop reason: HOSPADM

## 2022-07-11 RX ADMIN — DENOSUMAB 60 MG: 60 INJECTION SUBCUTANEOUS at 12:26

## 2022-07-12 ENCOUNTER — TRANSCRIBE ORDERS (OUTPATIENT)
Dept: GENERAL RADIOLOGY | Facility: HOSPITAL | Age: 67
End: 2022-07-12

## 2022-07-12 ENCOUNTER — HOSPITAL ENCOUNTER (OUTPATIENT)
Dept: GENERAL RADIOLOGY | Facility: HOSPITAL | Age: 67
Discharge: HOME | End: 2022-07-12

## 2022-07-12 DIAGNOSIS — R07.89 STERNUM PAIN: ICD-10-CM

## 2022-07-12 DIAGNOSIS — R07.89 STERNUM PAIN: Primary | ICD-10-CM

## 2022-07-12 PROCEDURE — 71046 X-RAY EXAM CHEST 2 VIEWS: CPT

## 2022-07-12 PROCEDURE — 71120 X-RAY EXAM BREASTBONE 2/>VWS: CPT

## 2022-07-12 PROCEDURE — 71100 X-RAY EXAM RIBS UNI 2 VIEWS: CPT

## 2022-07-15 ENCOUNTER — HOSPITAL ENCOUNTER (OUTPATIENT)
Dept: OCCUPATIONAL THERAPY | Facility: HOSPITAL | Age: 67
Setting detail: THERAPIES SERIES
Discharge: HOME | End: 2022-07-15

## 2022-07-15 DIAGNOSIS — I97.2 POSTMASTECTOMY LYMPHEDEMA SYNDROME: ICD-10-CM

## 2022-07-15 DIAGNOSIS — I89.0 LYMPHEDEMA: Primary | ICD-10-CM

## 2022-07-15 DIAGNOSIS — M25.60 JOINT STIFFNESS: ICD-10-CM

## 2022-07-15 DIAGNOSIS — R29.3 ABNORMAL POSTURE: ICD-10-CM

## 2022-07-15 DIAGNOSIS — R52 PAIN: ICD-10-CM

## 2022-07-15 PROCEDURE — 97140 MANUAL THERAPY 1/> REGIONS: CPT

## 2022-07-15 PROCEDURE — 97535 SELF CARE MNGMENT TRAINING: CPT

## 2022-07-15 NOTE — THERAPY TREATMENT NOTE
Outpatient Occupational Therapy Lymphedema Treatment Note  [facility]     Patient Name: Aida Conner  : 1955  MRN: [id number]  Today's Date: 7/15/2022      Visit Date: 07/15/2022    Patient Active Problem List   Diagnosis   • Inflammatory breast cancer, right (HCC)   • Other specified disorders of breast    • Malignant neoplasm of axillary tail of right female breast (HCC)   • Encounter for eye exam due to high risk medication   • Obesity hypoventilation syndrome (HCC)   • Iron adverse reaction   • Functional diarrhea   • Encounter for long-term (current) use of high-risk medication   • Abnormal mammogram   • Allergic rhinitis   • Anemia   • Anxiety disorder   • Breast pain, right   • Depression   • Essential tremor   • Hemorrhoids   • Hyperlipidemia   • Hypertension, essential   • IBS (irritable bowel syndrome)   • Impaired fasting glucose   • Obesity   • Obstructive sleep apnea   • Osteoarthrosis   • Renal calculi   • Restrictive airway disease   • Type II diabetes mellitus (HCC)   • Vitamin deficiency   • Urinary bladder incontinence   • Malignant neoplasm of central portion of right female breast (HCC)   • Osteoporosis without current pathological fracture   • Chronic diastolic congestive heart failure (HCC)   • Coronary artery calcification seen on CT scan        Past Medical History:   Diagnosis Date   • Anemia    • Anxiety    • Asthma    • Chronic diastolic (congestive) heart failure (HCC)    • Chronic hypoxemic respiratory failure (HCC)     on continuous O2   • Coronary artery calcification seen on CT scan 2022   • Diarrhea     with chemo   • H/O Intraductal papilloma    • Hemorrhoids    • History of transfusion 1984    AFTER BACK SURGERY no reaction   • Hypertension    • IBS (irritable bowel syndrome)    • Inflammatory breast cancer (HCC)     right   • Kidney stone    • Morbid obesity with BMI of 50.0-59.9, adult (HCC)    • Obesity hypoventilation syndrome (HCC) 2021   • On home oxygen  therapy     2.5 AT REST WITH ACTIVITY UP TO 4L   • ANABELLE on CPAP     cpap  & uses O2 with CPAP   • Osteoarthritis    • PONV (postoperative nausea and vomiting)         Past Surgical History:   Procedure Laterality Date   • BREAST EXCISIONAL BIOPSY Bilateral    • COLONOSCOPY     • CYSTOSCOPY BLADDER STONE LITHOTRIPSY     • ENDOSCOPY     • KNEE ARTHROPLASTY Bilateral 2009   • MASTECTOMY Left 8/10/2021    Procedure: LEFT TOTAL MASTECTOMY;  Surgeon: Nicci Banks MD;  Location: [facility] MAIN OR;  Service: General;  Laterality: Left;   • MASTECTOMY Right 8/10/2021    Procedure: RIGHT MODIFIED RADICAL MASTECTOMY;  Surgeon: Nicci Banks MD;  Location: [facility] MAIN OR;  Service: General;  Laterality: Right;   • SPINE SURGERY  1984   • TOTAL ABDOMINAL HYSTERECTOMY WITH SALPINGO OOPHORECTOMY  2004   • UPPER GASTROINTESTINAL ENDOSCOPY     • VENOUS ACCESS DEVICE (PORT) INSERTION N/A 1/25/2021    Procedure: INSERTION VENOUS ACCESS DEVICE;  Surgeon: Nicci Banks MD;  Location: [facility] MAIN OR;  Service: General;  Laterality: N/A;   • VENOUS ACCESS DEVICE (PORT) REMOVAL Left 4/5/2022    Procedure: REMOVAL VENOUS ACCESS DEVICE;  Surgeon: Nicci Banks MD;  Location: [facility] MAIN OR;  Service: General;  Laterality: Left;         Visit Dx:      ICD-10-CM ICD-9-CM   1. Lymphedema  I89.0 457.1   2. Pain  R52 780.96   3. Joint stiffness  M25.60 719.50   4. Abnormal posture  R29.3 781.92   5. 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 yes  -SC      Pre-Treatment Pain Level 1  -SC      Post-Treatment Pain Level 1  -SC      Subjective Pain Comment Right shoulder and right axilla  -SC              Subjective Comments    Subjective Comments Patient states that she has been more consistent with her HEP stretching protocol and has been performing extra stretching to help release tightness in right axilla.   Patient states her and her  have difficulty donning the CircAid reduction kit and would like more education on technique  -SC              Skin Changes/Observations    Skin Observations Comment Patient noted donning CircAid reduction kit on right upper extremity however patient's garment was loose on each strap and not placed in proper placement.  -SC              Manual Lymphatic Drainage    Manual Lymphatic Drainage initial sequence;opened regional lymph nodes;opened anastamoses;extremity treatment  -SC      Initial Sequence short neck;cervical;supraclavicular;shoulder collectors  -SC      Opened Regional Lymph Nodes axillary;inguinal  -SC      Extremity Treatment MLD to full limb  -SC      MLD to Full Limb Right upper extremity  -SC      Manual Therapy Therapist providing trigger point release and passive stretch to right upper quadrant.  Patient able to demonstrate increased range of motion post treatment  -SC              Compression/Skin Care    Compression/Skin Care compression garment  -SC      Compression Garment Comments CircAid reduction kit right upper extremity  -SC            User Key  (r) = Recorded By, (t) = Taken By, (c) = Cosigned By    Initials Name Provider Type    Shahrzad Solis COTA Occupational Therapist Assistant                                Manual Rx (last 36 hours)     Manual Treatments     Row Name 07/15/22 1152             Total Minutes    82787 - OT Manual Therapy Minutes 30  -SC            User Key  (r) = Recorded By, (t) = Taken By, (c) = Cosigned By    Initials Name Provider Type    Shahrzad Solis COTA Occupational Therapist Assistant                  Therapy Education  Education Details: Therapist reeducating patient on donning doffing techniques for CircAid reduction kit for right upper extremity.  Patient states she is only been pulling the straps 1 time through and having a difficult time getting the top straps to reach.  Therapist educating patient to go  through 2 times with the second time allowing for increased pull on all straps.  Patient was able to demonstrate a increased independence with donning post reeducation.  Patient does admit that she does not don the CircAid reduction kit as often as she should.  Therapist reeducating patient on importance of keeping the reduction kit on at all times except for showers in order to reduce in volume and plateau  Given: Symptoms/condition management  Program: Reinforced  How Provided: Verbal, Demonstration  Provided to: Patient  Level of Understanding: Teach back education performed, Verbalized, Demonstrated  76339 - OT Self Care/Mgmt Minutes: 12                Time Calculation:   Timed Charges  03034 - OT Manual Therapy Minutes: 30  66615 - OT Self Care/Mgmt Minutes: 12  Total Minutes  Timed Charges Total Minutes: 42   Total Minutes: 42     Therapy Charges for Today     Code Description Service Date Service Provider Modifiers Qty    56716495604 HC OT MANUAL THERAPY EA 15 MIN 7/15/2022 Shahrzad Torres COTA GO 2    50292359177 HC OT SELF CARE/MGMT/TRAIN EA 15 MIN 7/15/2022 Shahrzad Torres COTA GO 1                      LUIS M Mcguire  7/15/2022

## 2022-07-18 ENCOUNTER — HOSPITAL ENCOUNTER (OUTPATIENT)
Dept: OCCUPATIONAL THERAPY | Facility: HOSPITAL | Age: 67
Setting detail: THERAPIES SERIES
Discharge: HOME | End: 2022-07-18

## 2022-07-18 DIAGNOSIS — I89.0 LYMPHEDEMA: Primary | ICD-10-CM

## 2022-07-18 DIAGNOSIS — R52 PAIN: ICD-10-CM

## 2022-07-18 DIAGNOSIS — I97.2 POSTMASTECTOMY LYMPHEDEMA SYNDROME: ICD-10-CM

## 2022-07-18 DIAGNOSIS — R29.3 ABNORMAL POSTURE: ICD-10-CM

## 2022-07-18 DIAGNOSIS — M25.60 JOINT STIFFNESS: ICD-10-CM

## 2022-07-18 PROCEDURE — 97535 SELF CARE MNGMENT TRAINING: CPT

## 2022-07-18 PROCEDURE — 97140 MANUAL THERAPY 1/> REGIONS: CPT

## 2022-07-18 NOTE — THERAPY TREATMENT NOTE
Outpatient Occupational Therapy Lymphedema Treatment Note  [facility]     Patient Name: Aida Conner  : 1955  MRN: [id number]  Today's Date: 2022      Visit Date: 2022    Patient Active Problem List   Diagnosis   • Inflammatory breast cancer, right (HCC)   • Other specified disorders of breast    • Malignant neoplasm of axillary tail of right female breast (HCC)   • Encounter for eye exam due to high risk medication   • Obesity hypoventilation syndrome (HCC)   • Iron adverse reaction   • Functional diarrhea   • Encounter for long-term (current) use of high-risk medication   • Abnormal mammogram   • Allergic rhinitis   • Anemia   • Anxiety disorder   • Breast pain, right   • Depression   • Essential tremor   • Hemorrhoids   • Hyperlipidemia   • Hypertension, essential   • IBS (irritable bowel syndrome)   • Impaired fasting glucose   • Obesity   • Obstructive sleep apnea   • Osteoarthrosis   • Renal calculi   • Restrictive airway disease   • Type II diabetes mellitus (HCC)   • Vitamin deficiency   • Urinary bladder incontinence   • Malignant neoplasm of central portion of right female breast (HCC)   • Osteoporosis without current pathological fracture   • Chronic diastolic congestive heart failure (HCC)   • Coronary artery calcification seen on CT scan        Past Medical History:   Diagnosis Date   • Anemia    • Anxiety    • Asthma    • Chronic diastolic (congestive) heart failure (HCC)    • Chronic hypoxemic respiratory failure (HCC)     on continuous O2   • Coronary artery calcification seen on CT scan 2022   • Diarrhea     with chemo   • H/O Intraductal papilloma    • Hemorrhoids    • History of transfusion 1984    AFTER BACK SURGERY no reaction   • Hypertension    • IBS (irritable bowel syndrome)    • Inflammatory breast cancer (HCC)     right   • Kidney stone    • Morbid obesity with BMI of 50.0-59.9, adult (HCC)    • Obesity hypoventilation syndrome (HCC) 2021   • On home oxygen  therapy     2.5 AT REST WITH ACTIVITY UP TO 4L   • ANABELLE on CPAP     cpap  & uses O2 with CPAP   • Osteoarthritis    • PONV (postoperative nausea and vomiting)         Past Surgical History:   Procedure Laterality Date   • BREAST EXCISIONAL BIOPSY Bilateral    • COLONOSCOPY     • CYSTOSCOPY BLADDER STONE LITHOTRIPSY     • ENDOSCOPY     • KNEE ARTHROPLASTY Bilateral 2009   • MASTECTOMY Left 8/10/2021    Procedure: LEFT TOTAL MASTECTOMY;  Surgeon: Nicci Banks MD;  Location: [facility] MAIN OR;  Service: General;  Laterality: Left;   • MASTECTOMY Right 8/10/2021    Procedure: RIGHT MODIFIED RADICAL MASTECTOMY;  Surgeon: Nicci Banks MD;  Location: [facility] MAIN OR;  Service: General;  Laterality: Right;   • SPINE SURGERY  1984   • TOTAL ABDOMINAL HYSTERECTOMY WITH SALPINGO OOPHORECTOMY  2004   • UPPER GASTROINTESTINAL ENDOSCOPY     • VENOUS ACCESS DEVICE (PORT) INSERTION N/A 1/25/2021    Procedure: INSERTION VENOUS ACCESS DEVICE;  Surgeon: Nicci Banks MD;  Location: [facility] MAIN OR;  Service: General;  Laterality: N/A;   • VENOUS ACCESS DEVICE (PORT) REMOVAL Left 4/5/2022    Procedure: REMOVAL VENOUS ACCESS DEVICE;  Surgeon: Nicci Banks MD;  Location: [facility] MAIN OR;  Service: General;  Laterality: Left;         Visit Dx:      ICD-10-CM ICD-9-CM   1. Lymphedema  I89.0 457.1   2. Pain  R52 780.96   3. Joint stiffness  M25.60 719.50   4. Abnormal posture  R29.3 781.92   5. 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 yes  -SC      Pre-Treatment Pain Level 0  -SC      Post-Treatment Pain Level 0  -SC              Subjective Comments    Subjective Comments Patient states her  and her have been attempting to get the CircAid reduction kit on better.  Patient does state that she feels her right elbow is irritated due to the garment.  -SC              Skin Changes/Observations    Skin  Observations Comment Patient noted donning CircAid reduction kit on right upper extremity upon arrival.  Reduction kit noted with improper placement and very loose straps throughout.  Reduction kit noted with bunching at elbow which may be causing patient's discomfort  -SC              Manual Lymphatic Drainage    Manual Lymphatic Drainage initial sequence;opened regional lymph nodes;opened anastamoses;extremity treatment  -SC      Initial Sequence short neck;cervical;supraclavicular;shoulder collectors  -SC      Opened Regional Lymph Nodes axillary;inguinal  -SC      Extremity Treatment MLD to full limb  -SC      MLD to Full Limb Right upper extremity  -SC      Manual Therapy Therapist providing trigger point release and passive stretch to right upper quadrant.  Patient able to demonstrate increased range of motion post treatment  -SC              Compression/Skin Care    Compression/Skin Care compression garment  -SC      Compression Garment Comments CircAid reduction kit  -SC            User Key  (r) = Recorded By, (t) = Taken By, (c) = Cosigned By    Initials Name Provider Type    Shahrzad Solis COTA Occupational Therapist Assistant                         OT Assessment/Plan     Row Name 07/18/22 1113          OT Assessment    Assessment Comments Patient will continue to benefit from skilled occupational therapy until patient has demonstrated normalized lymphatic functioning and patient has obtained permanent compression garment and is independent with complete decongestive therapy.  -SC     Patient would benefit from skilled therapy intervention Yes  -SC            OT Plan    OT Plan Comments Continue POC  -SC           User Key  (r) = Recorded By, (t) = Taken By, (c) = Cosigned By    Initials Name Provider Type    Shahrzad Solis COTA Occupational Therapist Assistant                    Manual Rx (last 36 hours)     Manual Treatments     Row Name 07/18/22 1116             Total Minutes    59486 - OT  Manual Therapy Minutes 30  -SC            User Key  (r) = Recorded By, (t) = Taken By, (c) = Cosigned By    Initials Name Provider Type    Shahrzad Solis COTA Occupational Therapist Assistant                  Therapy Education  Education Details: Therapist reeducating patient on donning techniques for CircAid reduction kit.  Patient states she feels her  is in a hurry when attempting to assist her with donning the reduction kit impacting technique.  Therapist reeducating patient on independent donning techniques.  Patient utilizing table to hold arm up and going through the straps twice to get a better compression.  Therapist reeducating patient on importance of retightening straps and replacing garment throughout the day due to decreasing lymph volume causing the garment to slip.  Therapist educating patient on the possibility of garment slipping and sitting in elbow region causing the irritation to patient's elbow and causing red marks throughout brachium areas.  Patient states she did not think about having to readjust and retighten compression garment throughout the day.  Therapist reeducating patient on the need to retighten and readjust in order to continue lymph reduction.  Therapist reeducating patient on self manual lymphatic drainage to ensure patient's carryover of skill.  Patient was able to demonstrate good carryover this date after reeducation  Given: Symptoms/condition management, HEP  Program: New, Reinforced  How Provided: Verbal, Demonstration  Provided to: Patient  Level of Understanding: Teach back education performed, Verbalized, Demonstrated  29384 - OT Self Care/Mgmt Minutes: 24                Time Calculation:   Timed Charges  10834 - OT Manual Therapy Minutes: 30  57858 - OT Self Care/Mgmt Minutes: 24  Total Minutes  Timed Charges Total Minutes: 54   Total Minutes: 54     Therapy Charges for Today     Code Description Service Date Service Provider Modifiers Qty    97781684001   OT MANUAL THERAPY EA 15 MIN 7/18/2022 Shahrzad Torres COTA GO 2    13843401218 HC OT SELF CARE/MGMT/TRAIN EA 15 MIN 7/18/2022 Shahrzad Torres COTA GO 2                      LUIS M Mcguire  7/18/2022

## 2022-08-02 ENCOUNTER — HOSPITAL ENCOUNTER (OUTPATIENT)
Dept: OCCUPATIONAL THERAPY | Facility: HOSPITAL | Age: 67
Setting detail: THERAPIES SERIES
Discharge: HOME | End: 2022-08-02

## 2022-08-02 DIAGNOSIS — M62.81 MUSCLE WEAKNESS (GENERALIZED): ICD-10-CM

## 2022-08-02 DIAGNOSIS — Z91.89 AT RISK FOR LYMPHEDEMA: ICD-10-CM

## 2022-08-02 DIAGNOSIS — R29.3 ABNORMAL POSTURE: ICD-10-CM

## 2022-08-02 DIAGNOSIS — Z90.13 HISTORY OF MASTECTOMY, BILATERAL: ICD-10-CM

## 2022-08-02 DIAGNOSIS — R52 PAIN: ICD-10-CM

## 2022-08-02 DIAGNOSIS — C50.911 BILATERAL MALIGNANT NEOPLASM OF BREAST IN FEMALE, ESTROGEN RECEPTOR POSITIVE, UNSPECIFIED SITE OF BREAST: ICD-10-CM

## 2022-08-02 DIAGNOSIS — Z17.0 BILATERAL MALIGNANT NEOPLASM OF BREAST IN FEMALE, ESTROGEN RECEPTOR POSITIVE, UNSPECIFIED SITE OF BREAST: ICD-10-CM

## 2022-08-02 DIAGNOSIS — C50.912 BILATERAL MALIGNANT NEOPLASM OF BREAST IN FEMALE, ESTROGEN RECEPTOR POSITIVE, UNSPECIFIED SITE OF BREAST: ICD-10-CM

## 2022-08-02 DIAGNOSIS — I89.0 LYMPHEDEMA: Primary | ICD-10-CM

## 2022-08-02 DIAGNOSIS — L90.5 SCAR CONDITION AND FIBROSIS OF SKIN: ICD-10-CM

## 2022-08-02 DIAGNOSIS — Z91.89 AT RISK FOR SELF CARE DEFICIT: ICD-10-CM

## 2022-08-02 DIAGNOSIS — M25.60 JOINT STIFFNESS: ICD-10-CM

## 2022-08-02 PROCEDURE — 97535 SELF CARE MNGMENT TRAINING: CPT

## 2022-08-02 PROCEDURE — 97140 MANUAL THERAPY 1/> REGIONS: CPT

## 2022-08-02 PROCEDURE — 93702 BIS XTRACELL FLUID ANALYSIS: CPT

## 2022-08-02 NOTE — THERAPY RE-EVALUATION
Outpatient Occupational Therapy Lymphedema Re-Evaluation   [facility]     Patient Name: Aida Conner  : 1955  MRN: [id number]  Today's Date: 2022      Visit Date: 2022    Patient Active Problem List   Diagnosis   • Inflammatory breast cancer, right (HCC)   • Other specified disorders of breast    • Malignant neoplasm of axillary tail of right female breast (HCC)   • Encounter for eye exam due to high risk medication   • Obesity hypoventilation syndrome (HCC)   • Iron adverse reaction   • Functional diarrhea   • Encounter for long-term (current) use of high-risk medication   • Abnormal mammogram   • Allergic rhinitis   • Anemia   • Anxiety disorder   • Breast pain, right   • Depression   • Essential tremor   • Hemorrhoids   • Hyperlipidemia   • Hypertension, essential   • IBS (irritable bowel syndrome)   • Impaired fasting glucose   • Obesity   • Obstructive sleep apnea   • Osteoarthrosis   • Renal calculi   • Restrictive airway disease   • Type II diabetes mellitus (HCC)   • Vitamin deficiency   • Urinary bladder incontinence   • Malignant neoplasm of central portion of right female breast (HCC)   • Osteoporosis without current pathological fracture   • Chronic diastolic congestive heart failure (HCC)   • Coronary artery calcification seen on CT scan        Past Medical History:   Diagnosis Date   • Anemia    • Anxiety    • Asthma    • Chronic diastolic (congestive) heart failure (HCC)    • Chronic hypoxemic respiratory failure (HCC)     on continuous O2   • Coronary artery calcification seen on CT scan 2022   • Diarrhea     with chemo   • H/O Intraductal papilloma    • Hemorrhoids    • History of transfusion     AFTER BACK SURGERY no reaction   • Hypertension    • IBS (irritable bowel syndrome)    • Inflammatory breast cancer (HCC)     right   • Kidney stone    • Morbid obesity with BMI of 50.0-59.9, adult (HCC)    • Obesity hypoventilation syndrome (HCC) 2021   • On home oxygen  therapy     2.5 AT REST WITH ACTIVITY UP TO 4L   • ANABELLE on CPAP     cpap  & uses O2 with CPAP   • Osteoarthritis    • PONV (postoperative nausea and vomiting)         Past Surgical History:   Procedure Laterality Date   • BREAST EXCISIONAL BIOPSY Bilateral    • COLONOSCOPY     • CYSTOSCOPY BLADDER STONE LITHOTRIPSY     • ENDOSCOPY     • KNEE ARTHROPLASTY Bilateral 2009   • MASTECTOMY Left 8/10/2021    Procedure: LEFT TOTAL MASTECTOMY;  Surgeon: Nicci Banks MD;  Location: [facility] MAIN OR;  Service: General;  Laterality: Left;   • MASTECTOMY Right 8/10/2021    Procedure: RIGHT MODIFIED RADICAL MASTECTOMY;  Surgeon: Nicci Banks MD;  Location: [facility] MAIN OR;  Service: General;  Laterality: Right;   • SPINE SURGERY  1984   • TOTAL ABDOMINAL HYSTERECTOMY WITH SALPINGO OOPHORECTOMY  2004   • UPPER GASTROINTESTINAL ENDOSCOPY     • VENOUS ACCESS DEVICE (PORT) INSERTION N/A 1/25/2021    Procedure: INSERTION VENOUS ACCESS DEVICE;  Surgeon: Nicci Banks MD;  Location: [facility] MAIN OR;  Service: General;  Laterality: N/A;   • VENOUS ACCESS DEVICE (PORT) REMOVAL Left 4/5/2022    Procedure: REMOVAL VENOUS ACCESS DEVICE;  Surgeon: Nicci Banks MD;  Location: [facility] MAIN OR;  Service: General;  Laterality: Left;         Visit Dx:     ICD-10-CM ICD-9-CM   1. Lymphedema  I89.0 457.1   2. Pain  R52 780.96   3. Joint stiffness  M25.60 719.50   4. Abnormal posture  R29.3 781.92   5. Scar condition and fibrosis of skin  L90.5 709.2   6. At risk for self care deficit  Z91.89 V49.89   7. Bilateral malignant neoplasm of breast in female, estrogen receptor positive, unspecified site of breast (HCC)  C50.911 174.9    Z17.0 V86.0    C50.912    8. Muscle weakness (generalized)  M62.81 728.87   9. At risk for lymphedema  Z91.89 V49.89   10. History of mastectomy, bilateral  Z90.13 V45.71            Lymphedema     Row Name 08/02/22 1000             Subjective Pain    Able to rate subjective pain? yes  -MP       "Pre-Treatment Pain Level 0  -MP      Post-Treatment Pain Level 0  -MP              Subjective Comments    Subjective Comments Pt states that she has been instructing her  in how to vicente circaid reduction kit. She states she didn't put sleeve on under circaid this morning due to being in a hurry.  She states she is wearing it approximately 50% of time.  -MP              Lymphedema Assessment    Lymphedema Classification stage 2 (Spontaneously Irreversible)  -MP      Lymphedema Cancer Related Sx right;bilateral;modified radical mastectomy  -MP      Lymph Nodes Removed # 19  -MP      Positive Lymph Nodes # 4  -MP      Stage of Cancer Stage III  -MP      Chemo Received yes  -MP      Radiation Therapy Received yes  -MP      Radiation Treatments #/Timeframe 25  -MP      Infections or Cellulitis? no  -MP              LLIS - Physical Concerns    The amount of pain associated with my lymphedema is: 0  -MP      The amount of limb heaviness associated with my lymphedema is: 2  -MP      The amount of skin tightness associated with my lymphedema is: 1  -MP      The size of my swollen limb(s) seems: 1  -MP      Lymphedema affects the movement of my swollen limb(s): 1  -MP      The strength in my swollen limb(s) is: 1  -MP              LLIS - Psychosocial Concerns    Lymphedema affects my body image (i.e., \"how I think I look\"). 0  -MP      Lymphedema affects my socializing with others. 0  -MP      Lymphedema affects my intimate relations with spouse or partner (rate 0 if not applicable 0  -MP      Lymphedema \"gets me down\" (i.e., depression, frustration, or anger) 0  -MP      I must rely on others for help due to my lymphedema. 0  -MP      I know what to do to manage my lymphedema 0  -MP              LLIS - Functional Concerns    Lymphedema affects my ability to perform self-care activities (i.e. eating, dressing, hygiene) 0  -MP      Lymphedema affects my ability to perform routine home or work-related activities. 0  -MP  "     Lymphedema affects my performance of preferred leisure activities. 0  -MP      Lymphedema affects proper fit of clothing/shoes 0  -MP      Lymphedema affects my sleep 0  -MP              Posture/Observations    Posture/Observations Comments WFLs for ADLs  -MP              General ROM    RT Upper Ext --  R shoulder deficits. See below  -MP      GENERAL ROM COMMENTS see below  -MP              Right Upper Ext    Rt Shoulder Flexion AROM 0-90  -MP      Rt Shoulder External Rotation AROM 60  -MP      Rt Shoulder Internal Rotation AROM 50  -MP      Rt Upper Extremity Comments  Pt states pulling pants up is easier. She states reaching for items and doing dishes have gotten better.  -MP              MMT (Manual Muscle Testing)    General MMT Comments Shoulder scaption 3-/5 right; other parameters 3+/5 within available ROM right  -MP              Skin Changes/Observations    Location/Assessment Upper Quadrant  -MP      Upper Quadrant Conditions right:;intact;clean;dry  -MP      Skin Observations Comment Patient arrived with circaid reduction kit on.  It appeared to be on properly for proximity to axilla and having no gaps.  Pt was not wearing sleeve under circaid and verbalized knowledge that it should be worn with circaid reduction kit, states she was in a hurry and unable to locate in time to vicente.  States she is now independent to vicente this portion herself, and is providing instruction to  for circaid donning.  -MP              Lymphedema Sensation    Lymphedema Sensation Comments denies sensory deficits.  -MP              Manual Lymphatic Drainage    Manual Lymphatic Drainage initial sequence;opened regional lymph nodes;opened anastamoses;extremity treatment  -MP      Initial Sequence short neck;cervical;supraclavicular;shoulder collectors  -MP      Opened Regional Lymph Nodes axillary;inguinal  -MP      Extremity Treatment MLD to full limb  -MP      MLD to Full Limb Full MLD right UE  -MP      Manual Therapy  Provided trigger point release and passive stretch and joint mobilization for distration G-H joint Right prior to MLD  -MP              Compression/Skin Care    Compression/Skin Care skin care;compression garment  -MP      Compression Garment Comments circaid reduction kit donned with pt providing teach back  -MP              L-Dex Bioimpedence Screening    L-Dex UE Score 15  -MP      L-Dex UE Baseline Score 10.6  -MP      L-Dex UE Value Change 4.4  -MP      $ L-Dex Charge yes  -MP              Lymphedema Life Impact Scale Totals    A.  Total Q1 - Q17 (Do not include Q18) 6  -MP      B.  Total number of questions answered (Q1-Q17) 17  -MP      C. Divide A by B 0.35  -MP      D. Multiple C by 25 8.75  -MP            User Key  (r) = Recorded By, (t) = Taken By, (c) = Cosigned By    Initials Name Provider Type    Belkis Kaur OT Occupational Therapist                        Therapy Education  Education Details: Therapist reinforced that CircAid reduction kit should be worn with stocking placed first over skin after skin care, then donning of upper portion with Velcro closures.  Patient was able to teach back proper technique for therapist to assist with donning of CircAid reduction kit as she is instructing her  at home.  Patient was educated that she does demonstrate a slight reduction in lymph volume per L-Dex measurement today, but still elevated from baseline score which was taken months after surgery and at which time patient was demonstrating symptoms.  Patient was instructed in necessity to maintain compression as much as possible between treatment sessions, to continue to perform self manual lymphatic drainage and exercises daily.  Patient is agreeable to try to increase wearing time as much as possible for CircAid reduction kit.  Given: HEP, Symptoms/condition management  Program: Reinforced, Progressed  How Provided: Verbal, Demonstration  Level of Understanding: Teach back education performed,  Verbalized, Demonstrated  33667 - OT Self Care/Mgmt Minutes: 15      Manual Rx (last 36 hours)     Manual Treatments     Row Name 08/02/22 1344             Total Minutes    16929 - OT Manual Therapy Minutes 28  -MP            User Key  (r) = Recorded By, (t) = Taken By, (c) = Cosigned By    Initials Name Provider Type    Belkis Kaur, OT Occupational Therapist               OT Goals     Row Name 08/02/22 1343          Time Calculation    OT Goal Re-Cert Due Date 09/01/22  -MP           User Key  (r) = Recorded By, (t) = Taken By, (c) = Cosigned By    Initials Name Provider Type    Belkis Kaur, OT Occupational Therapist                 OT Assessment/Plan     Row Name 08/02/22 1340          OT Assessment    Functional Limitations Performance in self-care ADL  -MP     Impairments Impaired lymphatic circulation;Range of motion;Posture;Pain;Muscle strength;Joint mobility  -MP     Assessment Comments Patient is demonstrating a slight reduction in lymphatic volume per L-Dex measurements on this date, but continues to have lymph dynamics outside of normal limits.  She is independent to don a portion of CircAid reduction kit, the stocking, and is able to instruct her  to don the Velcro closure portion of the CircAid reduction kit with her supervision and verbal instructions.  She states motivation to increase wearing time is much as possible to be wearing 24/7.  She is appropriate to continue skilled occupational therapy services until a plateau is reached, she has obtained a permanent compression garment and is independent with complete decongestive therapy.  -MP     OT Diagnosis Lymphedema  -MP     OT Rehab Potential Good  -MP     Patient/caregiver participated in establishment of treatment plan and goals Yes  -MP     Patient would benefit from skilled therapy intervention Yes  -MP            OT Plan    OT Frequency 2x/week  1-2 times a week  -MP     Predicted Duration of Therapy Intervention (OT) 4  weeks  -MP     Planned CPT's? OT RE-EVAL: 05054;OT THER ACT EA 15 MIN: 05860QK;OT THER PROC EA 15 MIN: 19139TZ;OT NEUROMUSC RE EDUCATION EA 15 MIN: 62066;OT SELF CARE/MGMT/TRAIN 15 MIN: 59938;OT HOT/COLD PACK;OT ULTRASOUND EA 15 MIN: 38670;OT ELECTRIC STIM ATTD EA 15 MIN: 95951;OT MANUAL THERAPY EA 15 MIN: 05436;OT BIS XTRACELL FLUID ANALYSIS: 40179;OT ORTHOTIC MGMT/TRAIN EA 15 MIN: 94795;OT ORTHO/PROSTHET CHECKOUT EA 15 MIN: 84367  -MP     Planned Therapy Interventions (Optional Details) home exercise program;joint mobilization;manual therapy techniques;neuromuscular re-education;orthotic fitting/training;patient/family education;postural re-education;ROM (Range of Motion);strengthening  -MP     OT Plan Comments Continue POC  -MP           User Key  (r) = Recorded By, (t) = Taken By, (c) = Cosigned By    Initials Name Provider Type    Belkis Kaur OT Occupational Therapist              Goals:  1. Post Breast Surgery Care/at risk for Lymphedema  LTG 1: 90 days:  As an indicator of no exacerbation of lymphedema staging, the patient will present with an L-Dex score less than 7 points from preoperative baseline.              STATUS: Not Met: Ongoing     STG 1a:   30 days: To prevent exacerbation of mixed edema to lymphedema, patient will utilize the 2 postsurgical compression garments daily.                 STATUS: Not met: Discontinue     STG 1b: 30 days: Patient will be independent with self-manual lymphatic massage.               STATUS: Met: Ongoing      STG 1c: 30 days:  Patient will be independent with identification of signs and symptoms of lymphedema exasperation per stoplight to recovery education handout.              STATUS: met: Ongoing      STG 1 d: 30 days: Patient will be independent with HEP to prevent advancement in lymphedema staging.              STATUS: Partially met: ongoing     TREATMENT:  Self Care/ADL retraining, Therapeutic Activity, Neuromuscular Re-education, Therapeutic Exercise,  Bioimpedence Fluid Analysis, Post-Surgical compression garement [address] [address] [facility]-High/ [employer] Camisole Kit 2860K, Orthotic Management and training,  and Manual Therapy.     2. Decreased Functional Use of R UE  LTG [2]:  90 Days: The patient will report a pain rating of 2/10 or better to improve functional use of right upper extremity and IADL tasks.            STATUS: Met: ongoing  STG [2]a:  The patient will report a pain rating of 4/10 or better as an average in 1 week intervals.   STATUS:  Met: ongoing     3. Decrease Functional ROM:     LTG [3]: 90 days:  The patient will demonstrate an increase of right upper extremity AROM to WFL for ADL independence.  STATUS:  Partially Met: ongoing     STG [3]a: The patient will demonstrate an increase in all shoulder planes in right upper extremity up to degrees of 20 or WFL for improved self-care independence.        STATUS:  Partially Met: ongoing     TREATMENT:  Manual Therapy, Therapeutic Activity, ADL retraining, Neuromuscular Re-education, Therapeutic Exercise, Patient and Family Education, Orthotic Management and training, Modalities: TENS, NMES, Ultrasound, Fluidotherapy Wound dressing as needed, Modalities as needed and Manual Therapy.     3. Carrying, Moving, and Handling Objects Functional Limitation                               LTG 4: 90 days:  The patient will demonstrate 1-19% limitation by achieving a score of 11 on the Quick Dash.                      STATUS:  Partially Met: ongoing              STG 4a: 30 days:  The patient will demonstrate 80-99% limitation by achieving a score of 54 on the Quick Dash.                      STATUS:  Met              STG 4a: 30 days:  The patient will demonstrate 40-9% limitation by achieving a score of 36 on the Quick Dash.                      STATUS:  New  TREATMENT: Manual Therapy, Therapeutic Activity, Neuromuscular Re-education, Therapeutic Exercise, Patient and Family Education, Orthotic Management and training,  Modalities: TENS, NMES, Ultrasound, Fluidotherapy Wound dressing as needed, Modalities as needed and Manual Therapy.        Time Calculation:   Timed Charges  17701 - OT Manual Therapy Minutes: 28  14575 - OT Self Care/Mgmt Minutes: 15  Total Minutes  Timed Charges Total Minutes: 43   Total Minutes: 43     Therapy Charges for Today     Code Description Service Date Service Provider Modifiers Qty    42394747436 HC PT BIS XTRACELL FLUID ANALYSIS 8/2/2022 Belkis Mooney, OT  1    45802995132 HC OT MANUAL THERAPY EA 15 MIN 8/2/2022 Belkis Mooney OT GO 2    [id number]  OT SELF CARE/MGMT/TRAIN EA 15 MIN 8/2/2022 Belkis Mooney OT GO 1                    Belkis Mooney OT  8/2/2022

## 2022-08-04 ENCOUNTER — OFFICE VISIT (OUTPATIENT)
Dept: PULMONOLOGY | Facility: CLINIC | Age: 67
End: 2022-08-04

## 2022-08-04 VITALS
RESPIRATION RATE: 16 BRPM | SYSTOLIC BLOOD PRESSURE: 142 MMHG | OXYGEN SATURATION: 97 % | HEART RATE: 76 BPM | DIASTOLIC BLOOD PRESSURE: 123 MMHG | TEMPERATURE: 98 F

## 2022-08-04 DIAGNOSIS — E66.01 MORBID OBESITY WITH BMI OF 50.0-59.9, ADULT: ICD-10-CM

## 2022-08-04 DIAGNOSIS — E66.2 OBESITY HYPOVENTILATION SYNDROME: Primary | ICD-10-CM

## 2022-08-04 DIAGNOSIS — R06.09 DYSPNEA ON EXERTION: ICD-10-CM

## 2022-08-04 DIAGNOSIS — J96.11 CHRONIC RESPIRATORY FAILURE WITH HYPOXIA AND HYPERCAPNIA: ICD-10-CM

## 2022-08-04 DIAGNOSIS — G47.33 OBSTRUCTIVE SLEEP APNEA: ICD-10-CM

## 2022-08-04 DIAGNOSIS — J96.12 CHRONIC RESPIRATORY FAILURE WITH HYPOXIA AND HYPERCAPNIA: ICD-10-CM

## 2022-08-04 DIAGNOSIS — C50.911 INFLAMMATORY BREAST CANCER, RIGHT: ICD-10-CM

## 2022-08-04 PROCEDURE — 99213 OFFICE O/P EST LOW 20 MIN: CPT | Performed by: NURSE PRACTITIONER

## 2022-08-04 NOTE — PATIENT INSTRUCTIONS
Obesity, Adult  Obesity is the condition of having too much total body fat. Being overweight or obese means that your weight is greater than what is considered healthy for your body size. Obesity is determined by a measurement called BMI. BMI is an estimate of body fat and is calculated from height and weight. For adults, a BMI of 30 or higher is considered obese.  Obesity can lead to other health concerns and major illnesses, including:  Stroke.  Coronary artery disease (CAD).  Type 2 diabetes.  Some types of cancer, including cancers of the colon, breast, uterus, and gallbladder.  Osteoarthritis.  High blood pressure (hypertension).  High cholesterol.  Sleep apnea.  Gallbladder stones.  Infertility problems.  What are the causes?  Common causes of this condition include:  Eating daily meals that are high in calories, sugar, and fat.  Being born with genes that may make you more likely to become obese.  Having a medical condition that causes obesity, including:  Hypothyroidism.  Polycystic ovarian syndrome (PCOS).  Binge-eating disorder.  Cushing syndrome.  Taking certain medicines, such as steroids, antidepressants, and seizure medicines.  Not being physically active (sedentary lifestyle).  Not getting enough sleep.  Drinking high amounts of sugar-sweetened beverages, such as soft drinks.  What increases the risk?  The following factors may make you more likely to develop this condition:  Having a family history of obesity.  Being a woman of  descent.  Being a man of  descent.  Living in an area with limited access to:  [place], recreation centers, or sidewalks.  Healthy food choices, such as grocery stores and [employer] markets.  What are the signs or symptoms?  The main sign of this condition is having too much body fat.  How is this diagnosed?  This condition is diagnosed based on:  Your BMI. If you are an adult with a BMI of 30 or higher, you are considered obese.  Your waist  circumference. This measures the distance around your waistline.  Your skinfold thickness. Your health care provider may gently pinch a fold of your skin and measure it.  You may have other tests to check for underlying conditions.  How is this treated?  Treatment for this condition often includes changing your lifestyle. Treatment may include some or all of the following:  Dietary changes. This may include developing a healthy meal plan.  Regular physical activity. This may include activity that causes your heart to beat faster (aerobic exercise) and strength training. Work with your health care provider to design an exercise program that works for you.  Medicine to help you lose weight if you are unable to lose 1 pound a week after 6 weeks of healthy eating and more physical activity.  Treating conditions that cause the obesity (underlying conditions).  Surgery. Surgical options may include gastric banding and gastric bypass. Surgery may be done if:  Other treatments have not helped to improve your condition.  You have a BMI of 40 or higher.  You have life-threatening health problems related to obesity.  Follow these instructions at home:  Eating and drinking    Follow recommendations from your health care provider about what you eat and drink. Your health care provider may advise you to:  Limit fast food, sweets, and processed snack foods.  Choose low-fat options, such as low-fat milk instead of whole milk.  Eat 5 or more servings of fruits or vegetables every day.  Eat at home more often. This gives you more control over what you eat.  Choose healthy foods when you eat out.  Learn to read food labels. This will help you understand how much food is considered 1 serving.  Learn what a healthy serving size is.  Keep low-fat snacks available.  Limit sugary drinks, such as soda, fruit juice, sweetened iced tea, and flavored milk.  Drink enough water to keep your urine pale yellow.  Do not follow a fad diet. Fad diets  can be unhealthy and even dangerous.    Physical activity  Exercise regularly, as told by your health care provider.  Most adults should get up to 150 minutes of moderate-intensity exercise every week.  Ask your health care provider what types of exercise are safe for you and how often you should exercise.  Warm up and stretch before being active.  Cool down and stretch after being active.  Rest between periods of activity.  Lifestyle  Work with your health care provider and a dietitian to set a weight-loss goal that is healthy and reasonable for you.  Limit your screen time.  Find ways to reward yourself that do not involve food.  Do not drink alcohol if:  Your health care provider tells you not to drink.  You are pregnant, may be pregnant, or are planning to become pregnant.  If you drink alcohol:  Limit how much you use to:  0-1 drink a day for women.  0-2 drinks a day for men.  Be aware of how much alcohol is in your drink. In the [place] one drink equals one 12 oz bottle of beer (355 mL), one 5 oz glass of wine (148 mL), or one 1½ oz glass of hard liquor (44 mL).  General instructions  Keep a weight-loss journal to keep track of the food you eat and how much exercise you get.  Take over-the-counter and prescription medicines only as told by your health care provider.  Take vitamins and supplements only as told by your health care provider.  Consider joining a support group. Your health care provider may be able to recommend a support group.  Keep all follow-up visits as told by your health care provider. This is important.  Contact a health care provider if:  You are unable to meet your weight loss goal after 6 weeks of dietary and lifestyle changes.  Get help right away if you are having:  Trouble breathing.  Suicidal thoughts or behaviors.  Summary  Obesity is the condition of having too much total body fat.  Being overweight or obese means that your weight is greater than what is considered healthy for your  body size.  Work with your health care provider and a dietitian to set a weight-loss goal that is healthy and reasonable for you.  Exercise regularly, as told by your health care provider. Ask your health care provider what types of exercise are safe for you and how often you should exercise.  This information is not intended to replace advice given to you by your health care provider. Make sure you discuss any questions you have with your health care provider.  Document Revised: 08/22/2019 Document Reviewed: 08/22/2019  [employer]vier Patient Education © 2021 [employer]

## 2022-08-04 NOTE — PROGRESS NOTES
Primary Care Provider  Serenity Lucas APRN     Referring Provider  No ref. provider found     Chief Complaint  Sleep Apnea (6 month f/u/Has not been using CPAP)    Subjective          Aida Conner presents to [facility] PULMONARY [facility]ARE MEDICINE  History of Present Illness  Aida Conner is a 67 y.o. female patient of Dr. Anaya here for management of obstructive sleep apnea, chronic hypoxic and hypercapnic respiratory failure, COPD, obesity, breast cancer and an abnormal chest x-ray.    Patient states she is doing well this her last visit.  She denies using any antibiotics or steroids for her lungs.  She denies any fevers or chills.  She has not needed to use her Dulera or albuterol this year.  Her shortness of breath is mild in severity, worse with exertion and improved with rest. she has continued to take her Singulair at night.  She completed the 6 months of radiation last year prior to having a mastectomy in August 2021.  She states that since having chemotherapy, she has not had as many trips to her primary care provider or illnesses.  She wears 3 L of oxygen.  She is not wearing a CPAP at night currently.  Overall, patient is doing well and has no additional concerns at this time.  She is up-to-date with her COVID and Prevnar 13 vaccines.  She will be due for a flu vaccine this fall.     Her history of smoking is   Tobacco Use: Low Risk    • Smoking Tobacco Use: Never Smoker   • Smokeless Tobacco Use: Never Used   .    Review of Systems   Constitutional: Negative for chills, fatigue, fever, unexpected weight gain and unexpected weight loss.   HENT: Negative for congestion (Nasal), hearing loss, mouth sores, nosebleeds, postnasal drip, sore throat and trouble swallowing.    Eyes: Negative for blurred vision and visual disturbance.   Respiratory: Positive for shortness of breath. Negative for apnea, cough and wheezing.         Negative for Hemoptysis     Cardiovascular: Negative  for chest pain, palpitations and leg swelling.   Gastrointestinal: Negative for abdominal pain, constipation, diarrhea, nausea, vomiting and GERD.        Negative for Jaundice  Negative for Bloating  Negative for Melena   Musculoskeletal: Negative for joint swelling and myalgias.        Negative for Joint pain  Negative for Joint stiffness   Skin: Negative for color change.        Negative for cyanosis   Neurological: Negative for syncope, weakness, numbness and headache.      Sleep: Negative for Excessive daytime sleepiness  Negative for morning headaches  Negative for Snoring    Family History   Problem Relation Age of Onset   • Breast cancer Mother 45   • Cancer Mother         breast; metastasized   • Colon cancer Paternal Grandmother    • Cancer Paternal Grandmother         colon cancer   • Hypertension Paternal Grandmother         EVERY ADULT IN MY FAMILY   • Breast cancer Maternal Aunt 70   • Breast cancer Paternal Aunt 70   • Cancer Paternal Aunt         breast; cured   • Lung cancer Father    • Hodgkin's lymphoma Father    • Skin cancer Father         squamous cell   • Cancer Father         4 kinds: Hodgkins; lung; squamous cell skin   • Cancer Maternal Aunt         breast; metastasized   • Ovarian cancer Neg Hx    • Uterine cancer Neg Hx    • Deep vein thrombosis Neg Hx    • Pulmonary embolism Neg Hx    • Malig Hyperthermia Neg Hx         Social History     Socioeconomic History   • Marital status:    • Number of children: 3   Tobacco Use   • Smoking status: Never Smoker   • Smokeless tobacco: Never Used   Vaping Use   • Vaping Use: Never used   Substance and Sexual Activity   • Alcohol use: Never   • Drug use: Never   • Sexual activity: Not Currently     Partners: Female        Past Medical History:   Diagnosis Date   • Allergic rhinitis    • Anemia    • Anxiety    • Asthma    • Chronic bronchitis (HCC)     In past, espec. during working years   • Chronic diastolic (congestive) heart failure (HCC)     • Chronic hypoxemic respiratory failure (HCC)     on continuous O2   • Coronary artery calcification seen on CT scan 04/21/2022   • Diarrhea     with chemo   • GERD (gastroesophageal reflux disease) Mild   • H/O Intraductal papilloma    • Hemorrhoids    • History of transfusion 1984    AFTER BACK SURGERY no reaction   • Hypertension    • IBS (irritable bowel syndrome)    • Inflammatory breast cancer (HCC)     right   • Kidney stone    • Morbid obesity with BMI of 50.0-59.9, adult (HCC)    • Obesity hypoventilation syndrome (HCC) 02/05/2021   • On home oxygen therapy     2.5 AT REST WITH ACTIVITY UP TO 4L   • ANABELLE on CPAP     cpap  & uses O2 with CPAP   • Osteoarthritis    • Pneumonia     Many times in past, 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   • PONV (postoperative nausea and vomiting)         Immunization History   Administered Date(s) Administered   • COVID-19 (PFIZER) PURPLE CAP 03/19/2021, 04/09/2021, 11/13/2021   • Covid-19 (Pfizer) Gray Cap 06/18/2022   • Flu Vaccine Quad PF >36MO 09/24/2018, 11/12/2020   • Fluzone High-Dose 65+yrs 09/28/2021   • Influenza TIV (IM) 01/30/2014, 09/29/2014   • Pneumococcal Conjugate 13-Valent (PCV13) 08/21/2019         Allergies   Allergen Reactions   • Amlodipine Swelling     LEGS    • Hydrochlorothiazide Unknown - Low Severity     Neuro issues   • Morphine Nausea And Vomiting     hallucinations   • Adhesive Tape Rash          Current Outpatient Medications:   •  anastrozole (ARIMIDEX) 1 MG tablet, Take 1 tablet by mouth Daily., Disp: 90 tablet, Rfl: 1  •  carvedilol (COREG) 25 MG tablet, Take 0.5 tablets by mouth 2 (Two) Times a Day With Meals., Disp: 30 tablet, Rfl: 0  •  Cholecalciferol (Vitamin D) 50 MCG (2000 UT) capsule, Take 2,000 Units by mouth Daily., Disp: , Rfl:   •  dicyclomine (BENTYL) 20 MG tablet, Take 1 tablet by mouth Every 6 (Six) Hours., Disp: 60 tablet, Rfl: 2  •  diphenoxylate-atropine (LOMOTIL) 2.5-0.025 MG per tablet, Take 1 tablet by mouth 4 (Four) Times a  Day As Needed for Diarrhea., Disp: 120 tablet, Rfl: 0  •  FLUoxetine (PROzac) 40 MG capsule, TAKE 1 CAPSULE BY MOUTH EVERY DAY, Disp: 30 capsule, Rfl: 0  •  furosemide (LASIX) 40 MG tablet, TAKE ONE-HALF TABLET BY MOUTH EVERY DAY, Disp: 45 tablet, Rfl: 1  •  lisinopril (PRINIVIL,ZESTRIL) 5 MG tablet, TAKE 1 TABLET BY MOUTH EVERY DAY, Disp: 90 tablet, Rfl: 1  •  montelukast (SINGULAIR) 10 MG tablet, Take 10 mg by mouth Every Night., Disp: , Rfl:   •  multivitamin (THERAGRAN) tablet tablet, Take 1 tablet by mouth Daily., Disp: , Rfl:   •  O2 (OXYGEN), Inhale 3 L/min Continuous., Disp: , Rfl:   •  pravastatin (PRAVACHOL) 40 MG tablet, Take 40 mg by mouth Every Night., Disp: , Rfl:   •  psyllium (METAMUCIL) 0.52 g capsule, Metamucil 0.52 gram oral capsule take 1 capsule by oral route 5Xday   Active, Disp: , Rfl:   •  spironolactone (ALDACTONE) 25 MG tablet, Take 25 mg by mouth Daily., Disp: , Rfl:   •  albuterol sulfate  (90 Base) MCG/ACT inhaler, Inhale 2 puffs Every 4 (Four) Hours As Needed., Disp: , Rfl:   •  diphenhydrAMINE (BENADRYL) 25 mg capsule, Take 25 mg by mouth At Night As Needed for Itching, Allergies or Sleep., Disp: , Rfl:   •  gabapentin (NEURONTIN) 100 MG capsule, Take 1 capsule by mouth 3 (Three) Times a Day for 30 days., Disp: 90 capsule, Rfl: 1  •  Hemorrhoidal Relief 5 % cream cream, apply topically to the appropriate area as directed THREE TIMES DAILY AS NEEDED for hemmorrhoid pain, Disp: 30 g, Rfl: 2  •  Lidocaine Viscous HCl (XYLOCAINE) 2 % solution, Apply 5 mL topically to the appropriate area as directed As Needed for Mild Pain  (mix with Silvadene)., Disp: 100 mL, Rfl: 1  •  meloxicam (MOBIC) 15 MG tablet, take 1/2 tablet BY MOUTH EVERY DAY, Disp: 45 tablet, Rfl: 1  •  miconazole (Lotrimin AF) 2 % powder, Apply  topically to the appropriate area as directed 2 (two) times a day., Disp: 85 g, Rfl: 1  •  mometasone-formoterol (DULERA 100) 100-5 MCG/ACT inhaler, Inhale 2 Puffs/kg Every  Night., Disp: , Rfl:   •  ondansetron (ZOFRAN) 8 MG tablet, Take 1 tablet by mouth 3 (Three) Times a Day As Needed for Nausea or Vomiting., Disp: 30 tablet, Rfl: 5  •  Potassium 99 MG tablet, Take 1 tablet by mouth Every Night., Disp: , Rfl:   •  riFAXIMin (Xifaxan) 550 MG tablet, Take 1 tablet by mouth Daily., Disp: 28 tablet, Rfl: 2  •  silver sulfadiazine (SILVADENE, SSD) 1 % cream, Apply 1 application topically to the appropriate area as directed 2 (Two) Times a Day., Disp: 50 g, Rfl: 1     Objective   Physical Exam  Constitutional:       General: She is not in acute distress.     Appearance: Normal appearance. She is obese.   HENT:      Right Ear: Hearing normal.      Left Ear: Hearing normal.      Nose: No nasal tenderness or congestion.      Mouth/Throat:      Mouth: Mucous membranes are moist. No oral lesions.   Eyes:      Extraocular Movements: Extraocular movements intact.      Pupils: Pupils are equal, round, and reactive to light.   Neck:      Thyroid: No thyroid mass or thyromegaly.   Cardiovascular:      Rate and Rhythm: Normal rate and regular rhythm.      Pulses: Normal pulses.      Heart sounds: Normal heart sounds. No murmur heard.  Pulmonary:      Effort: Pulmonary effort is normal.      Breath sounds: Decreased breath sounds present. No wheezing, rhonchi or rales.      Comments: Patient is on 3 L of oxygen.  She is able to speak full sentences without difficulty.  Chest:   Breasts:      Right: No axillary adenopathy.       Abdominal:      General: Bowel sounds are normal. There is no distension.      Palpations: Abdomen is soft.      Tenderness: There is no abdominal tenderness.   Musculoskeletal:      Cervical back: Neck supple.      Right lower leg: No edema.      Left lower leg: No edema.   Lymphadenopathy:      Cervical: No cervical adenopathy.      Upper Body:      Right upper body: No axillary adenopathy.   Skin:     General: Skin is warm and dry.      Findings: No lesion or rash.    Neurological:      General: No focal deficit present.      Mental Status: She is alert and oriented to person, place, and time.      Cranial Nerves: Cranial nerves are intact.   Psychiatric:         Mood and Affect: Affect normal. Mood is not anxious or depressed.         Vital Signs:   BP (!) 142/123 (BP Location: Left arm, Patient Position: Sitting, Cuff Size: Adult) Comment: Patient has not taken BP medication today.  Pulse 76   Temp 98 °F (36.7 °C) (Infrared)   Resp 16   SpO2 97% Comment: 3L oxygen pulse       Result Review :     CMP    CMP 3/10/22 3/31/22 7/11/22   Glucose 101 97 125 (A)   BUN 18 19 18   Creatinine 0.58 (A) 0.61 0.60   Sodium 139 139 139   Potassium 4.4 4.0 4.6   Chloride 101 105 101   Calcium 9.0 8.4 (A) 9.6   Albumin 3.70 3.40 (A) 4.00   Total Bilirubin 0.5 0.4 0.4   Alkaline Phosphatase 94 83 104   AST (SGOT) 14 14 14   ALT (SGPT) 17 12 19   (A) Abnormal value            CBC w/diff    CBC w/Diff 3/10/22 3/31/22 7/11/22   WBC 8.27 8.19 8.75   RBC 4.22 3.99 4.39   Hemoglobin 12.1 11.7 (A) 13.3   Hematocrit 40.7 40.0 42.9   MCV 96.4 100.3 (A) 97.7 (A)   MCH 28.7 29.3 30.3   MCHC 29.7 (A) 29.3 (A) 31.0 (A)   RDW 15.9 (A) 14.2 12.6   Platelets 221 246 240   Neutrophil Rel % 82.3 (A) 80.2 (A) 83.3 (A)   Immature Granulocyte Rel % 0.4 0.4 0.3   Lymphocyte Rel % 7.5 (A) 9.4 (A) 8.0 (A)   Monocyte Rel % 7.3 8.4 6.2   Eosinophil Rel % 2.3 1.5 1.9   Basophil Rel % 0.2 0.1 0.3   (A) Abnormal value            Data reviewed: Radiologic studies Chest CT 10/14/2021, pulmonary function test 1/16/2019 and My last office note   Procedures        Assessment and Plan    Diagnoses and all orders for this visit:    1. Obesity hypoventilation syndrome (HCC) (Primary)    2. Obstructive sleep apnea    3. Inflammatory breast cancer, right (HCC)    4. Morbid obesity with BMI of 50.0-59.9, adult (HCC)    5. Dyspnea on exertion    6. Chronic respiratory failure with hypoxia and hypercapnia (HCC)    7.  Continue  Dulera as prescribed.  Rinse mouth out after each use.  8.  Continue albuterol as needed.  9.  Continue Singulair.  10.  Continue supplemental oxygen to maintain saturations at or above 89%.  11.  Follow-up with oncology as scheduled.  12.  Follow-up with Dr. Anaya in 6 months, sooner if needed.    I spent 25 minutes caring for Aida on this date of service. This time includes time spent by me in the following activities:preparing for the visit, reviewing tests, obtaining and/or reviewing a separately obtained history, performing a medically appropriate examination and/or evaluation , counseling and educating the patient/family/caregiver and documenting information in the medical record    Follow Up   Return in about 6 months (around 2/4/2023) for Recheck with Héctor.  Patient was given instructions and counseling regarding her condition or for health maintenance advice. Please see specific information pulled into the AVS if appropriate.

## 2022-08-05 ENCOUNTER — HOSPITAL ENCOUNTER (OUTPATIENT)
Dept: OCCUPATIONAL THERAPY | Facility: HOSPITAL | Age: 67
Setting detail: THERAPIES SERIES
Discharge: HOME | End: 2022-08-05

## 2022-08-05 DIAGNOSIS — C50.911 BILATERAL MALIGNANT NEOPLASM OF BREAST IN FEMALE, ESTROGEN RECEPTOR POSITIVE, UNSPECIFIED SITE OF BREAST: ICD-10-CM

## 2022-08-05 DIAGNOSIS — C50.912 BILATERAL MALIGNANT NEOPLASM OF BREAST IN FEMALE, ESTROGEN RECEPTOR POSITIVE, UNSPECIFIED SITE OF BREAST: ICD-10-CM

## 2022-08-05 DIAGNOSIS — Z17.0 BILATERAL MALIGNANT NEOPLASM OF BREAST IN FEMALE, ESTROGEN RECEPTOR POSITIVE, UNSPECIFIED SITE OF BREAST: ICD-10-CM

## 2022-08-05 DIAGNOSIS — M25.60 JOINT STIFFNESS: ICD-10-CM

## 2022-08-05 DIAGNOSIS — I97.2 POSTMASTECTOMY LYMPHEDEMA SYNDROME: ICD-10-CM

## 2022-08-05 DIAGNOSIS — Z90.13 HISTORY OF MASTECTOMY, BILATERAL: ICD-10-CM

## 2022-08-05 DIAGNOSIS — M62.81 MUSCLE WEAKNESS (GENERALIZED): ICD-10-CM

## 2022-08-05 DIAGNOSIS — I89.0 LYMPHEDEMA: Primary | ICD-10-CM

## 2022-08-05 DIAGNOSIS — R52 PAIN: ICD-10-CM

## 2022-08-05 DIAGNOSIS — L90.5 SCAR CONDITION AND FIBROSIS OF SKIN: ICD-10-CM

## 2022-08-05 DIAGNOSIS — R29.3 ABNORMAL POSTURE: ICD-10-CM

## 2022-08-05 PROCEDURE — 97140 MANUAL THERAPY 1/> REGIONS: CPT

## 2022-08-05 NOTE — THERAPY TREATMENT NOTE
Outpatient Occupational Therapy Lymphedema Treatment Note  OLGA Clifton     Patient Name: Aida Conner  : 1955  MRN: [id number]  Today's Date: 2022      Visit Date: 2022    Patient Active Problem List   Diagnosis   • Inflammatory breast cancer, right (HCC)   • Other specified disorders of breast    • Malignant neoplasm of axillary tail of right female breast (HCC)   • Encounter for eye exam due to high risk medication   • Obesity hypoventilation syndrome (HCC)   • Iron adverse reaction   • Functional diarrhea   • Encounter for long-term (current) use of high-risk medication   • Abnormal mammogram   • Allergic rhinitis   • Anemia   • Anxiety disorder   • Breast pain, right   • Depression   • Essential tremor   • Hemorrhoids   • Hyperlipidemia   • Hypertension, essential   • IBS (irritable bowel syndrome)   • Impaired fasting glucose   • Obesity   • Obstructive sleep apnea   • Osteoarthrosis   • Renal calculi   • Restrictive airway disease   • Type II diabetes mellitus (HCC)   • Vitamin deficiency   • Urinary bladder incontinence   • Malignant neoplasm of central portion of right female breast (HCC)   • Osteoporosis without current pathological fracture   • Chronic diastolic congestive heart failure (HCC)   • Coronary artery calcification seen on CT scan        Past Medical History:   Diagnosis Date   • Allergic rhinitis    • Anemia    • Anxiety    • Asthma    • Chronic bronchitis (HCC)     In past, espec. during working years   • Chronic diastolic (congestive) heart failure (HCC)    • Chronic hypoxemic respiratory failure (HCC)     on continuous O2   • Coronary artery calcification seen on CT scan 2022   • Diarrhea     with chemo   • GERD (gastroesophageal reflux disease) Mild   • H/O Intraductal papilloma    • Hemorrhoids    • History of transfusion     AFTER BACK SURGERY no reaction   • Hypertension    • IBS (irritable bowel syndrome)    • Inflammatory breast cancer (HCC)     right   •  Kidney stone    • Morbid obesity with BMI of 50.0-59.9, adult ([facility])    • Obesity hypoventilation syndrome (HCC) 02/05/2021   • On home oxygen therapy     2.5 AT REST WITH ACTIVITY UP TO 4L   • ANABELLE on CPAP     cpap  & uses O2 with CPAP   • Osteoarthritis    • Pneumonia     Many times in past, 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   • PONV (postoperative nausea and vomiting)         Past Surgical History:   Procedure Laterality Date   • BREAST EXCISIONAL BIOPSY Bilateral    • COLONOSCOPY     • CYSTOSCOPY BLADDER STONE LITHOTRIPSY     • ENDOSCOPY     • KNEE ARTHROPLASTY Bilateral 2009   • MASTECTOMY Left 08/10/2021    Procedure: LEFT TOTAL MASTECTOMY;  Surgeon: Nicci Banks MD;  Location: [facility] MAIN OR;  Service: General;  Laterality: Left;   • MASTECTOMY Right 08/10/2021    Procedure: RIGHT MODIFIED RADICAL MASTECTOMY;  Surgeon: Nicci Banks MD;  Location: [facility] MAIN OR;  Service: General;  Laterality: Right;   • SPINE SURGERY  1984   • TOTAL ABDOMINAL HYSTERECTOMY WITH SALPINGO OOPHORECTOMY  2004   • UPPER GASTROINTESTINAL ENDOSCOPY     • VENOUS ACCESS DEVICE (PORT) INSERTION N/A 01/25/2021    Procedure: INSERTION VENOUS ACCESS DEVICE;  Surgeon: Nicci Banks MD;  Location: [facility] MAIN OR;  Service: General;  Laterality: N/A;   • VENOUS ACCESS DEVICE (PORT) REMOVAL Left 04/05/2022    Procedure: REMOVAL VENOUS ACCESS DEVICE;  Surgeon: Nicci Banks MD;  Location: [facility] MAIN OR;  Service: General;  Laterality: Left;         Visit Dx:      ICD-10-CM ICD-9-CM   1. Lymphedema  I89.0 457.1   2. Pain  R52 780.96   3. Abnormal posture  R29.3 781.92   4. Joint stiffness  M25.60 719.50   5. 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 Scar condition and fibrosis of skin  L90.5 709.2   7. History of mastectomy, bilateral  Z90.13 V45.71   8. Muscle weakness (generalized)  M62.81 728.87   9. Bilateral malignant neoplasm of breast in female, estrogen receptor positive, unspecified site of breast  ([facility])  C50.911 174.9    Z17.0 V86.0    C50.912         Lymphedema     Row Name 08/05/22 1100             Subjective Pain    Able to rate subjective pain? yes  -MP      Pre-Treatment Pain Level 0  -MP      Post-Treatment Pain Level 0  -MP              Subjective Comments    Subjective Comments Pt states that she is keeping the circaid reduction kit for about 16 hours a day.  She states she is being able to put it on more by herself.  She states she is not consistently wearing it through the night, but is elevated arm when not wearing.  -MP              Skin Changes/Observations    Location/Assessment Upper Quadrant  -MP      Upper Quadrant Conditions right:;intact;clean;dry  -MP              Manual Lymphatic Drainage    Manual Lymphatic Drainage initial sequence;opened regional lymph nodes;opened anastamoses;extremity treatment  -MP      Initial Sequence short neck;cervical;supraclavicular;shoulder collectors  -MP      Opened Regional Lymph Nodes axillary;inguinal  -MP      Extremity Treatment MLD to full limb  -MP      MLD to Full Limb full MLD right UE  -MP      Manual Therapy Provided trigger release at pectoralis major, minor, teres minor and upper trapezius muscles, as well as gentle distraction at the glenohumeral joint  left prior to assisted motion  -MP              Compression/Skin Care    Compression/Skin Care skin care;compression garment  -MP      Compression Garment Comments circaid reduction kit wass re-applied after tx wut patient providing instruction during donning.  -MP            User Key  (r) = Recorded By, (t) = Taken By, (c) = Cosigned By    Initials Name Provider Type    Belkis Kaur OT Occupational Therapist               OT Assessment/Plan     Row Name 08/05/22 4661          OT Assessment    Assessment Comments Patient tolerated treatment well with increase in active range of motion following joint mobilization and soft tissue work.  Patient continues to benefit from skilled  occupational therapy services until a plateau is noted in lymph volume reduction and maximal functional improvement is noted at the left upper extremity.  -MP           User Key  (r) = Recorded By, (t) = Taken By, (c) = Cosigned By    Initials Name Provider Type    Belkis Kaur OT Occupational Therapist               OT Exercises     Row Name 08/05/22 1100             Exercise 2    Exercise Name 2 Active assistive external rotation with cane  -MP      Sets 2 1  -MP      Reps 2 5  -MP      Time (Seconds) 2 Hold 10 seconds  -MP              Exercise 3    Exercise Name 3 Overhead reach with cane exercise  -MP      Reps 3 5  -MP      Time (Minutes) 3 Hold for sustained stretch for 5 to 10 seconds  -MP              Exercise 4    Exercise Name 4 Shoulder flexion actively and assistive wall  -MP      Sets 4 1  -MP      Reps 4 5  -MP              Exercise 5    Exercise Name 5 Shoulder abduction active and assistive wall  -MP      Sets 5 1  -MP      Reps 5 5  -MP              Exercise 6    Exercise Name 6 --  -MP      Sets 6 --  -MP      Reps 6 --  -MP            User Key  (r) = Recorded By, (t) = Taken By, (c) = Cosigned By    Initials Name Provider Type    Belkis Kaur OT Occupational Therapist              Manual Rx (last 36 hours)     Manual Treatments     Row Name 08/05/22 1702             Total Minutes    97315 - OT Manual Therapy Minutes 48  -MP            User Key  (r) = Recorded By, (t) = Taken By, (c) = Cosigned By    Initials Name Provider Type    Belkis Kaur OT Occupational Therapist            Therapy Education  Education Details: Patient return demonstrated no exercise for trip protraction/retraction of left shoulder at tabletop correctly after instruction.  Patient is asked to add this to her home exercise program activities.  Patient return demonstrated independent donning of CircAid reduction kit at the right upper extremity correctly with minimal cueing from this therapist to be sure  of no gaps at Velcro closures  31845 - OT Self Care/Mgmt Minutes: 14          Time Calculation:   Timed Charges  92899 - OT Manual Therapy Minutes: 48  19156 - OT Self Care/Mgmt Minutes: 14  Total Minutes  Timed Charges Total Minutes: 62   Total Minutes: 62     Therapy Charges for Today     Code Description Service Date Service Provider Modifiers Qty    [id number] HC OT MANUAL THERAPY EA 15 MIN 8/5/2022 Belkis Mooney OT GO 3                      Belkis Mooney OT  8/5/2022

## 2022-08-08 ENCOUNTER — HOSPITAL ENCOUNTER (OUTPATIENT)
Dept: OCCUPATIONAL THERAPY | Facility: HOSPITAL | Age: 67
Setting detail: THERAPIES SERIES
Discharge: HOME | End: 2022-08-08

## 2022-08-08 DIAGNOSIS — M25.60 JOINT STIFFNESS: ICD-10-CM

## 2022-08-08 DIAGNOSIS — L90.5 SCAR CONDITION AND FIBROSIS OF SKIN: ICD-10-CM

## 2022-08-08 DIAGNOSIS — I89.0 LYMPHEDEMA: Primary | ICD-10-CM

## 2022-08-08 DIAGNOSIS — I97.2 POSTMASTECTOMY LYMPHEDEMA SYNDROME: ICD-10-CM

## 2022-08-08 DIAGNOSIS — R52 PAIN: ICD-10-CM

## 2022-08-08 DIAGNOSIS — R29.3 ABNORMAL POSTURE: ICD-10-CM

## 2022-08-08 PROCEDURE — 97535 SELF CARE MNGMENT TRAINING: CPT

## 2022-08-08 PROCEDURE — 97760 ORTHOTIC MGMT&TRAING 1ST ENC: CPT

## 2022-08-08 NOTE — THERAPY TREATMENT NOTE
Outpatient Occupational Therapy Lymphedema Treatment Note  OLGA Clifton     Patient Name: Aida Conner  : 1955  MRN: [id number]  Today's Date: 2022      Visit Date: 2022    Patient Active Problem List   Diagnosis   • Inflammatory breast cancer, right (HCC)   • Other specified disorders of breast    • Malignant neoplasm of axillary tail of right female breast (HCC)   • Encounter for eye exam due to high risk medication   • Obesity hypoventilation syndrome (HCC)   • Iron adverse reaction   • Functional diarrhea   • Encounter for long-term (current) use of high-risk medication   • Abnormal mammogram   • Allergic rhinitis   • Anemia   • Anxiety disorder   • Breast pain, right   • Depression   • Essential tremor   • Hemorrhoids   • Hyperlipidemia   • Hypertension, essential   • IBS (irritable bowel syndrome)   • Impaired fasting glucose   • Obesity   • Obstructive sleep apnea   • Osteoarthrosis   • Renal calculi   • Restrictive airway disease   • Type II diabetes mellitus (HCC)   • Vitamin deficiency   • Urinary bladder incontinence   • Malignant neoplasm of central portion of right female breast (HCC)   • Osteoporosis without current pathological fracture   • Chronic diastolic congestive heart failure (HCC)   • Coronary artery calcification seen on CT scan        Past Medical History:   Diagnosis Date   • Allergic rhinitis    • Anemia    • Anxiety    • Asthma    • Chronic bronchitis (HCC)     In past, espec. during working years   • Chronic diastolic (congestive) heart failure (HCC)    • Chronic hypoxemic respiratory failure (HCC)     on continuous O2   • Coronary artery calcification seen on CT scan 2022   • Diarrhea     with chemo   • GERD (gastroesophageal reflux disease) Mild   • H/O Intraductal papilloma    • Hemorrhoids    • History of transfusion     AFTER BACK SURGERY no reaction   • Hypertension    • IBS (irritable bowel syndrome)    • Inflammatory breast cancer (HCC)     right   •  Kidney stone    • Morbid obesity with BMI of 50.0-59.9, adult ([facility])    • Obesity hypoventilation syndrome (HCC) 02/05/2021   • On home oxygen therapy     2.5 AT REST WITH ACTIVITY UP TO 4L   • ANABELLE on CPAP     cpap  & uses O2 with CPAP   • Osteoarthritis    • Pneumonia     Many times in past, 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   • PONV (postoperative nausea and vomiting)         Past Surgical History:   Procedure Laterality Date   • BREAST EXCISIONAL BIOPSY Bilateral    • COLONOSCOPY     • CYSTOSCOPY BLADDER STONE LITHOTRIPSY     • ENDOSCOPY     • KNEE ARTHROPLASTY Bilateral 2009   • MASTECTOMY Left 08/10/2021    Procedure: LEFT TOTAL MASTECTOMY;  Surgeon: Nicci Banks MD;  Location: [facility] MAIN OR;  Service: General;  Laterality: Left;   • MASTECTOMY Right 08/10/2021    Procedure: RIGHT MODIFIED RADICAL MASTECTOMY;  Surgeon: Nicci Banks MD;  Location: [facility] MAIN OR;  Service: General;  Laterality: Right;   • SPINE SURGERY  1984   • TOTAL ABDOMINAL HYSTERECTOMY WITH SALPINGO OOPHORECTOMY  2004   • UPPER GASTROINTESTINAL ENDOSCOPY     • VENOUS ACCESS DEVICE (PORT) INSERTION N/A 01/25/2021    Procedure: INSERTION VENOUS ACCESS DEVICE;  Surgeon: Nicci Banks MD;  Location: [facility] MAIN OR;  Service: General;  Laterality: N/A;   • VENOUS ACCESS DEVICE (PORT) REMOVAL Left 04/05/2022    Procedure: REMOVAL VENOUS ACCESS DEVICE;  Surgeon: Nicci Banks MD;  Location: [facility] MAIN OR;  Service: General;  Laterality: Left;         Visit Dx:      ICD-10-CM ICD-9-CM   1. Lymphedema  I89.0 457.1   2. Pain  R52 780.96   3. Joint stiffness  M25.60 719.50   4. 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 Scar condition and fibrosis of skin  L90.5 709.2   6. Abnormal posture  R29.3 781.92        Lymphedema     Row Name 08/08/22 1600             Subjective Pain    Able to rate subjective pain? yes  -CH      Pre-Treatment Pain Level 0  -CH      Post-Treatment Pain Level 0  -CH              Subjective  "Comments    Subjective Comments Patient can sleep off on Sunday and did not put them back on until she came to the clinic.  -CH              Lymphedema Assessment    Lymphedema Classification stage 2 (Spontaneously Irreversible)  -CH      Lymphedema Cancer Related Sx right;bilateral;modified radical mastectomy  -CH      Lymph Nodes Removed # 19  -CH      Positive Lymph Nodes # 4  -CH      Chemo Received yes  -CH      Radiation Therapy Received yes  -CH      Radiation Treatments #/Timeframe 25  -CH      Infections or Cellulitis? no  -CH            User Key  (r) = Recorded By, (t) = Taken By, (c) = Cosigned By    Initials Name Provider Type    Ria Colón OT Occupational Therapist                 OT Ortho     Row Name 08/08/22 1600             Orthotics & Prosthetics Management    Orthosis Location upper extremity orthosis  -      Additional Documentation Orthosis Location (Row)  -            User Key  (r) = Recorded By, (t) = Taken By, (c) = Cosigned By    Initials Name Provider Type    Ria Colón OT Occupational Therapist              OT provided patient with a size F Tubigrip to use as under sleeve underneath the CircAid reduction kit to attempt to mold soft tissue into a cylindrical shape.  Patient also provided with 1/4\" foam around the elbow to decrease cutting in at the inferior aspect of the elbow.  Patient was educated that throughout the day as she loses volume she will need to readjust and retighten the sleeve so that it does not slide down and accumulate into the elbow region.       OT Assessment/Plan     Row Name 08/08/22 1702          OT Assessment    Functional Limitations Performance in self-care ADL  -     Impairments Impaired lymphatic circulation;Range of motion;Posture;Pain;Muscle strength;Joint mobility  -     Assessment Comments Patient continues to present with ongoing worsening lymphedema due to intolerance to compression sleeve.  Patient is improving functional range " of motion however she is not doing self manual lymphatic drainage at home either.  Patient will benefit from skilled occupational therapy services to continue to alter the approach to address her lymphedema and continue to alter the compression sleeve to improve comfort and tolerance to the use of the compression sleeve to support reduction of limb volume so that a plateau can be achieved.  If patient is unable to demonstrate progress OT will then assist patient in procuring a permanent compression garment and discharge her from therapy services.  -CH            OT Plan    OT Plan Comments Continue POC  -CH           User Key  (r) = Recorded By, (t) = Taken By, (c) = Cosigned By    Initials Name Provider Type    Ria Colón OT Occupational Therapist                          Therapy Education  Education Details: OT educated patient that given her current trajectory she continues to demonstrate more more lymph volume in the upper extremity due to her not wearing her compression sleeve for longer than 8 hours a day and going for more than 24 hours without use of it at all.  Patient states that the main issue with her compliance with the sleeve has to do with it cutting into her elbow so she will take it off and then forget to put it back on.  OT educated patient that at this point if she does not start to demonstrate improved lymphatic functioning we may have to procure her a permanent sleeve and discharge her from services.  However OT would like to initiate ordering a pneumatic compression pump to improve lymphatic flow at home when he is experiencing intolerance to wear of the compression sleeve.  OT also altered multiple pieces of the compression sleeve to ease cutting into the arm.  Patient states that she will have her  help her don her compression sleeve and try to keep it on longer.  Given: Symptoms/condition management  Program: Reinforced  How Provided: Verbal  Provided to: Patient  Level of  Understanding: Verbalized  20745 - OT Self Care/Mgmt Minutes: 25     Patient continues to demonstrate failed limb volume reduction with conservative treatment.           Time Calculation:   Timed Charges  72162 - OT Self Care/Mgmt Minutes: 25  34532 - OT Orthotic Management: 28  Total Minutes  Timed Charges Total Minutes: 53   Total Minutes: 53     Therapy Charges for Today     Code Description Service Date Service Provider Modifiers Qty    60815886156 HC OT SELF CARE/MGMT/TRAIN EA 15 MIN 8/8/2022 Ria Walker OT GO 2    83234113907  OT ORTHOTIC MGMT/TRAIN EA 15 MIN 8/8/2022 Ria Walker OT GO 1                      Ria Walker OT  8/8/2022

## 2022-08-10 ENCOUNTER — HOSPITAL ENCOUNTER (OUTPATIENT)
Dept: OCCUPATIONAL THERAPY | Facility: HOSPITAL | Age: 67
Setting detail: THERAPIES SERIES
Discharge: HOME | End: 2022-08-10

## 2022-08-10 DIAGNOSIS — I97.2 POSTMASTECTOMY LYMPHEDEMA SYNDROME: ICD-10-CM

## 2022-08-10 DIAGNOSIS — L90.5 SCAR CONDITION AND FIBROSIS OF SKIN: ICD-10-CM

## 2022-08-10 DIAGNOSIS — M25.60 JOINT STIFFNESS: ICD-10-CM

## 2022-08-10 DIAGNOSIS — R29.3 ABNORMAL POSTURE: ICD-10-CM

## 2022-08-10 DIAGNOSIS — I89.0 LYMPHEDEMA: Primary | ICD-10-CM

## 2022-08-10 DIAGNOSIS — R52 PAIN: ICD-10-CM

## 2022-08-10 PROCEDURE — 97140 MANUAL THERAPY 1/> REGIONS: CPT

## 2022-08-10 PROCEDURE — 97535 SELF CARE MNGMENT TRAINING: CPT

## 2022-08-10 NOTE — THERAPY TREATMENT NOTE
Outpatient Occupational Therapy Lymphedema Treatment Note  OLGA Clifton     Patient Name: Aida Conner  : 1955  MRN: [id number]  Today's Date: 8/10/2022      Visit Date: 08/10/2022    Patient Active Problem List   Diagnosis   • Inflammatory breast cancer, right (HCC)   • Other specified disorders of breast    • Malignant neoplasm of axillary tail of right female breast (HCC)   • Encounter for eye exam due to high risk medication   • Obesity hypoventilation syndrome (HCC)   • Iron adverse reaction   • Functional diarrhea   • Encounter for long-term (current) use of high-risk medication   • Abnormal mammogram   • Allergic rhinitis   • Anemia   • Anxiety disorder   • Breast pain, right   • Depression   • Essential tremor   • Hemorrhoids   • Hyperlipidemia   • Hypertension, essential   • IBS (irritable bowel syndrome)   • Impaired fasting glucose   • Obesity   • Obstructive sleep apnea   • Osteoarthrosis   • Renal calculi   • Restrictive airway disease   • Type II diabetes mellitus (HCC)   • Vitamin deficiency   • Urinary bladder incontinence   • Malignant neoplasm of central portion of right female breast (HCC)   • Osteoporosis without current pathological fracture   • Chronic diastolic congestive heart failure (HCC)   • Coronary artery calcification seen on CT scan        Past Medical History:   Diagnosis Date   • Allergic rhinitis    • Anemia    • Anxiety    • Asthma    • Chronic bronchitis (HCC)     In past, espec. during working years   • Chronic diastolic (congestive) heart failure (HCC)    • Chronic hypoxemic respiratory failure (HCC)     on continuous O2   • Coronary artery calcification seen on CT scan 2022   • Diarrhea     with chemo   • GERD (gastroesophageal reflux disease) Mild   • H/O Intraductal papilloma    • Hemorrhoids    • History of transfusion     AFTER BACK SURGERY no reaction   • Hypertension    • IBS (irritable bowel syndrome)    • Inflammatory breast cancer (HCC)     right   •  Kidney stone    • Morbid obesity with BMI of 50.0-59.9, adult ([facility])    • Obesity hypoventilation syndrome (HCC) 02/05/2021   • On home oxygen therapy     2.5 AT REST WITH ACTIVITY UP TO 4L   • ANABELLE on CPAP     cpap  & uses O2 with CPAP   • Osteoarthritis    • Pneumonia     Many times in past, 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   • PONV (postoperative nausea and vomiting)         Past Surgical History:   Procedure Laterality Date   • BREAST EXCISIONAL BIOPSY Bilateral    • COLONOSCOPY     • CYSTOSCOPY BLADDER STONE LITHOTRIPSY     • ENDOSCOPY     • KNEE ARTHROPLASTY Bilateral 2009   • MASTECTOMY Left 08/10/2021    Procedure: LEFT TOTAL MASTECTOMY;  Surgeon: Nicci Banks MD;  Location: [facility] MAIN OR;  Service: General;  Laterality: Left;   • MASTECTOMY Right 08/10/2021    Procedure: RIGHT MODIFIED RADICAL MASTECTOMY;  Surgeon: Nicci Banks MD;  Location: [facility] MAIN OR;  Service: General;  Laterality: Right;   • SPINE SURGERY  1984   • TOTAL ABDOMINAL HYSTERECTOMY WITH SALPINGO OOPHORECTOMY  2004   • UPPER GASTROINTESTINAL ENDOSCOPY     • VENOUS ACCESS DEVICE (PORT) INSERTION N/A 01/25/2021    Procedure: INSERTION VENOUS ACCESS DEVICE;  Surgeon: Nicci Banks MD;  Location: [facility] MAIN OR;  Service: General;  Laterality: N/A;   • VENOUS ACCESS DEVICE (PORT) REMOVAL Left 04/05/2022    Procedure: REMOVAL VENOUS ACCESS DEVICE;  Surgeon: Nicci Banks MD;  Location: [facility] MAIN OR;  Service: General;  Laterality: Left;         Visit Dx:      ICD-10-CM ICD-9-CM   1. Lymphedema  I89.0 457.1   2. Pain  R52 780.96   3. Joint stiffness  M25.60 719.50   4. 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 Scar condition and fibrosis of skin  L90.5 709.2   6. Abnormal posture  R29.3 781.92        Lymphedema     Row Name 08/10/22 1400             Subjective Pain    Able to rate subjective pain? yes  -SC      Pre-Treatment Pain Level 0  -SC      Post-Treatment Pain Level 0  -SC              Subjective  Comments    Subjective Comments Patient states that she is frustrated with the situation of gaining fluid and not losing fluid and feels like giving up on treatment.  Patient states she understands that the issue is her not being completely compliant however is having issues with doing the right things to fix the situation.  -SC              Skin Changes/Observations    Skin Observations Comment Patient arriving donning CircAid reduction kit on right upper extremity.  Reduction kit noted with slipped down presentation with straps not tight enough.  Patient states the foam from last appointment did not work and she cannot tolerate it.  Patient also states the Tubigrip that was applied under the CircAid was too tight and cut into her skin.  -SC              Manual Lymphatic Drainage    Manual Lymphatic Drainage initial sequence;opened regional lymph nodes;opened anastamoses;extremity treatment  -SC      Initial Sequence short neck;cervical;supraclavicular;shoulder collectors  -SC      Opened Regional Lymph Nodes axillary;inguinal  -SC      Extremity Treatment MLD to full limb  -SC      MLD to Full Limb Right upper extremity  -SC      Manual Therapy Therapist providing passive range of motion and stretch to right upper extremity to improve range of motion and decreased pain  -SC              Compression/Skin Care    Compression Garment Comments CircAid reduction kit  -SC      Compression/Skin Care Comments Therapist providing patient with new liners for right upper extremity under CircAid reduction kit  -SC            User Key  (r) = Recorded By, (t) = Taken By, (c) = Cosigned By    Initials Name Provider Type    Shahrzad Solis COTA Occupational Therapist Assistant                         OT Assessment/Plan     Row Name 08/10/22 0239          OT Assessment    Assessment Comments Patient will benefit from skilled occupational therapy services to continue to alter the approach to address her lymphedema and continue  to alter the compression sleeve to improve comfort and tolerance to the use of the compression sleeve to support reduction of lymph volume so that a plateau can be achieved.  -SC     Patient would benefit from skilled therapy intervention Yes  -SC            OT Plan    OT Plan Comments Continue POC  -SC           User Key  (r) = Recorded By, (t) = Taken By, (c) = Cosigned By    Initials Name Provider Type    Shahrzad Solis COTA Occupational Therapist Assistant                    Manual Rx (last 36 hours)     Manual Treatments     Row Name 08/10/22 1502             Total Minutes    11734 - OT Manual Therapy Minutes 38  -SC            User Key  (r) = Recorded By, (t) = Taken By, (c) = Cosigned By    Initials Name Provider Type    Shahrzad Solis COTA Occupational Therapist Assistant                  Therapy Education  Education Details: Therapist discussing with patient regarding wear schedule of CircAid reduction kit.  Patient states that she does not don it during exercise.  Therapist strongly encouraging patient to perform exercises with CircAid reduction kit on secondary to muscle pumping action with the compression will help move fluid however without the compression can cause increased swelling.  Therapist recommending patient don CircAid reduction kit as long as she can tolerate and then [place] for our and redonning reduction kit, continuing that pattern to see if it helps with the discomfort.  Therapist suggesting that the routine of [place] a hour and replacing right after will help patient remember to re-don.  Patient states that she will do her best to be more compliant with wearing the reduction kit more often in hopes to begin making progress.  Therapist reeducating patient on techniques of donning reduction kit independently.  Given: Symptoms/condition management  Program: Reinforced, New  How Provided: Verbal, Demonstration  Provided to: Patient  Level of Understanding: Verbalized  61709 - OT  Self Care/Mgmt Minutes: 15                Time Calculation:   Timed Charges  42958 - OT Manual Therapy Minutes: 38  89871 - OT Self Care/Mgmt Minutes: 15  Total Minutes  Timed Charges Total Minutes: 53   Total Minutes: 53     Therapy Charges for Today     Code Description Service Date Service Provider Modifiers Qty    43283366816 HC OT MANUAL THERAPY EA 15 MIN 8/10/2022 Shahrzad Torres COTA GO 3    02100226336 HC OT SELF CARE/MGMT/TRAIN EA 15 MIN 8/10/2022 Shahrzad Torres COTA GO 1                      LUIS M Mcguire  8/10/2022

## 2022-08-16 ENCOUNTER — APPOINTMENT (OUTPATIENT)
Dept: OCCUPATIONAL THERAPY | Facility: HOSPITAL | Age: 67
End: 2022-08-16

## 2022-08-23 ENCOUNTER — HOSPITAL ENCOUNTER (OUTPATIENT)
Dept: OCCUPATIONAL THERAPY | Facility: HOSPITAL | Age: 67
Setting detail: THERAPIES SERIES
Discharge: HOME | End: 2022-08-23

## 2022-08-23 DIAGNOSIS — L90.5 SCAR CONDITION AND FIBROSIS OF SKIN: ICD-10-CM

## 2022-08-23 DIAGNOSIS — M25.60 JOINT STIFFNESS: ICD-10-CM

## 2022-08-23 DIAGNOSIS — I89.0 LYMPHEDEMA: Primary | ICD-10-CM

## 2022-08-23 DIAGNOSIS — R52 PAIN: ICD-10-CM

## 2022-08-23 DIAGNOSIS — I97.2 POSTMASTECTOMY LYMPHEDEMA SYNDROME: ICD-10-CM

## 2022-08-23 PROCEDURE — 97140 MANUAL THERAPY 1/> REGIONS: CPT

## 2022-08-23 PROCEDURE — 97535 SELF CARE MNGMENT TRAINING: CPT

## 2022-08-23 NOTE — THERAPY TREATMENT NOTE
Outpatient Occupational Therapy Lymphedema Treatment Note  OLGA Clifton     Patient Name: Aida Conner  : 1955  MRN: [id number]  Today's Date: 2022      Visit Date: 2022    Patient Active Problem List   Diagnosis   • Inflammatory breast cancer, right (HCC)   • Other specified disorders of breast    • Malignant neoplasm of axillary tail of right female breast (HCC)   • Encounter for eye exam due to high risk medication   • Obesity hypoventilation syndrome (HCC)   • Iron adverse reaction   • Functional diarrhea   • Encounter for long-term (current) use of high-risk medication   • Abnormal mammogram   • Allergic rhinitis   • Anemia   • Anxiety disorder   • Breast pain, right   • Depression   • Essential tremor   • Hemorrhoids   • Hyperlipidemia   • Hypertension, essential   • IBS (irritable bowel syndrome)   • Impaired fasting glucose   • Obesity   • Obstructive sleep apnea   • Osteoarthrosis   • Renal calculi   • Restrictive airway disease   • Type II diabetes mellitus (HCC)   • Vitamin deficiency   • Urinary bladder incontinence   • Malignant neoplasm of central portion of right female breast (HCC)   • Osteoporosis without current pathological fracture   • Chronic diastolic congestive heart failure (HCC)   • Coronary artery calcification seen on CT scan        Past Medical History:   Diagnosis Date   • Allergic rhinitis    • Anemia    • Anxiety    • Asthma    • Chronic bronchitis (HCC)     In past, espec. during working years   • Chronic diastolic (congestive) heart failure (HCC)    • Chronic hypoxemic respiratory failure (HCC)     on continuous O2   • Coronary artery calcification seen on CT scan 2022   • Diarrhea     with chemo   • GERD (gastroesophageal reflux disease) Mild   • H/O Intraductal papilloma    • Hemorrhoids    • History of transfusion     AFTER BACK SURGERY no reaction   • Hypertension    • IBS (irritable bowel syndrome)    • Inflammatory breast cancer (HCC)     right   •  Kidney stone    • Morbid obesity with BMI of 50.0-59.9, adult ([facility])    • Obesity hypoventilation syndrome (HCC) 02/05/2021   • On home oxygen therapy     2.5 AT REST WITH ACTIVITY UP TO 4L   • ANABELLE on CPAP     cpap  & uses O2 with CPAP   • Osteoarthritis    • Pneumonia     Many times in past, 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   • PONV (postoperative nausea and vomiting)         Past Surgical History:   Procedure Laterality Date   • BREAST EXCISIONAL BIOPSY Bilateral    • COLONOSCOPY     • CYSTOSCOPY BLADDER STONE LITHOTRIPSY     • ENDOSCOPY     • KNEE ARTHROPLASTY Bilateral 2009   • MASTECTOMY Left 08/10/2021    Procedure: LEFT TOTAL MASTECTOMY;  Surgeon: Nicci Banks MD;  Location: [facility] MAIN OR;  Service: General;  Laterality: Left;   • MASTECTOMY Right 08/10/2021    Procedure: RIGHT MODIFIED RADICAL MASTECTOMY;  Surgeon: Nicci Banks MD;  Location: [facility] MAIN OR;  Service: General;  Laterality: Right;   • SPINE SURGERY  1984   • TOTAL ABDOMINAL HYSTERECTOMY WITH SALPINGO OOPHORECTOMY  2004   • UPPER GASTROINTESTINAL ENDOSCOPY     • VENOUS ACCESS DEVICE (PORT) INSERTION N/A 01/25/2021    Procedure: INSERTION VENOUS ACCESS DEVICE;  Surgeon: Nicci Banks MD;  Location: [facility] MAIN OR;  Service: General;  Laterality: N/A;   • VENOUS ACCESS DEVICE (PORT) REMOVAL Left 04/05/2022    Procedure: REMOVAL VENOUS ACCESS DEVICE;  Surgeon: Nicci Banks MD;  Location: [facility] MAIN OR;  Service: General;  Laterality: Left;         Visit Dx:      ICD-10-CM ICD-9-CM   1. Lymphedema  I89.0 457.1   2. Pain  R52 780.96   3. Joint stiffness  M25.60 719.50   4. 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 Scar condition and fibrosis of skin  L90.5 709.2        Lymphedema     Row Name 08/23/22 1000             Subjective Pain    Able to rate subjective pain? yes  -SC      Pre-Treatment Pain Level 0  -SC      Post-Treatment Pain Level 0  -SC              Subjective Comments    Subjective Comments Pt  c/o digestive issues this date requesting no laying down treatment to avoid issues  -SC              Skin Changes/Observations    Skin Observations Comment Patient arriving donning CircAid reduction kit on right upper extremity.  Reduction kit noted with slipped down presentation with straps not tight enough. Pt states her  put it on her and she was unable to reach around to retighten  -SC              Compression/Skin Care    Compression Garment Comments CircAid reduction kit  -SC      Compression/Skin Care Comments Therapist modifying reduction kin on R UE to allow for increased ease reaching straps to retighten. Pt was able to demonstrate increased independence once modifications/reducing size occured  -SC            User Key  (r) = Recorded By, (t) = Taken By, (c) = Cosigned By    Initials Name Provider Type    Shahrzad Solis COTA Occupational Therapist Assistant                                Manual Rx (last 36 hours)     Manual Treatments     Row Name 08/23/22 1039             Total Minutes    21169 - OT Manual Therapy Minutes 12  -SC            User Key  (r) = Recorded By, (t) = Taken By, (c) = Cosigned By    Initials Name Provider Type    Shahrzad Solis COTA Occupational Therapist Assistant                  Therapy Education  Education Details: Therapist reviewing self manual lymphatic drainage with pt. Pt was able to demonstrate increased understanding of skill. Therapist educating pt on techniques to increase independence with donning reduction kit. [facility] did attend appointment this date to measure pt for compression pump.  Given: Symptoms/condition management  Program: Reinforced  How Provided: Verbal, Demonstration  Provided to: Patient  Level of Understanding: Teach back education performed, Verbalized, Demonstrated  72821 - OT Self Care/Mgmt Minutes: 15                Time Calculation:   Timed Charges  70087 - OT Manual Therapy Minutes: 12  77491 - OT Self Care/Mgmt Minutes:  15  Total Minutes  Timed Charges Total Minutes: 27   Total Minutes: 27     Therapy Charges for Today     Code Description Service Date Service Provider Modifiers Qty    13011380475 HC OT MANUAL THERAPY EA 15 MIN 8/23/2022 Shahrzad Torres COTA GO 1    71266055703 HC OT SELF CARE/MGMT/TRAIN EA 15 MIN 8/23/2022 Shahrzad Torres COTA GO 1                      LUIS M Mcguire  8/23/2022

## 2022-08-25 ENCOUNTER — APPOINTMENT (OUTPATIENT)
Dept: OCCUPATIONAL THERAPY | Facility: HOSPITAL | Age: 67
End: 2022-08-25

## 2022-08-29 ENCOUNTER — TELEPHONE (OUTPATIENT)
Dept: SURGERY | Facility: CLINIC | Age: 67
End: 2022-08-29

## 2022-08-29 NOTE — TELEPHONE ENCOUNTER
Caller: PADILLA     Relationship: [facility]     Best call back number: [phone]    What form or medical record are you requesting: FORM FAXED ON 8/25/22 FOR MEDICAL EQUIPMENT. THIS SUPPLIER NEEDS A SIGNATURE FROM  TO SUPPLY TO PT.     Who is requesting this form or medical record from you: [facility]     How would you like to receive the form or medical records (pick-up, mail, fax): FAX  If fax, what is the fax number: [phone]      Timeframe paperwork needed: ASAP    Additional notes: N/A

## 2022-08-30 ENCOUNTER — HOSPITAL ENCOUNTER (OUTPATIENT)
Dept: OCCUPATIONAL THERAPY | Facility: HOSPITAL | Age: 67
Setting detail: THERAPIES SERIES
Discharge: HOME | End: 2022-08-30

## 2022-08-30 DIAGNOSIS — M25.60 JOINT STIFFNESS: ICD-10-CM

## 2022-08-30 DIAGNOSIS — M62.81 MUSCLE WEAKNESS (GENERALIZED): ICD-10-CM

## 2022-08-30 DIAGNOSIS — R52 PAIN: ICD-10-CM

## 2022-08-30 DIAGNOSIS — I97.2 POSTMASTECTOMY LYMPHEDEMA SYNDROME: ICD-10-CM

## 2022-08-30 DIAGNOSIS — Z91.89 AT RISK FOR LYMPHEDEMA: ICD-10-CM

## 2022-08-30 DIAGNOSIS — L90.5 SCAR CONDITION AND FIBROSIS OF SKIN: ICD-10-CM

## 2022-08-30 DIAGNOSIS — I89.0 LYMPHEDEMA: Primary | ICD-10-CM

## 2022-08-30 DIAGNOSIS — C50.911 BILATERAL MALIGNANT NEOPLASM OF BREAST IN FEMALE, ESTROGEN RECEPTOR POSITIVE, UNSPECIFIED SITE OF BREAST: ICD-10-CM

## 2022-08-30 DIAGNOSIS — C50.912 BILATERAL MALIGNANT NEOPLASM OF BREAST IN FEMALE, ESTROGEN RECEPTOR POSITIVE, UNSPECIFIED SITE OF BREAST: ICD-10-CM

## 2022-08-30 DIAGNOSIS — Z17.0 BILATERAL MALIGNANT NEOPLASM OF BREAST IN FEMALE, ESTROGEN RECEPTOR POSITIVE, UNSPECIFIED SITE OF BREAST: ICD-10-CM

## 2022-08-30 DIAGNOSIS — R29.3 ABNORMAL POSTURE: ICD-10-CM

## 2022-08-30 DIAGNOSIS — Z90.13 HISTORY OF MASTECTOMY, BILATERAL: ICD-10-CM

## 2022-08-30 DIAGNOSIS — Z91.89 AT RISK FOR SELF CARE DEFICIT: ICD-10-CM

## 2022-08-30 PROCEDURE — 97140 MANUAL THERAPY 1/> REGIONS: CPT

## 2022-08-30 PROCEDURE — 97535 SELF CARE MNGMENT TRAINING: CPT

## 2022-09-01 ENCOUNTER — HOSPITAL ENCOUNTER (OUTPATIENT)
Dept: OCCUPATIONAL THERAPY | Facility: HOSPITAL | Age: 67
Setting detail: THERAPIES SERIES
Discharge: HOME | End: 2022-09-01

## 2022-09-01 DIAGNOSIS — Z91.89 AT RISK FOR LYMPHEDEMA: ICD-10-CM

## 2022-09-01 DIAGNOSIS — I97.2 POSTMASTECTOMY LYMPHEDEMA SYNDROME: ICD-10-CM

## 2022-09-01 DIAGNOSIS — C50.911 BILATERAL MALIGNANT NEOPLASM OF BREAST IN FEMALE, ESTROGEN RECEPTOR POSITIVE, UNSPECIFIED SITE OF BREAST: ICD-10-CM

## 2022-09-01 DIAGNOSIS — R52 PAIN: ICD-10-CM

## 2022-09-01 DIAGNOSIS — Z91.89 AT RISK FOR SELF CARE DEFICIT: ICD-10-CM

## 2022-09-01 DIAGNOSIS — M62.81 MUSCLE WEAKNESS (GENERALIZED): ICD-10-CM

## 2022-09-01 DIAGNOSIS — Z17.0 BILATERAL MALIGNANT NEOPLASM OF BREAST IN FEMALE, ESTROGEN RECEPTOR POSITIVE, UNSPECIFIED SITE OF BREAST: ICD-10-CM

## 2022-09-01 DIAGNOSIS — M25.60 JOINT STIFFNESS: ICD-10-CM

## 2022-09-01 DIAGNOSIS — I89.0 LYMPHEDEMA: Primary | ICD-10-CM

## 2022-09-01 DIAGNOSIS — R29.3 ABNORMAL POSTURE: ICD-10-CM

## 2022-09-01 DIAGNOSIS — L90.5 SCAR CONDITION AND FIBROSIS OF SKIN: ICD-10-CM

## 2022-09-01 DIAGNOSIS — C50.912 BILATERAL MALIGNANT NEOPLASM OF BREAST IN FEMALE, ESTROGEN RECEPTOR POSITIVE, UNSPECIFIED SITE OF BREAST: ICD-10-CM

## 2022-09-01 PROCEDURE — 97140 MANUAL THERAPY 1/> REGIONS: CPT

## 2022-09-01 PROCEDURE — 97535 SELF CARE MNGMENT TRAINING: CPT

## 2022-09-01 NOTE — THERAPY TREATMENT NOTE
Outpatient Occupational Therapy Lymphedema Treatment Note  OLGA Clifton     Patient Name: Aida Conner  : 1955  MRN: [id number]  Today's Date: 2022      Visit Date: 2022    Patient Active Problem List   Diagnosis   • Inflammatory breast cancer, right (HCC)   • Other specified disorders of breast    • Malignant neoplasm of axillary tail of right female breast (HCC)   • Encounter for eye exam due to high risk medication   • Obesity hypoventilation syndrome (HCC)   • Iron adverse reaction   • Functional diarrhea   • Encounter for long-term (current) use of high-risk medication   • Abnormal mammogram   • Allergic rhinitis   • Anemia   • Anxiety disorder   • Breast pain, right   • Depression   • Essential tremor   • Hemorrhoids   • Hyperlipidemia   • Hypertension, essential   • IBS (irritable bowel syndrome)   • Impaired fasting glucose   • Obesity   • Obstructive sleep apnea   • Osteoarthrosis   • Renal calculi   • Restrictive airway disease   • Type II diabetes mellitus (HCC)   • Vitamin deficiency   • Urinary bladder incontinence   • Malignant neoplasm of central portion of right female breast (HCC)   • Osteoporosis without current pathological fracture   • Chronic diastolic congestive heart failure (HCC)   • Coronary artery calcification seen on CT scan        Past Medical History:   Diagnosis Date   • Allergic rhinitis    • Anemia    • Anxiety    • Asthma    • Chronic bronchitis (HCC)     In past, espec. during working years   • Chronic diastolic (congestive) heart failure (HCC)    • Chronic hypoxemic respiratory failure (HCC)     on continuous O2   • Coronary artery calcification seen on CT scan 2022   • Diarrhea     with chemo   • GERD (gastroesophageal reflux disease) Mild   • H/O Intraductal papilloma    • Hemorrhoids    • History of transfusion     AFTER BACK SURGERY no reaction   • Hypertension    • IBS (irritable bowel syndrome)    • Inflammatory breast cancer (HCC)     right   •  Kidney stone    • Morbid obesity with BMI of 50.0-59.9, adult ([facility])    • Obesity hypoventilation syndrome ([facility]) 02/05/2021   • On home oxygen therapy     2.5 AT REST WITH ACTIVITY UP TO 4L   • ANABELLE on CPAP     cpap  & uses O2 with CPAP   • Osteoarthritis    • Pneumonia     Many times in past, 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   • PONV (postoperative nausea and vomiting)         Past Surgical History:   Procedure Laterality Date   • BREAST EXCISIONAL BIOPSY Bilateral    • COLONOSCOPY     • CYSTOSCOPY BLADDER STONE LITHOTRIPSY     • ENDOSCOPY     • KNEE ARTHROPLASTY Bilateral 2009   • MASTECTOMY Left 08/10/2021    Procedure: LEFT TOTAL MASTECTOMY;  Surgeon: Nicci Banks MD;  Location: [facility] MAIN OR;  Service: General;  Laterality: Left;   • MASTECTOMY Right 08/10/2021    Procedure: RIGHT MODIFIED RADICAL MASTECTOMY;  Surgeon: Nicci Banks MD;  Location: [facility] MAIN OR;  Service: General;  Laterality: Right;   • SPINE SURGERY  1984   • TOTAL ABDOMINAL HYSTERECTOMY WITH SALPINGO OOPHORECTOMY  2004   • UPPER GASTROINTESTINAL ENDOSCOPY     • VENOUS ACCESS DEVICE (PORT) INSERTION N/A 01/25/2021    Procedure: INSERTION VENOUS ACCESS DEVICE;  Surgeon: Nicci Banks MD;  Location: [facility] MAIN OR;  Service: General;  Laterality: N/A;   • VENOUS ACCESS DEVICE (PORT) REMOVAL Left 04/05/2022    Procedure: REMOVAL VENOUS ACCESS DEVICE;  Surgeon: Nicci Banks MD;  Location: [facility] MAIN OR;  Service: General;  Laterality: Left;         Visit Dx:      ICD-10-CM ICD-9-CM   1. Lymphedema  I89.0 457.1   2. Pain  R52 780.96   3. Joint stiffness  M25.60 719.50   4. 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 Scar condition and fibrosis of skin  L90.5 709.2   6. Abnormal posture  R29.3 781.92   7. Muscle weakness (generalized)  M62.81 728.87   8. Bilateral malignant neoplasm of breast in female, estrogen receptor positive, unspecified site of breast ([facility])  C50.911 174.9    Z17.0 V86.0    C50.912    9.  At risk for self care deficit  Z91.89 V49.89   10. At risk for lymphedema  Z91.89 V49.89        Lymphedema     Row Name 09/01/22 1600             Subjective Pain    Able to rate subjective pain? yes  -SC      Pre-Treatment Pain Level 0  -SC      Post-Treatment Pain Level 0  -SC      Subjective Pain Comment Patient denies pain  -SC              Subjective Comments    Subjective Comments Patient states that she feels she has increased range of motion and has been able to do more things at home lysing right upper extremity  -SC              Manual Lymphatic Drainage    Manual Lymphatic Drainage initial sequence;opened regional lymph nodes;opened anastamoses;extremity treatment  -SC      Initial Sequence short neck;cervical;supraclavicular;shoulder collectors  -SC      Opened Regional Lymph Nodes axillary;inguinal  -SC      Axillary left  -SC      Inguinal right  -SC      Opened Anastamoses anterior axillo-axillary;axillo-inguinal  -SC      Extremity Treatment MLD to full limb  -SC      MLD to Full Limb Right upper extremity  -SC      Manual Therapy Therapist providing passive range of motion and stretch to right upper extremity to improve range of motion and decreased pain  -SC              Compression/Skin Care    Compression/Skin Care compression garment  -SC      Compression Garment Comments CircAid reduction kit right upper extremity  -SC            User Key  (r) = Recorded By, (t) = Taken By, (c) = Cosigned By    Initials Name Provider Type    Shahrzad Solis COTA Occupational Therapist Assistant                                Manual Rx (last 36 hours)     Manual Treatments     Row Name 09/01/22 1607             Total Minutes    38740 - OT Manual Therapy Minutes 42  -SC            User Key  (r) = Recorded By, (t) = Taken By, (c) = Cosigned By    Initials Name Provider Type    Shahrzad Solis COTA Occupational Therapist Assistant                  Therapy Education  Education Details: Therapist reeducating  patient on importance of retightening CircAid reduction kit on right upper upper extremity throughout the day.  Patient states that her  put it on her however he does not get it tight enough when he does it the second time after initial donning.  Therapist reeducating patient on techniques of self tightening the reduction kit.  Patient was able to demonstrate independence with this technique  Given: Symptoms/condition management  Program: Reinforced  How Provided: Verbal, Demonstration  Provided to: Patient  Level of Understanding: Verbalized, Teach back education performed, Demonstrated  06799 - OT Self Care/Mgmt Minutes: 15                Time Calculation:   Timed Charges  91987 - OT Manual Therapy Minutes: 42  03200 - OT Self Care/Mgmt Minutes: 15  Total Minutes  Timed Charges Total Minutes: 57   Total Minutes: 57     Therapy Charges for Today     Code Description Service Date Service Provider Modifiers Qty    70495309388 HC OT MANUAL THERAPY EA 15 MIN 9/1/2022 Shahrzad Torres COTA GO 3    68201937126 HC OT SELF CARE/MGMT/TRAIN EA 15 MIN 9/1/2022 Shahrzad Torres COTA GO 1                      LUIS M Mcguire  9/1/2022

## 2022-09-04 DIAGNOSIS — C50.611 MALIGNANT NEOPLASM OF AXILLARY TAIL OF RIGHT FEMALE BREAST, UNSPECIFIED ESTROGEN RECEPTOR STATUS: ICD-10-CM

## 2022-09-06 ENCOUNTER — HOSPITAL ENCOUNTER (OUTPATIENT)
Dept: OCCUPATIONAL THERAPY | Facility: HOSPITAL | Age: 67
Setting detail: THERAPIES SERIES
Discharge: HOME | End: 2022-09-06

## 2022-09-06 DIAGNOSIS — M25.60 JOINT STIFFNESS: ICD-10-CM

## 2022-09-06 DIAGNOSIS — I89.0 LYMPHEDEMA: Primary | ICD-10-CM

## 2022-09-06 DIAGNOSIS — R52 PAIN: ICD-10-CM

## 2022-09-06 PROCEDURE — 97140 MANUAL THERAPY 1/> REGIONS: CPT | Performed by: OCCUPATIONAL THERAPIST

## 2022-09-06 PROCEDURE — 97535 SELF CARE MNGMENT TRAINING: CPT | Performed by: OCCUPATIONAL THERAPIST

## 2022-09-06 PROCEDURE — 93702 BIS XTRACELL FLUID ANALYSIS: CPT | Performed by: OCCUPATIONAL THERAPIST

## 2022-09-06 RX ORDER — OMEPRAZOLE 20 MG/1
CAPSULE, DELAYED RELEASE ORAL
Qty: 30 CAPSULE | Refills: 3 | OUTPATIENT
Start: 2022-09-06

## 2022-09-06 NOTE — THERAPY RE-EVALUATION
Outpatient Occupational Therapy Lymphedema Re-Evaluation   Et[facility]     Patient Name: Aida Conner  : 1955  MRN: [id number]  Today's Date: 2022      Visit Date: 2022    Patient Active Problem List   Diagnosis   • Inflammatory breast cancer, right (HCC)   • Other specified disorders of breast    • Malignant neoplasm of axillary tail of right female breast (HCC)   • Encounter for eye exam due to high risk medication   • Obesity hypoventilation syndrome (HCC)   • Iron adverse reaction   • Functional diarrhea   • Encounter for long-term (current) use of high-risk medication   • Abnormal mammogram   • Allergic rhinitis   • Anemia   • Anxiety disorder   • Breast pain, right   • Depression   • Essential tremor   • Hemorrhoids   • Hyperlipidemia   • Hypertension, essential   • IBS (irritable bowel syndrome)   • Impaired fasting glucose   • Obesity   • Obstructive sleep apnea   • Osteoarthrosis   • Renal calculi   • Restrictive airway disease   • Type II diabetes mellitus (HCC)   • Vitamin deficiency   • Urinary bladder incontinence   • Malignant neoplasm of central portion of right female breast (HCC)   • Osteoporosis without current pathological fracture   • Chronic diastolic congestive heart failure (HCC)   • Coronary artery calcification seen on CT scan        Past Medical History:   Diagnosis Date   • Allergic rhinitis    • Anemia    • Anxiety    • Asthma    • Chronic bronchitis (HCC)     In past, espec. during working years   • Chronic diastolic (congestive) heart failure (HCC)    • Chronic hypoxemic respiratory failure (HCC)     on continuous O2   • Coronary artery calcification seen on CT scan 2022   • Diarrhea     with chemo   • GERD (gastroesophageal reflux disease) Mild   • H/O Intraductal papilloma    • Hemorrhoids    • History of transfusion     AFTER BACK SURGERY no reaction   • Hypertension    • IBS (irritable bowel syndrome)    • Inflammatory breast cancer (HCC)     right   •  Kidney stone    • Morbid obesity with BMI of 50.0-59.9, adult ([facility])    • Obesity hypoventilation syndrome (HCC) 02/05/2021   • On home oxygen therapy     2.5 AT REST WITH ACTIVITY UP TO 4L   • ANABELLE on CPAP     cpap  & uses O2 with CPAP   • Osteoarthritis    • Pneumonia     Many times in past, 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   • PONV (postoperative nausea and vomiting)         Past Surgical History:   Procedure Laterality Date   • BREAST EXCISIONAL BIOPSY Bilateral    • COLONOSCOPY     • CYSTOSCOPY BLADDER STONE LITHOTRIPSY     • ENDOSCOPY     • KNEE ARTHROPLASTY Bilateral 2009   • MASTECTOMY Left 08/10/2021    Procedure: LEFT TOTAL MASTECTOMY;  Surgeon: Nicci Banks MD;  Location: [facility] OR;  Service: General;  Laterality: Left;   • MASTECTOMY Right 08/10/2021    Procedure: RIGHT MODIFIED RADICAL MASTECTOMY;  Surgeon: Nicci Banks MD;  Location: [facility] OR;  Service: General;  Laterality: Right;   • SPINE SURGERY  1984   • TOTAL ABDOMINAL HYSTERECTOMY WITH SALPINGO OOPHORECTOMY  2004   • UPPER GASTROINTESTINAL ENDOSCOPY     • VENOUS ACCESS DEVICE (PORT) INSERTION N/A 01/25/2021    Procedure: INSERTION VENOUS ACCESS DEVICE;  Surgeon: Nicci Banks MD;  Location: [facility] OR;  Service: General;  Laterality: N/A;   • VENOUS ACCESS DEVICE (PORT) REMOVAL Left 04/05/2022    Procedure: REMOVAL VENOUS ACCESS DEVICE;  Surgeon: Nicci Banks MD;  Location: [facility] OR;  Service: General;  Laterality: Left;         Visit Dx:     ICD-10-CM ICD-9-CM   1. Lymphedema  I89.0 457.1   2. Pain  R52 780.96   3. Joint stiffness  M25.60 719.50        Patient History     Row Name 09/06/22 1100             History    Chief Complaint --  Pt reports that she is moving her arm better but still not moving as much as she would like  -TD      Brief Description of Current Complaint Pt had bilateral mysectomy without reconstruction and had 19 lymphnodes removed on the right side.  She presents today with  better movement but continues wearing her circaid reduction kit  -TD              Fall Risk Assessment    Any falls in the past year: No  -TD      Does patient have a fear of falling Yes (comment)  -TD              Services    Are you currently receiving Home Health services No  -TD      Do you plan to receive Home Health services in the near future No  -TD              Daily Activities    Primary Language English  -TD      Are you able to read Yes  -TD      Are you able to write Yes  -TD      How does patient learn best? Reading;Demonstration  -TD      Barriers to learning None  -TD              Safety    Are you being hurt, hit, or frightened by anyone at home or in your life? No  -TD      Are you being neglected by a caregiver No  -TD      Have you had any of the following issues with Depression  pt has a history of use of prozac and her primary care has set up counseling  -TD            User Key  (r) = Recorded By, (t) = Taken By, (c) = Cosigned By    Initials Name Provider Type    TD Jojo Jones OT Occupational Therapist                 Lymphedema     Row Name 09/06/22 1100             Subjective Pain    Able to rate subjective pain? yes  -TD      Pre-Treatment Pain Level 0  -TD      Post-Treatment Pain Level 0  -TD              Subjective Comments    Subjective Comments Pt reports she feels that the swelling has decreased and AROM has increased.  -TD              Lymphedema Assessment    Lymphedema Classification RUE:;stage 1 (Spontaneously Reversible)  -TD      Lymphedema Cancer Related Sx bilateral;simple mastectomy  -TD      Lymph Nodes Removed # 19  -TD      Positive Lymph Nodes # 4  -TD      Chemo Received yes  -TD      Radiation Therapy Received yes  -TD              LLIS - Physical Concerns    The amount of pain associated with my lymphedema is: 0  -TD      The amount of limb heaviness associated with my lymphedema is: 0  -TD      The amount of skin tightness associated with my lymphedema is: 0  -TD   "    The size of my swollen limb(s) seems: 2  -TD      Lymphedema affects the movement of my swollen limb(s): 0  -TD      The strength in my swollen limb(s) is: 0  -TD              LLIS - Psychosocial Concerns    Lymphedema affects my body image (i.e., \"how I think I look\"). 0  -TD      Lymphedema affects my socializing with others. 0  -TD      Lymphedema affects my intimate relations with spouse or partner (rate 0 if not applicable 0  -TD      Lymphedema \"gets me down\" (i.e., depression, frustration, or anger) 0  -TD      I must rely on others for help due to my lymphedema. 1  -TD      I know what to do to manage my lymphedema 3  -TD              LLIS - Functional Concerns    Lymphedema affects my ability to perform self-care activities (i.e. eating, dressing, hygiene) 0  -TD      Lymphedema affects my ability to perform routine home or work-related activities. 0  -TD      Lymphedema affects my performance of preferred leisure activities. 0  -TD      Lymphedema affects proper fit of clothing/shoes 0  -TD      Lymphedema affects my sleep 0  -TD              Right Upper Ext    Rt Shoulder Abduction AROM 100  -TD      Rt Shoulder Flexion AROM 140  -TD      Rt Shoulder External Rotation AROM 60  -TD      Rt Shoulder Internal Rotation AROM 50  -TD              L-Dex Bioimpedence Screening    L-Dex Measurement Extremity LUE  -TD      L-Dex Patient Position Standing  -TD      L-Dex UE Dominate Side Right  -TD      L-Dex UE At Risk Side Right  -TD      L-Dex UE Pre Surgical Value Yes  -TD      L-Dex UE Score 10.3  -TD      L-Dex UE Baseline Score 10.6  -TD      L-Dex UE Value Change -0.3  -TD      $ L-Dex Charge yes  -TD              Lymphedema Life Impact Scale Totals    A.  Total Q1 - Q17 (Do not include Q18) 6  -TD      B.  Total number of questions answered (Q1-Q17) 17  -TD      C. Divide A by B 0.35  -TD      D. Multiple C by 25 8.75  -TD            User Key  (r) = Recorded By, (t) = Taken By, (c) = Cosigned By    " Initials Name Provider Type    Jojo Vines OT Occupational Therapist                        Therapy Education  Education Details: Patient and therapist investigated different options for compression on the chest wall.  Patient practiced with multiple sports bras and compression bras this date but was not comfortable due to lack of breast tissue to assist in anchoring the bra to the chest wall.  Patient reported that the bras cut into the axilla and caused pain.  Therapist also concerned that the cutting in the axilla would increase lymph volume in the right upper extremity.  Patient and therapist will continue to investigate further options for compression on the chest wall.  Given: Symptoms/condition management  Program: New  How Provided: Verbal, Demonstration  Provided to: Patient  Level of Understanding: Verbalized, Teach back education performed, Demonstrated  95251 - OT Self Care/Mgmt Minutes: 45      Manual Rx (last 36 hours)     Manual Treatments     Row Name 09/06/22 1233             Total Minutes    08291 - OT Manual Therapy Minutes 10  -TD            User Key  (r) = Recorded By, (t) = Taken By, (c) = Cosigned By    Initials Name Provider Type    Jojo Vines OT Occupational Therapist               OT Goals     Row Name 09/06/22 1233          Time Calculation    OT Goal Re-Cert Due Date 10/06/22  -TD           User Key  (r) = Recorded By, (t) = Taken By, (c) = Cosigned By    Initials Name Provider Type    Jojo Vines OT Occupational Therapist              Goals:  1. Post Breast Surgery Care/at risk for Lymphedema  LTG 1: 90 days:  As an indicator of no exacerbation of lymphedema staging, the patient will present with an L-Dex score less than 7 points from preoperative baseline.              STATUS: Not Met: Ongoing     STG 1a:   30 days: To prevent exacerbation of mixed edema to lymphedema, patient will utilize the 2 postsurgical compression garments daily.                 STATUS: Not met:  Discontinue     STG 1b: 30 days: Patient will be independent with self-manual lymphatic massage.               STATUS: Met: Ongoing      STG 1c: 30 days:  Patient will be independent with identification of signs and symptoms of lymphedema exasperation per stoplight to recovery education handout.              STATUS: met: Ongoing      STG 1 d: 30 days: Patient will be independent with HEP to prevent advancement in lymphedema staging.              STATUS: Partially met: ongoing     TREATMENT:  Self Care/ADL retraining, Therapeutic Activity, Neuromuscular Re-education, Therapeutic Exercise, Bioimpedence Fluid Analysis, Post-Surgical compression garement [address] Zip-ST-High/ Nayana Camisole Kit 2860K, Orthotic Management and training,  and Manual Therapy.     2. Decreased Functional Use of R UE  LTG [2]:  90 Days: The patient will report a pain rating of 2/10 or better to improve functional use of right upper extremity and IADL tasks.            STATUS: Met: ongoing  STG [2]a:  The patient will report a pain rating of 4/10 or better as an average in 1 week intervals.   STATUS:  Met: ongoing     3. Decrease Functional ROM:     LTG [3]: 90 days:  The patient will demonstrate an increase of right upper extremity AROM to WFL for ADL independence.  STATUS:  Partially Met: ongoing     STG [3]a: The patient will demonstrate an increase in all shoulder planes in right upper extremity up to degrees of 20 or WFL for improved self-care independence.        STATUS:  Partially Met: ongoing     TREATMENT:  Manual Therapy, Therapeutic Activity, ADL retraining, Neuromuscular Re-education, Therapeutic Exercise, Patient and Family Education, Orthotic Management and training, Modalities: TENS, NMES, Ultrasound, Fluidotherapy Wound dressing as needed, Modalities as needed and Manual Therapy.     3. Carrying, Moving, and Handling Objects Functional Limitation                               LTG 4: 90 days:  The patient will demonstrate  1-19% limitation by achieving a score of 11 on the Quick Dash.                      STATUS:  Partially Met: ongoing              STG 4a: 30 days:  The patient will demonstrate 80-99% limitation by achieving a score of 54 on the Quick Dash.                      STATUS:  Met              STG 4a: 30 days:  The patient will demonstrate 40-9% limitation by achieving a score of 36 on the Quick Dash.                      STATUS:  New  TREATMENT: Manual Therapy, Therapeutic Activity, Neuromuscular Re-education, Therapeutic Exercise, Patient and Family Education, Orthotic Management and training, Modalities: TENS, NMES, Ultrasound, Fluidotherapy Wound dressing as needed, Modalities as needed and Manual Therapy.   OT Assessment/Plan     Row Name 09/06/22 1226          OT Assessment    Functional Limitations Performance in self-care ADL  -TD     Impairments Impaired lymphatic circulation;Range of motion;Posture;Pain;Muscle strength;Joint mobility  -TD     Assessment Comments Patient reported that she feels that swelling has decreased.  Patient continues complain of the right chest wall being swollen and feels full.  Patient was unable to tolerate matrix wrap last session due to movement meant of the matrix wrap up around her axilla.  Patient and therapist are going to continue to investigate compression for the right chest wall.  Patient would benefit from continued skilled occupational therapy until plateau is reached achieved and lymph volume at the right upper extremity and she is independent with complete decongestive therapy and home exercise program for muscular deficits.  -TD     OT Diagnosis Lymphedema  -TD     OT Rehab Potential Good  -TD     Patient/caregiver participated in establishment of treatment plan and goals Yes  -TD     Patient would benefit from skilled therapy intervention Yes  -TD            OT Plan    OT Frequency 1x/week  -TD     Predicted Duration of Therapy Intervention (OT) 4 weeks  -TD     Planned  CPT's? OT RE-EVAL: 52281;OT THER ACT EA 15 MIN: 08320VU;OT THER PROC EA 15 MIN: 19331QS;OT NEUROMUSC RE EDUCATION EA 15 MIN: 50719;OT SELF CARE/MGMT/TRAIN 15 MIN: 50054;OT HOT/COLD PACK;OT ULTRASOUND EA 15 MIN: 54528;OT ELECTRIC STIM ATTD EA 15 MIN: 13643;OT MANUAL THERAPY EA 15 MIN: 31148;OT BIS XTRACELL FLUID ANALYSIS: 07279;OT ORTHOTIC MGMT/TRAIN EA 15 MIN: 50325;OT ORTHO/PROSTHET CHECKOUT EA 15 MIN: 47403  -TD     Planned Therapy Interventions (Optional Details) home exercise program;joint mobilization;manual therapy techniques;neuromuscular re-education;orthotic fitting/training;patient/family education;postural re-education;ROM (Range of Motion);strengthening  -TD     OT Plan Comments Continue plan of care  -TD           User Key  (r) = Recorded By, (t) = Taken By, (c) = Cosigned By    Initials Name Provider Type    TD Jojo Jones OT Occupational Therapist                          Time Calculation:   Timed Charges  00262 - OT Manual Therapy Minutes: 10  35691 - OT Self Care/Mgmt Minutes: 45  Total Minutes  Timed Charges Total Minutes: 55   Total Minutes: 55     Therapy Charges for Today     Code Description Service Date Service Provider Modifiers Qty    32287962027 HC PT BIS XTRACELL FLUID ANALYSIS 9/6/2022 Jojo Jones OT  1    24636710920 HC OT MANUAL THERAPY EA 15 MIN 9/6/2022 Jojo Jones OT GO 1    07488525899 HC OT SELF CARE/MGMT/TRAIN EA 15 MIN 9/6/2022 Jojo Jones OT GO 3                    Jojo Jones OT  9/6/2022

## 2022-09-22 ENCOUNTER — HOSPITAL ENCOUNTER (OUTPATIENT)
Dept: OCCUPATIONAL THERAPY | Facility: HOSPITAL | Age: 67
Setting detail: THERAPIES SERIES
Discharge: HOME | End: 2022-09-22

## 2022-09-22 DIAGNOSIS — Z17.0 BILATERAL MALIGNANT NEOPLASM OF BREAST IN FEMALE, ESTROGEN RECEPTOR POSITIVE, UNSPECIFIED SITE OF BREAST: ICD-10-CM

## 2022-09-22 DIAGNOSIS — M25.60 JOINT STIFFNESS: ICD-10-CM

## 2022-09-22 DIAGNOSIS — Z90.13 HISTORY OF MASTECTOMY, BILATERAL: ICD-10-CM

## 2022-09-22 DIAGNOSIS — C50.912 BILATERAL MALIGNANT NEOPLASM OF BREAST IN FEMALE, ESTROGEN RECEPTOR POSITIVE, UNSPECIFIED SITE OF BREAST: ICD-10-CM

## 2022-09-22 DIAGNOSIS — R29.3 ABNORMAL POSTURE: ICD-10-CM

## 2022-09-22 DIAGNOSIS — L90.5 SCAR CONDITION AND FIBROSIS OF SKIN: ICD-10-CM

## 2022-09-22 DIAGNOSIS — C50.911 BILATERAL MALIGNANT NEOPLASM OF BREAST IN FEMALE, ESTROGEN RECEPTOR POSITIVE, UNSPECIFIED SITE OF BREAST: ICD-10-CM

## 2022-09-22 DIAGNOSIS — M62.81 MUSCLE WEAKNESS (GENERALIZED): ICD-10-CM

## 2022-09-22 DIAGNOSIS — I89.0 LYMPHEDEMA: Primary | ICD-10-CM

## 2022-09-22 DIAGNOSIS — I97.2 POSTMASTECTOMY LYMPHEDEMA SYNDROME: ICD-10-CM

## 2022-09-22 DIAGNOSIS — R52 PAIN: ICD-10-CM

## 2022-09-22 DIAGNOSIS — Z91.89 AT RISK FOR SELF CARE DEFICIT: ICD-10-CM

## 2022-09-22 DIAGNOSIS — Z91.89 AT RISK FOR LYMPHEDEMA: ICD-10-CM

## 2022-09-22 PROCEDURE — 97140 MANUAL THERAPY 1/> REGIONS: CPT

## 2022-09-22 NOTE — THERAPY TREATMENT NOTE
Outpatient Occupational Therapy Lymphedema Treatment Note  [facility]     Patient Name: Aida Conner  : 1955  MRN: [id number]  Today's Date: 2022      Visit Date: 2022    Patient Active Problem List   Diagnosis   • Inflammatory breast cancer, right (HCC)   • Other specified disorders of breast    • Malignant neoplasm of axillary tail of right female breast (HCC)   • Encounter for eye exam due to high risk medication   • Obesity hypoventilation syndrome (HCC)   • Iron adverse reaction   • Functional diarrhea   • Encounter for long-term (current) use of high-risk medication   • Abnormal mammogram   • Allergic rhinitis   • Anemia   • Anxiety disorder   • Breast pain, right   • Depression   • Essential tremor   • Hemorrhoids   • Hyperlipidemia   • Hypertension, essential   • IBS (irritable bowel syndrome)   • Impaired fasting glucose   • Obesity   • Obstructive sleep apnea   • Osteoarthrosis   • Renal calculi   • Restrictive airway disease   • Type II diabetes mellitus (HCC)   • Vitamin deficiency   • Urinary bladder incontinence   • Malignant neoplasm of central portion of right female breast (HCC)   • Osteoporosis without current pathological fracture   • Chronic diastolic congestive heart failure (HCC)   • Coronary artery calcification seen on CT scan        Past Medical History:   Diagnosis Date   • Allergic rhinitis    • Anemia    • Anxiety    • Asthma    • Chronic bronchitis (HCC)     In past, espec. during working years   • Chronic diastolic (congestive) heart failure (HCC)    • Chronic hypoxemic respiratory failure (HCC)     on continuous O2   • Coronary artery calcification seen on CT scan 2022   • Diarrhea     with chemo   • GERD (gastroesophageal reflux disease) Mild   • H/O Intraductal papilloma    • Hemorrhoids    • History of transfusion     AFTER BACK SURGERY no reaction   • Hypertension    • IBS (irritable bowel syndrome)    • Inflammatory breast cancer (HCC)     right   •  Kidney stone    • Morbid obesity with BMI of 50.0-59.9, adult ([facility])    • Obesity hypoventilation syndrome ([facility]) 02/05/2021   • On home oxygen therapy     2.5 AT REST WITH ACTIVITY UP TO 4L   • ANABELLE on CPAP     cpap  & uses O2 with CPAP   • Osteoarthritis    • Pneumonia     Many times in past, 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   • PONV (postoperative nausea and vomiting)         Past Surgical History:   Procedure Laterality Date   • BREAST EXCISIONAL BIOPSY Bilateral    • COLONOSCOPY     • CYSTOSCOPY BLADDER STONE LITHOTRIPSY     • ENDOSCOPY     • KNEE ARTHROPLASTY Bilateral 2009   • MASTECTOMY Left 08/10/2021    Procedure: LEFT TOTAL MASTECTOMY;  Surgeon: Nicci Banks MD;  Location: [facility] MAIN OR;  Service: General;  Laterality: Left;   • MASTECTOMY Right 08/10/2021    Procedure: RIGHT MODIFIED RADICAL MASTECTOMY;  Surgeon: Nicci Banks MD;  Location: [facility] MAIN OR;  Service: General;  Laterality: Right;   • SPINE SURGERY  1984   • TOTAL ABDOMINAL HYSTERECTOMY WITH SALPINGO OOPHORECTOMY  2004   • UPPER GASTROINTESTINAL ENDOSCOPY     • VENOUS ACCESS DEVICE (PORT) INSERTION N/A 01/25/2021    Procedure: INSERTION VENOUS ACCESS DEVICE;  Surgeon: Nicci Banks MD;  Location: [facility] MAIN OR;  Service: General;  Laterality: N/A;   • VENOUS ACCESS DEVICE (PORT) REMOVAL Left 04/05/2022    Procedure: REMOVAL VENOUS ACCESS DEVICE;  Surgeon: Nicci Banks MD;  Location: [facility] MAIN OR;  Service: General;  Laterality: Left;         Visit Dx:      ICD-10-CM ICD-9-CM   1. Lymphedema  I89.0 457.1   2. Pain  R52 780.96   3. Joint stiffness  M25.60 719.50   4. 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 Scar condition and fibrosis of skin  L90.5 709.2   6. Abnormal posture  R29.3 781.92   7. Muscle weakness (generalized)  M62.81 728.87   8. Bilateral malignant neoplasm of breast in female, estrogen receptor positive, unspecified site of breast ([facility])  C50.911 174.9    Z17.0 V86.0    C50.912    9.  At risk for self care deficit  Z91.89 V49.89   10. At risk for lymphedema  Z91.89 V49.89   11. History of mastectomy, bilateral  Z90.13 V45.71        Lymphedema     Row Name 09/22/22 1500             Subjective Comments    Subjective Comments Patient states she feels that her range of motion is getting better however does continue to impact her IADLs.  Patient states that she knows that her CircAid slips however she does not take the time to pull it up.  -SC              Skin Changes/Observations    Skin Observations Comment Patient arriving with CircAid reduction kit donned on right upper extremity however kit was significantly slipped causing bunching at elbow and wrist.  -SC              Manual Lymphatic Drainage    Manual Lymphatic Drainage initial sequence;opened regional lymph nodes;opened anastamoses;extremity treatment  -SC      Initial Sequence short neck;cervical;supraclavicular;shoulder collectors  -SC      Opened Regional Lymph Nodes axillary;inguinal  -SC      Axillary left  -SC      Inguinal right  -SC      Opened Anastamoses anterior axillo-axillary;axillo-inguinal  -SC      Extremity Treatment MLD to full limb  -SC      MLD to Full Limb Right upper extremity  -SC      Manual Therapy Therapist providing passive range of motion and stretch to right upper extremity in flexion, external rotation, internal rotation, abduction, and horizontal abduction to improve range of motion and decreased pain  -SC              Compression/Skin Care    Compression/Skin Care compression garment  -SC      Compression Garment Comments CircAid reduction kit right lower extremity.  No modifications required this date  -SC            User Key  (r) = Recorded By, (t) = Taken By, (c) = Cosigned By    Initials Name Provider Type    SC Shahrzad Torres COTA Occupational Therapist Assistant                         OT Assessment/Plan     Row Name 09/22/22 6519          OT Assessment    Assessment Comments Patient has demonstrated  decrease in lymph volume however due to decreased range of motion and difficulty with exercise and donning compression garment patient will have a difficult time maintaining lymphedema.  Patient has attempted exercise, stretching, compression, and elevation while sitting however patient still struggles with managing lymphedema symptoms.  Patient will continue to benefit from occupational therapy services to continue education for lymphedema management symptoms, decrease lymph volume to a plateau, obtain permanent compression garment, and until patient is independent with complete decongestive therapy.  -SC     Patient would benefit from skilled therapy intervention Yes  -SC            OT Plan    OT Plan Comments Continue POC  -SC           User Key  (r) = Recorded By, (t) = Taken By, (c) = Cosigned By    Initials Name Provider Type    Shahrzad Solis COTA Occupational Therapist Assistant                    Manual Rx (last 36 hours)     Manual Treatments     Row Name 09/22/22 1548             Total Minutes    09170 - OT Manual Therapy Minutes 54  -SC            User Key  (r) = Recorded By, (t) = Taken By, (c) = Cosigned By    Initials Name Provider Type    SC Shahrzad Torres COTA Occupational Therapist Assistant                                   Time Calculation:   Timed Charges  42975 - OT Manual Therapy Minutes: 54  Total Minutes  Timed Charges Total Minutes: 54   Total Minutes: 54     Therapy Charges for Today     Code Description Service Date Service Provider Modifiers Qty    [id number]  OT MANUAL THERAPY EA 15 MIN 9/22/2022 Shahrzad Torres COTA GO 4                      LUIS M Mcguire  9/22/2022

## 2022-10-04 ENCOUNTER — OFFICE VISIT (OUTPATIENT)
Dept: ONCOLOGY | Facility: CLINIC | Age: 67
End: 2022-10-04

## 2022-10-04 ENCOUNTER — LAB (OUTPATIENT)
Dept: LAB | Facility: HOSPITAL | Age: 67
End: 2022-10-04

## 2022-10-04 ENCOUNTER — APPOINTMENT (OUTPATIENT)
Dept: OCCUPATIONAL THERAPY | Facility: HOSPITAL | Age: 67
End: 2022-10-04

## 2022-10-04 VITALS
RESPIRATION RATE: 20 BRPM | WEIGHT: 293 LBS | BODY MASS INDEX: 48.82 KG/M2 | TEMPERATURE: 96.9 F | SYSTOLIC BLOOD PRESSURE: 179 MMHG | HEART RATE: 83 BPM | OXYGEN SATURATION: 95 % | HEIGHT: 65 IN | DIASTOLIC BLOOD PRESSURE: 79 MMHG

## 2022-10-04 DIAGNOSIS — C50.911 INFLAMMATORY BREAST CANCER, RIGHT: ICD-10-CM

## 2022-10-04 DIAGNOSIS — C50.611 MALIGNANT NEOPLASM OF AXILLARY TAIL OF RIGHT FEMALE BREAST, UNSPECIFIED ESTROGEN RECEPTOR STATUS: Primary | ICD-10-CM

## 2022-10-04 DIAGNOSIS — C50.111 MALIGNANT NEOPLASM OF CENTRAL PORTION OF RIGHT BREAST IN FEMALE, ESTROGEN RECEPTOR POSITIVE: ICD-10-CM

## 2022-10-04 DIAGNOSIS — Z17.0 MALIGNANT NEOPLASM OF CENTRAL PORTION OF RIGHT BREAST IN FEMALE, ESTROGEN RECEPTOR POSITIVE: ICD-10-CM

## 2022-10-04 DIAGNOSIS — C50.611 MALIGNANT NEOPLASM OF AXILLARY TAIL OF RIGHT FEMALE BREAST, UNSPECIFIED ESTROGEN RECEPTOR STATUS: ICD-10-CM

## 2022-10-04 PROBLEM — Z79.811 AROMATASE INHIBITOR USE: Status: ACTIVE | Noted: 2022-10-04

## 2022-10-04 LAB
ALBUMIN SERPL-MCNC: 3.9 G/DL (ref 3.5–5.2)
ALBUMIN/GLOB SERPL: 1.4 G/DL (ref 1.1–2.4)
ALP SERPL-CCNC: 80 U/L (ref 38–116)
ALT SERPL W P-5'-P-CCNC: 14 U/L (ref 0–33)
ANION GAP SERPL CALCULATED.3IONS-SCNC: 10.4 MMOL/L (ref 5–15)
AST SERPL-CCNC: 12 U/L (ref 0–32)
BASOPHILS # BLD AUTO: 0.02 10*3/MM3 (ref 0–0.2)
BASOPHILS NFR BLD AUTO: 0.3 % (ref 0–1.5)
BILIRUB SERPL-MCNC: 0.5 MG/DL (ref 0.2–1.2)
BUN SERPL-MCNC: 15 MG/DL (ref 6–20)
BUN/CREAT SERPL: 25.9 (ref 7.3–30)
CALCIUM SPEC-SCNC: 9.2 MG/DL (ref 8.5–10.2)
CHLORIDE SERPL-SCNC: 104 MMOL/L (ref 98–107)
CO2 SERPL-SCNC: 27.6 MMOL/L (ref 22–29)
CREAT SERPL-MCNC: 0.58 MG/DL (ref 0.6–1.1)
DEPRECATED RDW RBC AUTO: 47.5 FL (ref 37–54)
EGFRCR SERPLBLD CKD-EPI 2021: 98.1 ML/MIN/1.73
EOSINOPHIL # BLD AUTO: 0.16 10*3/MM3 (ref 0–0.4)
EOSINOPHIL NFR BLD AUTO: 2.3 % (ref 0.3–6.2)
ERYTHROCYTE [DISTWIDTH] IN BLOOD BY AUTOMATED COUNT: 13.2 % (ref 12.3–15.4)
GLOBULIN UR ELPH-MCNC: 2.8 GM/DL (ref 1.8–3.5)
GLUCOSE SERPL-MCNC: 116 MG/DL (ref 74–124)
HCT VFR BLD AUTO: 42.4 % (ref 34–46.6)
HGB BLD-MCNC: 12.7 G/DL (ref 12–15.9)
IMM GRANULOCYTES # BLD AUTO: 0.03 10*3/MM3 (ref 0–0.05)
IMM GRANULOCYTES NFR BLD AUTO: 0.4 % (ref 0–0.5)
LYMPHOCYTES # BLD AUTO: 0.86 10*3/MM3 (ref 0.7–3.1)
LYMPHOCYTES NFR BLD AUTO: 12.2 % (ref 19.6–45.3)
MCH RBC QN AUTO: 29.4 PG (ref 26.6–33)
MCHC RBC AUTO-ENTMCNC: 30 G/DL (ref 31.5–35.7)
MCV RBC AUTO: 98.1 FL (ref 79–97)
MONOCYTES # BLD AUTO: 0.49 10*3/MM3 (ref 0.1–0.9)
MONOCYTES NFR BLD AUTO: 6.9 % (ref 5–12)
NEUTROPHILS NFR BLD AUTO: 5.51 10*3/MM3 (ref 1.7–7)
NEUTROPHILS NFR BLD AUTO: 77.9 % (ref 42.7–76)
NRBC BLD AUTO-RTO: 0 /100 WBC (ref 0–0.2)
PLATELET # BLD AUTO: 243 10*3/MM3 (ref 140–450)
PMV BLD AUTO: 9.1 FL (ref 6–12)
POTASSIUM SERPL-SCNC: 4.3 MMOL/L (ref 3.5–4.7)
PROT SERPL-MCNC: 6.7 G/DL (ref 6.3–8)
RBC # BLD AUTO: 4.32 10*6/MM3 (ref 3.77–5.28)
SODIUM SERPL-SCNC: 142 MMOL/L (ref 134–145)
WBC NRBC COR # BLD: 7.07 10*3/MM3 (ref 3.4–10.8)

## 2022-10-04 PROCEDURE — 80053 COMPREHEN METABOLIC PANEL: CPT

## 2022-10-04 PROCEDURE — 99214 OFFICE O/P EST MOD 30 MIN: CPT | Performed by: INTERNAL MEDICINE

## 2022-10-04 PROCEDURE — 85025 COMPLETE CBC W/AUTO DIFF WBC: CPT

## 2022-10-04 PROCEDURE — 36415 COLL VENOUS BLD VENIPUNCTURE: CPT

## 2022-10-04 NOTE — PROGRESS NOTES
Subjective     REASON FOR FOLLOW-UP: Right breast inflammatory breast, triple positive                              REQUESTING PHYSICIAN: MD Francisco Esteban MD Bethany Haynes, MD    History of Present Illness patient is a 67 y.o. 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  Completed treatment March 2022    Patient has been continuing on Arimidex daily.  She denies any known side effects from this aside from gaining weight.  She does remain quite sedentary and admits to even skipping her lymphedema exercises that she knows she should be doing.  She has not been getting out of the house much as family is quite concerned about her risk of infection.  We discussed from the cancer standpoint she has not immunosuppressed however she does have to watch with regards to her ongoing shortness of breath and need for oxygen.      She has noticed intermittent diarrhea and states she is due for colonoscopy so therefore we will get this set up.  She is concerned this could be related to her diarrhea and chemotherapy though she did have some IBS type symptoms prior to initiation of treatment she reports.  She does have improvement when she takes probiotics regularly but finds when she stops them that the diarrhea increases.  I encouraged her to take this daily then.    She denies any new areas of pain.  She has had no recent skin changes.  She does continue with lymphedema clinic at [facility].    Past Medical History:   Diagnosis Date   •  Allergic rhinitis    • Anemia    • Anxiety    • Asthma    • Chronic bronchitis (HCC)     In past, espec. during working years   • Chronic diastolic (congestive) heart failure (HCC)    • Chronic hypoxemic respiratory failure (HCC)     on continuous O2   • Coronary artery calcification seen on CT scan 04/21/2022   • Diarrhea     with chemo   • GERD (gastroesophageal reflux disease) Mild   • H/O Intraductal papilloma    • Hemorrhoids    • History of transfusion 1984    AFTER BACK SURGERY no reaction   • Hypertension    • IBS (irritable bowel syndrome)    • Inflammatory breast cancer (HCC)     right   • Kidney stone    • Morbid obesity with BMI of 50.0-59.9, adult (HCC)    • Obesity hypoventilation syndrome (HCC) 02/05/2021   • On home oxygen therapy     2.5 AT REST WITH ACTIVITY UP TO 4L   • ANABELLE on CPAP     cpap  & uses O2 with CPAP   • Osteoarthritis    • Pneumonia     Many times in past, 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   • PONV (postoperative nausea and vomiting)         Past Surgical History:   Procedure Laterality Date   • BREAST EXCISIONAL BIOPSY Bilateral    • COLONOSCOPY     • CYSTOSCOPY BLADDER STONE LITHOTRIPSY     • ENDOSCOPY     • KNEE ARTHROPLASTY Bilateral 2009   • MASTECTOMY Left 08/10/2021    Procedure: LEFT TOTAL MASTECTOMY;  Surgeon: Nicci Banks MD;  Location: [facility];  Service: General;  Laterality: Left;   • MASTECTOMY Right 08/10/2021    Procedure: RIGHT MODIFIED RADICAL MASTECTOMY;  Surgeon: Nicci Banks MD;  Location: [facility] OR;  Service: General;  Laterality: Right;   • SPINE SURGERY  1984   • TOTAL ABDOMINAL HYSTERECTOMY WITH SALPINGO OOPHORECTOMY  2004   • UPPER GASTROINTESTINAL ENDOSCOPY     • VENOUS ACCESS DEVICE (PORT) INSERTION N/A 01/25/2021    Procedure: INSERTION VENOUS ACCESS DEVICE;  Surgeon: Nicci Banks MD;  Location: [facility];  Service: General;  Laterality: N/A;   • VENOUS ACCESS DEVICE (PORT) REMOVAL Left 04/05/2022    Procedure: REMOVAL  VENOUS ACCESS DEVICE;  Surgeon: Nicci Banks MD;  Location: [facility];  Service: General;  Laterality: Left;      ONC HISTORY  patient is a 65-year-old white female with morbid obesity, history of diastolic congestive heart failure and unknown type of pulmonary disease for which she has been on oxygen for 4 years.    She has been getting routine mammography since 38 years of age because her mother  at 46 of breast cancer and had a biopsy of her left breast in  which was apparently benign and another biopsy in  on her right breast which showed a small focus of atypical ductal hyperplasia and atypical lobular hyperplasia with a residual intraductal papilloma.  At that time she saw medical oncologist who recommended genetic testing and prevention but the insurance would not cover the genetic testing and the medical oncologist thought tamoxifen was too risky for her because of her comorbidities and no treatment was given.  More recently she noticed redness of the right breast in October and showed it to her family doctor who gave her course of Keflex when it did not improve and mammogram was ordered and this was benign  When the redness persisted she saw her gynecologist with concern for inflammatory breast cancer and referred her to Dr. Banks after repeat imaging and [facility].  Preliminary report shows micropapillary invasive mammary carcinoma intermediate grade measuring 13 mm right axillary node was also involved with metastatic cancer ER/[place] and HER-2 are pending  Patient saw Dr. Banks who sent her for skin biopsies and she had 3 separate punch biopsy of the skin that showed Perivascular and perifollicular inflammation with no obvious malignancy at 3:00 12:00 and 9:00  In addition staging work-up with CAT scans and bone scan were ordered.  CAT scan of the chest showed a borderline mediastinal  Infracarinal node  measuring 15 mm in length mild cardiomegaly and heavy coronary artery calcifications  CT of the abdomen showed a 2.5 x 2.2 cm right adrenal mass which the patient tells me she has had in the past and is most likely benign but also a 2.7 cm left external iliac node which is indeterminate.  Bone scan was negative    Echocardiogram is scheduled for later next week and genetic testing with the [employer] stat panel is pending    Patient is  3 para 3 menarche was at age 9 menopause at 49 when she had a hysterectomy and oophorectomy  First childbirth was at age 20 she breast-fed her second and third children and took no hormone replacement after menopause    Family history is positive for mother dying of breast cancer at age 46 she is a maternal aunt with breast cancer in her 70s a paternal aunt with breast cancer in her 70s paternal grandmother with colon cancer.  Her father had Hodgkin's disease and non-Hodgkin's lymphoma but  of small cell lung cancer at 67      She has not had a heart attack stroke or blood clot    Plan to do echocardiogram soon as possible to ascertain tolerability of cardiotoxic chemotherapy which would be typically indicated with an inflammatory breast cancer    Also plan PET scan to follow-up on atypical lymph nodes and confirm benign etiology of the adrenal lesion    She will have port placement and chemo education pt is  ER/NE and HER-2 +    I explained to Aida that the goal of treatment would be curative but she has a lot of comorbidities which may limit our ability to give the most effective chemotherapy in the setting  I told her radiation would be involved and possibly hormonal therapy for 10 years as she has hormone positivity and HER-2 directed therapy    She expressed some concerns about driving back and forth from [place] but felt that once a week was doable    We will see her back in 2 weeks to start treatment with a port placement planned early next week      Patient  did have a mild reaction to Taxol dose #1 with some increased shortness of breath and flushing.  This was responsive to 100 mg of Solu-Cortef.  She states this gave her a headache and made her quite talkative but otherwise she was thankfully able to complete Taxol infusion without further incident.    Patient reports issues with chronic diarrhea over the last few years and did note a little bit increase in stooling following THP though nothing overly significant in her mind.  She did finally begin taking Imodium just a few days ago and has had no further stools.  She does note significant trouble with hemorrhoids in relation to the diarrhea   Required blood transfusion due to significant hemorrhoidal bleeding plus iron deficiency.  Injectafer planned    Due to inclement weather cycle 1 day 8 therapy was missed.  She did get day 15 therapy with fairly good tolerance except for some diarrhea.    She was found to be iron deficient despite trying oral iron.  We therefore elected to proceed with IV Injectafer but she remains mildly anemic with a hemoglobin of 9.3    7/21    She has increased her Lasix and her weight is down 7 pounds but overall she is significantly weaker since starting treatment and I think it is in her best interest to discontinue treatment and proceed with surgery and will use Herceptin in the interim till surgery scheduled  She has had a good response in the breast and I do not think the last dose of Adriamycin is going to be crucial and we run the risk of making her so weak that she cannot go for surgery    9/21  Patient had worsening shortness of breath requiring continuous oxygen.  CT of the chest performed to rule out pneumonitis and she was started on steroids.  CT showed bilateral groundglass infiltrates presumed to be related to Taxol pneumonitis and therefore Taxol discontinued.  Patient completed the fourth cycle of Perjeta/Herceptin alone.  Diarrhea was an issue but not as bad without the  Taxol.  She is weaned off her prednisone    Patient proceeded with cycle 1 Adriamycin/Cytoxan on 5/14/2021 and is seen back today for cycle #3 and we are doing it at 3-week intervals because of her frailty and after 3 cycles we stopped because of tolerance issues    She went for surgery her bilateral mastectomies and interestingly she had a complete pathological CR on the right breast and had evidence of DCIS in the left breast [id number]    10/21  We will start anastrozole because she is not a good candidate for tamoxifen and her bone density showed normal bone density in the spine but osteoporosis in the left femoral neck and we will start Prolia in 6 weeks.The side effects and toxicities of the Aromatase inhibitors was discussed with the patient including, hot flashes, mood swings and hair thinning.Significant arthralgias and worsening bone density were also discussed. Baseline bone density evaluation was ordered.        Current Outpatient Medications on File Prior to Visit   Medication Sig Dispense Refill   • albuterol sulfate  (90 Base) MCG/ACT inhaler Inhale 2 puffs Every 4 (Four) Hours As Needed.     • anastrozole (ARIMIDEX) 1 MG tablet Take 1 tablet by mouth Daily. 90 tablet 1   • carvedilol (COREG) 25 MG tablet Take 0.5 tablets by mouth 2 (Two) Times a Day With Meals. 30 tablet 0   • Cholecalciferol (Vitamin D) 50 MCG (2000 UT) capsule Take 2,000 Units by mouth Daily.     • dicyclomine (BENTYL) 20 MG tablet Take 1 tablet by mouth Every 6 (Six) Hours. 60 tablet 2   • diphenhydrAMINE (BENADRYL) 25 mg capsule Take 25 mg by mouth At Night As Needed for Itching, Allergies or Sleep.     • diphenoxylate-atropine (LOMOTIL) 2.5-0.025 MG per tablet Take 1 tablet by mouth 4 (Four) Times a Day As Needed for Diarrhea. 120 tablet 0   • FLUoxetine (PROzac) 40 MG capsule TAKE 1 CAPSULE BY MOUTH EVERY DAY 30 capsule 0   • furosemide (LASIX) 40 MG tablet TAKE ONE-HALF TABLET BY MOUTH EVERY DAY 45 tablet 1   • gabapentin  (NEURONTIN) 100 MG capsule Take 1 capsule by mouth 3 (Three) Times a Day for 30 days. 90 capsule 1   • Hemorrhoidal Relief 5 % cream cream apply topically to the appropriate area as directed THREE TIMES DAILY AS NEEDED for hemmorrhoid pain 30 g 2   • Lidocaine Viscous HCl (XYLOCAINE) 2 % solution Apply 5 mL topically to the appropriate area as directed As Needed for Mild Pain  (mix with Silvadene). 100 mL 1   • lisinopril (PRINIVIL,ZESTRIL) 5 MG tablet TAKE 1 TABLET BY MOUTH EVERY DAY 90 tablet 1   • meloxicam (MOBIC) 15 MG tablet take 1/2 tablet BY MOUTH EVERY DAY 45 tablet 1   • miconazole (Lotrimin AF) 2 % powder Apply  topically to the appropriate area as directed 2 (two) times a day. 85 g 1   • mometasone-formoterol (DULERA 100) 100-5 MCG/ACT inhaler Inhale 2 Puffs/kg Every Night.     • montelukast (SINGULAIR) 10 MG tablet Take 10 mg by mouth Every Night.     • multivitamin (THERAGRAN) tablet tablet Take 1 tablet by mouth Daily.     • O2 (OXYGEN) Inhale 3 L/min Continuous.     • ondansetron (ZOFRAN) 8 MG tablet Take 1 tablet by mouth 3 (Three) Times a Day As Needed for Nausea or Vomiting. 30 tablet 5   • Potassium 99 MG tablet Take 1 tablet by mouth Every Night.     • pravastatin (PRAVACHOL) 40 MG tablet Take 40 mg by mouth Every Night.     • psyllium (METAMUCIL) 0.52 g capsule Metamucil 0.52 gram oral capsule take 1 capsule by oral route 5Xday   Active     • riFAXIMin (Xifaxan) 550 MG tablet Take 1 tablet by mouth Daily. 28 tablet 2   • silver sulfadiazine (SILVADENE, SSD) 1 % cream Apply 1 application topically to the appropriate area as directed 2 (Two) Times a Day. 50 g 1   • spironolactone (ALDACTONE) 25 MG tablet Take 25 mg by mouth Daily.       No current facility-administered medications on file prior to visit.        ALLERGIES:    Allergies   Allergen Reactions   • Amlodipine Swelling     LEGS    • Hydrochlorothiazide Unknown - Low Severity     Neuro issues   • Morphine Nausea And Vomiting      "hallucinations   • Adhesive Tape Rash        Social History     Socioeconomic History   • Marital status:    • Number of children: 3   Tobacco Use   • Smoking status: Never Smoker   • Smokeless tobacco: Never Used   Vaping Use   • Vaping Use: Never used   Substance and Sexual Activity   • Alcohol use: Never   • Drug use: Never   • Sexual activity: Not Currently     Partners: Female        Family History   Problem Relation Age of Onset   • Breast cancer Mother 45   • Cancer Mother         breast; metastasized   • Colon cancer Paternal Grandmother    • Cancer Paternal Grandmother         colon cancer   • Hypertension Paternal Grandmother         EVERY ADULT IN MY FAMILY   • Breast cancer Maternal Aunt 70   • Breast cancer Paternal Aunt 70   • Cancer Paternal Aunt         breast; cured   • Lung cancer Father    • Hodgkin's lymphoma Father    • Skin cancer Father         squamous cell   • Cancer Father         4 kinds: Hodgkins; lung; squamous cell skin   • Cancer Maternal Aunt         breast; metastasized   • Ovarian cancer Neg Hx    • Uterine cancer Neg Hx    • Deep vein thrombosis Neg Hx    • Pulmonary embolism Neg Hx    • Malig Hyperthermia Neg Hx         Review of Systems   ROS as per HPI    Objective       Vitals:    10/04/22 1019   Temp: 96.9 °F (36.1 °C)   TempSrc: Temporal   Weight: (!) 155 kg (340 lb 11.2 oz)   Height: 165.1 cm (65\")   PainSc: 0-No pain     Current Status 10/4/2022   ECOG score 0         Physical Exam    CONSTITUTIONAL:  Vital signs reviewed.  No distress, looks comfortable. Morbidly obese  EYES:  Conjunctiva and lids unremarkable.  PERRLA  EARS,NOSE,MOUTH,THROAT: Hearing intact, mask in place.  RESPIRATORY:  Normal respiratory effort.  O2 via nasal cannula in place  BREASTS: Bilateral mastectomies with no reconstruction.  No erythema or nodules noted.  CARDIOVASCULAR:  Normal S1, S2.  No murmurs rubs or gallops.  1+ brawny lower extremity edema.  SKIN:  Warm.  Dry, no " rashes.  MUSCULOSKELETAL: Walks without assistance.  PSYCHIATRIC:  Normal judgment and insight.  Normal mood and affect.       I have reexamined the patient and the results are consistent with the previously documented exam. Ronda Rivera MA     RECENT LABS:  Results from last 7 days   Lab Units 10/04/22  0950   WBC 10*3/mm3 7.07   NEUTROS ABS 10*3/mm3 5.51   HEMOGLOBIN g/dL 12.7   HEMATOCRIT % 42.4   PLATELETS 10*3/mm3 243             PET  IMPRESSION:  1.  Moderate to intensely FDG avid asymmetric soft tissue and skin  thickening involving the right breast likely representing patient's  known malignancy.  2.  Intensely FDG avid right axillary and subpectoral adenopathy likely  represent metastatic disease.  3.  Constellation of findings within the right adrenal gland are favored  to represent a lipid rich adenoma. Continued attention on follow-up is  recommended to ensure stability.  4.  While there are no findings of definite FDG avid osseous metastasis,  given the heterogenous FDG uptake throughout the axial and appendicular  skeleton due to the above stated limitations, subtle underlying osseous  metastasis would remain occult. Therefore, continued close attention on  follow-up is recommended to exclude this possibility.  5.  Short segment of moderate to intense FDG uptake within the distal  esophagus and GE junction suggestive of esophagitis. In the appropriate  clinical context correlation with patient history is recommended with  follow-up endoscopy if clinically indicated.  6.  Sub-6 mm pulmonary nodule within the right lower lobe is below PET  resolution and indeterminate. Continued close attention on follow-up  with chest CT in 3 months is recommended to exclude metastatic disease.  7.  Other findings as above.     This report was finalized on 2/2/2021     Final Diagnosis   1. Left Breast, Total Mastectomy (2,122 grams):               A. MULTIFOCAL LOW GRADE DUCTAL CARCINOMA IN SITU (DCIS):                             1. Solid, Cribriform, and Pagetoid type with single cell necrosis and focal calcifications.                            2. Extent of DCIS: 20 mm (5 of 26 blocks involved).                            3. Margins are negative for in situ carcinoma; Closest distance: DCIS is present > 10 mm from                                the posterior margin.               B. Multiple intraductal papillomas, usual ductal hyperplasia, and fibroadenomatoid change.               C. Unremarkable skin and nipple.               D. See Synoptic Report and Comment #1.      2. Right Breast, Modified Radical Mastectomy S/P Neoadjuvant Chemotherapy (2,589 grams):               A. FIBROTIC TUMOR BED WITH NO RESIDUAL INVASIVE DUCTAL CARCINOMA.               B. Background breast parenchyma with multiple intraductal papillomas, fibroadenomatoid change,       usual ductal hyperplasia and pseudoangiomatous stromal hyperplasia (PASH).  C. Scar and fat necrosis, consistent with prior procedure-related changes.  D. Clip and biopsy site changes present within tumor bed.    E. Unremarkable skin and nipple.   F. Nineteen lymph nodes, negative for carcinoma (0/19):               1. Clip and biopsy site changes are present.               2. Treatment effect is present in 4 of 19 lymph nodes.  G. See Comment #2.         FINDINGS:   LUMBAR SPINE:  The BMD measured in the L1-L4 is 1.054 g/cm2 for a  T-score of 0.1 and a Z-score of 2.1     LEFT HIP: The BMD for the femoral neck is 0.476g/cm2 for a T score of   -3.4 and a Z score of -1.7     RIGHT HIP:  The BMD for the femoral neck is 0.657g/cm2 for a T score of  -1.7 and a Z score of 0.0     IMPRESSION:  Osteoporosis.     Assessment & Plan   1. [id number] right breast cancer ER/CT HER-2 -3+ positive inflammatory breast cancer for neoadjuvant chemotherapy  · Staging work-up negative except for 6 mm lung nodule and axillary and subpectoral adenopathy  · THP followed by AC planned if she tolerates  it  · C1D8 Taxol missed due to inclement weather.  · Taxol discontinued after 7 doses due to probable Taxol pneumonitis treated with steroids.    · C1 Adriamycin/Cytoxan given 5/14/2021.  · Adriamycin and Cytoxan stopped after 3 doses due to poor tolerance  · YPT0N0 right breast with multifocal DCIS ER/MD positive in the left breast post bilateral mastectomies and right axillary dissection  · Radiation Arimidex and Perjeta Herceptin to continue for the rest of the year  · Severe diarrhea after resuming Perjeta-Lomotil ordered and C. difficile checked with this is C. difficile negative she will not tolerate Perjeta for the rest of the year and we will stop  · Doing well on single agent Herceptin and Arimidex in 2/22  · Final Herceptin 3/31/2022  · Port removed by Dr. Banks 4/5/2022  · Referred to lymphedema clinic at [facility] and undergoing treatment  · Patient seen 7/11/2022 continuing on Arimidex.  She does have intermittent diarrhea which she feels is related to the chemotherapy.  We discussed typically it would resolve within a month or so after treatment is over.  She does have formed stools as well and states she feels as dietary related.  She is due for colonoscopy and will have this scheduled.  Otherwise she feels she is tolerating the Arimidex well.  No new areas of persistent pain.  Respiratory status is stable.  Patient is gaining some weight and is quite frustrated by this though she did have fairly significant nausea during treatment with weight loss.  We discussed 7 gain is not surprising giving that she was adjusting to being off chemotherapy however that we do want to monitor this and limit any further weight gain and ideally have her lose weight in a healthy manner.  She reports he is quite sedentary now and I encouraged some sort of increased physical activity.  She is not keeping up with her lymphedema exercises she reports we discussed trying to schedule this on her day so that she makes her to  accomplish a task.  We also discussed some dietary adjustments.    2.  Morbid obesity    3.  History of diastolic heart failure  · Echocardiogram with ejection fraction of 64% normal strain  · Cleared by cardio-oncology for chemotherapy  · Echocardiogram in 9/21 stable at 63%  · Echocardiogram 1/5/2022 with stable ejection fraction 64%  · Echocardiogram with ejection fraction 55% 4/21/2022  · Patient due for echocardiogram and follow-up with Dr. Garza October 2022    4.  Pulmonary disease?  Etiology on oxygen for 4 years?  Jennifer    5.  Strong family history of breast cancer genetic testing 84 genes negative    6.  Probable benign adrenal adenoma-PET negative    7.  Questionable enlarged lymph nodes left iliac chain and mediastinum likely reactive-PET negative    8.  6 mm right lower lobe nodule below PET resolution pretreatment needs follow-up after THP  · Repeat CT read at [facility] radiologist report stability of nodules and nodes  · Repeat CT at [facility] in 10/21 shows continued improvement    9.  Abnormal uptake short segment esophagus with a history of Schatzki's ring-we will double PPI and watch closely but we we will proceed with chemotherapy at this point and refer back to GI-doubt she has metastatic disease to this area    10. Anemia with microcytic indices  · Iron studies performed 2/10/2021.  · 2/24/2021: reviewed with the patient that she is iron deficient, with ferritin of 16, iron saturation of 4%.  Patient reports taking ferrous gluconate in the past but this caused GI upset.  Also with her chronic diarrhea I do not think she can absorb it.  We will pursue IV iron with plans to initiate this next week pending insurance approval. In addition hemoglobin down to 8.0 and transfusion pursued.  · 3/3/2021: IV Injectafer initiated x2.   · Hemoglobin down to 9.9 one week out from first AC though overall stable.  Monitor.   · Hemoglobin improved off chemotherapy  · Hemoglobin today  13.3    11. Hemorrhoidal pain secondary to diarrhea. Does have occasional bleeding. Topical lidocaine prescribed. Monitor.    12.  History of chronic diarrhea, ?IBS, exacerbated with Perjeta therapy.  · Patient required rifaximin 550 mg to take twice daily x7 days with each Perjeta dose.   · Since completion of Perjeta patient is now actually experiencing constipation (further discussed below).      13.  Taxol-induced pneumonitis.   · Worsening shortness of breath with CT evidence of groundglass infiltrates bilaterally suspicious for pneumonitis  · Patient prescribed prednisone 20 twice daily  · Breathing is overall stable.  Patient is slowly tapering off prednisone.      12.  Osteoporosis on DEXA scan in 9/21-Prolia initiated in 12/21.  Patient due for Prolia today.    15.  Boil under the right mastectomy site  · Drained by PCP and placed on doxycycline x10 days which was completed  · No evidence of recurrent boil presently with no erythema, drainage, warmth.  There is only minimal induration just under the site of previous drainage site.  The incision is closed.  · No further management required at this time.    Plan    1. Prolia today   2. Continue Arimidex daily  3. Follow-up with Dr. Garza for echocardiogram in October.  4. Follow-up with Dr. Azul in 3 months with CBC, CMP  5. Patient will schedule colonoscopy with her gastroenterologist  6. Encouraged increased physical activity and dietary adjustments due to recent weight gain.      Patient is on high risk medication requiring close monitoring for toxicity    Ronda Rivera MA  10/04/2022

## 2022-10-04 NOTE — PROGRESS NOTES
Subjective     REASON FOR FOLLOW-UP: Right breast inflammatory breast, triple positive                              REQUESTING PHYSICIAN: MD Francisco Estebna MD Bethany Haynes, MD    History of present illness:  patient is a 67 y.o. 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    She is feeling very well overall and tolerating the Arimidex without any problems    Diarrhea has resolved after stopping the Perjeta but she still has issues on and off and she is scheduled to see the gastroenterologist    She had 2 dose of Prolia and is not due for the next dose until January    continues on oxygen but overall is doing much better than I expected after surgery and chemotherapy    She is having issues with weight gain which we talked about and she is going to try to be more active    Past Medical History:   Diagnosis Date   • Allergic rhinitis    • Anemia    • Anxiety    • Asthma    • Chronic bronchitis (HCC)     In past, espec. during working years   • Chronic diastolic (congestive) heart failure (HCC)    • Chronic hypoxemic respiratory failure (HCC)     on continuous O2   • Coronary artery calcification seen on CT scan 04/21/2022   • Diarrhea     with chemo   • GERD (gastroesophageal reflux disease) Mild   • H/O Intraductal papilloma    • Hemorrhoids    • History of transfusion 1984    AFTER BACK SURGERY no reaction   • Hypertension    •  IBS (irritable bowel syndrome)    • Inflammatory breast cancer (HCC)     right   • Kidney stone    • Morbid obesity with BMI of 50.0-59.9, adult (HCC)    • Obesity hypoventilation syndrome (HCC) 2021   • On home oxygen therapy     2.5 AT REST WITH ACTIVITY UP TO 4L   • ANABELLE on CPAP     cpap  & uses O2 with CPAP   • Osteoarthritis    • Pneumonia     Many times in past, 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   • PONV (postoperative nausea and vomiting)         Past Surgical History:   Procedure Laterality Date   • BREAST EXCISIONAL BIOPSY Bilateral    • COLONOSCOPY     • CYSTOSCOPY BLADDER STONE LITHOTRIPSY     • ENDOSCOPY     • KNEE ARTHROPLASTY Bilateral    • MASTECTOMY Left 08/10/2021    Procedure: LEFT TOTAL MASTECTOMY;  Surgeon: Nicci Banks MD;  Location: [facility] MAIN OR;  Service: General;  Laterality: Left;   • MASTECTOMY Right 08/10/2021    Procedure: RIGHT MODIFIED RADICAL MASTECTOMY;  Surgeon: Nicci Banks MD;  Location: [facility] MAIN OR;  Service: General;  Laterality: Right;   • SPINE SURGERY     • TOTAL ABDOMINAL HYSTERECTOMY WITH SALPINGO OOPHORECTOMY     • UPPER GASTROINTESTINAL ENDOSCOPY     • VENOUS ACCESS DEVICE (PORT) INSERTION N/A 2021    Procedure: INSERTION VENOUS ACCESS DEVICE;  Surgeon: Nicci Banks MD;  Location: [facility] MAIN OR;  Service: General;  Laterality: N/A;   • VENOUS ACCESS DEVICE (PORT) REMOVAL Left 2022    Procedure: REMOVAL VENOUS ACCESS DEVICE;  Surgeon: Nicci Banks MD;  Location: [facility] MAIN OR;  Service: General;  Laterality: Left;      ONC HISTORY  patient is a 65-year-old white female with morbid obesity, history of diastolic congestive heart failure and unknown type of pulmonary disease for which she has been on oxygen for 4 years.    She has been getting routine mammography since 38 years of age because her mother  at 46 of breast cancer and had a biopsy of her left breast in  which was apparently benign and another  biopsy in  on her right breast which showed a small focus of atypical ductal hyperplasia and atypical lobular hyperplasia with a residual intraductal papilloma.  At that time she saw medical oncologist who recommended genetic testing and prevention but the insurance would not cover the genetic testing and the medical oncologist thought tamoxifen was too risky for her because of her comorbidities and no treatment was given.  More recently she noticed redness of the right breast in October and showed it to her family doctor who gave her course of Keflex when it did not improve and mammogram was ordered and this was benign  When the redness persisted she saw her gynecologist with concern for inflammatory breast cancer and referred her to Dr. Banks after repeat imaging and [facility] right [facility]  Preliminary report shows micropapillary invasive mammary carcinoma intermediate grade measuring 13 mm right axillary node was also involved with metastatic cancer ER/MA and HER-2 are pending  Patient saw Dr. Banks who sent her for skin biopsies and she had 3 separate punch biopsy of the skin that showed Perivascular and perifollicular inflammation with no obvious malignancy at 3:00 12:00 and 9:00  In addition staging work-up with CAT scans and bone scan were ordered.  CAT scan of the chest showed a borderline mediastinal  Infracarinal node measuring 15 mm in length mild cardiomegaly and heavy coronary artery calcifications  CT of the abdomen showed a 2.5 x 2.2 cm right adrenal mass which the patient tells me she has had in the past and is most likely benign but also a 2.7 cm left external iliac node which is indeterminate.  Bone scan was negative    Echocardiogram is scheduled for later next week and genetic testing with the invitae stat panel is pending    Patient is  3 para 3 menarche was at age 9 menopause at 49 when she had a hysterectomy and  oophorectomy  First childbirth was at age 20 she breast-fed her second and third children and took no hormone replacement after menopause    Family history is positive for mother dying of breast cancer at age 46 she is a maternal aunt with breast cancer in her 70s a paternal aunt with breast cancer in her 70s paternal grandmother with colon cancer.  Her father had Hodgkin's disease and non-Hodgkin's lymphoma but  of small cell lung cancer at 67      She has not had a heart attack stroke or blood clot    Plan to do echocardiogram soon as possible to ascertain tolerability of cardiotoxic chemotherapy which would be typically indicated with an inflammatory breast cancer    Also plan PET scan to follow-up on atypical lymph nodes and confirm benign etiology of the adrenal lesion    She will have port placement and chemo education pt is  ER/[place] and HER-2 +    I explained to Aida that the goal of treatment would be curative but she has a lot of comorbidities which may limit our ability to give the most effective chemotherapy in the setting  I told her radiation would be involved and possibly hormonal therapy for 10 years as she has hormone positivity and HER-2 directed therapy    She expressed some concerns about driving back and forth from [place] but felt that once a week was doable    We will see her back in 2 weeks to start treatment with a port placement planned early next week      Patient did have a mild reaction to Taxol dose #1 with some increased shortness of breath and flushing.  This was responsive to 100 mg of Solu-Cortef.  She states this gave her a headache and made her quite talkative but otherwise she was thankfully able to complete Taxol infusion without further incident.    Patient reports issues with chronic diarrhea over the last few years and did note a little bit increase in stooling following THP though nothing overly significant in her mind.  She did finally begin taking Imodium just a  few days ago and has had no further stools.  She does note significant trouble with hemorrhoids in relation to the diarrhea   Required blood transfusion due to significant hemorrhoidal bleeding plus iron deficiency.  Injectafer planned    Due to inclement weather cycle 1 day 8 therapy was missed.  She did get day 15 therapy with fairly good tolerance except for some diarrhea.    She was found to be iron deficient despite trying oral iron.  We therefore elected to proceed with IV Injectafer but she remains mildly anemic with a hemoglobin of 9.3    7/21    She has increased her Lasix and her weight is down 7 pounds but overall she is significantly weaker since starting treatment and I think it is in her best interest to discontinue treatment and proceed with surgery and will use Herceptin in the interim till surgery scheduled  She has had a good response in the breast and I do not think the last dose of Adriamycin is going to be crucial and we run the risk of making her so weak that she cannot go for surgery    9/21  Patient had worsening shortness of breath requiring continuous oxygen.  CT of the chest performed to rule out pneumonitis and she was started on steroids.  CT showed bilateral groundglass infiltrates presumed to be related to Taxol pneumonitis and therefore Taxol discontinued.  Patient completed the fourth cycle of Perjeta/Herceptin alone.  Diarrhea was an issue but not as bad without the Taxol.  She is weaned off her prednisone    Patient proceeded with cycle 1 Adriamycin/Cytoxan on 5/14/2021 and is seen back today for cycle #3 and we are doing it at 3-week intervals because of her frailty and after 3 cycles we stopped because of tolerance issues    She went for surgery her bilateral mastectomies and interestingly she had a complete pathological CR on the right breast and had evidence of DCIS in the left breast [id number]    10/21  We will start anastrozole because she is not a good candidate for tamoxifen  and her bone density showed normal bone density in the spine but osteoporosis in the left femoral neck and we will start Prolia in 6 weeks.The side effects and toxicities of the Aromatase inhibitors was discussed with the patient including, hot flashes, mood swings and hair thinning.Significant arthralgias and worsening bone density were also discussed. Baseline bone density evaluation was ordered.        Current Outpatient Medications on File Prior to Visit   Medication Sig Dispense Refill   • albuterol sulfate  (90 Base) MCG/ACT inhaler Inhale 2 puffs Every 4 (Four) Hours As Needed.     • anastrozole (ARIMIDEX) 1 MG tablet Take 1 tablet by mouth Daily. 90 tablet 1   • carvedilol (COREG) 25 MG tablet Take 0.5 tablets by mouth 2 (Two) Times a Day With Meals. 30 tablet 0   • Cholecalciferol (Vitamin D) 50 MCG (2000 UT) capsule Take 2,000 Units by mouth Daily.     • dicyclomine (BENTYL) 20 MG tablet Take 1 tablet by mouth Every 6 (Six) Hours. 60 tablet 2   • diphenhydrAMINE (BENADRYL) 25 mg capsule Take 25 mg by mouth At Night As Needed for Itching, Allergies or Sleep.     • diphenoxylate-atropine (LOMOTIL) 2.5-0.025 MG per tablet Take 1 tablet by mouth 4 (Four) Times a Day As Needed for Diarrhea. 120 tablet 0   • FLUoxetine (PROzac) 40 MG capsule TAKE 1 CAPSULE BY MOUTH EVERY DAY 30 capsule 0   • furosemide (LASIX) 40 MG tablet TAKE ONE-HALF TABLET BY MOUTH EVERY DAY 45 tablet 1   • gabapentin (NEURONTIN) 100 MG capsule Take 1 capsule by mouth 3 (Three) Times a Day for 30 days. 90 capsule 1   • Hemorrhoidal Relief 5 % cream cream apply topically to the appropriate area as directed THREE TIMES DAILY AS NEEDED for hemmorrhoid pain 30 g 2   • Lidocaine Viscous HCl (XYLOCAINE) 2 % solution Apply 5 mL topically to the appropriate area as directed As Needed for Mild Pain  (mix with Silvadene). 100 mL 1   • lisinopril (PRINIVIL,ZESTRIL) 5 MG tablet TAKE 1 TABLET BY MOUTH EVERY DAY 90 tablet 1   • meloxicam (MOBIC) 15  MG tablet take 1/2 tablet BY MOUTH EVERY DAY 45 tablet 1   • miconazole (Lotrimin AF) 2 % powder Apply  topically to the appropriate area as directed 2 (two) times a day. 85 g 1   • mometasone-formoterol (DULERA 100) 100-5 MCG/ACT inhaler Inhale 2 Puffs/kg Every Night.     • montelukast (SINGULAIR) 10 MG tablet Take 10 mg by mouth Every Night.     • multivitamin (THERAGRAN) tablet tablet Take 1 tablet by mouth Daily.     • O2 (OXYGEN) Inhale 3 L/min Continuous.     • ondansetron (ZOFRAN) 8 MG tablet Take 1 tablet by mouth 3 (Three) Times a Day As Needed for Nausea or Vomiting. 30 tablet 5   • Potassium 99 MG tablet Take 1 tablet by mouth Every Night.     • pravastatin (PRAVACHOL) 40 MG tablet Take 40 mg by mouth Every Night.     • psyllium (METAMUCIL) 0.52 g capsule Metamucil 0.52 gram oral capsule take 1 capsule by oral route 5Xday   Active     • riFAXIMin (Xifaxan) 550 MG tablet Take 1 tablet by mouth Daily. 28 tablet 2   • silver sulfadiazine (SILVADENE, SSD) 1 % cream Apply 1 application topically to the appropriate area as directed 2 (Two) Times a Day. 50 g 1   • spironolactone (ALDACTONE) 25 MG tablet Take 25 mg by mouth Daily.       No current facility-administered medications on file prior to visit.        ALLERGIES:    Allergies   Allergen Reactions   • Amlodipine Swelling     LEGS    • Hydrochlorothiazide Unknown - Low Severity     Neuro issues   • Morphine Nausea And Vomiting     hallucinations   • Adhesive Tape Rash        Social History     Socioeconomic History   • Marital status:    • Number of children: 3   Tobacco Use   • Smoking status: Never Smoker   • Smokeless tobacco: Never Used   Vaping Use   • Vaping Use: Never used   Substance and Sexual Activity   • Alcohol use: Never   • Drug use: Never   • Sexual activity: Not Currently     Partners: Female        Family History   Problem Relation Age of Onset   • Breast cancer Mother 45   • Cancer Mother         breast; metastasized   • Colon  "cancer Paternal Grandmother    • Cancer Paternal Grandmother         colon cancer   • Hypertension Paternal Grandmother         EVERY ADULT IN MY FAMILY   • Breast cancer Maternal Aunt 70   • Breast cancer Paternal Aunt 70   • Cancer Paternal Aunt         breast; cured   • Lung cancer Father    • Hodgkin's lymphoma Father    • Skin cancer Father         squamous cell   • Cancer Father         4 kinds: Hodgkins; lung; squamous cell skin   • Cancer Maternal Aunt         breast; metastasized   • Ovarian cancer Neg Hx    • Uterine cancer Neg Hx    • Deep vein thrombosis Neg Hx    • Pulmonary embolism Neg Hx    • Malig Hyperthermia Neg Hx         Review of Systems   Constitutional: Positive for fatigue and unexpected weight change (Weight gain).   Respiratory: Positive for shortness of breath.    Gastrointestinal: Positive for diarrhea.   All other systems reviewed and are negative.        Objective     Vitals:    10/04/22 1019   BP: 179/79   Pulse: 83   Resp: 20   Temp: 96.9 °F (36.1 °C)   TempSrc: Temporal   SpO2: 95%   Weight: (!) 155 kg (340 lb 11.2 oz)   Height: 165.1 cm (65\")   PainSc: 0-No pain     Current Status 10/4/2022   ECOG score 0       Physical Exam    CONSTITUTIONAL:  Vital signs reviewed.  No distress, looks comfortable. Morbidly obese  EYES:  Conjunctiva and lids unremarkable.  PERRLA  EARS,NOSE,MOUTH,THROAT:  Ears and nose appear unremarkable.  Lips, teeth, gums appear unremarkable.  RESPIRATORY:  Normal respiratory effort.  Lungs clear to auscultation bilaterally.  No axillary adenopathy  BREASTS: Bilateral mastectomies with no reconstruction-  CARDIOVASCULAR:  Normal S1, S2.  No murmurs rubs or gallops.  1+ brawny lower extremity edema.  GASTROINTESTINAL: Abdomen appears unremarkable.  Nontender.  No hepatomegaly.  No splenomegaly.  LYMPHATIC:  No cervical, supraclavicular, axillary lymphadenopathy.  SKIN:  Warm.  Inclusion cyst left axilla-  PSYCHIATRIC:  Normal judgment and insight.  Normal mood " and affect.       I have reexamined the patient and the results are consistent with the previously documented exam. Jaime Azul MD           RECENT LABS:  Results from last 7 days   Lab Units 10/04/22  0950   WBC 10*3/mm3 7.07   NEUTROS ABS 10*3/mm3 5.51   HEMOGLOBIN g/dL 12.7   HEMATOCRIT % 42.4   PLATELETS 10*3/mm3 243                 PET  IMPRESSION:  1.  Moderate to intensely FDG avid asymmetric soft tissue and skin  thickening involving the right breast likely representing patient's  known malignancy.  2.  Intensely FDG avid right axillary and subpectoral adenopathy likely  represent metastatic disease.  3.  Constellation of findings within the right adrenal gland are favored  to represent a lipid rich adenoma. Continued attention on follow-up is  recommended to ensure stability.  4.  While there are no findings of definite FDG avid osseous metastasis,  given the heterogenous FDG uptake throughout the axial and appendicular  skeleton due to the above stated limitations, subtle underlying osseous  metastasis would remain occult. Therefore, continued close attention on  follow-up is recommended to exclude this possibility.  5.  Short segment of moderate to intense FDG uptake within the distal  esophagus and GE junction suggestive of esophagitis. In the appropriate  clinical context correlation with patient history is recommended with  follow-up endoscopy if clinically indicated.  6.  Sub-6 mm pulmonary nodule within the right lower lobe is below PET  resolution and indeterminate. Continued close attention on follow-up  with chest CT in 3 months is recommended to exclude metastatic disease.  7.  Other findings as above.     This report was finalized on 2/2/2021     Final Diagnosis   1. Left Breast, Total Mastectomy (2,122 grams):               A. MULTIFOCAL LOW GRADE DUCTAL CARCINOMA IN SITU (DCIS):                            1. Solid, Cribriform, and Pagetoid type with single cell necrosis and focal  calcifications.                            2. Extent of DCIS: 20 mm (5 of 26 blocks involved).                            3. Margins are negative for in situ carcinoma; Closest distance: DCIS is present > 10 mm from                                the posterior margin.               B. Multiple intraductal papillomas, usual ductal hyperplasia, and fibroadenomatoid change.               C. Unremarkable skin and nipple.               D. See Synoptic Report and Comment #1.      2. Right Breast, Modified Radical Mastectomy S/P Neoadjuvant Chemotherapy (2,589 grams):               A. FIBROTIC TUMOR BED WITH NO RESIDUAL INVASIVE DUCTAL CARCINOMA.               B. Background breast parenchyma with multiple intraductal papillomas, fibroadenomatoid change,       usual ductal hyperplasia and pseudoangiomatous stromal hyperplasia (PASH).  C. Scar and fat necrosis, consistent with prior procedure-related changes.  D. Clip and biopsy site changes present within tumor bed.    E. Unremarkable skin and nipple.   F. Nineteen lymph nodes, negative for carcinoma (0/19):               1. Clip and biopsy site changes are present.               2. Treatment effect is present in 4 of 19 lymph nodes.  G. See Comment #2.         FINDINGS:   LUMBAR SPINE:  The BMD measured in the L1-L4 is 1.054 g/cm2 for a  T-score of 0.1 and a Z-score of 2.1     LEFT HIP: The BMD for the femoral neck is 0.476g/cm2 for a T score of   -3.4 and a Z score of -1.7     RIGHT HIP:  The BMD for the femoral neck is 0.657g/cm2 for a T score of  -1.7 and a Z score of 0.0     IMPRESSION:  Osteoporosis.     This report was finalized on 9/16/2021     Assessment & Plan   1. [id number] right breast cancer ER/[place] HER-2 -3+ positive inflammatory breast cancer for neoadjuvant chemotherapy  · Staging work-up negative except for 6 mm lung nodule and axillary and subpectoral adenopathy  · THP followed by AC planned if she tolerates it  · C1D8 Taxol missed due to inclement  weather.  · Taxol discontinued after 7 doses due to probable Taxol pneumonitis treated with steroids.    · C1 Adriamycin/Cytoxan given 5/14/2021.  · Adriamycin and Cytoxan stopped after 3 doses due to poor tolerance  · YPT0N0 right breast with multifocal DCIS ER/FL positive in the left breast post bilateral mastectomies and right axillary dissection  · Radiation Arimidex and Perjeta Herceptin to continue for the rest of the year  · Severe diarrhea after resuming Perjeta-Lomotil ordered and C. difficile checked with this is C. difficile negative she will not tolerate Perjeta for the rest of the year and we will stop  · Doing well on single agent Herceptin and Arimidex in 2/22  · Completed adjuvant Herceptin in 3/22  · Tolerating anastrozole well as of 10/22    2.  Morbid obesity    3.  History of diastolic heart failure  · Echocardiogram with ejection fraction of 64% normal strain  · Cleared by cardio-oncology for chemotherapy  · Echocardiogram in 9/21 stable at 63%    4.  Pulmonary disease?  Etiology on oxygen for 4 years?  Pickwickian    5.  Strong family history of breast cancer genetic testing 84 genes negative    6.  Probable benign adrenal adenoma-PET negative    7.  Questionable enlarged lymph nodes left iliac chain and mediastinum likely reactive-PET negative    8.  6 mm right lower lobe nodule below PET resolution pretreatment needs follow-up after THP  · Repeat CT read at [facility] radiologist report stability of nodules and nodes  · Repeat CT at [facility] in 10/21 shows continued improvement    9.  Abnormal uptake short segment esophagus with a history of Schatzki's ring-we will double PPI and watch closely but we we will proceed with chemotherapy at this point and refer back to GI-doubt she has metastatic disease to this area    10. Anemia with microcytic indices  · Iron studies performed 2/10/2021.  · 2/24/2021: reviewed with the patient that she is iron deficient, with ferritin of 16, iron  saturation of 4%.  Patient reports taking ferrous gluconate in the past but this caused GI upset.  Also with her chronic diarrhea I do not think she can absorb it.  We will pursue IV iron with plans to initiate this next week pending insurance approval. In addition hemoglobin down to 8.0 and transfusion pursued.  · 3/3/2021: IV Injectafer initiated x2.   · Hemoglobin down to 9.9 one week out from first AC though overall stable.  Monitor.   · Hemoglobin improved off chemotherapy  · Hemoglobin dropped after resuming Perjeta Herceptin will recheck iron stores    11. Hemorrhoidal pain secondary to diarrhea. Does have occasional bleeding. Topical lidocaine prescribed. Monitor.    12.  History of chronic diarrhea, ?IBS, exacerbated with Perjeta therapy.  · Patient required rifaximin 550 mg to take twice daily x7 days with each Perjeta dose.   · Since completion of Perjeta patient is now actually experiencing constipation (further discussed below).      13.  Taxol-induced pneumonitis.   · Worsening shortness of breath with CT evidence of groundglass infiltrates bilaterally suspicious for pneumonitis  · Patient prescribed prednisone 20 twice daily  · Breathing is overall improved.  Patient is slowly tapering off prednisone.  Today she will begin 5 mg every other day x1 week and then discontinue.      14.  Constipation following initiation of Adriamycin/Cytoxan.  · Patient is just increased fiber in her diet but asking what else she can do.  She prefers a suppository if possible.  We discussed the use of a total suppository but also consider taking senna S1-2 tabs nightly at least the first week after chemotherapy to help avoid this in the future.    15.  Osteoporosis on DEXA scan in 9/21-Prolia initiated in 12/21    Plan  1. Return  12 weeks for her next dose of Prolia and NP visit  2.continue Arimidex   3.  See me in 6 months    Discussed exercise and weight loss and she is going to try

## 2022-10-06 NOTE — PROGRESS NOTES
Chief Complaint  Colon cancer screening     Aida Conner is a 67 y.o. female who presents to [facility]STROENTEROLOG[facility]- [facility] for a gastroenterology evaluation of colon cancer screening       History of Present Illness  New patient presents to the office for colon cancer screening.  Patient experienced severe diarrhea associated with Perjeta treatment for breast cancer.  Patient received treatment with Xifaxan for 7 days after each dose.  Patient was switched to Arimidex and diarrhea resolved.  Patient reports she can have intermittent diarrhea depending on certain foods she eats.  Denies melena, hematochezia, abdominal pain, and unintentional weight loss.  She does have family history of colon cancer in her paternal grandmother.  She has had increased bloating and belching.  Denies nausea, vomiting, heartburn, dysphagia, and epigastric pain.  Patient is established with pulmonology Dr. Anaya, she is on 2.5L nasal cannula all of the time.  Patient is established with cardiology Dr. Turner    Colonoscopy 08/10/2017 by Dr. Craft -normal mucosa throughout with large external hemorrhoids.    Past Medical History:   Diagnosis Date   • Allergic rhinitis    • Anemia    • Anxiety    • Asthma    • Chronic bronchitis (HCC)     In past, espec. during working years   • Chronic diastolic (congestive) heart failure (HCC)    • Chronic hypoxemic respiratory failure (HCC)     on continuous O2   • Coronary artery calcification seen on CT scan 04/21/2022   • Diarrhea     with chemo   • GERD (gastroesophageal reflux disease) Mild   • H/O Intraductal papilloma    • Hemorrhoids    • History of transfusion 1984    AFTER BACK SURGERY no reaction   • Hypertension    • IBS (irritable bowel syndrome)    • Inflammatory breast cancer (HCC)     right   • Kidney stone    • Morbid obesity with BMI of 50.0-59.9, adult (HCC)    • Obesity hypoventilation syndrome (HCC) 02/05/2021   • On home oxygen therapy     2.5 AT REST  WITH ACTIVITY UP TO 4L   • ANABELLE on CPAP     cpap  & uses O2 with CPAP   • Osteoarthritis    • Pneumonia     Many times in past, 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   • PONV (postoperative nausea and vomiting)        Past Surgical History:   Procedure Laterality Date   • BREAST EXCISIONAL BIOPSY Bilateral    • COLONOSCOPY     • CYSTOSCOPY BLADDER STONE LITHOTRIPSY     • ENDOSCOPY     • KNEE ARTHROPLASTY Bilateral 2009   • MASTECTOMY Left 08/10/2021    Procedure: LEFT TOTAL MASTECTOMY;  Surgeon: Nicci Banks MD;  Location: [facility] MAIN OR;  Service: General;  Laterality: Left;   • MASTECTOMY Right 08/10/2021    Procedure: RIGHT MODIFIED RADICAL MASTECTOMY;  Surgeon: Nicci Banks MD;  Location: [facility] MAIN OR;  Service: General;  Laterality: Right;   • SPINE SURGERY  1984   • TOTAL ABDOMINAL HYSTERECTOMY WITH SALPINGO OOPHORECTOMY  2004   • UPPER GASTROINTESTINAL ENDOSCOPY     • VENOUS ACCESS DEVICE (PORT) INSERTION N/A 01/25/2021    Procedure: INSERTION VENOUS ACCESS DEVICE;  Surgeon: Nicci Banks MD;  Location: [facility] MAIN OR;  Service: General;  Laterality: N/A;   • VENOUS ACCESS DEVICE (PORT) REMOVAL Left 04/05/2022    Procedure: REMOVAL VENOUS ACCESS DEVICE;  Surgeon: Nicci Banks MD;  Location: [facility] MAIN OR;  Service: General;  Laterality: Left;         Current Outpatient Medications:   •  albuterol sulfate  (90 Base) MCG/ACT inhaler, Inhale 2 puffs Every 4 (Four) Hours As Needed., Disp: , Rfl:   •  anastrozole (ARIMIDEX) 1 MG tablet, Take 1 tablet by mouth Daily., Disp: 90 tablet, Rfl: 1  •  carvedilol (COREG) 25 MG tablet, Take 0.5 tablets by mouth 2 (Two) Times a Day With Meals., Disp: 30 tablet, Rfl: 0  •  Cholecalciferol (Vitamin D) 50 MCG (2000 UT) capsule, Take 2,000 Units by mouth Daily., Disp: , Rfl:   •  dicyclomine (BENTYL) 20 MG tablet, Take 1 tablet by mouth Every 6 (Six) Hours., Disp: 60 tablet, Rfl: 2  •  diphenoxylate-atropine (LOMOTIL) 2.5-0.025 MG per tablet,  Take 1 tablet by mouth 4 (Four) Times a Day As Needed for Diarrhea., Disp: 120 tablet, Rfl: 0  •  FLUoxetine (PROzac) 40 MG capsule, TAKE 1 CAPSULE BY MOUTH EVERY DAY, Disp: 30 capsule, Rfl: 0  •  furosemide (LASIX) 40 MG tablet, TAKE ONE-HALF TABLET BY MOUTH EVERY DAY, Disp: 45 tablet, Rfl: 1  •  lisinopril (PRINIVIL,ZESTRIL) 5 MG tablet, TAKE 1 TABLET BY MOUTH EVERY DAY, Disp: 90 tablet, Rfl: 1  •  mometasone-formoterol (DULERA 100) 100-5 MCG/ACT inhaler, Inhale 2 Puffs/kg Every Night., Disp: , Rfl:   •  montelukast (SINGULAIR) 10 MG tablet, Take 10 mg by mouth Every Night., Disp: , Rfl:   •  multivitamin (THERAGRAN) tablet tablet, Take 1 tablet by mouth Daily., Disp: , Rfl:   •  O2 (OXYGEN), Inhale 3 L/min Continuous., Disp: , Rfl:   •  pravastatin (PRAVACHOL) 40 MG tablet, Take 40 mg by mouth Every Night., Disp: , Rfl:   •  psyllium (METAMUCIL) 0.52 g capsule, Metamucil 0.52 gram oral capsule take 1 capsule by oral route 5Xday   Active, Disp: , Rfl:   •  spironolactone (ALDACTONE) 25 MG tablet, Take 25 mg by mouth Daily., Disp: , Rfl:   •  PEG 3350-KCl-NaBcb-NaCl-NaSulf (Golytely) 227.1 g pack, Take 4,000 mL by mouth 1 (One) Time for 1 dose. Take as directed by the office for colon prep, Disp: 1 each, Rfl: 0     Allergies   Allergen Reactions   • Amlodipine Swelling     LEGS    • Hydrochlorothiazide Unknown - Low Severity     Neuro issues   • Morphine Nausea And Vomiting     hallucinations   • Adhesive Tape Rash       Family History   Problem Relation Age of Onset   • Breast cancer Mother 45   • Cancer Mother         breast; metastasized   • Lung cancer Father    • Hodgkin's lymphoma Father    • Skin cancer Father         squamous cell   • Cancer Father         4 kinds: Hodgkins; lung; squamous cell skin   • Breast cancer Maternal Aunt 70   • Cancer Maternal Aunt         breast; metastasized   • Breast cancer Paternal Aunt 70   • Cancer Paternal Aunt         breast; cured   • Colon cancer Paternal Grandmother 60  "  • Cancer Paternal Grandmother         colon cancer   • Hypertension Paternal Grandmother         EVERY ADULT IN MY FAMILY   • Ovarian cancer Neg Hx    • Uterine cancer Neg Hx    • Deep vein thrombosis Neg Hx    • Pulmonary embolism Neg Hx    • Malig Hyperthermia Neg Hx         Social History     Social History Narrative    3 daughters    She drinks one half of a 2 liter of  DT coke with caffeine daily.        Immunization:  Immunization History   Administered Date(s) Administered   • COVID-19 (PFIZER) PURPLE CAP 03/19/2021, 04/09/2021, 11/13/2021   • Covid-19 (Pfizer) Gray Cap 06/18/2022   • Flu Vaccine Quad PF >36MO 09/24/2018, 11/12/2020   • Fluzone High-Dose 65+yrs 09/28/2021   • Influenza TIV (IM) 01/30/2014, 09/29/2014   • Pneumococcal Conjugate 13-Valent (PCV13) 08/21/2019        Objective     Vital Signs:   /71 (BP Location: Right arm, Patient Position: Sitting, Cuff Size: Adult)   Pulse 94   Ht 165.1 cm (65\")   Wt (!) 154 kg (338 lb 12.8 oz)   SpO2 92%   BMI 56.38 kg/m²       Physical Exam  Constitutional:       Appearance: Normal appearance.   HENT:      Head: Normocephalic.   Cardiovascular:      Rate and Rhythm: Normal rate and regular rhythm.      Heart sounds: Normal heart sounds.   Pulmonary:      Effort: Pulmonary effort is normal.      Breath sounds: Normal breath sounds.   Abdominal:      General: Bowel sounds are normal.      Palpations: Abdomen is soft.   Skin:     General: Skin is warm and dry.   Neurological:      Mental Status: She is alert and oriented to person, place, and time. Mental status is at baseline.   Psychiatric:         Mood and Affect: Mood normal.         Behavior: Behavior normal.         Thought Content: Thought content normal.         Judgment: Judgment normal.         Result Review :     CBC w/diff    CBC w/Diff 3/31/22 7/11/22 10/4/22   WBC 8.19 8.75 7.07   RBC 3.99 4.39 4.32   Hemoglobin 11.7 (A) 13.3 12.7   Hematocrit 40.0 42.9 42.4   .3 (A) 97.7 (A) " 98.1 (A)   MCH 29.3 30.3 29.4   MCHC 29.3 (A) 31.0 (A) 30.0 (A)   RDW 14.2 12.6 13.2   Platelets 246 240 243   Neutrophil Rel % 80.2 (A) 83.3 (A) 77.9 (A)   Immature Granulocyte Rel % 0.4 0.3 0.4   Lymphocyte Rel % 9.4 (A) 8.0 (A) 12.2 (A)   Monocyte Rel % 8.4 6.2 6.9   Eosinophil Rel % 1.5 1.9 2.3   Basophil Rel % 0.1 0.3 0.3   (A) Abnormal value            CMP    CMP 3/31/22 7/11/22 10/4/22   Glucose 97 125 (A) 116   BUN 19 18 15   Creatinine 0.61 0.60 0.58 (A)   Sodium 139 139 142   Potassium 4.0 4.6 4.3   Chloride 105 101 104   Calcium 8.4 (A) 9.6 9.2   Albumin 3.40 (A) 4.00 3.90   Total Bilirubin 0.4 0.4 0.5   Alkaline Phosphatase 83 104 80   AST (SGOT) 14 14 12   ALT (SGPT) 12 19 14   (A) Abnormal value                    Assessment and Plan    Diagnoses and all orders for this visit:    1. Belching (Primary)    2. Bloating    3. Hiatal hernia    4. Irritable bowel syndrome, unspecified type    5. History of breast cancer    6. Family history of colon cancer    Other orders  -     PEG 3350-KCl-NaBcb-NaCl-NaSulf (Golytely) 227.1 g pack; Take 4,000 mL by mouth 1 (One) Time for 1 dose. Take as directed by the office for colon prep  Dispense: 1 each; Refill: 0    67-year-old patient presents to the office due for screening colonoscopy, irritable bowel syndrome, belching, hiatal hernia, and history of breast cancer.  Patient will continue dicyclomine to manage IBS.  Advised patient to avoid certain food triggers known to cause diarrhea.  Offered to start patient on Prilosec, she does not wish to proceed at this time.  Patient would like to proceed with EGD and colonoscopy, we will schedule at her earliest convenience.  We will seek pulmonary clearance from Dr. Anaya and cardiac clearance from Dr. Turner.  Patient is agreeable to plan will call the office any questions or concerns.    EGD/COLONOSCOPY Surgical Risk and Benefits: Possible risk/complications, benefits, and alternatives to surgical or invasive procedure  have been explained to patient and/or legal guardian. Risks include bleeding, infection, and perforation. Patient has been evaluated and can tolerate anesthesia and/or sedation. Risk, benefits, and alternatives to anesthesia and sedation have been explained to patient and/or legal guardian.     Follow Up   No follow-ups on file.  Patient was given instructions and counseling regarding her condition or for health maintenance advice. Please see specific information pulled into the AVS if appropriate.

## 2022-10-07 ENCOUNTER — OFFICE VISIT (OUTPATIENT)
Dept: GASTROENTEROLOGY | Facility: CLINIC | Age: 67
End: 2022-10-07

## 2022-10-07 ENCOUNTER — HOSPITAL ENCOUNTER (OUTPATIENT)
Dept: OCCUPATIONAL THERAPY | Facility: HOSPITAL | Age: 67
Setting detail: THERAPIES SERIES
Discharge: HOME | End: 2022-10-07

## 2022-10-07 ENCOUNTER — PREP FOR SURGERY (OUTPATIENT)
Dept: OTHER | Facility: HOSPITAL | Age: 67
End: 2022-10-07

## 2022-10-07 VITALS
DIASTOLIC BLOOD PRESSURE: 71 MMHG | SYSTOLIC BLOOD PRESSURE: 124 MMHG | HEART RATE: 94 BPM | OXYGEN SATURATION: 92 % | WEIGHT: 293 LBS | BODY MASS INDEX: 48.82 KG/M2 | HEIGHT: 65 IN

## 2022-10-07 DIAGNOSIS — R14.0 BLOATING: ICD-10-CM

## 2022-10-07 DIAGNOSIS — I89.0 LYMPHEDEMA: Primary | ICD-10-CM

## 2022-10-07 DIAGNOSIS — Z80.0 FAMILY HISTORY OF COLON CANCER: ICD-10-CM

## 2022-10-07 DIAGNOSIS — Z90.13 HISTORY OF MASTECTOMY, BILATERAL: ICD-10-CM

## 2022-10-07 DIAGNOSIS — Z91.89 AT RISK FOR SELF CARE DEFICIT: ICD-10-CM

## 2022-10-07 DIAGNOSIS — C50.911 BILATERAL MALIGNANT NEOPLASM OF BREAST IN FEMALE, ESTROGEN RECEPTOR POSITIVE, UNSPECIFIED SITE OF BREAST: ICD-10-CM

## 2022-10-07 DIAGNOSIS — K44.9 HIATAL HERNIA: Primary | ICD-10-CM

## 2022-10-07 DIAGNOSIS — Z85.3 HISTORY OF BREAST CANCER: ICD-10-CM

## 2022-10-07 DIAGNOSIS — Z91.89 AT RISK FOR LYMPHEDEMA: ICD-10-CM

## 2022-10-07 DIAGNOSIS — L90.5 SCAR CONDITION AND FIBROSIS OF SKIN: ICD-10-CM

## 2022-10-07 DIAGNOSIS — Z17.0 BILATERAL MALIGNANT NEOPLASM OF BREAST IN FEMALE, ESTROGEN RECEPTOR POSITIVE, UNSPECIFIED SITE OF BREAST: ICD-10-CM

## 2022-10-07 DIAGNOSIS — C50.912 BILATERAL MALIGNANT NEOPLASM OF BREAST IN FEMALE, ESTROGEN RECEPTOR POSITIVE, UNSPECIFIED SITE OF BREAST: ICD-10-CM

## 2022-10-07 DIAGNOSIS — K44.9 HIATAL HERNIA: ICD-10-CM

## 2022-10-07 DIAGNOSIS — M62.81 MUSCLE WEAKNESS (GENERALIZED): ICD-10-CM

## 2022-10-07 DIAGNOSIS — I97.2 POSTMASTECTOMY LYMPHEDEMA SYNDROME: ICD-10-CM

## 2022-10-07 DIAGNOSIS — K58.9 IRRITABLE BOWEL SYNDROME, UNSPECIFIED TYPE: ICD-10-CM

## 2022-10-07 DIAGNOSIS — R14.2 BELCHING: Primary | ICD-10-CM

## 2022-10-07 DIAGNOSIS — M25.60 JOINT STIFFNESS: ICD-10-CM

## 2022-10-07 DIAGNOSIS — R52 PAIN: ICD-10-CM

## 2022-10-07 DIAGNOSIS — R29.3 ABNORMAL POSTURE: ICD-10-CM

## 2022-10-07 PROCEDURE — 97535 SELF CARE MNGMENT TRAINING: CPT

## 2022-10-07 PROCEDURE — 99214 OFFICE O/P EST MOD 30 MIN: CPT

## 2022-10-07 PROCEDURE — 97140 MANUAL THERAPY 1/> REGIONS: CPT

## 2022-10-07 RX ORDER — POLYETHYLENE GLYCOL 3350, SODIUM SULFATE ANHYDROUS, SODIUM BICARBONATE, SODIUM CHLORIDE, POTASSIUM CHLORIDE 227.1; 21.5; 6.36; 5.53; .754 G/L; G/L; G/L; G/L; G/L
4000 POWDER, FOR SOLUTION ORAL ONCE
Qty: 1 EACH | Refills: 0 | Status: SHIPPED | OUTPATIENT
Start: 2022-10-07 | End: 2022-10-07

## 2022-10-07 NOTE — THERAPY TREATMENT NOTE
Outpatient Occupational Therapy Lymphedema Treatment Note  [facility]     Patient Name: Aida Conner  : 1955  MRN: [id number]  Today's Date: 10/7/2022      Visit Date: 10/07/2022    Patient Active Problem List   Diagnosis   • Inflammatory breast cancer, right (HCC)   • Other specified disorders of breast    • Malignant neoplasm of axillary tail of right female breast (HCC)   • Encounter for eye exam due to high risk medication   • Obesity hypoventilation syndrome (HCC)   • Iron adverse reaction   • Functional diarrhea   • Encounter for long-term (current) use of high-risk medication   • Abnormal mammogram   • Allergic rhinitis   • Anemia   • Anxiety disorder   • Breast pain, right   • Depression   • Essential tremor   • Hemorrhoids   • Hyperlipidemia   • Hypertension, essential   • IBS (irritable bowel syndrome)   • Impaired fasting glucose   • Obesity   • Obstructive sleep apnea   • Osteoarthrosis   • Renal calculi   • Restrictive airway disease   • Type II diabetes mellitus (HCC)   • Vitamin deficiency   • Urinary bladder incontinence   • Malignant neoplasm of central portion of right female breast (HCC)   • Osteoporosis without current pathological fracture   • Chronic diastolic congestive heart failure (HCC)   • Coronary artery calcification seen on CT scan   • Aromatase inhibitor use   • Belching   • Bloating   • Hiatal hernia   • History of breast cancer   • Family history of colon cancer        Past Medical History:   Diagnosis Date   • Allergic rhinitis    • Anemia    • Anxiety    • Asthma    • Chronic bronchitis (HCC)     In past, espec. during working years   • Chronic diastolic (congestive) heart failure (HCC)    • Chronic hypoxemic respiratory failure (HCC)     on continuous O2   • Coronary artery calcification seen on CT scan 2022   • Diarrhea     with chemo   • GERD (gastroesophageal reflux disease) Mild   • H/O Intraductal papilloma    • Hemorrhoids    • History of transfusion 1984     AFTER BACK SURGERY no reaction   • Hypertension    • IBS (irritable bowel syndrome)    • Inflammatory breast cancer (HCC)     right   • Kidney stone    • Morbid obesity with BMI of 50.0-59.9, adult (HCC)    • Obesity hypoventilation syndrome (HCC) 02/05/2021   • On home oxygen therapy     2.5 AT REST WITH ACTIVITY UP TO 4L   • ANABELLE on CPAP     cpap  & uses O2 with CPAP   • Osteoarthritis    • Pneumonia     Many times in past, 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 MAIN OR;  Service: General;  Laterality: Right;   • SPINE SURGERY  1984   • TOTAL ABDOMINAL HYSTERECTOMY WITH SALPINGO OOPHORECTOMY  2004   • UPPER GASTROINTESTINAL ENDOSCOPY     • VENOUS ACCESS DEVICE (PORT) INSERTION N/A 01/25/2021    Procedure: INSERTION VENOUS ACCESS DEVICE;  Surgeon: Nicci Banks MD;  Location: [facility] OR;  Service: General;  Laterality: N/A;   • VENOUS ACCESS DEVICE (PORT) REMOVAL Left 04/05/2022    Procedure: REMOVAL VENOUS ACCESS DEVICE;  Surgeon: Nicci Banks MD;  Location: [facility] OR;  Service: General;  Laterality: Left;       Visit Dx:      ICD-10-CM ICD-9-CM   1. Lymphedema  I89.0 457.1   2. Pain  R52 780.96   3. Joint stiffness  M25.60 719.50   4. 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 Scar condition and fibrosis of skin  L90.5 709.2   6. Abnormal posture  R29.3 781.92   7. Muscle weakness (generalized)  M62.81 728.87   8. At risk for self care  deficit  Z91.89 V49.89   9. At risk for lymphedema  Z91.89 V49.89   10. History of mastectomy, bilateral  Z90.13 V45.71   11. Bilateral malignant neoplasm of breast in female, estrogen receptor positive, unspecified site of breast (HCC)  C50.911 174.9    Z17.0 V86.0    C50.912         Lymphedema     Row Name 10/07/22 1300             Subjective Pain    Able to rate subjective pain? yes  -MP      Pre-Treatment Pain Level 0  -MP      Post-Treatment Pain Level 0  -MP              Subjective Comments    Subjective Comments Silke states that she received her compression pump, but has not used it yet. She arrived today without circaid reduction kit on, and  is out of town.  -MP              Manual Lymphatic Drainage    Manual Lymphatic Drainage initial sequence;opened regional lymph nodes;opened anastamoses;extremity treatment  -MP      Initial Sequence short neck;cervical;supraclavicular;shoulder collectors  -MP      Opened Regional Lymph Nodes axillary;inguinal  -MP      Axillary left  -MP      Inguinal right  -MP      Opened Anastamoses anterior axillo-axillary;axillo-inguinal  -MP      Extremity Treatment MLD to full limb  -MP      MLD to Full Limb R UE  -MP              Compression/Skin Care    Compression/Skin Care Comments unasble to vicente circaid due to pt forgetting it at home.  -MP            User Key  (r) = Recorded By, (t) = Taken By, (c) = Cosigned By    Initials Name Provider Type    Belkis Kaur OT Occupational Therapist               OT Assessment/Plan     Row Name 10/07/22 1340          OT Assessment    Assessment Comments Mrs. Conner continues to tolerate treatment well, with improved motion at the right shoulder for shoulder flexion from 135 degrees to 150.  She demonstrated ability to perform cane exercies, which she can complete while in recliner at home.  She is aware of recommendation to have circaid on at all times except in shower, and to use pump daily without circaid on, but to  replace directly after. She will continue to benefit from skilled OT until a plateau in  lymph volume is acheived and she is independent with complete decongestive therapy.  -MP           User Key  (r) = Recorded By, (t) = Taken By, (c) = Cosigned By    Initials Name Provider Type    Belkis Kaur OT Occupational Therapist              Manual Rx (last 36 hours)     Manual Treatments     Row Name 10/07/22 1740             Total Minutes    88511 - OT Manual Therapy Minutes 55  -MP            User Key  (r) = Recorded By, (t) = Taken By, (c) = Cosigned By    Initials Name Provider Type    Belkis Kaur OT Occupational Therapist              Therapy Education  Education Details: Patient was not wearing Circaid reduction today.  She was reminded to vicente Circaid daily after shower and should maintain it on as close to 24/7 as possible. Pt was re-instructed in cane exercises for sustained stretch into external rotation, overhead flexion and abduction with focus on proper position at the G-H joint  Given: Symptoms/condition management, HEP  Program: Reinforced  How Provided: Demonstration, Written, Verbal  Provided to: Patient  Level of Understanding: Verbalized, Demonstrated  08061 - OT Self Care/Mgmt Minutes: 12          Time Calculation:   Timed Charges  69064 - OT Manual Therapy Minutes: 55  72542 - OT Self Care/Mgmt Minutes: 12  Total Minutes  Timed Charges Total Minutes: 67   Total Minutes: 67     Therapy Charges for Today     Code Description Service Date Service Provider Modifiers Qty    52444028021 HC OT MANUAL THERAPY EA 15 MIN 10/7/2022 Belkis Mooney OT GO 3    72349176362 HC OT SELF CARE/MGMT/TRAIN EA 15 MIN 10/7/2022 Belkis Mooney OT GO 1                      Belkis Mooney OT  10/7/2022

## 2022-10-10 ENCOUNTER — PATIENT ROUNDING (BHMG ONLY) (OUTPATIENT)
Dept: GASTROENTEROLOGY | Facility: CLINIC | Age: 67
End: 2022-10-10

## 2022-10-10 ENCOUNTER — TELEPHONE (OUTPATIENT)
Dept: PULMONOLOGY | Facility: CLINIC | Age: 67
End: 2022-10-10

## 2022-10-10 NOTE — TELEPHONE ENCOUNTER
----- Message from Shahab Anaya DO sent at 10/7/2022  3:43 PM EDT -----  Ok to have surgery  ----- Message -----  From: Ghazala Castro RegSched Rep  Sent: 10/7/2022  11:50 AM EDT  To: Shahab Anaya DO

## 2022-10-10 NOTE — PROGRESS NOTES
10/10/2022      Hello, may I speak with Aida Conner     My name is Jose. I am calling from [facility] [place].    Before we get started may I verify your date of birth? 1955    I am calling to officially welcome you to our practice and ask about your recent visit. Is this a good time to talk? No. Left pt VM.     Tell me about your visit with us. What things went well?         We're always looking for ways to make our patients' experiences even better. Do you have recommendations on ways we may improve?    Overall were you satisfied with your first visit to our practice?    I appreciate you taking the time to speak with me today. Is there anything else I can do for you?    I'm glad to hear that you had a very good visit. We would really appreciate you completing a survey if you receive one either in the mail, email or text.       Thank you, and have a great day.

## 2022-10-12 ENCOUNTER — HOSPITAL ENCOUNTER (OUTPATIENT)
Dept: OCCUPATIONAL THERAPY | Facility: HOSPITAL | Age: 67
Setting detail: THERAPIES SERIES
Discharge: HOME | End: 2022-10-12

## 2022-10-12 VITALS — WEIGHT: 293 LBS | BODY MASS INDEX: 48.82 KG/M2 | HEIGHT: 65 IN

## 2022-10-12 DIAGNOSIS — R52 PAIN: ICD-10-CM

## 2022-10-12 DIAGNOSIS — I89.0 LYMPHEDEMA: Primary | ICD-10-CM

## 2022-10-12 DIAGNOSIS — I97.2 POSTMASTECTOMY LYMPHEDEMA SYNDROME: ICD-10-CM

## 2022-10-12 DIAGNOSIS — L90.5 SCAR CONDITION AND FIBROSIS OF SKIN: ICD-10-CM

## 2022-10-12 DIAGNOSIS — M25.60 JOINT STIFFNESS: ICD-10-CM

## 2022-10-12 PROCEDURE — 97535 SELF CARE MNGMENT TRAINING: CPT

## 2022-10-12 PROCEDURE — 93702 BIS XTRACELL FLUID ANALYSIS: CPT

## 2022-10-12 NOTE — THERAPY RE-EVALUATION
Outpatient Occupational Therapy Lymphedema Re-Evaluation   [place]     Patient Name: Aida Conner  : 1955  MRN: [id number]  Today's Date: 10/12/2022      Visit Date: 10/12/2022    Patient Active Problem List   Diagnosis   • Inflammatory breast cancer, right (HCC)   • Other specified disorders of breast    • Malignant neoplasm of axillary tail of right female breast (HCC)   • Encounter for eye exam due to high risk medication   • Obesity hypoventilation syndrome (HCC)   • Iron adverse reaction   • Functional diarrhea   • Encounter for long-term (current) use of high-risk medication   • Abnormal mammogram   • Allergic rhinitis   • Anemia   • Anxiety disorder   • Breast pain, right   • Depression   • Essential tremor   • Hemorrhoids   • Hyperlipidemia   • Hypertension, essential   • IBS (irritable bowel syndrome)   • Impaired fasting glucose   • Obesity   • Obstructive sleep apnea   • Osteoarthrosis   • Renal calculi   • Restrictive airway disease   • Type II diabetes mellitus (HCC)   • Vitamin deficiency   • Urinary bladder incontinence   • Malignant neoplasm of central portion of right female breast (HCC)   • Osteoporosis without current pathological fracture   • Chronic diastolic congestive heart failure (HCC)   • Coronary artery calcification seen on CT scan   • Aromatase inhibitor use   • Belching   • Bloating   • Hiatal hernia   • History of breast cancer   • Family history of colon cancer        Past Medical History:   Diagnosis Date   • Allergic rhinitis    • Anemia    • Anxiety    • Asthma    • Chronic bronchitis (HCC)     In past, espec. during working years   • Chronic diastolic (congestive) heart failure (HCC)    • Chronic hypoxemic respiratory failure (HCC)     on continuous O2   • Coronary artery calcification seen on CT scan 2022   • Diarrhea     with chemo   • GERD (gastroesophageal reflux disease) Mild   • H/O Intraductal papilloma    • Hemorrhoids    • History of transfusion 1984     AFTER BACK SURGERY no reaction   • Hypertension    • IBS (irritable bowel syndrome)    • Inflammatory breast cancer (HCC)     right   • Kidney stone    • Morbid obesity with BMI of 50.0-59.9, adult (HCC)    • Obesity hypoventilation syndrome (HCC) 02/05/2021   • On home oxygen therapy     2.5 AT REST WITH ACTIVITY UP TO 4L   • ANABELLE on CPAP     cpap  & uses O2 with CPAP   • Osteoarthritis    • Pneumonia     Many times in past, 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   • PONV (postoperative nausea and vomiting)         Past Surgical History:   Procedure Laterality Date   • BREAST EXCISIONAL BIOPSY Bilateral    • COLONOSCOPY     • CYSTOSCOPY BLADDER STONE LITHOTRIPSY     • ENDOSCOPY     • KNEE ARTHROPLASTY Bilateral 2009   • MASTECTOMY Left 08/10/2021    Procedure: LEFT TOTAL MASTECTOMY;  Surgeon: Nicci Banks MD;  Location: [facility] OR;  Service: General;  Laterality: Left;   • MASTECTOMY Right 08/10/2021    Procedure: RIGHT MODIFIED RADICAL MASTECTOMY;  Surgeon: Nicci Banks MD;  Location: [facility] OR;  Service: General;  Laterality: Right;   • SPINE SURGERY  1984   • TOTAL ABDOMINAL HYSTERECTOMY WITH SALPINGO OOPHORECTOMY  2004   • UPPER GASTROINTESTINAL ENDOSCOPY     • VENOUS ACCESS DEVICE (PORT) INSERTION N/A 01/25/2021    Procedure: INSERTION VENOUS ACCESS DEVICE;  Surgeon: Nicci Banks MD;  Location: [facility] OR;  Service: General;  Laterality: N/A;   • VENOUS ACCESS DEVICE (PORT) REMOVAL Left 04/05/2022    Procedure: REMOVAL VENOUS ACCESS DEVICE;  Surgeon: Nicci Banks MD;  Location: [facility] OR;  Service: General;  Laterality: Left;         Visit Dx:     ICD-10-CM ICD-9-CM   1. Lymphedema  I89.0 457.1   2. Pain  R52 780.96   3. 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 Joint stiffness  M25.60 719.50   5. Scar condition and fibrosis of skin  L90.5 709.2        Patient History     Row Name 10/12/22 1300             History    Brief Description of Current Complaint Pt  had bilateral mysectomy without reconstruction and had 19 lymphnodes removed on the right side.  She presents today with better movement but continues wearing her circaid reduction kit. Patient reports no new concerns at this time and is happy with progress in therapy.  -SF      Hand Dominance left-handed  -SF         Fall Risk Assessment    Any falls in the past year: No  -SF      Does patient have a fear of falling Yes (comment)  Due to previous falls  -SF         Services    Are you currently receiving Home Health services No  -SF      Do you plan to receive Home Health services in the near future No  -SF         Daily Activities    Primary Language English  -SF      Are you able to read Yes  -SF      Are you able to write Yes  -SF      How does patient learn best? Reading;Demonstration  -SF      Barriers to learning None  -SF         Safety    Are you being hurt, hit, or frightened by anyone at home or in your life? No  -SF      Are you being neglected by a caregiver No  -SF      Have you had any of the following issues with Depression  pt has a history of use of prozac and her primary care has set up counseling  -SF            User Key  (r) = Recorded By, (t) = Taken By, (c) = Cosigned By    Initials Name Provider Type    SF Clary Rosas, OT Occupational Therapist                 Lymphedema     Row Name 10/12/22 1300             Subjective Pain    Able to rate subjective pain? yes  -SF      Pre-Treatment Pain Level 0  -SF      Post-Treatment Pain Level 0  -SF      Subjective Pain Comment Patient denies pain  -SF         Subjective Comments    Subjective Comments Patient reports wearing CircAId wrap throughout the day but it will slip down. Patient reports compression receiving pump in the mail but has not used it yet.  Patient subjectively reports that she wears her CircAid wrap for about 6 hours during the day.  -SF         Lymphedema Assessment    Lymphedema Classification RUE:;stage 1 (Spontaneously  "Reversible)  -SF      Lymphedema Cancer Related Sx bilateral;simple mastectomy  -SF      Lymph Nodes Removed # 19  -SF      Positive Lymph Nodes # 4  -SF      Chemo Received yes  -SF      Radiation Therapy Received yes  -SF         LLIS - Physical Concerns    The amount of pain associated with my lymphedema is: 0  -SF      The amount of limb heaviness associated with my lymphedema is: 1  -SF      The amount of skin tightness associated with my lymphedema is: 1  -SF      The size of my swollen limb(s) seems: 1  -SF      Lymphedema affects the movement of my swollen limb(s): 0  Patient reports movement is affected by surgery and not lymphedema  -SF      The strength in my swollen limb(s) is: 1  -SF         LLIS - Psychosocial Concerns    Lymphedema affects my body image (i.e., \"how I think I look\"). 0  -SF      Lymphedema affects my socializing with others. 0  -SF      Lymphedema affects my intimate relations with spouse or partner (rate 0 if not applicable 0  -SF      Lymphedema \"gets me down\" (i.e., depression, frustration, or anger) 0  -SF      I must rely on others for help due to my lymphedema. 1  -SF      I know what to do to manage my lymphedema 0  -SF         LLIS - Functional Concerns    Lymphedema affects my ability to perform self-care activities (i.e. eating, dressing, hygiene) 0  -SF      Lymphedema affects my ability to perform routine home or work-related activities. 0  -SF      Lymphedema affects my performance of preferred leisure activities. 1  -SF      Lymphedema affects proper fit of clothing/shoes 0  -SF      Lymphedema affects my sleep 0  -SF         Right Upper Ext    Rt Shoulder Abduction AROM 110  -SF      Rt Shoulder Flexion AROM 135  -SF      Rt Shoulder External Rotation AROM 55  -SF      Rt Shoulder Internal Rotation AROM 50  -SF         Skin Changes/Observations    Skin Observations Comment Patient arrived to therapy appointment w/ Circaid wrap donned to [place].  CircAid wrap noted with " "improper placement providing extra compression around bicep with swelling noted above.  -SF         Compression/Skin Care    Compression/Skin Care compression garment  -SF      Compression Garment Comments CircAid reduction kit for right upper extremity.  Patient required mod assist to don garment  -SF         L-Dex Bioimpedence Screening    L-Dex Measurement Extremity LUE  -SF      L-Dex Patient Position Standing  -SF      L-Dex UE Dominate Side Left  -SF      L-Dex UE At Risk Side Right  -SF      L-Dex UE Score 12.3  -SF      L-Dex UE Baseline Score 10.6  -SF      L-Dex UE Value Change 1.7  -SF      Height 165.1 cm (65\")  -SF      Weight 154 kg (339 lb 8.1 oz)  -SF      BMI (Calculated) 56.5  -SF      $ L-Dex Charge yes  -SF         Lymphedema Life Impact Scale Totals    A.  Total Q1 - Q17 (Do not include Q18) 6  -SF      B.  Total number of questions answered (Q1-Q17) 17  -SF      C. Divide A by B 0.35  -SF      D. Multiple C by 25 8.75  -SF            User Key  (r) = Recorded By, (t) = Taken By, (c) = Cosigned By    Initials Name Provider Type    Clary Kelley OT Occupational Therapist                        Therapy Education  Education Details: Patient was educated on importance of utilizing compression pump to help manage lymphedema.  OT reviewed utilizing compression pump for 1 hour twice a day.  OT provided towel stretch to increase shoulder internal rotation following patient complaints of not being able to reach her back.  OT provided patient with \"bye-bye\" exercises to improve lymphatic mobilization and decrease patient risk of lymphatic cording and to also improve nerve pain.  Patient verbalized understanding and demonstrated exercises.  Review of complete decongestive therapy including importance for skin care, compression wrapping, exercise and self massage as well as signs and symptoms of infection.  Given: Symptoms/condition management, HEP  Program: New, Reinforced  How Provided: Verbal, " Demonstration, Written  Provided to: Patient  Level of Understanding: Verbalized  39339 - OT Self Care/Mgmt Minutes: 55         OT Goals     Row Name 10/12/22 1705          Time Calculation    OT Goal Re-Cert Due Date 11/11/22  -           User Key  (r) = Recorded By, (t) = Taken By, (c) = Cosigned By    Initials Name Provider Type    Clary Kelley OT Occupational Therapist              Goals:  1. Post Breast Surgery Care/at risk for Lymphedema  LTG 1: 90 days:  As an indicator of no exacerbation of lymphedema staging, the patient will present with an L-Dex score less than 7 points from preoperative baseline.              STATUS: Not Met: Ongoing     STG 1a:   30 days: To prevent exacerbation of mixed edema to lymphedema, patient will utilize the 2 postsurgical compression garments daily.                 STATUS: Not met: Discontinue     STG 1b: 30 days: Patient will be independent with self-manual lymphatic massage.               STATUS: Met: Ongoing      STG 1c: 30 days:  Patient will be independent with identification of signs and symptoms of lymphedema exasperation per stoplight to recovery education handout.              STATUS: met: Ongoing      STG 1 d: 30 days: Patient will be independent with HEP to prevent advancement in lymphedema staging.              STATUS: Partially met: ongoing     TREATMENT:  Self Care/ADL retraining, Therapeutic Activity, Neuromuscular Re-education, Therapeutic Exercise, Bioimpedence Fluid Analysis, Post-Surgical compression garement [id number] [facility] [employer] Cam[employer] Kit 2860K, Orthotic Management and training,  and Manual Therapy.     2. Decreased Functional Use of R UE  LTG [2]:  90 Days: The patient will report a pain rating of 2/10 or better to improve functional use of right upper extremity and IADL tasks.            STATUS: Met: ongoing  STG [2]a:  The patient will report a pain rating of 4/10 or better as an average in 1 week intervals.   STATUS:  Met:  ongoing     3. Decrease Functional ROM:     LTG [3]: 90 days:  The patient will demonstrate an increase of right upper extremity AROM to WFL for ADL independence.  STATUS:  Partially Met: ongoing     STG [3]a: The patient will demonstrate an increase in all shoulder planes in right upper extremity up to degrees of 20 or WFL for improved self-care independence.        STATUS:  Partially Met: ongoing     TREATMENT:  Manual Therapy, Therapeutic Activity, ADL retraining, Neuromuscular Re-education, Therapeutic Exercise, Patient and Family Education, Orthotic Management and training, Modalities: TENS, NMES, Ultrasound, Fluidotherapy Wound dressing as needed, Modalities as needed and Manual Therapy.     3. Carrying, Moving, and Handling Objects Functional Limitation                               LTG 4: 90 days:  The patient will demonstrate 1-19% limitation by achieving a score of 11 on the Quick Dash.                      STATUS:  Partially Met: ongoing              STG 4a: 30 days:  The patient will demonstrate 80-99% limitation by achieving a score of 54 on the Quick Dash.                      STATUS:  Met              STG 4a: 30 days:  The patient will demonstrate 40-9% limitation by achieving a score of 36 on the Quick Dash.                      STATUS:  Ongoing  TREATMENT: Manual Therapy, Therapeutic Activity, Neuromuscular Re-education, Therapeutic Exercise, Patient and Family Education, Orthotic Management and training, Modalities: TENS, NMES, Ultrasound, Fluidotherapy Wound dressing as needed, Modalities as needed and Manual Therapy.     OT Assessment/Plan     Row Name 10/12/22 0374          OT Assessment    Functional Limitations Performance in self-care ADL;Limitations in functional capacity and performance  -SF     Impairments Impaired lymphatic circulation;Range of motion;Posture;Pain;Muscle strength;Joint mobility  -SF     Assessment Comments Patient continues to tolerate treatments well and demonstrates  good understanding of need for compression garment to help manage lymphedema.  Patient subjectively reports comparable score on the lymphedema life impact scale as well as showing comparable range of motion measurements to last reevaluation.  OT and patient discussed continuing therapy to improve carryover of lymphedema management techniques as well as incorporate compression pump into daily routine.  Patient verbalized understanding and was in agreement with plan.  Patient L-Dex scores demonstrate no significant change.  Patient will continue to benefit from skilled occupational therapy to prevent progression of lymphedema to every reversible stage, decrease risk of cellulitis associated with the condition, and increased activity tolerance for daily functional task.  -SF     OT Diagnosis Lymphedema  -SF     OT Rehab Potential Good  -SF     Patient/caregiver participated in establishment of treatment plan and goals Yes  -SF     Patient would benefit from skilled therapy intervention Yes  -SF        OT Plan    OT Frequency 1x/week  -SF     Predicted Duration of Therapy Intervention (OT) 4 weeks  -SF     Planned CPT's? OT RE-EVAL: 92012;OT THER ACT EA 15 MIN: [id number];OT THER PROC EA 15 MIN: [id number];OT NEUROMUSC RE EDUCATION EA 15 MIN: 21000;OT SELF CARE/MGMT/TRAIN 15 MIN: 88011;OT HOT/COLD PACK;OT ULTRASOUND EA 15 MIN: 06060;OT ELECTRIC STIM ATTD EA 15 MIN: 93876;OT MANUAL THERAPY EA 15 MIN: 50322;OT BIS XTRACELL FLUID ANALYSIS: 85579;OT ORTHOTIC MGMT/TRAIN EA 15 MIN: 57727;OT ORTHO/PROSTHET CHECKOUT EA 15 MIN: 76566  -SF     Planned Therapy Interventions (Optional Details) home exercise program;joint mobilization;manual therapy techniques;neuromuscular re-education;orthotic fitting/training;patient/family education;postural re-education;ROM (Range of Motion);strengthening  -SF     OT Plan Comments Continue POC  -SF           User Key  (r) = Recorded By, (t) = Taken By, (c) = Cosigned By    Initials Name Provider Type     SF Clary Rosas OT Occupational Therapist                          Time Calculation:   Timed Charges  65820 - OT Self Care/Mgmt Minutes: 55  Total Minutes  Timed Charges Total Minutes: 55   Total Minutes: 55     Therapy Charges for Today     Code Description Service Date Service Provider Modifiers Qty    62849968393 HC PT BIS XTRACELL FLUID ANALYSIS 10/12/2022 Clary Rosas OT  1    [id number] HC OT SELF CARE/MGMT/TRAIN EA 15 MIN 10/12/2022 Clary Rosas OT GO 4                    Clary Rosas OT  10/12/2022

## 2022-10-18 ENCOUNTER — APPOINTMENT (OUTPATIENT)
Dept: OCCUPATIONAL THERAPY | Facility: HOSPITAL | Age: 67
End: 2022-10-18

## 2022-10-21 ENCOUNTER — HOSPITAL ENCOUNTER (OUTPATIENT)
Dept: OCCUPATIONAL THERAPY | Facility: HOSPITAL | Age: 67
Discharge: HOME | End: 2022-10-21
Admitting: SURGERY

## 2022-10-21 DIAGNOSIS — Z90.13 HISTORY OF MASTECTOMY, BILATERAL: ICD-10-CM

## 2022-10-21 DIAGNOSIS — Z91.89 AT RISK FOR LYMPHEDEMA: ICD-10-CM

## 2022-10-21 DIAGNOSIS — C50.911 BILATERAL MALIGNANT NEOPLASM OF BREAST IN FEMALE, ESTROGEN RECEPTOR POSITIVE, UNSPECIFIED SITE OF BREAST: ICD-10-CM

## 2022-10-21 DIAGNOSIS — I97.2 POSTMASTECTOMY LYMPHEDEMA SYNDROME: ICD-10-CM

## 2022-10-21 DIAGNOSIS — Z17.0 BILATERAL MALIGNANT NEOPLASM OF BREAST IN FEMALE, ESTROGEN RECEPTOR POSITIVE, UNSPECIFIED SITE OF BREAST: ICD-10-CM

## 2022-10-21 DIAGNOSIS — Z91.89 AT RISK FOR SELF CARE DEFICIT: ICD-10-CM

## 2022-10-21 DIAGNOSIS — R52 PAIN: ICD-10-CM

## 2022-10-21 DIAGNOSIS — R29.3 ABNORMAL POSTURE: ICD-10-CM

## 2022-10-21 DIAGNOSIS — L90.5 SCAR CONDITION AND FIBROSIS OF SKIN: ICD-10-CM

## 2022-10-21 DIAGNOSIS — C50.912 BILATERAL MALIGNANT NEOPLASM OF BREAST IN FEMALE, ESTROGEN RECEPTOR POSITIVE, UNSPECIFIED SITE OF BREAST: ICD-10-CM

## 2022-10-21 DIAGNOSIS — M62.81 MUSCLE WEAKNESS (GENERALIZED): ICD-10-CM

## 2022-10-21 DIAGNOSIS — I89.0 LYMPHEDEMA: Primary | ICD-10-CM

## 2022-10-21 DIAGNOSIS — M25.60 JOINT STIFFNESS: ICD-10-CM

## 2022-10-21 PROCEDURE — 97140 MANUAL THERAPY 1/> REGIONS: CPT

## 2022-10-21 PROCEDURE — 97110 THERAPEUTIC EXERCISES: CPT

## 2022-10-21 NOTE — THERAPY TREATMENT NOTE
Outpatient Occupational Therapy Lymphedema Treatment Note  OLGA Clifton     Patient Name: Aida Conner  : 1955  MRN: [id number]  Today's Date: 10/21/2022      Visit Date: 10/21/2022    Patient Active Problem List   Diagnosis   • Inflammatory breast cancer, right (HCC)   • Other specified disorders of breast    • Malignant neoplasm of axillary tail of right female breast (HCC)   • Encounter for eye exam due to high risk medication   • Obesity hypoventilation syndrome (HCC)   • Iron adverse reaction   • Functional diarrhea   • Encounter for long-term (current) use of high-risk medication   • Abnormal mammogram   • Allergic rhinitis   • Anemia   • Anxiety disorder   • Breast pain, right   • Depression   • Essential tremor   • Hemorrhoids   • Hyperlipidemia   • Hypertension, essential   • IBS (irritable bowel syndrome)   • Impaired fasting glucose   • Obesity   • Obstructive sleep apnea   • Osteoarthrosis   • Renal calculi   • Restrictive airway disease   • Type II diabetes mellitus (HCC)   • Vitamin deficiency   • Urinary bladder incontinence   • Malignant neoplasm of central portion of right female breast (HCC)   • Osteoporosis without current pathological fracture   • Chronic diastolic congestive heart failure (HCC)   • Coronary artery calcification seen on CT scan   • Aromatase inhibitor use   • Belching   • Bloating   • Hiatal hernia   • History of breast cancer   • Family history of colon cancer        Past Medical History:   Diagnosis Date   • Allergic rhinitis    • Anemia    • Anxiety    • Asthma    • Chronic bronchitis (HCC)     In past, espec. during working years   • Chronic diastolic (congestive) heart failure (HCC)    • Chronic hypoxemic respiratory failure (HCC)     on continuous O2   • Coronary artery calcification seen on CT scan 2022   • Diarrhea     with chemo   • GERD (gastroesophageal reflux disease) Mild   • H/O Intraductal papilloma    • Hemorrhoids    • History of transfusion 1984     AFTER BACK SURGERY no reaction   • Hypertension    • IBS (irritable bowel syndrome)    • Inflammatory breast cancer (HCC)     right   • Kidney stone    • Morbid obesity with BMI of 50.0-59.9, adult (HCC)    • Obesity hypoventilation syndrome (HCC) 02/05/2021   • On home oxygen therapy     2.5 AT REST WITH ACTIVITY UP TO 4L   • ANABELLE on CPAP     cpap  & uses O2 with CPAP   • Osteoarthritis    • Pneumonia     Many times in past, 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   • PONV (postoperative nausea and vomiting)         Past Surgical History:   Procedure Laterality Date   • BREAST EXCISIONAL BIOPSY Bilateral    • COLONOSCOPY     • CYSTOSCOPY BLADDER STONE LITHOTRIPSY     • ENDOSCOPY     • KNEE ARTHROPLASTY Bilateral 2009   • MASTECTOMY Left 08/10/2021    Procedure: LEFT TOTAL MASTECTOMY;  Surgeon: Nicci Banks MD;  Location: [facility] OR;  Service: General;  Laterality: Left;   • MASTECTOMY Right 08/10/2021    Procedure: RIGHT MODIFIED RADICAL MASTECTOMY;  Surgeon: Nicci Banks MD;  Location: [facility] MAIN OR;  Service: General;  Laterality: Right;   • SPINE SURGERY  1984   • TOTAL ABDOMINAL HYSTERECTOMY WITH SALPINGO OOPHORECTOMY  2004   • UPPER GASTROINTESTINAL ENDOSCOPY     • VENOUS ACCESS DEVICE (PORT) INSERTION N/A 01/25/2021    Procedure: INSERTION VENOUS ACCESS DEVICE;  Surgeon: Nicci Banks MD;  Location: [facility] OR;  Service: General;  Laterality: N/A;   • VENOUS ACCESS DEVICE (PORT) REMOVAL Left 04/05/2022    Procedure: REMOVAL VENOUS ACCESS DEVICE;  Surgeon: Nicci Banks MD;  Location: [facility] OR;  Service: General;  Laterality: Left;       Visit Dx:      ICD-10-CM ICD-9-CM   1. Lymphedema  I89.0 457.1   2. Pain  R52 780.96   3. 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 Joint stiffness  M25.60 719.50   5. Scar condition and fibrosis of skin  L90.5 709.2   6. Abnormal posture  R29.3 781.92   7. Muscle weakness (generalized)  M62.81 728.87   8. At risk for self care  deficit  Z91.89 V49.89   9. At risk for lymphedema  Z91.89 V49.89   10. History of mastectomy, bilateral  Z90.13 V45.71   11. Bilateral malignant neoplasm of breast in female, estrogen receptor positive, unspecified site of breast (HCC)  C50.911 174.9    Z17.0 V86.0    C50.912         Lymphedema     Row Name 10/21/22 1100             Subjective Pain    Able to rate subjective pain? yes  -MP      Pre-Treatment Pain Level 2  -MP      Post-Treatment Pain Level 2  -MP      Subjective Pain Comment Patient reports that the sleeve for the pump seems to be fitting in a way that she thinks is causing swelling at the right bicep.  -MP         Subjective Comments    Subjective Comments Patient states she had to vicente CircAid independently this morning and had trouble getting it correctly.  -MP         Lymphedema Assessment    Lymphedema Classification RUE:;stage 1 (Spontaneously Reversible)  -MP      Lymphedema Cancer Related Sx bilateral;simple mastectomy  -MP      Lymph Nodes Removed # 19  -MP      Positive Lymph Nodes # 4  -MP      Chemo Received yes  -MP      Radiation Therapy Received yes  -MP         Manual Lymphatic Drainage    Manual Lymphatic Drainage initial sequence;opened regional lymph nodes;opened anastamoses;extremity treatment  -MP      Initial Sequence full neck  -MP      Opened Regional Lymph Nodes axillary;inguinal  -MP      Axillary left  -MP      Inguinal right  -MP      Opened Anastamoses anterior axillo-axillary;axillo-inguinal  -MP      Extremity Treatment MLD to full limb  -MP      MLD to Full Limb R UE  -MP         Compression/Skin Care    Compression/Skin Care compression garment  -MP      Compression Garment Comments Occupational therapist helped patient to don CircAid reduction kit following treatment.  OT emphasized need to pull garment up to the axilla for proper fit.  CircAid reduction kit appears more snug than it has previously been.  -MP            User Key  (r) = Recorded By, (t) = Taken By,  (c) = Cosigned By    Initials Name Provider Type    Belkis Kaur OT Occupational Therapist                 OT Assessment/Plan     Row Name 10/21/22 1513          OT Assessment    Assessment Comments Mrs. Conner presents today with increase in volume at the proximal right upper extremity, which she believes is due to pump not providing appropriate compression at the proximal end.  Occupational therapist encouraged patient to discuss settings and application of sleeve with lymphedema pump representative.  Patient verbalizes that she will call them when she leaves here today.  Patient continues to have difficulty with proper donning of CircAid reduction kit when she has to do it by herself, and is encouraged to try to get her  to help her with it prior to him leaving the house in the morning.  Patient continues to require skilled occupational therapy services in order to prevent progression of lymphedema to irreversible stage decrease risk of cellulitis associated with lymphedema and increase functional range of motion for activities of daily living  -MP           User Key  (r) = Recorded By, (t) = Taken By, (c) = Cosigned By    Initials Name Provider Type    Belkis Kaur OT Occupational Therapist                Manual Rx (last 36 hours)     Manual Treatments     Row Name 10/21/22 1515             Total Minutes    86022 - OT Manual Therapy Minutes 48  -MP            User Key  (r) = Recorded By, (t) = Taken By, (c) = Cosigned By    Initials Name Provider Type    Belkis Kaur OT Occupational Therapist              Therapy Education  Education Details: Occupational therapist observed that there was visually more edema at the proximal right arm, which patient states is due to compression pump not applying pressure at the proximal arm.  Occupational therapist asked patient if she had been donning the sleeve all the way to the axilla.  Patient states that she has been.  Occupational therapist  instructed patient to call lymphedema pump representative and discuss possible reasons and changes in settings that may be helpful.  Patient was also educated to continue to perform home exercise program as she has been doing including exercises and self manual lymphatic drainage.  Occupational therapist encouraged patient to continue to have  assist with donning of CircAid reduction kit and to make sure that it is pulled all the way to the axilla to provide appropriate compression.  Given: Symptoms/condition management, HEP  Program: Reinforced  How Provided: Verbal, Demonstration, Written  Provided to: Patient  Level of Understanding: Verbalized     Time Calculation:   Timed Charges  34114 - OT Therapeutic Exercise Minutes: 10  85435 - OT Manual Therapy Minutes: 48  Total Minutes  Timed Charges Total Minutes: 58   Total Minutes: 58     Therapy Charges for Today     Code Description Service Date Service Provider Modifiers Qty    80974137019  OT THER PROC EA 15 MIN 10/21/2022 Belkis Mooney OT GO 1    [id number]  OT MANUAL THERAPY EA 15 MIN 10/21/2022 Belkis Mooney OT GO 3                      Belkis Mooney OT  10/21/2022

## 2022-10-26 ENCOUNTER — APPOINTMENT (OUTPATIENT)
Dept: CARDIOLOGY | Facility: HOSPITAL | Age: 67
End: 2022-10-26

## 2022-10-26 NOTE — PROGRESS NOTES
Date of Office Visit:  10/07/2022  Encounter Provider:  Bradley Garza MD  Place of Service:  [facility]  Patient Name: Aida Conner  :  1955    To Whom it May Concern:    Ms Conner has chronic diastolic congestive heart failure, coronary artery calcification without symptoms of angina, systemic hypertension, sleep apnea, obesity hypoventilation syndrome on chronic oxygen, type 2 diabetes, and she is morbidly obese.  Her cardiac conditions do not necessarily put her at high risk with endoscopy but her numerous other comorbidities do.    Please contact our office with any questions or concerns. As always, it has been a pleasure to participate in your patient's care.      Bradley Garza MD  [facility]

## 2022-10-27 ENCOUNTER — APPOINTMENT (OUTPATIENT)
Dept: OCCUPATIONAL THERAPY | Facility: HOSPITAL | Age: 67
End: 2022-10-27

## 2022-10-27 ENCOUNTER — HOSPITAL ENCOUNTER (OUTPATIENT)
Dept: OCCUPATIONAL THERAPY | Facility: HOSPITAL | Age: 67
Setting detail: THERAPIES SERIES
Discharge: HOME | End: 2022-10-27

## 2022-10-27 DIAGNOSIS — R52 PAIN: ICD-10-CM

## 2022-10-27 DIAGNOSIS — C50.911 BILATERAL MALIGNANT NEOPLASM OF BREAST IN FEMALE, ESTROGEN RECEPTOR POSITIVE, UNSPECIFIED SITE OF BREAST: ICD-10-CM

## 2022-10-27 DIAGNOSIS — Z91.89 AT RISK FOR SELF CARE DEFICIT: ICD-10-CM

## 2022-10-27 DIAGNOSIS — M62.81 MUSCLE WEAKNESS (GENERALIZED): ICD-10-CM

## 2022-10-27 DIAGNOSIS — R29.3 ABNORMAL POSTURE: ICD-10-CM

## 2022-10-27 DIAGNOSIS — L90.5 SCAR CONDITION AND FIBROSIS OF SKIN: ICD-10-CM

## 2022-10-27 DIAGNOSIS — Z90.13 HISTORY OF MASTECTOMY, BILATERAL: ICD-10-CM

## 2022-10-27 DIAGNOSIS — Z17.0 BILATERAL MALIGNANT NEOPLASM OF BREAST IN FEMALE, ESTROGEN RECEPTOR POSITIVE, UNSPECIFIED SITE OF BREAST: ICD-10-CM

## 2022-10-27 DIAGNOSIS — Z91.89 AT RISK FOR LYMPHEDEMA: ICD-10-CM

## 2022-10-27 DIAGNOSIS — M25.60 JOINT STIFFNESS: ICD-10-CM

## 2022-10-27 DIAGNOSIS — I97.2 POSTMASTECTOMY LYMPHEDEMA SYNDROME: ICD-10-CM

## 2022-10-27 DIAGNOSIS — C50.912 BILATERAL MALIGNANT NEOPLASM OF BREAST IN FEMALE, ESTROGEN RECEPTOR POSITIVE, UNSPECIFIED SITE OF BREAST: ICD-10-CM

## 2022-10-27 DIAGNOSIS — I89.0 LYMPHEDEMA: Primary | ICD-10-CM

## 2022-10-27 PROCEDURE — 97140 MANUAL THERAPY 1/> REGIONS: CPT

## 2022-10-27 PROCEDURE — 97110 THERAPEUTIC EXERCISES: CPT

## 2022-10-27 NOTE — THERAPY TREATMENT NOTE
Outpatient Occupational Therapy Lymphedema Treatment Note  [facility] Clifton     Patient Name: Aida Conner  : 1955  MRN: [id number]  Today's Date: 10/27/2022      Visit Date: 10/27/2022    Patient Active Problem List   Diagnosis   • Inflammatory breast cancer, right (HCC)   • Other specified disorders of breast    • Malignant neoplasm of axillary tail of right female breast (HCC)   • Encounter for eye exam due to high risk medication   • Obesity hypoventilation syndrome (HCC)   • Iron adverse reaction   • Functional diarrhea   • Encounter for long-term (current) use of high-risk medication   • Abnormal mammogram   • Allergic rhinitis   • Anemia   • Anxiety disorder   • Breast pain, right   • Depression   • Essential tremor   • Hemorrhoids   • Hyperlipidemia   • Hypertension, essential   • IBS (irritable bowel syndrome)   • Impaired fasting glucose   • Obesity   • Obstructive sleep apnea   • Osteoarthrosis   • Renal calculi   • Restrictive airway disease   • Type II diabetes mellitus (HCC)   • Vitamin deficiency   • Urinary bladder incontinence   • Malignant neoplasm of central portion of right female breast (HCC)   • Osteoporosis without current pathological fracture   • Chronic diastolic congestive heart failure (HCC)   • Coronary artery calcification seen on CT scan   • Aromatase inhibitor use   • Belching   • Bloating   • Hiatal hernia   • History of breast cancer   • Family history of colon cancer        Past Medical History:   Diagnosis Date   • Allergic rhinitis    • Anemia    • Anxiety    • Asthma    • Chronic bronchitis (HCC)     In past, espec. during working years   • Chronic diastolic (congestive) heart failure (HCC)    • Chronic hypoxemic respiratory failure (HCC)     on continuous O2   • Coronary artery calcification seen on CT scan 2022   • Diarrhea     with chemo   • GERD (gastroesophageal reflux disease) Mild   • H/O Intraductal papilloma    • Hemorrhoids    • History of transfusion 1984     AFTER BACK SURGERY no reaction   • Hypertension    • IBS (irritable bowel syndrome)    • Inflammatory breast cancer (HCC)     right   • Kidney stone    • Morbid obesity with BMI of 50.0-59.9, adult (HCC)    • Obesity hypoventilation syndrome (HCC) 02/05/2021   • On home oxygen therapy     2.5 AT REST WITH ACTIVITY UP TO 4L   • ANABELLE on CPAP     cpap  & uses O2 with CPAP   • Osteoarthritis    • Pneumonia     Many times in past, 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   • PONV (postoperative nausea and vomiting)         Past Surgical History:   Procedure Laterality Date   • BREAST EXCISIONAL BIOPSY Bilateral    • COLONOSCOPY     • CYSTOSCOPY BLADDER STONE LITHOTRIPSY     • ENDOSCOPY     • KNEE ARTHROPLASTY Bilateral 2009   • MASTECTOMY Left 08/10/2021    Procedure: LEFT TOTAL MASTECTOMY;  Surgeon: Nicci Banks MD;  Location: [facility] OR;  Service: General;  Laterality: Left;   • MASTECTOMY Right 08/10/2021    Procedure: RIGHT MODIFIED RADICAL MASTECTOMY;  Surgeon: Nicci Banks MD;  Location: [facility] MAIN OR;  Service: General;  Laterality: Right;   • SPINE SURGERY  1984   • TOTAL ABDOMINAL HYSTERECTOMY WITH SALPINGO OOPHORECTOMY  2004   • UPPER GASTROINTESTINAL ENDOSCOPY     • VENOUS ACCESS DEVICE (PORT) INSERTION N/A 01/25/2021    Procedure: INSERTION VENOUS ACCESS DEVICE;  Surgeon: Nicci Banks MD;  Location: [facility] OR;  Service: General;  Laterality: N/A;   • VENOUS ACCESS DEVICE (PORT) REMOVAL Left 04/05/2022    Procedure: REMOVAL VENOUS ACCESS DEVICE;  Surgeon: Nicci Banks MD;  Location: [facility] OR;  Service: General;  Laterality: Left;     Visit Dx:      ICD-10-CM ICD-9-CM   1. Lymphedema  I89.0 457.1   2. Pain  R52 780.96   3. 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 Joint stiffness  M25.60 719.50   5. Scar condition and fibrosis of skin  L90.5 709.2   6. Abnormal posture  R29.3 781.92   7. Muscle weakness (generalized)  M62.81 728.87   8. At risk for self care  deficit  Z91.89 V49.89   9. At risk for lymphedema  Z91.89 V49.89   10. History of mastectomy, bilateral  Z90.13 V45.71   11. Bilateral malignant neoplasm of breast in female, estrogen receptor positive, unspecified site of breast (HCC)  C50.911 174.9    Z17.0 V86.0    C50.912       Lymphedema     Row Name 10/27/22 1400             Subjective Pain    Able to rate subjective pain? yes  -MP      Pre-Treatment Pain Level 0  -MP      Post-Treatment Pain Level 0  -MP      Subjective Pain Comment Patient denies pain today.  She states her  was in a hurry to get out of the house.  -MP         Subjective Comments    Subjective Comments Patient arrived with no compression on the right UE.  -MP         Manual Lymphatic Drainage    Manual Lymphatic Drainage initial sequence;opened regional lymph nodes;opened anastamoses;extremity treatment  -MP      Initial Sequence full neck  -MP      Opened Regional Lymph Nodes axillary;inguinal  -MP      Axillary left  -MP      Inguinal right  -MP      Opened Anastamoses anterior axillo-axillary  -MP      Extremity Treatment MLD to full limb  -MP      MLD to Full Limb R UE  -MP         Compression/Skin Care    Compression/Skin Care compression garment  -MP      Compression Garment Comments Patient arrived without compression garment on today, and compression garment was left at home.  -MP      Compression/Skin Care Comments Unable to assist with compression garment due to patient not having it with her.  -MP            User Key  (r) = Recorded By, (t) = Taken By, (c) = Cosigned By    Initials Name Provider Type    Belkis Kaur OT Occupational Therapist                Manual Rx (last 36 hours)     Manual Treatments     Row Name 10/27/22 1524             Total Minutes    46847 - OT Manual Therapy Minutes 38  -MP            User Key  (r) = Recorded By, (t) = Taken By, (c) = Cosigned By    Initials Name Provider Type    Belkis Kaur OT Occupational Therapist                 Therapy Education  Education Details: Occupational therapist emphasized the importance of wearing CircAid reduction kit daily, 24/7 as much as possible in order to get best lymph reduction.  Also, patient was reminded that use of the lymphedema pump 2 times a day would be helpful and patient informed therapist that she has not contacted the lymphedema pump representative to discuss swelling at the proximal arm yet.  Therapist encouraged patient to call lymphedema pump representative as soon as she leaves here today.  Patient is asked to continue home exercise program activities and manual lymphatic drainage as well.  Patient is agreeable.  Given: Symptoms/condition management, HEP  Program: Reinforced  How Provided: Verbal, Demonstration  Provided to: Patient  Level of Understanding: Verbalized        Time Calculation:   Timed Charges  05979 - OT Therapeutic Exercise Minutes: 16  97720 - OT Manual Therapy Minutes: 38  Total Minutes  Timed Charges Total Minutes: 54   Total Minutes: 54     Therapy Charges for Today     Code Description Service Date Service Provider Modifiers Qty    [id number]  OT THER PROC EA 15 MIN 10/27/2022 Belkis Mooney OT GO 1    [id number]  OT MANUAL THERAPY EA 15 MIN 10/27/2022 Belkis Mooney OT GO 3                      Belkis Mooney OT  10/27/2022

## 2022-11-01 ENCOUNTER — HOSPITAL ENCOUNTER (OUTPATIENT)
Dept: OCCUPATIONAL THERAPY | Facility: HOSPITAL | Age: 67
Setting detail: THERAPIES SERIES
Discharge: HOME | End: 2022-11-01

## 2022-11-01 ENCOUNTER — APPOINTMENT (OUTPATIENT)
Dept: OCCUPATIONAL THERAPY | Facility: HOSPITAL | Age: 67
End: 2022-11-01

## 2022-11-01 DIAGNOSIS — M25.60 JOINT STIFFNESS: ICD-10-CM

## 2022-11-01 DIAGNOSIS — R52 PAIN: ICD-10-CM

## 2022-11-01 DIAGNOSIS — I97.2 POSTMASTECTOMY LYMPHEDEMA SYNDROME: ICD-10-CM

## 2022-11-01 DIAGNOSIS — I89.0 LYMPHEDEMA: Primary | ICD-10-CM

## 2022-11-01 PROCEDURE — 97140 MANUAL THERAPY 1/> REGIONS: CPT

## 2022-11-01 NOTE — THERAPY TREATMENT NOTE
Outpatient Occupational Therapy Lymphedema Treatment Note  OL[facility] Clifton     Patient Name: Aida Conner  : 1955  MRN: [id number]  Today's Date: 2022      Visit Date: 2022    Patient Active Problem List   Diagnosis   • Inflammatory breast cancer, right (HCC)   • Other specified disorders of breast    • Malignant neoplasm of axillary tail of right female breast (HCC)   • Encounter for eye exam due to high risk medication   • Obesity hypoventilation syndrome (HCC)   • Iron adverse reaction   • Functional diarrhea   • Encounter for long-term (current) use of high-risk medication   • Abnormal mammogram   • Allergic rhinitis   • Anemia   • Anxiety disorder   • Breast pain, right   • Depression   • Essential tremor   • Hemorrhoids   • Hyperlipidemia   • Hypertension, essential   • IBS (irritable bowel syndrome)   • Impaired fasting glucose   • Obesity   • Obstructive sleep apnea   • Osteoarthrosis   • Renal calculi   • Restrictive airway disease   • Type II diabetes mellitus (HCC)   • Vitamin deficiency   • Urinary bladder incontinence   • Malignant neoplasm of central portion of right female breast (HCC)   • Osteoporosis without current pathological fracture   • Chronic diastolic congestive heart failure (HCC)   • Coronary artery calcification seen on CT scan   • Aromatase inhibitor use   • Belching   • Bloating   • Hiatal hernia   • History of breast cancer   • Family history of colon cancer        Past Medical History:   Diagnosis Date   • Allergic rhinitis    • Anemia    • Anxiety    • Asthma    • Chronic bronchitis (HCC)     In past, espec. during working years   • Chronic diastolic (congestive) heart failure (HCC)    • Chronic hypoxemic respiratory failure (HCC)     on continuous O2   • Coronary artery calcification seen on CT scan 2022   • Diarrhea     with chemo   • GERD (gastroesophageal reflux disease) Mild   • H/O Intraductal papilloma    • Hemorrhoids    • History of transfusion 1984     AFTER BACK SURGERY no reaction   • Hypertension    • IBS (irritable bowel syndrome)    • Inflammatory breast cancer (HCC)     right   • Kidney stone    • Morbid obesity with BMI of 50.0-59.9, adult (HCC)    • Obesity hypoventilation syndrome (HCC) 02/05/2021   • On home oxygen therapy     2.5 AT REST WITH ACTIVITY UP TO 4L   • ANABELLE on CPAP     cpap  & uses O2 with CPAP   • Osteoarthritis    • Pneumonia     Many times in past, 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   • PONV (postoperative nausea and vomiting)         Past Surgical History:   Procedure Laterality Date   • BREAST EXCISIONAL BIOPSY Bilateral    • COLONOSCOPY     • CYSTOSCOPY BLADDER STONE LITHOTRIPSY     • ENDOSCOPY     • KNEE ARTHROPLASTY Bilateral 2009   • MASTECTOMY Left 08/10/2021    Procedure: LEFT TOTAL MASTECTOMY;  Surgeon: Nicci Banks MD;  Location: [facility];  Service: General;  Laterality: Left;   • MASTECTOMY Right 08/10/2021    Procedure: RIGHT MODIFIED RADICAL MASTECTOMY;  Surgeon: Nicci Banks MD;  Location: [facility] OR;  Service: General;  Laterality: Right;   • SPINE SURGERY  1984   • TOTAL ABDOMINAL HYSTERECTOMY WITH SALPINGO OOPHORECTOMY  2004   • UPPER GASTROINTESTINAL ENDOSCOPY     • VENOUS ACCESS DEVICE (PORT) INSERTION N/A 01/25/2021    Procedure: INSERTION VENOUS ACCESS DEVICE;  Surgeon: Nicci Banks MD;  Location: [facility] OR;  Service: General;  Laterality: N/A;   • VENOUS ACCESS DEVICE (PORT) REMOVAL Left 04/05/2022    Procedure: REMOVAL VENOUS ACCESS DEVICE;  Surgeon: Nicci Banks MD;  Location: [facility] OR;  Service: General;  Laterality: Left;         Visit Dx:      ICD-10-CM ICD-9-CM   1. Lymphedema  I89.0 457.1   2. Pain  R52 780.96   3. 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 Joint stiffness  M25.60 719.50        Lymphedema     Row Name 11/01/22 1500             Subjective Pain    Able to rate subjective pain? yes  -SF      Pre-Treatment Pain Level 0  -SF       Post-Treatment Pain Level 0  -SF      Subjective Pain Comment Patient denies pain  -SF         Subjective Comments    Subjective Comments Patient arrived w/ Circaid kit on RUE. Patient reports she has stopped using compression pump due to pump not providing compression to bicep and seems to be causing more swelling. patient reports she has called rep to have him come and assess pump.  -SF         Manual Lymphatic Drainage    Manual Lymphatic Drainage initial sequence;opened regional lymph nodes;opened anastamoses;extremity treatment  -SF      Initial Sequence full neck  -SF      Opened Regional Lymph Nodes axillary;inguinal  -SF      Axillary left;right  -SF      Inguinal right  -SF      Opened Anastamoses anterior axillo-axillary;axillo-inguinal  -SF      Extremity Treatment MLD to full limb  -SF      MLD to Full Limb RUE  -SF         Compression/Skin Care    Compression/Skin Care compression garment;skin care  -SF      Skin Care moisturizing lotion applied  -SF      Compression Garment Comments Patient arrived w/ Circaid reduction kit on RUE  -SF      Compression/Skin Care Comments OT provided max A to don Circaid reduction kit following treatment  -SF            User Key  (r) = Recorded By, (t) = Taken By, (c) = Cosigned By    Initials Name Provider Type    Clary Kelley OT Occupational Therapist                         OT Assessment/Plan     Row Name 11/01/22 1816          OT Assessment    Assessment Comments Patient demonstrates compliance with CircAid reduction kit on this date. Patient will continue to benefit from skilled occupational therapy services in order to prevent progression of lymphedema to irreversible stage, decrease risk of cellulitis associated with lymphedema, and increased functional range of motion for activities of daily living.  -SF        OT Plan    OT Plan Comments Continue w/ POC  -SF           User Key  (r) = Recorded By, (t) = Taken By, (c) = Cosigned By    Initials Name Provider  Type    SF Clary Rosas OT Occupational Therapist                    Manual Rx (last 36 hours)     Manual Treatments     Row Name 11/01/22 1720             Total Minutes    11131 - OT Manual Therapy Minutes 40  -SF            User Key  (r) = Recorded By, (t) = Taken By, (c) = Cosigned By    Initials Name Provider Type    Clary Kelley OT Occupational Therapist                                   Time Calculation:   Timed Charges  21650 - OT Manual Therapy Minutes: 40  Total Minutes  Timed Charges Total Minutes: 40   Total Minutes: 40     Therapy Charges for Today     Code Description Service Date Service Provider Modifiers Qty    [id number]  OT MANUAL THERAPY EA 15 MIN 11/1/2022 Clary Rosas OT GO 3                      Clary Rosas OT  11/1/2022

## 2022-11-08 ENCOUNTER — TELEPHONE (OUTPATIENT)
Dept: GASTROENTEROLOGY | Facility: CLINIC | Age: 67
End: 2022-11-08

## 2022-11-08 NOTE — TELEPHONE ENCOUNTER
----- Message from Bradley Garza MD sent at 11/8/2022 11:51 AM EST -----        ----- Message -----  From: Ghazala Castro RegSched Rep  Sent: 11/8/2022  11:21 AM EST  To: Bradley Garza MD

## 2022-11-09 ENCOUNTER — HOSPITAL ENCOUNTER (OUTPATIENT)
Dept: OCCUPATIONAL THERAPY | Facility: HOSPITAL | Age: 67
Setting detail: THERAPIES SERIES
Discharge: HOME | End: 2022-11-09

## 2022-11-09 DIAGNOSIS — I89.0 LYMPHEDEMA: Primary | ICD-10-CM

## 2022-11-09 DIAGNOSIS — L90.5 SCAR CONDITION AND FIBROSIS OF SKIN: ICD-10-CM

## 2022-11-09 DIAGNOSIS — I97.2 POSTMASTECTOMY LYMPHEDEMA SYNDROME: ICD-10-CM

## 2022-11-09 DIAGNOSIS — M25.60 JOINT STIFFNESS: ICD-10-CM

## 2022-11-09 DIAGNOSIS — R52 PAIN: ICD-10-CM

## 2022-11-09 PROCEDURE — 97140 MANUAL THERAPY 1/> REGIONS: CPT

## 2022-11-09 PROCEDURE — 97535 SELF CARE MNGMENT TRAINING: CPT

## 2022-11-09 NOTE — THERAPY TREATMENT NOTE
Outpatient Occupational Therapy Lymphedema Treatment Note  [facility]     Patient Name: Aida Conner  : 1955  MRN: [id number]  Today's Date: 2022      Visit Date: 2022    Patient Active Problem List   Diagnosis   • Inflammatory breast cancer, right (HCC)   • Other specified disorders of breast    • Malignant neoplasm of axillary tail of right female breast (HCC)   • Encounter for eye exam due to high risk medication   • Obesity hypoventilation syndrome (HCC)   • Iron adverse reaction   • Functional diarrhea   • Encounter for long-term (current) use of high-risk medication   • Abnormal mammogram   • Allergic rhinitis   • Anemia   • Anxiety disorder   • Breast pain, right   • Depression   • Essential tremor   • Hemorrhoids   • Hyperlipidemia   • Hypertension, essential   • IBS (irritable bowel syndrome)   • Impaired fasting glucose   • Obesity   • Obstructive sleep apnea   • Osteoarthrosis   • Renal calculi   • Restrictive airway disease   • Type II diabetes mellitus (HCC)   • Vitamin deficiency   • Urinary bladder incontinence   • Malignant neoplasm of central portion of right female breast (HCC)   • Osteoporosis without current pathological fracture   • Chronic diastolic congestive heart failure (HCC)   • Coronary artery calcification seen on CT scan   • Aromatase inhibitor use   • Belching   • Bloating   • Hiatal hernia   • History of breast cancer   • Family history of colon cancer        Past Medical History:   Diagnosis Date   • Allergic rhinitis    • Anemia    • Anxiety    • Asthma    • Chronic bronchitis (HCC)     In past, espec. during working years   • Chronic diastolic (congestive) heart failure (HCC)    • Chronic hypoxemic respiratory failure (HCC)     on continuous O2   • Coronary artery calcification seen on CT scan 2022   • Diarrhea     with chemo   • GERD (gastroesophageal reflux disease) Mild   • H/O Intraductal papilloma    • Hemorrhoids    • History of transfusion 1984     AFTER BACK SURGERY no reaction   • Hypertension    • IBS (irritable bowel syndrome)    • Inflammatory breast cancer (HCC)     right   • Kidney stone    • Morbid obesity with BMI of 50.0-59.9, adult (HCC)    • Obesity hypoventilation syndrome (HCC) 02/05/2021   • On home oxygen therapy     2.5 AT REST WITH ACTIVITY UP TO 4L   • ANABELLE on CPAP     cpap  & uses O2 with CPAP   • Osteoarthritis    • Pneumonia     Many times in past, 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   • PONV (postoperative nausea and vomiting)         Past Surgical History:   Procedure Laterality Date   • BREAST EXCISIONAL BIOPSY Bilateral    • COLONOSCOPY     • CYSTOSCOPY BLADDER STONE LITHOTRIPSY     • ENDOSCOPY     • KNEE ARTHROPLASTY Bilateral 2009   • MASTECTOMY Left 08/10/2021    Procedure: LEFT TOTAL MASTECTOMY;  Surgeon: Nicci Banks MD;  Location: [facility] OR;  Service: General;  Laterality: Left;   • MASTECTOMY Right 08/10/2021    Procedure: RIGHT MODIFIED RADICAL MASTECTOMY;  Surgeon: Nicci Banks MD;  Location: [facility] OR;  Service: General;  Laterality: Right;   • SPINE SURGERY  1984   • TOTAL ABDOMINAL HYSTERECTOMY WITH SALPINGO OOPHORECTOMY  2004   • UPPER GASTROINTESTINAL ENDOSCOPY     • VENOUS ACCESS DEVICE (PORT) INSERTION N/A 01/25/2021    Procedure: INSERTION VENOUS ACCESS DEVICE;  Surgeon: Nicci Banks MD;  Location: [facility] OR;  Service: General;  Laterality: N/A;   • VENOUS ACCESS DEVICE (PORT) REMOVAL Left 04/05/2022    Procedure: REMOVAL VENOUS ACCESS DEVICE;  Surgeon: Nicci Banks MD;  Location: [facility] OR;  Service: General;  Laterality: Left;         Visit Dx:      ICD-10-CM ICD-9-CM   1. Lymphedema  I89.0 457.1   2. Pain  R52 780.96   3. 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 Joint stiffness  M25.60 719.50   5. Scar condition and fibrosis of skin  L90.5 709.2        Lymphedema     Row Name 11/09/22 1400             Subjective Pain    Able to rate subjective pain? yes  -SF       Pre-Treatment Pain Level 0  -SF      Post-Treatment Pain Level 0  -SF      Subjective Pain Comment Patient denies pain  -SF         Subjective Comments    Subjective Comments Patient reports she has not gotten in contact with compression pump rep to assess function of machine at this time.  Patient reports she has been able to maintain CircAid compression garment.  Patient reports engaging in HEP specifically cane exercises to address right shoulder flexion.  -SF         Skin Changes/Observations    Skin Observations Comment Patient arrive w/ CircAid reduction kit donned to RUE  -SF         Lymphedema Measurements    Measurement Type(s) Circumferential  -SF      Circumferential Areas Upper extremities  -SF         Manual Lymphatic Drainage    Manual Lymphatic Drainage initial sequence;opened regional lymph nodes;opened anastamoses;extremity treatment  -SF      Initial Sequence short neck;supraclavicular;shoulder collectors  -SF      Opened Regional Lymph Nodes axillary;inguinal  -SF      Axillary left;right  -SF      Inguinal right  -SF      Opened Anastamoses anterior axillo-axillary;axillo-inguinal  -SF      Extremity Treatment MLD to full limb  -SF      MLD to Full Limb RUE  -SF         Compression/Skin Care    Compression/Skin Care compression garment;skin care  -SF      Skin Care moisturizing lotion applied  -SF      Compression/Skin Care Comments OT provided max a to don CircAid reduction kit following treatment  -SF            User Key  (r) = Recorded By, (t) = Taken By, (c) = Cosigned By    Initials Name Provider Type    Clary Kelley OT Occupational Therapist                         OT Assessment/Plan     Row Name 11/09/22 2661          OT Assessment    Assessment Comments Patient reports compliance with HEP as well as maintaining CircAid reduction kit on right upper extremity. Patient will continue to benefit from skilled occupational therapy services in order to prevent progression of lymphedema to  irreversible stage, decrease risk of cellulitis associated with lymphedema, and increased functional range of motion for activities of daily living.  -SF     Patient would benefit from skilled therapy intervention Yes  -SF        OT Plan    OT Plan Comments Continue w/ POC  -SF           User Key  (r) = Recorded By, (t) = Taken By, (c) = Cosigned By    Initials Name Provider Type    Clary Kelley OT Occupational Therapist                    Manual Rx (last 36 hours)     Manual Treatments     Row Name 11/09/22 1615             Total Minutes    48915 - OT Manual Therapy Minutes 30  -SF            User Key  (r) = Recorded By, (t) = Taken By, (c) = Cosigned By    Initials Name Provider Type    Clary Kelley OT Occupational Therapist                  Therapy Education  Education Details: OT and patient discussed compression camisole to address right axillary region due to patient discomfort with skin.  OT and patient discussed importance of utilizing compression pump to manage lymphedema.  Patient verbalized she would get in contact with compression pump rep to have machine assessed to be able to use daily.  Given: Symptoms/condition management, Edema management  Program: Reinforced  How Provided: Verbal  Provided to: Patient  Level of Understanding: Verbalized  90619 - OT Self Care/Mgmt Minutes: 10                Time Calculation:   Timed Charges  28862 - OT Manual Therapy Minutes: 30  90890 - OT Self Care/Mgmt Minutes: 10  Total Minutes  Timed Charges Total Minutes: 40   Total Minutes: 40     Therapy Charges for Today     Code Description Service Date Service Provider Modifiers Qty    02463044458  OT MANUAL THERAPY EA 15 MIN 11/9/2022 Clary Rosas OT GO 2    27911688001 HC OT SELF CARE/MGMT/TRAIN EA 15 MIN 11/9/2022 Clary Rosas OT GO 1                      Clary Rosas OT  11/9/2022

## 2022-11-15 ENCOUNTER — APPOINTMENT (OUTPATIENT)
Dept: OCCUPATIONAL THERAPY | Facility: HOSPITAL | Age: 67
End: 2022-11-15

## 2022-11-18 ENCOUNTER — TELEPHONE (OUTPATIENT)
Dept: GASTROENTEROLOGY | Facility: CLINIC | Age: 67
End: 2022-11-18

## 2022-11-18 NOTE — TELEPHONE ENCOUNTER
I left  reminding Ms Conner of her scheduled scope on 12.08.22, arrival time of 11:30am. Reminded of liquid diet the day prior. Reminded of bowel prep and instructions. Stated I would also send [employer] message too. chela

## 2022-12-08 ENCOUNTER — ANESTHESIA (OUTPATIENT)
Dept: GASTROENTEROLOGY | Facility: HOSPITAL | Age: 67
End: 2022-12-08

## 2022-12-08 ENCOUNTER — HOSPITAL ENCOUNTER (OUTPATIENT)
Facility: HOSPITAL | Age: 67
Setting detail: HOSPITAL OUTPATIENT SURGERY
Discharge: HOME | End: 2022-12-08
Attending: INTERNAL MEDICINE | Admitting: INTERNAL MEDICINE

## 2022-12-08 ENCOUNTER — ANESTHESIA EVENT (OUTPATIENT)
Dept: GASTROENTEROLOGY | Facility: HOSPITAL | Age: 67
End: 2022-12-08

## 2022-12-08 VITALS
HEART RATE: 70 BPM | RESPIRATION RATE: 19 BRPM | SYSTOLIC BLOOD PRESSURE: 147 MMHG | TEMPERATURE: 97.9 F | OXYGEN SATURATION: 93 % | DIASTOLIC BLOOD PRESSURE: 72 MMHG | BODY MASS INDEX: 56.06 KG/M2 | WEIGHT: 293 LBS

## 2022-12-08 DIAGNOSIS — Z80.0 FAMILY HISTORY OF COLON CANCER: ICD-10-CM

## 2022-12-08 DIAGNOSIS — Z85.3 HISTORY OF BREAST CANCER: ICD-10-CM

## 2022-12-08 DIAGNOSIS — R14.0 BLOATING: ICD-10-CM

## 2022-12-08 DIAGNOSIS — R14.2 BELCHING: ICD-10-CM

## 2022-12-08 DIAGNOSIS — K44.9 HIATAL HERNIA: ICD-10-CM

## 2022-12-08 DIAGNOSIS — K58.9 IRRITABLE BOWEL SYNDROME, UNSPECIFIED TYPE: ICD-10-CM

## 2022-12-08 PROCEDURE — 88305 TISSUE EXAM BY PATHOLOGIST: CPT | Performed by: INTERNAL MEDICINE

## 2022-12-08 PROCEDURE — G0105 COLORECTAL SCRN; HI RISK IND: HCPCS | Performed by: INTERNAL MEDICINE

## 2022-12-08 PROCEDURE — 43239 EGD BIOPSY SINGLE/MULTIPLE: CPT | Performed by: INTERNAL MEDICINE

## 2022-12-08 PROCEDURE — 25010000002 PROPOFOL 10 MG/ML EMULSION: Performed by: NURSE ANESTHETIST, CERTIFIED REGISTERED

## 2022-12-08 RX ORDER — SODIUM CHLORIDE, SODIUM LACTATE, POTASSIUM CHLORIDE, CALCIUM CHLORIDE 600; 310; 30; 20 MG/100ML; MG/100ML; MG/100ML; MG/100ML
30 INJECTION, SOLUTION INTRAVENOUS CONTINUOUS
Status: DISCONTINUED | OUTPATIENT
Start: 2022-12-08 | End: 2022-12-08 | Stop reason: HOSPADM

## 2022-12-08 RX ORDER — PROPOFOL 10 MG/ML
VIAL (ML) INTRAVENOUS AS NEEDED
Status: DISCONTINUED | OUTPATIENT
Start: 2022-12-08 | End: 2022-12-08 | Stop reason: SURG

## 2022-12-08 RX ORDER — LIDOCAINE HYDROCHLORIDE 20 MG/ML
INJECTION, SOLUTION EPIDURAL; INFILTRATION; INTRACAUDAL; PERINEURAL AS NEEDED
Status: DISCONTINUED | OUTPATIENT
Start: 2022-12-08 | End: 2022-12-08 | Stop reason: SURG

## 2022-12-08 RX ADMIN — PROPOFOL 100 MG: 10 INJECTION, EMULSION INTRAVENOUS at 13:49

## 2022-12-08 RX ADMIN — PROPOFOL 100 MCG/KG/MIN: 10 INJECTION, EMULSION INTRAVENOUS at 13:49

## 2022-12-08 RX ADMIN — SODIUM CHLORIDE, POTASSIUM CHLORIDE, SODIUM LACTATE AND CALCIUM CHLORIDE: 600; 310; 30; 20 INJECTION, SOLUTION INTRAVENOUS at 13:46

## 2022-12-08 RX ADMIN — LIDOCAINE HYDROCHLORIDE 50 MG: 20 INJECTION, SOLUTION EPIDURAL; INFILTRATION; INTRACAUDAL; PERINEURAL at 13:49

## 2022-12-08 NOTE — ANESTHESIA POSTPROCEDURE EVALUATION
Patient: Aida STARKEY Ubaldo    Procedure Summary     Date: 12/08/22 Room / Location: [facility] ENDOSCOPY 3 / [facility]    Anesthesia Start: 1346 Anesthesia Stop: 1422    Procedures:       ESOPHAGOGASTRODUODENOSCOPY  with biopsy      COLONOSCOPY Diagnosis:       Belching      Bloating      Hiatal hernia      Irritable bowel syndrome, unspecified type      History of breast cancer      Family history of colon cancer      (Belching [R14.2])      (Bloating [R14.0])      (Hiatal hernia [K44.9])      (Irritable bowel syndrome, unspecified type [K58.9])      (History of breast cancer [Z85.3])      (Family history of colon cancer [Z80.0])    Surgeons: Henry Aden MD Provider: Thad Gross MD    Anesthesia Type: general ASA Status: 3          Anesthesia Type: general    Vitals  Vitals Value Taken Time   /68 12/08/22 1426   Temp 36.7 °C (98 °F) 12/08/22 1420   Pulse 73 12/08/22 1431   Resp 19 12/08/22 1425   SpO2 91 % 12/08/22 1431   Vitals shown include unvalidated device data.        Post Anesthesia Care and Evaluation    Patient location during evaluation: bedside  Patient participation: complete - patient participated  Level of consciousness: awake  Pain management: adequate    Airway patency: patent  Anesthetic complications: No anesthetic complications  PONV Status: none  Cardiovascular status: acceptable and stable  Respiratory status: acceptable  Hydration status: acceptable    Comments: An Anesthesiologist personally participated in the most demanding procedures (including induction and emergence if applicable) in the anesthesia plan, monitored the course of anesthesia administration at frequent intervals and remained physically present and available for immediate diagnosis and treatment of emergencies.

## 2022-12-08 NOTE — ANESTHESIA PREPROCEDURE EVALUATION
Anesthesia Evaluation     Patient summary reviewed and Nursing notes reviewed   history of anesthetic complications: PONV  NPO Solid Status: > 8 hours  NPO Liquid Status: > 2 hours           Airway   Mallampati: II  TM distance: <3 FB  Neck ROM: full  Large neck circumference and Possible difficult intubation  Dental - normal exam     Pulmonary - normal exam    breath sounds clear to auscultation  (+) pneumonia resolved , COPD, asthma,home oxygen, sleep apnea on CPAP,   Cardiovascular - normal exam  Exercise tolerance: poor (<4 METS)    ECG reviewed  Rhythm: regular  Rate: normal    (+) hypertension, CAD, CHF Diastolic >=55%, hyperlipidemia,     ROS comment: Reviewed echo    Neuro/Psych  (+) psychiatric history Anxiety and Depression,    GI/Hepatic/Renal/Endo    (+) obesity, morbid obesity, hiatal hernia, GERD,  renal disease stones, diabetes mellitus type 2 well controlled,     Musculoskeletal     Abdominal   (+) obese,    Substance History      OB/GYN          Other   arthritis,    history of cancer                    Anesthesia Plan    ASA 3     general     (I have reviewed all pertinent information including medical history,allergies, imaging, studies and laboratory results. I have explained risks and benefits to anesthesia, including but not limited to; dental damage, corneal abrasion, sore throat, nausea, vomiting, aspiration, MI, nerve damage, failed block and death. Patient has agreed to proceed. )  intravenous induction     Anesthetic plan, risks, benefits, and alternatives have been provided, discussed and informed consent has been obtained with: patient.  Pre-procedure education provided  Plan discussed with CRNA.        CODE STATUS:

## 2022-12-08 NOTE — H&P
ScreeningPre Procedure History & Physical    Chief Complaint:   Screening   gerd  Family h/o colon cancer    Subjective     HPI:   Screening     Past Medical History:   Past Medical History:   Diagnosis Date   • Allergic rhinitis    • Anemia    • Anxiety    • Asthma    • Chronic bronchitis (HCC)     In past, espec. during working years   • Chronic diastolic (congestive) heart failure (HCC)    • Chronic hypoxemic respiratory failure (HCC)     on continuous O2   • Coronary artery calcification seen on CT scan 04/21/2022   • Diarrhea     with chemo   • GERD (gastroesophageal reflux disease) Mild   • H/O Intraductal papilloma    • Hemorrhoids    • History of transfusion 1984    AFTER BACK SURGERY no reaction   • Hypertension    • IBS (irritable bowel syndrome)    • Inflammatory breast cancer (HCC)     right   • Kidney stone    • Morbid obesity with BMI of 50.0-59.9, adult (HCC)    • Obesity hypoventilation syndrome (HCC) 02/05/2021   • On home oxygen therapy     2.5 AT REST WITH ACTIVITY UP TO 4L   • ANABELLE on CPAP     cpap  & uses O2 with CPAP   • Osteoarthritis    • Pneumonia     Many times in past, 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   • PONV (postoperative nausea and vomiting)        Past Surgical History:  Past Surgical History:   Procedure Laterality Date   • BREAST EXCISIONAL BIOPSY Bilateral    • COLONOSCOPY     • CYSTOSCOPY BLADDER STONE LITHOTRIPSY     • ENDOSCOPY     • KNEE ARTHROPLASTY Bilateral 2009   • MASTECTOMY Left 08/10/2021    Procedure: LEFT TOTAL MASTECTOMY;  Surgeon: Nicci Banks MD;  Location: [facility];  Service: General;  Laterality: Left;   • MASTECTOMY Right 08/10/2021    Procedure: RIGHT MODIFIED RADICAL MASTECTOMY;  Surgeon: Nicci Banks MD;  Location: [facility] OR;  Service: General;  Laterality: Right;   • SPINE SURGERY  1984   • TOTAL ABDOMINAL HYSTERECTOMY WITH SALPINGO OOPHORECTOMY  2004   • UPPER GASTROINTESTINAL ENDOSCOPY     • VENOUS ACCESS DEVICE (PORT) INSERTION N/A  01/25/2021    Procedure: INSERTION VENOUS ACCESS DEVICE;  Surgeon: Nicci Banks MD;  Location:  [facility];  Service: General;  Laterality: N/A;   • VENOUS ACCESS DEVICE (PORT) REMOVAL Left 04/05/2022    Procedure: REMOVAL VENOUS ACCESS DEVICE;  Surgeon: Nicci Banks MD;  Location:  [facility];  Service: General;  Laterality: Left;       Family History:  Family History   Problem Relation Age of Onset   • Breast cancer Mother 45   • Cancer Mother         breast; metastasized   • Lung cancer Father    • Hodgkin's lymphoma Father    • Skin cancer Father         squamous cell   • Cancer Father         4 kinds: Hodgkins; lung; squamous cell skin   • Breast cancer Maternal Aunt 70   • Cancer Maternal Aunt         breast; metastasized   • Breast cancer Paternal Aunt 70   • Cancer Paternal Aunt         breast; cured   • Colon cancer Paternal Grandmother 60   • Cancer Paternal Grandmother         colon cancer   • Hypertension Paternal Grandmother         EVERY ADULT IN MY FAMILY   • Ovarian cancer Neg Hx    • Uterine cancer Neg Hx    • Deep vein thrombosis Neg Hx    • Pulmonary embolism Neg Hx    • Malig Hyperthermia Neg Hx        Social History:   reports that she has never smoked. She has never used smokeless tobacco. She reports that she does not drink alcohol and does not use drugs.    Medications:   Medications Prior to Admission   Medication Sig Dispense Refill Last Dose   • albuterol sulfate  (90 Base) MCG/ACT inhaler Inhale 2 puffs Every 4 (Four) Hours As Needed.   Past Month   • anastrozole (ARIMIDEX) 1 MG tablet Take 1 tablet by mouth Daily. 90 tablet 1 12/7/2022   • carvedilol (COREG) 25 MG tablet Take 0.5 tablets by mouth 2 (Two) Times a Day With Meals. 30 tablet 0 12/7/2022   • Cholecalciferol (Vitamin D) 50 MCG (2000 UT) capsule Take 2,000 Units by mouth Daily.   12/7/2022   • dicyclomine (BENTYL) 20 MG tablet Take 1 tablet by mouth Every 6 (Six) Hours. 60 tablet 2 Past Month   •  diphenoxylate-atropine (LOMOTIL) 2.5-0.025 MG per tablet Take 1 tablet by mouth 4 (Four) Times a Day As Needed for Diarrhea. 120 tablet 0 Past Month   • FLUoxetine (PROzac) 40 MG capsule TAKE 1 CAPSULE BY MOUTH EVERY DAY 30 capsule 0 12/7/2022   • furosemide (LASIX) 40 MG tablet TAKE ONE-HALF TABLET BY MOUTH EVERY DAY 45 tablet 1 Past Week   • lisinopril (PRINIVIL,ZESTRIL) 5 MG tablet TAKE 1 TABLET BY MOUTH EVERY DAY 90 tablet 1 12/7/2022   • mometasone-formoterol (DULERA 100) 100-5 MCG/ACT inhaler Inhale 2 Puffs/kg Every Night.   Past Week   • montelukast (SINGULAIR) 10 MG tablet Take 10 mg by mouth Every Night.   Past Week   • multivitamin (THERAGRAN) tablet tablet Take 1 tablet by mouth Daily.   Past Week   • O2 (OXYGEN) Inhale 3 L/min Continuous.   12/8/2022   • pravastatin (PRAVACHOL) 40 MG tablet Take 40 mg by mouth Every Night.   Past Week   • psyllium (METAMUCIL) 0.52 g capsule Metamucil 0.52 gram oral capsule take 1 capsule by oral route 5Xday   Active   Past Month   • spironolactone (ALDACTONE) 25 MG tablet Take 25 mg by mouth Daily.   Past Week       Allergies:  Amlodipine, Hydrochlorothiazide, Morphine, and Adhesive tape        Objective     Blood pressure 136/53, pulse 67, temperature 96.5 °F (35.8 °C), temperature source Temporal, resp. rate 18, weight (!) 153 kg (336 lb 13.8 oz), SpO2 97 %, not currently breastfeeding.    Physical Exam   Constitutional: Pt is oriented to person, place, and time and well-developed, well-nourished, and in no distress.   Mouth/Throat: Oropharynx is clear and moist.   Neck: Normal range of motion.   Cardiovascular: Normal rate, regular rhythm and normal heart sounds.    Pulmonary/Chest: Effort normal and breath sounds normal.   Abdominal: Soft. Nontender  Skin: Skin is warm and dry.   Psychiatric: Mood, memory, affect and judgment normal.     Assessment & Plan     Diagnosis:  Screening colonoscopy  Family h/o colon cancer  gerd  belching       Anticipated Surgical  Procedure:  Colonoscopy  egd    The risks, benefits, and alternatives of this procedure have been discussed with the patient or the responsible party- the patient understands and agrees to proceed.

## 2022-12-12 ENCOUNTER — TELEPHONE (OUTPATIENT)
Dept: GASTROENTEROLOGY | Facility: CLINIC | Age: 67
End: 2022-12-12

## 2022-12-12 LAB
CYTO UR: NORMAL
LAB AP CASE REPORT: NORMAL
LAB AP CLINICAL INFORMATION: NORMAL
PATH REPORT.FINAL DX SPEC: NORMAL
PATH REPORT.GROSS SPEC: NORMAL

## 2022-12-12 NOTE — TELEPHONE ENCOUNTER
Patient placed in 5 year colon recall----- Message from SHAUNA Wilhelm sent at 12/12/2022  8:51 AM EST -----  I have reviewed the patient colonoscopy and pathology report. It is recommended the patient be on a 5 year recall. Please place in the recall system.     I have reviewed upper endoscopy. Negative results for H. Pylori, metaplasia, dysplasia and malignancy.

## 2022-12-20 ENCOUNTER — HOSPITAL ENCOUNTER (OUTPATIENT)
Dept: OCCUPATIONAL THERAPY | Facility: HOSPITAL | Age: 67
Setting detail: THERAPIES SERIES
Discharge: HOME | End: 2022-12-20

## 2022-12-20 DIAGNOSIS — Z17.0 BILATERAL MALIGNANT NEOPLASM OF BREAST IN FEMALE, ESTROGEN RECEPTOR POSITIVE, UNSPECIFIED SITE OF BREAST: ICD-10-CM

## 2022-12-20 DIAGNOSIS — M62.81 MUSCLE WEAKNESS (GENERALIZED): ICD-10-CM

## 2022-12-20 DIAGNOSIS — E56.9 VITAMIN DEFICIENCY: ICD-10-CM

## 2022-12-20 DIAGNOSIS — C50.912 BILATERAL MALIGNANT NEOPLASM OF BREAST IN FEMALE, ESTROGEN RECEPTOR POSITIVE, UNSPECIFIED SITE OF BREAST: ICD-10-CM

## 2022-12-20 DIAGNOSIS — L90.5 SCAR CONDITION AND FIBROSIS OF SKIN: ICD-10-CM

## 2022-12-20 DIAGNOSIS — Z90.13 HISTORY OF MASTECTOMY, BILATERAL: ICD-10-CM

## 2022-12-20 DIAGNOSIS — C50.911 BILATERAL MALIGNANT NEOPLASM OF BREAST IN FEMALE, ESTROGEN RECEPTOR POSITIVE, UNSPECIFIED SITE OF BREAST: ICD-10-CM

## 2022-12-20 DIAGNOSIS — I89.0 LYMPHEDEMA: Primary | ICD-10-CM

## 2022-12-20 DIAGNOSIS — I97.2 POSTMASTECTOMY LYMPHEDEMA SYNDROME: ICD-10-CM

## 2022-12-20 PROCEDURE — 97140 MANUAL THERAPY 1/> REGIONS: CPT

## 2022-12-20 PROCEDURE — 97535 SELF CARE MNGMENT TRAINING: CPT

## 2022-12-20 PROCEDURE — 93702 BIS XTRACELL FLUID ANALYSIS: CPT

## 2022-12-20 NOTE — THERAPY RE-EVALUATION
Outpatient Occupational Therapy Lymphedema Re-Evaluation   [place]     Patient Name: Aida Conner  : 1955  MRN: [id number]  Today's Date: 2022      Visit Date: 2022    Patient Active Problem List   Diagnosis   • Inflammatory breast cancer, right (HCC)   • Other specified disorders of breast    • Malignant neoplasm of axillary tail of right female breast (HCC)   • Encounter for eye exam due to high risk medication   • Obesity hypoventilation syndrome (HCC)   • Iron adverse reaction   • Functional diarrhea   • Encounter for long-term (current) use of high-risk medication   • Abnormal mammogram   • Allergic rhinitis   • Anemia   • Anxiety disorder   • Breast pain, right   • Depression   • Essential tremor   • Hemorrhoids   • Hyperlipidemia   • Hypertension, essential   • IBS (irritable bowel syndrome)   • Impaired fasting glucose   • Obesity   • Obstructive sleep apnea   • Osteoarthrosis   • Renal calculi   • Restrictive airway disease   • Type II diabetes mellitus (HCC)   • Vitamin deficiency   • Urinary bladder incontinence   • Malignant neoplasm of central portion of right female breast (HCC)   • Osteoporosis without current pathological fracture   • Chronic diastolic congestive heart failure (HCC)   • Coronary artery calcification seen on CT scan   • Aromatase inhibitor use   • Belching   • Bloating   • Hiatal hernia   • History of breast cancer   • Family history of colon cancer        Past Medical History:   Diagnosis Date   • Allergic rhinitis    • Anemia    • Anxiety    • Asthma    • Chronic bronchitis (HCC)     In past, espec. during working years   • Chronic diastolic (congestive) heart failure (HCC)    • Chronic hypoxemic respiratory failure (HCC)     on continuous O2   • Coronary artery calcification seen on CT scan 2022   • Diarrhea     with chemo   • GERD (gastroesophageal reflux disease) Mild   • H/O Intraductal papilloma    • Hemorrhoids    • History of transfusion 1984     AFTER BACK SURGERY no reaction   • Hypertension    • IBS (irritable bowel syndrome)    • Inflammatory breast cancer (HCC)     right   • Kidney stone    • Morbid obesity with BMI of 50.0-59.9, adult (HCC)    • Obesity hypoventilation syndrome (HCC) 02/05/2021   • On home oxygen therapy     2.5 AT REST WITH ACTIVITY UP TO 4L   • ANABELLE on CPAP     cpap  & uses O2 with CPAP   • Osteoarthritis    • Pneumonia     Many times in past, 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 ENDOSCOPY;  Service: Gastroenterology;  Laterality: N/A;  diverticulosis, and hemorrhoids   • CYSTOSCOPY BLADDER STONE LITHOTRIPSY     • ENDOSCOPY     • ENDOSCOPY N/A 12/8/2022    Procedure: ESOPHAGOGASTRODUODENOSCOPY  with biopsy;  Surgeon: Henry Aden MD;  Location: [facility] ENDOSCOPY;  Service: Gastroenterology;  Laterality: N/A;  hiatal hernia   • KNEE ARTHROPLASTY Bilateral 2009   • MASTECTOMY Left 08/10/2021    Procedure: LEFT TOTAL MASTECTOMY;  Surgeon: Nicci Banks MD;  Location: [facility] OR;  Service: General;  Laterality: Left;   • MASTECTOMY Right 08/10/2021    Procedure: RIGHT MODIFIED RADICAL MASTECTOMY;  Surgeon: Nicci Banks MD;  Location: [facility] OR;  Service: General;  Laterality: Right;   • SPINE SURGERY  1984   • TOTAL ABDOMINAL HYSTERECTOMY WITH SALPINGO OOPHORECTOMY  2004   • UPPER GASTROINTESTINAL ENDOSCOPY     • VENOUS ACCESS DEVICE (PORT) INSERTION N/A 01/25/2021    Procedure: INSERTION VENOUS ACCESS DEVICE;  Surgeon: Nicci Banks MD;  Location: [facility] OR;  Service: General;  Laterality: N/A;   • VENOUS ACCESS DEVICE (PORT) REMOVAL Left 04/05/2022    Procedure: REMOVAL VENOUS ACCESS DEVICE;  Surgeon: Nicci Banks MD;   "Location: [facility] OR;  Service: General;  Laterality: Left;         Visit Dx:     ICD-10-CM ICD-9-CM   1. 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 Scar condition and fibrosis of skin  L90.5 709.2   4. Muscle weakness (generalized)  M62.81 728.87   5. History of mastectomy, bilateral  Z90.13 V45.71   6. Bilateral malignant neoplasm of breast in female, estrogen receptor positive, unspecified site of breast (HCC)  C50.911 174.9    Z17.0 V86.0    C50.912             Lymphedema     Row Name 12/20/22 1400             Subjective Pain    Able to rate subjective pain? yes  -SF      Pre-Treatment Pain Level 0  -SF      Post-Treatment Pain Level 0  -SF      Subjective Pain Comment Patient denies pain  -SF         Subjective Comments    Subjective Comments Patient reports she feels her arms are symmetrical.  Patient reports she has not been wearing reduction kit consistently and has not had her compression pump fixed.  -SF         Lymphedema Assessment    Lymphedema Classification RUE:;stage 1 (Spontaneously Reversible)  -SF      Lymphedema Cancer Related Sx bilateral;simple mastectomy  -SF      Lymph Nodes Removed # 19  -SF      Positive Lymph Nodes # 4  -SF      Chemo Received yes  -SF      Radiation Therapy Received yes  -SF         LLIS - Physical Concerns    The amount of pain associated with my lymphedema is: 0  -SF      The amount of limb heaviness associated with my lymphedema is: 1  -SF      The amount of skin tightness associated with my lymphedema is: 1  -SF      The size of my swollen limb(s) seems: 0  -SF      Lymphedema affects the movement of my swollen limb(s): 0  -SF      The strength in my swollen limb(s) is: 1  -SF         LLIS - Psychosocial Concerns    Lymphedema affects my body image (i.e., \"how I think I look\"). 0  -SF      Lymphedema affects my socializing with others. 0  -SF      Lymphedema affects my intimate relations with spouse or partner (rate 0 if not " "applicable 0  -SF      Lymphedema \"gets me down\" (i.e., depression, frustration, or anger) 0  -SF      I must rely on others for help due to my lymphedema. 1  -SF      I know what to do to manage my lymphedema 0  -SF         LLIS - Functional Concerns    Lymphedema affects my ability to perform self-care activities (i.e. eating, dressing, hygiene) 0  -SF      Lymphedema affects my ability to perform routine home or work-related activities. 0  -SF      Lymphedema affects my performance of preferred leisure activities. 0  -SF      Lymphedema affects proper fit of clothing/shoes 0  -SF      Lymphedema affects my sleep 0  -SF         Right Upper Ext    Rt Shoulder Abduction AROM 90  -SF      Rt Shoulder Flexion AROM 100  -SF      Rt Shoulder External Rotation AROM 55  -SF      Rt Shoulder Internal Rotation AROM 50  -SF         Skin Changes/Observations    Skin Observations Comment Patient arrived with CircAid reduction kit donned to right upper extremity  -SF         Lymphedema Measurements    Measurement Type(s) Circumferential;Volumetric  -SF      Circumferential Areas Upper extremities  -SF         Compression/Skin Care    Compression/Skin Care compression garment;skin care  -SF      Compression/Skin Care Comments OT provided max A to don CircAid reduction kit  -SF         L-Dex Bioimpedence Screening    L-Dex Measurement Extremity RUE  -SF      L-Dex Patient Position Standing  -SF      L-Dex UE Dominate Side Left  -SF      L-Dex UE At Risk Side Right  -SF      L-Dex UE Score 11.8  -SF      L-Dex UE Baseline Score 10.6  -SF      L-Dex UE Value Change 1.2  -SF      $ L-Dex Charge yes  -SF         Lymphedema Life Impact Scale Totals    A.  Total Q1 - Q17 (Do not include Q18) 4  -SF      B.  Total number of questions answered (Q1-Q17) 17  -SF      C. Divide A by B 0.24  -SF      D. Multiple C by 25 6  -SF            User Key  (r) = Recorded By, (t) = Taken By, (c) = Cosigned By    Initials Name Provider Type    SF " Clary Rosas OT Occupational Therapist               Circumferential Measurements:              Therapy Education  Education Details: Patient provided with the LeAP lymphedema action plan stoplight to recovery handout (Rehabilitation Oncology: July 2019 - Volume 37 - Issue 3 - p 122-127 doi: 10.1097/01.[id number]) to improve patient's ability to identify lymph exacerbation and provide guidance on appropriate action to be taken to control symptoms. She is instructed to call for a follow-up appointment prior to 3 months from today if she does have an increase in fullness, tightness or any other symptoms.  Patient was encouraged to wear reduction kit daily as well as utilize compression pump.  Review of HEP as well as self manual lymphatic drainage was provided on this date with additional handouts provided to patient.  Given: Symptoms/condition management, Edema management, HEP  Program: New, Reinforced  How Provided: Verbal, Demonstration, Written  Provided to: Patient  Level of Understanding: Verbalized, Demonstrated  56187 - OT Self Care/Mgmt Minutes: 45      Manual Rx (last 36 hours)     Manual Treatments     Row Name 12/20/22 1518             Total Minutes    39576 - OT Manual Therapy Minutes 10  -SF            User Key  (r) = Recorded By, (t) = Taken By, (c) = Cosigned By    Initials Name Provider Type    SF Clary Rosas OT Occupational Therapist               OT Goals     Row Name 12/20/22 1518          Time Calculation    OT Goal Re-Cert Due Date 01/19/23  -SF           User Key  (r) = Recorded By, (t) = Taken By, (c) = Cosigned By    Initials Name Provider Type    SF Clary Rosas OT Occupational Therapist                 OT Assessment/Plan     Row Name 12/20/22 1513          OT Assessment    Functional Limitations Performance in self-care ADL;Limitations in functional capacity and performance  -SF     Impairments Impaired lymphatic circulation;Range of motion;Posture;Pain;Muscle strength;Joint  mobility  -SF     Assessment Comments Patient L-Dex scores have returned closely to baseline.  Circumferential measurements obtained on this date also demonstrates symmetry in upper extremities.  At this time, patient will be placed on the lymphedema surveillance program to monitor lymphatic functioning.  OT and patient discussed options for new compression garments, however at this time patient circumferential measurements do not qualify her for an over-the-counter garment.  Patient was encouraged to wear reduction kit daily as well as utilize compression pump.  Patient verbalized understanding of plan and was in agreement.  Patient will continue to benefit from skilled occupational therapy services in order to prevent progression of lymphedema to irreversible stage and to evaluate ongoing lymphatic functioning.  -SF     OT Diagnosis Lymphedema  -SF     OT Rehab Potential Good  -SF     Patient/caregiver participated in establishment of treatment plan and goals Yes  -SF     Patient would benefit from skilled therapy intervention Yes  -SF        OT Plan    OT Frequency Other (comment)  -SF     Predicted Duration of Therapy Intervention (OT) Once every 3 months years 1-3 and once every 6 months years 4 and 5  -SF     Planned CPT's? OT RE-EVAL: 72780;OT THER ACT EA 15 MIN: 63907MX;OT THER PROC EA 15 MIN: 17763KF;OT NEUROMUSC RE EDUCATION EA 15 MIN: 92670;OT SELF CARE/MGMT/TRAIN 15 MIN: 03509;OT HOT/COLD PACK;OT ULTRASOUND EA 15 MIN: 22521;OT ELECTRIC STIM ATTD EA 15 MIN: 10916;OT MANUAL THERAPY EA 15 MIN: 85563;OT BIS XTRACELL FLUID ANALYSIS: 35963;OT ORTHOTIC MGMT/TRAIN EA 15 MIN: 81182;OT ORTHO/PROSTHET CHECKOUT EA 15 MIN: 54014  -SF     Planned Therapy Interventions (Optional Details) home exercise program;joint mobilization;manual therapy techniques;neuromuscular re-education;orthotic fitting/training;patient/family education;postural re-education;ROM (Range of Motion);strengthening  -SF     OT Plan Comments  Continue w/ POC  -SF           User Key  (r) = Recorded By, (t) = Taken By, (c) = Cosigned By    Initials Name Provider Type    Clary Kelley OT Occupational Therapist              Goals:  1. Post Breast Surgery Care/at risk for Lymphedema  LTG 1: 90 days:  As an indicator of no exacerbation of lymphedema staging, the patient will present with an L-Dex score less than 7 points from preoperative baseline.              STATUS: Not Met: Ongoing     STG 1a:   30 days: To prevent exacerbation of mixed edema to lymphedema, patient will utilize the 2 postsurgical compression garments daily.                 STATUS: Not met: Discontinue     STG 1b: 30 days: Patient will be independent with self-manual lymphatic massage.               STATUS: Met: Ongoing      STG 1c: 30 days:  Patient will be independent with identification of signs and symptoms of lymphedema exasperation per stoplight to recovery education handout.              STATUS: met: Ongoing      STG 1 d: 30 days: Patient will be independent with HEP to prevent advancement in lymphedema staging.              STATUS: Partially met: ongoing     TREATMENT:  Self Care/ADL retraining, Therapeutic Activity, Neuromuscular Re-education, Therapeutic Exercise, Bioimpedence Fluid Analysis, Post-Surgical compression garement [address]707 [address]na Zip-ST-High/ Nayana Camisole Kit 2860K, Orthotic Management and training,  and Manual Therapy.     2. Decreased Functional Use of R UE  LTG [2]:  90 Days: The patient will report a pain rating of 2/10 or better to improve functional use of right upper extremity and IADL tasks.            STATUS: Met: ongoing  STG [2]a:  The patient will report a pain rating of 4/10 or better as an average in 1 week intervals.   STATUS:  Met: ongoing     3. Decrease Functional ROM:     LTG [3]: 90 days:  The patient will demonstrate an increase of right upper extremity AROM to WFL for ADL independence.  STATUS:  Partially Met: ongoing     STG [3]a: The patient  will demonstrate an increase in all shoulder planes in right upper extremity up to degrees of 20 or WFL for improved self-care independence.        STATUS: Not Met: ongoing     TREATMENT:  Manual Therapy, Therapeutic Activity, ADL retraining, Neuromuscular Re-education, Therapeutic Exercise, Patient and Family Education, Orthotic Management and training, Modalities: TENS, NMES, Ultrasound, Fluidotherapy Wound dressing as needed, Modalities as needed and Manual Therapy.     3. Carrying, Moving, and Handling Objects Functional Limitation                               LTG 4: 90 days:  The patient will demonstrate 1-19% limitation by achieving a score of 11 on the Quick Dash.                      STATUS:  Partially Met: ongoing              STG 4a: 30 days:  The patient will demonstrate 80-99% limitation by achieving a score of 54 on the Quick Dash.                      STATUS:  Met              STG 4a: 30 days:  The patient will demonstrate 40-9% limitation by achieving a score of 36 on the Quick Dash.                      STATUS:  Ongoing  TREATMENT: Manual Therapy, Therapeutic Activity, Neuromuscular Re-education, Therapeutic Exercise, Patient and Family Education, Orthotic Management and training, Modalities: TENS, NMES, Ultrasound, Fluidotherapy Wound dressing as needed, Modalities as needed and Manual Therapy.               Time Calculation:   Timed Charges  36070 - OT Manual Therapy Minutes: 10  35273 - OT Self Care/Mgmt Minutes: 45  Total Minutes  Timed Charges Total Minutes: 55   Total Minutes: 55     Therapy Charges for Today     Code Description Service Date Service Provider Modifiers Qty    80441977767 HC PT BIS XTRACELL FLUID ANALYSIS 12/20/2022 Clary Rosas OT  1    50516325750 HC OT SELF CARE/MGMT/TRAIN EA 15 MIN 12/20/2022 Clary Rosas OT GO 3    [id number]  OT MANUAL THERAPY EA 15 MIN 12/20/2022 Clary Rosas OT GO 1                    Clary Rosas OT  12/20/2022

## 2022-12-27 RX ORDER — FUROSEMIDE 40 MG/1
TABLET ORAL
Qty: 45 TABLET | Refills: 1 | Status: SHIPPED | OUTPATIENT
Start: 2022-12-27

## 2022-12-29 RX ORDER — ANASTROZOLE 1 MG/1
1 TABLET ORAL DAILY
Qty: 90 TABLET | Refills: 1 | Status: SHIPPED | OUTPATIENT
Start: 2022-12-29

## 2023-01-09 ENCOUNTER — TELEPHONE (OUTPATIENT)
Dept: ONCOLOGY | Facility: CLINIC | Age: 68
End: 2023-01-09
Payer: MEDICARE

## 2023-01-09 NOTE — TELEPHONE ENCOUNTER
Caller: Aida Conner    Relationship to patient: Self    Best call back number: [phone]    Chief complaint: TRANSPORTATION ISSUES    Type of visit: LAB, F/U 1 & INJECTION    Requested date: CALL TO R/S     If rescheduling, when is the original appointment: 1/10/2023

## 2023-01-10 ENCOUNTER — APPOINTMENT (OUTPATIENT)
Dept: ONCOLOGY | Facility: HOSPITAL | Age: 68
End: 2023-01-10
Payer: MEDICARE

## 2023-01-10 ENCOUNTER — APPOINTMENT (OUTPATIENT)
Dept: LAB | Facility: HOSPITAL | Age: 68
End: 2023-01-10
Payer: MEDICARE

## 2023-01-17 ENCOUNTER — APPOINTMENT (OUTPATIENT)
Dept: ONCOLOGY | Facility: HOSPITAL | Age: 68
End: 2023-01-17
Payer: MEDICARE

## 2023-01-17 ENCOUNTER — APPOINTMENT (OUTPATIENT)
Dept: LAB | Facility: HOSPITAL | Age: 68
End: 2023-01-17
Payer: MEDICARE

## 2023-01-24 ENCOUNTER — OFFICE VISIT (OUTPATIENT)
Dept: ONCOLOGY | Facility: CLINIC | Age: 68
End: 2023-01-24
Payer: MEDICARE

## 2023-01-24 ENCOUNTER — INFUSION (OUTPATIENT)
Dept: ONCOLOGY | Facility: HOSPITAL | Age: 68
End: 2023-01-24
Payer: MEDICARE

## 2023-01-24 ENCOUNTER — LAB (OUTPATIENT)
Dept: LAB | Facility: HOSPITAL | Age: 68
End: 2023-01-24
Payer: MEDICARE

## 2023-01-24 VITALS
DIASTOLIC BLOOD PRESSURE: 77 MMHG | TEMPERATURE: 97.3 F | OXYGEN SATURATION: 93 % | WEIGHT: 293 LBS | SYSTOLIC BLOOD PRESSURE: 124 MMHG | RESPIRATION RATE: 24 BRPM | HEIGHT: 65 IN | BODY MASS INDEX: 48.82 KG/M2 | HEART RATE: 71 BPM

## 2023-01-24 DIAGNOSIS — C50.111 MALIGNANT NEOPLASM OF CENTRAL PORTION OF RIGHT FEMALE BREAST, UNSPECIFIED ESTROGEN RECEPTOR STATUS: ICD-10-CM

## 2023-01-24 DIAGNOSIS — M81.0 AGE-RELATED OSTEOPOROSIS WITHOUT CURRENT PATHOLOGICAL FRACTURE: Primary | ICD-10-CM

## 2023-01-24 DIAGNOSIS — M81.0 AGE-RELATED OSTEOPOROSIS WITHOUT CURRENT PATHOLOGICAL FRACTURE: ICD-10-CM

## 2023-01-24 DIAGNOSIS — E66.01 CLASS 3 SEVERE OBESITY DUE TO EXCESS CALORIES WITH SERIOUS COMORBIDITY AND BODY MASS INDEX (BMI) OF 50.0 TO 59.9 IN ADULT: ICD-10-CM

## 2023-01-24 DIAGNOSIS — C50.911 INFLAMMATORY BREAST CANCER, RIGHT: Primary | ICD-10-CM

## 2023-01-24 DIAGNOSIS — Z79.811 AROMATASE INHIBITOR USE: ICD-10-CM

## 2023-01-24 LAB
ALBUMIN SERPL-MCNC: 3.8 G/DL (ref 3.5–5.2)
ALBUMIN/GLOB SERPL: 1.3 G/DL (ref 1.1–2.4)
ALP SERPL-CCNC: 89 U/L (ref 38–116)
ALT SERPL W P-5'-P-CCNC: 16 U/L (ref 0–33)
ANION GAP SERPL CALCULATED.3IONS-SCNC: 9.1 MMOL/L (ref 5–15)
AST SERPL-CCNC: 15 U/L (ref 0–32)
BASOPHILS # BLD AUTO: 0.03 10*3/MM3 (ref 0–0.2)
BASOPHILS NFR BLD AUTO: 0.4 % (ref 0–1.5)
BILIRUB SERPL-MCNC: 0.3 MG/DL (ref 0.2–1.2)
BUN SERPL-MCNC: 14 MG/DL (ref 6–20)
BUN/CREAT SERPL: 25.5 (ref 7.3–30)
CALCIUM SPEC-SCNC: 9.5 MG/DL (ref 8.5–10.2)
CHLORIDE SERPL-SCNC: 101 MMOL/L (ref 98–107)
CO2 SERPL-SCNC: 29.9 MMOL/L (ref 22–29)
CREAT SERPL-MCNC: 0.55 MG/DL (ref 0.6–1.1)
DEPRECATED RDW RBC AUTO: 46.6 FL (ref 37–54)
EGFRCR SERPLBLD CKD-EPI 2021: 99.4 ML/MIN/1.73
EOSINOPHIL # BLD AUTO: 0.16 10*3/MM3 (ref 0–0.4)
EOSINOPHIL NFR BLD AUTO: 1.9 % (ref 0.3–6.2)
ERYTHROCYTE [DISTWIDTH] IN BLOOD BY AUTOMATED COUNT: 13 % (ref 12.3–15.4)
GLOBULIN UR ELPH-MCNC: 3 GM/DL (ref 1.8–3.5)
GLUCOSE SERPL-MCNC: 162 MG/DL (ref 74–124)
HCT VFR BLD AUTO: 41.1 % (ref 34–46.6)
HGB BLD-MCNC: 12.2 G/DL (ref 12–15.9)
IMM GRANULOCYTES # BLD AUTO: 0.05 10*3/MM3 (ref 0–0.05)
IMM GRANULOCYTES NFR BLD AUTO: 0.6 % (ref 0–0.5)
LYMPHOCYTES # BLD AUTO: 0.8 10*3/MM3 (ref 0.7–3.1)
LYMPHOCYTES NFR BLD AUTO: 9.7 % (ref 19.6–45.3)
MAGNESIUM SERPL-MCNC: 1.9 MG/DL (ref 1.8–2.5)
MCH RBC QN AUTO: 29 PG (ref 26.6–33)
MCHC RBC AUTO-ENTMCNC: 29.7 G/DL (ref 31.5–35.7)
MCV RBC AUTO: 97.9 FL (ref 79–97)
MONOCYTES # BLD AUTO: 0.58 10*3/MM3 (ref 0.1–0.9)
MONOCYTES NFR BLD AUTO: 7 % (ref 5–12)
NEUTROPHILS NFR BLD AUTO: 6.62 10*3/MM3 (ref 1.7–7)
NEUTROPHILS NFR BLD AUTO: 80.4 % (ref 42.7–76)
NRBC BLD AUTO-RTO: 0 /100 WBC (ref 0–0.2)
PHOSPHATE SERPL-MCNC: 3.2 MG/DL (ref 2.5–4.5)
PLATELET # BLD AUTO: 258 10*3/MM3 (ref 140–450)
PMV BLD AUTO: 9.7 FL (ref 6–12)
POTASSIUM SERPL-SCNC: 4.2 MMOL/L (ref 3.5–4.7)
PROT SERPL-MCNC: 6.8 G/DL (ref 6.3–8)
RBC # BLD AUTO: 4.2 10*6/MM3 (ref 3.77–5.28)
SODIUM SERPL-SCNC: 140 MMOL/L (ref 134–145)
WBC NRBC COR # BLD: 8.24 10*3/MM3 (ref 3.4–10.8)

## 2023-01-24 PROCEDURE — 96372 THER/PROPH/DIAG INJ SC/IM: CPT

## 2023-01-24 PROCEDURE — 80053 COMPREHEN METABOLIC PANEL: CPT

## 2023-01-24 PROCEDURE — 83735 ASSAY OF MAGNESIUM: CPT

## 2023-01-24 PROCEDURE — 25010000002 DENOSUMAB 60 MG/ML SOLUTION PREFILLED SYRINGE: Performed by: NURSE PRACTITIONER

## 2023-01-24 PROCEDURE — 85025 COMPLETE CBC W/AUTO DIFF WBC: CPT

## 2023-01-24 PROCEDURE — 99214 OFFICE O/P EST MOD 30 MIN: CPT | Performed by: NURSE PRACTITIONER

## 2023-01-24 PROCEDURE — 84100 ASSAY OF PHOSPHORUS: CPT

## 2023-01-24 PROCEDURE — 36415 COLL VENOUS BLD VENIPUNCTURE: CPT

## 2023-01-24 RX ADMIN — DENOSUMAB 60 MG: 60 INJECTION SUBCUTANEOUS at 11:25

## 2023-01-24 NOTE — PROGRESS NOTES
Subjective     REASON FOR FOLLOW-UP: Right breast inflammatory breast, triple positive.                              REQUESTING PHYSICIAN: MD Francisco Esteban MD Bethany Haynes, MD    History of present illness:  Patient is a 67 y.o. 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 She then had surgery with a complete pathological response and then resumed adjuvant treatment with Perjeta Herceptin with intolerance due to severe diarrhea. She completed Herceptin in March of 2022 and continued on Arimidex along with initiating Prolia for worsening osteopenia.    Patient continues to tolerate her Arimidex and denies any arthralgias or hot flashes.  She returns today for her third Prolia injection.  She does report she has recently followed up with her primary care provider who checked her iron levels.  It sounds like the ferritin level is normal but the iron saturation is low.  We have requested these labs for review.      Her biggest concern today is her weight and fatigue.  She does report she is discouraged from her weight gain post chemotherapy completion.  She has considered joining her  for water aerobics however she is concerned about her oxygen tubing and need for continuous oxygen.      Past Medical History:   Diagnosis Date   • Allergic rhinitis    • Anemia    • Anxiety    • Asthma    • Chronic bronchitis (HCC)     In past, espec. during working years   • Chronic diastolic (congestive) heart failure (HCC)    • Chronic hypoxemic respiratory failure (HCC)     on continuous O2   • Coronary artery calcification seen on CT scan 04/21/2022   • Diarrhea     with chemo   • GERD (gastroesophageal reflux disease) Mild   •  H/O Intraductal papilloma    • Hemorrhoids    • History of transfusion 1984    AFTER BACK SURGERY no reaction   • Hypertension    • IBS (irritable bowel syndrome)    • Inflammatory breast cancer (HCC)     right   • Kidney stone    • Morbid obesity with BMI of 50.0-59.9, adult (HCC)    • Obesity hypoventilation syndrome (HCC) 02/05/2021   • On home oxygen therapy     2.5 AT REST WITH ACTIVITY UP TO 4L   • ANABELLE on CPAP     cpap  & uses O2 with CPAP   • Osteoarthritis    • Pneumonia     Many times in past, 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   • PONV (postoperative nausea and vomiting)         Past Surgical History:   Procedure Laterality Date   • BREAST EXCISIONAL BIOPSY Bilateral    • COLONOSCOPY     • COLONOSCOPY N/A 12/8/2022    Procedure: COLONOSCOPY;  Surgeon: Henry Aden MD;  Location: [facility] ENDOSCOPY;  Service: Gastroenterology;  Laterality: N/A;  diverticulosis, and hemorrhoids   • CYSTOSCOPY BLADDER STONE LITHOTRIPSY     • ENDOSCOPY     • ENDOSCOPY N/A 12/8/2022    Procedure: ESOPHAGOGASTRODUODENOSCOPY  with biopsy;  Surgeon: Henry Aden MD;  Location: [facility] ENDOSCOPY;  Service: Gastroenterology;  Laterality: N/A;  hiatal hernia   • KNEE ARTHROPLASTY Bilateral 2009   • MASTECTOMY Left 08/10/2021    Procedure: LEFT TOTAL MASTECTOMY;  Surgeon: Nicci Banks MD;  Location: [facility];  Service: General;  Laterality: Left;   • MASTECTOMY Right 08/10/2021    Procedure: RIGHT MODIFIED RADICAL MASTECTOMY;  Surgeon: Nicci Banks MD;  Location: [facility] OR;  Service: General;  Laterality: Right;   • SPINE SURGERY  1984   • TOTAL ABDOMINAL HYSTERECTOMY WITH SALPINGO OOPHORECTOMY  2004   • UPPER GASTROINTESTINAL ENDOSCOPY     • VENOUS ACCESS DEVICE (PORT) INSERTION N/A 01/25/2021    Procedure: INSERTION VENOUS ACCESS DEVICE;  Surgeon: Nicci Banks MD;  Location: [facility] OR;  Service: General;  Laterality: N/A;   • VENOUS ACCESS DEVICE (PORT) REMOVAL Left 04/05/2022     Procedure: REMOVAL VENOUS ACCESS DEVICE;  Surgeon: Nicci Banks MD;  Location: [facility] OR;  Service: General;  Laterality: Left;      ONC HISTORY  patient is a 65-year-old white female with morbid obesity, history of diastolic congestive heart failure and unknown type of pulmonary disease for which she has been on oxygen for 4 years.    She has been getting routine mammography since 38 years of age because her mother  at 46 of breast cancer and had a biopsy of her left breast in  which was apparently benign and another biopsy in  on her right breast which showed a small focus of atypical ductal hyperplasia and atypical lobular hyperplasia with a residual intraductal papilloma.  At that time she saw medical oncologist who recommended genetic testing and prevention but the insurance would not cover the genetic testing and the medical oncologist thought tamoxifen was too risky for her because of her comorbidities and no treatment was given.  More recently she noticed redness of the right breast in October and showed it to her family doctor who gave her course of Keflex when it did not improve and mammogram was ordered and this was benign  When the redness persisted she saw her gynecologist with concern for inflammatory breast cancer and referred her to Dr. Banks after repeat imaging and [facility].  Preliminary report shows micropapillary invasive mammary carcinoma intermediate grade measuring 13 mm right axillary node was also involved with metastatic cancer ER/AZ and HER-2 are pending  Patient saw Dr. Banks who sent her for skin biopsies and she had 3 separate punch biopsy of the skin that showed Perivascular and perifollicular inflammation with no obvious malignancy at 3:00 12:00 and 9:00  In addition staging work-up with CAT scans and bone scan were ordered.  CAT scan of the chest showed a borderline  mediastinal  Infracarinal node measuring 15 mm in length mild cardiomegaly and heavy coronary artery calcifications  CT of the abdomen showed a 2.5 x 2.2 cm right adrenal mass which the patient tells me she has had in the past and is most likely benign but also a 2.7 cm left external iliac node which is indeterminate.  Bone scan was negative    Echocardiogram is scheduled for later next week and genetic testing with the Emprivoitae stat panel is pending    Patient is  3 para 3 menarche was at age 9 menopause at 49 when she had a hysterectomy and oophorectomy  First childbirth was at age 20 she breast-fed her second and third children and took no hormone replacement after menopause    Family history is positive for mother dying of breast cancer at age 46 she is a maternal aunt with breast cancer in her 70s a paternal aunt with breast cancer in her 70s paternal grandmother with colon cancer.  Her father had Hodgkin's disease and non-Hodgkin's lymphoma but  of small cell lung cancer at 67      She has not had a heart attack stroke or blood clot    Plan to do echocardiogram soon as possible to ascertain tolerability of cardiotoxic chemotherapy which would be typically indicated with an inflammatory breast cancer    Also plan PET scan to follow-up on atypical lymph nodes and confirm benign etiology of the adrenal lesion    She will have port placement and chemo education pt is  ER/NH and HER-2 +    I explained to Aida that the goal of treatment would be curative but she has a lot of comorbidities which may limit our ability to give the most effective chemotherapy in the setting  I told her radiation would be involved and possibly hormonal therapy for 10 years as she has hormone positivity and HER-2 directed therapy    She expressed some concerns about driving back and forth from [place] but felt that once a week was doable    We will see her back in 2 weeks to start treatment with a port placement planned  early next week      Patient did have a mild reaction to Taxol dose #1 with some increased shortness of breath and flushing.  This was responsive to 100 mg of Solu-Cortef.  She states this gave her a headache and made her quite talkative but otherwise she was thankfully able to complete Taxol infusion without further incident.    Patient reports issues with chronic diarrhea over the last few years and did note a little bit increase in stooling following THP though nothing overly significant in her mind.  She did finally begin taking Imodium just a few days ago and has had no further stools.  She does note significant trouble with hemorrhoids in relation to the diarrhea   Required blood transfusion due to significant hemorrhoidal bleeding plus iron deficiency.  Injectafer planned    Due to inclement weather cycle 1 day 8 therapy was missed.  She did get day 15 therapy with fairly good tolerance except for some diarrhea.    She was found to be iron deficient despite trying oral iron.  We therefore elected to proceed with IV Injectafer but she remains mildly anemic with a hemoglobin of 9.3    7/21    She has increased her Lasix and her weight is down 7 pounds but overall she is significantly weaker since starting treatment and I think it is in her best interest to discontinue treatment and proceed with surgery and will use Herceptin in the interim till surgery scheduled  She has had a good response in the breast and I do not think the last dose of Adriamycin is going to be crucial and we run the risk of making her so weak that she cannot go for surgery    9/21  Patient had worsening shortness of breath requiring continuous oxygen.  CT of the chest performed to rule out pneumonitis and she was started on steroids.  CT showed bilateral groundglass infiltrates presumed to be related to Taxol pneumonitis and therefore Taxol discontinued.  Patient completed the fourth cycle of Perjeta/Herceptin alone.  Diarrhea was an issue  but not as bad without the Taxol.  She is weaned off her prednisone    Patient proceeded with cycle 1 Adriamycin/Cytoxan on 5/14/2021 and is seen back today for cycle #3 and we are doing it at 3-week intervals because of her frailty and after 3 cycles we stopped because of tolerance issues    She went for surgery her bilateral mastectomies and interestingly she had a complete pathological CR on the right breast and had evidence of DCIS in the left breast [id number]    10/21  We will start anastrozole because she is not a good candidate for tamoxifen and her bone density showed normal bone density in the spine but osteoporosis in the left femoral neck and we will start Prolia in 6 weeks.The side effects and toxicities of the Aromatase inhibitors was discussed with the patient including, hot flashes, mood swings and hair thinning.Significant arthralgias and worsening bone density were also discussed. Baseline bone density evaluation was ordered.        Current Outpatient Medications on File Prior to Visit   Medication Sig Dispense Refill   • albuterol sulfate  (90 Base) MCG/ACT inhaler Inhale 2 puffs Every 4 (Four) Hours As Needed.     • anastrozole (ARIMIDEX) 1 MG tablet Take 1 tablet by mouth Daily. 90 tablet 1   • carvedilol (COREG) 25 MG tablet Take 0.5 tablets by mouth 2 (Two) Times a Day With Meals. 30 tablet 0   • Cholecalciferol (Vitamin D) 50 MCG (2000 UT) capsule Take 2,000 Units by mouth Daily.     • dicyclomine (BENTYL) 20 MG tablet Take 1 tablet by mouth Every 6 (Six) Hours. 60 tablet 2   • diphenoxylate-atropine (LOMOTIL) 2.5-0.025 MG per tablet Take 1 tablet by mouth 4 (Four) Times a Day As Needed for Diarrhea. 120 tablet 0   • FLUoxetine (PROzac) 40 MG capsule TAKE 1 CAPSULE BY MOUTH EVERY DAY 30 capsule 0   • furosemide (LASIX) 40 MG tablet TAKE ONE-HALF TABLET BY MOUTH EVERY DAY 45 tablet 1   • lisinopril (PRINIVIL,ZESTRIL) 5 MG tablet TAKE 1 TABLET BY MOUTH EVERY DAY 90 tablet 1   •  mometasone-formoterol (DULERA 100) 100-5 MCG/ACT inhaler Inhale 2 Puffs/kg Every Night.     • montelukast (SINGULAIR) 10 MG tablet Take 10 mg by mouth Every Night.     • multivitamin (THERAGRAN) tablet tablet Take 1 tablet by mouth Daily.     • O2 (OXYGEN) Inhale 3 L/min Continuous.     • pravastatin (PRAVACHOL) 40 MG tablet Take 40 mg by mouth Every Night.     • psyllium (METAMUCIL) 0.52 g capsule Metamucil 0.52 gram oral capsule take 1 capsule by oral route 5Xday   Active     • spironolactone (ALDACTONE) 25 MG tablet Take 25 mg by mouth Daily.       No current facility-administered medications on file prior to visit.        ALLERGIES:    Allergies   Allergen Reactions   • Amlodipine Swelling     LEGS    • Hydrochlorothiazide Unknown - Low Severity     Neuro issues   • Morphine Nausea And Vomiting     hallucinations   • Adhesive Tape Rash        Social History     Socioeconomic History   • Marital status:    • Number of children: 3   Tobacco Use   • Smoking status: Never   • Smokeless tobacco: Never   Vaping Use   • Vaping Use: Never used   Substance and Sexual Activity   • Alcohol use: Never   • Drug use: Never   • Sexual activity: Not Currently     Partners: Female        Family History   Problem Relation Age of Onset   • Breast cancer Mother 45   • Cancer Mother         breast; metastasized   • Lung cancer Father    • Hodgkin's lymphoma Father    • Skin cancer Father         squamous cell   • Cancer Father         4 kinds: Hodgkins; lung; squamous cell skin   • Breast cancer Maternal Aunt 70   • Cancer Maternal Aunt         breast; metastasized   • Breast cancer Paternal Aunt 70   • Cancer Paternal Aunt         breast; cured   • Colon cancer Paternal Grandmother 60   • Cancer Paternal Grandmother         colon cancer   • Hypertension Paternal Grandmother         EVERY ADULT IN MY FAMILY   • Ovarian cancer Neg Hx    • Uterine cancer Neg Hx    • Deep vein thrombosis Neg Hx    • Pulmonary embolism Neg Hx   "  • Malig Hyperthermia Neg Hx         Review of Systems   Constitutional: Positive for fatigue and unexpected weight change (Weight gain).   Respiratory: Positive for shortness of breath.    Gastrointestinal: Positive for diarrhea.   All other systems reviewed and are negative.        Objective     Vitals:    01/24/23 1028   BP: 124/77   Pulse: 71   Resp: 24   Temp: 97.3 °F (36.3 °C)   TempSrc: Temporal   SpO2: 93%  Comment: on 4 liters oxygen   Weight: (!) 157 kg (346 lb 12.8 oz)   Height: 165.1 cm (65\")   PainSc: 0-No pain     Current Status 1/24/2023   ECOG score 3       Physical Exam    CONSTITUTIONAL:  Vital signs reviewed.  No distress, looks comfortable. Morbidly obese  EYES:  Conjunctiva and lids unremarkable.  PERRLA  EARS,NOSE,MOUTH,THROAT:  Ears and nose appear unremarkable.  Lips, teeth, gums appear unremarkable.  RESPIRATORY:  Normal respiratory effort.  Lungs clear to auscultation bilaterally.  No axillary adenopathy  BREASTS: Bilateral mastectomies with no reconstruction-no evidence of chest wall recurrence.  No palpable abnormalities noted.  CARDIOVASCULAR:  Normal S1, S2.  No murmurs rubs or gallops.  1+ brawny lower extremity edema.  GASTROINTESTINAL: Abdomen appears unremarkable.  Nontender.  No hepatomegaly.  No splenomegaly.  LYMPHATIC:  No cervical, supraclavicular, axillary lymphadenopathy.  SKIN:  Warm.  Inclusion cyst left axilla-  PSYCHIATRIC:  Normal judgment and insight.  Normal mood and affect.       I have reexamined the patient and the results are consistent with the previously documented exam. Ynes Vazquez, SHAUNA           RECENT LABS:  Results from last 7 days   Lab Units 01/24/23  1018   WBC 10*3/mm3 8.24   NEUTROS ABS 10*3/mm3 6.62   HEMOGLOBIN g/dL 12.2   HEMATOCRIT % 41.1   PLATELETS 10*3/mm3 258     Results from last 7 days   Lab Units 01/24/23  1018   SODIUM mmol/L 140   POTASSIUM mmol/L 4.2   CHLORIDE mmol/L 101   CO2 mmol/L 29.9*   BUN mg/dL 14   CREATININE mg/dL " 0.55*   CALCIUM mg/dL 9.5   ALBUMIN g/dL 3.8   BILIRUBIN mg/dL 0.3   ALK PHOS U/L 89   ALT (SGPT) U/L 16   AST (SGOT) U/L 15   GLUCOSE mg/dL 162*   MAGNESIUM mg/dL 1.9             PET IMPRESSION:  1.  Moderate to intensely FDG avid asymmetric soft tissue and skin  thickening involving the right breast likely representing patient's  known malignancy.  2.  Intensely FDG avid right axillary and subpectoral adenopathy likely  represent metastatic disease.  3.  Constellation of findings within the right adrenal gland are favored  to represent a lipid rich adenoma. Continued attention on follow-up is  recommended to ensure stability.  4.  While there are no findings of definite FDG avid osseous metastasis,  given the heterogenous FDG uptake throughout the axial and appendicular  skeleton due to the above stated limitations, subtle underlying osseous  metastasis would remain occult. Therefore, continued close attention on  follow-up is recommended to exclude this possibility.  5.  Short segment of moderate to intense FDG uptake within the distal  esophagus and GE junction suggestive of esophagitis. In the appropriate  clinical context correlation with patient history is recommended with  follow-up endoscopy if clinically indicated.  6.  Sub-6 mm pulmonary nodule within the right lower lobe is below PET  resolution and indeterminate. Continued close attention on follow-up  with chest CT in 3 months is recommended to exclude metastatic disease.  7.  Other findings as above.     This report was finalized on 2/2/2021     Final Diagnosis   1. Left Breast, Total Mastectomy (2,122 grams):               A. MULTIFOCAL LOW GRADE DUCTAL CARCINOMA IN SITU (DCIS):                            1. Solid, Cribriform, and Pagetoid type with single cell necrosis and focal calcifications.                            2. Extent of DCIS: 20 mm (5 of 26 blocks involved).                            3. Margins are negative for in situ carcinoma;  Closest distance: DCIS is present > 10 mm from                                the posterior margin.               B. Multiple intraductal papillomas, usual ductal hyperplasia, and fibroadenomatoid change.               C. Unremarkable skin and nipple.               D. See Synoptic Report and Comment #1.      2. Right Breast, Modified Radical Mastectomy S/P Neoadjuvant Chemotherapy (2,589 grams):               A. FIBROTIC TUMOR BED WITH NO RESIDUAL INVASIVE DUCTAL CARCINOMA.               B. Background breast parenchyma with multiple intraductal papillomas, fibroadenomatoid change,       usual ductal hyperplasia and pseudoangiomatous stromal hyperplasia (PASH).  C. Scar and fat necrosis, consistent with prior procedure-related changes.  D. Clip and biopsy site changes present within tumor bed.    E. Unremarkable skin and nipple.   F. Nineteen lymph nodes, negative for carcinoma (0/19):               1. Clip and biopsy site changes are present.               2. Treatment effect is present in 4 of 19 lymph nodes.  G. See Comment #2.         FINDINGS:   LUMBAR SPINE:  The BMD measured in the L1-L4 is 1.054 g/cm2 for a  T-score of 0.1 and a Z-score of 2.1     LEFT HIP: The BMD for the femoral neck is 0.476g/cm2 for a T score of   -3.4 and a Z score of -1.7     RIGHT HIP:  The BMD for the femoral neck is 0.657g/cm2 for a T score of  -1.7 and a Z score of 0.0     IMPRESSION:  Osteoporosis.     This report was finalized on 9/16/2021     Assessment & Plan   1. [id number] right breast cancer ER/OK HER-2 -3+ positive inflammatory breast cancer 1/19/2021 for neoadjuvant chemotherapy  · Staging work-up negative except for 6 mm lung nodule and axillary and subpectoral adenopathy  · THP followed by AC planned if she tolerates it  · C1D8 Taxol missed due to inclement weather.  · Taxol discontinued after 7 doses due to probable Taxol pneumonitis treated with steroids.    · C1 Adriamycin/Cytoxan given 5/14/2021.  · Adriamycin and  Cytoxan stopped after 3 doses due to poor tolerance  · YPT0N0 right breast with multifocal DCIS ER/NH positive in the left breast post bilateral mastectomies and right axillary dissection  · Radiation Arimidex and Perjeta Herceptin to continue for the rest of the year  · Severe diarrhea after resuming Perjeta-Lomotil ordered and C. difficile checked with this is C. difficile negative she will not tolerate Perjeta for the rest of the year and we will stop  · Doing well on single agent Herceptin and Arimidex in 2/22  · Completed adjuvant Herceptin in 3/22  · Tolerating anastrozole well as of 1/2023.    2.  Morbid obesity  · Body mass index is 57.71 kg/m².  · Patient concerned about weight gain.  Discussed intentional healthy food choices and potential water aerobics.    3.  History of diastolic heart failure  · Echocardiogram with ejection fraction of 64% normal strain  · Cleared by cardio-oncology for chemotherapy  · Echocardiogram in 9/21 stable at 63%    4.  Pulmonary disease?  Etiology on oxygen for 4 years?  Pickwickian  · Managed by pulmonologist.  Remains on continuous oxygen.  Follows up with them annually.    5.  Strong family history of breast cancer genetic testing 84 genes negative    6.  Probable benign adrenal adenoma-PET negative    7.  Questionable enlarged lymph nodes left iliac chain and mediastinum likely reactive-PET negative    8.  6 mm right lower lobe nodule below PET resolution pretreatment needs follow-up after THP  · Repeat CT read at [facility] radiologist report stability of nodules and nodes  · [facility] CT at [facility] in 10/21 shows continued improvement    9.  Abnormal uptake short segment esophagus with a history of Schatzki's ring-we will double PPI and watch closely but we we will proceed with chemotherapy at this point and refer back to GI-doubt she has metastatic disease to this area    10. Anemia with microcytic indices  · Iron studies performed 2/10/2021.  · 2/24/2021:  reviewed with the patient that she is iron deficient, with ferritin of 16, iron saturation of 4%.  Patient reports taking ferrous gluconate in the past but this caused GI upset.  Also with her chronic diarrhea I do not think she can absorb it.  We will pursue IV iron with plans to initiate this next week pending insurance approval. In addition hemoglobin down to 8.0 and transfusion pursued.  · 3/3/2021: IV Injectafer initiated x2.   · Hemoglobin down to 9.9 one week out from first AC though overall stable.  Monitor.   · Hemoglobin improved off chemotherapy  · Hemoglobin dropped after resuming Perjeta Herceptin will recheck iron stores  · Hgb 12.2.  · 1/24/2023 requesting labs from primary care provider to review iron studies.    11.  History of chronic diarrhea, ?IBS, exacerbated with Perjeta therapy.  · Patient required rifaximin 550 mg to take twice daily x7 days with each Perjeta dose.   · Since completion of Perjeta patient is now actually experiencing constipation (further discussed below).    12. Osteoporosis on DEXA scan in 9/21-Prolia initiated in 12/21  · Patient to receive third Prolia injection today 1/24/2023.  · Next DEXA scan due September 2023.    Plan  1.  Proceed with Prolia #3 today  2.  Continue Arimidex   3.  Return in 3 months for follow-up with Dr. Azul.  4.  Next DEXA scan due September 2023  5.  Encouraged healthy diet changes and water aerobics for weight management.  6.  Requesting anemia labs from primary care provider.  We will evaluate if patient needs further IV iron.  7.  Discussed exercise and weight loss and she is going to try.    This patient is on high risk drug therapy requiring intensive monitoring for toxicity.

## 2023-04-13 NOTE — PROGRESS NOTES
Primary Care Provider  Serenity Lucas APRN   Referring Provider  No ref. provider found      Patient Complaint  Allergic Rhinitis, Apnea, Follow-up, Shortness of Breath, and Med Refill (Dulera and albuterol)      Subjective          Aida Conner presents to [facility]      History of Presenting Illness  Aida Conner is a 68 y.o. female patient of Dr. Anaya with history of COPD, chronic respiratory failure with hypoxia and hypercapnia, ANABELLE, obesity hypoventilation syndrome, here for 6-month follow-up.    Patient states she is doing well since last visit, although she is very concerned about weight gain in the past year or so.  She completed chemotherapy for breast cancer about 1 year ago and lost about 50 pounds in the process, since then has gained back 40 pounds.  She also has low energy and difficulty staying active.  Patient denies using any antibiotics or steroids for her lungs, denies any fevers or chills.  She continues to use Dulera inhaler daily as prescribed as well as albuterol rescue inhaler as needed.  She also takes Singulair nightly for seasonal allergies.  Patient wears 2.5 L supplemental oxygen continuously.  She has a CPAP machine that she wears nightly and with naps.  Patient is a never smoker.  She denies any productive cough, hemoptysis, swollen lymph nodes, weight loss, or night sweats.  Patient continues to follow-up with oncology for surveillance of breast cancer in remission.  Overall, patient is doing well and has no additional concerns at this time.  Patient is able to perform ADLs without difficulty.  I have personally reviewed the review of systems, past family, social, medical and surgical histories; and agree with their findings.      Review of Systems    Review of Systems   Constitutional: Positive for fatigue. Negative for activity change, chills, fever, unexpected weight gain and unexpected weight loss.   HENT: Negative for  congestion, ear discharge, ear pain, mouth sores, postnasal drip, rhinorrhea, sinus pressure, sore throat, swollen glands and trouble swallowing.    Eyes: Negative for blurred vision, pain, discharge, itching and visual disturbance.   Respiratory: Positive for shortness of breath (with exertion). Negative for apnea, cough, chest tightness, wheezing and stridor.    Cardiovascular: Negative for chest pain, palpitations and leg swelling.   Gastrointestinal: Negative for abdominal distention, abdominal pain, constipation, diarrhea, nausea, vomiting, GERD and indigestion.   Musculoskeletal: Negative for arthralgias, joint swelling and myalgias.   Skin: Negative for color change.   Neurological: Negative for dizziness, weakness, light-headedness and headache.      Sleep: Negative for Excessive daytime sleepiness  Negative for morning headaches  Negative for Snoring      Family History   Problem Relation Age of Onset   • Breast cancer Mother 45   • Cancer Mother         breast; metastasized   • Lung cancer Father    • Hodgkin's lymphoma Father    • Skin cancer Father         squamous cell   • Cancer Father         4 kinds: Hodgkins; lung; squamous cell skin   • Breast cancer Maternal Aunt 70   • Cancer Maternal Aunt         breast; metastasized   • Breast cancer Paternal Aunt 70   • Cancer Paternal Aunt         breast; cured   • Colon cancer Paternal Grandmother 60   • Cancer Paternal Grandmother         colon cancer   • Hypertension Paternal Grandmother         EVERY ADULT IN MY FAMILY   • Ovarian cancer Neg Hx    • Uterine cancer Neg Hx    • Deep vein thrombosis Neg Hx    • Pulmonary embolism Neg Hx    • Malig Hyperthermia Neg Hx         Social History     Socioeconomic History   • Marital status:    • Number of children: 3   Tobacco Use   • Smoking status: Never   • Smokeless tobacco: Never   Vaping Use   • Vaping Use: Never used   Substance and Sexual Activity   • Alcohol use: Never   • Drug use: Never   •  Sexual activity: Not Currently     Partners: Female        Past Medical History:   Diagnosis Date   • Allergic rhinitis    • Anemia    • Anxiety    • Asthma    • Chronic bronchitis     In past, espec. during working years   • Chronic diastolic (congestive) heart failure    • Chronic hypoxemic respiratory failure     on continuous O2   • Coronary artery calcification seen on CT scan 04/21/2022   • Diarrhea     with chemo   • GERD (gastroesophageal reflux disease) Mild   • H/O Intraductal papilloma    • Hemorrhoids    • History of transfusion 1984    AFTER BACK SURGERY no reaction   • Hypertension    • IBS (irritable bowel syndrome)    • Inflammatory breast cancer     right   • Kidney stone    • Morbid obesity with BMI of 50.0-59.9, adult    • Obesity hypoventilation syndrome 02/05/2021   • On home oxygen therapy     2.5 AT REST WITH ACTIVITY UP TO 4L   • ANABELLE on CPAP     cpap  & uses O2 with CPAP   • Osteoarthritis    • Pneumonia     Many times in past, 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   • PONV (postoperative nausea and vomiting)         Immunization History   Administered Date(s) Administered   • COVID-19 (PFIZER) BIVALENT 12+YRS 11/30/2022   • COVID-19 (PFIZER) Purple Cap Monovalent 03/19/2021, 04/09/2021, 11/13/2021   • Covid-19 (Pfizer) Gray Cap Monovalent 06/18/2022   • Flu Vaccine Quad PF >36MO 09/24/2018, 11/12/2020   • Fluzone High-Dose 65+yrs 09/28/2021   • Influenza TIV (IM) 01/30/2014, 09/29/2014   • Pneumococcal Conjugate 13-Valent (PCV13) 08/21/2019       Allergies   Allergen Reactions   • Amlodipine Swelling     LEGS    • Hydrochlorothiazide Unknown - Low Severity     Neuro issues   • Morphine Nausea And Vomiting     hallucinations   • Adhesive Tape Rash          Current Outpatient Medications:   •  albuterol sulfate  (90 Base) MCG/ACT inhaler, Inhale 2 puffs Every 4 (Four) Hours As Needed for Wheezing or Shortness of Air., Disp: 18 g, Rfl: 11  •  anastrozole (ARIMIDEX) 1 MG tablet, Take 1 tablet by  "mouth Daily., Disp: 90 tablet, Rfl: 1  •  carvedilol (COREG) 25 MG tablet, Take 0.5 tablets by mouth 2 (Two) Times a Day With Meals., Disp: 30 tablet, Rfl: 0  •  Cholecalciferol (Vitamin D) 50 MCG (2000 UT) capsule, Take 1 capsule by mouth Daily., Disp: , Rfl:   •  dicyclomine (BENTYL) 20 MG tablet, Take 1 tablet by mouth Every 6 (Six) Hours., Disp: 60 tablet, Rfl: 2  •  diphenoxylate-atropine (LOMOTIL) 2.5-0.025 MG per tablet, Take 1 tablet by mouth 4 (Four) Times a Day As Needed for Diarrhea., Disp: 120 tablet, Rfl: 0  •  FLUoxetine (PROzac) 40 MG capsule, TAKE 1 CAPSULE BY MOUTH EVERY DAY, Disp: 30 capsule, Rfl: 0  •  furosemide (LASIX) 40 MG tablet, TAKE ONE-HALF TABLET BY MOUTH EVERY DAY, Disp: 45 tablet, Rfl: 1  •  lisinopril (PRINIVIL,ZESTRIL) 5 MG tablet, TAKE 1 TABLET BY MOUTH EVERY DAY, Disp: 90 tablet, Rfl: 1  •  meloxicam (MOBIC) 15 MG tablet, Take 0.5 tablets by mouth Daily., Disp: , Rfl:   •  mometasone-formoterol (DULERA 100) 100-5 MCG/ACT inhaler, Inhale 2 puffs 2 (Two) Times a Day., Disp: 1 each, Rfl: 11  •  montelukast (SINGULAIR) 10 MG tablet, Take 1 tablet by mouth Every Night., Disp: , Rfl:   •  multivitamin (THERAGRAN) tablet tablet, Take 1 tablet by mouth Daily., Disp: , Rfl:   •  O2 (OXYGEN), Inhale 3 L/min Continuous., Disp: , Rfl:   •  pantoprazole (PROTONIX) 40 MG EC tablet, Take 1 tablet by mouth Daily., Disp: , Rfl:   •  pravastatin (PRAVACHOL) 40 MG tablet, Take 1 tablet by mouth Every Night., Disp: , Rfl:   •  spironolactone (ALDACTONE) 25 MG tablet, Take 1 tablet by mouth Daily., Disp: , Rfl:   •  psyllium (METAMUCIL) 0.52 g capsule, Metamucil 0.52 gram oral capsule take 1 capsule by oral route 5Xday   Active (Patient not taking: Reported on 4/28/2023), Disp: , Rfl:      Objective     Vital Signs:   /85 (BP Location: Left arm, Patient Position: Sitting, Cuff Size: Large Adult)   Pulse 64   Temp 98.2 °F (36.8 °C) (Tympanic)   Resp 18   Ht 165.1 cm (65\")   Wt (!) 163 kg (360 " lb)   SpO2 92% Comment: 4L PULSE DOSE  BMI 59.91 kg/m²     Objective   Physical Exam  Constitutional:       General: She is not in acute distress.     Appearance: Normal appearance. She is obese. She is not ill-appearing.   HENT:      Right Ear: Tympanic membrane and ear canal normal.      Left Ear: Tympanic membrane and ear canal normal.      Nose: Nose normal.      Mouth/Throat:      Mouth: Mucous membranes are moist.      Pharynx: Oropharynx is clear.   Eyes:      Extraocular Movements: Extraocular movements intact.      Conjunctiva/sclera: Conjunctivae normal.      Pupils: Pupils are equal, round, and reactive to light.   Cardiovascular:      Rate and Rhythm: Normal rate and regular rhythm.      Pulses: Normal pulses.      Heart sounds: Normal heart sounds.   Pulmonary:      Effort: Pulmonary effort is normal. No respiratory distress.      Breath sounds: Normal breath sounds. No stridor. No wheezing, rhonchi or rales.   Abdominal:      General: Bowel sounds are normal.      Palpations: Abdomen is soft.   Musculoskeletal:         General: No swelling. Normal range of motion.      Cervical back: Normal range of motion and neck supple.      Right lower leg: No edema.      Left lower leg: No edema.   Skin:     General: Skin is warm and dry.   Neurological:      General: No focal deficit present.      Mental Status: She is alert and oriented to person, place, and time.      Motor: No weakness.   Psychiatric:         Mood and Affect: Mood normal.         Behavior: Behavior normal.        Result Review :   I have personally reviewed patient's labs and images.  I also reviewed Stephanie's last office note 8/4/2022.            Diagnoses and all orders for this visit:    1. Chronic respiratory failure with hypoxia and hypercapnia (Primary)  -     mometasone-formoterol (DULERA 100) 100-5 MCG/ACT inhaler; Inhale 2 puffs 2 (Two) Times a Day.  Dispense: 1 each; Refill: 11  -     albuterol sulfate  (90 Base) MCG/ACT  inhaler; Inhale 2 puffs Every 4 (Four) Hours As Needed for Wheezing or Shortness of Air.  Dispense: 18 g; Refill: 11    2. Chronic obstructive pulmonary disease, unspecified COPD type  -     mometasone-formoterol (DULERA 100) 100-5 MCG/ACT inhaler; Inhale 2 puffs 2 (Two) Times a Day.  Dispense: 1 each; Refill: 11  -     albuterol sulfate  (90 Base) MCG/ACT inhaler; Inhale 2 puffs Every 4 (Four) Hours As Needed for Wheezing or Shortness of Air.  Dispense: 18 g; Refill: 11    3. Restrictive airway disease    4. Obstructive sleep apnea    5. Obesity hypoventilation syndrome    6. Dyspnea on exertion  -     mometasone-formoterol (DULERA 100) 100-5 MCG/ACT inhaler; Inhale 2 puffs 2 (Two) Times a Day.  Dispense: 1 each; Refill: 11  -     albuterol sulfate  (90 Base) MCG/ACT inhaler; Inhale 2 puffs Every 4 (Four) Hours As Needed for Wheezing or Shortness of Air.  Dispense: 18 g; Refill: 11    7. Inflammatory breast cancer, right    8. Morbid obesity with BMI of 50.0-59.9, adult    9. Allergic rhinitis, unspecified seasonality, unspecified trigger       Impression and Plan    -PFTs 1/16/2019 showed obstructive airway disease, FEV1 42% of predicted, no significant response to bronchodilator.  Lung volumes indicate restrictive airway disease, DLCO normal.  -Continue Dulera 2 puffs twice daily, reminded patient to rinse mouth after each use, refills sent to pharmacy  -Continue albuterol rescue inhaler as needed, refills sent to pharmacy  -Continue taking Singulair daily for seasonal allergies and allergic rhinitis  -Continue using 2.5 L supplemental oxygen to maintain O2 saturations at or above 89%  -Continue using CPAP nightly and with naps, patient continues to benefit from nightly PAP therapy  -Continue following up with oncology as scheduled for management of breast cancer in remission  -Spent 5 minutes counseling patient on diet and exercise.  Recommended a low-fat, low-calorie diet.  Also recommend 30  minutes of daily exercise.  Patient verbalizes understanding.  I recommend patient speak with her primary care provider regarding options for weight loss and injectables, as she does have comorbidities such as ANABELLE and borderline diabetes.  -Follow-up with Dr. Anaya in 6 months, may return sooner if needed    Smoking status: Reviewed  Vaccination status: Patient reports she is up-to-date with her flu, pneumonia, and Covid vaccines.  Patient is advised to continue to follow CDC recommendations such as social distancing wearing a mask and washing hands for at least 20 seconds.  Medications personally reviewed    Follow Up   Return in about 6 months (around 10/28/2023).  Patient was given instructions and counseling regarding her condition or for health maintenance advice. Please see specific information pulled into the AVS if appropriate.

## 2023-04-28 ENCOUNTER — OFFICE VISIT (OUTPATIENT)
Dept: PULMONOLOGY | Facility: CLINIC | Age: 68
End: 2023-04-28
Payer: MEDICARE

## 2023-04-28 VITALS
RESPIRATION RATE: 18 BRPM | BODY MASS INDEX: 48.82 KG/M2 | DIASTOLIC BLOOD PRESSURE: 85 MMHG | WEIGHT: 293 LBS | HEIGHT: 65 IN | HEART RATE: 64 BPM | SYSTOLIC BLOOD PRESSURE: 138 MMHG | OXYGEN SATURATION: 92 % | TEMPERATURE: 98.2 F

## 2023-04-28 DIAGNOSIS — J96.12 CHRONIC RESPIRATORY FAILURE WITH HYPOXIA AND HYPERCAPNIA: Primary | ICD-10-CM

## 2023-04-28 DIAGNOSIS — E66.01 MORBID OBESITY WITH BMI OF 50.0-59.9, ADULT: ICD-10-CM

## 2023-04-28 DIAGNOSIS — R06.09 DYSPNEA ON EXERTION: ICD-10-CM

## 2023-04-28 DIAGNOSIS — J30.9 ALLERGIC RHINITIS, UNSPECIFIED SEASONALITY, UNSPECIFIED TRIGGER: ICD-10-CM

## 2023-04-28 DIAGNOSIS — E66.2 OBESITY HYPOVENTILATION SYNDROME: ICD-10-CM

## 2023-04-28 DIAGNOSIS — J96.11 CHRONIC RESPIRATORY FAILURE WITH HYPOXIA AND HYPERCAPNIA: Primary | ICD-10-CM

## 2023-04-28 DIAGNOSIS — C50.911 INFLAMMATORY BREAST CANCER, RIGHT: ICD-10-CM

## 2023-04-28 DIAGNOSIS — G47.33 OBSTRUCTIVE SLEEP APNEA: ICD-10-CM

## 2023-04-28 DIAGNOSIS — J98.4 RESTRICTIVE AIRWAY DISEASE: ICD-10-CM

## 2023-04-28 DIAGNOSIS — J44.9 CHRONIC OBSTRUCTIVE PULMONARY DISEASE, UNSPECIFIED COPD TYPE: ICD-10-CM

## 2023-04-28 RX ORDER — ALBUTEROL SULFATE 90 UG/1
2 AEROSOL, METERED RESPIRATORY (INHALATION) EVERY 4 HOURS PRN
Qty: 18 G | Refills: 11 | Status: SHIPPED | OUTPATIENT
Start: 2023-04-28

## 2023-04-28 RX ORDER — MELOXICAM 15 MG/1
0.5 TABLET ORAL DAILY
COMMUNITY
Start: 2023-03-25

## 2023-04-28 RX ORDER — PANTOPRAZOLE SODIUM 40 MG/1
1 TABLET, DELAYED RELEASE ORAL DAILY
COMMUNITY
Start: 2023-02-15

## 2023-05-01 ENCOUNTER — LAB (OUTPATIENT)
Dept: OTHER | Facility: HOSPITAL | Age: 68
End: 2023-05-01
Payer: MEDICARE

## 2023-05-01 ENCOUNTER — OFFICE VISIT (OUTPATIENT)
Dept: ONCOLOGY | Facility: CLINIC | Age: 68
End: 2023-05-01
Payer: MEDICARE

## 2023-05-01 VITALS
SYSTOLIC BLOOD PRESSURE: 160 MMHG | TEMPERATURE: 98.2 F | OXYGEN SATURATION: 95 % | HEART RATE: 72 BPM | HEIGHT: 65 IN | WEIGHT: 293 LBS | RESPIRATION RATE: 16 BRPM | BODY MASS INDEX: 48.82 KG/M2 | DIASTOLIC BLOOD PRESSURE: 90 MMHG

## 2023-05-01 DIAGNOSIS — Z17.0 MALIGNANT NEOPLASM OF AXILLARY TAIL OF RIGHT BREAST IN FEMALE, ESTROGEN RECEPTOR POSITIVE: ICD-10-CM

## 2023-05-01 DIAGNOSIS — C50.611 MALIGNANT NEOPLASM OF AXILLARY TAIL OF RIGHT FEMALE BREAST, UNSPECIFIED ESTROGEN RECEPTOR STATUS: ICD-10-CM

## 2023-05-01 DIAGNOSIS — Z79.811 AROMATASE INHIBITOR USE: ICD-10-CM

## 2023-05-01 DIAGNOSIS — C50.911 INFLAMMATORY BREAST CANCER, RIGHT: Primary | ICD-10-CM

## 2023-05-01 DIAGNOSIS — Z78.0 POST-MENOPAUSAL: ICD-10-CM

## 2023-05-01 DIAGNOSIS — Z17.0 MALIGNANT NEOPLASM OF CENTRAL PORTION OF RIGHT BREAST IN FEMALE, ESTROGEN RECEPTOR POSITIVE: ICD-10-CM

## 2023-05-01 DIAGNOSIS — C50.611 MALIGNANT NEOPLASM OF AXILLARY TAIL OF RIGHT BREAST IN FEMALE, ESTROGEN RECEPTOR POSITIVE: ICD-10-CM

## 2023-05-01 DIAGNOSIS — C50.111 MALIGNANT NEOPLASM OF CENTRAL PORTION OF RIGHT BREAST IN FEMALE, ESTROGEN RECEPTOR POSITIVE: ICD-10-CM

## 2023-05-01 LAB
ALBUMIN SERPL-MCNC: 3.9 G/DL (ref 3.5–5.2)
ALBUMIN/GLOB SERPL: 1.1 G/DL
ALP SERPL-CCNC: 90 U/L (ref 39–117)
ALT SERPL W P-5'-P-CCNC: 18 U/L (ref 1–33)
ANION GAP SERPL CALCULATED.3IONS-SCNC: 10.5 MMOL/L (ref 5–15)
AST SERPL-CCNC: 22 U/L (ref 1–32)
BASOPHILS # BLD AUTO: 0.03 10*3/MM3 (ref 0–0.2)
BASOPHILS NFR BLD AUTO: 0.4 % (ref 0–1.5)
BILIRUB SERPL-MCNC: 0.5 MG/DL (ref 0–1.2)
BUN SERPL-MCNC: 12 MG/DL (ref 8–23)
BUN/CREAT SERPL: 22.6 (ref 7–25)
CALCIUM SPEC-SCNC: 9.2 MG/DL (ref 8.6–10.5)
CHLORIDE SERPL-SCNC: 105 MMOL/L (ref 98–107)
CO2 SERPL-SCNC: 27.5 MMOL/L (ref 22–29)
CREAT SERPL-MCNC: 0.53 MG/DL (ref 0.57–1)
DEPRECATED RDW RBC AUTO: 46.5 FL (ref 37–54)
EGFRCR SERPLBLD CKD-EPI 2021: 99.6 ML/MIN/1.73
EOSINOPHIL # BLD AUTO: 0.13 10*3/MM3 (ref 0–0.4)
EOSINOPHIL NFR BLD AUTO: 1.7 % (ref 0.3–6.2)
ERYTHROCYTE [DISTWIDTH] IN BLOOD BY AUTOMATED COUNT: 13.5 % (ref 12.3–15.4)
GLOBULIN UR ELPH-MCNC: 3.4 GM/DL
GLUCOSE SERPL-MCNC: 126 MG/DL (ref 65–99)
HCT VFR BLD AUTO: 40.6 % (ref 34–46.6)
HGB BLD-MCNC: 12.2 G/DL (ref 12–15.9)
IMM GRANULOCYTES # BLD AUTO: 0.04 10*3/MM3 (ref 0–0.05)
IMM GRANULOCYTES NFR BLD AUTO: 0.5 % (ref 0–0.5)
LYMPHOCYTES # BLD AUTO: 0.57 10*3/MM3 (ref 0.7–3.1)
LYMPHOCYTES NFR BLD AUTO: 7.3 % (ref 19.6–45.3)
MCH RBC QN AUTO: 28.2 PG (ref 26.6–33)
MCHC RBC AUTO-ENTMCNC: 30 G/DL (ref 31.5–35.7)
MCV RBC AUTO: 93.8 FL (ref 79–97)
MONOCYTES # BLD AUTO: 0.49 10*3/MM3 (ref 0.1–0.9)
MONOCYTES NFR BLD AUTO: 6.3 % (ref 5–12)
NEUTROPHILS NFR BLD AUTO: 6.54 10*3/MM3 (ref 1.7–7)
NEUTROPHILS NFR BLD AUTO: 83.8 % (ref 42.7–76)
NRBC BLD AUTO-RTO: 0 /100 WBC (ref 0–0.2)
PLATELET # BLD AUTO: 247 10*3/MM3 (ref 140–450)
PMV BLD AUTO: 10.2 FL (ref 6–12)
POTASSIUM SERPL-SCNC: 4.8 MMOL/L (ref 3.5–5.2)
PROT SERPL-MCNC: 7.3 G/DL (ref 6–8.5)
RBC # BLD AUTO: 4.33 10*6/MM3 (ref 3.77–5.28)
SODIUM SERPL-SCNC: 143 MMOL/L (ref 136–145)
WBC NRBC COR # BLD: 7.8 10*3/MM3 (ref 3.4–10.8)

## 2023-05-01 PROCEDURE — 80053 COMPREHEN METABOLIC PANEL: CPT | Performed by: INTERNAL MEDICINE

## 2023-05-01 PROCEDURE — 36415 COLL VENOUS BLD VENIPUNCTURE: CPT

## 2023-05-01 PROCEDURE — 85025 COMPLETE CBC W/AUTO DIFF WBC: CPT | Performed by: INTERNAL MEDICINE

## 2023-05-01 NOTE — PROGRESS NOTES
Subjective     REASON FOR FOLLOW-UP: Right breast inflammatory breast, triple positive.                              REQUESTING PHYSICIAN: MD Francisco Esteban MD Bethany Haynes, MD    History of present illness:  Patient is a 68 y.o. 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 She then had surgery with a complete pathological response and then resumed adjuvant treatment with Perjeta Herceptin with intolerance due to severe diarrhea. She completed Herceptin in March of 2022 and continued on Arimidex along with initiating Prolia for worsening osteopenia.    Patient continues to tolerate her Arimidex and denies any arthralgias or hot flashes.  She returns today stating that she has severe fatigue which makes it hard for her to get up and exercise and therefore she is gaining weight.  Her primary doctor is checking her thyroid function she wonders if the Arimidex is causing the fatigue    . Her biggest concern today is her weight and fatigue.  She does report she is discouraged from her weight gain post chemotherapy completion.  She has considered joining her  for water aerobics however she is concerned about her oxygen tubing and need for continuous oxygen.    I have asked her to take a break 1 month from the Femara and if the fatigue does not improve this is not the cause.  Obviously she feels much better off the sugar she will call us and we will switch medications     she may be a candidate for the weight loss injection because there is such a problem for her with her poor respiratory status  Bone density is due again in September and she remains on Prolia which is due again in July    Past Medical History:    Diagnosis Date   • Allergic rhinitis    • Anemia    • Anxiety    • Asthma    • Chronic bronchitis     In past, espec. during working years   • Chronic diastolic (congestive) heart failure    • Chronic hypoxemic respiratory failure     on continuous O2   • Coronary artery calcification seen on CT scan 04/21/2022   • Diarrhea     with chemo   • GERD (gastroesophageal reflux disease) Mild   • H/O Intraductal papilloma    • Hemorrhoids    • History of transfusion 1984    AFTER BACK SURGERY no reaction   • Hypertension    • IBS (irritable bowel syndrome)    • Inflammatory breast cancer     right   • Kidney stone    • Morbid obesity with BMI of 50.0-59.9, adult    • Obesity hypoventilation syndrome 02/05/2021   • On home oxygen therapy     2.5 AT REST WITH ACTIVITY UP TO 4L   • ANABELLE on CPAP     cpap  & uses O2 with CPAP   • Osteoarthritis    • Pneumonia     Many times in past, 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   • PONV (postoperative nausea and vomiting)         Past Surgical History:   Procedure Laterality Date   • BREAST EXCISIONAL BIOPSY Bilateral    • COLONOSCOPY     • COLONOSCOPY N/A 12/8/2022    Procedure: COLONOSCOPY;  Surgeon: Henry Aden MD;  Location: [facility] ENDOSCOPY;  Service: Gastroenterology;  Laterality: N/A;  diverticulosis, and hemorrhoids   • CYSTOSCOPY BLADDER STONE LITHOTRIPSY     • ENDOSCOPY     • ENDOSCOPY N/A 12/8/2022    Procedure: ESOPHAGOGASTRODUODENOSCOPY  with biopsy;  Surgeon: Henry Aden MD;  Location: [facility] ENDOSCOPY;  Service: Gastroenterology;  Laterality: N/A;  hiatal hernia   • KNEE ARTHROPLASTY Bilateral 2009   • MASTECTOMY Left 08/10/2021    Procedure: LEFT TOTAL MASTECTOMY;  Surgeon: Nicci Banks MD;  Location: [facility] OR;  Service: General;  Laterality: Left;   • MASTECTOMY Right 08/10/2021    Procedure: RIGHT MODIFIED RADICAL MASTECTOMY;  Surgeon: Nicci Banks MD;  Location: [facility] OR;  Service: General;  Laterality: Right;   • SPINE  SURGERY     • TOTAL ABDOMINAL HYSTERECTOMY WITH SALPINGO OOPHORECTOMY     • UPPER GASTROINTESTINAL ENDOSCOPY     • VENOUS ACCESS DEVICE (PORT) INSERTION N/A 2021    Procedure: INSERTION VENOUS ACCESS DEVICE;  Surgeon: Nicci Banks MD;  Location: [facility];  Service: General;  Laterality: N/A;   • VENOUS ACCESS DEVICE (PORT) REMOVAL Left 2022    Procedure: REMOVAL VENOUS ACCESS DEVICE;  Surgeon: Nicci Banks MD;  Location: [facility];  Service: General;  Laterality: Left;      ONC HISTORY  patient is a 65-year-old white female with morbid obesity, history of diastolic congestive heart failure and unknown type of pulmonary disease for which she has been on oxygen for 4 years.    She has been getting routine mammography since 38 years of age because her mother  at 46 of breast cancer and had a biopsy of her left breast in  which was apparently benign and another biopsy in  on her right breast which showed a small focus of atypical ductal hyperplasia and atypical lobular hyperplasia with a residual intraductal papilloma.  At that time she saw medical oncologist who recommended genetic testing and prevention but the insurance would not cover the genetic testing and the medical oncologist thought tamoxifen was too risky for her because of her comorbidities and no treatment was given.  More recently she noticed redness of the right breast in October and showed it to her family doctor who gave her course of Keflex when it did not improve and mammogram was ordered and this was benign  When the redness persisted she saw her gynecologist with concern for inflammatory breast cancer and referred her to Dr. Banks after repeat imaging and [facility].  Preliminary report shows micropapillary invasive mammary carcinoma intermediate grade measuring 13 mm right axillary node was also involved with  metastatic cancer ER/AK and HER-2 are pending  Patient saw Dr. Banks who sent her for skin biopsies and she had 3 separate punch biopsy of the skin that showed Perivascular and perifollicular inflammation with no obvious malignancy at 3:00 12:00 and 9:00  In addition staging work-up with CAT scans and bone scan were ordered.  CAT scan of the chest showed a borderline mediastinal  Infracarinal node measuring 15 mm in length mild cardiomegaly and heavy coronary artery calcifications  CT of the abdomen showed a 2.5 x 2.2 cm right adrenal mass which the patient tells me she has had in the past and is most likely benign but also a 2.7 cm left external iliac node which is indeterminate.  Bone scan was negative    Echocardiogram is scheduled for later next week and genetic testing with the [employer] stat panel is pending    Patient is  3 para 3 menarche was at age 9 menopause at 49 when she had a hysterectomy and oophorectomy  First childbirth was at age 20 she breast-fed her second and third children and took no hormone replacement after menopause    Family history is positive for mother dying of breast cancer at age 46 she is a maternal aunt with breast cancer in her 70s a paternal aunt with breast cancer in her 70s paternal grandmother with colon cancer.  Her father had Hodgkin's disease and non-Hodgkin's lymphoma but  of small cell lung cancer at 67      She has not had a heart attack stroke or blood clot    Plan to do echocardiogram soon as possible to ascertain tolerability of cardiotoxic chemotherapy which would be typically indicated with an inflammatory breast cancer    Also plan PET scan to follow-up on atypical lymph nodes and confirm benign etiology of the adrenal lesion    She will have port placement and chemo education pt is  ER96%/PR82%  and HER-2 +--3+    I explained to Aida that the goal of treatment would be curative but she has a lot of comorbidities which may limit our ability to give the  most effective chemotherapy in the setting  I told her radiation would be involved and possibly hormonal therapy for 10 years as she has hormone positivity and HER-2 directed therapy    She expressed some concerns about driving back and forth from [place] but felt that once a week was doable    We will see her back in 2 weeks to start treatment with a port placement planned early next week      Patient did have a mild reaction to Taxol dose #1 with some increased shortness of breath and flushing.  This was responsive to 100 mg of Solu-Cortef.  She states this gave her a headache and made her quite talkative but otherwise she was thankfully able to complete Taxol infusion without further incident.    Patient reports issues with chronic diarrhea over the last few years and did note a little bit increase in stooling following THP though nothing overly significant in her mind.  She did finally begin taking Imodium just a few days ago and has had no further stools.  She does note significant trouble with hemorrhoids in relation to the diarrhea   Required blood transfusion due to significant hemorrhoidal bleeding plus iron deficiency.  Injectafer planned    Due to inclement weather cycle 1 day 8 therapy was missed.  She did get day 15 therapy with fairly good tolerance except for some diarrhea.    She was found to be iron deficient despite trying oral iron.  We therefore elected to proceed with IV Injectafer but she remains mildly anemic with a hemoglobin of 9.3    7/21    She has increased her Lasix and her weight is down 7 pounds but overall she is significantly weaker since starting treatment and I think it is in her best interest to discontinue treatment and proceed with surgery and will use Herceptin in the interim till surgery scheduled  She has had a good response in the breast and I do not think the last dose of Adriamycin is going to be crucial and we run the risk of making her so weak that she cannot go  for surgery    9/21  Patient had worsening shortness of breath requiring continuous oxygen.  CT of the chest performed to rule out pneumonitis and she was started on steroids.  CT showed bilateral groundglass infiltrates presumed to be related to Taxol pneumonitis and therefore Taxol discontinued.  Patient completed the fourth cycle of Perjeta/Herceptin alone.  Diarrhea was an issue but not as bad without the Taxol.  She is weaned off her prednisone    Patient proceeded with cycle 1 Adriamycin/Cytoxan on 5/14/2021 and is seen back today for cycle #3 and we are doing it at 3-week intervals because of her frailty and after 3 cycles we stopped because of tolerance issues    She went for surgery her bilateral mastectomies and interestingly she had a complete pathological CR on the right breast and had evidence of DCIS in the left breast [id number]    10/21  We will start anastrozole because she is not a good candidate for tamoxifen and her bone density showed normal bone density in the spine but osteoporosis in the left femoral neck and we will start Prolia in 6 weeks.The side effects and toxicities of the Aromatase inhibitors was discussed with the patient including, hot flashes, mood swings and hair thinning.Significant arthralgias and worsening bone density were also discussed. Baseline bone density evaluation was ordered.        Current Outpatient Medications on File Prior to Visit   Medication Sig Dispense Refill   • albuterol sulfate  (90 Base) MCG/ACT inhaler Inhale 2 puffs Every 4 (Four) Hours As Needed for Wheezing or Shortness of Air. 18 g 11   • anastrozole (ARIMIDEX) 1 MG tablet Take 1 tablet by mouth Daily. 90 tablet 1   • carvedilol (COREG) 25 MG tablet Take 0.5 tablets by mouth 2 (Two) Times a Day With Meals. 30 tablet 0   • Cholecalciferol (Vitamin D) 50 MCG (2000 UT) capsule Take 1 capsule by mouth Daily.     • dicyclomine (BENTYL) 20 MG tablet Take 1 tablet by mouth Every 6 (Six) Hours. 60 tablet 2    • diphenoxylate-atropine (LOMOTIL) 2.5-0.025 MG per tablet Take 1 tablet by mouth 4 (Four) Times a Day As Needed for Diarrhea. 120 tablet 0   • FLUoxetine (PROzac) 40 MG capsule TAKE 1 CAPSULE BY MOUTH EVERY DAY 30 capsule 0   • furosemide (LASIX) 40 MG tablet TAKE ONE-HALF TABLET BY MOUTH EVERY DAY 45 tablet 1   • lisinopril (PRINIVIL,ZESTRIL) 5 MG tablet TAKE 1 TABLET BY MOUTH EVERY DAY 90 tablet 1   • meloxicam (MOBIC) 15 MG tablet Take 0.5 tablets by mouth Daily.     • mometasone-formoterol (DULERA 100) 100-5 MCG/ACT inhaler Inhale 2 puffs 2 (Two) Times a Day. 1 each 11   • montelukast (SINGULAIR) 10 MG tablet Take 1 tablet by mouth Every Night.     • multivitamin (THERAGRAN) tablet tablet Take 1 tablet by mouth Daily.     • O2 (OXYGEN) Inhale 3 L/min Continuous.     • pantoprazole (PROTONIX) 40 MG EC tablet Take 1 tablet by mouth Daily.     • pravastatin (PRAVACHOL) 40 MG tablet Take 1 tablet by mouth Every Night.     • psyllium (METAMUCIL) 0.52 g capsule      • spironolactone (ALDACTONE) 25 MG tablet Take 1 tablet by mouth Daily.       No current facility-administered medications on file prior to visit.        ALLERGIES:    Allergies   Allergen Reactions   • Amlodipine Swelling     LEGS    • Hydrochlorothiazide Unknown - Low Severity     Neuro issues   • Morphine Nausea And Vomiting     hallucinations   • Adhesive Tape Rash        Social History     Socioeconomic History   • Marital status:    • Number of children: 3   Tobacco Use   • Smoking status: Never   • Smokeless tobacco: Never   Vaping Use   • Vaping Use: Never used   Substance and Sexual Activity   • Alcohol use: Never   • Drug use: Never   • Sexual activity: Not Currently     Partners: Female        Family History   Problem Relation Age of Onset   • Breast cancer Mother 45   • Cancer Mother         breast; metastasized   • Lung cancer Father    • Hodgkin's lymphoma Father    • Skin cancer Father         squamous cell   • Cancer Father         " 4 kinds: Hodgkins; lung; squamous cell skin   • Breast cancer Maternal Aunt 70   • Cancer Maternal Aunt         breast; metastasized   • Breast cancer Paternal Aunt 70   • Cancer Paternal Aunt         breast; cured   • Colon cancer Paternal Grandmother 60   • Cancer Paternal Grandmother         colon cancer   • Hypertension Paternal Grandmother         EVERY ADULT IN MY FAMILY   • Ovarian cancer Neg Hx    • Uterine cancer Neg Hx    • Deep vein thrombosis Neg Hx    • Pulmonary embolism Neg Hx    • Malig Hyperthermia Neg Hx         Review of Systems   Constitutional: Positive for fatigue and unexpected weight change (Weight gain).   Respiratory: Positive for shortness of breath.    Gastrointestinal: Positive for diarrhea.   Musculoskeletal: Positive for back pain (Sciatica on the right).   All other systems reviewed and are negative.        Objective     Vitals:    05/01/23 1121   BP: 160/90  Comment: per patient, didn't take meds this AM   Pulse: 72   Resp: 16   Temp: 98.2 °F (36.8 °C)   TempSrc: Temporal   SpO2: 95%  Comment: on 4 liter of O2   Weight: (!) 161 kg (356 lb)   Height: 165 cm (64.96\")   PainSc:   2   PainLoc: Generalized  Comment: HIP AND ACHILLES TENDON         5/1/2023    11:24 AM   Current Status   ECOG score 1       Physical Exam    CONSTITUTIONAL:  Vital signs reviewed.  No distress, looks comfortable. Morbidly obese on continuous oxygen  EYES:  Conjunctiva and lids unremarkable.  PERRLA  EARS,NOSE,MOUTH,THROAT:  Ears and nose appear unremarkable.  Lips, teeth, gums appear unremarkable.  RESPIRATORY:  Normal respiratory effort.  Lungs clear to auscultation bilaterally.  No axillary adenopathy  BREASTS: Bilateral mastectomies with no reconstruction-no evidence of chest wall recurrence.  No palpable abnormalities noted.  CARDIOVASCULAR:  Normal S1, S2.  No murmurs rubs or gallops.  1+ brawny lower extremity edema.  GASTROINTESTINAL: Abdomen appears unremarkable.  Nontender.  No hepatomegaly.  No " splenomegaly.  LYMPHATIC:  No cervical, supraclavicular, axillary lymphadenopathy.  SKIN:  Warm.  Inclusion cyst left axilla-  PSYCHIATRIC:  Normal judgment and insight.  Normal mood and affect.       I have reexamined the patient and the results are consistent with the previously documented exam. Jaime Azul MD           RECENT LABS:  Results from last 7 days   Lab Units 05/01/23  1048   WBC 10*3/mm3 7.80   NEUTROS ABS 10*3/mm3 6.54   HEMOGLOBIN g/dL 12.2   HEMATOCRIT % 40.6   PLATELETS 10*3/mm3 247     Results from last 7 days   Lab Units 05/01/23  1048   SODIUM mmol/L 143   POTASSIUM mmol/L 4.8   CHLORIDE mmol/L 105   CO2 mmol/L 27.5   BUN mg/dL 12   CREATININE mg/dL 0.53*   CALCIUM mg/dL 9.2   ALBUMIN g/dL 3.9   BILIRUBIN mg/dL 0.5   ALK PHOS U/L 90   ALT (SGPT) U/L 18   AST (SGOT) U/L 22   GLUCOSE mg/dL 126*             PET IMPRESSION:  1.  Moderate to intensely FDG avid asymmetric soft tissue and skin  thickening involving the right breast likely representing patient's  known malignancy.  2.  Intensely FDG avid right axillary and subpectoral adenopathy likely  represent metastatic disease.  3.  Constellation of findings within the right adrenal gland are favored  to represent a lipid rich adenoma. Continued attention on follow-up is  recommended to ensure stability.  4.  While there are no findings of definite FDG avid osseous metastasis,  given the heterogenous FDG uptake throughout the axial and appendicular  skeleton due to the above stated limitations, subtle underlying osseous  metastasis would remain occult. Therefore, continued close attention on  follow-up is recommended to exclude this possibility.  5.  Short segment of moderate to intense FDG uptake within the distal  esophagus and GE junction suggestive of esophagitis. In the appropriate  clinical context correlation with patient history is recommended with  follow-up endoscopy if clinically indicated.  6.  Sub-6 mm pulmonary nodule within  the right lower lobe is below PET  resolution and indeterminate. Continued close attention on follow-up  with chest CT in 3 months is recommended to exclude metastatic disease.  7.  Other findings as above.     This report was finalized on 2/2/2021     Final Diagnosis   1. Left Breast, Total Mastectomy (2,122 grams):               A. MULTIFOCAL LOW GRADE DUCTAL CARCINOMA IN SITU (DCIS):                            1. Solid, Cribriform, and Pagetoid type with single cell necrosis and focal calcifications.                            2. Extent of DCIS: 20 mm (5 of 26 blocks involved).                            3. Margins are negative for in situ carcinoma; Closest distance: DCIS is present > 10 mm from                                the posterior margin.               B. Multiple intraductal papillomas, usual ductal hyperplasia, and fibroadenomatoid change.               C. Unremarkable skin and nipple.               D. See Synoptic Report and Comment #1.      2. Right Breast, Modified Radical Mastectomy S/P Neoadjuvant Chemotherapy (2,589 grams):               A. FIBROTIC TUMOR BED WITH NO RESIDUAL INVASIVE DUCTAL CARCINOMA.               B. Background breast parenchyma with multiple intraductal papillomas, fibroadenomatoid change,       usual ductal hyperplasia and pseudoangiomatous stromal hyperplasia (PASH).  C. Scar and fat necrosis, consistent with prior procedure-related changes.  D. Clip and biopsy site changes present within tumor bed.    E. Unremarkable skin and nipple.   F. Nineteen lymph nodes, negative for carcinoma (0/19):               1. Clip and biopsy site changes are present.               2. Treatment effect is present in 4 of 19 lymph nodes.  G. See Comment #2.         FINDINGS:   LUMBAR SPINE:  The BMD measured in the L1-L4 is 1.054 g/cm2 for a  T-score of 0.1 and a Z-score of 2.1     LEFT HIP: The BMD for the femoral neck is 0.476g/cm2 for a T score of   -3.4 and a Z score of -1.7     RIGHT HIP:   The BMD for the femoral neck is 0.657g/cm2 for a T score of  -1.7 and a Z score of 0.0     IMPRESSION:  Osteoporosis.     This report was finalized on 9/16/2021     Assessment & Plan   1. [id number] right breast cancer ER96%/MO 82% HER-2 -3+ positive inflammatory breast cancer 1/19/2021 for neoadjuvant chemotherapy  · Staging work-up negative except for 6 mm lung nodule and axillary and subpectoral adenopathy  · THP followed by AC planned if she tolerates it  · C1D8 Taxol missed due to inclement weather.  · Taxol discontinued after 7 doses due to probable Taxol pneumonitis treated with steroids.    · C1 Adriamycin/Cytoxan given 5/14/2021.  · Adriamycin and Cytoxan stopped after 3 doses due to poor tolerance  · YPT0N0 right breast with multifocal DCIS ER/MO positive in the left breast post bilateral mastectomies and right axillary dissection  · Radiation Arimidex and Perjeta Herceptin to continue for the rest of the year  · Severe diarrhea after resuming Perjeta-Lomotil ordered and C. difficile checked with this is C. difficile negative she will not tolerate Perjeta for the rest of the year and we will stop  · Doing well on single agent Herceptin and Arimidex in 2/22  · Completed adjuvant Herceptin in 3/22  · Tolerating anastrozole well as of 1/2023.  · 1 month break from anastrozole because of fatigue    2.  Morbid obesity  Body mass index is 59.31 kg/m².  · Patient concerned about weight gain.  Discussed intentional healthy food choices and potential water aerobics.    3.  History of diastolic heart failure  · Echocardiogram with ejection fraction of 64% normal strain  · Cleared by cardio-oncology for chemotherapy  · Echocardiogram in 9/21 stable at 63%    4.  Pulmonary disease?  Etiology on oxygen for 4 years?  [place]  · Managed by pulmonologist.  Remains on continuous oxygen.  Follows up with them annually.    5.  Strong family history of breast cancer genetic testing 84 genes negative    6.  Probable benign  adrenal adenoma-PET negative    7.  Questionable enlarged lymph nodes left iliac chain and mediastinum likely reactive-PET negative    8.  6 mm right lower lobe nodule below PET resolution pretreatment needs follow-up after THP  · Repeat CT read at [facility] radiologist report stability of nodules and nodes  · Repeat CT at [facility] in 10/21 shows continued improvement    9.  Abnormal uptake short segment esophagus with a history of Schatzki's ring-we will double PPI and watch closely but we we will proceed with chemotherapy at this point and refer back to GI-doubt she has metastatic disease to this area    10. Anemia with microcytic indices  · Iron studies performed 2/10/2021.  · 2/24/2021: reviewed with the patient that she is iron deficient, with ferritin of 16, iron saturation of 4%.  Patient reports taking ferrous gluconate in the past but this caused GI upset.  Also with her chronic diarrhea I do not think she can absorb it.  We will pursue IV iron with plans to initiate this next week pending insurance approval. In addition hemoglobin down to 8.0 and transfusion pursued.  · 3/3/2021: IV Injectafer initiated x2.   · Hemoglobin down to 9.9 one week out from first AC though overall stable.  Monitor.   · Hemoglobin improved off chemotherapy  · Hemoglobin dropped after resuming Perjeta Herceptin will recheck iron stores  · Hgb 12.2.  .    11.  History of chronic diarrhea, ?IBS, exacerbated with Perjeta therapy.  · Patient required rifaximin 550 mg to take twice daily x7 days with each Perjeta dose.   · Since completion of Perjeta patient is now actually experiencing constipation (further discussed below).    12. Osteoporosis on DEXA scan in 9/21-Prolia initiated in 12/21  · Patient to receive third Prolia injection today 1/24/2023.  · Next DEXA scan due September 2023.    Plan  1.  1 month break from Arimidex -call if she feels much better and we will change to different medication otherwise resume  Arimidex  2.  Return in 3 months for follow-up with Dr. Azul.  For Prolia  3.  Next DEXA scan due September 2023  4.  Encouraged healthy diet changes and water aerobics for weight management.  5.  Discussed exercise and weight loss and she is going to try.    This patient is on high risk drug therapy requiring intensive monitoring for toxicity.

## 2023-08-01 ENCOUNTER — OFFICE VISIT (OUTPATIENT)
Dept: ONCOLOGY | Facility: CLINIC | Age: 68
End: 2023-08-01
Payer: MEDICARE

## 2023-08-01 ENCOUNTER — INFUSION (OUTPATIENT)
Dept: ONCOLOGY | Facility: HOSPITAL | Age: 68
End: 2023-08-01
Payer: MEDICARE

## 2023-08-01 ENCOUNTER — LAB (OUTPATIENT)
Dept: LAB | Facility: HOSPITAL | Age: 68
End: 2023-08-01
Payer: MEDICARE

## 2023-08-01 VITALS
SYSTOLIC BLOOD PRESSURE: 144 MMHG | DIASTOLIC BLOOD PRESSURE: 92 MMHG | WEIGHT: 293 LBS | BODY MASS INDEX: 48.82 KG/M2 | TEMPERATURE: 96.9 F | OXYGEN SATURATION: 94 % | HEART RATE: 89 BPM | RESPIRATION RATE: 16 BRPM | HEIGHT: 65 IN

## 2023-08-01 DIAGNOSIS — Z79.899 ENCOUNTER FOR LONG-TERM (CURRENT) USE OF HIGH-RISK MEDICATION: ICD-10-CM

## 2023-08-01 DIAGNOSIS — M81.0 AGE-RELATED OSTEOPOROSIS WITHOUT CURRENT PATHOLOGICAL FRACTURE: Primary | ICD-10-CM

## 2023-08-01 DIAGNOSIS — Z17.0 MALIGNANT NEOPLASM OF AXILLARY TAIL OF RIGHT BREAST IN FEMALE, ESTROGEN RECEPTOR POSITIVE: ICD-10-CM

## 2023-08-01 DIAGNOSIS — Z79.811 AROMATASE INHIBITOR USE: ICD-10-CM

## 2023-08-01 DIAGNOSIS — R63.5 WEIGHT GAIN: ICD-10-CM

## 2023-08-01 DIAGNOSIS — C50.111 MALIGNANT NEOPLASM OF CENTRAL PORTION OF RIGHT FEMALE BREAST, UNSPECIFIED ESTROGEN RECEPTOR STATUS: ICD-10-CM

## 2023-08-01 DIAGNOSIS — C50.611 MALIGNANT NEOPLASM OF AXILLARY TAIL OF RIGHT BREAST IN FEMALE, ESTROGEN RECEPTOR POSITIVE: ICD-10-CM

## 2023-08-01 DIAGNOSIS — M81.0 AGE-RELATED OSTEOPOROSIS WITHOUT CURRENT PATHOLOGICAL FRACTURE: ICD-10-CM

## 2023-08-01 DIAGNOSIS — C50.111 MALIGNANT NEOPLASM OF CENTRAL PORTION OF RIGHT FEMALE BREAST, UNSPECIFIED ESTROGEN RECEPTOR STATUS: Primary | ICD-10-CM

## 2023-08-01 LAB
ALBUMIN SERPL-MCNC: 3.8 G/DL (ref 3.5–5.2)
ALBUMIN/GLOB SERPL: 1.6 G/DL
ALP SERPL-CCNC: 91 U/L (ref 39–117)
ALT SERPL W P-5'-P-CCNC: 12 U/L (ref 1–33)
ANION GAP SERPL CALCULATED.3IONS-SCNC: 15.4 MMOL/L (ref 5–15)
AST SERPL-CCNC: 15 U/L (ref 1–32)
BASOPHILS # BLD AUTO: 0.03 10*3/MM3 (ref 0–0.2)
BASOPHILS NFR BLD AUTO: 0.4 % (ref 0–1.5)
BILIRUB SERPL-MCNC: 0.4 MG/DL (ref 0–1.2)
BUN SERPL-MCNC: 16 MG/DL (ref 8–23)
BUN/CREAT SERPL: 23.9 (ref 7–25)
CALCIUM SPEC-SCNC: 9.1 MG/DL (ref 8.6–10.5)
CHLORIDE SERPL-SCNC: 102 MMOL/L (ref 98–107)
CO2 SERPL-SCNC: 25.6 MMOL/L (ref 22–29)
CREAT SERPL-MCNC: 0.67 MG/DL (ref 0.6–1.1)
DEPRECATED RDW RBC AUTO: 49.1 FL (ref 37–54)
EGFRCR SERPLBLD CKD-EPI 2021: 94.2 ML/MIN/1.73
EOSINOPHIL # BLD AUTO: 0.15 10*3/MM3 (ref 0–0.4)
EOSINOPHIL NFR BLD AUTO: 1.8 % (ref 0.3–6.2)
ERYTHROCYTE [DISTWIDTH] IN BLOOD BY AUTOMATED COUNT: 14.3 % (ref 12.3–15.4)
GLOBULIN UR ELPH-MCNC: 2.4 GM/DL
GLUCOSE SERPL-MCNC: 102 MG/DL (ref 65–99)
HCT VFR BLD AUTO: 38.2 % (ref 34–46.6)
HGB BLD-MCNC: 11.1 G/DL (ref 12–15.9)
IMM GRANULOCYTES # BLD AUTO: 0.03 10*3/MM3 (ref 0–0.05)
IMM GRANULOCYTES NFR BLD AUTO: 0.4 % (ref 0–0.5)
LYMPHOCYTES # BLD AUTO: 0.79 10*3/MM3 (ref 0.7–3.1)
LYMPHOCYTES NFR BLD AUTO: 9.7 % (ref 19.6–45.3)
MAGNESIUM SERPL-MCNC: 2 MG/DL (ref 1.6–2.4)
MCH RBC QN AUTO: 27.3 PG (ref 26.6–33)
MCHC RBC AUTO-ENTMCNC: 29.1 G/DL (ref 31.5–35.7)
MCV RBC AUTO: 93.9 FL (ref 79–97)
MONOCYTES # BLD AUTO: 0.51 10*3/MM3 (ref 0.1–0.9)
MONOCYTES NFR BLD AUTO: 6.3 % (ref 5–12)
NEUTROPHILS NFR BLD AUTO: 6.61 10*3/MM3 (ref 1.7–7)
NEUTROPHILS NFR BLD AUTO: 81.4 % (ref 42.7–76)
NRBC BLD AUTO-RTO: 0 /100 WBC (ref 0–0.2)
PHOSPHATE SERPL-MCNC: 3.5 MG/DL (ref 2.5–4.5)
PLATELET # BLD AUTO: 253 10*3/MM3 (ref 140–450)
PMV BLD AUTO: 10.1 FL (ref 6–12)
POTASSIUM SERPL-SCNC: 4.4 MMOL/L (ref 3.5–5.2)
PROT SERPL-MCNC: 6.2 G/DL (ref 6–8.5)
RBC # BLD AUTO: 4.07 10*6/MM3 (ref 3.77–5.28)
SODIUM SERPL-SCNC: 143 MMOL/L (ref 136–145)
T4 FREE SERPL-MCNC: 1.14 NG/DL (ref 0.93–1.7)
TSH SERPL DL<=0.05 MIU/L-ACNC: 1.25 UIU/ML (ref 0.27–4.2)
WBC NRBC COR # BLD: 8.12 10*3/MM3 (ref 3.4–10.8)

## 2023-08-01 PROCEDURE — 25010000002 DENOSUMAB 60 MG/ML SOLUTION PREFILLED SYRINGE: Performed by: NURSE PRACTITIONER

## 2023-08-01 PROCEDURE — 85025 COMPLETE CBC W/AUTO DIFF WBC: CPT

## 2023-08-01 PROCEDURE — 84443 ASSAY THYROID STIM HORMONE: CPT | Performed by: NURSE PRACTITIONER

## 2023-08-01 PROCEDURE — 84100 ASSAY OF PHOSPHORUS: CPT

## 2023-08-01 PROCEDURE — 84439 ASSAY OF FREE THYROXINE: CPT | Performed by: NURSE PRACTITIONER

## 2023-08-01 PROCEDURE — 80053 COMPREHEN METABOLIC PANEL: CPT

## 2023-08-01 PROCEDURE — 36415 COLL VENOUS BLD VENIPUNCTURE: CPT

## 2023-08-01 PROCEDURE — 96372 THER/PROPH/DIAG INJ SC/IM: CPT

## 2023-08-01 PROCEDURE — 83735 ASSAY OF MAGNESIUM: CPT

## 2023-08-01 RX ORDER — NYSTATIN 100000 [USP'U]/G
POWDER TOPICAL 2 TIMES DAILY
Qty: 60 G | Refills: 2 | Status: SHIPPED | OUTPATIENT
Start: 2023-08-01

## 2023-08-01 RX ADMIN — DENOSUMAB 60 MG: 60 INJECTION SUBCUTANEOUS at 12:17

## 2023-08-02 ENCOUNTER — TELEPHONE (OUTPATIENT)
Dept: ONCOLOGY | Facility: CLINIC | Age: 68
End: 2023-08-02
Payer: MEDICARE

## 2023-09-15 RX ORDER — DICYCLOMINE HCL 20 MG
TABLET ORAL
Qty: 60 TABLET | Refills: 2 | Status: SHIPPED | OUTPATIENT
Start: 2023-09-15

## 2023-12-04 ENCOUNTER — HOSPITAL ENCOUNTER (INPATIENT)
Facility: HOSPITAL | Age: 68
LOS: 7 days | Discharge: HOME HEALTH | DRG: 177 | End: 2023-12-11
Attending: EMERGENCY MEDICINE | Admitting: FAMILY MEDICINE
Payer: MEDICARE

## 2023-12-04 ENCOUNTER — APPOINTMENT (OUTPATIENT)
Dept: GENERAL RADIOLOGY | Facility: HOSPITAL | Age: 68
DRG: 177 | End: 2023-12-04
Payer: MEDICARE

## 2023-12-04 DIAGNOSIS — J96.12 CHRONIC RESPIRATORY FAILURE WITH HYPOXIA AND HYPERCAPNIA: ICD-10-CM

## 2023-12-04 DIAGNOSIS — R26.2 DIFFICULTY WALKING: ICD-10-CM

## 2023-12-04 DIAGNOSIS — R06.09 DYSPNEA ON EXERTION: ICD-10-CM

## 2023-12-04 DIAGNOSIS — U07.1 COVID-19: Primary | ICD-10-CM

## 2023-12-04 DIAGNOSIS — J44.9 CHRONIC OBSTRUCTIVE PULMONARY DISEASE, UNSPECIFIED COPD TYPE: ICD-10-CM

## 2023-12-04 DIAGNOSIS — J96.01 ACUTE RESPIRATORY FAILURE WITH HYPOXIA: ICD-10-CM

## 2023-12-04 DIAGNOSIS — J96.11 CHRONIC RESPIRATORY FAILURE WITH HYPOXIA AND HYPERCAPNIA: ICD-10-CM

## 2023-12-04 PROBLEM — J06.9 ACUTE RESPIRATORY DISEASE DUE TO COVID-19 VIRUS: Status: ACTIVE | Noted: 2023-12-04

## 2023-12-04 LAB
ALBUMIN SERPL-MCNC: 3.8 G/DL (ref 3.5–5.2)
ALBUMIN/GLOB SERPL: 1.4 G/DL
ALP SERPL-CCNC: 100 U/L (ref 39–117)
ALT SERPL W P-5'-P-CCNC: 14 U/L (ref 1–33)
ANION GAP SERPL CALCULATED.3IONS-SCNC: 9.6 MMOL/L (ref 5–15)
AST SERPL-CCNC: 17 U/L (ref 1–32)
BASE EXCESS BLDA CALC-SCNC: 10.3 MMOL/L (ref -2–2)
BASOPHILS # BLD AUTO: 0.03 10*3/MM3 (ref 0–0.2)
BASOPHILS NFR BLD AUTO: 0.5 % (ref 0–1.5)
BDY SITE: ABNORMAL
BILIRUB SERPL-MCNC: 0.6 MG/DL (ref 0–1.2)
BUN SERPL-MCNC: 10 MG/DL (ref 8–23)
BUN/CREAT SERPL: 14.1 (ref 7–25)
CALCIUM SPEC-SCNC: 9 MG/DL (ref 8.6–10.5)
CHLORIDE SERPL-SCNC: 99 MMOL/L (ref 98–107)
CO2 SERPL-SCNC: 33.4 MMOL/L (ref 22–29)
COHGB MFR BLD: 1 % (ref 0–1.5)
CREAT SERPL-MCNC: 0.71 MG/DL (ref 0.57–1)
CRP SERPL-MCNC: 3.67 MG/DL (ref 0–0.5)
D-LACTATE SERPL-SCNC: 1.4 MMOL/L (ref 0.5–2)
DEPRECATED RDW RBC AUTO: 49.7 FL (ref 37–54)
EGFRCR SERPLBLD CKD-EPI 2021: 91.6 ML/MIN/1.73
EOSINOPHIL # BLD AUTO: 0.11 10*3/MM3 (ref 0–0.4)
EOSINOPHIL NFR BLD AUTO: 1.8 % (ref 0.3–6.2)
ERYTHROCYTE [DISTWIDTH] IN BLOOD BY AUTOMATED COUNT: 15.6 % (ref 12.3–15.4)
FHHB: 8.5 % (ref 0–5)
FLUAV SUBTYP SPEC NAA+PROBE: NOT DETECTED
FLUBV RNA ISLT QL NAA+PROBE: NOT DETECTED
GAS FLOW AIRWAY: 7 LPM
GEN 5 2HR TROPONIN T REFLEX: 12 NG/L
GLOBULIN UR ELPH-MCNC: 2.8 GM/DL
GLUCOSE SERPL-MCNC: 98 MG/DL (ref 65–99)
HCO3 BLDA-SCNC: 37.6 MMOL/L (ref 22–26)
HCT VFR BLD AUTO: 36.5 % (ref 34–46.6)
HGB BLD-MCNC: 10.2 G/DL (ref 12–15.9)
HGB BLDA-MCNC: 12.1 G/DL (ref 11.7–14.6)
IMM GRANULOCYTES # BLD AUTO: 0.02 10*3/MM3 (ref 0–0.05)
IMM GRANULOCYTES NFR BLD AUTO: 0.3 % (ref 0–0.5)
LACTATE BLDA-SCNC: ABNORMAL MMOL/L
LYMPHOCYTES # BLD AUTO: 0.78 10*3/MM3 (ref 0.7–3.1)
LYMPHOCYTES NFR BLD AUTO: 13 % (ref 19.6–45.3)
MCH RBC QN AUTO: 24.6 PG (ref 26.6–33)
MCHC RBC AUTO-ENTMCNC: 27.9 G/DL (ref 31.5–35.7)
MCV RBC AUTO: 88 FL (ref 79–97)
METHGB BLD QL: 0.1 % (ref 0–1.5)
MODALITY: ABNORMAL
MONOCYTES # BLD AUTO: 0.47 10*3/MM3 (ref 0.1–0.9)
MONOCYTES NFR BLD AUTO: 7.9 % (ref 5–12)
NEUTROPHILS NFR BLD AUTO: 4.57 10*3/MM3 (ref 1.7–7)
NEUTROPHILS NFR BLD AUTO: 76.5 % (ref 42.7–76)
NRBC BLD AUTO-RTO: 0 /100 WBC (ref 0–0.2)
NT-PROBNP SERPL-MCNC: 1413 PG/ML (ref 0–900)
OXYHGB MFR BLDV: 90.4 % (ref 94–99)
PCO2 BLDA: 63.9 MM HG (ref 35–45)
PH BLDA: 7.39 PH UNITS (ref 7.35–7.45)
PLATELET # BLD AUTO: 237 10*3/MM3 (ref 140–450)
PMV BLD AUTO: 10.6 FL (ref 6–12)
PO2 BLDA: 63.4 MM HG (ref 80–100)
POTASSIUM SERPL-SCNC: 3.8 MMOL/L (ref 3.5–5.2)
PROT SERPL-MCNC: 6.6 G/DL (ref 6–8.5)
RBC # BLD AUTO: 4.15 10*6/MM3 (ref 3.77–5.28)
RSV RNA NPH QL NAA+NON-PROBE: NOT DETECTED
SAO2 % BLDCOA: 91.4 % (ref 95–99)
SARS-COV-2 RNA RESP QL NAA+PROBE: DETECTED
SODIUM SERPL-SCNC: 142 MMOL/L (ref 136–145)
TROPONIN T DELTA: 0 NG/L
TROPONIN T SERPL HS-MCNC: 12 NG/L
WBC NRBC COR # BLD AUTO: 5.98 10*3/MM3 (ref 3.4–10.8)

## 2023-12-04 PROCEDURE — 94799 UNLISTED PULMONARY SVC/PX: CPT

## 2023-12-04 PROCEDURE — 87637 SARSCOV2&INF A&B&RSV AMP PRB: CPT | Performed by: EMERGENCY MEDICINE

## 2023-12-04 PROCEDURE — 83050 HGB METHEMOGLOBIN QUAN: CPT | Performed by: EMERGENCY MEDICINE

## 2023-12-04 PROCEDURE — 85025 COMPLETE CBC W/AUTO DIFF WBC: CPT | Performed by: EMERGENCY MEDICINE

## 2023-12-04 PROCEDURE — 80053 COMPREHEN METABOLIC PANEL: CPT | Performed by: EMERGENCY MEDICINE

## 2023-12-04 PROCEDURE — 83880 ASSAY OF NATRIURETIC PEPTIDE: CPT | Performed by: EMERGENCY MEDICINE

## 2023-12-04 PROCEDURE — 25010000002 METHYLPREDNISOLONE PER 125 MG: Performed by: EMERGENCY MEDICINE

## 2023-12-04 PROCEDURE — 83605 ASSAY OF LACTIC ACID: CPT | Performed by: EMERGENCY MEDICINE

## 2023-12-04 PROCEDURE — 86140 C-REACTIVE PROTEIN: CPT | Performed by: FAMILY MEDICINE

## 2023-12-04 PROCEDURE — 84484 ASSAY OF TROPONIN QUANT: CPT | Performed by: EMERGENCY MEDICINE

## 2023-12-04 PROCEDURE — 94640 AIRWAY INHALATION TREATMENT: CPT

## 2023-12-04 PROCEDURE — 99285 EMERGENCY DEPT VISIT HI MDM: CPT

## 2023-12-04 PROCEDURE — 87040 BLOOD CULTURE FOR BACTERIA: CPT | Performed by: EMERGENCY MEDICINE

## 2023-12-04 PROCEDURE — 82805 BLOOD GASES W/O2 SATURATION: CPT | Performed by: EMERGENCY MEDICINE

## 2023-12-04 PROCEDURE — 71045 X-RAY EXAM CHEST 1 VIEW: CPT

## 2023-12-04 PROCEDURE — 82375 ASSAY CARBOXYHB QUANT: CPT | Performed by: EMERGENCY MEDICINE

## 2023-12-04 PROCEDURE — 93010 ELECTROCARDIOGRAM REPORT: CPT | Performed by: INTERNAL MEDICINE

## 2023-12-04 PROCEDURE — 36600 WITHDRAWAL OF ARTERIAL BLOOD: CPT | Performed by: EMERGENCY MEDICINE

## 2023-12-04 PROCEDURE — 36415 COLL VENOUS BLD VENIPUNCTURE: CPT

## 2023-12-04 PROCEDURE — 93005 ELECTROCARDIOGRAM TRACING: CPT | Performed by: EMERGENCY MEDICINE

## 2023-12-04 PROCEDURE — 25010000002 ENOXAPARIN PER 10 MG: Performed by: FAMILY MEDICINE

## 2023-12-04 PROCEDURE — 25010000002 FUROSEMIDE PER 20 MG: Performed by: EMERGENCY MEDICINE

## 2023-12-04 RX ORDER — SODIUM CHLORIDE 9 MG/ML
40 INJECTION, SOLUTION INTRAVENOUS AS NEEDED
Status: DISCONTINUED | OUTPATIENT
Start: 2023-12-04 | End: 2023-12-11 | Stop reason: HOSPADM

## 2023-12-04 RX ORDER — AMOXICILLIN 250 MG
2 CAPSULE ORAL 2 TIMES DAILY
Status: DISCONTINUED | OUTPATIENT
Start: 2023-12-04 | End: 2023-12-11 | Stop reason: HOSPADM

## 2023-12-04 RX ORDER — FUROSEMIDE 10 MG/ML
40 INJECTION INTRAMUSCULAR; INTRAVENOUS ONCE
Status: COMPLETED | OUTPATIENT
Start: 2023-12-04 | End: 2023-12-04

## 2023-12-04 RX ORDER — IPRATROPIUM BROMIDE AND ALBUTEROL SULFATE 2.5; .5 MG/3ML; MG/3ML
3 SOLUTION RESPIRATORY (INHALATION) EVERY 4 HOURS PRN
Status: DISCONTINUED | OUTPATIENT
Start: 2023-12-04 | End: 2023-12-11 | Stop reason: HOSPADM

## 2023-12-04 RX ORDER — NITROGLYCERIN 0.4 MG/1
0.4 TABLET SUBLINGUAL
Status: DISCONTINUED | OUTPATIENT
Start: 2023-12-04 | End: 2023-12-11 | Stop reason: HOSPADM

## 2023-12-04 RX ORDER — METHYLPREDNISOLONE SODIUM SUCCINATE 125 MG/2ML
125 INJECTION, POWDER, LYOPHILIZED, FOR SOLUTION INTRAMUSCULAR; INTRAVENOUS ONCE
Status: COMPLETED | OUTPATIENT
Start: 2023-12-04 | End: 2023-12-04

## 2023-12-04 RX ORDER — IPRATROPIUM BROMIDE AND ALBUTEROL SULFATE 2.5; .5 MG/3ML; MG/3ML
3 SOLUTION RESPIRATORY (INHALATION) 3 TIMES DAILY
Status: DISCONTINUED | OUTPATIENT
Start: 2023-12-04 | End: 2023-12-11

## 2023-12-04 RX ORDER — BENZONATATE 100 MG/1
100 CAPSULE ORAL 3 TIMES DAILY PRN
Status: DISCONTINUED | OUTPATIENT
Start: 2023-12-04 | End: 2023-12-11 | Stop reason: HOSPADM

## 2023-12-04 RX ORDER — BISACODYL 10 MG
10 SUPPOSITORY, RECTAL RECTAL DAILY PRN
Status: DISCONTINUED | OUTPATIENT
Start: 2023-12-04 | End: 2023-12-11 | Stop reason: HOSPADM

## 2023-12-04 RX ORDER — FERROUS SULFATE 325(65) MG
1 TABLET ORAL 2 TIMES DAILY WITH MEALS
COMMUNITY
Start: 2023-11-21

## 2023-12-04 RX ORDER — BISACODYL 5 MG/1
5 TABLET, DELAYED RELEASE ORAL DAILY PRN
Status: DISCONTINUED | OUTPATIENT
Start: 2023-12-04 | End: 2023-12-11 | Stop reason: HOSPADM

## 2023-12-04 RX ORDER — GUAIFENESIN 600 MG/1
600 TABLET, EXTENDED RELEASE ORAL EVERY 12 HOURS SCHEDULED
Status: DISCONTINUED | OUTPATIENT
Start: 2023-12-04 | End: 2023-12-11 | Stop reason: HOSPADM

## 2023-12-04 RX ORDER — ENOXAPARIN SODIUM 100 MG/ML
40 INJECTION SUBCUTANEOUS DAILY
Status: DISCONTINUED | OUTPATIENT
Start: 2023-12-04 | End: 2023-12-05

## 2023-12-04 RX ORDER — SODIUM CHLORIDE 0.9 % (FLUSH) 0.9 %
10 SYRINGE (ML) INJECTION EVERY 12 HOURS SCHEDULED
Status: DISCONTINUED | OUTPATIENT
Start: 2023-12-04 | End: 2023-12-11 | Stop reason: HOSPADM

## 2023-12-04 RX ORDER — POLYETHYLENE GLYCOL 3350 17 G/17G
17 POWDER, FOR SOLUTION ORAL DAILY PRN
Status: DISCONTINUED | OUTPATIENT
Start: 2023-12-04 | End: 2023-12-11 | Stop reason: HOSPADM

## 2023-12-04 RX ORDER — SODIUM CHLORIDE 0.9 % (FLUSH) 0.9 %
10 SYRINGE (ML) INJECTION AS NEEDED
Status: DISCONTINUED | OUTPATIENT
Start: 2023-12-04 | End: 2023-12-11 | Stop reason: HOSPADM

## 2023-12-04 RX ORDER — NIRMATRELVIR AND RITONAVIR 300-100 MG
3 KIT ORAL 2 TIMES DAILY
COMMUNITY
Start: 2023-12-01 | End: 2023-12-11 | Stop reason: HOSPADM

## 2023-12-04 RX ADMIN — IPRATROPIUM BROMIDE AND ALBUTEROL SULFATE 3 ML: .5; 3 SOLUTION RESPIRATORY (INHALATION) at 22:31

## 2023-12-04 RX ADMIN — METHYLPREDNISOLONE SODIUM SUCCINATE 125 MG: 125 INJECTION INTRAMUSCULAR; INTRAVENOUS at 18:39

## 2023-12-04 RX ADMIN — Medication 10 ML: at 22:22

## 2023-12-04 RX ADMIN — GUAIFENESIN 600 MG: 600 TABLET ORAL at 22:21

## 2023-12-04 RX ADMIN — ENOXAPARIN SODIUM 40 MG: 100 INJECTION SUBCUTANEOUS at 22:21

## 2023-12-04 RX ADMIN — FUROSEMIDE 40 MG: 10 INJECTION, SOLUTION INTRAMUSCULAR; INTRAVENOUS at 18:38

## 2023-12-04 NOTE — ED PROVIDER NOTES
Time: 5:47 PM EST  Date of encounter:  12/4/2023  Independent Historian/Clinical History and Information was obtained by:   Patient and Family    History is limited by: N/A    Chief Complaint: Shortness of breath      History of Present Illness:  Patient is a 68 y.o. year old female who presents to the emergency department for evaluation of shortness of breath.  Patient has a history of chronic respiratory failure, CHF, asthma, GERD who presents with complaints of shortness of breath.  Chronically on 3 L nasal cannula at home.  States that she tested positive for COVID last Thursday and has been doing okay but is worsened over the last day or so.  States that she is gotten more short of breath.  Had oxygen saturations in the 60s in triage on her oxygen    HPI    Patient Care Team  Primary Care Provider: Serenity Lucas APRN    Past Medical History:     Allergies   Allergen Reactions    Amlodipine Swelling     LEGS     Hydrochlorothiazide Unknown - Low Severity     Neuro issues    Morphine Nausea And Vomiting     hallucinations    Adhesive Tape Rash     Past Medical History:   Diagnosis Date    Allergic rhinitis     Anemia     Anxiety     Asthma     Chronic bronchitis     In past, espec. during working years    Chronic diastolic (congestive) heart failure     Chronic hypoxemic respiratory failure     on continuous O2    Coronary artery calcification seen on CT scan 04/21/2022    Diarrhea     with chemo    GERD (gastroesophageal reflux disease) Mild    H/O Intraductal papilloma     Hemorrhoids     History of transfusion 1984    AFTER BACK SURGERY no reaction    Hypertension     IBS (irritable bowel syndrome)     Inflammatory breast cancer     right    Kidney stone     Morbid obesity with BMI of 50.0-59.9, adult     Obesity hypoventilation syndrome 02/05/2021    On home oxygen therapy     2.5 AT REST WITH ACTIVITY UP TO 4L    ANABELLE on CPAP     cpap  & uses O2 with CPAP    Osteoarthritis     Pneumonia     Many times in  past, 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    PONV (postoperative nausea and vomiting)     Type II diabetes mellitus 09/08/2021     Past Surgical History:   Procedure Laterality Date    BREAST EXCISIONAL BIOPSY Bilateral     COLONOSCOPY      COLONOSCOPY N/A 12/8/2022    Procedure: COLONOSCOPY;  Surgeon: Henry Aden MD;  Location: [facility] ENDOSCOPY;  Service: Gastroenterology;  Laterality: N/A;  diverticulosis, and hemorrhoids    CYSTOSCOPY BLADDER STONE LITHOTRIPSY      ENDOSCOPY      ENDOSCOPY N/A 12/8/2022    Procedure: ESOPHAGOGASTRODUODENOSCOPY  with biopsy;  Surgeon: eHnry Aden MD;  Location: [facility] ENDOSCOPY;  Service: Gastroenterology;  Laterality: N/A;  hiatal hernia    KNEE ARTHROPLASTY Bilateral 2009    MASTECTOMY Left 08/10/2021    Procedure: LEFT TOTAL MASTECTOMY;  Surgeon: Nicci Banks MD;  Location: [facility]  Service: General;  Laterality: Left;    MASTECTOMY Right 08/10/2021    Procedure: RIGHT MODIFIED RADICAL MASTECTOMY;  Surgeon: Nicci Banks MD;  Location: [facility] OR;  Service: General;  Laterality: Right;    SPINE SURGERY  1984    TOTAL ABDOMINAL HYSTERECTOMY WITH SALPINGO OOPHORECTOMY  2004    UPPER GASTROINTESTINAL ENDOSCOPY      VENOUS ACCESS DEVICE (PORT) INSERTION N/A 01/25/2021    Procedure: INSERTION VENOUS ACCESS DEVICE;  Surgeon: Nicci Banks MD;  Location: [facility] OR;  Service: General;  Laterality: N/A;    VENOUS ACCESS DEVICE (PORT) REMOVAL Left 04/05/2022    Procedure: REMOVAL VENOUS ACCESS DEVICE;  Surgeon: Nicci Banks MD;  Location: [facility] OR;  Service: General;  Laterality: Left;     Family History   Problem Relation Age of Onset    Breast cancer Mother 45    Cancer Mother         breast; metastasized    Lung cancer Father     Hodgkin's lymphoma Father     Skin cancer Father         squamous cell    Cancer Father         4 kinds: Hodgkins; lung; squamous cell skin    Breast cancer Maternal Aunt 70    Cancer  Maternal Aunt         breast; metastasized    Breast cancer Paternal Aunt 70    Cancer Paternal Aunt         breast; cured    Colon cancer Paternal Grandmother 60    Cancer Paternal Grandmother         colon cancer    Hypertension Paternal Grandmother         EVERY ADULT IN MY FAMILY    Ovarian cancer Neg Hx     Uterine cancer Neg Hx     Deep vein thrombosis Neg Hx     Pulmonary embolism Neg Hx     Malig Hyperthermia Neg Hx        Home Medications:  Prior to Admission medications    Medication Sig Start Date End Date Taking? Authorizing Provider   albuterol sulfate  (90 Base) MCG/ACT inhaler Inhale 2 puffs Every 4 (Four) Hours As Needed for Wheezing or Shortness of Air. 4/28/23   Anna Dueñas APRN   anastrozole (ARIMIDEX) 1 MG tablet TAKE 1 TABLET BY MOUTH EVERY DAY 7/18/23   Jaime Azul MD   carvedilol (COREG) 25 MG tablet Take 0.5 tablets by mouth 2 (Two) Times a Day With Meals. 8/6/21   Shari Roa MD   Cholecalciferol (Vitamin D) 50 MCG (2000 UT) capsule Take 1 capsule by mouth Daily.    Candy Gonzalez MD   dicyclomine (BENTYL) 20 MG tablet TAKE 1 TABLET BY MOUTH EVERY 6 HOURS 9/15/23   Jaime Azul MD   diphenoxylate-atropine (LOMOTIL) 2.5-0.025 MG per tablet Take 1 tablet by mouth 4 (Four) Times a Day As Needed for Diarrhea. 10/27/21   Jaime Azul MD   FLUoxetine (PROzac) 40 MG capsule TAKE 1 CAPSULE BY MOUTH EVERY DAY 9/7/21   Shari Roa MD   furosemide (LASIX) 40 MG tablet TAKE ONE-HALF TABLET BY MOUTH EVERY DAY 12/27/22   Shari Roa MD   lisinopril (PRINIVIL,ZESTRIL) 5 MG tablet TAKE 1 TABLET BY MOUTH EVERY DAY 6/10/22   Bradley Garza MD   meloxicam (MOBIC) 15 MG tablet Take 0.5 tablets by mouth Daily. 3/25/23   Candy Gonzalez MD   mometasone-formoterol (DULERA 100) 100-5 MCG/ACT inhaler Inhale 2 puffs 2 (Two) Times a Day. 4/28/23   Anna Dueñas APRN   montelukast (SINGULAIR) 10 MG tablet Take 1 tablet by  "mouth Every Night.    Candy Gonzalez MD   multivitamin (THERAGRAN) tablet tablet Take 1 tablet by mouth Daily.    Candy Gonzalez MD   nystatin (MYCOSTATIN) 820802 UNIT/GM powder Apply  topically to the appropriate area as directed 2 (Two) Times a Day. 8/1/23   Ynes Vazquez APRN   O2 (OXYGEN) Inhale 3 L/min Continuous.    Candy Gonzalez MD   pantoprazole (PROTONIX) 40 MG EC tablet Take 1 tablet by mouth Daily. 2/15/23   Candy Gonzalez MD   pravastatin (PRAVACHOL) 40 MG tablet Take 1 tablet by mouth Every Night.    Candy Gonzalez MD   psyllium (METAMUCIL) 0.52 g capsule Take  by mouth Daily.    Candy Gonzalez MD   spironolactone (ALDACTONE) 25 MG tablet Take 1 tablet by mouth Daily.    Candy Gonzalez MD        Social History:   Social History     Tobacco Use    Smoking status: Never    Smokeless tobacco: Never   Vaping Use    Vaping Use: Never used   Substance Use Topics    Alcohol use: Never    Drug use: Never         Review of Systems:  Review of Systems   Respiratory:  Positive for shortness of breath.         Physical Exam:  BP (!) 171/128   Pulse 92   Temp 98.4 °F (36.9 °C) (Oral)   Resp 22   Ht 165.1 cm (65\")   Wt (!) 160 kg (353 lb 2.8 oz)   LMP  (LMP Unknown)   SpO2 96%   BMI 58.77 kg/m²     Physical Exam  Vitals and nursing note reviewed.   Constitutional:       Appearance: Normal appearance. She is obese.   HENT:      Head: Normocephalic and atraumatic.      Mouth/Throat:      Comments: Blue lips  Eyes:      General: No scleral icterus.  Cardiovascular:      Rate and Rhythm: Normal rate and regular rhythm.   Pulmonary:      Effort: Tachypnea present.      Comments: Decreased breath sounds bilaterally  Abdominal:      Palpations: Abdomen is soft.      Tenderness: There is no abdominal tenderness.   Musculoskeletal:         General: Normal range of motion.      Cervical back: Normal range of motion.   Skin:     Findings: No rash. "   Neurological:      General: No focal deficit present.      Mental Status: She is alert.                  Procedures:  Procedures      Medical Decision Making:      Comorbidities that affect care:    Chronic Lung Disease, Congestive Heart Failure, COPD, Obesity    External Notes reviewed:    Reviewed oncology note from 8/1/2023  Reviewed pulmonary note from 4/20/2023    The following orders were placed and all results were independently analyzed by me:  Orders Placed This Encounter   Procedures    COVID-19, FLU A/B, RSV PCR 1 HR TAT - Swab, Nasopharynx    Blood Culture - Blood,    Blood Culture - Blood,    XR Chest 1 View    Comprehensive Metabolic Panel    BNP    Blood Gas, Arterial -With Co-Ox Panel: Yes    High Sensitivity Troponin T    CBC Auto Differential    Lactic Acid, Plasma    High Sensitivity Troponin T 2Hr    Inpatient Hospitalist Consult    ECG 12 Lead Dyspnea    CBC & Differential       Medications Given in the Emergency Department:  Medications   methylPREDNISolone sodium succinate (SOLU-Medrol) injection 125 mg (125 mg Intravenous Given 12/4/23 1839)   furosemide (LASIX) injection 40 mg (40 mg Intravenous Given 12/4/23 1838)        ED Course:    ED Course as of 12/04/23 2034   Mon Dec 04, 2023   2033 Spoke with Dr. Becker who agrees to admit [MA]      ED Course User Index  [MA] Slick Scruggs MD       Labs:    Lab Results (last 24 hours)       Procedure Component Value Units Date/Time    Blood Gas, Arterial -With Co-Ox Panel: Yes [[id number]]  (Abnormal) Collected: 12/04/23 1808    Specimen: Arterial Blood from Arm, Left Updated: 12/04/23 1811     pH, Arterial 7.387 pH units      pCO2, Arterial 63.9 mm Hg      pO2, Arterial 63.4 mm Hg      HCO3, Arterial 37.6 mmol/L      Base Excess, Arterial 10.3 mmol/L      O2 Saturation, Arterial 91.4 %      Hemoglobin, Blood Gas 12.1 g/dL      Carboxyhemoglobin 1.0 %      Methemoglobin 0.10 %      Oxyhemoglobin 90.4 %      FHHB 8.5 %      Site Arterial:  left radial     Modality Cannula - High Flow     Flow Rate 7 lpm      Lactate, Arterial --    COVID-19, FLU A/B, RSV PCR 1 HR TAT - Swab, Nasopharynx [[id number]]  (Abnormal) Collected: 12/04/23 1811    Specimen: Swab from Nasopharynx Updated: 12/04/23 1929     COVID19 Detected     Influenza A PCR Not Detected     Influenza B PCR Not Detected     RSV, PCR Not Detected    Narrative:      Fact sheet for providers: https://www.fda.gov/media/370274/download    Fact sheet for patients: https://www.fda.gov/media/458765/download    Test performed by PCR.    CBC & Differential [[id number]]  (Abnormal) Collected: 12/04/23 1835    Specimen: Blood Updated: 12/04/23 1850    Narrative:      The following orders were created for panel order CBC & Differential.  Procedure                               Abnormality         Status                     ---------                               -----------         ------                     CBC Auto Differential[[id number]]        Abnormal            Final result                 Please view results for these tests on the individual orders.    Comprehensive Metabolic Panel [[id number]]  (Abnormal) Collected: 12/04/23 1835    Specimen: Blood Updated: 12/04/23 1919     Glucose 98 mg/dL      BUN 10 mg/dL      Creatinine 0.71 mg/dL      Sodium 142 mmol/L      Potassium 3.8 mmol/L      Chloride 99 mmol/L      CO2 33.4 mmol/L      Calcium 9.0 mg/dL      Total Protein 6.6 g/dL      Albumin 3.8 g/dL      ALT (SGPT) 14 U/L      AST (SGOT) 17 U/L      Alkaline Phosphatase 100 U/L      Total Bilirubin 0.6 mg/dL      Globulin 2.8 gm/dL      A/G Ratio 1.4 g/dL      BUN/Creatinine Ratio 14.1     Anion Gap 9.6 mmol/L      eGFR 91.6 mL/min/1.73     Narrative:      GFR Normal >60  Chronic Kidney Disease <60  Kidney Failure <15      BNP [[id number]]  (Abnormal) Collected: 12/04/23 1835    Specimen: Blood Updated: 12/04/23 1914     proBNP 1,413.0 pg/mL     Narrative:      This assay is used as an aid in the  diagnosis of individuals suspected of having heart failure. It can be used as an aid in the diagnosis of acute decompensated heart failure (ADHF) in patients presenting with signs and symptoms of ADHF to the emergency department (ED). In addition, NT-proBNP of <300 pg/mL indicates ADHF is not likely.    Age Range Result Interpretation  NT-proBNP Concentration (pg/mL:      <50             Positive            >450                   Gray                 300-450                    Negative             <300    50-75           Positive            >900                  Gray                300-900                  Negative            <300      >75             Positive            >1800                  Gray                300-1800                  Negative            <300    High Sensitivity Troponin T [[id number]]  (Normal) Collected: 12/04/23 1835    Specimen: Blood Updated: 12/04/23 1919     HS Troponin T 12 ng/L     Narrative:      High Sensitive Troponin T Reference Range:  <14.0 ng/L- Negative Female for AMI  <22.0 ng/L- Negative Male for AMI  >=14 - Abnormal Female indicating possible myocardial injury.  >=22 - Abnormal Male indicating possible myocardial injury.   Clinicians would have to utilize clinical acumen, EKG, Troponin, and serial changes to determine if it is an Acute Myocardial Infarction or myocardial injury due to an underlying chronic condition.         CBC Auto Differential [[id number]]  (Abnormal) Collected: 12/04/23 1835    Specimen: Blood Updated: 12/04/23 1850     WBC 5.98 10*3/mm3      RBC 4.15 10*6/mm3      Hemoglobin 10.2 g/dL      Hematocrit 36.5 %      MCV 88.0 fL      MCH 24.6 pg      MCHC 27.9 g/dL      RDW 15.6 %      RDW-SD 49.7 fl      MPV 10.6 fL      Platelets 237 10*3/mm3      Neutrophil % 76.5 %      Lymphocyte % 13.0 %      Monocyte % 7.9 %      Eosinophil % 1.8 %      Basophil % 0.5 %      Immature Grans % 0.3 %      Neutrophils, Absolute 4.57 10*3/mm3      Lymphocytes, Absolute 0.78  10*3/mm3      Monocytes, Absolute 0.47 10*3/mm3      Eosinophils, Absolute 0.11 10*3/mm3      Basophils, Absolute 0.03 10*3/mm3      Immature Grans, Absolute 0.02 10*3/mm3      nRBC 0.0 /100 WBC     Blood Culture - Blood, Arm, Left [[id number]] Collected: 12/04/23 1835    Specimen: Blood from Arm, Left Updated: 12/04/23 1847    Blood Culture - Blood, Arm, Left [[id number]] Collected: 12/04/23 1835    Specimen: Blood from Arm, Left Updated: 12/04/23 1847    Lactic Acid, Plasma [[id number]]  (Normal) Collected: 12/04/23 1835    Specimen: Blood Updated: 12/04/23 1910     Lactate 1.4 mmol/L              Imaging:    XR Chest 1 View    Result Date: 12/4/2023  PROCEDURE: XR CHEST 1 VW  COMPARISON: [facility], , XR CHEST 2 VW, 7/12/2022, 13:28.  INDICATIONS: soa  FINDINGS:  The heart is enlarged.  The pulmonary vascularity is prominent centrally.  The lungs are well-expanded.  No airspace disease is seen.  Bony structures appear intact.        Cardiomegaly with central vascular congestion suggesting mild congestive heart failure or volume overload.       ANDERS MAGALLON MD       Electronically Signed and Approved By: ANDERS MAGALLON MD on 12/04/2023 at 18:11                Differential Diagnosis and Discussion:    Dyspnea: Differential diagnosis includes but is not limited to metabolic acidosis, neurological disorders, psychogenic, asthma, pneumothorax, upper airway obstruction, COPD, pneumonia, noncardiogenic pulmonary edema, interstitial lung disease, anemia, congestive heart failure, and pulmonary embolism    All labs were reviewed and interpreted by me.  All X-rays impressions were independently interpreted by me.  EKG was interpreted by me.    MDM     Patient is a 68-year-old female with a history of chronic respiratory failure, CHF who presents with complaints of shortness of breath.  Had oxygen saturations in the 60s upon arrival.  Was blue and in respiratory distress.  Had to be put on high flow  nasal cannula here with improvement oxygenation saturations.  Also appears to be slightly fluid overloaded.  He is COVID-positive.  Given steroids as well as Lasix.  Will admit to the hospital further workup management.    Critical Care Note: Total Critical Care time of 32 minutes. Total critical care time documented does not include time spent on separately billed procedures for services of nurses or physician assistants. I personally saw and examined the patient. I have reviewed all diagnostic interpretations and treatment plans as written. I was present for the key portions of any procedures performed and the inclusive time noted in any critical care statement. Critical care time includes patient management by me, time spent at the patients bedside,  time to review lab and imaging results, discussing patient care, documentation in the medical record, and time spent with family or caregiver.        Patient Care Considerations:          Consultants/Shared Management Plan:    Hospitalist: I have discussed the case with Dr. Becker who agrees to accept the patient for admission.    Social Determinants of Health:          Disposition and Care Coordination:    Admit:   Through independent evaluation of the patient's history, physical, and imperical data, the patient meets criteria for observation/admission to the hospital.        Final diagnoses:   COVID-19   Acute respiratory failure with hypoxia        ED Disposition       ED Disposition   Decision to Admit    Condition   --    Comment   --               This medical record created using voice recognition software.             Slick Scruggs MD  12/04/23 3067

## 2023-12-05 ENCOUNTER — APPOINTMENT (OUTPATIENT)
Dept: CT IMAGING | Facility: HOSPITAL | Age: 68
DRG: 177 | End: 2023-12-05
Payer: MEDICARE

## 2023-12-05 PROBLEM — J96.02 ACUTE RESPIRATORY FAILURE WITH HYPERCAPNIA: Status: ACTIVE | Noted: 2023-12-05

## 2023-12-05 LAB
ANION GAP SERPL CALCULATED.3IONS-SCNC: 9.7 MMOL/L (ref 5–15)
BUN SERPL-MCNC: 9 MG/DL (ref 8–23)
BUN/CREAT SERPL: 14.8 (ref 7–25)
CALCIUM SPEC-SCNC: 8.4 MG/DL (ref 8.6–10.5)
CHLORIDE SERPL-SCNC: 96 MMOL/L (ref 98–107)
CO2 SERPL-SCNC: 35.3 MMOL/L (ref 22–29)
CREAT SERPL-MCNC: 0.61 MG/DL (ref 0.57–1)
DEPRECATED RDW RBC AUTO: 51.5 FL (ref 37–54)
EGFRCR SERPLBLD CKD-EPI 2021: 96.3 ML/MIN/1.73
ERYTHROCYTE [DISTWIDTH] IN BLOOD BY AUTOMATED COUNT: 15.8 % (ref 12.3–15.4)
GLUCOSE SERPL-MCNC: 140 MG/DL (ref 65–99)
HCT VFR BLD AUTO: 38.3 % (ref 34–46.6)
HGB BLD-MCNC: 10.6 G/DL (ref 12–15.9)
MCH RBC QN AUTO: 24.8 PG (ref 26.6–33)
MCHC RBC AUTO-ENTMCNC: 27.7 G/DL (ref 31.5–35.7)
MCV RBC AUTO: 89.5 FL (ref 79–97)
PLATELET # BLD AUTO: 209 10*3/MM3 (ref 140–450)
PMV BLD AUTO: 10.9 FL (ref 6–12)
POTASSIUM SERPL-SCNC: 3.9 MMOL/L (ref 3.5–5.2)
PROCALCITONIN SERPL-MCNC: 0.03 NG/ML (ref 0–0.25)
QT INTERVAL: 422 MS
QTC INTERVAL: 526 MS
RBC # BLD AUTO: 4.28 10*6/MM3 (ref 3.77–5.28)
SODIUM SERPL-SCNC: 141 MMOL/L (ref 136–145)
WBC NRBC COR # BLD AUTO: 5.44 10*3/MM3 (ref 3.4–10.8)

## 2023-12-05 PROCEDURE — 84145 PROCALCITONIN (PCT): CPT | Performed by: STUDENT IN AN ORGANIZED HEALTH CARE EDUCATION/TRAINING PROGRAM

## 2023-12-05 PROCEDURE — 94761 N-INVAS EAR/PLS OXIMETRY MLT: CPT

## 2023-12-05 PROCEDURE — 94799 UNLISTED PULMONARY SVC/PX: CPT

## 2023-12-05 PROCEDURE — 85027 COMPLETE CBC AUTOMATED: CPT | Performed by: FAMILY MEDICINE

## 2023-12-05 PROCEDURE — 80048 BASIC METABOLIC PNL TOTAL CA: CPT | Performed by: FAMILY MEDICINE

## 2023-12-05 PROCEDURE — 25010000002 FUROSEMIDE PER 20 MG: Performed by: FAMILY MEDICINE

## 2023-12-05 PROCEDURE — 99223 1ST HOSP IP/OBS HIGH 75: CPT | Performed by: FAMILY MEDICINE

## 2023-12-05 PROCEDURE — 63710000001 DEXAMETHASONE PER 0.25 MG: Performed by: FAMILY MEDICINE

## 2023-12-05 PROCEDURE — 71250 CT THORAX DX C-: CPT

## 2023-12-05 PROCEDURE — 94660 CPAP INITIATION&MGMT: CPT

## 2023-12-05 PROCEDURE — 25010000002 ENOXAPARIN PER 10 MG: Performed by: STUDENT IN AN ORGANIZED HEALTH CARE EDUCATION/TRAINING PROGRAM

## 2023-12-05 RX ORDER — ENOXAPARIN SODIUM 100 MG/ML
60 INJECTION SUBCUTANEOUS 2 TIMES DAILY
Status: DISCONTINUED | OUTPATIENT
Start: 2023-12-05 | End: 2023-12-11 | Stop reason: HOSPADM

## 2023-12-05 RX ORDER — ANASTROZOLE 1 MG/1
1 TABLET ORAL DAILY
Status: DISCONTINUED | OUTPATIENT
Start: 2023-12-05 | End: 2023-12-11 | Stop reason: HOSPADM

## 2023-12-05 RX ORDER — DICYCLOMINE HCL 20 MG
20 TABLET ORAL EVERY 6 HOURS
Status: DISCONTINUED | OUTPATIENT
Start: 2023-12-05 | End: 2023-12-11 | Stop reason: HOSPADM

## 2023-12-05 RX ORDER — ALBUTEROL SULFATE 2.5 MG/3ML
2.5 SOLUTION RESPIRATORY (INHALATION) EVERY 6 HOURS PRN
Status: DISCONTINUED | OUTPATIENT
Start: 2023-12-05 | End: 2023-12-11 | Stop reason: HOSPADM

## 2023-12-05 RX ORDER — FUROSEMIDE 10 MG/ML
40 INJECTION INTRAMUSCULAR; INTRAVENOUS EVERY 12 HOURS
Status: DISCONTINUED | OUTPATIENT
Start: 2023-12-05 | End: 2023-12-11 | Stop reason: HOSPADM

## 2023-12-05 RX ORDER — PRAVASTATIN SODIUM 20 MG
40 TABLET ORAL NIGHTLY
Status: DISCONTINUED | OUTPATIENT
Start: 2023-12-05 | End: 2023-12-11 | Stop reason: HOSPADM

## 2023-12-05 RX ORDER — MONTELUKAST SODIUM 10 MG/1
10 TABLET ORAL NIGHTLY
Status: DISCONTINUED | OUTPATIENT
Start: 2023-12-05 | End: 2023-12-05

## 2023-12-05 RX ORDER — BUDESONIDE AND FORMOTEROL FUMARATE DIHYDRATE 160; 4.5 UG/1; UG/1
2 AEROSOL RESPIRATORY (INHALATION)
Status: DISCONTINUED | OUTPATIENT
Start: 2023-12-05 | End: 2023-12-06

## 2023-12-05 RX ORDER — CARVEDILOL 12.5 MG/1
12.5 TABLET ORAL 2 TIMES DAILY WITH MEALS
Status: DISCONTINUED | OUTPATIENT
Start: 2023-12-05 | End: 2023-12-11 | Stop reason: HOSPADM

## 2023-12-05 RX ORDER — SPIRONOLACTONE 25 MG/1
25 TABLET ORAL DAILY
Status: DISCONTINUED | OUTPATIENT
Start: 2023-12-05 | End: 2023-12-09

## 2023-12-05 RX ORDER — MELATONIN
2000 DAILY
Status: DISCONTINUED | OUTPATIENT
Start: 2023-12-05 | End: 2023-12-11 | Stop reason: HOSPADM

## 2023-12-05 RX ORDER — MONTELUKAST SODIUM 10 MG/1
10 TABLET ORAL NIGHTLY
Status: DISCONTINUED | OUTPATIENT
Start: 2023-12-05 | End: 2023-12-11 | Stop reason: HOSPADM

## 2023-12-05 RX ORDER — FLUOXETINE HYDROCHLORIDE 20 MG/1
40 CAPSULE ORAL DAILY
Status: DISCONTINUED | OUTPATIENT
Start: 2023-12-05 | End: 2023-12-11 | Stop reason: HOSPADM

## 2023-12-05 RX ORDER — PANTOPRAZOLE SODIUM 40 MG/1
40 TABLET, DELAYED RELEASE ORAL DAILY
Status: DISCONTINUED | OUTPATIENT
Start: 2023-12-05 | End: 2023-12-11 | Stop reason: HOSPADM

## 2023-12-05 RX ORDER — LISINOPRIL 5 MG/1
5 TABLET ORAL DAILY
Status: DISCONTINUED | OUTPATIENT
Start: 2023-12-05 | End: 2023-12-09

## 2023-12-05 RX ADMIN — ANASTROZOLE 1 MG: 1 TABLET ORAL at 13:13

## 2023-12-05 RX ADMIN — ENOXAPARIN SODIUM 60 MG: 100 INJECTION SUBCUTANEOUS at 13:13

## 2023-12-05 RX ADMIN — BUDESONIDE AND FORMOTEROL FUMARATE DIHYDRATE 2 PUFF: 160; 4.5 AEROSOL RESPIRATORY (INHALATION) at 12:34

## 2023-12-05 RX ADMIN — Medication 10 ML: at 20:36

## 2023-12-05 RX ADMIN — CARVEDILOL 12.5 MG: 12.5 TABLET, FILM COATED ORAL at 08:28

## 2023-12-05 RX ADMIN — BUDESONIDE AND FORMOTEROL FUMARATE DIHYDRATE 2 PUFF: 160; 4.5 AEROSOL RESPIRATORY (INHALATION) at 19:12

## 2023-12-05 RX ADMIN — IPRATROPIUM BROMIDE AND ALBUTEROL SULFATE 3 ML: .5; 3 SOLUTION RESPIRATORY (INHALATION) at 08:04

## 2023-12-05 RX ADMIN — PANTOPRAZOLE SODIUM 40 MG: 40 TABLET, DELAYED RELEASE ORAL at 08:28

## 2023-12-05 RX ADMIN — GUAIFENESIN 600 MG: 600 TABLET ORAL at 20:35

## 2023-12-05 RX ADMIN — IPRATROPIUM BROMIDE AND ALBUTEROL SULFATE 3 ML: .5; 3 SOLUTION RESPIRATORY (INHALATION) at 15:09

## 2023-12-05 RX ADMIN — SPIRONOLACTONE 25 MG: 25 TABLET ORAL at 08:28

## 2023-12-05 RX ADMIN — DICYCLOMINE HYDROCHLORIDE 20 MG: 20 TABLET ORAL at 13:13

## 2023-12-05 RX ADMIN — DICYCLOMINE HYDROCHLORIDE 20 MG: 20 TABLET ORAL at 18:30

## 2023-12-05 RX ADMIN — ENOXAPARIN SODIUM 60 MG: 100 INJECTION SUBCUTANEOUS at 20:35

## 2023-12-05 RX ADMIN — MONTELUKAST 10 MG: 10 TABLET, FILM COATED ORAL at 20:35

## 2023-12-05 RX ADMIN — PRAVASTATIN SODIUM 40 MG: 20 TABLET ORAL at 20:35

## 2023-12-05 RX ADMIN — LISINOPRIL 5 MG: 5 TABLET ORAL at 08:28

## 2023-12-05 RX ADMIN — GUAIFENESIN 600 MG: 600 TABLET ORAL at 08:27

## 2023-12-05 RX ADMIN — FUROSEMIDE 40 MG: 10 INJECTION, SOLUTION INTRAMUSCULAR; INTRAVENOUS at 18:29

## 2023-12-05 RX ADMIN — IPRATROPIUM BROMIDE AND ALBUTEROL SULFATE 3 ML: .5; 3 SOLUTION RESPIRATORY (INHALATION) at 19:12

## 2023-12-05 RX ADMIN — Medication 10 ML: at 08:29

## 2023-12-05 RX ADMIN — DEXAMETHASONE 6 MG: 0.5 TABLET ORAL at 08:28

## 2023-12-05 RX ADMIN — FUROSEMIDE 40 MG: 10 INJECTION, SOLUTION INTRAMUSCULAR; INTRAVENOUS at 05:46

## 2023-12-05 RX ADMIN — CARVEDILOL 12.5 MG: 12.5 TABLET, FILM COATED ORAL at 18:30

## 2023-12-05 RX ADMIN — Medication 2000 UNITS: at 13:13

## 2023-12-05 RX ADMIN — FLUOXETINE HYDROCHLORIDE 40 MG: 20 CAPSULE ORAL at 08:28

## 2023-12-05 NOTE — H&P
[facility]   HISTORY AND PHYSICAL    Patient Name: Aida Conner  : 1955  MRN: [id number]  Primary Care Physician:  Serenity Lucas APRN  Date of admission: 2023    Subjective   Subjective     Chief Complaint: Shortness of breath    HPI:    Aida Conner is a 68 y.o. female with past medical history of hypertension, CHF, COPD on home oxygen anxiety, morbid obesity, ANABELLE, and GERD presented to the ED with complaints of shortness of breath.  Patient states that for the last week or so she has been having worsening dyspnea on exertion where she was symptomatic after just a few steps.  Patient had an episode of difficulty breathing where her pulse ox showed that she was saturating in the 60s so she asked her  to bring her to the ED immediately.  In the ED patient denies hypertension and significantly hypoxic on arrival for which she was placed on high flow nasal cannula.  Please show that she was in hypercapnic respiratory failure with respiratory panel positive for COVID-19.  Patient had an elevated proBNP and CRP with remaining labs being relatively unremarkable given her chronic conditions including a negative lactic acid.  Chest x-ray showed cardiomegaly with pulmonary congestion/edema.  Patient does admit to poor compliance with her CHF medications.  When asked she denied any recent fevers, focal weakness, chest pain, palpitation, abdominal pain, nausea, vomiting, diarrhea, constipation, dysuria, hematuria, hematochezia, melena, or anxiety.     Review of Systems   All systems were reviewed and negative except for: As stated in HPI    Personal History     Past Medical History:   Diagnosis Date    Allergic rhinitis     Anemia     Anxiety     Asthma     Chronic bronchitis     In past, espec. during working years    Chronic diastolic (congestive) heart failure     Chronic hypoxemic respiratory failure     on continuous O2    Coronary artery calcification seen on CT scan 2022    Diarrhea      with chemo    GERD (gastroesophageal reflux disease) Mild    H/O Intraductal papilloma     Hemorrhoids     History of transfusion 1984    AFTER BACK SURGERY no reaction    Hypertension     IBS (irritable bowel syndrome)     Inflammatory breast cancer     right    Kidney stone     Morbid obesity with BMI of 50.0-59.9, adult     Obesity hypoventilation syndrome 02/05/2021    On home oxygen therapy     2.5 AT REST WITH ACTIVITY UP TO 4L    ANABELLE on CPAP     cpap  & uses O2 with CPAP    Osteoarthritis     Pneumonia     Many times in past, 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 ENDOSCOPY;  Service: Gastroenterology;  Laterality: N/A;  diverticulosis, and hemorrhoids    CYSTOSCOPY BLADDER STONE LITHOTRIPSY      ENDOSCOPY      ENDOSCOPY N/A 12/8/2022    Procedure: ESOPHAGOGASTRODUODENOSCOPY  with biopsy;  Surgeon: Henry Aden MD;  Location: [facility] ENDOSCOPY;  Service: Gastroenterology;  Laterality: N/A;  hiatal hernia    KNEE ARTHROPLASTY Bilateral 2009    MASTECTOMY Left 08/10/2021    Procedure: LEFT TOTAL MASTECTOMY;  Surgeon: Nicci Banks MD;  Location: [facility] OR;  Service: General;  Laterality: Left;    MASTECTOMY Right 08/10/2021    Procedure: RIGHT MODIFIED RADICAL MASTECTOMY;  Surgeon: Nicci Banks MD;  Location: [facility] OR;  Service: General;  Laterality: Right;    SPINE SURGERY  1984    TOTAL ABDOMINAL HYSTERECTOMY WITH SALPINGO OOPHORECTOMY  2004    UPPER GASTROINTESTINAL ENDOSCOPY      VENOUS ACCESS DEVICE (PORT) INSERTION N/A 01/25/2021    Procedure: INSERTION VENOUS ACCESS DEVICE;  Surgeon: Nicci Banks MD;  Location: [facility] OR;  Service: General;  Laterality: N/A;     VENOUS ACCESS DEVICE (PORT) REMOVAL Left 04/05/2022    Procedure: REMOVAL VENOUS ACCESS DEVICE;  Surgeon: Nicci Banks MD;  Location: [facility];  Service: General;  Laterality: Left;       Family History: family history includes Breast cancer (age of onset: 45) in her mother; Breast cancer (age of onset: 70) in her maternal aunt and paternal aunt; Cancer in her father, maternal aunt, mother, paternal aunt, and paternal grandmother; Colon cancer (age of onset: 60) in her paternal grandmother; Hodgkin's lymphoma in her father; Hypertension in her paternal grandmother; Lung cancer in her father; Skin cancer in her father. Otherwise pertinent FHx was reviewed and not pertinent to current issue.    Social History:  reports that she has never smoked. She has never used smokeless tobacco. She reports that she does not drink alcohol and does not use drugs.    Home Medications:  FLUoxetine, Nirmatrelvir & Ritonavir (300mg/100mg), Vitamin D, albuterol sulfate HFA, anastrozole, carvedilol, dicyclomine, ferrous sulfate, furosemide, lisinopril, meloxicam, mometasone-formoterol, montelukast, multivitamin, pantoprazole, pravastatin, and spironolactone      Allergies:  Allergies   Allergen Reactions    Amlodipine Swelling     LEGS     Hydrochlorothiazide Unknown - Low Severity     Neuro issues    Morphine Nausea And Vomiting     hallucinations    Adhesive Tape Rash       Objective   Objective     Vitals:   Temp:  [97.7 °F (36.5 °C)-98.4 °F (36.9 °C)] 97.7 °F (36.5 °C)  Heart Rate:  [] 90  Resp:  [16-22] 16  BP: (120-171)/() 123/72  Flow (L/min):  [4-7] 7  Physical Exam    Constitutional: Awake, alert   Eyes: PERRLA, sclerae anicteric, no conjunctival injection   HENT: NCAT, mucous membranes moist   Neck: Supple, no thyromegaly, no lymphadenopathy, trachea midline   Respiratory: Clear to auscultation bilaterally, nonlabored respirations    Cardiovascular: RRR, no murmurs, rubs, or gallops, palpable pedal  pulses bilaterally   Gastrointestinal: Positive bowel sounds, soft, nontender, nondistended   Musculoskeletal: No bilateral ankle edema, no clubbing or cyanosis to extremities   Psychiatric: Appropriate affect, cooperative   Neurologic: Oriented x 3, strength symmetric in all extremities, Cranial Nerves grossly intact to confrontation, speech clear   Skin: No rashes     Result Review    Result Review:  I have personally reviewed the results from the time of this admission to 12/5/2023 01:07 EST and agree with these findings:  [x]  Laboratory list / accordion  []  Microbiology  [x]  Radiology  [x]  EKG/Telemetry   []  Cardiology/Vascular   []  Pathology  []  Old records  []  Other:  Most notable findings include: Positive COVID-19, elevated proBNP, elevated CRP, hypercapnic respiratory failure      Assessment & Plan   Assessment / Plan     Brief Patient Summary:  Aida Conner is a 68 y.o. female with past medical history of hypertension, CHF, COPD on home oxygen anxiety, morbid obesity, ANABELLE, and GERD presented to the ED with complaints of shortness of breath.     Active Hospital Problems:  Active Hospital Problems    Diagnosis     **Acute respiratory disease due to COVID-19 virus     Acute respiratory failure with hypercapnia     Chronic diastolic congestive heart failure     Anxiety disorder     Depression     Hypertension, essential     Obstructive sleep apnea      Plan:     Acute amatory failure with hypercapnia  -Secondary to COVID-19 with superimposed CHF and COPD  -Admit to telemetry  -Imaging reviewed  -Supplemental oxygen as needed, wean down as tolerated  -ABG reviewed  -BiPAP  -Dexamethasone daily  -Remdesivir or plasma if needed  -DuoNeb 3 times daily and as needed  -Empiric antibiotics  -Mucinex, Tessalon Perles  -Incentive spirometry  -Consult pulmonology if warranted  -IV diuresis with Lasix 40 mg twice daily  -Daily input output  -1.5 L fluid restriction  -Echo if warranted  -Supportive  care    HTN  -Currently well controlled  -PRN BP meds  -Resume home meds when available  -Titrate if needed    CHF   -As above    ANABELLE   -BiPAP    Hx Morbid Obesity  Hx Breast CA    GI ppx  DVT ppx    DVT prophylaxis:  Medical DVT prophylaxis orders are present.    CODE STATUS:    Level Of Support Discussed With: Patient  Code Status (Patient has no pulse and is not breathing): CPR (Attempt to Resuscitate)  Medical Interventions (Patient has pulse or is breathing): Full Support    Admission Status:  I believe this patient meets inpatient status.      Electronically signed by Arnulfo Becker MD, 12/05/23, 1:07 AM EST.

## 2023-12-06 ENCOUNTER — APPOINTMENT (OUTPATIENT)
Dept: CARDIOLOGY | Facility: HOSPITAL | Age: 68
DRG: 177 | End: 2023-12-06
Payer: MEDICARE

## 2023-12-06 LAB
ANION GAP SERPL CALCULATED.3IONS-SCNC: 8.6 MMOL/L (ref 5–15)
ASCENDING AORTA: 3.8 CM
B PARAPERT DNA SPEC QL NAA+PROBE: NOT DETECTED
B PERT DNA SPEC QL NAA+PROBE: NOT DETECTED
BH CV ECHO MEAS - AO MAX PG: 10 MMHG
BH CV ECHO MEAS - AO MEAN PG: 6 MMHG
BH CV ECHO MEAS - AO ROOT DIAM: 3.6 CM
BH CV ECHO MEAS - AO V2 MAX: 154 CM/SEC
BH CV ECHO MEAS - AO V2 VTI: 32.2 CM
BH CV ECHO MEAS - AVA(I,D): 1.53 CM2
BH CV ECHO MEAS - EDV(CUBED): 126.5 ML
BH CV ECHO MEAS - EDV(MOD-SP2): 94.5 ML
BH CV ECHO MEAS - EDV(MOD-SP4): 111 ML
BH CV ECHO MEAS - EF(MOD-BP): 50.1 %
BH CV ECHO MEAS - EF(MOD-SP2): 59.4 %
BH CV ECHO MEAS - EF(MOD-SP4): 39.3 %
BH CV ECHO MEAS - ESV(CUBED): 45.1 ML
BH CV ECHO MEAS - ESV(MOD-SP2): 38.4 ML
BH CV ECHO MEAS - ESV(MOD-SP4): 67.4 ML
BH CV ECHO MEAS - FS: 29.1 %
BH CV ECHO MEAS - IVS/LVPW: 1.34 CM
BH CV ECHO MEAS - IVSD: 1.61 CM
BH CV ECHO MEAS - LA DIMENSION: 5 CM
BH CV ECHO MEAS - LV DIASTOLIC VOL/BSA (35-75): 44.1 CM2
BH CV ECHO MEAS - LV MASS(C)D: 294.7 GRAMS
BH CV ECHO MEAS - LV MAX PG: 2.7 MMHG
BH CV ECHO MEAS - LV MEAN PG: 2 MMHG
BH CV ECHO MEAS - LV SYSTOLIC VOL/BSA (12-30): 26.8 CM2
BH CV ECHO MEAS - LV V1 MAX: 81.4 CM/SEC
BH CV ECHO MEAS - LV V1 VTI: 15.7 CM
BH CV ECHO MEAS - LVIDD: 5 CM
BH CV ECHO MEAS - LVIDS: 3.6 CM
BH CV ECHO MEAS - LVOT AREA: 3.1 CM2
BH CV ECHO MEAS - LVOT DIAM: 2 CM
BH CV ECHO MEAS - LVPWD: 1.2 CM
BH CV ECHO MEAS - MV DEC SLOPE: 1004 CM/SEC2
BH CV ECHO MEAS - MV MAX PG: 10 MMHG
BH CV ECHO MEAS - MV MEAN PG: 4 MMHG
BH CV ECHO MEAS - MV P1/2T: 49 MSEC
BH CV ECHO MEAS - MV V2 VTI: 28.8 CM
BH CV ECHO MEAS - MVA(P1/2T): 4.5 CM2
BH CV ECHO MEAS - MVA(VTI): 1.71 CM2
BH CV ECHO MEAS - RAP SYSTOLE: 8 MMHG
BH CV ECHO MEAS - RVDD: 3.2 CM
BH CV ECHO MEAS - RVSP: 34.4 MMHG
BH CV ECHO MEAS - SI(MOD-SP2): 22.3 ML/M2
BH CV ECHO MEAS - SI(MOD-SP4): 17.3 ML/M2
BH CV ECHO MEAS - SV(LVOT): 49.3 ML
BH CV ECHO MEAS - SV(MOD-SP2): 56.1 ML
BH CV ECHO MEAS - SV(MOD-SP4): 43.6 ML
BH CV ECHO MEAS - TR MAX PG: 26.4 MMHG
BH CV ECHO MEAS - TR MAX VEL: 257 CM/SEC
BUN SERPL-MCNC: 17 MG/DL (ref 8–23)
BUN/CREAT SERPL: 24.3 (ref 7–25)
C PNEUM DNA NPH QL NAA+NON-PROBE: NOT DETECTED
CALCIUM SPEC-SCNC: 8.7 MG/DL (ref 8.6–10.5)
CHLORIDE SERPL-SCNC: 93 MMOL/L (ref 98–107)
CO2 SERPL-SCNC: 38.4 MMOL/L (ref 22–29)
CREAT SERPL-MCNC: 0.7 MG/DL (ref 0.57–1)
D-LACTATE SERPL-SCNC: 1.7 MMOL/L (ref 0.5–2)
D-LACTATE SERPL-SCNC: 2.8 MMOL/L (ref 0.5–2)
DEPRECATED RDW RBC AUTO: 48.7 FL (ref 37–54)
EGFRCR SERPLBLD CKD-EPI 2021: 93.2 ML/MIN/1.73
ERYTHROCYTE [DISTWIDTH] IN BLOOD BY AUTOMATED COUNT: 15.9 % (ref 12.3–15.4)
FLUAV SUBTYP SPEC NAA+PROBE: NOT DETECTED
FLUBV RNA ISLT QL NAA+PROBE: NOT DETECTED
GLUCOSE SERPL-MCNC: 168 MG/DL (ref 65–99)
HADV DNA SPEC NAA+PROBE: NOT DETECTED
HCOV 229E RNA SPEC QL NAA+PROBE: NOT DETECTED
HCOV HKU1 RNA SPEC QL NAA+PROBE: NOT DETECTED
HCOV NL63 RNA SPEC QL NAA+PROBE: NOT DETECTED
HCOV OC43 RNA SPEC QL NAA+PROBE: NOT DETECTED
HCT VFR BLD AUTO: 35.9 % (ref 34–46.6)
HGB BLD-MCNC: 10.4 G/DL (ref 12–15.9)
HMPV RNA NPH QL NAA+NON-PROBE: NOT DETECTED
HPIV1 RNA ISLT QL NAA+PROBE: NOT DETECTED
HPIV2 RNA SPEC QL NAA+PROBE: NOT DETECTED
HPIV3 RNA NPH QL NAA+PROBE: NOT DETECTED
HPIV4 P GENE NPH QL NAA+PROBE: NOT DETECTED
L PNEUMO1 AG UR QL IA: NEGATIVE
LEFT ATRIUM VOLUME INDEX: 44.8 ML/M2
M PNEUMO IGG SER IA-ACNC: NOT DETECTED
MCH RBC QN AUTO: 24.8 PG (ref 26.6–33)
MCHC RBC AUTO-ENTMCNC: 29 G/DL (ref 31.5–35.7)
MCV RBC AUTO: 85.5 FL (ref 79–97)
MRSA DNA SPEC QL NAA+PROBE: NORMAL
PLATELET # BLD AUTO: 239 10*3/MM3 (ref 140–450)
PMV BLD AUTO: 11.2 FL (ref 6–12)
POTASSIUM SERPL-SCNC: 3.5 MMOL/L (ref 3.5–5.2)
PROCALCITONIN SERPL-MCNC: 0.03 NG/ML (ref 0–0.25)
RBC # BLD AUTO: 4.2 10*6/MM3 (ref 3.77–5.28)
RHINOVIRUS RNA SPEC NAA+PROBE: NOT DETECTED
RSV RNA NPH QL NAA+NON-PROBE: NOT DETECTED
S PNEUM AG SPEC QL LA: NEGATIVE
SARS-COV-2 RNA RESP QL NAA+PROBE: DETECTED
SODIUM SERPL-SCNC: 140 MMOL/L (ref 136–145)
WBC NRBC COR # BLD AUTO: 6.72 10*3/MM3 (ref 3.4–10.8)

## 2023-12-06 PROCEDURE — 0202U NFCT DS 22 TRGT SARS-COV-2: CPT | Performed by: STUDENT IN AN ORGANIZED HEALTH CARE EDUCATION/TRAINING PROGRAM

## 2023-12-06 PROCEDURE — 85027 COMPLETE CBC AUTOMATED: CPT | Performed by: FAMILY MEDICINE

## 2023-12-06 PROCEDURE — 93306 TTE W/DOPPLER COMPLETE: CPT

## 2023-12-06 PROCEDURE — 25010000002 SULFUR HEXAFLUORIDE MICROSPH 60.7-25 MG RECONSTITUTED SUSPENSION: Performed by: STUDENT IN AN ORGANIZED HEALTH CARE EDUCATION/TRAINING PROGRAM

## 2023-12-06 PROCEDURE — 93306 TTE W/DOPPLER COMPLETE: CPT | Performed by: SPECIALIST

## 2023-12-06 PROCEDURE — 25010000002 ENOXAPARIN PER 10 MG: Performed by: STUDENT IN AN ORGANIZED HEALTH CARE EDUCATION/TRAINING PROGRAM

## 2023-12-06 PROCEDURE — 80048 BASIC METABOLIC PNL TOTAL CA: CPT | Performed by: FAMILY MEDICINE

## 2023-12-06 PROCEDURE — 94799 UNLISTED PULMONARY SVC/PX: CPT

## 2023-12-06 PROCEDURE — 25010000002 FUROSEMIDE PER 20 MG: Performed by: FAMILY MEDICINE

## 2023-12-06 PROCEDURE — 63710000001 DEXAMETHASONE PER 0.25 MG: Performed by: FAMILY MEDICINE

## 2023-12-06 PROCEDURE — 25810000003 SODIUM CHLORIDE 0.9 % SOLUTION: Performed by: STUDENT IN AN ORGANIZED HEALTH CARE EDUCATION/TRAINING PROGRAM

## 2023-12-06 PROCEDURE — 83605 ASSAY OF LACTIC ACID: CPT | Performed by: STUDENT IN AN ORGANIZED HEALTH CARE EDUCATION/TRAINING PROGRAM

## 2023-12-06 PROCEDURE — 87449 NOS EACH ORGANISM AG IA: CPT | Performed by: STUDENT IN AN ORGANIZED HEALTH CARE EDUCATION/TRAINING PROGRAM

## 2023-12-06 PROCEDURE — 84145 PROCALCITONIN (PCT): CPT | Performed by: STUDENT IN AN ORGANIZED HEALTH CARE EDUCATION/TRAINING PROGRAM

## 2023-12-06 PROCEDURE — 94761 N-INVAS EAR/PLS OXIMETRY MLT: CPT

## 2023-12-06 PROCEDURE — 25010000002 CEFEPIME PER 500 MG: Performed by: STUDENT IN AN ORGANIZED HEALTH CARE EDUCATION/TRAINING PROGRAM

## 2023-12-06 PROCEDURE — 99232 SBSQ HOSP IP/OBS MODERATE 35: CPT | Performed by: STUDENT IN AN ORGANIZED HEALTH CARE EDUCATION/TRAINING PROGRAM

## 2023-12-06 PROCEDURE — 87641 MR-STAPH DNA AMP PROBE: CPT | Performed by: STUDENT IN AN ORGANIZED HEALTH CARE EDUCATION/TRAINING PROGRAM

## 2023-12-06 PROCEDURE — 99223 1ST HOSP IP/OBS HIGH 75: CPT | Performed by: STUDENT IN AN ORGANIZED HEALTH CARE EDUCATION/TRAINING PROGRAM

## 2023-12-06 RX ORDER — ARFORMOTEROL TARTRATE 15 UG/2ML
15 SOLUTION RESPIRATORY (INHALATION)
Status: DISCONTINUED | OUTPATIENT
Start: 2023-12-06 | End: 2023-12-11 | Stop reason: HOSPADM

## 2023-12-06 RX ORDER — SODIUM CHLORIDE 9 MG/ML
100 INJECTION, SOLUTION INTRAVENOUS CONTINUOUS
Status: ACTIVE | OUTPATIENT
Start: 2023-12-06 | End: 2023-12-07

## 2023-12-06 RX ADMIN — IPRATROPIUM BROMIDE AND ALBUTEROL SULFATE 3 ML: .5; 3 SOLUTION RESPIRATORY (INHALATION) at 06:52

## 2023-12-06 RX ADMIN — Medication 2000 UNITS: at 10:51

## 2023-12-06 RX ADMIN — DICYCLOMINE HYDROCHLORIDE 20 MG: 20 TABLET ORAL at 18:29

## 2023-12-06 RX ADMIN — ENOXAPARIN SODIUM 60 MG: 100 INJECTION SUBCUTANEOUS at 10:53

## 2023-12-06 RX ADMIN — PRAVASTATIN SODIUM 40 MG: 20 TABLET ORAL at 20:14

## 2023-12-06 RX ADMIN — ARFORMOTEROL TARTRATE 15 MCG: 15 SOLUTION RESPIRATORY (INHALATION) at 19:09

## 2023-12-06 RX ADMIN — SPIRONOLACTONE 25 MG: 25 TABLET ORAL at 10:52

## 2023-12-06 RX ADMIN — ANASTROZOLE 1 MG: 1 TABLET ORAL at 10:52

## 2023-12-06 RX ADMIN — SULFUR HEXAFLUORIDE 5 ML: KIT at 10:04

## 2023-12-06 RX ADMIN — CARVEDILOL 12.5 MG: 12.5 TABLET, FILM COATED ORAL at 11:18

## 2023-12-06 RX ADMIN — IPRATROPIUM BROMIDE AND ALBUTEROL SULFATE 3 ML: .5; 3 SOLUTION RESPIRATORY (INHALATION) at 19:09

## 2023-12-06 RX ADMIN — DICYCLOMINE HYDROCHLORIDE 20 MG: 20 TABLET ORAL at 13:40

## 2023-12-06 RX ADMIN — DEXAMETHASONE 6 MG: 0.5 TABLET ORAL at 10:52

## 2023-12-06 RX ADMIN — FLUOXETINE HYDROCHLORIDE 40 MG: 20 CAPSULE ORAL at 10:50

## 2023-12-06 RX ADMIN — DOCUSATE SODIUM 50MG AND SENNOSIDES 8.6MG 2 TABLET: 8.6; 5 TABLET, FILM COATED ORAL at 10:50

## 2023-12-06 RX ADMIN — GUAIFENESIN 600 MG: 600 TABLET ORAL at 20:14

## 2023-12-06 RX ADMIN — CEFEPIME 2000 MG: 2 INJECTION, POWDER, FOR SOLUTION INTRAVENOUS at 20:30

## 2023-12-06 RX ADMIN — CARVEDILOL 12.5 MG: 12.5 TABLET, FILM COATED ORAL at 18:24

## 2023-12-06 RX ADMIN — SODIUM CHLORIDE 100 ML/HR: 9 INJECTION, SOLUTION INTRAVENOUS at 16:35

## 2023-12-06 RX ADMIN — ENOXAPARIN SODIUM 60 MG: 100 INJECTION SUBCUTANEOUS at 20:15

## 2023-12-06 RX ADMIN — LISINOPRIL 5 MG: 5 TABLET ORAL at 10:52

## 2023-12-06 RX ADMIN — FUROSEMIDE 40 MG: 10 INJECTION, SOLUTION INTRAMUSCULAR; INTRAVENOUS at 05:35

## 2023-12-06 RX ADMIN — MONTELUKAST 10 MG: 10 TABLET, FILM COATED ORAL at 20:14

## 2023-12-06 RX ADMIN — Medication 10 ML: at 20:16

## 2023-12-06 RX ADMIN — BUDESONIDE AND FORMOTEROL FUMARATE DIHYDRATE 2 PUFF: 160; 4.5 AEROSOL RESPIRATORY (INHALATION) at 06:52

## 2023-12-06 RX ADMIN — FUROSEMIDE 40 MG: 10 INJECTION, SOLUTION INTRAMUSCULAR; INTRAVENOUS at 18:23

## 2023-12-06 RX ADMIN — Medication 10 ML: at 10:53

## 2023-12-06 RX ADMIN — CEFEPIME 2000 MG: 2 INJECTION, POWDER, FOR SOLUTION INTRAVENOUS at 13:39

## 2023-12-06 RX ADMIN — DICYCLOMINE HYDROCHLORIDE 20 MG: 20 TABLET ORAL at 05:35

## 2023-12-06 RX ADMIN — GUAIFENESIN 600 MG: 600 TABLET ORAL at 10:52

## 2023-12-06 RX ADMIN — PANTOPRAZOLE SODIUM 40 MG: 40 TABLET, DELAYED RELEASE ORAL at 10:52

## 2023-12-06 NOTE — CONSULTS
Pulmonary / Critical Care Consult Note      Patient Name: Aida Conner  : 1955  MRN: [id number]  Primary Care Physician:  Serenity Lucas APRN  Referring Physician: Julia Lagunas MD  Date of admission: 2023    Subjective   Subjective     Reason for Consult/ Chief Complaint:   COVID-19 pneumonia    HPI:  Aida Conner is a 68 y.o. female with past medical history of COPD on home oxygen, CHF, hypertension, morbid obesity, ANABELLE, and anxiety presented to the ED for evaluation of shortness of breath.  In the ED, ABG was 7.38/63/63, proBNP 1413, C-reactive protein 3.67, lactate 2.8, and patient tested positive for COVID-19.   chest CT demonstrated small right pleural effusion, and air bronchograms possibly secondary to atelectasis or pneumonia.  For the above reasons, the service was consulted to assist in the management and treatment of the patient's acute issues.  Upon assessment, patient lying in bed on 10 L high flow nasal cannula with moderate distress.  Patient stated that her  had recently been diagnosed with COVID and a few days later she started feeling ill and was also tested for COVID and was diagnosed with COVID.  Patient denies any fever or chills.    Review of Systems  Constitutional symptoms: T, otherwise denied complaints   Ear, nose, throat: Denied complaints  Cardiovascular: Dyspnea and cough, otherwise denied complaints  Respiratory: Denied complaints  Gastrointestinal: Denied complaints  Musculoskeletal: Weakness, otherwise denied complaints  Genitourinary: Denied complaints  Allergy / Immunology: Denied complaints  Hematologic: Denied complaints  Neurologic: Denied complaints  Skin: Denied complaints  Endocrine: Denied complaints  Psychiatric: Denied complaints      Personal History     Past Medical History:   Diagnosis Date    Allergic rhinitis     Anemia     Anxiety     Asthma     Chronic bronchitis     In past, espec. during working years    Chronic diastolic  (congestive) heart failure     Chronic hypoxemic respiratory failure     on continuous O2    Coronary artery calcification seen on CT scan 04/21/2022    Diarrhea     with chemo    GERD (gastroesophageal reflux disease) Mild    H/O Intraductal papilloma     Hemorrhoids     History of transfusion 1984    AFTER BACK SURGERY no reaction    Hypertension     IBS (irritable bowel syndrome)     Inflammatory breast cancer     right    Kidney stone     Morbid obesity with BMI of 50.0-59.9, adult     Obesity hypoventilation syndrome 02/05/2021    On home oxygen therapy     2.5 AT REST WITH ACTIVITY UP TO 4L    ANABELLE on CPAP     cpap  & uses O2 with CPAP    Osteoarthritis     Pneumonia     Many times in past, 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    PONV (postoperative nausea and vomiting)     Type II diabetes mellitus 09/08/2021       Past Surgical History:   Procedure Laterality Date    BREAST EXCISIONAL BIOPSY Bilateral     COLONOSCOPY      COLONOSCOPY N/A 12/8/2022    Procedure: COLONOSCOPY;  Surgeon: Henry Aden MD;  Location: [facility] ENDOSCOPY;  Service: Gastroenterology;  Laterality: N/A;  diverticulosis, and hemorrhoids    CYSTOSCOPY BLADDER STONE LITHOTRIPSY      ENDOSCOPY      ENDOSCOPY N/A 12/8/2022    Procedure: ESOPHAGOGASTRODUODENOSCOPY  with biopsy;  Surgeon: Henry Aden MD;  Location: [facility] ENDOSCOPY;  Service: Gastroenterology;  Laterality: N/A;  hiatal hernia    KNEE ARTHROPLASTY Bilateral 2009    MASTECTOMY Left 08/10/2021    Procedure: LEFT TOTAL MASTECTOMY;  Surgeon: Nicci Banks MD;  Location: [facility];  Service: General;  Laterality: Left;    MASTECTOMY Right 08/10/2021    Procedure: RIGHT MODIFIED RADICAL MASTECTOMY;  Surgeon: Nicci Banks MD;  Location: [facility] OR;  Service: General;  Laterality: Right;    SPINE SURGERY  1984    TOTAL ABDOMINAL HYSTERECTOMY WITH SALPINGO OOPHORECTOMY  2004    UPPER GASTROINTESTINAL ENDOSCOPY      VENOUS ACCESS DEVICE (PORT)  INSERTION N/A 01/25/2021    Procedure: INSERTION VENOUS ACCESS DEVICE;  Surgeon: Nicci Banks MD;  Location: [facility] MAIN OR;  Service: General;  Laterality: N/A;    VENOUS ACCESS DEVICE (PORT) REMOVAL Left 04/05/2022    Procedure: REMOVAL VENOUS ACCESS DEVICE;  Surgeon: Nicci Banks MD;  Location: [facility] MAIN OR;  Service: General;  Laterality: Left;       Family History: family history includes Breast cancer (age of onset: 45) in her mother; Breast cancer (age of onset: 70) in her maternal aunt and paternal aunt; Cancer in her father, maternal aunt, mother, paternal aunt, and paternal grandmother; Colon cancer (age of onset: 60) in her paternal grandmother; Hodgkin's lymphoma in her father; Hypertension in her paternal grandmother; Lung cancer in her father; Skin cancer in her father. Otherwise pertinent FHx was reviewed and not pertinent to current issue.    Social History:  reports that she has never smoked. She has never used smokeless tobacco. She reports that she does not drink alcohol and does not use drugs.    Home Medications:  FLUoxetine, Nirmatrelvir & Ritonavir (300mg/100mg), Vitamin D, albuterol sulfate HFA, anastrozole, carvedilol, dicyclomine, ferrous sulfate, furosemide, lisinopril, meloxicam, mometasone-formoterol, montelukast, multivitamin, pantoprazole, pravastatin, and spironolactone    Allergies:  Allergies   Allergen Reactions    Amlodipine Swelling     LEGS     Hydrochlorothiazide Unknown - Low Severity     Neuro issues    Morphine Nausea And Vomiting     hallucinations    Adhesive Tape Rash       Objective    Objective     Vitals:   Temp:  [97.9 °F (36.6 °C)-98.6 °F (37 °C)] 97.9 °F (36.6 °C)  Heart Rate:  [] 73  Resp:  [18-20] 20  BP: (106-137)/() 137/100  Flow (L/min):  [10] 10    Physical Exam:  Vital Signs Reviewed   General:  Alert, NAD. Sitting up on edge of bed   HEENT:  PERRL, EOMI.    Neck:  No JVD, no thyromegaly  Lymph: no axillary, cervical,  supraclavicular lymphadenopathy noted bilaterally  Chest:  diminished to auscultation bilaterally, no work of breathing noted on 10L HFNC  CV: RRR, no M/G/R, pulses 2+  Abd:  Soft, NT, ND, +BS, obese  EXT:  no clubbing, no cyanosis, no edema  Neuro:  A&Ox3, CN grossly intact, no focal deficits.  Skin: No rashes or lesions noted    Result Review    Result Review:  I have personally reviewed the results from the time of this admission to 12/6/2023 15:32 EST and agree with these findings:  [x]  Laboratory  [x]  Microbiology  [x]  Radiology  []  EKG/Telemetry   []  Cardiology/Vascular   []  Pathology  [x]  Old records  [x]  Other:  Most notable findings include:     -PFTs 1/16/2019 showed obstructive airway disease, FEV1 42% of predicted, no significant response to bronchodilator.  Lung volumes indicate restrictive airway disease, DLCO normal.         Lab 12/06/23  0448 12/05/23  0320 12/04/23  1835   WBC 6.72 5.44 5.98   HEMOGLOBIN 10.4* 10.6* 10.2*   HEMATOCRIT 35.9 38.3 36.5   PLATELETS 239 209 237   SODIUM 140 141 142   POTASSIUM 3.5 3.9 3.8   CHLORIDE 93* 96* 99   CO2 38.4* 35.3* 33.4*   BUN 17 9 10   CREATININE 0.70 0.61 0.71   GLUCOSE 168* 140* 98   CALCIUM 8.7 8.4* 9.0   TOTAL PROTEIN  --   --  6.6   ALBUMIN  --   --  3.8   GLOBULIN  --   --  2.8       Assessment & Plan   Assessment / Plan     Active Hospital Problems:  Active Hospital Problems    Diagnosis     **Acute respiratory disease due to COVID-19 virus     Acute respiratory failure with hypercapnia     Chronic diastolic congestive heart failure     Anxiety disorder     Depression     Hypertension, essential     Obstructive sleep apnea      Impression:  COVID-19 pneumonia  Acute on chronic hypoxic and hypercapnic respiratory failure  COPD exacerbation  Lactic acidosis, clinically significant  Obstructive of sleep apnea  Obesity hypoventilation syndrome  Morbid obesity    Plan:  -On 10 L high flow nasal cannula.  Patient's baseline is 2 to 3 L.  Continue  to wean to maintain SpO2 greater than 90%  -Encourage NIPPV at at night and during naps  -Micro negative to date.  Positive for COVID-19.  Respiratory panel pending.  MRSA PCR pending  -C-reactive protein 3.67.  Procalcitonin 0.03  -12/5 chest CT reviewed demonstrating small right pleural effusion and bronchograms possibly secondary to atelectasis or pneumonia  -12/6 echo EF 50%  -Continue cefepime and deescalate when appropriate  -Continue Decadron 6 mg oral daily for 10 days  -Initiated bronchodilator and bronchopulmonary protocols.  Encourage I-S and flutter  -Wrote for nebulizer  -Continue Lasix 40 mg IV twice daily and Aldactone 25 mg oral daily  -Continue to monitor renal panel electrolytes.  Replace electrolytes as necessary  -Continue to trend lactate until clear  -Glucose control per primary  -Encouraged weight loss    DVT prophylaxis:  Medical DVT prophylaxis orders are present.     Code Status and Medical Interventions:   Ordered at: 12/04/23 2110     Level Of Support Discussed With:    Patient     Code Status (Patient has no pulse and is not breathing):    CPR (Attempt to Resuscitate)     Medical Interventions (Patient has pulse or is breathing):    Full Support      Electronically signed by SHAUNA Aguilar, 12/06/23, 3:46 PM EST.    This patient was seen by both a physician and a NP. Yaron FERRER MD, spent >50% of time in accordance with split shared billing. This included personally reviewing all pertinent labs, imaging, microbiology and documentation. Also discussing the case with the patient and any available family, the admitting physician and any available ancillary staff.   Electronically signed by Yaron Lima MD, 12/06/23, 9:13 PM EST.

## 2023-12-06 NOTE — SIGNIFICANT NOTE
Aida Conner is a 68 y.o. female with past medical history of hypertension, CHF, COPD on home oxygen anxiety, morbid obesity, ANABELLE, and GERD presented to the ED with complaints of shortness of breath. Patient was found to have Acute hypoxic respiratory failure. COVID 19 detected. On 7 L/min of O2 currently.   Denied any fever, chills, rigors, chest pain or SOB.   Started on Decadron. Holding Remdesivir currently. Obtaining CT chest to look for intrapulmonary etiology leading to hypoxia. Continue Lasix IV 40 mg BID. Echo ordered. Continue to titrate oxygen as tolerated. Continue Duonebs as needed along with Mucinex and Tessalon Perles. Continue rest of the current management.

## 2023-12-06 NOTE — PROGRESS NOTES
[facility]   Hospitalist Progress Note  Date: 2023  Patient Name: Aida Conner  : 1955  MRN: [id number]  Date of admission: 2023  Room/Bed: 403/1      Subjective   Subjective     Chief Complaint: Follow-up for shortness of breath    Summary:Aida Conner is a 68 y.o. female with past medical history of hypertension, CHF, COPD on home oxygen of 3 L/min, anxiety, morbid obesity, ANABELLE and GERD who presented to ED with complaints of shortness of breath.  She was found to have acute hypoxic respiratory failure likely due to COVID-19 infection.  She was on 7 L/min of oxygen via NC to maintain saturation.  Patient was started on oral Decadron.  Remdesivir was held.  Patient was admitted to medicine department for further evaluation and management of acute on chronic hypoxic respiratory failure likely due to underlying COVID infection.    Interval Followup: Patient became hypoxic overnight requiring 10 L/min humidified high flow nasal cannula.  She mentioned her shortness of breath is significantly improved to patient was more hypoxic.  She denied any fever, chills, rigors, chest pain, difficulty breathing, headache or lightheadedness.  She was sitting at the edge of the bed, not in acute distress.    Review of Systems    All systems reviewed and negative except for what is outlined above.      Objective   Objective     Vitals:   Temp:  [98.1 °F (36.7 °C)-98.6 °F (37 °C)] 98.2 °F (36.8 °C)  Heart Rate:  [] 110  Resp:  [18-24] 18  BP: (106-131)/(61-94) 131/94  Flow (L/min):  [10] 10    Physical Exam   General: Awake, alert, NAD; morbidly obese.  HENT: NCAT, MMM  Eyes: pupils equal, no scleral icterus  Cardiovascular: RRR, no murmurs   Pulmonary: CTA bilaterally; no wheezes; no conversational dyspnea; on 2 L/min via humidified HFNC.  Gastrointestinal: S/ND/NT, +BS  Musculoskeletal: No gross deformities  Skin: No jaundice, no rash on exposed skin appreciated  Neuro: Alert, awake, oriented x 3;  speech clear; no tremor  Psych: Mood and affect appropriate  : No Kiser catheter; no suprapubic tenderness    Result Review    Result Review:  I have personally reviewed these results:  [x]  Laboratory      Lab 12/06/23 0448 12/05/23 0320 12/04/23 2101 12/04/23 1835   WBC 6.72 5.44  --  5.98   HEMOGLOBIN 10.4* 10.6*  --  10.2*   HEMATOCRIT 35.9 38.3  --  36.5   PLATELETS 239 209  --  237   NEUTROS ABS  --   --   --  4.57   IMMATURE GRANS (ABS)  --   --   --  0.02   LYMPHS ABS  --   --   --  0.78   MONOS ABS  --   --   --  0.47   EOS ABS  --   --   --  0.11   MCV 85.5 89.5  --  88.0   CRP  --   --  3.67*  --    PROCALCITONIN  --  0.03  --   --    LACTATE  --   --   --  1.4         Lab 12/06/23 0448 12/05/23 0320 12/04/23 1835   SODIUM 140 141 142   POTASSIUM 3.5 3.9 3.8   CHLORIDE 93* 96* 99   CO2 38.4* 35.3* 33.4*   ANION GAP 8.6 9.7 9.6   BUN 17 9 10   CREATININE 0.70 0.61 0.71   EGFR 93.2 96.3 91.6   GLUCOSE 168* 140* 98   CALCIUM 8.7 8.4* 9.0         Lab 12/04/23 1835   TOTAL PROTEIN 6.6   ALBUMIN 3.8   GLOBULIN 2.8   ALT (SGPT) 14   AST (SGOT) 17   BILIRUBIN 0.6   ALK PHOS 100         Lab 12/04/23 2101 12/04/23 1835   PROBNP  --  1,413.0*   HSTROP T 12 12                 Lab 12/04/23 1808   PH, ARTERIAL 7.387   PCO2, ARTERIAL 63.9*   PO2 ART 63.4*   O2 SATURATION ART 91.4*   HCO3 ART 37.6*   BASE EXCESS ART 10.3*   CARBOXYHEMOGLOBIN 1.0     Brief Urine Lab Results       None          [x]  Microbiology   Microbiology Results (last 10 days)       Procedure Component Value - Date/Time    Blood Culture - Blood, Arm, Left [[id number]]  (Normal) Collected: 12/04/23 1835    Lab Status: Preliminary result Specimen: Blood from Arm, Left Updated: 12/05/23 1900     Blood Culture No growth at 24 hours    Blood Culture - Blood, Arm, Left [[id number]]  (Normal) Collected: 12/04/23 1835    Lab Status: Preliminary result Specimen: Blood from Arm, Left Updated: 12/05/23 1900     Blood Culture No growth at 24 hours     COVID-19, FLU A/B, RSV PCR 1 HR TAT - Swab, Nasopharynx [[id number]]  (Abnormal) Collected: 12/04/23 1811    Lab Status: Final result Specimen: Swab from Nasopharynx Updated: 12/04/23 1929     COVID19 Detected     Influenza A PCR Not Detected     Influenza B PCR Not Detected     RSV, PCR Not Detected    Narrative:      Fact sheet for providers: https://www.fda.gov/media/077077/download    Fact sheet for patients: https://www.fda.gov/media/001003/download    Test performed by PCR.          [x]  Radiology  XR Chest 1 View    Result Date: 12/4/2023    Cardiomegaly with central vascular congestion suggesting mild congestive heart failure or volume overload.       ANDERS MAGALLON MD       Electronically Signed and Approved By: ANDERS MAGALLON MD on 12/04/2023 at 18:11            []  EKG/Telemetry   []  Cardiology/Vascular   []  Pathology  []  Old records  []  Other:    Assessment & Plan   Assessment / Plan     Assessment:  Acute on chronic hypoxic respiratory failure due to COVID-19 infection  Possible COVID-19 pneumonia  Chronic diastolic CHF  Hypertension  ANABELLE  Depression  Anxiety    Plan:  Patient currently being managed in hospitalist service.  Was hypoxic overnight, requiring 10 L/min via humidified HFNC.  Continue to titrate oxygen as tolerated to maintain SpO2 greater than 90%.  CT chest without contrast showed small right pleural effusion, small pericardial effusion along with consolidation in the left lower lobe with air bronchograms possibly pneumonia.  Started on cefepime 2000 mg every 8 hours.  Continue Lasix IV 40 mg twice daily.  Transthoracic echo showed normal left ventricular systolic function, mild aortic stenosis and trace MR along with trace TR.  On Decadron 6 mg daily for 10 days.  MRSA PCR negative.  Respiratory panel showed COVID-19, rest negative.  Blood culture showed no growth at 24 hours.  Continue bronchodilators and bronchopulmonary protocol.  Pulmonology following the patient, appreciate  further input.  Continue rest of the current management.        DVT prophylaxis:  Medical DVT prophylaxis orders are present.    CODE STATUS:   Level Of Support Discussed With: Patient  Code Status (Patient has no pulse and is not breathing): CPR (Attempt to Resuscitate)  Medical Interventions (Patient has pulse or is breathing): Full Support      Electronically signed by Julia Lagunas MD, 12/06/23, 12:41 PM EST.

## 2023-12-07 LAB
ANION GAP SERPL CALCULATED.3IONS-SCNC: 10.5 MMOL/L (ref 5–15)
BASOPHILS # BLD AUTO: 0 10*3/MM3 (ref 0–0.2)
BASOPHILS NFR BLD AUTO: 0 % (ref 0–1.5)
BUN SERPL-MCNC: 21 MG/DL (ref 8–23)
BUN/CREAT SERPL: 30 (ref 7–25)
CALCIUM SPEC-SCNC: 8.5 MG/DL (ref 8.6–10.5)
CHLORIDE SERPL-SCNC: 93 MMOL/L (ref 98–107)
CO2 SERPL-SCNC: 35.5 MMOL/L (ref 22–29)
CREAT SERPL-MCNC: 0.7 MG/DL (ref 0.57–1)
DEPRECATED RDW RBC AUTO: 50.3 FL (ref 37–54)
EGFRCR SERPLBLD CKD-EPI 2021: 93.2 ML/MIN/1.73
EOSINOPHIL # BLD AUTO: 0 10*3/MM3 (ref 0–0.4)
EOSINOPHIL NFR BLD AUTO: 0 % (ref 0.3–6.2)
ERYTHROCYTE [DISTWIDTH] IN BLOOD BY AUTOMATED COUNT: 15.9 % (ref 12.3–15.4)
GLUCOSE SERPL-MCNC: 149 MG/DL (ref 65–99)
HCT VFR BLD AUTO: 36 % (ref 34–46.6)
HGB BLD-MCNC: 10.2 G/DL (ref 12–15.9)
IMM GRANULOCYTES # BLD AUTO: 0.04 10*3/MM3 (ref 0–0.05)
IMM GRANULOCYTES NFR BLD AUTO: 0.5 % (ref 0–0.5)
LYMPHOCYTES # BLD AUTO: 0.28 10*3/MM3 (ref 0.7–3.1)
LYMPHOCYTES NFR BLD AUTO: 3.7 % (ref 19.6–45.3)
MAGNESIUM SERPL-MCNC: 1.9 MG/DL (ref 1.6–2.4)
MCH RBC QN AUTO: 24.5 PG (ref 26.6–33)
MCHC RBC AUTO-ENTMCNC: 28.3 G/DL (ref 31.5–35.7)
MCV RBC AUTO: 86.5 FL (ref 79–97)
MONOCYTES # BLD AUTO: 0.36 10*3/MM3 (ref 0.1–0.9)
MONOCYTES NFR BLD AUTO: 4.8 % (ref 5–12)
NEUTROPHILS NFR BLD AUTO: 6.8 10*3/MM3 (ref 1.7–7)
NEUTROPHILS NFR BLD AUTO: 91 % (ref 42.7–76)
NRBC BLD AUTO-RTO: 0 /100 WBC (ref 0–0.2)
PHOSPHATE SERPL-MCNC: 3.5 MG/DL (ref 2.5–4.5)
PLATELET # BLD AUTO: 231 10*3/MM3 (ref 140–450)
PMV BLD AUTO: 11.3 FL (ref 6–12)
POTASSIUM SERPL-SCNC: 3.5 MMOL/L (ref 3.5–5.2)
RBC # BLD AUTO: 4.16 10*6/MM3 (ref 3.77–5.28)
SODIUM SERPL-SCNC: 139 MMOL/L (ref 136–145)
WBC NRBC COR # BLD AUTO: 7.48 10*3/MM3 (ref 3.4–10.8)

## 2023-12-07 PROCEDURE — 85025 COMPLETE CBC W/AUTO DIFF WBC: CPT | Performed by: STUDENT IN AN ORGANIZED HEALTH CARE EDUCATION/TRAINING PROGRAM

## 2023-12-07 PROCEDURE — 63710000001 DEXAMETHASONE PER 0.25 MG: Performed by: FAMILY MEDICINE

## 2023-12-07 PROCEDURE — 94799 UNLISTED PULMONARY SVC/PX: CPT

## 2023-12-07 PROCEDURE — 25010000002 ENOXAPARIN PER 10 MG: Performed by: STUDENT IN AN ORGANIZED HEALTH CARE EDUCATION/TRAINING PROGRAM

## 2023-12-07 PROCEDURE — 99232 SBSQ HOSP IP/OBS MODERATE 35: CPT | Performed by: STUDENT IN AN ORGANIZED HEALTH CARE EDUCATION/TRAINING PROGRAM

## 2023-12-07 PROCEDURE — 80048 BASIC METABOLIC PNL TOTAL CA: CPT | Performed by: FAMILY MEDICINE

## 2023-12-07 PROCEDURE — 25010000002 AMPICILLIN-SULBACTAM PER 1.5 G: Performed by: STUDENT IN AN ORGANIZED HEALTH CARE EDUCATION/TRAINING PROGRAM

## 2023-12-07 PROCEDURE — 25810000003 SODIUM CHLORIDE 0.9 % SOLUTION: Performed by: STUDENT IN AN ORGANIZED HEALTH CARE EDUCATION/TRAINING PROGRAM

## 2023-12-07 PROCEDURE — 25010000002 FUROSEMIDE PER 20 MG: Performed by: FAMILY MEDICINE

## 2023-12-07 PROCEDURE — 99233 SBSQ HOSP IP/OBS HIGH 50: CPT | Performed by: STUDENT IN AN ORGANIZED HEALTH CARE EDUCATION/TRAINING PROGRAM

## 2023-12-07 PROCEDURE — 84100 ASSAY OF PHOSPHORUS: CPT | Performed by: STUDENT IN AN ORGANIZED HEALTH CARE EDUCATION/TRAINING PROGRAM

## 2023-12-07 PROCEDURE — 25010000002 CEFEPIME PER 500 MG: Performed by: STUDENT IN AN ORGANIZED HEALTH CARE EDUCATION/TRAINING PROGRAM

## 2023-12-07 PROCEDURE — 83735 ASSAY OF MAGNESIUM: CPT | Performed by: STUDENT IN AN ORGANIZED HEALTH CARE EDUCATION/TRAINING PROGRAM

## 2023-12-07 PROCEDURE — 94761 N-INVAS EAR/PLS OXIMETRY MLT: CPT

## 2023-12-07 PROCEDURE — 94664 DEMO&/EVAL PT USE INHALER: CPT

## 2023-12-07 RX ADMIN — DEXAMETHASONE 6 MG: 0.5 TABLET ORAL at 08:37

## 2023-12-07 RX ADMIN — ARFORMOTEROL TARTRATE 15 MCG: 15 SOLUTION RESPIRATORY (INHALATION) at 18:53

## 2023-12-07 RX ADMIN — DICYCLOMINE HYDROCHLORIDE 20 MG: 20 TABLET ORAL at 12:37

## 2023-12-07 RX ADMIN — MONTELUKAST 10 MG: 10 TABLET, FILM COATED ORAL at 20:17

## 2023-12-07 RX ADMIN — CEFEPIME 2000 MG: 2 INJECTION, POWDER, FOR SOLUTION INTRAVENOUS at 05:55

## 2023-12-07 RX ADMIN — CEFEPIME 2000 MG: 2 INJECTION, POWDER, FOR SOLUTION INTRAVENOUS at 12:37

## 2023-12-07 RX ADMIN — PANTOPRAZOLE SODIUM 40 MG: 40 TABLET, DELAYED RELEASE ORAL at 08:38

## 2023-12-07 RX ADMIN — CARVEDILOL 12.5 MG: 12.5 TABLET, FILM COATED ORAL at 08:38

## 2023-12-07 RX ADMIN — SODIUM CHLORIDE 100 ML/HR: 9 INJECTION, SOLUTION INTRAVENOUS at 02:32

## 2023-12-07 RX ADMIN — ARFORMOTEROL TARTRATE 15 MCG: 15 SOLUTION RESPIRATORY (INHALATION) at 08:45

## 2023-12-07 RX ADMIN — LISINOPRIL 5 MG: 5 TABLET ORAL at 08:38

## 2023-12-07 RX ADMIN — SODIUM CHLORIDE 3 G: 900 INJECTION INTRAVENOUS at 23:21

## 2023-12-07 RX ADMIN — DICYCLOMINE HYDROCHLORIDE 20 MG: 20 TABLET ORAL at 05:56

## 2023-12-07 RX ADMIN — GUAIFENESIN 600 MG: 600 TABLET ORAL at 20:17

## 2023-12-07 RX ADMIN — IPRATROPIUM BROMIDE AND ALBUTEROL SULFATE 3 ML: .5; 3 SOLUTION RESPIRATORY (INHALATION) at 08:45

## 2023-12-07 RX ADMIN — SODIUM CHLORIDE 3 G: 900 INJECTION INTRAVENOUS at 18:44

## 2023-12-07 RX ADMIN — CARVEDILOL 12.5 MG: 12.5 TABLET, FILM COATED ORAL at 17:52

## 2023-12-07 RX ADMIN — ENOXAPARIN SODIUM 60 MG: 100 INJECTION SUBCUTANEOUS at 20:17

## 2023-12-07 RX ADMIN — BENZOCAINE AND MENTHOL 1 EACH: 15; 3.6 LOZENGE ORAL at 18:44

## 2023-12-07 RX ADMIN — FUROSEMIDE 40 MG: 10 INJECTION, SOLUTION INTRAMUSCULAR; INTRAVENOUS at 17:52

## 2023-12-07 RX ADMIN — IPRATROPIUM BROMIDE AND ALBUTEROL SULFATE 3 ML: .5; 3 SOLUTION RESPIRATORY (INHALATION) at 15:58

## 2023-12-07 RX ADMIN — DICYCLOMINE HYDROCHLORIDE 20 MG: 20 TABLET ORAL at 17:52

## 2023-12-07 RX ADMIN — FLUOXETINE HYDROCHLORIDE 40 MG: 20 CAPSULE ORAL at 08:38

## 2023-12-07 RX ADMIN — DICYCLOMINE HYDROCHLORIDE 20 MG: 20 TABLET ORAL at 23:33

## 2023-12-07 RX ADMIN — SPIRONOLACTONE 25 MG: 25 TABLET ORAL at 08:38

## 2023-12-07 RX ADMIN — FUROSEMIDE 40 MG: 10 INJECTION, SOLUTION INTRAMUSCULAR; INTRAVENOUS at 05:55

## 2023-12-07 RX ADMIN — Medication 2000 UNITS: at 08:38

## 2023-12-07 RX ADMIN — ANASTROZOLE 1 MG: 1 TABLET ORAL at 08:38

## 2023-12-07 RX ADMIN — ENOXAPARIN SODIUM 60 MG: 100 INJECTION SUBCUTANEOUS at 08:37

## 2023-12-07 RX ADMIN — GUAIFENESIN 600 MG: 600 TABLET ORAL at 08:38

## 2023-12-07 RX ADMIN — PRAVASTATIN SODIUM 40 MG: 20 TABLET ORAL at 20:17

## 2023-12-07 RX ADMIN — Medication 10 ML: at 10:21

## 2023-12-07 RX ADMIN — SODIUM CHLORIDE 100 ML/HR: 9 INJECTION, SOLUTION INTRAVENOUS at 12:37

## 2023-12-07 RX ADMIN — DICYCLOMINE HYDROCHLORIDE 20 MG: 20 TABLET ORAL at 00:03

## 2023-12-07 NOTE — PROGRESS NOTES
Pulmonary / Critical Care Progress Note      Patient Name: Aida Conner  : 1955  MRN: [id number]  Primary Care Physician:  Serenity Lucas APRN  Date of admission: 2023    Subjective   Subjective   Follow-up for COVID-19 pneumonia    No acute events overnight.    This morning,  Remains on 10 L high flow nasal cannula  Ambulating in room  Feels better than yesterday  Patient on day 3 of 10 Decadron regimen  Diuresing well  -4 L net output    Review of Systems  General: Fatigue, otherwise denied complaints  HEENT: Denied complaints  Respiratory: Dyspnea, otherwise denied complaints  Cardiovascular: Denied complaints  GI: Denied complaints  : Denied complaints  MSK: Generalized weakness, otherwise denied complaints    Objective   Objective     Vitals:   Temp:  [97.5 °F (36.4 °C)-98.2 °F (36.8 °C)] 97.7 °F (36.5 °C)  Heart Rate:  [] 89  Resp:  [20-24] 20  BP: (119-147)/() 143/96  Flow (L/min):  [10] 10  Physical Exam   Vital Signs Reviewed   General:  Alert, NAD. Sitting on edge of bed  HEENT:  PERRL, EOMI.    Neck:  No JVD, no thyromegaly  Lymph: no axillary, cervical, supraclavicular lymphadenopathy noted bilaterally  Chest: Diminished to auscultation bilaterally, no work of breathing noted on 10 L high flow nasal cannula  CV: RRR, no M/G/R, pulses 2+  Abd:  Soft, NT, ND, +BS, obese  EXT:  no clubbing, no cyanosis, no edema  Neuro:  A&Ox3, CN grossly intact, no focal deficits.  Skin: No rashes or lesions noted    Result Review    Result Review:  I have personally reviewed the results from the time of this admission to 2023 11:29 EST and agree with these findings:  [x]  Laboratory  [x]  Microbiology  [x]  Radiology  []  EKG/Telemetry   []  Cardiology/Vascular   []  Pathology  []  Old records  []  Other:  Most notable findings include:      echo EF 50%        Lab 23  0443 23  0448 23  0320 23  1835   WBC 7.48 6.72 5.44 5.98   HEMOGLOBIN 10.2* 10.4* 10.6*  10.2*   HEMATOCRIT 36.0 35.9 38.3 36.5   PLATELETS 231 239 209 237   SODIUM 139 140 141 142   POTASSIUM 3.5 3.5 3.9 3.8   CHLORIDE 93* 93* 96* 99   CO2 35.5* 38.4* 35.3* 33.4*   BUN 21 17 9 10   CREATININE 0.70 0.70 0.61 0.71   GLUCOSE 149* 168* 140* 98   CALCIUM 8.5* 8.7 8.4* 9.0   PHOSPHORUS 3.5  --   --   --    TOTAL PROTEIN  --   --   --  6.6   ALBUMIN  --   --   --  3.8   GLOBULIN  --   --   --  2.8       Assessment & Plan   Assessment / Plan     Active Hospital Problems:  Active Hospital Problems    Diagnosis     **Acute respiratory disease due to COVID-19 virus     Acute respiratory failure with hypercapnia     Chronic diastolic congestive heart failure     Anxiety disorder     Depression     Hypertension, essential     Obstructive sleep apnea      Impression:  COVID-19 pneumonia  Acute on chronic hypoxic and hypercapnic respiratory failure  COPD exacerbation  Lactic acidosis, clinically significant  Obstructive of sleep apnea  Obesity hypoventilation syndrome  Morbid obesity    Plan:  -On 10 L high flow nasal cannula.  Patient's baseline is 2 to 3 L.  Continue to wean to maintain SpO2 greater than 90%  -Patient's respiratory status remains tenuous at this time.  It may be worth looking into PE as a cause for inability to decrease supplemental oxygen  -Encourage NIPPV at at night and during naps  -Micro negative to date.  Sputum culture pending  -Continue cefepime and deescalate when appropriate  -Continue Decadron 6 mg oral daily for 10 days  -Continue bronchodilator and bronchopulmonary protocols.  Encourage I-S and flutter  -Continue nebulizers  -Continue Lasix 40 mg IV twice daily and Aldactone 25 mg oral daily  -Continue to monitor renal panel electrolytes.  Replace electrolytes as necessary  -Lactate resolved  -Glucose control per primary  -Encouraged weight loss  -Encourage mobilization.  Out of bed to chair  -Of note, patient on Arimidex for history of breast cancer  -Patient agreeable to home health  care at discharge  -Follow-up with pulmonary clinic 1 to 2 weeks after discharge    DVT prophylaxis:  Medical DVT prophylaxis orders are present.    CODE STATUS:   Level Of Support Discussed With: Patient  Code Status (Patient has no pulse and is not breathing): CPR (Attempt to Resuscitate)  Medical Interventions (Patient has pulse or is breathing): Full Support      Electronically signed by SHAUNA Aguilar, 12/07/23, 11:29 AM EST.  This patient was seen by both a physician and a NP. I, Yaron Lima MD, spent >50% of time in accordance with split shared billing. This included personally reviewing all pertinent labs, imaging, microbiology and documentation. Also discussing the case with the patient and any available family, the admitting physician and any available ancillary staff.   Electronically signed by Yaron Lima MD, 12/07/23, 1:25 PM EST.

## 2023-12-07 NOTE — PROGRESS NOTES
Respiratory Therapist Broncho-Pulmonary Hygiene Progress Note      Patient Name:  Aida Conner  YOB: 1955    Aida Conner meets the qualification for Level 2 of the Bronco-Pulmonary Hygiene Protocol. This was based on my daily patient assessment and includes review of chest x-ray results, cough ability and quality, oxygenation, secretions or risk for secretion development and patient mobility.     Broncho-Pulmonary Hygiene Assessment:    Level of Movement: Actively changing positions without assistance  Alert/ oriented/ cooperative    Breath Sounds: Diminished and/or coarse rhonchi    Cough: Strong, effective    Chest X-Ray: Possible signs of consolidation and/or atelectasis or clear.     Sputum Productions: None or small amount of thin or watery secretions with effective cough    History and Physical: Chronic condition    SpO2 to Oxygen Need: greater than 90% on  greater than 6L, Vapotherm, oxygen mask greater than 40% or ventilator    Current SpO2 is: 92 on 10    Based on this information, I have completed the following interventions: Aerobika with bronchodialtor medication or TID. Xray suggests volume overload (CHF).      Electronically signed by Alysha Santiago RRT, 12/07/23, 11:01 AM EST.

## 2023-12-07 NOTE — PROGRESS NOTES
[facility]   Hospitalist Progress Note  Date: 2023  Patient Name: Aida Conner  : 1955  MRN: [id number]  Date of admission: 2023  Room/Bed: 403/1      Subjective   Subjective     Chief Complaint: Follow-up for shortness of breath    Summary:Aida Conner is a 68 y.o. female with past medical history of hypertension, CHF, COPD on home oxygen of 3 L/min, anxiety, morbid obesity, ANABELLE and GERD who presented to ED with complaints of shortness of breath.  She was found to have acute hypoxic respiratory failure likely due to COVID-19 infection.  She was on 7 L/min of oxygen via NC to maintain saturation.  Patient was started on oral Decadron.  Remdesivir was held.  Patient was admitted to medicine department for further evaluation and management of acute on chronic hypoxic respiratory failure likely due to underlying COVID infection.    Interval Followup: No acute events overnight.Patient currently on 10 L HFNC to maintain SpO2 greater than 90%.  Denies any fever, chills, rigors, difficulty breathing or chest pain.  Mention she feels good today.    Review of Systems    All systems reviewed and negative except for what is outlined above.      Objective   Objective     Vitals:   Temp:  [97.5 °F (36.4 °C)-98.2 °F (36.8 °C)] 97.8 °F (36.6 °C)  Heart Rate:  [] 111  Resp:  [20-24] 22  BP: (119-149)/(59-98) 149/91  Flow (L/min):  [10] 10    Physical Exam   General: Awake, alert, NAD; morbidly obese.  HENT: NCAT, MMM  Eyes: pupils equal, no scleral icterus  Cardiovascular: RRR, no murmurs   Pulmonary: CTA bilaterally; no wheezes; no conversational dyspnea; on 10 L/min via humidified HFNC.  Gastrointestinal: S/ND/NT, +BS  Musculoskeletal: No gross deformities  Skin: No jaundice, no rash on exposed skin appreciated  Neuro: Alert, awake, oriented x 3; speech clear; no tremor  Psych: Mood and affect appropriate  : No Kiser catheter; no suprapubic tenderness    Result Review    Result Review:  I have  personally reviewed these results:  [x]  Laboratory      Lab 12/07/23 0443 12/06/23  1553 12/06/23  1252 12/06/23 0448 12/05/23  0320 12/04/23 2101 12/04/23  1835   WBC 7.48  --   --  6.72 5.44  --  5.98   HEMOGLOBIN 10.2*  --   --  10.4* 10.6*  --  10.2*   HEMATOCRIT 36.0  --   --  35.9 38.3  --  36.5   PLATELETS 231  --   --  239 209  --  237   NEUTROS ABS 6.80  --   --   --   --   --  4.57   IMMATURE GRANS (ABS) 0.04  --   --   --   --   --  0.02   LYMPHS ABS 0.28*  --   --   --   --   --  0.78   MONOS ABS 0.36  --   --   --   --   --  0.47   EOS ABS 0.00  --   --   --   --   --  0.11   MCV 86.5  --   --  85.5 89.5  --  88.0   CRP  --   --   --   --   --  3.67*  --    PROCALCITONIN  --   --   --  0.03 0.03  --   --    LACTATE  --  1.7 2.8*  --   --   --  1.4         Lab 12/07/23 0443 12/06/23 0448 12/05/23 0320   SODIUM 139 140 141   POTASSIUM 3.5 3.5 3.9   CHLORIDE 93* 93* 96*   CO2 35.5* 38.4* 35.3*   ANION GAP 10.5 8.6 9.7   BUN 21 17 9   CREATININE 0.70 0.70 0.61   EGFR 93.2 93.2 96.3   GLUCOSE 149* 168* 140*   CALCIUM 8.5* 8.7 8.4*   MAGNESIUM 1.9  --   --    PHOSPHORUS 3.5  --   --          Lab 12/04/23  1835   TOTAL PROTEIN 6.6   ALBUMIN 3.8   GLOBULIN 2.8   ALT (SGPT) 14   AST (SGOT) 17   BILIRUBIN 0.6   ALK PHOS 100         Lab 12/04/23 2101 12/04/23  1835   PROBNP  --  1,413.0*   HSTROP T 12 12                 Lab 12/04/23  1808   PH, ARTERIAL 7.387   PCO2, ARTERIAL 63.9*   PO2 ART 63.4*   O2 SATURATION ART 91.4*   HCO3 ART 37.6*   BASE EXCESS ART 10.3*   CARBOXYHEMOGLOBIN 1.0     Brief Urine Lab Results       None          [x]  Microbiology   Microbiology Results (last 10 days)       Procedure Component Value - Date/Time    S. Pneumo Ag Urine or CSF - Urine, Urine, Clean Catch [[id number]]  (Normal) Collected: 12/06/23 1503    Lab Status: Final result Specimen: Urine, Clean Catch Updated: 12/06/23 1528     Strep Pneumo Ag Negative    Legionella Antigen, Urine - Urine, Urine, Clean Catch  [[id number]]  (Normal) Collected: 12/06/23 1503    Lab Status: Final result Specimen: Urine, Clean Catch Updated: 12/06/23 1528     LEGIONELLA ANTIGEN, URINE Negative    Respiratory Panel PCR w/COVID-19(SARS-CoV-2) COMPA/KIANA/DREW/PAD/COR/ERIC In-House, NP Swab in UTM/VTM, 2 HR TAT - Swab, Nasopharynx [[id number]]  (Abnormal) Collected: 12/06/23 1500    Lab Status: Final result Specimen: Swab from Nasopharynx Updated: 12/06/23 1604     ADENOVIRUS, PCR Not Detected     Coronavirus 229E Not Detected     Coronavirus HKU1 Not Detected     Coronavirus NL63 Not Detected     Coronavirus OC43 Not Detected     COVID19 Detected     Human Metapneumovirus Not Detected     Human Rhinovirus/Enterovirus Not Detected     Influenza A PCR Not Detected     Influenza B PCR Not Detected     Parainfluenza Virus 1 Not Detected     Parainfluenza Virus 2 Not Detected     Parainfluenza Virus 3 Not Detected     Parainfluenza Virus 4 Not Detected     RSV, PCR Not Detected     Bordetella pertussis pcr Not Detected     Bordetella parapertussis PCR Not Detected     Chlamydophila pneumoniae PCR Not Detected     Mycoplasma pneumo by PCR Not Detected    Narrative:      In the setting of a positive respiratory panel with a viral infection PLUS a negative procalcitonin without other underlying concern for bacterial infection, consider observing off antibiotics or discontinuation of antibiotics and continue supportive care. If the respiratory panel is positive for atypical bacterial infection (Bordetella pertussis, Chlamydophila pneumoniae, or Mycoplasma pneumoniae), consider antibiotic de-escalation to target atypical bacterial infection.    MRSA Screen, PCR (Inpatient) - Swab, Nares [[id number]]  (Normal) Collected: 12/06/23 1500    Lab Status: Final result Specimen: Swab from Nares Updated: 12/06/23 1617     MRSA PCR No MRSA Detected    Narrative:      The negative predictive value of this diagnostic test is high and should only be used to consider  de-escalating anti-MRSA therapy. A positive result may indicate colonization with MRSA and must be correlated clinically.    Blood Culture - Blood, Arm, Left [[id number]]  (Normal) Collected: 12/04/23 1835    Lab Status: Preliminary result Specimen: Blood from Arm, Left Updated: 12/06/23 1900     Blood Culture No growth at 2 days    Blood Culture - Blood, Arm, Left [[id number]]  (Normal) Collected: 12/04/23 1835    Lab Status: Preliminary result Specimen: Blood from Arm, Left Updated: 12/06/23 1900     Blood Culture No growth at 2 days    COVID-19, FLU A/B, RSV PCR 1 HR TAT - Swab, Nasopharynx [[id number]]  (Abnormal) Collected: 12/04/23 1811    Lab Status: Final result Specimen: Swab from Nasopharynx Updated: 12/04/23 1929     COVID19 Detected     Influenza A PCR Not Detected     Influenza B PCR Not Detected     RSV, PCR Not Detected    Narrative:      Fact sheet for providers: https://www.fda.gov/media/557720/download    Fact sheet for patients: https://www.fda.gov/media/005797/download    Test performed by PCR.          [x]  Radiology  XR Chest 1 View    Result Date: 12/4/2023    Cardiomegaly with central vascular congestion suggesting mild congestive heart failure or volume overload.       ANDERS MAGALLON MD       Electronically Signed and Approved By: ANDERS MAGALLON MD on 12/04/2023 at 18:11            []  EKG/Telemetry   []  Cardiology/Vascular   []  Pathology  []  Old records  []  Other:    Assessment & Plan   Assessment / Plan     Assessment:  Acute on chronic hypoxic respiratory failure due to COVID-19 infection  Possible COVID-19 pneumonia  Lactic acidosis  Chronic diastolic CHF  Hypertension  ANABELLE  Depression  Anxiety    Plan:  Patient currently being managed in hospitalist service.  On 10 L via HFNC.  Continue to titrate oxygen as tolerated to maintain SpO2 greater than 90%.  CT chest without contrast showed small right pleural effusion, small pericardial effusion along with consolidation in the left lower  lobe with air bronchograms possibly pneumonia.  On cefepime 2000 mg every 8 hours.  Continue Lasix IV 40 mg twice daily.  Also continue Aldactone 25 mg daily.  Strep pneumo antigen and Legionella antigen negative.  Respiratory viral panel only positive for COVID-19.  MRSA PCR negative.  Blood culture showing no growth at 2 days.  Transthoracic echo showed normal left ventricular systolic function, mild aortic stenosis and trace MR along with trace TR.  On Decadron 6 mg daily for 10 days.  Lactic acidosis resolved.  Corrected calcium normal.  Continue bronchodilators and bronchopulmonary protocol.  Pulmonology following the patient, appreciate further input.  Continue rest of the current management.  Likely discharge in next 48 hours if oxygen requirement improves.        DVT prophylaxis:  Medical DVT prophylaxis orders are present.    CODE STATUS:   Level Of Support Discussed With: Patient  Code Status (Patient has no pulse and is not breathing): CPR (Attempt to Resuscitate)  Medical Interventions (Patient has pulse or is breathing): Full Support      Electronically signed by Julia Lagunas MD, 12/07/23, 12:41 PM EST.

## 2023-12-08 ENCOUNTER — APPOINTMENT (OUTPATIENT)
Dept: GENERAL RADIOLOGY | Facility: HOSPITAL | Age: 68
DRG: 177 | End: 2023-12-08
Payer: MEDICARE

## 2023-12-08 LAB
ANION GAP SERPL CALCULATED.3IONS-SCNC: 13.4 MMOL/L (ref 5–15)
BASOPHILS # BLD AUTO: 0 10*3/MM3 (ref 0–0.2)
BASOPHILS NFR BLD AUTO: 0 % (ref 0–1.5)
BUN SERPL-MCNC: 20 MG/DL (ref 8–23)
BUN/CREAT SERPL: 28.6 (ref 7–25)
CALCIUM SPEC-SCNC: 8.9 MG/DL (ref 8.6–10.5)
CHLORIDE SERPL-SCNC: 93 MMOL/L (ref 98–107)
CO2 SERPL-SCNC: 32.6 MMOL/L (ref 22–29)
CREAT SERPL-MCNC: 0.7 MG/DL (ref 0.57–1)
D DIMER PPP FEU-MCNC: 1.06 MCGFEU/ML (ref 0–0.7)
DEPRECATED RDW RBC AUTO: 48.2 FL (ref 37–54)
EGFRCR SERPLBLD CKD-EPI 2021: 93.2 ML/MIN/1.73
EOSINOPHIL # BLD AUTO: 0 10*3/MM3 (ref 0–0.4)
EOSINOPHIL NFR BLD AUTO: 0 % (ref 0.3–6.2)
ERYTHROCYTE [DISTWIDTH] IN BLOOD BY AUTOMATED COUNT: 15.7 % (ref 12.3–15.4)
GLUCOSE SERPL-MCNC: 156 MG/DL (ref 65–99)
HCT VFR BLD AUTO: 38.4 % (ref 34–46.6)
HCT VFR BLD AUTO: 39 % (ref 34–46.6)
HGB BLD-MCNC: 11 G/DL (ref 12–15.9)
HGB BLD-MCNC: 11.5 G/DL (ref 12–15.9)
IMM GRANULOCYTES # BLD AUTO: 0.04 10*3/MM3 (ref 0–0.05)
IMM GRANULOCYTES NFR BLD AUTO: 0.5 % (ref 0–0.5)
LYMPHOCYTES # BLD AUTO: 0.26 10*3/MM3 (ref 0.7–3.1)
LYMPHOCYTES NFR BLD AUTO: 3.2 % (ref 19.6–45.3)
MAGNESIUM SERPL-MCNC: 2 MG/DL (ref 1.6–2.4)
MCH RBC QN AUTO: 24.2 PG (ref 26.6–33)
MCHC RBC AUTO-ENTMCNC: 28.6 G/DL (ref 31.5–35.7)
MCV RBC AUTO: 84.6 FL (ref 79–97)
MONOCYTES # BLD AUTO: 0.48 10*3/MM3 (ref 0.1–0.9)
MONOCYTES NFR BLD AUTO: 5.9 % (ref 5–12)
NEUTROPHILS NFR BLD AUTO: 7.35 10*3/MM3 (ref 1.7–7)
NEUTROPHILS NFR BLD AUTO: 90.4 % (ref 42.7–76)
NRBC BLD AUTO-RTO: 0 /100 WBC (ref 0–0.2)
PHOSPHATE SERPL-MCNC: 3.4 MG/DL (ref 2.5–4.5)
PLATELET # BLD AUTO: 233 10*3/MM3 (ref 140–450)
PMV BLD AUTO: 10.7 FL (ref 6–12)
POTASSIUM SERPL-SCNC: 3.6 MMOL/L (ref 3.5–5.2)
RBC # BLD AUTO: 4.54 10*6/MM3 (ref 3.77–5.28)
SODIUM SERPL-SCNC: 139 MMOL/L (ref 136–145)
WBC NRBC COR # BLD AUTO: 8.13 10*3/MM3 (ref 3.4–10.8)

## 2023-12-08 PROCEDURE — 97161 PT EVAL LOW COMPLEX 20 MIN: CPT

## 2023-12-08 PROCEDURE — 80048 BASIC METABOLIC PNL TOTAL CA: CPT | Performed by: FAMILY MEDICINE

## 2023-12-08 PROCEDURE — 25010000002 ENOXAPARIN PER 10 MG: Performed by: STUDENT IN AN ORGANIZED HEALTH CARE EDUCATION/TRAINING PROGRAM

## 2023-12-08 PROCEDURE — 94799 UNLISTED PULMONARY SVC/PX: CPT

## 2023-12-08 PROCEDURE — 25010000002 AMPICILLIN-SULBACTAM PER 1.5 G: Performed by: STUDENT IN AN ORGANIZED HEALTH CARE EDUCATION/TRAINING PROGRAM

## 2023-12-08 PROCEDURE — 84100 ASSAY OF PHOSPHORUS: CPT | Performed by: STUDENT IN AN ORGANIZED HEALTH CARE EDUCATION/TRAINING PROGRAM

## 2023-12-08 PROCEDURE — 83735 ASSAY OF MAGNESIUM: CPT | Performed by: STUDENT IN AN ORGANIZED HEALTH CARE EDUCATION/TRAINING PROGRAM

## 2023-12-08 PROCEDURE — 99233 SBSQ HOSP IP/OBS HIGH 50: CPT | Performed by: STUDENT IN AN ORGANIZED HEALTH CARE EDUCATION/TRAINING PROGRAM

## 2023-12-08 PROCEDURE — 85379 FIBRIN DEGRADATION QUANT: CPT

## 2023-12-08 PROCEDURE — 85018 HEMOGLOBIN: CPT | Performed by: STUDENT IN AN ORGANIZED HEALTH CARE EDUCATION/TRAINING PROGRAM

## 2023-12-08 PROCEDURE — 85014 HEMATOCRIT: CPT | Performed by: STUDENT IN AN ORGANIZED HEALTH CARE EDUCATION/TRAINING PROGRAM

## 2023-12-08 PROCEDURE — 25010000002 FUROSEMIDE PER 20 MG: Performed by: FAMILY MEDICINE

## 2023-12-08 PROCEDURE — 85025 COMPLETE CBC W/AUTO DIFF WBC: CPT | Performed by: STUDENT IN AN ORGANIZED HEALTH CARE EDUCATION/TRAINING PROGRAM

## 2023-12-08 PROCEDURE — 94664 DEMO&/EVAL PT USE INHALER: CPT

## 2023-12-08 PROCEDURE — 99232 SBSQ HOSP IP/OBS MODERATE 35: CPT | Performed by: STUDENT IN AN ORGANIZED HEALTH CARE EDUCATION/TRAINING PROGRAM

## 2023-12-08 PROCEDURE — 71045 X-RAY EXAM CHEST 1 VIEW: CPT

## 2023-12-08 PROCEDURE — 94761 N-INVAS EAR/PLS OXIMETRY MLT: CPT

## 2023-12-08 PROCEDURE — 63710000001 DEXAMETHASONE PER 0.25 MG: Performed by: FAMILY MEDICINE

## 2023-12-08 RX ORDER — ECHINACEA PURPUREA EXTRACT 125 MG
1 TABLET ORAL AS NEEDED
Status: DISCONTINUED | OUTPATIENT
Start: 2023-12-08 | End: 2023-12-11 | Stop reason: HOSPADM

## 2023-12-08 RX ORDER — OXYMETAZOLINE HYDROCHLORIDE 0.05 G/100ML
2 SPRAY NASAL 2 TIMES DAILY PRN
Status: DISCONTINUED | OUTPATIENT
Start: 2023-12-08 | End: 2023-12-11 | Stop reason: HOSPADM

## 2023-12-08 RX ADMIN — ARFORMOTEROL TARTRATE 15 MCG: 15 SOLUTION RESPIRATORY (INHALATION) at 20:10

## 2023-12-08 RX ADMIN — SODIUM CHLORIDE 3 G: 900 INJECTION INTRAVENOUS at 05:33

## 2023-12-08 RX ADMIN — CARVEDILOL 12.5 MG: 12.5 TABLET, FILM COATED ORAL at 08:40

## 2023-12-08 RX ADMIN — SODIUM CHLORIDE 3 G: 900 INJECTION INTRAVENOUS at 12:58

## 2023-12-08 RX ADMIN — DOCUSATE SODIUM 50MG AND SENNOSIDES 8.6MG 2 TABLET: 8.6; 5 TABLET, FILM COATED ORAL at 08:43

## 2023-12-08 RX ADMIN — Medication 2000 UNITS: at 08:40

## 2023-12-08 RX ADMIN — ARFORMOTEROL TARTRATE 15 MCG: 15 SOLUTION RESPIRATORY (INHALATION) at 06:43

## 2023-12-08 RX ADMIN — DEXAMETHASONE 6 MG: 0.5 TABLET ORAL at 08:40

## 2023-12-08 RX ADMIN — FUROSEMIDE 40 MG: 10 INJECTION, SOLUTION INTRAMUSCULAR; INTRAVENOUS at 17:39

## 2023-12-08 RX ADMIN — PANTOPRAZOLE SODIUM 40 MG: 40 TABLET, DELAYED RELEASE ORAL at 08:40

## 2023-12-08 RX ADMIN — FUROSEMIDE 40 MG: 10 INJECTION, SOLUTION INTRAMUSCULAR; INTRAVENOUS at 05:30

## 2023-12-08 RX ADMIN — Medication 10 ML: at 08:39

## 2023-12-08 RX ADMIN — SODIUM CHLORIDE 3 G: 900 INJECTION INTRAVENOUS at 23:58

## 2023-12-08 RX ADMIN — LISINOPRIL 5 MG: 5 TABLET ORAL at 08:40

## 2023-12-08 RX ADMIN — MONTELUKAST 10 MG: 10 TABLET, FILM COATED ORAL at 21:09

## 2023-12-08 RX ADMIN — ENOXAPARIN SODIUM 60 MG: 100 INJECTION SUBCUTANEOUS at 08:39

## 2023-12-08 RX ADMIN — IPRATROPIUM BROMIDE AND ALBUTEROL SULFATE 3 ML: .5; 3 SOLUTION RESPIRATORY (INHALATION) at 20:10

## 2023-12-08 RX ADMIN — DICYCLOMINE HYDROCHLORIDE 20 MG: 20 TABLET ORAL at 12:58

## 2023-12-08 RX ADMIN — SPIRONOLACTONE 25 MG: 25 TABLET ORAL at 08:40

## 2023-12-08 RX ADMIN — ANASTROZOLE 1 MG: 1 TABLET ORAL at 08:40

## 2023-12-08 RX ADMIN — GUAIFENESIN 600 MG: 600 TABLET ORAL at 21:09

## 2023-12-08 RX ADMIN — GUAIFENESIN 600 MG: 600 TABLET ORAL at 08:40

## 2023-12-08 RX ADMIN — SODIUM CHLORIDE 3 G: 900 INJECTION INTRAVENOUS at 17:39

## 2023-12-08 RX ADMIN — DICYCLOMINE HYDROCHLORIDE 20 MG: 20 TABLET ORAL at 17:39

## 2023-12-08 RX ADMIN — FLUOXETINE HYDROCHLORIDE 40 MG: 20 CAPSULE ORAL at 08:40

## 2023-12-08 RX ADMIN — IPRATROPIUM BROMIDE AND ALBUTEROL SULFATE 3 ML: .5; 3 SOLUTION RESPIRATORY (INHALATION) at 06:43

## 2023-12-08 RX ADMIN — IPRATROPIUM BROMIDE AND ALBUTEROL SULFATE 3 ML: .5; 3 SOLUTION RESPIRATORY (INHALATION) at 00:24

## 2023-12-08 RX ADMIN — Medication 10 ML: at 21:09

## 2023-12-08 RX ADMIN — DICYCLOMINE HYDROCHLORIDE 20 MG: 20 TABLET ORAL at 23:58

## 2023-12-08 RX ADMIN — CARVEDILOL 12.5 MG: 12.5 TABLET, FILM COATED ORAL at 17:39

## 2023-12-08 RX ADMIN — IPRATROPIUM BROMIDE AND ALBUTEROL SULFATE 3 ML: .5; 3 SOLUTION RESPIRATORY (INHALATION) at 14:47

## 2023-12-08 RX ADMIN — DICYCLOMINE HYDROCHLORIDE 20 MG: 20 TABLET ORAL at 05:30

## 2023-12-08 RX ADMIN — BENZOCAINE AND MENTHOL 1 EACH: 15; 3.6 LOZENGE ORAL at 04:00

## 2023-12-08 RX ADMIN — PRAVASTATIN SODIUM 40 MG: 20 TABLET ORAL at 21:09

## 2023-12-08 NOTE — THERAPY EVALUATION
Acute Care - Physical Therapy Initial Evaluation   [facility]     Patient Name: Aida Conner  : 1955  MRN: [id number]  Today's Date: 2023      Visit Dx:     ICD-10-CM ICD-9-CM   1. COVID-19  U07.1 079.89   2. Acute respiratory failure with hypoxia  J96.01 518.81   3. Difficulty walking  R26.2 719.7     Patient Active Problem List   Diagnosis    Inflammatory breast cancer, right    Other specified disorders of breast     Malignant neoplasm of axillary tail of right female breast    Encounter for eye exam due to high risk medication    Obesity hypoventilation syndrome    Iron adverse reaction    Functional diarrhea    Encounter for long-term (current) use of high-risk medication    Abnormal mammogram    Allergic rhinitis    Anemia    Anxiety disorder    Breast pain, right    Depression    Essential tremor    Hemorrhoids    Hyperlipidemia    Hypertension, essential    IBS (irritable bowel syndrome)    Impaired fasting glucose    Obesity    Obstructive sleep apnea    Osteoarthrosis    Renal calculi    Restrictive airway disease    Type II diabetes mellitus    Vitamin deficiency    Urinary bladder incontinence    Malignant neoplasm of central portion of right female breast    Osteoporosis without current pathological fracture    Chronic diastolic congestive heart failure    Coronary artery calcification seen on CT scan    Aromatase inhibitor use    Belching    Bloating    Hiatal hernia    History of breast cancer    Family history of colon cancer    Acute respiratory disease due to COVID-19 virus    Acute respiratory failure with hypercapnia     Past Medical History:   Diagnosis Date    Allergic rhinitis     Anemia     Anxiety     Asthma     Chronic bronchitis     In past, espec. during working years    Chronic diastolic (congestive) heart failure     Chronic hypoxemic respiratory failure     on continuous O2    Coronary artery calcification seen on CT scan 2022    Diarrhea     with chemo    GERD  (gastroesophageal reflux disease) Mild    H/O Intraductal papilloma     Hemorrhoids     History of transfusion 1984    AFTER BACK SURGERY no reaction    Hypertension     IBS (irritable bowel syndrome)     Inflammatory breast cancer     right    Kidney stone     Morbid obesity with BMI of 50.0-59.9, adult     Obesity hypoventilation syndrome 02/05/2021    On home oxygen therapy     2.5 AT REST WITH ACTIVITY UP TO 4L    ANABELLE on CPAP     cpap  & uses O2 with CPAP    Osteoarthritis     Pneumonia     Many times in past, 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    PONV (postoperative nausea and vomiting)     Type II diabetes mellitus 09/08/2021     Past Surgical History:   Procedure Laterality Date    BREAST EXCISIONAL BIOPSY Bilateral     COLONOSCOPY      COLONOSCOPY N/A 12/8/2022    Procedure: COLONOSCOPY;  Surgeon: Henry Aden MD;  Location: [facility] ENDOSCOPY;  Service: Gastroenterology;  Laterality: N/A;  diverticulosis, and hemorrhoids    CYSTOSCOPY BLADDER STONE LITHOTRIPSY      ENDOSCOPY      ENDOSCOPY N/A 12/8/2022    Procedure: ESOPHAGOGASTRODUODENOSCOPY  with biopsy;  Surgeon: Henry Aden MD;  Location: [facility] ENDOSCOPY;  Service: Gastroenterology;  Laterality: N/A;  hiatal hernia    KNEE ARTHROPLASTY Bilateral 2009    MASTECTOMY Left 08/10/2021    Procedure: LEFT TOTAL MASTECTOMY;  Surgeon: Nicci Banks MD;  Location: [facility] OR;  Service: General;  Laterality: Left;    MASTECTOMY Right 08/10/2021    Procedure: RIGHT MODIFIED RADICAL MASTECTOMY;  Surgeon: Nicci Banks MD;  Location: [facility] OR;  Service: General;  Laterality: Right;    SPINE SURGERY  1984    TOTAL ABDOMINAL HYSTERECTOMY WITH SALPINGO OOPHORECTOMY  2004    UPPER GASTROINTESTINAL ENDOSCOPY      VENOUS ACCESS DEVICE (PORT) INSERTION N/A 01/25/2021    Procedure: INSERTION VENOUS ACCESS DEVICE;  Surgeon: Nicci Banks MD;  Location: [facility] OR;  Service: General;  Laterality: N/A;    VENOUS ACCESS DEVICE  (PORT) REMOVAL Left 04/05/2022    Procedure: REMOVAL VENOUS ACCESS DEVICE;  Surgeon: Nicci Banks MD;  Location: [facility] MAIN OR;  Service: General;  Laterality: Left;     PT Assessment (last 12 hours)       PT Evaluation and Treatment       Row Name 12/08/23 1300          Physical Therapy Time and Intention    Subjective Information no complaints  -DP     Document Type evaluation  -DP     Mode of Treatment individual therapy;physical therapy  -DP     Patient Effort good  -DP       Row Name 12/08/23 1300          General Information    Patient Profile Reviewed yes  -DP     Patient Observations alert;cooperative;agree to therapy  -DP     Prior Level of Function independent:;gait;transfer;bed mobility;ADL's  -DP     Equipment Currently Used at Home oxygen  -DP     Existing Precautions/Restrictions no known precautions/restrictions  -DP     Barriers to Rehab none identified  -DP       Row Name 12/08/23 1300          Living Environment    Current Living Arrangements home  -DP     People in Home spouse  -DP       Row Name 12/08/23 1300          Cognition    Orientation Status (Cognition) oriented x 3  -DP       Row Name 12/08/23 1300          Range of Motion Comprehensive    General Range of Motion bilateral lower extremity ROM WFL  -DP       Row Name 12/08/23 1300          Strength (Manual Muscle Testing)    Strength (Manual Muscle Testing) bilateral lower extremities;strength is WFL  -DP       Row Name 12/08/23 1300          Bed Mobility    Bed Mobility supine-sit-supine  -DP     Supine-Sit-Supine Hudson (Bed Mobility) independent  -DP       Row Name 12/08/23 1300          Transfers    Transfers sit-stand transfer  -DP       Row Name 12/08/23 1300          Sit-Stand Transfer    Sit-Stand Hudson (Transfers) independent  -DP       Row Name 12/08/23 1300          Gait/Stairs (Locomotion)    Gait/Stairs Locomotion gait/ambulation independence  -DP     Hudson Level (Gait) modified independence  -DP      Assistive Device (Gait) other (see comments)  none  -DP     Distance in Feet (Gait) 40  -DP     Comment, (Gait/Stairs) Patient is ambulating ad christelle. in her room  -DP       Row Name 12/08/23 1300          Balance    Balance Assessment standing dynamic balance  -DP     Dynamic Standing Balance supervision  -DP       Row Name 12/08/23 1300          Plan of Care Review    Plan of Care Reviewed With patient  -DP     Outcome Evaluation Patient is able to complete all transfers and bed mobilities indepedently in the room. Pt is able to ambulate ad christelle in the room. Pt does not need inpatient PT services. Recommend DC home.  -DP       Row Name 12/08/23 1300          Therapy Assessment/Plan (PT)    Criteria for Skilled Interventions Met (PT) no problems identified which require skilled intervention  -DP     Therapy Frequency (PT) evaluation only  -DP       Row Name 12/08/23 1300          PT Evaluation Complexity    History, PT Evaluation Complexity no personal factors and/or comorbidities  -DP     Examination of Body Systems (PT Eval Complexity) total of 4 or more elements  -DP     Clinical Presentation (PT Evaluation Complexity) stable  -DP     Clinical Decision Making (PT Evaluation Complexity) low complexity  -DP     Overall Complexity (PT Evaluation Complexity) low complexity  -DP       Row Name 12/08/23 1300          Physical Therapy Goals    Problem Specific Goal Selection (PT) problem specific goal 1, PT  -DP       Row Name 12/08/23 1300          Problem Specific Goal 1 (PT)    Problem Specific Goal 1 (PT) Complete PT evaluation.  -DP     Time Frame (Problem Specific Goal 1, PT) 1 day  -DP     Progress/Outcome (Problem Specific Goal 1, PT) goal met  -DP               User Key  (r) = Recorded By, (t) = Taken By, (c) = Cosigned By      Initials Name Provider Type    Sommer Youngblood, PT Physical Therapist                      PT Recommendation and Plan  Anticipated Discharge Disposition (PT): home  Therapy Frequency  (PT): evaluation only  Plan of Care Reviewed With: patient  Outcome Evaluation: Patient is able to complete all transfers and bed mobilities indepedently in the room. Pt is able to ambulate ad christelle in the room. Pt does not need inpatient PT services. Recommend DC home.   Outcome Measures       Row Name 12/08/23 1300             How much help from another person do you currently need...    Turning from your back to your side while in flat bed without using bedrails? 4  -DP      Moving from lying on back to sitting on the side of a flat bed without bedrails? 4  -DP      Moving to and from a bed to a chair (including a wheelchair)? 4  -DP      Standing up from a chair using your arms (e.g., wheelchair, bedside chair)? 4  -DP      Climbing 3-5 steps with a railing? 4  -DP      To walk in hospital room? 4  -DP      AM-PAC 6 Clicks Score (PT) 24  -DP      Highest Level of Mobility Goal 8 --> Walked 250 feet or more  -DP         Functional Assessment    Outcome Measure Options AM-PAC 6 Clicks Basic Mobility (PT)  -DP                User Key  (r) = Recorded By, (t) = Taken By, (c) = Cosigned By      Initials Name Provider Type    Sommer Youngblood, PT Physical Therapist                     Time Calculation:    PT Charges       Row Name 12/08/23 1358             Time Calculation    PT Received On 12/08/23  -DP         Untimed Charges    PT Eval/Re-eval Minutes 20  -DP         Total Minutes    Untimed Charges Total Minutes 20  -DP       Total Minutes 20  -DP                User Key  (r) = Recorded By, (t) = Taken By, (c) = Cosigned By      Initials Name Provider Type    Sommer Youngblood PT Physical Therapist                      PT G-Codes  Outcome Measure Options: AM-PAC 6 Clicks Basic Mobility (PT)  AM-PAC 6 Clicks Score (PT): 24    Sommer Sharp, PT  12/8/2023

## 2023-12-08 NOTE — PROGRESS NOTES
Pulmonary / Critical Care Progress Note      Patient Name: Aida Conner  : 1955  MRN: [id number]  Primary Care Physician:  Serenity Lucas APRN  Date of admission: 2023    Subjective   Subjective   Follow-up for COVID-19 pneumonia    No acute events overnight.    This morning,  Remains on 10 L high flow nasal cannula  Sitting in chair  Feels okay today  Patient on day 4 of 10 Decadron regimen  Reports some epistaxis  Diuresing well  -4 L net output    Review of Systems  General: Fatigue, otherwise denied complaints  HEENT: Denied complaints  Respiratory: Dyspnea, otherwise denied complaints  Cardiovascular: Denied complaints  GI: Denied complaints  : Denied complaints  MSK: Generalized weakness, otherwise denied complaints    Objective   Objective     Vitals:   Temp:  [97.8 °F (36.6 °C)-98.6 °F (37 °C)] 98.6 °F (37 °C)  Heart Rate:  [] 106  Resp:  [18-24] 20  BP: (129-150)/(81-97) 150/97  Flow (L/min):  [10] 10  Physical Exam   Vital Signs Reviewed   General:  Alert, NAD. Sitting up in chair  HEENT:  PERRL, EOMI.    Neck:  No JVD, no thyromegaly  Lymph: no axillary, cervical, supraclavicular lymphadenopathy noted bilaterally  Chest:  Clear to auscultation bilaterally, no work of breathing noted on 10L HFNC  CV: RRR, no M/G/R, pulses 2+  Abd:  Soft, NT, ND, +BS, obese  EXT:  no clubbing, no cyanosis, no edema  Neuro:  A&Ox3, CN grossly intact, no focal deficits.  Skin: No rashes or lesions noted    Result Review    Result Review:  I have personally reviewed the results from the time of this admission to 2023 12:01 EST and agree with these findings:  [x]  Laboratory  [x]  Microbiology  [x]  Radiology  []  EKG/Telemetry   []  Cardiology/Vascular   []  Pathology  []  Old records  []  Other:  Most notable findings include:      echo EF 50%        Lab 23  0511 23  0443 23  0448 23  0320 23  1835   WBC 8.13 7.48 6.72 5.44 5.98   HEMOGLOBIN 11.0* 10.2* 10.4*  10.6* 10.2*   HEMATOCRIT 38.4 36.0 35.9 38.3 36.5   PLATELETS 233 231 239 209 237   SODIUM 139 139 140 141 142   POTASSIUM 3.6 3.5 3.5 3.9 3.8   CHLORIDE 93* 93* 93* 96* 99   CO2 32.6* 35.5* 38.4* 35.3* 33.4*   BUN 20 21 17 9 10   CREATININE 0.70 0.70 0.70 0.61 0.71   GLUCOSE 156* 149* 168* 140* 98   CALCIUM 8.9 8.5* 8.7 8.4* 9.0   PHOSPHORUS 3.4 3.5  --   --   --    TOTAL PROTEIN  --   --   --   --  6.6   ALBUMIN  --   --   --   --  3.8   GLOBULIN  --   --   --   --  2.8       Assessment & Plan   Assessment / Plan     Active Hospital Problems:  Active Hospital Problems    Diagnosis     **Acute respiratory disease due to COVID-19 virus     Acute respiratory failure with hypercapnia     Chronic diastolic congestive heart failure     Anxiety disorder     Depression     Hypertension, essential     Obstructive sleep apnea      Impression:  COVID-19 pneumonia  Acute on chronic hypoxic and hypercapnic respiratory failure  COPD exacerbation  Lactic acidosis, clinically significant  Obstructive of sleep apnea  Obesity hypoventilation syndrome  Morbid obesity    Plan:  -On 10 L high flow nasal cannula.  Patient's baseline is 2 to 3 L.  Continue to wean to maintain SpO2 greater than 90%  -Patient's respiratory status remains tenuous at this time.   -Check D-dimer: 1.06; elevated as expected  -If if patient's respiratory status does not improve in the next day or so, will order CT PE to rule out PE  -Encourage NIPPV at at night and during naps  -Micro negative to date.  Sputum culture pending  -Continue cefepime and deescalate when appropriate  -Continue Decadron 6 mg oral daily for 10 days  -Continue bronchodilator and bronchopulmonary protocols.  Encourage I-S and flutter  -Continue nebulizers  -Continue Lasix 40 mg IV twice daily and Aldactone 25 mg oral daily  -Continue to monitor renal panel electrolytes.  Replace electrolytes as necessary  -Glucose control per primary  -Encouraged weight loss  -Encourage mobilization.   Out of bed to chair  -Of note, patient on Arimidex for history of breast cancer  -Consulted RT  for home oxygen needs  -Patient agreeable to home health care at discharge  -Follow-up with pulmonary clinic 1 to 2 weeks after discharge    DVT prophylaxis:  Medical DVT prophylaxis orders are present.    CODE STATUS:   Level Of Support Discussed With: Patient  Code Status (Patient has no pulse and is not breathing): CPR (Attempt to Resuscitate)  Medical Interventions (Patient has pulse or is breathing): Full Support    Electronically signed by SHAUNA Aguilar, 12/08/23, 12:01 PM EST.  This patient was seen by both a physician and a NP. I, Yaron Lima MD, spent >50% of time in accordance with split shared billing. This included personally reviewing all pertinent labs, imaging, microbiology and documentation. Also discussing the case with the patient and any available family, the admitting physician and any available ancillary staff.   Electronically signed by Yaron Lima MD, 12/08/23, 12:50 PM EST.

## 2023-12-08 NOTE — PROGRESS NOTES
[facility]   Hospitalist Progress Note  Date: 2023  Patient Name: Aida Conner  : 1955  MRN: [id number]  Date of admission: 2023  Room/Bed: 403/1      Subjective   Subjective     Chief Complaint: Follow-up for shortness of breath    Summary:Aida Conner is a 68 y.o. female with past medical history of hypertension, CHF, COPD on home oxygen of 3 L/min, anxiety, morbid obesity, ANABELLE and GERD who presented to ED with complaints of shortness of breath.  She was found to have acute hypoxic respiratory failure likely due to COVID-19 infection.  She was on 7 L/min of oxygen via NC to maintain saturation.  Patient was started on oral Decadron.  Remdesivir was held.  Patient was admitted to medicine department for further evaluation and management of acute on chronic hypoxic respiratory failure likely due to underlying COVID infection.    Interval Followup: No acute events overnight. Patient on 10 L HFNC with SpO2 95% during my evaluation.  Denies any fever, chills, rigors, difficulty breathing or chest pain.  Mentioed she feels good today.    Review of Systems    All systems reviewed and negative except for what is outlined above.      Objective   Objective     Vitals:   Temp:  [98.2 °F (36.8 °C)-98.6 °F (37 °C)] 98.6 °F (37 °C)  Heart Rate:  [] 97  Resp:  [18-24] 20  BP: (129-150)/(81-97) 150/97  Flow (L/min):  [6-10] 6    Physical Exam   General: Awake, alert, NAD; morbidly obese.  Cardiovascular: RRR, no murmurs   Pulmonary: CTA bilaterally; no wheezes; no conversational dyspnea; on 10 L/min via humidified HFNC.  Gastrointestinal: S/ND/NT, +BS  Musculoskeletal: No gross deformities  Skin: No jaundice, no rash on exposed skin appreciated  Neuro: Alert, awake, oriented x 3; speech clear; no tremor  : No Kiser catheter; no suprapubic tenderness    Result Review    Result Review:  I have personally reviewed these results:  [x]  Laboratory      Lab 23  0800 23  0511 23  0440  12/06/23  1553 12/06/23  1252 12/06/23  0448 12/05/23  0320 12/04/23  2101 12/04/23  1835   WBC  --  8.13 7.48  --   --  6.72 5.44  --  5.98   HEMOGLOBIN  --  11.0* 10.2*  --   --  10.4* 10.6*  --  10.2*   HEMATOCRIT  --  38.4 36.0  --   --  35.9 38.3  --  36.5   PLATELETS  --  233 231  --   --  239 209  --  237   NEUTROS ABS  --  7.35* 6.80  --   --   --   --   --  4.57   IMMATURE GRANS (ABS)  --  0.04 0.04  --   --   --   --   --  0.02   LYMPHS ABS  --  0.26* 0.28*  --   --   --   --   --  0.78   MONOS ABS  --  0.48 0.36  --   --   --   --   --  0.47   EOS ABS  --  0.00 0.00  --   --   --   --   --  0.11   MCV  --  84.6 86.5  --   --  85.5 89.5  --  88.0   CRP  --   --   --   --   --   --   --  3.67*  --    PROCALCITONIN  --   --   --   --   --  0.03 0.03  --   --    LACTATE  --   --   --  1.7 2.8*  --   --   --  1.4   D DIMER QUANT 1.06*  --   --   --   --   --   --   --   --          Lab 12/08/23  0511 12/07/23  0443 12/06/23  0448   SODIUM 139 139 140   POTASSIUM 3.6 3.5 3.5   CHLORIDE 93* 93* 93*   CO2 32.6* 35.5* 38.4*   ANION GAP 13.4 10.5 8.6   BUN 20 21 17   CREATININE 0.70 0.70 0.70   EGFR 93.2 93.2 93.2   GLUCOSE 156* 149* 168*   CALCIUM 8.9 8.5* 8.7   MAGNESIUM 2.0 1.9  --    PHOSPHORUS 3.4 3.5  --          Lab 12/04/23  1835   TOTAL PROTEIN 6.6   ALBUMIN 3.8   GLOBULIN 2.8   ALT (SGPT) 14   AST (SGOT) 17   BILIRUBIN 0.6   ALK PHOS 100         Lab 12/04/23  2101 12/04/23  1835   PROBNP  --  1,413.0*   HSTROP T 12 12                 Lab 12/04/23  1808   PH, ARTERIAL 7.387   PCO2, ARTERIAL 63.9*   PO2 ART 63.4*   O2 SATURATION ART 91.4*   HCO3 ART 37.6*   BASE EXCESS ART 10.3*   CARBOXYHEMOGLOBIN 1.0     Brief Urine Lab Results       None          [x]  Microbiology   Microbiology Results (last 10 days)       Procedure Component Value - Date/Time    S. Pneumo Ag Urine or CSF - Urine, Urine, Clean Catch [[id number]]  (Normal) Collected: 12/06/23 1503    Lab Status: Final result Specimen: Urine, Clean Catch  Updated: 12/06/23 1528     Strep Pneumo Ag Negative    Legionella Antigen, Urine - Urine, Urine, Clean Catch [[id number]]  (Normal) Collected: 12/06/23 1503    Lab Status: Final result Specimen: Urine, Clean Catch Updated: 12/06/23 1528     LEGIONELLA ANTIGEN, URINE Negative    Respiratory Panel PCR w/COVID-19(SARS-CoV-2) COMPA/KIANA/DREW/PAD/COR/ERIC In-House, NP Swab in UTM/VTM, 2 HR TAT - Swab, Nasopharynx [[id number]]  (Abnormal) Collected: 12/06/23 1500    Lab Status: Final result Specimen: Swab from Nasopharynx Updated: 12/06/23 1604     ADENOVIRUS, PCR Not Detected     Coronavirus 229E Not Detected     Coronavirus HKU1 Not Detected     Coronavirus NL63 Not Detected     Coronavirus OC43 Not Detected     COVID19 Detected     Human Metapneumovirus Not Detected     Human Rhinovirus/Enterovirus Not Detected     Influenza A PCR Not Detected     Influenza B PCR Not Detected     Parainfluenza Virus 1 Not Detected     Parainfluenza Virus 2 Not Detected     Parainfluenza Virus 3 Not Detected     Parainfluenza Virus 4 Not Detected     RSV, PCR Not Detected     Bordetella pertussis pcr Not Detected     Bordetella parapertussis PCR Not Detected     Chlamydophila pneumoniae PCR Not Detected     Mycoplasma pneumo by PCR Not Detected    Narrative:      In the setting of a positive respiratory panel with a viral infection PLUS a negative procalcitonin without other underlying concern for bacterial infection, consider observing off antibiotics or discontinuation of antibiotics and continue supportive care. If the respiratory panel is positive for atypical bacterial infection (Bordetella pertussis, Chlamydophila pneumoniae, or Mycoplasma pneumoniae), consider antibiotic de-escalation to target atypical bacterial infection.    MRSA Screen, PCR (Inpatient) - Swab, Nares [[id number]]  (Normal) Collected: 12/06/23 1500    Lab Status: Final result Specimen: Swab from Nares Updated: 12/06/23 1617     MRSA PCR No MRSA Detected    Narrative:       The negative predictive value of this diagnostic test is high and should only be used to consider de-escalating anti-MRSA therapy. A positive result may indicate colonization with MRSA and must be correlated clinically.    Blood Culture - Blood, Arm, Left [[id number]]  (Normal) Collected: 12/04/23 1835    Lab Status: Preliminary result Specimen: Blood from Arm, Left Updated: 12/07/23 1900     Blood Culture No growth at 3 days    Blood Culture - Blood, Arm, Left [[id number]]  (Normal) Collected: 12/04/23 1835    Lab Status: Preliminary result Specimen: Blood from Arm, Left Updated: 12/07/23 1900     Blood Culture No growth at 3 days    COVID-19, FLU A/B, RSV PCR 1 HR TAT - Swab, Nasopharynx [[id number]]  (Abnormal) Collected: 12/04/23 1811    Lab Status: Final result Specimen: Swab from Nasopharynx Updated: 12/04/23 1929     COVID19 Detected     Influenza A PCR Not Detected     Influenza B PCR Not Detected     RSV, PCR Not Detected    Narrative:      Fact sheet for providers: https://www.fda.gov/media/513182/download    Fact sheet for patients: https://www.fda.gov/media/097449/download    Test performed by PCR.          [x]  Radiology  XR Chest 1 View    Result Date: 12/4/2023    Cardiomegaly with central vascular congestion suggesting mild congestive heart failure or volume overload.       ANDERS MAGALLON MD       Electronically Signed and Approved By: ANDERS MAGALLON MD on 12/04/2023 at 18:11            []  EKG/Telemetry   []  Cardiology/Vascular   []  Pathology  []  Old records  []  Other:    Assessment & Plan   Assessment / Plan     Assessment:  Acute on chronic hypoxic respiratory failure due to COVID-19 infection  Possible COVID-19 pneumonia  Lactic acidosis  Chronic diastolic CHF  Hypertension  ANABELLE  Depression  Anxiety    Plan:  Patient currently being managed in hospitalist service.  On 10 L via HFNC.  Continue to titrate oxygen as tolerated to maintain SpO2 greater than 90%.  CT chest without contrast showed  small right pleural effusion, small pericardial effusion along with consolidation in the left lower lobe with air bronchograms possibly pneumonia.  On Unasyn IV every 6 hours.  Continue.  Continue Lasix IV 40 mg twice daily.  Also continue Aldactone 25 mg daily.  D-dimer elevated.  Plan for CT PE tomorrow if patient's oxygen saturation does not improve.  Strep pneumo antigen and Legionella antigen negative.  Respiratory viral panel only positive for COVID-19.  MRSA PCR negative.  Blood culture showing no growth at 3 days.  Transthoracic echo showed normal left ventricular systolic function, mild aortic stenosis and trace MR along with trace TR.  On Decadron 6 mg daily for 10 days.  Lactic acidosis resolved.  Corrected calcium normal.  Continue bronchodilators and bronchopulmonary protocol.  Pulmonology following the patient, appreciate further input.  Continue rest of the current management.  Likely discharge in next 48 hours if oxygen requirement improves.        DVT prophylaxis:  Medical DVT prophylaxis orders are present.    CODE STATUS:   Level Of Support Discussed With: Patient  Code Status (Patient has no pulse and is not breathing): CPR (Attempt to Resuscitate)  Medical Interventions (Patient has pulse or is breathing): Full Support      Electronically signed by Julia Lagunas MD, 12/08/23, 12:41 PM EST.

## 2023-12-09 LAB
ANION GAP SERPL CALCULATED.3IONS-SCNC: 9.5 MMOL/L (ref 5–15)
BACTERIA SPEC AEROBE CULT: NORMAL
BACTERIA SPEC AEROBE CULT: NORMAL
BASOPHILS # BLD AUTO: 0.01 10*3/MM3 (ref 0–0.2)
BASOPHILS NFR BLD AUTO: 0.1 % (ref 0–1.5)
BUN SERPL-MCNC: 24 MG/DL (ref 8–23)
BUN/CREAT SERPL: 34.3 (ref 7–25)
CALCIUM SPEC-SCNC: 9.1 MG/DL (ref 8.6–10.5)
CHLORIDE SERPL-SCNC: 94 MMOL/L (ref 98–107)
CO2 SERPL-SCNC: 35.5 MMOL/L (ref 22–29)
CREAT SERPL-MCNC: 0.7 MG/DL (ref 0.57–1)
DEPRECATED RDW RBC AUTO: 47 FL (ref 37–54)
EGFRCR SERPLBLD CKD-EPI 2021: 93.2 ML/MIN/1.73
EOSINOPHIL # BLD AUTO: 0 10*3/MM3 (ref 0–0.4)
EOSINOPHIL NFR BLD AUTO: 0 % (ref 0.3–6.2)
ERYTHROCYTE [DISTWIDTH] IN BLOOD BY AUTOMATED COUNT: 15.6 % (ref 12.3–15.4)
GLUCOSE SERPL-MCNC: 164 MG/DL (ref 65–99)
HCT VFR BLD AUTO: 38.5 % (ref 34–46.6)
HGB BLD-MCNC: 11.3 G/DL (ref 12–15.9)
IMM GRANULOCYTES # BLD AUTO: 0.06 10*3/MM3 (ref 0–0.05)
IMM GRANULOCYTES NFR BLD AUTO: 0.7 % (ref 0–0.5)
LYMPHOCYTES # BLD AUTO: 0.29 10*3/MM3 (ref 0.7–3.1)
LYMPHOCYTES NFR BLD AUTO: 3.5 % (ref 19.6–45.3)
MAGNESIUM SERPL-MCNC: 2.1 MG/DL (ref 1.6–2.4)
MCH RBC QN AUTO: 24.7 PG (ref 26.6–33)
MCHC RBC AUTO-ENTMCNC: 29.4 G/DL (ref 31.5–35.7)
MCV RBC AUTO: 84.2 FL (ref 79–97)
MONOCYTES # BLD AUTO: 0.48 10*3/MM3 (ref 0.1–0.9)
MONOCYTES NFR BLD AUTO: 5.7 % (ref 5–12)
NEUTROPHILS NFR BLD AUTO: 7.52 10*3/MM3 (ref 1.7–7)
NEUTROPHILS NFR BLD AUTO: 90 % (ref 42.7–76)
NRBC BLD AUTO-RTO: 0 /100 WBC (ref 0–0.2)
NT-PROBNP SERPL-MCNC: 1618 PG/ML (ref 0–900)
PHOSPHATE SERPL-MCNC: 3.4 MG/DL (ref 2.5–4.5)
PLATELET # BLD AUTO: 249 10*3/MM3 (ref 140–450)
PMV BLD AUTO: 11 FL (ref 6–12)
POTASSIUM SERPL-SCNC: 3.7 MMOL/L (ref 3.5–5.2)
RBC # BLD AUTO: 4.57 10*6/MM3 (ref 3.77–5.28)
SODIUM SERPL-SCNC: 139 MMOL/L (ref 136–145)
WBC NRBC COR # BLD AUTO: 8.36 10*3/MM3 (ref 3.4–10.8)

## 2023-12-09 PROCEDURE — 25010000002 AMPICILLIN-SULBACTAM PER 1.5 G: Performed by: STUDENT IN AN ORGANIZED HEALTH CARE EDUCATION/TRAINING PROGRAM

## 2023-12-09 PROCEDURE — 99232 SBSQ HOSP IP/OBS MODERATE 35: CPT | Performed by: STUDENT IN AN ORGANIZED HEALTH CARE EDUCATION/TRAINING PROGRAM

## 2023-12-09 PROCEDURE — 94761 N-INVAS EAR/PLS OXIMETRY MLT: CPT

## 2023-12-09 PROCEDURE — 99233 SBSQ HOSP IP/OBS HIGH 50: CPT | Performed by: INTERNAL MEDICINE

## 2023-12-09 PROCEDURE — 80048 BASIC METABOLIC PNL TOTAL CA: CPT | Performed by: FAMILY MEDICINE

## 2023-12-09 PROCEDURE — 94664 DEMO&/EVAL PT USE INHALER: CPT

## 2023-12-09 PROCEDURE — 94799 UNLISTED PULMONARY SVC/PX: CPT

## 2023-12-09 PROCEDURE — 84100 ASSAY OF PHOSPHORUS: CPT | Performed by: STUDENT IN AN ORGANIZED HEALTH CARE EDUCATION/TRAINING PROGRAM

## 2023-12-09 PROCEDURE — 25010000002 FUROSEMIDE PER 20 MG: Performed by: FAMILY MEDICINE

## 2023-12-09 PROCEDURE — 63710000001 DEXAMETHASONE PER 0.25 MG: Performed by: FAMILY MEDICINE

## 2023-12-09 PROCEDURE — 83735 ASSAY OF MAGNESIUM: CPT | Performed by: STUDENT IN AN ORGANIZED HEALTH CARE EDUCATION/TRAINING PROGRAM

## 2023-12-09 PROCEDURE — 85025 COMPLETE CBC W/AUTO DIFF WBC: CPT | Performed by: STUDENT IN AN ORGANIZED HEALTH CARE EDUCATION/TRAINING PROGRAM

## 2023-12-09 PROCEDURE — 83880 ASSAY OF NATRIURETIC PEPTIDE: CPT | Performed by: INTERNAL MEDICINE

## 2023-12-09 RX ORDER — LISINOPRIL 5 MG/1
5 TABLET ORAL ONCE
Status: COMPLETED | OUTPATIENT
Start: 2023-12-09 | End: 2023-12-09

## 2023-12-09 RX ORDER — SPIRONOLACTONE 25 MG/1
50 TABLET ORAL DAILY
Status: DISCONTINUED | OUTPATIENT
Start: 2023-12-09 | End: 2023-12-11 | Stop reason: HOSPADM

## 2023-12-09 RX ORDER — ECHINACEA PURPUREA EXTRACT 125 MG
2 TABLET ORAL 2 TIMES DAILY
Status: DISCONTINUED | OUTPATIENT
Start: 2023-12-09 | End: 2023-12-11 | Stop reason: HOSPADM

## 2023-12-09 RX ORDER — POTASSIUM CHLORIDE 750 MG/1
40 CAPSULE, EXTENDED RELEASE ORAL ONCE
Status: COMPLETED | OUTPATIENT
Start: 2023-12-09 | End: 2023-12-09

## 2023-12-09 RX ORDER — LISINOPRIL 10 MG/1
10 TABLET ORAL DAILY
Status: DISCONTINUED | OUTPATIENT
Start: 2023-12-10 | End: 2023-12-11 | Stop reason: HOSPADM

## 2023-12-09 RX ADMIN — LISINOPRIL 5 MG: 5 TABLET ORAL at 13:30

## 2023-12-09 RX ADMIN — SPIRONOLACTONE 50 MG: 25 TABLET ORAL at 08:17

## 2023-12-09 RX ADMIN — DICYCLOMINE HYDROCHLORIDE 20 MG: 20 TABLET ORAL at 17:36

## 2023-12-09 RX ADMIN — MONTELUKAST 10 MG: 10 TABLET, FILM COATED ORAL at 20:51

## 2023-12-09 RX ADMIN — Medication 10 ML: at 08:22

## 2023-12-09 RX ADMIN — IPRATROPIUM BROMIDE AND ALBUTEROL SULFATE 3 ML: .5; 3 SOLUTION RESPIRATORY (INHALATION) at 15:17

## 2023-12-09 RX ADMIN — CARVEDILOL 12.5 MG: 12.5 TABLET, FILM COATED ORAL at 17:06

## 2023-12-09 RX ADMIN — IPRATROPIUM BROMIDE AND ALBUTEROL SULFATE 3 ML: .5; 3 SOLUTION RESPIRATORY (INHALATION) at 21:02

## 2023-12-09 RX ADMIN — ARFORMOTEROL TARTRATE 15 MCG: 15 SOLUTION RESPIRATORY (INHALATION) at 21:02

## 2023-12-09 RX ADMIN — DOCUSATE SODIUM 50MG AND SENNOSIDES 8.6MG 2 TABLET: 8.6; 5 TABLET, FILM COATED ORAL at 20:51

## 2023-12-09 RX ADMIN — FUROSEMIDE 40 MG: 10 INJECTION, SOLUTION INTRAMUSCULAR; INTRAVENOUS at 17:07

## 2023-12-09 RX ADMIN — FUROSEMIDE 40 MG: 10 INJECTION, SOLUTION INTRAMUSCULAR; INTRAVENOUS at 06:05

## 2023-12-09 RX ADMIN — AMPICILLIN AND SULBACTAM 3 G: 2; 1 INJECTION, POWDER, FOR SOLUTION INTRAVENOUS at 15:48

## 2023-12-09 RX ADMIN — SODIUM CHLORIDE 3 G: 900 INJECTION INTRAVENOUS at 06:05

## 2023-12-09 RX ADMIN — SALINE NASAL SPRAY 2 SPRAY: 1.5 SOLUTION NASAL at 20:52

## 2023-12-09 RX ADMIN — Medication 10 ML: at 20:52

## 2023-12-09 RX ADMIN — DICYCLOMINE HYDROCHLORIDE 20 MG: 20 TABLET ORAL at 06:05

## 2023-12-09 RX ADMIN — PRAVASTATIN SODIUM 40 MG: 20 TABLET ORAL at 20:51

## 2023-12-09 RX ADMIN — FLUOXETINE HYDROCHLORIDE 40 MG: 20 CAPSULE ORAL at 09:46

## 2023-12-09 RX ADMIN — DEXAMETHASONE 6 MG: 0.5 TABLET ORAL at 08:17

## 2023-12-09 RX ADMIN — DOCUSATE SODIUM 50MG AND SENNOSIDES 8.6MG 2 TABLET: 8.6; 5 TABLET, FILM COATED ORAL at 08:17

## 2023-12-09 RX ADMIN — Medication 2000 UNITS: at 08:17

## 2023-12-09 RX ADMIN — GUAIFENESIN 600 MG: 600 TABLET ORAL at 09:46

## 2023-12-09 RX ADMIN — DICYCLOMINE HYDROCHLORIDE 20 MG: 20 TABLET ORAL at 23:34

## 2023-12-09 RX ADMIN — ANASTROZOLE 1 MG: 1 TABLET ORAL at 09:46

## 2023-12-09 RX ADMIN — CARVEDILOL 12.5 MG: 12.5 TABLET, FILM COATED ORAL at 08:17

## 2023-12-09 RX ADMIN — AMPICILLIN AND SULBACTAM 3 G: 2; 1 INJECTION, POWDER, FOR SOLUTION INTRAVENOUS at 20:51

## 2023-12-09 RX ADMIN — SALINE NASAL SPRAY 2 SPRAY: 1.5 SOLUTION NASAL at 13:30

## 2023-12-09 RX ADMIN — PANTOPRAZOLE SODIUM 40 MG: 40 TABLET, DELAYED RELEASE ORAL at 08:17

## 2023-12-09 RX ADMIN — LISINOPRIL 5 MG: 5 TABLET ORAL at 09:46

## 2023-12-09 RX ADMIN — POTASSIUM CHLORIDE 40 MEQ: 10 CAPSULE, COATED, EXTENDED RELEASE ORAL at 08:16

## 2023-12-09 RX ADMIN — GUAIFENESIN 600 MG: 600 TABLET ORAL at 20:52

## 2023-12-09 RX ADMIN — DICYCLOMINE HYDROCHLORIDE 20 MG: 20 TABLET ORAL at 13:30

## 2023-12-09 NOTE — PROGRESS NOTES
Pulmonary / Critical Care Progress Note      Patient Name: Aida Conner  : 1955  MRN: [id number]  Primary Care Physician:  Serenity Lucas APRN  Date of admission: 2023    Subjective   Subjective   Follow-up for COVID-19 pneumonia    No acute events overnight.    This morning,  On 6 L high flow nasal cannula  Sitting in chair  Feels like her status continues to improve  Continues Decadron  Continues to report some epistaxis  Diuresing well  -2.4 L net output    Review of Systems  General: Fatigue, otherwise denied complaints  HEENT: Denied complaints  Respiratory: Dyspnea, otherwise denied complaints  Cardiovascular: Denied complaints  GI: Denied complaints  : Denied complaints  MSK: Generalized weakness, otherwise denied complaints    Objective   Objective     Vitals:   Temp:  [97.9 °F (36.6 °C)-98.6 °F (37 °C)] 97.9 °F (36.6 °C)  Heart Rate:  [] 110  Resp:  [18-28] 22  BP: (129-153)/() 153/104  Flow (L/min):  [6-10] 6  Physical Exam   Vital Signs Reviewed   General:  Alert, NAD. Sitting up in chair  HEENT:  PERRL, EOMI.    Neck:  No JVD, no thyromegaly  Lymph: no axillary, cervical, supraclavicular lymphadenopathy noted bilaterally  Chest:  Clear to auscultation bilaterally, no work of breathing noted on 10L HFNC  CV: RRR, no M/G/R, pulses 2+  Abd:  Soft, NT, ND, +BS, obese  EXT:  no clubbing, no cyanosis, no edema  Neuro:  A&Ox3, CN grossly intact, no focal deficits.  Skin: No rashes or lesions noted    Result Review    Result Review:  I have personally reviewed the results from the time of this admission to 2023 07:21 EST and agree with these findings:  [x]  Laboratory  [x]  Microbiology  [x]  Radiology  []  EKG/Telemetry   []  Cardiology/Vascular   []  Pathology  []  Old records  []  Other:  Most notable findings include:      echo EF 50%        Lab 23  0532 23  1742 23  0511 23  0443 23  0448 23  0320 23  1835   WBC 8.36  --  8.13  7.48 6.72 5.44 5.98   HEMOGLOBIN 11.3* 11.5* 11.0* 10.2* 10.4* 10.6* 10.2*   HEMATOCRIT 38.5 39.0 38.4 36.0 35.9 38.3 36.5   PLATELETS 249  --  233 231 239 209 237   SODIUM 139  --  139 139 140 141 142   POTASSIUM 3.7  --  3.6 3.5 3.5 3.9 3.8   CHLORIDE 94*  --  93* 93* 93* 96* 99   CO2 35.5*  --  32.6* 35.5* 38.4* 35.3* 33.4*   BUN 24*  --  20 21 17 9 10   CREATININE 0.70  --  0.70 0.70 0.70 0.61 0.71   GLUCOSE 164*  --  156* 149* 168* 140* 98   CALCIUM 9.1  --  8.9 8.5* 8.7 8.4* 9.0   PHOSPHORUS 3.4  --  3.4 3.5  --   --   --    TOTAL PROTEIN  --   --   --   --   --   --  6.6   ALBUMIN  --   --   --   --   --   --  3.8   GLOBULIN  --   --   --   --   --   --  2.8       Assessment & Plan   Assessment / Plan     Active Hospital Problems:  Active Hospital Problems    Diagnosis     **Acute respiratory disease due to COVID-19 virus     Acute respiratory failure with hypercapnia     Chronic diastolic congestive heart failure     Anxiety disorder     Depression     Hypertension, essential     Obstructive sleep apnea      Impression:  COVID-19 pneumonia  Acute on chronic hypoxic and hypercapnic respiratory failure  COPD exacerbation  Lactic acidosis, clinically significant  Obstructive of sleep apnea  Obesity hypoventilation syndrome  Morbid obesity  Epistaxis  Hypokalemia     Plan:  -Down to 6 L high flow nasal cannula.  Patient's baseline is 2 to 3 L.  Continue to wean to maintain SpO2 greater than 90%  -Encourage NIPPV at at night and during naps  -Check BNP. 1618  -Continue cefepime and deescalate when appropriate  -Continue Decadron 6 mg oral daily for 10 days total  -Continue bronchodilator and bronchopulmonary protocols.  Encourage I-S and flutter  -Continue nebulizers  -Continue Lasix 40 mg IV twice daily.  Increase Aldactone to 50 mg daily  -Continue to monitor renal panel electrolytes.  Replace potassium orally  -Glucose control per primary  -Continue Afrin and humidified oxygen for epistaxis.  Patient  requesting CT for epistaxis.  There is no indication for that  -Encouraged weight loss  -Encourage mobilization.  Out of bed to chair  -Of note, patient on Arimidex for history of breast cancer  -Consulted RT  for home oxygen needs  -Patient agreeable to home health care at discharge  -Follow-up with pulmonary clinic 1 to 2 weeks after discharge       DVT prophylaxis:  Medical and mechanical DVT prophylaxis orders are present.    CODE STATUS:   Level Of Support Discussed With: Patient  Code Status (Patient has no pulse and is not breathing): CPR (Attempt to Resuscitate)  Medical Interventions (Patient has pulse or is breathing): Full Support      Labs, imaging, microbiology, notes and medications personally reviewed  Discussed with primary    I, Dr. Kevin Borrero, have spent more than 50% of the total time managing the patient in this encounter today.  This included personally reviewing all pertinent labs, imaging, microbiology and documentation. Also discussing the case with the patient and any available family, the admitting physician and any available ancillary staff.    Electronically signed by SHAUNA Aguilar, 12/09/23, 7:21 AM EST.  Electronically signed by Kevin Borrero MD, 12/09/23, 1:00 PM EST.

## 2023-12-09 NOTE — PROGRESS NOTES
[facility]   Hospitalist Progress Note  Date: 2023  Patient Name: Aida Conner  : 1955  MRN: [id number]  Date of admission: 2023  Room/Bed: 403/1      Subjective   Subjective     Chief Complaint: Follow-up for shortness of breath    Summary:Aida Conner is a 68 y.o. female with past medical history of hypertension, CHF, COPD on home oxygen of 3 L/min, anxiety, morbid obesity, ANABELLE and GERD who presented to ED with complaints of shortness of breath.  She was found to have acute hypoxic respiratory failure likely due to COVID-19 infection.  She was on 7 L/min of oxygen via NC to maintain saturation.  Patient was started on oral Decadron.  Remdesivir was held.  Patient was admitted to medicine department for further evaluation and management of acute on chronic hypoxic respiratory failure likely due to underlying COVID infection.    Interval Followup:   Patient had an episode of epistaxis yesterday.  Conservative management along with oxymetazoline was given after which his epistaxis has been controlled.  Stated she has been having on and off epistaxis prior to this admission and was supposed to follow-up with ENT as outpatient; pending evaluation.  On sodium chloride nasal spray to avoid nasal dryness.  Patient on 6 L HFNC with SpO2 92% during my evaluation today.  Denies any fever, chills, rigors, difficulty breathing or chest pain.      Review of Systems    All systems reviewed and negative except for what is outlined above.      Objective   Objective     Vitals:   Temp:  [97.7 °F (36.5 °C)-98.8 °F (37.1 °C)] 97.7 °F (36.5 °C)  Heart Rate:  [] 91  Resp:  [18-28] 20  BP: (139-153)/() 149/80  Flow (L/min):  [6] 6    Physical Exam   General: Awake, alert, NAD; morbidly obese.  Cardiovascular: RRR, no murmurs   Pulmonary: CTA bilaterally; no wheezes; no conversational dyspnea; on 6 L/min via humidified HFNC.  Gastrointestinal: S/ND/NT, +BS  Musculoskeletal: No gross  deformities  Skin: No jaundice, no rash on exposed skin appreciated  Neuro: Alert, awake, oriented x 3; speech clear; no tremor  : No Kiser catheter; no suprapubic tenderness    Result Review    Result Review:  I have personally reviewed these results:  [x]  Laboratory      Lab 12/09/23  0532 12/08/23  1742 12/08/23  0800 12/08/23  0511 12/07/23  0443 12/06/23  1553 12/06/23  1252 12/06/23  0448 12/05/23  0320 12/04/23  2101 12/04/23  1835   WBC 8.36  --   --  8.13 7.48  --   --  6.72 5.44  --  5.98   HEMOGLOBIN 11.3* 11.5*  --  11.0* 10.2*  --   --  10.4* 10.6*  --  10.2*   HEMATOCRIT 38.5 39.0  --  38.4 36.0  --   --  35.9 38.3  --  36.5   PLATELETS 249  --   --  233 231  --   --  239 209  --  237   NEUTROS ABS 7.52*  --   --  7.35* 6.80  --   --   --   --   --  4.57   IMMATURE GRANS (ABS) 0.06*  --   --  0.04 0.04  --   --   --   --   --  0.02   LYMPHS ABS 0.29*  --   --  0.26* 0.28*  --   --   --   --   --  0.78   MONOS ABS 0.48  --   --  0.48 0.36  --   --   --   --   --  0.47   EOS ABS 0.00  --   --  0.00 0.00  --   --   --   --   --  0.11   MCV 84.2  --   --  84.6 86.5  --   --  85.5 89.5  --  88.0   CRP  --   --   --   --   --   --   --   --   --  3.67*  --    PROCALCITONIN  --   --   --   --   --   --   --  0.03 0.03  --   --    LACTATE  --   --   --   --   --  1.7 2.8*  --   --   --  1.4   D DIMER QUANT  --   --  1.06*  --   --   --   --   --   --   --   --          Lab 12/09/23  0532 12/08/23  0511 12/07/23  0443   SODIUM 139 139 139   POTASSIUM 3.7 3.6 3.5   CHLORIDE 94* 93* 93*   CO2 35.5* 32.6* 35.5*   ANION GAP 9.5 13.4 10.5   BUN 24* 20 21   CREATININE 0.70 0.70 0.70   EGFR 93.2 93.2 93.2   GLUCOSE 164* 156* 149*   CALCIUM 9.1 8.9 8.5*   MAGNESIUM 2.1 2.0 1.9   PHOSPHORUS 3.4 3.4 3.5         Lab 12/04/23  1835   TOTAL PROTEIN 6.6   ALBUMIN 3.8   GLOBULIN 2.8   ALT (SGPT) 14   AST (SGOT) 17   BILIRUBIN 0.6   ALK PHOS 100         Lab 12/09/23  0532 12/04/23  2101 12/04/23  1835   PROBNP 1,618.0*  --   1,413.0*   HSTROP T  --  12 12                 Lab 12/04/23  1808   PH, ARTERIAL 7.387   PCO2, ARTERIAL 63.9*   PO2 ART 63.4*   O2 SATURATION ART 91.4*   HCO3 ART 37.6*   BASE EXCESS ART 10.3*   CARBOXYHEMOGLOBIN 1.0     Brief Urine Lab Results       None          [x]  Microbiology   Microbiology Results (last 10 days)       Procedure Component Value - Date/Time    S. Pneumo Ag Urine or CSF - Urine, Urine, Clean Catch [[id number]]  (Normal) Collected: 12/06/23 1503    Lab Status: Final result Specimen: Urine, Clean Catch Updated: 12/06/23 1528     Strep Pneumo Ag Negative    Legionella Antigen, Urine - Urine, Urine, Clean Catch [[id number]]  (Normal) Collected: 12/06/23 1503    Lab Status: Final result Specimen: Urine, Clean Catch Updated: 12/06/23 1528     LEGIONELLA ANTIGEN, URINE Negative    Respiratory Panel PCR w/COVID-19(SARS-CoV-2) COMPA/KIANA/DREW/PAD/COR/ERIC In-House, NP Swab in UTM/VTM, 2 HR TAT - Swab, Nasopharynx [[id number]]  (Abnormal) Collected: 12/06/23 1500    Lab Status: Final result Specimen: Swab from Nasopharynx Updated: 12/06/23 1604     ADENOVIRUS, PCR Not Detected     Coronavirus 229E Not Detected     Coronavirus HKU1 Not Detected     Coronavirus NL63 Not Detected     Coronavirus OC43 Not Detected     COVID19 Detected     Human Metapneumovirus Not Detected     Human Rhinovirus/Enterovirus Not Detected     Influenza A PCR Not Detected     Influenza B PCR Not Detected     Parainfluenza Virus 1 Not Detected     Parainfluenza Virus 2 Not Detected     Parainfluenza Virus 3 Not Detected     Parainfluenza Virus 4 Not Detected     RSV, PCR Not Detected     Bordetella pertussis pcr Not Detected     Bordetella parapertussis PCR Not Detected     Chlamydophila pneumoniae PCR Not Detected     Mycoplasma pneumo by PCR Not Detected    Narrative:      In the setting of a positive respiratory panel with a viral infection PLUS a negative procalcitonin without other underlying concern for bacterial infection, consider  observing off antibiotics or discontinuation of antibiotics and continue supportive care. If the respiratory panel is positive for atypical bacterial infection (Bordetella pertussis, Chlamydophila pneumoniae, or Mycoplasma pneumoniae), consider antibiotic de-escalation to target atypical bacterial infection.    MRSA Screen, PCR (Inpatient) - Swab, Nares [[id number]]  (Normal) Collected: 12/06/23 1500    Lab Status: Final result Specimen: Swab from Nares Updated: 12/06/23 1617     MRSA PCR No MRSA Detected    Narrative:      The negative predictive value of this diagnostic test is high and should only be used to consider de-escalating anti-MRSA therapy. A positive result may indicate colonization with MRSA and must be correlated clinically.    Blood Culture - Blood, Arm, Left [[id number]]  (Normal) Collected: 12/04/23 1835    Lab Status: Preliminary result Specimen: Blood from Arm, Left Updated: 12/08/23 1900     Blood Culture No growth at 4 days    Blood Culture - Blood, Arm, Left [[id number]]  (Normal) Collected: 12/04/23 1835    Lab Status: Preliminary result Specimen: Blood from Arm, Left Updated: 12/08/23 1900     Blood Culture No growth at 4 days    COVID-19, FLU A/B, RSV PCR 1 HR TAT - Swab, Nasopharynx [[id number]]  (Abnormal) Collected: 12/04/23 1811    Lab Status: Final result Specimen: Swab from Nasopharynx Updated: 12/04/23 1929     COVID19 Detected     Influenza A PCR Not Detected     Influenza B PCR Not Detected     RSV, PCR Not Detected    Narrative:      Fact sheet for providers: https://www.fda.gov/media/169869/download    Fact sheet for patients: https://www.fda.gov/media/037224/download    Test performed by PCR.          [x]  Radiology  XR Chest 1 View    Result Date: 12/8/2023    1. Pleural/parenchymal opacities in the right base, as before, which could be related to small pleural effusion and atelectasis or infiltrate. 2. Stable cardiomegaly.       ANGEL DEVINE MD       Electronically Signed and  Approved By: ANGEL DEVINE MD on 12/08/2023 at 18:30             XR Chest 1 View    Result Date: 12/4/2023    Cardiomegaly with central vascular congestion suggesting mild congestive heart failure or volume overload.       ANDERS MAGALLON MD       Electronically Signed and Approved By: ANDERS MAGALLON MD on 12/04/2023 at 18:11            []  EKG/Telemetry   []  Cardiology/Vascular   []  Pathology  []  Old records  []  Other:    Assessment & Plan   Assessment / Plan     Assessment:  Acute on chronic hypoxic respiratory failure due to COVID-19 infection  Possible COVID-19 pneumonia  Lactic acidosis  Chronic diastolic CHF  Hypertension  ANABELLE  Depression  Anxiety    Plan:  Patient currently being managed in hospitalist service.  On 6 L via humidified HFNC.  Continue to titrate oxygen as tolerated to maintain SpO2 greater than 90%.  CT chest without contrast showed small right pleural effusion, small pericardial effusion along with consolidation in the left lower lobe with air bronchograms possibly pneumonia.  On Unasyn IV every 6 hours.  Continue given patient is still requiring high oxygen requirement, currently on 6 L via humidified HFNC.  Continue Lasix IV 40 mg twice daily.  Increased the dose of Aldactone to 50 mg daily.  D-dimer elevated.  Plan for CT PE if patient's oxygen saturation does not improve.  Holding currently given oxygen requirement improved compared to yesterday.  Strep pneumo antigen and Legionella antigen negative.  Respiratory viral panel only positive for COVID-19.  MRSA PCR negative.  Blood culture showing no growth at 4 days.  Transthoracic echo showed normal left ventricular systolic function, mild aortic stenosis and trace MR along with trace TR.  On Decadron 6 mg daily for 10 days.  Lactic acidosis resolved.  Continue bronchodilators and bronchopulmonary protocol.  Pulmonology following the patient, appreciate further input.  Continue rest of the current management.  Likely discharge in next 48  hours if oxygen requirement improves.        DVT prophylaxis:  Medical and mechanical DVT prophylaxis orders are present.    CODE STATUS:   Level Of Support Discussed With: Patient  Code Status (Patient has no pulse and is not breathing): CPR (Attempt to Resuscitate)  Medical Interventions (Patient has pulse or is breathing): Full Support      Electronically signed by Julia Lagunas MD, 12/09/23, 12:41 PM EST.

## 2023-12-10 LAB
ANION GAP SERPL CALCULATED.3IONS-SCNC: 10.7 MMOL/L (ref 5–15)
BASOPHILS # BLD AUTO: 0.01 10*3/MM3 (ref 0–0.2)
BASOPHILS NFR BLD AUTO: 0.1 % (ref 0–1.5)
BUN SERPL-MCNC: 29 MG/DL (ref 8–23)
BUN/CREAT SERPL: 43.9 (ref 7–25)
CALCIUM SPEC-SCNC: 9.2 MG/DL (ref 8.6–10.5)
CHLORIDE SERPL-SCNC: 95 MMOL/L (ref 98–107)
CO2 SERPL-SCNC: 32.3 MMOL/L (ref 22–29)
CREAT SERPL-MCNC: 0.66 MG/DL (ref 0.57–1)
DEPRECATED RDW RBC AUTO: 48 FL (ref 37–54)
EGFRCR SERPLBLD CKD-EPI 2021: 94.5 ML/MIN/1.73
EOSINOPHIL # BLD AUTO: 0 10*3/MM3 (ref 0–0.4)
EOSINOPHIL NFR BLD AUTO: 0 % (ref 0.3–6.2)
ERYTHROCYTE [DISTWIDTH] IN BLOOD BY AUTOMATED COUNT: 15.9 % (ref 12.3–15.4)
GLUCOSE SERPL-MCNC: 180 MG/DL (ref 65–99)
HCT VFR BLD AUTO: 38.9 % (ref 34–46.6)
HGB BLD-MCNC: 11.4 G/DL (ref 12–15.9)
IMM GRANULOCYTES # BLD AUTO: 0.07 10*3/MM3 (ref 0–0.05)
IMM GRANULOCYTES NFR BLD AUTO: 0.8 % (ref 0–0.5)
LYMPHOCYTES # BLD AUTO: 0.34 10*3/MM3 (ref 0.7–3.1)
LYMPHOCYTES NFR BLD AUTO: 3.7 % (ref 19.6–45.3)
MAGNESIUM SERPL-MCNC: 2.1 MG/DL (ref 1.6–2.4)
MCH RBC QN AUTO: 24.5 PG (ref 26.6–33)
MCHC RBC AUTO-ENTMCNC: 29.3 G/DL (ref 31.5–35.7)
MCV RBC AUTO: 83.5 FL (ref 79–97)
MONOCYTES # BLD AUTO: 0.56 10*3/MM3 (ref 0.1–0.9)
MONOCYTES NFR BLD AUTO: 6.1 % (ref 5–12)
NEUTROPHILS NFR BLD AUTO: 8.25 10*3/MM3 (ref 1.7–7)
NEUTROPHILS NFR BLD AUTO: 89.3 % (ref 42.7–76)
NRBC BLD AUTO-RTO: 0 /100 WBC (ref 0–0.2)
PHOSPHATE SERPL-MCNC: 3.2 MG/DL (ref 2.5–4.5)
PLATELET # BLD AUTO: 243 10*3/MM3 (ref 140–450)
PMV BLD AUTO: 11.3 FL (ref 6–12)
POTASSIUM SERPL-SCNC: 3.9 MMOL/L (ref 3.5–5.2)
RBC # BLD AUTO: 4.66 10*6/MM3 (ref 3.77–5.28)
SODIUM SERPL-SCNC: 138 MMOL/L (ref 136–145)
WBC NRBC COR # BLD AUTO: 9.23 10*3/MM3 (ref 3.4–10.8)

## 2023-12-10 PROCEDURE — 94660 CPAP INITIATION&MGMT: CPT

## 2023-12-10 PROCEDURE — 85025 COMPLETE CBC W/AUTO DIFF WBC: CPT | Performed by: STUDENT IN AN ORGANIZED HEALTH CARE EDUCATION/TRAINING PROGRAM

## 2023-12-10 PROCEDURE — 94799 UNLISTED PULMONARY SVC/PX: CPT

## 2023-12-10 PROCEDURE — 94664 DEMO&/EVAL PT USE INHALER: CPT

## 2023-12-10 PROCEDURE — 99232 SBSQ HOSP IP/OBS MODERATE 35: CPT | Performed by: STUDENT IN AN ORGANIZED HEALTH CARE EDUCATION/TRAINING PROGRAM

## 2023-12-10 PROCEDURE — 80048 BASIC METABOLIC PNL TOTAL CA: CPT | Performed by: FAMILY MEDICINE

## 2023-12-10 PROCEDURE — 83735 ASSAY OF MAGNESIUM: CPT | Performed by: STUDENT IN AN ORGANIZED HEALTH CARE EDUCATION/TRAINING PROGRAM

## 2023-12-10 PROCEDURE — 94761 N-INVAS EAR/PLS OXIMETRY MLT: CPT

## 2023-12-10 PROCEDURE — 25010000002 HYDRALAZINE PER 20 MG: Performed by: FAMILY MEDICINE

## 2023-12-10 PROCEDURE — 99233 SBSQ HOSP IP/OBS HIGH 50: CPT | Performed by: INTERNAL MEDICINE

## 2023-12-10 PROCEDURE — 25010000002 FUROSEMIDE PER 20 MG: Performed by: FAMILY MEDICINE

## 2023-12-10 PROCEDURE — 84100 ASSAY OF PHOSPHORUS: CPT | Performed by: STUDENT IN AN ORGANIZED HEALTH CARE EDUCATION/TRAINING PROGRAM

## 2023-12-10 PROCEDURE — 63710000001 DEXAMETHASONE PER 0.25 MG: Performed by: FAMILY MEDICINE

## 2023-12-10 PROCEDURE — 25010000002 AMPICILLIN-SULBACTAM PER 1.5 G: Performed by: STUDENT IN AN ORGANIZED HEALTH CARE EDUCATION/TRAINING PROGRAM

## 2023-12-10 RX ORDER — METOLAZONE 5 MG/1
5 TABLET ORAL ONCE
Status: COMPLETED | OUTPATIENT
Start: 2023-12-10 | End: 2023-12-10

## 2023-12-10 RX ORDER — AMOXICILLIN AND CLAVULANATE POTASSIUM 875; 125 MG/1; MG/1
1 TABLET, FILM COATED ORAL EVERY 12 HOURS SCHEDULED
Status: DISCONTINUED | OUTPATIENT
Start: 2023-12-10 | End: 2023-12-11 | Stop reason: HOSPADM

## 2023-12-10 RX ORDER — HYDRALAZINE HYDROCHLORIDE 20 MG/ML
10 INJECTION INTRAMUSCULAR; INTRAVENOUS ONCE
Status: COMPLETED | OUTPATIENT
Start: 2023-12-10 | End: 2023-12-10

## 2023-12-10 RX ORDER — POTASSIUM CHLORIDE 750 MG/1
40 CAPSULE, EXTENDED RELEASE ORAL ONCE
Status: COMPLETED | OUTPATIENT
Start: 2023-12-10 | End: 2023-12-10

## 2023-12-10 RX ADMIN — AMOXICILLIN AND CLAVULANATE POTASSIUM 1 TABLET: 875; 125 TABLET, FILM COATED ORAL at 20:48

## 2023-12-10 RX ADMIN — IPRATROPIUM BROMIDE AND ALBUTEROL SULFATE 3 ML: .5; 3 SOLUTION RESPIRATORY (INHALATION) at 16:06

## 2023-12-10 RX ADMIN — MONTELUKAST 10 MG: 10 TABLET, FILM COATED ORAL at 20:48

## 2023-12-10 RX ADMIN — CARVEDILOL 12.5 MG: 12.5 TABLET, FILM COATED ORAL at 17:32

## 2023-12-10 RX ADMIN — DOCUSATE SODIUM 50MG AND SENNOSIDES 8.6MG 2 TABLET: 8.6; 5 TABLET, FILM COATED ORAL at 08:54

## 2023-12-10 RX ADMIN — ARFORMOTEROL TARTRATE 15 MCG: 15 SOLUTION RESPIRATORY (INHALATION) at 19:28

## 2023-12-10 RX ADMIN — ARFORMOTEROL TARTRATE 15 MCG: 15 SOLUTION RESPIRATORY (INHALATION) at 07:10

## 2023-12-10 RX ADMIN — FLUOXETINE HYDROCHLORIDE 40 MG: 20 CAPSULE ORAL at 08:51

## 2023-12-10 RX ADMIN — ANASTROZOLE 1 MG: 1 TABLET ORAL at 08:54

## 2023-12-10 RX ADMIN — IPRATROPIUM BROMIDE AND ALBUTEROL SULFATE 3 ML: .5; 3 SOLUTION RESPIRATORY (INHALATION) at 23:05

## 2023-12-10 RX ADMIN — PANTOPRAZOLE SODIUM 40 MG: 40 TABLET, DELAYED RELEASE ORAL at 08:54

## 2023-12-10 RX ADMIN — METOLAZONE 5 MG: 5 TABLET ORAL at 09:01

## 2023-12-10 RX ADMIN — SALINE NASAL SPRAY 2 SPRAY: 1.5 SOLUTION NASAL at 20:49

## 2023-12-10 RX ADMIN — CARVEDILOL 12.5 MG: 12.5 TABLET, FILM COATED ORAL at 09:01

## 2023-12-10 RX ADMIN — AMPICILLIN AND SULBACTAM 3 G: 2; 1 INJECTION, POWDER, FOR SOLUTION INTRAVENOUS at 04:28

## 2023-12-10 RX ADMIN — GUAIFENESIN 600 MG: 600 TABLET ORAL at 08:54

## 2023-12-10 RX ADMIN — DICYCLOMINE HYDROCHLORIDE 20 MG: 20 TABLET ORAL at 12:20

## 2023-12-10 RX ADMIN — SPIRONOLACTONE 50 MG: 25 TABLET ORAL at 09:01

## 2023-12-10 RX ADMIN — DEXAMETHASONE 6 MG: 0.5 TABLET ORAL at 08:51

## 2023-12-10 RX ADMIN — Medication 2000 UNITS: at 08:51

## 2023-12-10 RX ADMIN — SALINE NASAL SPRAY 2 SPRAY: 1.5 SOLUTION NASAL at 08:55

## 2023-12-10 RX ADMIN — DICYCLOMINE HYDROCHLORIDE 20 MG: 20 TABLET ORAL at 06:31

## 2023-12-10 RX ADMIN — Medication 10 ML: at 20:48

## 2023-12-10 RX ADMIN — POTASSIUM CHLORIDE 40 MEQ: 10 CAPSULE, COATED, EXTENDED RELEASE ORAL at 08:51

## 2023-12-10 RX ADMIN — AMPICILLIN AND SULBACTAM 3 G: 2; 1 INJECTION, POWDER, FOR SOLUTION INTRAVENOUS at 08:55

## 2023-12-10 RX ADMIN — GUAIFENESIN 600 MG: 600 TABLET ORAL at 20:48

## 2023-12-10 RX ADMIN — PRAVASTATIN SODIUM 40 MG: 20 TABLET ORAL at 20:48

## 2023-12-10 RX ADMIN — IPRATROPIUM BROMIDE AND ALBUTEROL SULFATE 3 ML: .5; 3 SOLUTION RESPIRATORY (INHALATION) at 07:10

## 2023-12-10 RX ADMIN — LISINOPRIL 10 MG: 10 TABLET ORAL at 09:01

## 2023-12-10 RX ADMIN — HYDRALAZINE HYDROCHLORIDE 10 MG: 20 INJECTION, SOLUTION INTRAMUSCULAR; INTRAVENOUS at 00:23

## 2023-12-10 RX ADMIN — Medication 10 ML: at 08:55

## 2023-12-10 RX ADMIN — DICYCLOMINE HYDROCHLORIDE 20 MG: 20 TABLET ORAL at 17:32

## 2023-12-10 RX ADMIN — FUROSEMIDE 40 MG: 10 INJECTION, SOLUTION INTRAMUSCULAR; INTRAVENOUS at 17:33

## 2023-12-10 RX ADMIN — FUROSEMIDE 40 MG: 10 INJECTION, SOLUTION INTRAMUSCULAR; INTRAVENOUS at 06:32

## 2023-12-10 NOTE — PROGRESS NOTES
[facility]   Hospitalist Progress Note  Date: 12/10/2023  Patient Name: Aida Conner  : 1955  MRN: [id number]  Date of admission: 2023  Room/Bed: 403/1      Subjective   Subjective     Chief Complaint: Follow-up for shortness of breath    Summary:Aida Conner is a 68 y.o. female with past medical history of hypertension, CHF, COPD on home oxygen of 3 L/min, anxiety, morbid obesity, ANABELLE and GERD who presented to ED with complaints of shortness of breath.  She was found to have acute hypoxic respiratory failure likely due to COVID-19 infection.  She was on 7 L/min of oxygen via NC to maintain saturation.  Patient was started on oral Decadron.  Remdesivir was held.  Patient was admitted to medicine department for further evaluation and management of acute on chronic hypoxic respiratory failure likely due to underlying COVID infection.    Interval Followup:   No acute events overnight. She is on 4 L/min via humidified nasal cannula.  Patient was hypertensive with blood pressure 166/102 overnight, s/p 1 dose of hydralazine IV.  Blood pressure stable this morning.  Denies any fever, chills, rigors, difficulty breathing or chest pain.  No epistaxis overnight.    Review of Systems    All systems reviewed and negative except for what is outlined above.      Objective   Objective     Vitals:   Temp:  [98.1 °F (36.7 °C)-98.6 °F (37 °C)] 98.4 °F (36.9 °C)  Heart Rate:  [] 109  Resp:  [18] 18  BP: ()/() 127/88  Flow (L/min):  [4-6] 4    Physical Exam   General: Awake, alert, NAD; morbidly obese.  Cardiovascular: RRR, no murmurs   Pulmonary: CTA bilaterally; no wheezes; no conversational dyspnea; on 4 L/min via humidified HFNC.  Gastrointestinal: S/ND/NT, +BS  Musculoskeletal: No gross deformities  Skin: No jaundice, no rash on exposed skin appreciated  Neuro: Alert, awake, oriented x 3; speech clear; no tremor  : No Kiser catheter; no suprapubic tenderness    Result Review    Result  Review:  I have personally reviewed these results:  [x]  Laboratory      Lab 12/10/23  0515 12/09/23  0532 12/08/23  1742 12/08/23  0800 12/08/23  0511 12/07/23  0443 12/06/23  1553 12/06/23  1252 12/06/23  0448 12/05/23  0320 12/04/23  2101 12/04/23  1835   WBC 9.23 8.36  --   --  8.13   < >  --   --  6.72 5.44  --  5.98   HEMOGLOBIN 11.4* 11.3* 11.5*  --  11.0*   < >  --   --  10.4* 10.6*  --  10.2*   HEMATOCRIT 38.9 38.5 39.0  --  38.4   < >  --   --  35.9 38.3  --  36.5   PLATELETS 243 249  --   --  233   < >  --   --  239 209  --  237   NEUTROS ABS 8.25* 7.52*  --   --  7.35*   < >  --   --   --   --   --  4.57   IMMATURE GRANS (ABS) 0.07* 0.06*  --   --  0.04   < >  --   --   --   --   --  0.02   LYMPHS ABS 0.34* 0.29*  --   --  0.26*   < >  --   --   --   --   --  0.78   MONOS ABS 0.56 0.48  --   --  0.48   < >  --   --   --   --   --  0.47   EOS ABS 0.00 0.00  --   --  0.00   < >  --   --   --   --   --  0.11   MCV 83.5 84.2  --   --  84.6   < >  --   --  85.5 89.5  --  88.0   CRP  --   --   --   --   --   --   --   --   --   --  3.67*  --    PROCALCITONIN  --   --   --   --   --   --   --   --  0.03 0.03  --   --    LACTATE  --   --   --   --   --   --  1.7 2.8*  --   --   --  1.4   D DIMER QUANT  --   --   --  1.06*  --   --   --   --   --   --   --   --     < > = values in this interval not displayed.         Lab 12/10/23  0515 12/09/23  0532 12/08/23  0511   SODIUM 138 139 139   POTASSIUM 3.9 3.7 3.6   CHLORIDE 95* 94* 93*   CO2 32.3* 35.5* 32.6*   ANION GAP 10.7 9.5 13.4   BUN 29* 24* 20   CREATININE 0.66 0.70 0.70   EGFR 94.5 93.2 93.2   GLUCOSE 180* 164* 156*   CALCIUM 9.2 9.1 8.9   MAGNESIUM 2.1 2.1 2.0   PHOSPHORUS 3.2 3.4 3.4         Lab 12/04/23  1835   TOTAL PROTEIN 6.6   ALBUMIN 3.8   GLOBULIN 2.8   ALT (SGPT) 14   AST (SGOT) 17   BILIRUBIN 0.6   ALK PHOS 100         Lab 12/09/23  0532 12/04/23  2101 12/04/23  1835   PROBNP 1,618.0*  --  1,413.0*   HSTROP T  --  12 12                 Lab  12/04/23  1808   PH, ARTERIAL 7.387   PCO2, ARTERIAL 63.9*   PO2 ART 63.4*   O2 SATURATION ART 91.4*   HCO3 ART 37.6*   BASE EXCESS ART 10.3*   CARBOXYHEMOGLOBIN 1.0     Brief Urine Lab Results       None          [x]  Microbiology   Microbiology Results (last 10 days)       Procedure Component Value - Date/Time    S. Pneumo Ag Urine or CSF - Urine, Urine, Clean Catch [[id number]]  (Normal) Collected: 12/06/23 1503    Lab Status: Final result Specimen: Urine, Clean Catch Updated: 12/06/23 1528     Strep Pneumo Ag Negative    Legionella Antigen, Urine - Urine, Urine, Clean Catch [[id number]]  (Normal) Collected: 12/06/23 1503    Lab Status: Final result Specimen: Urine, Clean Catch Updated: 12/06/23 1528     LEGIONELLA ANTIGEN, URINE Negative    Respiratory Panel PCR w/COVID-19(SARS-CoV-2) COMPA/KIANA/DREW/PAD/COR/ERIC In-House, NP Swab in UTM/VTM, 2 HR TAT - Swab, Nasopharynx [[id number]]  (Abnormal) Collected: 12/06/23 1500    Lab Status: Final result Specimen: Swab from Nasopharynx Updated: 12/06/23 1604     ADENOVIRUS, PCR Not Detected     Coronavirus 229E Not Detected     Coronavirus HKU1 Not Detected     Coronavirus NL63 Not Detected     Coronavirus OC43 Not Detected     COVID19 Detected     Human Metapneumovirus Not Detected     Human Rhinovirus/Enterovirus Not Detected     Influenza A PCR Not Detected     Influenza B PCR Not Detected     Parainfluenza Virus 1 Not Detected     Parainfluenza Virus 2 Not Detected     Parainfluenza Virus 3 Not Detected     Parainfluenza Virus 4 Not Detected     RSV, PCR Not Detected     Bordetella pertussis pcr Not Detected     Bordetella parapertussis PCR Not Detected     Chlamydophila pneumoniae PCR Not Detected     Mycoplasma pneumo by PCR Not Detected    Narrative:      In the setting of a positive respiratory panel with a viral infection PLUS a negative procalcitonin without other underlying concern for bacterial infection, consider observing off antibiotics or discontinuation of  antibiotics and continue supportive care. If the respiratory panel is positive for atypical bacterial infection (Bordetella pertussis, Chlamydophila pneumoniae, or Mycoplasma pneumoniae), consider antibiotic de-escalation to target atypical bacterial infection.    MRSA Screen, PCR (Inpatient) - Swab, Nares [[id number]]  (Normal) Collected: 12/06/23 1500    Lab Status: Final result Specimen: Swab from Nares Updated: 12/06/23 1617     MRSA PCR No MRSA Detected    Narrative:      The negative predictive value of this diagnostic test is high and should only be used to consider de-escalating anti-MRSA therapy. A positive result may indicate colonization with MRSA and must be correlated clinically.    Blood Culture - Blood, Arm, Left [[id number]]  (Normal) Collected: 12/04/23 1835    Lab Status: Final result Specimen: Blood from Arm, Left Updated: 12/09/23 1900     Blood Culture No growth at 5 days    Blood Culture - Blood, Arm, Left [[id number]]  (Normal) Collected: 12/04/23 1835    Lab Status: Final result Specimen: Blood from Arm, Left Updated: 12/09/23 1900     Blood Culture No growth at 5 days    COVID-19, FLU A/B, RSV PCR 1 HR TAT - Swab, Nasopharynx [[id number]]  (Abnormal) Collected: 12/04/23 1811    Lab Status: Final result Specimen: Swab from Nasopharynx Updated: 12/04/23 1929     COVID19 Detected     Influenza A PCR Not Detected     Influenza B PCR Not Detected     RSV, PCR Not Detected    Narrative:      Fact sheet for providers: https://www.fda.gov/media/001655/download    Fact sheet for patients: https://www.fda.gov/media/512645/download    Test performed by PCR.          [x]  Radiology  XR Chest 1 View    Result Date: 12/8/2023    1. Pleural/parenchymal opacities in the right base, as before, which could be related to small pleural effusion and atelectasis or infiltrate. 2. Stable cardiomegaly.       ANGEL DEVINE MD       Electronically Signed and Approved By: ANGEL DEVINE MD on 12/08/2023 at 18:30              XR Chest 1 View    Result Date: 12/4/2023    Cardiomegaly with central vascular congestion suggesting mild congestive heart failure or volume overload.       ANDERS MAGALLON MD       Electronically Signed and Approved By: ANDERS MAGALLON MD on 12/04/2023 at 18:11            []  EKG/Telemetry   []  Cardiology/Vascular   []  Pathology  []  Old records  []  Other:    Assessment & Plan   Assessment / Plan     Assessment:  Acute on chronic hypoxic respiratory failure due to COVID-19 infection  Possible COVID-19 pneumonia  Lactic acidosis  Chronic diastolic CHF  Hypertension  ANABELLE  Depression  Anxiety    Plan:  Patient currently being managed in hospitalist service.  On 4 L via humidified HFNC.  Continue to titrate oxygen as tolerated to maintain SpO2 greater than 90%.  Patient is on 3 L/min of oxygen via NC at home at baseline.  CT chest without contrast showed small right pleural effusion, small pericardial effusion along with consolidation in the left lower lobe with air bronchograms possibly pneumonia.  Was on Unasyn IV every 6 hours.  Transitioning to oral Augmentin twice daily from today given oxygen requirement improving.  Continue given patient is still requiring high oxygen requirement, currently on 6 L via humidified HFNC.  Continue Lasix IV 40 mg twice daily.  Continue Aldactone to 50 mg daily.  D-dimer elevated.  Holding CT PE currently given oxygen requirement improving.  Strep pneumo antigen and Legionella antigen negative.  Respiratory viral panel only positive for COVID-19.  MRSA PCR negative.  Blood culture showing no growth at 5 days.  Transthoracic echo showed normal left ventricular systolic function, mild aortic stenosis and trace MR along with trace TR.  On Decadron 6 mg daily for 10 days.  Lactic acidosis resolved.  Continue bronchodilators and bronchopulmonary protocol.  Pulmonology following the patient, appreciate further input.  Continue rest of the current management.  Likely discharge in next  24 hours if oxygen requirement improves.        DVT prophylaxis:  Medical and mechanical DVT prophylaxis orders are present.    CODE STATUS:   Level Of Support Discussed With: Patient  Code Status (Patient has no pulse and is not breathing): CPR (Attempt to Resuscitate)  Medical Interventions (Patient has pulse or is breathing): Full Support      Electronically signed by Julia Lagunas MD, 12/10/23, 12:41 PM EST.

## 2023-12-10 NOTE — PROGRESS NOTES
Pulmonary / Critical Care Progress Note      Patient Name: Aida Conner  : 1955  MRN: [id number]  Primary Care Physician:  Serenity Lucas APRN  Date of admission: 2023    Subjective   Subjective   Follow-up for COVID-19 pneumonia    No acute events overnight.  Wore BiPAP    This morning,  On 4 L nasal cannula  Sitting in chair  Goals well today  Continues Decadron  Epistaxis resolving  Diuresing well  - 1 L net output    Review of Systems  General: Fatigue, otherwise denied complaints  HEENT: Denied complaints  Respiratory: Dyspnea, otherwise denied complaints  Cardiovascular: Denied complaints  GI: Denied complaints  : Denied complaints  MSK: Generalized weakness, otherwise denied complaints    Objective   Objective     Vitals:   Temp:  [97.7 °F (36.5 °C)-98.6 °F (37 °C)] 98.1 °F (36.7 °C)  Heart Rate:  [] 88  Resp:  [18-20] 18  BP: (131-166)/() 131/84  Flow (L/min):  [4-6] 4  Physical Exam   Vital Signs Reviewed   General:  Alert, NAD. Sitting up in chair  HEENT:  PERRL, EOMI.    Neck:  No JVD, no thyromegaly  Lymph: no axillary, cervical, supraclavicular lymphadenopathy noted bilaterally  Chest:  Clear to auscultation bilaterally, no work of breathing noted on 10L HFNC  CV: RRR, no M/G/R, pulses 2+  Abd:  Soft, NT, ND, +BS, obese  EXT:  no clubbing, no cyanosis, no edema  Neuro:  A&Ox3, CN grossly intact, no focal deficits.  Skin: No rashes or lesions noted    Result Review    Result Review:  I have personally reviewed the results from the time of this admission to 12/10/2023 07:33 EST and agree with these findings:  [x]  Laboratory  [x]  Microbiology  [x]  Radiology  []  EKG/Telemetry   []  Cardiology/Vascular   []  Pathology  []  Old records  []  Other:  Most notable findings include:      echo EF 50%        Lab 12/10/23  0515 23  0532 23  1742 23  0511 23  0443 23  0448 23  0320 23  1835   WBC 9.23 8.36  --  8.13 7.48 6.72 5.44 5.98    HEMOGLOBIN 11.4* 11.3* 11.5* 11.0* 10.2* 10.4* 10.6* 10.2*   HEMATOCRIT 38.9 38.5 39.0 38.4 36.0 35.9 38.3 36.5   PLATELETS 243 249  --  233 231 239 209 237   SODIUM 138 139  --  139 139 140 141 142   POTASSIUM 3.9 3.7  --  3.6 3.5 3.5 3.9 3.8   CHLORIDE 95* 94*  --  93* 93* 93* 96* 99   CO2 32.3* 35.5*  --  32.6* 35.5* 38.4* 35.3* 33.4*   BUN 29* 24*  --  20 21 17 9 10   CREATININE 0.66 0.70  --  0.70 0.70 0.70 0.61 0.71   GLUCOSE 180* 164*  --  156* 149* 168* 140* 98   CALCIUM 9.2 9.1  --  8.9 8.5* 8.7 8.4* 9.0   PHOSPHORUS 3.2 3.4  --  3.4 3.5  --   --   --    TOTAL PROTEIN  --   --   --   --   --   --   --  6.6   ALBUMIN  --   --   --   --   --   --   --  3.8   GLOBULIN  --   --   --   --   --   --   --  2.8       Assessment & Plan   Assessment / Plan     Active Hospital Problems:  Active Hospital Problems    Diagnosis     **Acute respiratory disease due to COVID-19 virus     Acute respiratory failure with hypercapnia     Chronic diastolic congestive heart failure     Anxiety disorder     Depression     Hypertension, essential     Obstructive sleep apnea      Impression:  COVID-19 pneumonia  Acute on chronic hypoxic and hypercapnic respiratory failure  COPD exacerbation  Lactic acidosis, clinically significant  Obstructive of sleep apnea  Obesity hypoventilation syndrome  Morbid obesity  Epistaxis  Hypokalemia     Plan:  -On 4 L humidified nasal cannula.  Patient's baseline is 2 to 3 L.  Continue to wean to maintain SpO2 greater than 90%  -Encourage NIPPV at at night and during naps  -Continue cefepime and deescalate when appropriate  -Continue Decadron 6 mg oral daily for 10 days total  -Continue bronchodilator and bronchopulmonary protocols.  Encourage I-S and flutter  -Continue nebulizers  -Continue Lasix 40 mg IV twice daily and Aldactone 50 mg daily. Give metolazone 5 mg one-time  -Continue to monitor renal panel electrolytes.  Replace potassium orally  -Glucose control per primary  -Encouraged weight  loss  -Encourage mobilization.  Out of bed to chair  -Of note, patient on Arimidex for history of breast cancer  -Consulted RT  for home oxygen needs  -Patient agreeable to home health care at discharge  -Follow-up with pulmonary clinic 1 to 2 weeks after discharge        DVT prophylaxis:  Medical and mechanical DVT prophylaxis orders are present.    CODE STATUS:   Level Of Support Discussed With: Patient  Code Status (Patient has no pulse and is not breathing): CPR (Attempt to Resuscitate)  Medical Interventions (Patient has pulse or is breathing): Full Support    Labs, imaging, microbiology, notes and medications personally reviewed  Discussed with primary    I, Dr. Kevin Borrero, have spent more than 50% of the total time managing the patient in this encounter today.  This included personally reviewing all pertinent labs, imaging, microbiology and documentation. Also discussing the case with the patient and any available family, the admitting physician and any available ancillary staff.    Electronically signed by SHAUNA Aguilar, 12/10/23, 7:33 AM EST.  Electronically signed by Kevin Borrero MD, 12/10/23, 12:48 PM EST.

## 2023-12-11 ENCOUNTER — READMISSION MANAGEMENT (OUTPATIENT)
Dept: CALL CENTER | Facility: HOSPITAL | Age: 68
End: 2023-12-11
Payer: MEDICARE

## 2023-12-11 VITALS
WEIGHT: 293 LBS | BODY MASS INDEX: 48.82 KG/M2 | SYSTOLIC BLOOD PRESSURE: 107 MMHG | RESPIRATION RATE: 18 BRPM | HEART RATE: 99 BPM | HEIGHT: 65 IN | DIASTOLIC BLOOD PRESSURE: 64 MMHG | OXYGEN SATURATION: 95 % | TEMPERATURE: 98 F

## 2023-12-11 LAB
ANION GAP SERPL CALCULATED.3IONS-SCNC: 12.7 MMOL/L (ref 5–15)
BASOPHILS # BLD AUTO: 0.01 10*3/MM3 (ref 0–0.2)
BASOPHILS NFR BLD AUTO: 0.1 % (ref 0–1.5)
BUN SERPL-MCNC: 34 MG/DL (ref 8–23)
BUN/CREAT SERPL: 40.5 (ref 7–25)
CALCIUM SPEC-SCNC: 9.6 MG/DL (ref 8.6–10.5)
CHLORIDE SERPL-SCNC: 90 MMOL/L (ref 98–107)
CO2 SERPL-SCNC: 31.3 MMOL/L (ref 22–29)
CREAT SERPL-MCNC: 0.84 MG/DL (ref 0.57–1)
DEPRECATED RDW RBC AUTO: 47.4 FL (ref 37–54)
EGFRCR SERPLBLD CKD-EPI 2021: 74.9 ML/MIN/1.73
EOSINOPHIL # BLD AUTO: 0 10*3/MM3 (ref 0–0.4)
EOSINOPHIL NFR BLD AUTO: 0 % (ref 0.3–6.2)
ERYTHROCYTE [DISTWIDTH] IN BLOOD BY AUTOMATED COUNT: 15.9 % (ref 12.3–15.4)
GLUCOSE SERPL-MCNC: 191 MG/DL (ref 65–99)
HCT VFR BLD AUTO: 40 % (ref 34–46.6)
HGB BLD-MCNC: 11.9 G/DL (ref 12–15.9)
IMM GRANULOCYTES # BLD AUTO: 0.1 10*3/MM3 (ref 0–0.05)
IMM GRANULOCYTES NFR BLD AUTO: 0.8 % (ref 0–0.5)
LYMPHOCYTES # BLD AUTO: 0.38 10*3/MM3 (ref 0.7–3.1)
LYMPHOCYTES NFR BLD AUTO: 3 % (ref 19.6–45.3)
MAGNESIUM SERPL-MCNC: 2.2 MG/DL (ref 1.6–2.4)
MCH RBC QN AUTO: 24.5 PG (ref 26.6–33)
MCHC RBC AUTO-ENTMCNC: 29.8 G/DL (ref 31.5–35.7)
MCV RBC AUTO: 82.5 FL (ref 79–97)
MONOCYTES # BLD AUTO: 0.72 10*3/MM3 (ref 0.1–0.9)
MONOCYTES NFR BLD AUTO: 5.7 % (ref 5–12)
NEUTROPHILS NFR BLD AUTO: 11.44 10*3/MM3 (ref 1.7–7)
NEUTROPHILS NFR BLD AUTO: 90.4 % (ref 42.7–76)
NRBC BLD AUTO-RTO: 0 /100 WBC (ref 0–0.2)
PHOSPHATE SERPL-MCNC: 4.6 MG/DL (ref 2.5–4.5)
PLATELET # BLD AUTO: 282 10*3/MM3 (ref 140–450)
PMV BLD AUTO: 11.1 FL (ref 6–12)
POTASSIUM SERPL-SCNC: 4 MMOL/L (ref 3.5–5.2)
RBC # BLD AUTO: 4.85 10*6/MM3 (ref 3.77–5.28)
SODIUM SERPL-SCNC: 134 MMOL/L (ref 136–145)
WBC NRBC COR # BLD AUTO: 12.65 10*3/MM3 (ref 3.4–10.8)

## 2023-12-11 PROCEDURE — 84100 ASSAY OF PHOSPHORUS: CPT | Performed by: STUDENT IN AN ORGANIZED HEALTH CARE EDUCATION/TRAINING PROGRAM

## 2023-12-11 PROCEDURE — 63710000001 DEXAMETHASONE PER 0.25 MG: Performed by: FAMILY MEDICINE

## 2023-12-11 PROCEDURE — 83735 ASSAY OF MAGNESIUM: CPT | Performed by: STUDENT IN AN ORGANIZED HEALTH CARE EDUCATION/TRAINING PROGRAM

## 2023-12-11 PROCEDURE — 85025 COMPLETE CBC W/AUTO DIFF WBC: CPT | Performed by: STUDENT IN AN ORGANIZED HEALTH CARE EDUCATION/TRAINING PROGRAM

## 2023-12-11 PROCEDURE — 25010000002 FUROSEMIDE PER 20 MG: Performed by: FAMILY MEDICINE

## 2023-12-11 PROCEDURE — 94761 N-INVAS EAR/PLS OXIMETRY MLT: CPT

## 2023-12-11 PROCEDURE — 94799 UNLISTED PULMONARY SVC/PX: CPT

## 2023-12-11 PROCEDURE — 99232 SBSQ HOSP IP/OBS MODERATE 35: CPT | Performed by: INTERNAL MEDICINE

## 2023-12-11 PROCEDURE — 80048 BASIC METABOLIC PNL TOTAL CA: CPT | Performed by: FAMILY MEDICINE

## 2023-12-11 PROCEDURE — 94664 DEMO&/EVAL PT USE INHALER: CPT

## 2023-12-11 PROCEDURE — 99239 HOSP IP/OBS DSCHRG MGMT >30: CPT | Performed by: STUDENT IN AN ORGANIZED HEALTH CARE EDUCATION/TRAINING PROGRAM

## 2023-12-11 RX ORDER — ALBUTEROL SULFATE 90 UG/1
2 AEROSOL, METERED RESPIRATORY (INHALATION) EVERY 4 HOURS PRN
Qty: 0.05 G | Refills: 0 | Status: SHIPPED | OUTPATIENT
Start: 2023-12-11 | End: 2024-01-26

## 2023-12-11 RX ORDER — LISINOPRIL 10 MG/1
10 TABLET ORAL DAILY
Qty: 30 TABLET | Refills: 0 | Status: SHIPPED | OUTPATIENT
Start: 2023-12-12 | End: 2024-01-11

## 2023-12-11 RX ORDER — AMOXICILLIN AND CLAVULANATE POTASSIUM 875; 125 MG/1; MG/1
1 TABLET, FILM COATED ORAL EVERY 12 HOURS SCHEDULED
Qty: 12 TABLET | Refills: 0 | Status: SHIPPED | OUTPATIENT
Start: 2023-12-11 | End: 2023-12-17

## 2023-12-11 RX ORDER — FUROSEMIDE 40 MG/1
40 TABLET ORAL DAILY
Qty: 30 TABLET | Refills: 0 | Status: SHIPPED | OUTPATIENT
Start: 2023-12-11 | End: 2024-01-10

## 2023-12-11 RX ORDER — ECHINACEA PURPUREA EXTRACT 125 MG
2 TABLET ORAL 2 TIMES DAILY
Qty: 1 EACH | Refills: 0 | Status: SHIPPED | OUTPATIENT
Start: 2023-12-11 | End: 2023-12-25

## 2023-12-11 RX ORDER — DEXAMETHASONE 6 MG/1
6 TABLET ORAL
Qty: 3 TABLET | Refills: 0 | Status: SHIPPED | OUTPATIENT
Start: 2023-12-12 | End: 2023-12-15

## 2023-12-11 RX ORDER — SPIRONOLACTONE 50 MG/1
50 TABLET, FILM COATED ORAL DAILY
Qty: 30 TABLET | Refills: 0 | Status: SHIPPED | OUTPATIENT
Start: 2023-12-12 | End: 2024-01-11

## 2023-12-11 RX ADMIN — Medication 2000 UNITS: at 09:37

## 2023-12-11 RX ADMIN — ARFORMOTEROL TARTRATE 15 MCG: 15 SOLUTION RESPIRATORY (INHALATION) at 06:55

## 2023-12-11 RX ADMIN — LISINOPRIL 10 MG: 10 TABLET ORAL at 09:38

## 2023-12-11 RX ADMIN — IPRATROPIUM BROMIDE AND ALBUTEROL SULFATE 3 ML: .5; 3 SOLUTION RESPIRATORY (INHALATION) at 06:55

## 2023-12-11 RX ADMIN — FUROSEMIDE 40 MG: 10 INJECTION, SOLUTION INTRAMUSCULAR; INTRAVENOUS at 05:58

## 2023-12-11 RX ADMIN — DICYCLOMINE HYDROCHLORIDE 20 MG: 20 TABLET ORAL at 13:07

## 2023-12-11 RX ADMIN — AMOXICILLIN AND CLAVULANATE POTASSIUM 1 TABLET: 875; 125 TABLET, FILM COATED ORAL at 09:37

## 2023-12-11 RX ADMIN — DEXAMETHASONE 6 MG: 0.5 TABLET ORAL at 09:39

## 2023-12-11 RX ADMIN — Medication 10 ML: at 09:40

## 2023-12-11 RX ADMIN — DICYCLOMINE HYDROCHLORIDE 20 MG: 20 TABLET ORAL at 05:58

## 2023-12-11 RX ADMIN — FLUOXETINE HYDROCHLORIDE 40 MG: 20 CAPSULE ORAL at 09:37

## 2023-12-11 RX ADMIN — SALINE NASAL SPRAY 2 SPRAY: 1.5 SOLUTION NASAL at 09:39

## 2023-12-11 RX ADMIN — ANASTROZOLE 1 MG: 1 TABLET ORAL at 09:38

## 2023-12-11 RX ADMIN — SPIRONOLACTONE 50 MG: 25 TABLET ORAL at 09:37

## 2023-12-11 RX ADMIN — GUAIFENESIN 600 MG: 600 TABLET ORAL at 09:37

## 2023-12-11 RX ADMIN — CARVEDILOL 12.5 MG: 12.5 TABLET, FILM COATED ORAL at 09:37

## 2023-12-11 RX ADMIN — PANTOPRAZOLE SODIUM 40 MG: 40 TABLET, DELAYED RELEASE ORAL at 09:39

## 2023-12-11 NOTE — PROGRESS NOTES
Pulmonary / Critical Care Progress Note      Patient Name: Aida Conner  : 1955  MRN: [id number]  Primary Care Physician:  Serenity Lucas APRN  Date of admission: 2023    Subjective   Subjective   Follow-up for COVID-19 pneumonia    Over past 24 hours: Remains on Unasyn.  Continues Decadron 6 mg oral daily for 10 days.  Remains on Lasix 40 mg IV twice daily and Aldactone 50 mg oral daily.  Remains on Brovana    No acute events overnight.  Refused BiPAP    This morning,  On 3 L nasal cannula  Sitting in chair  Feels well today  Continues Decadron  No reports of nosebleed  Diuresing well  -2.1 L net output    Review of Systems  General:  + Fatigue, No Fever  HEENT: No dysphagia, No Visual Changes, no rhinorrhea  Respiratory:  - cough,+Dyspnea, - phlegm, No Pleuritic Pain, + wheezing, no hemoptysis  Cardiovascular: Denies chest pain, denies palpitations,+SOLOMON, No Chest Pressure  Gastrointestinal:  No Abdominal Pain, No Nausea, No Vomiting, No Diarrhea  Genitourinary:  No Dysuria, No Frequency, No Hesitancy  Musculoskeletal: No muscle pain or swelling    Objective   Objective     Vitals:   Temp:  [97.7 °F (36.5 °C)-98.5 °F (36.9 °C)] 98.2 °F (36.8 °C)  Heart Rate:  [] 112  Resp:  [18-21] 18  BP: ()/(67-88) 107/72  Flow (L/min):  [3-4] 3  Physical Exam   Vital Signs Reviewed   General:  Alert, NAD. Sitting up in chair  HEENT:  PERRL, EOMI.    Neck:  No JVD, no thyromegaly  Lymph: no axillary, cervical, supraclavicular lymphadenopathy noted bilaterally  Chest:  Clear to auscultation bilaterally, no work of breathing noted on 3 L nasal cannula  CV: RRR, no M/G/R, pulses 2+  Abd:  Soft, NT, ND, +BS, obese  EXT:  no clubbing, no cyanosis, no edema  Neuro:  A&Ox3, CN grossly intact, no focal deficits.  Skin: No rashes or lesions noted    Result Review    Result Review:  I have personally reviewed the results from the time of this admission to 2023 07:09 EST and agree with these findings:  [x]   Laboratory  [x]  Microbiology  [x]  Radiology  []  EKG/Telemetry   []  Cardiology/Vascular   []  Pathology  []  Old records  []  Other:  Most notable findings include:     12/6 echo EF 50%        Lab 12/11/23  0601 12/10/23  0515 12/09/23  0532 12/08/23  1742 12/08/23  0511 12/07/23  0443 12/06/23  0448 12/05/23  0320 12/04/23  1835   WBC 12.65* 9.23 8.36  --  8.13 7.48 6.72 5.44 5.98   HEMOGLOBIN 11.9* 11.4* 11.3* 11.5* 11.0* 10.2* 10.4* 10.6* 10.2*   HEMATOCRIT 40.0 38.9 38.5 39.0 38.4 36.0 35.9 38.3 36.5   PLATELETS 282 243 249  --  233 231 239 209 237   SODIUM 134* 138 139  --  139 139 140 141 142   POTASSIUM 4.0 3.9 3.7  --  3.6 3.5 3.5 3.9 3.8   CHLORIDE 90* 95* 94*  --  93* 93* 93* 96* 99   CO2 31.3* 32.3* 35.5*  --  32.6* 35.5* 38.4* 35.3* 33.4*   BUN 34* 29* 24*  --  20 21 17 9 10   CREATININE 0.84 0.66 0.70  --  0.70 0.70 0.70 0.61 0.71   GLUCOSE 191* 180* 164*  --  156* 149* 168* 140* 98   CALCIUM 9.6 9.2 9.1  --  8.9 8.5* 8.7 8.4* 9.0   PHOSPHORUS 4.6* 3.2 3.4  --  3.4 3.5  --   --   --    TOTAL PROTEIN  --   --   --   --   --   --   --   --  6.6   ALBUMIN  --   --   --   --   --   --   --   --  3.8   GLOBULIN  --   --   --   --   --   --   --   --  2.8       Assessment & Plan   Assessment / Plan     Active Hospital Problems:  Active Hospital Problems    Diagnosis     **Acute respiratory disease due to COVID-19 virus     Acute respiratory failure with hypercapnia     Chronic diastolic congestive heart failure     Anxiety disorder     Depression     Hypertension, essential     Obstructive sleep apnea      Impression:  COVID-19 pneumonia  Acute on chronic hypoxic and hypercapnic respiratory failure  COPD exacerbation  Lactic acidosis, clinically significant  Obstructive of sleep apnea  Obesity hypoventilation syndrome  Morbid obesity  Epistaxis  Hypokalemia     Plan:  -On 3 L humidified nasal cannula.  Patient's baseline is 2 to 3 L.  Continue to wean to maintain SpO2 greater than 90%  -Encourage NIPPV at  at night and during naps  -Continue Augmentin for 4 days  -Continue Decadron 6 mg oral daily for 10 days total  -Continue bronchodilator and bronchopulmonary protocols.  Encourage I-S and flutter  -Continue nebulizers  -Continue Lasix 40 mg IV twice daily and Aldactone 50 mg daily.   -Continue to monitor renal panel electrolytes.  Replace electrolytes as needed  -Glucose control per primary  -Encouraged weight loss  -Encourage mobilization.  Out of bed to chair  -Of note, patient on Arimidex for history of breast cancer  -Consulted RT  for home oxygen needs  -Patient agreeable to home health care at discharge  -Follow-up with pulmonary clinic 1 to 2 weeks after discharge     DVT prophylaxis:  Medical and mechanical DVT prophylaxis orders are present.    CODE STATUS:   Level Of Support Discussed With: Patient  Code Status (Patient has no pulse and is not breathing): CPR (Attempt to Resuscitate)  Medical Interventions (Patient has pulse or is breathing): Full Support    Labs, imaging, labs, provider notes and medications personally reviewed,.  Discussed with primary service and bedside nurse.    Electronically signed by SHAUNA Aguilar, 12/11/23, 3:28 PM EST.    This visit was performed by BOTH a physician and an APC. I personally evaluated and examined the patient. I performed all aspects of MDM as documented. , I have reviewed and confirmed the accuracy of the patient's history as documented in this note., and I have reexamined the patient and the results are consistent with the previously documented exam. I have updated the documentation as necessary.     Electronically signed by Jakob Dougherty MD, 12/11/23, 5:17 PM EST.   Electronically signed by Jakob Dougherty MD, 12/11/23, 7:09 AM EST.

## 2023-12-11 NOTE — CONSULTS
"Nutrition Services    Patient Name: Aida Conner  YOB: 1955  MRN: [id number]  Admission date: 12/4/2023      CLINICAL NUTRITION ASSESSMENT      Reason for Assessment  Identified at Risk by Screening Criteria  and LOS   H&P:    Past Medical History:   Diagnosis Date    Allergic rhinitis     Anemia     Anxiety     Asthma     Chronic bronchitis     In past, espec. during working years    Chronic diastolic (congestive) heart failure     Chronic hypoxemic respiratory failure     on continuous O2    Coronary artery calcification seen on CT scan 04/21/2022    Diarrhea     with chemo    GERD (gastroesophageal reflux disease) Mild    H/O Intraductal papilloma     Hemorrhoids     History of transfusion 1984    AFTER BACK SURGERY no reaction    Hypertension     IBS (irritable bowel syndrome)     Inflammatory breast cancer     right    Kidney stone     Morbid obesity with BMI of 50.0-59.9, adult     Obesity hypoventilation syndrome 02/05/2021    On home oxygen therapy     2.5 AT REST WITH ACTIVITY UP TO 4L    ANABELLE on CPAP     cpap  & uses O2 with CPAP    Osteoarthritis     Pneumonia     Many times in past, 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 Patient is at low risk per nutrition risk screening (NRS-2002).     No acute nutrition concerns or interventions at this time.      Anthropometrics        Current Height, Weight Height: 165.1 cm (65\")  Weight: (!) 160 kg (353 lb 2.8 oz)   Current BMI Body mass index is 58.77 kg/m².   BMI Classification Obese Class " III   % %   Adjusted Body Weight (ABW) 98 kg   Weight Hx  Wt Readings from Last 30 Encounters:   12/04/23 1740 (!) 160 kg (353 lb 2.8 oz)   08/01/23 1128 (!) 161 kg (353 lb 14.4 oz)   05/01/23 1121 (!) 161 kg (356 lb)   04/28/23 1316 (!) 163 kg (360 lb)   01/24/23 1028 (!) 157 kg (346 lb 12.8 oz)   12/08/22 1140 (!) 153 kg (336 lb 13.8 oz)   10/12/22 1300 (!) 154 kg (339 lb 8.1 oz)   10/07/22 1025 (!) 154 kg (338 lb 12.8 oz)   10/04/22 1019 (!) 155 kg (340 lb 11.2 oz)   07/11/22 1037 (!) 148 kg (327 lb 1.6 oz)   04/21/22 0940 (!) 145 kg (320 lb)   04/21/22 1040 (!) 147 kg (324 lb)   04/05/22 0645 (!) 145 kg (320 lb 5.3 oz)   03/31/22 1147 (!) 145 kg (318 lb 12.8 oz)   03/23/22 1136 (!) 146 kg (321 lb)   03/10/22 1226 (!) 146 kg (321 lb)   02/17/22 1250 (!) 145 kg (318 lb 9.6 oz)   01/27/22 1414 (!) 141 kg (311 lb 6.4 oz)   01/06/22 1253 (!) 141 kg (311 lb 3.2 oz)   01/05/22 1051 (!) 143 kg (316 lb)   12/16/21 1054 (!) 144 kg (316 lb 9.6 oz)   12/13/21 0948 (!) 147 kg (323 lb 13.7 oz)   12/09/21 1144 (!) 148 kg (327 lb)   12/02/21 1049 (!) 148 kg (326 lb)   11/18/21 1216 (!) 148 kg (326 lb 12.8 oz)   11/10/21 1339 (!) 147 kg (324 lb 4.8 oz)   10/27/21 1207 (!) 152 kg (334 lb 12.8 oz)   10/26/21 1332 (!) 152 kg (334 lb 10.5 oz)   10/26/21 1330 (!) 159 kg (350 lb 1.5 oz)   10/21/21 1341 (!) 151 kg (331 lb 12.7 oz)   10/05/21 1041 (!) 152 kg (335 lb 3.2 oz)            Wt Change Observation Trending up x 1 year     Estimated/Assessed Needs  Estimated Needs based on: Adjusted Body Weight 98 kg       Energy Requirements 25-30 kcal/kg   EST Needs (kcal/day) 5292-5772 kcal       Protein Requirements 1.0-1.2 g/kg   EST Daily Needs (g/day)  g       Fluid Requirements 25 ml/kg    Estimated Needs (mL/day) 2450 ml     Labs/Medications         Pertinent Labs Reviewed.   Results from last 7 days   Lab Units 12/11/23  0601 12/10/23  0515 12/09/23  0532 12/05/23  0320 12/04/23  1835   SODIUM mmol/L 134* 138 139   < > 142    POTASSIUM mmol/L 4.0 3.9 3.7   < > 3.8   CHLORIDE mmol/L 90* 95* 94*   < > 99   CO2 mmol/L 31.3* 32.3* 35.5*   < > 33.4*   BUN mg/dL 34* 29* 24*   < > 10   CREATININE mg/dL 0.84 0.66 0.70   < > 0.71   CALCIUM mg/dL 9.6 9.2 9.1   < > 9.0   BILIRUBIN mg/dL  --   --   --   --  0.6   ALK PHOS U/L  --   --   --   --  100   ALT (SGPT) U/L  --   --   --   --  14   AST (SGOT) U/L  --   --   --   --  17   GLUCOSE mg/dL 191* 180* 164*   < > 98    < > = values in this interval not displayed.     Results from last 7 days   Lab Units 12/11/23  0601 12/10/23  0515 12/09/23  0532   MAGNESIUM mg/dL 2.2 2.1 2.1   PHOSPHORUS mg/dL 4.6* 3.2 3.4   HEMOGLOBIN g/dL 11.9* 11.4* 11.3*   HEMATOCRIT % 40.0 38.9 38.5     COVID19   Date Value Ref Range Status   12/06/2023 Detected (C) Not Detected - Ref. Range Final     Lab Results   Component Value Date    HGBA1C 6.08 (H) 05/14/2021         Pertinent Medications Reviewed.     Malnutrition Severity Assessment                Nutrition Diagnosis         Nutrition Dx Problem 1 No nutrition diagnosis at this time.       Nutrition Intervention        Current Nutrition Orders & Evaluation of Intake       Oral Nutrition     Current PO Diet Diet: Cardiac Diets, Fluid Restriction (240 mL/tray) Diets; Healthy Heart (2-3 Na+); 2000 mL/day; Texture: Regular Texture (IDDSI 7); Fluid Consistency: Thin (IDDSI 0)   Supplement No active supplement orders     PO Intake %   No further nutrition intervention indicated.       Medical Nutrition Therapy/Nutrition Education          Learner     Readiness Patient  Education not indicated.      Method     Response N/A  N/A     Monitor/Evaluation        Monitor Per protocol     Nutrition Discharge Plan         No discharge nutrition needs identified.      Electronically signed by:  Kylee Batista RD  12/11/23 14:01 EST

## 2023-12-12 NOTE — DISCHARGE SUMMARY
[facility]         HOSPITALIST  DISCHARGE SUMMARY    Patient Name: Aida Conner  : 1955  MRN: [id number]    Date of Admission: 2023  Date of Discharge:  2023  Primary Care Physician: Serenity Lucas APRN    Consults       No orders found from 2023 to 2023.            Active and Resolved Hospital Problems:  Active Hospital Problems    Diagnosis POA    **Acute respiratory disease due to COVID-19 virus [U07.1, J06.9] Yes    Acute respiratory failure with hypercapnia [J96.02] Unknown    Chronic diastolic congestive heart failure [I50.32] Yes    Anxiety disorder [F41.9] Yes    Depression [F32.A] Yes    Hypertension, essential [I10] Yes    Obstructive sleep apnea [G47.33] Yes      Resolved Hospital Problems   No resolved problems to display.       Hospital Course     Hospital Course:  Aida Conner is a 68 y.o. female with past medical history of hypertension, CHF, COPD on home oxygen of 3 L/min, anxiety, morbid obesity, ANABELLE and GERD who presented to ED with complaints of shortness of breath.  She was found to have acute hypoxic respiratory failure likely due to COVID-19 infection.  She was on 7 L/min of oxygen via NC to maintain saturation.  Patient was started on oral Decadron.  Remdesivir was held.  Patient was admitted to medicine department for further evaluation and management of acute on chronic hypoxic respiratory failure likely due to underlying COVID infection. Patient was requiring about 10 L/min Humidified O2 to maintain saturation, which was eventually weaned off to 3 L/min which is her baseline O2 requirement. Hospital course complicated by epistaxis which was controlled by conservative management. Chronic epistaxis history, outpatient follow up with ENT. Patient was on Decadron and IV antibiotics.    Patient is planned to be discharged today. She will follow up with her PCP and pulmonologist as suggested. Also outpatient follow up with ENT for chronic  epistaxis and Neurology for hand tremors evaluation. Advised to be complaint with medications and diet. Patient is stable at the time of discharge.        DISCHARGE Follow Up Recommendations for labs and diagnostics:   As mentioned above.    Day of Discharge     Vital Signs:  Temp:  [98 °F (36.7 °C)-98.2 °F (36.8 °C)] 98 °F (36.7 °C)  Heart Rate:  [] 99  Resp:  [18] 18  BP: (107-127)/(64-91) 107/64  Flow (L/min):  [3-4] 3  Physical Exam:   General: Awake, alert, NAD; morbidly obese.  Cardiovascular: RRR, no murmurs   Pulmonary: CTA bilaterally; no wheezes; no conversational dyspnea; on 3 L/min via humidified HFNC.  Gastrointestinal: S/ND/NT, +BS  Musculoskeletal: No gross deformities  Skin: No jaundice, no rash on exposed skin appreciated  Neuro: Alert, awake, oriented x 3; speech clear; : No Kiser catheter; no suprapubic tenderness     Discharge Details        Discharge Medications        New Medications        Instructions Start Date   amoxicillin-clavulanate 875-125 MG per tablet  Commonly known as: AUGMENTIN   1 tablet, Oral, Every 12 Hours Scheduled      dexAMETHasone 6 MG tablet  Commonly known as: DECADRON   6 mg, Oral, Daily With Breakfast      sodium chloride 0.65 % nasal spray   2 sprays, Nasal, 2 Times Daily             Changes to Medications        Instructions Start Date   furosemide 40 MG tablet  Commonly known as: Lasix  What changed: how much to take   40 mg, Oral, Daily      lisinopril 10 MG tablet  Commonly known as: PRINIVIL,ZESTRIL  What changed:   medication strength  how much to take   10 mg, Oral, Daily      spironolactone 50 MG tablet  Commonly known as: ALDACTONE  What changed:   medication strength  how much to take   50 mg, Oral, Daily             Continue These Medications        Instructions Start Date   albuterol sulfate  (90 Base) MCG/ACT inhaler  Commonly known as: PROVENTIL HFA;VENTOLIN HFA;PROAIR HFA   2 puffs, Inhalation, Every 4 Hours PRN      anastrozole 1 MG  tablet  Commonly known as: ARIMIDEX   TAKE 1 TABLET BY MOUTH EVERY DAY      carvedilol 25 MG tablet  Commonly known as: COREG   12.5 mg, Oral, 2 Times Daily With Meals      dicyclomine 20 MG tablet  Commonly known as: BENTYL   TAKE 1 TABLET BY MOUTH EVERY 6 HOURS      FeroSul 325 (65 Fe) MG tablet  Generic drug: ferrous sulfate   1 tablet, Oral, 2 Times Daily With Meals      FLUoxetine 40 MG capsule  Commonly known as: PROzac   TAKE 1 CAPSULE BY MOUTH EVERY DAY      meloxicam 15 MG tablet  Commonly known as: MOBIC   0.5 tablets, Oral, Daily      mometasone-formoterol 100-5 MCG/ACT inhaler  Commonly known as: DULERA 100   2 puffs, Inhalation, 2 Times Daily - RT      montelukast 10 MG tablet  Commonly known as: SINGULAIR   10 mg, Oral, Nightly      multivitamin tablet tablet   1 tablet, Oral, Daily      pantoprazole 40 MG EC tablet  Commonly known as: PROTONIX   1 tablet, Oral, Daily      pravastatin 40 MG tablet  Commonly known as: PRAVACHOL   40 mg, Oral, Nightly      Vitamin D 50 MCG (2000 UT) capsule   2,000 Units, Oral, Daily             Stop These Medications      Paxlovid (300/100) 20 x 150 MG & 10 x 100MG tablet therapy pack tablet  Generic drug: Nirmatrelvir & Ritonavir (300mg/100mg)              Allergies   Allergen Reactions    Amlodipine Swelling     LEGS     Hydrochlorothiazide Unknown - Low Severity     Neuro issues    Morphine Nausea And Vomiting     hallucinations    Adhesive Tape Rash       Discharge Disposition:  Home-Health Care [facility]    Diet:  Hospital:No active diet order      Discharge Activity:   Activity Instructions    As tolerated           CODE STATUS:  Code Status and Medical Interventions:   Ordered at: 12/04/23 2110     Level Of Support Discussed With:    Patient     Code Status (Patient has no pulse and is not breathing):    CPR (Attempt to Resuscitate)     Medical Interventions (Patient has pulse or is breathing):    Full Support       Future Appointments   Date Time Provider Department  [place]   12/26/2023 10:15 AM Anna Dueñas APRN [facility]   12/29/2023  8:20 AM LAB CHAIR 6 CBC [facility]  LAB [facility] [place]   12/29/2023  8:40 AM Jaime Azul MD [facility] [place]       Additional Instructions for the Follow-ups that You Need to Schedule       Discharge Follow-up with PCP   As directed       Currently Documented PCP:    Serenity Lucas APRN    PCP Phone Number:    [phone]     Follow Up Details: In 3-7 days' needs CBC and BMP trended during the follow up visit.        Discharge Follow-up with Specialty: ENT; 2 Weeks   As directed      Specialty: ENT   Follow Up: 2 Weeks   Follow Up Details: For Epistaxis evaluation        Discharge Follow-up with Specified Provider: Pulmonology; 2 Weeks   As directed      To: Pulmonology   Follow Up: 2 Weeks       Additional information on Labs and Follow-ups:    Follow up with SHAUNA On December18th at 1:45 pm with Lance Benton PCP           Pertinent  and/or Most Recent Results     PROCEDURES:   N/A    LAB RESULTS:      Lab 12/11/23  0601 12/10/23  0515 12/09/23  0532 12/08/23  1742 12/08/23  0800 12/08/23  0511 12/07/23  0443 12/06/23  1553 12/06/23  1252 12/06/23  0448 12/05/23  0320   WBC 12.65* 9.23 8.36  --   --  8.13 7.48  --   --  6.72 5.44   HEMOGLOBIN 11.9* 11.4* 11.3* 11.5*  --  11.0* 10.2*  --   --  10.4* 10.6*   HEMATOCRIT 40.0 38.9 38.5 39.0  --  38.4 36.0  --   --  35.9 38.3   PLATELETS 282 243 249  --   --  233 231  --   --  239 209   NEUTROS ABS 11.44* 8.25* 7.52*  --   --  7.35* 6.80  --   --   --   --    IMMATURE GRANS (ABS) 0.10* 0.07* 0.06*  --   --  0.04 0.04  --   --   --   --    LYMPHS ABS 0.38* 0.34* 0.29*  --   --  0.26* 0.28*  --   --   --   --    MONOS ABS 0.72 0.56 0.48  --   --  0.48 0.36  --   --   --   --    EOS ABS 0.00 0.00 0.00  --   --  0.00 0.00  --   --   --   --    MCV 82.5 83.5 84.2  --   --  84.6 86.5  --   --  85.5 89.5   PROCALCITONIN  --   --   --   --   --   --   --   --   --  0.03 0.03   LACTATE  --   --    --   --   --   --   --  1.7 2.8*  --   --    D DIMER QUANT  --   --   --   --  1.06*  --   --   --   --   --   --          Lab 12/11/23  0601 12/10/23  0515 12/09/23  0532 12/08/23  0511 12/07/23  0443   SODIUM 134* 138 139 139 139   POTASSIUM 4.0 3.9 3.7 3.6 3.5   CHLORIDE 90* 95* 94* 93* 93*   CO2 31.3* 32.3* 35.5* 32.6* 35.5*   ANION GAP 12.7 10.7 9.5 13.4 10.5   BUN 34* 29* 24* 20 21   CREATININE 0.84 0.66 0.70 0.70 0.70   EGFR 74.9 94.5 93.2 93.2 93.2   GLUCOSE 191* 180* 164* 156* 149*   CALCIUM 9.6 9.2 9.1 8.9 8.5*   MAGNESIUM 2.2 2.1 2.1 2.0 1.9   PHOSPHORUS 4.6* 3.2 3.4 3.4 3.5             Lab 12/09/23  0532   PROBNP 1,618.0*                 Brief Urine Lab Results       None          Microbiology Results (last 10 days)       Procedure Component Value - Date/Time    S. Pneumo Ag Urine or CSF - Urine, Urine, Clean Catch [[id number]]  (Normal) Collected: 12/06/23 1503    Lab Status: Final result Specimen: Urine, Clean Catch Updated: 12/06/23 1528     Strep Pneumo Ag Negative    Legionella Antigen, Urine - Urine, Urine, Clean Catch [[id number]]  (Normal) Collected: 12/06/23 1503    Lab Status: Final result Specimen: Urine, Clean Catch Updated: 12/06/23 1528     LEGIONELLA ANTIGEN, URINE Negative    Respiratory Panel PCR w/COVID-19(SARS-CoV-2) COMPA/KIANA/DREW/PAD/COR/ERIC In-House, NP Swab in UTM/VTM, 2 HR TAT - Swab, Nasopharynx [[id number]]  (Abnormal) Collected: 12/06/23 1500    Lab Status: Final result Specimen: Swab from Nasopharynx Updated: 12/06/23 1604     ADENOVIRUS, PCR Not Detected     Coronavirus 229E Not Detected     Coronavirus HKU1 Not Detected     Coronavirus NL63 Not Detected     Coronavirus OC43 Not Detected     COVID19 Detected     Human Metapneumovirus Not Detected     Human Rhinovirus/Enterovirus Not Detected     Influenza A PCR Not Detected     Influenza B PCR Not Detected     Parainfluenza Virus 1 Not Detected     Parainfluenza Virus 2 Not Detected     Parainfluenza Virus 3 Not Detected      Parainfluenza Virus 4 Not Detected     RSV, PCR Not Detected     Bordetella pertussis pcr Not Detected     Bordetella parapertussis PCR Not Detected     Chlamydophila pneumoniae PCR Not Detected     Mycoplasma pneumo by PCR Not Detected    Narrative:      In the setting of a positive respiratory panel with a viral infection PLUS a negative procalcitonin without other underlying concern for bacterial infection, consider observing off antibiotics or discontinuation of antibiotics and continue supportive care. If the respiratory panel is positive for atypical bacterial infection (Bordetella pertussis, Chlamydophila pneumoniae, or Mycoplasma pneumoniae), consider antibiotic de-escalation to target atypical bacterial infection.    MRSA Screen, PCR (Inpatient) - Swab, Nares [[id number]]  (Normal) Collected: 12/06/23 1500    Lab Status: Final result Specimen: Swab from Nares Updated: 12/06/23 1617     MRSA PCR No MRSA Detected    Narrative:      The negative predictive value of this diagnostic test is high and should only be used to consider de-escalating anti-MRSA therapy. A positive result may indicate colonization with MRSA and must be correlated clinically.    Blood Culture - Blood, Arm, Left [[id number]]  (Normal) Collected: 12/04/23 1835    Lab Status: Final result Specimen: Blood from Arm, Left Updated: 12/09/23 1900     Blood Culture No growth at 5 days    Blood Culture - Blood, Arm, Left [[id number]]  (Normal) Collected: 12/04/23 1835    Lab Status: Final result Specimen: Blood from Arm, Left Updated: 12/09/23 1900     Blood Culture No growth at 5 days    COVID-19, FLU A/B, RSV PCR 1 HR TAT - Swab, Nasopharynx [[id number]]  (Abnormal) Collected: 12/04/23 1811    Lab Status: Final result Specimen: Swab from Nasopharynx Updated: 12/04/23 1929     COVID19 Detected     Influenza A PCR Not Detected     Influenza B PCR Not Detected     RSV, PCR Not Detected    Narrative:      Fact sheet for providers:  https://www.fda.gov/media/298004/download    Fact sheet for patients: https://www.fda.gov/media/089636/download    Test performed by PCR.            XR Chest 1 View    Result Date: 12/8/2023    1. Pleural/parenchymal opacities in the right base, as before, which could be related to small pleural effusion and atelectasis or infiltrate. 2. Stable cardiomegaly.       ANGEL DEVINE MD       Electronically Signed and Approved By: ANGEL DEVINE MD on 12/08/2023 at 18:30             XR Chest 1 View    Result Date: 12/4/2023    Cardiomegaly with central vascular congestion suggesting mild congestive heart failure or volume overload.       ANDERS MAGALLON MD       Electronically Signed and Approved By: ANDERS MAGALLON MD on 12/04/2023 at 18:11                       Results for orders placed during the hospital encounter of 12/04/23    Adult Transthoracic Echo Complete W/ Cont if Necessary Per Protocol    Interpretation Summary  Normal left ventricular systolic function.  Fibrocalcific aortic valve with mild aortic stenosis.  Trace MR and trace TR.      Labs Pending at Discharge:        Time spent on Discharge including face to face service:  35 minutes    Electronically signed by Julia Lagunas MD, 12/12/23, 1:35 AM EST.

## 2023-12-12 NOTE — OUTREACH NOTE
Prep Survey      Flowsheet Row Responses   [facility] discharged from? [facility]   Is LACE score < 7 ? No   Eligibility Readm Mgmt   Discharge diagnosis acute resp disease d/t COVID-19 virus with PNA, COPD exacerbation   Does the patient have one of the following disease processes/diagnoses(primary or secondary)? Pneumonia   Does the patient have Home health ordered? Yes   What is the Home health agency?  [facility]   Is there a DME ordered? No   Prep survey completed? Yes            Baylee ROCHE - Registered Nurse

## 2023-12-14 ENCOUNTER — READMISSION MANAGEMENT (OUTPATIENT)
Dept: CALL CENTER | Facility: HOSPITAL | Age: 68
End: 2023-12-14
Payer: MEDICARE

## 2023-12-14 NOTE — OUTREACH NOTE
COPD/PN Week 1 Survey      Flowsheet Row Responses   [facility] patient discharged from? [facility]   Does the patient have one of the following disease processes/diagnoses(primary or secondary)? Pneumonia   Week 1 attempt successful? Yes   Call start time 1007   Call end time 1015   Discharge diagnosis acute resp disease d/t COVID-19 virus with PNA, COPD exacerbation   Meds reviewed with patient/caregiver? Yes   Is the patient having any side effects they believe may be caused by any medication additions or changes? No   Does the patient have all medications ordered at discharge? Yes   Is the patient taking all medications as directed (includes completed medication regime)? Yes   Does the patient have a primary care provider?  Yes   Does the patient have an appointment with their PCP or specialist within 7 days of discharge? Yes   Has the patient kept scheduled appointments due by today? N/A   What is the Home health agency?  [facility]   Has home health visited the patient within 72 hours of discharge? Yes   Pulse Ox monitoring Intermittent   Pulse Ox device source Patient   O2 Sat comments 95-96% on 3L O2   O2 Sat: education provided Sat levels   Psychosocial issues? No   Did the patient receive a copy of their discharge instructions? Yes   Nursing interventions Reviewed instructions with patient   What is the patient's perception of their health status since discharge? Improving   Nursing Interventions Nurse provided patient education   Is the patient/caregiver able to teach back the hierarchy of who to call/visit for symptoms/problems? PCP, Specialist, Home health nurse, Urgent Care, ED, 911 Yes   Is the patient/caregiver able to teach back signs and symptoms of worsening condition: Fever/chills, Shortness of breath, Chest pain   Is the patient/caregiver able to teach back importance of completing antibiotic course of treatment? Yes   Week 1 call completed? Yes   Call end time 1015            Susy  S - Registered Nurse

## 2023-12-22 NOTE — PROGRESS NOTES
Primary Care Provider  Serenity Lucas APRN   Referring Provider  No ref. provider found      Patient Complaint  Cough, Shortness of Breath, Hospital Follow Up Visit, and Oxygen (Needs new oxygen prescription 5 year oxygen recert )      Subjective          Aida Conner presents to [facility] PULMONARY & CRITICAL CARE MEDICINE      History of Presenting Illness  Aida Conner is a 68 y.o. female patient of Dr. Anaya with history of COPD, chronic respiratory failure with hypoxia and hypercapnia, ANABELLE, and obesity hypoventilation syndrome, here for hospital follow-up.    Patient was hospitalized at [facility] 12/4/2023 through 12/11/2023 for ARDS due to COVID-19, acute on chronic respiratory failure with hypoxia and hypercapnia.  She presented to the ED with shortness of breath, found to be hypoxic and positive for COVID-19 infection.  ABGs showed compensated respiratory acidosis with pCO2 63.9.  She was placed on higher flow oxygen to maintain O2 saturation, up to 10 L/min.  She was started on Decadron, IV diuretics, and IV antibiotics.  Remdesivir was held.  With time and treatment, patient was able to be weaned back down to her baseline supplemental oxygen.  Her hospitalization was complicated by epistaxis, patient also has a history of chronic epistaxis, instructed to follow-up with ENT after discharge.  She was also instructed to follow-up with neurology for hand tremors evaluation.  Patient was discharged home in stable condition with home health.    Patient states she is doing well since being discharged from the hospital, she was last seen in our clinic about 8 months ago.  She is accompanied today by her daughter.  She reports having completed the oral Augmentin and Decadron that were prescribed at discharge.  She reports her breathing is back to about baseline, continues to have some mild weakness and decreased stamina.  Home health is working with her.  At baseline, her  shortness of breath is mild to moderate in severity, worsens with activity and improves with rest.  Patient denies any fevers or chills.  She continues to use Dulera inhaler daily as prescribed as well as her albuterol inhaler as needed.  She also takes Singulair nightly for seasonal allergies.  Patient's DME company is [employer] she reports that they had requested that she be recertified for her oxygen therapy and CPAP.  Patient wears 3.5 L supplemental oxygen continuously.  She has a CPAP machine that she normally wears nightly and with naps, however her mask is not attaching clearly to the tubing and [facility] needs a new order before they will give her new supplies.  Patient has never smoked.  She denies any hemoptysis, swollen lymph nodes, unexplained weight loss, or night sweats.  Patient completed chemotherapy for breast cancer in 2022.  She also has low energy and difficulty staying active.  Patient continues to follow-up with oncology for surveillance of breast cancer in remission.  Overall, patient is doing well and has no additional concerns at this time.  Patient is able to perform ADLs with some difficulty due to mobility and shortness of breath.  I have personally reviewed the review of systems, past family, social, medical and surgical histories; and agree with their findings.      Review of Systems    Review of Systems   Constitutional:  Positive for fatigue. Negative for activity change, chills, fever, unexpected weight gain and unexpected weight loss.   HENT:  Negative for congestion, ear discharge, ear pain, mouth sores, postnasal drip, rhinorrhea, sinus pressure, sore throat, swollen glands and trouble swallowing.    Eyes:  Negative for blurred vision, pain, discharge, itching and visual disturbance.   Respiratory:  Positive for cough and shortness of breath (with exertion). Negative for apnea, chest tightness, wheezing and stridor.    Cardiovascular:  Negative for chest pain, palpitations and  leg swelling.   Gastrointestinal:  Negative for abdominal distention, abdominal pain, constipation, diarrhea, nausea, vomiting, GERD and indigestion.   Musculoskeletal:  Negative for arthralgias, joint swelling and myalgias.   Skin:  Negative for color change.   Neurological:  Negative for dizziness, weakness, light-headedness and headache.      Sleep: Negative for Excessive daytime sleepiness  Negative for morning headaches  Negative for Snoring      Family History   Problem Relation Age of Onset    Breast cancer Mother 45    Cancer Mother         breast; metastasized    Lung cancer Father     Hodgkin's lymphoma Father     Skin cancer Father         squamous cell    Cancer Father         4 kinds: Hodgkins; lung; squamous cell skin    Breast cancer Maternal Aunt 70    Cancer Maternal Aunt         breast; metastasized    Breast cancer Paternal Aunt 70    Cancer Paternal Aunt         breast; cured    Colon cancer Paternal Grandmother 60    Cancer Paternal Grandmother         colon cancer    Hypertension Paternal Grandmother         EVERY ADULT IN MY FAMILY    Ovarian cancer Neg Hx     Uterine cancer Neg Hx     Deep vein thrombosis Neg Hx     Pulmonary embolism Neg Hx     Malig Hyperthermia Neg Hx         Social History     Socioeconomic History    Marital status:     Number of children: 3   Tobacco Use    Smoking status: Never    Smokeless tobacco: Never   Vaping Use    Vaping Use: Never used   Substance and Sexual Activity    Alcohol use: Never    Drug use: Never    Sexual activity: Not Currently     Partners: Female        Past Medical History:   Diagnosis Date    Allergic rhinitis     Anemia     Anxiety     Asthma     Chronic bronchitis     In past, espec. during working years    Chronic diastolic (congestive) heart failure     Chronic hypoxemic respiratory failure     on continuous O2    Coronary artery calcification seen on CT scan 04/21/2022    Diarrhea     with chemo    GERD (gastroesophageal reflux  disease) Mild    H/O Intraductal papilloma     Hemorrhoids     History of transfusion 1984    AFTER BACK SURGERY no reaction    Hypertension     IBS (irritable bowel syndrome)     Inflammatory breast cancer     right    Kidney stone     Morbid obesity with BMI of 50.0-59.9, adult     Obesity hypoventilation syndrome 02/05/2021    On home oxygen therapy     2.5 AT REST WITH ACTIVITY UP TO 4L    ANABELLE on CPAP     cpap  & uses O2 with CPAP    Osteoarthritis     Pneumonia     Many times in past, 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    PONV (postoperative nausea and vomiting)     Type II diabetes mellitus 09/08/2021        Immunization History   Administered Date(s) Administered    COVID-19 (PFIZER) BIVALENT 12+YRS 11/30/2022    COVID-19 (PFIZER) Purple Cap Monovalent 03/19/2021, 04/09/2021, 11/13/2021    Covid-19 (Pfizer) Gray Cap Monovalent 06/18/2022    Flu Vaccine Quad PF >36MO 09/24/2018, 11/12/2020    Fluzone High-Dose 65+yrs 09/28/2021    Influenza TIV (IM) 01/30/2014, 09/29/2014    Influenza, Unspecified 11/16/2023    Pneumococcal Conjugate 13-Valent (PCV13) 08/21/2019       Allergies   Allergen Reactions    Amlodipine Swelling     LEGS     Hydrochlorothiazide Unknown - Low Severity     Neuro issues    Morphine Nausea And Vomiting     hallucinations    Adhesive Tape Rash          Current Outpatient Medications:     albuterol sulfate  (90 Base) MCG/ACT inhaler, Inhale 2 puffs Every 4 (Four) Hours As Needed for Wheezing or Shortness of Air for up to 46 days., Disp: 0.05 g, Rfl: 0    anastrozole (ARIMIDEX) 1 MG tablet, TAKE 1 TABLET BY MOUTH EVERY DAY, Disp: 90 tablet, Rfl: 0    carvedilol (COREG) 25 MG tablet, Take 0.5 tablets by mouth 2 (Two) Times a Day With Meals., Disp: 30 tablet, Rfl: 0    Cholecalciferol (Vitamin D) 50 MCG (2000 UT) capsule, Take 1 capsule by mouth Daily., Disp: , Rfl:     clotrimazole (MYCELEX) 10 MG yuri, dissolve 1 tablet in mouth 5 times daily for 12 days, Disp: , Rfl:     dicyclomine  "(BENTYL) 20 MG tablet, TAKE 1 TABLET BY MOUTH EVERY 6 HOURS, Disp: 60 tablet, Rfl: 2    FLUoxetine (PROzac) 40 MG capsule, TAKE 1 CAPSULE BY MOUTH EVERY DAY, Disp: 30 capsule, Rfl: 0    furosemide (Lasix) 40 MG tablet, Take 1 tablet by mouth Daily for 30 days., Disp: 30 tablet, Rfl: 0    lisinopril (PRINIVIL,ZESTRIL) 10 MG tablet, Take 1 tablet by mouth Daily for 30 days., Disp: 30 tablet, Rfl: 0    mometasone-formoterol (DULERA 100) 100-5 MCG/ACT inhaler, Inhale 2 puffs 2 (Two) Times a Day., Disp: 1 each, Rfl: 11    montelukast (SINGULAIR) 10 MG tablet, Take 1 tablet by mouth Every Night., Disp: , Rfl:     multivitamin (THERAGRAN) tablet tablet, Take 1 tablet by mouth Daily., Disp: , Rfl:     pantoprazole (PROTONIX) 40 MG EC tablet, Take 1 tablet by mouth Daily., Disp: , Rfl:     pravastatin (PRAVACHOL) 40 MG tablet, Take 1 tablet by mouth Every Night., Disp: , Rfl:     FeroSul 325 (65 Fe) MG tablet, Take 1 tablet by mouth 2 (Two) Times a Day With Meals. (Patient not taking: Reported on 12/26/2023), Disp: , Rfl:     meloxicam (MOBIC) 15 MG tablet, Take 0.5 tablets by mouth Daily. (Patient not taking: Reported on 12/26/2023), Disp: , Rfl:     spironolactone (ALDACTONE) 50 MG tablet, Take 1 tablet by mouth Daily for 30 days., Disp: 30 tablet, Rfl: 0     Objective     Vital Signs:   /83 (BP Location: Left arm, Patient Position: Sitting, Cuff Size: Adult) Comment (BP Location): LOWER ARM  Pulse 97   Temp 98 °F (36.7 °C) (Tympanic)   Resp 18   Ht 165.1 cm (65\")   Wt (!) 148 kg (325 lb 6.4 oz)   SpO2 96% Comment: 5L PULSE DOSE  BMI 54.15 kg/m²     Objective   Physical Exam  Constitutional:       General: She is not in acute distress.     Appearance: Normal appearance. She is obese. She is not ill-appearing.   HENT:      Right Ear: Tympanic membrane and ear canal normal.      Left Ear: Tympanic membrane and ear canal normal.      Nose: Nose normal.      Mouth/Throat:      Mouth: Mucous membranes are moist.    "   Pharynx: Oropharynx is clear.   Eyes:      Extraocular Movements: Extraocular movements intact.      Conjunctiva/sclera: Conjunctivae normal.      Pupils: Pupils are equal, round, and reactive to light.   Cardiovascular:      Rate and Rhythm: Normal rate and regular rhythm.      Pulses: Normal pulses.      Heart sounds: Normal heart sounds.   Pulmonary:      Effort: Pulmonary effort is normal. No respiratory distress.      Breath sounds: No stridor. No wheezing, rhonchi or rales.      Comments: Diminished  Abdominal:      General: Bowel sounds are normal.      Palpations: Abdomen is soft.   Musculoskeletal:         General: No swelling. Normal range of motion.      Cervical back: Normal range of motion and neck supple.      Right lower leg: No edema.      Left lower leg: No edema.   Skin:     General: Skin is warm and dry.   Neurological:      General: No focal deficit present.      Mental Status: She is alert and oriented to person, place, and time.      Motor: No weakness.   Psychiatric:         Mood and Affect: Mood normal.         Behavior: Behavior normal.        Result Review :   I have personally reviewed patient's labs and images.  I also reviewed Dr. Dougherty's final progress note 12/11/2023 and Dr. Lagunas's discharge summary 12/12/2023.       Diagnoses and all orders for this visit:    1. Chronic respiratory failure with hypoxia and hypercapnia (Primary)  -     6 Minute Walk Test; Future  -     Cancel: Oxygen Therapy  -     Oxygen Therapy    2. Chronic obstructive pulmonary disease, unspecified COPD type    3. Restrictive airway disease    4. Obesity hypoventilation syndrome    5. Obstructive sleep apnea    6. Dyspnea on exertion  -     6 Minute Walk Test; Future    7. Epistaxis    8. Inflammatory breast cancer, right    9. Morbid obesity with BMI of 50.0-59.9, adult      Impression and Plan    -COVID positive 12/6/2023  -PFTs 1/16/2019 showed obstructive airway disease, FEV1 42% of predicted, no  significant response to bronchodilator.  Lung volumes indicate restrictive airway disease, DLCO normal.  -CT chest 12/5/2023 showed small right pleural effusion, cardiomegaly, small pericardial effusion, and airspace consolidation in the left lower lobe possibly secondary to atelectasis or pneumonia  -Echocardiogram 12/6/2023 showed normal left and right heart function  -Continue using Dulera 2 puffs twice daily, reminded patient to rinse mouth after each use  -Continue using albuterol inhaler as needed  -Continue taking Singulair daily for seasonal allergies  -Continue using supplemental oxygen to maintain O2 saturations at or above 89%.  Oxygen recertification completed today, patient maintained O2 saturation with 2 L, new prescription sent to [facility] today.  -Continue using CPAP with a pressure of 14 cmH2O nightly and with naps, patient continues to benefit from nightly PAP therapy.  Will send in renewed prescription for CPAP to [facility].  -Continue following up with oncology as scheduled for management of breast cancer in remission  -Follow-up with ENT for history of epistaxis  -Spent 5 minutes counseling patient on diet and exercise.  Recommended a low-fat, low-calorie diet.  Also recommend 30 minutes of daily exercise.  Patient verbalizes understanding.  I recommend patient speak with her primary care provider regarding options for weight loss and injectables, as she does have comorbidities such as ANABELLE and borderline diabetes.  -Follow-up with Dr. Anaya in 4 months, may return sooner if needed    Smoking status: Reviewed  Vaccination status: Patient reports she is up-to-date with her flu, pneumonia, and Covid vaccines.  Patient is advised to continue to follow CDC recommendations such as social distancing wearing a mask and washing hands for at least 20 seconds.  Medications personally reviewed    Follow Up   No follow-ups on file.  Patient was given instructions and counseling regarding her condition or  for health maintenance advice. Please see specific information pulled into the AVS if appropriate.

## 2023-12-26 ENCOUNTER — OFFICE VISIT (OUTPATIENT)
Dept: PULMONOLOGY | Facility: CLINIC | Age: 68
End: 2023-12-26
Payer: MEDICARE

## 2023-12-26 VITALS
HEIGHT: 65 IN | WEIGHT: 293 LBS | OXYGEN SATURATION: 96 % | HEART RATE: 97 BPM | SYSTOLIC BLOOD PRESSURE: 123 MMHG | RESPIRATION RATE: 18 BRPM | TEMPERATURE: 98 F | DIASTOLIC BLOOD PRESSURE: 83 MMHG | BODY MASS INDEX: 48.82 KG/M2

## 2023-12-26 DIAGNOSIS — J96.12 CHRONIC RESPIRATORY FAILURE WITH HYPOXIA AND HYPERCAPNIA: Primary | ICD-10-CM

## 2023-12-26 DIAGNOSIS — E66.01 MORBID OBESITY WITH BMI OF 50.0-59.9, ADULT: ICD-10-CM

## 2023-12-26 DIAGNOSIS — J44.9 CHRONIC OBSTRUCTIVE PULMONARY DISEASE, UNSPECIFIED COPD TYPE: ICD-10-CM

## 2023-12-26 DIAGNOSIS — R04.0 EPISTAXIS: ICD-10-CM

## 2023-12-26 DIAGNOSIS — J98.4 RESTRICTIVE AIRWAY DISEASE: ICD-10-CM

## 2023-12-26 DIAGNOSIS — E66.2 OBESITY HYPOVENTILATION SYNDROME: ICD-10-CM

## 2023-12-26 DIAGNOSIS — G47.33 OBSTRUCTIVE SLEEP APNEA: ICD-10-CM

## 2023-12-26 DIAGNOSIS — J96.11 CHRONIC RESPIRATORY FAILURE WITH HYPOXIA AND HYPERCAPNIA: Primary | ICD-10-CM

## 2023-12-26 DIAGNOSIS — C50.911 INFLAMMATORY BREAST CANCER, RIGHT: ICD-10-CM

## 2023-12-26 DIAGNOSIS — R06.09 DYSPNEA ON EXERTION: ICD-10-CM

## 2023-12-26 RX ORDER — CLOTRIMAZOLE 10 MG/1
LOZENGE ORAL; TOPICAL
COMMUNITY
Start: 2023-12-18

## 2023-12-29 ENCOUNTER — LAB (OUTPATIENT)
Dept: LAB | Facility: HOSPITAL | Age: 68
End: 2023-12-29
Payer: MEDICARE

## 2023-12-29 ENCOUNTER — OFFICE VISIT (OUTPATIENT)
Dept: ONCOLOGY | Facility: CLINIC | Age: 68
End: 2023-12-29
Payer: MEDICARE

## 2023-12-29 VITALS
TEMPERATURE: 97.5 F | HEART RATE: 89 BPM | OXYGEN SATURATION: 94 % | DIASTOLIC BLOOD PRESSURE: 87 MMHG | HEIGHT: 65 IN | SYSTOLIC BLOOD PRESSURE: 140 MMHG | WEIGHT: 293 LBS | BODY MASS INDEX: 48.82 KG/M2 | RESPIRATION RATE: 16 BRPM

## 2023-12-29 DIAGNOSIS — Z17.0 MALIGNANT NEOPLASM OF AXILLARY TAIL OF RIGHT BREAST IN FEMALE, ESTROGEN RECEPTOR POSITIVE: Primary | ICD-10-CM

## 2023-12-29 DIAGNOSIS — C50.111 MALIGNANT NEOPLASM OF CENTRAL PORTION OF RIGHT FEMALE BREAST, UNSPECIFIED ESTROGEN RECEPTOR STATUS: ICD-10-CM

## 2023-12-29 DIAGNOSIS — C50.611 MALIGNANT NEOPLASM OF AXILLARY TAIL OF RIGHT BREAST IN FEMALE, ESTROGEN RECEPTOR POSITIVE: Primary | ICD-10-CM

## 2023-12-29 LAB
ALBUMIN SERPL-MCNC: 3.3 G/DL (ref 3.5–5.2)
ALBUMIN/GLOB SERPL: 1.3 G/DL
ALP SERPL-CCNC: 80 U/L (ref 39–117)
ALT SERPL W P-5'-P-CCNC: 21 U/L (ref 1–33)
ANION GAP SERPL CALCULATED.3IONS-SCNC: 7.2 MMOL/L (ref 5–15)
AST SERPL-CCNC: 13 U/L (ref 1–32)
BASOPHILS # BLD AUTO: 0.02 10*3/MM3 (ref 0–0.2)
BASOPHILS NFR BLD AUTO: 0.3 % (ref 0–1.5)
BILIRUB SERPL-MCNC: 0.3 MG/DL (ref 0–1.2)
BUN SERPL-MCNC: 18 MG/DL (ref 8–23)
BUN/CREAT SERPL: 26.1 (ref 7–25)
CALCIUM SPEC-SCNC: 9.1 MG/DL (ref 8.6–10.5)
CHLORIDE SERPL-SCNC: 104 MMOL/L (ref 98–107)
CO2 SERPL-SCNC: 27.8 MMOL/L (ref 22–29)
CREAT SERPL-MCNC: 0.69 MG/DL (ref 0.57–1)
DEPRECATED RDW RBC AUTO: 55.3 FL (ref 37–54)
EGFRCR SERPLBLD CKD-EPI 2021: 93.5 ML/MIN/1.73
EOSINOPHIL # BLD AUTO: 0.22 10*3/MM3 (ref 0–0.4)
EOSINOPHIL NFR BLD AUTO: 3.3 % (ref 0.3–6.2)
ERYTHROCYTE [DISTWIDTH] IN BLOOD BY AUTOMATED COUNT: 17.2 % (ref 12.3–15.4)
FERRITIN SERPL-MCNC: 124 NG/ML (ref 13–150)
GLOBULIN UR ELPH-MCNC: 2.6 GM/DL
GLUCOSE SERPL-MCNC: 149 MG/DL (ref 65–99)
HCT VFR BLD AUTO: 35.5 % (ref 34–46.6)
HGB BLD-MCNC: 10.3 G/DL (ref 12–15.9)
HGB RETIC QN AUTO: 29.3 PG (ref 29.8–36.1)
IMM GRANULOCYTES # BLD AUTO: 0.03 10*3/MM3 (ref 0–0.05)
IMM GRANULOCYTES NFR BLD AUTO: 0.4 % (ref 0–0.5)
IMM RETICS NFR: 20.5 % (ref 3–15.8)
IRON 24H UR-MRATE: 40 MCG/DL (ref 37–145)
IRON SATN MFR SERPL: 13 % (ref 20–50)
LDH SERPL-CCNC: 167 U/L (ref 135–214)
LYMPHOCYTES # BLD AUTO: 0.86 10*3/MM3 (ref 0.7–3.1)
LYMPHOCYTES NFR BLD AUTO: 12.8 % (ref 19.6–45.3)
MCH RBC QN AUTO: 25.6 PG (ref 26.6–33)
MCHC RBC AUTO-ENTMCNC: 29 G/DL (ref 31.5–35.7)
MCV RBC AUTO: 88.1 FL (ref 79–97)
MONOCYTES # BLD AUTO: 0.4 10*3/MM3 (ref 0.1–0.9)
MONOCYTES NFR BLD AUTO: 6 % (ref 5–12)
NEUTROPHILS NFR BLD AUTO: 5.17 10*3/MM3 (ref 1.7–7)
NEUTROPHILS NFR BLD AUTO: 77.2 % (ref 42.7–76)
NRBC BLD AUTO-RTO: 0 /100 WBC (ref 0–0.2)
PLATELET # BLD AUTO: 155 10*3/MM3 (ref 140–450)
PMV BLD AUTO: 10.5 FL (ref 6–12)
POTASSIUM SERPL-SCNC: 4 MMOL/L (ref 3.5–5.2)
PROT SERPL-MCNC: 5.9 G/DL (ref 6–8.5)
RBC # BLD AUTO: 4.03 10*6/MM3 (ref 3.77–5.28)
RETICS # AUTO: 0.04 10*6/MM3 (ref 0.02–0.13)
RETICS/RBC NFR AUTO: 1.07 % (ref 0.7–1.9)
SODIUM SERPL-SCNC: 139 MMOL/L (ref 136–145)
TIBC SERPL-MCNC: 318 MCG/DL (ref 298–536)
TRANSFERRIN SERPL-MCNC: 227 MG/DL (ref 200–360)
WBC NRBC COR # BLD AUTO: 6.7 10*3/MM3 (ref 3.4–10.8)

## 2023-12-29 PROCEDURE — 36415 COLL VENOUS BLD VENIPUNCTURE: CPT

## 2023-12-29 PROCEDURE — 80053 COMPREHEN METABOLIC PANEL: CPT

## 2023-12-29 PROCEDURE — 83615 LACTATE (LD) (LDH) ENZYME: CPT | Performed by: INTERNAL MEDICINE

## 2023-12-29 PROCEDURE — 83540 ASSAY OF IRON: CPT | Performed by: INTERNAL MEDICINE

## 2023-12-29 PROCEDURE — 85046 RETICYTE/HGB CONCENTRATE: CPT | Performed by: INTERNAL MEDICINE

## 2023-12-29 PROCEDURE — 85025 COMPLETE CBC W/AUTO DIFF WBC: CPT

## 2023-12-29 PROCEDURE — 84466 ASSAY OF TRANSFERRIN: CPT | Performed by: INTERNAL MEDICINE

## 2023-12-29 PROCEDURE — 82728 ASSAY OF FERRITIN: CPT | Performed by: INTERNAL MEDICINE

## 2023-12-29 RX ORDER — ANASTROZOLE 1 MG/1
TABLET ORAL
Qty: 90 TABLET | Refills: 0 | Status: SHIPPED | OUTPATIENT
Start: 2023-12-29

## 2023-12-29 NOTE — PROGRESS NOTES
Subjective     REASON FOR FOLLOW-UP: Right breast inflammatory breast, triple positive.                              REQUESTING PHYSICIAN: MD Francisco Esteban MD Bethany Haynes, MD    History of present illness:    Patient is a 68 y.o. 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 She then had surgery with a complete pathological response in September 2021 and then resumed adjuvant treatment with Perjeta Herceptin with intolerance due to severe diarrhea. She completed Herceptin in March of 2022 and continues on Arimidex along with initiating Prolia for worsening osteopenia.    She was admitted to the hospital in early December with COVID-19 infection and required supplemental high-level oxygen and thankfully came off that is back to her routine 3 L/min dose.  She is more fatigued and tired but is back home and functioning okay     Her anemia is little worse than it was before but this predated her COVID infection we will repeat iron studies as she has been iron deficient in the past when we saw her initially we gave her IV iron.  She has been started on oral iron by her family doctor    She continues on Arimidex which she thinks is interfering with weight loss but I suspect this is not a big factor because she has had issues with her weight for many many years     She forgot to get her DEXA scan done and we will reschedule this in order to decide whether to continue Prolia    we discussed the potential for her to take one of the various weight loss injections she would be a good candidate with her obesity and poor lung function requiring oxygen. .  I have encouraged her to discuss this again with her PCP.      She does also  note frustration regarding excess fat/tissue under her right arm since having mastectomy.  She notes inability to fully lower her right arm.  We discussed that she could be referred back to breast surgery though she might not be considered the best candidate to undergo additional surgery considering anesthesia and poor pulmonary function.    Otherwise she denies further concerns at this time.    Past Medical History:   Diagnosis Date    Allergic rhinitis     Anemia     Anxiety     Asthma     Chronic bronchitis     In past, espec. during working years    Chronic diastolic (congestive) heart failure     Chronic hypoxemic respiratory failure     on continuous O2    Coronary artery calcification seen on CT scan 04/21/2022    Diarrhea     with chemo    GERD (gastroesophageal reflux disease) Mild    H/O Intraductal papilloma     Hemorrhoids     History of transfusion 1984    AFTER BACK SURGERY no reaction    Hypertension     IBS (irritable bowel syndrome)     Inflammatory breast cancer     right    Kidney stone     Morbid obesity with BMI of 50.0-59.9, adult     Obesity hypoventilation syndrome 02/05/2021    On home oxygen therapy     2.5 AT REST WITH ACTIVITY UP TO 4L    ANABELLE on CPAP     cpap  & uses O2 with CPAP    Osteoarthritis     Pneumonia     Many times in past, 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    PONV (postoperative nausea and vomiting)     Type II diabetes mellitus 09/08/2021        Past Surgical History:   Procedure Laterality Date    BREAST EXCISIONAL BIOPSY Bilateral     COLONOSCOPY      COLONOSCOPY N/A 12/8/2022    Procedure: COLONOSCOPY;  Surgeon: Henry Aden MD;  Location: [facility] ENDOSCOPY;  Service: Gastroenterology;  Laterality: N/A;  diverticulosis, and hemorrhoids    CYSTOSCOPY BLADDER STONE LITHOTRIPSY      ENDOSCOPY      ENDOSCOPY N/A 12/8/2022    Procedure: ESOPHAGOGASTRODUODENOSCOPY  with biopsy;  Surgeon: Henry Aden MD;  Location: [facility] ENDOSCOPY;  Service: Gastroenterology;   Laterality: N/A;  hiatal hernia    KNEE ARTHROPLASTY Bilateral 2009    MASTECTOMY Left 08/10/2021    Procedure: LEFT TOTAL MASTECTOMY;  Surgeon: Nicci Banks MD;  Location: [facility] MAIN OR;  Service: General;  Laterality: Left;    MASTECTOMY Right 08/10/2021    Procedure: RIGHT MODIFIED RADICAL MASTECTOMY;  Surgeon: Nicci Banks MD;  Location: [facility] MAIN OR;  Service: General;  Laterality: Right;    SPINE SURGERY      TOTAL ABDOMINAL HYSTERECTOMY WITH SALPINGO OOPHORECTOMY      UPPER GASTROINTESTINAL ENDOSCOPY      VENOUS ACCESS DEVICE (PORT) INSERTION N/A 2021    Procedure: INSERTION VENOUS ACCESS DEVICE;  Surgeon: Nicci Banks MD;  Location: [facility] MAIN OR;  Service: General;  Laterality: N/A;    VENOUS ACCESS DEVICE (PORT) REMOVAL Left 2022    Procedure: REMOVAL VENOUS ACCESS DEVICE;  Surgeon: Nicci Banks MD;  Location: [facility] MAIN OR;  Service: General;  Laterality: Left;      ONC HISTORY  patient is a 65-year-old white female with morbid obesity, history of diastolic congestive heart failure and unknown type of pulmonary disease for which she has been on oxygen for 4 years.    She has been getting routine mammography since 38 years of age because her mother  at 46 of breast cancer and had a biopsy of her left breast in  which was apparently benign and another biopsy in  on her right breast which showed a small focus of atypical ductal hyperplasia and atypical lobular hyperplasia with a residual intraductal papilloma.  At that time she saw medical oncologist who recommended genetic testing and prevention but the insurance would not cover the genetic testing and the medical oncologist thought tamoxifen was too risky for her because of her comorbidities and no treatment was given.  More recently she noticed redness of the right breast in October and showed it to her family doctor who gave her course of Keflex when it did not improve and mammogram was  ordered and this was benign  When the redness persisted she saw her gynecologist with concern for inflammatory breast cancer and referred her to Dr. Banks after repeat imaging and [facility]  Preliminary report shows micropapillary invasive mammary carcinoma intermediate grade measuring 13 mm right axillary node was also involved with metastatic cancer ER/NC and HER-2 are pending  Patient saw Dr. Banks who sent her for skin biopsies and she had 3 separate punch biopsy of the skin that showed Perivascular and perifollicular inflammation with no obvious malignancy at 3:00 12:00 and 9:00  In addition staging work-up with CAT scans and bone scan were ordered.  CAT scan of the chest showed a borderline mediastinal  Infracarinal node measuring 15 mm in length mild cardiomegaly and heavy coronary artery calcifications  CT of the abdomen showed a 2.5 x 2.2 cm right adrenal mass which the patient tells me she has had in the past and is most likely benign but also a 2.7 cm left external iliac node which is indeterminate.  Bone scan was negative    Echocardiogram is scheduled for later next week and genetic testing with the [facility] stat panel is pending    Patient is  3 para 3 menarche was at age 9 menopause at 49 when she had a hysterectomy and oophorectomy  First childbirth was at age 20 she breast-fed her second and third children and took no hormone replacement after menopause    Family history is positive for mother dying of breast cancer at age 46 she is a maternal aunt with breast cancer in her 70s a paternal aunt with breast cancer in her 70s paternal grandmother with colon cancer.  Her father had Hodgkin's disease and non-Hodgkin's lymphoma but  of small cell lung cancer at 67      She has not had a heart attack stroke or blood clot    Plan to do echocardiogram soon as possible to ascertain tolerability of cardiotoxic chemotherapy  which would be typically indicated with an inflammatory breast cancer    Also plan PET scan to follow-up on atypical lymph nodes and confirm benign etiology of the adrenal lesion    She will have port placement and chemo education pt is  ER96%/PR82%  and HER-2 +--3+    I explained to Aida that the goal of treatment would be curative but she has a lot of comorbidities which may limit our ability to give the most effective chemotherapy in the setting  I told her radiation would be involved and possibly hormonal therapy for 10 years as she has hormone positivity and HER-2 directed therapy    She expressed some concerns about driving back and forth from [place] but felt that once a week was doable    We will see her back in 2 weeks to start treatment with a port placement planned early next week      Patient did have a mild reaction to Taxol dose #1 with some increased shortness of breath and flushing.  This was responsive to 100 mg of Solu-Cortef.  She states this gave her a headache and made her quite talkative but otherwise she was thankfully able to complete Taxol infusion without further incident.    Patient reports issues with chronic diarrhea over the last few years and did note a little bit increase in stooling following THP though nothing overly significant in her mind.  She did finally begin taking Imodium just a few days ago and has had no further stools.  She does note significant trouble with hemorrhoids in relation to the diarrhea   Required blood transfusion due to significant hemorrhoidal bleeding plus iron deficiency.  Injectafer planned    Due to inclement weather cycle 1 day 8 therapy was missed.  She did get day 15 therapy with fairly good tolerance except for some diarrhea.    She was found to be iron deficient despite trying oral iron.  We therefore elected to proceed with IV Injectafer but she remains mildly anemic with a hemoglobin of 9.3    7/21    She has increased her Lasix and her  weight is down 7 pounds but overall she is significantly weaker since starting treatment and I think it is in her best interest to discontinue treatment and proceed with surgery and will use Herceptin in the interim till surgery scheduled  She has had a good response in the breast and I do not think the last dose of Adriamycin is going to be crucial and we run the risk of making her so weak that she cannot go for surgery    9/21  Patient had worsening shortness of breath requiring continuous oxygen.  CT of the chest performed to rule out pneumonitis and she was started on steroids.  CT showed bilateral groundglass infiltrates presumed to be related to Taxol pneumonitis and therefore Taxol discontinued.  Patient completed the fourth cycle of Perjeta/Herceptin alone.  Diarrhea was an issue but not as bad without the Taxol.  She is weaned off her prednisone    Patient proceeded with cycle 1 Adriamycin/Cytoxan on 5/14/2021 and is seen back today for cycle #3 and we are doing it at 3-week intervals because of her frailty and after 3 cycles we stopped because of tolerance issues    She went for surgery her bilateral mastectomies and interestingly she had a complete pathological CR on the right breast and had evidence of DCIS in the left breast [id number]    10/21  We will start anastrozole because she is not a good candidate for tamoxifen and her bone density showed normal bone density in the spine but osteoporosis in the left femoral neck and we will start Prolia in 6 weeks.The side effects and toxicities of the Aromatase inhibitors was discussed with the patient including, hot flashes, mood swings and hair thinning.Significant arthralgias and worsening bone density were also discussed. Baseline bone density evaluation was ordered.        Current Outpatient Medications on File Prior to Visit   Medication Sig Dispense Refill    albuterol sulfate  (90 Base) MCG/ACT inhaler Inhale 2 puffs Every 4 (Four) Hours As Needed  for Wheezing or Shortness of Air for up to 46 days. 0.05 g 0    anastrozole (ARIMIDEX) 1 MG tablet TAKE 1 TABLET BY MOUTH EVERY DAY 90 tablet 0    carvedilol (COREG) 25 MG tablet Take 0.5 tablets by mouth 2 (Two) Times a Day With Meals. 30 tablet 0    Cholecalciferol (Vitamin D) 50 MCG (2000 UT) capsule Take 1 capsule by mouth Daily.      clotrimazole (MYCELEX) 10 MG yuri dissolve 1 tablet in mouth 5 times daily for 12 days      dicyclomine (BENTYL) 20 MG tablet TAKE 1 TABLET BY MOUTH EVERY 6 HOURS 60 tablet 2    FeroSul 325 (65 Fe) MG tablet Take 1 tablet by mouth 2 (Two) Times a Day With Meals.      FLUoxetine (PROzac) 40 MG capsule TAKE 1 CAPSULE BY MOUTH EVERY DAY 30 capsule 0    furosemide (Lasix) 40 MG tablet Take 1 tablet by mouth Daily for 30 days. 30 tablet 0    lisinopril (PRINIVIL,ZESTRIL) 10 MG tablet Take 1 tablet by mouth Daily for 30 days. 30 tablet 0    meloxicam (MOBIC) 15 MG tablet Take 0.5 tablets by mouth Daily.      mometasone-formoterol (DULERA 100) 100-5 MCG/ACT inhaler Inhale 2 puffs 2 (Two) Times a Day. 1 each 11    montelukast (SINGULAIR) 10 MG tablet Take 1 tablet by mouth Every Night.      multivitamin (THERAGRAN) tablet tablet Take 1 tablet by mouth Daily.      pantoprazole (PROTONIX) 40 MG EC tablet Take 1 tablet by mouth Daily.      pravastatin (PRAVACHOL) 40 MG tablet Take 1 tablet by mouth Every Night.      spironolactone (ALDACTONE) 50 MG tablet Take 1 tablet by mouth Daily for 30 days. 30 tablet 0     No current facility-administered medications on file prior to visit.        ALLERGIES:    Allergies   Allergen Reactions    Amlodipine Swelling     LEGS     Hydrochlorothiazide Unknown - Low Severity     Neuro issues    Morphine Nausea And Vomiting     hallucinations    Adhesive Tape Rash        Social History     Socioeconomic History    Marital status:     Number of children: 3   Tobacco Use    Smoking status: Never    Smokeless tobacco: Never   Vaping Use    Vaping Use:  "Never used   Substance and Sexual Activity    Alcohol use: Never    Drug use: Never    Sexual activity: Not Currently     Partners: Female        Family History   Problem Relation Age of Onset    Breast cancer Mother 45    Cancer Mother         breast; metastasized    Lung cancer Father     Hodgkin's lymphoma Father     Skin cancer Father         squamous cell    Cancer Father         4 kinds: Hodgkins; lung; squamous cell skin    Breast cancer Maternal Aunt 70    Cancer Maternal Aunt         breast; metastasized    Breast cancer Paternal Aunt 70    Cancer Paternal Aunt         breast; cured    Colon cancer Paternal Grandmother 60    Cancer Paternal Grandmother         colon cancer    Hypertension Paternal Grandmother         EVERY ADULT IN MY FAMILY    Ovarian cancer Neg Hx     Uterine cancer Neg Hx     Deep vein thrombosis Neg Hx     Pulmonary embolism Neg Hx     Malig Hyperthermia Neg Hx         Review of Systems   Constitutional:  Positive for fatigue and unexpected weight change (Weight gain).   Respiratory:  Positive for shortness of breath.    All other systems reviewed and are negative.        Objective     Vitals:    12/29/23 0844   BP: 140/87   Pulse: 89   Resp: 16   Temp: 97.5 °F (36.4 °C)   TempSrc: Temporal   SpO2: 94%  Comment: pt on 3liters O2   Weight: (!) 151 kg (333 lb 6.4 oz)   Height: 165.1 cm (65\")   PainSc: 0-No pain         12/29/2023     8:46 AM   Current Status   ECOG score 1       Physical Exam    CONSTITUTIONAL:  Vital signs reviewed.  No distress, looks comfortable. Morbidly obese on continuous oxygen  EYES:  Conjunctiva and lids unremarkable.  PERRLA  EARS,NOSE,MOUTH,THROAT:  Ears and nose appear unremarkable.  Lips, teeth, gums appear unremarkable.  RESPIRATORY:  Normal respiratory effort.  Lungs clear to auscultation bilaterally.  No axillary adenopathy  BREASTS: Bilateral mastectomies with no reconstruction-no evidence of chest wall recurrence.  No palpable abnormalities noted.  Small " area of fungal infection of the right lateral chest skin fold.  CARDIOVASCULAR:  Normal S1, S2.  No murmurs rubs or gallops.  1+ brawny lower extremity edema.  GASTROINTESTINAL: Abdomen appears unremarkable.  Nontender.  No hepatomegaly.  No splenomegaly.  LYMPHATIC:  No cervical, supraclavicular, axillary lymphadenopathy.  SKIN:  Warm  PSYCHIATRIC:  Normal judgment and insight.  Normal mood and affect.     I have reexamined the patient and the results are consistent with the previously documented exam except as updated.. Jaime Azul MD       RECENT LABS:  Results from last 7 days   Lab Units 12/29/23  0839   WBC 10*3/mm3 6.70   NEUTROS ABS 10*3/mm3 5.17   HEMOGLOBIN g/dL 10.3*   HEMATOCRIT % 35.5   PLATELETS 10*3/mm3 155                   PET IMPRESSION:  1.  Moderate to intensely FDG avid asymmetric soft tissue and skin  thickening involving the right breast likely representing patient's  known malignancy.  2.  Intensely FDG avid right axillary and subpectoral adenopathy likely  represent metastatic disease.  3.  Constellation of findings within the right adrenal gland are favored  to represent a lipid rich adenoma. Continued attention on follow-up is  recommended to ensure stability.  4.  While there are no findings of definite FDG avid osseous metastasis,  given the heterogenous FDG uptake throughout the axial and appendicular  skeleton due to the above stated limitations, subtle underlying osseous  metastasis would remain occult. Therefore, continued close attention on  follow-up is recommended to exclude this possibility.  5.  Short segment of moderate to intense FDG uptake within the distal  esophagus and GE junction suggestive of esophagitis. In the appropriate  clinical context correlation with patient history is recommended with  follow-up endoscopy if clinically indicated.  6.  Sub-6 mm pulmonary nodule within the right lower lobe is below PET  resolution and indeterminate. Continued close  attention on follow-up  with chest CT in 3 months is recommended to exclude metastatic disease.  7.  Other findings as above.     This report was finalized on 2/2/2021     Final Diagnosis   1. Left Breast, Total Mastectomy (2,122 grams):               A. MULTIFOCAL LOW GRADE DUCTAL CARCINOMA IN SITU (DCIS):                            1. Solid, Cribriform, and Pagetoid type with single cell necrosis and focal calcifications.                            2. Extent of DCIS: 20 mm (5 of 26 blocks involved).                            3. Margins are negative for in situ carcinoma; Closest distance: DCIS is present > 10 mm from                                the posterior margin.               B. Multiple intraductal papillomas, usual ductal hyperplasia, and fibroadenomatoid change.               C. Unremarkable skin and nipple.               D. See Synoptic Report and Comment #1.      2. Right Breast, Modified Radical Mastectomy S/P Neoadjuvant Chemotherapy (2,589 grams):               A. FIBROTIC TUMOR BED WITH NO RESIDUAL INVASIVE DUCTAL CARCINOMA.               B. Background breast parenchyma with multiple intraductal papillomas, fibroadenomatoid change,       usual ductal hyperplasia and pseudoangiomatous stromal hyperplasia (PASH).  C. Scar and fat necrosis, consistent with prior procedure-related changes.  D. Clip and biopsy site changes present within tumor bed.    E. Unremarkable skin and nipple.   F. Nineteen lymph nodes, negative for carcinoma (0/19):               1. Clip and biopsy site changes are present.               2. Treatment effect is present in 4 of 19 lymph nodes.  G. See Comment #2.         FINDINGS:   LUMBAR SPINE:  The BMD measured in the L1-L4 is 1.054 g/cm2 for a  T-score of 0.1 and a Z-score of 2.1     LEFT HIP: The BMD for the femoral neck is 0.476g/cm2 for a T score of   -3.4 and a Z score of -1.7     RIGHT HIP:  The BMD for the femoral neck is 0.657g/cm2 for a T score of  -1.7 and a Z score  of 0.0     IMPRESSION:  Osteoporosis.     This report was finalized on 9/16/2021     Assessment & Plan   1. [id number] right breast cancer ER96%/MA 82% HER-2 -3+ positive inflammatory breast cancer 1/19/2021 for neoadjuvant chemotherapy  Staging work-up negative except for 6 mm lung nodule and axillary and subpectoral adenopathy  THP followed by AC planned if she tolerates it  C1D8 Taxol missed due to inclement weather.  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    C1 Adriamycin/Cytoxan given 5/14/2021.  Adriamycin and Cytoxan stopped after 3 doses due to poor tolerance  YPT0N0 right breast with multifocal DCIS ER/MA positive in the left breast post bilateral mastectomies and right axillary dissection  Radiation Arimidex and Perjeta Herceptin to continue for the rest of the year  Severe diarrhea after resuming Perjeta-Lomotil ordered and C. difficile checked with this is C. difficile negative she will not tolerate Perjeta for the rest of the year and we will stop  Doing well on single agent Herceptin and Arimidex in 2/22  Completed adjuvant Herceptin in 3/22  Tolerating anastrozole well as of 1/2023.  1 month break from anastrozole because of fatigue  8/1/2023 patient notes no change in her symptoms having taken 1 month break with Arimidex.  Therefore resumed therapy.  We will add TSH and free T4 to labs done today.      2.  Morbid obesity  Body mass index is 55.48 kg/m².  Patient concerned about weight gain.  Discussed intentional healthy food choices and potential water aerobics.  Also encouraged her to discuss possible weight loss injection drugs with her PCP as she would be a good candidate with her obesity and poor pulmonary function.    3.  History of diastolic heart failure  Echocardiogram with ejection fraction of 64% normal strain  Cleared by cardio-oncology for chemotherapy  Echocardiogram in 9/21 stable at 63%    4.  Pulmonary disease?  Etiology on oxygen for  4 years?  Jennifer  Managed by pulmonologist.  Remains on continuous oxygen.  Follows up with them annually.    5.  Strong family history of breast cancer genetic testing 84 genes negative    6.  Probable benign adrenal adenoma-PET negative    7.  Questionable enlarged lymph nodes left iliac chain and mediastinum likely reactive-PET negative    8.  6 mm right lower lobe nodule below PET resolution pretreatment needs follow-up after THP  Repeat CT read at [facility] radiologist report stability of nodules and nodes  [facility] CT at [facility] in 10/21 shows continued improvement    9.  Abnormal uptake short segment esophagus with a history of Schatzki's ring-we will double PPI and watch closely but we we will proceed with chemotherapy at this point and refer back to GI-doubt she has metastatic disease to this area    10. Anemia with microcytic indices  Iron studies performed 2/10/2021.  2/24/2021: reviewed with the patient that she is iron deficient, with ferritin of 16, iron saturation of 4%.  Patient reports taking ferrous gluconate in the past but this caused GI upset.  Also with her chronic diarrhea I do not think she can absorb it.  We will pursue IV iron with plans to initiate this next week pending insurance approval. In addition hemoglobin down to 8.0 and transfusion pursued.  3/3/2021: IV Injectafer initiated x2.   Hemoglobin down to 9.9 one week out from first AC though overall stable.  Monitor.   Hemoglobin improved off chemotherapy  Hemoglobin dropped after resuming Perjeta Herceptin.  Repeat iron stores March 2022 replete.   Normocytic anemia.  Hgb 11.1.  Hemoglobin dropped to 10.5 recheck iron stores in 12/23    11. History of chronic diarrhea, ?IBS, exacerbated with Perjeta therapy.  Patient required rifaximin 550 mg to take twice daily x7 days with each Perjeta dose.   Since completion of Perjeta patient is now actually experiencing constipation (further discussed below).    12. Osteoporosis  on DEXA scan in 9/21-Prolia initiated in 12/21  Patient to receive third Prolia injection today 1/24/2023.  Next DEXA scan due September 2023.    Plan:  Continue Arimidex.  Repeat DEXA scan   Encourage patient to discuss weight loss medication with her PCP.  Prolia due again at the end of January.  Check iron studies   see me in 6 months follow-up    This patient is on high risk drug therapy requiring intensive monitoring for toxicity.

## 2024-01-23 ENCOUNTER — HOSPITAL ENCOUNTER (OUTPATIENT)
Dept: BONE DENSITY | Facility: HOSPITAL | Age: 69
Discharge: HOME | End: 2024-01-23
Admitting: INTERNAL MEDICINE
Payer: MEDICARE

## 2024-01-23 DIAGNOSIS — Z78.0 POST-MENOPAUSAL: ICD-10-CM

## 2024-01-23 DIAGNOSIS — Z79.811 AROMATASE INHIBITOR USE: ICD-10-CM

## 2024-01-23 DIAGNOSIS — C50.911 INFLAMMATORY BREAST CANCER, RIGHT: ICD-10-CM

## 2024-01-23 PROCEDURE — 77080 DXA BONE DENSITY AXIAL: CPT

## 2024-01-30 ENCOUNTER — OFFICE VISIT (OUTPATIENT)
Dept: CARDIOLOGY | Facility: CLINIC | Age: 69
End: 2024-01-30
Payer: MEDICARE

## 2024-01-30 VITALS
HEART RATE: 97 BPM | HEIGHT: 65 IN | SYSTOLIC BLOOD PRESSURE: 122 MMHG | DIASTOLIC BLOOD PRESSURE: 84 MMHG | BODY MASS INDEX: 48.82 KG/M2 | WEIGHT: 293 LBS

## 2024-01-30 DIAGNOSIS — Z85.3 HISTORY OF BREAST CANCER: ICD-10-CM

## 2024-01-30 DIAGNOSIS — I45.10 RBBB (RIGHT BUNDLE BRANCH BLOCK): ICD-10-CM

## 2024-01-30 DIAGNOSIS — I10 PRIMARY HYPERTENSION: ICD-10-CM

## 2024-01-30 DIAGNOSIS — E66.2 OBESITY HYPOVENTILATION SYNDROME: ICD-10-CM

## 2024-01-30 DIAGNOSIS — I50.32 CHRONIC DIASTOLIC CONGESTIVE HEART FAILURE: Primary | ICD-10-CM

## 2024-01-30 DIAGNOSIS — I25.10 CORONARY ARTERY CALCIFICATION SEEN ON CT SCAN: ICD-10-CM

## 2024-01-30 DIAGNOSIS — R94.31 ABNORMAL EKG: ICD-10-CM

## 2024-01-30 DIAGNOSIS — I35.0 NONRHEUMATIC AORTIC VALVE STENOSIS: ICD-10-CM

## 2024-01-30 DIAGNOSIS — I49.1 PAC (PREMATURE ATRIAL CONTRACTION): ICD-10-CM

## 2024-01-30 RX ORDER — SPIRONOLACTONE 25 MG/1
25 TABLET ORAL DAILY
Qty: 90 TABLET | Refills: 2 | Status: SHIPPED | OUTPATIENT
Start: 2024-01-30

## 2024-01-30 RX ORDER — FUROSEMIDE 40 MG/1
40 TABLET ORAL DAILY
Qty: 90 TABLET | Refills: 2 | Status: SHIPPED | OUTPATIENT
Start: 2024-01-30

## 2024-01-30 RX ORDER — CARVEDILOL 12.5 MG/1
12.5 TABLET ORAL 2 TIMES DAILY WITH MEALS
Qty: 180 TABLET | Refills: 3 | Status: SHIPPED | OUTPATIENT
Start: 2024-01-30

## 2024-01-30 RX ORDER — LISINOPRIL 10 MG/1
10 TABLET ORAL DAILY
Qty: 90 TABLET | Refills: 0 | Status: SHIPPED | OUTPATIENT
Start: 2024-01-30

## 2024-01-30 NOTE — PROGRESS NOTES
Date of Office Visit: 2024  Encounter Provider: SHAUNA Maciel  Place of Service: [facility] CARDIOLOGY  Patient Name: Aida Conner  :1955  Primary Cardiologist: Dr. Bradley Garza    Chief Complaint   Patient presents with    Follow-up   :     HPI: Aida Conner is a 68 y.o. female who presents today for cardiac follow-up visit.  She is a new patient to me and I reviewed her medical records.    She has been diagnosed with hypertension, type 2 diabetes mellitus, obesity hypoventilation syndrome,, chronic hypoxemic respiratory failure, home oxygen use, and obstructive sleep apnea on CPAP.    She has been noted to have coronary artery calcification on CT of the chest.  We see her for chronic diastolic heart failure.    In , she was diagnosed with right-sided inflammatory breast cancer  (ER/MO/Her2+).  In 2021, echocardiogram showed normal LVEF and normal GLS -21%.  In February, she started aclitaxel, trastuzumab, and pertuzumab.  She was then started on doxorubicin. There was recommendation to switch her beta-blocker due to concerns of increased doxorubicin levels, but she was reluctant to do so. Doxorubicin/cyclophosphamide was stopped after 3 doses due to intolerance.  Paclitaxel caused pneumonitis.  She did not tolerate pertuzumab and finished trastuzumab in 2022.  She has been maintained on anastrozole.  Postchemotherapy echocardiograms have been stable.     In 2022, echocardiogram showed LVEF 64%, GLS -22%, aortic valve sclerosis, mild mitral and tricuspid regurgitation.    In 2023, she was hospitalized for COVID-19 infection and received oral Decadron.  Echocardiogram showed EF 50%, GLS not calculated, left atrial cavity dilated, mild aortic stenosis, and trace MR/TR.    She presents today for an annual cardiac follow-up visit.  She continues to experience shortness of breath and fatigue.  She is wearing 3 L of oxygen.  She  denies chest pain, PND, orthopnea, palpitations, edema, dizziness, syncope, or bleeding.  When her blood pressure is low she is more short of breath.      Past Medical History:   Diagnosis Date    Allergic rhinitis     Anemia     Anxiety     Asthma     Chronic bronchitis     In past, espec. during working years    Chronic diastolic (congestive) heart failure     Chronic hypoxemic respiratory failure     on continuous O2    Coronary artery calcification seen on CT scan 04/21/2022    Diarrhea     with chemo    GERD (gastroesophageal reflux disease) Mild    H/O Intraductal papilloma     Hemorrhoids     History of transfusion 1984    AFTER BACK SURGERY no reaction    Hypertension     IBS (irritable bowel syndrome)     Inflammatory breast cancer     right    Kidney stone     Morbid obesity with BMI of 50.0-59.9, adult     Obesity hypoventilation syndrome 02/05/2021    On home oxygen therapy     2.5 AT REST WITH ACTIVITY UP TO 4L    ANABELLE on CPAP     cpap  & uses O2 with CPAP    Osteoarthritis     Pneumonia     Many times in past, 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    PONV (postoperative nausea and vomiting)     Type II diabetes mellitus 09/08/2021       Past Surgical History:   Procedure Laterality Date    BREAST EXCISIONAL BIOPSY Bilateral     COLONOSCOPY      COLONOSCOPY N/A 12/8/2022    Procedure: COLONOSCOPY;  Surgeon: Henry Aden MD;  Location: [facility] ENDOSCOPY;  Service: Gastroenterology;  Laterality: N/A;  diverticulosis, and hemorrhoids    CYSTOSCOPY BLADDER STONE LITHOTRIPSY      ENDOSCOPY      ENDOSCOPY N/A 12/8/2022    Procedure: ESOPHAGOGASTRODUODENOSCOPY  with biopsy;  Surgeon: Henry Aden MD;  Location: [facility] ENDOSCOPY;  Service: Gastroenterology;  Laterality: N/A;  hiatal hernia    KNEE ARTHROPLASTY Bilateral 2009    MASTECTOMY Left 08/10/2021    Procedure: LEFT TOTAL MASTECTOMY;  Surgeon: Nicci Banks MD;  Location: [facility] OR;  Service: General;  Laterality: Left;     MASTECTOMY Right 08/10/2021    Procedure: RIGHT MODIFIED RADICAL MASTECTOMY;  Surgeon: Nicci Banks MD;  Location: [facility] MAIN OR;  Service: General;  Laterality: Right;    SPINE SURGERY  1984    TOTAL ABDOMINAL HYSTERECTOMY WITH SALPINGO OOPHORECTOMY  2004    UPPER GASTROINTESTINAL ENDOSCOPY      VENOUS ACCESS DEVICE (PORT) INSERTION N/A 01/25/2021    Procedure: INSERTION VENOUS ACCESS DEVICE;  Surgeon: Nicci Banks MD;  Location:  [facility] OR;  Service: General;  Laterality: N/A;    VENOUS ACCESS DEVICE (PORT) REMOVAL Left 04/05/2022    Procedure: REMOVAL VENOUS ACCESS DEVICE;  Surgeon: Nicci Banks MD;  Location: [facility] MAIN OR;  Service: General;  Laterality: Left;       Social History     Socioeconomic History    Marital status:     Number of children: 3   Tobacco Use    Smoking status: Never    Smokeless tobacco: Never   Vaping Use    Vaping Use: Never used   Substance and Sexual Activity    Alcohol use: Never    Drug use: Never    Sexual activity: Not Currently     Partners: Female       Family History   Problem Relation Age of Onset    Breast cancer Mother 45    Cancer Mother         breast; metastasized    Lung cancer Father     Hodgkin's lymphoma Father     Skin cancer Father         squamous cell    Cancer Father         4 kinds: Hodgkins; lung; squamous cell skin    Breast cancer Maternal Aunt 70    Cancer Maternal Aunt         breast; metastasized    Breast cancer Paternal Aunt 70    Cancer Paternal Aunt         breast; cured    Colon cancer Paternal Grandmother 60    Cancer Paternal Grandmother         colon cancer    Hypertension Paternal Grandmother         EVERY ADULT IN MY FAMILY    Ovarian cancer Neg Hx     Uterine cancer Neg Hx     Deep vein thrombosis Neg Hx     Pulmonary embolism Neg Hx     Malig Hyperthermia Neg Hx        The following portion of the patient's history were reviewed and updated as appropriate: past medical history, past surgical history, past  social history, past family history, allergies, current medications, and problem list.    Review of Systems   Constitutional: Positive for malaise/fatigue.   Cardiovascular:  Positive for dyspnea on exertion.   Respiratory:  Positive for shortness of breath.    Hematologic/Lymphatic: Negative.    Neurological: Negative.        Allergies   Allergen Reactions    Amlodipine Swelling     LEGS     Hydrochlorothiazide Unknown - Low Severity     Neuro issues    Morphine Nausea And Vomiting     hallucinations    Adhesive Tape Rash         Current Outpatient Medications:     anastrozole (ARIMIDEX) 1 MG tablet, TAKE 1 TABLET BY MOUTH EVERY DAY, Disp: 90 tablet, Rfl: 0    carvedilol (COREG) 12.5 MG tablet, Take 1 tablet by mouth 2 (Two) Times a Day With Meals., Disp: 180 tablet, Rfl: 3    Cholecalciferol (Vitamin D) 50 MCG (2000 UT) capsule, Take 1 capsule by mouth Daily., Disp: , Rfl:     dicyclomine (BENTYL) 20 MG tablet, TAKE 1 TABLET BY MOUTH EVERY 6 HOURS, Disp: 60 tablet, Rfl: 2    FeroSul 325 (65 Fe) MG tablet, Take 1 tablet by mouth 2 (Two) Times a Day With Meals., Disp: , Rfl:     FLUoxetine (PROzac) 40 MG capsule, TAKE 1 CAPSULE BY MOUTH EVERY DAY, Disp: 30 capsule, Rfl: 0    furosemide (Lasix) 40 MG tablet, Take 1 tablet by mouth Daily., Disp: 90 tablet, Rfl: 2    lisinopril (PRINIVIL,ZESTRIL) 10 MG tablet, Take 1 tablet by mouth Daily., Disp: 90 tablet, Rfl: 0    mometasone-formoterol (DULERA 100) 100-5 MCG/ACT inhaler, Inhale 2 puffs 2 (Two) Times a Day., Disp: 1 each, Rfl: 11    montelukast (SINGULAIR) 10 MG tablet, Take 1 tablet by mouth Every Night., Disp: , Rfl:     multivitamin (THERAGRAN) tablet tablet, Take 1 tablet by mouth Daily., Disp: , Rfl:     pantoprazole (PROTONIX) 40 MG EC tablet, Take 1 tablet by mouth Daily., Disp: , Rfl:     pravastatin (PRAVACHOL) 40 MG tablet, Take 1 tablet by mouth Every Night., Disp: , Rfl:     spironolactone (ALDACTONE) 50 MG tablet, Take 1 tablet by mouth Daily., Disp: 90  "tablet, Rfl: 2          Objective:     Vitals:    01/30/24 1438   BP: 122/84   BP Location: Left arm   Patient Position: Sitting   Pulse: 97   Weight: (!) 147 kg (325 lb)   Height: 165.1 cm (65\")     Body mass index is 54.08 kg/m².    PHYSICAL EXAM:    Vitals Reviewed.   General Appearance: No acute distress, well developed and well nourished. Obese.   Eyes: Glasses.   HENT: No hearing loss noted.    Respiratory: No signs of respiratory distress. Respiration rhythm and depth normal.  Clear to auscultation. 3L oxygen NC.  Cardiovascular:  Jugular Venous Pressure: Normal  Heart Rate and Rhythm: Normal rhythm with frequent ectopy.  Heart Sounds: Normal S1 and S2. No S3 or S4 noted.  Murmurs: No murmurs noted. No rubs, thrills, or gallops.   Lower Extremities: No edema noted.  Musculoskeletal: Normal movement of extremities.  Skin: General appearance normal.    Psychiatric: Patient alert and oriented to person, place, and time. Speech and behavior appropriate. Normal mood and affect.       ECG 12 Lead    Date/Time: 1/30/2024 3:36 PM  Performed by: Jojo Amin APRN    Authorized by: Jojo Amin APRN  Comparison: compared with previous ECG from 12/5/2023  Similar to previous ECG  Rhythm: sinus rhythm  Ectopy: atrial premature contractions  Rate: normal  BPM: 86  Conduction: right bundle branch block  QRS axis: normal  Other findings: non-specific ST-T wave changes    Clinical impression: abnormal EKG            Assessment:       Diagnosis Plan   1. Chronic diastolic congestive heart failure        2. Abnormal EKG        3. PAC (premature atrial contraction)        4. RBBB (right bundle branch block)        5. Coronary artery calcification seen on CT scan        6. Nonrheumatic aortic valve stenosis        7. Primary hypertension        8. History of breast cancer        9. Obesity hypoventilation syndrome        10. Obstructive sleep apnea               Plan:       1.  Chronic diastolic heart failure: She " reports chronic shortness of breath.  She feels well compensated on current dose of furosemide.  She feels that her blood pressure is getting too low (116/68) and this makes her more short of breath.  I decided to do a trial of decreasing spironolactone to 25 mg daily to see if that helps.  I recommend a low-sodium diet and daily weights.  Call with any worsening symptoms.    2-4.  EKG today was abnormal.  It appeared that it could have been atrial flutter, but after further evaluation with Dr. Sotelo and Dr. Garza we decided it is normal sinus rhythm, frequent PACs, and a right bundle branch block.  She is asymptomatic.    5.  Coronary artery calcification noted on previous CT scan.    6.  Mild aortic stenosis noted on recent echocardiogram.    7.  Hypertension: She feels that her blood pressure is getting too low.  Decrease spironolactone to 25 mg daily.    8.  Breast cancer: She was treated with 3 doses of doxorubicin, pertuzumab, and trastuzumab.  Echocardiograms have been stable.  Continue anastrozole.    9.  Obesity: Body mass index is 54.08 kg/m².     10.  I will check on her next week.  Follow-up with Dr. Garza in 6 months.  Call with any concerns.    As always, it has been a pleasure to participate in your patient's care. Thank you.         Sincerely,         SHAUNA Ibanez  [facility] Cardiology      Dictated utilizing Dragon Dictation

## 2024-01-30 NOTE — Clinical Note
She is someone that had abnormal EKG with frequent APCs which also appeared to be possible flutter.  They all decided it was normal sinus rhythm, frequent PACs, with right bundle branch block.  Do want a Holter monitor to quantify this?  She is asymptomatic.  Also, she has a history of breast cancer.  Do I need to do any further echocardiograms after the one that was completed in December?  Thank you

## 2024-02-02 ENCOUNTER — INFUSION (OUTPATIENT)
Dept: ONCOLOGY | Facility: HOSPITAL | Age: 69
End: 2024-02-02
Payer: MEDICARE

## 2024-02-02 ENCOUNTER — LAB (OUTPATIENT)
Dept: LAB | Facility: HOSPITAL | Age: 69
End: 2024-02-02
Payer: MEDICARE

## 2024-02-02 DIAGNOSIS — C50.111 MALIGNANT NEOPLASM OF CENTRAL PORTION OF RIGHT FEMALE BREAST, UNSPECIFIED ESTROGEN RECEPTOR STATUS: ICD-10-CM

## 2024-02-02 DIAGNOSIS — M81.0 AGE-RELATED OSTEOPOROSIS WITHOUT CURRENT PATHOLOGICAL FRACTURE: Primary | ICD-10-CM

## 2024-02-02 DIAGNOSIS — M81.0 AGE-RELATED OSTEOPOROSIS WITHOUT CURRENT PATHOLOGICAL FRACTURE: ICD-10-CM

## 2024-02-02 PROBLEM — E66.9 OBESITY: Status: RESOLVED | Noted: 2021-09-08 | Resolved: 2024-02-02

## 2024-02-02 PROBLEM — J96.02 ACUTE RESPIRATORY FAILURE WITH HYPERCAPNIA: Status: RESOLVED | Noted: 2023-12-05 | Resolved: 2024-02-02

## 2024-02-02 PROBLEM — J06.9 ACUTE RESPIRATORY DISEASE DUE TO COVID-19 VIRUS: Status: RESOLVED | Noted: 2023-12-04 | Resolved: 2024-02-02

## 2024-02-02 PROBLEM — I10 HYPERTENSION, ESSENTIAL: Status: RESOLVED | Noted: 2021-09-08 | Resolved: 2024-02-02

## 2024-02-02 PROBLEM — U07.1 ACUTE RESPIRATORY DISEASE DUE TO COVID-19 VIRUS: Status: RESOLVED | Noted: 2023-12-04 | Resolved: 2024-02-02

## 2024-02-02 LAB
ALBUMIN SERPL-MCNC: 3.5 G/DL (ref 3.5–5.2)
ALBUMIN/GLOB SERPL: 1.2 G/DL
ALP SERPL-CCNC: 98 U/L (ref 39–117)
ALT SERPL W P-5'-P-CCNC: 17 U/L (ref 1–33)
ANION GAP SERPL CALCULATED.3IONS-SCNC: 8 MMOL/L (ref 5–15)
AST SERPL-CCNC: 17 U/L (ref 1–32)
BASOPHILS # BLD AUTO: 0.04 10*3/MM3 (ref 0–0.2)
BASOPHILS NFR BLD AUTO: 0.3 % (ref 0–1.5)
BILIRUB SERPL-MCNC: 0.5 MG/DL (ref 0–1.2)
BUN SERPL-MCNC: 14 MG/DL (ref 8–23)
BUN/CREAT SERPL: 17.1 (ref 7–25)
CALCIUM SPEC-SCNC: 10 MG/DL (ref 8.6–10.5)
CHLORIDE SERPL-SCNC: 100 MMOL/L (ref 98–107)
CO2 SERPL-SCNC: 31 MMOL/L (ref 22–29)
CREAT SERPL-MCNC: 0.82 MG/DL (ref 0.57–1)
DEPRECATED RDW RBC AUTO: 63.1 FL (ref 37–54)
EGFRCR SERPLBLD CKD-EPI 2021: 77.1 ML/MIN/1.73
EOSINOPHIL # BLD AUTO: 0.16 10*3/MM3 (ref 0–0.4)
EOSINOPHIL NFR BLD AUTO: 1.4 % (ref 0.3–6.2)
ERYTHROCYTE [DISTWIDTH] IN BLOOD BY AUTOMATED COUNT: 18.7 % (ref 12.3–15.4)
GLOBULIN UR ELPH-MCNC: 3 GM/DL
GLUCOSE SERPL-MCNC: 117 MG/DL (ref 65–99)
HCT VFR BLD AUTO: 37.7 % (ref 34–46.6)
HGB BLD-MCNC: 11.1 G/DL (ref 12–15.9)
IMM GRANULOCYTES # BLD AUTO: 0.05 10*3/MM3 (ref 0–0.05)
IMM GRANULOCYTES NFR BLD AUTO: 0.4 % (ref 0–0.5)
LYMPHOCYTES # BLD AUTO: 0.95 10*3/MM3 (ref 0.7–3.1)
LYMPHOCYTES NFR BLD AUTO: 8.3 % (ref 19.6–45.3)
MAGNESIUM SERPL-MCNC: 1.9 MG/DL (ref 1.6–2.4)
MCH RBC QN AUTO: 27.1 PG (ref 26.6–33)
MCHC RBC AUTO-ENTMCNC: 29.4 G/DL (ref 31.5–35.7)
MCV RBC AUTO: 92.2 FL (ref 79–97)
MONOCYTES # BLD AUTO: 0.71 10*3/MM3 (ref 0.1–0.9)
MONOCYTES NFR BLD AUTO: 6.2 % (ref 5–12)
NEUTROPHILS NFR BLD AUTO: 83.4 % (ref 42.7–76)
NEUTROPHILS NFR BLD AUTO: 9.58 10*3/MM3 (ref 1.7–7)
NRBC BLD AUTO-RTO: 0 /100 WBC (ref 0–0.2)
PHOSPHATE SERPL-MCNC: 4.4 MG/DL (ref 2.5–4.5)
PLATELET # BLD AUTO: 320 10*3/MM3 (ref 140–450)
PMV BLD AUTO: 10.1 FL (ref 6–12)
POTASSIUM SERPL-SCNC: 4.7 MMOL/L (ref 3.5–5.2)
PROT SERPL-MCNC: 6.5 G/DL (ref 6–8.5)
RBC # BLD AUTO: 4.09 10*6/MM3 (ref 3.77–5.28)
SODIUM SERPL-SCNC: 139 MMOL/L (ref 136–145)
WBC NRBC COR # BLD AUTO: 11.49 10*3/MM3 (ref 3.4–10.8)

## 2024-02-02 PROCEDURE — 80053 COMPREHEN METABOLIC PANEL: CPT

## 2024-02-02 PROCEDURE — 84100 ASSAY OF PHOSPHORUS: CPT

## 2024-02-02 PROCEDURE — 36415 COLL VENOUS BLD VENIPUNCTURE: CPT

## 2024-02-02 PROCEDURE — 96372 THER/PROPH/DIAG INJ SC/IM: CPT

## 2024-02-02 PROCEDURE — 85025 COMPLETE CBC W/AUTO DIFF WBC: CPT

## 2024-02-02 PROCEDURE — 83735 ASSAY OF MAGNESIUM: CPT

## 2024-02-02 PROCEDURE — 25010000002 DENOSUMAB 60 MG/ML SOLUTION PREFILLED SYRINGE: Performed by: NURSE PRACTITIONER

## 2024-02-02 RX ADMIN — DENOSUMAB 60 MG: 60 INJECTION SUBCUTANEOUS at 12:32

## 2024-02-05 NOTE — PROGRESS NOTES
She needs a limited echo now to recheck EF/GLS as the last one was done while she was acutely ill, was technically difficult, and reported a decline in EF.     No Holter.    jdk

## 2024-02-07 ENCOUNTER — TELEPHONE (OUTPATIENT)
Dept: CARDIOLOGY | Facility: CLINIC | Age: 69
End: 2024-02-07
Payer: MEDICARE

## 2024-02-07 NOTE — TELEPHONE ENCOUNTER
I recently saw in the office.  She has chronic shortness of breath.  She felt that her blood pressure was getting too low and that would make her more short of breath.  I reduced her spironolactone to 25 mg daily and told her I would call her for reassessment.    Dr. Garza also recommended to repeat a limited echocardiogram to be completed to recheck EF/GLS because the last one was done when she was acutely ill.  She did not feel that she needed a Holter monitor to be completed.

## 2024-02-09 NOTE — TELEPHONE ENCOUNTER
She sent my a my chart message saying she would be home today.     I left messages on both numbers.

## 2024-02-09 NOTE — TELEPHONE ENCOUNTER
I spoke with Ms. Conner via phone. Her bp today was 128/83. Her shortness of breath is the same. I explained that Dr. Garza recommended a repeat echo and I will have scheduling call her.     Scheduling please call her to schedule a limited echo. Thanks.

## 2024-02-27 ENCOUNTER — TELEPHONE (OUTPATIENT)
Dept: CARDIOLOGY | Facility: CLINIC | Age: 69
End: 2024-02-27
Payer: MEDICARE

## 2024-02-28 ENCOUNTER — HOSPITAL ENCOUNTER (OUTPATIENT)
Dept: CARDIOLOGY | Facility: HOSPITAL | Age: 69
Discharge: HOME | End: 2024-02-28
Admitting: NURSE PRACTITIONER
Payer: MEDICARE

## 2024-02-28 VITALS
DIASTOLIC BLOOD PRESSURE: 82 MMHG | BODY MASS INDEX: 48.82 KG/M2 | HEIGHT: 65 IN | WEIGHT: 293 LBS | HEART RATE: 95 BPM | SYSTOLIC BLOOD PRESSURE: 128 MMHG

## 2024-02-28 DIAGNOSIS — I51.9 LEFT VENTRICULAR DYSFUNCTION: ICD-10-CM

## 2024-02-28 PROCEDURE — 93308 TTE F-UP OR LMTD: CPT

## 2024-02-28 PROCEDURE — 25510000001 PERFLUTREN (DEFINITY) 8.476 MG IN SODIUM CHLORIDE (PF) 0.9 % 10 ML INJECTION: Performed by: NURSE PRACTITIONER

## 2024-02-28 PROCEDURE — 93321 DOPPLER ECHO F-UP/LMTD STD: CPT

## 2024-02-28 PROCEDURE — 93325 DOPPLER ECHO COLOR FLOW MAPG: CPT

## 2024-02-28 RX ADMIN — PERFLUTREN 2 ML: 6.52 INJECTION, SUSPENSION INTRAVENOUS at 16:04

## 2024-02-29 ENCOUNTER — TELEPHONE (OUTPATIENT)
Dept: CARDIOLOGY | Facility: CLINIC | Age: 69
End: 2024-02-29
Payer: MEDICARE

## 2024-02-29 LAB
BH CV ECHO MEAS - EDV(MOD-SP2): 120 ML
BH CV ECHO MEAS - EDV(MOD-SP4): 141 ML
BH CV ECHO MEAS - EF(MOD-BP): 68.2 %
BH CV ECHO MEAS - EF(MOD-SP2): 66.7 %
BH CV ECHO MEAS - EF(MOD-SP4): 70.2 %
BH CV ECHO MEAS - ESV(MOD-SP2): 40 ML
BH CV ECHO MEAS - ESV(MOD-SP4): 42 ML
BH CV ECHO MEAS - LAT PEAK E' VEL: 11 CM/SEC
BH CV ECHO MEAS - LV DIASTOLIC VOL/BSA (35-75): 58.1 CM2
BH CV ECHO MEAS - LV SYSTOLIC VOL/BSA (12-30): 17.3 CM2
BH CV ECHO MEAS - MED PEAK E' VEL: 10.4 CM/SEC
BH CV ECHO MEAS - MV DEC SLOPE: 984 CM/SEC2
BH CV ECHO MEAS - MV DEC TIME: 0.22 SEC
BH CV ECHO MEAS - MV E MAX VEL: 133 CM/SEC
BH CV ECHO MEAS - MV MAX PG: 6.5 MMHG
BH CV ECHO MEAS - MV MEAN PG: 3.6 MMHG
BH CV ECHO MEAS - MV P1/2T: 40 MSEC
BH CV ECHO MEAS - MV V2 VTI: 21.4 CM
BH CV ECHO MEAS - MVA(P1/2T): 5.5 CM2
BH CV ECHO MEAS - SI(MOD-SP2): 33 ML/M2
BH CV ECHO MEAS - SI(MOD-SP4): 40.8 ML/M2
BH CV ECHO MEAS - SV(MOD-SP2): 80 ML
BH CV ECHO MEAS - SV(MOD-SP4): 99 ML
BH CV ECHO MEAS - TR MAX PG: 39 MMHG
BH CV ECHO MEAS - TR MAX VEL: 314 CM/SEC
BH CV ECHO MEASUREMENTS AVERAGE E/E' RATIO: 12.43
LV EF 2D ECHO EST: 65 %

## 2024-02-29 NOTE — TELEPHONE ENCOUNTER
In December 2023, echo showed EF 50% and GLS was not calculated.  This was done in the setting of acute illness.  Dr. Garza recommended repeating echocardiogram.    This echocardiogram shows normal EF, GLS could not be obtained, diastolic function indeterminate, RV moderately to moderately dilated with reduced RV systolic function, right atrium borderline dilated, trace MR, mild to moderate tricuspid regurgitation, and mild pulmonary hypertension.    Echocardiogram looks stable.  She was recommended to follow-up with Dr. Garza in 6 months.  She verbalizes understanding.

## 2024-03-05 RX ORDER — DICYCLOMINE HCL 20 MG
TABLET ORAL
Qty: 60 TABLET | Refills: 2 | Status: SHIPPED | OUTPATIENT
Start: 2024-03-05

## 2024-05-14 ENCOUNTER — OFFICE VISIT (OUTPATIENT)
Dept: PULMONOLOGY | Facility: CLINIC | Age: 69
End: 2024-05-14
Payer: MEDICARE

## 2024-05-14 VITALS
HEIGHT: 65 IN | DIASTOLIC BLOOD PRESSURE: 74 MMHG | SYSTOLIC BLOOD PRESSURE: 137 MMHG | WEIGHT: 293 LBS | RESPIRATION RATE: 20 BRPM | HEART RATE: 84 BPM | TEMPERATURE: 97.6 F | OXYGEN SATURATION: 91 % | BODY MASS INDEX: 48.82 KG/M2

## 2024-05-14 DIAGNOSIS — J96.12 CHRONIC RESPIRATORY FAILURE WITH HYPOXIA AND HYPERCAPNIA: ICD-10-CM

## 2024-05-14 DIAGNOSIS — J44.9 CHRONIC OBSTRUCTIVE PULMONARY DISEASE, UNSPECIFIED COPD TYPE: Primary | Chronic | ICD-10-CM

## 2024-05-14 DIAGNOSIS — R06.09 DYSPNEA ON EXERTION: ICD-10-CM

## 2024-05-14 DIAGNOSIS — E66.01 CLASS 3 SEVERE OBESITY WITH SERIOUS COMORBIDITY AND BODY MASS INDEX (BMI) OF 60.0 TO 69.9 IN ADULT, UNSPECIFIED OBESITY TYPE: ICD-10-CM

## 2024-05-14 DIAGNOSIS — J96.11 CHRONIC RESPIRATORY FAILURE WITH HYPOXIA AND HYPERCAPNIA: ICD-10-CM

## 2024-05-14 DIAGNOSIS — Z23 ENCOUNTER FOR IMMUNIZATION: ICD-10-CM

## 2024-05-14 DIAGNOSIS — G47.33 OSA (OBSTRUCTIVE SLEEP APNEA): ICD-10-CM

## 2024-05-14 PROCEDURE — G0009 ADMIN PNEUMOCOCCAL VACCINE: HCPCS | Performed by: NURSE PRACTITIONER

## 2024-05-14 PROCEDURE — 3078F DIAST BP <80 MM HG: CPT | Performed by: NURSE PRACTITIONER

## 2024-05-14 PROCEDURE — 3075F SYST BP GE 130 - 139MM HG: CPT | Performed by: NURSE PRACTITIONER

## 2024-05-14 PROCEDURE — 1159F MED LIST DOCD IN RCRD: CPT | Performed by: NURSE PRACTITIONER

## 2024-05-14 PROCEDURE — 1160F RVW MEDS BY RX/DR IN RCRD: CPT | Performed by: NURSE PRACTITIONER

## 2024-05-14 PROCEDURE — 90677 PCV20 VACCINE IM: CPT | Performed by: NURSE PRACTITIONER

## 2024-05-14 PROCEDURE — 99214 OFFICE O/P EST MOD 30 MIN: CPT | Performed by: NURSE PRACTITIONER

## 2024-05-14 RX ORDER — CARVEDILOL 25 MG/1
0.5 TABLET ORAL EVERY 12 HOURS SCHEDULED
COMMUNITY
Start: 2024-03-18

## 2024-05-14 RX ORDER — DIPHENOXYLATE HYDROCHLORIDE AND ATROPINE SULFATE 2.5; .025 MG/1; MG/1
1 TABLET ORAL 4 TIMES DAILY PRN
COMMUNITY
Start: 2024-03-18

## 2024-05-14 RX ORDER — ONDANSETRON HYDROCHLORIDE 8 MG/1
8 TABLET, FILM COATED ORAL 3 TIMES DAILY PRN
COMMUNITY
Start: 2024-03-18

## 2024-05-14 RX ORDER — ALBUTEROL SULFATE 90 UG/1
2 AEROSOL, METERED RESPIRATORY (INHALATION) EVERY 4 HOURS PRN
Qty: 18 G | Refills: 11 | Status: SHIPPED | OUTPATIENT
Start: 2024-05-14

## 2024-05-14 NOTE — PROGRESS NOTES
Primary Care Provider  Serenity Lucas APRN     Referring Provider  No ref. provider found     Chief Complaint  COPD, Sleep Apnea, Shortness of Breath (With exertion ), Cough (Dry cough, worse in PM), and Follow-up (4 month f/up - nasal drainage due to allergies. )    Subjective          Aida Conner presents to [facility] PULMON[facility]ITICAL CARE MEDICINE  History of Present Illness  Aida Conner is a 69 y.o. female patient of Dr. Anaya here for management of COPD, obstructive sleep apnea, chronic hypoxic and hypercapnic respiratory failure and dyspnea on exertion.    Patient states she is doing okay since her last office visit.  She denies using any antibiotics or steroids for her lungs.  She denies any current fevers or chills.  Her shortness of breath is moderate in severity, worse with exertion and improved with rest.  She continues to use Dulera, but states that she thought that was her rescue inhaler.  She will intermittently use albuterol.  She does complain of current allergies and rhinorrhea.  She has an occasional cough that is worse at night.  She continues to use 4 L of oxygen and is benefiting from its use.  Patient does have sleep apnea but does not wear CPAP.  Patient does have questions about her oxygen concentrator and I will have our DME coordinator speak with her.  She is wanting to receive her pneumonia vaccine and I will send the RSV vaccine to her local pharmacy after discussing risk versus benefits with her.  Given patient's severe COPD, I do recommend patient getting the RSV vaccine.  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  Otherwise, she states she is doing okay and has no additional respiratory concerns at this time.  She is able to perform her ADLs with minor modifications due to mobility.  She is up-to-date with her COVID and flu vaccine.  She will receive a Prevnar 20 today in office and RSV  vaccine will be sent to her pharmacy.     Her history of smoking is   Tobacco Use: Low Risk  (5/14/2024)    Patient History     Smoking Tobacco Use: Never     Smokeless Tobacco Use: Never     Passive Exposure: Never   .    Review of Systems   Constitutional:  Negative for chills, fatigue, fever, unexpected weight gain and unexpected weight loss.   HENT:  Positive for postnasal drip and rhinorrhea. Congestion: Nasal.   Respiratory:  Positive for cough and shortness of breath. Negative for apnea and wheezing.         Negative for Hemoptysis     Cardiovascular:  Negative for chest pain, palpitations and leg swelling.   Skin:         Negative for cyanosis      Sleep: Negative for Excessive daytime sleepiness  Negative for morning headaches  Negative for Snoring    Family History   Problem Relation Age of Onset    Breast cancer Mother 45    Cancer Mother         breast; metastasized    Lung cancer Father     Hodgkin's lymphoma Father     Skin cancer Father         squamous cell    Cancer Father         4 kinds: Hodgkins; lung; squamous cell skin    Breast cancer Maternal Aunt 70    Cancer Maternal Aunt         breast; metastasized    Breast cancer Paternal Aunt 70    Cancer Paternal Aunt         breast; cured    Colon cancer Paternal Grandmother 60    Cancer Paternal Grandmother         colon cancer    Hypertension Paternal Grandmother         EVERY ADULT IN MY FAMILY    Ovarian cancer Neg Hx     Uterine cancer Neg Hx     Deep vein thrombosis Neg Hx     Pulmonary embolism Neg Hx     Malig Hyperthermia Neg Hx         Social History     Socioeconomic History    Marital status:     Number of children: 3   Tobacco Use    Smoking status: Never     Passive exposure: Never    Smokeless tobacco: Never   Vaping Use    Vaping status: Never Used   Substance and Sexual Activity    Alcohol use: Never    Drug use: Never    Sexual activity: Not Currently     Partners: Female        Past Medical History:   Diagnosis Date     Allergic rhinitis     Anemia     Anxiety     Asthma     Chronic bronchitis     In past, espec. during working years    Chronic diastolic (congestive) heart failure     Chronic hypoxemic respiratory failure     on continuous O2    Coronary artery calcification seen on CT scan 04/21/2022    Diarrhea     with chemo    GERD (gastroesophageal reflux disease) Mild    H/O Intraductal papilloma     Hemorrhoids     History of transfusion 1984    AFTER BACK SURGERY no reaction    Hypertension     IBS (irritable bowel syndrome)     Inflammatory breast cancer     right    Kidney stone     Morbid obesity with BMI of 50.0-59.9, adult     Obesity hypoventilation syndrome 02/05/2021    On home oxygen therapy     2.5 AT REST WITH ACTIVITY UP TO 4L    ANABELLE on CPAP     cpap  & uses O2 with CPAP    Osteoarthritis     Pneumonia     Many times in past, 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    PONV (postoperative nausea and vomiting)     Type II diabetes mellitus 09/08/2021        Immunization History   Administered Date(s) Administered    COVID-19 (PFIZER) BIVALENT 12+YRS 11/30/2022    COVID-19 (PFIZER) Purple Cap Monovalent 03/19/2021, 04/09/2021, 11/13/2021    Covid-19 (Pfizer) Gray Cap Monovalent 06/18/2022    Flu Vaccine Quad PF >36MO 09/24/2018, 11/12/2020    Fluzone High-Dose 65+yrs 09/28/2021    Influenza TIV (IM) 01/30/2014, 09/29/2014    Influenza, Unspecified 11/16/2023    Pneumococcal Conjugate 13-Valent (PCV13) 08/21/2019    Pneumococcal Conjugate 20-Valent (PCV20) 05/14/2024         Allergies   Allergen Reactions    Amlodipine Swelling     LEGS     Hydrochlorothiazide Unknown - Low Severity     Neuro issues    Morphine Nausea And Vomiting     hallucinations    Adhesive Tape Rash          Current Outpatient Medications:     anastrozole (ARIMIDEX) 1 MG tablet, TAKE 1 TABLET BY MOUTH EVERY DAY, Disp: 90 tablet, Rfl: 0    carvedilol (COREG) 25 MG tablet, Take 0.5 tablets by mouth Every 12 (Twelve) Hours., Disp: , Rfl:      Cholecalciferol (Vitamin D) 50 MCG (2000 UT) capsule, Take 1 capsule by mouth Daily., Disp: , Rfl:     dicyclomine (BENTYL) 20 MG tablet, TAKE 1 TABLET BY MOUTH EVERY 6 HOURS, Disp: 60 tablet, Rfl: 2    diphenoxylate-atropine (LOMOTIL) 2.5-0.025 MG per tablet, Take 1 tablet by mouth 4 (Four) Times a Day As Needed for Diarrhea., Disp: , Rfl:     FeroSul 325 (65 Fe) MG tablet, Take 1 tablet by mouth 2 (Two) Times a Day With Meals., Disp: , Rfl:     FLUoxetine (PROzac) 40 MG capsule, TAKE 1 CAPSULE BY MOUTH EVERY DAY, Disp: 30 capsule, Rfl: 0    furosemide (Lasix) 40 MG tablet, Take 1 tablet by mouth Daily., Disp: 90 tablet, Rfl: 2    lisinopril (PRINIVIL,ZESTRIL) 10 MG tablet, Take 1 tablet by mouth Daily., Disp: 90 tablet, Rfl: 0    mometasone-formoterol (DULERA 100) 100-5 MCG/ACT inhaler, Inhale 2 puffs 2 (Two) Times a Day., Disp: 1 each, Rfl: 11    montelukast (SINGULAIR) 10 MG tablet, Take 1 tablet by mouth Every Night., Disp: , Rfl:     multivitamin (THERAGRAN) tablet tablet, Take 1 tablet by mouth Daily., Disp: , Rfl:     mupirocin (BACTROBAN) 2 % ointment, APPLY TO THE AFFECTED AREA(S) TWICE DAILY for 2 weeks, Disp: , Rfl:     ondansetron (ZOFRAN) 8 MG tablet, Take 1 tablet by mouth 3 (Three) Times a Day As Needed for Nausea., Disp: , Rfl:     pantoprazole (PROTONIX) 40 MG EC tablet, Take 1 tablet by mouth Daily., Disp: , Rfl:     pravastatin (PRAVACHOL) 40 MG tablet, Take 1 tablet by mouth Every Night., Disp: , Rfl:     spironolactone (ALDACTONE) 25 MG tablet, Take 1 tablet by mouth Daily., Disp: 90 tablet, Rfl: 2    albuterol sulfate  (90 Base) MCG/ACT inhaler, Inhale 2 puffs Every 4 (Four) Hours As Needed for Wheezing., Disp: 18 g, Rfl: 11    carvedilol (COREG) 12.5 MG tablet, Take 1 tablet by mouth 2 (Two) Times a Day With Meals. (Patient not taking: Reported on 5/14/2024), Disp: 180 tablet, Rfl: 3    RSVPreF3 Vac Recomb Adjuvanted (AREXVY) 120 MCG/0.5ML reconstituted suspension injection, Inject 0.5  "mL into the appropriate muscle as directed by prescriber 1 (One) Time for 1 dose., Disp: 0.5 mL, Rfl: 0     Objective   Physical Exam  Constitutional:       General: She is not in acute distress.     Appearance: Normal appearance. She is obese.   HENT:      Right Ear: Hearing normal.      Left Ear: Hearing normal.      Nose: No nasal tenderness or congestion.      Mouth/Throat:      Mouth: Mucous membranes are moist. No oral lesions.   Eyes:      Extraocular Movements: Extraocular movements intact.      Pupils: Pupils are equal, round, and reactive to light.   Cardiovascular:      Rate and Rhythm: Normal rate and regular rhythm.      Pulses: Normal pulses.      Heart sounds: Normal heart sounds. No murmur heard.  Pulmonary:      Effort: Pulmonary effort is normal.      Breath sounds: Decreased breath sounds present. No wheezing, rhonchi or rales.      Comments: Patient is on 5 L pulsed dose.  She is able to speak full sentences without difficulty  Musculoskeletal:      Right lower leg: No edema.      Left lower leg: No edema.   Skin:     General: Skin is warm and dry.      Findings: No lesion or rash.   Neurological:      General: No focal deficit present.      Mental Status: She is alert and oriented to person, place, and time.   Psychiatric:         Mood and Affect: Affect normal. Mood is not anxious or depressed.         Vital Signs:   /74 (BP Location: Left arm, Patient Position: Sitting, Cuff Size: Large Adult) Comment (BP Location): lower arm  Pulse 84   Temp 97.6 °F (36.4 °C) (Oral)   Resp 20   Ht 165.1 cm (65\")   Wt (!) 171 kg (377 lb 6.4 oz)   SpO2 91% Comment: 5 L's PD  BMI 62.80 kg/m²        Result Review :   The following data was reviewed by: SHAUNA Miranda on 05/14/2024:  CMP          12/11/2023    06:01 12/29/2023    08:39 2/2/2024    11:47   CMP   Glucose 191  149  117    BUN 34  18  14    Creatinine 0.84  0.69  0.82    EGFR 74.9  93.5  77.1    Sodium 134  139  139    Potassium 4.0 "  4.0  4.7    Chloride 90  104  100    Calcium 9.6  9.1  10.0    Total Protein  5.9  6.5    Albumin  3.3  3.5    Globulin  2.6  3.0    Total Bilirubin  0.3  0.5    Alkaline Phosphatase  80  98    AST (SGOT)  13  17    ALT (SGPT)  21  17    Albumin/Globulin Ratio  1.3  1.2    BUN/Creatinine Ratio 40.5  26.1  17.1    Anion Gap 12.7  7.2  8.0      CBC w/diff          12/11/2023    06:01 12/29/2023    08:39 2/2/2024    11:47   CBC w/Diff   WBC 12.65  6.70  11.49    RBC 4.85  4.03  4.09    Hemoglobin 11.9  10.3  11.1    Hematocrit 40.0  35.5  37.7    MCV 82.5  88.1  92.2    MCH 24.5  25.6  27.1    MCHC 29.8  29.0  29.4    RDW 15.9  17.2  18.7    Platelets 282  155  320    Neutrophil Rel % 90.4  77.2  83.4    Immature Granulocyte Rel % 0.8  0.4  0.4    Lymphocyte Rel % 3.0  12.8  8.3    Monocyte Rel % 5.7  6.0  6.2    Eosinophil Rel % 0.0  3.3  1.4    Basophil Rel % 0.1  0.3  0.3      Data reviewed : Radiologic studies chest CT 12/5/2023, chest x-ray 12/8/2023, pulmonary function test 1/16/2019  and Anna VILLATORO last office note    Procedures        Assessment and Plan    Diagnoses and all orders for this visit:    1. Chronic obstructive pulmonary disease, unspecified COPD type (Primary)  Comments:  Continue Dulera  Orders:  -     mometasone-formoterol (DULERA 100) 100-5 MCG/ACT inhaler; Inhale 2 puffs 2 (Two) Times a Day.  Dispense: 1 each; Refill: 11  -     albuterol sulfate  (90 Base) MCG/ACT inhaler; Inhale 2 puffs Every 4 (Four) Hours As Needed for Wheezing.  Dispense: 18 g; Refill: 11    2. Chronic respiratory failure with hypoxia and hypercapnia  Comments:  Continue oxygen  Orders:  -     mometasone-formoterol (DULERA 100) 100-5 MCG/ACT inhaler; Inhale 2 puffs 2 (Two) Times a Day.  Dispense: 1 each; Refill: 11    3. ANABELLE (obstructive sleep apnea)  Comments:  Patient not on CPAP    4. Dyspnea on exertion  Comments:  Continue albuterol and oxygen  Orders:  -     mometasone-formoterol (DULERA 100) 100-5  MCG/ACT inhaler; Inhale 2 puffs 2 (Two) Times a Day.  Dispense: 1 each; Refill: 11    5. Encounter for immunization  -     Pneumococcal Conjugate Vaccine 20-Valent (PCV20)  -     RSVPreF3 Vac Recomb Adjuvanted (AREXVY) 120 MCG/0.5ML reconstituted suspension injection; Inject 0.5 mL into the appropriate muscle as directed by prescriber 1 (One) Time for 1 dose.  Dispense: 0.5 mL; Refill: 0    6. Class 3 severe obesity with serious comorbidity and body mass index (BMI) of 60.0 to 69.9 in adult, unspecified obesity type  Comments:  encourage patient to try to stay as active as possible.  Recommend 30 minutes of daily activity.          Follow Up   Return in about 4 months (around 9/14/2024) for Recheck with Jorge.  Patient was given instructions and counseling regarding her condition or for health maintenance advice. Please see specific information pulled into the AVS if appropriate.

## 2024-05-14 NOTE — PATIENT INSTRUCTIONS
COPD and Physical Activity  Chronic obstructive pulmonary disease (COPD) is a long-term, or chronic, condition that affects the lungs. COPD is a general term that can be used to describe many problems that cause inflammation of the lungs and limit airflow. These conditions include chronic bronchitis and emphysema.  The main symptom of COPD is shortness of breath, which makes it harder to do even simple tasks. This can also make it harder to exercise and stay active. Talk with your health care provider about treatments to help you breathe better and actions you can take to prevent breathing problems during physical activity.  What are the benefits of exercising when you have COPD?  Exercising regularly is an important part of a healthy lifestyle. You can still exercise and do physical activities even though you have COPD. Exercise and physical activity improve your shortness of breath by increasing blood flow (circulation). This causes your heart to pump more oxygen through your body. Moderate exercise can:  Improve oxygen use.  Increase your energy level.  Help with shortness of breath.  Strengthen your breathing muscles.  Improve heart health.  Help with sleep.  Improve your self-esteem and feelings of self-worth.  Lower depression, stress, and anxiety.  Exercise can benefit everyone with COPD. The severity of your disease may affect how hard you can exercise, especially at first, but everyone can benefit. Talk with your health care provider about how much exercise is safe for you, and which activities and exercises are safe for you.  What actions can I take to prevent breathing problems during physical activity?  Sign up for a pulmonary rehabilitation program. This type of program may include:  Education about lung diseases.  Exercise classes that teach you how to exercise and be more active while improving your breathing. This usually involves:  Exercise using your lower extremities, such as a stationary  bicycle.  About 30 minutes of exercise, 2 to 5 times per week, for 6 to 12 weeks.  Strength training, such as push-ups or leg lifts.  Nutrition education.  Group classes in which you can talk with others who also have COPD and learn ways to manage stress.  If you use an oxygen tank, you should use it while you exercise. Work with your health care provider to adjust your oxygen for your physical activity. Your resting flow rate is different from your flow rate during physical activity.  How to manage your breathing while exercising  While you are exercising:  Take slow breaths.  Pace yourself, and do nottry to go too fast.  Purse your lips while breathing out. Pursing your lips is similar to a kissing or whistling position.  If doing exercise that uses a quick burst of effort, such as weight lifting:  Breathe in before starting the exercise.  Breathe out during the hardest part of the exercise, such as raising the weights.  Where to find support  You can find support for exercising with COPD from:  Your health care provider.  A pulmonary rehabilitation program.  Your local health department or community health programs.  Support groups, either online or in-person. Your health care provider may be able to recommend support groups.  Where to find more information  You can find more information about exercising with COPD from:  American Lung Association: lung.org  COPD Foundation: copdfoundation.org  Contact a health care provider if:  Your symptoms get worse.  You have nausea.  You have a fever.  You want to start a new exercise program or a new activity.  Get help right away if:  You have chest pain.  You cannot breathe.  These symptoms may represent a serious problem that is an emergency. Do not wait to see if the symptoms will go away. Get medical help right away. Call your local emergency services (911 in the [place] Do not drive yourself to the hospital.  Summary  COPD is a general term that can be used to describe  many different lung problems that cause lung inflammation and limit airflow. This includes chronic bronchitis and emphysema.  Exercise and physical activity improve your shortness of breath by increasing blood flow (circulation). This causes your heart to provide more oxygen to your body.  Contact your health care provider before starting any exercise program or new activity. Ask your health care provider what exercises and activities are safe for you.  This information is not intended to replace advice given to you by your health care provider. Make sure you discuss any questions you have with your health care provider.  Document Revised: 10/26/2021 Document Reviewed: 10/26/2021  [employer] Patient Education © 2024 [employer]  Obesity, Adult  Obesity is the condition of having too much total body fat. Being overweight or obese means that your weight is greater than what is considered healthy for your body size. Obesity is determined by a measurement called BMI (body mass index). BMI is an estimate of body fat and is calculated from height and weight. For adults, a BMI of 30 or higher is considered obese.  Obesity can lead to other health concerns and major illnesses, including:  Stroke.  Coronary artery disease (CAD).  Type 2 diabetes.  Some types of cancer, including cancers of the colon, breast, uterus, and gallbladder.  High blood pressure (hypertension).  High cholesterol.  Gallbladder stones.  Obesity can also contribute to:  Osteoarthritis.  Sleep apnea.  Infertility problems.  What are the causes?  Common causes of this condition include:  Eating daily meals that are high in calories, sugar, and fat.  Drinking high amounts of sugar-sweetened beverages, such as soft drinks.  Being born with genes that may make you more likely to become obese.  Having a medical condition that causes obesity, including:  Hypothyroidism.  Polycystic ovarian syndrome (PCOS).  Binge-eating disorder.  Cushing syndrome.  Taking  certain medicines, such as steroids, antidepressants, and seizure medicines.  Not being physically active (sedentary lifestyle).  Not getting enough sleep.  What increases the risk?  The following factors may make you more likely to develop this condition:  Having a family history of obesity.  Living in an area with limited access to:  [place], recreation centers, or sidewalks.  Healthy food choices, such as grocery stores and farmers' markets.  What are the signs or symptoms?  The main sign of this condition is having too much body fat.  How is this diagnosed?  This condition is diagnosed based on:  Your BMI. If you are an adult with a BMI of 30 or higher, you are considered obese.  Your waist circumference. This measures the distance around your waistline.  Your skinfold thickness. Your health care provider may gently pinch a fold of your skin and measure it.  You may have other tests to check for underlying conditions.  How is this treated?  Treatment for this condition often includes changing your lifestyle. Treatment may include some or all of the following:  Dietary changes. This may include developing a healthy meal plan.  Regular physical activity. This may include activity that causes your heart to beat faster (aerobic exercise) and strength training. Work with your health care provider to design an exercise program that works for you.  Medicine to help you lose weight if you are unable to lose one pound a week after six weeks of healthy eating and more physical activity.  Treating conditions that cause the obesity (underlying conditions).  Surgery. Surgical options may include gastric banding and gastric bypass. Surgery may be done if:  Other treatments have not helped to improve your condition.  You have a BMI of 40 or higher.  You have life-threatening health problems related to obesity.  Follow these instructions at home:  Eating and drinking    Follow recommendations from your health care provider about  what you eat and drink. Your health care provider may advise you to:  Limit fast food, sweets, and processed snack foods.  Choose low-fat options, such as low-fat milk instead of whole milk.  Eat five or more servings of fruits or vegetables every day.  Choose healthy foods when you eat out.  Keep low-fat snacks available.  Limit sugary drinks, such as soda, fruit juice, sweetened iced tea, and flavored milk.  Drink enough water to keep your urine pale yellow.  Do not follow a fad diet. Fad diets can be unhealthy and even dangerous.  Other healthful choices include:  Eat at home more often. This gives you more control over what you eat.  Learn to read food labels. This will help you understand how much food is considered one serving.  Learn what a healthy serving size is.  Physical activity  Exercise regularly, as told by your health care provider.  Most adults should get up to 150 minutes of moderate-intensity exercise every week.  Ask your health care provider what types of exercise are safe for you and how often you should exercise.  Warm up and stretch before being active.  Cool down and stretch after being active.  Rest between periods of activity.  Lifestyle  Work with your health care provider and a dietitian to set a weight-loss goal that is healthy and reasonable for you.  Limit your screen time.  Find ways to reward yourself that do not involve food.  Do not drink alcohol if:  Your health care provider tells you not to drink.  You are pregnant, may be pregnant, or are planning to become pregnant.  If you drink alcohol:  Limit how much you have to:  0-1 drink a day for women.  0-2 drinks a day for men.  Know how much alcohol is in your drink. In the [place] one drink equals one 12 oz bottle of beer (355 mL), one 5 oz glass of wine (148 mL), or one 1½ oz glass of hard liquor (44 mL).  General instructions  Keep a weight-loss journal to keep track of the food you eat and how much exercise you get.  Take  over-the-counter and prescription medicines only as told by your health care provider.  Take vitamins and supplements only as told by your health care provider.  Consider joining a support group. Your health care provider may be able to recommend a support group.  Pay attention to your mental health as obesity can lead to depression or self esteem issues.  Keep all follow-up visits. This is important.  Contact a health care provider if:  You are unable to meet your weight-loss goal after six weeks of dietary and lifestyle changes.  You have trouble breathing.  Summary  Obesity is the condition of having too much total body fat.  Being overweight or obese means that your weight is greater than what is considered healthy for your body size.  Work with your health care provider and a dietitian to set a weight-loss goal that is healthy and reasonable for you.  Exercise regularly, as told by your health care provider. Ask your health care provider what types of exercise are safe for you and how often you should exercise.  This information is not intended to replace advice given to you by your health care provider. Make sure you discuss any questions you have with your health care provider.  Document Revised: 07/26/2022 Document Reviewed: 07/26/2022  [email or web address] Patient Education © 2024 [email or web address]  Sleep Apnea  Sleep apnea is a condition in which breathing pauses or becomes shallow during sleep. People with sleep apnea usually snore loudly. They may have times when they gasp and stop breathing for 10 seconds or more during sleep. This may happen many times during the night.  Sleep apnea disrupts your sleep and keeps your body from getting the rest that it needs. This condition can increase your risk of certain health problems, including:  Heart attack.  Stroke.  Obesity.  Type 2 diabetes.  Heart failure.  Irregular heartbeat.  High blood pressure.  The goal of treatment is to help you breathe normally again.  What are the  causes?    The most common cause of sleep apnea is a collapsed or blocked airway.  There are three kinds of sleep apnea:  Obstructive sleep apnea. This kind is caused by a blocked or collapsed airway.  Central sleep apnea. This kind happens when the part of the brain that controls breathing does not send the correct signals to the muscles that control breathing.  Mixed sleep apnea. This is a combination of obstructive and central sleep apnea.  What increases the risk?  You are more likely to develop this condition if you:  Are overweight.  Smoke.  Have a smaller than normal airway.  Are older.  Are male.  Drink alcohol.  Take sedatives or tranquilizers.  Have a family history of sleep apnea.  Have a tongue or tonsils that are larger than normal.  What are the signs or symptoms?  Symptoms of this condition include:  Trouble staying asleep.  Loud snoring.  Morning headaches.  Waking up gasping.  Dry mouth or sore throat in the morning.  Daytime sleepiness and tiredness.  If you have daytime fatigue because of sleep apnea, you may be more likely to have:  Trouble concentrating.  Forgetfulness.  Irritability or mood swings.  Personality changes.  Feelings of depression.  Sexual dysfunction. This may include loss of interest if you are female, or erectile dysfunction if you are male.  How is this diagnosed?  This condition may be diagnosed with:  A medical history.  A physical exam.  A series of tests that are done while you are sleeping (sleep study). These tests are usually done in a sleep lab, but they may also be done at home.  How is this treated?  Treatment for this condition aims to restore normal breathing and to ease symptoms during sleep. It may involve managing health issues that can affect breathing, such as high blood pressure or obesity. Treatment may include:  Sleeping on your side.  Using a decongestant if you have nasal congestion.  Avoiding the use of depressants, including alcohol, sedatives, and  narcotics.  Losing weight if you are overweight.  Making changes to your diet.  Quitting smoking.  Using a device to open your airway while you sleep, such as:  An oral appliance. This is a custom-made mouthpiece that shifts your lower jaw forward.  A continuous positive airway pressure (CPAP) device. This device blows air through a mask when you breathe out (exhale).  A nasal expiratory positive airway pressure (EPAP) device. This device has valves that you put into each nostril.  A bi-level positive airway pressure (BIPAP) device. This device blows air through a mask when you breathe in (inhale) and breathe out (exhale).  Having surgery if other treatments do not work. During surgery, excess tissue is removed to create a wider airway.  Follow these instructions at home:  Lifestyle  Make any lifestyle changes that your health care provider recommends.  Eat a healthy, well-balanced diet.  Take steps to lose weight if you are overweight.  Avoid using depressants, including alcohol, sedatives, and narcotics.  Do not use any products that contain nicotine or tobacco. These products include cigarettes, chewing tobacco, and vaping devices, such as e-cigarettes. If you need help quitting, ask your health care provider.  General instructions  Take over-the-counter and prescription medicines only as told by your health care provider.  If you were given a device to open your airway while you sleep, use it only as told by your health care provider.  If you are having surgery, make sure to tell your health care provider you have sleep apnea. You may need to bring your device with you.  Keep all follow-up visits. This is important.  Contact a health care provider if:  The device that you received to open your airway during sleep is uncomfortable or does not seem to be working.  Your symptoms do not improve.  Your symptoms get worse.  Get help right away if:  You develop:  Chest pain.  Shortness of breath.  Discomfort in your  back, arms, or stomach.  You have:  Trouble speaking.  Weakness on one side of your body.  Drooping in your face.  These symptoms may represent a serious problem that is an emergency. Do not wait to see if the symptoms will go away. Get medical help right away. Call your local emergency services (911 in the [place] Do not drive yourself to the hospital.  Summary  Sleep apnea is a condition in which breathing pauses or becomes shallow during sleep.  The most common cause is a collapsed or blocked airway.  The goal of treatment is to restore normal breathing and to ease symptoms during sleep.  This information is not intended to replace advice given to you by your health care provider. Make sure you discuss any questions you have with your health care provider.  Document Revised: 07/27/2022 Document Reviewed: 11/26/2021  [employer] Patient Education © 2024 [employer]

## 2024-06-26 ENCOUNTER — TELEPHONE (OUTPATIENT)
Dept: ONCOLOGY | Facility: CLINIC | Age: 69
End: 2024-06-26
Payer: MEDICARE

## 2024-06-26 ENCOUNTER — LAB (OUTPATIENT)
Dept: LAB | Facility: HOSPITAL | Age: 69
End: 2024-06-26
Payer: MEDICARE

## 2024-06-26 ENCOUNTER — OFFICE VISIT (OUTPATIENT)
Dept: ONCOLOGY | Facility: CLINIC | Age: 69
End: 2024-06-26
Payer: MEDICARE

## 2024-06-26 VITALS
OXYGEN SATURATION: 94 % | HEIGHT: 65 IN | TEMPERATURE: 97.9 F | SYSTOLIC BLOOD PRESSURE: 147 MMHG | WEIGHT: 293 LBS | BODY MASS INDEX: 48.82 KG/M2 | DIASTOLIC BLOOD PRESSURE: 90 MMHG | HEART RATE: 90 BPM | RESPIRATION RATE: 20 BRPM

## 2024-06-26 DIAGNOSIS — C50.111 MALIGNANT NEOPLASM OF CENTRAL PORTION OF RIGHT FEMALE BREAST, UNSPECIFIED ESTROGEN RECEPTOR STATUS: ICD-10-CM

## 2024-06-26 DIAGNOSIS — Z79.811 AROMATASE INHIBITOR USE: ICD-10-CM

## 2024-06-26 DIAGNOSIS — Z86.2 HISTORY OF IRON DEFICIENCY ANEMIA: ICD-10-CM

## 2024-06-26 DIAGNOSIS — Z17.0 MALIGNANT NEOPLASM OF AXILLARY TAIL OF RIGHT BREAST IN FEMALE, ESTROGEN RECEPTOR POSITIVE: ICD-10-CM

## 2024-06-26 DIAGNOSIS — C50.611 MALIGNANT NEOPLASM OF AXILLARY TAIL OF RIGHT BREAST IN FEMALE, ESTROGEN RECEPTOR POSITIVE: ICD-10-CM

## 2024-06-26 DIAGNOSIS — E11.00 TYPE 2 DIABETES MELLITUS WITH HYPEROSMOLARITY WITHOUT COMA, WITHOUT LONG-TERM CURRENT USE OF INSULIN: Primary | ICD-10-CM

## 2024-06-26 LAB
ALBUMIN SERPL-MCNC: 4 G/DL (ref 3.5–5.2)
ALBUMIN/GLOB SERPL: 1.4 G/DL
ALP SERPL-CCNC: 112 U/L (ref 39–117)
ALT SERPL W P-5'-P-CCNC: 15 U/L (ref 1–33)
ANION GAP SERPL CALCULATED.3IONS-SCNC: 11.9 MMOL/L (ref 5–15)
AST SERPL-CCNC: 19 U/L (ref 1–32)
BASOPHILS # BLD AUTO: 0.02 10*3/MM3 (ref 0–0.2)
BASOPHILS NFR BLD AUTO: 0.2 % (ref 0–1.5)
BILIRUB SERPL-MCNC: 0.5 MG/DL (ref 0–1.2)
BUN SERPL-MCNC: 14 MG/DL (ref 8–23)
BUN/CREAT SERPL: 20 (ref 7–25)
CALCIUM SPEC-SCNC: 9.4 MG/DL (ref 8.6–10.5)
CHLORIDE SERPL-SCNC: 103 MMOL/L (ref 98–107)
CO2 SERPL-SCNC: 26.1 MMOL/L (ref 22–29)
CREAT SERPL-MCNC: 0.7 MG/DL (ref 0.57–1)
DEPRECATED RDW RBC AUTO: 49.3 FL (ref 37–54)
EGFRCR SERPLBLD CKD-EPI 2021: 92.6 ML/MIN/1.73
EOSINOPHIL # BLD AUTO: 0.22 10*3/MM3 (ref 0–0.4)
EOSINOPHIL NFR BLD AUTO: 2.4 % (ref 0.3–6.2)
ERYTHROCYTE [DISTWIDTH] IN BLOOD BY AUTOMATED COUNT: 15.8 % (ref 12.3–15.4)
FERRITIN SERPL-MCNC: 31.4 NG/ML (ref 13–150)
GLOBULIN UR ELPH-MCNC: 2.9 GM/DL
GLUCOSE SERPL-MCNC: 146 MG/DL (ref 65–99)
HBA1C MFR BLD: 6.4 % (ref 4.8–5.6)
HCT VFR BLD AUTO: 38.4 % (ref 34–46.6)
HGB BLD-MCNC: 11.3 G/DL (ref 12–15.9)
IMM GRANULOCYTES # BLD AUTO: 0.04 10*3/MM3 (ref 0–0.05)
IMM GRANULOCYTES NFR BLD AUTO: 0.4 % (ref 0–0.5)
IRON 24H UR-MRATE: 36 MCG/DL (ref 37–145)
IRON SATN MFR SERPL: 8 % (ref 20–50)
LYMPHOCYTES # BLD AUTO: 0.73 10*3/MM3 (ref 0.7–3.1)
LYMPHOCYTES NFR BLD AUTO: 7.8 % (ref 19.6–45.3)
MCH RBC QN AUTO: 25.3 PG (ref 26.6–33)
MCHC RBC AUTO-ENTMCNC: 29.4 G/DL (ref 31.5–35.7)
MCV RBC AUTO: 86.1 FL (ref 79–97)
MONOCYTES # BLD AUTO: 0.56 10*3/MM3 (ref 0.1–0.9)
MONOCYTES NFR BLD AUTO: 6 % (ref 5–12)
NEUTROPHILS NFR BLD AUTO: 7.76 10*3/MM3 (ref 1.7–7)
NEUTROPHILS NFR BLD AUTO: 83.2 % (ref 42.7–76)
NRBC BLD AUTO-RTO: 0 /100 WBC (ref 0–0.2)
PLATELET # BLD AUTO: 317 10*3/MM3 (ref 140–450)
PMV BLD AUTO: 10 FL (ref 6–12)
POTASSIUM SERPL-SCNC: 4.4 MMOL/L (ref 3.5–5.2)
PROT SERPL-MCNC: 6.9 G/DL (ref 6–8.5)
RBC # BLD AUTO: 4.46 10*6/MM3 (ref 3.77–5.28)
SODIUM SERPL-SCNC: 141 MMOL/L (ref 136–145)
TIBC SERPL-MCNC: 463 MCG/DL (ref 298–536)
TRANSFERRIN SERPL-MCNC: 311 MG/DL (ref 200–360)
WBC NRBC COR # BLD AUTO: 9.33 10*3/MM3 (ref 3.4–10.8)

## 2024-06-26 PROCEDURE — 85025 COMPLETE CBC W/AUTO DIFF WBC: CPT

## 2024-06-26 PROCEDURE — 84466 ASSAY OF TRANSFERRIN: CPT | Performed by: NURSE PRACTITIONER

## 2024-06-26 PROCEDURE — 82728 ASSAY OF FERRITIN: CPT | Performed by: NURSE PRACTITIONER

## 2024-06-26 PROCEDURE — 83540 ASSAY OF IRON: CPT | Performed by: NURSE PRACTITIONER

## 2024-06-26 PROCEDURE — 80053 COMPREHEN METABOLIC PANEL: CPT

## 2024-06-26 PROCEDURE — 36415 COLL VENOUS BLD VENIPUNCTURE: CPT

## 2024-06-26 PROCEDURE — 83036 HEMOGLOBIN GLYCOSYLATED A1C: CPT | Performed by: NURSE PRACTITIONER

## 2024-06-26 RX ORDER — TRIAMCINOLONE ACETONIDE 1 MG/G
OINTMENT TOPICAL
COMMUNITY
Start: 2024-06-04

## 2024-06-26 RX ORDER — ANASTROZOLE 1 MG/1
1 TABLET ORAL DAILY
Qty: 90 TABLET | Refills: 3 | Status: SHIPPED | OUTPATIENT
Start: 2024-06-26

## 2024-06-26 NOTE — PROGRESS NOTES
Subjective     REASON FOR FOLLOW-UP: Right breast inflammatory breast, triple positive.                              REQUESTING PHYSICIAN: MD Francisco Esteban MD Bethany Haynes, MD    History of present illness:    Patient is a 69 y.o. 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 She then had surgery with a complete pathological response in September 2021 and then resumed adjuvant treatment with Perjeta Herceptin with intolerance due to severe diarrhea. She completed Herceptin in March of 2022 and continues on Arimidex along with initiating Prolia for worsening osteopenia.    She was admitted to the hospital in early December with COVID-19 infection and required supplemental high-level oxygen and thankfully came off that is back to her routine 3 L/min dose.      As she is reviewed back today she is feeling more tired.  She does not feel like she is ever really bounced back from COVID completely,  In general more deconditioned.  She has had a flare of her diarrhea recently after eating Chinese food.  She fasted yesterday and is slowly advancing her diet today along with taking Bentyl and Lomotil with improvement.    Based on her worsening fatigue we did check repeat iron studies today and she is found to be iron deficient again.  Patient previously has tried oral iron but could not tolerate.  We will therefore pursue IV iron replacement.    She denies other concerns at this time.      Past Medical History:   Diagnosis Date    Allergic rhinitis     Anemia     Anxiety     Asthma     Chronic bronchitis     In past, espec. during working years    Chronic diastolic (congestive) heart failure     Chronic hypoxemic respiratory  failure     on continuous O2    Coronary artery calcification seen on CT scan 04/21/2022    Diarrhea     with chemo    GERD (gastroesophageal reflux disease) Mild    H/O Intraductal papilloma     Hemorrhoids     History of transfusion 1984    AFTER BACK SURGERY no reaction    Hypertension     IBS (irritable bowel syndrome)     Inflammatory breast cancer     right    Kidney stone     Morbid obesity with BMI of 50.0-59.9, adult     Obesity hypoventilation syndrome 02/05/2021    On home oxygen therapy     2.5 AT REST WITH ACTIVITY UP TO 4L    ANABELLE on CPAP     cpap  & uses O2 with CPAP    Osteoarthritis     Pneumonia     Many times in past, 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    PONV (postoperative nausea and vomiting)     Type II diabetes mellitus 09/08/2021        Past Surgical History:   Procedure Laterality Date    BREAST EXCISIONAL BIOPSY Bilateral     COLONOSCOPY      COLONOSCOPY N/A 12/8/2022    Procedure: COLONOSCOPY;  Surgeon: Henry Aden MD;  Location: [facility] ENDOSCOPY;  Service: Gastroenterology;  Laterality: N/A;  diverticulosis, and hemorrhoids    CYSTOSCOPY BLADDER STONE LITHOTRIPSY      ENDOSCOPY      ENDOSCOPY N/A 12/8/2022    Procedure: ESOPHAGOGASTRODUODENOSCOPY  with biopsy;  Surgeon: Henry Aden MD;  Location: [facility] ENDOSCOPY;  Service: Gastroenterology;  Laterality: N/A;  hiatal hernia    KNEE ARTHROPLASTY Bilateral 2009    MASTECTOMY Left 08/10/2021    Procedure: LEFT TOTAL MASTECTOMY;  Surgeon: Nicci Banks MD;  Location: [facility] OR;  Service: General;  Laterality: Left;    MASTECTOMY Right 08/10/2021    Procedure: RIGHT MODIFIED RADICAL MASTECTOMY;  Surgeon: Nicci Banks MD;  Location: [facility] OR;  Service: General;  Laterality: Right;    SPINE SURGERY  1984    TOTAL ABDOMINAL HYSTERECTOMY WITH SALPINGO OOPHORECTOMY  2004    UPPER GASTROINTESTINAL ENDOSCOPY      VENOUS ACCESS DEVICE (PORT) INSERTION N/A 01/25/2021    Procedure: INSERTION VENOUS ACCESS  DEVICE;  Surgeon: Nicci Banks MD;  Location: [facility] OR;  Service: General;  Laterality: N/A;    VENOUS ACCESS DEVICE (PORT) REMOVAL Left 2022    Procedure: REMOVAL VENOUS ACCESS DEVICE;  Surgeon: Nicci Banks MD;  Location: [facility] OR;  Service: General;  Laterality: Left;      ONC HISTORY  patient is a 65-year-old white female with morbid obesity, history of diastolic congestive heart failure and unknown type of pulmonary disease for which she has been on oxygen for 4 years.    She has been getting routine mammography since 38 years of age because her mother  at 46 of breast cancer and had a biopsy of her left breast in  which was apparently benign and another biopsy in  on her right breast which showed a small focus of atypical ductal hyperplasia and atypical lobular hyperplasia with a residual intraductal papilloma.  At that time she saw medical oncologist who recommended genetic testing and prevention but the insurance would not cover the genetic testing and the medical oncologist thought tamoxifen was too risky for her because of her comorbidities and no treatment was given.  More recently she noticed redness of the right breast in October and showed it to her family doctor who gave her course of Keflex when it did not improve and mammogram was ordered and this was benign  When the redness persisted she saw her gynecologist with concern for inflammatory breast cancer and referred her to Dr. Banks after repeat imaging and [facility]  Preliminary report shows micropapillary invasive mammary carcinoma intermediate grade measuring 13 mm right axillary node was also involved with metastatic cancer ER/CA and HER-2 are pending  Patient saw Dr. Banks who sent her for skin biopsies and she had 3 separate punch biopsy of the skin that showed Perivascular and perifollicular inflammation with no obvious  malignancy at 3:00 12:00 and 9:00  In addition staging work-up with CAT scans and bone scan were ordered.  CAT scan of the chest showed a borderline mediastinal  Infracarinal node measuring 15 mm in length mild cardiomegaly and heavy coronary artery calcifications  CT of the abdomen showed a 2.5 x 2.2 cm right adrenal mass which the patient tells me she has had in the past and is most likely benign but also a 2.7 cm left external iliac node which is indeterminate.  Bone scan was negative    Echocardiogram is scheduled for later next week and genetic testing with the Colppy stat panel is pending    Patient is  3 para 3 menarche was at age 9 menopause at 49 when she had a hysterectomy and oophorectomy  First childbirth was at age 20 she breast-fed her second and third children and took no hormone replacement after menopause    Family history is positive for mother dying of breast cancer at age 46 she is a maternal aunt with breast cancer in her 70s a paternal aunt with breast cancer in her 70s paternal grandmother with colon cancer.  Her father had Hodgkin's disease and non-Hodgkin's lymphoma but  of small cell lung cancer at 67      She has not had a heart attack stroke or blood clot    Plan to do echocardiogram soon as possible to ascertain tolerability of cardiotoxic chemotherapy which would be typically indicated with an inflammatory breast cancer    Also plan PET scan to follow-up on atypical lymph nodes and confirm benign etiology of the adrenal lesion    She will have port placement and chemo education pt is  ER96%/PR82%  and HER-2 +--3+    I explained to Aida that the goal of treatment would be curative but she has a lot of comorbidities which may limit our ability to give the most effective chemotherapy in the setting  I told her radiation would be involved and possibly hormonal therapy for 10 years as she has hormone positivity and HER-2 directed therapy    She expressed some concerns about  driving back and forth from [place] but felt that once a week was doable    We will see her back in 2 weeks to start treatment with a port placement planned early next week      Patient did have a mild reaction to Taxol dose #1 with some increased shortness of breath and flushing.  This was responsive to 100 mg of Solu-Cortef.  She states this gave her a headache and made her quite talkative but otherwise she was thankfully able to complete Taxol infusion without further incident.    Patient reports issues with chronic diarrhea over the last few years and did note a little bit increase in stooling following THP though nothing overly significant in her mind.  She did finally begin taking Imodium just a few days ago and has had no further stools.  She does note significant trouble with hemorrhoids in relation to the diarrhea   Required blood transfusion due to significant hemorrhoidal bleeding plus iron deficiency.  Injectafer planned    Due to inclement weather cycle 1 day 8 therapy was missed.  She did get day 15 therapy with fairly good tolerance except for some diarrhea.    She was found to be iron deficient despite trying oral iron.  We therefore elected to proceed with IV Injectafer but she remains mildly anemic with a hemoglobin of 9.3    7/21    She has increased her Lasix and her weight is down 7 pounds but overall she is significantly weaker since starting treatment and I think it is in her best interest to discontinue treatment and proceed with surgery and will use Herceptin in the interim till surgery scheduled  She has had a good response in the breast and I do not think the last dose of Adriamycin is going to be crucial and we run the risk of making her so weak that she cannot go for surgery    9/21  Patient had worsening shortness of breath requiring continuous oxygen.  CT of the chest performed to rule out pneumonitis and she was started on steroids.  CT showed bilateral groundglass infiltrates  presumed to be related to Taxol pneumonitis and therefore Taxol discontinued.  Patient completed the fourth cycle of Perjeta/Herceptin alone.  Diarrhea was an issue but not as bad without the Taxol.  She is weaned off her prednisone    Patient proceeded with cycle 1 Adriamycin/Cytoxan on 5/14/2021 and is seen back today for cycle #3 and we are doing it at 3-week intervals because of her frailty and after 3 cycles we stopped because of tolerance issues    She went for surgery her bilateral mastectomies and interestingly she had a complete pathological CR on the right breast and had evidence of DCIS in the left breast [id number]    10/21  We will start anastrozole because she is not a good candidate for tamoxifen and her bone density showed normal bone density in the spine but osteoporosis in the left femoral neck and we will start Prolia in 6 weeks.The side effects and toxicities of the Aromatase inhibitors was discussed with the patient including, hot flashes, mood swings and hair thinning.Significant arthralgias and worsening bone density were also discussed. Baseline bone density evaluation was ordered.        Current Outpatient Medications on File Prior to Visit   Medication Sig Dispense Refill    albuterol sulfate  (90 Base) MCG/ACT inhaler Inhale 2 puffs Every 4 (Four) Hours As Needed for Wheezing. 18 g 11    carvedilol (COREG) 12.5 MG tablet Take 1 tablet by mouth 2 (Two) Times a Day With Meals. 180 tablet 3    carvedilol (COREG) 25 MG tablet Take 0.5 tablets by mouth Every 12 (Twelve) Hours.      Cholecalciferol (Vitamin D) 50 MCG (2000 UT) capsule Take 1 capsule by mouth Daily.      dicyclomine (BENTYL) 20 MG tablet TAKE 1 TABLET BY MOUTH EVERY 6 HOURS 60 tablet 2    diphenoxylate-atropine (LOMOTIL) 2.5-0.025 MG per tablet Take 1 tablet by mouth 4 (Four) Times a Day As Needed for Diarrhea.      FeroSul 325 (65 Fe) MG tablet Take 1 tablet by mouth 2 (Two) Times a Day With Meals.      FLUoxetine (PROzac) 40  MG capsule TAKE 1 CAPSULE BY MOUTH EVERY DAY 30 capsule 0    furosemide (Lasix) 40 MG tablet Take 1 tablet by mouth Daily. 90 tablet 2    lisinopril (PRINIVIL,ZESTRIL) 10 MG tablet Take 1 tablet by mouth Daily. 90 tablet 0    mometasone-formoterol (DULERA 100) 100-5 MCG/ACT inhaler Inhale 2 puffs 2 (Two) Times a Day. 1 each 11    montelukast (SINGULAIR) 10 MG tablet Take 1 tablet by mouth Every Night.      multivitamin (THERAGRAN) tablet tablet Take 1 tablet by mouth Daily.      mupirocin (BACTROBAN) 2 % ointment APPLY TO THE AFFECTED AREA(S) TWICE DAILY for 2 weeks      ondansetron (ZOFRAN) 8 MG tablet Take 1 tablet by mouth 3 (Three) Times a Day As Needed for Nausea.      pantoprazole (PROTONIX) 40 MG EC tablet Take 1 tablet by mouth Daily.      pravastatin (PRAVACHOL) 40 MG tablet Take 1 tablet by mouth Every Night.      spironolactone (ALDACTONE) 25 MG tablet Take 1 tablet by mouth Daily. 90 tablet 2    triamcinolone (KENALOG) 0.1 % ointment APPLY TWICE DAILY THREE TIMES DAILY FOR 2 WEEKS      [DISCONTINUED] anastrozole (ARIMIDEX) 1 MG tablet TAKE 1 TABLET BY MOUTH EVERY DAY 90 tablet 0     No current facility-administered medications on file prior to visit.        ALLERGIES:    Allergies   Allergen Reactions    Amlodipine Swelling     LEGS     Hydrochlorothiazide Unknown - Low Severity     Neuro issues    Morphine Nausea And Vomiting     hallucinations    Adhesive Tape Rash        Social History     Socioeconomic History    Marital status:     Number of children: 3   Tobacco Use    Smoking status: Never     Passive exposure: Never    Smokeless tobacco: Never   Vaping Use    Vaping status: Never Used   Substance and Sexual Activity    Alcohol use: Never    Drug use: Never    Sexual activity: Not Currently     Partners: Female        Family History   Problem Relation Age of Onset    Breast cancer Mother 45    Cancer Mother         breast; metastasized    Lung cancer Father     Hodgkin's lymphoma Father      "Skin cancer Father         squamous cell    Cancer Father         4 kinds: Hodgkins; lung; squamous cell skin    Breast cancer Maternal Aunt 70    Cancer Maternal Aunt         breast; metastasized    Breast cancer Paternal Aunt 70    Cancer Paternal Aunt         breast; cured    Colon cancer Paternal Grandmother 60    Cancer Paternal Grandmother         colon cancer    Hypertension Paternal Grandmother         EVERY ADULT IN MY FAMILY    Ovarian cancer Neg Hx     Uterine cancer Neg Hx     Deep vein thrombosis Neg Hx     Pulmonary embolism Neg Hx     Malig Hyperthermia Neg Hx         Review of Systems   Constitutional:  Positive for fatigue and unexpected weight change (Weight gain).   Respiratory:  Positive for shortness of breath.    All other systems reviewed and are negative.    Objective     Vitals:    06/26/24 1124   BP: 147/90   Pulse: 90   Resp: 20   Temp: 97.9 °F (36.6 °C)   TempSrc: Oral   SpO2: 94%  Comment: 3 Liters O2   Weight: (!) 150 kg (330 lb 6.4 oz)   Height: 165.1 cm (65\")   PainSc: 0-No pain           6/26/2024    11:20 AM   Current Status   ECOG score 1       Physical Exam    CONSTITUTIONAL:  Vital signs reviewed.  No distress, looks comfortable. Morbidly obese on continuous oxygen  EYES:  Conjunctiva and lids unremarkable.  PERRLA  EARS,NOSE,MOUTH,THROAT:  Ears and nose appear unremarkable.  Lips, teeth, gums appear unremarkable.  RESPIRATORY:  Normal respiratory effort.  Lungs clear to auscultation bilaterally.  No axillary adenopathy  BREASTS: Bilateral mastectomies with no reconstruction-no evidence of chest wall recurrence.  No palpable abnormalities noted.    CARDIOVASCULAR:  Normal S1, S2.  No murmurs rubs or gallops.  1+ brawny lower extremity edema.  GASTROINTESTINAL: Abdomen appears unremarkable.  Nontender.    LYMPHATIC:  No cervical, supraclavicular, axillary lymphadenopathy.  SKIN:  Warm.  Venous stasis changes on lower extremities.  PSYCHIATRIC:  Normal judgment and insight.  Normal " mood and affect.     I have reexamined the patient and the results are consistent with the previously documented exam except as updated.. SHAUNA Bardales       RECENT LABS:  Results from last 7 days   Lab Units 06/26/24  1032   WBC 10*3/mm3 9.33   NEUTROS ABS 10*3/mm3 7.76*   HEMOGLOBIN g/dL 11.3*   HEMATOCRIT % 38.4   PLATELETS 10*3/mm3 317     Results from last 7 days   Lab Units 06/26/24  1032   SODIUM mmol/L 141   POTASSIUM mmol/L 4.4   CHLORIDE mmol/L 103   CO2 mmol/L 26.1   BUN mg/dL 14   CREATININE mg/dL 0.70   CALCIUM mg/dL 9.4   ALBUMIN g/dL 4.0   BILIRUBIN mg/dL 0.5   ALK PHOS U/L 112   ALT (SGPT) U/L 15   AST (SGOT) U/L 19   GLUCOSE mg/dL 146*   FERRITIN ng/mL 31.40   IRON mcg/dL 36*   TIBC mcg/dL 463   Iron sat 8%            PET IMPRESSION:  1.  Moderate to intensely FDG avid asymmetric soft tissue and skin  thickening involving the right breast likely representing patient's  known malignancy.  2.  Intensely FDG avid right axillary and subpectoral adenopathy likely  represent metastatic disease.  3.  Constellation of findings within the right adrenal gland are favored  to represent a lipid rich adenoma. Continued attention on follow-up is  recommended to ensure stability.  4.  While there are no findings of definite FDG avid osseous metastasis,  given the heterogenous FDG uptake throughout the axial and appendicular  skeleton due to the above stated limitations, subtle underlying osseous  metastasis would remain occult. Therefore, continued close attention on  follow-up is recommended to exclude this possibility.  5.  Short segment of moderate to intense FDG uptake within the distal  esophagus and GE junction suggestive of esophagitis. In the appropriate  clinical context correlation with patient history is recommended with  follow-up endoscopy if clinically indicated.  6.  Sub-6 mm pulmonary nodule within the right lower lobe is below PET  resolution and indeterminate. Continued close  attention on follow-up  with chest CT in 3 months is recommended to exclude metastatic disease.  7.  Other findings as above.     This report was finalized on 2/2/2021     Final Diagnosis   1. Left Breast, Total Mastectomy (2,122 grams):               A. MULTIFOCAL LOW GRADE DUCTAL CARCINOMA IN SITU (DCIS):                            1. Solid, Cribriform, and Pagetoid type with single cell necrosis and focal calcifications.                            2. Extent of DCIS: 20 mm (5 of 26 blocks involved).                            3. Margins are negative for in situ carcinoma; Closest distance: DCIS is present > 10 mm from                                the posterior margin.               B. Multiple intraductal papillomas, usual ductal hyperplasia, and fibroadenomatoid change.               C. Unremarkable skin and nipple.               D. See Synoptic Report and Comment #1.      2. Right Breast, Modified Radical Mastectomy S/P Neoadjuvant Chemotherapy (2,589 grams):               A. FIBROTIC TUMOR BED WITH NO RESIDUAL INVASIVE DUCTAL CARCINOMA.               B. Background breast parenchyma with multiple intraductal papillomas, fibroadenomatoid change,       usual ductal hyperplasia and pseudoangiomatous stromal hyperplasia (PASH).  C. Scar and fat necrosis, consistent with prior procedure-related changes.  D. Clip and biopsy site changes present within tumor bed.    E. Unremarkable skin and nipple.   F. Nineteen lymph nodes, negative for carcinoma (0/19):               1. Clip and biopsy site changes are present.               2. Treatment effect is present in 4 of 19 lymph nodes.  G. See Comment #2.         FINDINGS:   LUMBAR SPINE:  The BMD measured in the L1-L4 is 1.054 g/cm2 for a  T-score of 0.1 and a Z-score of 2.1     LEFT HIP: The BMD for the femoral neck is 0.476g/cm2 for a T score of   -3.4 and a Z score of -1.7     RIGHT HIP:  The BMD for the femoral neck is 0.657g/cm2 for a T score of  -1.7 and a Z score  of 0.0     IMPRESSION:  Osteoporosis.     This report was finalized on 9/16/2021     Assessment & Plan   1. [id number] right breast cancer ER96%/NE 82% HER-2 -3+ positive inflammatory breast cancer 1/19/2021 for neoadjuvant chemotherapy  Staging work-up negative except for 6 mm lung nodule and axillary and subpectoral adenopathy  THP followed by AC planned if she tolerates it  C1D8 Taxol missed due to inclement weather.  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    C1 Adriamycin/Cytoxan given 5/14/2021.  Adriamycin and Cytoxan stopped after 3 doses due to poor tolerance  YPT0N0 right breast with multifocal DCIS ER/NE positive in the left breast post bilateral mastectomies and right axillary dissection  Radiation Arimidex and Perjeta Herceptin to continue for the rest of the year  Severe diarrhea after resuming Perjeta-Lomotil ordered and C. difficile checked with this is C. difficile negative she will not tolerate Perjeta for the rest of the year and we will stop  Doing well on single agent Herceptin and Arimidex in 2/22  Completed adjuvant Herceptin in 3/22  Tolerating anastrozole well as of 1/2023.  1 month break from anastrozole because of fatigue  8/1/2023 patient notes no change in her symptoms having taken 1 month break with Arimidex.  TSH and free T4 checked because of ongoing fatigue, normal.    6/26/2024 reviewed today with no evidence of recurrence.  Continuing on anastrozole with good tolerance.    2.  Morbid obesity  Body mass index is 54.98 kg/m².  Patient is working with her PCP hopeful to get approved for injectable medication to help with both her weight and her diabetes.  Requested a globin A1c be drawn again today for her PCP to review.    3.  History of diastolic heart failure  Echocardiogram with ejection fraction of 64% normal strain  Cleared by cardio-oncology for chemotherapy  Echocardiogram in 9/21 stable at 63%    4.  Pulmonary disease?  Etiology  on oxygen for 4 years?  Jennifer  Managed by pulmonologist.  Remains on continuous oxygen.  Follows up with them annually.    5.  Strong family history of breast cancer genetic testing 84 genes negative    6.  Probable benign adrenal adenoma-PET negative    7.  Questionable enlarged lymph nodes left iliac chain and mediastinum likely reactive-PET negative    8.  6 mm right lower lobe nodule below PET resolution pretreatment needs follow-up after THP  Repeat CT read at [facility] radiologist report stability of nodules and nodes  Re[facility] CT at [facility] in 10/21 shows continued improvement    9.  Abnormal uptake short segment esophagus with a history of Schatzki's ring-we will double PPI and watch closely but we we will proceed with chemotherapy at this point and refer back to GI-doubt she has metastatic disease to this area    10.  Iron deficiency anemia   iron studies performed 2/10/2021.  2/24/2021: reviewed with the patient that she is iron deficient, with ferritin of 16, iron saturation of 4%.  Patient reports taking ferrous gluconate in the past but this caused GI upset.  Also with her chronic diarrhea I do not think she can absorb it.  We will pursue IV iron with plans to initiate this next week pending insurance approval. In addition hemoglobin down to 8.0 and transfusion pursued.  3/3/2021: IV Injectafer initiated x2.   Hemoglobin down to 9.9 one week out from first AC though overall stable.  Monitor.   Hemoglobin improved off chemotherapy  Hemoglobin dropped after resuming Perjeta Herceptin.  Repeat iron stores March 2022 replete.   Normocytic anemia.  Hgb 11.1.  Hemoglobin dropped to 10.5 recheck iron stores in 12/23 6/26/2024 Hgb 11.3, ferritin 31, iron sat 8%.  Patient cannot tolerate oral iron we will therefore pursue IV iron.  Her insurance will approve Feraheme.    11. History of chronic diarrhea, ?IBS, exacerbated with Perjeta therapy.  Patient required rifaximin 550 mg to take twice  daily x7 days with each Perjeta dose.   Since completion of Perjeta patient is now actually experiencing constipation (further discussed below).    12. Osteoporosis on DEXA scan in 9/21-Prolia initiated in 12/21  Repeat DEXA 1/2024 showing osteopenia.  Continue Prolia.    Plan:  Continue Arimidex.  Refilled today.  [place] iron studies reviewed showing recurrent iron deficiency anemia.  She cannot tolerate oral iron.  We will pursue IV iron replacement.  Insurance approving Feraheme.  Plan to bring her back soon for Feraheme x 2.  Patient due again for Prolia 8/2/24 and she will see Dr. Azul this day.  HgbA1c drawn today per request.  Patient is working with her PCP about getting on potential injectable medications for diabetes and weight loss.      This patient is on high risk drug therapy requiring intensive monitoring for toxicity.      I spent 45 minutes caring for Aida on this date of service. This time includes time spent by me in the following activities: preparing for the visit, reviewing tests, performing a medically appropriate examination and/or evaluation, counseling and educating the patient/family/caregiver, referring and communicating with other health care professionals, documenting information in the medical record, independently interpreting results and communicating that information with the patient/family/caregiver, care coordination, ordering medications, ordering test(s), obtaining a separately obtained history, and reviewing a separately obtained history

## 2024-06-26 NOTE — TELEPHONE ENCOUNTER
----- Message from Ynes Vazquez sent at 6/26/2024  2:04 PM EDT -----  Yes that would be great. I'll message scheduling if you want to call the patient and let her know that she is indeed iron deficient and we're going to replace. Thank you!  ----- Message -----  From: Chantel Esqueda RN  Sent: 6/26/2024   1:18 PM EDT  To: SHAUNA Bardales,     I talked with Holley and she stated she would be eligible for [facility]. Do you want me to get the plan entered and approved?    Thank you  ----- Message -----  From: Ynes Vazquez APRN  Sent: 6/26/2024  12:17 PM EDT  To: Chantel Esqueda RN    This patient cannot tolerate oral iron and is iron deficient again.  Can you please look into IV iron and what she could receive and let me know? Thank you.  ----- Message -----  From: Lab, Background User  Sent: 6/26/2024  11:58 AM EDT  To: SHAUNA Bardales

## 2024-06-28 ENCOUNTER — INFUSION (OUTPATIENT)
Dept: ONCOLOGY | Facility: HOSPITAL | Age: 69
End: 2024-06-28
Payer: MEDICARE

## 2024-06-28 VITALS
TEMPERATURE: 97.5 F | DIASTOLIC BLOOD PRESSURE: 70 MMHG | RESPIRATION RATE: 16 BRPM | HEART RATE: 119 BPM | OXYGEN SATURATION: 91 % | BODY MASS INDEX: 54.65 KG/M2 | WEIGHT: 293 LBS | SYSTOLIC BLOOD PRESSURE: 115 MMHG

## 2024-06-28 DIAGNOSIS — T45.4X5A ADVERSE EFFECT OF IRON, INITIAL ENCOUNTER: Primary | ICD-10-CM

## 2024-06-28 DIAGNOSIS — D64.9 ANEMIA, UNSPECIFIED TYPE: ICD-10-CM

## 2024-06-28 PROCEDURE — 96374 THER/PROPH/DIAG INJ IV PUSH: CPT

## 2024-06-28 PROCEDURE — 63710000001 DIPHENHYDRAMINE PER 50 MG: Performed by: INTERNAL MEDICINE

## 2024-06-28 PROCEDURE — 25010000002 FERUMOXYTOL 510 MG/17ML SOLUTION 17 ML VIAL: Performed by: INTERNAL MEDICINE

## 2024-06-28 PROCEDURE — 63710000001 ACETAMINOPHEN 325 MG TABLET: Performed by: INTERNAL MEDICINE

## 2024-06-28 PROCEDURE — 25810000003 SODIUM CHLORIDE 0.9 % SOLUTION: Performed by: INTERNAL MEDICINE

## 2024-06-28 PROCEDURE — A9270 NON-COVERED ITEM OR SERVICE: HCPCS | Performed by: INTERNAL MEDICINE

## 2024-06-28 RX ORDER — SODIUM CHLORIDE 9 MG/ML
20 INJECTION, SOLUTION INTRAVENOUS ONCE
Status: COMPLETED | OUTPATIENT
Start: 2024-06-28 | End: 2024-06-28

## 2024-06-28 RX ORDER — ACETAMINOPHEN 325 MG/1
650 TABLET ORAL ONCE
Status: COMPLETED | OUTPATIENT
Start: 2024-06-28 | End: 2024-06-28

## 2024-06-28 RX ORDER — DIPHENHYDRAMINE HCL 25 MG
25 CAPSULE ORAL ONCE
Status: COMPLETED | OUTPATIENT
Start: 2024-06-28 | End: 2024-06-28

## 2024-06-28 RX ADMIN — ACETAMINOPHEN 650 MG: 325 TABLET ORAL at 13:19

## 2024-06-28 RX ADMIN — DIPHENHYDRAMINE HYDROCHLORIDE 25 MG: 25 CAPSULE ORAL at 13:19

## 2024-06-28 RX ADMIN — SODIUM CHLORIDE 20 ML/HR: 9 INJECTION, SOLUTION INTRAVENOUS at 13:47

## 2024-06-28 RX ADMIN — FERUMOXYTOL 510 MG: 510 INJECTION INTRAVENOUS at 13:47

## 2024-07-01 ENCOUNTER — TELEPHONE (OUTPATIENT)
Dept: ONCOLOGY | Facility: CLINIC | Age: 69
End: 2024-07-01
Payer: MEDICARE

## 2024-07-01 NOTE — TELEPHONE ENCOUNTER
----- Message from Ynes Vazquez sent at 6/26/2024  3:38 PM EDT -----  Please also let patient know this result, slightly improved at 6.4 compared to last 6.8. She wanted this info for her PCP. Thanks.  ----- Message -----  From: Lab, Background User  Sent: 6/26/2024  11:58 AM EDT  To: SHAUNA Bardales

## 2024-07-05 ENCOUNTER — INFUSION (OUTPATIENT)
Dept: ONCOLOGY | Facility: HOSPITAL | Age: 69
End: 2024-07-05
Payer: MEDICARE

## 2024-07-05 VITALS
HEART RATE: 87 BPM | BODY MASS INDEX: 54.65 KG/M2 | TEMPERATURE: 97.8 F | DIASTOLIC BLOOD PRESSURE: 71 MMHG | OXYGEN SATURATION: 92 % | WEIGHT: 293 LBS | SYSTOLIC BLOOD PRESSURE: 110 MMHG

## 2024-07-05 DIAGNOSIS — T45.4X5A ADVERSE EFFECT OF IRON, INITIAL ENCOUNTER: Primary | ICD-10-CM

## 2024-07-05 DIAGNOSIS — D64.9 ANEMIA, UNSPECIFIED TYPE: ICD-10-CM

## 2024-07-05 PROCEDURE — 96374 THER/PROPH/DIAG INJ IV PUSH: CPT

## 2024-07-05 PROCEDURE — A9270 NON-COVERED ITEM OR SERVICE: HCPCS | Performed by: INTERNAL MEDICINE

## 2024-07-05 PROCEDURE — 63710000001 ACETAMINOPHEN 325 MG TABLET: Performed by: INTERNAL MEDICINE

## 2024-07-05 PROCEDURE — 25010000002 FERUMOXYTOL 510 MG/17ML SOLUTION 17 ML VIAL: Performed by: INTERNAL MEDICINE

## 2024-07-05 PROCEDURE — 63710000001 DIPHENHYDRAMINE PER 50 MG: Performed by: INTERNAL MEDICINE

## 2024-07-05 PROCEDURE — 25810000003 SODIUM CHLORIDE 0.9 % SOLUTION: Performed by: INTERNAL MEDICINE

## 2024-07-05 RX ORDER — SODIUM CHLORIDE 9 MG/ML
20 INJECTION, SOLUTION INTRAVENOUS ONCE
Status: COMPLETED | OUTPATIENT
Start: 2024-07-05 | End: 2024-07-05

## 2024-07-05 RX ORDER — ACETAMINOPHEN 325 MG/1
650 TABLET ORAL ONCE
Status: COMPLETED | OUTPATIENT
Start: 2024-07-05 | End: 2024-07-05

## 2024-07-05 RX ORDER — DIPHENHYDRAMINE HCL 25 MG
25 CAPSULE ORAL ONCE
Status: COMPLETED | OUTPATIENT
Start: 2024-07-05 | End: 2024-07-05

## 2024-07-05 RX ADMIN — SODIUM CHLORIDE 20 ML/HR: 9 INJECTION, SOLUTION INTRAVENOUS at 13:58

## 2024-07-05 RX ADMIN — FERUMOXYTOL 510 MG: 510 INJECTION INTRAVENOUS at 13:58

## 2024-07-05 RX ADMIN — DIPHENHYDRAMINE HYDROCHLORIDE 25 MG: 25 CAPSULE ORAL at 13:33

## 2024-07-05 RX ADMIN — ACETAMINOPHEN 650 MG: 325 TABLET ORAL at 13:33

## 2024-08-06 ENCOUNTER — TELEPHONE (OUTPATIENT)
Dept: ONCOLOGY | Facility: CLINIC | Age: 69
End: 2024-08-06
Payer: MEDICARE

## 2024-08-06 NOTE — TELEPHONE ENCOUNTER
Caller: Aida Conner    Relationship to patient: Self    Best call back number: [phone]     Chief complaint: PT  IS HAVING SURGERY AND PT NEEDS TO R/S     Type of visit: LAB, FOLLOW UP AND INJECTION    Requested date: NEXT AVAILABLE     If rescheduling, when is the original appointment: 08/08/24

## 2024-08-28 ENCOUNTER — INFUSION (OUTPATIENT)
Dept: ONCOLOGY | Facility: HOSPITAL | Age: 69
End: 2024-08-28
Payer: MEDICARE

## 2024-08-28 ENCOUNTER — LAB (OUTPATIENT)
Dept: LAB | Facility: HOSPITAL | Age: 69
End: 2024-08-28
Payer: MEDICARE

## 2024-08-28 ENCOUNTER — OFFICE VISIT (OUTPATIENT)
Dept: ONCOLOGY | Facility: CLINIC | Age: 69
End: 2024-08-28
Payer: MEDICARE

## 2024-08-28 VITALS
RESPIRATION RATE: 16 BRPM | BODY MASS INDEX: 48.82 KG/M2 | DIASTOLIC BLOOD PRESSURE: 87 MMHG | SYSTOLIC BLOOD PRESSURE: 156 MMHG | TEMPERATURE: 98.5 F | HEART RATE: 86 BPM | WEIGHT: 293 LBS | OXYGEN SATURATION: 91 % | HEIGHT: 65 IN

## 2024-08-28 DIAGNOSIS — C50.611 MALIGNANT NEOPLASM OF AXILLARY TAIL OF RIGHT BREAST IN FEMALE, ESTROGEN RECEPTOR POSITIVE: ICD-10-CM

## 2024-08-28 DIAGNOSIS — T45.4X5A ADVERSE EFFECT OF IRON, INITIAL ENCOUNTER: ICD-10-CM

## 2024-08-28 DIAGNOSIS — M85.80 OSTEOPENIA, UNSPECIFIED LOCATION: ICD-10-CM

## 2024-08-28 DIAGNOSIS — M81.0 AGE-RELATED OSTEOPOROSIS WITHOUT CURRENT PATHOLOGICAL FRACTURE: ICD-10-CM

## 2024-08-28 DIAGNOSIS — C50.911 INFLAMMATORY BREAST CANCER, RIGHT: ICD-10-CM

## 2024-08-28 DIAGNOSIS — C50.111 MALIGNANT NEOPLASM OF CENTRAL PORTION OF RIGHT FEMALE BREAST, UNSPECIFIED ESTROGEN RECEPTOR STATUS: Primary | ICD-10-CM

## 2024-08-28 DIAGNOSIS — C50.111 MALIGNANT NEOPLASM OF CENTRAL PORTION OF RIGHT FEMALE BREAST, UNSPECIFIED ESTROGEN RECEPTOR STATUS: ICD-10-CM

## 2024-08-28 DIAGNOSIS — Z17.0 MALIGNANT NEOPLASM OF AXILLARY TAIL OF RIGHT BREAST IN FEMALE, ESTROGEN RECEPTOR POSITIVE: ICD-10-CM

## 2024-08-28 DIAGNOSIS — Z79.811 AROMATASE INHIBITOR USE: Primary | ICD-10-CM

## 2024-08-28 DIAGNOSIS — Z79.899 HIGH RISK MEDICATION USE: ICD-10-CM

## 2024-08-28 LAB
ALBUMIN SERPL-MCNC: 3.9 G/DL (ref 3.5–5.2)
ALBUMIN/GLOB SERPL: 1.3 G/DL
ALP SERPL-CCNC: 120 U/L (ref 39–117)
ALT SERPL W P-5'-P-CCNC: 28 U/L (ref 1–33)
ANION GAP SERPL CALCULATED.3IONS-SCNC: 11.8 MMOL/L (ref 5–15)
AST SERPL-CCNC: 25 U/L (ref 1–32)
BASOPHILS # BLD AUTO: 0.03 10*3/MM3 (ref 0–0.2)
BASOPHILS NFR BLD AUTO: 0.4 % (ref 0–1.5)
BILIRUB SERPL-MCNC: 0.6 MG/DL (ref 0–1.2)
BUN SERPL-MCNC: 16 MG/DL (ref 8–23)
BUN/CREAT SERPL: 21.3 (ref 7–25)
CALCIUM SPEC-SCNC: 8.2 MG/DL (ref 8.6–10.5)
CHLORIDE SERPL-SCNC: 102 MMOL/L (ref 98–107)
CO2 SERPL-SCNC: 28.2 MMOL/L (ref 22–29)
CREAT SERPL-MCNC: 0.75 MG/DL (ref 0.57–1)
DEPRECATED RDW RBC AUTO: 57.6 FL (ref 37–54)
EGFRCR SERPLBLD CKD-EPI 2021: 85.2 ML/MIN/1.73
EOSINOPHIL # BLD AUTO: 0.1 10*3/MM3 (ref 0–0.4)
EOSINOPHIL NFR BLD AUTO: 1.2 % (ref 0.3–6.2)
ERYTHROCYTE [DISTWIDTH] IN BLOOD BY AUTOMATED COUNT: 17.1 % (ref 12.3–15.4)
FERRITIN SERPL-MCNC: 183 NG/ML (ref 13–150)
GLOBULIN UR ELPH-MCNC: 2.9 GM/DL
GLUCOSE SERPL-MCNC: 112 MG/DL (ref 65–99)
HCT VFR BLD AUTO: 41.1 % (ref 34–46.6)
HGB BLD-MCNC: 12.6 G/DL (ref 12–15.9)
IMM GRANULOCYTES # BLD AUTO: 0.07 10*3/MM3 (ref 0–0.05)
IMM GRANULOCYTES NFR BLD AUTO: 0.8 % (ref 0–0.5)
IRON 24H UR-MRATE: 66 MCG/DL (ref 37–145)
IRON SATN MFR SERPL: 17 % (ref 20–50)
LYMPHOCYTES # BLD AUTO: 0.87 10*3/MM3 (ref 0.7–3.1)
LYMPHOCYTES NFR BLD AUTO: 10.4 % (ref 19.6–45.3)
MAGNESIUM SERPL-MCNC: 2 MG/DL (ref 1.6–2.4)
MCH RBC QN AUTO: 28.1 PG (ref 26.6–33)
MCHC RBC AUTO-ENTMCNC: 30.7 G/DL (ref 31.5–35.7)
MCV RBC AUTO: 91.7 FL (ref 79–97)
MONOCYTES # BLD AUTO: 0.56 10*3/MM3 (ref 0.1–0.9)
MONOCYTES NFR BLD AUTO: 6.7 % (ref 5–12)
NEUTROPHILS NFR BLD AUTO: 6.71 10*3/MM3 (ref 1.7–7)
NEUTROPHILS NFR BLD AUTO: 80.5 % (ref 42.7–76)
NRBC BLD AUTO-RTO: 0 /100 WBC (ref 0–0.2)
PHOSPHATE SERPL-MCNC: 4.8 MG/DL (ref 2.5–4.5)
PLATELET # BLD AUTO: 260 10*3/MM3 (ref 140–450)
PMV BLD AUTO: 10.3 FL (ref 6–12)
POTASSIUM SERPL-SCNC: 4.4 MMOL/L (ref 3.5–5.2)
PROT SERPL-MCNC: 6.8 G/DL (ref 6–8.5)
RBC # BLD AUTO: 4.48 10*6/MM3 (ref 3.77–5.28)
SODIUM SERPL-SCNC: 142 MMOL/L (ref 136–145)
TIBC SERPL-MCNC: 386 MCG/DL (ref 298–536)
TRANSFERRIN SERPL-MCNC: 259 MG/DL (ref 200–360)
WBC NRBC COR # BLD AUTO: 8.34 10*3/MM3 (ref 3.4–10.8)

## 2024-08-28 PROCEDURE — 83540 ASSAY OF IRON: CPT

## 2024-08-28 PROCEDURE — 82728 ASSAY OF FERRITIN: CPT

## 2024-08-28 PROCEDURE — 36415 COLL VENOUS BLD VENIPUNCTURE: CPT

## 2024-08-28 PROCEDURE — 84100 ASSAY OF PHOSPHORUS: CPT

## 2024-08-28 PROCEDURE — 25010000002 DENOSUMAB 60 MG/ML SOLUTION PREFILLED SYRINGE: Performed by: NURSE PRACTITIONER

## 2024-08-28 PROCEDURE — 85025 COMPLETE CBC W/AUTO DIFF WBC: CPT

## 2024-08-28 PROCEDURE — 96372 THER/PROPH/DIAG INJ SC/IM: CPT

## 2024-08-28 PROCEDURE — 83735 ASSAY OF MAGNESIUM: CPT

## 2024-08-28 PROCEDURE — 80053 COMPREHEN METABOLIC PANEL: CPT

## 2024-08-28 PROCEDURE — 84466 ASSAY OF TRANSFERRIN: CPT

## 2024-08-28 RX ADMIN — DENOSUMAB 60 MG: 60 INJECTION SUBCUTANEOUS at 13:02

## 2024-08-28 NOTE — PROGRESS NOTES
Reviewed Calcium of 8.2 with Mariza WALLS, and okay to give Prolia today.   Patient only taking Vit D, patient is to start calcium plus D.

## 2024-08-28 NOTE — PROGRESS NOTES
Subjective     REASON FOR FOLLOW-UP: Right breast inflammatory breast, triple positive.                              REQUESTING PHYSICIAN: MD Francisco Esteban MD Bethany Haynes, MD    History of present illness:    Patient is a 69 y.o. 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 She then had surgery with a complete pathological response in September 2021 and then resumed adjuvant treatment with Perjeta Herceptin with intolerance due to severe diarrhea. She completed Herceptin in March of 2022 and continues on Arimidex along with initiating Prolia for worsening osteopenia.    She was admitted to the hospital in early December with COVID-19 infection and required supplemental high-level oxygen and thankfully came off that is back to her routine 3 L/min dose.      She is reviewed back today few weeks delayed, originally gradual to see Dr. Azul at the beginning of August.  She notes her  had shoulder surgery and she could not get into see us.  She is due for Prolia dose #6 today.  She continues on vitamin D but is not taking calcium.  Her calcium level is notably low at 8.2 and we discussed beginning a calcium plus vitamin D supplement instead.    She continues to tolerate the Arimidex well otherwise.    When I last saw her in June she was found again to be iron deficient with symptomatic fatigue.  She received IV Feraheme 510 mg x 2 doses.  Hgb has improved to 11.3 up to 12.6 and iron sat up to 17%, ferritin up to 183.  She is feeling stronger in this regard.    She denies other concerns at this time.    Past Medical History:   Diagnosis Date    Allergic rhinitis     Anemia     Anxiety     Asthma     Chronic  bronchitis     In past, espec. during working years    Chronic diastolic (congestive) heart failure     Chronic hypoxemic respiratory failure     on continuous O2    Coronary artery calcification seen on CT scan 04/21/2022    Diarrhea     with chemo    GERD (gastroesophageal reflux disease) Mild    H/O Intraductal papilloma     Hemorrhoids     History of transfusion 1984    AFTER BACK SURGERY no reaction    Hypertension     IBS (irritable bowel syndrome)     Inflammatory breast cancer     right    Kidney stone     Morbid obesity with BMI of 50.0-59.9, adult     Obesity hypoventilation syndrome 02/05/2021    On home oxygen therapy     2.5 AT REST WITH ACTIVITY UP TO 4L    ANABELLE on CPAP     cpap  & uses O2 with CPAP    Osteoarthritis     Pneumonia     Many times in past, 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    PONV (postoperative nausea and vomiting)     Type II diabetes mellitus 09/08/2021        Past Surgical History:   Procedure Laterality Date    BREAST EXCISIONAL BIOPSY Bilateral     COLONOSCOPY      COLONOSCOPY N/A 12/8/2022    Procedure: COLONOSCOPY;  Surgeon: Henry Aden MD;  Location: [facility] ENDOSCOPY;  Service: Gastroenterology;  Laterality: N/A;  diverticulosis, and hemorrhoids    CYSTOSCOPY BLADDER STONE LITHOTRIPSY      ENDOSCOPY      ENDOSCOPY N/A 12/8/2022    Procedure: ESOPHAGOGASTRODUODENOSCOPY  with biopsy;  Surgeon: Henry Aden MD;  Location: [facility] ENDOSCOPY;  Service: Gastroenterology;  Laterality: N/A;  hiatal hernia    KNEE ARTHROPLASTY Bilateral 2009    MASTECTOMY Left 08/10/2021    Procedure: LEFT TOTAL MASTECTOMY;  Surgeon: Nicci Banks MD;  Location: [facility] OR;  Service: General;  Laterality: Left;    MASTECTOMY Right 08/10/2021    Procedure: RIGHT MODIFIED RADICAL MASTECTOMY;  Surgeon: Nicci Banks MD;  Location: [facility] OR;  Service: General;  Laterality: Right;    SPINE SURGERY  1984    TOTAL ABDOMINAL HYSTERECTOMY WITH SALPINGO OOPHORECTOMY  2004     UPPER GASTROINTESTINAL ENDOSCOPY      VENOUS ACCESS DEVICE (PORT) INSERTION N/A 2021    Procedure: INSERTION VENOUS ACCESS DEVICE;  Surgeon: Nicci Banks MD;  Location: [facility];  Service: General;  Laterality: N/A;    VENOUS ACCESS DEVICE (PORT) REMOVAL Left 2022    Procedure: REMOVAL VENOUS ACCESS DEVICE;  Surgeon: Nicci Banks MD;  Location: [facility] OR;  Service: General;  Laterality: Left;      ONC HISTORY  patient is a 65-year-old white female with morbid obesity, history of diastolic congestive heart failure and unknown type of pulmonary disease for which she has been on oxygen for 4 years.    She has been getting routine mammography since 38 years of age because her mother  at 46 of breast cancer and had a biopsy of her left breast in  which was apparently benign and another biopsy in  on her right breast which showed a small focus of atypical ductal hyperplasia and atypical lobular hyperplasia with a residual intraductal papilloma.  At that time she saw medical oncologist who recommended genetic testing and prevention but the insurance would not cover the genetic testing and the medical oncologist thought tamoxifen was too risky for her because of her comorbidities and no treatment was given.  More recently she noticed redness of the right breast in October and showed it to her family doctor who gave her course of Keflex when it did not improve and mammogram was ordered and this was benign  When the redness persisted she saw her gynecologist with concern for inflammatory breast cancer and referred her to Dr. Banks after repeat imaging and [facility]  Preliminary report shows micropapillary invasive mammary carcinoma intermediate grade measuring 13 mm right axillary node was also involved with metastatic cancer ER/CO and HER-2 are pending  Patient saw Dr. Banks who sent her for skin  biopsies and she had 3 separate punch biopsy of the skin that showed Perivascular and perifollicular inflammation with no obvious malignancy at 3:00 12:00 and 9:00  In addition staging work-up with CAT scans and bone scan were ordered.  CAT scan of the chest showed a borderline mediastinal  Infracarinal node measuring 15 mm in length mild cardiomegaly and heavy coronary artery calcifications  CT of the abdomen showed a 2.5 x 2.2 cm right adrenal mass which the patient tells me she has had in the past and is most likely benign but also a 2.7 cm left external iliac node which is indeterminate.  Bone scan was negative    Echocardiogram is scheduled for later next week and genetic testing with the [employer] stat panel is pending    Patient is  3 para 3 menarche was at age 9 menopause at 49 when she had a hysterectomy and oophorectomy  First childbirth was at age 20 she breast-fed her second and third children and took no hormone replacement after menopause    Family history is positive for mother dying of breast cancer at age 46 she is a maternal aunt with breast cancer in her 70s a paternal aunt with breast cancer in her 70s paternal grandmother with colon cancer.  Her father had Hodgkin's disease and non-Hodgkin's lymphoma but  of small cell lung cancer at 67      She has not had a heart attack stroke or blood clot    Plan to do echocardiogram soon as possible to ascertain tolerability of cardiotoxic chemotherapy which would be typically indicated with an inflammatory breast cancer    Also plan PET scan to follow-up on atypical lymph nodes and confirm benign etiology of the adrenal lesion    She will have port placement and chemo education pt is  ER96%/PR82%  and HER-2 +--3+    I explained to Aida that the goal of treatment would be curative but she has a lot of comorbidities which may limit our ability to give the most effective chemotherapy in the setting  I told her radiation would be involved and  possibly hormonal therapy for 10 years as she has hormone positivity and HER-2 directed therapy    She expressed some concerns about driving back and forth from [place] but felt that once a week was doable    We will see her back in 2 weeks to start treatment with a port placement planned early next week      Patient did have a mild reaction to Taxol dose #1 with some increased shortness of breath and flushing.  This was responsive to 100 mg of Solu-Cortef.  She states this gave her a headache and made her quite talkative but otherwise she was thankfully able to complete Taxol infusion without further incident.    Patient reports issues with chronic diarrhea over the last few years and did note a little bit increase in stooling following THP though nothing overly significant in her mind.  She did finally begin taking Imodium just a few days ago and has had no further stools.  She does note significant trouble with hemorrhoids in relation to the diarrhea   Required blood transfusion due to significant hemorrhoidal bleeding plus iron deficiency.  Injectafer planned    Due to inclement weather cycle 1 day 8 therapy was missed.  She did get day 15 therapy with fairly good tolerance except for some diarrhea.    She was found to be iron deficient despite trying oral iron.  We therefore elected to proceed with IV Injectafer but she remains mildly anemic with a hemoglobin of 9.3    7/21    She has increased her Lasix and her weight is down 7 pounds but overall she is significantly weaker since starting treatment and I think it is in her best interest to discontinue treatment and proceed with surgery and will use Herceptin in the interim till surgery scheduled  She has had a good response in the breast and I do not think the last dose of Adriamycin is going to be crucial and we run the risk of making her so weak that she cannot go for surgery    9/21  Patient had worsening shortness of breath requiring continuous  oxygen.  CT of the chest performed to rule out pneumonitis and she was started on steroids.  CT showed bilateral groundglass infiltrates presumed to be related to Taxol pneumonitis and therefore Taxol discontinued.  Patient completed the fourth cycle of Perjeta/Herceptin alone.  Diarrhea was an issue but not as bad without the Taxol.  She is weaned off her prednisone    Patient proceeded with cycle 1 Adriamycin/Cytoxan on 5/14/2021 and is seen back today for cycle #3 and we are doing it at 3-week intervals because of her frailty and after 3 cycles we stopped because of tolerance issues    She went for surgery her bilateral mastectomies and interestingly she had a complete pathological CR on the right breast and had evidence of DCIS in the left breast [id number]    10/21  We will start anastrozole because she is not a good candidate for tamoxifen and her bone density showed normal bone density in the spine but osteoporosis in the left femoral neck and we will start Prolia in 6 weeks.The side effects and toxicities of the Aromatase inhibitors was discussed with the patient including, hot flashes, mood swings and hair thinning.Significant arthralgias and worsening bone density were also discussed. Baseline bone density evaluation was ordered.        Current Outpatient Medications on File Prior to Visit   Medication Sig Dispense Refill    albuterol sulfate  (90 Base) MCG/ACT inhaler Inhale 2 puffs Every 4 (Four) Hours As Needed for Wheezing. 18 g 11    anastrozole (ARIMIDEX) 1 MG tablet Take 1 tablet by mouth Daily. 90 tablet 3    carvedilol (COREG) 12.5 MG tablet Take 1 tablet by mouth 2 (Two) Times a Day With Meals. 180 tablet 3    carvedilol (COREG) 25 MG tablet Take 0.5 tablets by mouth Every 12 (Twelve) Hours.      Cholecalciferol (Vitamin D) 50 MCG (2000 UT) capsule Take 1 capsule by mouth Daily.      dicyclomine (BENTYL) 20 MG tablet TAKE 1 TABLET BY MOUTH EVERY 6 HOURS 60 tablet 2    diphenoxylate-atropine  (LOMOTIL) 2.5-0.025 MG per tablet Take 1 tablet by mouth 4 (Four) Times a Day As Needed for Diarrhea.      FeroSul 325 (65 Fe) MG tablet Take 1 tablet by mouth 2 (Two) Times a Day With Meals.      FLUoxetine (PROzac) 40 MG capsule TAKE 1 CAPSULE BY MOUTH EVERY DAY 30 capsule 0    furosemide (Lasix) 40 MG tablet Take 1 tablet by mouth Daily. 90 tablet 2    lisinopril (PRINIVIL,ZESTRIL) 10 MG tablet Take 1 tablet by mouth Daily. 90 tablet 0    mometasone-formoterol (DULERA 100) 100-5 MCG/ACT inhaler Inhale 2 puffs 2 (Two) Times a Day. 1 each 11    montelukast (SINGULAIR) 10 MG tablet Take 1 tablet by mouth Every Night.      multivitamin (THERAGRAN) tablet tablet Take 1 tablet by mouth Daily.      mupirocin (BACTROBAN) 2 % ointment APPLY TO THE AFFECTED AREA(S) TWICE DAILY for 2 weeks      ondansetron (ZOFRAN) 8 MG tablet Take 1 tablet by mouth 3 (Three) Times a Day As Needed for Nausea.      pantoprazole (PROTONIX) 40 MG EC tablet Take 1 tablet by mouth Daily.      pravastatin (PRAVACHOL) 40 MG tablet Take 1 tablet by mouth Every Night.      spironolactone (ALDACTONE) 25 MG tablet Take 1 tablet by mouth Daily. 90 tablet 2    triamcinolone (KENALOG) 0.1 % ointment APPLY TWICE DAILY THREE TIMES DAILY FOR 2 WEEKS       No current facility-administered medications on file prior to visit.        ALLERGIES:    Allergies   Allergen Reactions    Amlodipine Swelling     LEGS     Hydrochlorothiazide Unknown - Low Severity     Neuro issues    Morphine Nausea And Vomiting     hallucinations    Adhesive Tape Rash        Social History     Socioeconomic History    Marital status:     Number of children: 3   Tobacco Use    Smoking status: Never     Passive exposure: Never    Smokeless tobacco: Never   Vaping Use    Vaping status: Never Used   Substance and Sexual Activity    Alcohol use: Never    Drug use: Never    Sexual activity: Not Currently     Partners: Female        Family History   Problem Relation Age of Onset     "Breast cancer Mother 45    Cancer Mother         breast; metastasized    Lung cancer Father     Hodgkin's lymphoma Father     Skin cancer Father         squamous cell    Cancer Father         4 kinds: Hodgkins; lung; squamous cell skin    Breast cancer Maternal Aunt 70    Cancer Maternal Aunt         breast; metastasized    Breast cancer Paternal Aunt 70    Cancer Paternal Aunt         breast; cured    Colon cancer Paternal Grandmother 60    Cancer Paternal Grandmother         colon cancer    Hypertension Paternal Grandmother         EVERY ADULT IN MY FAMILY    Ovarian cancer Neg Hx     Uterine cancer Neg Hx     Deep vein thrombosis Neg Hx     Pulmonary embolism Neg Hx     Malig Hyperthermia Neg Hx         Review of Systems   Constitutional:  Positive for fatigue and unexpected weight change (Weight gain).   Respiratory:  Positive for shortness of breath.    All other systems reviewed and are negative.    Objective     Vitals:    08/28/24 1234   BP: 156/87   Pulse: 86   Resp: 16   Temp: 98.5 °F (36.9 °C)   TempSrc: Oral   SpO2: 91%   Weight: (!) 150 kg (329 lb 9.6 oz)   Height: 165.1 cm (65\")   PainSc: 0-No pain           8/28/2024    12:41 PM   Current Status   ECOG score 1       Physical Exam    CONSTITUTIONAL:  Vital signs reviewed.  No distress, looks comfortable. Morbidly obese on continuous oxygen  EYES:  Conjunctiva and lids unremarkable.  PERRLA  EARS,NOSE,MOUTH,THROAT:  Ears and nose appear unremarkable.  Lips, teeth, gums appear unremarkable.  RESPIRATORY:  Normal respiratory effort.  Lungs clear to auscultation bilaterally.  No axillary adenopathy  BREASTS: Bilateral mastectomies with no reconstruction-no evidence of chest wall recurrence.  No palpable abnormalities noted.    CARDIOVASCULAR:  Normal S1, S2.  No murmurs rubs or gallops.  1+ brawny lower extremity edema.  GASTROINTESTINAL: Abdomen appears unremarkable.  Nontender.    LYMPHATIC:  No cervical, supraclavicular, axillary lymphadenopathy.  SKIN: "  Warm.  Venous stasis changes on lower extremities.  PSYCHIATRIC:  Normal judgment and insight.  Normal mood and affect.     I have reexamined the patient and the results are consistent with the previously documented exam except as updated.. SHAUNA Bardales       RECENT LABS:  Results from last 7 days   Lab Units 08/28/24  1220   WBC 10*3/mm3 8.34   NEUTROS ABS 10*3/mm3 6.71   HEMOGLOBIN g/dL 12.6   HEMATOCRIT % 41.1   PLATELETS 10*3/mm3 260     Results from last 7 days   Lab Units 08/28/24  1229   SODIUM mmol/L 142   POTASSIUM mmol/L 4.4   CHLORIDE mmol/L 102   CO2 mmol/L 28.2   BUN mg/dL 16   CREATININE mg/dL 0.75   CALCIUM mg/dL 8.2*   ALBUMIN g/dL 3.9   BILIRUBIN mg/dL 0.6   ALK PHOS U/L 120*   ALT (SGPT) U/L 28   AST (SGOT) U/L 25   GLUCOSE mg/dL 112*   MAGNESIUM mg/dL 2.0   FERRITIN ng/mL 183.00*   IRON mcg/dL 66   TIBC mcg/dL 386     Iron sat 17%            PET IMPRESSION:  1.  Moderate to intensely FDG avid asymmetric soft tissue and skin  thickening involving the right breast likely representing patient's  known malignancy.  2.  Intensely FDG avid right axillary and subpectoral adenopathy likely  represent metastatic disease.  3.  Constellation of findings within the right adrenal gland are favored  to represent a lipid rich adenoma. Continued attention on follow-up is  recommended to ensure stability.  4.  While there are no findings of definite FDG avid osseous metastasis,  given the heterogenous FDG uptake throughout the axial and appendicular  skeleton due to the above stated limitations, subtle underlying osseous  metastasis would remain occult. Therefore, continued close attention on  follow-up is recommended to exclude this possibility.  5.  Short segment of moderate to intense FDG uptake within the distal  esophagus and GE junction suggestive of esophagitis. In the appropriate  clinical context correlation with patient history is recommended with  follow-up endoscopy if clinically  indicated.  6.  Sub-6 mm pulmonary nodule within the right lower lobe is below PET  resolution and indeterminate. Continued close attention on follow-up  with chest CT in 3 months is recommended to exclude metastatic disease.  7.  Other findings as above.     This report was finalized on 2/2/2021     Final Diagnosis   1. Left Breast, Total Mastectomy (2,122 grams):               A. MULTIFOCAL LOW GRADE DUCTAL CARCINOMA IN SITU (DCIS):                            1. Solid, Cribriform, and Pagetoid type with single cell necrosis and focal calcifications.                            2. Extent of DCIS: 20 mm (5 of 26 blocks involved).                            3. Margins are negative for in situ carcinoma; Closest distance: DCIS is present > 10 mm from                                the posterior margin.               B. Multiple intraductal papillomas, usual ductal hyperplasia, and fibroadenomatoid change.               C. Unremarkable skin and nipple.               D. See Synoptic Report and Comment #1.      2. Right Breast, Modified Radical Mastectomy S/P Neoadjuvant Chemotherapy (2,589 grams):               A. FIBROTIC TUMOR BED WITH NO RESIDUAL INVASIVE DUCTAL CARCINOMA.               B. Background breast parenchyma with multiple intraductal papillomas, fibroadenomatoid change,       usual ductal hyperplasia and pseudoangiomatous stromal hyperplasia (PASH).  C. Scar and fat necrosis, consistent with prior procedure-related changes.  D. Clip and biopsy site changes present within tumor bed.    E. Unremarkable skin and nipple.   F. Nineteen lymph nodes, negative for carcinoma (0/19):               1. Clip and biopsy site changes are present.               2. Treatment effect is present in 4 of 19 lymph nodes.  G. See Comment #2.         FINDINGS:   LUMBAR SPINE:  The BMD measured in the L1-L4 is 1.054 g/cm2 for a  T-score of 0.1 and a Z-score of 2.1     LEFT HIP: The BMD for the femoral neck is 0.476g/cm2 for a T  score of   -3.4 and a Z score of -1.7     RIGHT HIP:  The BMD for the femoral neck is 0.657g/cm2 for a T score of  -1.7 and a Z score of 0.0     IMPRESSION:  Osteoporosis.     This report was finalized on 9/16/2021     Assessment & Plan   1. [id number] right breast cancer ER96%/AZ 82% HER-2 -3+ positive inflammatory breast cancer 1/19/2021 for neoadjuvant chemotherapy  Staging work-up negative except for 6 mm lung nodule and axillary and subpectoral adenopathy  THP followed by AC planned if she tolerates it  C1D8 Taxol missed due to inclement weather.  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    C1 Adriamycin/Cytoxan given 5/14/2021.  Adriamycin and Cytoxan stopped after 3 doses due to poor tolerance  YPT0N0 right breast with multifocal DCIS ER/AZ positive in the left breast post bilateral mastectomies and right axillary dissection  Radiation Arimidex and Perjeta Herceptin to continue for the rest of the year  Severe diarrhea after resuming Perjeta-Lomotil ordered and C. difficile checked with this is C. difficile negative she will not tolerate Perjeta for the rest of the year and we will stop  Doing well on single agent Herceptin and Arimidex in 2/22  Completed adjuvant Herceptin in 3/22  Tolerating anastrozole well as of 1/2023.  1 month break from anastrozole because of fatigue  8/1/2023 patient notes no change in her symptoms having taken 1 month break with Arimidex.  TSH and free T4 checked because of ongoing fatigue, normal.    8/28/2024 reviewed today with no evidence of recurrence.  Continuing on anastrozole with good tolerance.    2.  Morbid obesity  Body mass index is 54.85 kg/m².  6/26/2024 Hgb A1c 6.4. Continues to work with PCP and was hopeful to get approved at some point for injectable medications to help with weight loss and diabetes.      3.  History of diastolic heart failure  Echocardiogram with ejection fraction of 64% normal strain  Cleared by  cardio-oncology for chemotherapy  Echocardiogram in 9/21 stable at 63%    4.  Pulmonary disease?  Etiology on oxygen for 4 years?  Jennifer  Managed by pulmonologist.  Remains on continuous oxygen.  Follows up with them annually.    5.  Strong family history of breast cancer genetic testing 84 genes negative    6.  Probable benign adrenal adenoma-PET negative    7.  Questionable enlarged lymph nodes left iliac chain and mediastinum likely reactive-PET negative    8.  6 mm right lower lobe nodule below PET resolution pretreatment needs follow-up after THP  Repeat CT read at [facility] radiologist report stability of nodules and nodes  [facility] in 10/21 shows continued improvement    9.  Abnormal uptake short segment esophagus with a history of Schatzki's ring-we will double PPI and watch closely but we we will proceed with chemotherapy at this point and refer back to GI-doubt she has metastatic disease to this area    10.  Iron deficiency anemia   iron studies performed 2/10/2021.  2/24/2021: reviewed with the patient that she is iron deficient, with ferritin of 16, iron saturation of 4%.  Patient reports taking ferrous gluconate in the past but this caused GI upset.  Also with her chronic diarrhea I do not think she can absorb it.  We will pursue IV iron with plans to initiate this next week pending insurance approval. In addition hemoglobin down to 8.0 and transfusion pursued.  3/3/2021: IV Injectafer initiated x2.   Hemoglobin down to 9.9 one week out from first AC though overall stable.  Monitor.   Hemoglobin improved off chemotherapy  Hemoglobin dropped after resuming Perjeta Herceptin.  Repeat iron stores March 2022 replete.   Normocytic anemia.  Hgb 11.1.  Hemoglobin dropped to 10.5 recheck iron stores in 12/23 6/26/2024 Hgb 11.3, ferritin 31, iron sat 8%.  Patient cannot tolerate oral iron we will therefore pursue IV iron.  Her insurance will approve Feraheme.  8/28/2024 Hgb up to  12.6, iron sat improved to 17%, ferritin up to 183.  She is feeling better in this regard.    11. History of chronic diarrhea, ?IBS, exacerbated with Perjeta therapy.  Patient required rifaximin 550 mg to take twice daily x7 days with each Perjeta dose.   Since completion of Perjeta patient is now actually experiencing constipation (further discussed below).    12. Osteoporosis on DEXA scan in 9/21-Prolia initiated in 12/21  Repeat DEXA 1/2024 showing osteopenia.  Continue Prolia.    13.  Hypocalcemia  8/2024 Calcium 8.2.  She is currently only taking vitamin D.  Instructed to begin taking calcium plus vitamin D supplementation.    Plan:  Continue Arimidex.    Proceed with Prolia dose #6.  Notably last DEXA scan 1/2024 showed osteopenia but overall BMD improvement.  Patient has only been taking vitamin D and was instructed to switch over to calcium plus vitamin D supplement going forward.  Reviewed iron stores with patient showing that she is now replete.  Hgb notably improved.  Monitor.    She will follow-up in 6 months with Dr. Azul with repeat ferritin, iron profile, CBC, CMP, mag, Phos.  We will tentatively schedule her for additional Prolia but defer to Dr. Azul at that point.      This patient is on high risk drug therapy requiring intensive monitoring for toxicity.

## 2024-09-12 NOTE — PROGRESS NOTES
RM:________     PCP: Serenity Lucas APRN    : 1955  AGE: 69 y.o.  EST PATIENT     REASON FOR VISIT/  CC:        BP Readings from Last 3 Encounters:   24 156/87   24 110/71   24 115/70      Wt Readings from Last 3 Encounters:   24 (!) 150 kg (329 lb 9.6 oz)   24 (!) 149 kg (328 lb 6.4 oz)   24 (!) 149 kg (328 lb 6.4 oz)        WT: ____________ BP: __________L __________R HR______    CHEST PAIN: _____________    SOA: _____________PALPS: _______________     LIGHTHEADED: ___________FATIGUE: ________________ EDEMA __________    ALLERGIES:Amlodipine, Hydrochlorothiazide, Morphine, and Adhesive tape SMOKING HISTORY:  Social History     Tobacco Use    Smoking status: Never     Passive exposure: Never    Smokeless tobacco: Never   Vaping Use    Vaping status: Never Used   Substance Use Topics    Alcohol use: Never    Drug use: Never     CAFFEINE USE_________________  ALCOHOL ______________________

## 2024-09-13 ENCOUNTER — OFFICE VISIT (OUTPATIENT)
Dept: CARDIOLOGY | Facility: CLINIC | Age: 69
End: 2024-09-13
Payer: MEDICARE

## 2024-09-13 VITALS
HEART RATE: 86 BPM | HEIGHT: 65 IN | SYSTOLIC BLOOD PRESSURE: 134 MMHG | BODY MASS INDEX: 48.82 KG/M2 | DIASTOLIC BLOOD PRESSURE: 76 MMHG | WEIGHT: 293 LBS

## 2024-09-13 DIAGNOSIS — G47.33 OBSTRUCTIVE SLEEP APNEA: ICD-10-CM

## 2024-09-13 DIAGNOSIS — C50.611 MALIGNANT NEOPLASM OF AXILLARY TAIL OF RIGHT BREAST IN FEMALE, ESTROGEN RECEPTOR POSITIVE: ICD-10-CM

## 2024-09-13 DIAGNOSIS — I10 PRIMARY HYPERTENSION: ICD-10-CM

## 2024-09-13 DIAGNOSIS — Z17.0 MALIGNANT NEOPLASM OF AXILLARY TAIL OF RIGHT BREAST IN FEMALE, ESTROGEN RECEPTOR POSITIVE: ICD-10-CM

## 2024-09-13 DIAGNOSIS — I25.10 CORONARY ARTERY CALCIFICATION SEEN ON CT SCAN: ICD-10-CM

## 2024-09-13 DIAGNOSIS — I87.8 VENOUS STASIS: ICD-10-CM

## 2024-09-13 DIAGNOSIS — Z79.811 AROMATASE INHIBITOR USE: ICD-10-CM

## 2024-09-13 DIAGNOSIS — I50.32 CHRONIC HEART FAILURE WITH PRESERVED EJECTION FRACTION (HFPEF): Primary | ICD-10-CM

## 2024-09-13 DIAGNOSIS — Z79.899 ENCOUNTER FOR LONG-TERM (CURRENT) USE OF HIGH-RISK MEDICATION: ICD-10-CM

## 2024-09-13 NOTE — PROGRESS NOTES
Date of Office Visit: 24  Encounter Provider: Bradley Garza MD  Place of Service: [facility] CARDIOLOGY  Patient Name: Aida Conner  :1955    Chief Complaint   Patient presents with    Congestive Heart Failure    CARDIO ONCOLOGY VISIT   :     HPI:     Ms. Conner is 69 y.o. and presents today to follow up. I met her in 2021. I have reviewed prior notes and there are no changes except for any new updates described below. I have also reviewed any information entered into the medical record by the patient or by ancillary staff.     She has chronic HFpEF.  She has obstructive sleep apnea and is on CPAP.  She has obesity hypoventilation syndrome and actually has to use oxygen at all times.  She has systemic hypertension.  She has coronary artery calcification seen on a chest CT but does not carry a diagnosis of coronary artery disease.  She has type 2 diabetes, hyperlipidemia, and is morbidly obese.    She was diagnosed with right sided inflammatory breast cancer (ER/ME/Her2+) in late . She had an echo in 2021 that showed normal LVSF, grade 1 diastolic dysfunction, and normal GLS -21%.     She was intended to take doxorubicin as well as trastuzumab/pertuzumab. She was already on carvedilol, and I added lisinopril. While on doxorubicin, there was a recommendation to switch her beta blocker due to concerns that it could increase doxorubicin levels, but she was reluctant to do so as it had been started by her primary cardiologist.     Ultimately, she received only 3 doses of doxorubicin/cyclophosphamide before it was stopped due to intolerance. Paclitaxel caused pneumonitis and had to be stopped. She did not tolerate pertuzumab, and she finished trastuzumab on 2022. She has been on anastrozole.     She has had serial echocardiograms since 2021. She was admitted to another hospital in 2023 with [facility]. An echo was performed and was  technically difficult. It reported a decline in EF to 50%. We repeated an echo in February 2024; LVSF was normal, EF 65%. GLS could not be obtained due to suboptimal images.    She has chronic dyspnea and is on oxygen. She has chronic fatigue. She has chronic leg swelling; she has cut back on her diuretic due to urinary frequency. She has had some blisters and redness of the left leg that did not respond to topical antibiotic ointments. She saw a dermatologist who recommend triamcinolone ointment and compression. She has no chest pain, palpitations, or syncope.     Past Medical History:   Diagnosis Date    Allergic rhinitis     Anemia     Anxiety     Asthma     Chronic bronchitis     In past, espec. during working years    Chronic heart failure with preserved ejection fraction (HFpEF)     Chronic hypoxemic respiratory failure     on continuous O2    Coronary artery calcification seen on CT scan 04/21/2022    Diarrhea     with chemo    GERD (gastroesophageal reflux disease) Mild    H/O Intraductal papilloma     Hemorrhoids     History of transfusion 1984    AFTER BACK SURGERY no reaction    Hypertension     IBS (irritable bowel syndrome)     Inflammatory breast cancer     right    Kidney stone     Morbid obesity with BMI of 50.0-59.9, adult     Obesity hypoventilation syndrome 02/05/2021    On home oxygen therapy     2.5 AT REST WITH ACTIVITY UP TO 4L    ANABELLE on CPAP     cpap  & uses O2 with CPAP    Osteoarthritis     Pneumonia     Many times in past, 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    PONV (postoperative nausea and vomiting)     Type II diabetes mellitus 09/08/2021       Past Surgical History:   Procedure Laterality Date    BREAST EXCISIONAL BIOPSY Bilateral     COLONOSCOPY      COLONOSCOPY N/A 12/8/2022    Procedure: COLONOSCOPY;  Surgeon: Henry Aden MD;  Location: [facility] ENDOSCOPY;  Service: Gastroenterology;  Laterality: N/A;  diverticulosis, and hemorrhoids    CYSTOSCOPY BLADDER STONE LITHOTRIPSY       ENDOSCOPY      ENDOSCOPY N/A 12/8/2022    Procedure: ESOPHAGOGASTRODUODENOSCOPY  with biopsy;  Surgeon: Henry Aden MD;  Location: [facility] ENDOSCOPY;  Service: Gastroenterology;  Laterality: N/A;  hiatal hernia    KNEE ARTHROPLASTY Bilateral 2009    MASTECTOMY Left 08/10/2021    Procedure: LEFT TOTAL MASTECTOMY;  Surgeon: Nicci Banks MD;  Location: [facility] OR;  Service: General;  Laterality: Left;    MASTECTOMY Right 08/10/2021    Procedure: RIGHT MODIFIED RADICAL MASTECTOMY;  Surgeon: Nicci Banks MD;  Location: [facility] OR;  Service: General;  Laterality: Right;    SPINE SURGERY  1984    TOTAL ABDOMINAL HYSTERECTOMY WITH SALPINGO OOPHORECTOMY  2004    UPPER GASTROINTESTINAL ENDOSCOPY      VENOUS ACCESS DEVICE (PORT) INSERTION N/A 01/25/2021    Procedure: INSERTION VENOUS ACCESS DEVICE;  Surgeon: Nicci Banks MD;  Location: [facility] OR;  Service: General;  Laterality: N/A;    VENOUS ACCESS DEVICE (PORT) REMOVAL Left 04/05/2022    Procedure: REMOVAL VENOUS ACCESS DEVICE;  Surgeon: Nicci Banks MD;  Location: [facility] OR;  Service: General;  Laterality: Left;       Social History     Socioeconomic History    Marital status:     Number of children: 3   Tobacco Use    Smoking status: Never     Passive exposure: Never    Smokeless tobacco: Never   Vaping Use    Vaping status: Never Used   Substance and Sexual Activity    Alcohol use: Never    Drug use: Never    Sexual activity: Not Currently     Partners: Female       Family History   Problem Relation Age of Onset    Breast cancer Mother 45    Cancer Mother         breast; metastasized    Lung cancer Father     Hodgkin's lymphoma Father     Skin cancer Father         squamous cell    Cancer Father         4 kinds: Hodgkins; lung; squamous cell skin    Breast cancer Maternal Aunt 70    Cancer Maternal Aunt         breast; metastasized    Breast cancer Paternal Aunt 70    Cancer Paternal Aunt         breast;  cured    Colon cancer Paternal Grandmother 60    Cancer Paternal Grandmother         colon cancer    Hypertension Paternal Grandmother         EVERY ADULT IN MY FAMILY    Ovarian cancer Neg Hx     Uterine cancer Neg Hx     Deep vein thrombosis Neg Hx     Pulmonary embolism Neg Hx     Malig Hyperthermia Neg Hx        Review of Systems   Constitutional: Positive for malaise/fatigue.   Cardiovascular:  Positive for leg swelling.   Respiratory:  Positive for shortness of breath.    Skin:  Positive for dry skin, itching, poor wound healing, rash and suspicious lesions.   All other systems reviewed and are negative.      Allergies   Allergen Reactions    Amlodipine Swelling     LEGS     Hydrochlorothiazide Unknown - Low Severity     Neuro issues    Morphine Nausea And Vomiting     hallucinations    Adhesive Tape Rash         Current Outpatient Medications:     albuterol sulfate  (90 Base) MCG/ACT inhaler, Inhale 2 puffs Every 4 (Four) Hours As Needed for Wheezing., Disp: 18 g, Rfl: 11    anastrozole (ARIMIDEX) 1 MG tablet, Take 1 tablet by mouth Daily., Disp: 90 tablet, Rfl: 3    carvedilol (COREG) 25 MG tablet, Take 0.5 tablets by mouth Every 12 (Twelve) Hours., Disp: , Rfl:     Cholecalciferol (Vitamin D) 50 MCG (2000 UT) capsule, Take 1 capsule by mouth Daily., Disp: , Rfl:     dicyclomine (BENTYL) 20 MG tablet, TAKE 1 TABLET BY MOUTH EVERY 6 HOURS, Disp: 60 tablet, Rfl: 2    diphenoxylate-atropine (LOMOTIL) 2.5-0.025 MG per tablet, Take 1 tablet by mouth 4 (Four) Times a Day As Needed for Diarrhea., Disp: , Rfl:     FLUoxetine (PROzac) 40 MG capsule, TAKE 1 CAPSULE BY MOUTH EVERY DAY, Disp: 30 capsule, Rfl: 0    furosemide (Lasix) 40 MG tablet, Take 1 tablet by mouth Daily., Disp: 90 tablet, Rfl: 2    lisinopril (PRINIVIL,ZESTRIL) 10 MG tablet, Take 1 tablet by mouth Daily., Disp: 90 tablet, Rfl: 0    mometasone-formoterol (DULERA 100) 100-5 MCG/ACT inhaler, Inhale 2 puffs 2 (Two) Times a Day., Disp: 1 each,  "Rfl: 11    montelukast (SINGULAIR) 10 MG tablet, Take 1 tablet by mouth Every Night., Disp: , Rfl:     multivitamin (THERAGRAN) tablet tablet, Take 1 tablet by mouth Daily., Disp: , Rfl:     mupirocin (BACTROBAN) 2 % ointment, APPLY TO THE AFFECTED AREA(S) TWICE DAILY for 2 weeks, Disp: , Rfl:     ondansetron (ZOFRAN) 8 MG tablet, Take 1 tablet by mouth 3 (Three) Times a Day As Needed for Nausea., Disp: , Rfl:     pantoprazole (PROTONIX) 40 MG EC tablet, Take 1 tablet by mouth Daily., Disp: , Rfl:     pravastatin (PRAVACHOL) 40 MG tablet, Take 1 tablet by mouth Every Night., Disp: , Rfl:     spironolactone (ALDACTONE) 25 MG tablet, Take 1 tablet by mouth Daily., Disp: 90 tablet, Rfl: 2    triamcinolone (KENALOG) 0.1 % ointment, APPLY TWICE DAILY THREE TIMES DAILY FOR 2 WEEKS, Disp: , Rfl:     carvedilol (COREG) 12.5 MG tablet, Take 1 tablet by mouth 2 (Two) Times a Day With Meals. (Patient not taking: Reported on 9/13/2024), Disp: 180 tablet, Rfl: 3    FeroSul 325 (65 Fe) MG tablet, Take 1 tablet by mouth 2 (Two) Times a Day With Meals. (Patient not taking: Reported on 9/13/2024), Disp: , Rfl:       Objective:     Vitals:    09/13/24 1201   BP: 134/76   BP Location: Left arm   Pulse: 86   Weight: (!) 154 kg (338 lb 14.4 oz)   Height: 165.1 cm (65\")       Body mass index is 56.4 kg/m².    Vitals reviewed.   Constitutional:       Appearance: Not in distress. Morbidly obese.      Comments: On oxygen. Morbidly obese.   Eyes:      Conjunctiva/sclera: Conjunctivae normal.   HENT:      Head: Normocephalic.      Nose: Nose normal.   Neck:      Comments: Cannot assess for JVD due to habitus  Pulmonary:      Effort: Pulmonary effort is normal.      Breath sounds: Normal breath sounds.   Cardiovascular:      Normal rate. Occasional ectopic beats. Regular rhythm.      Murmurs: There is no murmur.      Comments: Stasis changes left shin  Pulses:     Intact distal pulses.   Edema:     Pretibial: bilateral 1+ edema of the " pretibial area.     Ankle: 1+ edema of the left ankle and 2+ edema of the right ankle.  Abdominal:      Palpations: Abdomen is soft.      Tenderness: There is no abdominal tenderness.      Comments: Obesity limits abdominal exam   Musculoskeletal: Normal range of motion.      Cervical back: Normal range of motion. Skin:     General: Skin is warm and dry.   Neurological:      Mental Status: Alert and oriented to person, place, and time.      Cranial Nerves: No cranial nerve deficit.   Psychiatric:         Behavior: Behavior normal.         Thought Content: Thought content normal.         Judgment: Judgment normal.           ECG 12 Lead    Date/Time: 9/13/2024 12:47 PM  Performed by: Bradley Garza MD    Authorized by: Bradley Garza MD  Comparison: compared with previous ECG   Similar to previous ECG  Rhythm: sinus rhythm  Ectopy: atrial premature contractions  Conduction: right bundle branch block and 1st degree AV block  Other findings: non-specific ST-T wave changes    Clinical impression: abnormal EKG          Assessment:       Diagnosis Plan   1. Chronic heart failure with preserved ejection fraction (HFpEF)  Adult Transthoracic Echo Complete W/ Cont if Necessary Per Protocol      2. Coronary artery calcification seen on CT scan        3. Primary hypertension        4. Obstructive sleep apnea        5. Malignant neoplasm of axillary tail of right breast in female, estrogen receptor positive        6. Encounter for long-term (current) use of high-risk medication  Adult Transthoracic Echo Complete W/ Cont if Necessary Per Protocol      7. Aromatase inhibitor use        8. Venous stasis           Plan:       Ms. Conner has chronic HFpEF, coronary artery calcification without symptoms of angina, systemic hypertension, sleep apnea, obesity hypoventilation syndrome on chronic oxygen, type 2 diabetes, and she is morbidly obese.      She only received three doses of doxorobucin (she did not tolerate it well). She did  not tolerate pertuzumab. She completed trastuzumab on March 31, 2022. Serial echocardiograms have been normal and stable. She needs a study yearly until 2027.     She will remain on carvedilol, lisinopril, furosemide, and spironolactone. Her BP is well controlled.     She is now on anastrozole; we will continue to try to control her cardiac risk factors while on this medication.     She definitely has some pedal edema. This is contributing to her stasis dermatitis. I asked her to take a full dose of furosemide at least two days per week, and agree with [facility] ointment and compression.     Sincerely,       Bradley Garza MD

## 2024-12-10 ENCOUNTER — APPOINTMENT (OUTPATIENT)
Dept: GENERAL RADIOLOGY | Facility: HOSPITAL | Age: 69
End: 2024-12-10
Payer: MEDICARE

## 2024-12-10 ENCOUNTER — HOSPITAL ENCOUNTER (EMERGENCY)
Facility: HOSPITAL | Age: 69
Discharge: HOME | End: 2024-12-10
Attending: EMERGENCY MEDICINE | Admitting: EMERGENCY MEDICINE
Payer: MEDICARE

## 2024-12-10 ENCOUNTER — APPOINTMENT (OUTPATIENT)
Dept: CT IMAGING | Facility: HOSPITAL | Age: 69
End: 2024-12-10
Payer: MEDICARE

## 2024-12-10 VITALS
HEART RATE: 88 BPM | RESPIRATION RATE: 22 BRPM | HEIGHT: 65 IN | BODY MASS INDEX: 48.82 KG/M2 | TEMPERATURE: 98.2 F | DIASTOLIC BLOOD PRESSURE: 85 MMHG | SYSTOLIC BLOOD PRESSURE: 113 MMHG | WEIGHT: 293 LBS | OXYGEN SATURATION: 98 %

## 2024-12-10 DIAGNOSIS — J06.9 UPPER RESPIRATORY TRACT INFECTION, UNSPECIFIED TYPE: ICD-10-CM

## 2024-12-10 DIAGNOSIS — I50.9 ACUTE ON CHRONIC CONGESTIVE HEART FAILURE, UNSPECIFIED HEART FAILURE TYPE: Primary | ICD-10-CM

## 2024-12-10 LAB
ALBUMIN SERPL-MCNC: 3.5 G/DL (ref 3.5–5.2)
ALBUMIN/GLOB SERPL: 1.3 G/DL
ALP SERPL-CCNC: 100 U/L (ref 39–117)
ALT SERPL W P-5'-P-CCNC: 13 U/L (ref 1–33)
ANION GAP SERPL CALCULATED.3IONS-SCNC: 8.8 MMOL/L (ref 5–15)
ARTERIAL PATENCY WRIST A: POSITIVE
AST SERPL-CCNC: 14 U/L (ref 1–32)
ATMOSPHERIC PRESS: 742 MMHG
BASE EXCESS BLDA CALC-SCNC: 5.4 MMOL/L (ref -2–2)
BASOPHILS # BLD AUTO: 0.02 10*3/MM3 (ref 0–0.2)
BASOPHILS NFR BLD AUTO: 0.2 % (ref 0–1.5)
BDY SITE: ABNORMAL
BILIRUB SERPL-MCNC: 0.7 MG/DL (ref 0–1.2)
BUN SERPL-MCNC: 9 MG/DL (ref 8–23)
BUN/CREAT SERPL: 12.9 (ref 7–25)
CALCIUM SPEC-SCNC: 8.7 MG/DL (ref 8.6–10.5)
CHLORIDE SERPL-SCNC: 104 MMOL/L (ref 98–107)
CO2 SERPL-SCNC: 28.2 MMOL/L (ref 22–29)
CREAT SERPL-MCNC: 0.7 MG/DL (ref 0.57–1)
DEPRECATED RDW RBC AUTO: 48.6 FL (ref 37–54)
EGFRCR SERPLBLD CKD-EPI 2021: 92.6 ML/MIN/1.73
EOSINOPHIL # BLD AUTO: 0.18 10*3/MM3 (ref 0–0.4)
EOSINOPHIL NFR BLD AUTO: 2 % (ref 0.3–6.2)
ERYTHROCYTE [DISTWIDTH] IN BLOOD BY AUTOMATED COUNT: 14.1 % (ref 12.3–15.4)
FLUAV SUBTYP SPEC NAA+PROBE: NOT DETECTED
FLUBV RNA ISLT QL NAA+PROBE: NOT DETECTED
GAS FLOW AIRWAY: 3 LPM
GEN 5 1HR TROPONIN T REFLEX: 10 NG/L
GLOBULIN UR ELPH-MCNC: 2.7 GM/DL
GLUCOSE SERPL-MCNC: 92 MG/DL (ref 65–99)
HCO3 BLDA-SCNC: 32 MMOL/L (ref 22–26)
HCT VFR BLD AUTO: 35.4 % (ref 34–46.6)
HCT VFR BLD CALC: 35 % (ref 38–51)
HEMODILUTION: NO
HGB BLD-MCNC: 10.3 G/DL (ref 12–15.9)
HGB BLDA-MCNC: 12 G/DL (ref 12–18)
HOLD SPECIMEN: NORMAL
HOLD SPECIMEN: NORMAL
IMM GRANULOCYTES # BLD AUTO: 0.03 10*3/MM3 (ref 0–0.05)
IMM GRANULOCYTES NFR BLD AUTO: 0.3 % (ref 0–0.5)
LYMPHOCYTES # BLD AUTO: 0.78 10*3/MM3 (ref 0.7–3.1)
LYMPHOCYTES NFR BLD AUTO: 8.7 % (ref 19.6–45.3)
MCH RBC QN AUTO: 27.5 PG (ref 26.6–33)
MCHC RBC AUTO-ENTMCNC: 29.1 G/DL (ref 31.5–35.7)
MCV RBC AUTO: 94.7 FL (ref 79–97)
MODALITY: ABNORMAL
MONOCYTES # BLD AUTO: 0.75 10*3/MM3 (ref 0.1–0.9)
MONOCYTES NFR BLD AUTO: 8.3 % (ref 5–12)
NEUTROPHILS NFR BLD AUTO: 7.25 10*3/MM3 (ref 1.7–7)
NEUTROPHILS NFR BLD AUTO: 80.5 % (ref 42.7–76)
NRBC BLD AUTO-RTO: 0 /100 WBC (ref 0–0.2)
NT-PROBNP SERPL-MCNC: 1255 PG/ML (ref 0–900)
PCO2 BLDA: 55.3 MM HG (ref 35–45)
PH BLDA: 7.37 PH UNITS (ref 7.35–7.45)
PLATELET # BLD AUTO: 217 10*3/MM3 (ref 140–450)
PMV BLD AUTO: 10.6 FL (ref 6–12)
PO2 BLDA: 59.6 MM HG (ref 80–100)
POTASSIUM SERPL-SCNC: 4.1 MMOL/L (ref 3.5–5.2)
PROT SERPL-MCNC: 6.2 G/DL (ref 6–8.5)
QT INTERVAL: 388 MS
QTC INTERVAL: 459 MS
RBC # BLD AUTO: 3.74 10*6/MM3 (ref 3.77–5.28)
RSV RNA NPH QL NAA+NON-PROBE: NOT DETECTED
SAO2 % BLDCOA: 89 % (ref 95–99)
SARS-COV-2 RNA RESP QL NAA+PROBE: NOT DETECTED
SODIUM SERPL-SCNC: 141 MMOL/L (ref 136–145)
TROPONIN T DELTA: 1 NG/L
TROPONIN T SERPL HS-MCNC: 9 NG/L
WBC NRBC COR # BLD AUTO: 9.01 10*3/MM3 (ref 3.4–10.8)
WHOLE BLOOD HOLD COAG: NORMAL
WHOLE BLOOD HOLD SPECIMEN: NORMAL

## 2024-12-10 PROCEDURE — 93005 ELECTROCARDIOGRAM TRACING: CPT | Performed by: EMERGENCY MEDICINE

## 2024-12-10 PROCEDURE — 84484 ASSAY OF TROPONIN QUANT: CPT | Performed by: EMERGENCY MEDICINE

## 2024-12-10 PROCEDURE — 82803 BLOOD GASES ANY COMBINATION: CPT

## 2024-12-10 PROCEDURE — 36600 WITHDRAWAL OF ARTERIAL BLOOD: CPT

## 2024-12-10 PROCEDURE — 96374 THER/PROPH/DIAG INJ IV PUSH: CPT

## 2024-12-10 PROCEDURE — 36415 COLL VENOUS BLD VENIPUNCTURE: CPT | Performed by: EMERGENCY MEDICINE

## 2024-12-10 PROCEDURE — 85025 COMPLETE CBC W/AUTO DIFF WBC: CPT | Performed by: EMERGENCY MEDICINE

## 2024-12-10 PROCEDURE — 25010000002 FUROSEMIDE PER 20 MG: Performed by: EMERGENCY MEDICINE

## 2024-12-10 PROCEDURE — 25510000001 IOPAMIDOL PER 1 ML: Performed by: EMERGENCY MEDICINE

## 2024-12-10 PROCEDURE — 83880 ASSAY OF NATRIURETIC PEPTIDE: CPT | Performed by: EMERGENCY MEDICINE

## 2024-12-10 PROCEDURE — 99285 EMERGENCY DEPT VISIT HI MDM: CPT

## 2024-12-10 PROCEDURE — 71275 CT ANGIOGRAPHY CHEST: CPT

## 2024-12-10 PROCEDURE — 80053 COMPREHEN METABOLIC PANEL: CPT | Performed by: EMERGENCY MEDICINE

## 2024-12-10 PROCEDURE — 87637 SARSCOV2&INF A&B&RSV AMP PRB: CPT | Performed by: EMERGENCY MEDICINE

## 2024-12-10 PROCEDURE — 71045 X-RAY EXAM CHEST 1 VIEW: CPT

## 2024-12-10 RX ORDER — FUROSEMIDE 10 MG/ML
40 INJECTION INTRAMUSCULAR; INTRAVENOUS ONCE
Status: COMPLETED | OUTPATIENT
Start: 2024-12-10 | End: 2024-12-10

## 2024-12-10 RX ORDER — IOPAMIDOL 755 MG/ML
100 INJECTION, SOLUTION INTRAVASCULAR
Status: COMPLETED | OUTPATIENT
Start: 2024-12-10 | End: 2024-12-10

## 2024-12-10 RX ORDER — AZITHROMYCIN 250 MG/1
TABLET, FILM COATED ORAL
Qty: 6 TABLET | Refills: 0 | Status: SHIPPED | OUTPATIENT
Start: 2024-12-10

## 2024-12-10 RX ORDER — SODIUM CHLORIDE 0.9 % (FLUSH) 0.9 %
10 SYRINGE (ML) INJECTION AS NEEDED
Status: DISCONTINUED | OUTPATIENT
Start: 2024-12-10 | End: 2024-12-10 | Stop reason: HOSPADM

## 2024-12-10 RX ADMIN — IOPAMIDOL 100 ML: 755 INJECTION, SOLUTION INTRAVENOUS at 16:52

## 2024-12-10 RX ADMIN — FUROSEMIDE 40 MG: 10 INJECTION, SOLUTION INTRAMUSCULAR; INTRAVENOUS at 16:11

## 2024-12-10 NOTE — ED PROVIDER NOTES
Time: 3:17 PM EST  Date of encounter:  12/10/2024  Independent Historian/Clinical History and Information was obtained by:   Patient    History is limited by: N/A    Chief Complaint: Shortness of breath      History of Present Illness:  Patient is a 69 y.o. year old female who presents to the emergency department for evaluation of shortness of breath.  Patient has a history of coronary artery disease, congestive heart failure, chronic respiratory failure, obesity, diabetes who presents with complaints of shortness of breath.  States that she is normally on 3 L nasal cannula at home.  States that she has been short of breath over the last couple days.  Has had cough and congestion.  She also reports that her weight is slightly up.  Did not take her Lasix today.  States that she was in [facility] and sent here for further eval.  Denies fever.  No other complaints this time.      Patient Care Team  Primary Care Provider: Serenity Lucas APRN    Past Medical History:     Allergies   Allergen Reactions    Amlodipine Swelling     LEGS     Hydrochlorothiazide Unknown - Low Severity     Neuro issues    Morphine Nausea And Vomiting     hallucinations    Adhesive Tape Rash     Past Medical History:   Diagnosis Date    Allergic rhinitis     Anemia     Anxiety     Asthma     Chronic bronchitis     In past, espec. during working years    Chronic heart failure with preserved ejection fraction (HFpEF)     Chronic hypoxemic respiratory failure     on continuous O2    Coronary artery calcification seen on CT scan 04/21/2022    Diarrhea     with chemo    GERD (gastroesophageal reflux disease) Mild    H/O Intraductal papilloma     Hemorrhoids     History of transfusion 1984    AFTER BACK SURGERY no reaction    Hypertension     IBS (irritable bowel syndrome)     Inflammatory breast cancer     right    Kidney stone     Morbid obesity with BMI of 50.0-59.9, adult     Obesity hypoventilation syndrome 02/05/2021    On home oxygen therapy     2.5  AT REST WITH ACTIVITY UP TO 4L    ANABELLE on CPAP     cpap  & uses O2 with CPAP    Osteoarthritis     Pneumonia     Many times in past, 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    PONV (postoperative nausea and vomiting)     Type II diabetes mellitus 09/08/2021     Past Surgical History:   Procedure Laterality Date    BREAST EXCISIONAL BIOPSY Bilateral     COLONOSCOPY      COLONOSCOPY N/A 12/8/2022    Procedure: COLONOSCOPY;  Surgeon: Henry Aden MD;  Location: [facility] ENDOSCOPY;  Service: Gastroenterology;  Laterality: N/A;  diverticulosis, and hemorrhoids    CYSTOSCOPY BLADDER STONE LITHOTRIPSY      ENDOSCOPY      ENDOSCOPY N/A 12/8/2022    Procedure: ESOPHAGOGASTRODUODENOSCOPY  with biopsy;  Surgeon: Henry Aden MD;  Location: [facility] ENDOSCOPY;  Service: Gastroenterology;  Laterality: N/A;  hiatal hernia    KNEE ARTHROPLASTY Bilateral 2009    MASTECTOMY Left 08/10/2021    Procedure: LEFT TOTAL MASTECTOMY;  Surgeon: Nicci Banks MD;  Location: [facility];  Service: General;  Laterality: Left;    MASTECTOMY Right 08/10/2021    Procedure: RIGHT MODIFIED RADICAL MASTECTOMY;  Surgeon: Nicci Banks MD;  Location: [facility] OR;  Service: General;  Laterality: Right;    SPINE SURGERY  1984    TOTAL ABDOMINAL HYSTERECTOMY WITH SALPINGO OOPHORECTOMY  2004    UPPER GASTROINTESTINAL ENDOSCOPY      VENOUS ACCESS DEVICE (PORT) INSERTION N/A 01/25/2021    Procedure: INSERTION VENOUS ACCESS DEVICE;  Surgeon: Nicci Banks MD;  Location: [facility] OR;  Service: General;  Laterality: N/A;    VENOUS ACCESS DEVICE (PORT) REMOVAL Left 04/05/2022    Procedure: REMOVAL VENOUS ACCESS DEVICE;  Surgeon: Nicci Banks MD;  Location: [facility] OR;  Service: General;  Laterality: Left;     Family History   Problem Relation Age of Onset    Breast cancer Mother 45    Cancer Mother         breast; metastasized    Lung cancer Father     Hodgkin's lymphoma Father     Skin cancer Father          squamous cell    Cancer Father         4 kinds: Hodgkins; lung; squamous cell skin    Breast cancer Maternal Aunt 70    Cancer Maternal Aunt         breast; metastasized    Breast cancer Paternal Aunt 70    Cancer Paternal Aunt         breast; cured    Colon cancer Paternal Grandmother 60    Cancer Paternal Grandmother         colon cancer    Hypertension Paternal Grandmother         EVERY ADULT IN MY FAMILY    Ovarian cancer Neg Hx     Uterine cancer Neg Hx     Deep vein thrombosis Neg Hx     Pulmonary embolism Neg Hx     Malig Hyperthermia Neg Hx        Home Medications:  Prior to Admission medications    Medication Sig Start Date End Date Taking? Authorizing Provider   albuterol sulfate  (90 Base) MCG/ACT inhaler Inhale 2 puffs Every 4 (Four) Hours As Needed for Wheezing. 5/14/24   Stephanie Lockett APRN   anastrozole (ARIMIDEX) 1 MG tablet Take 1 tablet by mouth Daily. 6/26/24   Ynes Vazquez APRN   carvedilol (COREG) 12.5 MG tablet Take 1 tablet by mouth 2 (Two) Times a Day With Meals.  Patient not taking: Reported on 9/13/2024 1/30/24   Jojo Amin APRN   carvedilol (COREG) 25 MG tablet Take 0.5 tablets by mouth Every 12 (Twelve) Hours. 3/18/24   Candy Gonzalez MD   Cholecalciferol (Vitamin D) 50 MCG (2000 UT) capsule Take 1 capsule by mouth Daily.    Candy Gonzalez MD   dicyclomine (BENTYL) 20 MG tablet TAKE 1 TABLET BY MOUTH EVERY 6 HOURS 3/5/24   Phyllis Shultz MD   diphenoxylate-atropine (LOMOTIL) 2.5-0.025 MG per tablet Take 1 tablet by mouth 4 (Four) Times a Day As Needed for Diarrhea. 3/18/24   Candy Gonzalez MD   FeroSul 325 (65 Fe) MG tablet Take 1 tablet by mouth 2 (Two) Times a Day With Meals.  Patient not taking: Reported on 9/13/2024 11/21/23   Candy Gonzalez MD   FLUoxetine (PROzac) 40 MG capsule TAKE 1 CAPSULE BY MOUTH EVERY DAY 9/7/21   Shari Roa MD   furosemide (Lasix) 40 MG tablet Take 1 tablet by mouth Daily. 1/30/24    "Jojo Amin APRN   lisinopril (PRINIVIL,ZESTRIL) 10 MG tablet Take 1 tablet by mouth Daily. 1/30/24   Jojo Amin APRN   mometasone-formoterol (DULERA 100) 100-5 MCG/ACT inhaler Inhale 2 puffs 2 (Two) Times a Day. 5/14/24   Stephanie Lockett APRN   montelukast (SINGULAIR) 10 MG tablet Take 1 tablet by mouth Every Night.    Candy Gonzalez MD   multivitamin (THERAGRAN) tablet tablet Take 1 tablet by mouth Daily.    Candy Gonzalez MD   mupirocin (BACTROBAN) 2 % ointment APPLY TO THE AFFECTED AREA(S) TWICE DAILY for 2 weeks 4/10/24   Candy Gonzalez MD   ondansetron (ZOFRAN) 8 MG tablet Take 1 tablet by mouth 3 (Three) Times a Day As Needed for Nausea. 3/18/24   Candy Gonzalez MD   pantoprazole (PROTONIX) 40 MG EC tablet Take 1 tablet by mouth Daily. 2/15/23   Candy Gonzalez MD   pravastatin (PRAVACHOL) 40 MG tablet Take 1 tablet by mouth Every Night.    Candy Gonzalez MD   spironolactone (ALDACTONE) 25 MG tablet Take 1 tablet by mouth Daily. 1/30/24   Jojo Amin APRN   triamcinolone (KENALOG) 0.1 % ointment APPLY TWICE DAILY THREE TIMES DAILY FOR 2 WEEKS 6/4/24   Candy Gonzalez MD        Social History:   Social History     Tobacco Use    Smoking status: Never     Passive exposure: Never    Smokeless tobacco: Never   Vaping Use    Vaping status: Never Used   Substance Use Topics    Alcohol use: Never    Drug use: Never         Review of Systems:  Review of Systems   Respiratory:  Positive for shortness of breath.    Cardiovascular:  Positive for leg swelling.        Physical Exam:  /85   Pulse 88   Temp 98.2 °F (36.8 °C) (Oral)   Resp 22   Ht 165.1 cm (65\")   Wt (!) 155 kg (341 lb 11.4 oz)   LMP  (LMP Unknown)   SpO2 98%   BMI 56.86 kg/m²     Physical Exam  Vitals and nursing note reviewed.   Constitutional:       Appearance: Normal appearance. She is obese.   HENT:      Head: Normocephalic and atraumatic.   Eyes:      General: No scleral " icterus.  Cardiovascular:      Rate and Rhythm: Normal rate and regular rhythm.      Heart sounds: Normal heart sounds.   Pulmonary:      Effort: Pulmonary effort is normal.      Comments: Coarse breath sounds  Abdominal:      Palpations: Abdomen is soft.      Tenderness: There is no abdominal tenderness.   Musculoskeletal:         General: Normal range of motion.      Cervical back: Normal range of motion.      Right lower leg: Edema present.      Left lower leg: Edema present.   Skin:     Findings: No rash.   Neurological:      General: No focal deficit present.      Mental Status: She is alert.                    Medical Decision Making:      Comorbidities that affect care:    COPD, Coronary Artery Disease, Diabetes, Hypertension, congestive heart failure    External Notes reviewed:    Reviewed urgent care note from today  Reviewed cardiology note from 9/13/2024    The following orders were placed and all results were independently analyzed by me:  Orders Placed This Encounter   Procedures    COVID-19, FLU A/B, RSV PCR 1 HR TAT - Swab, Nasopharynx    XR Chest 1 View    CT Angiogram Chest Pulmonary Embolism    [place] Draw    Comprehensive Metabolic Panel    BNP    High Sensitivity Troponin T    CBC Auto Differential    Blood Gas, Arterial -    High Sensitivity Troponin T 1Hr    Blood Gas, Arterial -    NPO Diet NPO Type: Strict NPO    Undress & Gown    Continuous Pulse Oximetry    Vital Signs    Oxygen Therapy- Nasal Cannula; Titrate 1-6 LPM Per SpO2; 90 - 95%    ECG 12 Lead ED Triage Standing Order; SOA    Insert Peripheral IV    CBC & Differential    Green Top (Gel)    Lavender Top    Gold Top - SST    Light Blue Top       Medications Given in the Emergency Department:  Medications   sodium chloride 0.9 % flush 10 mL (has no administration in time range)   furosemide (LASIX) injection 40 mg (40 mg Intravenous Given 12/10/24 1611)   iopamidol (ISOVUE-370) 76 % injection 100 mL (100 mL Intravenous Given 12/10/24  1652)        ED Course:    ED Course as of 12/10/24 1738   Tue Dec 10, 2024   1510 EKG interpreted by me  Time: 1445  Heart rate 83  Sinus, first-degree AV block, right bundle branch block [MA]   1733 Recheck patient.  Patient reports her breathing is much better.  She does report that she has not taken her Lasix for the last couple days.  Will send out on continued Lasix dose as well as a Z-Salty [MA]      ED Course User Index  [MA] Slick Scruggs MD       Labs:    Lab Results (last 24 hours)       Procedure Component Value Units Date/Time    CBC & Differential [[id number]]  (Abnormal) Collected: 12/10/24 1457    Specimen: Blood from Hand, Left Updated: 12/10/24 1507    Narrative:      The following orders were created for panel order CBC & Differential.  Procedure                               Abnormality         Status                     ---------                               -----------         ------                     CBC Auto Differential[[id number]]        Abnormal            Final result                 Please view results for these tests on the individual orders.    Comprehensive Metabolic Panel [[id number]] Collected: 12/10/24 1457    Specimen: Blood from Hand, Left Updated: 12/10/24 1528     Glucose 92 mg/dL      BUN 9 mg/dL      Creatinine 0.70 mg/dL      Sodium 141 mmol/L      Potassium 4.1 mmol/L      Chloride 104 mmol/L      CO2 28.2 mmol/L      Calcium 8.7 mg/dL      Total Protein 6.2 g/dL      Albumin 3.5 g/dL      ALT (SGPT) 13 U/L      AST (SGOT) 14 U/L      Alkaline Phosphatase 100 U/L      Total Bilirubin 0.7 mg/dL      Globulin 2.7 gm/dL      A/G Ratio 1.3 g/dL      BUN/Creatinine Ratio 12.9     Anion Gap 8.8 mmol/L      eGFR 92.6 mL/min/1.73     Narrative:      GFR Categories in Chronic Kidney Disease (CKD)      GFR Category          GFR (mL/min/1.73)    Interpretation  G1                     90 or greater         Normal or high (1)  G2                      60-89                Mild  decrease (1)  G3a                   45-59                Mild to moderate decrease  G3b                   30-44                Moderate to severe decrease  G4                    15-29                Severe decrease  G5                    14 or less           Kidney failure          (1)In the absence of evidence of kidney disease, neither GFR category G1 or G2 fulfill the criteria for CKD.    eGFR calculation 2021 CKD-EPI creatinine equation, which does not include race as a factor    BNP [[id number]]  (Abnormal) Collected: 12/10/24 1457    Specimen: Blood from Hand, Left Updated: 12/10/24 1527     proBNP 1,255.0 pg/mL     Narrative:      This assay is used as an aid in the diagnosis of individuals suspected of having heart failure. It can be used as an aid in the diagnosis of acute decompensated heart failure (ADHF) in patients presenting with signs and symptoms of ADHF to the emergency department (ED). In addition, NT-proBNP of <300 pg/mL indicates ADHF is not likely.    Age Range Result Interpretation  NT-proBNP Concentration (pg/mL:      <50             Positive            >450                   Gray                 300-450                    Negative             <300    50-75           Positive            >900                  Gray                300-900                  Negative            <300      >75             Positive            >1800                  Gray                300-1800                  Negative            <300    High Sensitivity Troponin T [[id number]]  (Normal) Collected: 12/10/24 1457    Specimen: Blood from Hand, Left Updated: 12/10/24 1528     HS Troponin T 9 ng/L     Narrative:      High Sensitive Troponin T Reference Range:  <14.0 ng/L- Negative Female for AMI  <22.0 ng/L- Negative Male for AMI  >=14 - Abnormal Female indicating possible myocardial injury.  >=22 - Abnormal Male indicating possible myocardial injury.   Clinicians would have to utilize clinical acumen, EKG, Troponin, and  serial changes to determine if it is an Acute Myocardial Infarction or myocardial injury due to an underlying chronic condition.         CBC Auto Differential [[id number]]  (Abnormal) Collected: 12/10/24 1457    Specimen: Blood from Hand, Left Updated: 12/10/24 1507     WBC 9.01 10*3/mm3      RBC 3.74 10*6/mm3      Hemoglobin 10.3 g/dL      Hematocrit 35.4 %      MCV 94.7 fL      MCH 27.5 pg      MCHC 29.1 g/dL      RDW 14.1 %      RDW-SD 48.6 fl      MPV 10.6 fL      Platelets 217 10*3/mm3      Neutrophil % 80.5 %      Lymphocyte % 8.7 %      Monocyte % 8.3 %      Eosinophil % 2.0 %      Basophil % 0.2 %      Immature Grans % 0.3 %      Neutrophils, Absolute 7.25 10*3/mm3      Lymphocytes, Absolute 0.78 10*3/mm3      Monocytes, Absolute 0.75 10*3/mm3      Eosinophils, Absolute 0.18 10*3/mm3      Basophils, Absolute 0.02 10*3/mm3      Immature Grans, Absolute 0.03 10*3/mm3      nRBC 0.0 /100 WBC     Blood Gas, Arterial - [[id number]]  (Abnormal) Collected: 12/10/24 1540    Specimen: Arterial Blood Updated: 12/10/24 1542     Site Left Radial     Jermain's Test Positive     pH, Arterial 7.371 pH units      pCO2, Arterial 55.3 mm Hg      pO2, Arterial 59.6 mm Hg      HCO3, Arterial 32.0 mmol/L      Base Excess, Arterial 5.4 mmol/L      Comment: Serial Number: [id number]:  [id number]        O2 Saturation, Arterial 89.0 %      Hemoglobin, Blood Gas 12.0 g/dL      Hematocrit, Blood Gas 35.0 %      Barometric Pressure for Blood Gas 742.0000 mmHg      Modality Cannula     Flow Rate 3.0000 lpm      Hemodilution No    COVID-19, FLU A/B, RSV PCR 1 HR TAT - Swab, Nasopharynx [[id number]]  (Normal) Collected: 12/10/24 1548    Specimen: Swab from Nasopharynx Updated: 12/10/24 1655     COVID19 Not Detected     Influenza A PCR Not Detected     Influenza B PCR Not Detected     RSV, PCR Not Detected    Narrative:      Fact sheet for providers: https://www.fda.gov/media/292248/download    Fact sheet for patients:  https://www.fda.gov/media/858496/download    Test performed by PCR.    High Sensitivity Troponin T 1Hr [[id number]]  (Normal) Collected: 12/10/24 1610    Specimen: Blood from Arm, Left Updated: 12/10/24 1637     HS Troponin T 10 ng/L      Troponin T Delta 1 ng/L     Narrative:      High Sensitive Troponin T Reference Range:  <14.0 ng/L- Negative Female for AMI  <22.0 ng/L- Negative Male for AMI  >=14 - Abnormal Female indicating possible myocardial injury.  >=22 - Abnormal Male indicating possible myocardial injury.   Clinicians would have to utilize clinical acumen, EKG, Troponin, and serial changes to determine if it is an Acute Myocardial Infarction or myocardial injury due to an underlying chronic condition.                  Imaging:    CT Angiogram Chest Pulmonary Embolism    Result Date: 12/10/2024  CT ANGIOGRAM CHEST PULMONARY EMBOLISM Date of Exam: 12/10/2024 4:41 PM EST Indication: soa shortness of breath. Comparison: Chest radiograph from earlier today and CT chest from December 5, 2023 Technique: Axial CT images were obtained of the chest after the uneventful intravenous administration of iodinated contrast utilizing pulmonary embolism protocol.  Reconstructed coronal and sagittal images were also obtained. Automated exposure control and iterative construction methods were used. Findings: The central tracheobronchial tree is clear. There are mild scattered bilateral groundglass densities. There is no dense consolidation. There is no pleural effusion. There is a trace right pleural effusion. The heart is enlarged, with a small pericardial effusion. The great vessels are normal in caliber. There is no evidence of pulmonary embolus. No abnormally enlarged lymph nodes are identified. There are surgical clips along the right axilla. Partial evaluation of the upper abdomen is unremarkable. No aggressive osseous lesions are identified.     Impression: 1.Negative for pulmonary embolus. 2.Mild scattered bilateral  groundglass densities, which could represent mild pulmonary edema or atypical infection. 3.Cardiomegaly with small pericardial effusion. 4.Trace right pleural effusion. Electronically Signed: Haseeb Oglesby MD  12/10/2024 5:08 PM EST  Workstation ID: [id number]    XR Chest 1 View    Result Date: 12/10/2024  XR CHEST 1 VW Date of Exam: 12/10/2024 3:36 PM EST Indication: SOA Triage Protocol Comparison: 12/8/2023 Findings: Cardiac size is enlarged. There is mild prominence of the pulmonary vascular markings. There is no evidence of overt heart failure. There is no pleural fluid seen. There is chronic scarring in the right lung base.     Impression: 1. Cardiomegaly without evidence of overt heart failure. 2. Chronic scarring in the right lung base. Electronically Signed: Kwasi Potter MD  12/10/2024 3:47 PM EST  Workstation ID: [id number]       Differential Diagnosis and Discussion:    Dyspnea: Differential diagnosis includes but is not limited to metabolic acidosis, neurological disorders, psychogenic, asthma, pneumothorax, upper airway obstruction, COPD, pneumonia, noncardiogenic pulmonary edema, interstitial lung disease, anemia, congestive heart failure, and pulmonary embolism    PROCEDURES:    All labs were reviewed and interpreted by me.  All X-rays impressions were independently interpreted by me.  EKG was interpreted by me.  CT scan radiology impression was interpreted by me.        Procedures    MDM     Patient is a 69-year-old female with multiple medical problems who presents with complaints of shortness of breath.  Patient has not been taking her Lasix for several days because she has had an upper respiratory infection.  CT shows what appears to be more pulmonary edema as well as possible upper respiratory tract infection.  She has improvement on Lasix.  His had a significant amount of urine out.  She does report her breathing is improved at this time.  Will cover with antibiotics to her comorbidities.   Recommend that she continue her Lasix as directed.  Discussed not missing more doses.  She is otherwise improved significantly since she has been here.  Will DC and have patient follow-up as outpatient.                Patient Care Considerations:          Consultants/Shared Management Plan:    None    Social Determinants of Health:    Patient is independent, reliable, and has access to care.       Disposition and Care Coordination:    Discharged: I considered escalation of care by admitting this patient to the hospital, however patient improved with Lasix and oxygen is now in the high 90s    I have explained the patient´s condition, diagnoses and treatment plan based on the information available to me at this time. I have answered questions and addressed any concerns. The patient has a good  understanding of the patient´s diagnosis, condition, and treatment plan as can be expected at this point. The vital signs have been stable. The patient´s condition is stable and appropriate for discharge from the emergency department.      The patient will pursue further outpatient evaluation with the primary care physician or other designated or consulting physician as outlined in the discharge instructions. They are agreeable to this plan of care and follow-up instructions have been explained in detail. The patient has received these instructions in written format and have expressed an understanding of the discharge instructions. The patient is aware that any significant change in condition or worsening of symptoms should prompt an immediate return to this or the closest emergency department or call to 911.      Final diagnoses:   Acute on chronic congestive heart failure, unspecified heart failure type   Upper respiratory tract infection, unspecified type        ED Disposition       ED Disposition   Discharge    Condition   Stable    Comment   --               This medical record created using voice recognition  software.             Slick Scruggs MD  12/10/24 0364

## 2024-12-10 NOTE — DISCHARGE INSTRUCTIONS
Please take all medicines as directed.  Should she have new or worsening symptoms return the emergency room.  Take your Lasix as directed.

## 2024-12-11 ENCOUNTER — TELEPHONE (OUTPATIENT)
Dept: PULMONOLOGY | Facility: CLINIC | Age: 69
End: 2024-12-11

## 2024-12-11 NOTE — TELEPHONE ENCOUNTER
Name: LUISA RINCON    Relationship: [phone]     Best Callback Number: [facility]     Patient would like to Schedule a Follow-up. Unable to schedule within the 3 week timeframe.     PATIENT WAS SEEN IN HOSPITAL. HER OXYGEN GOT UP TO THE 90S BUT NOW THAT SHE IS BACK HOME IT IS STAYING IN THE 80S.     CT CHEST AND XR CHEST DONE 12/10/24. PATIENT HAS REQUESTED THIS BE LOOKED AT.    SHE WAS SCHEDULED FOR A 4MONTH IN SEPTEMBER BUT WAS UNABLE TO ATTEND. IT HAS BEEN RESCHEDULED TO THE FEBRUARY, BUT SHE IS REQUESTING TO BE SEEN SOONER.

## 2024-12-12 NOTE — TELEPHONE ENCOUNTER
I called and informed patient that her current appointment is the first available, patient verbalized that she understood. I did advise patient that she can occasionally call back and ask if there are any cancellations that she may be able to get., patient verbalized understanding. Patient also verbalized understanding that if her O2 drops into the 80s and she can not bring it back up that she is to go to the ER. Patient stated that when in the Er they told her she could increase her O2 to 4L and she has been doing that when her O2 drops.

## 2024-12-12 NOTE — TELEPHONE ENCOUNTER
Hub staff attempted to follow warm transfer process and was unsuccessful     Caller: Aida Conner    Relationship to patient: Self    Best call back number: [phone]    Patient is needing: PT RETURNING CALL FROM OFFICE

## 2024-12-28 LAB
QT INTERVAL: 388 MS
QTC INTERVAL: 459 MS

## 2025-02-25 ENCOUNTER — OFFICE VISIT (OUTPATIENT)
Dept: PULMONOLOGY | Facility: CLINIC | Age: 70
End: 2025-02-25
Payer: MEDICARE

## 2025-02-25 VITALS
WEIGHT: 293 LBS | SYSTOLIC BLOOD PRESSURE: 123 MMHG | OXYGEN SATURATION: 93 % | HEART RATE: 85 BPM | HEIGHT: 65 IN | TEMPERATURE: 97.6 F | BODY MASS INDEX: 48.82 KG/M2 | RESPIRATION RATE: 16 BRPM | DIASTOLIC BLOOD PRESSURE: 58 MMHG

## 2025-02-25 DIAGNOSIS — E66.01 CLASS 3 SEVERE OBESITY WITH SERIOUS COMORBIDITY AND BODY MASS INDEX (BMI) OF 50.0 TO 59.9 IN ADULT, UNSPECIFIED OBESITY TYPE: ICD-10-CM

## 2025-02-25 DIAGNOSIS — G47.33 OSA (OBSTRUCTIVE SLEEP APNEA): Primary | ICD-10-CM

## 2025-02-25 DIAGNOSIS — R53.83 OTHER FATIGUE: ICD-10-CM

## 2025-02-25 DIAGNOSIS — E66.813 CLASS 3 SEVERE OBESITY WITH SERIOUS COMORBIDITY AND BODY MASS INDEX (BMI) OF 50.0 TO 59.9 IN ADULT, UNSPECIFIED OBESITY TYPE: ICD-10-CM

## 2025-02-25 DIAGNOSIS — J96.11 CHRONIC RESPIRATORY FAILURE WITH HYPOXIA: ICD-10-CM

## 2025-02-25 NOTE — PROGRESS NOTES
Primary Care Provider  Serenity Lucas APRN     Referring Provider  No ref. provider found       Patient or patient representative verbalized consent for the use of Ambient Listening during the visit with  SHAUNA Miranda for chart documentation. 2/25/2025  15:53 EST    Chief Complaint  COPD and Follow-up (9 month f/up - patient complaining of fatigue due to Covid x1 year ago, and also not using her CPap like she should. )    Subjective          Aida Conner presents to [facility]  History of Present Illness  Aida Conner is a 69 y.o. female patient of Dr. Anaya here for management of COPD, obstructive sleep apnea, chronic hypoxic respiratory failure and dyspnea on exertion.     History of Present Illness  The patient presents for evaluation of fatigue, asthma, and sinusitis.    She reports satisfactory respiratory function but experiences persistent fatigue, which she attributes to a prolonged recovery period following a COVID-19 infection 15 months prior. She has not been adhering to her prescribed CPAP therapy for the past few months due to late-night activities and has been sleeping in her chair. She acknowledges the need to resume CPAP use. She is currently on a regimen of 3 liters of oxygen. She does not report any fevers, chills, or productive cough. She also reports a decrease in physical activity.    She has not experienced any recent asthmatic symptoms. She occasionally uses albuterol during the winter months when the air is smoky. She expresses uncertainty about the necessity of daily medication use. She reports that her insurance does not cover the cost of albuterol.    She occasionally experiences sinusitis. Approximately one week ago, she experienced throat tightness, which she believes was triggered by coughing. She self-medicated with two Tylenol and an Allegra, and the symptoms resolved by the following morning. She suspects that  allergies may be contributing to her symptoms.   MEDICATIONS  Current: Dulera inhaler, albuterol, Tylenol, Allegra       Her history of smoking is   Tobacco Use: Low Risk  (2/25/2025)    Patient History     Smoking Tobacco Use: Never     Smokeless Tobacco Use: Never     Passive Exposure: Never   .    Review of Systems   Constitutional:  Positive for fatigue. Negative for chills, fever, unexpected weight gain and unexpected weight loss.   HENT:  Congestion: Nasal.    Respiratory:  Positive for shortness of breath. Negative for apnea, cough and wheezing.         Negative for Hemoptysis     Cardiovascular:  Negative for chest pain, palpitations and leg swelling.   Skin:         Negative for cyanosis      Sleep: Negative for Excessive daytime sleepiness  Negative for morning headaches  Negative for Snoring    Family History   Problem Relation Age of Onset    Breast cancer Mother 45    Cancer Mother         breast; metastasized    Lung cancer Father     Hodgkin's lymphoma Father     Skin cancer Father         squamous cell    Cancer Father         4 kinds: Hodgkins; lung; squamous cell skin    Breast cancer Maternal Aunt 70    Cancer Maternal Aunt         breast; metastasized    Breast cancer Paternal Aunt 70    Cancer Paternal Aunt         breast; cured    Colon cancer Paternal Grandmother 60    Cancer Paternal Grandmother         colon cancer    Hypertension Paternal Grandmother         EVERY ADULT IN MY FAMILY    Ovarian cancer Neg Hx     Uterine cancer Neg Hx     Deep vein thrombosis Neg Hx     Pulmonary embolism Neg Hx     Malig Hyperthermia Neg Hx         Social History     Socioeconomic History    Marital status:     Number of children: 3   Tobacco Use    Smoking status: Never     Passive exposure: Never    Smokeless tobacco: Never   Vaping Use    Vaping status: Never Used   Substance and Sexual Activity    Alcohol use: Never    Drug use: Never    Sexual activity: Not Currently     Partners: Male         Past Medical History:   Diagnosis Date    Allergic rhinitis     Anemia     Anxiety     Asthma     Chronic bronchitis     In past, espec. during working years    Chronic heart failure with preserved ejection fraction (HFpEF)     Chronic hypoxemic respiratory failure     on continuous O2    Coronary artery calcification seen on CT scan 04/21/2022    Diarrhea     with chemo    GERD (gastroesophageal reflux disease) Mild    H/O Intraductal papilloma     Hemorrhoids     History of transfusion 1984    AFTER BACK SURGERY no reaction    Hypertension     IBS (irritable bowel syndrome)     Inflammatory breast cancer     right    Kidney stone     Morbid obesity with BMI of 50.0-59.9, adult     Obesity hypoventilation syndrome 02/05/2021    On home oxygen therapy     2.5 AT REST WITH ACTIVITY UP TO 4L    ANABELLE on CPAP     cpap  & uses O2 with CPAP    Osteoarthritis     Pneumonia     Many times in past, 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    PONV (postoperative nausea and vomiting)     Type II diabetes mellitus 09/08/2021        Immunization History   Administered Date(s) Administered    COVID-19 (PFIZER) 12YRS+ (COMIRNATY) 12/05/2024    COVID-19 (PFIZER) BIVALENT 12+YRS 11/30/2022    COVID-19 (PFIZER) Purple Cap Monovalent 03/19/2021, 04/09/2021, 11/13/2021    Covid-19 (Pfizer) Gray Cap Monovalent 06/18/2022    Flu Vaccine Quad PF >36MO 09/24/2018, 11/12/2020    Fluzone High-Dose 65+yrs 09/28/2021    Influenza TIV (IM) 01/30/2014, 09/29/2014    Influenza, Unspecified 11/16/2023    Pneumococcal Conjugate 13-Valent (PCV13) 08/21/2019    Pneumococcal Conjugate 20-Valent (PCV20) 05/14/2024         Allergies   Allergen Reactions    Amlodipine Swelling     LEGS     Hydrochlorothiazide Unknown - Low Severity     Neuro issues    Morphine Nausea And Vomiting     hallucinations    Adhesive Tape Rash          Current Outpatient Medications:     albuterol sulfate  (90 Base) MCG/ACT inhaler, Inhale 2 puffs Every 4 (Four) Hours As Needed  for Wheezing., Disp: 18 g, Rfl: 11    anastrozole (ARIMIDEX) 1 MG tablet, Take 1 tablet by mouth Daily., Disp: 90 tablet, Rfl: 3    carvedilol (COREG) 12.5 MG tablet, Take 1 tablet by mouth 2 (Two) Times a Day With Meals., Disp: 180 tablet, Rfl: 3    carvedilol (COREG) 25 MG tablet, Take 0.5 tablets by mouth Every 12 (Twelve) Hours., Disp: , Rfl:     Cholecalciferol (Vitamin D) 50 MCG (2000 UT) capsule, Take 1 capsule by mouth Daily., Disp: , Rfl:     dicyclomine (BENTYL) 20 MG tablet, TAKE 1 TABLET BY MOUTH EVERY 6 HOURS, Disp: 60 tablet, Rfl: 2    diphenoxylate-atropine (LOMOTIL) 2.5-0.025 MG per tablet, Take 1 tablet by mouth 4 (Four) Times a Day As Needed for Diarrhea., Disp: , Rfl:     FLUoxetine (PROzac) 40 MG capsule, TAKE 1 CAPSULE BY MOUTH EVERY DAY, Disp: 30 capsule, Rfl: 0    furosemide (Lasix) 40 MG tablet, Take 1 tablet by mouth Daily., Disp: 90 tablet, Rfl: 2    lisinopril (PRINIVIL,ZESTRIL) 10 MG tablet, Take 1 tablet by mouth Daily., Disp: 90 tablet, Rfl: 0    mometasone-formoterol (DULERA 100) 100-5 MCG/ACT inhaler, Inhale 2 puffs 2 (Two) Times a Day., Disp: 1 each, Rfl: 11    montelukast (SINGULAIR) 10 MG tablet, Take 1 tablet by mouth Every Night., Disp: , Rfl:     multivitamin (THERAGRAN) tablet tablet, Take 1 tablet by mouth Daily., Disp: , Rfl:     mupirocin (BACTROBAN) 2 % ointment, APPLY TO THE AFFECTED AREA(S) TWICE DAILY for 2 weeks, Disp: , Rfl:     ondansetron (ZOFRAN) 8 MG tablet, Take 1 tablet by mouth 3 (Three) Times a Day As Needed for Nausea., Disp: , Rfl:     pantoprazole (PROTONIX) 40 MG EC tablet, Take 1 tablet by mouth Daily., Disp: , Rfl:     pravastatin (PRAVACHOL) 40 MG tablet, Take 1 tablet by mouth Every Night., Disp: , Rfl:     spironolactone (ALDACTONE) 25 MG tablet, Take 1 tablet by mouth Daily., Disp: 90 tablet, Rfl: 2    triamcinolone (KENALOG) 0.1 % ointment, APPLY TWICE DAILY THREE TIMES DAILY FOR 2 WEEKS, Disp: , Rfl:     FeroSul 325 (65 Fe) MG tablet, Take 1 tablet  "by mouth 2 (Two) Times a Day With Meals. (Patient not taking: Reported on 9/13/2024), Disp: , Rfl:      Objective   Physical Exam  Constitutional:       General: She is not in acute distress.     Appearance: Normal appearance. She is obese.   HENT:      Right Ear: Hearing normal.      Left Ear: Hearing normal.      Nose: No nasal tenderness or congestion.      Mouth/Throat:      Mouth: Mucous membranes are moist. No oral lesions.   Eyes:      Extraocular Movements: Extraocular movements intact.      Pupils: Pupils are equal, round, and reactive to light.   Cardiovascular:      Rate and Rhythm: Normal rate and regular rhythm.      Pulses: Normal pulses.      Heart sounds: Normal heart sounds. No murmur heard.  Pulmonary:      Effort: Pulmonary effort is normal.      Breath sounds: Normal breath sounds. No wheezing, rhonchi or rales.      Comments: Patient is on 3 L of oxygen.  She is able to speak full sentences without difficulty.  Musculoskeletal:      Right lower leg: No edema.      Left lower leg: No edema.   Skin:     General: Skin is warm and dry.      Findings: No lesion or rash.   Neurological:      General: No focal deficit present.      Mental Status: She is alert and oriented to person, place, and time.   Psychiatric:         Mood and Affect: Affect normal. Mood is not anxious or depressed.         Vital Signs:   /58 (BP Location: Left arm, Patient Position: Sitting, Cuff Size: Large Adult) Comment (BP Location): lower arm  Pulse 85   Temp 97.6 °F (36.4 °C) (Oral)   Resp 16   Ht 165.1 cm (65\")   Wt (!) 152 kg (335 lb)   SpO2 93% Comment: 3 L's PD/C  BMI 55.75 kg/m²        Result Review :   The following data was reviewed by: SHAUNA Miranda on 02/25/2025:  CMP          6/26/2024    10:32 8/28/2024    12:29 12/10/2024    14:57   CMP   Glucose 146  112  92    BUN 14  16  9    Creatinine 0.70  0.75  0.70    EGFR 92.6  85.2  92.6    Sodium 141  142  141    Potassium 4.4  4.4  4.1    Chloride " 103  102  104    Calcium 9.4  8.2  8.7    Total Protein 6.9  6.8  6.2    Albumin 4.0  3.9  3.5    Globulin 2.9  2.9  2.7    Total Bilirubin 0.5  0.6  0.7    Alkaline Phosphatase 112  120  100    AST (SGOT) 19  25  14    ALT (SGPT) 15  28  13    Albumin/Globulin Ratio 1.4  1.3  1.3    BUN/Creatinine Ratio 20.0  21.3  12.9    Anion Gap 11.9  11.8  8.8      CBC w/diff          2024    10:32 2024    12:20 12/10/2024    14:57   CBC w/Diff   WBC 9.33  8.34  9.01    RBC 4.46  4.48  3.74    Hemoglobin 11.3  12.6  10.3    Hematocrit 38.4  41.1  35.4    MCV 86.1  91.7  94.7    MCH 25.3  28.1  27.5    MCHC 29.4  30.7  29.1    RDW 15.8  17.1  14.1    Platelets 317  260  217    Neutrophil Rel % 83.2  80.5  80.5    Immature Granulocyte Rel % 0.4  0.8  0.3    Lymphocyte Rel % 7.8  10.4  8.7    Monocyte Rel % 6.0  6.7  8.3    Eosinophil Rel % 2.4  1.2  2.0    Basophil Rel % 0.2  0.4  0.2         Procedures        Assessment and Plan    Diagnoses and all orders for this visit:    1. ANABELLE (obstructive sleep apnea) (Primary)  Comments:  patient encouraged to restart CPAP    2. Chronic respiratory failure with hypoxia  Comments:  continue oxygen.  patient is using and benefiting    3. Other fatigue    4. Class 3 severe obesity with serious comorbidity and body mass index (BMI) of 50.0 to 59.9 in adult, unspecified obesity type  Comments:  encourage increased activity when able        Assessment & Plan  1. Fatigue.  She reports persistent fatigue, which may be related to her previous COVID-19 infection 15 months ago and her lack of CPAP use. She is advised to restart using her CPAP machine to help alleviate the fatigue.    2. Asthma.  She has not experienced any asthmatic symptoms recently. She is instructed to use the Dulera inhaler daily and albuterol as needed. Her prescription for Dulera will  in May 2025. She is advised to request a refill via MyChart if needed.    3. Sinusitis.  She occasionally experiences  sinusitis, which may be exacerbated by allergies and poor housekeeping. She is advised to manage her allergies effectively and maintain better housekeeping practices to reduce dust exposure.          Follow Up   Return in about 6 months (around 8/25/2025).  Patient was given instructions and counseling regarding her condition or for health maintenance advice. Please see specific information pulled into the AVS if appropriate.

## 2025-03-06 ENCOUNTER — LAB (OUTPATIENT)
Dept: LAB | Facility: HOSPITAL | Age: 70
End: 2025-03-06
Payer: MEDICARE

## 2025-03-06 ENCOUNTER — INFUSION (OUTPATIENT)
Dept: ONCOLOGY | Facility: HOSPITAL | Age: 70
End: 2025-03-06
Payer: MEDICARE

## 2025-03-06 ENCOUNTER — OFFICE VISIT (OUTPATIENT)
Dept: ONCOLOGY | Facility: CLINIC | Age: 70
End: 2025-03-06
Payer: MEDICARE

## 2025-03-06 VITALS
WEIGHT: 293 LBS | HEART RATE: 104 BPM | DIASTOLIC BLOOD PRESSURE: 70 MMHG | OXYGEN SATURATION: 93 % | RESPIRATION RATE: 16 BRPM | BODY MASS INDEX: 48.82 KG/M2 | SYSTOLIC BLOOD PRESSURE: 188 MMHG | TEMPERATURE: 99 F | HEIGHT: 65 IN

## 2025-03-06 DIAGNOSIS — M81.0 AGE-RELATED OSTEOPOROSIS WITHOUT CURRENT PATHOLOGICAL FRACTURE: Primary | ICD-10-CM

## 2025-03-06 DIAGNOSIS — M81.0 AGE-RELATED OSTEOPOROSIS WITHOUT CURRENT PATHOLOGICAL FRACTURE: ICD-10-CM

## 2025-03-06 DIAGNOSIS — C50.111 MALIGNANT NEOPLASM OF CENTRAL PORTION OF RIGHT FEMALE BREAST, UNSPECIFIED ESTROGEN RECEPTOR STATUS: Primary | ICD-10-CM

## 2025-03-06 DIAGNOSIS — C50.611 MALIGNANT NEOPLASM OF AXILLARY TAIL OF RIGHT BREAST IN FEMALE, ESTROGEN RECEPTOR POSITIVE: ICD-10-CM

## 2025-03-06 DIAGNOSIS — T45.4X5A ADVERSE EFFECT OF IRON, INITIAL ENCOUNTER: ICD-10-CM

## 2025-03-06 DIAGNOSIS — Z79.811 AROMATASE INHIBITOR USE: ICD-10-CM

## 2025-03-06 DIAGNOSIS — Z17.0 MALIGNANT NEOPLASM OF AXILLARY TAIL OF RIGHT BREAST IN FEMALE, ESTROGEN RECEPTOR POSITIVE: ICD-10-CM

## 2025-03-06 DIAGNOSIS — C50.111 MALIGNANT NEOPLASM OF CENTRAL PORTION OF RIGHT FEMALE BREAST, UNSPECIFIED ESTROGEN RECEPTOR STATUS: ICD-10-CM

## 2025-03-06 LAB
ALBUMIN SERPL-MCNC: 3.8 G/DL (ref 3.5–5.2)
ALBUMIN/GLOB SERPL: 1.3 G/DL
ALP SERPL-CCNC: 114 U/L (ref 39–117)
ALT SERPL W P-5'-P-CCNC: 22 U/L (ref 1–33)
ANION GAP SERPL CALCULATED.3IONS-SCNC: 10.8 MMOL/L (ref 5–15)
AST SERPL-CCNC: 24 U/L (ref 1–32)
BASOPHILS # BLD AUTO: 0.03 10*3/MM3 (ref 0–0.2)
BASOPHILS NFR BLD AUTO: 0.3 % (ref 0–1.5)
BILIRUB SERPL-MCNC: 0.5 MG/DL (ref 0–1.2)
BUN SERPL-MCNC: 11 MG/DL (ref 8–23)
BUN/CREAT SERPL: 14.7 (ref 7–25)
CALCIUM SPEC-SCNC: 10.1 MG/DL (ref 8.6–10.5)
CHLORIDE SERPL-SCNC: 100 MMOL/L (ref 98–107)
CO2 SERPL-SCNC: 31.2 MMOL/L (ref 22–29)
CREAT SERPL-MCNC: 0.75 MG/DL (ref 0.57–1)
DEPRECATED RDW RBC AUTO: 50.4 FL (ref 37–54)
EGFRCR SERPLBLD CKD-EPI 2021: 85.2 ML/MIN/1.73
EOSINOPHIL # BLD AUTO: 0.15 10*3/MM3 (ref 0–0.4)
EOSINOPHIL NFR BLD AUTO: 1.7 % (ref 0.3–6.2)
ERYTHROCYTE [DISTWIDTH] IN BLOOD BY AUTOMATED COUNT: 14.5 % (ref 12.3–15.4)
FERRITIN SERPL-MCNC: 77.3 NG/ML (ref 13–150)
GLOBULIN UR ELPH-MCNC: 3 GM/DL
GLUCOSE SERPL-MCNC: 111 MG/DL (ref 65–99)
HCT VFR BLD AUTO: 40.4 % (ref 34–46.6)
HGB BLD-MCNC: 11.9 G/DL (ref 12–15.9)
IMM GRANULOCYTES # BLD AUTO: 0.05 10*3/MM3 (ref 0–0.05)
IMM GRANULOCYTES NFR BLD AUTO: 0.6 % (ref 0–0.5)
IRON 24H UR-MRATE: 36 MCG/DL (ref 37–145)
IRON SATN MFR SERPL: 9 % (ref 20–50)
LYMPHOCYTES # BLD AUTO: 0.37 10*3/MM3 (ref 0.7–3.1)
LYMPHOCYTES NFR BLD AUTO: 4.3 % (ref 19.6–45.3)
MAGNESIUM SERPL-MCNC: 2 MG/DL (ref 1.6–2.4)
MCH RBC QN AUTO: 28.2 PG (ref 26.6–33)
MCHC RBC AUTO-ENTMCNC: 29.5 G/DL (ref 31.5–35.7)
MCV RBC AUTO: 95.7 FL (ref 79–97)
MONOCYTES # BLD AUTO: 0.6 10*3/MM3 (ref 0.1–0.9)
MONOCYTES NFR BLD AUTO: 7 % (ref 5–12)
NEUTROPHILS NFR BLD AUTO: 7.43 10*3/MM3 (ref 1.7–7)
NEUTROPHILS NFR BLD AUTO: 86.1 % (ref 42.7–76)
NRBC BLD AUTO-RTO: 0 /100 WBC (ref 0–0.2)
PHOSPHATE SERPL-MCNC: 4.8 MG/DL (ref 2.5–4.5)
PLATELET # BLD AUTO: 255 10*3/MM3 (ref 140–450)
PMV BLD AUTO: 10.4 FL (ref 6–12)
POTASSIUM SERPL-SCNC: 5 MMOL/L (ref 3.5–5.2)
PROT SERPL-MCNC: 6.8 G/DL (ref 6–8.5)
RBC # BLD AUTO: 4.22 10*6/MM3 (ref 3.77–5.28)
SODIUM SERPL-SCNC: 142 MMOL/L (ref 136–145)
TIBC SERPL-MCNC: 413 MCG/DL (ref 298–536)
TRANSFERRIN SERPL-MCNC: 277 MG/DL (ref 200–360)
WBC NRBC COR # BLD AUTO: 8.63 10*3/MM3 (ref 3.4–10.8)

## 2025-03-06 PROCEDURE — 25010000002 DENOSUMAB 60 MG/ML SOLUTION PREFILLED SYRINGE: Performed by: INTERNAL MEDICINE

## 2025-03-06 PROCEDURE — 36415 COLL VENOUS BLD VENIPUNCTURE: CPT

## 2025-03-06 PROCEDURE — 84100 ASSAY OF PHOSPHORUS: CPT

## 2025-03-06 PROCEDURE — 96372 THER/PROPH/DIAG INJ SC/IM: CPT

## 2025-03-06 PROCEDURE — 83735 ASSAY OF MAGNESIUM: CPT

## 2025-03-06 PROCEDURE — 80053 COMPREHEN METABOLIC PANEL: CPT

## 2025-03-06 PROCEDURE — 84466 ASSAY OF TRANSFERRIN: CPT

## 2025-03-06 PROCEDURE — 83540 ASSAY OF IRON: CPT

## 2025-03-06 PROCEDURE — 85025 COMPLETE CBC W/AUTO DIFF WBC: CPT

## 2025-03-06 PROCEDURE — 82728 ASSAY OF FERRITIN: CPT

## 2025-03-06 RX ADMIN — DENOSUMAB 60 MG: 60 INJECTION SUBCUTANEOUS at 15:43

## 2025-03-06 NOTE — PROGRESS NOTES
Subjective     REASON FOR FOLLOW-UP: Right breast inflammatory breast, triple positive.                              REQUESTING PHYSICIAN: MD Francisco Esteban MD Bethany Haynes, MD    History of present illness:    Patient is a 69 y.o. 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 She then had surgery with a complete pathological response in September 2021 and then resumed adjuvant treatment with Perjeta Herceptin with intolerance due to severe diarrhea. She completed Herceptin in March of 2022 and continues on Arimidex along with initiating Prolia for worsening osteopenia.  She has been on it now for 3-1/2 years    She continues on Arimidex which she thinks is interfering with weight loss but I suspect this is not a big factor because she has had issues with her weight for many many years     we discussed the potential for her to take one of the various weight loss injections she would be a good candidate with her obesity and poor lung function requiring oxygen. .  I have encouraged her to discuss this again with her PCP    She is up to 4 L of oxygen when she does much at all and this is slow progression of her lung disease.      She has a crusty lesion on this shin of her left leg which has not responded to multiple steroid creams prescribed by dermatology and she wants to see another dermatologist to get another opinion    Otherwise she denies further concerns at this time.    Past Medical History:   Diagnosis Date    Allergic rhinitis     Anemia     Anxiety     Asthma     Chronic bronchitis     In past, espec. during working years    Chronic heart failure with preserved ejection fraction (HFpEF)     Chronic hypoxemic  respiratory failure     on continuous O2    Coronary artery calcification seen on CT scan 04/21/2022    Diarrhea     with chemo    GERD (gastroesophageal reflux disease) Mild    H/O Intraductal papilloma     Hemorrhoids     History of transfusion 1984    AFTER BACK SURGERY no reaction    Hypertension     IBS (irritable bowel syndrome)     Inflammatory breast cancer     right    Kidney stone     Morbid obesity with BMI of 50.0-59.9, adult     Obesity hypoventilation syndrome 02/05/2021    On home oxygen therapy     2.5 AT REST WITH ACTIVITY UP TO 4L    ANABELLE on CPAP     cpap  & uses O2 with CPAP    Osteoarthritis     Pneumonia     Many times in past, 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    PONV (postoperative nausea and vomiting)     Type II diabetes mellitus 09/08/2021        Past Surgical History:   Procedure Laterality Date    BREAST EXCISIONAL BIOPSY Bilateral     COLONOSCOPY      COLONOSCOPY N/A 12/8/2022    Procedure: COLONOSCOPY;  Surgeon: Henry Aden MD;  Location: [facility] ENDOSCOPY;  Service: Gastroenterology;  Laterality: N/A;  diverticulosis, and hemorrhoids    CYSTOSCOPY BLADDER STONE LITHOTRIPSY      ENDOSCOPY      ENDOSCOPY N/A 12/8/2022    Procedure: ESOPHAGOGASTRODUODENOSCOPY  with biopsy;  Surgeon: Henry Aden MD;  Location: [facility] ENDOSCOPY;  Service: Gastroenterology;  Laterality: N/A;  hiatal hernia    KNEE ARTHROPLASTY Bilateral 2009    MASTECTOMY Left 08/10/2021    Procedure: LEFT TOTAL MASTECTOMY;  Surgeon: Nicci Banks MD;  Location: [facility] OR;  Service: General;  Laterality: Left;    MASTECTOMY Right 08/10/2021    Procedure: RIGHT MODIFIED RADICAL MASTECTOMY;  Surgeon: Nicci Banks MD;  Location: [facility] OR;  Service: General;  Laterality: Right;    SPINE SURGERY  1984    TOTAL ABDOMINAL HYSTERECTOMY WITH SALPINGO OOPHORECTOMY  2004    UPPER GASTROINTESTINAL ENDOSCOPY      VENOUS ACCESS DEVICE (PORT) INSERTION N/A 01/25/2021    Procedure: INSERTION VENOUS  ACCESS DEVICE;  Surgeon: Nicci Banks MD;  Location: [facility] OR;  Service: General;  Laterality: N/A;    VENOUS ACCESS DEVICE (PORT) REMOVAL Left 2022    Procedure: REMOVAL VENOUS ACCESS DEVICE;  Surgeon: Nicci Banks MD;  Location: [facility] OR;  Service: General;  Laterality: Left;      ONC HISTORY  patient is a 65-year-old white female with morbid obesity, history of diastolic congestive heart failure and unknown type of pulmonary disease for which she has been on oxygen for 4 years.    She has been getting routine mammography since 38 years of age because her mother  at 46 of breast cancer and had a biopsy of her left breast in  which was apparently benign and another biopsy in  on her right breast which showed a small focus of atypical ductal hyperplasia and atypical lobular hyperplasia with a residual intraductal papilloma.  At that time she saw medical oncologist who recommended genetic testing and prevention but the insurance would not cover the genetic testing and the medical oncologist thought tamoxifen was too risky for her because of her comorbidities and no treatment was given.  More recently she noticed redness of the right breast in October and showed it to her family doctor who gave her course of Keflex when it did not improve and mammogram was ordered and this was benign  When the redness persisted she saw her gynecologist with concern for inflammatory breast cancer and referred her to Dr. Banks after repeat imaging and [facility]  Preliminary report shows micropapillary invasive mammary carcinoma intermediate grade measuring 13 mm right axillary node was also involved with metastatic cancer ER/WV and HER-2 are pending  Patient saw Dr. Banks who sent her for skin biopsies and she had 3 separate punch biopsy of the skin that showed Perivascular and perifollicular inflammation with no  obvious malignancy at 3:00 12:00 and 9:00  In addition staging work-up with CAT scans and bone scan were ordered.  CAT scan of the chest showed a borderline mediastinal  Infracarinal node measuring 15 mm in length mild cardiomegaly and heavy coronary artery calcifications  CT of the abdomen showed a 2.5 x 2.2 cm right adrenal mass which the patient tells me she has had in the past and is most likely benign but also a 2.7 cm left external iliac node which is indeterminate.  Bone scan was negative    Echocardiogram is scheduled for later next week and genetic testing with the [employer] stat panel is pending    Patient is  3 para 3 menarche was at age 9 menopause at 49 when she had a hysterectomy and oophorectomy  First childbirth was at age 20 she breast-fed her second and third children and took no hormone replacement after menopause    Family history is positive for mother dying of breast cancer at age 46 she is a maternal aunt with breast cancer in her 70s a paternal aunt with breast cancer in her 70s paternal grandmother with colon cancer.  Her father had Hodgkin's disease and non-Hodgkin's lymphoma but  of small cell lung cancer at 67      She has not had a heart attack stroke or blood clot    Plan to do echocardiogram soon as possible to ascertain tolerability of cardiotoxic chemotherapy which would be typically indicated with an inflammatory breast cancer    Also plan PET scan to follow-up on atypical lymph nodes and confirm benign etiology of the adrenal lesion    She will have port placement and chemo education pt is  ER96%/PR82%  and HER-2 +--3+    I explained to Aida that the goal of treatment would be curative but she has a lot of comorbidities which may limit our ability to give the most effective chemotherapy in the setting  I told her radiation would be involved and possibly hormonal therapy for 10 years as she has hormone positivity and HER-2 directed therapy    She expressed some concerns  about driving back and forth from [place] but felt that once a week was doable    We will see her back in 2 weeks to start treatment with a port placement planned early next week      Patient did have a mild reaction to Taxol dose #1 with some increased shortness of breath and flushing.  This was responsive to 100 mg of Solu-Cortef.  She states this gave her a headache and made her quite talkative but otherwise she was thankfully able to complete Taxol infusion without further incident.    Patient reports issues with chronic diarrhea over the last few years and did note a little bit increase in stooling following THP though nothing overly significant in her mind.  She did finally begin taking Imodium just a few days ago and has had no further stools.  She does note significant trouble with hemorrhoids in relation to the diarrhea   Required blood transfusion due to significant hemorrhoidal bleeding plus iron deficiency.  Injectafer planned    Due to inclement weather cycle 1 day 8 therapy was missed.  She did get day 15 therapy with fairly good tolerance except for some diarrhea.    She was found to be iron deficient despite trying oral iron.  We therefore elected to proceed with IV Injectafer but she remains mildly anemic with a hemoglobin of 9.3    7/21    She has increased her Lasix and her weight is down 7 pounds but overall she is significantly weaker since starting treatment and I think it is in her best interest to discontinue treatment and proceed with surgery and will use Herceptin in the interim till surgery scheduled  She has had a good response in the breast and I do not think the last dose of Adriamycin is going to be crucial and we run the risk of making her so weak that she cannot go for surgery    9/21  Patient had worsening shortness of breath requiring continuous oxygen.  CT of the chest performed to rule out pneumonitis and she was started on steroids.  CT showed bilateral groundglass  infiltrates presumed to be related to Taxol pneumonitis and therefore Taxol discontinued.  Patient completed the fourth cycle of Perjeta/Herceptin alone.  Diarrhea was an issue but not as bad without the Taxol.  She is weaned off her prednisone    Patient proceeded with cycle 1 Adriamycin/Cytoxan on 5/14/2021 and is seen back today for cycle #3 and we are doing it at 3-week intervals because of her frailty and after 3 cycles we stopped because of tolerance issues    She went for surgery her bilateral mastectomies and interestingly she had a complete pathological CR on the right breast and had evidence of DCIS in the left breast [id number]    10/21  We will start anastrozole because she is not a good candidate for tamoxifen and her bone density showed normal bone density in the spine but osteoporosis in the left femoral neck and we will start Prolia in 6 weeks.The side effects and toxicities of the Aromatase inhibitors was discussed with the patient including, hot flashes, mood swings and hair thinning.Significant arthralgias and worsening bone density were also discussed. Baseline bone density evaluation was ordered.        Current Outpatient Medications on File Prior to Visit   Medication Sig Dispense Refill    albuterol sulfate  (90 Base) MCG/ACT inhaler Inhale 2 puffs Every 4 (Four) Hours As Needed for Wheezing. 18 g 11    anastrozole (ARIMIDEX) 1 MG tablet Take 1 tablet by mouth Daily. 90 tablet 3    carvedilol (COREG) 12.5 MG tablet Take 1 tablet by mouth 2 (Two) Times a Day With Meals. 180 tablet 3    carvedilol (COREG) 25 MG tablet Take 0.5 tablets by mouth Every 12 (Twelve) Hours.      Cholecalciferol (Vitamin D) 50 MCG (2000 UT) capsule Take 1 capsule by mouth Daily.      dicyclomine (BENTYL) 20 MG tablet TAKE 1 TABLET BY MOUTH EVERY 6 HOURS 60 tablet 2    diphenoxylate-atropine (LOMOTIL) 2.5-0.025 MG per tablet Take 1 tablet by mouth 4 (Four) Times a Day As Needed for Diarrhea.      FLUoxetine (PROzac)  40 MG capsule TAKE 1 CAPSULE BY MOUTH EVERY DAY 30 capsule 0    furosemide (Lasix) 40 MG tablet Take 1 tablet by mouth Daily. 90 tablet 2    lisinopril (PRINIVIL,ZESTRIL) 10 MG tablet Take 1 tablet by mouth Daily. 90 tablet 0    mometasone-formoterol (DULERA 100) 100-5 MCG/ACT inhaler Inhale 2 puffs 2 (Two) Times a Day. 1 each 11    montelukast (SINGULAIR) 10 MG tablet Take 1 tablet by mouth Every Night.      multivitamin (THERAGRAN) tablet tablet Take 1 tablet by mouth Daily.      mupirocin (BACTROBAN) 2 % ointment APPLY TO THE AFFECTED AREA(S) TWICE DAILY for 2 weeks      ondansetron (ZOFRAN) 8 MG tablet Take 1 tablet by mouth 3 (Three) Times a Day As Needed for Nausea.      pantoprazole (PROTONIX) 40 MG EC tablet Take 1 tablet by mouth Daily.      pravastatin (PRAVACHOL) 40 MG tablet Take 1 tablet by mouth Every Night.      spironolactone (ALDACTONE) 25 MG tablet Take 1 tablet by mouth Daily. 90 tablet 2    triamcinolone (KENALOG) 0.1 % ointment APPLY TWICE DAILY THREE TIMES DAILY FOR 2 WEEKS      FeroSul 325 (65 Fe) MG tablet Take 1 tablet by mouth 2 (Two) Times a Day With Meals. (Patient not taking: Reported on 9/13/2024)       No current facility-administered medications on file prior to visit.        ALLERGIES:    Allergies   Allergen Reactions    Amlodipine Swelling     LEGS     Hydrochlorothiazide Unknown - Low Severity     Neuro issues    Morphine Nausea And Vomiting     hallucinations    Adhesive Tape Rash        Social History     Socioeconomic History    Marital status:     Number of children: 3   Tobacco Use    Smoking status: Never     Passive exposure: Never    Smokeless tobacco: Never   Vaping Use    Vaping status: Never Used   Substance and Sexual Activity    Alcohol use: Never    Drug use: Never    Sexual activity: Not Currently     Partners: Male        Family History   Problem Relation Age of Onset    Breast cancer Mother 45    Cancer Mother         breast; metastasized    Lung cancer  "Father     Hodgkin's lymphoma Father     Skin cancer Father         squamous cell    Cancer Father         4 kinds: Hodgkins; lung; squamous cell skin    Breast cancer Maternal Aunt 70    Cancer Maternal Aunt         breast; metastasized    Breast cancer Paternal Aunt 70    Cancer Paternal Aunt         breast; cured    Colon cancer Paternal Grandmother 60    Cancer Paternal Grandmother         colon cancer    Hypertension Paternal Grandmother         EVERY ADULT IN MY FAMILY    Ovarian cancer Neg Hx     Uterine cancer Neg Hx     Deep vein thrombosis Neg Hx     Pulmonary embolism Neg Hx     Malig Hyperthermia Neg Hx         Review of Systems   Constitutional:  Positive for fatigue and unexpected weight change (Weight gain).   Respiratory:  Positive for shortness of breath.    Gastrointestinal:  Positive for diarrhea.   All other systems reviewed and are negative.        Objective     Vitals:    03/06/25 1456   BP: (!) 188/70   Pulse: 104   Resp: 16   Temp: 99 °F (37.2 °C)   TempSrc: Oral   SpO2: 93%  Comment: 4 liters O2   Weight: (!) 156 kg (344 lb 12.8 oz)   Height: 165.1 cm (65\")   PainSc: 0-No pain         3/6/2025     2:55 PM   Current Status   ECOG score 1       Physical Exam    CONSTITUTIONAL:  Vital signs reviewed.  No distress, looks comfortable. Morbidly obese on continuous oxygen  EYES:  Conjunctiva and lids unremarkable.  PERRLA  EARS,NOSE,MOUTH,THROAT:  Ears and nose appear unremarkable.  Lips, teeth, gums appear unremarkable.  RESPIRATORY:  Normal respiratory effort.  Lungs clear to auscultation bilaterally.  No axillary adenopathy  BREASTS: Bilateral mastectomies with no reconstruction-no evidence of chest wall recurrence.  No palpable abnormalities noted.  Small area of fungal infection of the right lateral chest skin fold.  CARDIOVASCULAR:  Normal S1, S2.  No murmurs rubs or gallops.  1+ brawny lower extremity edema.  GASTROINTESTINAL: Abdomen appears unremarkable.  Nontender.  No hepatomegaly.  No " splenomegaly.  LYMPHATIC:  No cervical, supraclavicular, axillary lymphadenopathy.  SKIN:  Warm  PSYCHIATRIC:  Normal judgment and insight.  Normal mood and affect.     I have reexamined the patient and the results are consistent with the previously documented exam except as updated.. Jaime Azul MD       RECENT LABS:  Results from last 7 days   Lab Units 03/06/25  1450   WBC 10*3/mm3 8.63   NEUTROS ABS 10*3/mm3 7.43*   HEMOGLOBIN g/dL 11.9*   HEMATOCRIT % 40.4   PLATELETS 10*3/mm3 255                   PET IMPRESSION:  1.  Moderate to intensely FDG avid asymmetric soft tissue and skin  thickening involving the right breast likely representing patient's  known malignancy.  2.  Intensely FDG avid right axillary and subpectoral adenopathy likely  represent metastatic disease.  3.  Constellation of findings within the right adrenal gland are favored  to represent a lipid rich adenoma. Continued attention on follow-up is  recommended to ensure stability.  4.  While there are no findings of definite FDG avid osseous metastasis,  given the heterogenous FDG uptake throughout the axial and appendicular  skeleton due to the above stated limitations, subtle underlying osseous  metastasis would remain occult. Therefore, continued close attention on  follow-up is recommended to exclude this possibility.  5.  Short segment of moderate to intense FDG uptake within the distal  esophagus and GE junction suggestive of esophagitis. In the appropriate  clinical context correlation with patient history is recommended with  follow-up endoscopy if clinically indicated.  6.  Sub-6 mm pulmonary nodule within the right lower lobe is below PET  resolution and indeterminate. Continued close attention on follow-up  with chest CT in 3 months is recommended to exclude metastatic disease.  7.  Other findings as above.     This report was finalized on 2/2/2021     Final Diagnosis   1. Left Breast, Total Mastectomy (2,122 grams):                A. MULTIFOCAL LOW GRADE DUCTAL CARCINOMA IN SITU (DCIS):                            1. Solid, Cribriform, and Pagetoid type with single cell necrosis and focal calcifications.                            2. Extent of DCIS: 20 mm (5 of 26 blocks involved).                            3. Margins are negative for in situ carcinoma; Closest distance: DCIS is present > 10 mm from                                the posterior margin.               B. Multiple intraductal papillomas, usual ductal hyperplasia, and fibroadenomatoid change.               C. Unremarkable skin and nipple.               D. See Synoptic Report and Comment #1.      2. Right Breast, Modified Radical Mastectomy S/P Neoadjuvant Chemotherapy (2,589 grams):               A. FIBROTIC TUMOR BED WITH NO RESIDUAL INVASIVE DUCTAL CARCINOMA.               B. Background breast parenchyma with multiple intraductal papillomas, fibroadenomatoid change,       usual ductal hyperplasia and pseudoangiomatous stromal hyperplasia (PASH).  C. Scar and fat necrosis, consistent with prior procedure-related changes.  D. Clip and biopsy site changes present within tumor bed.    E. Unremarkable skin and nipple.   F. Nineteen lymph nodes, negative for carcinoma (0/19):               1. Clip and biopsy site changes are present.               2. Treatment effect is present in 4 of 19 lymph nodes.  G. See Comment #2.         FINDINGS:   LUMBAR SPINE:  The BMD measured in the L1-L4 is 1.054 g/cm2 for a  T-score of 0.1 and a Z-score of 2.1     LEFT HIP: The BMD for the femoral neck is 0.476g/cm2 for a T score of   -3.4 and a Z score of -1.7     RIGHT HIP:  The BMD for the femoral neck is 0.657g/cm2 for a T score of  -1.7 and a Z score of 0.0     IMPRESSION:  Osteoporosis.     This report was finalized on 9/16/2021     Assessment & Plan   1. [id number] right breast cancer ER96%/MA 82% HER-2 -3+ positive inflammatory breast cancer 1/19/2021 for neoadjuvant chemotherapy  Staging  work-up negative except for 6 mm lung nodule and axillary and subpectoral adenopathy  THP followed by AC planned if she tolerates it  C1D8 Taxol missed due to inclement weather.  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    C1 Adriamycin/Cytoxan given 5/14/2021.  Adriamycin and Cytoxan stopped after 3 doses due to poor tolerance  YPT0N0 right breast with multifocal DCIS ER/ME positive in the left breast post bilateral mastectomies and right axillary dissection  Radiation Arimidex and Perjeta Herceptin to continue for the rest of the year  Severe diarrhea after resuming Perjeta-Lomotil ordered and C. difficile checked with this is C. difficile negative she will not tolerate Perjeta for the rest of the year and we will stop  Doing well on single agent Herceptin and Arimidex in 2/22  Completed adjuvant Herceptin in 3/22  Tolerating anastrozole well as of 1/2023.  1 month break from anastrozole because of fatigue  8/1/2023 patient notes no change in her symptoms having taken 1 month break with Arimidex.  Therefore resumed therapy.  We will add TSH and free T4 to labs done today.    Tolerating anastrozole well as of 3/25    2.  Morbid obesity  Body mass index is 57.38 kg/m².  Patient concerned about weight gain.  Discussed intentional healthy food choices and potential water aerobics.  Also encouraged her to discuss possible weight loss injection drugs with her PCP as she would be a good candidate with her obesity and poor pulmonary function.    3.  History of diastolic heart failure  Echocardiogram with ejection fraction of 64% normal strain  Cleared by cardio-oncology for chemotherapy  Echocardiogram in 9/21 stable at 63%    4.  Pulmonary disease?  Etiology on oxygen for 4 years?  [place]  Managed by pulmonologist.  Remains on continuous oxygen.  Follows up with them annually.    5.  Strong family history of breast cancer genetic testing 84 genes negative    6.   Probable benign adrenal adenoma-PET negative    7.  Questionable enlarged lymph nodes left iliac chain and mediastinum likely reactive-PET negative    8.  6 mm right lower lobe nodule below PET resolution pretreatment needs follow-up after THP  Repeat CT read at [facility] radiologist report stability of nodules and nodes  Repeat CT at [facility] in 10/21 shows continued improvement    9.  Abnormal uptake short segment esophagus with a history of Schatzki's ring-we will double PPI and watch closely but we we will proceed with chemotherapy at this point and refer back to GI-doubt she has metastatic disease to this area    10. Anemia with microcytic indices  Iron studies performed 2/10/2021.  2/24/2021: reviewed with the patient that she is iron deficient, with ferritin of 16, iron saturation of 4%.  Patient reports taking ferrous gluconate in the past but this caused GI upset.  Also with her chronic diarrhea I do not think she can absorb it.  We will pursue IV iron with plans to initiate this next week pending insurance approval. In addition hemoglobin down to 8.0 and transfusion pursued.  3/3/2021: IV Injectafer initiated x2.   Hemoglobin down to 9.9 one week out from first AC though overall stable.  Monitor.   Hemoglobin improved off chemotherapy  Hemoglobin dropped after resuming Perjeta Herceptin.  Repeat iron stores March 2022 replete.   Normocytic anemia.  Hgb 11.1.  Hemoglobin dropped to 10.5 recheck iron stores in 12/23    11. History of chronic diarrhea, ?IBS, exacerbated with Perjeta therapy.  Patient required rifaximin 550 mg to take twice daily x7 days with each Perjeta dose.   Since completion of Perjeta patient is now actually experiencing constipation (further discussed below).    12. Osteoporosis on DEXA scan in 9/21-Prolia initiated in 12/21  Patient to receive third Prolia injection today 1/24/2023.  Next DEXA scan due September 2023.  Prolia given on 3/6/2025    Plan:  Continue  Arimidex.  Prolia due today  Encourage patient to discuss weight loss medication with her PCP.  see me in 6 months follow-up    This patient is on high risk drug therapy requiring intensive monitoring for toxicity.

## 2025-03-07 ENCOUNTER — TELEPHONE (OUTPATIENT)
Dept: CARDIOLOGY | Facility: CLINIC | Age: 70
End: 2025-03-07
Payer: MEDICARE

## 2025-03-07 DIAGNOSIS — T45.4X5D ADVERSE EFFECT OF IRON, SUBSEQUENT ENCOUNTER: Primary | ICD-10-CM

## 2025-03-07 DIAGNOSIS — D50.8 OTHER IRON DEFICIENCY ANEMIA: ICD-10-CM

## 2025-03-07 PROBLEM — D50.9 IRON DEFICIENCY ANEMIA: Status: ACTIVE | Noted: 2025-03-07

## 2025-03-07 RX ORDER — FAMOTIDINE 10 MG/ML
20 INJECTION, SOLUTION INTRAVENOUS AS NEEDED
OUTPATIENT
Start: 2025-03-21

## 2025-03-07 RX ORDER — DIPHENHYDRAMINE HYDROCHLORIDE 50 MG/ML
50 INJECTION INTRAMUSCULAR; INTRAVENOUS AS NEEDED
OUTPATIENT
Start: 2025-03-21

## 2025-03-07 RX ORDER — CETIRIZINE HYDROCHLORIDE 10 MG/1
10 TABLET ORAL ONCE
OUTPATIENT
Start: 2025-03-21

## 2025-03-07 RX ORDER — ACETAMINOPHEN 325 MG/1
650 TABLET ORAL ONCE
OUTPATIENT
Start: 2025-03-21

## 2025-03-07 RX ORDER — SODIUM CHLORIDE 9 MG/ML
20 INJECTION, SOLUTION INTRAVENOUS ONCE
OUTPATIENT
Start: 2025-03-21

## 2025-03-07 RX ORDER — HYDROCORTISONE SODIUM SUCCINATE 100 MG/2ML
100 INJECTION INTRAMUSCULAR; INTRAVENOUS AS NEEDED
OUTPATIENT
Start: 2025-03-21

## 2025-03-07 NOTE — PROGRESS NOTES
Per Dr Azul, therapy plan placed for feraheme so pt can receive at [facility]. Pt informed of lab results indicating the need for this and she v/u. Message sent to scheduling to arrange for 2 infusions at [facility].

## 2025-03-10 ENCOUNTER — TELEPHONE (OUTPATIENT)
Dept: ONCOLOGY | Facility: CLINIC | Age: 70
End: 2025-03-10
Payer: MEDICARE

## 2025-03-13 ENCOUNTER — APPOINTMENT (OUTPATIENT)
Dept: GENERAL RADIOLOGY | Facility: HOSPITAL | Age: 70
End: 2025-03-13
Payer: MEDICARE

## 2025-03-13 ENCOUNTER — APPOINTMENT (OUTPATIENT)
Dept: CARDIOLOGY | Facility: HOSPITAL | Age: 70
End: 2025-03-13
Payer: MEDICARE

## 2025-03-13 ENCOUNTER — HOSPITAL ENCOUNTER (INPATIENT)
Facility: HOSPITAL | Age: 70
LOS: 5 days | Discharge: HOME HEALTH | End: 2025-03-18
Attending: EMERGENCY MEDICINE | Admitting: INTERNAL MEDICINE
Payer: MEDICARE

## 2025-03-13 ENCOUNTER — APPOINTMENT (OUTPATIENT)
Dept: CT IMAGING | Facility: HOSPITAL | Age: 70
End: 2025-03-13
Payer: MEDICARE

## 2025-03-13 DIAGNOSIS — J18.9 ACUTE PNEUMONIA: Primary | ICD-10-CM

## 2025-03-13 DIAGNOSIS — J10.1 INFLUENZA A: ICD-10-CM

## 2025-03-13 DIAGNOSIS — I50.32 CHRONIC HEART FAILURE WITH PRESERVED EJECTION FRACTION (HFPEF): ICD-10-CM

## 2025-03-13 DIAGNOSIS — D50.9 IRON DEFICIENCY ANEMIA, UNSPECIFIED IRON DEFICIENCY ANEMIA TYPE: ICD-10-CM

## 2025-03-13 DIAGNOSIS — J96.21 ACUTE ON CHRONIC RESPIRATORY FAILURE WITH HYPOXIA: ICD-10-CM

## 2025-03-13 DIAGNOSIS — G47.33 OBSTRUCTIVE SLEEP APNEA: ICD-10-CM

## 2025-03-13 DIAGNOSIS — R26.2 DIFFICULTY WALKING: ICD-10-CM

## 2025-03-13 PROBLEM — J96.01 ACUTE HYPOXEMIC RESPIRATORY FAILURE: Status: ACTIVE | Noted: 2025-03-13

## 2025-03-13 LAB
ALBUMIN SERPL-MCNC: 3.6 G/DL (ref 3.5–5.2)
ALBUMIN/GLOB SERPL: 1 G/DL
ALP SERPL-CCNC: 117 U/L (ref 39–117)
ALT SERPL W P-5'-P-CCNC: 21 U/L (ref 1–33)
ANION GAP SERPL CALCULATED.3IONS-SCNC: 11.1 MMOL/L (ref 5–15)
ARTERIAL PATENCY WRIST A: POSITIVE
AST SERPL-CCNC: 22 U/L (ref 1–32)
ATMOSPHERIC PRESS: 739.3 MMHG
BASE EXCESS BLDA CALC-SCNC: 4.9 MMOL/L (ref -2–2)
BASOPHILS # BLD AUTO: 0.03 10*3/MM3 (ref 0–0.2)
BASOPHILS NFR BLD AUTO: 0.2 % (ref 0–1.5)
BDY SITE: ABNORMAL
BILIRUB SERPL-MCNC: 1.1 MG/DL (ref 0–1.2)
BUN SERPL-MCNC: 13 MG/DL (ref 8–23)
BUN/CREAT SERPL: 18.1 (ref 7–25)
CA-I BLDA-SCNC: 1.14 MMOL/L (ref 1.13–1.32)
CALCIUM SPEC-SCNC: 8.9 MG/DL (ref 8.6–10.5)
CHLORIDE BLDA-SCNC: 100 MMOL/L (ref 98–107)
CHLORIDE SERPL-SCNC: 98 MMOL/L (ref 98–107)
CO2 SERPL-SCNC: 28.9 MMOL/L (ref 22–29)
CREAT SERPL-MCNC: 0.72 MG/DL (ref 0.57–1)
D-LACTATE SERPL-SCNC: 1.1 MMOL/L
D-LACTATE SERPL-SCNC: 1.9 MMOL/L (ref 0.5–2)
DEPRECATED RDW RBC AUTO: 48 FL (ref 37–54)
EGFRCR SERPLBLD CKD-EPI 2021: 89.5 ML/MIN/1.73
EOSINOPHIL # BLD AUTO: 0.01 10*3/MM3 (ref 0–0.4)
EOSINOPHIL NFR BLD AUTO: 0.1 % (ref 0.3–6.2)
ERYTHROCYTE [DISTWIDTH] IN BLOOD BY AUTOMATED COUNT: 14.1 % (ref 12.3–15.4)
FLUAV RNA RESP QL NAA+PROBE: DETECTED
FLUBV RNA RESP QL NAA+PROBE: NOT DETECTED
GAS FLOW AIRWAY: 10 LPM
GEN 5 1HR TROPONIN T REFLEX: 10 NG/L
GLOBULIN UR ELPH-MCNC: 3.7 GM/DL
GLUCOSE BLDC GLUCOMTR-MCNC: 165 MG/DL (ref 70–99)
GLUCOSE BLDC GLUCOMTR-MCNC: 178 MG/DL (ref 65–99)
GLUCOSE BLDC GLUCOMTR-MCNC: 245 MG/DL (ref 70–99)
GLUCOSE SERPL-MCNC: 174 MG/DL (ref 65–99)
HBA1C MFR BLD: 6.3 % (ref 4.8–5.6)
HCO3 BLDA-SCNC: 30.9 MMOL/L (ref 22–26)
HCT VFR BLD AUTO: 39.4 % (ref 34–46.6)
HCT VFR BLD CALC: 41 % (ref 38–51)
HEMODILUTION: NO
HGB BLD-MCNC: 12 G/DL (ref 12–15.9)
HGB BLDA-MCNC: 14.1 G/DL (ref 12–18)
HOLD SPECIMEN: NORMAL
HOLD SPECIMEN: NORMAL
IMM GRANULOCYTES # BLD AUTO: 0.06 10*3/MM3 (ref 0–0.05)
IMM GRANULOCYTES NFR BLD AUTO: 0.4 % (ref 0–0.5)
L PNEUMO1 AG UR QL IA: POSITIVE
LYMPHOCYTES # BLD AUTO: 0.51 10*3/MM3 (ref 0.7–3.1)
LYMPHOCYTES NFR BLD AUTO: 3.6 % (ref 19.6–45.3)
MAGNESIUM SERPL-MCNC: 1.8 MG/DL (ref 1.6–2.4)
MCH RBC QN AUTO: 28.4 PG (ref 26.6–33)
MCHC RBC AUTO-ENTMCNC: 30.5 G/DL (ref 31.5–35.7)
MCV RBC AUTO: 93.1 FL (ref 79–97)
MODALITY: ABNORMAL
MONOCYTES # BLD AUTO: 0.84 10*3/MM3 (ref 0.1–0.9)
MONOCYTES NFR BLD AUTO: 5.9 % (ref 5–12)
MRSA DNA SPEC QL NAA+PROBE: NORMAL
NEUTROPHILS NFR BLD AUTO: 12.85 10*3/MM3 (ref 1.7–7)
NEUTROPHILS NFR BLD AUTO: 89.8 % (ref 42.7–76)
NRBC BLD AUTO-RTO: 0 /100 WBC (ref 0–0.2)
NT-PROBNP SERPL-MCNC: 896.8 PG/ML (ref 0–900)
PCO2 BLDA: 50.3 MM HG (ref 35–45)
PH BLDA: 7.4 PH UNITS (ref 7.35–7.45)
PLATELET # BLD AUTO: 232 10*3/MM3 (ref 140–450)
PMV BLD AUTO: 10.8 FL (ref 6–12)
PO2 BLDA: 75.2 MM HG (ref 80–100)
POTASSIUM BLDA-SCNC: 4.2 MMOL/L (ref 3.5–5)
POTASSIUM SERPL-SCNC: 4.4 MMOL/L (ref 3.5–5.2)
PROCALCITONIN SERPL-MCNC: 0.06 NG/ML (ref 0–0.25)
PROT SERPL-MCNC: 7.3 G/DL (ref 6–8.5)
QT INTERVAL: 360 MS
QTC INTERVAL: 524 MS
RBC # BLD AUTO: 4.23 10*6/MM3 (ref 3.77–5.28)
RSV RNA RESP QL NAA+PROBE: NOT DETECTED
S PNEUM AG SPEC QL LA: NEGATIVE
SAO2 % BLDCOA: 94.6 % (ref 95–99)
SARS-COV-2 RNA RESP QL NAA+PROBE: NOT DETECTED
SODIUM BLD-SCNC: 144 MMOL/L (ref 131–143)
SODIUM SERPL-SCNC: 138 MMOL/L (ref 136–145)
TROPONIN T NUMERIC DELTA: 0 NG/L
TROPONIN T SERPL HS-MCNC: 10 NG/L
WBC NRBC COR # BLD AUTO: 14.3 10*3/MM3 (ref 3.4–10.8)
WHOLE BLOOD HOLD COAG: NORMAL
WHOLE BLOOD HOLD SPECIMEN: NORMAL

## 2025-03-13 PROCEDURE — 82948 REAGENT STRIP/BLOOD GLUCOSE: CPT | Performed by: INTERNAL MEDICINE

## 2025-03-13 PROCEDURE — 82803 BLOOD GASES ANY COMBINATION: CPT

## 2025-03-13 PROCEDURE — 36415 COLL VENOUS BLD VENIPUNCTURE: CPT

## 2025-03-13 PROCEDURE — 36600 WITHDRAWAL OF ARTERIAL BLOOD: CPT

## 2025-03-13 PROCEDURE — 82330 ASSAY OF CALCIUM: CPT

## 2025-03-13 PROCEDURE — 71045 X-RAY EXAM CHEST 1 VIEW: CPT

## 2025-03-13 PROCEDURE — 83605 ASSAY OF LACTIC ACID: CPT

## 2025-03-13 PROCEDURE — 87205 SMEAR GRAM STAIN: CPT | Performed by: INTERNAL MEDICINE

## 2025-03-13 PROCEDURE — 63710000001 INSULIN LISPRO (HUMAN) PER 5 UNITS: Performed by: INTERNAL MEDICINE

## 2025-03-13 PROCEDURE — 71275 CT ANGIOGRAPHY CHEST: CPT

## 2025-03-13 PROCEDURE — 87899 AGENT NOS ASSAY W/OPTIC: CPT | Performed by: INTERNAL MEDICINE

## 2025-03-13 PROCEDURE — 25010000002 CEFEPIME PER 500 MG: Performed by: EMERGENCY MEDICINE

## 2025-03-13 PROCEDURE — 94799 UNLISTED PULMONARY SVC/PX: CPT

## 2025-03-13 PROCEDURE — 94761 N-INVAS EAR/PLS OXIMETRY MLT: CPT

## 2025-03-13 PROCEDURE — 93005 ELECTROCARDIOGRAM TRACING: CPT | Performed by: EMERGENCY MEDICINE

## 2025-03-13 PROCEDURE — 99291 CRITICAL CARE FIRST HOUR: CPT

## 2025-03-13 PROCEDURE — 87070 CULTURE OTHR SPECIMN AEROBIC: CPT | Performed by: INTERNAL MEDICINE

## 2025-03-13 PROCEDURE — 84145 PROCALCITONIN (PCT): CPT | Performed by: INTERNAL MEDICINE

## 2025-03-13 PROCEDURE — 25810000003 SODIUM CHLORIDE 0.9 % SOLUTION: Performed by: EMERGENCY MEDICINE

## 2025-03-13 PROCEDURE — 87641 MR-STAPH DNA AMP PROBE: CPT | Performed by: INTERNAL MEDICINE

## 2025-03-13 PROCEDURE — 94664 DEMO&/EVAL PT USE INHALER: CPT

## 2025-03-13 PROCEDURE — 99223 1ST HOSP IP/OBS HIGH 75: CPT | Performed by: INTERNAL MEDICINE

## 2025-03-13 PROCEDURE — 85025 COMPLETE CBC W/AUTO DIFF WBC: CPT | Performed by: EMERGENCY MEDICINE

## 2025-03-13 PROCEDURE — 83880 ASSAY OF NATRIURETIC PEPTIDE: CPT | Performed by: EMERGENCY MEDICINE

## 2025-03-13 PROCEDURE — 93306 TTE W/DOPPLER COMPLETE: CPT

## 2025-03-13 PROCEDURE — 83735 ASSAY OF MAGNESIUM: CPT | Performed by: STUDENT IN AN ORGANIZED HEALTH CARE EDUCATION/TRAINING PROGRAM

## 2025-03-13 PROCEDURE — 25010000002 FUROSEMIDE PER 20 MG: Performed by: INTERNAL MEDICINE

## 2025-03-13 PROCEDURE — 25010000002 METHYLPREDNISOLONE PER 125 MG: Performed by: EMERGENCY MEDICINE

## 2025-03-13 PROCEDURE — 25010000002 SULFUR HEXAFLUORIDE MICROSPH 60.7-25 MG RECONSTITUTED SUSPENSION: Performed by: INTERNAL MEDICINE

## 2025-03-13 PROCEDURE — 25010000002 ENOXAPARIN PER 10 MG: Performed by: INTERNAL MEDICINE

## 2025-03-13 PROCEDURE — 83036 HEMOGLOBIN GLYCOSYLATED A1C: CPT | Performed by: INTERNAL MEDICINE

## 2025-03-13 PROCEDURE — 82948 REAGENT STRIP/BLOOD GLUCOSE: CPT

## 2025-03-13 PROCEDURE — 25810000003 SODIUM CHLORIDE 0.9 % SOLUTION 250 ML FLEX CONT: Performed by: INTERNAL MEDICINE

## 2025-03-13 PROCEDURE — 93005 ELECTROCARDIOGRAM TRACING: CPT

## 2025-03-13 PROCEDURE — 25010000002 VANCOMYCIN 1 G RECONSTITUTED SOLUTION 1 EACH VIAL: Performed by: INTERNAL MEDICINE

## 2025-03-13 PROCEDURE — 80051 ELECTROLYTE PANEL: CPT

## 2025-03-13 PROCEDURE — 25010000002 VANCOMYCIN 5 G RECONSTITUTED SOLUTION: Performed by: EMERGENCY MEDICINE

## 2025-03-13 PROCEDURE — 25010000002 LEVOFLOXACIN PER 250 MG: Performed by: INTERNAL MEDICINE

## 2025-03-13 PROCEDURE — 25010000002 METHYLPREDNISOLONE PER 125 MG: Performed by: INTERNAL MEDICINE

## 2025-03-13 PROCEDURE — 87449 NOS EACH ORGANISM AG IA: CPT | Performed by: INTERNAL MEDICINE

## 2025-03-13 PROCEDURE — 25510000001 IOPAMIDOL PER 1 ML: Performed by: INTERNAL MEDICINE

## 2025-03-13 PROCEDURE — 80053 COMPREHEN METABOLIC PANEL: CPT | Performed by: EMERGENCY MEDICINE

## 2025-03-13 PROCEDURE — 83605 ASSAY OF LACTIC ACID: CPT | Performed by: EMERGENCY MEDICINE

## 2025-03-13 PROCEDURE — 87040 BLOOD CULTURE FOR BACTERIA: CPT | Performed by: EMERGENCY MEDICINE

## 2025-03-13 PROCEDURE — 84484 ASSAY OF TROPONIN QUANT: CPT | Performed by: EMERGENCY MEDICINE

## 2025-03-13 PROCEDURE — 87637 SARSCOV2&INF A&B&RSV AMP PRB: CPT | Performed by: EMERGENCY MEDICINE

## 2025-03-13 PROCEDURE — 99291 CRITICAL CARE FIRST HOUR: CPT | Performed by: STUDENT IN AN ORGANIZED HEALTH CARE EDUCATION/TRAINING PROGRAM

## 2025-03-13 RX ORDER — SODIUM CHLORIDE 9 MG/ML
40 INJECTION, SOLUTION INTRAVENOUS AS NEEDED
Status: DISCONTINUED | OUTPATIENT
Start: 2025-03-13 | End: 2025-03-18 | Stop reason: HOSPADM

## 2025-03-13 RX ORDER — INSULIN LISPRO 100 [IU]/ML
2-7 INJECTION, SOLUTION INTRAVENOUS; SUBCUTANEOUS
Status: DISCONTINUED | OUTPATIENT
Start: 2025-03-13 | End: 2025-03-18 | Stop reason: HOSPADM

## 2025-03-13 RX ORDER — FUROSEMIDE 40 MG/1
40 TABLET ORAL DAILY
Status: DISCONTINUED | OUTPATIENT
Start: 2025-03-13 | End: 2025-03-18 | Stop reason: HOSPADM

## 2025-03-13 RX ORDER — SPIRONOLACTONE 25 MG/1
25 TABLET ORAL DAILY
Status: DISCONTINUED | OUTPATIENT
Start: 2025-03-13 | End: 2025-03-18 | Stop reason: HOSPADM

## 2025-03-13 RX ORDER — IOPAMIDOL 755 MG/ML
100 INJECTION, SOLUTION INTRAVASCULAR
Status: COMPLETED | OUTPATIENT
Start: 2025-03-13 | End: 2025-03-13

## 2025-03-13 RX ORDER — SODIUM CHLORIDE 0.9 % (FLUSH) 0.9 %
10 SYRINGE (ML) INJECTION AS NEEDED
Status: DISCONTINUED | OUTPATIENT
Start: 2025-03-13 | End: 2025-03-18 | Stop reason: HOSPADM

## 2025-03-13 RX ORDER — PRAVASTATIN SODIUM 20 MG
40 TABLET ORAL NIGHTLY
Status: DISCONTINUED | OUTPATIENT
Start: 2025-03-13 | End: 2025-03-18 | Stop reason: HOSPADM

## 2025-03-13 RX ORDER — METOPROLOL TARTRATE 25 MG/1
12.5 TABLET, FILM COATED ORAL EVERY 12 HOURS SCHEDULED
Status: DISCONTINUED | OUTPATIENT
Start: 2025-03-13 | End: 2025-03-15

## 2025-03-13 RX ORDER — LISINOPRIL 10 MG/1
10 TABLET ORAL DAILY
Status: DISCONTINUED | OUTPATIENT
Start: 2025-03-13 | End: 2025-03-18 | Stop reason: HOSPADM

## 2025-03-13 RX ORDER — BISACODYL 10 MG
10 SUPPOSITORY, RECTAL RECTAL DAILY PRN
Status: DISCONTINUED | OUTPATIENT
Start: 2025-03-13 | End: 2025-03-18 | Stop reason: HOSPADM

## 2025-03-13 RX ORDER — POLYETHYLENE GLYCOL 3350 17 G/17G
17 POWDER, FOR SOLUTION ORAL 2 TIMES DAILY PRN
Status: DISCONTINUED | OUTPATIENT
Start: 2025-03-13 | End: 2025-03-13

## 2025-03-13 RX ORDER — HYDROXYZINE HYDROCHLORIDE 25 MG/1
25 TABLET, FILM COATED ORAL 3 TIMES DAILY PRN
Status: DISCONTINUED | OUTPATIENT
Start: 2025-03-13 | End: 2025-03-18 | Stop reason: HOSPADM

## 2025-03-13 RX ORDER — LIDOCAINE 4 G/G
1 PATCH TOPICAL DAILY PRN
Status: DISCONTINUED | OUTPATIENT
Start: 2025-03-13 | End: 2025-03-18 | Stop reason: HOSPADM

## 2025-03-13 RX ORDER — FUROSEMIDE 10 MG/ML
40 INJECTION INTRAMUSCULAR; INTRAVENOUS ONCE
Status: COMPLETED | OUTPATIENT
Start: 2025-03-13 | End: 2025-03-13

## 2025-03-13 RX ORDER — ECHINACEA PURPUREA EXTRACT 125 MG
2 TABLET ORAL AS NEEDED
Status: DISCONTINUED | OUTPATIENT
Start: 2025-03-13 | End: 2025-03-18 | Stop reason: HOSPADM

## 2025-03-13 RX ORDER — ALUMINA, MAGNESIA, AND SIMETHICONE 2400; 2400; 240 MG/30ML; MG/30ML; MG/30ML
15 SUSPENSION ORAL EVERY 6 HOURS PRN
Status: DISCONTINUED | OUTPATIENT
Start: 2025-03-13 | End: 2025-03-18 | Stop reason: HOSPADM

## 2025-03-13 RX ORDER — BISACODYL 5 MG/1
5 TABLET, DELAYED RELEASE ORAL DAILY PRN
Status: DISCONTINUED | OUTPATIENT
Start: 2025-03-13 | End: 2025-03-18 | Stop reason: HOSPADM

## 2025-03-13 RX ORDER — ACETAMINOPHEN 325 MG/1
650 TABLET ORAL EVERY 6 HOURS PRN
Status: DISCONTINUED | OUTPATIENT
Start: 2025-03-13 | End: 2025-03-18 | Stop reason: HOSPADM

## 2025-03-13 RX ORDER — POLYETHYLENE GLYCOL 3350 17 G/17G
17 POWDER, FOR SOLUTION ORAL DAILY PRN
Status: DISCONTINUED | OUTPATIENT
Start: 2025-03-13 | End: 2025-03-18 | Stop reason: HOSPADM

## 2025-03-13 RX ORDER — GUAIFENESIN/DEXTROMETHORPHAN 100-10MG/5
5 SYRUP ORAL EVERY 4 HOURS PRN
Status: DISCONTINUED | OUTPATIENT
Start: 2025-03-13 | End: 2025-03-18 | Stop reason: HOSPADM

## 2025-03-13 RX ORDER — OSELTAMIVIR PHOSPHATE 75 MG/1
75 CAPSULE ORAL EVERY 12 HOURS SCHEDULED
Status: COMPLETED | OUTPATIENT
Start: 2025-03-13 | End: 2025-03-17

## 2025-03-13 RX ORDER — GUAIFENESIN 600 MG/1
600 TABLET, EXTENDED RELEASE ORAL EVERY 12 HOURS SCHEDULED
Status: DISCONTINUED | OUTPATIENT
Start: 2025-03-13 | End: 2025-03-18 | Stop reason: HOSPADM

## 2025-03-13 RX ORDER — IPRATROPIUM BROMIDE AND ALBUTEROL SULFATE 2.5; .5 MG/3ML; MG/3ML
3 SOLUTION RESPIRATORY (INHALATION)
Status: COMPLETED | OUTPATIENT
Start: 2025-03-13 | End: 2025-03-13

## 2025-03-13 RX ORDER — ANASTROZOLE 1 MG/1
1 TABLET ORAL DAILY
Status: DISCONTINUED | OUTPATIENT
Start: 2025-03-13 | End: 2025-03-18 | Stop reason: HOSPADM

## 2025-03-13 RX ORDER — PANTOPRAZOLE SODIUM 40 MG/1
40 TABLET, DELAYED RELEASE ORAL DAILY
Status: DISCONTINUED | OUTPATIENT
Start: 2025-03-13 | End: 2025-03-18 | Stop reason: HOSPADM

## 2025-03-13 RX ORDER — DICYCLOMINE HCL 20 MG
10 TABLET ORAL 4 TIMES DAILY PRN
Status: DISCONTINUED | OUTPATIENT
Start: 2025-03-13 | End: 2025-03-16

## 2025-03-13 RX ORDER — DEXTROSE MONOHYDRATE 25 G/50ML
25 INJECTION, SOLUTION INTRAVENOUS
Status: DISCONTINUED | OUTPATIENT
Start: 2025-03-13 | End: 2025-03-18 | Stop reason: HOSPADM

## 2025-03-13 RX ORDER — NICOTINE 21 MG/24HR
1 PATCH, TRANSDERMAL 24 HOURS TRANSDERMAL DAILY PRN
Status: DISCONTINUED | OUTPATIENT
Start: 2025-03-13 | End: 2025-03-18 | Stop reason: HOSPADM

## 2025-03-13 RX ORDER — MONTELUKAST SODIUM 10 MG/1
10 TABLET ORAL NIGHTLY
Status: DISCONTINUED | OUTPATIENT
Start: 2025-03-13 | End: 2025-03-18 | Stop reason: HOSPADM

## 2025-03-13 RX ORDER — ARFORMOTEROL TARTRATE 15 UG/2ML
15 SOLUTION RESPIRATORY (INHALATION)
Status: DISCONTINUED | OUTPATIENT
Start: 2025-03-13 | End: 2025-03-18 | Stop reason: HOSPADM

## 2025-03-13 RX ORDER — IBUPROFEN 600 MG/1
1 TABLET ORAL
Status: DISCONTINUED | OUTPATIENT
Start: 2025-03-13 | End: 2025-03-18 | Stop reason: HOSPADM

## 2025-03-13 RX ORDER — ENOXAPARIN SODIUM 100 MG/ML
1 INJECTION SUBCUTANEOUS EVERY 12 HOURS
Status: DISCONTINUED | OUTPATIENT
Start: 2025-03-13 | End: 2025-03-16

## 2025-03-13 RX ORDER — ALBUTEROL SULFATE 0.83 MG/ML
2.5 SOLUTION RESPIRATORY (INHALATION) EVERY 4 HOURS PRN
Status: DISCONTINUED | OUTPATIENT
Start: 2025-03-13 | End: 2025-03-18 | Stop reason: HOSPADM

## 2025-03-13 RX ORDER — ONDANSETRON 2 MG/ML
4 INJECTION INTRAMUSCULAR; INTRAVENOUS EVERY 4 HOURS PRN
Status: DISCONTINUED | OUTPATIENT
Start: 2025-03-13 | End: 2025-03-18 | Stop reason: HOSPADM

## 2025-03-13 RX ORDER — LEVOFLOXACIN 5 MG/ML
750 INJECTION, SOLUTION INTRAVENOUS EVERY 24 HOURS
Status: DISCONTINUED | OUTPATIENT
Start: 2025-03-13 | End: 2025-03-14

## 2025-03-13 RX ORDER — LIDOCAINE 5 G/100G
1 CREAM RECTAL; TOPICAL 3 TIMES DAILY PRN
Status: DISCONTINUED | OUTPATIENT
Start: 2025-03-13 | End: 2025-03-18 | Stop reason: HOSPADM

## 2025-03-13 RX ORDER — NICOTINE POLACRILEX 4 MG
15 LOZENGE BUCCAL
Status: DISCONTINUED | OUTPATIENT
Start: 2025-03-13 | End: 2025-03-18 | Stop reason: HOSPADM

## 2025-03-13 RX ORDER — SODIUM CHLORIDE 0.9 % (FLUSH) 0.9 %
10 SYRINGE (ML) INJECTION EVERY 12 HOURS SCHEDULED
Status: DISCONTINUED | OUTPATIENT
Start: 2025-03-13 | End: 2025-03-18 | Stop reason: HOSPADM

## 2025-03-13 RX ORDER — ALBUTEROL SULFATE 0.83 MG/ML
2.5 SOLUTION RESPIRATORY (INHALATION)
Status: DISCONTINUED | OUTPATIENT
Start: 2025-03-13 | End: 2025-03-18 | Stop reason: HOSPADM

## 2025-03-13 RX ORDER — AMOXICILLIN 250 MG
2 CAPSULE ORAL 2 TIMES DAILY
Status: DISCONTINUED | OUTPATIENT
Start: 2025-03-13 | End: 2025-03-16

## 2025-03-13 RX ORDER — BENZONATATE 100 MG/1
100 CAPSULE ORAL 3 TIMES DAILY PRN
Status: DISCONTINUED | OUTPATIENT
Start: 2025-03-13 | End: 2025-03-18 | Stop reason: HOSPADM

## 2025-03-13 RX ORDER — BUDESONIDE 0.5 MG/2ML
0.5 INHALANT ORAL
Status: DISCONTINUED | OUTPATIENT
Start: 2025-03-13 | End: 2025-03-18 | Stop reason: HOSPADM

## 2025-03-13 RX ADMIN — ENOXAPARIN SODIUM 160 MG: 80 INJECTION, SOLUTION SUBCUTANEOUS at 22:27

## 2025-03-13 RX ADMIN — INSULIN LISPRO 3 UNITS: 100 INJECTION, SOLUTION INTRAVENOUS; SUBCUTANEOUS at 17:04

## 2025-03-13 RX ADMIN — MUPIROCIN 1 APPLICATION: 20 OINTMENT TOPICAL at 22:13

## 2025-03-13 RX ADMIN — IOPAMIDOL 100 ML: 755 INJECTION, SOLUTION INTRAVENOUS at 10:47

## 2025-03-13 RX ADMIN — MUPIROCIN 1 APPLICATION: 20 OINTMENT TOPICAL at 15:37

## 2025-03-13 RX ADMIN — METOPROLOL TARTRATE 12.5 MG: 25 TABLET, FILM COATED ORAL at 22:13

## 2025-03-13 RX ADMIN — FUROSEMIDE 40 MG: 10 INJECTION, SOLUTION INTRAMUSCULAR; INTRAVENOUS at 13:35

## 2025-03-13 RX ADMIN — ARFORMOTEROL TARTRATE 15 MCG: 15 SOLUTION RESPIRATORY (INHALATION) at 12:00

## 2025-03-13 RX ADMIN — LEVOFLOXACIN 750 MG: 750 INJECTION, SOLUTION INTRAVENOUS at 18:39

## 2025-03-13 RX ADMIN — Medication 10 ML: at 12:09

## 2025-03-13 RX ADMIN — ALBUTEROL SULFATE 2.5 MG: 2.5 SOLUTION RESPIRATORY (INHALATION) at 19:56

## 2025-03-13 RX ADMIN — GUAIFENESIN 600 MG: 600 TABLET ORAL at 22:13

## 2025-03-13 RX ADMIN — PRAVASTATIN SODIUM 40 MG: 20 TABLET ORAL at 22:27

## 2025-03-13 RX ADMIN — INSULIN LISPRO 2 UNITS: 100 INJECTION, SOLUTION INTRAVENOUS; SUBCUTANEOUS at 22:27

## 2025-03-13 RX ADMIN — BUDESONIDE 0.5 MG: 0.5 INHALANT RESPIRATORY (INHALATION) at 12:00

## 2025-03-13 RX ADMIN — BUDESONIDE 0.5 MG: 0.5 INHALANT RESPIRATORY (INHALATION) at 19:56

## 2025-03-13 RX ADMIN — IPRATROPIUM BROMIDE AND ALBUTEROL SULFATE 3 ML: .5; 3 SOLUTION RESPIRATORY (INHALATION) at 09:22

## 2025-03-13 RX ADMIN — ALBUTEROL SULFATE 2.5 MG: 2.5 SOLUTION RESPIRATORY (INHALATION) at 13:45

## 2025-03-13 RX ADMIN — ENOXAPARIN SODIUM 160 MG: 80 INJECTION, SOLUTION SUBCUTANEOUS at 12:06

## 2025-03-13 RX ADMIN — OSELTAMIVIR 75 MG: 75 CAPSULE ORAL at 22:14

## 2025-03-13 RX ADMIN — OSELTAMIVIR 75 MG: 75 CAPSULE ORAL at 12:05

## 2025-03-13 RX ADMIN — MONTELUKAST 10 MG: 10 TABLET, FILM COATED ORAL at 22:13

## 2025-03-13 RX ADMIN — ANASTROZOLE 1 MG: 1 TABLET, COATED ORAL at 15:36

## 2025-03-13 RX ADMIN — WATER 125 MG: 1 INJECTION INTRAMUSCULAR; INTRAVENOUS; SUBCUTANEOUS at 08:58

## 2025-03-13 RX ADMIN — SULFUR HEXAFLUORIDE 4 ML: KIT at 14:59

## 2025-03-13 RX ADMIN — SENNOSIDES AND DOCUSATE SODIUM 2 TABLET: 50; 8.6 TABLET ORAL at 22:14

## 2025-03-13 RX ADMIN — GUAIFENESIN 600 MG: 600 TABLET ORAL at 12:06

## 2025-03-13 RX ADMIN — VANCOMYCIN HYDROCHLORIDE 3000 MG: 5 INJECTION, POWDER, LYOPHILIZED, FOR SOLUTION INTRAVENOUS at 09:44

## 2025-03-13 RX ADMIN — SODIUM CHLORIDE 2000 MG: 9 INJECTION, SOLUTION INTRAVENOUS at 09:00

## 2025-03-13 RX ADMIN — PANTOPRAZOLE SODIUM 40 MG: 40 TABLET, DELAYED RELEASE ORAL at 15:36

## 2025-03-13 RX ADMIN — IPRATROPIUM BROMIDE AND ALBUTEROL SULFATE 3 ML: .5; 3 SOLUTION RESPIRATORY (INHALATION) at 09:21

## 2025-03-13 RX ADMIN — VANCOMYCIN HYDROCHLORIDE 1000 MG: 1 INJECTION, POWDER, LYOPHILIZED, FOR SOLUTION INTRAVENOUS at 22:12

## 2025-03-13 RX ADMIN — METOPROLOL TARTRATE 12.5 MG: 25 TABLET, FILM COATED ORAL at 12:06

## 2025-03-13 RX ADMIN — FLUOXETINE HYDROCHLORIDE 40 MG: 20 CAPSULE ORAL at 15:36

## 2025-03-13 RX ADMIN — METHYLPREDNISOLONE SODIUM SUCCINATE 60 MG: 125 INJECTION INTRAMUSCULAR; INTRAVENOUS at 17:03

## 2025-03-13 RX ADMIN — ARFORMOTEROL TARTRATE 15 MCG: 15 SOLUTION RESPIRATORY (INHALATION) at 19:56

## 2025-03-13 RX ADMIN — Medication 10 ML: at 22:15

## 2025-03-13 NOTE — CONSULTS
INTENSIVIST   PROGRESS NOTE          SUBJECTIVE     Aida 69 y.o. female is followed for:Shortness of Breath (pt c/o sob. pt on oxygen at home 3 l/nc.)       Acute hypoxemic respiratory failure    As an Intensivist, we provide an integrated approach to the ICU patient and family, medical management of comorbid conditions, including but not limited to electrolytes, glycemic control, organ dysfunction, lead interdisciplinary rounds and coordinate the care with all other services, including those from other specialists.     HPI:  Aida is a 69 y.o. female with PMH COPD oxygen dependent 3 L at rest and 4 L on exertion, ANABELLE but noncompliant on NIV who presented to this Hospital on 3/13/2025 because of shortness of breath.  Patient has had productive green cough for the past week and worsening hypoxia at home.  She has had to increase her oxygen.  She also complains of bilateral lower extremity swelling for the past couple of days.  Denies nausea or vomiting.  Does have diarrhea which is chronic for her.  No history of any cardiac issues including heart failure or arrhythmias such as A-fib/a flutter.  In the ED patient was found to be hypoxic requiring 11 L high flow.  Blood pressure hypertensive to normotensive.  Found to be in a flutter to the 120s.  Labs significant for white blood count 14, negative troponin, proBNP 896.  CMP unremarkable.  Influenza A positive.  ABG 7.4/50/75.  Patient subsequently admitted to the ICU for further care.    Patient seen at bedside sitting up in her chair.  Currently on 11 L high flow with O2 sat 94%.  Normotensive.  Heart rate 110s.  Complaining of shortness of breath which is some improved since being on higher flow of oxygen per patient.  Complains of cough.  Does not normally have a productive cough at baseline.  Lives with  at home.   is not sick.       PMH: She  has a past medical history of Allergic rhinitis, Anemia, Anxiety, Asthma, Chronic bronchitis, Chronic  heart failure with preserved ejection fraction (HFpEF), Chronic hypoxemic respiratory failure, Coronary artery calcification seen on CT scan (04/21/2022), Diarrhea, GERD (gastroesophageal reflux disease) (Mild), H/O Intraductal papilloma, Hemorrhoids, History of transfusion (1984), Hypertension, IBS (irritable bowel syndrome), Inflammatory breast cancer, Kidney stone, Morbid obesity with BMI of 50.0-59.9, adult, Obesity hypoventilation syndrome (02/05/2021), On home oxygen therapy, ANABELLE on CPAP, Osteoarthritis, Pneumonia, PONV (postoperative nausea and vomiting), and Type II diabetes mellitus (09/08/2021).   PSxH: She  has a past surgical history that includes Total abdominal hysterectomy w/ bilateral salpingoophorectomy (2004); Spine surgery (1984); Breast excisional biopsy (Bilateral); Colonoscopy; Knee Arthroplasty (Bilateral, 2009); cystoscopy bladder stone lithotripsy; Venous Access Device (Port) (N/A, 01/25/2021); Esophagogastroduodenoscopy; Upper gastrointestinal endoscopy; Mastectomy (Left, 08/10/2021); Mastectomy (Right, 08/10/2021); Venous Access Device (Port) Removal (Left, 04/05/2022); Esophagogastroduodenoscopy (N/A, 12/8/2022); and Colonoscopy (N/A, 12/8/2022).     Medications:  No current facility-administered medications on file prior to encounter.     Current Outpatient Medications on File Prior to Encounter   Medication Sig    albuterol sulfate  (90 Base) MCG/ACT inhaler Inhale 2 puffs Every 4 (Four) Hours As Needed for Wheezing.    anastrozole (ARIMIDEX) 1 MG tablet Take 1 tablet by mouth Daily.    carvedilol (COREG) 25 MG tablet Take 0.5 tablets by mouth Every 12 (Twelve) Hours.    Cholecalciferol (Vitamin D) 50 MCG (2000 UT) capsule Take 1 capsule by mouth Daily.    dicyclomine (BENTYL) 20 MG tablet TAKE 1 TABLET BY MOUTH EVERY 6 HOURS (Patient taking differently: Take 1 tablet by mouth Every 6 (Six) Hours As Needed for Abdominal Cramping (ibs).)    diphenoxylate-atropine (LOMOTIL)  2.5-0.025 MG per tablet Take 1 tablet by mouth 4 (Four) Times a Day As Needed for Diarrhea.    FLUoxetine (PROzac) 40 MG capsule TAKE 1 CAPSULE BY MOUTH EVERY DAY    furosemide (Lasix) 40 MG tablet Take 1 tablet by mouth Daily.    lisinopril (PRINIVIL,ZESTRIL) 10 MG tablet Take 1 tablet by mouth Daily.    mometasone-formoterol (DULERA 100) 100-5 MCG/ACT inhaler Inhale 2 puffs 2 (Two) Times a Day.    montelukast (SINGULAIR) 10 MG tablet Take 1 tablet by mouth Every Night.    multivitamin (THERAGRAN) tablet tablet Take 1 tablet by mouth Daily.    pantoprazole (PROTONIX) 40 MG EC tablet Take 1 tablet by mouth Daily.    pravastatin (PRAVACHOL) 40 MG tablet Take 1 tablet by mouth Every Night.    spironolactone (ALDACTONE) 25 MG tablet Take 1 tablet by mouth Daily.    ondansetron (ZOFRAN) 8 MG tablet Take 1 tablet by mouth 3 (Three) Times a Day As Needed for Nausea.    [DISCONTINUED] carvedilol (COREG) 12.5 MG tablet Take 1 tablet by mouth 2 (Two) Times a Day With Meals.    [DISCONTINUED] FeroSul 325 (65 Fe) MG tablet Take 1 tablet by mouth 2 (Two) Times a Day With Meals. (Patient not taking: Reported on 9/13/2024)    [DISCONTINUED] mupirocin (BACTROBAN) 2 % ointment APPLY TO THE AFFECTED AREA(S) TWICE DAILY for 2 weeks    [DISCONTINUED] triamcinolone (KENALOG) 0.1 % ointment APPLY TWICE DAILY THREE TIMES DAILY FOR 2 WEEKS        Allergies: She is allergic to amlodipine, hydrochlorothiazide, morphine, and adhesive tape.   FH: Her family history includes Breast cancer (age of onset: 45) in her mother; Breast cancer (age of onset: 70) in her maternal aunt and paternal aunt; Cancer in her father, maternal aunt, mother, paternal aunt, and paternal grandmother; Colon cancer (age of onset: 60) in her paternal grandmother; Hodgkin's lymphoma in her father; Hypertension in her paternal grandmother; Lung cancer in her father; Skin cancer in her father.   SH: She  reports that she has never smoked. She has never been exposed to  tobacco smoke. She has never used smokeless tobacco. She reports that she does not drink alcohol and does not use drugs.     The patient's relevant past medical, surgical and social history were reviewed and updated in Epic as appropriate.                 Review of Systems  As described in the HPI.       OBJECTIVE     Vitals:  Temp: 98.2 °F (36.8 °C) (25 1222) Temp  Min: 98.2 °F (36.8 °C)  Max: 100 °F (37.8 °C)   Temp core:      BP: (!) 129/102 (25 1400) BP  Min: 129/102  Max: 156/100   MAP (non-invasive) Noninvasive MAP (mmHg): 109 (25 1400) Noninvasive MAP (mmHg)  Av.5  Min: 109  Max: 114   Pulse: 102 (25 1400) Pulse  Min: 97  Max: 129   Resp: 22 (25 1400) Resp  Min: 21  Max: 30   SpO2: 97 % (25 1400) SpO2  Min: 87 %  Max: 98 %   Device: high-flow nasal cannula (25 1400)    Flow Rate: 11 (25 1400) Flow (L/min) (Oxygen Therapy)  Min: 5  Max: 12         25  0743 25  1222   Weight: (!) 157 kg (345 lb 10.9 oz) (!) 157 kg (345 lb 10.9 oz)        Intake/Ouptut 24 hrs (7:00AM - 6:59 AM)  Intake & Output (last 2 days)          07 0700  0701   0700  0701   0700    IV Piggyback   600    Total Intake(mL/kg)   600 (3.8)    Urine (mL/kg/hr)   250    Total Output   250    Net   +350                   Medications (drips):  Pharmacy to dose vancomycin        Physical Examination  Telemetry:  Rhythm: atrial rhythm (25 1220)  Atrial Rhythm: atrial flutter (25 1220)      Constitutional:  No acute distress.   Cardiovascular: Mildly tacky, a flutter   Respiratory: Rhonchi, mild expiratory wheezing.  No respiratory distress   Abdominal:  Soft with no tenderness.   Extremities: 2+ Edema bilateral lower extremities   Neurological:   Alert, Oriented, Cooperative.  Best Eye Response: 4-->(E4) spontaneous (25 1220)  Best Motor Response: 6-->(M6) obeys commands (25 122)  Best Verbal Response: 5-->(V5) oriented  (03/13/25 1220)  Juanis Coma Scale Score: 15 (03/13/25 1220)          Lines, Drains & Airways       Active LDAs       Name Placement date Placement time Site Days    Peripheral IV 03/13/25 0803 Left;Posterior Hand 03/13/25  0803  Hand  less than 1                    Results Reviewed:  Laboratory  Microbiology  Radiology  Pathology    Hematology:  Results from last 7 days   Lab Units 03/13/25  0802 03/06/25  1450   WBC 10*3/mm3 14.30* 8.63   HEMOGLOBIN g/dL 12.0 11.9*   MCV fL 93.1 95.7   PLATELETS 10*3/mm3 232 255     Results from last 7 days   Lab Units 03/13/25  0802 03/06/25  1450   NEUTROS ABS 10*3/mm3 12.85* 7.43*   LYMPHS ABS 10*3/mm3 0.51* 0.37*   EOS ABS 10*3/mm3 0.01 0.15     Chemistry:  Estimated Creatinine Clearance: 109.7 mL/min (by C-G formula based on SCr of 0.72 mg/dL).    Results from last 7 days   Lab Units 03/13/25  0802 03/06/25  1450   SODIUM mmol/L 138 142   POTASSIUM mmol/L 4.4 5.0   CHLORIDE mmol/L 98 100   CO2 mmol/L 28.9 31.2*   BUN mg/dL 13 11   CREATININE mg/dL 0.72 0.75   GLUCOSE mg/dL 174* 111*     Results from last 7 days   Lab Units 03/13/25  0802 03/06/25  1450   CALCIUM mg/dL 8.9 10.1   MAGNESIUM mg/dL  --  2.0   PHOSPHORUS mg/dL  --  4.8*       Hepatic Panel:  Results from last 7 days   Lab Units 03/13/25  0802 03/06/25  1450   ALBUMIN g/dL 3.6 3.8   TOTAL PROTEIN g/dL 7.3 6.8   BILIRUBIN mg/dL 1.1 0.5   AST (SGOT) U/L 22 24   ALT (SGPT) U/L 21 22   ALK PHOS U/L 117 114        Coagulation Labs:         Cardiac Labs:  Results from last 7 days   Lab Units 03/13/25  1017 03/13/25  0802   PROBNP pg/mL  --  896.8   HSTROP T ng/L 10 10       Biomarkers:  Results from last 7 days   Lab Units 03/13/25  1017 03/13/25  0912 03/13/25  0802 03/06/25  1450   LACTATE mmol/L  --  1.1 1.9  --    PROCALCITONIN ng/mL 0.06  --   --   --    FERRITIN ng/mL  --   --   --  77.30       U/A              COVID-19  Lab Results   Component Value Date    COVID19 Not Detected 03/13/2025    COVID19 Not Detected  12/10/2024    COVID19 Detected (C) 12/06/2023    COVID19 Detected (C) 12/04/2023       Arterial Blood Gases:  Results from last 7 days   Lab Units 03/13/25  0912   PH, ARTERIAL pH units 7.397   PCO2, ARTERIAL mm Hg 50.3*   PO2 ART mm Hg 75.2*       Images:  CT Angiogram Chest Pulmonary Embolism  Result Date: 3/13/2025  Impression: 1.No evidence of central pulmonary embolus. Distal segmental and subsegmental pulmonary arteries are suboptimally assessed. 2.Multifocal pneumonia with more dense consolidation in the right middle lobe. 3.Other stable chronic findings. Electronically Signed: Preet Ross MD  3/13/2025 10:57 AM EDT  Workstation ID: [id number]    XR Chest 1 View  Result Date: 3/13/2025  Impression: 1.New consolidation throughout the right lower lung, concerning for pneumonia. 2.Mild airspace opacities in the left lower lung, which may be due to pneumonia and/or atelectasis. 3.Enlarged cardiac silhouette. Electronically Signed: Alejandra Hdz  3/13/2025 8:34 AM EDT  Workstation ID: [id number]      Echo:  Results for orders placed during the hospital encounter of 02/28/24    Adult Transthoracic Echo Limited W/ Cont if Necessary Per Protocol    Interpretation Summary    : Left ventricular systolic function is normal. Estimated left ventricular EF = 65% Normal left ventricular cavity size noted. Left ventricular wall thickness is consistent with mild concentric hypertrophy. All left ventricular wall segments contract normally. Left ventricular diastolic function was indeterminate. GLS could not be obtained.    : The right ventricular cavity is mild to moderately dilated. Mildly reduced right ventricular systolic function noted.    Mild to moderate tricuspid valve regurgitation is present. No evidence of significant tricuspid valve stenosis is present. The IVC could not be visualized. Assuming a right atrial pressure of 3 mmHg, there is mild pulmonary hypertension, RVSP 42 mmHg.      Results: Reviewed.  I reviewed  the patient's new laboratory and imaging results.  I independently reviewed the patient's new images.    Medications: Reviewed.    Assessment    A/P     Hospital:  LOS: 0 days   ICU: 2h     Active Hospital Problems    Diagnosis  POA    **Acute hypoxemic respiratory failure [J96.01]  Yes     Aida is a 69 y.o. female admitted on 3/13/2025 with Influenza A [J10.1]  Acute on chronic respiratory failure with hypoxia [J96.21]  Acute hypoxemic respiratory failure [J96.01]  Acute pneumonia [J18.9]    Assessment/Management/Treatment Plan:    //Acute on chronic hypoxic respiratory failure  //Influenza A positive  //Legionella positive  //COPD exacerbation, oxygen dependent 3 L at rest and 4 L on exertion  //PMH obesity class III, ANABELLE but noncompliant on NIV    Pulmonary   High flow 11 L, titrate for O2 sat 88 to 92% given COPD.  Placed on NIV with naps and nightly.  CTA 3/13 reviewed, no large PE but suboptimal image.  Multifocal pneumonia right greater than left  Pulmicort, Brovana, albuterol.  Encourage I-S 10 times per hour while awake.  Bronchopulmonary hygiene.  Methylpred 60 twice daily.  Cardiovascular  New onset a flutter.  Lovenox full AC.  Lopressor 12.5 g twice daily.  Echo pending.  Troponin negative X 2.  .  Continue home statin.  Neuro  Alert and oriented.  No issues.  Home fluoxetine.  GI/Hepatology  PPI daily giving high-dose steroids.  Cardiac diet  Renal  Creatinine and electrolytes within normal limits.  Given a flutter keep potassium greater than 4 magnesium greater than 2.  ID/Antibiotics  Tamiflu.  Empiric vancomycin and cefepime for multifocal pneumonia.  Will likely de-escalate tomorrow pending status.  MRSA nares negative.  Strep pneumo negative.  Hematology  Hemoglobin and platelets within normal limits.  Full AC with Lovenox as above.  Endocrine  Prediabetes (A1C 5.7%-6.4%). Accuchecks 4 times daily with meals and nightly.  Sliding scale insulin.    Lab Results   Lab Value Date/Time     HGBA1C 6.40 (H) 06/26/2024 1139    HGBA1C 6.08 (H) 05/14/2021 0949     Results from last 7 days   Lab Units 03/13/25  0912   GLUCOSE mg/dL 178*       Diet: Diet: Cardiac; Low Sodium (2g); Fluid Consistency: Thin (IDDSI 0)  No active supplement orders      Advance Directives: Code Status and Medical Interventions: CPR (Attempt to Resuscitate); Full Support   Ordered at: 03/13/25 1021     Code Status (Patient has no pulse and is not breathing):    CPR (Attempt to Resuscitate)     Medical Interventions (Patient has pulse or is breathing):    Full Support        VTE Prophylaxis:  Pharmacologic VTE prophylaxis orders are present.           Disposition: Admit to ICU.  Can likely transfer to floor tomorrow AM.    Plan of care and goals reviewed during interdisciplinary rounds.  I discussed the patient's findings and my recommendations with patient    Time: 38 minutes of critical care provided. This time excludes other billable procedures. Time does include preparation of documents, medical consultations, review of old records, and direct bedside care. Patient is at high risk for life-threatening deterioration due to Respiratory Failure.    She has a high risk of imminent or life-threatening  deterioration, which requires the highest level of physician preparedness to intervene urgently. I devoted my full attention to the direct care of this patient for the amount of time indicated above.     Time spent with family or surrogate(s) is included only if the patient was incapable of providing the necessary information or participating in medical decision making.        Chana Muñoz MD  Pulmonary Critical Care Medicine

## 2025-03-13 NOTE — H&P
[facility]   HOSPITALIST HISTORY AND PHYSICAL    Date of admission: 3/13/2025  Patient Name: Aida Conner   1955  Primary Care Physician:  Serenity Lucas APRN    Subjective     Chief Complaint   Patient presents with    Shortness of Breath     pt c/o sob. pt on oxygen at home 3 l/nc.        HPI:  69 y.o. with COPD on 2.5 L baseline with 4 L exertional baseline oxygen, severe morbid obesity, ANABELLE previously on NIPPV but has not had for the past couple of years, who presented with progressively worsening shortness of air fever cough and worsening oxygen requirement.  She notes increased leg swelling in setting of her CHF unclear about weight gain.  Due to severity of symptoms prompted further evaluation today.  Denies any history of A-fib or a flutter did feel some palpitation type symptoms initially.  Denies chest pain or pressure.  Having difficulty with producing sputum      [facility] notable for:     Active Ambulatory Problems     Diagnosis Date Noted    Inflammatory breast cancer, right 01/19/2021    Other specified disorders of breast  01/19/2021    Malignant neoplasm of axillary tail of right female breast 01/25/2021    Encounter for eye exam due to high risk medication 01/28/2021    Obesity hypoventilation syndrome 02/05/2021    Iron adverse reaction 02/24/2021    Functional diarrhea 02/24/2021    Encounter for long-term (current) use of high-risk medication 04/14/2021    Abnormal mammogram 12/15/2014    Allergic rhinitis 09/08/2021    Anemia 09/08/2021    Anxiety disorder 09/08/2021    Breast pain, right 09/08/2021    Depression 09/08/2021    Essential tremor 04/25/2016    Hemorrhoids 09/08/2021    Hyperlipidemia 09/08/2021    Primary hypertension 09/08/2021    IBS (irritable bowel syndrome) 09/08/2021    Impaired fasting glucose 06/24/2014    Obstructive sleep apnea 09/08/2021    Osteoarthrosis 09/08/2021    Renal calculi 01/01/2015    Restrictive airway disease 08/15/2016    Type II diabetes mellitus  09/08/2021    Vitamin deficiency 12/30/2014    Urinary bladder incontinence 09/08/2021    Malignant neoplasm of central portion of right female breast 09/30/2021    Osteoporosis without current pathological fracture 10/27/2021    Chronic heart failure with preserved ejection fraction (HFpEF) 11/06/2021    Coronary artery calcification seen on CT scan 04/21/2022    Aromatase inhibitor use 10/04/2022    Belching 10/07/2022    Bloating 10/07/2022    Hiatal hernia 10/07/2022    History of breast cancer 10/07/2022    Family history of colon cancer 10/07/2022    Iron deficiency anemia 03/07/2025     Resolved Ambulatory Problems     Diagnosis Date Noted    Obesity 09/08/2021    Acute respiratory disease due to COVID-19 virus 12/04/2023    Acute respiratory failure with hypercapnia 12/05/2023     Past Medical History:   Diagnosis Date    Anxiety     Asthma     Chronic bronchitis     Chronic hypoxemic respiratory failure     Diarrhea     GERD (gastroesophageal reflux disease) Mild    H/O Intraductal papilloma     History of transfusion 1984    Hypertension     Inflammatory breast cancer     Kidney stone     Morbid obesity with BMI of 50.0-59.9, adult     On home oxygen therapy     ANABELLE on CPAP     Osteoarthritis     Pneumonia     PONV (postoperative nausea and vomiting)        Past Surgical History:   Procedure Laterality Date    BREAST EXCISIONAL BIOPSY Bilateral     COLONOSCOPY      COLONOSCOPY N/A 12/8/2022    Procedure: COLONOSCOPY;  Surgeon: Henry Aden MD;  Location: [facility] ENDOSCOPY;  Service: Gastroenterology;  Laterality: N/A;  diverticulosis, and hemorrhoids    CYSTOSCOPY BLADDER STONE LITHOTRIPSY      ENDOSCOPY      ENDOSCOPY N/A 12/8/2022    Procedure: ESOPHAGOGASTRODUODENOSCOPY  with biopsy;  Surgeon: Henry Aden MD;  Location: [facility] ENDOSCOPY;  Service: Gastroenterology;  Laterality: N/A;  hiatal hernia    KNEE ARTHROPLASTY Bilateral 2009    MASTECTOMY Left 08/10/2021    Procedure: LEFT  TOTAL MASTECTOMY;  Surgeon: Nicci Banks MD;  Location[facility] MA[facility] OR;  Service: General;  Laterality: Left;    MASTECTOMY Right 08/10/2021    Procedure: RIGHT MODIFIED RADICAL MASTECTOMY;  Surgeon: Nicci Banks MD;  Location: [facility] MAIN OR;  Service: General;  Laterality: Right;    SPINE SURGERY  1984    TOTAL ABDOMINAL HYSTERECTOMY WITH SALPINGO OOPHORECTOMY  2004    UPPER GASTROINTESTINAL ENDOSCOPY      VENOUS ACCESS DEVICE (PORT) INSERTION N/A 01/25/2021    Procedure: INSERTION VENOUS ACCESS DEVICE;  Surgeon: Nicci Banks MD;  Location: [facility] MAIN OR;  Service: General;  Laterality: N/A;    VENOUS ACCESS DEVICE (PORT) REMOVAL Left 04/05/2022    Procedure: REMOVAL VENOUS ACCESS DEVICE;  Surgeon: Nicci Banks MD;  Location: [facility] MAIN OR;  Service: General;  Laterality: Left;        Family History   Problem Relation Age of Onset    Breast cancer Mother 45    Cancer Mother         breast; metastasized    Lung cancer Father     Hodgkin's lymphoma Father     Skin cancer Father         squamous cell    Cancer Father         4 kinds: Hodgkins; lung; squamous cell skin    Breast cancer Maternal Aunt 70    Cancer Maternal Aunt         breast; metastasized    Breast cancer Paternal Aunt 70    Cancer Paternal Aunt         breast; cured    Colon cancer Paternal Grandmother 60    Cancer Paternal Grandmother         colon cancer    Hypertension Paternal Grandmother         EVERY ADULT IN MY FAMILY    Ovarian cancer Neg Hx     Uterine cancer Neg Hx     Deep vein thrombosis Neg Hx     Pulmonary embolism Neg Hx     Malig Hyperthermia Neg Hx        Social History     Socioeconomic History    Marital status:     Number of children: 3   Tobacco Use    Smoking status: Never     Passive exposure: Never    Smokeless tobacco: Never   Vaping Use    Vaping status: Never Used   Substance and Sexual Activity    Alcohol use: Never    Drug use: Never    Sexual activity: Not Currently     Partners:  Male         Objective     Vitals:   Temp:  [100 °F (37.8 °C)] 100 °F (37.8 °C)  Heart Rate:  [109-129] 113  Resp:  [24-28] 24  BP: (152-156)/(100-103) 156/100  Flow (L/min) (Oxygen Therapy):  [5-12] 12    Physical Exam   Awake tired, morbidly obese, family at bedside,  Poor aeration with prolonged expiratory wheezing bilaterally and diminished breath sounds, conversational dyspnea and dyspnea at rest with tachypnea, on high flow nasal cannula  Elevated rate irregular regular, lateral lower extremity pitting edema  Obese abdomen nontender nondistended  Generalized weakness    Result Review    Vital signs, labs and any relevant imaging reviewed.     Assessment / Plan     Sepsis secondary to multifocal pneumonia from unspecified bacterial organism and influenza pneumonia, with organ dysfunction as evidenced by respiratory failure and new a flutter  Acute hypoxemic respiratory failure requiring high flow nasal cannula  Influenza A pneumonia  New, Aflutter with RVR  Diastolic with suspected exacerbation and baseline adherence difficulties  COPD on oxygen approximately 2.5 with up to 4 L on exertion at baseline, with exacerbation, denies any smoking history  ANABELLE on remote use of CPAP, has not been on any years in setting of device malfunction reportedly  HER2 positive triple positive breast cancer on the right, prior surgery, chemotherapy, adjuvant treatment.  Currently on anastrazole DM2 diet controlled as outpatient  Severe morbid obesity BMI 57  Chronic anxiety  GERD    Admit to ICU for respiratory failure requiring high flow nasal cannula, placed on scheduled nebulizers, steroids, work on respiratory hygiene, airway clearance, guaifenesin, wean oxygen as able, tenuous status.  Confirmed with patient wants to be full code currently   Placing on SSI while on steroids, is not on any medications for diabetes at baseline has been diet controlled more recently apparently, will check an A1c, suspect probably will need a  regimen at discharge will consider Jardiance if needed  IV vancomycin and cefepime, check additional infectious studies for bacterial pneumonia, blood cultures, narrow as able, monitor renal function, vancomycin levels  Start on Tamiflu for influenza A, isolation precautions  New a flutter, possible PE component as well, CT PE stat ordered, placed on therapeutic Lovenox for now will need anticoagulation for a flutter regardless.   CT PE was slightly admitted for subsegmental evaluation but no central PE was noted, multifocal pneumonia with dense right middle lobe consolidation noted.  No obvious pleural effusion.  No heart strain noted.  No WILLIAMS.  Check echo, start metop for rate control (in place of home Coreg given concern about blood pressure), monitor aflutter, new as outpatient cardiologist at [facility] with Dr. Garza; ultimately evaluate for   Holding home spironolactone and lisinopril and Coreg while monitoring as above.  Give IV Lasix dose, BNP within normal limits but suspect falsely low with her morbid obesity, and likely needs more aggressive diuresis - will be cautious given sepsis picture, deferring IV fluids as patient is volume overloaded and normal lactic  RT  consult for NIPPV evaluation, family to go to bring in her unit apparently needs a new piece has not used in years however, likely will need to reevaluate.  Will defer further changes/titration of settings per pulmonology while here. ABG had pco2 50 with compensated ph.  Continue home anastrozole  Continue fluoxetine  Continue home Bentyl as needed  Continue PPI  Continue statin  Check am cbc, bmp, mg, phos and additional testing as ordered  discussed initial management and workup with ED provider  based on the above problems patient warrants hospitalization for continued evaluation, treatment and monitoring.  Admitting to the ICU given critical illness and    VTE Prophylaxis:  Pharmacologic VTE prophylaxis orders are  present.      Code: full  Diet: 2g/low sod/cardiac     CBC          12/10/2024    14:57 3/6/2025    14:50 3/13/2025    08:02   CBC   WBC 9.01  8.63  14.30    RBC 3.74  4.22  4.23    Hemoglobin 10.3  11.9  12.0    Hematocrit 35.4  40.4  39.4    MCV 94.7  95.7  93.1    MCH 27.5  28.2  28.4    MCHC 29.1  29.5  30.5    RDW 14.1  14.5  14.1    Platelets 217  255  232        CMP          12/10/2024    14:57 3/6/2025    14:50 3/13/2025    08:02   CMP   Glucose 92  111  174    BUN 9  11  13    Creatinine 0.70  0.75  0.72    EGFR 92.6  85.2  89.5    Sodium 141  142  138    Potassium 4.1  5.0  4.4    Chloride 104  100  98    Calcium 8.7  10.1  8.9    Total Protein 6.2  6.8  7.3    Albumin 3.5  3.8  3.6    Globulin 2.7  3.0  3.7    Total Bilirubin 0.7  0.5  1.1    Alkaline Phosphatase 100  114  117    AST (SGOT) 14  24  22    ALT (SGPT) 13  22  21    Albumin/Globulin Ratio 1.3  1.3  1.0    BUN/Creatinine Ratio 12.9  14.7  18.1    Anion Gap 8.8  10.8  11.1

## 2025-03-13 NOTE — PLAN OF CARE
Goal Outcome Evaluation:  Plan of Care Reviewed With: patient, spouse        Progress: no change  Outcome Evaluation: Admitted from ED. Currently on 11 liters high flow. No c/o pain. Pt up to bsc with standby assist.

## 2025-03-13 NOTE — ED PROVIDER NOTES
Time: 8:37 AM EDT  Date of encounter:  3/13/2025  Independent Historian/Clinical History and Information was obtained by:   Patient    History is limited by: N/A    Chief Complaint: Cough, fever, chills, shortness of breath      History of Present Illness:  Patient is a 69 y.o. year old female who presents to the emergency department for evaluation of cough, fever, chills, shortness of breath.  This patient presents to the emergency room with the above symptoms.  She has a history of COPD and obstructive sleep apnea and she is on 2 L of oxygen via nasal cannula at home.  The patient also has a history of diastolic heart failure and triple positive breast cancer for which she is currently taking Arimidex.      Patient Care Team  Primary Care Provider: Serenity Lucas APRN    Past Medical History:     Allergies   Allergen Reactions    Amlodipine Swelling     LEGS     Hydrochlorothiazide Unknown - Low Severity     Neuro issues    Morphine Nausea And Vomiting     hallucinations    Adhesive Tape Rash     Past Medical History:   Diagnosis Date    Allergic rhinitis     Anemia     Anxiety     Asthma     Chronic bronchitis     In past, espec. during working years    Chronic heart failure with preserved ejection fraction (HFpEF)     Chronic hypoxemic respiratory failure     on continuous O2    Coronary artery calcification seen on CT scan 04/21/2022    Diarrhea     with chemo    GERD (gastroesophageal reflux disease) Mild    H/O Intraductal papilloma     Hemorrhoids     History of transfusion 1984    AFTER BACK SURGERY no reaction    Hypertension     IBS (irritable bowel syndrome)     Inflammatory breast cancer     right    Kidney stone     Morbid obesity with BMI of 50.0-59.9, adult     Obesity hypoventilation syndrome 02/05/2021    On home oxygen therapy     2.5 AT REST WITH ACTIVITY UP TO 4L    ANABELLE on CPAP     cpap  & uses O2 with CPAP    Osteoarthritis     Pneumonia     Many times in past, 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     PONV (postoperative nausea and vomiting)     Type II diabetes mellitus 09/08/2021     Past Surgical History:   Procedure Laterality Date    BREAST EXCISIONAL BIOPSY Bilateral     COLONOSCOPY      COLONOSCOPY N/A 12/8/2022    Procedure: COLONOSCOPY;  Surgeon: Henry Aden MD;  Location: [facility] ENDOSCOPY;  Service: Gastroenterology;  Laterality: N/A;  diverticulosis, and hemorrhoids    CYSTOSCOPY BLADDER STONE LITHOTRIPSY      ENDOSCOPY      ENDOSCOPY N/A 12/8/2022    Procedure: ESOPHAGOGASTRODUODENOSCOPY  with biopsy;  Surgeon: Henry Aden MD;  Location: [facility] ENDOSCOPY;  Service: Gastroenterology;  Laterality: N/A;  hiatal hernia    KNEE ARTHROPLASTY Bilateral 2009    MASTECTOMY Left 08/10/2021    Procedure: LEFT TOTAL MASTECTOMY;  Surgeon: Nicci Banks MD;  Location: [facility] OR;  Service: General;  Laterality: Left;    MASTECTOMY Right 08/10/2021    Procedure: RIGHT MODIFIED RADICAL MASTECTOMY;  Surgeon: Nicci Banks MD;  Location: [facility] MAIN OR;  Service: General;  Laterality: Right;    SPINE SURGERY  1984    TOTAL ABDOMINAL HYSTERECTOMY WITH SALPINGO OOPHORECTOMY  2004    UPPER GASTROINTESTINAL ENDOSCOPY      VENOUS ACCESS DEVICE (PORT) INSERTION N/A 01/25/2021    Procedure: INSERTION VENOUS ACCESS DEVICE;  Surgeon: Nicci Banks MD;  Location: [facility] OR;  Service: General;  Laterality: N/A;    VENOUS ACCESS DEVICE (PORT) REMOVAL Left 04/05/2022    Procedure: REMOVAL VENOUS ACCESS DEVICE;  Surgeon: Nicci Banks MD;  Location: [facility] MAIN OR;  Service: General;  Laterality: Left;     Family History   Problem Relation Age of Onset    Breast cancer Mother 45    Cancer Mother         breast; metastasized    Lung cancer Father     Hodgkin's lymphoma Father     Skin cancer Father         squamous cell    Cancer Father         4 kinds: Hodgkins; lung; squamous cell skin    Breast cancer Maternal Aunt 70    Cancer Maternal Aunt         breast; metastasized     Breast cancer Paternal Aunt 70    Cancer Paternal Aunt         breast; cured    Colon cancer Paternal Grandmother 60    Cancer Paternal Grandmother         colon cancer    Hypertension Paternal Grandmother         EVERY ADULT IN MY FAMILY    Ovarian cancer Neg Hx     Uterine cancer Neg Hx     Deep vein thrombosis Neg Hx     Pulmonary embolism Neg Hx     Malig Hyperthermia Neg Hx        Home Medications:  Prior to Admission medications    Medication Sig Start Date End Date Taking? Authorizing Provider   albuterol sulfate  (90 Base) MCG/ACT inhaler Inhale 2 puffs Every 4 (Four) Hours As Needed for Wheezing. 5/14/24   Stephanie Lockett APRN   anastrozole (ARIMIDEX) 1 MG tablet Take 1 tablet by mouth Daily. 6/26/24   Ynes Vazquez APRN   carvedilol (COREG) 12.5 MG tablet Take 1 tablet by mouth 2 (Two) Times a Day With Meals. 1/30/24   Jojo Amin APRN   carvedilol (COREG) 25 MG tablet Take 0.5 tablets by mouth Every 12 (Twelve) Hours. 3/18/24   Candy Gonzalez MD   Cholecalciferol (Vitamin D) 50 MCG (2000 UT) capsule Take 1 capsule by mouth Daily.    Candy Gonzalez MD   dicyclomine (BENTYL) 20 MG tablet TAKE 1 TABLET BY MOUTH EVERY 6 HOURS 3/5/24   Phyllis Shultz MD   diphenoxylate-atropine (LOMOTIL) 2.5-0.025 MG per tablet Take 1 tablet by mouth 4 (Four) Times a Day As Needed for Diarrhea. 3/18/24   Candy Gonzalez MD   FeroSul 325 (65 Fe) MG tablet Take 1 tablet by mouth 2 (Two) Times a Day With Meals.  Patient not taking: Reported on 9/13/2024 11/21/23   Candy Gonzalez MD   FLUoxetine (PROzac) 40 MG capsule TAKE 1 CAPSULE BY MOUTH EVERY DAY 9/7/21   Shari Roa MD   furosemide (Lasix) 40 MG tablet Take 1 tablet by mouth Daily. 1/30/24   Jojo Amin APRN   lisinopril (PRINIVIL,ZESTRIL) 10 MG tablet Take 1 tablet by mouth Daily. 1/30/24   Jojo Amin APRN   mometasone-formoterol (DULERA 100) 100-5 MCG/ACT inhaler Inhale 2 puffs 2 (Two)  Times a Day. 5/14/24   Stephanie Lockett APRN   montelukast (SINGULAIR) 10 MG tablet Take 1 tablet by mouth Every Night.    Candy Gonzalez MD   multivitamin (THERAGRAN) tablet tablet Take 1 tablet by mouth Daily.    Candy Gonzalez MD   mupirocin (BACTROBAN) 2 % ointment APPLY TO THE AFFECTED AREA(S) TWICE DAILY for 2 weeks 4/10/24   Candy Gonzalez MD   ondansetron (ZOFRAN) 8 MG tablet Take 1 tablet by mouth 3 (Three) Times a Day As Needed for Nausea. 3/18/24   Candy Gonzalez MD   pantoprazole (PROTONIX) 40 MG EC tablet Take 1 tablet by mouth Daily. 2/15/23   Candy Gonzalez MD   pravastatin (PRAVACHOL) 40 MG tablet Take 1 tablet by mouth Every Night.    Candy Gonzalez MD   spironolactone (ALDACTONE) 25 MG tablet Take 1 tablet by mouth Daily. 1/30/24   Jojo Amin APRN   triamcinolone (KENALOG) 0.1 % ointment APPLY TWICE DAILY THREE TIMES DAILY FOR 2 WEEKS 6/4/24   Candy Gonzalez MD        Social History:   Social History     Tobacco Use    Smoking status: Never     Passive exposure: Never    Smokeless tobacco: Never   Vaping Use    Vaping status: Never Used   Substance Use Topics    Alcohol use: Never    Drug use: Never         Review of Systems:  Review of Systems   Constitutional:  Positive for appetite change, chills, fatigue and fever.   HENT:  Negative for congestion, ear pain and sore throat.    Eyes:  Negative for pain.   Respiratory:  Positive for cough, shortness of breath and wheezing. Negative for chest tightness.    Cardiovascular:  Positive for leg swelling. Negative for chest pain.   Gastrointestinal:  Negative for abdominal pain, diarrhea, nausea and vomiting.   Genitourinary:  Negative for flank pain and hematuria.   Musculoskeletal:  Negative for joint swelling.   Skin:  Negative for pallor.   Neurological:  Negative for seizures and headaches.   All other systems reviewed and are negative.       Physical Exam:  BP (!) 129/102 (BP Location: Left  "arm, Patient Position: Lying)   Pulse 102   Temp 98.2 °F (36.8 °C) (Oral)   Resp 22   Ht 165.1 cm (65\")   Wt (!) 157 kg (345 lb 10.9 oz)   LMP  (LMP Unknown)   SpO2 97%   BMI 57.52 kg/m²     Physical Exam  Vitals and nursing note reviewed.   Constitutional:       General: She is in acute distress.      Appearance: Normal appearance. She is obese. She is ill-appearing. She is not toxic-appearing.   HENT:      Head: Normocephalic and atraumatic.      Mouth/Throat:      Mouth: Mucous membranes are moist.   Eyes:      General: No scleral icterus.     Extraocular Movements: Extraocular movements intact.      Pupils: Pupils are equal, round, and reactive to light.   Cardiovascular:      Rate and Rhythm: Regular rhythm. Tachycardia present.      Pulses: Normal pulses.      Heart sounds: Normal heart sounds.   Pulmonary:      Effort: Tachypnea, accessory muscle usage and respiratory distress present.      Breath sounds: Examination of the right-middle field reveals decreased breath sounds, rhonchi and rales. Examination of the left-middle field reveals decreased breath sounds, rhonchi and rales. Decreased breath sounds, rhonchi and rales present.   Abdominal:      General: Abdomen is flat.      Palpations: Abdomen is soft.      Tenderness: There is no abdominal tenderness.   Musculoskeletal:         General: Normal range of motion.      Cervical back: Normal range of motion and neck supple.      Right lower leg: Edema present.      Left lower leg: Edema present.   Skin:     General: Skin is warm and dry.      Capillary Refill: Capillary refill takes less than 2 seconds.   Neurological:      General: No focal deficit present.      Mental Status: She is alert and oriented to person, place, and time. Mental status is at baseline.                    Medical Decision Making:      Comorbidities that affect care:    COPD    External Notes reviewed:    Previous Clinic Note: Oncology clinic note      The following orders " were placed and all results were independently analyzed by me:  Orders Placed This Encounter   Procedures    Blood Culture - Blood,    Blood Culture - Blood,    COVID PRE-OP / PRE-PROCEDURE SCREENING ORDER (NO ISOLATION) - Swab, Nasopharynx    COVID-19, FLU A/B, RSV PCR 1 HR TAT - Swab, Nasopharynx    Legionella Antigen, Urine - Urine, Urine, Clean Catch    S. Pneumo Ag Urine or CSF - Urine, Urine, Clean Catch    Respiratory Culture - Sputum, Cough    MRSA Screen, PCR (Inpatient) - Swab, Nares    XR Chest 1 View    CT Angiogram Chest Pulmonary Embolism    Comprehensive Metabolic Panel    [place] Draw    BNP    High Sensitivity Troponin T    Lactic Acid, Plasma    CBC Auto Differential    Arterial Blood Gas,H&H,Lytes,Lactate    Blood Gas, Arterial -    High Sensitivity Troponin T 1Hr    Procalcitonin    Basic Metabolic Panel    Magnesium    Phosphorus    Vancomycin, Random    Hemoglobin A1c    Magnesium    Diet: Cardiac; Low Sodium (2g); Fluid Consistency: Thin (IDDSI 0)    Undress & Gown    Vital Signs    Undress & Gown    Vital Signs    Undress & Gown    Continuous Pulse Oximetry    Vital Signs    Apply Heat To Affected Area    Strict Intake & Output    Daily Weights    Compression - Not to be Used for VTE Prophylaxis    Vital Signs    Notify Provider (With Default Parameters)    Activity - Ad Jessi    Ambulate Patient    Up in Chair    Intake & Output    Weigh Patient    Oral Care    Saline Lock & Maintain IV Access    Up With Assistance    Daily CHG Bath While in ICU    Code Status and Medical Interventions: CPR (Attempt to Resuscitate); Full Support    Hospitalist (on-call MD unless specified)    Inpatient Pulmonology Consult    Inpatient RT Case Management Consult    OT Consult: Eval & Treat ADL Performance Below Baseline, Discharge Placement Assessment    PT Consult: Eval & Treat Discharge Placement Assessment    Oxygen Therapy- Nasal Cannula; Titrate 1-6 LPM Per SpO2; 90 - 95%    Oxygen Therapy- Nasal Cannula;  Titrate 1-6 LPM Per SpO2; 90 - 95%    Oxygen Therapy- Nasal Cannula; Titrate 1-6 LPM Per SpO2; 90 - 95%    Incentive Spirometry    Oscillating Positive Expiratory Pressure (OPEP)    RT to Initiate Bronchopulmonary Hygiene Protocol    RT to Initiate Bronchopulmonary Hygiene Protocol    RT to Initiate Bronchodilator Protocol    POC Glucose Once    POC Lactate    POC Electrolyte Panel    POC Glucose 4x Daily Before Meals & at Bedtime    ECG 12 Lead ED Triage Standing Order; SOA    Adult Transthoracic Echo Complete W/ Cont if Necessary Per Protocol    Insert Peripheral IV    Insert Peripheral IV    Insert Peripheral IV    Insert Peripheral IV    Inpatient Admission    CBC & Differential    Green Top (Gel)    Lavender Top    Gold Top - SST    Light Blue Top    CBC & Differential       Medications Given in the Emergency Department:  Medications   sodium chloride 0.9 % flush 10 mL (has no administration in time range)   sodium chloride 0.9 % flush 10 mL (has no administration in time range)   sodium chloride 0.9 % flush 10 mL (has no administration in time range)   enoxaparin sodium (LOVENOX) syringe 160 mg (160 mg Subcutaneous Given 3/13/25 1206)   Pharmacy to dose vancomycin (has no administration in time range)   melatonin tablet 5 mg (has no administration in time range)   aluminum-magnesium hydroxide-simethicone (MAALOX MAX) 400-400-40 MG/5ML suspension 15 mL (has no administration in time range)   nicotine (NICODERM CQ) 21 MG/24HR patch 1 patch (has no administration in time range)   hydrOXYzine (ATARAX) tablet 25 mg (has no administration in time range)   ondansetron (ZOFRAN) injection 4 mg (has no administration in time range)   acetaminophen (TYLENOL) tablet 650 mg (has no administration in time range)   Diclofenac Sodium (VOLTAREN) 1 % gel 2 g (has no administration in time range)   Lidocaine 4 % 1 patch (has no administration in time range)   Lidocaine (Anorectal) (LMX 5) 5 % cream cream 1 Application (has no  administration in time range)   benzocaine-menthol (CHLORASEPTIC) lozenge 1 each (has no administration in time range)   sodium chloride nasal spray 2 spray (has no administration in time range)   guaiFENesin-dextromethorphan (ROBITUSSIN DM) 100-10 MG/5ML syrup 5 mL (has no administration in time range)   benzonatate (TESSALON) capsule 100 mg (has no administration in time range)   guaiFENesin (MUCINEX) 12 hr tablet 600 mg (600 mg Oral Given 3/13/25 1206)   albuterol (PROVENTIL) nebulizer solution 0.083% 2.5 mg/3mL (has no administration in time range)   arformoterol (BROVANA) nebulizer solution 15 mcg (15 mcg Nebulization Given 3/13/25 1200)   budesonide (PULMICORT) nebulizer solution 0.5 mg (0.5 mg Nebulization Given 3/13/25 1200)   sodium chloride 0.9 % flush 10 mL (10 mL Intravenous Given 3/13/25 1209)   sodium chloride 0.9 % flush 10 mL (has no administration in time range)   sodium chloride 0.9 % infusion 40 mL (has no administration in time range)   sennosides-docusate (PERICOLACE) 8.6-50 MG per tablet 2 tablet (2 tablets Oral Not Given 3/13/25 1159)     And   polyethylene glycol (MIRALAX) packet 17 g (has no administration in time range)     And   bisacodyl (DULCOLAX) EC tablet 5 mg (has no administration in time range)     And   bisacodyl (DULCOLAX) suppository 10 mg (has no administration in time range)   metoprolol tartrate (LOPRESSOR) tablet 12.5 mg (12.5 mg Oral Given 3/13/25 1206)   oseltamivir (TAMIFLU) capsule 75 mg (75 mg Oral Given 3/13/25 1205)   albuterol (PROVENTIL) nebulizer solution 0.083% 2.5 mg/3mL (2.5 mg Nebulization Given 3/13/25 1345)   cefepime 2000 mg IVPB in 100 mL NS (VTB) (has no administration in time range)   vancomycin (VANCOCIN) 1,000 mg in sodium chloride 0.9 % 250 mL IVPB-VTB (has no administration in time range)   dextrose (GLUTOSE) oral gel 15 g (has no administration in time range)   dextrose (D50W) (25 g/50 mL) IV injection 25 g (has no administration in time range)    glucagon (GLUCAGEN) injection 1 mg (has no administration in time range)   Insulin Lispro (humaLOG) injection 2-7 Units (has no administration in time range)   mupirocin (BACTROBAN) 2 % nasal ointment 1 Application (1 Application Each Nare Given 3/13/25 1537)   anastrozole (ARIMIDEX) tablet 1 mg (1 mg Oral Given 3/13/25 1536)   dicyclomine (BENTYL) tablet 10 mg (has no administration in time range)   lisinopril (PRINIVIL,ZESTRIL) tablet 10 mg ( Oral Dose Auto Held 3/21/25 0900)   furosemide (LASIX) tablet 40 mg ( Oral Dose Auto Held 3/21/25 0900)   FLUoxetine (PROzac) capsule 40 mg (40 mg Oral Given 3/13/25 1536)   montelukast (SINGULAIR) tablet 10 mg (has no administration in time range)   pantoprazole (PROTONIX) EC tablet 40 mg (40 mg Oral Given 3/13/25 1536)   pravastatin (PRAVACHOL) tablet 40 mg (has no administration in time range)   spironolactone (ALDACTONE) tablet 25 mg ( Oral Dose Auto Held 3/21/25 0900)   methylPREDNISolone sodium succinate (SOLU-Medrol) 60 mg in sterile water (preservative free) 0.96 mL (has no administration in time range)   ipratropium-albuterol (DUO-NEB) nebulizer solution 3 mL (3 mL Nebulization Given 3/13/25 0922)   methylPREDNISolone sodium succinate (SOLU-Medrol) 125 mg in sterile water (preservative free) 2 mL (125 mg Intravenous Given 3/13/25 0858)   cefepime 2000 mg IVPB in 100 mL NS (VTB) (0 mg Intravenous Stopped 3/13/25 0943)   vancomycin 3000 mg/500 mL 0.9% NS IVPB ([facility]) (3,000 mg Intravenous New Bag 3/13/25 0944)   iopamidol (ISOVUE-370) 76 % injection 100 mL (100 mL Intravenous Given 3/13/25 1047)   furosemide (LASIX) injection 40 mg (40 mg Intravenous Given 3/13/25 1335)   Sulfur Hexafluoride Microsph (LUMASON) 60.7-25 MG IV reconstituted suspension reconstituted suspension 4 mL (4 mL Injection Given 3/13/25 8674)        ED Course:         EKG: Atrial flutter with a rate of 129 bpm  Nonspecific ST  No acute ischemia.      Labs:    Lab Results (last 24 hours)        Procedure Component Value Units Date/Time    CBC & Differential [[id number]]  (Abnormal) Collected: 03/13/25 0802    Specimen: Blood from Arm, Left Updated: 03/13/25 0810    Narrative:      The following orders were created for panel order CBC & Differential.  Procedure                               Abnormality         Status                     ---------                               -----------         ------                     CBC Auto Differential[[id number]]        Abnormal            Final result                 Please view results for these tests on the individual orders.    Comprehensive Metabolic Panel [[id number]]  (Abnormal) Collected: 03/13/25 0802    Specimen: Blood from Arm, Left Updated: 03/13/25 0840     Glucose 174 mg/dL      BUN 13 mg/dL      Creatinine 0.72 mg/dL      Sodium 138 mmol/L      Potassium 4.4 mmol/L      Comment: Slight hemolysis detected by analyzer. Result may be falsely elevated.        Chloride 98 mmol/L      CO2 28.9 mmol/L      Calcium 8.9 mg/dL      Total Protein 7.3 g/dL      Albumin 3.6 g/dL      ALT (SGPT) 21 U/L      AST (SGOT) 22 U/L      Alkaline Phosphatase 117 U/L      Total Bilirubin 1.1 mg/dL      Globulin 3.7 gm/dL      A/G Ratio 1.0 g/dL      BUN/Creatinine Ratio 18.1     Anion Gap 11.1 mmol/L      eGFR 89.5 mL/min/1.73     Narrative:      GFR Categories in Chronic Kidney Disease (CKD)      GFR Category          GFR (mL/min/1.73)    Interpretation  G1                     90 or greater         Normal or high (1)  G2                      60-89                Mild decrease (1)  G3a                   45-59                Mild to moderate decrease  G3b                   30-44                Moderate to severe decrease  G4                    15-29                Severe decrease  G5                    14 or less           Kidney failure          (1)In the absence of evidence of kidney disease, neither GFR category G1 or G2 fulfill the criteria for CKD.    eGFR calculation  2021 CKD-EPI creatinine equation, which does not include race as a factor    BNP [[id number]]  (Normal) Collected: 03/13/25 0802    Specimen: Blood from Arm, Left Updated: 03/13/25 0848     proBNP 896.8 pg/mL     Narrative:      This assay is used as an aid in the diagnosis of individuals suspected of having heart failure. It can be used as an aid in the diagnosis of acute decompensated heart failure (ADHF) in patients presenting with signs and symptoms of ADHF to the emergency department (ED). In addition, NT-proBNP of <300 pg/mL indicates ADHF is not likely.    Age Range Result Interpretation  NT-proBNP Concentration (pg/mL:      <50             Positive            >450                   Gray                 300-450                    Negative             <300    50-75           Positive            >900                  Gray                300-900                  Negative            <300      >75             Positive            >1800                  Gray                300-1800                  Negative            <300    High Sensitivity Troponin T [[id number]]  (Normal) Collected: 03/13/25 0802    Specimen: Blood from Arm, Left Updated: 03/13/25 0925     HS Troponin T 10 ng/L     Narrative:      High Sensitive Troponin T Reference Range:  <14.0 ng/L- Negative Female for AMI  <22.0 ng/L- Negative Male for AMI  >=14 - Abnormal Female indicating possible myocardial injury.  >=22 - Abnormal Male indicating possible myocardial injury.   Clinicians would have to utilize clinical acumen, EKG, Troponin, and serial changes to determine if it is an Acute Myocardial Infarction or myocardial injury due to an underlying chronic condition.         Lactic Acid, Plasma [[id number]]  (Normal) Collected: 03/13/25 0802    Specimen: Blood from Arm, Left Updated: 03/13/25 0836     Lactate 1.9 mmol/L     CBC Auto Differential [[id number]]  (Abnormal) Collected: 03/13/25 0802    Specimen: Blood from Arm, Left Updated: 03/13/25 0810      WBC 14.30 10*3/mm3      RBC 4.23 10*6/mm3      Hemoglobin 12.0 g/dL      Hematocrit 39.4 %      MCV 93.1 fL      MCH 28.4 pg      MCHC 30.5 g/dL      RDW 14.1 %      RDW-SD 48.0 fl      MPV 10.8 fL      Platelets 232 10*3/mm3      Neutrophil % 89.8 %      Lymphocyte % 3.6 %      Monocyte % 5.9 %      Eosinophil % 0.1 %      Basophil % 0.2 %      Immature Grans % 0.4 %      Neutrophils, Absolute 12.85 10*3/mm3      Lymphocytes, Absolute 0.51 10*3/mm3      Monocytes, Absolute 0.84 10*3/mm3      Eosinophils, Absolute 0.01 10*3/mm3      Basophils, Absolute 0.03 10*3/mm3      Immature Grans, Absolute 0.06 10*3/mm3      nRBC 0.0 /100 WBC     Hemoglobin A1c [[id number]] Collected: 03/13/25 0802    Specimen: Blood from Arm, Left Updated: 03/13/25 1252    Blood Culture - Blood, Arm, Left [[id number]] Collected: 03/13/25 0808    Specimen: Blood from Arm, Left Updated: 03/13/25 0820    Blood Culture - Blood, Hand, Left [[id number]] Collected: 03/13/25 0808    Specimen: Blood from Hand, Left Updated: 03/13/25 0820    COVID PRE-OP / PRE-PROCEDURE SCREENING ORDER (NO ISOLATION) - Swab, Nasopharynx [[id number]]  (Abnormal) Collected: 03/13/25 0819    Specimen: Swab from Nasopharynx Updated: 03/13/25 0903    Narrative:      The following orders were created for panel order COVID PRE-OP / PRE-PROCEDURE SCREENING ORDER (NO ISOLATION) - Swab, Nasopharynx.  Procedure                               Abnormality         Status                     ---------                               -----------         ------                     COVID-19, FLU A/B, RSV P...[[id number]]  Abnormal            Final result                 Please view results for these tests on the individual orders.    COVID-19, FLU A/B, RSV PCR 1 HR TAT - Swab, Nasopharynx [[id number]]  (Abnormal) Collected: 03/13/25 0819    Specimen: Swab from Nasopharynx Updated: 03/13/25 0903     COVID19 Not Detected     Influenza A PCR Detected     Influenza B PCR Not Detected     RSV, PCR  Not Detected    Narrative:      Fact sheet for providers: https://www.fda.gov/media/562574/download    Fact sheet for patients: https://www.fda.gov/media/070583/download    Test performed by PCR.    POC Glucose Once [[id number]]  (Abnormal) Collected: 03/13/25 0912    Specimen: Arterial Blood Updated: 03/13/25 0914     Glucose 178 mg/dL      Comment: Serial Number: [id number]:  [id number]       Blood Gas, Arterial - [[id number]]  (Abnormal) Collected: 03/13/25 0912    Specimen: Arterial Blood Updated: 03/13/25 0914     Site Left Radial     Jermain's Test Positive     pH, Arterial 7.397 pH units      pCO2, Arterial 50.3 mm Hg      pO2, Arterial 75.2 mm Hg      HCO3, Arterial 30.9 mmol/L      Base Excess, Arterial 4.9 mmol/L      Comment: Serial Number: [id number]:  [id number]        O2 Saturation, Arterial 94.6 %      Hemoglobin, Blood Gas 14.1 g/dL      Hematocrit, Blood Gas 41.0 %      Barometric Pressure for Blood Gas 739.3000 mmHg      Modality Cannula     Flow Rate 10.0000 lpm      Hemodilution No    POC Lactate [[id number]]  (Normal) Collected: 03/13/25 0912    Specimen: Arterial Blood Updated: 03/13/25 0914     Lactate 1.1 mmol/L      Comment: Serial Number: [id number]:  [id number]       POC Electrolyte Panel [[id number]]  (Abnormal) Collected: 03/13/25 0912    Specimen: Arterial Blood Updated: 03/13/25 0914     Sodium 144 mmol/L      POC Potassium 4.2 mmol/L      Chloride 100 mmol/L      Ionized Calcium 1.14 mmol/L      Comment: Serial Number: [id number]:  [id number]       High Sensitivity Troponin T 1Hr [[id number]]  (Normal) Collected: 03/13/25 1017    Specimen: Blood from Arm, Left Updated: 03/13/25 1053     HS Troponin T 10 ng/L      Troponin T Numeric Delta 0 ng/L     Narrative:      High Sensitive Troponin T Reference Range:  <14.0 ng/L- Negative Female for AMI  <22.0 ng/L- Negative Male for AMI  >=14 - Abnormal Female indicating possible myocardial injury.  >=22 - Abnormal Male indicating possible myocardial  "injury.   Clinicians would have to utilize clinical acumen, EKG, Troponin, and serial changes to determine if it is an Acute Myocardial Infarction or myocardial injury due to an underlying chronic condition.         Procalcitonin [[id number]]  (Normal) Collected: 03/13/25 1017    Specimen: Blood from Arm, Left Updated: 03/13/25 1102     Procalcitonin 0.06 ng/mL     Narrative:      As a Marker for Sepsis (Non-Neonates):    1. <0.5 ng/mL represents a low risk of severe sepsis and/or septic shock.  2. >2 ng/mL represents a high risk of severe sepsis and/or septic shock.    As a Marker for Lower Respiratory Tract Infections that require antibiotic therapy:    PCT on Admission    Antibiotic Therapy       6-12 Hrs later    >0.5                Strongly Recommended  >0.25 - <0.5        Recommended  0.1 - 0.25          Discouraged              Remeasure/reassess PCT  <0.1                Strongly Discouraged     Remeasure/reassess PCT    As 28 day mortality risk marker: \"Change in Procalcitonin Result\" (>80% or <=80%) if Day 0 (or Day 1) and Day 4 values are available. Refer to http://www.[employer]rus-pct-calculator.com    Change in PCT <=80%  A decrease of PCT levels below or equal to 80% defines a positive change in PCT test result representing a higher risk for 28-day all-cause mortality of patients diagnosed with severe sepsis for septic shock.    Change in PCT >80%  A decrease of PCT levels of more than 80% defines a negative change in PCT result representing a lower risk for 28-day all-cause mortality of patients diagnosed with severe sepsis or septic shock.    This test is Prognostic not Diagnostic, if elevated correlate with clinical findings before administering antibiotic treatment.        Magnesium [[id number]]  (Normal) Collected: 03/13/25 1017    Specimen: Blood from Arm, Left Updated: 03/13/25 1502     Magnesium 1.8 mg/dL     Legionella Antigen, Urine - Urine, Urine, Clean Catch [[id number]]  (Abnormal) Collected: " 03/13/25 1212    Specimen: Urine, Clean Catch Updated: 03/13/25 1318     LEGIONELLA ANTIGEN, URINE Positive    S. Pneumo Ag Urine or CSF - Urine, Urine, Clean Catch [[id number]]  (Normal) Collected: 03/13/25 1212    Specimen: Urine, Clean Catch Updated: 03/13/25 1318     Strep Pneumo Ag Negative    MRSA Screen, PCR (Inpatient) - Swab, Nares [[id number]]  (Normal) Collected: 03/13/25 1212    Specimen: Swab from Nares Updated: 03/13/25 1405     MRSA PCR No MRSA Detected    Narrative:      The negative predictive value of this diagnostic test is high and should only be used to consider de-escalating anti-MRSA therapy. A positive result may indicate colonization with MRSA and must be correlated clinically.             Imaging:    CT Angiogram Chest Pulmonary Embolism  Result Date: 3/13/2025  CT ANGIOGRAM CHEST PULMONARY EMBOLISM Date of Exam: 3/13/2025 10:36 AM EDT Indication: ahrf, morb obesity, new? aflutter, rll pna, flu a positive, possible PE component and CHF. Comparison: 12/10/2024 Technique: Axial CT images were obtained of the chest after the uneventful intravenous administration of iodinated contrast utilizing pulmonary embolism protocol.  Reconstructed coronal and sagittal images were also obtained. Automated exposure control and iterative construction methods were used. Findings: Examination limited by body habitus and respiratory motion artifact. There is suboptimal assessment of the subsegmental pulmonary arteries. PULMONARY VASCULATURE: Main and lobar pulmonary arteries are widely patent without evidence of embolus. Proximal segmental pulmonary arteries are unremarkable. Distal segmental and subsegmental pulmonary arteries are suboptimally assessed. Main pulmonary  vein is enlarged, measuring 4.3 cm in diameter similar prior examination, consistent with pulmonary arterial hypertension no evidence of right heart strain. MEDIASTINUM: Heart is enlarged. Aortic contours are normal. There is a small sliding  hiatal hernia. No aortic dissection identified. No mass nor significant pericardial effusion. No evidence of left atrial appendage thrombus. CORONARY ARTERIES: There is calcified atherosclerotic disease. LUNGS: There is fairly dense right middle lobe consolidation suggestive of pneumonia with some scattered nodular airspace disease in the central to lower right lung and left lower lobe. Imaging features suggestive of multifocal pneumonia. PLEURAL SPACE: No effusion, mass, nor pneumothorax. LYMPH NODES: There are no pathologically enlarged lymph nodes. UPPER ABDOMEN: Unremarkable OSSEOUS STRUCTURES: Appropriate for age with no acute process identified.     Impression: 1.No evidence of central pulmonary embolus. Distal segmental and subsegmental pulmonary arteries are suboptimally assessed. 2.Multifocal pneumonia with more dense consolidation in the right middle lobe. 3.Other stable chronic findings. Electronically Signed: Preet Ross MD  3/13/2025 10:57 AM EDT  Workstation ID: [id number]    XR Chest 1 View  Result Date: 3/13/2025  XR CHEST 1 VW Date of Exam: 3/13/2025 8:10 AM EDT Indication: SOA Triage Protocol Comparison: CT chest 12/10/2024, AP chest x-ray 12/10/2024 Findings: Cardiac silhouette remains enlarged. There is new consolidation throughout the right lower lung, as well as mild airspace opacities in the left lower lung. No pneumothorax is seen.     Impression: 1.New consolidation throughout the right lower lung, concerning for pneumonia. 2.Mild airspace opacities in the left lower lung, which may be due to pneumonia and/or atelectasis. 3.Enlarged cardiac silhouette. Electronically Signed: Alejandra Hdz  3/13/2025 8:34 AM EDT  Workstation ID: [id number]        Differential Diagnosis and Discussion:    Cough: Differential diagnosis includes but is not limited to pneumonia, acute bronchitis, upper respiratory infection, ACE inhibitor use, allergic reaction, epiglottitis, seasonal allergies, chemical irritants,  exercise-induced asthma, viral syndrome.  Dyspnea: Differential diagnosis includes but is not limited to metabolic acidosis, neurological disorders, psychogenic, asthma, pneumothorax, upper airway obstruction, COPD, pneumonia, noncardiogenic pulmonary edema, interstitial lung disease, anemia, congestive heart failure, and pulmonary embolism  Fever: Based on the complaint of fever, differential diagnosis includes but is not limited to meningitis, pneumonia, pyelonephritis, acute uti,  systemic immune response syndrome, sepsis, viral syndrome, fungal infection, tick born illness and other bacterial infections.    PROCEDURES:    Labs were collected in the emergency department and all labs were reviewed and interpreted by me.  X-ray were performed in the emergency department and all X-ray impressions were independently interpreted by me.  An EKG was performed and the EKG was interpreted by me.    ECG 12 Lead ED Triage Standing Order; SOA   Preliminary Result   HEART ATFT=773  bpm   RR Hkcdhlqc=465  ms   MT Interval=  ms   P Horizontal Axis=  deg   P Front Axis=  deg   QRSD Pkxafjva=275  ms   QT Pnsfkgtz=004  ms   QRkG=283  ms   QRS Axis=-63  deg   T Wave Axis=16  deg   - ABNORMAL ECG -   Atrial flutter with varied AV block,   RBBB and LAFB   ST depr, consider ischemia, anterolateral lds   Prolonged QT interval   Date and Time of Study:2025-03-13 07:54:43          Procedures    MDM     Amount and/or Complexity of Data Reviewed  Clinical lab tests: reviewed  Tests in the radiology section of CPT®: reviewed  Tests in the medicine section of CPT®: reviewed             Total Critical Care time of 50 minutes. Total critical care time documented does not include time spent on separately billed procedures for services of nurses or physician assistants. I personally saw and examined the patient. I have reviewed all diagnostic interpretations and treatment plans as written. I was present for the key portions of any procedures  performed and the inclusive time noted in any critical care statement. Critical care time includes patient management by me, time spent at the patients bedside,  time to review lab and imaging results, discussing patient care, documentation in the medical record, and time spent with family or caregiver.          Patient Care Considerations:    SEPSIS was considered but is NOT present in the emergency department as SIRS criteria is not present.      Consultants/Shared Management Plan:    Hospitalist: I have discussed the case with hospitalist who agrees to accept the patient for admission.    Social Determinants of Health:    Patient is unable to carry out activities of daily life. Escalation of care is necessary.       Disposition and Care Coordination:    Admit:   Through independent evaluation of the patient's history, physical, and imperical data, the patient meets criteria for inpatient admission to the hospital.        Final diagnoses:   Acute pneumonia   Acute on chronic respiratory failure with hypoxia   Influenza A        ED Disposition       ED Disposition   Decision to Admit    Condition   --    Comment   Level of Care: Critical Care [6]   Diagnosis: Acute hypoxemic respiratory failure [[id number]]   Admitting Physician: KENYON MCCOY [[id number]]   Attending Physician: KENYON MCCOY [[id number]]   Certification: I Certify That Inpatient Hospital Services Are Medically Necessary For Greater Than 2 Midnights                 This medical record created using voice recognition software.             Kenan Roy, DO  03/13/25 154

## 2025-03-13 NOTE — THERAPY EVALUATION
Respiratory Therapist Broncho-Pulmonary Hygiene Progress Note      Patient Name:  Aida Conner  YOB: 1955    Aida Conner meets the qualification for Level 2 of the Bronco-Pulmonary Hygiene Protocol. This was based on my daily patient assessment and includes review of chest x-ray results, cough ability and quality, oxygenation, secretions or risk for secretion development and patient mobility.     Broncho-Pulmonary Hygiene Assessment:    Level of Movement: Actively changing positions without assistance  Alert/ oriented/ cooperative    Breath Sounds: Diminished and/or coarse rhonchi    Cough: Strong, effective    Chest X-Ray: Mild consolidation and/or atelectasis.    Sputum Productions: Able to produce small to moderate amount, of moderately thick secretions    History and Physical: New onset of bronchitis or mucus plugging    SpO2 to Oxygen Need: greater than 90% on  greater than 6L, Vapotherm, oxygen mask greater than 40% or ventilator    Current SpO2 is: 92 on 11l HF    Based on this information, I have completed the following interventions: Aerobika with bronchodialtor medication or TID    Flutter ordered    Electronically signed by Alysha Santiago RRT, 03/13/25, 1:07 PM EDT.

## 2025-03-14 LAB
ANION GAP SERPL CALCULATED.3IONS-SCNC: 10.9 MMOL/L (ref 5–15)
AORTIC DIMENSIONLESS INDEX: 0.48 (DI)
ASCENDING AORTA: 4.1 CM
AV MEAN PRESS GRAD SYS DOP V1V2: 8 MMHG
AV VMAX SYS DOP: 186 CM/SEC
BASOPHILS # BLD AUTO: 0.01 10*3/MM3 (ref 0–0.2)
BASOPHILS NFR BLD AUTO: 0.1 % (ref 0–1.5)
BH CV ECHO MEAS - AO MAX PG: 13.8 MMHG
BH CV ECHO MEAS - AO ROOT DIAM: 3.9 CM
BH CV ECHO MEAS - AO V2 VTI: 35 CM
BH CV ECHO MEAS - AVA(I,D): 1.51 CM2
BH CV ECHO MEAS - EDV(CUBED): 132.7 ML
BH CV ECHO MEAS - EDV(MOD-SP2): 126 ML
BH CV ECHO MEAS - EDV(MOD-SP4): 145 ML
BH CV ECHO MEAS - EF(MOD-SP2): 65.9 %
BH CV ECHO MEAS - EF(MOD-SP4): 57.1 %
BH CV ECHO MEAS - ESV(CUBED): 19.7 ML
BH CV ECHO MEAS - ESV(MOD-SP2): 43 ML
BH CV ECHO MEAS - ESV(MOD-SP4): 62.2 ML
BH CV ECHO MEAS - FS: 47.1 %
BH CV ECHO MEAS - IVS/LVPW: 1 CM
BH CV ECHO MEAS - IVSD: 1.4 CM
BH CV ECHO MEAS - LA DIMENSION: 4.2 CM
BH CV ECHO MEAS - LAT PEAK E' VEL: 13.6 CM/SEC
BH CV ECHO MEAS - LV MASS(C)D: 300.4 GRAMS
BH CV ECHO MEAS - LV MAX PG: 3 MMHG
BH CV ECHO MEAS - LV MEAN PG: 2 MMHG
BH CV ECHO MEAS - LV V1 MAX: 85.9 CM/SEC
BH CV ECHO MEAS - LV V1 VTI: 16.8 CM
BH CV ECHO MEAS - LVIDD: 5.1 CM
BH CV ECHO MEAS - LVIDS: 2.7 CM
BH CV ECHO MEAS - LVOT AREA: 3.1 CM2
BH CV ECHO MEAS - LVOT DIAM: 2 CM
BH CV ECHO MEAS - LVPWD: 1.4 CM
BH CV ECHO MEAS - MED PEAK E' VEL: 12.6 CM/SEC
BH CV ECHO MEAS - MV A MAX VEL: 98.5 CM/SEC
BH CV ECHO MEAS - MV DEC SLOPE: 765.5 CM/SEC2
BH CV ECHO MEAS - MV DEC TIME: 0.17 SEC
BH CV ECHO MEAS - MV E MAX VEL: 119 CM/SEC
BH CV ECHO MEAS - MV E/A: 1.21
BH CV ECHO MEAS - MV MAX PG: 5 MMHG
BH CV ECHO MEAS - MV MEAN PG: 2 MMHG
BH CV ECHO MEAS - MV P1/2T: 46.7 MSEC
BH CV ECHO MEAS - MV V2 VTI: 19.9 CM
BH CV ECHO MEAS - MVA(P1/2T): 4.7 CM2
BH CV ECHO MEAS - MVA(VTI): 2.7 CM2
BH CV ECHO MEAS - RAP SYSTOLE: 8 MMHG
BH CV ECHO MEAS - RVDD: 3.3 CM
BH CV ECHO MEAS - RVSP: 54.5 MMHG
BH CV ECHO MEAS - SV(LVOT): 52.8 ML
BH CV ECHO MEAS - SV(MOD-SP2): 83 ML
BH CV ECHO MEAS - SV(MOD-SP4): 82.8 ML
BH CV ECHO MEAS - TR MAX PG: 46.5 MMHG
BH CV ECHO MEAS - TR MAX VEL: 341 CM/SEC
BH CV ECHO MEASUREMENTS AVERAGE E/E' RATIO: 9.08
BUN SERPL-MCNC: 17 MG/DL (ref 8–23)
BUN/CREAT SERPL: 28.3 (ref 7–25)
CALCIUM SPEC-SCNC: 8.5 MG/DL (ref 8.6–10.5)
CHLORIDE SERPL-SCNC: 99 MMOL/L (ref 98–107)
CO2 SERPL-SCNC: 30.1 MMOL/L (ref 22–29)
CREAT SERPL-MCNC: 0.6 MG/DL (ref 0.57–1)
DEPRECATED RDW RBC AUTO: 48.3 FL (ref 37–54)
EGFRCR SERPLBLD CKD-EPI 2021: 96.1 ML/MIN/1.73
EOSINOPHIL # BLD AUTO: 0 10*3/MM3 (ref 0–0.4)
EOSINOPHIL NFR BLD AUTO: 0 % (ref 0.3–6.2)
ERYTHROCYTE [DISTWIDTH] IN BLOOD BY AUTOMATED COUNT: 14.1 % (ref 12.3–15.4)
GLUCOSE BLDC GLUCOMTR-MCNC: 151 MG/DL (ref 70–99)
GLUCOSE BLDC GLUCOMTR-MCNC: 153 MG/DL (ref 70–99)
GLUCOSE BLDC GLUCOMTR-MCNC: 167 MG/DL (ref 70–99)
GLUCOSE BLDC GLUCOMTR-MCNC: 203 MG/DL (ref 70–99)
GLUCOSE SERPL-MCNC: 150 MG/DL (ref 65–99)
HCT VFR BLD AUTO: 36.5 % (ref 34–46.6)
HGB BLD-MCNC: 11 G/DL (ref 12–15.9)
IMM GRANULOCYTES # BLD AUTO: 0.06 10*3/MM3 (ref 0–0.05)
IMM GRANULOCYTES NFR BLD AUTO: 0.5 % (ref 0–0.5)
LEFT ATRIUM VOLUME INDEX: 45.9 ML/M2
LV EF BIPLANE MOD: 60.8 %
LYMPHOCYTES # BLD AUTO: 0.52 10*3/MM3 (ref 0.7–3.1)
LYMPHOCYTES NFR BLD AUTO: 4.5 % (ref 19.6–45.3)
MAGNESIUM SERPL-MCNC: 2 MG/DL (ref 1.6–2.4)
MCH RBC QN AUTO: 27.8 PG (ref 26.6–33)
MCHC RBC AUTO-ENTMCNC: 30.1 G/DL (ref 31.5–35.7)
MCV RBC AUTO: 92.4 FL (ref 79–97)
MONOCYTES # BLD AUTO: 0.29 10*3/MM3 (ref 0.1–0.9)
MONOCYTES NFR BLD AUTO: 2.5 % (ref 5–12)
NEUTROPHILS NFR BLD AUTO: 10.78 10*3/MM3 (ref 1.7–7)
NEUTROPHILS NFR BLD AUTO: 92.4 % (ref 42.7–76)
NRBC BLD AUTO-RTO: 0 /100 WBC (ref 0–0.2)
PHOSPHATE SERPL-MCNC: 2.3 MG/DL (ref 2.5–4.5)
PLATELET # BLD AUTO: 209 10*3/MM3 (ref 140–450)
PMV BLD AUTO: 11 FL (ref 6–12)
POTASSIUM SERPL-SCNC: 4.4 MMOL/L (ref 3.5–5.2)
RBC # BLD AUTO: 3.95 10*6/MM3 (ref 3.77–5.28)
SODIUM SERPL-SCNC: 140 MMOL/L (ref 136–145)
VANCOMYCIN SERPL-MCNC: 14.9 MCG/ML (ref 5–40)
WBC NRBC COR # BLD AUTO: 11.66 10*3/MM3 (ref 3.4–10.8)

## 2025-03-14 PROCEDURE — 93010 ELECTROCARDIOGRAM REPORT: CPT | Performed by: INTERNAL MEDICINE

## 2025-03-14 PROCEDURE — 85025 COMPLETE CBC W/AUTO DIFF WBC: CPT | Performed by: INTERNAL MEDICINE

## 2025-03-14 PROCEDURE — 25010000002 METHYLPREDNISOLONE PER 125 MG: Performed by: INTERNAL MEDICINE

## 2025-03-14 PROCEDURE — 80202 ASSAY OF VANCOMYCIN: CPT | Performed by: INTERNAL MEDICINE

## 2025-03-14 PROCEDURE — 63710000001 INSULIN LISPRO (HUMAN) PER 5 UNITS: Performed by: INTERNAL MEDICINE

## 2025-03-14 PROCEDURE — 94799 UNLISTED PULMONARY SVC/PX: CPT

## 2025-03-14 PROCEDURE — 36415 COLL VENOUS BLD VENIPUNCTURE: CPT | Performed by: INTERNAL MEDICINE

## 2025-03-14 PROCEDURE — 83735 ASSAY OF MAGNESIUM: CPT | Performed by: INTERNAL MEDICINE

## 2025-03-14 PROCEDURE — 80048 BASIC METABOLIC PNL TOTAL CA: CPT | Performed by: INTERNAL MEDICINE

## 2025-03-14 PROCEDURE — 84100 ASSAY OF PHOSPHORUS: CPT | Performed by: INTERNAL MEDICINE

## 2025-03-14 PROCEDURE — 94664 DEMO&/EVAL PT USE INHALER: CPT

## 2025-03-14 PROCEDURE — 99233 SBSQ HOSP IP/OBS HIGH 50: CPT | Performed by: INTERNAL MEDICINE

## 2025-03-14 PROCEDURE — 25010000002 FUROSEMIDE PER 20 MG: Performed by: INTERNAL MEDICINE

## 2025-03-14 PROCEDURE — 93005 ELECTROCARDIOGRAM TRACING: CPT | Performed by: NURSE PRACTITIONER

## 2025-03-14 PROCEDURE — 25010000002 AZITHROMYCIN PER 500 MG: Performed by: INTERNAL MEDICINE

## 2025-03-14 PROCEDURE — 99291 CRITICAL CARE FIRST HOUR: CPT | Performed by: STUDENT IN AN ORGANIZED HEALTH CARE EDUCATION/TRAINING PROGRAM

## 2025-03-14 PROCEDURE — 82948 REAGENT STRIP/BLOOD GLUCOSE: CPT

## 2025-03-14 PROCEDURE — 25010000002 ENOXAPARIN PER 10 MG: Performed by: INTERNAL MEDICINE

## 2025-03-14 PROCEDURE — 97165 OT EVAL LOW COMPLEX 30 MIN: CPT

## 2025-03-14 PROCEDURE — 94761 N-INVAS EAR/PLS OXIMETRY MLT: CPT

## 2025-03-14 PROCEDURE — 82948 REAGENT STRIP/BLOOD GLUCOSE: CPT | Performed by: INTERNAL MEDICINE

## 2025-03-14 PROCEDURE — 25810000003 SODIUM CHLORIDE 0.9 % SOLUTION 250 ML FLEX CONT: Performed by: INTERNAL MEDICINE

## 2025-03-14 RX ORDER — FUROSEMIDE 10 MG/ML
40 INJECTION INTRAMUSCULAR; INTRAVENOUS EVERY 12 HOURS
Status: COMPLETED | OUTPATIENT
Start: 2025-03-14 | End: 2025-03-15

## 2025-03-14 RX ORDER — PREDNISONE 20 MG/1
40 TABLET ORAL
Status: DISCONTINUED | OUTPATIENT
Start: 2025-03-15 | End: 2025-03-18 | Stop reason: HOSPADM

## 2025-03-14 RX ORDER — POTASSIUM CHLORIDE 750 MG/1
20 CAPSULE, EXTENDED RELEASE ORAL 2 TIMES DAILY WITH MEALS
Status: DISCONTINUED | OUTPATIENT
Start: 2025-03-14 | End: 2025-03-14

## 2025-03-14 RX ORDER — DIPHENOXYLATE HYDROCHLORIDE AND ATROPINE SULFATE 2.5; .025 MG/1; MG/1
1 TABLET ORAL 4 TIMES DAILY PRN
Status: DISCONTINUED | OUTPATIENT
Start: 2025-03-14 | End: 2025-03-18 | Stop reason: HOSPADM

## 2025-03-14 RX ADMIN — DICYCLOMINE HYDROCHLORIDE 10 MG: 20 TABLET ORAL at 18:59

## 2025-03-14 RX ADMIN — PRAVASTATIN SODIUM 40 MG: 20 TABLET ORAL at 20:53

## 2025-03-14 RX ADMIN — METOPROLOL TARTRATE 12.5 MG: 25 TABLET, FILM COATED ORAL at 20:54

## 2025-03-14 RX ADMIN — MUPIROCIN 1 APPLICATION: 20 OINTMENT TOPICAL at 20:55

## 2025-03-14 RX ADMIN — BUDESONIDE 0.5 MG: 0.5 INHALANT RESPIRATORY (INHALATION) at 20:49

## 2025-03-14 RX ADMIN — ALBUTEROL SULFATE 2.5 MG: 2.5 SOLUTION RESPIRATORY (INHALATION) at 07:01

## 2025-03-14 RX ADMIN — AZITHROMYCIN DIHYDRATE 500 MG: 500 INJECTION, POWDER, LYOPHILIZED, FOR SOLUTION INTRAVENOUS at 17:28

## 2025-03-14 RX ADMIN — ALBUTEROL SULFATE 2.5 MG: 2.5 SOLUTION RESPIRATORY (INHALATION) at 23:37

## 2025-03-14 RX ADMIN — INSULIN LISPRO 2 UNITS: 100 INJECTION, SOLUTION INTRAVENOUS; SUBCUTANEOUS at 21:26

## 2025-03-14 RX ADMIN — INSULIN LISPRO 2 UNITS: 100 INJECTION, SOLUTION INTRAVENOUS; SUBCUTANEOUS at 17:28

## 2025-03-14 RX ADMIN — ARFORMOTEROL TARTRATE 15 MCG: 15 SOLUTION RESPIRATORY (INHALATION) at 20:49

## 2025-03-14 RX ADMIN — POTASSIUM & SODIUM PHOSPHATES POWDER PACK 280-160-250 MG 2 PACKET: 280-160-250 PACK at 13:09

## 2025-03-14 RX ADMIN — ALBUTEROL SULFATE 2.5 MG: 2.5 SOLUTION RESPIRATORY (INHALATION) at 02:47

## 2025-03-14 RX ADMIN — BENZONATATE 100 MG: 100 CAPSULE ORAL at 12:47

## 2025-03-14 RX ADMIN — GUAIFENESIN 600 MG: 600 TABLET ORAL at 20:54

## 2025-03-14 RX ADMIN — PANTOPRAZOLE SODIUM 40 MG: 40 TABLET, DELAYED RELEASE ORAL at 09:02

## 2025-03-14 RX ADMIN — SENNOSIDES AND DOCUSATE SODIUM 2 TABLET: 50; 8.6 TABLET ORAL at 09:02

## 2025-03-14 RX ADMIN — OSELTAMIVIR 75 MG: 75 CAPSULE ORAL at 09:01

## 2025-03-14 RX ADMIN — METHYLPREDNISOLONE SODIUM SUCCINATE 60 MG: 125 INJECTION INTRAMUSCULAR; INTRAVENOUS at 17:28

## 2025-03-14 RX ADMIN — ENOXAPARIN SODIUM 160 MG: 80 INJECTION, SOLUTION SUBCUTANEOUS at 09:35

## 2025-03-14 RX ADMIN — OSELTAMIVIR 75 MG: 75 CAPSULE ORAL at 20:54

## 2025-03-14 RX ADMIN — Medication 10 ML: at 09:03

## 2025-03-14 RX ADMIN — ALBUTEROL SULFATE 2.5 MG: 2.5 SOLUTION RESPIRATORY (INHALATION) at 14:38

## 2025-03-14 RX ADMIN — MUPIROCIN 1 APPLICATION: 20 OINTMENT TOPICAL at 09:03

## 2025-03-14 RX ADMIN — FLUOXETINE HYDROCHLORIDE 40 MG: 20 CAPSULE ORAL at 09:01

## 2025-03-14 RX ADMIN — DIPHENOXYLATE HYDROCHLORIDE AND ATROPINE SULFATE 1 TABLET: 2.5; .025 TABLET ORAL at 19:12

## 2025-03-14 RX ADMIN — Medication 10 ML: at 20:55

## 2025-03-14 RX ADMIN — GUAIFENESIN 600 MG: 600 TABLET ORAL at 09:01

## 2025-03-14 RX ADMIN — ANASTROZOLE 1 MG: 1 TABLET, COATED ORAL at 09:00

## 2025-03-14 RX ADMIN — ENOXAPARIN SODIUM 160 MG: 80 INJECTION, SOLUTION SUBCUTANEOUS at 22:14

## 2025-03-14 RX ADMIN — POTASSIUM & SODIUM PHOSPHATES POWDER PACK 280-160-250 MG 2 PACKET: 280-160-250 PACK at 09:02

## 2025-03-14 RX ADMIN — METHYLPREDNISOLONE SODIUM SUCCINATE 60 MG: 125 INJECTION INTRAMUSCULAR; INTRAVENOUS at 05:38

## 2025-03-14 RX ADMIN — ACETAMINOPHEN 650 MG: 325 TABLET ORAL at 09:35

## 2025-03-14 RX ADMIN — BUDESONIDE 0.5 MG: 0.5 INHALANT RESPIRATORY (INHALATION) at 07:01

## 2025-03-14 RX ADMIN — FUROSEMIDE 40 MG: 10 INJECTION, SOLUTION INTRAMUSCULAR; INTRAVENOUS at 09:35

## 2025-03-14 RX ADMIN — ARFORMOTEROL TARTRATE 15 MCG: 15 SOLUTION RESPIRATORY (INHALATION) at 07:01

## 2025-03-14 RX ADMIN — ALBUTEROL SULFATE 2.5 MG: 2.5 SOLUTION RESPIRATORY (INHALATION) at 20:49

## 2025-03-14 RX ADMIN — METOPROLOL TARTRATE 12.5 MG: 25 TABLET, FILM COATED ORAL at 09:02

## 2025-03-14 RX ADMIN — INSULIN LISPRO 3 UNITS: 100 INJECTION, SOLUTION INTRAVENOUS; SUBCUTANEOUS at 12:46

## 2025-03-14 RX ADMIN — MONTELUKAST 10 MG: 10 TABLET, FILM COATED ORAL at 20:54

## 2025-03-14 NOTE — THERAPY EVALUATION
Patient Name: Aida Conner  : 1955    MRN: [id number]                              Today's Date: 3/14/2025       Admit Date: 3/13/2025    Visit Dx:     ICD-10-CM ICD-9-CM   1. Acute pneumonia  J18.9 486   2. Acute on chronic respiratory failure with hypoxia  J96.21 518.84     799.02   3. Influenza A  J10.1 487.1     Patient Active Problem List   Diagnosis    Inflammatory breast cancer, right    Other specified disorders of breast     Malignant neoplasm of axillary tail of right female breast    Encounter for eye exam due to high risk medication    Obesity hypoventilation syndrome    Iron adverse reaction    Functional diarrhea    Encounter for long-term (current) use of high-risk medication    Abnormal mammogram    Allergic rhinitis    Anemia    Anxiety disorder    Breast pain, right    Depression    Essential tremor    Hemorrhoids    Hyperlipidemia    Primary hypertension    IBS (irritable bowel syndrome)    Impaired fasting glucose    Obstructive sleep apnea    Osteoarthrosis    Renal calculi    Restrictive airway disease    Type II diabetes mellitus    Vitamin deficiency    Urinary bladder incontinence    Malignant neoplasm of central portion of right female breast    Osteoporosis without current pathological fracture    Chronic heart failure with preserved ejection fraction (HFpEF)    Coronary artery calcification seen on CT scan    Aromatase inhibitor use    Belching    Bloating    Hiatal hernia    History of breast cancer    Family history of colon cancer    Iron deficiency anemia    Acute hypoxemic respiratory failure     Past Medical History:   Diagnosis Date    Allergic rhinitis     Anemia     Anxiety     Asthma     Chronic bronchitis     In past, espec. during working years    Chronic heart failure with preserved ejection fraction (HFpEF)     Chronic hypoxemic respiratory failure     on continuous O2    Coronary artery calcification seen on CT scan 2022    Diarrhea     with chemo    GERD  (gastroesophageal reflux disease) Mild    H/O Intraductal papilloma     Hemorrhoids     History of transfusion 1984    AFTER BACK SURGERY no reaction    Hypertension     IBS (irritable bowel syndrome)     Inflammatory breast cancer     right    Kidney stone     Morbid obesity with BMI of 50.0-59.9, adult     Obesity hypoventilation syndrome 02/05/2021    On home oxygen therapy     2.5 AT REST WITH ACTIVITY UP TO 4L    ANABELLE on CPAP     cpap  & uses O2 with CPAP    Osteoarthritis     Pneumonia     Many times in past, 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    PONV (postoperative nausea and vomiting)     Type II diabetes mellitus 09/08/2021     Past Surgical History:   Procedure Laterality Date    BREAST EXCISIONAL BIOPSY Bilateral     COLONOSCOPY      COLONOSCOPY N/A 12/8/2022    Procedure: COLONOSCOPY;  Surgeon: Henry Aden MD;  Location: [facility] ENDOSCOPY;  Service: Gastroenterology;  Laterality: N/A;  diverticulosis, and hemorrhoids    CYSTOSCOPY BLADDER STONE LITHOTRIPSY      ENDOSCOPY      ENDOSCOPY N/A 12/8/2022    Procedure: ESOPHAGOGASTRODUODENOSCOPY  with biopsy;  Surgeon: Henry Aden MD;  Location: [facility] ENDOSCOPY;  Service: Gastroenterology;  Laterality: N/A;  hiatal hernia    KNEE ARTHROPLASTY Bilateral 2009    MASTECTOMY Left 08/10/2021    Procedure: LEFT TOTAL MASTECTOMY;  Surgeon: Nicci Banks MD;  Location: [facility] OR;  Service: General;  Laterality: Left;    MASTECTOMY Right 08/10/2021    Procedure: RIGHT MODIFIED RADICAL MASTECTOMY;  Surgeon: Nicci Banks MD;  Location: [facility] OR;  Service: General;  Laterality: Right;    SPINE SURGERY  1984    TOTAL ABDOMINAL HYSTERECTOMY WITH SALPINGO OOPHORECTOMY  2004    UPPER GASTROINTESTINAL ENDOSCOPY      VENOUS ACCESS DEVICE (PORT) INSERTION N/A 01/25/2021    Procedure: INSERTION VENOUS ACCESS DEVICE;  Surgeon: Nicci Banks MD;  Location: [facility] OR;  Service: General;  Laterality: N/A;    VENOUS ACCESS DEVICE  (PORT) REMOVAL Left 04/05/2022    Procedure: REMOVAL VENOUS ACCESS DEVICE;  Surgeon: Nicci Banks MD;  Location: [facility] MAIN OR;  Service: General;  Laterality: Left;      General Information       Row Name 03/14/25 0838          OT Time and Intention    Document Type evaluation  -PG     Mode of Treatment individual therapy;occupational therapy  -PG       Row Name 03/14/25 0838          General Information    Patient Profile Reviewed yes  Lives with spouse, 3-1/2 L oxygen continuously at home.  Uses rolling walker.  Independent with all self-care, has shower chair.  Continues to drive  -PG     Prior Level of Function independent:;transfer;ADL's  -PG     Existing Precautions/Restrictions fall;oxygen therapy device and L/min  -PG     Barriers to Rehab none identified  -PG       Row Name 03/14/25 0844          Occupational Profile    Reason for Services/Referral (Occupational Profile) Patient is a 69-year-old female admitted for respiratory failure, influenza A and pneumonia.  Patient is being evaluated by Occupational Therapy due to recent decline in ADL function.  No previous OT services identified  -PG       Row Name 03/14/25 0808          Living Environment    Current Living Arrangements home  -PG       Row Name 03/14/25 0838          Cognition    Orientation Status (Cognition) oriented x 3  -PG       Row Name 03/14/25 0838          Safety Issues/Impairments Affecting Functional Mobility    Impairments Affecting Function (Mobility) balance;shortness of breath;strength;endurance/activity tolerance  -PG               User Key  (r) = Recorded By, (t) = Taken By, (c) = Cosigned By      Initials Name Provider Type    PG Tai Kumar, OT Occupational Therapist                     Mobility/ADL's       Row Name 03/14/25 0840          Transfers    Transfers bed-chair transfer  -PG       Row Name 03/14/25 0840          Bed-Chair Transfer    Bed-Chair [place] (Transfers) contact guard;verbal cues  -PG      Assistive Device (Bed-Chair Transfers) walker, front-wheeled  -PG       Row Name 03/14/25 0840          Activities of Daily Living    BADL Assessment/Intervention bathing;upper body dressing;lower body dressing;grooming;toileting  -PG       Row Name 03/14/25 0840          Bathing Assessment/Intervention    [place] Level (Bathing) bathing skills;moderate assist (50% patient effort)  -PG       Row Name 03/14/25 0840          Upper Body Dressing Assessment/Training    [place] Level (Upper Body Dressing) upper body dressing skills;set up  -PG       Row Name 03/14/25 0840          Lower Body Dressing Assessment/Training    [place] Level (Lower Body Dressing) lower body dressing skills;maximum assist (25% patient effort)  -PG       Row Name 03/14/25 0840          Grooming Assessment/Training    [place] Level (Grooming) grooming skills;set up  -PG       Row Name 03/14/25 0840          Toileting Assessment/Training    [place] Level (Toileting) toileting skills;moderate assist (50% patient effort)  -PG               User Key  (r) = Recorded By, (t) = Taken By, (c) = Cosigned By      Initials Name Provider Type    PG Tai Kumar OT Occupational Therapist                   Obj/Interventions       Row Name 03/14/25 0843          Sensory Assessment (Somatosensory)    Sensory Assessment (Somatosensory) sensation intact  -PG       Row Name 03/14/25 0843          Vision Assessment/Intervention    Visual Impairment/Limitations WFL  -PG       Row Name 03/14/25 0843          Range of Motion Comprehensive    General Range of Motion no range of motion deficits identified  -PG       Row Name 03/14/25 0843          Strength Comprehensive (MMT)    General Manual Muscle Testing (MMT) Assessment no strength deficits identified  -PG       Row Name 03/14/25 0843          Motor Skills    Motor Skills coordination;functional endurance  -PG     Coordination WFL  -PG     Functional Endurance poor plus  -PG                User Key  (r) = Recorded By, (t) = Taken By, (c) = Cosigned By      Initials Name Provider Type    PG Tai Kumar, OT Occupational Therapist                   Goals/Plan       Row Name 03/14/25 0845          Transfer Goal 1 (OT)    Activity/Assistive Device (Transfer Goal 1, OT) transfers, all  -PG     [place] Level/Cues Needed (Transfer Goal 1, OT) modified independence  -PG     Time Frame (Transfer Goal 1, OT) long term goal (LTG);10 days  -PG       Row Name 03/14/25 0845          Bathing Goal 1 (OT)    Activity/Device (Bathing Goal 1, OT) bathing skills, all  -PG     [place] Level/Cues Needed (Bathing Goal 1, OT) modified independence  -PG     Time Frame (Bathing Goal 1, OT) long term goal (LTG);10 days  -PG       Row Name 03/14/25 0845          Dressing Goal 1 (OT)    Activity/Device (Dressing Goal 1, OT) dressing skills, all  -PG     [place]/Cues Needed (Dressing Goal 1, OT) modified independence  -PG     Time Frame (Dressing Goal 1, OT) long term goal (LTG);10 days  -PG       Row Name 03/14/25 0845          Toileting Goal 1 (OT)    Activity/Device (Toileting Goal 1, OT) toileting skills, all  -PG     [place] Level/Cues Needed (Toileting Goal 1, OT) modified independence  -PG     Time Frame (Toileting Goal 1, OT) long term goal (LTG);10 days  -PG       Row Name 03/14/25 0845          Grooming Goal 1 (OT)    Activity/Device (Grooming Goal 1, OT) grooming skills, all  -PG     [place] (Grooming Goal 1, OT) modified independence  -PG     Time Frame (Grooming Goal 1, OT) long term goal (LTG);10 days  -PG       Row Name 03/14/25 0845          Problem Specific Goal 1 (OT)    Problem Specific Goal 1 (OT) Patient will improve activity tolerance to fair plus to support independence with self-care activities  -PG     Time Frame (Problem Specific Goal 1, OT) long term goal (LTG);10 days  -PG       Row Name 03/14/25 0845          Therapy Assessment/Plan (OT)    Planned Therapy Interventions (OT)  activity tolerance training;BADL retraining;strengthening exercise;transfer/mobility retraining;patient/caregiver education/training;occupation/activity based interventions  -PG               User Key  (r) = Recorded By, (t) = Taken By, (c) = Cosigned By      Initials Name Provider Type    PG Tai Kumar OT Occupational Therapist                   Clinical Impression       Row Name 03/14/25 0844          Pain Assessment    Pretreatment Pain Rating 0/10 - no pain  -PG     Posttreatment Pain Rating 0/10 - no pain  -PG       Row Name 03/14/25 0844          Plan of Care Review    Plan of Care Reviewed With patient  -PG     Progress no change  -PG     Outcome Evaluation Patient presents with limitations affecting strength, activity tolerance, and balance impacting patient's ability to return home safely and independently.  The skills of a therapist will be required to safely and effectively implement the following treatment plan to restore maximal level of function  -PG       Row Name 03/14/25 0832          Therapy Assessment/Plan (OT)    Patient/Family Therapy Goal Statement (OT) Get stronger and return home independently  -PG     Rehab Potential (OT) good  -PG     Criteria for Skilled Therapeutic Interventions Met (OT) yes;meets criteria;skilled treatment is necessary  -PG     Therapy Frequency (OT) 5 times/wk  -PG       Row Name 03/14/25 0844          Therapy Plan Review/Discharge Plan (OT)    Anticipated Discharge Disposition (OT) skilled nursing facility  -PG               User Key  (r) = Recorded By, (t) = Taken By, (c) = Cosigned By      Initials Name Provider Type    PG Tai Kumar OT Occupational Therapist                   Outcome Measures       Row Name 03/14/25 0879          How much help from another is currently needed...    Putting on and taking off regular lower body clothing? 1  -PG     Bathing (including washing, rinsing, and drying) 2  -PG     Toileting (which includes using toilet bed pan or  urinal) 2  -PG     Putting on and taking off regular upper body clothing 4  -PG     Taking care of personal grooming (such as brushing teeth) 4  -PG     Eating meals 4  -PG     AM-PAC 6 Clicks Score (OT) 17  -PG       Row Name 03/14/25 0736 03/14/25 0125       How much help from another person do you currently need...    Turning from your back to your side while in flat bed without using bedrails? 3  -JR 3  -JH    Moving from lying on back to sitting on the side of a flat bed without bedrails? 3  -JR 2  -JH    Moving to and from a bed to a chair (including a wheelchair)? 2  -JR 2  -JH    Standing up from a chair using your arms (e.g., wheelchair, bedside chair)? 3  -JR 3  -JH    Climbing 3-5 steps with a railing? 1  -JR 1  -JH    To walk in hospital room? 1  -JR 1  -JH    AM-PAC 6 Clicks Score (PT) 13  -JR 12  -JH      Row Name 03/14/25 0846          Functional Assessment    Outcome Measure Options AM-PAC 6 Clicks Daily Activity (OT);Optimal Instrument  -PG       Row Name 03/14/25 0846          Optimal Instrument    Optimal Instrument Optimal - 3  -PG     Bending/Stooping 2  -PG     Standing 2  -PG     Reaching 1  -PG     From the list, choose the 3 activities you would most like to be able to do without any difficulty Bending/stooping;Standing;Reaching  -PG     Total Score Optimal - 3 5  -PG               User Key  (r) = Recorded By, (t) = Taken By, (c) = Cosigned By      Initials Name Provider Type    Viola Massey, RN Registered Nurse    Tai Leoanrd OT Occupational Therapist    Fely Waterman, RN Registered Nurse                    Occupational Therapy Education       Title: PT OT SLP Therapies (Done)       Topic: Occupational Therapy (Done)       Point: ADL training (Done)       Learning Progress Summary            Patient Acceptance, E,D, DU by PG at 3/14/2025 0847                      Point: Home exercise program (Done)       Learning Progress Summary            Patient Acceptance, E,D, DU by PG at  3/14/2025 0847                      Point: Precautions (Done)       Learning Progress Summary            Patient Acceptance, E,D, DU by PG at 3/14/2025 0847                      Point: Body mechanics (Done)       Learning Progress Summary            Patient Acceptance, E,D, DU by PG at 3/14/2025 0847                                      User Key       Initials Effective Dates Name Provider Type Discipline    PG 06/16/21 -  Tai Kumar, OT Occupational Therapist OT                  OT Recommendation and Plan  Planned Therapy Interventions (OT): activity tolerance training, BADL retraining, strengthening exercise, transfer/mobility retraining, patient/caregiver education/training, occupation/activity based interventions  Therapy Frequency (OT): 5 times/wk  Plan of Care Review  Plan of Care Reviewed With: patient  Progress: no change  Outcome Evaluation: Patient presents with limitations affecting strength, activity tolerance, and balance impacting patient's ability to return home safely and independently.  The skills of a therapist will be required to safely and effectively implement the following treatment plan to restore maximal level of function     Time Calculation:   Evaluation Complexity (OT)  Review Occupational Profile/Medical/Therapy History Complexity: brief/low complexity  Assessment, Occupational Performance/Identification of Deficit Complexity: 3-5 performance deficits  Clinical Decision Making Complexity (OT): problem focused assessment/low complexity  Overall Complexity of Evaluation (OT): low complexity     Time Calculation- OT       Row Name 03/14/25 0852             Time Calculation- OT    OT Received On 03/14/25  -PG      OT Goal Re-Cert Due Date 03/23/25  -PG         Untimed Charges    OT Eval/Re-eval Minutes 35  -PG         Total Minutes    Untimed Charges Total Minutes 35  -PG       Total Minutes 35  -PG                User Key  (r) = Recorded By, (t) = Taken By, (c) = Cosigned By      Initials  Name Provider Type    PG Tai Kumar OT Occupational Therapist                  Therapy Charges for Today       Code Description Service Date Service Provider Modifiers Qty    [id number] HC OT EVAL LOW COMPLEXITY 3 3/14/2025 Tai Kumar OT GO 1                 Tai Kumar OT  3/14/2025

## 2025-03-14 NOTE — PLAN OF CARE
Goal Outcome Evaluation:  Plan of Care Reviewed With: patient, family        Progress: no change  Outcome Evaluation: Remains on 11L high flow, up standby in room to BSC, remains in Afib controlled, frequent loose bowel movements, home bentyl and lomotil given at shift change, droplet precautions maintained.

## 2025-03-14 NOTE — PROGRESS NOTES
INTENSIVIST   PROGRESS NOTE        SUBJECTIVE     Aida 69 y.o. female is followed for: Shortness of Breath (pt c/o sob. pt on oxygen at home 3 l/nc.)       Acute hypoxemic respiratory failure    As an Intensivist, we provide an integrated approach to the ICU patient and family, medical management of comorbid conditions, including but not limited to electrolytes, glycemic control, organ dysfunction, lead interdisciplinary rounds and coordinate the care with all other services, including those from other specialists.     Interval History:  No acute events overnight.  Remains on 11 L high flow.  No issues overnight.  Not on any critical drips. Up in chair eating breakfast.     Temp  Min: 98.2 °F (36.8 °C)  Max: 100 °F (37.8 °C)                The patient's relevant past medical, surgical and social history were reviewed and updated in Epic as appropriate.        OBJECTIVE     Vitals:  Temp: 98.8 °F (37.1 °C) (25) Temp  Min: 98.2 °F (36.8 °C)  Max: 100 °F (37.8 °C)   Temp core:      BP: 133/91 (25) BP  Min: 103/58  Max: 156/100   MAP (non-invasive) Noninvasive MAP (mmHg): 104 (25) Noninvasive MAP (mmHg)  Av.3  Min: 71  Max: 114   Pulse: 90 (25) Pulse  Min: 86  Max: 129   Resp: 20 (25) Resp  Min: 18  Max: 30   SpO2: 100 % (25) SpO2  Min: 87 %  Max: 100 %   Device: humidified, high-flow nasal cannula (25)    Flow Rate: 11 (25) Flow (L/min) (Oxygen Therapy)  Min: 5  Max: 12         25  0743 25  1222   Weight: (!) 157 kg (345 lb 10.9 oz) (!) 157 kg (345 lb 10.9 oz)        Intake/Ouptut 24 hrs (7:00AM - 6:59 AM)  Intake & Output (last 2 days)          07 0700  07 07 0701  03/15 0700    P.O.  650     IV Piggyback  600     Total Intake(mL/kg)  1250 (8)     Urine (mL/kg/hr)  875 200 (2)    Stool  0     Total Output  875 200    Net  +375 -200           Urine Unmeasured Occurrence  1 x      Stool Unmeasured Occurrence  1 x             Medications (drips):  Pharmacy to dose vancomycin          Physical Examination  Telemetry:  Rhythm: normal sinus rhythm (03/14/25 0125)      Constitutional:  No acute distress.   Cardiovascular: RRR.    Respiratory: Decreased bases bilaterally.  Some rhonchi   Abdominal:  Soft with no tenderness.   Extremities: 1+ edema    Neurological:   Alert, Oriented, Cooperative.    Best Eye Response: 4-->(E4) spontaneous (03/14/25 0125)  Best Motor Response: 6-->(M6) obeys commands (03/14/25 0125)  Best Verbal Response: 5-->(V5) oriented (03/14/25 0125)  [place] Coma Scale Score: 15 (03/14/25 0125)                    Lines, Drains & Airways       Active LDAs       Name Placement date Placement time Site Days    Peripheral IV 03/13/25 0803 Left;Posterior Hand 03/13/25 0803  Hand  less than 1                    Results Reviewed:  Laboratory  Microbiology  Radiology  Pathology    Hematology:  Results from last 7 days   Lab Units 03/14/25 0255 03/13/25  0802   WBC 10*3/mm3 11.66* 14.30*   HEMOGLOBIN g/dL 11.0* 12.0   MCV fL 92.4 93.1   PLATELETS 10*3/mm3 209 232     Results from last 7 days   Lab Units 03/14/25 0255 03/13/25  0802   NEUTROS ABS 10*3/mm3 10.78* 12.85*   LYMPHS ABS 10*3/mm3 0.52* 0.51*   EOS ABS 10*3/mm3 0.00 0.01     Chemistry:  Estimated Creatinine Clearance: 131.7 mL/min (by C-G formula based on SCr of 0.6 mg/dL).    Results from last 7 days   Lab Units 03/14/25  0255 03/13/25  0802   SODIUM mmol/L 140 138   POTASSIUM mmol/L 4.4 4.4   CHLORIDE mmol/L 99 98   CO2 mmol/L 30.1* 28.9   BUN mg/dL 17 13   CREATININE mg/dL 0.60 0.72   GLUCOSE mg/dL 150* 174*     Results from last 7 days   Lab Units 03/14/25  0255 03/13/25  1017 03/13/25  0802   CALCIUM mg/dL 8.5*  --  8.9   MAGNESIUM mg/dL 2.0 1.8  --    PHOSPHORUS mg/dL 2.3*  --   --      Hepatic Panel:  Results from last 7 days   Lab Units 03/13/25  0802   ALBUMIN g/dL 3.6   TOTAL PROTEIN g/dL 7.3   BILIRUBIN mg/dL 1.1  "  AST (SGOT) U/L 22   ALT (SGPT) U/L 21   ALK PHOS U/L 117     Coagulation Labs:       Cardiac Labs:  Results from last 7 days   Lab Units 03/13/25  1017 03/13/25  0802   PROBNP pg/mL  --  896.8   HSTROP T ng/L 10 10     Biomarkers:  Results from last 7 days   Lab Units 03/13/25  1017 03/13/25  0912 03/13/25  0802   LACTATE mmol/L  --  1.1 1.9   PROCALCITONIN ng/mL 0.06  --   --        U/A            Interleukin:  No results found for: \"INTERLEUN6\"  COVID-19  Lab Results   Component Value Date    COVID19 Not Detected 03/13/2025    COVID19 Not Detected 12/10/2024    COVID19 Detected (C) 12/06/2023    COVID19 Detected (C) 12/04/2023       Arterial Blood Gases:  Results from last 7 days   Lab Units 03/13/25  0912   PH, ARTERIAL pH units 7.397   PCO2, ARTERIAL mm Hg 50.3*   PO2 ART mm Hg 75.2*       Images:  CT Angiogram Chest Pulmonary Embolism  Result Date: 3/13/2025  Impression: 1.No evidence of central pulmonary embolus. Distal segmental and subsegmental pulmonary arteries are suboptimally assessed. 2.Multifocal pneumonia with more dense consolidation in the right middle lobe. 3.Other stable chronic findings. Electronically Signed: Preet Ross MD  3/13/2025 10:57 AM EDT  Workstation ID: [id number]    XR Chest 1 View  Result Date: 3/13/2025  Impression: 1.New consolidation throughout the right lower lung, concerning for pneumonia. 2.Mild airspace opacities in the left lower lung, which may be due to pneumonia and/or atelectasis. 3.Enlarged cardiac silhouette. Electronically Signed: Alejandra Hdz  3/13/2025 8:34 AM EDT  Workstation ID: [id number]      Echo:  Results for orders placed during the hospital encounter of 02/28/24    Adult Transthoracic Echo Limited W/ Cont if Necessary Per Protocol    Interpretation Summary    : Left ventricular systolic function is normal. Estimated left ventricular EF = 65% Normal left ventricular cavity size noted. Left ventricular wall thickness is consistent with mild concentric " hypertrophy. All left ventricular wall segments contract normally. Left ventricular diastolic function was indeterminate. GLS could not be obtained.    : The right ventricular cavity is mild to moderately dilated. Mildly reduced right ventricular systolic function noted.    Mild to moderate tricuspid valve regurgitation is present. No evidence of significant tricuspid valve stenosis is present. The IVC could not be visualized. Assuming a right atrial pressure of 3 mmHg, there is mild pulmonary hypertension, RVSP 42 mmHg.      Results: Reviewed.  I reviewed the patient's new laboratory and imaging results.  I independently reviewed the patient's new images.    Medications: Reviewed.    Assessment   A/P     Hospital:  LOS: 1 day   ICU: 19h     Active Hospital Problems    Diagnosis  POA    **Acute hypoxemic respiratory failure [J96.01]  Yes     Aida is a 69 y.o. female admitted on 3/13/2025 with Influenza A [J10.1]  Acute on chronic respiratory failure with hypoxia [J96.21]  Acute hypoxemic respiratory failure [J96.01]  Acute pneumonia [J18.9]    Assessment/Management/Treatment Plan:    //Acute on chronic hypoxic respiratory failure  //Influenza A positive  //Legionella positive  //COPD exacerbation, oxygen dependent 3 L at rest and 4 L on exertion  //PMH obesity class III, ANABELLE but noncompliant on NIV     Pulmonary         High flow 11 L, titrate for O2 sat 88 to 92% given COPD.  Place on NIV with naps and nightly.  CTA 3/13 reviewed, no large PE but suboptimal image.  Multifocal pneumonia right greater than left  Pulmicort, Brovana, albuterol.  Encourage I-S 10 times per hour while awake.  Bronchopulmonary hygiene.  Methylpred 60 twice daily.  Cardiovascular  New onset a flutter.  Lovenox full AC.  Lopressor 12.5 g twice daily.  Echo 3/13/25 reviewed.  Troponin negative X 2.  .  Continue home statin.  EKG 3/13 524, repeat EKG to assess today given patient started on Levaquin.  Neuro  Alert and oriented.  No  issues.  Home fluoxetine.  GI/Hepatology  PPI daily giving high-dose steroids.  Cardiac diet  Renal  Creatinine and electrolytes within normal limits.  Given a flutter keep potassium greater than 4 magnesium greater than 2.  ID/Antibiotics  Tamiflu.  Levaquin for legionella pneumonia. Dc vanc. MRSA nares negative.  Strep pneumo negative.  Hematology  Hemoglobin and platelets within normal limits.  Full AC with Lovenox as above.  Endocrine  Prediabetes (A1C 5.7%-6.4%). Accuchecks 4 times daily with meals and nightly.  Sliding scale insulin.    Lab Results   Lab Value Date/Time    HGBA1C 6.30 (H) 03/13/2025 0802    HGBA1C 6.40 (H) 06/26/2024 1139     Results from last 7 days   Lab Units 03/14/25  0729 03/13/25  2207 03/13/25  1702 03/13/25  0912   GLUCOSE mg/dL 151* 165* 245* 178*       Diet: Diet: Cardiac; Low Sodium (2g); Fluid Consistency: Thin (IDDSI 0)   Advance Directives: Code Status and Medical Interventions: CPR (Attempt to Resuscitate); Full Support   Ordered at: 03/13/25 1021     Code Status (Patient has no pulse and is not breathing):    CPR (Attempt to Resuscitate)     Medical Interventions (Patient has pulse or is breathing):    Full Support        VTE Prophylaxis:  Pharmacologic VTE prophylaxis orders are present.           Disposition: Transfer to floor    Plan of care and goals reviewed during interdisciplinary rounds.  I discussed the patient's findings and my recommendations with patient and nursing staff    Time: was greater than 32 critical care minutes. This is non-concurrent time.   (This excludes time spent performing separately reportable procedures and services).    She has a high risk of imminent or life-threatening  deterioration, which requires the highest level of physician preparedness to intervene urgently. I devoted my full attention to the direct care of this patient for the amount of time indicated above.     Time spent with family or surrogate(s) is included only if the patient was  incapable of providing the necessary information or participating in medical decision making.    She has the following organ/system impairments Respiratory Failure.            Copied text in this note has been reviewed and is accurate as of 03/14/25    Chana Muñoz MD  Pulmonary Critical Care Medicine

## 2025-03-15 LAB
ANION GAP SERPL CALCULATED.3IONS-SCNC: 12.4 MMOL/L (ref 5–15)
BACTERIA SPEC RESP CULT: NORMAL
BASOPHILS # BLD AUTO: 0.02 10*3/MM3 (ref 0–0.2)
BASOPHILS NFR BLD AUTO: 0.1 % (ref 0–1.5)
BUN SERPL-MCNC: 24 MG/DL (ref 8–23)
BUN/CREAT SERPL: 32 (ref 7–25)
CALCIUM SPEC-SCNC: 8.4 MG/DL (ref 8.6–10.5)
CHLORIDE SERPL-SCNC: 95 MMOL/L (ref 98–107)
CO2 SERPL-SCNC: 30.6 MMOL/L (ref 22–29)
CREAT SERPL-MCNC: 0.75 MG/DL (ref 0.57–1)
DEPRECATED RDW RBC AUTO: 47.9 FL (ref 37–54)
EGFRCR SERPLBLD CKD-EPI 2021: 85.2 ML/MIN/1.73
EOSINOPHIL # BLD AUTO: 0 10*3/MM3 (ref 0–0.4)
EOSINOPHIL NFR BLD AUTO: 0 % (ref 0.3–6.2)
ERYTHROCYTE [DISTWIDTH] IN BLOOD BY AUTOMATED COUNT: 14.4 % (ref 12.3–15.4)
GLUCOSE BLDC GLUCOMTR-MCNC: 155 MG/DL (ref 70–99)
GLUCOSE BLDC GLUCOMTR-MCNC: 163 MG/DL (ref 70–99)
GLUCOSE BLDC GLUCOMTR-MCNC: 167 MG/DL (ref 70–99)
GLUCOSE BLDC GLUCOMTR-MCNC: 192 MG/DL (ref 70–99)
GLUCOSE SERPL-MCNC: 177 MG/DL (ref 65–99)
GRAM STN SPEC: NORMAL
GRAM STN SPEC: NORMAL
HCT VFR BLD AUTO: 39.2 % (ref 34–46.6)
HGB BLD-MCNC: 11.9 G/DL (ref 12–15.9)
IMM GRANULOCYTES # BLD AUTO: 0.06 10*3/MM3 (ref 0–0.05)
IMM GRANULOCYTES NFR BLD AUTO: 0.4 % (ref 0–0.5)
LYMPHOCYTES # BLD AUTO: 0.52 10*3/MM3 (ref 0.7–3.1)
LYMPHOCYTES NFR BLD AUTO: 3.8 % (ref 19.6–45.3)
MAGNESIUM SERPL-MCNC: 2.1 MG/DL (ref 1.6–2.4)
MCH RBC QN AUTO: 27.8 PG (ref 26.6–33)
MCHC RBC AUTO-ENTMCNC: 30.4 G/DL (ref 31.5–35.7)
MCV RBC AUTO: 91.6 FL (ref 79–97)
MONOCYTES # BLD AUTO: 0.42 10*3/MM3 (ref 0.1–0.9)
MONOCYTES NFR BLD AUTO: 3.1 % (ref 5–12)
NEUTROPHILS NFR BLD AUTO: 12.5 10*3/MM3 (ref 1.7–7)
NEUTROPHILS NFR BLD AUTO: 92.6 % (ref 42.7–76)
NRBC BLD AUTO-RTO: 0 /100 WBC (ref 0–0.2)
PHOSPHATE SERPL-MCNC: 3.3 MG/DL (ref 2.5–4.5)
PLATELET # BLD AUTO: 238 10*3/MM3 (ref 140–450)
PMV BLD AUTO: 11.3 FL (ref 6–12)
POTASSIUM SERPL-SCNC: 3.7 MMOL/L (ref 3.5–5.2)
RBC # BLD AUTO: 4.28 10*6/MM3 (ref 3.77–5.28)
SODIUM SERPL-SCNC: 138 MMOL/L (ref 136–145)
WBC NRBC COR # BLD AUTO: 13.52 10*3/MM3 (ref 3.4–10.8)

## 2025-03-15 PROCEDURE — 63710000001 PREDNISONE PER 1 MG: Performed by: INTERNAL MEDICINE

## 2025-03-15 PROCEDURE — 63710000001 INSULIN LISPRO (HUMAN) PER 5 UNITS: Performed by: INTERNAL MEDICINE

## 2025-03-15 PROCEDURE — 82948 REAGENT STRIP/BLOOD GLUCOSE: CPT

## 2025-03-15 PROCEDURE — 80048 BASIC METABOLIC PNL TOTAL CA: CPT | Performed by: INTERNAL MEDICINE

## 2025-03-15 PROCEDURE — 94799 UNLISTED PULMONARY SVC/PX: CPT

## 2025-03-15 PROCEDURE — 36415 COLL VENOUS BLD VENIPUNCTURE: CPT | Performed by: INTERNAL MEDICINE

## 2025-03-15 PROCEDURE — 84100 ASSAY OF PHOSPHORUS: CPT | Performed by: INTERNAL MEDICINE

## 2025-03-15 PROCEDURE — 25010000002 ENOXAPARIN PER 10 MG: Performed by: INTERNAL MEDICINE

## 2025-03-15 PROCEDURE — 94761 N-INVAS EAR/PLS OXIMETRY MLT: CPT

## 2025-03-15 PROCEDURE — 99233 SBSQ HOSP IP/OBS HIGH 50: CPT | Performed by: INTERNAL MEDICINE

## 2025-03-15 PROCEDURE — 94664 DEMO&/EVAL PT USE INHALER: CPT

## 2025-03-15 PROCEDURE — 25010000002 FUROSEMIDE PER 20 MG: Performed by: INTERNAL MEDICINE

## 2025-03-15 PROCEDURE — 85025 COMPLETE CBC W/AUTO DIFF WBC: CPT | Performed by: INTERNAL MEDICINE

## 2025-03-15 PROCEDURE — 83735 ASSAY OF MAGNESIUM: CPT | Performed by: INTERNAL MEDICINE

## 2025-03-15 RX ORDER — METOPROLOL SUCCINATE 25 MG/1
25 TABLET, EXTENDED RELEASE ORAL EVERY 12 HOURS SCHEDULED
Status: DISCONTINUED | OUTPATIENT
Start: 2025-03-15 | End: 2025-03-18 | Stop reason: HOSPADM

## 2025-03-15 RX ORDER — POTASSIUM CHLORIDE 750 MG/1
40 CAPSULE, EXTENDED RELEASE ORAL ONCE
Status: COMPLETED | OUTPATIENT
Start: 2025-03-15 | End: 2025-03-15

## 2025-03-15 RX ORDER — FUROSEMIDE 10 MG/ML
40 INJECTION INTRAMUSCULAR; INTRAVENOUS EVERY 12 HOURS
Status: COMPLETED | OUTPATIENT
Start: 2025-03-15 | End: 2025-03-15

## 2025-03-15 RX ORDER — AZITHROMYCIN 250 MG/1
500 TABLET, FILM COATED ORAL EVERY 24 HOURS
Status: DISCONTINUED | OUTPATIENT
Start: 2025-03-15 | End: 2025-03-18 | Stop reason: HOSPADM

## 2025-03-15 RX ORDER — METOPROLOL TARTRATE 25 MG/1
12.5 TABLET, FILM COATED ORAL ONCE
Status: COMPLETED | OUTPATIENT
Start: 2025-03-15 | End: 2025-03-15

## 2025-03-15 RX ADMIN — PRAVASTATIN SODIUM 40 MG: 20 TABLET ORAL at 20:49

## 2025-03-15 RX ADMIN — FUROSEMIDE 40 MG: 10 INJECTION, SOLUTION INTRAMUSCULAR; INTRAVENOUS at 01:13

## 2025-03-15 RX ADMIN — METOPROLOL TARTRATE 12.5 MG: 25 TABLET, FILM COATED ORAL at 09:26

## 2025-03-15 RX ADMIN — Medication 10 ML: at 20:49

## 2025-03-15 RX ADMIN — ANASTROZOLE 1 MG: 1 TABLET, COATED ORAL at 09:29

## 2025-03-15 RX ADMIN — OSELTAMIVIR 75 MG: 75 CAPSULE ORAL at 20:49

## 2025-03-15 RX ADMIN — BENZONATATE 100 MG: 100 CAPSULE ORAL at 01:17

## 2025-03-15 RX ADMIN — DICYCLOMINE HYDROCHLORIDE 10 MG: 20 TABLET ORAL at 01:59

## 2025-03-15 RX ADMIN — MUPIROCIN 1 APPLICATION: 20 OINTMENT TOPICAL at 08:01

## 2025-03-15 RX ADMIN — ALBUTEROL SULFATE 2.5 MG: 2.5 SOLUTION RESPIRATORY (INHALATION) at 13:18

## 2025-03-15 RX ADMIN — ARFORMOTEROL TARTRATE 15 MCG: 15 SOLUTION RESPIRATORY (INHALATION) at 07:43

## 2025-03-15 RX ADMIN — OSELTAMIVIR 75 MG: 75 CAPSULE ORAL at 08:02

## 2025-03-15 RX ADMIN — ENOXAPARIN SODIUM 160 MG: 80 INJECTION, SOLUTION SUBCUTANEOUS at 12:06

## 2025-03-15 RX ADMIN — GUAIFENESIN 600 MG: 600 TABLET ORAL at 20:50

## 2025-03-15 RX ADMIN — INSULIN LISPRO 2 UNITS: 100 INJECTION, SOLUTION INTRAVENOUS; SUBCUTANEOUS at 17:15

## 2025-03-15 RX ADMIN — PANTOPRAZOLE SODIUM 40 MG: 40 TABLET, DELAYED RELEASE ORAL at 08:01

## 2025-03-15 RX ADMIN — FUROSEMIDE 40 MG: 10 INJECTION, SOLUTION INTRAMUSCULAR; INTRAVENOUS at 22:18

## 2025-03-15 RX ADMIN — DICYCLOMINE HYDROCHLORIDE 10 MG: 20 TABLET ORAL at 12:06

## 2025-03-15 RX ADMIN — GUAIFENESIN AND DEXTROMETHORPHAN 5 ML: 100; 10 SYRUP ORAL at 12:16

## 2025-03-15 RX ADMIN — ARFORMOTEROL TARTRATE 15 MCG: 15 SOLUTION RESPIRATORY (INHALATION) at 20:07

## 2025-03-15 RX ADMIN — POLYETHYLENE GLYCOL 3350 17 G: 17 POWDER, FOR SOLUTION ORAL at 12:17

## 2025-03-15 RX ADMIN — ACETAMINOPHEN 650 MG: 325 TABLET ORAL at 01:18

## 2025-03-15 RX ADMIN — PREDNISONE 40 MG: 20 TABLET ORAL at 08:00

## 2025-03-15 RX ADMIN — GUAIFENESIN 600 MG: 600 TABLET ORAL at 08:01

## 2025-03-15 RX ADMIN — BUDESONIDE 0.5 MG: 0.5 INHALANT RESPIRATORY (INHALATION) at 07:43

## 2025-03-15 RX ADMIN — DICYCLOMINE HYDROCHLORIDE 10 MG: 20 TABLET ORAL at 20:48

## 2025-03-15 RX ADMIN — METOPROLOL TARTRATE 12.5 MG: 25 TABLET, FILM COATED ORAL at 08:01

## 2025-03-15 RX ADMIN — BUDESONIDE 0.5 MG: 0.5 INHALANT RESPIRATORY (INHALATION) at 20:07

## 2025-03-15 RX ADMIN — AZITHROMYCIN DIHYDRATE 500 MG: 250 TABLET ORAL at 20:25

## 2025-03-15 RX ADMIN — ALBUTEROL SULFATE 2.5 MG: 2.5 SOLUTION RESPIRATORY (INHALATION) at 20:07

## 2025-03-15 RX ADMIN — FLUOXETINE HYDROCHLORIDE 40 MG: 20 CAPSULE ORAL at 08:01

## 2025-03-15 RX ADMIN — INSULIN LISPRO 2 UNITS: 100 INJECTION, SOLUTION INTRAVENOUS; SUBCUTANEOUS at 12:16

## 2025-03-15 RX ADMIN — METOPROLOL SUCCINATE 25 MG: 25 TABLET, EXTENDED RELEASE ORAL at 20:25

## 2025-03-15 RX ADMIN — ENOXAPARIN SODIUM 160 MG: 80 INJECTION, SOLUTION SUBCUTANEOUS at 22:18

## 2025-03-15 RX ADMIN — ACETAMINOPHEN 650 MG: 325 TABLET ORAL at 20:53

## 2025-03-15 RX ADMIN — BENZONATATE 100 MG: 100 CAPSULE ORAL at 20:50

## 2025-03-15 RX ADMIN — ALBUTEROL SULFATE 2.5 MG: 2.5 SOLUTION RESPIRATORY (INHALATION) at 07:43

## 2025-03-15 RX ADMIN — POTASSIUM CHLORIDE 40 MEQ: 750 CAPSULE, EXTENDED RELEASE ORAL at 09:25

## 2025-03-15 RX ADMIN — INSULIN LISPRO 2 UNITS: 100 INJECTION, SOLUTION INTRAVENOUS; SUBCUTANEOUS at 20:50

## 2025-03-15 RX ADMIN — INSULIN LISPRO 2 UNITS: 100 INJECTION, SOLUTION INTRAVENOUS; SUBCUTANEOUS at 07:34

## 2025-03-15 RX ADMIN — BENZONATATE 100 MG: 100 CAPSULE ORAL at 12:17

## 2025-03-15 RX ADMIN — Medication 10 ML: at 08:02

## 2025-03-15 RX ADMIN — FUROSEMIDE 40 MG: 10 INJECTION, SOLUTION INTRAMUSCULAR; INTRAVENOUS at 09:26

## 2025-03-15 RX ADMIN — MONTELUKAST 10 MG: 10 TABLET, FILM COATED ORAL at 20:50

## 2025-03-15 RX ADMIN — MUPIROCIN 1 APPLICATION: 20 OINTMENT TOPICAL at 20:50

## 2025-03-15 NOTE — PLAN OF CARE
Goal Outcome Evaluation:  Plan of Care Reviewed With: patient        Progress: no change  Outcome Evaluation: VSS.  Afib with PVC's on telemetry.  A/Ox4.  Bentyl given for c/o abdominal pain.  unable to complete azithromycin dose d/t pt unable to tolerate through current peripheral iv site, even at a lower rate.  RRT nurse also unable to ultrasound place an iv last night.

## 2025-03-15 NOTE — PLAN OF CARE
Goal Outcome Evaluation:              Outcome Evaluation: pt is alert and oriented x4, pt is on 8 liter high flow per nc getting breating tx , afib on the monitor, IV lasix, accuchecks coverd with sliding scale insulin.  Able to make needs known, likes to sleep in chair, able to transfer from [facility] to [facility] without assistance.  Continue current plan

## 2025-03-15 NOTE — PROGRESS NOTES
[facility]   Hospitalist Progress Note    Date of admission: 3/13/2025  Patient Name: Aida Conner  1955  Date: 3/15/2025      Subjective     Chief Complaint   Patient presents with    Shortness of Breath     pt c/o sob. pt on oxygen at home 3 l/nc.       Interval Followup: Shortness of air improving still having cough with occasional sputum but decreasing now and is easily fatigued.  Still on 8 L of nasal cannula.  Feels like his urinated fairly well with diuretics      Objective     Vitals:   Temp:  [97.3 °F (36.3 °C)-98.6 °F (37 °C)] 97.7 °F (36.5 °C)  Heart Rate:  [] 93  Resp:  [16-22] 18  BP: (118-160)/(75-94) 137/92  Flow (L/min) (Oxygen Therapy):  [8-11] 8    Physical Exam  Awake, conversant, tired, morbidly obese   B/l wheezing, moderate aeration, worse in bases, improved from yesterday some, cough not as severe, on 8 L high flow currently  Rrr, le edema ~1+ decreasing compared to yesterday  Abdomen nondistended    Result Review:  Vital signs, labs and recent relevant imaging reviewed.      CBC          3/13/2025    08:02 3/14/2025    02:55 3/15/2025    04:54   CBC   WBC 14.30  11.66  13.52    RBC 4.23  3.95  4.28    Hemoglobin 12.0  11.0  11.9    Hematocrit 39.4  36.5  39.2    MCV 93.1  92.4  91.6    MCH 28.4  27.8  27.8    MCHC 30.5  30.1  30.4    RDW 14.1  14.1  14.4    Platelets 232  209  238      CMP          3/13/2025    08:02 3/14/2025    02:55 3/15/2025    04:54   CMP   Glucose 174  150  177    BUN 13  17  24    Creatinine 0.72  0.60  0.75    EGFR 89.5  96.1  85.2    Sodium 138  140  138    Potassium 4.4  4.4  3.7    Chloride 98  99  95    Calcium 8.9  8.5  8.4    Total Protein 7.3      Albumin 3.6      Globulin 3.7      Total Bilirubin 1.1      Alkaline Phosphatase 117      AST (SGOT) 22      ALT (SGPT) 21      Albumin/Globulin Ratio 1.0      BUN/Creatinine Ratio 18.1  28.3  32.0    Anion Gap 11.1  10.9  12.4          acetaminophen    albuterol    aluminum-magnesium  hydroxide-simethicone    benzocaine-menthol    benzonatate    senna-docusate sodium **AND** polyethylene glycol **AND** bisacodyl **AND** bisacodyl    dextrose    dextrose    Diclofenac Sodium    dicyclomine    diphenoxylate-atropine    glucagon (human recombinant)    guaiFENesin-dextromethorphan    hydrOXYzine    Lidocaine (Anorectal)    Lidocaine    melatonin    nicotine    ondansetron    sodium chloride    sodium chloride    sodium chloride    sodium chloride    sodium chloride    sodium chloride    albuterol, 2.5 mg, Nebulization, Q6H - RT  anastrozole, 1 mg, Oral, Daily  arformoterol, 15 mcg, Nebulization, BID - RT  azithromycin, 500 mg, Intravenous, Q24H  budesonide, 0.5 mg, Nebulization, BID - RT  enoxaparin sodium, 1 mg/kg, Subcutaneous, Q12H  FLUoxetine, 40 mg, Oral, Daily  furosemide, 40 mg, Intravenous, Q12H  [Held by provider] furosemide, 40 mg, Oral, Daily  guaiFENesin, 600 mg, Oral, Q12H  insulin lispro, 2-7 Units, Subcutaneous, 4x Daily AC & at Bedtime  [Held by provider] lisinopril, 10 mg, Oral, Daily  metoprolol succinate XL, 25 mg, Oral, Q12H  montelukast, 10 mg, Oral, Nightly  mupirocin, 1 Application, Each Nare, BID  oseltamivir, 75 mg, Oral, Q12H  pantoprazole, 40 mg, Oral, Daily  pravastatin, 40 mg, Oral, Nightly  predniSONE, 40 mg, Oral, Daily With Breakfast  senna-docusate sodium, 2 tablet, Oral, BID  sodium chloride, 10 mL, Intravenous, Q12H  [Held by provider] spironolactone, 25 mg, Oral, Daily        CT Angiogram Chest Pulmonary Embolism  Result Date: 3/13/2025  Impression: 1.No evidence of central pulmonary embolus. Distal segmental and subsegmental pulmonary arteries are suboptimally assessed. 2.Multifocal pneumonia with more dense consolidation in the right middle lobe. 3.Other stable chronic findings. Electronically Signed: Preet Ross MD  3/13/2025 10:57 AM EDT  Workstation ID: [id number]    XR Chest 1 View  Result Date: 3/13/2025  Impression: 1.New consolidation throughout the right  lower lung, concerning for pneumonia. 2.Mild airspace opacities in the left lower lung, which may be due to pneumonia and/or atelectasis. 3.Enlarged cardiac silhouette. Electronically Signed: Alejandra Hdz  3/13/2025 8:34 AM EDT  Workstation ID: [id number]      Assessment / Plan     Summary: 69 y.o. COPD on 2.5 L baseline with 4 L exertional baseline oxygen, severe morbid obesity, ANABELLE previously on NIPPV but has not had for the past couple of years, who presented with progressively worsening shortness of air fever cough.  Admitted for AHRF, sepsis from legionella pna, chf exacerbation and new aflutter with rvr.      Assessment/Plan (clinically significant if listed here)  Sepsis secondary to multifocal pneumonia from unspecified bacterial organism and influenza pneumonia, with organ dysfunction as evidenced by respiratory failure and new a flutter  Acute hypoxemic respiratory failure requiring high flow nasal cannula  Legionella pneumonia  Influenza A pneumonia  New, Aflutter with RVR  Diastolic with exacerbation (61%, elevated RVSP moderate 45-55) and baseline adherence difficulties to diuresis  COPD on oxygen approximately 2.5 with up to 4 L on exertion at baseline, with exacerbation, denies any smoking history  ANABELLE on remote use of CPAP, has not been on any years in setting of device malfunction reportedly  HER2 positive triple positive breast cancer on the right, prior surgery, chemotherapy, adjuvant treatment.  Currently on anastrazole DM2 diet controlled as outpatient  Severe morbid obesity BMI 57  Chronic anxiety  GERD     CT PE: no central pe, limited fur subseqmental, multifocal pneumonia with dense right middle lobe consolidation noted.  No obvious pleural effusion.  No heart strain noted.  No WILLIAMS.    ECHO    Left ventricular ejection fraction appears to be 61 - 65%.    Left ventricular wall thickness is consistent with mild concentric hypertrophy.    Left ventricular diastolic function was indeterminate.     The left atrial cavity is mildly dilated.    Left atrial volume is mildly increased.    Estimated right ventricular systolic pressure from tricuspid regurgitation is moderately elevated (45-55 mmHg).    Mild dilation of the ascending aorta is present.      Telemetry paroxysmal a flutter, PVC and brief NSVT episode    Continue steroids, SSI while on steroids outpatient is diet controlled diabetes  Continue IV azithromycin course for Legionella, repeat Pro-Bossman in a.m.  White count increased today suspect in setting of steroids monitor  Discussed with pt and family - some concerns about hvac/damp basement/humidity and they are going to have this be evaluated   Cont Tamiflu course for influenza A, isolation precautions  Cont iv lasix, I/o, renal fxn, daily weights, echo results as above  Blood pressure improving, change metoprolol to metoprolol succinate at increased dose, holding spironolactone and lisinopril still possibly can resume in the next few days  Continue Lovenox for anticoagulation will switch to Eliquis for a flutter, discussed with patient wants to use this over warfarin.  Is okay with the cost that would be around $90 a month if we could get a free first month with a coupon since she has and uses before  Has cardiologist Bobby / Dr Garaz and will follow-up with him outpatient  Brov, pulm, nebs  Hfnc, wean as able to 90%spo2  RT  consult for NIPPV evaluation, family to go to bring in her unit apparently needs a new piece has not used in years however, likely will need to reevaluate.  Abg was compensated initially with secretions and symptoms suspect would tolerate well currently and seems to be doing fairly well with high flow will continue to monitor  Pulmonology recs reviewed appreciate assistance  Continue home anastrozole  Continue fluoxetine  Continue PPI  Continue statin  Prn bentyl needing for cramping, lomotil for diarrhea if needed (chronic issue no acute new symptoms concerning for c  diff otherwise currently)   PT/OT  Check a.m. CBC, BMP, magnesium, phosphorus    Dispo: likely rehab once medically stable. Cont close inpatient monitoring  Will discuss further with SW    VTE Prophylaxis:  Pharmacologic VTE prophylaxis orders are present.      Code Status (Patient has no pulse and is not breathing): CPR (Attempt to Resuscitate)  Medical Interventions (Patient has pulse or is breathing): Full Support

## 2025-03-16 LAB
ANION GAP SERPL CALCULATED.3IONS-SCNC: 12.3 MMOL/L (ref 5–15)
BASOPHILS # BLD AUTO: 0.02 10*3/MM3 (ref 0–0.2)
BASOPHILS NFR BLD AUTO: 0.2 % (ref 0–1.5)
BUN SERPL-MCNC: 29 MG/DL (ref 8–23)
BUN/CREAT SERPL: 36.3 (ref 7–25)
CALCIUM SPEC-SCNC: 8.4 MG/DL (ref 8.6–10.5)
CHLORIDE SERPL-SCNC: 96 MMOL/L (ref 98–107)
CO2 SERPL-SCNC: 26.7 MMOL/L (ref 22–29)
CREAT SERPL-MCNC: 0.8 MG/DL (ref 0.57–1)
DEPRECATED RDW RBC AUTO: 47.9 FL (ref 37–54)
EGFRCR SERPLBLD CKD-EPI 2021: 78.9 ML/MIN/1.73
EOSINOPHIL # BLD AUTO: 0 10*3/MM3 (ref 0–0.4)
EOSINOPHIL NFR BLD AUTO: 0 % (ref 0.3–6.2)
ERYTHROCYTE [DISTWIDTH] IN BLOOD BY AUTOMATED COUNT: 14.1 % (ref 12.3–15.4)
GLUCOSE BLDC GLUCOMTR-MCNC: 133 MG/DL (ref 70–99)
GLUCOSE BLDC GLUCOMTR-MCNC: 169 MG/DL (ref 70–99)
GLUCOSE BLDC GLUCOMTR-MCNC: 214 MG/DL (ref 70–99)
GLUCOSE BLDC GLUCOMTR-MCNC: 218 MG/DL (ref 70–99)
GLUCOSE SERPL-MCNC: 165 MG/DL (ref 65–99)
HCT VFR BLD AUTO: 38.8 % (ref 34–46.6)
HGB BLD-MCNC: 11.8 G/DL (ref 12–15.9)
IMM GRANULOCYTES # BLD AUTO: 0.09 10*3/MM3 (ref 0–0.05)
IMM GRANULOCYTES NFR BLD AUTO: 0.7 % (ref 0–0.5)
LYMPHOCYTES # BLD AUTO: 0.61 10*3/MM3 (ref 0.7–3.1)
LYMPHOCYTES NFR BLD AUTO: 4.6 % (ref 19.6–45.3)
MAGNESIUM SERPL-MCNC: 2.3 MG/DL (ref 1.6–2.4)
MCH RBC QN AUTO: 28.2 PG (ref 26.6–33)
MCHC RBC AUTO-ENTMCNC: 30.4 G/DL (ref 31.5–35.7)
MCV RBC AUTO: 92.8 FL (ref 79–97)
MONOCYTES # BLD AUTO: 0.87 10*3/MM3 (ref 0.1–0.9)
MONOCYTES NFR BLD AUTO: 6.6 % (ref 5–12)
NEUTROPHILS NFR BLD AUTO: 11.66 10*3/MM3 (ref 1.7–7)
NEUTROPHILS NFR BLD AUTO: 87.9 % (ref 42.7–76)
NRBC BLD AUTO-RTO: 0 /100 WBC (ref 0–0.2)
PHOSPHATE SERPL-MCNC: 3.1 MG/DL (ref 2.5–4.5)
PLATELET # BLD AUTO: 250 10*3/MM3 (ref 140–450)
PMV BLD AUTO: 11.1 FL (ref 6–12)
POTASSIUM SERPL-SCNC: 4.3 MMOL/L (ref 3.5–5.2)
PROCALCITONIN SERPL-MCNC: 0.05 NG/ML (ref 0–0.25)
RBC # BLD AUTO: 4.18 10*6/MM3 (ref 3.77–5.28)
SODIUM SERPL-SCNC: 135 MMOL/L (ref 136–145)
WBC NRBC COR # BLD AUTO: 13.25 10*3/MM3 (ref 3.4–10.8)

## 2025-03-16 PROCEDURE — 63710000001 INSULIN LISPRO (HUMAN) PER 5 UNITS: Performed by: INTERNAL MEDICINE

## 2025-03-16 PROCEDURE — 94640 AIRWAY INHALATION TREATMENT: CPT

## 2025-03-16 PROCEDURE — 94799 UNLISTED PULMONARY SVC/PX: CPT

## 2025-03-16 PROCEDURE — 84145 PROCALCITONIN (PCT): CPT | Performed by: INTERNAL MEDICINE

## 2025-03-16 PROCEDURE — 63710000001 PREDNISONE PER 1 MG: Performed by: INTERNAL MEDICINE

## 2025-03-16 PROCEDURE — 80048 BASIC METABOLIC PNL TOTAL CA: CPT | Performed by: INTERNAL MEDICINE

## 2025-03-16 PROCEDURE — 86901 BLOOD TYPING SEROLOGIC RH(D): CPT

## 2025-03-16 PROCEDURE — 99233 SBSQ HOSP IP/OBS HIGH 50: CPT | Performed by: INTERNAL MEDICINE

## 2025-03-16 PROCEDURE — 85025 COMPLETE CBC W/AUTO DIFF WBC: CPT | Performed by: INTERNAL MEDICINE

## 2025-03-16 PROCEDURE — 94664 DEMO&/EVAL PT USE INHALER: CPT

## 2025-03-16 PROCEDURE — 82948 REAGENT STRIP/BLOOD GLUCOSE: CPT

## 2025-03-16 PROCEDURE — 83735 ASSAY OF MAGNESIUM: CPT | Performed by: INTERNAL MEDICINE

## 2025-03-16 PROCEDURE — 25010000002 FUROSEMIDE PER 20 MG: Performed by: INTERNAL MEDICINE

## 2025-03-16 PROCEDURE — 25010000002 ENOXAPARIN PER 10 MG: Performed by: INTERNAL MEDICINE

## 2025-03-16 PROCEDURE — 94761 N-INVAS EAR/PLS OXIMETRY MLT: CPT

## 2025-03-16 PROCEDURE — 86900 BLOOD TYPING SEROLOGIC ABO: CPT

## 2025-03-16 PROCEDURE — 84100 ASSAY OF PHOSPHORUS: CPT | Performed by: INTERNAL MEDICINE

## 2025-03-16 RX ORDER — METOLAZONE 5 MG/1
5 TABLET ORAL DAILY
Status: COMPLETED | OUTPATIENT
Start: 2025-03-16 | End: 2025-03-16

## 2025-03-16 RX ORDER — DICYCLOMINE HYDROCHLORIDE 10 MG/1
10 CAPSULE ORAL 4 TIMES DAILY PRN
Status: DISCONTINUED | OUTPATIENT
Start: 2025-03-16 | End: 2025-03-18 | Stop reason: HOSPADM

## 2025-03-16 RX ORDER — POTASSIUM CHLORIDE 750 MG/1
20 CAPSULE, EXTENDED RELEASE ORAL ONCE
Status: COMPLETED | OUTPATIENT
Start: 2025-03-16 | End: 2025-03-16

## 2025-03-16 RX ORDER — HYDROCORTISONE ACETATE 25 MG/1
25 SUPPOSITORY RECTAL 2 TIMES DAILY
Status: DISCONTINUED | OUTPATIENT
Start: 2025-03-16 | End: 2025-03-18 | Stop reason: HOSPADM

## 2025-03-16 RX ORDER — FUROSEMIDE 10 MG/ML
40 INJECTION INTRAMUSCULAR; INTRAVENOUS EVERY 12 HOURS
Status: COMPLETED | OUTPATIENT
Start: 2025-03-16 | End: 2025-03-16

## 2025-03-16 RX ADMIN — MUPIROCIN 1 APPLICATION: 20 OINTMENT TOPICAL at 20:43

## 2025-03-16 RX ADMIN — OSELTAMIVIR 75 MG: 75 CAPSULE ORAL at 20:44

## 2025-03-16 RX ADMIN — DICYCLOMINE HYDROCHLORIDE 10 MG: 10 CAPSULE ORAL at 10:45

## 2025-03-16 RX ADMIN — METOLAZONE 5 MG: 5 TABLET ORAL at 16:53

## 2025-03-16 RX ADMIN — BENZONATATE 100 MG: 100 CAPSULE ORAL at 08:22

## 2025-03-16 RX ADMIN — GUAIFENESIN 600 MG: 600 TABLET ORAL at 08:24

## 2025-03-16 RX ADMIN — FUROSEMIDE 40 MG: 10 INJECTION, SOLUTION INTRAMUSCULAR; INTRAVENOUS at 10:24

## 2025-03-16 RX ADMIN — POTASSIUM CHLORIDE 20 MEQ: 750 CAPSULE, EXTENDED RELEASE ORAL at 10:24

## 2025-03-16 RX ADMIN — MONTELUKAST 10 MG: 10 TABLET, FILM COATED ORAL at 20:44

## 2025-03-16 RX ADMIN — OSELTAMIVIR 75 MG: 75 CAPSULE ORAL at 08:22

## 2025-03-16 RX ADMIN — ARFORMOTEROL TARTRATE 15 MCG: 15 SOLUTION RESPIRATORY (INHALATION) at 07:39

## 2025-03-16 RX ADMIN — AZITHROMYCIN DIHYDRATE 500 MG: 250 TABLET ORAL at 20:44

## 2025-03-16 RX ADMIN — PREDNISONE 40 MG: 20 TABLET ORAL at 08:22

## 2025-03-16 RX ADMIN — ALBUTEROL SULFATE 2.5 MG: 2.5 SOLUTION RESPIRATORY (INHALATION) at 23:42

## 2025-03-16 RX ADMIN — ALUMINUM HYDROXIDE, MAGNESIUM HYDROXIDE, AND DIMETHICONE 15 ML: 400; 400; 40 SUSPENSION ORAL at 00:08

## 2025-03-16 RX ADMIN — INSULIN LISPRO 2 UNITS: 100 INJECTION, SOLUTION INTRAVENOUS; SUBCUTANEOUS at 17:48

## 2025-03-16 RX ADMIN — ALBUTEROL SULFATE 2.5 MG: 2.5 SOLUTION RESPIRATORY (INHALATION) at 19:22

## 2025-03-16 RX ADMIN — METOPROLOL SUCCINATE 25 MG: 25 TABLET, EXTENDED RELEASE ORAL at 08:22

## 2025-03-16 RX ADMIN — INSULIN LISPRO 3 UNITS: 100 INJECTION, SOLUTION INTRAVENOUS; SUBCUTANEOUS at 20:43

## 2025-03-16 RX ADMIN — SENNOSIDES AND DOCUSATE SODIUM 2 TABLET: 50; 8.6 TABLET ORAL at 08:22

## 2025-03-16 RX ADMIN — ENOXAPARIN SODIUM 160 MG: 80 INJECTION, SOLUTION SUBCUTANEOUS at 10:23

## 2025-03-16 RX ADMIN — PANTOPRAZOLE SODIUM 40 MG: 40 TABLET, DELAYED RELEASE ORAL at 08:22

## 2025-03-16 RX ADMIN — HYDROCORTISONE ACETATE 25 MG: 25 SUPPOSITORY RECTAL at 21:56

## 2025-03-16 RX ADMIN — ARFORMOTEROL TARTRATE 15 MCG: 15 SOLUTION RESPIRATORY (INHALATION) at 19:22

## 2025-03-16 RX ADMIN — Medication 10 ML: at 08:23

## 2025-03-16 RX ADMIN — ALBUTEROL SULFATE 2.5 MG: 2.5 SOLUTION RESPIRATORY (INHALATION) at 14:01

## 2025-03-16 RX ADMIN — FLUOXETINE HYDROCHLORIDE 40 MG: 20 CAPSULE ORAL at 08:22

## 2025-03-16 RX ADMIN — FUROSEMIDE 40 MG: 10 INJECTION, SOLUTION INTRAMUSCULAR; INTRAVENOUS at 21:56

## 2025-03-16 RX ADMIN — ACETAMINOPHEN 650 MG: 325 TABLET ORAL at 23:50

## 2025-03-16 RX ADMIN — INSULIN LISPRO 3 UNITS: 100 INJECTION, SOLUTION INTRAVENOUS; SUBCUTANEOUS at 12:18

## 2025-03-16 RX ADMIN — DICYCLOMINE HYDROCHLORIDE 10 MG: 10 CAPSULE ORAL at 19:10

## 2025-03-16 RX ADMIN — ALBUTEROL SULFATE 2.5 MG: 2.5 SOLUTION RESPIRATORY (INHALATION) at 01:31

## 2025-03-16 RX ADMIN — ANASTROZOLE 1 MG: 1 TABLET, COATED ORAL at 08:55

## 2025-03-16 RX ADMIN — BUDESONIDE 0.5 MG: 0.5 INHALANT RESPIRATORY (INHALATION) at 19:22

## 2025-03-16 RX ADMIN — PRAVASTATIN SODIUM 40 MG: 20 TABLET ORAL at 20:44

## 2025-03-16 RX ADMIN — BUDESONIDE 0.5 MG: 0.5 INHALANT RESPIRATORY (INHALATION) at 07:39

## 2025-03-16 RX ADMIN — METOPROLOL SUCCINATE 25 MG: 25 TABLET, EXTENDED RELEASE ORAL at 20:47

## 2025-03-16 RX ADMIN — GUAIFENESIN 600 MG: 600 TABLET ORAL at 20:44

## 2025-03-16 RX ADMIN — ALBUTEROL SULFATE 2.5 MG: 2.5 SOLUTION RESPIRATORY (INHALATION) at 07:39

## 2025-03-16 RX ADMIN — MUPIROCIN 1 APPLICATION: 20 OINTMENT TOPICAL at 08:25

## 2025-03-16 RX ADMIN — BENZONATATE 100 MG: 100 CAPSULE ORAL at 18:24

## 2025-03-16 RX ADMIN — Medication 10 ML: at 20:45

## 2025-03-16 NOTE — PROGRESS NOTES
[facility]   Hospitalist Progress Note    Date of admission: 3/13/2025  Patient Name: Aida Conner  1955  Date: 3/16/2025      Subjective     Chief Complaint   Patient presents with    Shortness of Breath     pt c/o sob. pt on oxygen at home 3 l/nc.       Interval Followup: Short of air continues to improve, decreasing oxygen requirements.  Still gets dyspnea with exertion but feels like is improving.  Cough decreasing.  Feels like her urine output is not as much as yesterday.  Has left lower extremity leg rash has been there for about 10 months at least seen dermatology before has been on steroids for this before, no acute changes in his is asking if nothing else to try for it.  Apparently had a spot shot in the past and had some sweating at times as well.  Discussed CHF related precautions with lower extremity edema.    Patient now noting that she has a chronic history of hemorrhoidal bleeding, apparently this been longstanding but has been having a migraine is worse today discussed stopping her blood thinner and monitoring    Objective     Vitals:   Temp:  [97.3 °F (36.3 °C)-98.1 °F (36.7 °C)] 97.7 °F (36.5 °C)  Heart Rate:  [] 91  Resp:  [16-20] 16  BP: (114-144)/() 135/91  Flow (L/min) (Oxygen Therapy):  [4-8] 4    Physical Exam  Awake, conversant, tired, morbidly obese   Decreasing wheezing and lower lung crackles, 6 L nasal cannula   Rrr, le edema decreasing still about 1+,   Abdomen nondistended  Left lower extremity anterior shin chronic appearing rash, appears to have some chronic scarring    Result Review:  Vital signs, labs and recent relevant imaging reviewed.      CBC          3/14/2025    02:55 3/15/2025    04:54 3/16/2025    04:15   CBC   WBC 11.66  13.52  13.25    RBC 3.95  4.28  4.18    Hemoglobin 11.0  11.9  11.8    Hematocrit 36.5  39.2  38.8    MCV 92.4  91.6  92.8    MCH 27.8  27.8  28.2    MCHC 30.1  30.4  30.4    RDW 14.1  14.4  14.1    Platelets 209  238  250      CMP           3/14/2025    02:55 3/15/2025    04:54 3/16/2025    04:15   CMP   Glucose 150  177  165    BUN 17  24  29    Creatinine 0.60  0.75  0.80    EGFR 96.1  85.2  78.9    Sodium 140  138  135    Potassium 4.4  3.7  4.3    Chloride 99  95  96    Calcium 8.5  8.4  8.4    BUN/Creatinine Ratio 28.3  32.0  36.3    Anion Gap 10.9  12.4  12.3          acetaminophen    albuterol    aluminum-magnesium hydroxide-simethicone    benzocaine-menthol    benzonatate    senna-docusate sodium **AND** polyethylene glycol **AND** bisacodyl **AND** bisacodyl    dextrose    dextrose    Diclofenac Sodium    dicyclomine    diphenoxylate-atropine    glucagon (human recombinant)    guaiFENesin-dextromethorphan    hydrOXYzine    Lidocaine (Anorectal)    Lidocaine    melatonin    nicotine    ondansetron    sodium chloride    sodium chloride    sodium chloride    sodium chloride    sodium chloride    sodium chloride    albuterol, 2.5 mg, Nebulization, Q6H - RT  anastrozole, 1 mg, Oral, Daily  arformoterol, 15 mcg, Nebulization, BID - RT  azithromycin, 500 mg, Oral, Q24H  budesonide, 0.5 mg, Nebulization, BID - RT  enoxaparin sodium, 1 mg/kg, Subcutaneous, Q12H  FLUoxetine, 40 mg, Oral, Daily  furosemide, 40 mg, Intravenous, Q12H  [Held by provider] furosemide, 40 mg, Oral, Daily  guaiFENesin, 600 mg, Oral, Q12H  insulin lispro, 2-7 Units, Subcutaneous, 4x Daily AC & at Bedtime  [Held by provider] lisinopril, 10 mg, Oral, Daily  metoprolol succinate XL, 25 mg, Oral, Q12H  montelukast, 10 mg, Oral, Nightly  mupirocin, 1 Application, Each Nare, BID  oseltamivir, 75 mg, Oral, Q12H  pantoprazole, 40 mg, Oral, Daily  pravastatin, 40 mg, Oral, Nightly  predniSONE, 40 mg, Oral, Daily With Breakfast  senna-docusate sodium, 2 tablet, Oral, BID  sodium chloride, 10 mL, Intravenous, Q12H  [Held by provider] spironolactone, 25 mg, Oral, Daily        CT Angiogram Chest Pulmonary Embolism  Result Date: 3/13/2025  Impression: 1.No evidence of central  pulmonary embolus. Distal segmental and subsegmental pulmonary arteries are suboptimally assessed. 2.Multifocal pneumonia with more dense consolidation in the right middle lobe. 3.Other stable chronic findings. Electronically Signed: Preet Ross MD  3/13/2025 10:57 AM EDT  Workstation ID: [id number]    XR Chest 1 View  Result Date: 3/13/2025  Impression: 1.New consolidation throughout the right lower lung, concerning for pneumonia. 2.Mild airspace opacities in the left lower lung, which may be due to pneumonia and/or atelectasis. 3.Enlarged cardiac silhouette. Electronically Signed: Alejandra Hdz  3/13/2025 8:34 AM EDT  Workstation ID: [id number]      Assessment / Plan     Summary: 69 y.o. COPD on 2.5 L baseline with 4 L exertional baseline oxygen, severe morbid obesity, ANABELLE previously on NIPPV but has not had for the past couple of years, who presented with progressively worsening shortness of air fever cough.  Admitted for AHRF, sepsis from legionella pna, chf exacerbation and new aflutter with rvr.      Assessment/Plan (clinically significant if listed here)  Sepsis secondary to multifocal pneumonia from unspecified bacterial organism and influenza pneumonia, with organ dysfunction as evidenced by respiratory failure and new a flutter  Acute hypoxemic respiratory failure requiring high flow nasal cannula  Legionella pneumonia  Influenza A pneumonia  New, Aflutter with RVR  Diastolic with exacerbation (61%, elevated RVSP moderate 45-55) and baseline adherence difficulties to diuresis  COPD on oxygen approximately 2.5 with up to 4 L on exertion at baseline, with exacerbation, denies any smoking history  ANABELLE on remote use of CPAP, has not been on any years in setting of device malfunction reportedly  HER2 positive triple positive breast cancer on the right, prior surgery, chemotherapy, adjuvant treatment.  Currently on anastrazole DM2 diet controlled as outpatient  Severe morbid obesity BMI 57  Chronic  anxiety  GERD  History of internal hemorrhoids with rectal bleeding     CT PE: no central pe, limited fur subseqmental, multifocal pneumonia with dense right middle lobe consolidation noted.  No obvious pleural effusion.  No heart strain noted.  No WILLIAMS.    ECHO    Left ventricular ejection fraction appears to be 61 - 65%.    Left ventricular wall thickness is consistent with mild concentric hypertrophy.    Left ventricular diastolic function was indeterminate.    The left atrial cavity is mildly dilated.    Left atrial volume is mildly increased.    Estimated right ventricular systolic pressure from tricuspid regurgitation is moderately elevated (45-55 mmHg).    Mild dilation of the ascending aorta is present.    2022 colonoscopy had diverticulitis and internal hemorrhoids 5-year repeat colonoscopy was recommended at that time  2022 EGD medium-sized hiatal hernia otherwise unremarkable    Telemetry paroxysmal a flutter    Continue steroids, SSI while on steroids outpatient is diet controlled diabetes  Continue azithromycin for Legionella,  procalcitonin negative, still with leukocytosis starting to downtrend partially still elevated in setting of steroids  Continue IV Lasix still has volume overload, give a dose of metolazone.  Needs follow-up outpatient with cardiologist Bobby Parry/Dr. Garza.  Discontinue Lovenox, rectal bleeding apparently acute on chronic as she notes it has been going on for months but is worse on the current blood thinner.  Does have a history of internal hemorrhoids seen on colonoscopy 5/20/2022.  Hemoglobin has been stable.  Stop Lovenox, placed on hydrocortisone suppository, check type and screen and monitor hemoglobin, may need to hold off on any anticoagulation until stable from a respiratory standpoint to undergo repeat scope.  Probably as an outpatient.  Continue Tamiflu course for influenza A  Brov, pulm, nebs  Hfnc, wean as able to 90%spo2  RT  consult for NIPPV  evaluation, family to go to bring in her unit apparently needs a new piece has not used in years however, likely will need to reevaluate outpatient.  Continue on nasal cannula for now.  ABG was compensated  Pulmonology has evaluated  Continue home anastrozole  Continue fluoxetine  Continue PPI  Continue statin  Prn bentyl needing for cramping, lomotil for diarrhea if needed (chronic issue no acute new symptoms concerning for c diff otherwise currently).  Bleeding is noted as above suspect irritated with patient's underlying baseline diarrhea  PT/OT  Check a.m. CBC, BMP, magnesium, phosphorus    Dispo: likely rehab once medically stable. Cont close inpatient monitoring  Will discuss further with MANUEL    VTE Prophylaxis:  Pharmacologic VTE prophylaxis orders are present.      Code Status (Patient has no pulse and is not breathing): CPR (Attempt to Resuscitate)  Medical Interventions (Patient has pulse or is breathing): Full Support

## 2025-03-16 NOTE — PROGRESS NOTES
Respiratory Therapist Broncho-Pulmonary Hygiene Progress Note      Patient Name:  Aida Conner  YOB: 1955    Aida Conner meets the qualification for Level 2 of the Bronco-Pulmonary Hygiene Protocol. This was based on my daily patient assessment and includes review of chest x-ray results, cough ability and quality, oxygenation, secretions or risk for secretion development and patient mobility.     Broncho-Pulmonary Hygiene Assessment:    Level of Movement: Actively changing positions without assistance  Alert/ oriented/ cooperative    Breath Sounds: Diminished and/or coarse rhonchi    Cough: Strong, effective and/or frequent  Loose, non prod  Chest X-Ray: Presence of atelectasis and/or consolidation  CT SCAN 3/13/25:   1.No evidence of central pulmonary embolus. Distal segmental and subsegmental pulmonary arteries are suboptimally assessed.  2.Multifocal pneumonia with more dense consolidation in the right middle lobe.  3.Other stable chronic findings.  Sputum Productions: None or small amount of thin or watery secretions with effective cough    History and Physical: Home use of oxygen  and Chronic condition  COPD on 2.5 L baseline with 4 L exertional baseline oxygen, severe morbid obesity, ANABELLE previously on NIPPV but has not had for the past couple of years   SpO2 to Oxygen Need: greater than 90% on  greater than 6L, Vapotherm, oxygen mask greater than 40% or ventilator    Current SpO2 is: 96 on 8 L high flow nasal cannula (actively weaning to 6L)    Based on this information, I have completed the following interventions: Aerobika with bronchodialtor medication or TID and PAP with Aerobika    Borderline Level 3. Increasing resistance to bedside PEP device and adding PAP extension. Increasing freq to TID. Re-evaluate at end of day for further escalation.    Electronically signed by Brenna Alamo RRT, 03/16/25, 7:42 AM EDT.

## 2025-03-16 NOTE — PLAN OF CARE
Goal Outcome Evaluation:              Outcome Evaluation: pt is alert and oriented x4, pt is on 6 liters high flow per nc getting breating tx , afib on the monitor, IV lasix, PO potassium replacement accuc hecks coverd with sliding scale insulin. Able to make needs known, likes to sleep in chair, able to transfer from [facility] to [facility] without assistance. Continue current plan

## 2025-03-16 NOTE — PLAN OF CARE
Goal Outcome Evaluation:              Outcome Evaluation: pt is alert and oriented x4, pt is on 8 liter high flow per nc getting breating tx , afib on the monitor, IV lasix, accuchecks coverd with sliding scale insulin.  Able to make needs known, likes to sleep in chair, able to transfer from [facility] to [facility] without assistance.  Continue current plan

## 2025-03-16 NOTE — PLAN OF CARE
Goal Outcome Evaluation:  Plan of Care Reviewed With: patient        Progress: no change  Outcome Evaluation: VSS.  remains afib on telemetry.  bentyl given for c/o abd. pain.  continues on 8 liters high flow with sats 94 and above.  sleeps sitting up in chair per preference.

## 2025-03-17 LAB
ABO GROUP BLD: NORMAL
ABO GROUP BLD: NORMAL
ANION GAP SERPL CALCULATED.3IONS-SCNC: 10.2 MMOL/L (ref 5–15)
BASOPHILS # BLD AUTO: 0.04 10*3/MM3 (ref 0–0.2)
BASOPHILS NFR BLD AUTO: 0.3 % (ref 0–1.5)
BLD GP AB SCN SERPL QL: NEGATIVE
BUN SERPL-MCNC: 25 MG/DL (ref 8–23)
BUN/CREAT SERPL: 29.8 (ref 7–25)
CALCIUM SPEC-SCNC: 8.6 MG/DL (ref 8.6–10.5)
CHLORIDE SERPL-SCNC: 92 MMOL/L (ref 98–107)
CO2 SERPL-SCNC: 33.8 MMOL/L (ref 22–29)
CREAT SERPL-MCNC: 0.84 MG/DL (ref 0.57–1)
DEPRECATED RDW RBC AUTO: 46.7 FL (ref 37–54)
EGFRCR SERPLBLD CKD-EPI 2021: 74.4 ML/MIN/1.73
EOSINOPHIL # BLD AUTO: 0.01 10*3/MM3 (ref 0–0.4)
EOSINOPHIL NFR BLD AUTO: 0.1 % (ref 0.3–6.2)
ERYTHROCYTE [DISTWIDTH] IN BLOOD BY AUTOMATED COUNT: 14 % (ref 12.3–15.4)
GLUCOSE BLDC GLUCOMTR-MCNC: 117 MG/DL (ref 70–99)
GLUCOSE BLDC GLUCOMTR-MCNC: 161 MG/DL (ref 70–99)
GLUCOSE BLDC GLUCOMTR-MCNC: 203 MG/DL (ref 70–99)
GLUCOSE BLDC GLUCOMTR-MCNC: 203 MG/DL (ref 70–99)
GLUCOSE SERPL-MCNC: 152 MG/DL (ref 65–99)
HCT VFR BLD AUTO: 41 % (ref 34–46.6)
HGB BLD-MCNC: 12.7 G/DL (ref 12–15.9)
IMM GRANULOCYTES # BLD AUTO: 0.21 10*3/MM3 (ref 0–0.05)
IMM GRANULOCYTES NFR BLD AUTO: 1.6 % (ref 0–0.5)
LYMPHOCYTES # BLD AUTO: 1.12 10*3/MM3 (ref 0.7–3.1)
LYMPHOCYTES NFR BLD AUTO: 8.7 % (ref 19.6–45.3)
MAGNESIUM SERPL-MCNC: 2.3 MG/DL (ref 1.6–2.4)
MCH RBC QN AUTO: 28 PG (ref 26.6–33)
MCHC RBC AUTO-ENTMCNC: 31 G/DL (ref 31.5–35.7)
MCV RBC AUTO: 90.5 FL (ref 79–97)
MONOCYTES # BLD AUTO: 1.31 10*3/MM3 (ref 0.1–0.9)
MONOCYTES NFR BLD AUTO: 10.1 % (ref 5–12)
NEUTROPHILS NFR BLD AUTO: 10.22 10*3/MM3 (ref 1.7–7)
NEUTROPHILS NFR BLD AUTO: 79.2 % (ref 42.7–76)
NRBC BLD AUTO-RTO: 0 /100 WBC (ref 0–0.2)
PHOSPHATE SERPL-MCNC: 2.5 MG/DL (ref 2.5–4.5)
PLATELET # BLD AUTO: 309 10*3/MM3 (ref 140–450)
PMV BLD AUTO: 10.6 FL (ref 6–12)
POTASSIUM SERPL-SCNC: 3.8 MMOL/L (ref 3.5–5.2)
RBC # BLD AUTO: 4.53 10*6/MM3 (ref 3.77–5.28)
RH BLD: POSITIVE
RH BLD: POSITIVE
SODIUM SERPL-SCNC: 136 MMOL/L (ref 136–145)
T&S EXPIRATION DATE: NORMAL
WBC NRBC COR # BLD AUTO: 12.91 10*3/MM3 (ref 3.4–10.8)

## 2025-03-17 PROCEDURE — 63710000001 PREDNISONE PER 1 MG: Performed by: INTERNAL MEDICINE

## 2025-03-17 PROCEDURE — 94799 UNLISTED PULMONARY SVC/PX: CPT

## 2025-03-17 PROCEDURE — 86850 RBC ANTIBODY SCREEN: CPT | Performed by: INTERNAL MEDICINE

## 2025-03-17 PROCEDURE — 94761 N-INVAS EAR/PLS OXIMETRY MLT: CPT

## 2025-03-17 PROCEDURE — 36415 COLL VENOUS BLD VENIPUNCTURE: CPT | Performed by: INTERNAL MEDICINE

## 2025-03-17 PROCEDURE — 83735 ASSAY OF MAGNESIUM: CPT | Performed by: INTERNAL MEDICINE

## 2025-03-17 PROCEDURE — 82948 REAGENT STRIP/BLOOD GLUCOSE: CPT | Performed by: INTERNAL MEDICINE

## 2025-03-17 PROCEDURE — 84100 ASSAY OF PHOSPHORUS: CPT | Performed by: INTERNAL MEDICINE

## 2025-03-17 PROCEDURE — 86900 BLOOD TYPING SEROLOGIC ABO: CPT | Performed by: INTERNAL MEDICINE

## 2025-03-17 PROCEDURE — 85025 COMPLETE CBC W/AUTO DIFF WBC: CPT | Performed by: INTERNAL MEDICINE

## 2025-03-17 PROCEDURE — 86901 BLOOD TYPING SEROLOGIC RH(D): CPT | Performed by: INTERNAL MEDICINE

## 2025-03-17 PROCEDURE — 94664 DEMO&/EVAL PT USE INHALER: CPT

## 2025-03-17 PROCEDURE — 97161 PT EVAL LOW COMPLEX 20 MIN: CPT

## 2025-03-17 PROCEDURE — 82948 REAGENT STRIP/BLOOD GLUCOSE: CPT

## 2025-03-17 PROCEDURE — 25010000002 FUROSEMIDE PER 20 MG: Performed by: INTERNAL MEDICINE

## 2025-03-17 PROCEDURE — 99233 SBSQ HOSP IP/OBS HIGH 50: CPT | Performed by: INTERNAL MEDICINE

## 2025-03-17 PROCEDURE — 80048 BASIC METABOLIC PNL TOTAL CA: CPT | Performed by: INTERNAL MEDICINE

## 2025-03-17 PROCEDURE — 63710000001 INSULIN LISPRO (HUMAN) PER 5 UNITS: Performed by: INTERNAL MEDICINE

## 2025-03-17 RX ORDER — FUROSEMIDE 10 MG/ML
40 INJECTION INTRAMUSCULAR; INTRAVENOUS
Status: DISCONTINUED | OUTPATIENT
Start: 2025-03-17 | End: 2025-03-18 | Stop reason: HOSPADM

## 2025-03-17 RX ORDER — ACETAZOLAMIDE 250 MG/1
500 TABLET ORAL DAILY
Status: COMPLETED | OUTPATIENT
Start: 2025-03-17 | End: 2025-03-18

## 2025-03-17 RX ORDER — POTASSIUM CHLORIDE 750 MG/1
40 CAPSULE, EXTENDED RELEASE ORAL ONCE
Status: COMPLETED | OUTPATIENT
Start: 2025-03-17 | End: 2025-03-17

## 2025-03-17 RX ADMIN — ALBUTEROL SULFATE 2.5 MG: 2.5 SOLUTION RESPIRATORY (INHALATION) at 13:17

## 2025-03-17 RX ADMIN — FLUOXETINE HYDROCHLORIDE 40 MG: 20 CAPSULE ORAL at 08:12

## 2025-03-17 RX ADMIN — INSULIN LISPRO 3 UNITS: 100 INJECTION, SOLUTION INTRAVENOUS; SUBCUTANEOUS at 20:40

## 2025-03-17 RX ADMIN — OSELTAMIVIR 75 MG: 75 CAPSULE ORAL at 08:13

## 2025-03-17 RX ADMIN — ARFORMOTEROL TARTRATE 15 MCG: 15 SOLUTION RESPIRATORY (INHALATION) at 18:50

## 2025-03-17 RX ADMIN — GUAIFENESIN 600 MG: 600 TABLET ORAL at 08:13

## 2025-03-17 RX ADMIN — ACETAZOLAMIDE 500 MG: 250 TABLET ORAL at 10:13

## 2025-03-17 RX ADMIN — HYDROCORTISONE ACETATE 25 MG: 25 SUPPOSITORY RECTAL at 08:14

## 2025-03-17 RX ADMIN — OSELTAMIVIR 75 MG: 75 CAPSULE ORAL at 20:41

## 2025-03-17 RX ADMIN — BUDESONIDE 0.5 MG: 0.5 INHALANT RESPIRATORY (INHALATION) at 07:21

## 2025-03-17 RX ADMIN — GUAIFENESIN 600 MG: 600 TABLET ORAL at 20:41

## 2025-03-17 RX ADMIN — POTASSIUM CHLORIDE 40 MEQ: 750 CAPSULE, EXTENDED RELEASE ORAL at 10:13

## 2025-03-17 RX ADMIN — MONTELUKAST 10 MG: 10 TABLET, FILM COATED ORAL at 20:41

## 2025-03-17 RX ADMIN — ALBUTEROL SULFATE 2.5 MG: 2.5 SOLUTION RESPIRATORY (INHALATION) at 07:21

## 2025-03-17 RX ADMIN — Medication 10 ML: at 20:40

## 2025-03-17 RX ADMIN — AZITHROMYCIN DIHYDRATE 500 MG: 250 TABLET ORAL at 20:41

## 2025-03-17 RX ADMIN — PREDNISONE 40 MG: 20 TABLET ORAL at 08:13

## 2025-03-17 RX ADMIN — ANASTROZOLE 1 MG: 1 TABLET, COATED ORAL at 08:13

## 2025-03-17 RX ADMIN — BUDESONIDE 0.5 MG: 0.5 INHALANT RESPIRATORY (INHALATION) at 18:50

## 2025-03-17 RX ADMIN — FUROSEMIDE 40 MG: 10 INJECTION, SOLUTION INTRAMUSCULAR; INTRAVENOUS at 10:13

## 2025-03-17 RX ADMIN — BENZONATATE 100 MG: 100 CAPSULE ORAL at 17:44

## 2025-03-17 RX ADMIN — MUPIROCIN 1 APPLICATION: 20 OINTMENT TOPICAL at 20:41

## 2025-03-17 RX ADMIN — MUPIROCIN 1 APPLICATION: 20 OINTMENT TOPICAL at 08:13

## 2025-03-17 RX ADMIN — DICYCLOMINE HYDROCHLORIDE 10 MG: 10 CAPSULE ORAL at 17:44

## 2025-03-17 RX ADMIN — Medication 10 ML: at 08:14

## 2025-03-17 RX ADMIN — INSULIN LISPRO 3 UNITS: 100 INJECTION, SOLUTION INTRAVENOUS; SUBCUTANEOUS at 17:35

## 2025-03-17 RX ADMIN — PRAVASTATIN SODIUM 40 MG: 20 TABLET ORAL at 20:41

## 2025-03-17 RX ADMIN — PANTOPRAZOLE SODIUM 40 MG: 40 TABLET, DELAYED RELEASE ORAL at 08:14

## 2025-03-17 RX ADMIN — INSULIN LISPRO 2 UNITS: 100 INJECTION, SOLUTION INTRAVENOUS; SUBCUTANEOUS at 12:52

## 2025-03-17 RX ADMIN — ARFORMOTEROL TARTRATE 15 MCG: 15 SOLUTION RESPIRATORY (INHALATION) at 07:21

## 2025-03-17 RX ADMIN — ALBUTEROL SULFATE 2.5 MG: 2.5 SOLUTION RESPIRATORY (INHALATION) at 18:50

## 2025-03-17 RX ADMIN — HYDROCORTISONE ACETATE 25 MG: 25 SUPPOSITORY RECTAL at 20:41

## 2025-03-17 RX ADMIN — METOPROLOL SUCCINATE 25 MG: 25 TABLET, EXTENDED RELEASE ORAL at 20:41

## 2025-03-17 RX ADMIN — FUROSEMIDE 40 MG: 10 INJECTION, SOLUTION INTRAMUSCULAR; INTRAVENOUS at 17:35

## 2025-03-17 RX ADMIN — METOPROLOL SUCCINATE 25 MG: 25 TABLET, EXTENDED RELEASE ORAL at 08:14

## 2025-03-17 NOTE — PLAN OF CARE
Goal Outcome Evaluation:  Plan of Care Reviewed With: patient        Progress: no change  Outcome Evaluation: pt currently on 3L [place] oxygen saturation around 93%.  up to [facility] with good urine output.  Blood sugar monitored, SSI given per MAR.   No acute event this shift.

## 2025-03-17 NOTE — PLAN OF CARE
Goal Outcome Evaluation:  Plan of Care Reviewed With: patient        Progress: no change  Outcome Evaluation: Patient presents with deficits in balance, endurance, transfers, and ambulation. Patient will benefit from skilled PT services to address these mobility deficits and decrease risk of falls.    Anticipated Discharge Disposition (PT): home with home health

## 2025-03-17 NOTE — CONSULTS
Patient states her CPAP is functional but she is not certain she has an oxygen bleed in adapter. Ms. Conner also states has an issue with her cpap mask coming apart.  I cannot fix any of these problems without seeing the patient's CPAP. Ms. Conner will need to either have her cpap brought to the hospital or taken to her DME provider to be fixed by [employer].

## 2025-03-17 NOTE — THERAPY EVALUATION
Acute Care - Physical Therapy Initial Evaluation   [place]     Patient Name: Aida Conner  : 1955  MRN: [id number]  Today's Date: 3/17/2025      Visit Dx:     ICD-10-CM ICD-9-CM   1. Acute pneumonia  J18.9 486   2. Acute on chronic respiratory failure with hypoxia  J96.21 518.84     799.02   3. Influenza A  J10.1 487.1   4. Difficulty walking  R26.2 719.7     Patient Active Problem List   Diagnosis    Inflammatory breast cancer, right    Other specified disorders of breast     Malignant neoplasm of axillary tail of right female breast    Encounter for eye exam due to high risk medication    Obesity hypoventilation syndrome    Iron adverse reaction    Functional diarrhea    Encounter for long-term (current) use of high-risk medication    Abnormal mammogram    Allergic rhinitis    Anemia    Anxiety disorder    Breast pain, right    Depression    Essential tremor    Hemorrhoids    Hyperlipidemia    Primary hypertension    IBS (irritable bowel syndrome)    Impaired fasting glucose    Obstructive sleep apnea    Osteoarthrosis    Renal calculi    Restrictive airway disease    Type II diabetes mellitus    Vitamin deficiency    Urinary bladder incontinence    Malignant neoplasm of central portion of right female breast    Osteoporosis without current pathological fracture    Chronic heart failure with preserved ejection fraction (HFpEF)    Coronary artery calcification seen on CT scan    Aromatase inhibitor use    Belching    Bloating    Hiatal hernia    History of breast cancer    Family history of colon cancer    Iron deficiency anemia    Acute hypoxemic respiratory failure     Past Medical History:   Diagnosis Date    Allergic rhinitis     Anemia     Anxiety     Asthma     Chronic bronchitis     In past, espec. during working years    Chronic heart failure with preserved ejection fraction (HFpEF)     Chronic hypoxemic respiratory failure     on continuous O2    Coronary artery calcification seen on CT scan  04/21/2022    Diarrhea     with chemo    GERD (gastroesophageal reflux disease) Mild    H/O Intraductal papilloma     Hemorrhoids     History of transfusion 1984    AFTER BACK SURGERY no reaction    Hypertension     IBS (irritable bowel syndrome)     Inflammatory breast cancer     right    Kidney stone     Morbid obesity with BMI of 50.0-59.9, adult     Obesity hypoventilation syndrome 02/05/2021    On home oxygen therapy     2.5 AT REST WITH ACTIVITY UP TO 4L    ANABELLE on CPAP     cpap  & uses O2 with CPAP    Osteoarthritis     Pneumonia     Many times in past, 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    PONV (postoperative nausea and vomiting)     Type II diabetes mellitus 09/08/2021     Past Surgical History:   Procedure Laterality Date    BREAST EXCISIONAL BIOPSY Bilateral     COLONOSCOPY      COLONOSCOPY N/A 12/8/2022    Procedure: COLONOSCOPY;  Surgeon: Henry Aden MD;  Location: [facility] ENDOSCOPY;  Service: Gastroenterology;  Laterality: N/A;  diverticulosis, and hemorrhoids    CYSTOSCOPY BLADDER STONE LITHOTRIPSY      ENDOSCOPY      ENDOSCOPY N/A 12/8/2022    Procedure: ESOPHAGOGASTRODUODENOSCOPY  with biopsy;  Surgeon: Henry Aden MD;  Location: [facility] ENDOSCOPY;  Service: Gastroenterology;  Laterality: N/A;  hiatal hernia    KNEE ARTHROPLASTY Bilateral 2009    MASTECTOMY Left 08/10/2021    Procedure: LEFT TOTAL MASTECTOMY;  Surgeon: Nicci Banks MD;  Location: [facility];  Service: General;  Laterality: Left;    MASTECTOMY Right 08/10/2021    Procedure: RIGHT MODIFIED RADICAL MASTECTOMY;  Surgeon: Nicci Banks MD;  Location: [facility] OR;  Service: General;  Laterality: Right;    SPINE SURGERY  1984    TOTAL ABDOMINAL HYSTERECTOMY WITH SALPINGO OOPHORECTOMY  2004    UPPER GASTROINTESTINAL ENDOSCOPY      VENOUS ACCESS DEVICE (PORT) INSERTION N/A 01/25/2021    Procedure: INSERTION VENOUS ACCESS DEVICE;  Surgeon: Nicci Banks MD;  Location: [facility];  Service:  General;  Laterality: N/A;    VENOUS ACCESS DEVICE (PORT) REMOVAL Left 04/05/2022    Procedure: REMOVAL VENOUS ACCESS DEVICE;  Surgeon: Nicci Banks MD;  Location: [facility] OR;  Service: General;  Laterality: Left;     PT Assessment (Last 12 Hours)       PT Evaluation and Treatment       Row Name 03/17/25 1400          Physical Therapy Time and Intention    Document Type evaluation  -AV     Mode of Treatment individual therapy;physical therapy  -AV       Row Name 03/17/25 1400          General Information    Patient Profile Reviewed yes  -AV     Patient Observations alert;cooperative;agree to therapy  -AV     Prior Level of Function independent:;all household mobility;gait;transfer;ADL's  Ambulated with STC/RW. 3 L O2.  -AV     Equipment Currently Used at Home cane, straight;walker, rolling;oxygen  -AV     Existing Precautions/Restrictions fall;oxygen therapy device and L/min  -AV       Row Name 03/17/25 1400          Living Environment    Current Living Arrangements home  -AV     People in Home spouse  -AV       Row Name 03/17/25 1400          Cognition    Orientation Status (Cognition) oriented x 3  -AV       Row Name 03/17/25 1400          Range of Motion (ROM)    Range of Motion bilateral lower extremities;ROM is WFL  -AV       Row Name 03/17/25 1400          Strength (Manual Muscle Testing)    Strength (Manual Muscle Testing) bilateral lower extremities;strength is WFL  -AV       Row Name 03/17/25 1400          Bed Mobility    Comment, (Bed Mobility) Patient seated upright in recliner upon therapist entry  -AV       Row Name 03/17/25 1400          Transfers    Transfers sit-stand transfer;stand-sit transfer  -AV       Row Name 03/17/25 1400          Sit-Stand Transfer    Sit-Stand [place] (Transfers) contact guard  -AV       Row Name 03/17/25 1400          Stand-Sit Transfer    Stand-Sit [place] (Transfers) contact guard  -AV       Row Name 03/17/25 1400          Gait/Stairs (Locomotion)     Gait/Stairs Locomotion gait/ambulation independence;gait/ambulation assistive device;distance ambulated  -AV     [place] Level (Gait) contact guard  -AV     Distance in Feet (Gait) 25  -AV       Row Name 03/17/25 1400          Safety Issues/Impairments Affecting Functional Mobility    Impairments Affecting Function (Mobility) balance;endurance/activity tolerance;shortness of breath  -AV       Row Name 03/17/25 1400          Balance    Balance Assessment standing dynamic balance  -AV     Dynamic Standing Balance contact guard  -AV     Position/Device Used, Standing Balance unsupported  -AV       Row Name 03/17/25 1400          Plan of Care Review    Plan of Care Reviewed With patient  -AV     Progress no change  -AV     Outcome Evaluation Patient presents with deficits in balance, endurance, transfers, and ambulation. Patient will benefit from skilled PT services to address these mobility deficits and decrease risk of falls.  -AV       Row Name 03/17/25 1400          Vital Signs    Pre SpO2 (%) 90  -AV     O2 Delivery Pre Treatment nasal cannula  -AV     Intra SpO2 (%) 86  -AV     O2 Delivery Intra Treatment nasal cannula  -AV     Post SpO2 (%) 91  -AV     O2 Delivery Post Treatment nasal cannula  -AV       Row Name 03/17/25 1400          Positioning and Restraints    Pre-Treatment Position sitting in chair/recliner  -AV     Post Treatment Position chair  -AV     In Chair sitting;call light within reach;encouraged to call for assist  No alarms active upon therapist entry  -AV       Row Name 03/17/25 1400          Therapy Assessment/Plan (PT)    Rehab Potential (PT) good  -AV     Criteria for Skilled Interventions Met (PT) yes;meets criteria  -AV     Therapy Frequency (PT) daily  -AV     Predicted Duration of Therapy Intervention (PT) 10 days  -AV     Problem List (PT) problems related to;balance;mobility  -AV     Activity Limitations Related to Problem List (PT) unable to ambulate safely;unable to transfer  safely  -AV       Row Name 03/17/25 1400          PT Evaluation Complexity    History, PT Evaluation Complexity 1-2 personal factors and/or comorbidities  -AV     Examination of Body Systems (PT Eval Complexity) total of 4 or more elements  -AV     Clinical Presentation (PT Evaluation Complexity) stable  -AV     Clinical Decision Making (PT Evaluation Complexity) low complexity  -AV     Overall Complexity (PT Evaluation Complexity) low complexity  -AV       Row Name 03/17/25 1400          Therapy Plan Review/Discharge Plan (PT)    Therapy Plan Review (PT) evaluation/treatment results reviewed;patient  -AV       Row Name 03/17/25 1400          Physical Therapy Goals    Transfer Goal Selection (PT) transfer, PT goal 1  -AV     Gait Training Goal Selection (PT) gait training, PT goal 1  -AV       Row Name 03/17/25 1400          Transfer Goal 1 (PT)    Activity/Assistive Device (Transfer Goal 1, PT) sit-to-stand/stand-to-sit;bed-to-chair/chair-to-bed;walker, rolling  -AV     [place] Level/Cues Needed (Transfer Goal 1, PT) modified independence  -AV     Time Frame (Transfer Goal 1, PT) 10 days  -AV       Row Name 03/17/25 1400          Gait Training Goal 1 (PT)    Activity/Assistive Device (Gait Training Goal 1, PT) gait (walking locomotion);assistive device use;walker, rolling  -AV     [place] Level (Gait Training Goal 1, PT) modified independence  -AV     Distance (Gait Training Goal 1, PT) 120  -AV     Time Frame (Gait Training Goal 1, PT) 10 days  -AV               User Key  (r) = Recorded By, (t) = Taken By, (c) = Cosigned By      Initials Name Provider Type    Eulogio Austin, PT Physical Therapist                    Physical Therapy Education       Title: PT OT SLP Therapies (In Progress)       Topic: Physical Therapy (In Progress)       Point: Mobility training (Done)       Learning Progress Summary            Patient Acceptance, E,TB, VU by AV at 3/17/2025 1503                      Point: Home  exercise program (Not Started)       Learner Progress:  Not documented in this visit.              Point: Body mechanics (Done)       Learning Progress Summary            Patient Acceptance, E,TB, VU by AV at 3/17/2025 1503                      Point: Precautions (Done)       Learning Progress Summary            Patient Acceptance, E,TB, VU by AV at 3/17/2025 1503                                      User Key       Initials Effective Dates Name Provider Type Discipline    AV 06/11/21 -  Eulogio Frank, PT Physical Therapist PT                  PT Recommendation and Plan  Anticipated Discharge Disposition (PT): home with home health  Planned Therapy Interventions (PT): balance training, bed mobility training, gait training, home exercise program, neuromuscular re-education, strengthening, transfer training  Therapy Frequency (PT): daily  Plan of Care Reviewed With: patient  Progress: no change  Outcome Evaluation: Patient presents with deficits in balance, endurance, transfers, and ambulation. Patient will benefit from skilled PT services to address these mobility deficits and decrease risk of falls.   Outcome Measures       Row Name 03/17/25 1500             How much help from another person do you currently need...    Turning from your back to your side while in flat bed without using bedrails? 4  -AV      Moving from lying on back to sitting on the side of a flat bed without bedrails? 4  -AV      Moving to and from a bed to a chair (including a wheelchair)? 3  -AV      Standing up from a chair using your arms (e.g., wheelchair, bedside chair)? 3  -AV      Climbing 3-5 steps with a railing? 3  -AV      To walk in hospital room? 3  -AV      AM-PAC 6 Clicks Score (PT) 20  -AV         Functional Assessment    Outcome Measure Options AM-PAC 6 Clicks Basic Mobility (PT)  -AV                User Key  (r) = Recorded By, (t) = Taken By, (c) = Cosigned By      Initials Name Provider Type    AV Eulogio Frank, PT  Physical Therapist                     Time Calculation:    PT Charges       Row Name 03/17/25 1502             Time Calculation    PT Received On 03/17/25  -AV      PT Goal Re-Cert Due Date 03/26/25  -AV         Untimed Charges    PT Eval/Re-eval Minutes 35  -AV         Total Minutes    Untimed Charges Total Minutes 35  -AV       Total Minutes 35  -AV                User Key  (r) = Recorded By, (t) = Taken By, (c) = Cosigned By      Initials Name Provider Type    AV Eulogio Frank, PT Physical Therapist                  Therapy Charges for Today       Code Description Service Date Service Provider Modifiers Qty    28539665976 HC PT EVAL LOW COMPLEXITY 3 3/17/2025 Eulogio Frank, PT GP 1            PT G-Codes  Outcome Measure Options: AM-PAC 6 Clicks Basic Mobility (PT)  AM-PAC 6 Clicks Score (PT): 20  AM-PAC 6 Clicks Score (OT): 17    Eulogio Frank PT  3/17/2025

## 2025-03-17 NOTE — PLAN OF CARE
Goal Outcome Evaluation:           Progress: improving  Outcome Evaluation: On 4 liters nasal cannula. Patient rested much of night. Moses Arshad RN

## 2025-03-17 NOTE — PROGRESS NOTES
[facility]   Hospitalist Progress Note    Date of admission: 3/13/2025  Patient Name: Aida Conner  1955  Date: 3/17/2025      Subjective     Chief Complaint   Patient presents with    Shortness of Breath     pt c/o sob. pt on oxygen at home 3 l/nc.       Interval Followup: Shortness of air is starting to improve with further, diuresing fairly well less short of air.  Had but sounded like hemorrhoidal bleeding yesterday, none noted today was started on medication for this yesterday and anticoagulation had been held.    Patient saying she wants to go home when she is stable for discharge despite rehab/SNF being recommended.  Feels like she has enough home support    Objective     Vitals:   Temp:  [97.5 °F (36.4 °C)-98.4 °F (36.9 °C)] 97.7 °F (36.5 °C)  Heart Rate:  [] 105  Resp:  [18-22] 22  BP: (113-137)/(78-92) 120/79  Flow (L/min) (Oxygen Therapy):  [3-4] 3    Physical Exam  Awake, conversant, tired, morbidly obese   Still some wheezing but improving aeration, more diminished in the bases, down to 4 L currently  Rrr, decreasing lower extremity pitting edema   Abdomen nondistended  Left lower extremity anterior shin chronic appearing rash, appears to have some chronic scarring    Result Review:  Vital signs, labs and recent relevant imaging reviewed.      CBC          3/15/2025    04:54 3/16/2025    04:15 3/17/2025    03:56   CBC   WBC 13.52  13.25  12.91    RBC 4.28  4.18  4.53    Hemoglobin 11.9  11.8  12.7    Hematocrit 39.2  38.8  41.0    MCV 91.6  92.8  90.5    MCH 27.8  28.2  28.0    MCHC 30.4  30.4  31.0    RDW 14.4  14.1  14.0    Platelets 238  250  309      CMP          3/15/2025    04:54 3/16/2025    04:15 3/17/2025    03:56   CMP   Glucose 177  165  152    BUN 24  29  25    Creatinine 0.75  0.80  0.84    EGFR 85.2  78.9  74.4    Sodium 138  135  136    Potassium 3.7  4.3  3.8    Chloride 95  96  92    Calcium 8.4  8.4  8.6    BUN/Creatinine Ratio 32.0  36.3  29.8    Anion Gap 12.4  12.3   10.2          acetaminophen    albuterol    aluminum-magnesium hydroxide-simethicone    benzocaine-menthol    benzonatate    [DISCONTINUED] senna-docusate sodium **AND** polyethylene glycol **AND** bisacodyl **AND** bisacodyl    dextrose    dextrose    Diclofenac Sodium    dicyclomine    diphenoxylate-atropine    glucagon (human recombinant)    guaiFENesin-dextromethorphan    hydrOXYzine    Lidocaine (Anorectal)    Lidocaine    melatonin    nicotine    ondansetron    sodium chloride    sodium chloride    sodium chloride    sodium chloride    sodium chloride    sodium chloride    albuterol, 2.5 mg, Nebulization, Q6H - RT  anastrozole, 1 mg, Oral, Daily  arformoterol, 15 mcg, Nebulization, BID - RT  azithromycin, 500 mg, Oral, Q24H  budesonide, 0.5 mg, Nebulization, BID - RT  FLUoxetine, 40 mg, Oral, Daily  furosemide, 40 mg, Intravenous, BID Diuretics  [Held by provider] furosemide, 40 mg, Oral, Daily  guaiFENesin, 600 mg, Oral, Q12H  hydrocortisone, 25 mg, Rectal, BID  insulin lispro, 2-7 Units, Subcutaneous, 4x Daily AC & at Bedtime  [Held by provider] lisinopril, 10 mg, Oral, Daily  metoprolol succinate XL, 25 mg, Oral, Q12H  montelukast, 10 mg, Oral, Nightly  mupirocin, 1 Application, Each Nare, BID  oseltamivir, 75 mg, Oral, Q12H  pantoprazole, 40 mg, Oral, Daily  pravastatin, 40 mg, Oral, Nightly  predniSONE, 40 mg, Oral, Daily With Breakfast  sodium chloride, 10 mL, Intravenous, Q12H  [Held by provider] spironolactone, 25 mg, Oral, Daily        CT Angiogram Chest Pulmonary Embolism  Result Date: 3/13/2025  Impression: 1.No evidence of central pulmonary embolus. Distal segmental and subsegmental pulmonary arteries are suboptimally assessed. 2.Multifocal pneumonia with more dense consolidation in the right middle lobe. 3.Other stable chronic findings. Electronically Signed: Preet Ross MD  3/13/2025 10:57 AM EDT  Workstation ID: [id number]    XR Chest 1 View  Result Date: 3/13/2025  Impression: 1.New  consolidation throughout the right lower lung, concerning for pneumonia. 2.Mild airspace opacities in the left lower lung, which may be due to pneumonia and/or atelectasis. 3.Enlarged cardiac silhouette. Electronically Signed: Alejandra Hdz  3/13/2025 8:34 AM EDT  Workstation ID: [id number]      Assessment / Plan     Summary: 69 y.o. COPD on 2.5 L baseline with 4 L exertional baseline oxygen, severe morbid obesity, ANABELLE previously on NIPPV but has not had for the past couple of years, who presented with progressively worsening shortness of air fever cough.  Admitted for AHRF, sepsis from legionella pna, chf exacerbation and new aflutter with rvr.  Patient wanting to go home at time of discharge probably in the next 1 to 2 days depending on oxygen requirements.  Had been started on anticoagulation for new a flutter, developed rectal bleeding suspect acute on chronic hemorrhoidal related bleeding but anticoagulation has been held and probably would benefit from outpatient GI follow-up and consideration of colonoscopy once clinically stable from respiratory standpoint before considering resumption of anticoagulation.    Assessment/Plan (clinically significant if listed here)  Sepsis secondary to multifocal pneumonia from unspecified bacterial organism and influenza pneumonia, with organ dysfunction as evidenced by respiratory failure and new a flutter  Acute hypoxemic respiratory failure requiring high flow nasal cannula  Legionella pneumonia  Influenza A pneumonia  New, Aflutter with RVR  Diastolic with exacerbation (61%, elevated RVSP moderate 45-55) and baseline adherence difficulties to diuresis  COPD on oxygen approximately 2.5 with up to 4 L on exertion at baseline, with exacerbation, denies any smoking history  ANABELLE on remote use of CPAP, has not been on any years in setting of device malfunction reportedly  HER2 positive triple positive breast cancer on the right, prior surgery, chemotherapy, adjuvant treatment.   Currently on anastrazole DM2 diet controlled as outpatient  Severe morbid obesity BMI 57  Chronic anxiety  GERD  History of internal hemorrhoids with rectal bleeding     CT PE: no central pe, limited fur subseqmental, multifocal pneumonia with dense right middle lobe consolidation noted.  No obvious pleural effusion.  No heart strain noted.  No WILLIAMS.    ECHO    Left ventricular ejection fraction appears to be 61 - 65%.    Left ventricular wall thickness is consistent with mild concentric hypertrophy.    Left ventricular diastolic function was indeterminate.    The left atrial cavity is mildly dilated.    Left atrial volume is mildly increased.    Estimated right ventricular systolic pressure from tricuspid regurgitation is moderately elevated (45-55 mmHg).    Mild dilation of the ascending aorta is present.    2022 colonoscopy had diverticulitis and internal hemorrhoids 5-year repeat colonoscopy was recommended at that time  2022 EGD medium-sized hiatal hernia otherwise unremarkable      Continue steroids, SSI while on steroids outpatient is diet controlled diabetes  Continue azithromycin 7-day total antibiotic course for Legionella responding well to treatment  Continue IV Lasix still has volume overload, give dose of Diamox as well for contraction alkalosis.  Replace potassium   Needs follow-up outpatient with cardiologist Bobby Parry/Dr. Garza for new aflutter/chf  No bleeding today, favor hemorrhoidal type bleeding, will continue to defer anticoagulation, continue Anusol suppository/monitor bowel regimen.    rectal bleeding apparently acute on chronic as she notes it has been going on for months but is worse on the current blood thinner.  Does have a history of internal hemorrhoids seen on colonoscopy 5/20/2022.  Hemoglobin stable  Outpatient GI follow-up  Continue Tamiflu course for influenza A  Brov, pulm, nebs  Continuing oxygen doing much better hopefully can be stable on baseline oxygen with diuretics  couple of days  RT  consult for NIPPV evaluation, family to go to bring in her unit apparently needs a new piece has not used in years however, likely will need to reevaluate outpatient.  Continue on nasal cannula for now.  ABG was compensated  Pulmonology has evaluated  Continue home anastrozole  Continue fluoxetine  Continue PPI  Continue statin  Prn bentyl needing for cramping, lomotil for diarrhea if needed (chronic issue no acute new symptoms concerning for c diff otherwise currently).  Bleeding is noted as above suspect irritated with patient's underlying baseline diarrhea  PT/OT  Check a.m. CBC, BMP, magnesium, phosphorus    Dispo: wants HH only at [place].  Otherwise would need snf.    D/w sw    VTE Prophylaxis:  Mechanical VTE prophylaxis orders are present.      Code Status (Patient has no pulse and is not breathing): CPR (Attempt to Resuscitate)  Medical Interventions (Patient has pulse or is breathing): Full Support

## 2025-03-18 ENCOUNTER — TELEPHONE (OUTPATIENT)
Dept: PULMONOLOGY | Facility: CLINIC | Age: 70
End: 2025-03-18
Payer: MEDICARE

## 2025-03-18 ENCOUNTER — READMISSION MANAGEMENT (OUTPATIENT)
Dept: CALL CENTER | Facility: HOSPITAL | Age: 70
End: 2025-03-18
Payer: MEDICARE

## 2025-03-18 VITALS
BODY MASS INDEX: 48.82 KG/M2 | HEART RATE: 99 BPM | HEIGHT: 65 IN | TEMPERATURE: 97.7 F | RESPIRATION RATE: 18 BRPM | WEIGHT: 293 LBS | SYSTOLIC BLOOD PRESSURE: 116 MMHG | OXYGEN SATURATION: 90 % | DIASTOLIC BLOOD PRESSURE: 75 MMHG

## 2025-03-18 LAB
ANION GAP SERPL CALCULATED.3IONS-SCNC: 9.7 MMOL/L (ref 5–15)
BACTERIA SPEC AEROBE CULT: NORMAL
BACTERIA SPEC AEROBE CULT: NORMAL
BASOPHILS # BLD AUTO: 0.08 10*3/MM3 (ref 0–0.2)
BASOPHILS NFR BLD AUTO: 0.6 % (ref 0–1.5)
BUN SERPL-MCNC: 27 MG/DL (ref 8–23)
BUN/CREAT SERPL: 28.1 (ref 7–25)
CALCIUM SPEC-SCNC: 9.6 MG/DL (ref 8.6–10.5)
CHLORIDE SERPL-SCNC: 91 MMOL/L (ref 98–107)
CO2 SERPL-SCNC: 33.3 MMOL/L (ref 22–29)
CREAT SERPL-MCNC: 0.96 MG/DL (ref 0.57–1)
DEPRECATED RDW RBC AUTO: 45.2 FL (ref 37–54)
EGFRCR SERPLBLD CKD-EPI 2021: 63.4 ML/MIN/1.73
EOSINOPHIL # BLD AUTO: 0.06 10*3/MM3 (ref 0–0.4)
EOSINOPHIL NFR BLD AUTO: 0.4 % (ref 0.3–6.2)
ERYTHROCYTE [DISTWIDTH] IN BLOOD BY AUTOMATED COUNT: 13.8 % (ref 12.3–15.4)
GLUCOSE BLDC GLUCOMTR-MCNC: 127 MG/DL (ref 70–99)
GLUCOSE BLDC GLUCOMTR-MCNC: 195 MG/DL (ref 70–99)
GLUCOSE SERPL-MCNC: 126 MG/DL (ref 65–99)
HCT VFR BLD AUTO: 40.9 % (ref 34–46.6)
HGB BLD-MCNC: 12.9 G/DL (ref 12–15.9)
IMM GRANULOCYTES # BLD AUTO: 0.5 10*3/MM3 (ref 0–0.05)
IMM GRANULOCYTES NFR BLD AUTO: 3.6 % (ref 0–0.5)
LYMPHOCYTES # BLD AUTO: 1.5 10*3/MM3 (ref 0.7–3.1)
LYMPHOCYTES NFR BLD AUTO: 10.9 % (ref 19.6–45.3)
MAGNESIUM SERPL-MCNC: 2.3 MG/DL (ref 1.6–2.4)
MCH RBC QN AUTO: 28.4 PG (ref 26.6–33)
MCHC RBC AUTO-ENTMCNC: 31.5 G/DL (ref 31.5–35.7)
MCV RBC AUTO: 90.1 FL (ref 79–97)
MONOCYTES # BLD AUTO: 1.44 10*3/MM3 (ref 0.1–0.9)
MONOCYTES NFR BLD AUTO: 10.5 % (ref 5–12)
NEUTROPHILS NFR BLD AUTO: 10.17 10*3/MM3 (ref 1.7–7)
NEUTROPHILS NFR BLD AUTO: 74 % (ref 42.7–76)
NRBC BLD AUTO-RTO: 0 /100 WBC (ref 0–0.2)
PHOSPHATE SERPL-MCNC: 4.1 MG/DL (ref 2.5–4.5)
PLATELET # BLD AUTO: 297 10*3/MM3 (ref 140–450)
PMV BLD AUTO: 10.6 FL (ref 6–12)
POTASSIUM SERPL-SCNC: 3.9 MMOL/L (ref 3.5–5.2)
RBC # BLD AUTO: 4.54 10*6/MM3 (ref 3.77–5.28)
SODIUM SERPL-SCNC: 134 MMOL/L (ref 136–145)
WBC NRBC COR # BLD AUTO: 13.75 10*3/MM3 (ref 3.4–10.8)

## 2025-03-18 PROCEDURE — 63710000001 PREDNISONE PER 1 MG: Performed by: INTERNAL MEDICINE

## 2025-03-18 PROCEDURE — 99239 HOSP IP/OBS DSCHRG MGMT >30: CPT | Performed by: INTERNAL MEDICINE

## 2025-03-18 PROCEDURE — 36415 COLL VENOUS BLD VENIPUNCTURE: CPT | Performed by: INTERNAL MEDICINE

## 2025-03-18 PROCEDURE — 84100 ASSAY OF PHOSPHORUS: CPT | Performed by: INTERNAL MEDICINE

## 2025-03-18 PROCEDURE — 25010000002 FUROSEMIDE PER 20 MG: Performed by: INTERNAL MEDICINE

## 2025-03-18 PROCEDURE — 85025 COMPLETE CBC W/AUTO DIFF WBC: CPT | Performed by: INTERNAL MEDICINE

## 2025-03-18 PROCEDURE — 94799 UNLISTED PULMONARY SVC/PX: CPT

## 2025-03-18 PROCEDURE — 63710000001 INSULIN LISPRO (HUMAN) PER 5 UNITS: Performed by: INTERNAL MEDICINE

## 2025-03-18 PROCEDURE — 82948 REAGENT STRIP/BLOOD GLUCOSE: CPT

## 2025-03-18 PROCEDURE — 80048 BASIC METABOLIC PNL TOTAL CA: CPT | Performed by: INTERNAL MEDICINE

## 2025-03-18 PROCEDURE — 83735 ASSAY OF MAGNESIUM: CPT | Performed by: INTERNAL MEDICINE

## 2025-03-18 PROCEDURE — 94761 N-INVAS EAR/PLS OXIMETRY MLT: CPT

## 2025-03-18 RX ORDER — METOPROLOL SUCCINATE 25 MG/1
25 TABLET, EXTENDED RELEASE ORAL EVERY 12 HOURS SCHEDULED
Qty: 60 TABLET | Refills: 0 | Status: SHIPPED | OUTPATIENT
Start: 2025-03-18 | End: 2025-04-17

## 2025-03-18 RX ORDER — PREDNISONE 20 MG/1
40 TABLET ORAL
Qty: 2 TABLET | Refills: 0 | Status: SHIPPED | OUTPATIENT
Start: 2025-03-19 | End: 2025-03-20

## 2025-03-18 RX ORDER — AZITHROMYCIN 500 MG/1
500 TABLET, FILM COATED ORAL EVERY 24 HOURS
Qty: 2 TABLET | Refills: 0 | Status: SHIPPED | OUTPATIENT
Start: 2025-03-18 | End: 2025-03-20

## 2025-03-18 RX ORDER — ACETAZOLAMIDE 250 MG/1
500 TABLET ORAL DAILY
Status: DISCONTINUED | OUTPATIENT
Start: 2025-03-18 | End: 2025-03-18 | Stop reason: HOSPADM

## 2025-03-18 RX ADMIN — ALBUTEROL SULFATE 2.5 MG: 2.5 SOLUTION RESPIRATORY (INHALATION) at 01:31

## 2025-03-18 RX ADMIN — Medication 10 ML: at 08:30

## 2025-03-18 RX ADMIN — ALBUTEROL SULFATE 2.5 MG: 2.5 SOLUTION RESPIRATORY (INHALATION) at 13:37

## 2025-03-18 RX ADMIN — BENZONATATE 100 MG: 100 CAPSULE ORAL at 12:57

## 2025-03-18 RX ADMIN — ALBUTEROL SULFATE 2.5 MG: 2.5 SOLUTION RESPIRATORY (INHALATION) at 06:59

## 2025-03-18 RX ADMIN — PANTOPRAZOLE SODIUM 40 MG: 40 TABLET, DELAYED RELEASE ORAL at 08:29

## 2025-03-18 RX ADMIN — GUAIFENESIN 600 MG: 600 TABLET ORAL at 08:30

## 2025-03-18 RX ADMIN — FLUOXETINE HYDROCHLORIDE 40 MG: 20 CAPSULE ORAL at 08:29

## 2025-03-18 RX ADMIN — BUDESONIDE 0.5 MG: 0.5 INHALANT RESPIRATORY (INHALATION) at 06:59

## 2025-03-18 RX ADMIN — ACETAZOLAMIDE 500 MG: 250 TABLET ORAL at 13:24

## 2025-03-18 RX ADMIN — INSULIN LISPRO 2 UNITS: 100 INJECTION, SOLUTION INTRAVENOUS; SUBCUTANEOUS at 12:57

## 2025-03-18 RX ADMIN — FUROSEMIDE 40 MG: 10 INJECTION, SOLUTION INTRAMUSCULAR; INTRAVENOUS at 08:28

## 2025-03-18 RX ADMIN — DICYCLOMINE HYDROCHLORIDE 10 MG: 10 CAPSULE ORAL at 12:57

## 2025-03-18 RX ADMIN — ARFORMOTEROL TARTRATE 15 MCG: 15 SOLUTION RESPIRATORY (INHALATION) at 06:59

## 2025-03-18 RX ADMIN — METOPROLOL SUCCINATE 25 MG: 25 TABLET, EXTENDED RELEASE ORAL at 08:29

## 2025-03-18 RX ADMIN — PREDNISONE 40 MG: 20 TABLET ORAL at 08:29

## 2025-03-18 RX ADMIN — ANASTROZOLE 1 MG: 1 TABLET, COATED ORAL at 08:29

## 2025-03-18 NOTE — TELEPHONE ENCOUNTER
Mrs. Aida Conner is discharging from [facility] today following her admission with multifocal pneumonia, hypoxemic respiratory failure and sepsis. They have asked [facility] health to follow her for nursing assessments. Her PCP, Serenity Lucas, has not seen her recently and will not sign for home health until her follow up appointment. I noticed she has been seeing your provider, Stephanie Lockett frequently before this admission and has a follow up scheduled for 3/26, would Stephanie Lockett be ok with following her for home health until her follow up with her PCP? Thank you in advance, you may reply to this message in [facility] or at one of my other contacts below.     Keena Real   email: [email or web address]   cell: [phone]

## 2025-03-18 NOTE — PLAN OF CARE
Goal Outcome Evaluation:               Education complete. Patient is discharging.

## 2025-03-18 NOTE — DISCHARGE SUMMARY
[facility]         HOSPITALIST  DISCHARGE SUMMARY    Patient Name: Aida Conner  : 1955  MRN: [id number]    Date of Admission: 3/13/2025  Date of Discharge:  25  Primary Care Physician: Serenity Lucas APRN    Consultants:  -Pulmonology/Critical Care: Dr. Chana Muñoz    Hospital Problems:  Sepsis secondary to multifocal pneumonia from unspecified bacterial organism and influenza pneumonia, with organ dysfunction as evidenced by respiratory failure and new a flutter  Acute hypoxemic respiratory failure requiring high flow nasal cannula  Legionella pneumonia  Influenza A pneumonia  New, Aflutter with RVR  Diastolic with exacerbation (61%, elevated RVSP moderate 45-55) and baseline adherence difficulties to diuresis  COPD on oxygen approximately 2.5 with up to 4 L on exertion at baseline, with exacerbation, denies any smoking history  ANABELLE on remote use of CPAP, has not been on any years in setting of device malfunction reportedly  HER2 positive triple positive breast cancer on the right, prior surgery, chemotherapy, adjuvant treatment.  Currently on anastrazole DM2 diet controlled as outpatient  Severe morbid obesity BMI 57  Chronic anxiety  GERD  History of internal hemorrhoids with rectal bleeding    Hospital Course     Hospital Course:  Aida Conner is a 69 y.o. female COPD on 2.5 L baseline with 4 L exertional baseline oxygen, severe morbid obesity, ANABELLE previously on NIPPV but has not had for the past couple of years, who presented with progressively worsening shortness of air fever cough.  Admitted for AHRF, sepsis from legionella pna, chf exacerbation and new aflutter with rvr.  Patient wanting to go home at time of discharge probably in the next 1 to 2 days depending on oxygen requirements.  Had been started on anticoagulation for new a flutter, developed rectal bleeding suspect acute on chronic hemorrhoidal related bleeding but anticoagulation has been held and probably would  benefit from outpatient GI follow-up and consideration of colonoscopy once clinically stable from respiratory standpoint before considering resumption of anticoagulation.  Hemoglobin stabilized.  Patient will continue antibiotic treatment with azithromycin after discharge.  Patient also will complete prednisone burst at discharge.  Anticoagulation not resumed at discharge given concern for GI bleeding, patient will discuss resumption of anticoagulation with her primary cardiologist and gastroenterologist as an outpatient.  Due to patient's significant medical comorbidities she is high risk for readmission to the hospital.  On day of discharge patient hemodynamically stable and no additional inpatient evaluation or workup necessary at this time, patient did not agrees to pursue rehab placement.  Patient was discharged home with home health and outpatient follow-up.    DISCHARGE Follow Up Recommendations for labs and diagnostics:   -Follow-up with PCP in 3 to 5 days  -Follow-up with gastroenterology in 2 weeks  -Follow-up with primary cardiologist in 2 weeks    Day of Discharge     Vital Signs:  Temp:  [97.5 °F (36.4 °C)-98.2 °F (36.8 °C)] 97.9 °F (36.6 °C)  Heart Rate:  [] 93  Resp:  [20-24] 21  BP: (111-128)/(55-79) 111/55  Flow (L/min) (Oxygen Therapy):  [2-3] 3  Physical Exam:   Gen: Conversant, Pleasant, sitting up in bedside  Resp: Normal respiratory effort, equal chest rise bilaterally  Card: RRR, No m/r/g  Abd: Soft, Nontender, Nondistended, + bowel sounds    Discharge Details        Discharge Medications        New Medications        Instructions Start Date   azithromycin 500 MG tablet  Commonly known as: ZITHROMAX   500 mg, Oral, Every 24 Hours      metoprolol succinate XL 25 MG 24 hr tablet  Commonly known as: TOPROL-XL   25 mg, Oral, Every 12 Hours Scheduled      predniSONE 20 MG tablet  Commonly known as: DELTASONE   40 mg, Oral, Daily With Breakfast   Start Date: March 19, 2025            Changes  to Medications        Instructions Start Date   dicyclomine 20 MG tablet  Commonly known as: BENTYL  What changed:   when to take this  reasons to take this   TAKE 1 TABLET BY MOUTH EVERY 6 HOURS             Continue These Medications        Instructions Start Date   albuterol sulfate  (90 Base) MCG/ACT inhaler  Commonly known as: PROVENTIL HFA;VENTOLIN HFA;PROAIR HFA   2 puffs, Inhalation, Every 4 Hours PRN      anastrozole 1 MG tablet  Commonly known as: ARIMIDEX   1 mg, Oral, Daily      diphenoxylate-atropine 2.5-0.025 MG per tablet  Commonly known as: LOMOTIL   1 tablet, 4 Times Daily PRN      FLUoxetine 40 MG capsule  Commonly known as: PROzac   TAKE 1 CAPSULE BY MOUTH EVERY DAY      furosemide 40 MG tablet  Commonly known as: Lasix   40 mg, Oral, Daily      lisinopril 10 MG tablet  Commonly known as: PRINIVIL,ZESTRIL   10 mg, Oral, Daily      mometasone-formoterol 100-5 MCG/ACT inhaler  Commonly known as: DULERA 100   2 puffs, Inhalation, 2 Times Daily - RT      montelukast 10 MG tablet  Commonly known as: SINGULAIR   10 mg, Nightly      multivitamin tablet tablet   1 tablet, Daily      ondansetron 8 MG tablet  Commonly known as: ZOFRAN   8 mg, 3 Times Daily PRN      pantoprazole 40 MG EC tablet  Commonly known as: PROTONIX   1 tablet, Daily      pravastatin 40 MG tablet  Commonly known as: PRAVACHOL   40 mg, Nightly      spironolactone 25 MG tablet  Commonly known as: ALDACTONE   25 mg, Oral, Daily      Vitamin D 50 MCG (2000 UT) capsule   2,000 Units, Daily             Stop These Medications      carvedilol 25 MG tablet  Commonly known as: COREG              Allergies   Allergen Reactions    Amlodipine Swelling     LEGS     Hydrochlorothiazide Unknown - Low Severity     Neuro issues    Morphine Nausea And Vomiting     hallucinations    Adhesive Tape Rash       Discharge Disposition:  Home-Health Care Svc    Diet:  Hospital:  Diet Order   Procedures    Diet: Cardiac; Low Sodium (2g); Fluid Consistency:  Thin (IDDSI 0)       Discharge Activity:   Activity Instructions       Activity as Tolerated              CODE STATUS:  Code Status and Medical Interventions: CPR (Attempt to Resuscitate); Full Support   Ordered at: 03/13/25 1021     Code Status (Patient has no pulse and is not breathing):    CPR (Attempt to Resuscitate)     Medical Interventions (Patient has pulse or is breathing):    Full Support       Future Appointments   Date Time Provider Department Center   3/25/2025  1:00 PM CHAIR 3 [facility]INF [facility]   4/2/2025  1:00 PM CHAIR 3 [facility] OPINF [facility]   8/26/2025  1:45 PM Stephanie Lockett APRN MG PCC ETW [facility]   9/18/2025  1:30 PM LAB CHAIR 6 [facility]  LAB [facility] [place]   9/18/2025  2:00 PM Ynes Vazquez APRN MGK CBC [facility] [place]   3/5/2026  1:50 PM LAB CHAIR 3 [facility]  LAB [facility] [place]   3/5/2026  2:20 PM Jaime Azul MD MGK CBC [facility] [place]       Additional Instructions for the Follow-ups that You Need to Schedule       Discharge Follow-up with PCP   As directed       Currently Documented PCP:    Serenity Lucas APRN    PCP Phone Number:    [phone]     Follow Up Details: Follow-up in 3-5 days                Pertinent  and/or Most Recent Results     RADIOLOGY:  CT Angiogram Chest Pulmonary Embolism [[id number]] [place] as Reviewed   Order Status: Completed Collected: 03/13/25 1052    Updated: 03/13/25 1059   Narrative:     CT ANGIOGRAM CHEST PULMONARY EMBOLISM    Date of Exam: 3/13/2025 10:36 AM EDT    Indication: ahrf, morb obesity, new? aflutter, rll pna, flu a positive, possible PE component and CHF.    Comparison: 12/10/2024    Technique: Axial CT images were obtained of the chest after the uneventful intravenous administration of iodinated contrast utilizing pulmonary embolism protocol.  Reconstructed coronal and sagittal images were also obtained. Automated exposure control  and iterative construction methods were used.      Findings:  Examination limited by body habitus  and respiratory motion artifact. There is suboptimal assessment of the subsegmental pulmonary arteries.    PULMONARY VASCULATURE: Main and lobar pulmonary arteries are widely patent without evidence of embolus. Proximal segmental pulmonary arteries are unremarkable. Distal segmental and subsegmental pulmonary arteries are suboptimally assessed. Main pulmonary   vein is enlarged, measuring 4.3 cm in diameter similar prior examination, consistent with pulmonary arterial hypertension no evidence of right heart strain.    MEDIASTINUM: Heart is enlarged. Aortic contours are normal. There is a small sliding hiatal hernia. No aortic dissection identified. No mass nor significant pericardial effusion. No evidence of left atrial appendage thrombus.  CORONARY ARTERIES: There is calcified atherosclerotic disease.  LUNGS: There is fairly dense right middle lobe consolidation suggestive of pneumonia with some scattered nodular airspace disease in the central to lower right lung and left lower lobe. Imaging features suggestive of multifocal pneumonia.  PLEURAL SPACE: No effusion, mass, nor pneumothorax.  LYMPH NODES: There are no pathologically enlarged lymph nodes.    UPPER ABDOMEN: Unremarkable    OSSEOUS STRUCTURES: Appropriate for age with no acute process identified.   Impression:     Impression:  1.No evidence of central pulmonary embolus. Distal segmental and subsegmental pulmonary arteries are suboptimally assessed.  2.Multifocal pneumonia with more dense consolidation in the right middle lobe.  3.Other stable chronic findings.        Electronically Signed: Preet Ross MD   3/13/2025 10:57 AM EDT   Workstation ID: [id number]    XR Chest 1 View [[id number]] [place] as Reviewed   Order Status: Completed Collected: 03/13/25 0833    Updated: 03/13/25 0836   Narrative:     XR CHEST 1 VW    Date of Exam: 3/13/2025 8:10 AM EDT    Indication: SOA Triage Protocol    Comparison: CT chest 12/10/2024, AP chest x-ray  12/10/2024    Findings:  Cardiac silhouette remains enlarged. There is new consolidation throughout the right lower lung, as well as mild airspace opacities in the left lower lung. No pneumothorax is seen.   Impression:     Impression:  1.New consolidation throughout the right lower lung, concerning for pneumonia.  2.Mild airspace opacities in the left lower lung, which may be due to pneumonia and/or atelectasis.  3.Enlarged cardiac silhouette.        Electronically Signed: Alejandra Hdz   3/13/2025 8:34 AM EDT   Workstation ID: [id number]       LAB RESULTS:      Lab 03/18/25 0431 03/17/25 0356 03/16/25 0415 03/15/25  0454 03/14/25  0255 03/13/25  1017 03/13/25  0912 03/13/25  0802   WBC 13.75* 12.91* 13.25* 13.52* 11.66*  --   --  14.30*   HEMOGLOBIN 12.9 12.7 11.8* 11.9* 11.0*  --   --  12.0   HEMATOCRIT 40.9 41.0 38.8 39.2 36.5  --   --  39.4   PLATELETS 297 309 250 238 209  --   --  232   NEUTROS ABS 10.17* 10.22* 11.66* 12.50* 10.78*  --   --  12.85*   IMMATURE GRANS (ABS) 0.50* 0.21* 0.09* 0.06* 0.06*  --   --  0.06*   LYMPHS ABS 1.50 1.12 0.61* 0.52* 0.52*  --   --  0.51*   MONOS ABS 1.44* 1.31* 0.87 0.42 0.29  --   --  0.84   EOS ABS 0.06 0.01 0.00 0.00 0.00  --   --  0.01   MCV 90.1 90.5 92.8 91.6 92.4  --   --  93.1   PROCALCITONIN  --   --  0.05  --   --  0.06  --   --    LACTATE  --   --   --   --   --   --  1.1 1.9         Lab 03/18/25  0431 03/17/25  0356 03/16/25  0415 03/15/25  0454 03/14/25  0255 03/13/25  1017 03/13/25  0802   SODIUM 134* 136 135* 138 140  --  138   POTASSIUM 3.9 3.8 4.3 3.7 4.4  --  4.4   CHLORIDE 91* 92* 96* 95* 99  --  98   CO2 33.3* 33.8* 26.7 30.6* 30.1*  --  28.9   ANION GAP 9.7 10.2 12.3 12.4 10.9  --  11.1   BUN 27* 25* 29* 24* 17  --  13   CREATININE 0.96 0.84 0.80 0.75 0.60  --  0.72   EGFR 63.4 74.4 78.9 85.2 96.1  --  89.5   GLUCOSE 126* 152* 165* 177* 150*  --  174*   CALCIUM 9.6 8.6 8.4* 8.4* 8.5*  --  8.9   MAGNESIUM 2.3 2.3 2.3 2.1 2.0   < >  --    PHOSPHORUS 4.1  2.5 3.1 3.3 2.3*  --   --    HEMOGLOBIN A1C  --   --   --   --   --   --  6.30*    < > = values in this interval not displayed.         Lab 03/13/25  0802   TOTAL PROTEIN 7.3   ALBUMIN 3.6   GLOBULIN 3.7   ALT (SGPT) 21   AST (SGOT) 22   BILIRUBIN 1.1   ALK PHOS 117         Lab 03/13/25  1017 03/13/25  0802   PROBNP  --  896.8   HSTROP T 10 10             Lab 03/17/25  0356   ABO TYPING A   RH TYPING Positive   ANTIBODY SCREEN Negative         Lab 03/13/25  0912   PH, ARTERIAL 7.397   PCO2, ARTERIAL 50.3*   PO2 ART 75.2*   O2 SATURATION ART 94.6*   HCO3 ART 30.9*   BASE EXCESS ART 4.9*     Brief Urine Lab Results       None          Microbiology Results (last 10 days)       Procedure Component Value - Date/Time    Respiratory Culture - Sputum, Cough [[id number]] Collected: 03/13/25 1555    Lab Status: Final result Specimen: Sputum from Cough Updated: 03/15/25 1259     Respiratory Culture Rare growth Normal respiratory julianne. No S. aureus or Pseudomonas aeruginosa detected. Final report.     Gram Stain Moderate (3+) WBCs seen      No organisms seen    Legionella Antigen, Urine - Urine, Urine, Clean Catch [[id number]]  (Abnormal) Collected: 03/13/25 1212    Lab Status: Final result Specimen: Urine, Clean Catch Updated: 03/13/25 1318     LEGIONELLA ANTIGEN, URINE Positive    S. Pneumo Ag Urine or CSF - Urine, Urine, Clean Catch [[id number]]  (Normal) Collected: 03/13/25 1212    Lab Status: Final result Specimen: Urine, Clean Catch Updated: 03/13/25 1318     Strep Pneumo Ag Negative    MRSA Screen, PCR (Inpatient) - Swab, Nares [[id number]]  (Normal) Collected: 03/13/25 1212    Lab Status: Final result Specimen: Swab from Nares Updated: 03/13/25 1405     MRSA PCR No MRSA Detected    Narrative:      The negative predictive value of this diagnostic test is high and should only be used to consider de-escalating anti-MRSA therapy. A positive result may indicate colonization with MRSA and must be correlated clinically.    COVID  PRE-OP / PRE-PROCEDURE SCREENING ORDER (NO ISOLATION) - Swab, Nasopharynx [[id number]]  (Abnormal) Collected: 03/13/25 0819    Lab Status: Final result Specimen: Swab from Nasopharynx Updated: 03/13/25 0903    Narrative:      The following orders were created for panel order COVID PRE-OP / PRE-PROCEDURE SCREENING ORDER (NO ISOLATION) - Swab, Nasopharynx.  Procedure                               Abnormality         Status                     ---------                               -----------         ------                     COVID-19, FLU A/B, RSV P...[[id number]]  Abnormal            Final result                 Please view results for these tests on the individual orders.    COVID-19, FLU A/B, RSV PCR 1 HR TAT - Swab, Nasopharynx [[id number]]  (Abnormal) Collected: 03/13/25 0819    Lab Status: Final result Specimen: Swab from Nasopharynx Updated: 03/13/25 0903     COVID19 Not Detected     Influenza A PCR Detected     Influenza B PCR Not Detected     RSV, PCR Not Detected    Narrative:      Fact sheet for providers: https://www.fda.gov/media/431128/download    Fact sheet for patients: https://www.fda.gov/media/210638/download    Test performed by PCR.    Blood Culture - Blood, Arm, Left [[id number]]  (Normal) Collected: 03/13/25 0808    Lab Status: Final result Specimen: Blood from Arm, Left Updated: 03/18/25 0830     Blood Culture No growth at 5 days    Blood Culture - Blood, Hand, Left [[id number]]  (Normal) Collected: 03/13/25 0808    Lab Status: Final result Specimen: Blood from Hand, Left Updated: 03/18/25 0830     Blood Culture No growth at 5 days                      Results for orders placed during the hospital encounter of 03/13/25    Adult Transthoracic Echo Complete W/ Cont if Necessary Per Protocol    Interpretation Summary    Left ventricular ejection fraction appears to be 61 - 65%.    Left ventricular wall thickness is consistent with mild concentric hypertrophy.    Left ventricular diastolic function  was indeterminate.    The left atrial cavity is mildly dilated.    Left atrial volume is mildly increased.    Estimated right ventricular systolic pressure from tricuspid regurgitation is moderately elevated (45-55 mmHg).    Mild dilation of the ascending aorta is present.        Time spent on Discharge including face to face service:  32 minutes    Electronically signed by Gilbert Andrews MD, 03/18/25, 11:17 AM EDT.

## 2025-03-18 NOTE — CONSULTS
Transition of Care Pharmacist Consult Note:      Consulted about cost of Eliquis on patient's prescription plan and medication education/disease state education.     Eliquis will cost pt approximately $161 for first 30-day fill due to unmet deductible.  After deductible is met, copay will be approximately $98/month. At this time, anticoagulation is being deferred due to presence of rectal bleeding. Pt to follow up with cardiology office. Educated patient on atrial flutter and to ensure she maintains all follow up appointments.     Counseled pt on HFpEF and provided educational materials on monitoring daily weights, sodium limitations, medication compliance, and medication changes at discharge.  Pt reports she fills weekly mediplanner to keep her compliant with her medications. She denies financial medication barriers. All questions were answered and patient expressed understanding.     Thank you for the consult.  Please let me know if there are any further questions or needs.

## 2025-03-18 NOTE — PLAN OF CARE
Goal Outcome Evaluation: A&Ox4, VSS, Flu pos. Patient expresses she would prefer home with home health vs. Rehab.

## 2025-03-19 LAB
QT INTERVAL: 383 MS
QTC INTERVAL: 493 MS

## 2025-03-19 NOTE — OUTREACH NOTE
Prep Survey      Flowsheet Row Responses   [facility] facility patient discharged from? [facility]   Is LACE score < 7 ? No   Eligibility Readm Mgmt   Discharge diagnosis Sepsis secondary to multifocal pneumonia  from unspecified bacterial organism and influenza pneumonia, with organ dysfunction as evidenced by respiratory failure and new a flutter   Does the patient have one of the following disease processes/diagnoses(primary or secondary)? Sepsis   Does the patient have Home health ordered? Yes   What is the Home health agency?  [facility]    Is there a DME ordered? No   Prep survey completed? Yes            Baylee ROCHE - Registered Nurse

## 2025-03-24 ENCOUNTER — TELEPHONE (OUTPATIENT)
Dept: PULMONOLOGY | Facility: CLINIC | Age: 70
End: 2025-03-24
Payer: MEDICARE

## 2025-03-24 NOTE — TELEPHONE ENCOUNTER
"@Relay     \"Patient has appt with Stephanie Lockett on 3/26 and 3/27, need to know which appt patient would like to keep and cancel the other.\"                 "

## 2025-03-25 ENCOUNTER — READMISSION MANAGEMENT (OUTPATIENT)
Dept: CALL CENTER | Facility: HOSPITAL | Age: 70
End: 2025-03-25
Payer: MEDICARE

## 2025-03-25 ENCOUNTER — HOSPITAL ENCOUNTER (OUTPATIENT)
Dept: INFUSION THERAPY | Facility: HOSPITAL | Age: 70
Discharge: HOME | End: 2025-03-25
Admitting: INTERNAL MEDICINE
Payer: MEDICARE

## 2025-03-25 VITALS
HEART RATE: 87 BPM | HEIGHT: 65 IN | TEMPERATURE: 98.3 F | RESPIRATION RATE: 20 BRPM | WEIGHT: 293 LBS | SYSTOLIC BLOOD PRESSURE: 114 MMHG | OXYGEN SATURATION: 97 % | DIASTOLIC BLOOD PRESSURE: 66 MMHG | BODY MASS INDEX: 48.82 KG/M2

## 2025-03-25 DIAGNOSIS — D50.8 OTHER IRON DEFICIENCY ANEMIA: ICD-10-CM

## 2025-03-25 DIAGNOSIS — T45.4X5D ADVERSE EFFECT OF IRON, SUBSEQUENT ENCOUNTER: Primary | ICD-10-CM

## 2025-03-25 PROCEDURE — 96374 THER/PROPH/DIAG INJ IV PUSH: CPT

## 2025-03-25 PROCEDURE — 25010000002 FERUMOXYTOL 510 MG/17ML SOLUTION: Performed by: INTERNAL MEDICINE

## 2025-03-25 RX ORDER — HYDROCORTISONE SODIUM SUCCINATE 100 MG/2ML
100 INJECTION INTRAMUSCULAR; INTRAVENOUS AS NEEDED
OUTPATIENT
Start: 2025-04-01

## 2025-03-25 RX ORDER — SODIUM CHLORIDE 9 MG/ML
20 INJECTION, SOLUTION INTRAVENOUS ONCE
Status: DISCONTINUED | OUTPATIENT
Start: 2025-03-25 | End: 2025-03-27 | Stop reason: HOSPADM

## 2025-03-25 RX ORDER — ACETAMINOPHEN 325 MG/1
650 TABLET ORAL ONCE
OUTPATIENT
Start: 2025-04-01

## 2025-03-25 RX ORDER — CETIRIZINE HYDROCHLORIDE 10 MG/1
10 TABLET ORAL ONCE
OUTPATIENT
Start: 2025-04-01

## 2025-03-25 RX ORDER — FAMOTIDINE 10 MG/ML
20 INJECTION, SOLUTION INTRAVENOUS AS NEEDED
OUTPATIENT
Start: 2025-04-01

## 2025-03-25 RX ORDER — CETIRIZINE HYDROCHLORIDE 10 MG/1
10 TABLET ORAL ONCE
Status: DISCONTINUED | OUTPATIENT
Start: 2025-03-25 | End: 2025-03-27 | Stop reason: HOSPADM

## 2025-03-25 RX ORDER — SODIUM CHLORIDE 9 MG/ML
20 INJECTION, SOLUTION INTRAVENOUS ONCE
OUTPATIENT
Start: 2025-04-01

## 2025-03-25 RX ORDER — ACETAMINOPHEN 325 MG/1
650 TABLET ORAL ONCE
Status: DISCONTINUED | OUTPATIENT
Start: 2025-03-25 | End: 2025-03-27 | Stop reason: HOSPADM

## 2025-03-25 RX ORDER — DIPHENHYDRAMINE HYDROCHLORIDE 50 MG/ML
50 INJECTION, SOLUTION INTRAMUSCULAR; INTRAVENOUS AS NEEDED
OUTPATIENT
Start: 2025-04-01

## 2025-03-25 RX ADMIN — FERUMOXYTOL 510 MG: 510 INJECTION INTRAVENOUS at 13:30

## 2025-03-25 NOTE — PROGRESS NOTES
Primary Care Provider  Serenity Lucas APRN     Referring Provider  No ref. provider found       Patient or patient representative verbalized consent for the use of Ambient Listening during the visit with  SHAUNA Miranda for chart documentation. 3/26/2025  13:59 EDT    Chief Complaint  COPD, Sleep Apnea, Cough (White thick mucus ), Wheezing, and Hospital Follow Up Visit ([facility] 3/13-3/18 )    Subjective          Aida Conner presents to [facility] PULMONARY & CRITICAL CARE MEDICINE  History of Present Illness  Aida Conner is a 70 y.o. female patient of Dr. Anaya here for management of COPD, obstructive sleep apnea, chronic hypoxic respiratory failure and dyspnea on exertion.    Patient had a recent hospitalization at [facility] from 3/13/2025 until 3/18/2025.  Per discharge summary, she was admitted with acute hypoxic respiratory failure, sepsis from Legionella pneumonia, CHF exacerbation and new atrial flutter with RVR.  She was started on anticoagulation for the new a flutter.  She had developed some rectal bleeding with suspected acute on chronic hemorrhoidal related bleeding but anticoagulation was held and patient would benefit from GI follow-up and consideration for colonoscopy once hemodynamically stable before considering resumption of anticoagulation.  Patient to complete treatment with azithromycin after discharge.  Patient would also complete a prednisone burst at discharge.  Patient at high risk for readmission due to significant medical comorbidities.    History of Present Illness  The patient is a 70-year-old female who presents for evaluation of pneumonia.    She reports an improvement in her condition since her hospitalization, during which she was diagnosed with Legionella pneumonia. She has not taken her morning medication today. The potential source of infection was identified as two small humidifiers in her living room, which were subsequently discarded.   She continues to use Dulera as prescribed.  She is on 3L of oxygen and is benefiting from its use.       Her history of smoking is   Tobacco Use: Low Risk  (3/26/2025)    Patient History     Smoking Tobacco Use: Never     Smokeless Tobacco Use: Never     Passive Exposure: Never   .    Review of Systems   Constitutional:  Negative for chills, fatigue, fever, unexpected weight gain and unexpected weight loss.   HENT:  Congestion: Nasal.    Respiratory:  Positive for shortness of breath. Negative for apnea, cough and wheezing.         Negative for Hemoptysis     Cardiovascular:  Negative for chest pain, palpitations and leg swelling.   Skin:         Negative for cyanosis      Sleep: Negative for Excessive daytime sleepiness  Negative for morning headaches  Negative for Snoring    Family History   Problem Relation Age of Onset    Breast cancer Mother 45    Cancer Mother         breast; metastasized    Lung cancer Father     Hodgkin's lymphoma Father     Skin cancer Father         squamous cell    Cancer Father         4 kinds: Hodgkins; lung; squamous cell skin    Breast cancer Maternal Aunt 70    Cancer Maternal Aunt         breast; metastasized    Breast cancer Paternal Aunt 70    Cancer Paternal Aunt         breast; cured    Colon cancer Paternal Grandmother 60    Cancer Paternal Grandmother         colon cancer    Hypertension Paternal Grandmother         EVERY ADULT IN MY FAMILY    Ovarian cancer Neg Hx     Uterine cancer Neg Hx     Deep vein thrombosis Neg Hx     Pulmonary embolism Neg Hx     Malig Hyperthermia Neg Hx         Social History     Socioeconomic History    Marital status:     Number of children: 3   Tobacco Use    Smoking status: Never     Passive exposure: Never    Smokeless tobacco: Never   Vaping Use    Vaping status: Never Used   Substance and Sexual Activity    Alcohol use: Never    Drug use: Never    Sexual activity: Not Currently     Partners: Male        Past Medical History:    Diagnosis Date    Allergic rhinitis     Anemia     Anxiety     Asthma     Chronic bronchitis     In past, espec. during working years    Chronic heart failure with preserved ejection fraction (HFpEF)     Chronic hypoxemic respiratory failure     on continuous O2    Coronary artery calcification seen on CT scan 04/21/2022    Diarrhea     with chemo    GERD (gastroesophageal reflux disease) Mild    H/O Intraductal papilloma     Hemorrhoids     History of transfusion 1984    AFTER BACK SURGERY no reaction    Hypertension     IBS (irritable bowel syndrome)     Inflammatory breast cancer     right    Kidney stone     Morbid obesity with BMI of 50.0-59.9, adult     Obesity hypoventilation syndrome 02/05/2021    On home oxygen therapy     2.5 AT REST WITH ACTIVITY UP TO 4L    ANABELLE on CPAP     cpap  & uses O2 with CPAP    Osteoarthritis     Pneumonia     Many times in past, 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    PONV (postoperative nausea and vomiting)     Type II diabetes mellitus 09/08/2021        Immunization History   Administered Date(s) Administered    COVID-19 (PFIZER) 12YRS+ (COMIRNATY) 12/05/2024    COVID-19 (PFIZER) BIVALENT 12+YRS 11/30/2022    COVID-19 (PFIZER) Purple Cap Monovalent 03/19/2021, 04/09/2021, 11/13/2021    Covid-19 (Pfizer) Gray Cap Monovalent 06/18/2022    Flu Vaccine Quad PF >36MO 09/24/2018, 11/12/2020    Fluzone High-Dose 65+yrs 09/28/2021    Influenza TIV (IM) 01/30/2014, 09/29/2014    Influenza, Unspecified 11/16/2023    Pneumococcal Conjugate 13-Valent (PCV13) 08/21/2019    Pneumococcal Conjugate 20-Valent (PCV20) 05/14/2024         Allergies   Allergen Reactions    Amlodipine Swelling     LEGS     Hydrochlorothiazide Unknown - Low Severity     Neuro issues    Morphine Nausea And Vomiting     hallucinations    Adhesive Tape Rash          Current Outpatient Medications:     albuterol sulfate  (90 Base) MCG/ACT inhaler, Inhale 2 puffs Every 4 (Four) Hours As Needed for Wheezing., Disp: 18  g, Rfl: 11    anastrozole (ARIMIDEX) 1 MG tablet, Take 1 tablet by mouth Daily., Disp: 90 tablet, Rfl: 3    Cholecalciferol (Vitamin D) 50 MCG (2000 UT) capsule, Take 1 capsule by mouth Daily., Disp: , Rfl:     dicyclomine (BENTYL) 20 MG tablet, TAKE 1 TABLET BY MOUTH EVERY 6 HOURS (Patient taking differently: Take 1 tablet by mouth Every 6 (Six) Hours As Needed for Abdominal Cramping (ibs).), Disp: 60 tablet, Rfl: 2    diphenoxylate-atropine (LOMOTIL) 2.5-0.025 MG per tablet, Take 1 tablet by mouth 4 (Four) Times a Day As Needed for Diarrhea., Disp: , Rfl:     FLUoxetine (PROzac) 40 MG capsule, TAKE 1 CAPSULE BY MOUTH EVERY DAY, Disp: 30 capsule, Rfl: 0    furosemide (Lasix) 40 MG tablet, Take 1 tablet by mouth Daily., Disp: 90 tablet, Rfl: 2    lisinopril (PRINIVIL,ZESTRIL) 10 MG tablet, Take 1 tablet by mouth Daily., Disp: 90 tablet, Rfl: 0    metoprolol succinate XL (TOPROL-XL) 25 MG 24 hr tablet, Take 1 tablet by mouth Every 12 (Twelve) Hours for 30 days., Disp: 60 tablet, Rfl: 0    mometasone-formoterol (DULERA 100) 100-5 MCG/ACT inhaler, Inhale 2 puffs 2 (Two) Times a Day., Disp: 1 each, Rfl: 11    montelukast (SINGULAIR) 10 MG tablet, Take 1 tablet by mouth Every Night., Disp: , Rfl:     multivitamin (THERAGRAN) tablet tablet, Take 1 tablet by mouth Daily., Disp: , Rfl:     mupirocin (BACTROBAN) 2 % ointment, APPLY TO THE AFFECTED AREA(S) TWICE DAILY for 2 weeks, Disp: , Rfl:     ondansetron (ZOFRAN) 8 MG tablet, Take 1 tablet by mouth 3 (Three) Times a Day As Needed for Nausea., Disp: , Rfl:     pantoprazole (PROTONIX) 40 MG EC tablet, Take 1 tablet by mouth Daily., Disp: , Rfl:     pravastatin (PRAVACHOL) 40 MG tablet, Take 1 tablet by mouth Every Night., Disp: , Rfl:     spironolactone (ALDACTONE) 25 MG tablet, Take 1 tablet by mouth Daily., Disp: 90 tablet, Rfl: 2    benzonatate (TESSALON) 100 MG capsule, Take 1 capsule by mouth 3 (Three) Times a Day As Needed for Cough., Disp: 90 capsule, Rfl: 2  No  "current facility-administered medications for this visit.    Facility-Administered Medications Ordered in Other Visits:     acetaminophen (TYLENOL) tablet 650 mg, 650 mg, Oral, Once, Jaime Azul MD    cetirizine (zyrTEC) tablet 10 mg, 10 mg, Oral, Once, Jaime Azul MD    sodium chloride 0.9 % infusion, 20 mL/hr, Intravenous, Once, Jaime Azul MD     Objective   Physical Exam  Constitutional:       General: She is not in acute distress.     Appearance: Normal appearance. She is obese.   HENT:      Right Ear: Hearing normal.      Left Ear: Hearing normal.      Nose: No nasal tenderness or congestion.      Mouth/Throat:      Mouth: Mucous membranes are moist. No oral lesions.   Eyes:      Extraocular Movements: Extraocular movements intact.      Pupils: Pupils are equal, round, and reactive to light.   Cardiovascular:      Rate and Rhythm: Normal rate and regular rhythm.      Pulses: Normal pulses.      Heart sounds: Normal heart sounds. No murmur heard.  Pulmonary:      Effort: Pulmonary effort is normal.      Breath sounds: Normal breath sounds. No wheezing, rhonchi or rales.      Comments: Patient is on 3L of oxygen. She is able to speak full sentences without difficulty.  Musculoskeletal:      Right lower leg: No edema.      Left lower leg: No edema.   Skin:     General: Skin is warm and dry.      Findings: No lesion or rash.   Neurological:      General: No focal deficit present.      Mental Status: She is alert and oriented to person, place, and time.   Psychiatric:         Mood and Affect: Affect normal. Mood is not anxious or depressed.         Vital Signs:   /62 (BP Location: Left arm, Patient Position: Sitting, Cuff Size: Large Adult) Comment (BP Location): lower arm  Pulse 88   Temp 98 °F (36.7 °C) (Oral)   Resp 16   Ht 165.1 cm (65\")   Wt (!) 152 kg (334 lb)   SpO2 94% Comment: 3 L's PD  BMI 55.58 kg/m²        Result Review :   The following data was reviewed by: " Stephanie Lcokett, APRN on 03/26/2025:  CMP          3/16/2025    04:15 3/17/2025    03:56 3/18/2025    04:31   CMP   Glucose 165  152  126    BUN 29  25  27    Creatinine 0.80  0.84  0.96    EGFR 78.9  74.4  63.4    Sodium 135  136  134    Potassium 4.3  3.8  3.9    Chloride 96  92  91    Calcium 8.4  8.6  9.6    BUN/Creatinine Ratio 36.3  29.8  28.1    Anion Gap 12.3  10.2  9.7      CBC w/diff          3/16/2025    04:15 3/17/2025    03:56 3/18/2025    04:31   CBC w/Diff   WBC 13.25  12.91  13.75    RBC 4.18  4.53  4.54    Hemoglobin 11.8  12.7  12.9    Hematocrit 38.8  41.0  40.9    MCV 92.8  90.5  90.1    MCH 28.2  28.0  28.4    MCHC 30.4  31.0  31.5    RDW 14.1  14.0  13.8    Platelets 250  309  297    Neutrophil Rel % 87.9  79.2  74.0    Immature Granulocyte Rel % 0.7  1.6  3.6    Lymphocyte Rel % 4.6  8.7  10.9    Monocyte Rel % 6.6  10.1  10.5    Eosinophil Rel % 0.0  0.1  0.4    Basophil Rel % 0.2  0.3  0.6      Data reviewed : Radiologic studies chest CT 12/10/2024, chest x-ray 3/13/2025, chest CT 3/18/2025, PFT 1/16/2019, Cardiology studies echocardiogram 3/14/2025, Consultant notes Dr. Muñoz consult note 3/13/2025, Recent hospitalization notes Hospital discharge summary 3/18/2025, and my last office note    Procedures        Assessment and Plan    Diagnoses and all orders for this visit:    1. ANABELLE (obstructive sleep apnea) (Primary)    2. Chronic obstructive pulmonary disease, unspecified COPD type  Comments:  Continue Dulera    3. Chronic respiratory failure with hypoxia  Comments:  continue oxygen.  Patient using and benefiting    4. Class 3 severe obesity with serious comorbidity and body mass index (BMI) of 50.0 to 59.9 in adult, unspecified obesity type  Comments:  encourage increased activity    5. Dyspnea on exertion    6. Multifocal pneumonia  -     CT Chest Without Contrast; Future    7. Acute cough  -     benzonatate (TESSALON) 100 MG capsule; Take 1 capsule by mouth 3 (Three) Times a Day As Needed  for Cough.  Dispense: 90 capsule; Refill: 2      Assessment & Plan  1. Legionella pneumonia.  She was previously hospitalized for Legionella pneumonia. A repeat CT scan will be scheduled towards the end of May 2025 to ensure complete resolution of the pneumonia and to check for any residual scarring. The results will be communicated via a phone call from Mary.          Follow Up   Return for Next scheduled follow up.  Patient was given instructions and counseling regarding her condition or for health maintenance advice. Please see specific information pulled into the AVS if appropriate.

## 2025-03-25 NOTE — OUTREACH NOTE
Sepsis Week 1 Survey      Flowsheet Row Responses   [facility] patient discharged from? [facility]   Does the patient have one of the following disease processes/diagnoses(primary or secondary)? Sepsis   Week 1 attempt successful? Yes   Call start time 1508   Call end time 1512   Discharge diagnosis Sepsis secondary to multifocal pneumonia  from unspecified bacterial organism and influenza pneumonia, with organ dysfunction as evidenced by respiratory failure and new a flutter   Person spoke with today (if not patient) and relationship Patient   Meds reviewed with patient/caregiver? Yes   Does the patient have all medications related to this admission filled (includes all antibiotics, inhalers, nebulizers,steroids,etc.) Yes   Is the patient taking all medications as directed (includes completed medication regime)? Yes   Comments regarding appointments Pulmonary 3/26, Cardiology 3/27   Does the patient have a primary care provider?  Yes   Comments regarding PCP PCP Friday 3/28   Does the patient have an appointment with their PCP within 7 days of discharge? N/A   Has the patient kept scheduled appointments due by today? N/A   What is the Home health agency?  [facility]    Has home health visited the patient within 72 hours of discharge? Yes   DME comments Wearing home O23L. Reports sats are in the 90's on 3L   Psychosocial issues? No   Did the patient receive a copy of their discharge instructions? Yes   Nursing interventions Reviewed instructions with patient   What is the patient's perception of their health status since discharge? Improving   Is the patient/caregiver able to teach back TIME? I nfection - may have signs and symptoms of an infection   Is patient/caregiver able to teach back steps to recovery at home? Set small, achievable goals for return to baseline health   Is the patient/caregiver able to teach back signs and symptoms of worsening condition: Shortness of breath/rapid respiratory rate, Fever   If  the patient is a current smoker, are they able to teach back resources for cessation? Not a smoker   Is the patient/caregiver able to teach back the hierarchy of who to call/visit for symptoms/problems? PCP, Specialist, Home health nurse, Urgent Care, ED, 911 Yes   Week 1 call completed? Yes   Is the patient interested in additional calls from an ambulatory ? No   Would this patient benefit from a Referral to [facility] Social Work? No   Call end time 1512            OLIMPIA VALLADARES - Registered Nurse

## 2025-03-26 ENCOUNTER — OFFICE VISIT (OUTPATIENT)
Dept: PULMONOLOGY | Facility: CLINIC | Age: 70
End: 2025-03-26
Payer: MEDICARE

## 2025-03-26 VITALS
SYSTOLIC BLOOD PRESSURE: 108 MMHG | HEART RATE: 88 BPM | DIASTOLIC BLOOD PRESSURE: 62 MMHG | BODY MASS INDEX: 48.82 KG/M2 | OXYGEN SATURATION: 94 % | RESPIRATION RATE: 16 BRPM | TEMPERATURE: 98 F | HEIGHT: 65 IN | WEIGHT: 293 LBS

## 2025-03-26 DIAGNOSIS — E66.813 CLASS 3 SEVERE OBESITY WITH SERIOUS COMORBIDITY AND BODY MASS INDEX (BMI) OF 50.0 TO 59.9 IN ADULT, UNSPECIFIED OBESITY TYPE: ICD-10-CM

## 2025-03-26 DIAGNOSIS — G47.33 OSA (OBSTRUCTIVE SLEEP APNEA): Primary | ICD-10-CM

## 2025-03-26 DIAGNOSIS — J96.11 CHRONIC RESPIRATORY FAILURE WITH HYPOXIA: Chronic | ICD-10-CM

## 2025-03-26 DIAGNOSIS — R05.1 ACUTE COUGH: ICD-10-CM

## 2025-03-26 DIAGNOSIS — R06.09 DYSPNEA ON EXERTION: ICD-10-CM

## 2025-03-26 DIAGNOSIS — J44.9 CHRONIC OBSTRUCTIVE PULMONARY DISEASE, UNSPECIFIED COPD TYPE: ICD-10-CM

## 2025-03-26 DIAGNOSIS — E66.01 CLASS 3 SEVERE OBESITY WITH SERIOUS COMORBIDITY AND BODY MASS INDEX (BMI) OF 50.0 TO 59.9 IN ADULT, UNSPECIFIED OBESITY TYPE: ICD-10-CM

## 2025-03-26 DIAGNOSIS — J18.9 MULTIFOCAL PNEUMONIA: ICD-10-CM

## 2025-03-26 RX ORDER — BENZONATATE 100 MG/1
100 CAPSULE ORAL 3 TIMES DAILY PRN
Qty: 90 CAPSULE | Refills: 2 | Status: SHIPPED | OUTPATIENT
Start: 2025-03-26

## 2025-03-26 RX ORDER — MUPIROCIN 20 MG/G
OINTMENT TOPICAL
COMMUNITY
Start: 2025-03-20

## 2025-03-27 ENCOUNTER — OFFICE VISIT (OUTPATIENT)
Dept: CARDIOLOGY | Age: 70
End: 2025-03-27
Payer: MEDICARE

## 2025-03-27 ENCOUNTER — TELEPHONE (OUTPATIENT)
Dept: CARDIOLOGY | Age: 70
End: 2025-03-27

## 2025-03-27 VITALS
DIASTOLIC BLOOD PRESSURE: 70 MMHG | HEART RATE: 87 BPM | BODY MASS INDEX: 48.82 KG/M2 | WEIGHT: 293 LBS | OXYGEN SATURATION: 94 % | HEIGHT: 65 IN | SYSTOLIC BLOOD PRESSURE: 108 MMHG

## 2025-03-27 DIAGNOSIS — I48.0 PAF (PAROXYSMAL ATRIAL FIBRILLATION): ICD-10-CM

## 2025-03-27 DIAGNOSIS — I77.810 ASCENDING AORTA DILATION: ICD-10-CM

## 2025-03-27 DIAGNOSIS — R53.83 OTHER FATIGUE: ICD-10-CM

## 2025-03-27 DIAGNOSIS — I48.92 ATRIAL FLUTTER, UNSPECIFIED TYPE: ICD-10-CM

## 2025-03-27 DIAGNOSIS — J96.01 ACUTE HYPOXEMIC RESPIRATORY FAILURE: ICD-10-CM

## 2025-03-27 DIAGNOSIS — Z09 HOSPITAL DISCHARGE FOLLOW-UP: Primary | ICD-10-CM

## 2025-03-27 DIAGNOSIS — I50.32 CHRONIC HEART FAILURE WITH PRESERVED EJECTION FRACTION (HFPEF): ICD-10-CM

## 2025-03-27 DIAGNOSIS — I27.20 PULMONARY HYPERTENSION: ICD-10-CM

## 2025-03-27 DIAGNOSIS — I36.1 NONRHEUMATIC TRICUSPID VALVE REGURGITATION: ICD-10-CM

## 2025-03-27 DIAGNOSIS — I35.0 NONRHEUMATIC AORTIC VALVE STENOSIS: ICD-10-CM

## 2025-03-27 DIAGNOSIS — K64.9 HEMORRHOIDS, UNSPECIFIED HEMORRHOID TYPE: ICD-10-CM

## 2025-03-27 DIAGNOSIS — I10 PRIMARY HYPERTENSION: ICD-10-CM

## 2025-03-27 NOTE — TELEPHONE ENCOUNTER
Please call patient.  Lets continue the metoprolol 25 mg twice per day.  I am afraid if not, her heart rates will elevate.  Dr. Garza said today she was in atrial flutter.    We would like her to be on a blood thinner, but Dr. Garza wants her to get permission from Dr. Barrios first.  After she sees or talks with Dr. Barrios, please let us know.    Thank you

## 2025-03-27 NOTE — TELEPHONE ENCOUNTER
Called and left VM, will continue to try to reach pt.    HUB- please put patient straight through to triage    Daysi Shahid, RN  Triage RN  03/27/25 15:29 EDT

## 2025-03-27 NOTE — PROGRESS NOTES
Date of Office Visit: 2025  Encounter Provider: SHAUNA Maciel  Place of Service: [facility] CARDIOLOGY  Patient Name: Aida Conner  :1955  Primary Cardiologist: Dr. Bradley Garza    Chief Complaint   Patient presents with    Hospital Follow Up Visit    Atrial Fibrillation     /Flutter   :     HPI: Aida Conner is a 70 y.o. female who presents today for hospital follow-up visit.  I reviewed her medical records.    She has been diagnosed with hypertension, type 2 diabetes mellitus, obesity hypoventilation syndrome, COPD, chronic hypoxemic respiratory failure, home oxygen use, and obstructive sleep apnea on CPAP.  She has a history of right sided breast cancer treated with chemotherapy.    We see her for her coronary artery calcification noted on previous CT scan and acute on chronic diastolic heart failure.  In 2023, echocardiogram showed EF 50%, GLS not calculated, left atrial cavity dilated, mild aortic stenosis, and trace MR/TR.     She was recently hospitalized for sepsis secondary to multifocal pneumonia, influenza pneumonia, and acute hypoxemic respiratory failure.  She was diagnosed with new onset atrial flutter and acute diastolic heart failure.  Echocardiogram showed normal LVEF, diastolic function indeterminate, mild LVH, left atrium volume mildly increased, left atrium mildly dilated, aortic valve moderately sclerotic, mild aortic stenosis, mitral valve mildly sclerotic, moderate to severe tricuspid regurgitation, and moderately severe pulmonary hypertension (RVSP 45-55 mmHg), and ascending aorta dilated at 4.1 cm.     She was started on apixaban in the hospital, but developed rectal bleeding felt to possibly be due to hemorrhoids and was recommended follow-up with GI outpatient.  Anticoagulation was held because of risk of GI bleeding.  She was discharged on metoprolol succinate twice per day.    She presents today for follow-up visit.  Her  shortness of breath has improved, she has a chronic cough, and is wearing 3 L of oxygen 24/7.  She reports fatigue and weakness.  She feels that she was bleeding in the hospital because she was sitting on the commode for long periods of time before being helped up.  She sees Dr. Denis HAINES in [facility].  She reports a bad headache.  She denies chest pain, palpitations, edema, dizziness, syncope, or further bleeding.  Blood pressure and heart rate are stable.        Past Medical History:   Diagnosis Date    Allergic rhinitis     Anemia     Anxiety     Asthma     Chronic bronchitis     In past, espec. during working years    Chronic heart failure with preserved ejection fraction (HFpEF)     Chronic hypoxemic respiratory failure     on continuous O2    Coronary artery calcification seen on CT scan 04/21/2022    Diarrhea     with chemo    GERD (gastroesophageal reflux disease) Mild    H/O Intraductal papilloma     Hemorrhoids     History of transfusion 1984    AFTER BACK SURGERY no reaction    Hypertension     IBS (irritable bowel syndrome)     Inflammatory breast cancer     right    Kidney stone     Morbid obesity with BMI of 50.0-59.9, adult     Obesity hypoventilation syndrome 02/05/2021    On home oxygen therapy     2.5 AT REST WITH ACTIVITY UP TO 4L    ANABELLE on CPAP     cpap  & uses O2 with CPAP    Osteoarthritis     Pneumonia     Many times in past, 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    PONV (postoperative nausea and vomiting)     Type II diabetes mellitus 09/08/2021       Past Surgical History:   Procedure Laterality Date    BREAST EXCISIONAL BIOPSY Bilateral     COLONOSCOPY      COLONOSCOPY N/A 12/8/2022    Procedure: COLONOSCOPY;  Surgeon: Henry Aden MD;  Location: [facility] ENDOSCOPY;  Service: Gastroenterology;  Laterality: N/A;  diverticulosis, and hemorrhoids    CYSTOSCOPY BLADDER STONE LITHOTRIPSY      ENDOSCOPY      ENDOSCOPY N/A 12/8/2022    Procedure: ESOPHAGOGASTRODUODENOSCOPY  with biopsy;  Surgeon:  Henry Aden MD;  Location: [facility] ENDOSCOPY;  Service: Gastroenterology;  Laterality: N/A;  hiatal hernia    KNEE ARTHROPLASTY Bilateral 2009    MASTECTOMY Left 08/10/2021    Procedure: LEFT TOTAL MASTECTOMY;  Surgeon: Nicci Banks MD;  Location: [facility] OR;  Service: General;  Laterality: Left;    MASTECTOMY Right 08/10/2021    Procedure: RIGHT MODIFIED RADICAL MASTECTOMY;  Surgeon: Nicci Banks MD;  Location: [facility] OR;  Service: General;  Laterality: Right;    SPINE SURGERY  1984    TOTAL ABDOMINAL HYSTERECTOMY WITH SALPINGO OOPHORECTOMY  2004    UPPER GASTROINTESTINAL ENDOSCOPY      VENOUS ACCESS DEVICE (PORT) INSERTION N/A 01/25/2021    Procedure: INSERTION VENOUS ACCESS DEVICE;  Surgeon: Nicci Banks MD;  Location: [facility] OR;  Service: General;  Laterality: N/A;    VENOUS ACCESS DEVICE (PORT) REMOVAL Left 04/05/2022    Procedure: REMOVAL VENOUS ACCESS DEVICE;  Surgeon: Nicci Banks MD;  Location: [facility];  Service: General;  Laterality: Left;       Social History     Socioeconomic History    Marital status:     Number of children: 3   Tobacco Use    Smoking status: Never     Passive exposure: Never    Smokeless tobacco: Never   Vaping Use    Vaping status: Never Used   Substance and Sexual Activity    Alcohol use: Never    Drug use: Never    Sexual activity: Not Currently     Partners: Male       Family History   Problem Relation Age of Onset    Breast cancer Mother 45    Cancer Mother         breast; metastasized    Lung cancer Father     Hodgkin's lymphoma Father     Skin cancer Father         squamous cell    Cancer Father         4 kinds: Hodgkins; lung; squamous cell skin    Breast cancer Maternal Aunt 70    Cancer Maternal Aunt         breast; metastasized    Breast cancer Paternal Aunt 70    Cancer Paternal Aunt         breast; cured    Colon cancer Paternal Grandmother 60    Cancer Paternal Grandmother         colon cancer     Hypertension Paternal Grandmother         EVERY ADULT IN MY FAMILY    Ovarian cancer Neg Hx     Uterine cancer Neg Hx     Deep vein thrombosis Neg Hx     Pulmonary embolism Neg Hx     Malig Hyperthermia Neg Hx        The following portion of the patient's history were reviewed and updated as appropriate: past medical history, past surgical history, past social history, past family history, allergies, current medications, and problem list.    Review of Systems   Constitutional: Positive for malaise/fatigue.   Cardiovascular:  Positive for dyspnea on exertion.   Neurological:  Positive for weakness.       Allergies   Allergen Reactions    Amlodipine Swelling     LEGS     Hydrochlorothiazide Unknown - Low Severity     Neuro issues    Morphine Nausea And Vomiting     hallucinations    Adhesive Tape Rash         Current Outpatient Medications:     albuterol sulfate  (90 Base) MCG/ACT inhaler, Inhale 2 puffs Every 4 (Four) Hours As Needed for Wheezing., Disp: 18 g, Rfl: 11    anastrozole (ARIMIDEX) 1 MG tablet, Take 1 tablet by mouth Daily., Disp: 90 tablet, Rfl: 3    benzonatate (TESSALON) 100 MG capsule, Take 1 capsule by mouth 3 (Three) Times a Day As Needed for Cough., Disp: 90 capsule, Rfl: 2    Cholecalciferol (Vitamin D) 50 MCG (2000 UT) capsule, Take 1 capsule by mouth Daily., Disp: , Rfl:     dicyclomine (BENTYL) 20 MG tablet, TAKE 1 TABLET BY MOUTH EVERY 6 HOURS (Patient taking differently: Take 1 tablet by mouth Every 6 (Six) Hours As Needed for Abdominal Cramping (ibs).), Disp: 60 tablet, Rfl: 2    diphenoxylate-atropine (LOMOTIL) 2.5-0.025 MG per tablet, Take 1 tablet by mouth 4 (Four) Times a Day As Needed for Diarrhea., Disp: , Rfl:     FLUoxetine (PROzac) 40 MG capsule, TAKE 1 CAPSULE BY MOUTH EVERY DAY, Disp: 30 capsule, Rfl: 0    furosemide (Lasix) 40 MG tablet, Take 1 tablet by mouth Daily., Disp: 90 tablet, Rfl: 2    lisinopril (PRINIVIL,ZESTRIL) 10 MG tablet, Take 1 tablet by mouth Daily.,  "Disp: 90 tablet, Rfl: 0    metoprolol succinate XL (TOPROL-XL) 25 MG 24 hr tablet, Take 1 tablet by mouth Every 12 (Twelve) Hours for 30 days., Disp: 60 tablet, Rfl: 0    mometasone-formoterol (DULERA 100) 100-5 MCG/ACT inhaler, Inhale 2 puffs 2 (Two) Times a Day., Disp: 1 each, Rfl: 11    montelukast (SINGULAIR) 10 MG tablet, Take 1 tablet by mouth Every Night., Disp: , Rfl:     multivitamin (THERAGRAN) tablet tablet, Take 1 tablet by mouth Daily., Disp: , Rfl:     mupirocin (BACTROBAN) 2 % ointment, APPLY TO THE AFFECTED AREA(S) TWICE DAILY for 2 weeks, Disp: , Rfl:     ondansetron (ZOFRAN) 8 MG tablet, Take 1 tablet by mouth 3 (Three) Times a Day As Needed for Nausea., Disp: , Rfl:     pantoprazole (PROTONIX) 40 MG EC tablet, Take 1 tablet by mouth Daily., Disp: , Rfl:     pravastatin (PRAVACHOL) 40 MG tablet, Take 1 tablet by mouth Every Night., Disp: , Rfl:     spironolactone (ALDACTONE) 25 MG tablet, Take 1 tablet by mouth Daily., Disp: 90 tablet, Rfl: 2          Objective:     Vitals:    03/27/25 1311   BP: 108/70   BP Location: Left arm   Patient Position: Sitting   Cuff Size: Adult   Pulse: 87   SpO2: 94%   Weight: (!) 153 kg (336 lb 12.8 oz)   Height: 165.1 cm (65\")     Body mass index is 56.05 kg/m².    PHYSICAL EXAM:    Vitals Reviewed.   General Appearance: No acute distress, well developed and well nourished.  Obesity limiting exam.  Eyes: Glasses.   HENT: No hearing loss noted.    Respiratory: No signs of respiratory distress. Respiration rhythm and depth normal.  Diminished lung sounds.  Wearing 3 L of oxygen.  Cardiovascular:  Jugular Venous Pressure: Normal  Heart Rate and Rhythm: Irregular.  Heart Sounds: Normal S1 and S2. No S3 or S4 noted.  Murmurs: No murmurs noted. No rubs, thrills, or gallops.   Lower Extremities: No edema noted.  Musculoskeletal: Normal movement of extremities.  Skin: General appearance normal.    Psychiatric: Patient alert and oriented to person, place, and time. Speech and " behavior appropriate. Normal mood and affect.       ECG 12 Lead    Date/Time: 3/27/2025 1:16 PM  Performed by: Jojo Amin APRN    Authorized by: Jojo Amin APRN  Comparison: compared with previous ECG   Similar to previous ECG  Rhythm: atrial flutter  Rate: normal  BPM: 87  Conduction: right bundle branch block  QRS axis: normal  Other findings: non-specific ST-T wave changes    Clinical impression: abnormal EKG            Assessment:       Diagnosis Plan   1. Hospital discharge follow-up        2. Acute hypoxemic respiratory failure        3. Atrial flutter, unspecified type  Holter Monitor - 72 Hour Up To 15 Days      4. PAF (paroxysmal atrial fibrillation)        5. Hemorrhoids, unspecified hemorrhoid type        6. Chronic heart failure with preserved ejection fraction (HFpEF)        7. Nonrheumatic aortic valve stenosis        8. Nonrheumatic tricuspid valve regurgitation        9. Pulmonary hypertension        10. Ascending aorta dilation        11. Primary hypertension        12. Other fatigue               Plan:       1/2.  Hospital discharge follow-up.  Recently hospitalized for pneumonia and acute respiratory failure.  She is currently on home oxygen.  Feeling better.  She reports fatigue and weakness.    3/4.  Recently diagnosed with new onset atrial flutter/atrial fibrillation.  She remains in atrial flutter with controlled ventricular response today.  Continue current dose of metoprolol.  He has a [id number] score of 5.  She was tried on apixaban in the hospital, but developed rectal bleeding from hemorrhoids.  Dr. Garza is recommended that she follow-up with Dr. Barrios, her GI MD to see if we can restart anticoagulation.  She will let us know.  She will wear a 14-day Holter monitor.    5.  Hemorrhoids.    6.  Acute on chronic heart failure with preserved LVEF.  Doing well today.  Continue current medications.    7-10.  Aortic stenosis, tricuspid regurgitation, pulmonary hypertension,  and ascending aorta dilation noted on recent echocardiogram.  Stable.    11.  Hypertension: Blood pressure normal today.    12.  Generalized weakness and fatigue.  Multifactorial.    13.  Further recommendations to follow after the 14-day Holter monitor.  She will call us after she sees Dr. Barrios.  Follow-up with Dr. Garza in 1 month.    As always, it has been a pleasure to participate in your patient's care. Thank you.         Sincerely,         SHAUNA Ibanez  [facility] Cardiology      Dictated utilizing Dragon Dictation  I spent 37 minutes reviewing her medical records/testing/previous office notes/labs, face-to-face interaction with patient, physical examination, formulating the plan of care, and discussion of plan of care with patient.

## 2025-03-28 NOTE — TELEPHONE ENCOUNTER
Called and left VM, will continue to try to reach pt.    HUB- please put patient straight through to triage    Daysi Shahid, RN  Triage RN  03/28/25 11:03 EDT

## 2025-03-31 NOTE — TELEPHONE ENCOUNTER
Called and left VM, will continue to try to reach pt.    HUB- please put patient straight through to triage    Daysi Shahid, RN  Triage RN  03/31/25 09:52 EDT

## 2025-04-02 LAB
QT INTERVAL: 360 MS
QTC INTERVAL: 524 MS

## 2025-04-07 ENCOUNTER — READMISSION MANAGEMENT (OUTPATIENT)
Dept: CALL CENTER | Facility: HOSPITAL | Age: 70
End: 2025-04-07
Payer: MEDICARE

## 2025-04-07 NOTE — OUTREACH NOTE
Sepsis Week 2 Survey      Flowsheet Row Responses   [facility] facility patient discharged from? [place]   Does the patient have one of the following disease processes/diagnoses(primary or secondary)? Sepsis   Week 2 attempt successful? No   Unsuccessful attempts Attempt 1   Revoke Rich Otto H - Registered Nurse

## 2025-04-15 ENCOUNTER — OFFICE VISIT (OUTPATIENT)
Dept: GASTROENTEROLOGY | Facility: CLINIC | Age: 70
End: 2025-04-15
Payer: MEDICARE

## 2025-04-15 VITALS
HEIGHT: 65 IN | DIASTOLIC BLOOD PRESSURE: 59 MMHG | BODY MASS INDEX: 48.82 KG/M2 | SYSTOLIC BLOOD PRESSURE: 114 MMHG | OXYGEN SATURATION: 100 % | HEART RATE: 85 BPM | WEIGHT: 293 LBS

## 2025-04-15 DIAGNOSIS — K64.9 HEMORRHOIDS, UNSPECIFIED HEMORRHOID TYPE: Primary | ICD-10-CM

## 2025-04-15 PROCEDURE — 99214 OFFICE O/P EST MOD 30 MIN: CPT

## 2025-04-15 PROCEDURE — 3074F SYST BP LT 130 MM HG: CPT

## 2025-04-15 PROCEDURE — 3078F DIAST BP <80 MM HG: CPT

## 2025-04-15 PROCEDURE — 1160F RVW MEDS BY RX/DR IN RCRD: CPT

## 2025-04-15 PROCEDURE — G2211 COMPLEX E/M VISIT ADD ON: HCPCS

## 2025-04-15 PROCEDURE — 1159F MED LIST DOCD IN RCRD: CPT

## 2025-04-15 NOTE — PROGRESS NOTES
Chief Complaint  Hemorrhoids    Aida Conner is a 70 y.o. female who presents to [facility] GASTROENTEROLOGY- [place] for hospital follow-up.     History of present Illness  Patient presents to the office for hospital follow-up.  Patient admitted to [facility] 3/13/2025 for sepsis, congestive heart failure exacerbation and a flutter with RVR.  Patient had episode of rectal bleeding after starting anticoagulation, anticoagulation held until follow-up with GI outpatient.  Most recent CBC shows normal hemoglobin.  Patient has not had any rectal bleeding since discharge from the hospital.  Reports normal daily bowel movements.  Heartburn symptoms controlled with Protonix 40 mg daily.    EGD/colonoscopy 12/8/2022 by Dr. Aden-normal esophagus, medium size hiatal hernia, normal stomach and duodenum.  Stomach biopsies-mild reactive gastropathy.  Diverticula in the sigmoid colon and grade 1 internal hemorrhoids.  Repeat 5 years.    Past Medical History:   Diagnosis Date    Allergic rhinitis     Anemia     Anxiety     Asthma     Chronic bronchitis     In past, espec. during working years    Chronic heart failure with preserved ejection fraction (HFpEF)     Chronic hypoxemic respiratory failure     on continuous O2    Coronary artery calcification seen on CT scan 04/21/2022    Diarrhea     with chemo    GERD (gastroesophageal reflux disease) Mild    H/O Intraductal papilloma     Hemorrhoids     History of transfusion 1984    AFTER BACK SURGERY no reaction    Hypertension     IBS (irritable bowel syndrome)     Inflammatory breast cancer     right    Kidney stone     Morbid obesity with BMI of 50.0-59.9, adult     Obesity hypoventilation syndrome 02/05/2021    On home oxygen therapy     2.5 AT REST WITH ACTIVITY UP TO 4L    ANABELLE on CPAP     cpap  & uses O2 with CPAP    Osteoarthritis     Pneumonia     Many times in past, 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    PONV (postoperative nausea and vomiting)     Type II  diabetes mellitus 09/08/2021       Past Surgical History:   Procedure Laterality Date    BREAST EXCISIONAL BIOPSY Bilateral     COLONOSCOPY      COLONOSCOPY N/A 12/8/2022    Procedure: COLONOSCOPY;  Surgeon: Henry Aden MD;  Location: [facility] ENDOSCOPY;  Service: Gastroenterology;  Laterality: N/A;  diverticulosis, and hemorrhoids    CYSTOSCOPY BLADDER STONE LITHOTRIPSY      ENDOSCOPY      ENDOSCOPY N/A 12/8/2022    Procedure: ESOPHAGOGASTRODUODENOSCOPY  with biopsy;  Surgeon: Henry Aden MD;  Location: [facility] ENDOSCOPY;  Service: Gastroenterology;  Laterality: N/A;  hiatal hernia    KNEE ARTHROPLASTY Bilateral 2009    MASTECTOMY Left 08/10/2021    Procedure: LEFT TOTAL MASTECTOMY;  Surgeon: Nicci Banks MD;  Location: [facility] MAIN OR;  Service: General;  Laterality: Left;    MASTECTOMY Right 08/10/2021    Procedure: RIGHT MODIFIED RADICAL MASTECTOMY;  Surgeon: Nicci Banks MD;  Location: [facility] MAIN OR;  Service: General;  Laterality: Right;    SPINE SURGERY  1984    TOTAL ABDOMINAL HYSTERECTOMY WITH SALPINGO OOPHORECTOMY  2004    UPPER GASTROINTESTINAL ENDOSCOPY      VENOUS ACCESS DEVICE (PORT) INSERTION N/A 01/25/2021    Procedure: INSERTION VENOUS ACCESS DEVICE;  Surgeon: Nicci Banks MD;  Location: [facility] MAIN OR;  Service: General;  Laterality: N/A;    VENOUS ACCESS DEVICE (PORT) REMOVAL Left 04/05/2022    Procedure: REMOVAL VENOUS ACCESS DEVICE;  Surgeon: Nicci Banks MD;  Location: [facility] MAIN OR;  Service: General;  Laterality: Left;         Current Outpatient Medications:     albuterol sulfate  (90 Base) MCG/ACT inhaler, Inhale 2 puffs Every 4 (Four) Hours As Needed for Wheezing., Disp: 18 g, Rfl: 11    anastrozole (ARIMIDEX) 1 MG tablet, Take 1 tablet by mouth Daily., Disp: 90 tablet, Rfl: 3    Cholecalciferol (Vitamin D) 50 MCG (2000 UT) capsule, Take 1 capsule by mouth Daily., Disp: , Rfl:     dicyclomine (BENTYL) 20 MG tablet, TAKE 1 TABLET BY  MOUTH EVERY 6 HOURS (Patient taking differently: Take 1 tablet by mouth Every 6 (Six) Hours As Needed for Abdominal Cramping (ibs).), Disp: 60 tablet, Rfl: 2    diphenoxylate-atropine (LOMOTIL) 2.5-0.025 MG per tablet, Take 1 tablet by mouth 4 (Four) Times a Day As Needed for Diarrhea., Disp: , Rfl:     FLUoxetine (PROzac) 40 MG capsule, TAKE 1 CAPSULE BY MOUTH EVERY DAY, Disp: 30 capsule, Rfl: 0    furosemide (Lasix) 40 MG tablet, Take 1 tablet by mouth Daily., Disp: 90 tablet, Rfl: 2    lisinopril (PRINIVIL,ZESTRIL) 10 MG tablet, Take 1 tablet by mouth Daily., Disp: 90 tablet, Rfl: 0    metoprolol succinate XL (TOPROL-XL) 25 MG 24 hr tablet, Take 1 tablet by mouth Every 12 (Twelve) Hours for 30 days., Disp: 60 tablet, Rfl: 0    mometasone-formoterol (DULERA 100) 100-5 MCG/ACT inhaler, Inhale 2 puffs 2 (Two) Times a Day., Disp: 1 each, Rfl: 11    montelukast (SINGULAIR) 10 MG tablet, Take 1 tablet by mouth Every Night., Disp: , Rfl:     multivitamin (THERAGRAN) tablet tablet, Take 1 tablet by mouth Daily., Disp: , Rfl:     mupirocin (BACTROBAN) 2 % ointment, APPLY TO THE AFFECTED AREA(S) TWICE DAILY for 2 weeks, Disp: , Rfl:     ondansetron (ZOFRAN) 8 MG tablet, Take 1 tablet by mouth 3 (Three) Times a Day As Needed for Nausea., Disp: , Rfl:     pantoprazole (PROTONIX) 40 MG EC tablet, Take 1 tablet by mouth Daily., Disp: , Rfl:     pravastatin (PRAVACHOL) 40 MG tablet, Take 1 tablet by mouth Every Night., Disp: , Rfl:     spironolactone (ALDACTONE) 25 MG tablet, Take 1 tablet by mouth Daily., Disp: 90 tablet, Rfl: 2    benzonatate (TESSALON) 100 MG capsule, Take 1 capsule by mouth 3 (Three) Times a Day As Needed for Cough. (Patient not taking: Reported on 4/15/2025), Disp: 90 capsule, Rfl: 2     Allergies   Allergen Reactions    Amlodipine Swelling     LEGS     Hydrochlorothiazide Unknown - Low Severity     Neuro issues    Morphine Nausea And Vomiting     hallucinations    Adhesive Tape Rash       Family History  "  Problem Relation Age of Onset    Breast cancer Mother 45    Cancer Mother         breast; metastasized    Lung cancer Father     Hodgkin's lymphoma Father     Skin cancer Father         squamous cell    Cancer Father         4 kinds: Hodgkins; lung; squamous cell skin    Breast cancer Maternal Aunt 70    Cancer Maternal Aunt         breast; metastasized    Breast cancer Paternal Aunt 70    Cancer Paternal Aunt         breast; cured    Colon cancer Paternal Grandmother 60    Cancer Paternal Grandmother         colon cancer    Hypertension Paternal Grandmother         EVERY ADULT IN MY FAMILY    Ovarian cancer Neg Hx     Uterine cancer Neg Hx     Deep vein thrombosis Neg Hx     Pulmonary embolism Neg Hx     Malig Hyperthermia Neg Hx         Social History     Social History Narrative    3 daughters    She drinks one half of a 2 liter of  DT coke with caffeine daily.        Objective       Vital Signs:   /59 (BP Location: Left arm, Patient Position: Sitting, Cuff Size: Adult)   Pulse 85   Ht 165.1 cm (65\")   Wt (!) 151 kg (333 lb 14.4 oz)   SpO2 100%   BMI 55.56 kg/m²       Physical Exam  Constitutional:       Appearance: Normal appearance. She is normal weight.   HENT:      Head: Normocephalic and atraumatic.      Nose: Nose normal.   Pulmonary:      Effort: Pulmonary effort is normal.      Comments: NC  Skin:     General: Skin is warm and dry.   Neurological:      Mental Status: She is alert and oriented to person, place, and time. Mental status is at baseline.   Psychiatric:         Mood and Affect: Mood normal.         Behavior: Behavior normal.         Thought Content: Thought content normal.         Judgment: Judgment normal.         Result Review :       CBC w/diff          3/16/2025    04:15 3/17/2025    03:56 3/18/2025    04:31   CBC w/Diff   WBC 13.25  12.91  13.75    RBC 4.18  4.53  4.54    Hemoglobin 11.8  12.7  12.9    Hematocrit 38.8  41.0  40.9    MCV 92.8  90.5  90.1    MCH 28.2  28.0  28.4 "    MCHC 30.4  31.0  31.5    RDW 14.1  14.0  13.8    Platelets 250  309  297    Neutrophil Rel % 87.9  79.2  74.0    Immature Granulocyte Rel % 0.7  1.6  3.6    Lymphocyte Rel % 4.6  8.7  10.9    Monocyte Rel % 6.6  10.1  10.5    Eosinophil Rel % 0.0  0.1  0.4    Basophil Rel % 0.2  0.3  0.6      CMP          3/16/2025    04:15 3/17/2025    03:56 3/18/2025    04:31   CMP   Glucose 165  152  126    BUN 29  25  27    Creatinine 0.80  0.84  0.96    EGFR 78.9  74.4  63.4    Sodium 135  136  134    Potassium 4.3  3.8  3.9    Chloride 96  92  91    Calcium 8.4  8.6  9.6    BUN/Creatinine Ratio 36.3  29.8  28.1    Anion Gap 12.3  10.2  9.7              Assessment and Plan    Diagnoses and all orders for this visit:    1. Hemorrhoids, unspecified hemorrhoid type (Primary)    70-year-old patient presents to the office for hospital follow-up.  Patient admitted to [facility] 3/13/2025 for sepsis, congestive heart failure exacerbation and a flutter with RVR.  Patient had episode of rectal bleeding after starting anticoagulation, anticoagulation held until follow-up with GI outpatient.  Most recent CBC shows normal hemoglobin.  Patient has not had any rectal bleeding since discharge from the hospital.  Reports normal daily bowel movements.  Discussed with patient about proceeding with colonoscopy for further evaluation, patient wishes to defer at this time due to other health concerns going on.  Patient aware risk associated with deferring procedure.  She will restart daily fiber supplement to prevent constipation and straining as this previously provided relief.  She will call the office if constipation worsens and we can consider stronger medication at that time.  Discussed signs and symptoms to monitor for once restarting blood thinner that would warrant calling the office.  We will send note to cardiology that patient is cleared to start blood thinner.  Patient will trend CBC with PCP moving forward, she will return to office for  onset of anemia.  Patient is agreeable to plan call office any questions or concerns.    Follow Up   Return if symptoms worsen or fail to improve.  Patient was given instructions and counseling regarding her condition or for health maintenance advice. Please see specific information pulled into the AVS if appropriate.

## 2025-04-16 ENCOUNTER — HOSPITAL ENCOUNTER (OUTPATIENT)
Dept: INFUSION THERAPY | Facility: HOSPITAL | Age: 70
Discharge: HOME | End: 2025-04-16
Admitting: INTERNAL MEDICINE
Payer: MEDICARE

## 2025-04-16 VITALS
RESPIRATION RATE: 20 BRPM | SYSTOLIC BLOOD PRESSURE: 118 MMHG | OXYGEN SATURATION: 95 % | HEART RATE: 89 BPM | TEMPERATURE: 98 F | DIASTOLIC BLOOD PRESSURE: 68 MMHG

## 2025-04-16 DIAGNOSIS — D50.8 OTHER IRON DEFICIENCY ANEMIA: ICD-10-CM

## 2025-04-16 DIAGNOSIS — T45.4X5D ADVERSE EFFECT OF IRON, SUBSEQUENT ENCOUNTER: Primary | ICD-10-CM

## 2025-04-16 PROCEDURE — 96374 THER/PROPH/DIAG INJ IV PUSH: CPT

## 2025-04-16 PROCEDURE — 25010000002 FERUMOXYTOL 510 MG/17ML SOLUTION: Performed by: INTERNAL MEDICINE

## 2025-04-16 RX ORDER — SODIUM CHLORIDE 9 MG/ML
20 INJECTION, SOLUTION INTRAVENOUS ONCE
Status: CANCELLED | OUTPATIENT
Start: 2025-04-22

## 2025-04-16 RX ORDER — HYDROCORTISONE SODIUM SUCCINATE 100 MG/2ML
100 INJECTION INTRAMUSCULAR; INTRAVENOUS AS NEEDED
OUTPATIENT
Start: 2025-04-22

## 2025-04-16 RX ORDER — ACETAMINOPHEN 325 MG/1
650 TABLET ORAL ONCE
Status: CANCELLED | OUTPATIENT
Start: 2025-04-22

## 2025-04-16 RX ORDER — CETIRIZINE HYDROCHLORIDE 10 MG/1
10 TABLET ORAL ONCE
Status: DISCONTINUED | OUTPATIENT
Start: 2025-04-16 | End: 2025-04-18 | Stop reason: HOSPADM

## 2025-04-16 RX ORDER — SODIUM CHLORIDE 9 MG/ML
20 INJECTION, SOLUTION INTRAVENOUS ONCE
Status: DISCONTINUED | OUTPATIENT
Start: 2025-04-16 | End: 2025-04-18 | Stop reason: HOSPADM

## 2025-04-16 RX ORDER — DIPHENHYDRAMINE HYDROCHLORIDE 50 MG/ML
50 INJECTION, SOLUTION INTRAMUSCULAR; INTRAVENOUS AS NEEDED
OUTPATIENT
Start: 2025-04-22

## 2025-04-16 RX ORDER — FAMOTIDINE 10 MG/ML
20 INJECTION, SOLUTION INTRAVENOUS AS NEEDED
OUTPATIENT
Start: 2025-04-22

## 2025-04-16 RX ORDER — CETIRIZINE HYDROCHLORIDE 10 MG/1
10 TABLET ORAL ONCE
Status: CANCELLED | OUTPATIENT
Start: 2025-04-22

## 2025-04-16 RX ORDER — ACETAMINOPHEN 325 MG/1
650 TABLET ORAL ONCE
Status: DISCONTINUED | OUTPATIENT
Start: 2025-04-16 | End: 2025-04-18 | Stop reason: HOSPADM

## 2025-04-16 RX ADMIN — FERUMOXYTOL 510 MG: 510 INJECTION INTRAVENOUS at 15:29

## 2025-04-25 NOTE — PROGRESS NOTES
RM:________     PCP: Serenity Lucas APRN    : 1955  AGE: 70 y.o.  EST PATIENT           Wt Readings from Last 3 Encounters:   04/15/25 (!) 151 kg (333 lb 14.4 oz)   25 (!) 153 kg (336 lb 12.8 oz)   25 (!) 152 kg (334 lb)           CP______  SOA_______ DIZZINESS _____ FATIGUE ______  PALPS ______    WT: ____________ BP: __________L __________R HR______    ALLERGIES:Amlodipine, Hydrochlorothiazide, Morphine, and Adhesive tape      Social History     Tobacco Use    Smoking status: Never     Passive exposure: Never    Smokeless tobacco: Never   Vaping Use    Vaping status: Never Used   Substance Use Topics    Alcohol use: Never    Drug use: Never

## 2025-04-30 ENCOUNTER — OFFICE VISIT (OUTPATIENT)
Dept: CARDIOLOGY | Age: 70
End: 2025-04-30
Payer: MEDICARE

## 2025-04-30 VITALS
HEIGHT: 65 IN | BODY MASS INDEX: 48.82 KG/M2 | SYSTOLIC BLOOD PRESSURE: 110 MMHG | OXYGEN SATURATION: 95 % | WEIGHT: 293 LBS | HEART RATE: 96 BPM | DIASTOLIC BLOOD PRESSURE: 72 MMHG

## 2025-04-30 DIAGNOSIS — C50.611 MALIGNANT NEOPLASM OF AXILLARY TAIL OF RIGHT BREAST IN FEMALE, ESTROGEN RECEPTOR POSITIVE: ICD-10-CM

## 2025-04-30 DIAGNOSIS — I48.92 ATRIAL FLUTTER WITH CONTROLLED RESPONSE: ICD-10-CM

## 2025-04-30 DIAGNOSIS — I50.32 CHRONIC HEART FAILURE WITH PRESERVED EJECTION FRACTION (HFPEF): Primary | ICD-10-CM

## 2025-04-30 DIAGNOSIS — Z17.0 MALIGNANT NEOPLASM OF AXILLARY TAIL OF RIGHT BREAST IN FEMALE, ESTROGEN RECEPTOR POSITIVE: ICD-10-CM

## 2025-04-30 DIAGNOSIS — Z79.811 AROMATASE INHIBITOR USE: ICD-10-CM

## 2025-04-30 DIAGNOSIS — I25.10 CORONARY ARTERY CALCIFICATION SEEN ON CT SCAN: ICD-10-CM

## 2025-04-30 DIAGNOSIS — I10 PRIMARY HYPERTENSION: ICD-10-CM

## 2025-04-30 PROBLEM — J96.01 ACUTE HYPOXEMIC RESPIRATORY FAILURE: Status: RESOLVED | Noted: 2025-03-13 | Resolved: 2025-04-30

## 2025-04-30 RX ORDER — METOPROLOL SUCCINATE 25 MG/1
25 TABLET, EXTENDED RELEASE ORAL EVERY 12 HOURS SCHEDULED
Qty: 180 TABLET | Refills: 3 | Status: SHIPPED | OUTPATIENT
Start: 2025-04-30 | End: 2025-05-30

## 2025-04-30 NOTE — LETTER
2025     SHAUNA Kemp  [address]  [address]  [place] [address]    Patient: Aida Conner   YOB: 1955   Date of Visit: 2025     Dear SHAUNA Kemp:       Thank you for referring Aida Conner to me for evaluation. Below are the relevant portions of my assessment and plan of care.    If you have questions, please do not hesitate to call me. I look forward to following Aida along with you.         Sincerely,        Bradley Garza MD        CC: MD Greg Lazo Jamie D, MD  25 1630  Sign when Signing Visit  Date of Office Visit: 25  Encounter Provider: Bradley Garza MD  Place of Service: [facility] CARDIOLOGY  Patient Name: Aida Conner  :1955    Chief Complaint   Patient presents with   • Congestive Heart Failure   :     HPI:     Ms. Conner is 70 y.o. and presents today in follow up. I met her in 2021. I have reviewed prior notes and there are no changes except for any new updates described below. I have also reviewed any information entered into the medical record by the patient or by ancillary staff.     She has chronic HFpEF.  She has obstructive sleep apnea and is on CPAP.  She has obesity hypoventilation syndrome and actually has to use oxygen at all times.  She has systemic hypertension.  She has coronary artery calcification seen on a chest CT but does not carry a diagnosis of coronary artery disease.  She has type 2 diabetes, hyperlipidemia, and is morbidly obese.    She was diagnosed with right sided inflammatory breast cancer (ER/PA/Her2+) in late . She had an echo in 2021 that showed normal LVSF, grade 1 diastolic dysfunction, and normal GLS -21%.     She was intended to take doxorubicin as well as trastuzumab/pertuzumab. She was already on carvedilol, and I added lisinopril. While on doxorubicin, there was a recommendation to switch her beta blocker due to concerns that  it could increase doxorubicin levels, but she was reluctant to do so as it had been started by her primary cardiologist.     Ultimately, she received only 3 doses of doxorubicin/cyclophosphamide before it was stopped due to intolerance. Paclitaxel caused pneumonitis and had to be stopped. She did not tolerate pertuzumab, and she finished trastuzumab on March 31, 2022. She has been on anastrozole.     She has had serial echocardiograms since April 2021. She was admitted to another hospital in December 2023 with COVID. An echo was performed and was technically difficult. It reported a decline in EF to 50%. We repeated an echo in February 2024; LVSF was normal, EF 65%. GLS could not be obtained due to suboptimal images.    She was hospitalized in March 2025 with Legionella pneumonia. An echo revealed normal LVSF (EF 60-65%), moderate pulmonary hypertension, and mild LAE. GLS was not obtained. She was noted to be in rate controlled, asymptomatic atrial flutter. A rhythm monitor showed good rate control.    She has chronic dyspnea and is on oxygen. She has chronic fatigue. She has chronic leg swelling. She has no chest pain, palpitations, or syncope.     Past Medical History:   Diagnosis Date   • Allergic rhinitis    • Anemia    • Anxiety    • Asthma    • Chronic bronchitis     In past, espec. during working years   • Chronic heart failure with preserved ejection fraction (HFpEF)    • Chronic hypoxemic respiratory failure     on continuous O2   • Coronary artery calcification seen on CT scan 04/21/2022   • Diarrhea     with chemo   • GERD (gastroesophageal reflux disease) Mild   • H/O Intraductal papilloma    • Hemorrhoids    • History of transfusion 1984    AFTER BACK SURGERY no reaction   • Hypertension    • IBS (irritable bowel syndrome)    • Inflammatory breast cancer     right   • Kidney stone    • Morbid obesity with BMI of 50.0-59.9, adult    • Obesity hypoventilation syndrome 02/05/2021   • On home oxygen therapy      2.5 AT REST WITH ACTIVITY UP TO 4L   • ANABELLE on CPAP     cpap  & uses O2 with CPAP   • Osteoarthritis    • Pneumonia     Many times in past, 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   • PONV (postoperative nausea and vomiting)    • Type II diabetes mellitus 09/08/2021       Past Surgical History:   Procedure Laterality Date   • BREAST EXCISIONAL BIOPSY Bilateral    • COLONOSCOPY     • COLONOSCOPY N/A 12/8/2022    Procedure: COLONOSCOPY;  Surgeon: Henry Aden MD;  Location: [facility] ENDOSCOPY;  Service: Gastroenterology;  Laterality: N/A;  diverticulosis, and hemorrhoids   • CYSTOSCOPY BLADDER STONE LITHOTRIPSY     • ENDOSCOPY     • ENDOSCOPY N/A 12/8/2022    Procedure: ESOPHAGOGASTRODUODENOSCOPY  with biopsy;  Surgeon: Henry Aden MD;  Location: [facility] ENDOSCOPY;  Service: Gastroenterology;  Laterality: N/A;  hiatal hernia   • KNEE ARTHROPLASTY Bilateral 2009   • MASTECTOMY Left 08/10/2021    Procedure: LEFT TOTAL MASTECTOMY;  Surgeon: Nicci Banks MD;  Location: [facility] OR;  Service: General;  Laterality: Left;   • MASTECTOMY Right 08/10/2021    Procedure: RIGHT MODIFIED RADICAL MASTECTOMY;  Surgeon: Nicci Banks MD;  Location: [facility] OR;  Service: General;  Laterality: Right;   • SPINE SURGERY  1984   • TOTAL ABDOMINAL HYSTERECTOMY WITH SALPINGO OOPHORECTOMY  2004   • UPPER GASTROINTESTINAL ENDOSCOPY     • VENOUS ACCESS DEVICE (PORT) INSERTION N/A 01/25/2021    Procedure: INSERTION VENOUS ACCESS DEVICE;  Surgeon: Nicci Banks MD;  Location: [facility] OR;  Service: General;  Laterality: N/A;   • VENOUS ACCESS DEVICE (PORT) REMOVAL Left 04/05/2022    Procedure: REMOVAL VENOUS ACCESS DEVICE;  Surgeon: Nicci Banks MD;  Location: [facility] OR;  Service: General;  Laterality: Left;       Social History     Socioeconomic History   • Marital status:    • Number of children: 3   Tobacco Use   • Smoking status: Never     Passive exposure: Never   • Smokeless  tobacco: Never   Vaping Use   • Vaping status: Never Used   Substance and Sexual Activity   • Alcohol use: Never   • Drug use: Never   • Sexual activity: Not Currently     Partners: Male       Family History   Problem Relation Age of Onset   • Breast cancer Mother 45   • Cancer Mother         breast; metastasized   • Lung cancer Father    • Hodgkin's lymphoma Father    • Skin cancer Father         squamous cell   • Cancer Father         4 kinds: Hodgkins; lung; squamous cell skin   • Breast cancer Maternal Aunt 70   • Cancer Maternal Aunt         breast; metastasized   • Breast cancer Paternal Aunt 70   • Cancer Paternal Aunt         breast; cured   • Colon cancer Paternal Grandmother 60   • Cancer Paternal Grandmother         colon cancer   • Hypertension Paternal Grandmother         EVERY ADULT IN MY FAMILY   • Ovarian cancer Neg Hx    • Uterine cancer Neg Hx    • Deep vein thrombosis Neg Hx    • Pulmonary embolism Neg Hx    • Malig Hyperthermia Neg Hx        Review of Systems   Constitutional: Positive for malaise/fatigue.   Cardiovascular:  Positive for leg swelling.   Respiratory:  Positive for shortness of breath.    Skin:  Positive for dry skin and suspicious lesions.   All other systems reviewed and are negative.      Allergies   Allergen Reactions   • Amlodipine Swelling     LEGS    • Hydrochlorothiazide Unknown - Low Severity     Neuro issues   • Morphine Nausea And Vomiting     hallucinations   • Adhesive Tape Rash         Current Outpatient Medications:   •  albuterol sulfate  (90 Base) MCG/ACT inhaler, Inhale 2 puffs Every 4 (Four) Hours As Needed for Wheezing., Disp: 18 g, Rfl: 11  •  anastrozole (ARIMIDEX) 1 MG tablet, Take 1 tablet by mouth Daily., Disp: 90 tablet, Rfl: 3  •  apixaban (ELIQUIS) 5 MG tablet tablet, Take 1 tablet by mouth 2 (Two) Times a Day., Disp: 60 tablet, Rfl: 11  •  benzonatate (TESSALON) 100 MG capsule, Take 1 capsule by mouth 3 (Three) Times a Day As Needed for Cough.,  "Disp: 90 capsule, Rfl: 2  •  Cholecalciferol (Vitamin D) 50 MCG (2000 UT) capsule, Take 1 capsule by mouth Daily., Disp: , Rfl:   •  dicyclomine (BENTYL) 20 MG tablet, TAKE 1 TABLET BY MOUTH EVERY 6 HOURS (Patient taking differently: Take 1 tablet by mouth Every 6 (Six) Hours As Needed for Abdominal Cramping (ibs).), Disp: 60 tablet, Rfl: 2  •  diphenoxylate-atropine (LOMOTIL) 2.5-0.025 MG per tablet, Take 1 tablet by mouth 4 (Four) Times a Day As Needed for Diarrhea., Disp: , Rfl:   •  FLUoxetine (PROzac) 40 MG capsule, TAKE 1 CAPSULE BY MOUTH EVERY DAY, Disp: 30 capsule, Rfl: 0  •  furosemide (Lasix) 40 MG tablet, Take 1 tablet by mouth Daily., Disp: 90 tablet, Rfl: 2  •  lisinopril (PRINIVIL,ZESTRIL) 10 MG tablet, Take 1 tablet by mouth Daily., Disp: 90 tablet, Rfl: 0  •  mometasone-formoterol (DULERA 100) 100-5 MCG/ACT inhaler, Inhale 2 puffs 2 (Two) Times a Day., Disp: 1 each, Rfl: 11  •  montelukast (SINGULAIR) 10 MG tablet, Take 1 tablet by mouth Every Night., Disp: , Rfl:   •  multivitamin (THERAGRAN) tablet tablet, Take 1 tablet by mouth Daily., Disp: , Rfl:   •  mupirocin (BACTROBAN) 2 % ointment, APPLY TO THE AFFECTED AREA(S) TWICE DAILY for 2 weeks, Disp: , Rfl:   •  ondansetron (ZOFRAN) 8 MG tablet, Take 1 tablet by mouth 3 (Three) Times a Day As Needed for Nausea., Disp: , Rfl:   •  pantoprazole (PROTONIX) 40 MG EC tablet, Take 1 tablet by mouth Daily., Disp: , Rfl:   •  pravastatin (PRAVACHOL) 40 MG tablet, Take 1 tablet by mouth Every Night., Disp: , Rfl:   •  spironolactone (ALDACTONE) 25 MG tablet, Take 1 tablet by mouth Daily., Disp: 90 tablet, Rfl: 2  •  metoprolol succinate XL (TOPROL-XL) 25 MG 24 hr tablet, Take 1 tablet by mouth Every 12 (Twelve) Hours for 30 days., Disp: 60 tablet, Rfl: 0      Objective:     Vitals:    04/30/25 1324   BP: 110/72   Pulse: 96   SpO2: 95%   Weight: (!) 152 kg (336 lb)   Height: 165.1 cm (65\")         Body mass index is 55.91 kg/m².    Vitals reviewed. "   Constitutional:       Appearance: Not in distress. Morbidly obese.      Comments: On oxygen. Morbidly obese.   Eyes:      Conjunctiva/sclera: Conjunctivae normal.   HENT:      Head: Normocephalic.      Nose: Nose normal.   Neck:      Comments: Cannot assess for JVD due to habitus  Pulmonary:      Effort: Pulmonary effort is normal.      Breath sounds: Normal breath sounds.   Cardiovascular:      Normal rate. Regular rhythm.      Murmurs: There is no murmur.      Comments: Stasis changes left shin  Pulses:     Intact distal pulses.   Edema:     Pretibial: bilateral 1+ edema of the pretibial area.     Ankle: bilateral 1+ edema of the ankle.  Abdominal:      Palpations: Abdomen is soft.      Tenderness: There is no abdominal tenderness.      Comments: Obesity limits abdominal exam   Musculoskeletal: Normal range of motion.      Cervical back: Normal range of motion. Skin:     General: Skin is warm and dry.   Neurological:      Mental Status: Alert and oriented to person, place, and time.      Cranial Nerves: No cranial nerve deficit.   Psychiatric:         Behavior: Behavior normal.         Thought Content: Thought content normal.         Judgment: Judgment normal.         Procedures    Assessment:       Diagnosis Plan   1. Chronic heart failure with preserved ejection fraction (HFpEF)        2. Primary hypertension        3. Atrial flutter with controlled response        4. Coronary artery calcification seen on CT scan        5. Aromatase inhibitor use        6. Malignant neoplasm of axillary tail of right breast in female, estrogen receptor positive             Plan:       Ms. Conner has chronic HFpEF, coronary artery calcification without symptoms of angina, systemic hypertension, sleep apnea, obesity hypoventilation syndrome on chronic oxygen, type 2 diabetes, and she is morbidly obese.      She only received three doses of doxorobucin (she did not tolerate it well). She did not tolerate pertuzumab. She completed  trastuzumab on March 31, 2022. Serial echocardiograms have been normal and stable. She needs a study yearly until 2027 (her last was in March 2025)..     She will remain on carvedilol, lisinopril, furosemide, and spironolactone. Her BP is well controlled.     She is now on anastrozole; we will continue to try to control her cardiac risk factors while on this medication.     She is in asymptomatic, rate controlled atrial flutter. I do not recommend rhythm control; continue apixaban and carvedilol.     Sincerely,       Bradley Garza MD

## 2025-04-30 NOTE — PROGRESS NOTES
Date of Office Visit: 25  Encounter Provider: Bradley Garza MD  Place of Service: [facility]  Patient Name: Aida Conner  :1955    Chief Complaint   Patient presents with    Congestive Heart Failure   :     HPI:     Ms. Conner is 70 y.o. and presents today in follow up. I met her in 2021. I have reviewed prior notes and there are no changes except for any new updates described below. I have also reviewed any information entered into the medical record by the patient or by ancillary staff.     She has chronic HFpEF.  She has obstructive sleep apnea and is on CPAP.  She has obesity hypoventilation syndrome and actually has to use oxygen at all times.  She has systemic hypertension.  She has coronary artery calcification seen on a chest CT but does not carry a diagnosis of coronary artery disease.  She has type 2 diabetes, hyperlipidemia, and is morbidly obese.    She was diagnosed with right sided inflammatory breast cancer (ER/NV/Her2+) in late . She had an echo in 2021 that showed normal LVSF, grade 1 diastolic dysfunction, and normal GLS -21%.     She was intended to take doxorubicin as well as trastuzumab/pertuzumab. She was already on carvedilol, and I added lisinopril. While on doxorubicin, there was a recommendation to switch her beta blocker due to concerns that it could increase doxorubicin levels, but she was reluctant to do so as it had been started by her primary cardiologist.     Ultimately, she received only 3 doses of doxorubicin/cyclophosphamide before it was stopped due to intolerance. Paclitaxel caused pneumonitis and had to be stopped. She did not tolerate pertuzumab, and she finished trastuzumab on 2022. She has been on anastrozole.     She has had serial echocardiograms since 2021. She was admitted to another hospital in 2023 with COVID. An echo was performed and was technically difficult. It  reported a decline in EF to 50%. We repeated an echo in February 2024; LVSF was normal, EF 65%. GLS could not be obtained due to suboptimal images.    She was hospitalized in March 2025 with Legionella pneumonia. An echo revealed normal LVSF (EF 60-65%), moderate pulmonary hypertension, and mild LAE. GLS was not obtained. She was noted to be in rate controlled, asymptomatic atrial flutter. A rhythm monitor showed good rate control.    She has chronic dyspnea and is on oxygen. She has chronic fatigue. She has chronic leg swelling. She has no chest pain, palpitations, or syncope.     Past Medical History:   Diagnosis Date    Allergic rhinitis     Anemia     Anxiety     Asthma     Chronic bronchitis     In past, espec. during working years    Chronic heart failure with preserved ejection fraction (HFpEF)     Chronic hypoxemic respiratory failure     on continuous O2    Coronary artery calcification seen on CT scan 04/21/2022    Diarrhea     with chemo    GERD (gastroesophageal reflux disease) Mild    H/O Intraductal papilloma     Hemorrhoids     History of transfusion 1984    AFTER BACK SURGERY no reaction    Hypertension     IBS (irritable bowel syndrome)     Inflammatory breast cancer     right    Kidney stone     Morbid obesity with BMI of 50.0-59.9, adult     Obesity hypoventilation syndrome 02/05/2021    On home oxygen therapy     2.5 AT REST WITH ACTIVITY UP TO 4L    ANABELLE on CPAP     cpap  & uses O2 with CPAP    Osteoarthritis     Pneumonia     Many times in past, 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    PONV (postoperative nausea and vomiting)     Type II diabetes mellitus 09/08/2021       Past Surgical History:   Procedure Laterality Date    BREAST EXCISIONAL BIOPSY Bilateral     COLONOSCOPY      COLONOSCOPY N/A 12/8/2022    Procedure: COLONOSCOPY;  Surgeon: Henry Aden MD;  Location: [facility] ENDOSCOPY;  Service: Gastroenterology;  Laterality: N/A;  diverticulosis, and hemorrhoids    CYSTOSCOPY BLADDER STONE  LITHOTRIPSY      ENDOSCOPY      ENDOSCOPY N/A 12/8/2022    Procedure: ESOPHAGOGASTRODUODENOSCOPY  with biopsy;  Surgeon: Henry Aden MD;  Location: [facility] ENDOSCOPY;  Service: Gastroenterology;  Laterality: N/A;  hiatal hernia    KNEE ARTHROPLASTY Bilateral 2009    MASTECTOMY Left 08/10/2021    Procedure: LEFT TOTAL MASTECTOMY;  Surgeon: Nicci Banks MD;  Location: [facility] MAIN OR;  Service: General;  Laterality: Left;    MASTECTOMY Right 08/10/2021    Procedure: RIGHT MODIFIED RADICAL MASTECTOMY;  Surgeon: Nicci Banks MD;  Location: [facility] MAIN OR;  Service: General;  Laterality: Right;    SPINE SURGERY  1984    TOTAL ABDOMINAL HYSTERECTOMY WITH SALPINGO OOPHORECTOMY  2004    UPPER GASTROINTESTINAL ENDOSCOPY      VENOUS ACCESS DEVICE (PORT) INSERTION N/A 01/25/2021    Procedure: INSERTION VENOUS ACCESS DEVICE;  Surgeon: Nicci Banks MD;  Location: [facility] MAIN OR;  Service: General;  Laterality: N/A;    VENOUS ACCESS DEVICE (PORT) REMOVAL Left 04/05/2022    Procedure: REMOVAL VENOUS ACCESS DEVICE;  Surgeon: Nicci Banks MD;  Location: [facility] MAIN OR;  Service: General;  Laterality: Left;       Social History     Socioeconomic History    Marital status:     Number of children: 3   Tobacco Use    Smoking status: Never     Passive exposure: Never    Smokeless tobacco: Never   Vaping Use    Vaping status: Never Used   Substance and Sexual Activity    Alcohol use: Never    Drug use: Never    Sexual activity: Not Currently     Partners: Male       Family History   Problem Relation Age of Onset    Breast cancer Mother 45    Cancer Mother         breast; metastasized    Lung cancer Father     Hodgkin's lymphoma Father     Skin cancer Father         squamous cell    Cancer Father         4 kinds: Hodgkins; lung; squamous cell skin    Breast cancer Maternal Aunt 70    Cancer Maternal Aunt         breast; metastasized    Breast cancer Paternal Aunt 70    Cancer Paternal Aunt          breast; cured    Colon cancer Paternal Grandmother 60    Cancer Paternal Grandmother         colon cancer    Hypertension Paternal Grandmother         EVERY ADULT IN MY FAMILY    Ovarian cancer Neg Hx     Uterine cancer Neg Hx     Deep vein thrombosis Neg Hx     Pulmonary embolism Neg Hx     Malig Hyperthermia Neg Hx        Review of Systems   Constitutional: Positive for malaise/fatigue.   Cardiovascular:  Positive for leg swelling.   Respiratory:  Positive for shortness of breath.    Skin:  Positive for dry skin and suspicious lesions.   All other systems reviewed and are negative.      Allergies   Allergen Reactions    Amlodipine Swelling     LEGS     Hydrochlorothiazide Unknown - Low Severity     Neuro issues    Morphine Nausea And Vomiting     hallucinations    Adhesive Tape Rash         Current Outpatient Medications:     albuterol sulfate  (90 Base) MCG/ACT inhaler, Inhale 2 puffs Every 4 (Four) Hours As Needed for Wheezing., Disp: 18 g, Rfl: 11    anastrozole (ARIMIDEX) 1 MG tablet, Take 1 tablet by mouth Daily., Disp: 90 tablet, Rfl: 3    apixaban (ELIQUIS) 5 MG tablet tablet, Take 1 tablet by mouth 2 (Two) Times a Day., Disp: 60 tablet, Rfl: 11    benzonatate (TESSALON) 100 MG capsule, Take 1 capsule by mouth 3 (Three) Times a Day As Needed for Cough., Disp: 90 capsule, Rfl: 2    Cholecalciferol (Vitamin D) 50 MCG (2000 UT) capsule, Take 1 capsule by mouth Daily., Disp: , Rfl:     dicyclomine (BENTYL) 20 MG tablet, TAKE 1 TABLET BY MOUTH EVERY 6 HOURS (Patient taking differently: Take 1 tablet by mouth Every 6 (Six) Hours As Needed for Abdominal Cramping (ibs).), Disp: 60 tablet, Rfl: 2    diphenoxylate-atropine (LOMOTIL) 2.5-0.025 MG per tablet, Take 1 tablet by mouth 4 (Four) Times a Day As Needed for Diarrhea., Disp: , Rfl:     FLUoxetine (PROzac) 40 MG capsule, TAKE 1 CAPSULE BY MOUTH EVERY DAY, Disp: 30 capsule, Rfl: 0    furosemide (Lasix) 40 MG tablet, Take 1 tablet by mouth Daily.,  "Disp: 90 tablet, Rfl: 2    lisinopril (PRINIVIL,ZESTRIL) 10 MG tablet, Take 1 tablet by mouth Daily., Disp: 90 tablet, Rfl: 0    mometasone-formoterol (DULERA 100) 100-5 MCG/ACT inhaler, Inhale 2 puffs 2 (Two) Times a Day., Disp: 1 each, Rfl: 11    montelukast (SINGULAIR) 10 MG tablet, Take 1 tablet by mouth Every Night., Disp: , Rfl:     multivitamin (THERAGRAN) tablet tablet, Take 1 tablet by mouth Daily., Disp: , Rfl:     mupirocin (BACTROBAN) 2 % ointment, APPLY TO THE AFFECTED AREA(S) TWICE DAILY for 2 weeks, Disp: , Rfl:     ondansetron (ZOFRAN) 8 MG tablet, Take 1 tablet by mouth 3 (Three) Times a Day As Needed for Nausea., Disp: , Rfl:     pantoprazole (PROTONIX) 40 MG EC tablet, Take 1 tablet by mouth Daily., Disp: , Rfl:     pravastatin (PRAVACHOL) 40 MG tablet, Take 1 tablet by mouth Every Night., Disp: , Rfl:     spironolactone (ALDACTONE) 25 MG tablet, Take 1 tablet by mouth Daily., Disp: 90 tablet, Rfl: 2    metoprolol succinate XL (TOPROL-XL) 25 MG 24 hr tablet, Take 1 tablet by mouth Every 12 (Twelve) Hours for 30 days., Disp: 60 tablet, Rfl: 0      Objective:     Vitals:    04/30/25 1324   BP: 110/72   Pulse: 96   SpO2: 95%   Weight: (!) 152 kg (336 lb)   Height: 165.1 cm (65\")         Body mass index is 55.91 kg/m².    Vitals reviewed.   Constitutional:       Appearance: Not in distress. Morbidly obese.      Comments: On oxygen. Morbidly obese.   Eyes:      Conjunctiva/sclera: Conjunctivae normal.   HENT:      Head: Normocephalic.      Nose: Nose normal.   Neck:      Comments: Cannot assess for JVD due to habitus  Pulmonary:      Effort: Pulmonary effort is normal.      Breath sounds: Normal breath sounds.   Cardiovascular:      Normal rate. Regular rhythm.      Murmurs: There is no murmur.      Comments: Stasis changes left shin  Pulses:     Intact distal pulses.   Edema:     Pretibial: bilateral 1+ edema of the pretibial area.     Ankle: bilateral 1+ edema of the ankle.  Abdominal:      " Palpations: Abdomen is soft.      Tenderness: There is no abdominal tenderness.      Comments: Obesity limits abdominal exam   Musculoskeletal: Normal range of motion.      Cervical back: Normal range of motion. Skin:     General: Skin is warm and dry.   Neurological:      Mental Status: Alert and oriented to person, place, and time.      Cranial Nerves: No cranial nerve deficit.   Psychiatric:         Behavior: Behavior normal.         Thought Content: Thought content normal.         Judgment: Judgment normal.         Procedures    Assessment:       Diagnosis Plan   1. Chronic heart failure with preserved ejection fraction (HFpEF)        2. Primary hypertension        3. Atrial flutter with controlled response        4. Coronary artery calcification seen on CT scan        5. Aromatase inhibitor use        6. Malignant neoplasm of axillary tail of right breast in female, estrogen receptor positive             Plan:       Ms. Conner has chronic HFpEF, coronary artery calcification without symptoms of angina, systemic hypertension, sleep apnea, obesity hypoventilation syndrome on chronic oxygen, type 2 diabetes, and she is morbidly obese.      She only received three doses of doxorobucin (she did not tolerate it well). She did not tolerate pertuzumab. She completed trastuzumab on March 31, 2022. Serial echocardiograms have been normal and stable. She needs a study yearly until 2027 (her last was in March 2025)..     She will remain on carvedilol, lisinopril, furosemide, and spironolactone. Her BP is well controlled.     She is now on anastrozole; we will continue to try to control her cardiac risk factors while on this medication.     She is in asymptomatic, rate controlled atrial flutter. I do not recommend rhythm control; continue apixaban and carvedilol.     Sincerely,       Bradley Garza MD

## 2025-08-19 RX ORDER — ANASTROZOLE 1 MG/1
1 TABLET ORAL DAILY
Qty: 90 TABLET | Refills: 3 | Status: SHIPPED | OUTPATIENT
Start: 2025-08-19

## 2025-08-26 ENCOUNTER — TELEPHONE (OUTPATIENT)
Dept: PULMONOLOGY | Facility: CLINIC | Age: 70
End: 2025-08-26

## (undated) DEVICE — BIOPATCH™ ANTIMICROBIAL DRESSING WITH CHLORHEXIDINE GLUCONATE IS A HYDROPHILLIC POLYURETHANE ABSORPTIVE FOAM WITH CHLORHEXIDINE GLUCONATE (CHG) WHICH INHIBITS BACTERIAL GROWTH UNDER THE DRESSING. THE DRESSING IS INTENDED TO BE USED TO ABSORB EXUDATE, COVER A WOUND CAUSED BY VASCULAR AND NONVASCULAR PERCUTANEOUS MEDICAL DEVICES DURING SURGERY, AS WELL AS REDUCE LOCAL INFECTION AND COLONIZATION OF MICROORGANISMS.: Brand: BIOPATCH

## (undated) DEVICE — SYR LL TP 10ML STRL

## (undated) DEVICE — Device

## (undated) DEVICE — SUT MNCRYL PLS ANTIB UD 4/0 PS2 18IN

## (undated) DEVICE — SUT PROLN 3/0 SH D/A 36IN 8522H

## (undated) DEVICE — TRAP FLD MINIVAC MEGADYNE 100ML

## (undated) DEVICE — DECANT BG O JET

## (undated) DEVICE — LINER SURG CANSTR SXN S/RIGD 1500CC

## (undated) DEVICE — ANTIBACTERIAL UNDYED BRAIDED (POLYGLACTIN 910), SYNTHETIC ABSORBABLE SUTURE: Brand: COATED VICRYL

## (undated) DEVICE — PIN SFTY LG LF

## (undated) DEVICE — HARMONIC FOCUS SHEARS 9CM LENGTH + ADAPTIVE TISSUE TECHNOLOGY FOR USE WITH BLUE HAND PIECE ONLY: Brand: HARMONIC FOCUS

## (undated) DEVICE — DRAPE,REIN 53X77,STERILE: Brand: MEDLINE

## (undated) DEVICE — BNDG ELAS ELITE V/CLOSE 6IN 5YD LF STRL

## (undated) DEVICE — PK UNIV COMPL 40

## (undated) DEVICE — GAUZE,SPONGE,4"X4",16PLY,XRAY,STRL,LF: Brand: MEDLINE

## (undated) DEVICE — INTENDED FOR TISSUE SEPARATION, AND OTHER PROCEDURES THAT REQUIRE A SHARP SURGICAL BLADE TO PUNCTURE OR CUT.: Brand: BARD-PARKER ® STAINLESS STEEL BLADES

## (undated) DEVICE — NDL HYPO ECLPS SFTY 22G 1 1/2IN

## (undated) DEVICE — BLCK/BITE BLOX WO/DENTL/RIM W/STRAP 54F

## (undated) DEVICE — LOU MINOR PROCEDURE: Brand: MEDLINE INDUSTRIES, INC.

## (undated) DEVICE — ADHS SKIN SURG TISS VISC PREMIERPRO EXOFIN HI/VISC FAST/DRY

## (undated) DEVICE — GAUZE,SPONGE,FLUFF,6"X6.75",STRL,10/TRAY: Brand: MEDLINE

## (undated) DEVICE — GLV SURG SENSICARE POLYISPRN W/ALOE PF LF 6.5 GRN STRL

## (undated) DEVICE — GLV SURG SENSICARE PI MIC PF SZ6.5 LF STRL

## (undated) DEVICE — SOL IRRG H2O PL/BG 1000ML STRL

## (undated) DEVICE — DRP C/ARM 41X74IN

## (undated) DEVICE — ELECTRD BLD EZ CLN MOD XLNG 2.75IN

## (undated) DEVICE — GOWN,SIRUS,NON REINFRCD,LARGE,SET IN SL: Brand: MEDLINE

## (undated) DEVICE — SUT ETHLN 3/0 PS1 18IN 1663H

## (undated) DEVICE — PENCL E/S ULTRAVAC TELESCP NOSE HOLSTR 10FT

## (undated) DEVICE — APPL CHLORAPREP HI/LITE 26ML ORNG

## (undated) DEVICE — SOL NS 500ML

## (undated) DEVICE — SUT VIC 3/0 TIES 18IN J110T

## (undated) DEVICE — STCKNT IMPERV 9X36IN STRL

## (undated) DEVICE — JACKSON-PRATT 100CC BULB RESERVOIR: Brand: CARDINAL HEALTH

## (undated) DEVICE — INTENDED FOR TISSUE SEPARATION, AND OTHER PROCEDURES THAT REQUIRE A SHARP SURGICAL BLADE TO PUNCTURE OR CUT.: Brand: BARD-PARKER ® CARBON RIB-BACK BLADES

## (undated) DEVICE — LEGGINGS, PAIR, 31X48, STERILE: Brand: MEDLINE

## (undated) DEVICE — STPLR SKIN VISISTAT WD 35CT

## (undated) DEVICE — TOWEL,OR,DSP,ST,BLUE,STD,4/PK,20PK/CS: Brand: MEDLINE

## (undated) DEVICE — TBG PENCL TELESCP MEGADYNE SMOKE EVAC 10FT

## (undated) DEVICE — PATIENT RETURN ELECTRODE, SINGLE-USE, CONTACT QUALITY MONITORING, ADULT, WITH 9FT CORD, FOR PATIENTS WEIGING OVER 33LBS. (15KG): Brand: MEGADYNE

## (undated) DEVICE — CVR PROB GEN PURP W ISOSILK 6X48

## (undated) DEVICE — SINGLE-USE BIOPSY FORCEPS: Brand: RADIAL JAW 4

## (undated) DEVICE — TOTAL TRAY, 16FR 10ML SIL FOLEY, URN: Brand: MEDLINE

## (undated) DEVICE — SOLIDIFIER LIQLOC PLS 1500CC BT

## (undated) DEVICE — ST. SORBAVIEW ULTIMATE IJ SYSTEM A,C: Brand: CENTURION

## (undated) DEVICE — ADHS SKIN DERMABOND TOP ADVANCED

## (undated) DEVICE — CONN JET HYDRA H20 AUXILIARY DISP

## (undated) DEVICE — SUT SILK 2/0 SH 30IN K833H

## (undated) DEVICE — BANDAGE,GAUZE,BULKEE II,4.5"X4.1YD,STRL: Brand: MEDLINE

## (undated) DEVICE — Device: Brand: DEFENDO AIR/WATER/SUCTION AND BIOPSY VALVE

## (undated) DEVICE — PK ENT MAJ 40

## (undated) DEVICE — 3M™ IOBAN™ 2 ANTIMICROBIAL INCISE DRAPE 6640EZ: Brand: IOBAN™ 2